# Patient Record
Sex: FEMALE | Race: WHITE | Employment: OTHER | URBAN - METROPOLITAN AREA
[De-identification: names, ages, dates, MRNs, and addresses within clinical notes are randomized per-mention and may not be internally consistent; named-entity substitution may affect disease eponyms.]

---

## 2017-01-30 ENCOUNTER — HOSPITAL ENCOUNTER (OUTPATIENT)
Dept: PULMONOLOGY | Age: 62
Discharge: HOME | End: 2017-01-30
Attending: INTERNAL MEDICINE | Admitting: INTERNAL MEDICINE

## 2017-01-30 ENCOUNTER — OFFICE VISIT (OUTPATIENT)
Dept: PULMONOLOGY | Age: 62
End: 2017-01-30

## 2017-01-30 VITALS
TEMPERATURE: 97.4 F | HEIGHT: 65 IN | SYSTOLIC BLOOD PRESSURE: 98 MMHG | DIASTOLIC BLOOD PRESSURE: 64 MMHG | OXYGEN SATURATION: 96 % | BODY MASS INDEX: 19.43 KG/M2 | HEART RATE: 107 BPM | WEIGHT: 116.6 LBS | RESPIRATION RATE: 24 BRPM

## 2017-01-30 DIAGNOSIS — Z23 NEED FOR INFLUENZA VACCINATION: ICD-10-CM

## 2017-01-30 DIAGNOSIS — K21.9 GASTROESOPHAGEAL REFLUX DISEASE, ESOPHAGITIS PRESENCE NOT SPECIFIED: ICD-10-CM

## 2017-01-30 DIAGNOSIS — F17.200 CURRENT SMOKER: ICD-10-CM

## 2017-01-30 DIAGNOSIS — J44.9 CHRONIC OBSTRUCTIVE PULMONARY DISEASE, UNSPECIFIED COPD TYPE (HCC): ICD-10-CM

## 2017-01-30 DIAGNOSIS — G47.34 NOCTURNAL HYPOXIA: ICD-10-CM

## 2017-01-30 DIAGNOSIS — R93.89 ABNORMAL FINDINGS ON DIAGNOSTIC IMAGING OF OTHER SPECIFIED BODY STRUCTURES: ICD-10-CM

## 2017-01-30 DIAGNOSIS — J47.9 BRONCHIECTASIS WITHOUT COMPLICATION (HCC): ICD-10-CM

## 2017-01-30 DIAGNOSIS — R93.89 ABNORMAL CT SCAN, CHEST: Primary | ICD-10-CM

## 2017-01-30 PROCEDURE — 99214 OFFICE O/P EST MOD 30 MIN: CPT | Performed by: INTERNAL MEDICINE

## 2017-01-30 PROCEDURE — G0008 ADMIN INFLUENZA VIRUS VAC: HCPCS | Performed by: INTERNAL MEDICINE

## 2017-01-30 PROCEDURE — 90688 IIV4 VACCINE SPLT 0.5 ML IM: CPT | Performed by: INTERNAL MEDICINE

## 2017-01-30 RX ORDER — ALBUTEROL SULFATE 2.5 MG/3ML
2.5 SOLUTION RESPIRATORY (INHALATION) ONCE
Status: COMPLETED | OUTPATIENT
Start: 2017-01-30 | End: 2017-01-30

## 2017-01-30 RX ADMIN — ALBUTEROL SULFATE 2.5 MG: 2.5 SOLUTION RESPIRATORY (INHALATION) at 10:10

## 2017-02-07 ENCOUNTER — OFFICE VISIT (OUTPATIENT)
Dept: FAMILY MEDICINE CLINIC | Age: 62
End: 2017-02-07

## 2017-02-07 VITALS
HEART RATE: 67 BPM | BODY MASS INDEX: 19.99 KG/M2 | OXYGEN SATURATION: 97 % | WEIGHT: 120 LBS | HEIGHT: 65 IN | SYSTOLIC BLOOD PRESSURE: 100 MMHG | DIASTOLIC BLOOD PRESSURE: 68 MMHG

## 2017-02-07 DIAGNOSIS — E78.00 PURE HYPERCHOLESTEROLEMIA: ICD-10-CM

## 2017-02-07 DIAGNOSIS — E87.1 HYPONATREMIA: ICD-10-CM

## 2017-02-07 DIAGNOSIS — K21.9 GASTROESOPHAGEAL REFLUX DISEASE WITHOUT ESOPHAGITIS: ICD-10-CM

## 2017-02-07 DIAGNOSIS — Z12.31 ENCOUNTER FOR SCREENING MAMMOGRAM FOR MALIGNANT NEOPLASM OF BREAST: ICD-10-CM

## 2017-02-07 DIAGNOSIS — J84.10 PULMONARY FIBROSIS (HCC): ICD-10-CM

## 2017-02-07 DIAGNOSIS — Z79.899 CURRENT USE OF PROTON PUMP INHIBITOR: ICD-10-CM

## 2017-02-07 DIAGNOSIS — J44.9 CHRONIC OBSTRUCTIVE PULMONARY DISEASE, UNSPECIFIED COPD TYPE (HCC): Primary | ICD-10-CM

## 2017-02-07 DIAGNOSIS — R68.89 COLD INTOLERANCE: ICD-10-CM

## 2017-02-07 DIAGNOSIS — F17.200 TOBACCO DEPENDENCE: ICD-10-CM

## 2017-02-07 DIAGNOSIS — J84.9 ILD (INTERSTITIAL LUNG DISEASE) (HCC): ICD-10-CM

## 2017-02-07 LAB
ANION GAP SERPL CALCULATED.3IONS-SCNC: 16 MMOL/L (ref 3–16)
BASOPHILS ABSOLUTE: 0.1 K/UL (ref 0–0.2)
BASOPHILS RELATIVE PERCENT: 0.9 %
BUN BLDV-MCNC: 14 MG/DL (ref 7–20)
CALCIUM SERPL-MCNC: 9.1 MG/DL (ref 8.3–10.6)
CHLORIDE BLD-SCNC: 94 MMOL/L (ref 99–110)
CHOLESTEROL, TOTAL: 179 MG/DL (ref 0–199)
CO2: 20 MMOL/L (ref 21–32)
CREAT SERPL-MCNC: 0.5 MG/DL (ref 0.6–1.2)
EOSINOPHILS ABSOLUTE: 0.1 K/UL (ref 0–0.6)
EOSINOPHILS RELATIVE PERCENT: 1 %
GFR AFRICAN AMERICAN: >60
GFR NON-AFRICAN AMERICAN: >60
GLUCOSE BLD-MCNC: 83 MG/DL (ref 70–99)
HCT VFR BLD CALC: 36.3 % (ref 36–48)
HDLC SERPL-MCNC: 81 MG/DL (ref 40–60)
HEMOGLOBIN: 12.2 G/DL (ref 12–16)
LDL CHOLESTEROL CALCULATED: 77 MG/DL
LYMPHOCYTES ABSOLUTE: 2.3 K/UL (ref 1–5.1)
LYMPHOCYTES RELATIVE PERCENT: 20 %
MAGNESIUM: 1.9 MG/DL (ref 1.8–2.4)
MCH RBC QN AUTO: 31 PG (ref 26–34)
MCHC RBC AUTO-ENTMCNC: 33.5 G/DL (ref 31–36)
MCV RBC AUTO: 92.5 FL (ref 80–100)
MONOCYTES ABSOLUTE: 0.7 K/UL (ref 0–1.3)
MONOCYTES RELATIVE PERCENT: 6.5 %
NEUTROPHILS ABSOLUTE: 8.2 K/UL (ref 1.7–7.7)
NEUTROPHILS RELATIVE PERCENT: 71.6 %
PDW BLD-RTO: 13.2 % (ref 12.4–15.4)
PLATELET # BLD: 221 K/UL (ref 135–450)
PMV BLD AUTO: 8.2 FL (ref 5–10.5)
POTASSIUM SERPL-SCNC: 4.5 MMOL/L (ref 3.5–5.1)
RBC # BLD: 3.92 M/UL (ref 4–5.2)
SODIUM BLD-SCNC: 130 MMOL/L (ref 136–145)
TRIGL SERPL-MCNC: 103 MG/DL (ref 0–150)
TSH REFLEX: 0.7 UIU/ML (ref 0.27–4.2)
VITAMIN B-12: 597 PG/ML (ref 211–911)
VLDLC SERPL CALC-MCNC: 21 MG/DL
WBC # BLD: 11.5 K/UL (ref 4–11)

## 2017-02-07 PROCEDURE — 36415 COLL VENOUS BLD VENIPUNCTURE: CPT | Performed by: FAMILY MEDICINE

## 2017-02-07 PROCEDURE — 99214 OFFICE O/P EST MOD 30 MIN: CPT | Performed by: FAMILY MEDICINE

## 2017-02-07 RX ORDER — PANTOPRAZOLE SODIUM 20 MG/1
TABLET, DELAYED RELEASE ORAL
Qty: 30 TABLET | Refills: 11 | Status: SHIPPED | OUTPATIENT
Start: 2017-02-07 | End: 2017-06-06

## 2017-02-07 RX ORDER — SERTRALINE HYDROCHLORIDE 100 MG/1
TABLET, FILM COATED ORAL
Qty: 60 TABLET | Refills: 11 | Status: SHIPPED | OUTPATIENT
Start: 2017-02-07 | End: 2018-02-12 | Stop reason: SDUPTHER

## 2017-02-07 RX ORDER — TRAZODONE HYDROCHLORIDE 150 MG/1
TABLET ORAL
Qty: 180 TABLET | Refills: 11 | Status: SHIPPED | OUTPATIENT
Start: 2017-02-07 | End: 2018-01-25 | Stop reason: SDUPTHER

## 2017-02-07 RX ORDER — IBUPROFEN 800 MG/1
TABLET ORAL
Qty: 90 TABLET | Refills: 11 | Status: SHIPPED | OUTPATIENT
Start: 2017-02-07 | End: 2018-04-25 | Stop reason: SDUPTHER

## 2017-02-07 RX ORDER — CYCLOBENZAPRINE HCL 5 MG
TABLET ORAL
Qty: 90 TABLET | Refills: 11 | Status: SHIPPED | OUTPATIENT
Start: 2017-02-07 | End: 2018-01-25 | Stop reason: SDUPTHER

## 2017-02-07 RX ORDER — MONTELUKAST SODIUM 10 MG/1
TABLET ORAL
Qty: 30 TABLET | Refills: 11 | Status: SHIPPED | OUTPATIENT
Start: 2017-02-07 | End: 2018-01-25 | Stop reason: SDUPTHER

## 2017-02-07 RX ORDER — BUSPIRONE HYDROCHLORIDE 30 MG/1
TABLET ORAL
Qty: 60 TABLET | Refills: 11 | Status: SHIPPED | OUTPATIENT
Start: 2017-02-07 | End: 2017-11-27 | Stop reason: SDUPTHER

## 2017-02-07 RX ORDER — ALENDRONATE SODIUM 70 MG/1
TABLET ORAL
Qty: 4 TABLET | Refills: 11 | Status: SHIPPED | OUTPATIENT
Start: 2017-02-07 | End: 2017-02-20

## 2017-02-07 RX ORDER — ARIPIPRAZOLE 5 MG/1
TABLET ORAL
Qty: 30 TABLET | Refills: 1 | Status: CANCELLED | OUTPATIENT
Start: 2017-02-07

## 2017-02-07 RX ORDER — SIMVASTATIN 20 MG
TABLET ORAL
Qty: 30 TABLET | Refills: 11 | Status: SHIPPED | OUTPATIENT
Start: 2017-02-07 | End: 2018-01-25 | Stop reason: SDUPTHER

## 2017-02-08 ASSESSMENT — ENCOUNTER SYMPTOMS
ABDOMINAL PAIN: 0
COUGH: 1
WHEEZING: 1
SHORTNESS OF BREATH: 1

## 2017-02-13 ENCOUNTER — TELEPHONE (OUTPATIENT)
Dept: FAMILY MEDICINE CLINIC | Age: 62
End: 2017-02-13

## 2017-02-20 RX ORDER — ALENDRONATE SODIUM 70 MG/1
TABLET ORAL
Qty: 4 TABLET | Refills: 5 | Status: SHIPPED | OUTPATIENT
Start: 2017-02-20 | End: 2017-09-03 | Stop reason: SDUPTHER

## 2017-03-20 RX ORDER — TIOTROPIUM BROMIDE 18 UG/1
CAPSULE ORAL; RESPIRATORY (INHALATION)
Qty: 30 CAPSULE | Refills: 5 | Status: SHIPPED | OUTPATIENT
Start: 2017-03-20 | End: 2017-10-31 | Stop reason: SDUPTHER

## 2017-04-03 DIAGNOSIS — J44.9 CHRONIC OBSTRUCTIVE PULMONARY DISEASE, UNSPECIFIED COPD TYPE (HCC): Primary | ICD-10-CM

## 2017-04-03 RX ORDER — LEVALBUTEROL TARTRATE 45 UG/1
2 AEROSOL, METERED ORAL EVERY 4 HOURS PRN
Qty: 1 INHALER | Refills: 5 | Status: SHIPPED | OUTPATIENT
Start: 2017-04-03 | End: 2017-09-05

## 2017-04-03 RX ORDER — LEVALBUTEROL TARTRATE 45 UG/1
AEROSOL, METERED ORAL
Qty: 1 INHALER | Refills: 6 | Status: SHIPPED | OUTPATIENT
Start: 2017-04-03 | End: 2017-09-05

## 2017-04-19 ENCOUNTER — TELEPHONE (OUTPATIENT)
Dept: PULMONOLOGY | Age: 62
End: 2017-04-19

## 2017-05-04 ENCOUNTER — TELEPHONE (OUTPATIENT)
Dept: PULMONOLOGY | Age: 62
End: 2017-05-04

## 2017-05-15 ENCOUNTER — TELEPHONE (OUTPATIENT)
Dept: FAMILY MEDICINE CLINIC | Age: 62
End: 2017-05-15

## 2017-05-23 RX ORDER — PROPRANOLOL HYDROCHLORIDE 40 MG/1
TABLET ORAL
Qty: 60 TABLET | Refills: 5 | Status: SHIPPED | OUTPATIENT
Start: 2017-05-23 | End: 2017-10-09 | Stop reason: SDUPTHER

## 2017-05-25 ENCOUNTER — HOSPITAL ENCOUNTER (OUTPATIENT)
Dept: PULMONOLOGY | Age: 62
Discharge: HOME | End: 2017-05-25
Attending: INTERNAL MEDICINE | Admitting: INTERNAL MEDICINE

## 2017-05-25 DIAGNOSIS — R93.89 ABNORMAL FINDINGS ON DIAGNOSTIC IMAGING OF OTHER SPECIFIED BODY STRUCTURES: ICD-10-CM

## 2017-05-25 RX ORDER — ALBUTEROL SULFATE 2.5 MG/3ML
2.5 SOLUTION RESPIRATORY (INHALATION) ONCE
Status: COMPLETED | OUTPATIENT
Start: 2017-05-25 | End: 2017-05-25

## 2017-05-25 RX ADMIN — ALBUTEROL SULFATE 2.5 MG: 2.5 SOLUTION RESPIRATORY (INHALATION) at 10:04

## 2017-06-13 ENCOUNTER — OFFICE VISIT (OUTPATIENT)
Dept: ORTHOPEDIC SURGERY | Age: 62
End: 2017-06-13

## 2017-06-13 VITALS — HEIGHT: 65 IN | BODY MASS INDEX: 19.98 KG/M2 | WEIGHT: 119.93 LBS

## 2017-06-13 DIAGNOSIS — S42.035A CLOSED NONDISPLACED FRACTURE OF ACROMIAL END OF LEFT CLAVICLE, INITIAL ENCOUNTER: ICD-10-CM

## 2017-06-13 DIAGNOSIS — M25.512 LEFT SHOULDER PAIN, UNSPECIFIED CHRONICITY: Primary | ICD-10-CM

## 2017-06-13 PROCEDURE — MISCLOD MISC IP SERVICE NONBILLABLE: Performed by: PHYSICIAN ASSISTANT

## 2017-06-13 PROCEDURE — 73030 X-RAY EXAM OF SHOULDER: CPT | Performed by: PHYSICIAN ASSISTANT

## 2017-06-13 RX ORDER — HYDROCODONE BITARTRATE AND ACETAMINOPHEN 5; 325 MG/1; MG/1
1 TABLET ORAL EVERY 6 HOURS PRN
Qty: 30 TABLET | Refills: 0 | Status: SHIPPED | OUTPATIENT
Start: 2017-06-13 | End: 2017-06-20

## 2017-08-28 ENCOUNTER — TELEPHONE (OUTPATIENT)
Dept: FAMILY MEDICINE CLINIC | Age: 62
End: 2017-08-28

## 2017-08-31 ENCOUNTER — OFFICE VISIT (OUTPATIENT)
Dept: PULMONOLOGY | Age: 62
End: 2017-08-31

## 2017-08-31 VITALS
TEMPERATURE: 97.6 F | HEART RATE: 76 BPM | RESPIRATION RATE: 16 BRPM | DIASTOLIC BLOOD PRESSURE: 70 MMHG | HEIGHT: 65 IN | SYSTOLIC BLOOD PRESSURE: 106 MMHG | WEIGHT: 129 LBS | OXYGEN SATURATION: 96 % | BODY MASS INDEX: 21.49 KG/M2

## 2017-08-31 DIAGNOSIS — F17.200 CURRENT SMOKER: ICD-10-CM

## 2017-08-31 DIAGNOSIS — J44.9 MODERATE COPD (CHRONIC OBSTRUCTIVE PULMONARY DISEASE) (HCC): ICD-10-CM

## 2017-08-31 DIAGNOSIS — J47.9 BRONCHIECTASIS WITHOUT COMPLICATION (HCC): ICD-10-CM

## 2017-08-31 DIAGNOSIS — K21.9 GASTROESOPHAGEAL REFLUX DISEASE, ESOPHAGITIS PRESENCE NOT SPECIFIED: ICD-10-CM

## 2017-08-31 DIAGNOSIS — R93.89 ABNORMAL CT SCAN, CHEST: Primary | ICD-10-CM

## 2017-08-31 PROCEDURE — 99214 OFFICE O/P EST MOD 30 MIN: CPT | Performed by: INTERNAL MEDICINE

## 2017-09-05 ENCOUNTER — TELEPHONE (OUTPATIENT)
Dept: PULMONOLOGY | Age: 62
End: 2017-09-05

## 2017-09-05 RX ORDER — ALBUTEROL SULFATE 90 UG/1
2 AEROSOL, METERED RESPIRATORY (INHALATION) EVERY 6 HOURS PRN
Qty: 1 INHALER | Refills: 5 | Status: SHIPPED | OUTPATIENT
Start: 2017-09-05 | End: 2018-08-22 | Stop reason: SDUPTHER

## 2017-09-05 RX ORDER — ALENDRONATE SODIUM 70 MG/1
TABLET ORAL
Qty: 4 TABLET | Refills: 5 | Status: SHIPPED | OUTPATIENT
Start: 2017-09-05 | End: 2018-01-25 | Stop reason: SDUPTHER

## 2017-10-09 RX ORDER — PROPRANOLOL HYDROCHLORIDE 40 MG/1
TABLET ORAL
Qty: 60 TABLET | Refills: 5 | Status: SHIPPED | OUTPATIENT
Start: 2017-10-09 | End: 2018-03-15 | Stop reason: SDUPTHER

## 2017-10-13 ENCOUNTER — HOSPITAL ENCOUNTER (OUTPATIENT)
Dept: PULMONOLOGY | Age: 62
Discharge: HOME | End: 2017-10-13
Attending: INTERNAL MEDICINE | Admitting: INTERNAL MEDICINE

## 2017-10-13 DIAGNOSIS — F17.200 NICOTINE DEPENDENCE, UNCOMPLICATED, UNSPECIFIED NICOTINE PRODUCT TYPE: ICD-10-CM

## 2017-10-13 DIAGNOSIS — R93.89 ABNORMAL FINDINGS ON DIAGNOSTIC IMAGING OF OTHER SPECIFIED BODY STRUCTURES: ICD-10-CM

## 2017-10-13 RX ORDER — ALBUTEROL SULFATE 2.5 MG/3ML
2.5 SOLUTION RESPIRATORY (INHALATION) ONCE
Status: DISCONTINUED | OUTPATIENT
Start: 2017-10-13 | End: 2017-10-13

## 2017-10-13 NOTE — PROCEDURES
[address]                [address] [address]                              PULMONARY FUNCTION    PATIENT NAME: Barb Dumont                        :             1955  MED REC NO:   [id number]                           ROOM:  ACCOUNT NO:   [de-identified]                           ADMISSION DATE:  10/13/2017  PROVIDER:     Quin Johnson MD    DATE OF PROCEDURE:  10/13/2017    INDICATIONS:  COPD. RESULTS:    1. Spirometry revealed evidence of severe obstructive defect. FEV1  is 1.27 L, which is 48% of predicted. FEV1/FVC ratio of 63%. 2.  Lung volume revealed normal total lung capacity of 4.78 L, which  is 89% of predicted. Air trapping residual volume 2.75 L, which is  141% of predicted. 3.  Diffusion capacity is severely decreased at 9.16, which is 38% of  predicted. 4.  Flow volume loops consistent with obstructive defect. 5.  Six-minute walk done per [facility] Protocol. The  patient was able to walk 1120 feet. Saturation on room air at rest  was 96% with a heart rate of 80. No significant desaturation on  exertion. Maximum heart rate is 105. CONCLUSION:  1. Severe obstructive defect with air trapping and severely decreased  diffusion capacity. 2.  Six-minute walk revealed no significant desaturation on exertion.         Deniz Ortiz MD    D: 10/13/2017 11:19:39       T: 10/13/2017 11:41:51     [email or web address]  Job#: [id number]     Doc#: [id number]

## 2017-10-16 RX ORDER — ALENDRONATE SODIUM 70 MG/1
TABLET ORAL
Qty: 4 TABLET | Refills: 5 | OUTPATIENT
Start: 2017-10-16

## 2017-10-19 ENCOUNTER — OFFICE VISIT (OUTPATIENT)
Dept: PULMONOLOGY | Age: 62
End: 2017-10-19

## 2017-10-19 VITALS
HEIGHT: 65 IN | RESPIRATION RATE: 20 BRPM | TEMPERATURE: 97.6 F | WEIGHT: 133.6 LBS | DIASTOLIC BLOOD PRESSURE: 74 MMHG | SYSTOLIC BLOOD PRESSURE: 116 MMHG | OXYGEN SATURATION: 95 % | HEART RATE: 60 BPM | BODY MASS INDEX: 22.26 KG/M2

## 2017-10-19 DIAGNOSIS — G47.34 NOCTURNAL HYPOXIA: ICD-10-CM

## 2017-10-19 DIAGNOSIS — F17.200 CURRENT SMOKER: ICD-10-CM

## 2017-10-19 DIAGNOSIS — R93.89 ABNORMAL CT OF THE CHEST: Primary | ICD-10-CM

## 2017-10-19 DIAGNOSIS — Z23 NEED FOR INFLUENZA VACCINATION: ICD-10-CM

## 2017-10-19 DIAGNOSIS — J44.9 MODERATE COPD (CHRONIC OBSTRUCTIVE PULMONARY DISEASE) (HCC): ICD-10-CM

## 2017-10-19 DIAGNOSIS — Z87.891 PERSONAL HISTORY OF TOBACCO USE, PRESENTING HAZARDS TO HEALTH: ICD-10-CM

## 2017-10-19 DIAGNOSIS — J47.9 BRONCHIECTASIS WITHOUT COMPLICATION (HCC): ICD-10-CM

## 2017-10-19 PROCEDURE — 3023F SPIROM DOC REV: CPT | Performed by: INTERNAL MEDICINE

## 2017-10-19 PROCEDURE — 4004F PT TOBACCO SCREEN RCVD TLK: CPT | Performed by: INTERNAL MEDICINE

## 2017-10-19 PROCEDURE — 3014F SCREEN MAMMO DOC REV: CPT | Performed by: INTERNAL MEDICINE

## 2017-10-19 PROCEDURE — 3017F COLORECTAL CA SCREEN DOC REV: CPT | Performed by: INTERNAL MEDICINE

## 2017-10-19 PROCEDURE — G8420 CALC BMI NORM PARAMETERS: HCPCS | Performed by: INTERNAL MEDICINE

## 2017-10-19 PROCEDURE — G8484 FLU IMMUNIZE NO ADMIN: HCPCS | Performed by: INTERNAL MEDICINE

## 2017-10-19 PROCEDURE — G0008 ADMIN INFLUENZA VIRUS VAC: HCPCS | Performed by: INTERNAL MEDICINE

## 2017-10-19 PROCEDURE — 99214 OFFICE O/P EST MOD 30 MIN: CPT | Performed by: INTERNAL MEDICINE

## 2017-10-19 PROCEDURE — 90688 IIV4 VACCINE SPLT 0.5 ML IM: CPT | Performed by: INTERNAL MEDICINE

## 2017-10-19 PROCEDURE — G8926 SPIRO NO PERF OR DOC: HCPCS | Performed by: INTERNAL MEDICINE

## 2017-10-19 PROCEDURE — G8427 DOCREV CUR MEDS BY ELIG CLIN: HCPCS | Performed by: INTERNAL MEDICINE

## 2017-10-19 RX ORDER — PANTOPRAZOLE SODIUM 20 MG/1
20 TABLET, DELAYED RELEASE ORAL DAILY
COMMUNITY
End: 2018-02-27

## 2017-10-19 NOTE — PATIENT INSTRUCTIONS
Tips to Help You Stop Smoking (taken from Up-To-Date)      Cigarette smoking is a preventable cause of death in the [place]. Quitting smoking now can decrease your risk of getting smoking-related illnesses like heart disease, stroke, cancer & COPD. S = Set a quit date. T = Tell family, friends, and the people around you that you plan to quit. A = Anticipate or plan ahead for the tough times you'll face while quitting. R = Remove cigarettes and other tobacco products from your home, car, and work. T = Talk to your doctor about getting help to quit. Your doctor may give you medicines to reduce your craving for cigarettes & the unpleasant symptoms that happen when you stop smoking (called withdrawal symptoms). What are the symptoms of withdrawal?  The symptoms include:   ?Trouble sleeping   ? Being irritable, anxious or restless   ? Getting frustrated or angry   ? Having trouble thinking clearly  Nicotine replacement therapy eases withdrawal and reduces your bodys craving for nicotine, the main drug found in cigarettes. Non-prescription forms of nicotine replacement include skin patches, lozenges, and gum. Two prescription medications are available that have been proven to help people stop smoking:  ? Bupropion is a prescription medicine that reduces your desire to smoke. This medicine is sold under the brand names Zyban and Wellbutrin & as a generic formulation. ? Varenicline (brand name: Chantix) is a prescription medicine that reduces withdrawal symptoms and cigarette cravings. If you take bupropion or varenicline and you have any new or worsening depressed mood or thoughts of harming yourself or someone else, stop taking the medicine and call your doctor. Buproprion should not be taken by anyone with a history of seizure or epilepsy. Will I gain weight if I quit?  Yes, you might gain a few pounds.  But quitting smoking will have a much more positive effect on your health than weighing a few pounds more. Plus, you can help prevent some weight gain by being more active and eating less. Taking the medicine bupropion might help control weight gain. What else can I do to improve my chances of quitting?  You can:  ?Start exercising. ?Stay away from smokers and places that you associate with smoking. If people close to you smoke, ask them to quit with you. ? Keep gum, hard candy, or something to put in your mouth handy. If you get a craving for a cigarette, try one of these instead. ?Dont give up, even if you start smoking again. It takes most people a few tries before they succeed. You can also get help from a free phone line (0-380-QUIT-NOW) or go online to www.smokefree.gov. Your pulmonary team is here to help you quit.   Call for assistance [phone]

## 2017-10-19 NOTE — PROGRESS NOTES
Vaccine Information Sheet, \"Influenza - Inactivated\"  given to Boni Leone, or parent/legal guardian of  Boni Leone and verbalized understanding. Patient responses:    Have you ever had a reaction to a flu vaccine? No  Are you able to eat eggs without adverse effects? Yes  Do you have any current illness? No  Have you ever had Guillian Strawn Syndrome? No    Flu vaccine given per order. Please see immunization tab.

## 2017-10-19 NOTE — PROGRESS NOTES
TOBACCO:   reports that she has been smoking Cigarettes. She has a 30.00 pack-year smoking history. She has never used smokeless tobacco.  ETOH:   reports that she does not drink alcohol.       Family History:       Problem Relation Age of Onset    Asthma Mother     Diabetes Mother     Emphysema Mother     Heart Failure Mother     Hypertension Mother     Heart Disease Mother     High Cholesterol Mother     Cancer Mother     Depression Mother     Diabetes Father     Emphysema Father     Heart Failure Father     Hypertension Father     Heart Disease Father     High Cholesterol Father     Substance Abuse Father     Emphysema Paternal Grandfather     Diabetes Sister     High Cholesterol Sister     Vision Loss Maternal Uncle        Current Medications:    Current Outpatient Prescriptions:     pantoprazole (PROTONIX) 20 MG tablet, Take 20 mg by mouth daily, Disp: , Rfl:     propranolol (INDERAL) 40 MG tablet, TAKE 1 TABLET BY MOUTH TWICE A DAY, Disp: 60 tablet, Rfl: 5    alendronate (FOSAMAX) 70 MG tablet, TAKE 1 TABLET BY MOUTH EACH WEEK, Disp: 4 tablet, Rfl: 5    albuterol sulfate HFA (VENTOLIN HFA) 108 (90 Base) MCG/ACT inhaler, Inhale 2 puffs into the lungs every 6 hours as needed for Wheezing, Disp: 1 Inhaler, Rfl: 5    ADVAIR DISKUS 250-50 MCG/DOSE AEPB, INHALE 1 PUFF TWICE DAILY, Disp: 1 Inhaler, Rfl: 5    SPIRIVA HANDIHALER 18 MCG inhalation capsule, INHALE 1 CAPSULE BY MOUTH INTO LUNGS EVERY DAY, Disp: 30 capsule, Rfl: 5    busPIRone (BUSPAR) 30 MG tablet, TAKE 1 TABLET BY MOUTH TWICE A DAY, Disp: 60 tablet, Rfl: 11    traZODone (DESYREL) 150 MG tablet, TAKE 2 TABLETS BY MOUTH AT BEDTIME, Disp: 180 tablet, Rfl: 11    montelukast (SINGULAIR) 10 MG tablet, TAKE 1 TABLET BY MOUTH EACH NIGHT, Disp: 30 tablet, Rfl: 11    simvastatin (ZOCOR) 20 MG tablet, TAKE 1 TABLET BY MOUTH EVERY EVENING, Disp: 30 tablet, Rfl: 11    ibuprofen (ADVIL;MOTRIN) 800 MG tablet, TAKE 1 TABLET BY MOUTH TLC 4.57(85%)   DLCO 08.31(35%) 6MW 1000F L O2 94%  PFTs 04/25/2016 FVC 1.63 (50%) FEV1 0.93(37%) TLC 4.96(97%)   DLCO 09.66(42%) 6MW 1140F L O2 91%  PFTs 12/09/2013                            FEV1 1.54(59%) TLC 5.34(104%) DLCO 11.30(49%) 6MW 1140F L O2 91%. PFTs 06/03/2013                            FEV1 1.69(65%) TLC 4.81(93%)   DLCO 09.44(41%) 6MW 1400F L O2 95%. PFTs 11/28/2012                            FEV1 1.67(64%) TLC 5.08(98%)   DLCO 11.94(51%) 6MW 1400F L O2 95%. PFTs 06/19/2012                            FEV1 1.59(60%) TLC 4.66(90%)   DLCO 10.21(44%) 6MW 1280F L O2 96%. PFTs 03/07/2012                            FEV1 1.72(70%) TLC 4.49(87%)   DLCO 10.72(54%)  PFTs 08/03/2011                            FEV1 1.71(72%) TLC 4.72(96%)   DLCO 12.3 (63%)  PFTs 01/10/2011                            FEV1 1.76           TLC 4.54             DLCO 10.50  PFTs 03/30/2010                            FEV1 1.96           TLC 4.86             DLCO 14.13  PFTs 03/03/2009                            FEV1 2.04           TLC 5.08             DLCO 14.02          CT chest 4/1/2016  Mediastinum: Mild dilation of the ascending thoracic aorta. Borderline enlarged precarinal lymph node measures 10 mm in short axis, unchanged. No new lymphadenopathy is identified. Coronary artery calcifications noted. Lungs/pleura: Peripheral irregular interstitial opacities are appreciated, scattered within the left lower lobes and within the anterior aspects of the upper lobes. Temporal heterogeneity is suggested with areas ground-glass opacity intermixed with areas of fibrosis with architectural distortion. Mild bronchiectasis within the lower lobes detected bilaterally, greater on the left. There is a rounded area of what is probably consolidation at left lung base immediately adjacent to the left hemidiaphragm. On the 5 mm imaging, this is best visualized on image number 82, measuring roughly 9 mm maximally.  All these findings are superimposed on a background of moderate emphysema. Upper Abdomen: Imaging the upper abdomen is unremarkable in appearance. Soft Tissues/Bones: No osteolytic or osteoblastic bone lesions are appreciated. Vertebroplasty within mid thoracic spine is identified. Mild anterior wedging of the thoracic vertebral bodies at other levels is re- demonstrated. LDCT 10/13/2017  images reviewed by me and showed:   1. LungRADS Category: LCS-2   2. LungRADS Category S: Emphysema   3. Incidental findings as above. 4. Annual LDCT screening study in 12 months. 4/5/2016 normal/negative RF 14, SCL70 SSA SSB aldolase CK GIORGI CCP Anti MALORIE-1    4/25/2016 Elevated IgA normal IgG and IgM      IgE 140 with unremarkable immunocap      Assessment:      · Abnormal CT 4/1/2016 with GGO- DDx RB-ILD, NSIP, HP, DIP, eosinophilic pneumonitis, aspiration and CTD-ILD. Favor smoking related ILD vs HP. Bronch 4/6/16 purulent secretions and grew strep pneumoniae. Negative AFB. Got rid of her Parrot   · Moderate obstructive airways diease secondary to COPD and asthma. · Bronchiectasis   · Nocturnal hypoxia on 2LPM   · >30 pack-years smoking                                                     Plan:   · Will continue to follow physiologically with PFTs 6MW in 4/2018  · Advised to avoid exposure/having birds   · Advair 250/50 BID, Spiriva daily, Singulair daily, and Albuterol PRN  · Advised to use O2 2LPM at night  · Pulmonary rehab referral    · Patient is up to date with Pneumococcal vaccine   · Influenza vaccine today   · Mucokinetic therapies (Hypertonic saline QID). · Acapella QID to mobilize respiratory secretions  · CT chest, low dose protocol, screening for lung cancer 10/2018. Risks, benefits and alternatives including doing nothing were discussed with patient. · Smoking cessation counseling. Patient was advised to visit [email or web address] gov and call 1800QUITNOW for assistance.  Will refer to [facility] smoking cessation program.

## 2017-10-23 ENCOUNTER — TELEPHONE (OUTPATIENT)
Dept: CASE MANAGEMENT | Age: 62
End: 2017-10-23

## 2017-10-23 NOTE — TELEPHONE ENCOUNTER
Annual lung screen on 10-13-17. LRAD2. Recommended screen in one year. Reviewed by ordering physician. Ordering office has contacted pt with results.

## 2017-11-27 RX ORDER — BUSPIRONE HYDROCHLORIDE 30 MG/1
TABLET ORAL
Qty: 180 TABLET | Refills: 3 | Status: SHIPPED | OUTPATIENT
Start: 2017-11-27 | End: 2018-12-01 | Stop reason: SDUPTHER

## 2018-01-08 RX ORDER — RANITIDINE 150 MG/1
TABLET ORAL
Qty: 30 TABLET | Refills: 5 | OUTPATIENT
Start: 2018-01-08

## 2018-01-10 ENCOUNTER — TELEPHONE (OUTPATIENT)
Dept: FAMILY MEDICINE CLINIC | Age: 63
End: 2018-01-10

## 2018-01-16 ENCOUNTER — TELEPHONE (OUTPATIENT)
Dept: PULMONOLOGY | Age: 63
End: 2018-01-16

## 2018-01-16 DIAGNOSIS — J84.10 PULMONARY FIBROSIS (HCC): Primary | ICD-10-CM

## 2018-01-16 DIAGNOSIS — J84.9 ILD (INTERSTITIAL LUNG DISEASE) (HCC): ICD-10-CM

## 2018-01-25 ENCOUNTER — OFFICE VISIT (OUTPATIENT)
Dept: FAMILY MEDICINE CLINIC | Age: 63
End: 2018-01-25

## 2018-01-25 VITALS
WEIGHT: 137 LBS | BODY MASS INDEX: 22.82 KG/M2 | HEART RATE: 92 BPM | DIASTOLIC BLOOD PRESSURE: 70 MMHG | SYSTOLIC BLOOD PRESSURE: 120 MMHG | HEIGHT: 65 IN | OXYGEN SATURATION: 95 %

## 2018-01-25 DIAGNOSIS — E87.1 HYPONATREMIA: ICD-10-CM

## 2018-01-25 DIAGNOSIS — Z23 NEED FOR TDAP VACCINATION: ICD-10-CM

## 2018-01-25 DIAGNOSIS — E78.00 PURE HYPERCHOLESTEROLEMIA: ICD-10-CM

## 2018-01-25 DIAGNOSIS — F33.41 RECURRENT MAJOR DEPRESSIVE DISORDER, IN PARTIAL REMISSION (HCC): ICD-10-CM

## 2018-01-25 DIAGNOSIS — J84.10 PULMONARY FIBROSIS (HCC): ICD-10-CM

## 2018-01-25 DIAGNOSIS — Z12.31 ENCOUNTER FOR SCREENING MAMMOGRAM FOR BREAST CANCER: ICD-10-CM

## 2018-01-25 DIAGNOSIS — J44.9 CHRONIC OBSTRUCTIVE PULMONARY DISEASE, UNSPECIFIED COPD TYPE (HCC): ICD-10-CM

## 2018-01-25 DIAGNOSIS — J84.9 ILD (INTERSTITIAL LUNG DISEASE) (HCC): ICD-10-CM

## 2018-01-25 DIAGNOSIS — F12.90 MARIJUANA USE: ICD-10-CM

## 2018-01-25 DIAGNOSIS — R82.90 ABNORMAL URINE ODOR: ICD-10-CM

## 2018-01-25 DIAGNOSIS — H61.23 BILATERAL IMPACTED CERUMEN: ICD-10-CM

## 2018-01-25 DIAGNOSIS — M79.7 FIBROMYALGIA: ICD-10-CM

## 2018-01-25 DIAGNOSIS — F17.210 CIGARETTE NICOTINE DEPENDENCE WITHOUT COMPLICATION: ICD-10-CM

## 2018-01-25 DIAGNOSIS — R53.82 CHRONIC FATIGUE: ICD-10-CM

## 2018-01-25 DIAGNOSIS — Z00.00 ROUTINE GENERAL MEDICAL EXAMINATION AT A HEALTH CARE FACILITY: Primary | ICD-10-CM

## 2018-01-25 PROBLEM — S42.035A CLOSED NONDISPLACED FRACTURE OF ACROMIAL END OF LEFT CLAVICLE: Status: RESOLVED | Noted: 2017-06-13 | Resolved: 2018-01-25

## 2018-01-25 LAB
A/G RATIO: 1.6 (ref 1.1–2.2)
ALBUMIN SERPL-MCNC: 4.6 G/DL (ref 3.4–5)
ALP BLD-CCNC: 62 U/L (ref 40–129)
ALT SERPL-CCNC: 13 U/L (ref 10–40)
ANION GAP SERPL CALCULATED.3IONS-SCNC: 14 MMOL/L (ref 3–16)
AST SERPL-CCNC: 18 U/L (ref 15–37)
BASOPHILS ABSOLUTE: 0 K/UL (ref 0–0.2)
BASOPHILS RELATIVE PERCENT: 0.5 %
BILIRUB SERPL-MCNC: 0.3 MG/DL (ref 0–1)
BUN BLDV-MCNC: 9 MG/DL (ref 7–20)
CALCIUM SERPL-MCNC: 9.5 MG/DL (ref 8.3–10.6)
CHLORIDE BLD-SCNC: 96 MMOL/L (ref 99–110)
CHOLESTEROL, TOTAL: 192 MG/DL (ref 0–199)
CO2: 23 MMOL/L (ref 21–32)
CREAT SERPL-MCNC: 0.7 MG/DL (ref 0.6–1.2)
EOSINOPHILS ABSOLUTE: 0.2 K/UL (ref 0–0.6)
EOSINOPHILS RELATIVE PERCENT: 2.6 %
GFR AFRICAN AMERICAN: >60
GFR NON-AFRICAN AMERICAN: >60
GLOBULIN: 2.8 G/DL
GLUCOSE BLD-MCNC: 95 MG/DL (ref 70–99)
HCT VFR BLD CALC: 43.9 % (ref 36–48)
HDLC SERPL-MCNC: 75 MG/DL (ref 40–60)
HEMOGLOBIN: 14.5 G/DL (ref 12–16)
LDL CHOLESTEROL CALCULATED: 85 MG/DL
LYMPHOCYTES ABSOLUTE: 2.2 K/UL (ref 1–5.1)
LYMPHOCYTES RELATIVE PERCENT: 23.4 %
MCH RBC QN AUTO: 30.8 PG (ref 26–34)
MCHC RBC AUTO-ENTMCNC: 33.1 G/DL (ref 31–36)
MCV RBC AUTO: 93.3 FL (ref 80–100)
MONOCYTES ABSOLUTE: 0.6 K/UL (ref 0–1.3)
MONOCYTES RELATIVE PERCENT: 6.1 %
NEUTROPHILS ABSOLUTE: 6.5 K/UL (ref 1.7–7.7)
NEUTROPHILS RELATIVE PERCENT: 67.4 %
PDW BLD-RTO: 13.2 % (ref 12.4–15.4)
PLATELET # BLD: 252 K/UL (ref 135–450)
PMV BLD AUTO: 8.5 FL (ref 5–10.5)
POTASSIUM SERPL-SCNC: 4.6 MMOL/L (ref 3.5–5.1)
RBC # BLD: 4.7 M/UL (ref 4–5.2)
SODIUM BLD-SCNC: 133 MMOL/L (ref 136–145)
TOTAL PROTEIN: 7.4 G/DL (ref 6.4–8.2)
TRIGL SERPL-MCNC: 161 MG/DL (ref 0–150)
TSH REFLEX: 1.43 UIU/ML (ref 0.27–4.2)
VITAMIN B-12: 653 PG/ML (ref 211–911)
VLDLC SERPL CALC-MCNC: 32 MG/DL
WBC # BLD: 9.6 K/UL (ref 4–11)

## 2018-01-25 PROCEDURE — G8484 FLU IMMUNIZE NO ADMIN: HCPCS | Performed by: FAMILY MEDICINE

## 2018-01-25 PROCEDURE — 4004F PT TOBACCO SCREEN RCVD TLK: CPT | Performed by: FAMILY MEDICINE

## 2018-01-25 PROCEDURE — G8420 CALC BMI NORM PARAMETERS: HCPCS | Performed by: FAMILY MEDICINE

## 2018-01-25 PROCEDURE — 3023F SPIROM DOC REV: CPT | Performed by: FAMILY MEDICINE

## 2018-01-25 PROCEDURE — 99214 OFFICE O/P EST MOD 30 MIN: CPT | Performed by: FAMILY MEDICINE

## 2018-01-25 PROCEDURE — G0438 PPPS, INITIAL VISIT: HCPCS | Performed by: FAMILY MEDICINE

## 2018-01-25 PROCEDURE — G8926 SPIRO NO PERF OR DOC: HCPCS | Performed by: FAMILY MEDICINE

## 2018-01-25 PROCEDURE — 90471 IMMUNIZATION ADMIN: CPT | Performed by: FAMILY MEDICINE

## 2018-01-25 PROCEDURE — 3014F SCREEN MAMMO DOC REV: CPT | Performed by: FAMILY MEDICINE

## 2018-01-25 PROCEDURE — 90715 TDAP VACCINE 7 YRS/> IM: CPT | Performed by: FAMILY MEDICINE

## 2018-01-25 PROCEDURE — 3017F COLORECTAL CA SCREEN DOC REV: CPT | Performed by: FAMILY MEDICINE

## 2018-01-25 PROCEDURE — G8427 DOCREV CUR MEDS BY ELIG CLIN: HCPCS | Performed by: FAMILY MEDICINE

## 2018-01-25 RX ORDER — MONTELUKAST SODIUM 10 MG/1
TABLET ORAL
Qty: 30 TABLET | Refills: 11 | Status: SHIPPED | OUTPATIENT
Start: 2018-01-25 | End: 2018-04-25 | Stop reason: SDUPTHER

## 2018-01-25 RX ORDER — ALENDRONATE SODIUM 70 MG/1
TABLET ORAL
Qty: 4 TABLET | Refills: 5 | Status: SHIPPED | OUTPATIENT
Start: 2018-01-25 | End: 2018-07-24 | Stop reason: SDUPTHER

## 2018-01-25 RX ORDER — SIMVASTATIN 20 MG
TABLET ORAL
Qty: 30 TABLET | Refills: 11 | Status: SHIPPED | OUTPATIENT
Start: 2018-01-25 | End: 2018-04-25 | Stop reason: SDUPTHER

## 2018-01-25 RX ORDER — TRAZODONE HYDROCHLORIDE 150 MG/1
TABLET ORAL
Qty: 180 TABLET | Refills: 11 | Status: SHIPPED | OUTPATIENT
Start: 2018-01-25 | End: 2018-12-19 | Stop reason: SDUPTHER

## 2018-01-25 RX ORDER — CYCLOBENZAPRINE HCL 5 MG
TABLET ORAL
Qty: 90 TABLET | Refills: 11 | Status: SHIPPED | OUTPATIENT
Start: 2018-01-25 | End: 2018-12-14 | Stop reason: ALTCHOICE

## 2018-01-25 RX ORDER — SERTRALINE HYDROCHLORIDE 100 MG/1
TABLET, FILM COATED ORAL
Qty: 60 TABLET | Refills: 11 | Status: CANCELLED | OUTPATIENT
Start: 2018-01-25

## 2018-01-25 RX ORDER — FLUOXETINE HYDROCHLORIDE 60 MG/1
1 TABLET, FILM COATED ORAL; ORAL NIGHTLY
Qty: 30 TABLET | Refills: 5 | Status: SHIPPED | OUTPATIENT
Start: 2018-01-25 | End: 2018-07-24 | Stop reason: SDUPTHER

## 2018-01-25 ASSESSMENT — LIFESTYLE VARIABLES: HOW OFTEN DO YOU HAVE A DRINK CONTAINING ALCOHOL: 0

## 2018-01-25 ASSESSMENT — ANXIETY QUESTIONNAIRES: GAD7 TOTAL SCORE: 3

## 2018-01-25 NOTE — PROGRESS NOTES
tablet TAKE ONE TABLET BY MOUTH EVERY 8 HOURS AS NEEDED FOR MUSCLE SPASMS Yes Pepe Caceres MD   albuterol (PROVENTIL) (2.5 MG/3ML) 0.083% nebulizer solution Take 3 mLs by nebulization every 6 hours as needed for Shortness of Breath Yes Irma Arvizu MD   sodium chloride 3 % nebulizer solution Take 2 mLs by nebulization 4 times daily Yes Irma Arvizu MD   Misc.  Devices (ACAPELLA) MISC 1 Device by Does not apply route 4 times daily DX ILD J84.9 Yes Irma Arvizu MD     Past Medical History:   Diagnosis Date    Allergic rhinitis 6/11/2010    Anxiety     Arthritis     Aspiration pneumonia ([address] 3/21/2012    Recurrent    Asthma     Bronchitis chronic     COPD (chronic obstructive pulmonary disease) ([address] 12/3/2009    Depression     Drug abuse, cocaine type     past history of crack cocaine use    Emphysema of lung (HCC)     Fibromyalgia     GERD (gastroesophageal reflux disease)     Hyperlipidemia     Lung disease     Pneumonia     Polysubstance dependence ([address] 1/2/2012    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    Pulmonary fibrosis ([facility] 10/16/2014    Pulmonary infiltrate     Pulmonary nodule 12/3/2009    Tobacco abuse      Past Surgical History:   Procedure Laterality Date    BRONCHOSCOPY      COLONOSCOPY  2012    ENDOSCOPY, COLON, DIAGNOSTIC      HYSTERECTOMY      OTHER SURGICAL HISTORY  2/12/15    T8 Kyphoplasty    SALIVARY GLAND SURGERY       Family History   Problem Relation Age of Onset    Asthma Mother     Diabetes Mother     Emphysema Mother     Heart Failure Mother     Hypertension Mother     Heart Disease Mother     High Cholesterol Mother     Cancer Mother     Depression Mother     Diabetes Father     Emphysema Father     Heart Failure Father     Hypertension Father     Heart Disease Father     High Cholesterol Father     Substance Abuse Father     Emphysema Paternal Grandfather     Diabetes Sister     High Cholesterol Sister     Vision Loss Maternal Uncle Cinda (Including outside providers/suppliers regularly involved in providing care):   Patient Care Team:  Garfield Pollard MD as PCP - General (Family Medicine)  Garfield Pollard MD as PCP - S Attributed Provider  Angelica Pabon MD as Consulting Physician (Pulmonology)  Araceli Ochoa RN as Registered Nurse    Wt Readings from Last 3 Encounters:   01/25/18 137 lb (62.1 kg)   10/19/17 133 lb 9.6 oz (60.6 kg)   08/31/17 129 lb (58.5 kg)     Vitals:    01/25/18 0818   BP: 120/70   Site: Right Arm   Position: Sitting   Cuff Size: Small Adult   Pulse: 92   SpO2: 95%   Weight: 137 lb (62.1 kg)   Height: 5' 5\" (1.651 m)       General Appearance: well-developed and well nourished and alert    Patient's complete Health Risk Assessment and screening values have been reviewed and are found in Flowsheets. The following problems were reviewed today and where indicated follow up appointments were made and/or referrals ordered.     Positive Risk Factor Screenings with Interventions:     Substance Abuse:  Social History     Tobacco History     Smoking Status  Current Every Day Smoker Smoking Frequency  1 pack/day for 30 years (30 pk yrs) Smoking Tobacco Type  Cigarettes    Smokeless Tobacco Use  Never Used          Alcohol History     Alcohol Use Status  No          Drug Use     Drug Use Status  No Comment  Crack  - none for 2 yrs          Sexual Activity     Sexually Active  Yes Partners  Male               Audit Questionnaire: Screen for Alcohol Misuse  How often do you have a drink containing alcohol?: Never  Substance Abuse Interventions:  · Recreational drug use:  educational materials provided    Health Habits/Nutrition:  Health Habits/Nutrition  Do you exercise for at least 20 minutes 2-3 times per week?: (!) No  Have you lost any weight without trying in the past 3 months?: No  Do you eat fewer than 2 meals per day?: No  Have you seen a dentist within the past year?: (!) No  Body mass index is Date Due    DTaP/Tdap/Td vaccine (1 - Tdap) 11/05/2013    Breast cancer screen  06/11/2016    Zostavax vaccine  10/22/2016    Smoker: low dose lung CT screening  10/13/2018    Colon cancer screen colonoscopy  08/19/2020    Lipid screen  02/07/2022    Flu vaccine  Completed    Pneumococcal med risk  Completed    Hepatitis C screen  Completed    HIV screen  Completed     Recommendations for Preventive Services Due: see orders.   Recommended screening schedule for the next 5-10 years is provided to the patient in written form: see Patient Instructions/AVS.

## 2018-01-25 NOTE — PATIENT INSTRUCTIONS
In[employer]ated disclaims any warranty or liability for your use of this information. Personalized Preventive Plan for Debbie Hartman - 1/25/2018  Medicare offers a range of preventive health benefits. Some of the tests and screenings are paid in full while other may be subject to a deductible, co-insurance, and/or copay. Some of these benefits include a comprehensive review of your medical history including lifestyle, illnesses that may run in your family, and various assessments and screenings as appropriate. After reviewing your medical record and screening and assessments performed today your provider may have ordered immunizations, labs, imaging, and/or referrals for you. A list of these orders (if applicable) as well as your Preventive Care list are included within your After Visit Summary for your review. Other Preventive Recommendations:    · A preventive eye exam performed by an eye specialist is recommended every 1-2 years to screen for glaucoma; cataracts, macular degeneration, and other eye disorders. · A preventive dental visit is recommended every 6 months. · Try to get at least 150 minutes of exercise per week or 10,000 steps per day on a pedometer . · Order or download the FREE \"Exercise & Physical Activity: Your Everyday Guide\" from [employer]. Call 8-502.977.7609 or search [employer] online. · You need 3697-7865 mg of calcium and 1678-1949 IU of vitamin D per day. It is possible to meet your calcium requirement with diet alone, but a vitamin D supplement is usually necessary to meet this goal.  · When exposed to the sun, use a sunscreen that protects against both UVA and UVB radiation with an SPF of 30 or greater. Reapply every 2 to 3 hours or after sweating, drying off with a towel, or swimming. · Always wear a seat belt when traveling in a car. Always wear a helmet when riding a bicycle or motorcycle.

## 2018-01-28 ASSESSMENT — COPD QUESTIONNAIRES: COPD: 1

## 2018-01-28 ASSESSMENT — ENCOUNTER SYMPTOMS
ABDOMINAL PAIN: 0
COUGH: 1
DIFFICULTY BREATHING: 0
WHEEZING: 1
NAUSEA: 0
SHORTNESS OF BREATH: 1
CHANGE IN BOWEL HABIT: 0

## 2018-01-29 ENCOUNTER — OFFICE VISIT (OUTPATIENT)
Dept: PULMONOLOGY | Age: 63
End: 2018-01-29

## 2018-01-29 VITALS
SYSTOLIC BLOOD PRESSURE: 110 MMHG | RESPIRATION RATE: 18 BRPM | OXYGEN SATURATION: 96 % | TEMPERATURE: 97.5 F | HEIGHT: 65 IN | HEART RATE: 81 BPM | BODY MASS INDEX: 22.49 KG/M2 | WEIGHT: 135 LBS | DIASTOLIC BLOOD PRESSURE: 60 MMHG

## 2018-01-29 DIAGNOSIS — J44.9 CHRONIC OBSTRUCTIVE PULMONARY DISEASE, UNSPECIFIED COPD TYPE (HCC): ICD-10-CM

## 2018-01-29 DIAGNOSIS — G47.34 NOCTURNAL HYPOXEMIA: Primary | ICD-10-CM

## 2018-01-29 DIAGNOSIS — Z72.0 TOBACCO ABUSE DISORDER: ICD-10-CM

## 2018-01-29 PROCEDURE — 3014F SCREEN MAMMO DOC REV: CPT | Performed by: NURSE PRACTITIONER

## 2018-01-29 PROCEDURE — G8484 FLU IMMUNIZE NO ADMIN: HCPCS | Performed by: NURSE PRACTITIONER

## 2018-01-29 PROCEDURE — 3017F COLORECTAL CA SCREEN DOC REV: CPT | Performed by: NURSE PRACTITIONER

## 2018-01-29 PROCEDURE — 99213 OFFICE O/P EST LOW 20 MIN: CPT | Performed by: NURSE PRACTITIONER

## 2018-01-29 PROCEDURE — G8427 DOCREV CUR MEDS BY ELIG CLIN: HCPCS | Performed by: NURSE PRACTITIONER

## 2018-01-29 PROCEDURE — G8420 CALC BMI NORM PARAMETERS: HCPCS | Performed by: NURSE PRACTITIONER

## 2018-01-29 PROCEDURE — 4004F PT TOBACCO SCREEN RCVD TLK: CPT | Performed by: NURSE PRACTITIONER

## 2018-01-29 PROCEDURE — 3023F SPIROM DOC REV: CPT | Performed by: NURSE PRACTITIONER

## 2018-01-29 PROCEDURE — G8926 SPIRO NO PERF OR DOC: HCPCS | Performed by: NURSE PRACTITIONER

## 2018-01-29 NOTE — PROGRESS NOTES
[facility]                                                                  CHIEF COMPLAINT: Dyspnea, hypoxia     HPI:   Patient of Dr. Hill Jerome who presents to the office for face-to-face visit for oxygen. Patient completed ONPO 1/8/2018 reviewed by me and showed low SPO2 85% and time < 88% 26.5 minutes. Patient previously would wear 's oxygen at night with benefit. Dyspnea is stable. Occasional cough with clear sputum. No hemoptysis. No fever/chills. Using albuterol 1-2x/day. Still smoking ~1ppd. Thinks about quitting all the time but not ready. Can walk about 2 blocks on the flat then experiences dyspnea. Vacuuming 1/2 living room then out of breath. 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 Social History:    TOBACCO:   reports that she has been smoking Cigarettes. She has a 30.00 pack-year smoking history.  She has never used smokeless tobacco.  ETOH:   reports that she does not drink

## 2018-02-13 RX ORDER — SERTRALINE HYDROCHLORIDE 100 MG/1
TABLET, FILM COATED ORAL
Qty: 60 TABLET | Refills: 11 | Status: SHIPPED | OUTPATIENT
Start: 2018-02-13 | End: 2018-02-27 | Stop reason: ALTCHOICE

## 2018-02-14 RX ORDER — ALENDRONATE SODIUM 70 MG/1
TABLET ORAL
Qty: 4 TABLET | Refills: 5 | OUTPATIENT
Start: 2018-02-14

## 2018-02-21 ENCOUNTER — TELEPHONE (OUTPATIENT)
Dept: FAMILY MEDICINE CLINIC | Age: 63
End: 2018-02-21

## 2018-02-22 ENCOUNTER — OFFICE VISIT (OUTPATIENT)
Dept: FAMILY MEDICINE CLINIC | Age: 63
End: 2018-02-22

## 2018-02-22 VITALS
BODY MASS INDEX: 23.22 KG/M2 | DIASTOLIC BLOOD PRESSURE: 52 MMHG | TEMPERATURE: 96.8 F | SYSTOLIC BLOOD PRESSURE: 96 MMHG | HEART RATE: 107 BPM | WEIGHT: 136 LBS | HEIGHT: 64 IN | OXYGEN SATURATION: 95 %

## 2018-02-22 DIAGNOSIS — R06.02 SHORTNESS OF BREATH: ICD-10-CM

## 2018-02-22 DIAGNOSIS — J44.1 COPD WITH ACUTE EXACERBATION (HCC): Primary | ICD-10-CM

## 2018-02-22 LAB
INFLUENZA A ANTIBODY: NORMAL
INFLUENZA B ANTIBODY: NORMAL

## 2018-02-22 PROCEDURE — 87804 INFLUENZA ASSAY W/OPTIC: CPT | Performed by: PHYSICIAN ASSISTANT

## 2018-02-22 PROCEDURE — 99214 OFFICE O/P EST MOD 30 MIN: CPT | Performed by: PHYSICIAN ASSISTANT

## 2018-02-22 PROCEDURE — 96372 THER/PROPH/DIAG INJ SC/IM: CPT | Performed by: PHYSICIAN ASSISTANT

## 2018-02-22 RX ORDER — METHYLPREDNISOLONE SODIUM SUCCINATE 125 MG/2ML
125 INJECTION, POWDER, LYOPHILIZED, FOR SOLUTION INTRAMUSCULAR; INTRAVENOUS ONCE
Status: COMPLETED | OUTPATIENT
Start: 2018-02-22 | End: 2018-02-22

## 2018-02-22 RX ORDER — CEFUROXIME AXETIL 500 MG/1
500 TABLET ORAL 2 TIMES DAILY
Qty: 20 TABLET | Refills: 0 | Status: SHIPPED | OUTPATIENT
Start: 2018-02-22 | End: 2018-03-04

## 2018-02-22 RX ORDER — PREDNISONE 10 MG/1
30 TABLET ORAL 2 TIMES DAILY
Qty: 30 TABLET | Refills: 0 | Status: SHIPPED | OUTPATIENT
Start: 2018-02-22 | End: 2018-02-27

## 2018-02-22 RX ADMIN — METHYLPREDNISOLONE SODIUM SUCCINATE 125 MG: 125 INJECTION, POWDER, LYOPHILIZED, FOR SOLUTION INTRAMUSCULAR; INTRAVENOUS at 13:36

## 2018-02-22 ASSESSMENT — ENCOUNTER SYMPTOMS
NAUSEA: 1
SHORTNESS OF BREATH: 1
RHINORRHEA: 1
ABDOMINAL PAIN: 0
CONSTIPATION: 0
SORE THROAT: 0
DIARRHEA: 0
VOMITING: 0
COUGH: 1

## 2018-02-22 NOTE — PROGRESS NOTES
2018  Diane Diamond (: 1955)  65 y.o. HPI     Being seen for shortness of breath. Has pMHx significant for COPD, has cough at baseline with occasional shortness of breath. 1 week of not feeling well. Increased cough from baseline with  green sputum. No hemoptysis. On oxygen at home 3L at night, has been using it some during the day for increased sob. No chest pain. Increased fatigue, feels like she's been \"hit by a bus. \" Initially with low grade fever, no chills. Has been using home inhalers and nebulizer with no improvement of soa and cough. Has also had some increased congestion. New headache x 3 days, frontal, has been taking ibuprofen without relief. No vision changes. No dizziness, or falls. Review of Systems   Constitutional: Positive for activity change. Negative for chills and fever. HENT: Positive for congestion and rhinorrhea. Negative for ear pain and sore throat. Eyes: Negative for visual disturbance. Respiratory: Positive for cough and shortness of breath. Cardiovascular: Negative for chest pain and palpitations. Gastrointestinal: Positive for nausea. Negative for abdominal pain, constipation, diarrhea and vomiting. Genitourinary: Negative for difficulty urinating and dysuria. Musculoskeletal: Negative for arthralgias and myalgias. Skin: Negative for rash. Neurological: Positive for headaches. Negative for dizziness, weakness and numbness. Psychiatric/Behavioral: Negative for sleep disturbance. Allergies, past medical history, family history, and social history reviewed and unchanged from previous encounter. Current Outpatient Prescriptions   Medication Sig Dispense Refill    predniSONE (DELTASONE) 10 MG tablet Take 3 tablets by mouth 2 times daily for 5 days Take with food.  30 tablet 0    cefUROXime (CEFTIN) 500 MG tablet Take 1 tablet by mouth 2 times daily for 10 days 20 tablet 0    sertraline (ZOLOFT) 100 MG tablet TAKE 2 TABLETS BY MOUTH

## 2018-02-27 ENCOUNTER — OFFICE VISIT (OUTPATIENT)
Dept: FAMILY MEDICINE CLINIC | Age: 63
End: 2018-02-27

## 2018-02-27 VITALS
HEIGHT: 63 IN | WEIGHT: 141 LBS | HEART RATE: 66 BPM | DIASTOLIC BLOOD PRESSURE: 60 MMHG | SYSTOLIC BLOOD PRESSURE: 110 MMHG | BODY MASS INDEX: 24.98 KG/M2 | OXYGEN SATURATION: 95 %

## 2018-02-27 DIAGNOSIS — F33.1 MDD (MAJOR DEPRESSIVE DISORDER), RECURRENT EPISODE, MODERATE (HCC): ICD-10-CM

## 2018-02-27 DIAGNOSIS — J44.1 COPD WITH ACUTE EXACERBATION (HCC): Primary | ICD-10-CM

## 2018-02-27 DIAGNOSIS — F41.8 DEPRESSION WITH ANXIETY: ICD-10-CM

## 2018-02-27 PROCEDURE — 4004F PT TOBACCO SCREEN RCVD TLK: CPT | Performed by: FAMILY MEDICINE

## 2018-02-27 PROCEDURE — G8926 SPIRO NO PERF OR DOC: HCPCS | Performed by: FAMILY MEDICINE

## 2018-02-27 PROCEDURE — G8420 CALC BMI NORM PARAMETERS: HCPCS | Performed by: FAMILY MEDICINE

## 2018-02-27 PROCEDURE — G8427 DOCREV CUR MEDS BY ELIG CLIN: HCPCS | Performed by: FAMILY MEDICINE

## 2018-02-27 PROCEDURE — 99214 OFFICE O/P EST MOD 30 MIN: CPT | Performed by: FAMILY MEDICINE

## 2018-02-27 PROCEDURE — 3023F SPIROM DOC REV: CPT | Performed by: FAMILY MEDICINE

## 2018-02-27 PROCEDURE — 3017F COLORECTAL CA SCREEN DOC REV: CPT | Performed by: FAMILY MEDICINE

## 2018-02-27 PROCEDURE — G8484 FLU IMMUNIZE NO ADMIN: HCPCS | Performed by: FAMILY MEDICINE

## 2018-02-27 PROCEDURE — 3014F SCREEN MAMMO DOC REV: CPT | Performed by: FAMILY MEDICINE

## 2018-02-27 RX ORDER — RANITIDINE 150 MG/1
TABLET ORAL
Qty: 60 TABLET | Refills: 11 | Status: SHIPPED | OUTPATIENT
Start: 2018-02-27 | End: 2018-12-19 | Stop reason: SDUPTHER

## 2018-02-27 RX ORDER — PREDNISONE 10 MG/1
TABLET ORAL
Qty: 42 TABLET | Refills: 0 | Status: SHIPPED | OUTPATIENT
Start: 2018-02-27 | End: 2018-03-09

## 2018-02-28 ASSESSMENT — ENCOUNTER SYMPTOMS
SPUTUM PRODUCTION: 1
DIFFICULTY BREATHING: 1
COUGH: 1
CHEST TIGHTNESS: 1
WHEEZING: 1

## 2018-02-28 ASSESSMENT — COPD QUESTIONNAIRES: COPD: 1

## 2018-03-05 RX ORDER — SIMVASTATIN 20 MG
TABLET ORAL
Qty: 30 TABLET | Refills: 11 | OUTPATIENT
Start: 2018-03-05

## 2018-03-13 RX ORDER — TRAZODONE HYDROCHLORIDE 150 MG/1
TABLET ORAL
Qty: 180 TABLET | Refills: 2 | OUTPATIENT
Start: 2018-03-13

## 2018-03-16 RX ORDER — PROPRANOLOL HYDROCHLORIDE 40 MG/1
TABLET ORAL
Qty: 60 TABLET | Refills: 5 | Status: SHIPPED | OUTPATIENT
Start: 2018-03-16 | End: 2018-04-09 | Stop reason: SDUPTHER

## 2018-03-21 ENCOUNTER — TELEPHONE (OUTPATIENT)
Dept: PHARMACY | Facility: CLINIC | Age: 63
End: 2018-03-21

## 2018-03-23 NOTE — TELEPHONE ENCOUNTER
CLINICAL PHARMACY NOTE: MEDICATION Odalis Hutchison is a 65 y.o. female identified as being eligible for Medication Therapy Management (MTM) services: comprehensive medication review (CMR) via [facility] platform.     Pt does not have VM, will try again later    Geraldine Hung, PharmD Candidate [phone] [facility]  Phone [phone] option 7

## 2018-03-27 NOTE — TELEPHONE ENCOUNTER
CLINICAL PHARMACY NOTE: MEDICATION Moi Reyes is a 65 y.o. female identified as being eligible for Medication Therapy Management (MTM) services: comprehensive medication review (CMR) via [email or web address]. S/O:    Medication Sig    propranolol (INDERAL) 40 MG tablet TAKE 1 TABLET BY MOUTH TWICE A DAY    ranitidine (ZANTAC) 150 MG tablet TAKE 1 TABLET BY MOUTH TWICE DAILY as needed for heartburn    traZODone (DESYREL) 150 MG tablet TAKE 2 TABLETS BY MOUTH AT BEDTIME    simvastatin (ZOCOR) 20 MG tablet TAKE 1 TABLET BY MOUTH EVERY EVENING    montelukast (SINGULAIR) 10 MG tablet TAKE 1 TABLET BY MOUTH EACH NIGHT    cyclobenzaprine (FLEXERIL) 5 MG tablet TAKE ONE TABLET BY MOUTH EVERY 8 HOURS AS NEEDED FOR MUSCLE SPASMS    alendronate (FOSAMAX) 70 MG tablet TAKE 1 TABLET BY MOUTH EACH WEEK    FLUoxetine HCl 60 MG TABS Take 1 tablet by mouth nightly  -Pt takes 3 20mg tablets once daily    fluticasone-salmeterol (ADVAIR DISKUS) 250-50 MCG/DOSE AEPB INHALE 1 PUFF TWICE DAILY    busPIRone (BUSPAR) 30 MG tablet TAKE 1 TABLET BY MOUTH TWICE A DAY    tiotropium (SPIRIVA HANDIHALER) 18 MCG inhalation capsule INHALE 1 CAPSULE BY MOUTH INTO LUNGS EVERY DAY    albuterol sulfate HFA (VENTOLIN HFA) 108 (90 Base) MCG/ACT inhaler Inhale 2 puffs into the lungs every 6 hours as needed for Wheezing  -Pt states she needs to take it a couple times every day    ibuprofen (ADVIL;MOTRIN) 800 MG tablet TAKE 1 TABLET BY MOUTH EVERY 8 HOURS AS NEEDED    albuterol (PROVENTIL) (2.5 MG/3ML) 0.083% nebulizer solution Take 3 mLs by nebulization every 6 hours as needed for Shortness of Breath  -Pt has to take occasionally, not at all in the last week    sodium chloride 3 % nebulizer solution Take 2 mLs by nebulization 4 times daily    Misc.  Devices (ACAPELLA) MISC 1 Device by Does not apply route 4 times daily DX ILD J84.9     Additional medications:  Oxygen 3L during the night while she sleeps    Allergies

## 2018-03-28 NOTE — TELEPHONE ENCOUNTER
Reviewed and agree with PharmD candidate.      Thank you,  Mallory Lara, PharmD, [address] Pharmacist  Direct: [phone]  Dept: [phone] (toll free [phone], option 7)

## 2018-03-28 NOTE — TELEPHONE ENCOUNTER
.  Last office visit 2/27/2018     Last written  12-13-17 #1 with 5 refills     Next office visit scheduled Visit date not scheduled     Requested Prescriptions     Pending Prescriptions Disp Refills    ADVAIR DISKUS 250-50 MCG/DOSE AEPB [Pharmacy Med Name: Abhay Leon 250-50 DISKUS]  1     Sig: INHALE 1 PUFF TWICE DAILY

## 2018-04-06 ENCOUNTER — OFFICE VISIT (OUTPATIENT)
Dept: FAMILY MEDICINE CLINIC | Age: 63
End: 2018-04-06

## 2018-04-06 ENCOUNTER — NURSE ONLY (OUTPATIENT)
Dept: URGENT CARE | Age: 63
End: 2018-04-06

## 2018-04-06 VITALS
HEIGHT: 63 IN | HEART RATE: 72 BPM | DIASTOLIC BLOOD PRESSURE: 70 MMHG | OXYGEN SATURATION: 97 % | BODY MASS INDEX: 25.52 KG/M2 | SYSTOLIC BLOOD PRESSURE: 134 MMHG | WEIGHT: 144 LBS

## 2018-04-06 DIAGNOSIS — R06.2 WHEEZING: ICD-10-CM

## 2018-04-06 DIAGNOSIS — J44.1 COPD EXACERBATION (HCC): ICD-10-CM

## 2018-04-06 DIAGNOSIS — J18.9 PNEUMONIA OF LEFT LOWER LOBE DUE TO INFECTIOUS ORGANISM: Primary | ICD-10-CM

## 2018-04-06 DIAGNOSIS — Z12.31 ENCOUNTER FOR SCREENING MAMMOGRAM FOR BREAST CANCER: ICD-10-CM

## 2018-04-06 PROCEDURE — 4004F PT TOBACCO SCREEN RCVD TLK: CPT | Performed by: FAMILY MEDICINE

## 2018-04-06 PROCEDURE — 99214 OFFICE O/P EST MOD 30 MIN: CPT | Performed by: FAMILY MEDICINE

## 2018-04-06 PROCEDURE — 3014F SCREEN MAMMO DOC REV: CPT | Performed by: FAMILY MEDICINE

## 2018-04-06 PROCEDURE — G8419 CALC BMI OUT NRM PARAM NOF/U: HCPCS | Performed by: FAMILY MEDICINE

## 2018-04-06 PROCEDURE — 3023F SPIROM DOC REV: CPT | Performed by: FAMILY MEDICINE

## 2018-04-06 PROCEDURE — 3017F COLORECTAL CA SCREEN DOC REV: CPT | Performed by: FAMILY MEDICINE

## 2018-04-06 PROCEDURE — G8926 SPIRO NO PERF OR DOC: HCPCS | Performed by: FAMILY MEDICINE

## 2018-04-06 PROCEDURE — G8427 DOCREV CUR MEDS BY ELIG CLIN: HCPCS | Performed by: FAMILY MEDICINE

## 2018-04-06 RX ORDER — LEVOFLOXACIN 750 MG/1
750 TABLET ORAL DAILY
Qty: 5 TABLET | Refills: 0 | Status: SHIPPED | OUTPATIENT
Start: 2018-04-06 | End: 2018-04-11

## 2018-04-06 RX ORDER — PREDNISONE 10 MG/1
TABLET ORAL
Qty: 42 TABLET | Refills: 0 | Status: SHIPPED | OUTPATIENT
Start: 2018-04-06 | End: 2018-04-16

## 2018-04-06 ASSESSMENT — ENCOUNTER SYMPTOMS
COUGH: 1
WHEEZING: 1
SORE THROAT: 0
DIFFICULTY BREATHING: 1
CHEST TIGHTNESS: 1
SHORTNESS OF BREATH: 1
SPUTUM PRODUCTION: 1

## 2018-04-09 RX ORDER — ATENOLOL 25 MG/1
25 TABLET ORAL DAILY
Qty: 90 TABLET | Refills: 1 | Status: CANCELLED | OUTPATIENT
Start: 2018-04-09 | End: 2019-04-09

## 2018-04-09 NOTE — TELEPHONE ENCOUNTER
Spoke to patient. Reviewed below. Patient verbalizes understanding and agreeable to trying atenolol.  - Agrees to complete propranolol 40mg BID on hand, then begin atenolol 25mg daily (as below consideration)  - Discussed monitoring tremors (may need to increase atenolol) and BP/HR (reports does not check at home; discussed s/s to monitor for)  - Requests rx to [facility] on file    Rx request pended to PCP in other encounter.

## 2018-04-10 ENCOUNTER — TELEPHONE (OUTPATIENT)
Dept: ORTHOPEDIC SURGERY | Age: 63
End: 2018-04-10

## 2018-04-10 RX ORDER — ATENOLOL 25 MG/1
25 TABLET ORAL DAILY
Qty: 90 TABLET | Refills: 1 | Status: SHIPPED | OUTPATIENT
Start: 2018-04-10 | End: 2018-11-06 | Stop reason: SDUPTHER

## 2018-04-13 ENCOUNTER — OFFICE VISIT (OUTPATIENT)
Dept: FAMILY MEDICINE CLINIC | Age: 63
End: 2018-04-13

## 2018-04-13 VITALS
DIASTOLIC BLOOD PRESSURE: 80 MMHG | OXYGEN SATURATION: 93 % | HEART RATE: 141 BPM | SYSTOLIC BLOOD PRESSURE: 130 MMHG | HEIGHT: 64 IN

## 2018-04-13 DIAGNOSIS — M54.6 ACUTE MIDLINE THORACIC BACK PAIN: ICD-10-CM

## 2018-04-13 DIAGNOSIS — E04.1 THYROID NODULE: ICD-10-CM

## 2018-04-13 DIAGNOSIS — Z78.0 POSTMENOPAUSAL STATE: ICD-10-CM

## 2018-04-13 DIAGNOSIS — S22.030A TRAUMATIC COMPRESSION FRACTURE OF T3 THORACIC VERTEBRA, CLOSED, INITIAL ENCOUNTER (HCC): Primary | ICD-10-CM

## 2018-04-13 PROCEDURE — G8427 DOCREV CUR MEDS BY ELIG CLIN: HCPCS | Performed by: FAMILY MEDICINE

## 2018-04-13 PROCEDURE — 4004F PT TOBACCO SCREEN RCVD TLK: CPT | Performed by: FAMILY MEDICINE

## 2018-04-13 PROCEDURE — 96372 THER/PROPH/DIAG INJ SC/IM: CPT | Performed by: FAMILY MEDICINE

## 2018-04-13 PROCEDURE — 3017F COLORECTAL CA SCREEN DOC REV: CPT | Performed by: FAMILY MEDICINE

## 2018-04-13 PROCEDURE — G8420 CALC BMI NORM PARAMETERS: HCPCS | Performed by: FAMILY MEDICINE

## 2018-04-13 PROCEDURE — 3014F SCREEN MAMMO DOC REV: CPT | Performed by: FAMILY MEDICINE

## 2018-04-13 PROCEDURE — 99214 OFFICE O/P EST MOD 30 MIN: CPT | Performed by: FAMILY MEDICINE

## 2018-04-13 RX ORDER — OXYCODONE HYDROCHLORIDE AND ACETAMINOPHEN 5; 325 MG/1; MG/1
1 TABLET ORAL EVERY 6 HOURS PRN
Qty: 28 TABLET | Refills: 0 | Status: SHIPPED | OUTPATIENT
Start: 2018-04-13 | End: 2018-05-08 | Stop reason: SDUPTHER

## 2018-04-14 ASSESSMENT — ENCOUNTER SYMPTOMS: BACK PAIN: 1

## 2018-04-16 ENCOUNTER — OFFICE VISIT (OUTPATIENT)
Dept: ORTHOPEDIC SURGERY | Age: 63
End: 2018-04-16

## 2018-04-16 VITALS
WEIGHT: 145.06 LBS | BODY MASS INDEX: 24.77 KG/M2 | DIASTOLIC BLOOD PRESSURE: 44 MMHG | HEIGHT: 64 IN | HEART RATE: 95 BPM | SYSTOLIC BLOOD PRESSURE: 135 MMHG

## 2018-04-16 DIAGNOSIS — S22.039A CLOSED FRACTURE OF THIRD THORACIC VERTEBRA, UNSPECIFIED FRACTURE MORPHOLOGY, INITIAL ENCOUNTER (HCC): Primary | ICD-10-CM

## 2018-04-16 PROCEDURE — 99214 OFFICE O/P EST MOD 30 MIN: CPT | Performed by: PHYSICIAN ASSISTANT

## 2018-04-16 PROCEDURE — 4004F PT TOBACCO SCREEN RCVD TLK: CPT | Performed by: PHYSICIAN ASSISTANT

## 2018-04-16 PROCEDURE — G8427 DOCREV CUR MEDS BY ELIG CLIN: HCPCS | Performed by: PHYSICIAN ASSISTANT

## 2018-04-16 PROCEDURE — 3014F SCREEN MAMMO DOC REV: CPT | Performed by: PHYSICIAN ASSISTANT

## 2018-04-16 PROCEDURE — G8420 CALC BMI NORM PARAMETERS: HCPCS | Performed by: PHYSICIAN ASSISTANT

## 2018-04-16 PROCEDURE — 3017F COLORECTAL CA SCREEN DOC REV: CPT | Performed by: PHYSICIAN ASSISTANT

## 2018-04-16 RX ORDER — MELOXICAM 15 MG/1
TABLET ORAL
Qty: 30 TABLET | Refills: 0 | Status: SHIPPED | OUTPATIENT
Start: 2018-04-16 | End: 2018-09-19

## 2018-04-16 RX ORDER — OXYCODONE HYDROCHLORIDE AND ACETAMINOPHEN 5; 325 MG/1; MG/1
TABLET ORAL
Qty: 28 TABLET | Refills: 0 | Status: SHIPPED | OUTPATIENT
Start: 2018-04-19 | End: 2018-04-27 | Stop reason: SDUPTHER

## 2018-04-17 ENCOUNTER — TELEPHONE (OUTPATIENT)
Dept: ORTHOPEDIC SURGERY | Age: 63
End: 2018-04-17

## 2018-04-18 ENCOUNTER — HOSPITAL ENCOUNTER (OUTPATIENT)
Dept: MRI IMAGING | Age: 63
Discharge: HOME | End: 2018-04-18
Attending: FAMILY MEDICINE | Admitting: FAMILY MEDICINE

## 2018-04-18 ENCOUNTER — TELEPHONE (OUTPATIENT)
Dept: ORTHOPEDIC SURGERY | Age: 63
End: 2018-04-18

## 2018-04-18 DIAGNOSIS — M54.6 ACUTE MIDLINE THORACIC BACK PAIN: ICD-10-CM

## 2018-04-18 DIAGNOSIS — S22.030A: ICD-10-CM

## 2018-04-18 DIAGNOSIS — S22.030A TRAUMATIC COMPRESSION FRACTURE OF T3 THORACIC VERTEBRA, CLOSED, INITIAL ENCOUNTER (HCC): ICD-10-CM

## 2018-04-19 ENCOUNTER — TELEPHONE (OUTPATIENT)
Dept: ORTHOPEDIC SURGERY | Age: 63
End: 2018-04-19

## 2018-04-19 ENCOUNTER — OFFICE VISIT (OUTPATIENT)
Dept: ORTHOPEDIC SURGERY | Age: 63
End: 2018-04-19

## 2018-04-19 VITALS
HEIGHT: 63 IN | WEIGHT: 134.92 LBS | BODY MASS INDEX: 23.91 KG/M2 | HEART RATE: 110 BPM | SYSTOLIC BLOOD PRESSURE: 87 MMHG | DIASTOLIC BLOOD PRESSURE: 53 MMHG

## 2018-04-19 DIAGNOSIS — M48.54XA NON-TRAUMATIC COMPRESSION FRACTURE OF THIRD THORACIC VERTEBRA, INITIAL ENCOUNTER: Primary | ICD-10-CM

## 2018-04-19 PROCEDURE — G8427 DOCREV CUR MEDS BY ELIG CLIN: HCPCS | Performed by: ORTHOPAEDIC SURGERY

## 2018-04-19 PROCEDURE — 99213 OFFICE O/P EST LOW 20 MIN: CPT | Performed by: ORTHOPAEDIC SURGERY

## 2018-04-19 PROCEDURE — 4004F PT TOBACCO SCREEN RCVD TLK: CPT | Performed by: ORTHOPAEDIC SURGERY

## 2018-04-19 PROCEDURE — 3014F SCREEN MAMMO DOC REV: CPT | Performed by: ORTHOPAEDIC SURGERY

## 2018-04-19 PROCEDURE — G8420 CALC BMI NORM PARAMETERS: HCPCS | Performed by: ORTHOPAEDIC SURGERY

## 2018-04-19 PROCEDURE — 3017F COLORECTAL CA SCREEN DOC REV: CPT | Performed by: ORTHOPAEDIC SURGERY

## 2018-04-20 ENCOUNTER — TELEPHONE (OUTPATIENT)
Dept: FAMILY MEDICINE CLINIC | Age: 63
End: 2018-04-20

## 2018-04-21 ENCOUNTER — TELEPHONE (OUTPATIENT)
Dept: FAMILY MEDICINE CLINIC | Age: 63
End: 2018-04-21

## 2018-04-23 ENCOUNTER — TELEPHONE (OUTPATIENT)
Dept: PULMONOLOGY | Age: 63
End: 2018-04-23

## 2018-04-27 DIAGNOSIS — S22.039A CLOSED FRACTURE OF THIRD THORACIC VERTEBRA, UNSPECIFIED FRACTURE MORPHOLOGY, INITIAL ENCOUNTER (HCC): ICD-10-CM

## 2018-04-27 RX ORDER — IBUPROFEN 800 MG/1
TABLET ORAL
Qty: 90 TABLET | Refills: 3 | Status: SHIPPED | OUTPATIENT
Start: 2018-04-27 | End: 2018-11-12 | Stop reason: SDUPTHER

## 2018-04-27 RX ORDER — OXYCODONE HYDROCHLORIDE AND ACETAMINOPHEN 5; 325 MG/1; MG/1
TABLET ORAL
Qty: 28 TABLET | Refills: 0 | Status: SHIPPED | OUTPATIENT
Start: 2018-04-27 | End: 2018-05-04

## 2018-04-27 RX ORDER — SIMVASTATIN 20 MG
TABLET ORAL
Qty: 90 TABLET | Refills: 3 | Status: SHIPPED | OUTPATIENT
Start: 2018-04-27 | End: 2018-12-19 | Stop reason: SDUPTHER

## 2018-04-27 RX ORDER — MONTELUKAST SODIUM 10 MG/1
TABLET ORAL
Qty: 90 TABLET | Refills: 3 | Status: SHIPPED | OUTPATIENT
Start: 2018-04-27 | End: 2018-12-12 | Stop reason: SDUPTHER

## 2018-04-30 RX ORDER — TIOTROPIUM BROMIDE 18 UG/1
CAPSULE ORAL; RESPIRATORY (INHALATION)
Qty: 30 CAPSULE | Refills: 5 | Status: SHIPPED | OUTPATIENT
Start: 2018-04-30 | End: 2018-08-22 | Stop reason: SDUPTHER

## 2018-05-03 ENCOUNTER — OFFICE VISIT (OUTPATIENT)
Dept: ORTHOPEDIC SURGERY | Age: 63
End: 2018-05-03

## 2018-05-03 VITALS
DIASTOLIC BLOOD PRESSURE: 78 MMHG | HEIGHT: 63 IN | HEART RATE: 71 BPM | BODY MASS INDEX: 23.91 KG/M2 | SYSTOLIC BLOOD PRESSURE: 146 MMHG | WEIGHT: 134.92 LBS

## 2018-05-03 DIAGNOSIS — M48.54XD NON-TRAUMATIC COMPRESSION FRACTURE OF T3 THORACIC VERTEBRA WITH ROUTINE HEALING, SUBSEQUENT ENCOUNTER: Primary | ICD-10-CM

## 2018-05-03 PROCEDURE — 99213 OFFICE O/P EST LOW 20 MIN: CPT | Performed by: ORTHOPAEDIC SURGERY

## 2018-05-03 PROCEDURE — 3017F COLORECTAL CA SCREEN DOC REV: CPT | Performed by: ORTHOPAEDIC SURGERY

## 2018-05-03 PROCEDURE — 4004F PT TOBACCO SCREEN RCVD TLK: CPT | Performed by: ORTHOPAEDIC SURGERY

## 2018-05-03 PROCEDURE — G8427 DOCREV CUR MEDS BY ELIG CLIN: HCPCS | Performed by: ORTHOPAEDIC SURGERY

## 2018-05-03 PROCEDURE — G8420 CALC BMI NORM PARAMETERS: HCPCS | Performed by: ORTHOPAEDIC SURGERY

## 2018-05-07 ENCOUNTER — TELEPHONE (OUTPATIENT)
Dept: FAMILY MEDICINE CLINIC | Age: 63
End: 2018-05-07

## 2018-05-07 ENCOUNTER — TELEPHONE (OUTPATIENT)
Dept: ORTHOPEDIC SURGERY | Age: 63
End: 2018-05-07

## 2018-05-07 DIAGNOSIS — S22.000S CLOSED COMPRESSION FRACTURE OF THORACIC VERTEBRA, SEQUELA: Primary | ICD-10-CM

## 2018-05-07 DIAGNOSIS — M54.6 ACUTE MIDLINE THORACIC BACK PAIN: ICD-10-CM

## 2018-05-08 ENCOUNTER — TELEPHONE (OUTPATIENT)
Dept: FAMILY MEDICINE CLINIC | Age: 63
End: 2018-05-08

## 2018-05-08 DIAGNOSIS — M54.6 ACUTE MIDLINE THORACIC BACK PAIN: ICD-10-CM

## 2018-05-08 DIAGNOSIS — S22.030A TRAUMATIC COMPRESSION FRACTURE OF T3 THORACIC VERTEBRA, CLOSED, INITIAL ENCOUNTER (HCC): ICD-10-CM

## 2018-05-08 RX ORDER — OXYCODONE HYDROCHLORIDE AND ACETAMINOPHEN 5; 325 MG/1; MG/1
1 TABLET ORAL EVERY 6 HOURS PRN
Qty: 28 TABLET | Refills: 0 | Status: SHIPPED | OUTPATIENT
Start: 2018-05-08 | End: 2018-05-15

## 2018-05-11 ENCOUNTER — HOSPITAL ENCOUNTER (OUTPATIENT)
Dept: GENERAL RADIOLOGY | Age: 63
Discharge: HOME | End: 2018-05-11
Attending: FAMILY MEDICINE | Admitting: FAMILY MEDICINE

## 2018-05-11 DIAGNOSIS — Z78.0 POSTMENOPAUSAL STATE: ICD-10-CM

## 2018-05-11 DIAGNOSIS — S22.030A: ICD-10-CM

## 2018-05-15 ENCOUNTER — TELEPHONE (OUTPATIENT)
Dept: FAMILY MEDICINE CLINIC | Age: 63
End: 2018-05-15

## 2018-05-16 ENCOUNTER — OFFICE VISIT (OUTPATIENT)
Dept: FAMILY MEDICINE CLINIC | Age: 63
End: 2018-05-16

## 2018-05-16 VITALS
DIASTOLIC BLOOD PRESSURE: 72 MMHG | TEMPERATURE: 97.3 F | BODY MASS INDEX: 23.92 KG/M2 | HEART RATE: 82 BPM | WEIGHT: 135 LBS | SYSTOLIC BLOOD PRESSURE: 100 MMHG | OXYGEN SATURATION: 95 %

## 2018-05-16 DIAGNOSIS — R06.2 WHEEZING: ICD-10-CM

## 2018-05-16 DIAGNOSIS — R09.89 RHONCHI: Primary | ICD-10-CM

## 2018-05-16 PROCEDURE — 99213 OFFICE O/P EST LOW 20 MIN: CPT | Performed by: PHYSICIAN ASSISTANT

## 2018-05-16 RX ORDER — PREDNISONE 10 MG/1
TABLET ORAL
Qty: 20 TABLET | Refills: 0 | Status: SHIPPED | OUTPATIENT
Start: 2018-05-16 | End: 2018-08-22 | Stop reason: ALTCHOICE

## 2018-05-16 RX ORDER — DOXYCYCLINE HYCLATE 100 MG/1
100 CAPSULE ORAL 2 TIMES DAILY
Qty: 14 CAPSULE | Refills: 0 | Status: SHIPPED | OUTPATIENT
Start: 2018-05-16 | End: 2018-05-23

## 2018-05-16 ASSESSMENT — ENCOUNTER SYMPTOMS
SHORTNESS OF BREATH: 1
EYE REDNESS: 0
EYE PAIN: 0
COUGH: 1
SORE THROAT: 0
WHEEZING: 1
EYE DISCHARGE: 0
SINUS PAIN: 0
SINUS PRESSURE: 0
RHINORRHEA: 0
EYE ITCHING: 0
CHEST TIGHTNESS: 0
DIARRHEA: 0
NAUSEA: 0

## 2018-05-17 ENCOUNTER — TELEPHONE (OUTPATIENT)
Dept: FAMILY MEDICINE CLINIC | Age: 63
End: 2018-05-17

## 2018-07-27 RX ORDER — FLUOXETINE HYDROCHLORIDE 60 MG/1
1 TABLET, FILM COATED ORAL; ORAL NIGHTLY
Qty: 30 TABLET | Refills: 5 | Status: SHIPPED | OUTPATIENT
Start: 2018-07-27 | End: 2018-12-19 | Stop reason: SDUPTHER

## 2018-07-27 RX ORDER — ALENDRONATE SODIUM 70 MG/1
TABLET ORAL
Qty: 4 TABLET | Refills: 5 | Status: SHIPPED | OUTPATIENT
Start: 2018-07-27 | End: 2018-12-10 | Stop reason: ALTCHOICE

## 2018-08-15 ENCOUNTER — HOSPITAL ENCOUNTER (OUTPATIENT)
Dept: PULMONOLOGY | Age: 63
Discharge: HOME | End: 2018-08-15
Payer: MEDICARE

## 2018-08-15 PROCEDURE — 6360000002 HC RX W HCPCS: Performed by: INTERNAL MEDICINE

## 2018-08-15 PROCEDURE — 94618 PULMONARY STRESS TESTING: CPT

## 2018-08-15 PROCEDURE — 94726 PLETHYSMOGRAPHY LUNG VOLUMES: CPT

## 2018-08-15 PROCEDURE — 94729 DIFFUSING CAPACITY: CPT

## 2018-08-15 PROCEDURE — 94640 AIRWAY INHALATION TREATMENT: CPT

## 2018-08-15 PROCEDURE — 94664 DEMO&/EVAL PT USE INHALER: CPT

## 2018-08-15 PROCEDURE — 94060 EVALUATION OF WHEEZING: CPT

## 2018-08-15 RX ORDER — ALBUTEROL SULFATE 2.5 MG/3ML
2.5 SOLUTION RESPIRATORY (INHALATION) ONCE
Status: COMPLETED | OUTPATIENT
Start: 2018-08-15 | End: 2018-08-15

## 2018-08-15 RX ADMIN — ALBUTEROL SULFATE 2.5 MG: 2.5 SOLUTION RESPIRATORY (INHALATION) at 10:44

## 2018-08-16 NOTE — PROCEDURES
[address]                  [address] [address]                                PULMONARY FUNCTION    PATIENT NAME: George Curtis                       :        1955  MED REC NO:   [id number]                          ROOM:  ACCOUNT NO:   [de-identified]                           ADMIT DATE: 08/15/2018  PROVIDER:     Rm Jamison MD    DATE OF PROCEDURE:  08/15/2018    PULMONARY FUNCTION TEST    INDICATION:  COPD. FINDINGS:  1. Spirometry revealed evidence of severe obstructive defect. FEV1 is  1.19 liters, which is 45% of predicted. No significant response to  bronchodilators. FEV1/FVC ratio of 64%. 2.  Lung volume revealed normal total lung capacity of 4.28 liters, which  is 80% of predicted. Air trapping residual volume 2.43 liters, which is  124% of predicted. 3.  Diffusion capacity is severely decreased at 7.59, which is 32% of  predicted. 4.  Flow volume loops consistent with obstructive defect. 5.  Six-minute walk done per [facility] protocol. The patient  was able to walk 840 feet. Saturation on room air at rest was 96% with a  heart rate of 63. There is no significant desaturation on exertion. CONCLUSION:  1. Severe obstructive defect with air trapping and severely decreased  diffusion capacity. 2.  Six-minute walk with no significant desaturation on exertion.         Jasmin Rey MD    D: 08/15/2018 14:04:41       T: 08/15/2018 14:05:37     [email or web address]  Job#: [id number]     Doc#: [id number]    CC:

## 2018-08-22 ENCOUNTER — OFFICE VISIT (OUTPATIENT)
Dept: PULMONOLOGY | Age: 63
End: 2018-08-22

## 2018-08-22 VITALS
RESPIRATION RATE: 16 BRPM | HEART RATE: 74 BPM | OXYGEN SATURATION: 96 % | DIASTOLIC BLOOD PRESSURE: 60 MMHG | TEMPERATURE: 97.6 F | SYSTOLIC BLOOD PRESSURE: 102 MMHG | BODY MASS INDEX: 22.98 KG/M2 | WEIGHT: 134.6 LBS | HEIGHT: 64 IN

## 2018-08-22 DIAGNOSIS — Z87.891 PERSONAL HISTORY OF SMOKING: ICD-10-CM

## 2018-08-22 DIAGNOSIS — J44.9 CHRONIC OBSTRUCTIVE PULMONARY DISEASE, UNSPECIFIED COPD TYPE (HCC): ICD-10-CM

## 2018-08-22 DIAGNOSIS — G47.34 NOCTURNAL HYPOXIA: ICD-10-CM

## 2018-08-22 DIAGNOSIS — J47.9 BRONCHIECTASIS WITHOUT COMPLICATION (HCC): ICD-10-CM

## 2018-08-22 DIAGNOSIS — R93.89 ABNORMAL CT OF THE CHEST: Primary | ICD-10-CM

## 2018-08-22 PROCEDURE — 3017F COLORECTAL CA SCREEN DOC REV: CPT | Performed by: INTERNAL MEDICINE

## 2018-08-22 PROCEDURE — G8427 DOCREV CUR MEDS BY ELIG CLIN: HCPCS | Performed by: INTERNAL MEDICINE

## 2018-08-22 PROCEDURE — 1036F TOBACCO NON-USER: CPT | Performed by: INTERNAL MEDICINE

## 2018-08-22 PROCEDURE — 99214 OFFICE O/P EST MOD 30 MIN: CPT | Performed by: INTERNAL MEDICINE

## 2018-08-22 PROCEDURE — G8926 SPIRO NO PERF OR DOC: HCPCS | Performed by: INTERNAL MEDICINE

## 2018-08-22 PROCEDURE — 3023F SPIROM DOC REV: CPT | Performed by: INTERNAL MEDICINE

## 2018-08-22 PROCEDURE — G8420 CALC BMI NORM PARAMETERS: HCPCS | Performed by: INTERNAL MEDICINE

## 2018-08-22 RX ORDER — VARENICLINE TARTRATE 0.5 MG/1
0.5 TABLET, FILM COATED ORAL 2 TIMES DAILY
COMMUNITY
End: 2018-09-05 | Stop reason: SDUPTHER

## 2018-08-22 RX ORDER — ALBUTEROL SULFATE 90 UG/1
2 AEROSOL, METERED RESPIRATORY (INHALATION) EVERY 6 HOURS PRN
Qty: 1 INHALER | Refills: 6 | Status: SHIPPED | OUTPATIENT
Start: 2018-08-22 | End: 2018-11-26 | Stop reason: SDUPTHER

## 2018-08-22 RX ORDER — GUAIFENESIN 600 MG/1
600 TABLET, EXTENDED RELEASE ORAL 2 TIMES DAILY PRN
Qty: 60 TABLET | Refills: 2 | Status: SHIPPED | OUTPATIENT
Start: 2018-08-22 | End: 2020-07-24 | Stop reason: SDUPTHER

## 2018-08-22 NOTE — PROGRESS NOTES
reports that she has quit smoking. Her smoking use included Cigarettes. She started smoking about 3 weeks ago. She has a 30.00 pack-year smoking history. She has never used smokeless tobacco.  ETOH:   reports that she does not drink alcohol.       Family History:   Family History   Problem Relation Age of Onset    Asthma Mother     Diabetes Mother     Emphysema Mother     Heart Failure Mother     Hypertension Mother     Heart Disease Mother     High Cholesterol Mother     Cancer Mother     Depression Mother     Diabetes Father     Emphysema Father     Heart Failure Father     Hypertension Father     Heart Disease Father     High Cholesterol Father     Substance Abuse Father     Emphysema Paternal Grandfather     Diabetes Sister     High Cholesterol Sister     Vision Loss Maternal Uncle        Current Medications:    Current Outpatient Prescriptions:     varenicline (CHANTIX) 0.5 MG tablet, Take 0.5 mg by mouth 2 times daily, Disp: , Rfl:     FLUoxetine HCl 60 MG TABS, Take 1 tablet by mouth nightly, Disp: 30 tablet, Rfl: 5    alendronate (FOSAMAX) 70 MG tablet, TAKE 1 TABLET BY MOUTH EACH WEEK, Disp: 4 tablet, Rfl: 5    fluticasone-salmeterol (ADVAIR DISKUS) 250-50 MCG/DOSE AEPB, INHALE 1 PUFF TWICE DAILY, Disp: 180 each, Rfl: 1    SPIRIVA HANDIHALER 18 MCG inhalation capsule, INHALE 1 CAPSULE BY MOUTH DAILY, Disp: 30 capsule, Rfl: 5    simvastatin (ZOCOR) 20 MG tablet, TAKE 1 TABLET BY MOUTH EVERY EVENING, Disp: 90 tablet, Rfl: 3    montelukast (SINGULAIR) 10 MG tablet, TAKE 1 TABLET BY MOUTH EACH NIGHT, Disp: 90 tablet, Rfl: 3    ibuprofen (ADVIL;MOTRIN) 800 MG tablet, TAKE 1 TABLET BY MOUTH EVERY 8 HOURS AS NEEDED, Disp: 90 tablet, Rfl: 3    meloxicam (MOBIC) 15 MG tablet, I po qd PRN, Disp: 30 tablet, Rfl: 0    atenolol (TENORMIN) 25 MG tablet, Take 1 tablet by mouth daily (replaces propranolol), Disp: 90 tablet, Rfl: 1    ranitidine (ZANTAC) 150 MG tablet, TAKE 1 TABLET BY MOUTH 6MW 840  F L O2 94%   PFTs 10/13/2017 FVC 2.00 (58%) FEV1 1.27(48%) TLC 4.78(89%)   DLCO 06.16(38%) 6MW 1120F L O2 94%  PFTs 05/25/2017 FVC 2.15 (63%) FEV1 1.25(47%) TLC 5.37(100%) DLCO 11.13(47%) 6MW 1120F L O2 95%  PFTs 01/30/2017 FVC 2.05 (60%) FEV1 1.18(44%) TLC 4.69(87%)   DLCO 09.35(39%) 6MW 1190F L O2 95%  PFTs 10/10/2016 FVC 1.96 (57%) FEV1 1.18(44%) TLC 4.57(85%)   DLCO 08.31(35%) 6MW 1000F L O2 94%  PFTs 04/25/2016 FVC 1.63 (50%) FEV1 0.93(37%) TLC 4.96(97%)   DLCO 09.66(42%) 6MW 1140F L O2 91%  PFTs 12/09/2013                            FEV1 1.54(59%) TLC 5.34(104%) DLCO 11.30(49%) 6MW 1140F L O2 91%. PFTs 06/03/2013                            FEV1 1.69(65%) TLC 4.81(93%)   DLCO 09.44(41%) 6MW 1400F L O2 95%. PFTs 11/28/2012                            FEV1 1.67(64%) TLC 5.08(98%)   DLCO 11.94(51%) 6MW 1400F L O2 95%. PFTs 06/19/2012                            FEV1 1.59(60%) TLC 4.66(90%)   DLCO 10.21(44%) 6MW 1280F L O2 96%. PFTs 03/07/2012                            FEV1 1.72(70%) TLC 4.49(87%)   DLCO 10.72(54%)  PFTs 08/03/2011                            FEV1 1.71(72%) TLC 4.72(96%)   DLCO 12.3 (63%)  PFTs 01/10/2011                            FEV1 1.76           TLC 4.54             DLCO 10.50  PFTs 03/30/2010                            FEV1 1.96           TLC 4.86             DLCO 14.13  PFTs 03/03/2009                            FEV1 2.04           TLC 5.08             DLCO 14.02          CT chest 4/1/2016  Mediastinum: Mild dilation of the ascending thoracic aorta. Borderline enlarged precarinal lymph node measures 10 mm in short axis, unchanged. No new lymphadenopathy is identified. Coronary artery calcifications noted. Lungs/pleura: Peripheral irregular interstitial opacities are appreciated, scattered within the left lower lobes and within the anterior aspects of the upper lobes.  Temporal heterogeneity is suggested with areas ground-glass opacity intermixed with areas of fibrosis with architectural distortion. Mild bronchiectasis within the lower lobes detected bilaterally, greater on the left. There is a rounded area of what is probably consolidation at left lung base immediately adjacent to the left hemidiaphragm. On the 5 mm imaging, this is best visualized on image number 82, measuring roughly 9 mm maximally. All these findings are superimposed on a background of moderate emphysema. Upper Abdomen: Imaging the upper abdomen is unremarkable in appearance. Soft Tissues/Bones: No osteolytic or osteoblastic bone lesions are appreciated. Vertebroplasty within mid thoracic spine is identified. Mild anterior wedging of the thoracic vertebral bodies at other levels is re- demonstrated. LDCT 10/13/2017   1. LungRADS Category: LCS-2   2. LungRADS Category S: Emphysema   3. Incidental findings as above. 4. Annual LDCT screening study in 12 months. 4/5/2016 normal/negative RF 14, SCL70 SSA SSB aldolase CK GIORGI CCP Anti MALORIE-1    4/25/2016 Elevated IgA normal IgG and IgM      IgE 140 with unremarkable immunocap      Assessment:   · Abnormal CT 4/1/2016 with GGO- DDx RB-ILD, NSIP, HP, DIP, eosinophilic pneumonitis, aspiration and CTD-ILD. Favor smoking related ILD vs HP. Bronch 4/6/16 purulent secretions and grew strep pneumoniae. Negative AFB. Got rid of her Parrot   · Moderate obstructive airways diease secondary to COPD and asthma.   · Bronchiectasis   · Nocturnal hypoxia- on 2LPM   · >30 pack-years smoking - quit 7/2018                                                    Plan:   · Will continue to follow physiologically with PFTs 6MW in 2/2019  · Advised to avoid exposure/having birds   · Advair 250/50 BID, Spiriva daily, Singulair daily  · Albuterol 2 puffs Q4-6 hrs PRN  · Advised to use O2 2LPM at night  · Pulmonary rehab referral    · Patient is up to date with Pneumococcal vaccine   · Advised to get influenza vaccine this year   · Acapella and Mucinex   · CT chest, low dose protocol, screening for lung cancer 10/2018. Risks, benefits and alternatives including doing nothing were discussed with patient.   · Advised to continue with smoking cessation

## 2018-09-05 ENCOUNTER — TELEPHONE (OUTPATIENT)
Dept: PULMONOLOGY | Age: 63
End: 2018-09-05

## 2018-09-05 RX ORDER — VARENICLINE TARTRATE 0.5 MG/1
0.5 TABLET, FILM COATED ORAL 2 TIMES DAILY
Qty: 60 TABLET | Refills: 1 | Status: SHIPPED | OUTPATIENT
Start: 2018-09-05 | End: 2018-12-19 | Stop reason: SDUPTHER

## 2018-09-05 NOTE — TELEPHONE ENCOUNTER
Patient calling requesting chantix be sent into the pharmacy. Patient states at the [address] she did not think she would need a refill however she does. Medication pending if appropriate. Patient was last seen 8/22/18,     LOV: 8/22/18    Assessment:   · Abnormal CT 4/1/2016 with GGO- DDx RB-ILD, NSIP, HP, DIP, eosinophilic pneumonitis, aspiration and CTD-ILD. Favor smoking related ILD vs HP. Bronch 4/6/16 purulent secretions and grew strep pneumoniae. Negative AFB. Got rid of her Parrot   · Moderate obstructive airways diease secondary to COPD and asthma. · Bronchiectasis   · Nocturnal hypoxia- on 2LPM   · >30 pack-years smoking - quit 7/2018                                                            Plan:   · Will continue to follow physiologically with PFTs 6MW in 2/2019  · Advised to avoid exposure/having birds   · Advair 250/50 BID, Spiriva daily, Singulair daily  · Albuterol 2 puffs Q4-6 hrs PRN  · Advised to use O2 2LPM at night  · Pulmonary rehab referral    · Patient is up to date with Pneumococcal vaccine   · Advised to get influenza vaccine this year   · Acapella and Mucinex   · CT chest, low dose protocol, screening for lung cancer 10/2018. Risks, benefits and alternatives including doing nothing were discussed with patient.   · Advised to continue with smoking cessation

## 2018-09-06 ENCOUNTER — HOSPITAL ENCOUNTER (OUTPATIENT)
Dept: MAMMOGRAPHY | Age: 63
Discharge: HOME | End: 2018-09-11
Payer: MEDICARE

## 2018-09-06 ENCOUNTER — PROCEDURE VISIT (OUTPATIENT)
Dept: FAMILY MEDICINE CLINIC | Age: 63
End: 2018-09-06

## 2018-09-06 VITALS
WEIGHT: 133 LBS | SYSTOLIC BLOOD PRESSURE: 124 MMHG | DIASTOLIC BLOOD PRESSURE: 70 MMHG | HEART RATE: 73 BPM | OXYGEN SATURATION: 96 % | HEIGHT: 64 IN | BODY MASS INDEX: 22.71 KG/M2

## 2018-09-06 DIAGNOSIS — Z23 INFLUENZA VACCINE NEEDED: ICD-10-CM

## 2018-09-06 DIAGNOSIS — M80.00XD OSTEOPOROSIS WITH CURRENT PATHOLOGICAL FRACTURE WITH ROUTINE HEALING, UNSPECIFIED OSTEOPOROSIS TYPE, SUBSEQUENT ENCOUNTER: ICD-10-CM

## 2018-09-06 DIAGNOSIS — M70.21 OLECRANON BURSITIS, RIGHT ELBOW: Primary | ICD-10-CM

## 2018-09-06 DIAGNOSIS — K63.5 POLYP OF COLON, UNSPECIFIED PART OF COLON, UNSPECIFIED TYPE: ICD-10-CM

## 2018-09-06 DIAGNOSIS — K62.5 RECTAL BLEEDING: ICD-10-CM

## 2018-09-06 DIAGNOSIS — Z12.31 VISIT FOR SCREENING MAMMOGRAM: ICD-10-CM

## 2018-09-06 PROCEDURE — 99214 OFFICE O/P EST MOD 30 MIN: CPT | Performed by: FAMILY MEDICINE

## 2018-09-06 PROCEDURE — 77063 BREAST TOMOSYNTHESIS BI: CPT

## 2018-09-06 PROCEDURE — 3017F COLORECTAL CA SCREEN DOC REV: CPT | Performed by: FAMILY MEDICINE

## 2018-09-06 PROCEDURE — G8420 CALC BMI NORM PARAMETERS: HCPCS | Performed by: FAMILY MEDICINE

## 2018-09-06 PROCEDURE — G0008 ADMIN INFLUENZA VIRUS VAC: HCPCS | Performed by: FAMILY MEDICINE

## 2018-09-06 PROCEDURE — G8427 DOCREV CUR MEDS BY ELIG CLIN: HCPCS | Performed by: FAMILY MEDICINE

## 2018-09-06 PROCEDURE — 1036F TOBACCO NON-USER: CPT | Performed by: FAMILY MEDICINE

## 2018-09-06 PROCEDURE — 90686 IIV4 VACC NO PRSV 0.5 ML IM: CPT | Performed by: FAMILY MEDICINE

## 2018-09-06 RX ORDER — METHYLPREDNISOLONE 4 MG/1
TABLET ORAL
Qty: 1 KIT | Refills: 0 | Status: SHIPPED | OUTPATIENT
Start: 2018-09-06 | End: 2018-09-19

## 2018-09-06 NOTE — PATIENT INSTRUCTIONS
to learn more about \"Elbow Bursitis: Exercises. \"     If you do not have an account, please click on the \"Sign Up Now\" link. Current as of: November 29, 2017  Content Version: 11.7  © 9447-3826 [employer]. Care instructions adapted under license by [facility] ([facility]). If you have questions about a medical condition or this instruction, always ask your healthcare professional. [address] any warranty or liability for your use of this information.

## 2018-09-10 ASSESSMENT — ENCOUNTER SYMPTOMS: HEMATOCHEZIA: 1

## 2018-09-10 NOTE — PROGRESS NOTES
2018     Waqar Wyatt (:  1955) is a 65 y.o. female, here for evaluation of the following medical concerns:    Chief complaint: Patient presents with:  Mass: mass right elbow, x1 month or a couple of weeks      Past medical, surgical, family and social history, as well as current medications and allergies, were reviewed and updated with the patient. Patient would like to go over recent DEXA scan results. Arm Pain    The incident occurred more than 1 week ago (x 3 weeks). The incident occurred at home. There was no injury mechanism. The pain is present in the right elbow. The quality of the pain is described as aching. The pain does not radiate. The pain is at a severity of 1/10. The pain is mild. Pertinent negatives include no muscle weakness, numbness or tingling. The symptoms are aggravated by palpation. She has tried nothing for the symptoms. Rectal Bleeding    The current episode started more than 1 week ago. The onset was gradual. The problem occurs occasionally. The patient is experiencing no pain. The stool is described as streaked with blood (on the outside of stool, also on the toilet paper). There was no prior successful therapy. Recent Medical Care: last colonoscopy in , had polyp. Review of Systems   Gastrointestinal: Positive for hematochezia. Neurological: Negative for tingling and numbness. Prior to Visit Medications    Medication Sig Taking?  Authorizing Provider   zoster recombinant adjuvanted vaccine (SHINGRIX) 50 MCG SUSR injection Inject 0.5 mLs into the muscle every 6 months for 2 doses Yes Hector England MD   methylPREDNISolone (MEDROL DOSEPACK) 4 MG tablet Take by mouth per pack instructions Yes Hector England MD   varenicline (CHANTIX) 0.5 MG tablet Take 1 tablet by mouth 2 times daily Yes Neil Booker MD   guaiFENesin (MUCINEX) 600 MG extended release tablet Take 1 tablet by mouth 2 times daily as needed for Congestion Yes Neil Booker MD albuterol sulfate HFA (VENTOLIN HFA) 108 (90 Base) MCG/ACT inhaler Inhale 2 puffs into the lungs every 6 hours as needed for Wheezing or Shortness of Breath Yes Kamron Constantino MD   tiotropium (SPIRIVA HANDIHALER) 18 MCG inhalation capsule INHALE 1 CAPSULE BY MOUTH DAILY Yes Kamron Constantino MD   FLUoxetine HCl 60 MG TABS Take 1 tablet by mouth nightly Yes Meg Reusing, MD   alendronate (FOSAMAX) 70 MG tablet TAKE 1 TABLET BY MOUTH EACH WEEK Yes Meg Reusing, MD   fluticasone-salmeterol (ADVAIR DISKUS) 250-50 MCG/DOSE AEPB INHALE 1 PUFF TWICE DAILY Yes Meg Reusing, MD   simvastatin (ZOCOR) 20 MG tablet TAKE 1 TABLET BY MOUTH EVERY EVENING Yes Meg Reusing, MD   montelukast (SINGULAIR) 10 MG tablet TAKE 1 TABLET BY MOUTH EACH NIGHT Yes Meg Reusing, MD   ibuprofen (ADVIL;MOTRIN) 800 MG tablet TAKE 1 TABLET BY MOUTH EVERY 8 HOURS AS NEEDED Yes Meg Reusing, MD   meloxicam (MOBIC) 15 MG tablet I po qd PRN Yes Usha Salomon PA-C   atenolol (TENORMIN) 25 MG tablet Take 1 tablet by mouth daily (replaces propranolol) Yes Meg Reusing, MD   ranitidine (ZANTAC) 150 MG tablet TAKE 1 TABLET BY MOUTH TWICE DAILY as needed for heartburn Yes Meg Reusing, MD   traZODone (DESYREL) 150 MG tablet TAKE 2 TABLETS BY MOUTH AT BEDTIME Yes Meg Reusing, MD   cyclobenzaprine (FLEXERIL) 5 MG tablet TAKE ONE TABLET BY MOUTH EVERY 8 HOURS AS NEEDED FOR MUSCLE SPASMS Yes Meg Reusing, MD   busPIRone (BUSPAR) 30 MG tablet TAKE 1 TABLET BY MOUTH TWICE A DAY Yes Meg Reusing, MD   albuterol (PROVENTIL) (2.5 MG/3ML) 0.083% nebulizer solution Take 3 mLs by nebulization every 6 hours as needed for Shortness of Breath Yes Kamron Constantino MD   sodium chloride 3 % nebulizer solution Take 2 mLs by nebulization 4 times daily Yes Kamron Constantino MD   Misc.  Devices (ACAPELLA) MISC 1 Device by Does not apply route 4 times daily DX ILD J84.9 Yes Kamron Constantino MD        Social History   Substance Use Topics    Smoking status: Former Smoker Packs/day: 1.00     Years: 30.00     Types: Cigarettes     Start date: 8/1/2018    Smokeless tobacco: Never Used    Alcohol use No        Vitals:    09/06/18 1505   BP: 124/70   Site: Left Arm   Position: Sitting   Cuff Size: Small Adult   Pulse: 73   SpO2: 96%   Weight: 133 lb (60.3 kg)   Height: 5' 4\" (1.626 m)     Estimated body mass index is 22.83 kg/m² as calculated from the following:    Height as of this encounter: 5' 4\" (1.626 m). Weight as of this encounter: 133 lb (60.3 kg). Physical Exam   Constitutional: She appears well-developed and well-nourished. No distress. Pulmonary/Chest: Effort normal.   Musculoskeletal:        Right elbow: She exhibits swelling (olecranon bursa). Tenderness found. Olecranon process tenderness noted. Neurological: She is alert. Skin: She is not diaphoretic. Nursing note and vitals reviewed.     Narrative   EXAMINATION:   BONE DENSITOMETRY       5/11/2018 11:33 am       TECHNIQUE:   A bone density DEXA scan was performed of the lumbar spine and bilateral hips   on a [employer] system.       COMPARISON:   04/10/2015       HISTORY:   ORDERING PHYSICIAN PROVIDED HISTORY: Postmenopausal state   TECHNOLOGIST PROVIDED HISTORY:   Technologist Provided Reason for Exam: Postmenopausal state   Acuity: Chronic   Type of Encounter: Ongoing       FINDINGS:   LUMBAR SPINE:       The bone mineral density in the lumbar spine including the L1, L2, and L4   levels is measured at 0.773 g/cm2, which corresponds to a T-score of -2.4 and   a Z-score of -0.9.  This is within the osteopenia range by WHO criteria.       Since prior exam, 4.1 % change in the lumbar spine, which is significant.       LEFT HIP:       The bone mineral density in the total hip is measured at 0.623 g/cm2   corresponding to a T-score of -2.6 and a Z-score of -1.6.  This is within the   osteoporosis range by WHO criteria.       The bone mineral density of the femoral neck is measured at 0.492 g/cm2   corresponding to

## 2018-09-17 ENCOUNTER — INITIAL CONSULT (OUTPATIENT)
Dept: GASTROENTEROLOGY | Age: 63
End: 2018-09-17

## 2018-09-17 VITALS
HEIGHT: 64 IN | DIASTOLIC BLOOD PRESSURE: 60 MMHG | WEIGHT: 136 LBS | SYSTOLIC BLOOD PRESSURE: 110 MMHG | BODY MASS INDEX: 23.22 KG/M2

## 2018-09-17 DIAGNOSIS — R13.19 ESOPHAGEAL DYSPHAGIA: ICD-10-CM

## 2018-09-17 DIAGNOSIS — Z86.010 HISTORY OF COLON POLYPS: Primary | ICD-10-CM

## 2018-09-17 DIAGNOSIS — K62.5 RECTAL BLEEDING: ICD-10-CM

## 2018-09-17 DIAGNOSIS — R63.4 WEIGHT LOSS: ICD-10-CM

## 2018-09-17 PROCEDURE — 3017F COLORECTAL CA SCREEN DOC REV: CPT | Performed by: INTERNAL MEDICINE

## 2018-09-17 PROCEDURE — 99204 OFFICE O/P NEW MOD 45 MIN: CPT | Performed by: INTERNAL MEDICINE

## 2018-09-17 PROCEDURE — G8427 DOCREV CUR MEDS BY ELIG CLIN: HCPCS | Performed by: INTERNAL MEDICINE

## 2018-09-17 PROCEDURE — 1036F TOBACCO NON-USER: CPT | Performed by: INTERNAL MEDICINE

## 2018-09-17 PROCEDURE — G8420 CALC BMI NORM PARAMETERS: HCPCS | Performed by: INTERNAL MEDICINE

## 2018-09-17 RX ORDER — SODIUM, POTASSIUM,MAG SULFATES 17.5-3.13G
1 SOLUTION, RECONSTITUTED, ORAL ORAL ONCE
Qty: 1 BOTTLE | Refills: 0 | Status: SHIPPED | OUTPATIENT
Start: 2018-09-17 | End: 2018-09-17

## 2018-09-17 NOTE — PATIENT INSTRUCTIONS
to detect lumps (tumors), polyps, inflammation, and areas of bleeding. Your caregiver may also take a small piece of tissue (biopsy) that will be examined under a microscope. LET YOUR CAREGIVER KNOW ABOUT:   Allergies to food or medicine. Medicines taken, including vitamins, herbs, eyedrops, over-the-counter medicines, and creams. Use of steroids (by mouth or creams). Previous problems with anesthetics or numbing medicines. History of bleeding problems or blood clots. Previous surgery. Other health problems, including diabetes and kidney problems. Possibility of pregnancy, if this applies. BEFORE THE PROCEDURE   A clear liquid diet may be required for 2 days before the exam.   Ask your caregiver about changing or stopping your regular medications. Liquid injections (enemas) or laxatives may be required. A large amount of electrolyte solution may be given to you to drink over a short period of time. This solution is used to clean out your colon. You should be present 60 minutes prior to your procedure or as directed by your caregiver. AFTER THE PROCEDURE   If you received a sedative or pain relieving medication, you will need to arrange for someone to drive you home. Occasionally, there is a little blood passed with the first bowel movement. Do not be concerned. FINDING OUT THE RESULTS OF YOUR TEST   Not all test results are available during your visit. If your test results are not back during the visit, make an appointment with your caregiver to find out the results. Do not assume everything is normal if you have not heard from your caregiver or the medical facility. It is important for you to follow up on all of your test results. HOME CARE INSTRUCTIONS   It is not unusual to pass moderate amounts of gas and experience mild abdominal cramping following the procedure.  This is due to air being used to inflate your colon during the exam. Walking or a warm pack on your belly (abdomen) may specific treatments (such as dilation, removal of polyps, treatment of bleeding), depending upon what is found during the examination. Air is introduced through the scope to open the esophagus, stomach, and intestine, allowing the scope to be passed through these structures and improving the endoscopist's ability to see all of the structures. You may experience a mild discomfort as air is pushed into the intestinal tract. This is not harmful and belching may relieve the sensation. The endoscope does not interfere with breathing. Taking slow, deep breaths during the procedure may help you to relax. ENDOSCOPY RECOVERY  After the endoscopy, you will be observed for one to two hours while the sedative medication wears off. The medicines cause most people to temporarily feel tired or have difficulty concentrating and you should not drive or return to work after the procedure. The most common discomfort after the examination is a feeling of bloating as a result of the air introduced during the examination. This usually resolves quickly. Some patients also have a mild sore throat. Most patients are able to eat shortly after the examination. ENDOSCOPY COMPLICATIONS  Upper endoscopy is a safe procedure and complications are uncommon. The following is a list of possible complications:  Aspiration (inhaling) of food or fluids into the lungs, the risk of which can be minimized by not eating or drinking for the recommended period of time before the examination. The endoscope can cause a tear or hole in the tissue being examined. This is a serious complication but fortunately occurs only rarely. Bleeding can occur from biopsies or the removal of polyps, although it is usually minimal and stops quickly on its own or can be easily controlled.    Reactions to the sedative medications are possible; the endoscopy team (doctors and nurses) will ask about previous medication allergies or reactions and about health problems such as heart, lung, kidney, or liver disease. Providing this information to the team ensures a safer examination. The medications may produce irritation in the vein at the site of the intravenous line. If redness, swelling, or discomfort occurs, your should call your endoscopist or primary care provider, or the number given by the nurse at discharge. The following signs and symptoms should be reported immediately:  Severe abdominal pain (more than gas cramps)   A firm, distended abdomen   Vomiting   Any temperature elevation   Difficulty swallowing or severe throat pain   A crunching feeling under the skin of the neck  AFTER UPPER ENDOSCOPY  Most patients tolerate endoscopy very well and feel fine afterwards. Some fatigue is common after the examination, and you should plan to take it easy and relax the rest of the day. The endoscopist can describe the result of their examination before you leave the endoscopy unit. If biopsies have been taken or polyps removed, you should call for results within one to two weeks. WHERE TO GET MORE INFORMATION  Your healthcare provider is the best source of information for questions and concerns related to your medical problem. ? The following organizations also provide reliable health information. [employer]  (www.nlm.nih.gov/medlineplus/healthtopics. html)  The American Society of Gastrointestinal Endoscopy:  (www.askasge. org)  [employer] of Diabetes and Digestive and Kidney Diseases  (http://digestive. niddk.nih.gov/ddiseases/pubs/upperendoscopy/index. htm)    COLONOSCOPY OVERVIEW - A colonoscopy is an exam of the lower part of the gastrointestinal tract, which is called the colon or large intestine (bowel). Colonoscopy is a safe procedure that provides information other tests may not be able to give. Patients who require colonoscopy often have questions and concerns about the procedure.     Colonoscopy is performed by inserting a device called a colonoscope can add a flavoring (included), which, unfortunately, only partially hides the unpleasant taste. Most doctors do not recommend that you add other flavorings to the solution. Refrigerating the solution can make it easier to drink. Drinking this solution may be the most unpleasant part of the exam. You will begin to have watery diarrhea within a short time after drinking the solution. If you become nauseated or vomit while drinking the solution, call your doctor or nurse for instructions. Medicines - You can take most prescription and nonprescription medicines right up to the day of the colonoscopy. Your doctor should tell you what medicines to stop. You should also tell the doctor if you are allergic to any medicines. Some medicines increase the risk of heavy bleeding if you have a biopsy during the colonoscopy. Ask your doctor how and when to stop these medicines, including warfarin/Coumadin® and clopidogrel/Plavix®. Transportation home - You will be given a sedative (a medicine to help you relax) during the colonoscopy, so you will need someone to take you home after your test. Although you will be awake by the time you go home, the sedative medicines cause changes in reflexes and judgment that can interfere with your ability to make decisions, similar to the effect of alcohol. WHAT TO EXPECT - Before the test, a doctor will review the test, including possible complications, and will ask you to sign a consent form. The nurse will start an IV line in your hand or arm. Your blood pressure and heart rate will be monitored during the test.    THE COLONOSCOPY PROCEDURE - You will be given fluid and medicines through an IV line. Many people sleep during the test, while others are very relaxed, comfortable, and generally not aware. The colonoscope is a flexible tube, approximately the size of the index finger.  The scope pumps air into the colon to inflate it and allow the doctor to see the entire lining. You might feel bloating or gas cramps as the air opens the colon. Try not to be embarrassed about passing this gas, and let your doctor know if you are uncomfortable. During the procedure, the doctor might take a biopsy (small pieces of tissue) or remove polyps. Polyps are growths of tissue that can range in size from the tip of a pen to several inches. Most polyps are benign (not cancerous). However, some polyps can become cancerous if allowed to grow for a long time. Having a polyp removed does not hurt. RECOVERY FROM COLONOSCOPY - After the colonoscopy, you will be observed in a recovery area until the effects of the sedative medication wear off. The most common complaint after colonoscopy is a feeling of bloating and gas cramps. You may also feel groggy from the sedation medications. You should not return to work or drive that day. Most people are able to eat normally after the test. Ask your doctor when it is safe to restart aspirin and other blood-thinning medications. COLONOSCOPY COMPLICATIONS - Colonoscopy is a safe procedure, and complications are rare but can occur:        Bleeding can occur from biopsies or the removal of polyps, but it is usually minimal and can be controlled. The colonoscope can cause a tear or hole in the colon. This is a serious problem, but it does not happen commonly. It is possible to have side effects from the sedative medicines. Although colonoscopy is the best test to examine the colon, it is possible for even the most skilled doctors to miss or overlook an abnormal area in the colon.     You should call your doctor immediately if you have any of the following:        Severe abdominal pain (not just gas cramps)      A firm, bloated abdomen      Vomiting      Fever      Rectal bleeding (greater than a few tablespoons)    AFTER COLONOSCOPY - Although many people worry about being uncomfortable during a colonoscopy, most people tolerate it very well and feel fine afterward. It is normal to feel tired afterward. Plan to take it easy and relax the rest of the day. Your doctor can describe the results of the colonoscopy as soon as it is over. If s/he took biopsies or polyps, you should call for results within one to two weeks. WHERE TO GET MORE INFORMATION - Your healthcare provider is the best source of information for questions and concerns related to your medical problem. This article will be updated as needed every four months on our Web site ([email or web address]). The following organizations also provide reliable health information. [employer]          (www.nlm.nih.gov/medlineplus/colonoscopy.html)        [address] for Gastrointestinal Endoscopy          (www.asge.org/PatientInfoIndex. aspx?rx=615)

## 2018-09-17 NOTE — LETTER
COLONOSCOPY PREP INSTRUCTIONS  SUPREP SPLIT DOSE  [facility] PHYSICIAN ENDOSCOPY    Your colonoscopy is scheduled on: _9/20/18_  Dr. Shafer/Jong_____  Arrival Time: __8:30 AM___   DO NOT EAT OR DRINK (INCLUDING WATER) AFTER: ___4:30 AM- after drinking the 2nd dose of prep__  's Name_[facility] [phone]  [facility] [phone]    Keep these papers together; REVIEW ALL OF THEM AT LEAST 7 DAYS BEFORE THE PROCEDURE. Please complete all paperwork; including a current list of your medications, to avoid delays in the admission process. The following instructions must be followed in order to ensure your procedure has optimal outcomes. - KEEP YOUR APPOINTMENT. If for any reason, you are unable to keep your appointment, please notify us within 72 hours before your procedure. - You MUST have a responsible adult to drive you, who MUST remain at our facility the ENTIRE time. If not the procedure will be cancelled. You may go by taxi ONLY if you have a responsible adult with you. You may experience light headedness, dizziness etc., therefore you should have a responsible adult remain with you until the morning after your procedure. - Bring your insurance card and 's license. Call your insurance carrier to verify your benefits, and confirm that our facility is in your network, prior to the procedure date to ensure coverage. The facility name is listed as [facility] or [facility] and the tax ID# is [id number].  - Due to the safety and privacy of our patients, only one visitor is allowed in the recovery area after the procedure. The center will not be responsible for lost valuables so please leave them at home. - Try to avoid seeds (strawberries, marlon, and rye) for one week prior to your procedure.   - If you have questions after beginning the prep, call between 8:30 am & 4:30pm you will speak to a nurse or medical assistant, after 4:30pm you will reach the physician on call. - Hydration (body fluid) is the most important aspect of an effective and safe prep. If you are not drinking enough fluid you may experience cramping, nausea and dizziness. - Common side effects of the prep are nausea, bloating and shaking chills. If any of these occur, take a break from the prep (30 minutes to 1 hour) and then restart. If unable to complete after that notify the Physician as your procedure may need to be cancelled. Nausea medication or an alternative prep is sometimes called in.  - Do not leave home after starting the prep. Frequent, liquid stools may begin as soon as 15 minutes after the first bottle, although it could take up to 4 hours or longer for your first bowel movement. - Even if your stools are clear and watery in appearance, TAKE ALL OF THE PREP.  - Using baby wipes and nonprescription ointments, such as Desitin, will help with the irritation caused by frequent loose stools. - Due to unforeseen schedule changes, you may be asked to move your appointment time to an earlier/later time slot. To insure that your prep gives you the best results for your Colonoscopy, Please follow all directions closely. 3-5 days prior to your procedure:  1. Begin a low-fiber diet (no corn, dried beans, tomatoes, nuts, seeds, lettuce). 2. No bran or bulking agents. 3. Stop taking your multivitamin or iron supplements.   4. If you currently take Aggrenox, Dipyridamole, Persantine, Fondaparinux sodium (Arixtra), Dalteparin sodium Zoie Tolliver), Warfarin (Coumadin), Clopidogrel (Plavix), Prasugrel (Effient), Enoxaparin, Lovenox, or Heparin, Cilostazol (Pletal), Rivoroxaban (Xarelto), Desirudin (Iprivask), Cyclopentyltrazolopyrimide (Ticagrelor or Brilinta), Cangrelor (Elia Ware), Dabigatran (Pradaxa), Apixaban (Eliquis), Edoxaban (Savaysa) you should

## 2018-09-17 NOTE — PROGRESS NOTES
04/04/16 111 lb 6.4 oz (50.5 kg)   01/14/16 112 lb (50.8 kg)   09/04/15 115 lb (52.2 kg)   06/30/15 110 lb (49.9 kg)   06/15/15 110 lb (49.9 kg)   04/02/15 116 lb (52.6 kg)   03/24/15 117 lb (53.1 kg)   02/12/15 120 lb (54.4 kg)   02/10/15 125 lb (56.7 kg)   02/09/15 125 lb (56.7 kg)   01/21/15 125 lb (56.7 kg)   12/30/14 120 lb 9.6 oz (54.7 kg)   10/27/14 127 lb (57.6 kg)   10/16/14 129 lb (58.5 kg)   09/26/14 120 lb 12.8 oz (54.8 kg)   09/05/14 124 lb 9.6 oz (56.5 kg)   01/02/14 135 lb (61.2 kg)   12/09/13 138 lb 3.2 oz (62.7 kg)   11/04/13 141 lb (64 kg)   06/13/13 142 lb (64.4 kg)   06/03/13 144 lb 6.4 oz (65.5 kg)   04/11/13 139 lb (63 kg)   01/15/13 143 lb (64.9 kg)   01/08/13 135 lb (61.2 kg)       No components found for: HGBA1C  BP Readings from Last 3 Encounters:   09/17/18 110/60   09/06/18 124/70   08/22/18 102/60     Health Maintenance   Topic Date Due    Shingles Vaccine (1 of 2 - 2 Dose Series) 10/22/2006    Colon cancer screen colonoscopy  08/19/2020    Breast cancer screen  09/06/2020    Lipid screen  01/25/2023    DTaP/Tdap/Td vaccine (2 - Td) 01/25/2028    Flu vaccine  Completed    Pneumococcal med risk  Completed    Hepatitis C screen  Completed    HIV screen  Completed       No components found for: [facility]     PAST MEDICAL HISTORY     Past Medical History:   Diagnosis Date    Allergic rhinitis 6/11/2010    Anxiety     Arthritis     [facility] 3/[facility]/2012    Recurrent    Asthma     Bronchitis chronic     COPD (chronic obstructive pulmonary disease) ([address] 12/3/2009    Depression     Drug abuse, cocaine type     past history of crack cocaine use    Emphysema [facility] lung ([facility]     Fibromyalgia     GERD (gastroesophageal reflux disease)     Hyperlipidemia     Lung disease     Pneumonia     Polysubstance dependence ([facility] 1/2/2012    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    Pulmonary fibrosis ([facility] 10/16/2014    Pulmonary infiltrate     Pulmonary nodule 12/3/2009    Tobacco abuse      FAMILY HISTORY     Family History   Problem Relation Age of Onset    Asthma Mother     Diabetes Mother     Emphysema Mother     Heart Failure Mother     Hypertension Mother     Heart Disease Mother     High Cholesterol Mother     Cancer Mother     Depression Mother     Diabetes Father     Emphysema Father     Heart Failure Father     Hypertension Father     Heart Disease Father     High Cholesterol Father     Substance Abuse Father     Emphysema Paternal Grandfather     Diabetes Sister     High Cholesterol Sister     Vision Loss Maternal Uncle      SOCIAL HISTORY     Social History     Social History    Marital status:      Spouse name: N/A    Number of children: N/A    Years of education: N/A     Occupational History    Not on file.      Social History Main Topics    Smoking status: Former Smoker     Packs/day: 1.00     Years: 30.00     Types: Cigarettes     Start date: 8/1/2018    Smokeless tobacco: Never Used    Alcohol use No    Drug use: Yes     Types: Marijuana    Sexual activity: Yes     Partners: Male     Other Topics Concern    Not on file     Social History Narrative    No narrative on file     SURGICAL HISTORY     Past Surgical History:   Procedure Laterality Date    BRONCHOSCOPY      COLONOSCOPY  2012    ENDOSCOPY, COLON, DIAGNOSTIC      HYSTERECTOMY      OTHER SURGICAL HISTORY  2/12/15    T8 Kyphoplasty    SALIVARY GLAND SURGERY       CURRENT MEDICATIONS   (This list may include medications prescribed during this encounter as epic can not insert only the list prior to this encounter.)  Current Outpatient Rx   Medication Sig Dispense Refill    bisacodyl (DULCOLAX) 5 MG EC tablet Take 1 tablet by mouth daily as needed for Constipation 4 tablet 0    Na Sulfate-K Sulfate-Mg Sulf 17.5-3.13-1.6 GM/180ML SOLN Take 1 kit by mouth once for 1 dose Insured 30% off (max $15) after first $40 of co-pay/cash [email or web address] 1 Bottle 0    zoster recombinant adjuvanted vaccine (SHINGRIX) 50 MCG SUSR injection Inject 0.5 mLs into the muscle every 6 months for 2 doses 1 each 1    methylPREDNISolone (MEDROL DOSEPACK) 4 MG tablet Take by mouth per pack instructions 1 kit 0    varenicline (CHANTIX) 0.5 MG tablet Take 1 tablet by mouth 2 times daily 60 tablet 1    guaiFENesin (MUCINEX) 600 MG extended release tablet Take 1 tablet by mouth 2 times daily as needed for Congestion 60 tablet 2    albuterol sulfate HFA (VENTOLIN HFA) 108 (90 Base) MCG/ACT inhaler Inhale 2 puffs into the lungs every 6 hours as needed for Wheezing or Shortness of Breath 1 Inhaler 6    tiotropium (SPIRIVA HANDIHALER) 18 MCG inhalation capsule INHALE 1 CAPSULE BY MOUTH DAILY 30 capsule 6    FLUoxetine HCl 60 MG TABS Take 1 tablet by mouth nightly 30 tablet 5    alendronate (FOSAMAX) 70 MG tablet TAKE 1 TABLET BY MOUTH EACH WEEK 4 tablet 5    fluticasone-salmeterol (ADVAIR DISKUS) 250-50 MCG/DOSE AEPB INHALE 1 PUFF TWICE DAILY 180 each 1    simvastatin (ZOCOR) 20 MG tablet TAKE 1 TABLET BY MOUTH EVERY EVENING 90 tablet 3    montelukast (SINGULAIR) 10 MG tablet TAKE 1 TABLET BY MOUTH EACH NIGHT 90 tablet 3    ibuprofen (ADVIL;MOTRIN) 800 MG tablet TAKE 1 TABLET BY MOUTH EVERY 8 HOURS AS NEEDED 90 tablet 3    meloxicam (MOBIC) 15 MG tablet I po qd PRN 30 tablet 0    atenolol (TENORMIN) 25 MG tablet Take 1 tablet by mouth daily (replaces propranolol) 90 tablet 1    ranitidine (ZANTAC) 150 MG tablet TAKE 1 TABLET BY MOUTH TWICE DAILY as needed for heartburn 60 tablet 11    traZODone (DESYREL) 150 MG tablet TAKE 2 TABLETS BY MOUTH AT BEDTIME 180 tablet 11    cyclobenzaprine (FLEXERIL) 5 MG tablet TAKE ONE TABLET BY MOUTH EVERY 8 HOURS AS NEEDED FOR MUSCLE SPASMS 90 tablet 11    busPIRone (BUSPAR) 30 MG tablet TAKE 1 TABLET BY MOUTH TWICE A  tablet 3    albuterol (PROVENTIL) (2.5 MG/3ML) 0.083% nebulizer kg)     Constitutional: Well developed, Well nourished, No acute distress, Non-toxic appearance. HENT: Normocephalic, Atraumatic, Bilateral external ears normal, Oropharynx moist, No oral exudates, Nose normal.   Eyes: Conjunctiva normal, No discharge. Neck: Normal range of motion, No tenderness, Supple, No stridor. Lymphatic: No cervical, subclavian, or axillary lymphadenopathy. Cardiovascular: Normal heart rate, Normal rhythm, No murmurs, No rubs, No gallops. Thorax & Lungs: Normal breath sounds, No respiratory distress, No wheezing, No chest tenderness. No gynecomastia. Abdomen: scars consistent with stated surgeries, no hernias, no HSM, soft NTND   Rectal:  Deferred. Skin: Warm, Dry, No erythema, No rash. No bruising. No spider hemangiomas. Back: No tenderness, No CVA tenderness. Lower Extremities: Intact distal pulses, No edema, No tenderness, No cyanosis, No clubbing. Neurologic: Alert & oriented x 3, Normal motor function, Normal sensory function, No focal deficits noted. No asterixis. RADIOLOGY/PROCEDURES         FINAL IMPRESSION     Orders Placed This Encounter   Procedures    COLONOSCOPY W/ OR W/O BIOPSY     Standing Status:   Future     Standing Expiration Date:   9/17/2019     Scheduling Instructions: With diprivan     Order Specific Question:   Screening or Diagnostic? Answer:   Diagnostic    EGD     Standing Status:   Future     Standing Expiration Date:   10/17/2018     Order Specific Question:   Screening or Diagnostic? Answer:   Isabela Jonas was seen today for establish care. Diagnoses and all orders for this visit:    History of colon polyps  -     COLONOSCOPY W/ OR W/O BIOPSY; Future  -     bisacodyl (DULCOLAX) 5 MG EC tablet;  Take 1 tablet by mouth daily as needed for Constipation  -     Na Sulfate-K Sulfate-Mg Sulf 17.5-3.13-1.6 GM/180ML SOLN; Take 1 kit by mouth once for 1 dose Insured 30% off (max $15) after first $40 of co-pay/cash price

## 2018-09-19 ENCOUNTER — TELEPHONE (OUTPATIENT)
Dept: GASTROENTEROLOGY | Age: 63
End: 2018-09-19

## 2018-09-20 ENCOUNTER — ANESTHESIA EVENT (OUTPATIENT)
Dept: ENDOSCOPY | Age: 63
End: 2018-09-20
Payer: MEDICARE

## 2018-09-20 ENCOUNTER — ANESTHESIA (OUTPATIENT)
Dept: ENDOSCOPY | Age: 63
End: 2018-09-20
Payer: MEDICARE

## 2018-09-20 ENCOUNTER — HOSPITAL ENCOUNTER (OUTPATIENT)
Age: 63
Setting detail: OUTPATIENT SURGERY
Discharge: HOME | End: 2018-09-20
Attending: INTERNAL MEDICINE | Admitting: INTERNAL MEDICINE
Payer: MEDICARE

## 2018-09-20 VITALS
HEIGHT: 64 IN | WEIGHT: 136 LBS | HEART RATE: 58 BPM | TEMPERATURE: 98.4 F | OXYGEN SATURATION: 100 % | RESPIRATION RATE: 15 BRPM | SYSTOLIC BLOOD PRESSURE: 115 MMHG | BODY MASS INDEX: 23.22 KG/M2 | DIASTOLIC BLOOD PRESSURE: 59 MMHG

## 2018-09-20 VITALS — OXYGEN SATURATION: 99 % | SYSTOLIC BLOOD PRESSURE: 79 MMHG | DIASTOLIC BLOOD PRESSURE: 49 MMHG

## 2018-09-20 PROCEDURE — 43450 DILATE ESOPHAGUS 1/MULT PASS: CPT | Performed by: INTERNAL MEDICINE

## 2018-09-20 PROCEDURE — 2500000003 HC RX 250 WO HCPCS: Performed by: NURSE ANESTHETIST, CERTIFIED REGISTERED

## 2018-09-20 PROCEDURE — 7100000010 HC PHASE II RECOVERY - FIRST 15 MIN: Performed by: INTERNAL MEDICINE

## 2018-09-20 PROCEDURE — 3609027000 HC COLONOSCOPY: Performed by: INTERNAL MEDICINE

## 2018-09-20 PROCEDURE — 3700000000 HC ANESTHESIA ATTENDED CARE: Performed by: INTERNAL MEDICINE

## 2018-09-20 PROCEDURE — 43235 EGD DIAGNOSTIC BRUSH WASH: CPT | Performed by: INTERNAL MEDICINE

## 2018-09-20 PROCEDURE — 6360000002 HC RX W HCPCS: Performed by: NURSE ANESTHETIST, CERTIFIED REGISTERED

## 2018-09-20 PROCEDURE — 2580000003 HC RX 258: Performed by: INTERNAL MEDICINE

## 2018-09-20 PROCEDURE — 45378 DIAGNOSTIC COLONOSCOPY: CPT | Performed by: INTERNAL MEDICINE

## 2018-09-20 PROCEDURE — 2709999900 HC NON-CHARGEABLE SUPPLY: Performed by: INTERNAL MEDICINE

## 2018-09-20 PROCEDURE — 3609017100 HC EGD: Performed by: INTERNAL MEDICINE

## 2018-09-20 PROCEDURE — 2580000003 HC RX 258: Performed by: NURSE ANESTHETIST, CERTIFIED REGISTERED

## 2018-09-20 PROCEDURE — 3609015300 HC ESOPHAGEAL DILATION MALONEY: Performed by: INTERNAL MEDICINE

## 2018-09-20 PROCEDURE — 3700000001 HC ADD 15 MINUTES (ANESTHESIA): Performed by: INTERNAL MEDICINE

## 2018-09-20 PROCEDURE — 7100000011 HC PHASE II RECOVERY - ADDTL 15 MIN: Performed by: INTERNAL MEDICINE

## 2018-09-20 RX ORDER — FENTANYL CITRATE 50 UG/ML
INJECTION, SOLUTION INTRAMUSCULAR; INTRAVENOUS PRN
Status: DISCONTINUED | OUTPATIENT
Start: 2018-09-20 | End: 2018-09-20 | Stop reason: SDUPTHER

## 2018-09-20 RX ORDER — PROPOFOL 10 MG/ML
INJECTION, EMULSION INTRAVENOUS PRN
Status: DISCONTINUED | OUTPATIENT
Start: 2018-09-20 | End: 2018-09-20 | Stop reason: SDUPTHER

## 2018-09-20 RX ORDER — SODIUM CHLORIDE 9 MG/ML
INJECTION, SOLUTION INTRAVENOUS CONTINUOUS PRN
Status: DISCONTINUED | OUTPATIENT
Start: 2018-09-20 | End: 2018-09-20 | Stop reason: SDUPTHER

## 2018-09-20 RX ORDER — SODIUM CHLORIDE, SODIUM LACTATE, POTASSIUM CHLORIDE, CALCIUM CHLORIDE 600; 310; 30; 20 MG/100ML; MG/100ML; MG/100ML; MG/100ML
INJECTION, SOLUTION INTRAVENOUS ONCE
Status: COMPLETED | OUTPATIENT
Start: 2018-09-20 | End: 2018-09-20

## 2018-09-20 RX ORDER — LIDOCAINE HYDROCHLORIDE 20 MG/ML
INJECTION, SOLUTION INFILTRATION; PERINEURAL PRN
Status: DISCONTINUED | OUTPATIENT
Start: 2018-09-20 | End: 2018-09-20 | Stop reason: SDUPTHER

## 2018-09-20 RX ADMIN — SODIUM CHLORIDE: 9 INJECTION, SOLUTION INTRAVENOUS at 09:00

## 2018-09-20 RX ADMIN — FENTANYL CITRATE 100 MCG: 50 INJECTION INTRAMUSCULAR; INTRAVENOUS at 09:04

## 2018-09-20 RX ADMIN — PROPOFOL 50 MG: 10 INJECTION, EMULSION INTRAVENOUS at 09:09

## 2018-09-20 RX ADMIN — PROPOFOL 20 MG: 10 INJECTION, EMULSION INTRAVENOUS at 09:15

## 2018-09-20 RX ADMIN — PROPOFOL 20 MG: 10 INJECTION, EMULSION INTRAVENOUS at 09:12

## 2018-09-20 RX ADMIN — PROPOFOL 100 MG: 10 INJECTION, EMULSION INTRAVENOUS at 09:06

## 2018-09-20 RX ADMIN — LIDOCAINE HYDROCHLORIDE 80 MG: 20 INJECTION, SOLUTION INFILTRATION; PERINEURAL at 09:04

## 2018-09-20 RX ADMIN — SODIUM CHLORIDE, POTASSIUM CHLORIDE, SODIUM LACTATE AND CALCIUM CHLORIDE: 600; 310; 30; 20 INJECTION, SOLUTION INTRAVENOUS at 08:26

## 2018-09-20 RX ADMIN — PHENYLEPHRINE HYDROCHLORIDE 100 MCG: 10 INJECTION INTRAVENOUS at 09:14

## 2018-09-20 RX ADMIN — PHENYLEPHRINE HYDROCHLORIDE 100 MCG: 10 INJECTION INTRAVENOUS at 09:24

## 2018-09-20 RX ADMIN — PROPOFOL 20 MG: 10 INJECTION, EMULSION INTRAVENOUS at 09:19

## 2018-09-20 ASSESSMENT — PAIN SCALES - GENERAL
PAINLEVEL_OUTOF10: 0

## 2018-09-20 ASSESSMENT — PAIN - FUNCTIONAL ASSESSMENT: PAIN_FUNCTIONAL_ASSESSMENT: 0-10

## 2018-09-20 NOTE — ANESTHESIA PRE PROCEDURE
09/17/18 136 lb (61.7 kg)   09/06/18 133 lb (60.3 kg)     Body mass index is 23.34 kg/m². CBC:   Lab Results   Component Value Date    WBC 15.9 04/18/2018    RBC 4.29 04/18/2018    HGB 13.0 04/18/2018    HCT 38.9 04/18/2018    MCV 90.5 04/18/2018    RDW 13.7 04/18/2018     04/18/2018       CMP:   Lab Results   Component Value Date     04/18/2018    K 4.3 04/18/2018    K 3.5 04/10/2018    CL 90 04/18/2018    CO2 23 04/18/2018    BUN 9 04/18/2018    CREATININE <0.5 04/18/2018    GFRAA >60 04/18/2018    GFRAA >60 01/08/2013    AGRATIO 1.1 04/18/2018    LABGLOM >60 04/18/2018    GLUCOSE 116 04/18/2018    PROT 6.9 04/18/2018    PROT 6.8 01/08/2013    CALCIUM 9.2 04/18/2018    BILITOT 0.5 04/18/2018    ALKPHOS 72 04/18/2018    AST 14 04/18/2018    ALT 9 04/18/2018       POC Tests: No results for input(s): POCGLU, POCNA, POCK, POCCL, POCBUN, POCHEMO, POCHCT in the last 72 hours.     Coags:   Lab Results   Component Value Date    PROTIME 11.2 02/12/2015    INR 1.04 02/12/2015       HCG (If Applicable): No results found for: PREGTESTUR, PREGSERUM, HCG, HCGQUANT     ABGs:   Lab Results   Component Value Date    PHART 7.424 03/28/2012    PO2ART 56.2 03/28/2012    DVM4XTR 37.1 03/28/2012    HMT5KQK 23.7 03/28/2012    BEART -0.4 03/28/2012    F6OSFZMW 90.1 03/28/2012        Type & Screen (If Applicable):  No results found for: TROY [facility]    Anesthesia Evaluation  Patient summary reviewed and Nursing notes reviewed no history of anesthetic complications:   Airway: Mallampati: II     Neck ROM: full   Dental:          Pulmonary:   (+) pneumonia:  COPD:  asthma:                            Cardiovascular:                      Neuro/Psych:   (+) neuromuscular disease:, psychiatric history:            GI/Hepatic/Renal:   (+) GERD:,           Endo/Other:                     Abdominal:           Vascular:                                        Anesthesia Plan      general     ASA 3       Induction: intravenous. Anesthetic plan and risks discussed with patient. Plan discussed with CRNA.                   Matthew Roberts MD   9/20/2018

## 2018-09-20 NOTE — H&P
[address] [facility],  [address] [place] [address] Ave  [place], [address] [place] [address]  Phone: [phone]       CHIEF COMPLAINT           Chief Complaint   Patient presents with   [facility] Establish Care       NP- rectal bleeding, previous GI Dr Laura Sun         HPI      Thank you Vipin Ag MD for asking me to see Glenn Snyder in consultation. She is a  [2] White [1] 65 y.o. Eward Medicus female seen independently who presents with the following GI complaints:  .  Glenn Snyder  Complains of 4 months of intermittent painless rectal bleeding. She has lost 10lbs this year but was not aware. She has chronic constipation and uses an otc stimulant laxative every 1-2 weeks. She describes intermittent solid food dysphagia with foreign body sensation. She has chronic gerd on a daily ppi with symptoms if she stops it. She believes she had colon polyps in the past.     HPI elements: location, severity, timing, modifying factors, quality, duration, context and associated signs/symptoms.     Last Encounter Reviewed:  Pertinent PMH, FH, SH is reviewed below. Last EGD: 2010  Last Colonoscopy: 2010     Review of available records reveals:       Wt Readings from Last 50 Encounters:   09/17/18 136 lb (61.7 kg)   09/06/18 133 lb (60.3 kg)   08/22/18 134 lb 9.6 oz (61.1 kg)   05/16/18 135 lb (61.2 kg)   05/03/18 134 lb 14.7 oz (61.2 kg)   04/19/18 134 lb 14.7 oz (61.2 kg)   04/18/18 135 lb (61.2 kg)   04/16/18 145 lb 1 oz (65.8 kg)   04/10/18 145 lb (65.8 kg)   04/06/18 144 lb (65.3 kg)   03/29/18 140 lb (63.5 kg)   02/27/18 141 lb (64 kg)   02/22/18 136 lb (61.7 kg)   01/29/18 135 lb (61.2 kg)   01/25/18 137 lb (62.1 kg)   10/19/17 133 lb 9.6 oz (60.6 kg)   08/31/17 129 lb (58.5 kg)   06/13/17 119 lb 14.9 oz (54.4 kg)   06/06/17 120 lb (54.4 kg)   02/07/17 120 lb (54.4 kg)   01/30/17 116 lb 9.6 oz (52.9 kg)   01/02/17 115 lb (52.2 kg)   10/12/16 110 lb (49.9 kg)   07/18/16 117 lb (53.1 kg)   04/25/16 108 lb (49 kg) 04/06/16 110 lb (49.9 kg)   04/04/16 111 lb 6.4 oz (50.5 kg)   01/14/16 112 lb (50.8 kg)   09/04/15 115 lb (52.2 kg)   06/30/15 110 lb (49.9 kg)   06/15/15 110 lb (49.9 kg)   04/02/15 116 lb (52.6 kg)   03/24/15 117 lb (53.1 kg)   02/12/15 120 lb (54.4 kg)   02/10/15 125 lb (56.7 kg)   02/09/15 125 lb (56.7 kg)   01/21/15 125 lb (56.7 kg)   12/30/14 120 lb 9.6 oz (54.7 kg)   10/27/14 127 lb (57.6 kg)   10/16/14 129 lb (58.5 kg)   09/26/14 120 lb 12.8 oz (54.8 kg)   09/05/14 124 lb 9.6 oz (56.5 kg)   01/02/14 135 lb (61.2 kg)   12/09/13 138 lb 3.2 oz (62.7 kg)   11/04/13 141 lb (64 kg)   06/13/13 142 lb (64.4 kg)   06/03/13 144 lb 6.4 oz (65.5 kg)   04/11/13 139 lb (63 kg)   01/15/13 143 lb (64.9 kg)   01/08/13 135 lb (61.2 kg)         No components found for: HGBA1C      BP Readings from Last 3 Encounters:   09/17/18 110/60   09/06/18 124/70   08/22/18 102/60           Health Maintenance   Topic Date Due    Shingles Vaccine (1 of 2 - 2 Dose Series) 10/22/2006    Colon cancer screen colonoscopy  08/19/2020    Breast cancer screen  09/06/2020    Lipid screen  01/25/2023    DTaP/Tdap/Td vaccine (2 - Td) 01/25/2028    Flu vaccine  Completed    Pneumococcal med risk  Completed    Hepatitis C screen  Completed    HIV screen  Completed         No components found for: Claude Redmond      PAST MEDICAL HISTORY      Past Medical History        Past Medical History:   Diagnosis Date    Allergic rhinitis 6/11/2010    Anxiety      Arthritis      Aspiration pneumonia ([facility] 3/21/2012     Recurrent    Asthma      Bronchitis chronic      COPD (chronic obstructive pulmonary disease) ([facility] 12/3/2009    Depression      Drug abuse, cocaine type       past history of crack cocaine use    Emphysema of lung (HCC)      Fibromyalgia      GERD (gastroesophageal reflux disease)      Hyperlipidemia      Lung disease      Pneumonia      [facility] dependence 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/2012    Pulmonary fibrosis ([facility] 10/16/2014    Pulmonary infiltrate      Pulmonary nodule 12/3/2009    Tobacco abuse           FAMILY HISTORY      Family History         Family History   Problem Relation Age of Onset    Asthma Mother      Diabetes Mother      Emphysema Mother      Heart Failure Mother      Hypertension Mother      Heart Disease Mother      High Cholesterol Mother      Cancer Mother      Depression Mother      Diabetes Father      Emphysema Father      Heart Failure Father      Hypertension Father      Heart Disease Father      High Cholesterol Father      Substance Abuse Father      Emphysema Paternal Grandfather      Diabetes Sister      High Cholesterol Sister      Vision Loss Maternal Uncle           SOCIAL HISTORY      Social History   Social History            Social History    Marital status:        Spouse name: N/A    Number of children: N/A    Years of education: N/A          Occupational History    Not on file.            Social History Main Topics    Smoking status: Former Smoker       Packs/day: 1.00       Years: 30.00       Types: Cigarettes       Start date: 8/1/2018    Smokeless tobacco: Never Used    Alcohol use No    Drug use:  Yes       Types: Marijuana    Sexual activity: Yes       Partners: Male           Other Topics Concern    Not on file          Social History Narrative    No narrative on file         SURGICAL HISTORY      Past Surgical History         Past Surgical History:   Procedure Laterality Date    BRONCHOSCOPY        COLONOSCOPY   2012    ENDOSCOPY, COLON, DIAGNOSTIC        HYSTERECTOMY        OTHER SURGICAL HISTORY   2/12/15     T8 Kyphoplasty    SALIVARY GLAND SURGERY             CURRENT MEDICATIONS   (This list may include medications prescribed during this encounter as epic can not insert only the list prior to this encounter.)  Current Outpatient Rx          Current Outpatient Rx   Medication Negative for suicidal ideas. PHYSICAL EXAM   VITAL SIGNS: /60 (Site: Right Upper Arm, Position: Sitting)   Ht 5' 4\" (1.626 m)   Wt 136 lb (61.7 kg)   BMI 23.34 kg/m²       Wt Readings from Last 3 Encounters:   09/17/18 136 lb (61.7 kg)   09/06/18 133 lb (60.3 kg)   08/22/18 134 lb 9.6 oz (61.1 kg)      Constitutional: Well developed, Well nourished, No acute distress, Non-toxic appearance. HENT: Normocephalic, Atraumatic, Bilateral external ears normal, Oropharynx moist, No oral exudates, Nose normal.   Eyes: Conjunctiva normal, No discharge. Neck: Normal range of motion, No tenderness, Supple, No stridor. Lymphatic: No cervical, subclavian, or axillary lymphadenopathy. Cardiovascular: Normal heart rate, Normal rhythm, No murmurs, No rubs, No gallops. Thorax & Lungs: Normal breath sounds, No respiratory distress, No wheezing, No chest tenderness. No gynecomastia. Abdomen: scars consistent with stated surgeries, no hernias, no HSM, soft NTND   Rectal:  Deferred. Skin: Warm, Dry, No erythema, No rash. No bruising. No spider hemangiomas. Back: No tenderness, No CVA tenderness. Lower Extremities: Intact distal pulses, No edema, No tenderness, No cyanosis, No clubbing. Neurologic: Alert & oriented x 3, Normal motor function, Normal sensory function, No focal deficits noted. No asterixis.   RADIOLOGY/PROCEDURES            FINAL IMPRESSION             Orders Placed This Encounter   Procedures    COLONOSCOPY W/ OR W/O BIOPSY       Standing Status:   Future       Standing Expiration Date:   9/17/2019       Scheduling Instructions:         With diprivan       Order Specific Question:   Screening or Diagnostic?       Answer:   Diagnostic    EGD       Standing Status:   Future       Standing Expiration Date:   10/17/2018       Order Specific Question:   Screening or Diagnostic?       Answer:   Diagnostic      Patt Lee was seen today for establish care.     Diagnoses and all orders for this

## 2018-09-21 ENCOUNTER — TELEPHONE (OUTPATIENT)
Dept: GASTROENTEROLOGY | Age: 63
End: 2018-09-21

## 2018-10-11 ENCOUNTER — TELEPHONE (OUTPATIENT)
Dept: CASE MANAGEMENT | Age: 63
End: 2018-10-11

## 2018-10-15 ENCOUNTER — TELEPHONE (OUTPATIENT)
Dept: CASE MANAGEMENT | Age: 63
End: 2018-10-15

## 2018-10-15 ENCOUNTER — OFFICE VISIT (OUTPATIENT)
Dept: FAMILY MEDICINE CLINIC | Age: 63
End: 2018-10-15
Payer: MEDICARE

## 2018-10-15 ENCOUNTER — HOSPITAL ENCOUNTER (OUTPATIENT)
Dept: CT IMAGING | Age: 63
Discharge: HOME | End: 2018-10-15
Payer: MEDICARE

## 2018-10-15 VITALS
SYSTOLIC BLOOD PRESSURE: 132 MMHG | HEART RATE: 62 BPM | WEIGHT: 143 LBS | DIASTOLIC BLOOD PRESSURE: 82 MMHG | BODY MASS INDEX: 24.55 KG/M2 | OXYGEN SATURATION: 99 %

## 2018-10-15 DIAGNOSIS — F17.200 SMOKER: ICD-10-CM

## 2018-10-15 DIAGNOSIS — M70.21 OLECRANON BURSITIS OF RIGHT ELBOW: Primary | ICD-10-CM

## 2018-10-15 PROCEDURE — 99213 OFFICE O/P EST LOW 20 MIN: CPT | Performed by: PHYSICIAN ASSISTANT

## 2018-10-15 PROCEDURE — G8420 CALC BMI NORM PARAMETERS: HCPCS | Performed by: PHYSICIAN ASSISTANT

## 2018-10-15 PROCEDURE — 3017F COLORECTAL CA SCREEN DOC REV: CPT | Performed by: PHYSICIAN ASSISTANT

## 2018-10-15 PROCEDURE — G8427 DOCREV CUR MEDS BY ELIG CLIN: HCPCS | Performed by: PHYSICIAN ASSISTANT

## 2018-10-15 PROCEDURE — G8482 FLU IMMUNIZE ORDER/ADMIN: HCPCS | Performed by: PHYSICIAN ASSISTANT

## 2018-10-15 PROCEDURE — 1036F TOBACCO NON-USER: CPT | Performed by: PHYSICIAN ASSISTANT

## 2018-10-15 PROCEDURE — G0297 LDCT FOR LUNG CA SCREEN: HCPCS

## 2018-10-15 ASSESSMENT — ENCOUNTER SYMPTOMS: COLOR CHANGE: 0

## 2018-10-15 NOTE — PROGRESS NOTES
erythema. Assessment    1. Olecranon bursitis of right elbow        Plan    Jessy Fisher was seen today for bursitis. Diagnoses and all orders for this visit:    Olecranon bursitis of right elbow  -     Anand Archibald MD (Hand, Wrist, Elbow)  -     No current infection noted.

## 2018-10-16 ENCOUNTER — TELEPHONE (OUTPATIENT)
Dept: ENDOCRINOLOGY | Age: 63
End: 2018-10-16

## 2018-10-16 NOTE — TELEPHONE ENCOUNTER
[address] [facility] [id number] [address] [facility] said Dr. Tomas Joe requested for dexa from 2007 and 2009 they are no able to get them.

## 2018-10-22 ENCOUNTER — TELEPHONE (OUTPATIENT)
Dept: ENDOCRINOLOGY | Age: 63
End: 2018-10-22

## 2018-10-23 ENCOUNTER — TELEPHONE (OUTPATIENT)
Dept: ORTHOPEDIC SURGERY | Age: 63
End: 2018-10-23

## 2018-10-31 ENCOUNTER — OFFICE VISIT (OUTPATIENT)
Dept: ORTHOPEDIC SURGERY | Age: 63
End: 2018-10-31
Payer: MEDICARE

## 2018-10-31 VITALS — BODY MASS INDEX: 24.43 KG/M2 | HEIGHT: 64 IN | WEIGHT: 143.08 LBS

## 2018-10-31 DIAGNOSIS — M25.521 ELBOW PAIN, RIGHT: Primary | ICD-10-CM

## 2018-10-31 DIAGNOSIS — M70.21 OLECRANON BURSITIS OF RIGHT ELBOW: ICD-10-CM

## 2018-10-31 PROCEDURE — 20605 DRAIN/INJ JOINT/BURSA W/O US: CPT | Performed by: ORTHOPAEDIC SURGERY

## 2018-10-31 PROCEDURE — G8420 CALC BMI NORM PARAMETERS: HCPCS | Performed by: ORTHOPAEDIC SURGERY

## 2018-10-31 PROCEDURE — G8427 DOCREV CUR MEDS BY ELIG CLIN: HCPCS | Performed by: ORTHOPAEDIC SURGERY

## 2018-10-31 PROCEDURE — 3017F COLORECTAL CA SCREEN DOC REV: CPT | Performed by: ORTHOPAEDIC SURGERY

## 2018-10-31 PROCEDURE — G8482 FLU IMMUNIZE ORDER/ADMIN: HCPCS | Performed by: ORTHOPAEDIC SURGERY

## 2018-10-31 PROCEDURE — 99213 OFFICE O/P EST LOW 20 MIN: CPT | Performed by: ORTHOPAEDIC SURGERY

## 2018-10-31 NOTE — PROGRESS NOTES
Chief Complaint  Elbow Pain (Right elbow olecranon bursitis)      History of Present Illness:  Frida Navas is a 59 y.o. female right-hand-dominant who presents for right elbow discomfort and swelling which began 3-4 months ago, insidiously, no history of trauma. She states that she had a similar bursitis years ago which successfully went away with drainage at a physician's office. She has tried activity modifications over the past 3 months with intermittent oral NSAIDs.     A specialty hand and upper extremity consultation was requested by my local colleague ANY Salazar, for evaluation treatment of right elbow pain, swelling and bursitis    Medical History  Past Medical History:   Diagnosis Date    Allergic rhinitis 6/11/2010    Anxiety     Arthritis     Aspiration pneumonia ([address] 3/21/2012    Recurrent    Asthma     Bronchitis chronic     COPD (chronic obstructive pulmonary disease) ([address] 12/3/2009    Depression     Drug abuse, cocaine type ([address]     past history of crack cocaine use    Emphysema of lung ([address]     Fibromyalgia     GERD (gastroesophageal reflux disease)     Hyperlipidemia     Lung disease     On home oxygen therapy     uses O2 NC 3L prn at night    Pneumonia     Polysubstance dependence ([address] 1/2/2012    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    Pulmonary fibrosis ([address] 10/16/2014    Pulmonary infiltrate     Pulmonary nodule 12/3/2009    Tobacco abuse      Past Surgical History:   Procedure Laterality Date    BLADDER REPAIR      BRONCHOSCOPY      COLONOSCOPY  2012    ENDOSCOPY, COLON, DIAGNOSTIC      ESOPHAGEAL DILATATION  9/20/2018    ESOPHAGEAL DILATION WATERS performed by Reena Baker MD at [facility],[address] HISTORY  2/12/15    T8 Kyphoplasty    MS COLONOSCOPY FLX DX W/COLLJ SPEC WHEN PFRMD N/A 9/20/2018    EGD AND COLONOSCOPY WITH ANESTHESIA performed by Reena Baker MD hours as needed for Wheezing or Shortness of Breath 1 Inhaler 6    tiotropium (SPIRIVA HANDIHALER) 18 MCG inhalation capsule INHALE 1 CAPSULE BY MOUTH DAILY 30 capsule 6    FLUoxetine HCl 60 MG TABS Take 1 tablet by mouth nightly 30 tablet 5    alendronate (FOSAMAX) 70 MG tablet TAKE 1 TABLET BY MOUTH EACH WEEK 4 tablet 5    fluticasone-salmeterol (ADVAIR DISKUS) 250-50 MCG/DOSE AEPB INHALE 1 PUFF TWICE DAILY 180 each 1    simvastatin (ZOCOR) 20 MG tablet TAKE 1 TABLET BY MOUTH EVERY EVENING 90 tablet 3    montelukast (SINGULAIR) 10 MG tablet TAKE 1 TABLET BY MOUTH EACH NIGHT 90 tablet 3    ibuprofen (ADVIL;MOTRIN) 800 MG tablet TAKE 1 TABLET BY MOUTH EVERY 8 HOURS AS NEEDED 90 tablet 3    atenolol (TENORMIN) 25 MG tablet Take 1 tablet by mouth daily (replaces propranolol) 90 tablet 1    ranitidine (ZANTAC) 150 MG tablet TAKE 1 TABLET BY MOUTH TWICE DAILY as needed for heartburn 60 tablet 11    traZODone (DESYREL) 150 MG tablet TAKE 2 TABLETS BY MOUTH AT BEDTIME 180 tablet 11    cyclobenzaprine (FLEXERIL) 5 MG tablet TAKE ONE TABLET BY MOUTH EVERY 8 HOURS AS NEEDED FOR MUSCLE SPASMS 90 tablet 11    busPIRone (BUSPAR) 30 MG tablet TAKE 1 TABLET BY MOUTH TWICE A  tablet 3    albuterol (PROVENTIL) (2.5 MG/3ML) 0.083% nebulizer solution Take 3 mLs by nebulization every 6 hours as needed for Shortness of Breath 120 each 3    sodium chloride 3 % nebulizer solution Take 2 mLs by nebulization 4 times daily 240 mL 6    Misc. Devices (ACAPELLA) MISC 1 Device by Does not apply route 4 times daily DX ILD J84.9 1 each 0     No current facility-administered medications for this visit. Allergies   Allergen Reactions    Meperidine Anaphylaxis     \"Demerol\"     Demerol Hives       Review of Systems  Relevant review of systems reviewed and available in the patient's chart    Vital Signs  There were no vitals filed for this visit.     General Exam:   Constitutional: Patient is adequately groomed with no

## 2018-11-06 RX ORDER — ATENOLOL 25 MG/1
25 TABLET ORAL DAILY
Qty: 90 TABLET | Refills: 1 | Status: SHIPPED | OUTPATIENT
Start: 2018-11-06 | End: 2018-12-19 | Stop reason: SDUPTHER

## 2018-11-09 ENCOUNTER — OFFICE VISIT (OUTPATIENT)
Dept: ENDOCRINOLOGY | Age: 63
End: 2018-11-09
Payer: MEDICARE

## 2018-11-09 VITALS
SYSTOLIC BLOOD PRESSURE: 104 MMHG | BODY MASS INDEX: 25.8 KG/M2 | DIASTOLIC BLOOD PRESSURE: 67 MMHG | HEIGHT: 62 IN | WEIGHT: 140.2 LBS

## 2018-11-09 DIAGNOSIS — M81.0 AGE-RELATED OSTEOPOROSIS WITHOUT CURRENT PATHOLOGICAL FRACTURE: ICD-10-CM

## 2018-11-09 DIAGNOSIS — M80.08XD VERTEBRAL FRACTURE, OSTEOPOROTIC, WITH ROUTINE HEALING, SUBSEQUENT ENCOUNTER: ICD-10-CM

## 2018-11-09 DIAGNOSIS — E55.9 VITAMIN D DEFICIENCY: ICD-10-CM

## 2018-11-09 DIAGNOSIS — Z51.81 MEDICATION MONITORING ENCOUNTER: ICD-10-CM

## 2018-11-09 PROCEDURE — 99205 OFFICE O/P NEW HI 60 MIN: CPT | Performed by: INTERNAL MEDICINE

## 2018-11-09 PROCEDURE — 3017F COLORECTAL CA SCREEN DOC REV: CPT | Performed by: INTERNAL MEDICINE

## 2018-11-09 PROCEDURE — 1036F TOBACCO NON-USER: CPT | Performed by: INTERNAL MEDICINE

## 2018-11-09 PROCEDURE — G8419 CALC BMI OUT NRM PARAM NOF/U: HCPCS | Performed by: INTERNAL MEDICINE

## 2018-11-09 PROCEDURE — G8482 FLU IMMUNIZE ORDER/ADMIN: HCPCS | Performed by: INTERNAL MEDICINE

## 2018-11-09 PROCEDURE — G8427 DOCREV CUR MEDS BY ELIG CLIN: HCPCS | Performed by: INTERNAL MEDICINE

## 2018-11-09 RX ORDER — PEN NEEDLE, DIABETIC 30 GX5/16"
NEEDLE, DISPOSABLE MISCELLANEOUS
Qty: 90 EACH | Refills: 4 | Status: SHIPPED | OUTPATIENT
Start: 2018-11-09 | End: 2019-12-25 | Stop reason: ALTCHOICE

## 2018-11-09 NOTE — LETTER
[address] [facility]  [address] [facility] [address] [facility], [address] [place] [address],[place] [address]  Phone [phone]  Fax [phone]         2018         Brandy Shannon MD                            Re:  Vickie Vahe,  1955    Dear Dr. Calvin Blend: Thank you for asking me to see Dustyjose Vahe in consultation. As you know, Ms. Angélica Arnold is a 59 y.o. woman who has had multiple fractures due to osteoporosis. She has been taking Fosamax for some time but with other medications, so Fosamax probably hasn't been absorbed. BMD decreased [id number]. We reviewed life-style issues (calcium, vitamin D and physical activity). I have ordered some diagnostic tests and will be back in touch when I have the results. I recommended treatment with an anabolic agent (Tymlos or Forteo). We will check on her insurance coverage. Enclosed is a copy of my consultation note. Please let me know if you have any questions. Sincerely,    Betty Arevalo. Mauri DELGADILLO     Encl.  Copy of consult note

## 2018-11-09 NOTE — PROGRESS NOTES
TODAYS DATE. FAMILY HISTORY. Relevant hx in problem list and/or HPI. Otherwise not contributory. PAST MEDICAL HISTORY. Noted in health history form. PAST SURGICAL HISTORY. Noted in health history form. SOCIAL HISTORY. Past smoker (quit 08/2018). No excessive intake of alcohol, caffeine or sodas. Lives with her . Kenia Garcia REVIEW OF SYSTEMS. Maximum adult height 65. 3 height loss. Usual weight 135#. No recent significant change in weight. PHYSICAL EXAMINATION. GENERAL. Well-nourished, well-developed, normally proportioned adult. MENTAL STATUS. Pleasant mood. Oriented to time, place, and person. ORAL. Edentulous. Gums appear to be in good condition. SKIN. Normal texture and turgor. LUNGS. Clear to auscultation. Breath sounds normal.  HEART. Heart sounds normal, no murmur or gallop. MUSCULOSKELETAL. The examination included inspection/palpation (any misalignment, asymmetry, crepitation, defects, tenderness, masses, effusions is noted), assessment of range of motion (any presence of pain, crepitation, contracture is noted), assessment of stability (any dislocation, subluxation, laxity is noted), assessment of muscle strength and tone (any atrophy or abnormal movements is noted). Pelvis appears normal.  Kyphosis. No spine tenderness to palpation or percussion. No finger space between ribs and pelvis. Gait steady without assistance. NEUROLOGICAL. Not to rise from chair without using arms. No apparent motor or sensory deficit. Coordination appears normal     BONE DENSITY. Most recent done [employer]. Studies were done in 2007 and 2009 but are not available. T-scores   Initial study: 04/10/2015 L1-L2-L4 -2.6 left fem. neck -2.9   Current study: 05/11/2018 L1-L2-L4 -2.4 left fem. neck -3.2     The table below shows bone mineral density (grams/cm2), the appropriate measure for comparing serial scans.  A significant increase or decrease is based on

## 2018-11-10 ENCOUNTER — HOSPITAL ENCOUNTER (OUTPATIENT)
Age: 63
Discharge: HOME | End: 2018-11-10
Payer: MEDICARE

## 2018-11-10 PROCEDURE — 84080 ASSAY ALKALINE PHOSPHATASES: CPT

## 2018-11-10 PROCEDURE — 36415 COLL VENOUS BLD VENIPUNCTURE: CPT

## 2018-11-10 PROCEDURE — 82306 VITAMIN D 25 HYDROXY: CPT

## 2018-11-10 PROCEDURE — 83519 RIA NONANTIBODY: CPT

## 2018-11-10 PROCEDURE — 83970 ASSAY OF PARATHORMONE: CPT

## 2018-11-11 LAB
PARATHYROID HORMONE INTACT: 32.1 PG/ML (ref 14–72)
VITAMIN D 25-HYDROXY: 23.6 NG/ML

## 2018-11-13 LAB — ALK PHOS BONE SPECIFIC: 8.4 UG/L

## 2018-11-13 RX ORDER — IBUPROFEN 800 MG/1
TABLET ORAL
Qty: 90 TABLET | Refills: 1 | Status: SHIPPED | OUTPATIENT
Start: 2018-11-13 | End: 2018-12-14 | Stop reason: ALTCHOICE

## 2018-11-16 LAB — PROCOLLAGEN I INTACT N-TERM PROPEP: 19 UG/L

## 2018-11-19 ENCOUNTER — TELEPHONE (OUTPATIENT)
Dept: ENDOCRINOLOGY | Age: 63
End: 2018-11-19

## 2018-11-26 NOTE — TELEPHONE ENCOUNTER
Last office visit 10/15/2018     Last written 08/22/2018, 1 inhaler with 6 refills. Next office visit scheduled None scheduled.      Requested Prescriptions     Pending Prescriptions Disp Refills    albuterol sulfate HFA (VENTOLIN HFA) 108 (90 Base) MCG/ACT inhaler 1 Inhaler 6     Sig: Inhale 2 puffs into the lungs every 6 hours as needed for Wheezing or Shortness of Breath

## 2018-11-28 RX ORDER — ALBUTEROL SULFATE 90 UG/1
2 AEROSOL, METERED RESPIRATORY (INHALATION) EVERY 6 HOURS PRN
Qty: 1 INHALER | Refills: 6 | Status: SHIPPED | OUTPATIENT
Start: 2018-11-28 | End: 2018-12-13 | Stop reason: SDUPTHER

## 2018-12-04 RX ORDER — BUSPIRONE HYDROCHLORIDE 30 MG/1
TABLET ORAL
Qty: 180 TABLET | Refills: 1 | Status: SHIPPED | OUTPATIENT
Start: 2018-12-04 | End: 2018-12-19 | Stop reason: SDUPTHER

## 2018-12-10 ENCOUNTER — OFFICE VISIT (OUTPATIENT)
Dept: FAMILY MEDICINE CLINIC | Age: 63
End: 2018-12-10
Payer: MEDICARE

## 2018-12-10 VITALS
SYSTOLIC BLOOD PRESSURE: 128 MMHG | DIASTOLIC BLOOD PRESSURE: 74 MMHG | HEART RATE: 98 BPM | OXYGEN SATURATION: 95 % | WEIGHT: 142 LBS | HEIGHT: 62 IN | BODY MASS INDEX: 26.13 KG/M2

## 2018-12-10 DIAGNOSIS — M54.6 CHRONIC MIDLINE THORACIC BACK PAIN: Primary | ICD-10-CM

## 2018-12-10 DIAGNOSIS — M80.00XD OSTEOPOROSIS WITH CURRENT PATHOLOGICAL FRACTURE WITH ROUTINE HEALING, UNSPECIFIED OSTEOPOROSIS TYPE, SUBSEQUENT ENCOUNTER: ICD-10-CM

## 2018-12-10 DIAGNOSIS — F12.20 CANNABIS DEPENDENCE WITH CURRENT USE (HCC): ICD-10-CM

## 2018-12-10 DIAGNOSIS — G89.29 CHRONIC MIDLINE THORACIC BACK PAIN: Primary | ICD-10-CM

## 2018-12-10 PROCEDURE — 99214 OFFICE O/P EST MOD 30 MIN: CPT | Performed by: FAMILY MEDICINE

## 2018-12-10 PROCEDURE — 3017F COLORECTAL CA SCREEN DOC REV: CPT | Performed by: FAMILY MEDICINE

## 2018-12-10 PROCEDURE — G8482 FLU IMMUNIZE ORDER/ADMIN: HCPCS | Performed by: FAMILY MEDICINE

## 2018-12-10 PROCEDURE — 1036F TOBACCO NON-USER: CPT | Performed by: FAMILY MEDICINE

## 2018-12-10 PROCEDURE — G8419 CALC BMI OUT NRM PARAM NOF/U: HCPCS | Performed by: FAMILY MEDICINE

## 2018-12-10 PROCEDURE — G8427 DOCREV CUR MEDS BY ELIG CLIN: HCPCS | Performed by: FAMILY MEDICINE

## 2018-12-12 ENCOUNTER — TELEPHONE (OUTPATIENT)
Dept: PAIN MANAGEMENT | Age: 63
End: 2018-12-12

## 2018-12-12 RX ORDER — MONTELUKAST SODIUM 10 MG/1
TABLET ORAL
Qty: 90 TABLET | Refills: 1 | Status: SHIPPED | OUTPATIENT
Start: 2018-12-12 | End: 2020-04-13 | Stop reason: SDUPTHER

## 2018-12-13 RX ORDER — ALBUTEROL SULFATE 90 UG/1
2 AEROSOL, METERED RESPIRATORY (INHALATION) EVERY 6 HOURS PRN
Qty: 3 INHALER | Refills: 1 | Status: SHIPPED | OUTPATIENT
Start: 2018-12-13 | End: 2019-08-29 | Stop reason: SDUPTHER

## 2018-12-13 ASSESSMENT — ENCOUNTER SYMPTOMS
BOWEL INCONTINENCE: 0
BACK PAIN: 1

## 2018-12-13 NOTE — PROGRESS NOTES
[address] [facility]  [address] [place], [address], [address] [facility]  Office: [phone]  Fax: [phone]       PATIENT: Katlin Jarquin    59 y.o.  female  [facility]: 1955   MRN:  [id number]        Date of current encounter: 12/14/2018  This encounter is evaluated as a:         [x] New Patient Visit      [] Consult/Second Opinion  [] [facility], [place]  [address]  [facility], [address] [facility] [address]  [phone]     Patient's PCP is Mira Coleman MD    Reason for referral:  'chronic back pain'    SUBJECTIVE:     Chief Complaint   Patient presents with    Pain     The patient complains of upper back pain.  Hip Pain     The patient complains of bilateral hip pain. HPI:  Katlin Jarquin is a 59y.o. year old, female complaining of chronic back pain. Presents with chronic back pain for the past 5 years, and attributes to multiple osteoporotic vertebral compression fractures over the years. History of osteoporotic T8 compression fracture treated with kyphoplasty in 02/2015 (Dr Brennon Nunez). Reports she has seen pain provider years ago and reports being on methadone and percocet for a while. She sustained another mild T3 compression fracture in 04/2018, seen Endo (Dr Ovidio Cochran) for osteoporosis and Ortho spine (Dr Mil Sargent) and was recommended bracing and conservative care. She is currently on ibuprofen and flexeril - referred to us for further options. Pain Characteristics     Location of Pain: upper and mid back areas. Quality of Pain: achy, throbbing, occasionally sharp. Frequency of Pain: constant, episodically worse. Radiation: none. Tingling/numbness: none. Weakness: none. Aggravated by: changes in weather, prolonged activities, bending, twsiting. Relieved by: nothing. Other: as above.       [x] RED FLAG symptoms (Symptoms may include, but not limited to, acute trauma, fever, drug use, malignancy, weakness, perianal numbness, bladder/bowel changes) - No    Co-morbid Issues     HTN, COPD (on home oxygen), GERD, asthma, osteoporosis, arthritis, anxiety. PRIOR TREATMENTS:     Therapies Tried      [x] Chiropractic Manipulation:  No / N/A    [x] Physical Therapy:  No / N/A    [x] Home Exercises:  No / N/A    [x] Behavioral Health Counseling: No / N/A    [x] Injections/Interventions: No / N/A    [x] Surgery: None Recently / N/A    Pain Medications Tried      [x] NSAIDS: No / N/A - ibuprofen. [x] Antidepressants/Anxiolytics: Yes / anxiety, depression  - Abilify, Zoloft, Buspar, Fluoxetine, trazodone    [x] Anticonvulsants: Yes / N/A - Neurontin, Lyrica.     [x] Muscle Relaxants: Yes / N/A - Flexeril    [x] Prescription Pain Medications: Yes / N/A - Norco, Percocet PRN    CO-EXISTING CONDITIONS:     · Past Medical History:  Past Medical History:   Diagnosis Date    Allergic rhinitis 6/11/2010    Anxiety     Arthritis     Aspiration pneumonia ([facility] [address] 3/21/2012    Recurrent    Asthma     Bronchitis chronic     COPD (chronic obstructive pulmonary disease) ([address] 12/3/2009    Depression     Drug abuse, cocaine type (HCC)     past history of crack cocaine use    Emphysema of lung (HCC)     Fibromyalgia     GERD (gastroesophageal reflux disease)     Hyperlipidemia     Lung disease     On home oxygen therapy     uses O2 NC 3L prn at night    Osteoporosis     Pneumonia     Polysubstance dependence ([facility] [address] [facility] 1/2/2012    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[facility] [address] [facility] 1/2/2012    Pulmonary fibrosis ([facility] [address] 10/16/2014    Pulmonary infiltrate     Pulmonary nodule 12/3/2009    Tobacco abuse        · Past Surgical History:  Past Surgical History:   Procedure Laterality Date    BLADDER REPAIR      BRONCHOSCOPY      COLONOSCOPY  2012    ENDOSCOPY, COLON, DIAGNOSTIC      ESOPHAGEAL DILATATION  9/20/2018    ESOPHAGEAL DILATION WATERS performed by Radha Darling MD at [address] central height loss. 2. T1 chronic mild superior endplate compression deformity. 3. T3 acute or subacute moderate compression fracture with 60% central height   loss and minimal retropulsion of the posterior inferior endplate causing mild   spinal canal stenosis. 4. T4 chronic mild superior endplate compression deformity. 5. T5 chronic mild anterior wedge compression deformity. 6. T6 chronic mild anterior wedge compression deformity. 7. T7 chronic mild anterior wedge compression deformity. 8. T8 chronic severe compression deformity previously treated with   kyphoplasty. Vertebral alignment is maintained.  Bone marrow signal is benign without   evidence of focal bone lesion.  Limited localizer images of the lumbar spine   show no compression deformity.       SPINAL CORD: No abnormal cord signal is seen. Barbette Yasmeen is no obvious epidural   hematoma.       SOFT TISSUES: No paraspinal mass identified.       DEGENERATIVE CHANGES: There are multilevel degenerative disc changes   throughout the spine.           Impression   1. Acute to subacute C7 and T3 compression fractures. 2. No spinal cord compression. 3. Multiple chronic midthoracic compression deformities. \"         CT (04/10/2018)  \"Impression   Mild compression fracture, inferior endplate T3 vertebral body new from prior   study 6 months earlier.  Correlate for local tenderness in the upper thoracic   spine.  If necessary, this could be better evaluated with thoracic spine MRI.       Prior compression fractures and kyphoplasty T8 unchanged. \"       [x] Results of the above studies were personally reviewed by me with the patient in detail along with the images, if available. The patient was instructed to contact the primary care provider regarding other unrelated findings not addressed during today's visit.     FUNCTIONAL EVALUATION:     [x] Brief Pain Inventory (BPI)   Pain Score = 4/10   Interference Score = 5 /10    [x] Pain, Enjoyment, General Activity (PEG-3) Score = 5 /10    [x] Pain Disability Index ([place])    Score = 61 /70      [x] Patient Health Questionnaire-9 (PHQ-9) Score = 14 /27      [x] General Anxiety Disorder-7 (BERNARDA-7) Score = 7 /21     [x] Pain Catastrophizing Scale (PCS) Score = 36 /52    ASSESSMENT (Medical Decision Making): Jamee Boxer is a 59y.o. year old female with the following diagnosis and treatments ordered today:    1. Compression fx, thoracic spine, sequela  Chronic  - [facility] OT - [facility]  - diclofenac (VOLTAREN) 50 MG EC tablet; Take 1 tablet by mouth 2 times daily (with food)  Dispense: 60 tablet; Refill: 1  - baclofen (LIORESAL) 10 MG tablet; Take 1 tablet by mouth 2 times daily  Dispense: 60 tablet; Refill: 1    2. Polyarthropathy, multiple sites  Chronic  - [facility] OT - [facility]  - diclofenac (VOLTAREN) 50 MG EC tablet; Take 1 tablet by mouth 2 times daily (with food)  Dispense: 60 tablet; Refill: 1  - baclofen (LIORESAL) 10 MG tablet; Take 1 tablet by mouth 2 times daily  Dispense: 60 tablet; Refill: 1    3. Chronic pain syndrome    4. Anxiety and depression    5. Other kyphosis of thoracic region    6. H/O: substance abuse    PLAN (Medical Decision Making):     1. Goals:     Multi-disciplinary comprehensive pain management with functional improvement and reduced opioid use. Discussed pain generators and management options including:      · Conservative care, including Physical therapy, home exercises, activity modification, weight loss, smoking cessation. · The indications for additional imaging/other studies/referrals. · The indications for diagnostic and therapeutic injections. · The indications for (possible future) surgical intervention. 2. Plan of Care Recommendations:    Chronic upper back pain due to osteoporotic compression fractures; no focal signs.   Reviewed films and results of thoracic MRI with patient - old compression fractures at T8 and T3, no canal intrusion. Counseled on osteoporotic fractures; recommend back brace. Recommend occupational therapy; will provide diclofenac and baclofen. Ongoing anxiety/depression issues - recommend counseling in PCP's office. Ongoing regular use of THC - she is also considering getting into the [facility] "[employer] program.  Rigo Simental her on opioids and high risk factors - poor candidate for chronic opioid. · Imaging/Testing/Referrals: NA    · Physical therapy: Occupational therapy    · Behavioral Health: Referral to [facility] (Dr Afia Faust)    · Interventions: TBD    · Adjuvant medications: diclofenac (stop ibuprofen) and baclofen (stop Flexeril)     · Prescription Pain Medication: NA    High risk for chronic opioids - recommend limited use for flare-ups. · MEDD current (per OARRS) = 0 to 30  · Opioid Risk Tool (ORT): 6; Risk level - HIGH   · Other Risk factors: Anxiety, osteoporosis, h/o polysubstance abuse (prescription opioid, benzo, cocaine, THC), h/o suicidal ideation (12/2011), recreational THC. · Urine Drug Screen: NA  · Medication Agreement: NA   · /OARRS review: Reviewed today. Attestation: The Prescription Monitoring Report for this patient was reviewed today. Meredith Payne MD)  Documentation: Possible medication side effects, risk of tolerance/dependence & alternative treatments discussed. , Potential drug abuse or diversion identified, see note documentation. Meredith Payne MD)     - Reviewed OARRS report in detail, including Narx Scores and Overdose Risk Score. - Counseled on dual effects, limitations, side effects and long-term complications of opioids including but not limited to opioid induced constipation, depression of breathing, sleep disorders, hormonal suppression, altered immune system, elevated cardiac risk, worsening pain or hyperalgesia, dependence and addiction. Discussed proper use and potential life threatening side effects of inappropriate use.      4. Education:    - Educational material provided on multidisciplinary chronic pain management and compression fractures. - Encouraged smoking cessation, regular home exercises and strengthening as long-term goals. - Educational material from [employer] on 'Non-opioid treatment of chronic pain' provided. 5. Follow-up: RTC X 2 months. - Patient engaged in shared decision making. The entire treatment plan was discussed with patient and agreed. - More than 45 minutes spent on face-to-face encounter with the patient by the provider.   - More than 50% of the time spent on counseling/coordination of care regarding chronic pain. Thank you for the referral - please do not hesitate to contact us if you have any questions or concerns.           Sasha Hubbard MD  Board Certified in Anesthesiology and Pain Medicine

## 2018-12-13 NOTE — PROGRESS NOTES
12/10/2018     Alma Fuentes (:  1955) is a 59 y.o. female, here for evaluation of the following medical concerns:    Chief complaint: Patient presents with:  Osteoporosis: pt saw a physician for arthritis and was dx with osteoporosis. just wants Dr. Yasmani Marr opinion  Back Pain     Past medical, surgical, family and social history, as well as current medications and allergies, were reviewed and updated with the patient. Back Pain   This is a chronic problem. The current episode started more than 1 month ago (x 7 months). The problem occurs constantly. The problem is unchanged. The pain is present in the thoracic spine. The quality of the pain is described as aching. Radiates to: radiates to sides  The pain is severe. The symptoms are aggravated by position, standing and sitting. Pertinent negatives include no bladder incontinence, bowel incontinence, numbness, tingling or weakness. Risk factors include history of osteoporosis (saw Dr. Leonora Olivares for OP, was started on Forteo). Treatments tried: had worn a back brace off and on after last fractures 7 momths ago, but did not wear it consistently. Got pain medication briefly, but called after hours and called multple providers for more pain meds, did not contact pain management as instructed. Review of Systems   Gastrointestinal: Negative for bowel incontinence. Genitourinary: Negative for bladder incontinence. Musculoskeletal: Positive for back pain. Neurological: Negative for tingling, weakness and numbness. Prior to Visit Medications    Medication Sig Taking? Authorizing Provider   Lidocaine-Menthol 4-5 % PTCH Patch may remain in place for up to 12 hours in any 24-hour period.  No more than 1 patch should be used in a 24-hour period Yes Ervin Salazar MD   busPIRone (BUSPAR) 30 MG tablet TAKE 1 TABLET BY MOUTH TWICE A DAY Yes Ervin Salazar MD   albuterol sulfate HFA (VENTOLIN HFA) 108 (90 Base) MCG/ACT inhaler Inhale 2 puffs into the

## 2018-12-14 ENCOUNTER — OFFICE VISIT (OUTPATIENT)
Dept: PAIN MANAGEMENT | Age: 63
End: 2018-12-14
Payer: MEDICARE

## 2018-12-14 VITALS
HEART RATE: 71 BPM | BODY MASS INDEX: 26.31 KG/M2 | DIASTOLIC BLOOD PRESSURE: 68 MMHG | HEIGHT: 62 IN | WEIGHT: 143 LBS | SYSTOLIC BLOOD PRESSURE: 112 MMHG

## 2018-12-14 DIAGNOSIS — F19.11 H/O: SUBSTANCE ABUSE (HCC): ICD-10-CM

## 2018-12-14 DIAGNOSIS — M40.294 OTHER KYPHOSIS OF THORACIC REGION: ICD-10-CM

## 2018-12-14 DIAGNOSIS — S22.000S COMPRESSION FX, THORACIC SPINE, SEQUELA: Primary | ICD-10-CM

## 2018-12-14 DIAGNOSIS — F32.A ANXIETY AND DEPRESSION: ICD-10-CM

## 2018-12-14 DIAGNOSIS — F41.9 ANXIETY AND DEPRESSION: ICD-10-CM

## 2018-12-14 DIAGNOSIS — M13.0 POLYARTHROPATHY, MULTIPLE SITES: ICD-10-CM

## 2018-12-14 DIAGNOSIS — G89.4 CHRONIC PAIN SYNDROME: ICD-10-CM

## 2018-12-14 PROBLEM — M40.209 KYPHOSIS: Status: ACTIVE | Noted: 2018-12-14

## 2018-12-14 PROCEDURE — G8419 CALC BMI OUT NRM PARAM NOF/U: HCPCS | Performed by: ANESTHESIOLOGY

## 2018-12-14 PROCEDURE — G8482 FLU IMMUNIZE ORDER/ADMIN: HCPCS | Performed by: ANESTHESIOLOGY

## 2018-12-14 PROCEDURE — 4004F PT TOBACCO SCREEN RCVD TLK: CPT | Performed by: ANESTHESIOLOGY

## 2018-12-14 PROCEDURE — 99204 OFFICE O/P NEW MOD 45 MIN: CPT | Performed by: ANESTHESIOLOGY

## 2018-12-14 PROCEDURE — G8427 DOCREV CUR MEDS BY ELIG CLIN: HCPCS | Performed by: ANESTHESIOLOGY

## 2018-12-14 PROCEDURE — 3017F COLORECTAL CA SCREEN DOC REV: CPT | Performed by: ANESTHESIOLOGY

## 2018-12-14 RX ORDER — BACLOFEN 10 MG/1
10 TABLET ORAL 2 TIMES DAILY
Qty: 60 TABLET | Refills: 1 | Status: SHIPPED | OUTPATIENT
Start: 2018-12-14 | End: 2019-03-26 | Stop reason: ALTCHOICE

## 2018-12-14 NOTE — PATIENT INSTRUCTIONS
You may find that combining more than one treatment helps the most.  These treatments can include:  · Heat or cold. This can help arthritis, sore muscles, and other aches. · Hydrotherapy. It uses flowing water to relax muscles. · Massage. Massage involves rubbing the soft tissues of the body. It eases tension and pain. · Transcutaneous electrical nerve stimulation (TENS). This treatment uses a gentle electric current applied to the skin for pain relief. · Acupuncture. This is a form of traditional [facility] medicine. It uses very thin needles inserted into certain points of the body. · Physical therapy. This treatment uses stretches and exercises to reduce pain and help you move better. If you get physical therapy, make sure to do any home exercises or stretching your therapist has prescribed. Stay as active as you can. Try to get some physical activity every day. What other things can help? You can manage chronic pain by using things other than medicines or physical treatments. For example, you can keep track of your pain in a pain diary. It can help you understand how the things you do affect your pain. Reducing stress and tension can reduce pain. And being more aware of your thought patterns can be helpful. In some cases, shifting how you think about pain can affect how you feel. Here are some options to think about:  · Breathing exercises and meditation. These techniques can help you focus your attention, relax, and get rid of tension. · Guided imagery. This is a series of thoughts and images that can focus your attention away from your pain. · Hypnosis. It's a state of focused concentration that makes you less aware of your surroundings. · Cognitive behavioral therapy. This type of counseling helps you change your thought patterns. · Yoga. Stretching and exercises can reduce stress and improve flexibility.   If what you're doing to control your pain isn't working, or if you're feeling depressed, talk

## 2018-12-19 ENCOUNTER — TELEPHONE (OUTPATIENT)
Dept: PULMONOLOGY | Age: 63
End: 2018-12-19

## 2018-12-19 DIAGNOSIS — J44.9 CHRONIC OBSTRUCTIVE PULMONARY DISEASE, UNSPECIFIED COPD TYPE (HCC): ICD-10-CM

## 2018-12-19 DIAGNOSIS — M54.6 CHRONIC MIDLINE THORACIC BACK PAIN: ICD-10-CM

## 2018-12-19 DIAGNOSIS — G89.29 CHRONIC MIDLINE THORACIC BACK PAIN: ICD-10-CM

## 2018-12-20 NOTE — TELEPHONE ENCOUNTER
I called and spoke with Hammad and stated that pt has appt on 2/28/19 with 6 MW. They state that appt is Ok to keep will just have to send O2 orders at that visit. I spoke with [facility].

## 2018-12-24 RX ORDER — FLUOXETINE HYDROCHLORIDE 60 MG/1
1 TABLET, FILM COATED ORAL; ORAL NIGHTLY
Qty: 90 TABLET | Refills: 1 | Status: SHIPPED | OUTPATIENT
Start: 2018-12-24 | End: 2019-04-01 | Stop reason: SDUPTHER

## 2018-12-24 RX ORDER — VARENICLINE TARTRATE 0.5 MG/1
0.5 TABLET, FILM COATED ORAL 2 TIMES DAILY
Qty: 60 TABLET | Refills: 1 | Status: SHIPPED | OUTPATIENT
Start: 2018-12-24 | End: 2019-03-26 | Stop reason: ALTCHOICE

## 2018-12-24 RX ORDER — CYCLOBENZAPRINE HCL 5 MG
TABLET ORAL
Qty: 90 TABLET | Refills: 11 | OUTPATIENT
Start: 2018-12-24

## 2018-12-24 RX ORDER — ATENOLOL 25 MG/1
25 TABLET ORAL DAILY
Qty: 90 TABLET | Refills: 1 | Status: SHIPPED | OUTPATIENT
Start: 2018-12-24 | End: 2019-09-13

## 2018-12-24 RX ORDER — ALBUTEROL SULFATE 2.5 MG/3ML
2.5 SOLUTION RESPIRATORY (INHALATION) EVERY 6 HOURS PRN
Qty: 120 EACH | Refills: 3 | Status: SHIPPED | OUTPATIENT
Start: 2018-12-24 | End: 2020-10-19

## 2018-12-24 RX ORDER — BUSPIRONE HYDROCHLORIDE 30 MG/1
TABLET ORAL
Qty: 180 TABLET | Refills: 1 | Status: SHIPPED | OUTPATIENT
Start: 2018-12-24 | End: 2019-03-27 | Stop reason: SDUPTHER

## 2018-12-24 RX ORDER — RANITIDINE 150 MG/1
TABLET ORAL
Qty: 180 TABLET | Refills: 1 | Status: SHIPPED | OUTPATIENT
Start: 2018-12-24 | End: 2019-03-27 | Stop reason: SDUPTHER

## 2018-12-24 RX ORDER — SIMVASTATIN 20 MG
TABLET ORAL
Qty: 90 TABLET | Refills: 1 | Status: SHIPPED | OUTPATIENT
Start: 2018-12-24 | End: 2019-03-27 | Stop reason: SDUPTHER

## 2018-12-24 RX ORDER — TRAZODONE HYDROCHLORIDE 150 MG/1
TABLET ORAL
Qty: 180 TABLET | Refills: 1 | Status: SHIPPED | OUTPATIENT
Start: 2018-12-24 | End: 2019-02-28 | Stop reason: SDUPTHER

## 2019-01-08 ENCOUNTER — TELEPHONE (OUTPATIENT)
Dept: PULMONOLOGY | Age: 64
End: 2019-01-08

## 2019-01-31 ENCOUNTER — OFFICE VISIT (OUTPATIENT)
Dept: FAMILY MEDICINE CLINIC | Age: 64
End: 2019-01-31
Payer: MEDICARE

## 2019-01-31 ENCOUNTER — TELEPHONE (OUTPATIENT)
Dept: FAMILY MEDICINE CLINIC | Age: 64
End: 2019-01-31

## 2019-01-31 ENCOUNTER — OFFICE VISIT (OUTPATIENT)
Dept: PSYCHOLOGY | Age: 64
End: 2019-01-31
Payer: MEDICARE

## 2019-01-31 VITALS
OXYGEN SATURATION: 96 % | HEIGHT: 62 IN | WEIGHT: 139 LBS | SYSTOLIC BLOOD PRESSURE: 124 MMHG | DIASTOLIC BLOOD PRESSURE: 68 MMHG | HEART RATE: 81 BPM | BODY MASS INDEX: 25.58 KG/M2

## 2019-01-31 DIAGNOSIS — J44.1 COPD EXACERBATION (HCC): Primary | ICD-10-CM

## 2019-01-31 DIAGNOSIS — F33.1 MDD (MAJOR DEPRESSIVE DISORDER), RECURRENT EPISODE, MODERATE (HCC): Primary | ICD-10-CM

## 2019-01-31 DIAGNOSIS — F41.9 ANXIETY: ICD-10-CM

## 2019-01-31 PROCEDURE — 99214 OFFICE O/P EST MOD 30 MIN: CPT | Performed by: FAMILY MEDICINE

## 2019-01-31 PROCEDURE — G8427 DOCREV CUR MEDS BY ELIG CLIN: HCPCS | Performed by: FAMILY MEDICINE

## 2019-01-31 PROCEDURE — 3017F COLORECTAL CA SCREEN DOC REV: CPT | Performed by: FAMILY MEDICINE

## 2019-01-31 PROCEDURE — 90791 PSYCH DIAGNOSTIC EVALUATION: CPT | Performed by: PSYCHOLOGIST

## 2019-01-31 PROCEDURE — G0444 DEPRESSION SCREEN ANNUAL: HCPCS | Performed by: FAMILY MEDICINE

## 2019-01-31 PROCEDURE — 94640 AIRWAY INHALATION TREATMENT: CPT | Performed by: FAMILY MEDICINE

## 2019-01-31 PROCEDURE — G8419 CALC BMI OUT NRM PARAM NOF/U: HCPCS | Performed by: FAMILY MEDICINE

## 2019-01-31 PROCEDURE — G8482 FLU IMMUNIZE ORDER/ADMIN: HCPCS | Performed by: FAMILY MEDICINE

## 2019-01-31 PROCEDURE — G8926 SPIRO NO PERF OR DOC: HCPCS | Performed by: FAMILY MEDICINE

## 2019-01-31 PROCEDURE — 4004F PT TOBACCO SCREEN RCVD TLK: CPT | Performed by: FAMILY MEDICINE

## 2019-01-31 PROCEDURE — 3023F SPIROM DOC REV: CPT | Performed by: FAMILY MEDICINE

## 2019-01-31 RX ORDER — IPRATROPIUM BROMIDE AND ALBUTEROL SULFATE 2.5; .5 MG/3ML; MG/3ML
1 SOLUTION RESPIRATORY (INHALATION) ONCE
Status: COMPLETED | OUTPATIENT
Start: 2019-01-31 | End: 2019-01-31

## 2019-01-31 RX ORDER — PREDNISONE 10 MG/1
TABLET ORAL
Qty: 42 TABLET | Refills: 0 | Status: SHIPPED | OUTPATIENT
Start: 2019-01-31 | End: 2019-02-10

## 2019-01-31 RX ADMIN — IPRATROPIUM BROMIDE AND ALBUTEROL SULFATE 1 AMPULE: 2.5; .5 SOLUTION RESPIRATORY (INHALATION) at 11:05

## 2019-01-31 ASSESSMENT — PATIENT HEALTH QUESTIONNAIRE - PHQ9
4. FEELING TIRED OR HAVING LITTLE ENERGY: 3
4. FEELING TIRED OR HAVING LITTLE ENERGY: 3
9. THOUGHTS THAT YOU WOULD BE BETTER OFF DEAD, OR OF HURTING YOURSELF: 0
6. FEELING BAD ABOUT YOURSELF - OR THAT YOU ARE A FAILURE OR HAVE LET YOURSELF OR YOUR FAMILY DOWN: 3
SUM OF ALL RESPONSES TO PHQ9 QUESTIONS 1 & 2: 3
9. THOUGHTS THAT YOU WOULD BE BETTER OFF DEAD, OR OF HURTING YOURSELF: 0
2. FEELING DOWN, DEPRESSED OR HOPELESS: 3
SUM OF ALL RESPONSES TO PHQ QUESTIONS 1-9: 15
3. TROUBLE FALLING OR STAYING ASLEEP: 3
8. MOVING OR SPEAKING SO SLOWLY THAT OTHER PEOPLE COULD HAVE NOTICED. OR THE OPPOSITE, BEING SO FIGETY OR RESTLESS THAT YOU HAVE BEEN MOVING AROUND A LOT MORE THAN USUAL: 0
7. TROUBLE CONCENTRATING ON THINGS, SUCH AS READING THE NEWSPAPER OR WATCHING TELEVISION: 3
5. POOR APPETITE OR OVEREATING: 0
10. IF YOU CHECKED OFF ANY PROBLEMS, HOW DIFFICULT HAVE THESE PROBLEMS MADE IT FOR YOU TO DO YOUR WORK, TAKE CARE OF THINGS AT HOME, OR GET ALONG WITH OTHER PEOPLE: 1
3. TROUBLE FALLING OR STAYING ASLEEP: 3
2. FEELING DOWN, DEPRESSED OR HOPELESS: 3
7. TROUBLE CONCENTRATING ON THINGS, SUCH AS READING THE NEWSPAPER OR WATCHING TELEVISION: 3
SUM OF ALL RESPONSES TO PHQ QUESTIONS 1-9: 18
5. POOR APPETITE OR OVEREATING: 0
8. MOVING OR SPEAKING SO SLOWLY THAT OTHER PEOPLE COULD HAVE NOTICED. OR THE OPPOSITE, BEING SO FIGETY OR RESTLESS THAT YOU HAVE BEEN MOVING AROUND A LOT MORE THAN USUAL: 0
1. LITTLE INTEREST OR PLEASURE IN DOING THINGS: 0
SUM OF ALL RESPONSES TO PHQ QUESTIONS 1-9: 18
6. FEELING BAD ABOUT YOURSELF - OR THAT YOU ARE A FAILURE OR HAVE LET YOURSELF OR YOUR FAMILY DOWN: 3
SUM OF ALL RESPONSES TO PHQ9 QUESTIONS 1 & 2: 6
1. LITTLE INTEREST OR PLEASURE IN DOING THINGS: 3
SUM OF ALL RESPONSES TO PHQ QUESTIONS 1-9: 15

## 2019-01-31 ASSESSMENT — ANXIETY QUESTIONNAIRES
6. BECOMING EASILY ANNOYED OR IRRITABLE: 2-OVER HALF THE DAYS
3. WORRYING TOO MUCH ABOUT DIFFERENT THINGS: 3-NEARLY EVERY DAY
GAD7 TOTAL SCORE: 14
7. FEELING AFRAID AS IF SOMETHING AWFUL MIGHT HAPPEN: 0-NOT AT ALL SURE
5. BEING SO RESTLESS THAT IT IS HARD TO SIT STILL: 0-NOT AT ALL SURE
2. NOT BEING ABLE TO STOP OR CONTROL WORRYING: 3-NEARLY EVERY DAY
1. FEELING NERVOUS, ANXIOUS, OR ON EDGE: 3-NEARLY EVERY DAY
4. TROUBLE RELAXING: 3-NEARLY EVERY DAY

## 2019-02-03 ASSESSMENT — ENCOUNTER SYMPTOMS
SPUTUM PRODUCTION: 0
WHEEZING: 1
SHORTNESS OF BREATH: 1

## 2019-02-28 ENCOUNTER — TELEPHONE (OUTPATIENT)
Dept: PULMONOLOGY | Age: 64
End: 2019-02-28

## 2019-02-28 ENCOUNTER — HOSPITAL ENCOUNTER (OUTPATIENT)
Dept: PULMONOLOGY | Age: 64
Discharge: HOME | End: 2019-02-28
Payer: MEDICARE

## 2019-02-28 ENCOUNTER — OFFICE VISIT (OUTPATIENT)
Dept: PULMONOLOGY | Age: 64
End: 2019-02-28
Payer: MEDICARE

## 2019-02-28 VITALS
BODY MASS INDEX: 25.95 KG/M2 | TEMPERATURE: 97.4 F | SYSTOLIC BLOOD PRESSURE: 106 MMHG | WEIGHT: 141 LBS | HEIGHT: 62 IN | RESPIRATION RATE: 16 BRPM | HEART RATE: 106 BPM | OXYGEN SATURATION: 94 % | DIASTOLIC BLOOD PRESSURE: 62 MMHG

## 2019-02-28 DIAGNOSIS — Z87.891 PERSONAL HISTORY OF TOBACCO USE: ICD-10-CM

## 2019-02-28 DIAGNOSIS — R06.02 SOB (SHORTNESS OF BREATH): ICD-10-CM

## 2019-02-28 DIAGNOSIS — J45.901 ASTHMA WITH ACUTE EXACERBATION, UNSPECIFIED ASTHMA SEVERITY, UNSPECIFIED WHETHER PERSISTENT: ICD-10-CM

## 2019-02-28 DIAGNOSIS — J44.9 MODERATE COPD (CHRONIC OBSTRUCTIVE PULMONARY DISEASE) (HCC): Primary | ICD-10-CM

## 2019-02-28 DIAGNOSIS — J47.9 BRONCHIECTASIS WITHOUT COMPLICATION (HCC): ICD-10-CM

## 2019-02-28 DIAGNOSIS — R93.89 ABNORMAL CT OF THE CHEST: ICD-10-CM

## 2019-02-28 DIAGNOSIS — G47.34 NOCTURNAL HYPOXIA: ICD-10-CM

## 2019-02-28 LAB
DLCO %PRED: 31 %
DLCO PRED: NORMAL ML/MIN/MMHG
DLCO/VA %PRED: NORMAL %
DLCO/VA PRED: NORMAL ML/MIN/MMHG
DLCO/VA: NORMAL ML/MIN/MMHG
DLCO: NORMAL ML/MIN/MMHG
EXPIRATORY TIME-POST: NORMAL SEC
EXPIRATORY TIME: NORMAL SEC
FEF 25-75% %CHNG: NORMAL
FEF 25-75% %PRED-POST: NORMAL %
FEF 25-75% %PRED-PRE: NORMAL L/SEC
FEF 25-75% PRED: NORMAL L/SEC
FEF 25-75%-POST: NORMAL L/SEC
FEF 25-75%-PRE: NORMAL L/SEC
FEV1 %PRED-POST: 54 %
FEV1 %PRED-PRE: 50 %
FEV1 PRED: NORMAL L
FEV1-POST: NORMAL L
FEV1-PRE: NORMAL L
FEV1/FVC %PRED-POST: NORMAL %
FEV1/FVC %PRED-PRE: NORMAL %
FEV1/FVC PRED: NORMAL %
FEV1/FVC-POST: 67 %
FEV1/FVC-PRE: 64 %
FVC %PRED-POST: NORMAL L
FVC %PRED-PRE: NORMAL %
FVC PRED: NORMAL L
FVC-POST: NORMAL L
FVC-PRE: NORMAL L
GAW %PRED: NORMAL %
GAW PRED: NORMAL L/S/CMH2O
GAW: NORMAL L/S/CMH2O
IC %PRED: NORMAL %
IC PRED: NORMAL L
IC: NORMAL L
MEP: NORMAL
MIP: NORMAL
MVV %PRED-PRE: NORMAL %
MVV PRED: NORMAL L/MIN
MVV-PRE: NORMAL L/MIN
PEF %PRED-POST: NORMAL %
PEF %PRED-PRE: NORMAL L/SEC
PEF PRED: NORMAL L/SEC
PEF%CHNG: NORMAL
PEF-POST: NORMAL L/SEC
PEF-PRE: NORMAL L/SEC
RAW %PRED: NORMAL %
RAW PRED: NORMAL CMH2O/L/S
RAW: NORMAL CMH2O/L/S
RV %PRED: NORMAL %
RV PRED: NORMAL L
RV: NORMAL L
SVC %PRED: NORMAL %
SVC PRED: NORMAL L
SVC: NORMAL L
TLC %PRED: 78 %
TLC PRED: NORMAL L
TLC: NORMAL L
VA %PRED: NORMAL %
VA PRED: NORMAL L
VA: NORMAL L
VTG %PRED: NORMAL %
VTG PRED: NORMAL L
VTG: NORMAL L

## 2019-02-28 PROCEDURE — 94640 AIRWAY INHALATION TREATMENT: CPT

## 2019-02-28 PROCEDURE — 3017F COLORECTAL CA SCREEN DOC REV: CPT | Performed by: INTERNAL MEDICINE

## 2019-02-28 PROCEDURE — 6360000002 HC RX W HCPCS: Performed by: INTERNAL MEDICINE

## 2019-02-28 PROCEDURE — 94060 EVALUATION OF WHEEZING: CPT

## 2019-02-28 PROCEDURE — 3023F SPIROM DOC REV: CPT | Performed by: INTERNAL MEDICINE

## 2019-02-28 PROCEDURE — G8419 CALC BMI OUT NRM PARAM NOF/U: HCPCS | Performed by: INTERNAL MEDICINE

## 2019-02-28 PROCEDURE — G8482 FLU IMMUNIZE ORDER/ADMIN: HCPCS | Performed by: INTERNAL MEDICINE

## 2019-02-28 PROCEDURE — G8427 DOCREV CUR MEDS BY ELIG CLIN: HCPCS | Performed by: INTERNAL MEDICINE

## 2019-02-28 PROCEDURE — 4004F PT TOBACCO SCREEN RCVD TLK: CPT | Performed by: INTERNAL MEDICINE

## 2019-02-28 PROCEDURE — 99214 OFFICE O/P EST MOD 30 MIN: CPT | Performed by: INTERNAL MEDICINE

## 2019-02-28 PROCEDURE — G8925 SPIR FEV1/FVC>=60% & NO COPD: HCPCS | Performed by: INTERNAL MEDICINE

## 2019-02-28 PROCEDURE — 94729 DIFFUSING CAPACITY: CPT

## 2019-02-28 PROCEDURE — G0296 VISIT TO DETERM LDCT ELIG: HCPCS | Performed by: INTERNAL MEDICINE

## 2019-02-28 PROCEDURE — 94618 PULMONARY STRESS TESTING: CPT

## 2019-02-28 PROCEDURE — 94726 PLETHYSMOGRAPHY LUNG VOLUMES: CPT

## 2019-02-28 RX ORDER — DOXYCYCLINE HYCLATE 100 MG
100 TABLET ORAL 2 TIMES DAILY
Qty: 10 TABLET | Refills: 0 | Status: SHIPPED | OUTPATIENT
Start: 2019-02-28 | End: 2019-06-04 | Stop reason: SDUPTHER

## 2019-02-28 RX ORDER — ALBUTEROL SULFATE 2.5 MG/3ML
2.5 SOLUTION RESPIRATORY (INHALATION) ONCE
Status: COMPLETED | OUTPATIENT
Start: 2019-02-28 | End: 2019-02-28

## 2019-02-28 RX ORDER — PREDNISONE 10 MG/1
TABLET ORAL
Qty: 30 TABLET | Refills: 0 | Status: SHIPPED | OUTPATIENT
Start: 2019-02-28 | End: 2019-06-04 | Stop reason: SDUPTHER

## 2019-02-28 RX ADMIN — ALBUTEROL SULFATE 2.5 MG: 2.5 SOLUTION RESPIRATORY (INHALATION) at 09:39

## 2019-02-28 ASSESSMENT — PULMONARY FUNCTION TESTS
FEV1_PERCENT_PREDICTED_POST: 54
FEV1_PERCENT_PREDICTED_PRE: 50
FEV1/FVC_POST: 67
FEV1/FVC_PRE: 64

## 2019-03-01 RX ORDER — TRAZODONE HYDROCHLORIDE 150 MG/1
TABLET ORAL
Qty: 180 TABLET | Refills: 2 | Status: SHIPPED | OUTPATIENT
Start: 2019-03-01 | End: 2019-10-25 | Stop reason: SDUPTHER

## 2019-03-19 ENCOUNTER — APPOINTMENT (OUTPATIENT)
Dept: CT IMAGING | Age: 64
DRG: 194 | End: 2019-03-19
Payer: MEDICARE

## 2019-03-19 ENCOUNTER — HOSPITAL ENCOUNTER (INPATIENT)
Age: 64
LOS: 2 days | Discharge: HOME | DRG: 194 | End: 2019-03-22
Attending: INTERNAL MEDICINE | Admitting: INTERNAL MEDICINE
Payer: MEDICARE

## 2019-03-19 ENCOUNTER — APPOINTMENT (OUTPATIENT)
Dept: GENERAL RADIOLOGY | Age: 64
DRG: 194 | End: 2019-03-19
Payer: MEDICARE

## 2019-03-19 DIAGNOSIS — J44.1 COPD EXACERBATION (HCC): ICD-10-CM

## 2019-03-19 DIAGNOSIS — J18.9 PNEUMONIA DUE TO ORGANISM: Primary | ICD-10-CM

## 2019-03-19 DIAGNOSIS — J84.10 PULMONARY FIBROSIS (HCC): ICD-10-CM

## 2019-03-19 LAB
A/G RATIO: 1.1 (ref 1.1–2.2)
ALBUMIN SERPL-MCNC: 3.5 G/DL (ref 3.4–5)
ALP BLD-CCNC: 63 U/L (ref 40–129)
ALT SERPL-CCNC: 9 U/L (ref 10–40)
ANION GAP SERPL CALCULATED.3IONS-SCNC: 10 MMOL/L (ref 3–16)
AST SERPL-CCNC: 10 U/L (ref 15–37)
BASOPHILS ABSOLUTE: 0 K/UL (ref 0–0.2)
BASOPHILS RELATIVE PERCENT: 0.3 %
BILIRUB SERPL-MCNC: 0.5 MG/DL (ref 0–1)
BUN BLDV-MCNC: 7 MG/DL (ref 7–20)
CALCIUM SERPL-MCNC: 8.5 MG/DL (ref 8.3–10.6)
CHLORIDE BLD-SCNC: 98 MMOL/L (ref 99–110)
CO2: 25 MMOL/L (ref 21–32)
CREAT SERPL-MCNC: <0.5 MG/DL (ref 0.6–1.2)
EOSINOPHILS ABSOLUTE: 0.1 K/UL (ref 0–0.6)
EOSINOPHILS RELATIVE PERCENT: 0.4 %
GFR AFRICAN AMERICAN: >60
GFR NON-AFRICAN AMERICAN: >60
GLOBULIN: 3.3 G/DL
GLUCOSE BLD-MCNC: 107 MG/DL (ref 70–99)
HCT VFR BLD CALC: 34.8 % (ref 36–48)
HEMOGLOBIN: 11.9 G/DL (ref 12–16)
LACTIC ACID: 1 MMOL/L (ref 0.4–2)
LYMPHOCYTES ABSOLUTE: 1.4 K/UL (ref 1–5.1)
LYMPHOCYTES RELATIVE PERCENT: 11 %
MCH RBC QN AUTO: 30.9 PG (ref 26–34)
MCHC RBC AUTO-ENTMCNC: 34.3 G/DL (ref 31–36)
MCV RBC AUTO: 90.3 FL (ref 80–100)
MONOCYTES ABSOLUTE: 1 K/UL (ref 0–1.3)
MONOCYTES RELATIVE PERCENT: 7.4 %
NEUTROPHILS ABSOLUTE: 10.6 K/UL (ref 1.7–7.7)
NEUTROPHILS RELATIVE PERCENT: 80.9 %
PDW BLD-RTO: 13.6 % (ref 12.4–15.4)
PLATELET # BLD: 224 K/UL (ref 135–450)
PMV BLD AUTO: 7.2 FL (ref 5–10.5)
POTASSIUM SERPL-SCNC: 3.7 MMOL/L (ref 3.5–5.1)
RAPID INFLUENZA  B AGN: NEGATIVE
RAPID INFLUENZA A AGN: NEGATIVE
RBC # BLD: 3.85 M/UL (ref 4–5.2)
SODIUM BLD-SCNC: 133 MMOL/L (ref 136–145)
TOTAL PROTEIN: 6.8 G/DL (ref 6.4–8.2)
TROPONIN: <0.01 NG/ML
WBC # BLD: 13.1 K/UL (ref 4–11)

## 2019-03-19 PROCEDURE — 2700000000 HC OXYGEN THERAPY PER DAY

## 2019-03-19 PROCEDURE — 6370000000 HC RX 637 (ALT 250 FOR IP): Performed by: NURSE PRACTITIONER

## 2019-03-19 PROCEDURE — 71046 X-RAY EXAM CHEST 2 VIEWS: CPT

## 2019-03-19 PROCEDURE — 84484 ASSAY OF TROPONIN QUANT: CPT

## 2019-03-19 PROCEDURE — 99285 EMERGENCY DEPT VISIT HI MDM: CPT

## 2019-03-19 PROCEDURE — 83880 ASSAY OF NATRIURETIC PEPTIDE: CPT

## 2019-03-19 PROCEDURE — 85025 COMPLETE CBC W/AUTO DIFF WBC: CPT

## 2019-03-19 PROCEDURE — 6360000002 HC RX W HCPCS: Performed by: NURSE PRACTITIONER

## 2019-03-19 PROCEDURE — 71250 CT THORAX DX C-: CPT

## 2019-03-19 PROCEDURE — 83605 ASSAY OF LACTIC ACID: CPT

## 2019-03-19 PROCEDURE — 93005 ELECTROCARDIOGRAM TRACING: CPT | Performed by: NURSE PRACTITIONER

## 2019-03-19 PROCEDURE — 94644 CONT INHLJ TX 1ST HOUR: CPT

## 2019-03-19 PROCEDURE — 80053 COMPREHEN METABOLIC PANEL: CPT

## 2019-03-19 PROCEDURE — 94761 N-INVAS EAR/PLS OXIMETRY MLT: CPT

## 2019-03-19 PROCEDURE — 96365 THER/PROPH/DIAG IV INF INIT: CPT

## 2019-03-19 PROCEDURE — 87804 INFLUENZA ASSAY W/OPTIC: CPT

## 2019-03-19 PROCEDURE — 96366 THER/PROPH/DIAG IV INF ADDON: CPT

## 2019-03-19 RX ORDER — LEVOFLOXACIN 5 MG/ML
750 INJECTION, SOLUTION INTRAVENOUS ONCE
Status: COMPLETED | OUTPATIENT
Start: 2019-03-19 | End: 2019-03-19

## 2019-03-19 RX ORDER — PREDNISONE 20 MG/1
60 TABLET ORAL ONCE
Status: COMPLETED | OUTPATIENT
Start: 2019-03-19 | End: 2019-03-19

## 2019-03-19 RX ORDER — ALBUTEROL SULFATE 2.5 MG/3ML
5 SOLUTION RESPIRATORY (INHALATION) ONCE
Status: COMPLETED | OUTPATIENT
Start: 2019-03-19 | End: 2019-03-19

## 2019-03-19 RX ORDER — IPRATROPIUM BROMIDE AND ALBUTEROL SULFATE 2.5; .5 MG/3ML; MG/3ML
1 SOLUTION RESPIRATORY (INHALATION) ONCE
Status: COMPLETED | OUTPATIENT
Start: 2019-03-19 | End: 2019-03-19

## 2019-03-19 RX ADMIN — ALBUTEROL SULFATE 5 MG: 2.5 SOLUTION RESPIRATORY (INHALATION) at 20:42

## 2019-03-19 RX ADMIN — LEVOFLOXACIN 750 MG: 5 INJECTION, SOLUTION INTRAVENOUS at 21:07

## 2019-03-19 RX ADMIN — IPRATROPIUM BROMIDE AND ALBUTEROL SULFATE 1 AMPULE: .5; 3 SOLUTION RESPIRATORY (INHALATION) at 20:42

## 2019-03-19 RX ADMIN — PREDNISONE 60 MG: 20 TABLET ORAL at 21:07

## 2019-03-20 LAB
ALBUMIN SERPL-MCNC: 3.3 G/DL (ref 3.4–5)
ANION GAP SERPL CALCULATED.3IONS-SCNC: 12 MMOL/L (ref 3–16)
BASOPHILS ABSOLUTE: 0 K/UL (ref 0–0.2)
BASOPHILS RELATIVE PERCENT: 0.1 %
BUN BLDV-MCNC: 7 MG/DL (ref 7–20)
CALCIUM SERPL-MCNC: 8.6 MG/DL (ref 8.3–10.6)
CHLORIDE BLD-SCNC: 97 MMOL/L (ref 99–110)
CO2: 23 MMOL/L (ref 21–32)
CREAT SERPL-MCNC: <0.5 MG/DL (ref 0.6–1.2)
EKG ATRIAL RATE: 93 BPM
EKG DIAGNOSIS: NORMAL
EKG P AXIS: 55 DEGREES
EKG P-R INTERVAL: 142 MS
EKG Q-T INTERVAL: 376 MS
EKG QRS DURATION: 82 MS
EKG QTC CALCULATION (BAZETT): 467 MS
EKG R AXIS: -2 DEGREES
EKG T AXIS: 58 DEGREES
EKG VENTRICULAR RATE: 93 BPM
EOSINOPHILS ABSOLUTE: 0 K/UL (ref 0–0.6)
EOSINOPHILS RELATIVE PERCENT: 0 %
GFR AFRICAN AMERICAN: >60
GFR NON-AFRICAN AMERICAN: >60
GLUCOSE BLD-MCNC: 134 MG/DL (ref 70–99)
GLUCOSE BLD-MCNC: 154 MG/DL (ref 70–99)
GLUCOSE BLD-MCNC: 160 MG/DL (ref 70–99)
GLUCOSE BLD-MCNC: 161 MG/DL (ref 70–99)
GLUCOSE BLD-MCNC: 177 MG/DL (ref 70–99)
HCT VFR BLD CALC: 34.1 % (ref 36–48)
HEMOGLOBIN: 11.6 G/DL (ref 12–16)
LYMPHOCYTES ABSOLUTE: 0.4 K/UL (ref 1–5.1)
LYMPHOCYTES RELATIVE PERCENT: 4.8 %
MCH RBC QN AUTO: 31.3 PG (ref 26–34)
MCHC RBC AUTO-ENTMCNC: 34.1 G/DL (ref 31–36)
MCV RBC AUTO: 91.7 FL (ref 80–100)
MONOCYTES ABSOLUTE: 0.1 K/UL (ref 0–1.3)
MONOCYTES RELATIVE PERCENT: 1.6 %
NEUTROPHILS ABSOLUTE: 7.2 K/UL (ref 1.7–7.7)
NEUTROPHILS RELATIVE PERCENT: 93.5 %
PDW BLD-RTO: 13.3 % (ref 12.4–15.4)
PERFORMED ON: ABNORMAL
PHOSPHORUS: 3 MG/DL (ref 2.5–4.9)
PLATELET # BLD: 207 K/UL (ref 135–450)
PMV BLD AUTO: 7.6 FL (ref 5–10.5)
POTASSIUM SERPL-SCNC: 4.2 MMOL/L (ref 3.5–5.1)
PRO-BNP: 129 PG/ML (ref 0–124)
RBC # BLD: 3.72 M/UL (ref 4–5.2)
SODIUM BLD-SCNC: 132 MMOL/L (ref 136–145)
WBC # BLD: 7.7 K/UL (ref 4–11)

## 2019-03-20 PROCEDURE — 6360000002 HC RX W HCPCS: Performed by: INTERNAL MEDICINE

## 2019-03-20 PROCEDURE — 2580000003 HC RX 258

## 2019-03-20 PROCEDURE — 94668 MNPJ CHEST WALL SBSQ: CPT

## 2019-03-20 PROCEDURE — 2500000003 HC RX 250 WO HCPCS: Performed by: INTERNAL MEDICINE

## 2019-03-20 PROCEDURE — 2700000000 HC OXYGEN THERAPY PER DAY

## 2019-03-20 PROCEDURE — G0378 HOSPITAL OBSERVATION PER HR: HCPCS

## 2019-03-20 PROCEDURE — 94761 N-INVAS EAR/PLS OXIMETRY MLT: CPT

## 2019-03-20 PROCEDURE — 1200000000 HC SEMI PRIVATE

## 2019-03-20 PROCEDURE — 6370000000 HC RX 637 (ALT 250 FOR IP): Performed by: INTERNAL MEDICINE

## 2019-03-20 PROCEDURE — 2580000003 HC RX 258: Performed by: INTERNAL MEDICINE

## 2019-03-20 PROCEDURE — 94640 AIRWAY INHALATION TREATMENT: CPT

## 2019-03-20 PROCEDURE — 93010 ELECTROCARDIOGRAM REPORT: CPT | Performed by: INTERNAL MEDICINE

## 2019-03-20 PROCEDURE — 36415 COLL VENOUS BLD VENIPUNCTURE: CPT

## 2019-03-20 PROCEDURE — 94150 VITAL CAPACITY TEST: CPT

## 2019-03-20 PROCEDURE — 94667 MNPJ CHEST WALL 1ST: CPT

## 2019-03-20 PROCEDURE — 85025 COMPLETE CBC W/AUTO DIFF WBC: CPT

## 2019-03-20 PROCEDURE — 80069 RENAL FUNCTION PANEL: CPT

## 2019-03-20 RX ORDER — ATENOLOL 25 MG/1
25 TABLET ORAL DAILY
Status: DISCONTINUED | OUTPATIENT
Start: 2019-03-20 | End: 2019-03-22 | Stop reason: HOSPADM

## 2019-03-20 RX ORDER — ALBUTEROL SULFATE 2.5 MG/3ML
2.5 SOLUTION RESPIRATORY (INHALATION) EVERY 6 HOURS PRN
Status: DISCONTINUED | OUTPATIENT
Start: 2019-03-20 | End: 2019-03-20

## 2019-03-20 RX ORDER — FLUOXETINE HYDROCHLORIDE 20 MG/1
60 CAPSULE ORAL NIGHTLY
Status: DISCONTINUED | OUTPATIENT
Start: 2019-03-20 | End: 2019-03-22 | Stop reason: HOSPADM

## 2019-03-20 RX ORDER — MONTELUKAST SODIUM 10 MG/1
10 TABLET ORAL NIGHTLY
Status: DISCONTINUED | OUTPATIENT
Start: 2019-03-20 | End: 2019-03-22 | Stop reason: HOSPADM

## 2019-03-20 RX ORDER — SIMVASTATIN 10 MG
20 TABLET ORAL NIGHTLY
Status: DISCONTINUED | OUTPATIENT
Start: 2019-03-20 | End: 2019-03-22 | Stop reason: HOSPADM

## 2019-03-20 RX ORDER — TRAZODONE HYDROCHLORIDE 50 MG/1
300 TABLET ORAL NIGHTLY
Status: DISCONTINUED | OUTPATIENT
Start: 2019-03-20 | End: 2019-03-22 | Stop reason: HOSPADM

## 2019-03-20 RX ORDER — NICOTINE 21 MG/24HR
1 PATCH, TRANSDERMAL 24 HOURS TRANSDERMAL DAILY
Status: DISCONTINUED | OUTPATIENT
Start: 2019-03-20 | End: 2019-03-22 | Stop reason: HOSPADM

## 2019-03-20 RX ORDER — VARENICLINE TARTRATE 1 MG/1
0.5 TABLET, FILM COATED ORAL 2 TIMES DAILY
Status: DISCONTINUED | OUTPATIENT
Start: 2019-03-20 | End: 2019-03-20 | Stop reason: ALTCHOICE

## 2019-03-20 RX ORDER — SODIUM CHLORIDE 0.9 % (FLUSH) 0.9 %
10 SYRINGE (ML) INJECTION EVERY 12 HOURS SCHEDULED
Status: DISCONTINUED | OUTPATIENT
Start: 2019-03-20 | End: 2019-03-22 | Stop reason: HOSPADM

## 2019-03-20 RX ORDER — SODIUM CHLORIDE 9 MG/ML
INJECTION, SOLUTION INTRAVENOUS
Status: COMPLETED
Start: 2019-03-20 | End: 2019-03-20

## 2019-03-20 RX ORDER — SODIUM CHLORIDE FOR INHALATION 3 %
2 VIAL, NEBULIZER (ML) INHALATION 4 TIMES DAILY
Status: DISCONTINUED | OUTPATIENT
Start: 2019-03-20 | End: 2019-03-22 | Stop reason: HOSPADM

## 2019-03-20 RX ORDER — NICOTINE POLACRILEX 4 MG
15 LOZENGE BUCCAL PRN
Status: DISCONTINUED | OUTPATIENT
Start: 2019-03-20 | End: 2019-03-22 | Stop reason: HOSPADM

## 2019-03-20 RX ORDER — DEXTROSE MONOHYDRATE 50 MG/ML
100 INJECTION, SOLUTION INTRAVENOUS PRN
Status: CANCELLED | OUTPATIENT
Start: 2019-03-20

## 2019-03-20 RX ORDER — ACETAMINOPHEN 325 MG/1
650 TABLET ORAL EVERY 4 HOURS PRN
Status: DISCONTINUED | OUTPATIENT
Start: 2019-03-20 | End: 2019-03-22 | Stop reason: HOSPADM

## 2019-03-20 RX ORDER — SODIUM CHLORIDE 0.9 % (FLUSH) 0.9 %
10 SYRINGE (ML) INJECTION PRN
Status: DISCONTINUED | OUTPATIENT
Start: 2019-03-20 | End: 2019-03-22 | Stop reason: HOSPADM

## 2019-03-20 RX ORDER — ALBUTEROL SULFATE 2.5 MG/3ML
2.5 SOLUTION RESPIRATORY (INHALATION)
Status: DISCONTINUED | OUTPATIENT
Start: 2019-03-20 | End: 2019-03-22 | Stop reason: HOSPADM

## 2019-03-20 RX ORDER — FAMOTIDINE 20 MG/1
20 TABLET, FILM COATED ORAL 2 TIMES DAILY
Status: DISCONTINUED | OUTPATIENT
Start: 2019-03-20 | End: 2019-03-22 | Stop reason: HOSPADM

## 2019-03-20 RX ORDER — BUSPIRONE HYDROCHLORIDE 5 MG/1
30 TABLET ORAL 2 TIMES DAILY
Status: DISCONTINUED | OUTPATIENT
Start: 2019-03-20 | End: 2019-03-22 | Stop reason: HOSPADM

## 2019-03-20 RX ORDER — DEXTROSE MONOHYDRATE 50 MG/ML
100 INJECTION, SOLUTION INTRAVENOUS PRN
Status: DISCONTINUED | OUTPATIENT
Start: 2019-03-20 | End: 2019-03-22 | Stop reason: HOSPADM

## 2019-03-20 RX ORDER — DEXTROSE MONOHYDRATE 25 G/50ML
12.5 INJECTION, SOLUTION INTRAVENOUS PRN
Status: DISCONTINUED | OUTPATIENT
Start: 2019-03-20 | End: 2019-03-22 | Stop reason: HOSPADM

## 2019-03-20 RX ORDER — BACLOFEN 10 MG/1
10 TABLET ORAL 2 TIMES DAILY
Status: DISCONTINUED | OUTPATIENT
Start: 2019-03-20 | End: 2019-03-22 | Stop reason: HOSPADM

## 2019-03-20 RX ORDER — NICOTINE POLACRILEX 4 MG
15 LOZENGE BUCCAL PRN
Status: CANCELLED | OUTPATIENT
Start: 2019-03-20

## 2019-03-20 RX ORDER — DEXTROSE MONOHYDRATE 25 G/50ML
12.5 INJECTION, SOLUTION INTRAVENOUS PRN
Status: CANCELLED | OUTPATIENT
Start: 2019-03-20

## 2019-03-20 RX ORDER — PREDNISONE 20 MG/1
40 TABLET ORAL DAILY
Status: DISCONTINUED | OUTPATIENT
Start: 2019-03-20 | End: 2019-03-22 | Stop reason: HOSPADM

## 2019-03-20 RX ADMIN — BUSPIRONE HYDROCHLORIDE 30 MG: 5 TABLET ORAL at 10:51

## 2019-03-20 RX ADMIN — ENOXAPARIN SODIUM 40 MG: 40 INJECTION SUBCUTANEOUS at 10:57

## 2019-03-20 RX ADMIN — SODIUM CHLORIDE SOLN NEBU 3% 2 ML: 3 NEBU SOLN at 15:28

## 2019-03-20 RX ADMIN — BACLOFEN 10 MG: 10 TABLET ORAL at 10:53

## 2019-03-20 RX ADMIN — Medication 2 PUFF: at 08:13

## 2019-03-20 RX ADMIN — SODIUM CHLORIDE SOLN NEBU 3% 2 ML: 3 NEBU SOLN at 19:44

## 2019-03-20 RX ADMIN — CEFTRIAXONE SODIUM 1 G: 1 INJECTION, POWDER, FOR SOLUTION INTRAMUSCULAR; INTRAVENOUS at 02:17

## 2019-03-20 RX ADMIN — BUSPIRONE HYDROCHLORIDE 30 MG: 5 TABLET ORAL at 20:41

## 2019-03-20 RX ADMIN — SIMVASTATIN 20 MG: 10 TABLET, FILM COATED ORAL at 20:41

## 2019-03-20 RX ADMIN — FLUOXETINE 60 MG: 20 CAPSULE ORAL at 20:40

## 2019-03-20 RX ADMIN — TRAZODONE HYDROCHLORIDE 300 MG: 50 TABLET ORAL at 02:56

## 2019-03-20 RX ADMIN — Medication 2 PUFF: at 19:51

## 2019-03-20 RX ADMIN — BACLOFEN 10 MG: 10 TABLET ORAL at 20:41

## 2019-03-20 RX ADMIN — ALBUTEROL SULFATE 2.5 MG: 2.5 SOLUTION RESPIRATORY (INHALATION) at 08:10

## 2019-03-20 RX ADMIN — SODIUM CHLORIDE SOLN NEBU 3% 2 ML: 3 NEBU SOLN at 08:10

## 2019-03-20 RX ADMIN — SODIUM CHLORIDE: 9 INJECTION, SOLUTION INTRAVENOUS at 02:38

## 2019-03-20 RX ADMIN — FAMOTIDINE 20 MG: 20 TABLET ORAL at 20:41

## 2019-03-20 RX ADMIN — ALBUTEROL SULFATE 2.5 MG: 2.5 SOLUTION RESPIRATORY (INHALATION) at 15:28

## 2019-03-20 RX ADMIN — TIOTROPIUM BROMIDE 18 MCG: 18 CAPSULE ORAL; RESPIRATORY (INHALATION) at 08:13

## 2019-03-20 RX ADMIN — MONTELUKAST 10 MG: 10 TABLET, FILM COATED ORAL at 20:41

## 2019-03-20 RX ADMIN — ATENOLOL 25 MG: 25 TABLET ORAL at 10:53

## 2019-03-20 RX ADMIN — DOXYCYCLINE 100 MG: 100 INJECTION, POWDER, LYOPHILIZED, FOR SOLUTION INTRAVENOUS at 03:03

## 2019-03-20 RX ADMIN — SODIUM CHLORIDE, PRESERVATIVE FREE 10 ML: 5 INJECTION INTRAVENOUS at 09:30

## 2019-03-20 RX ADMIN — ALBUTEROL SULFATE 2.5 MG: 2.5 SOLUTION RESPIRATORY (INHALATION) at 19:44

## 2019-03-20 RX ADMIN — ALBUTEROL SULFATE 2.5 MG: 2.5 SOLUTION RESPIRATORY (INHALATION) at 11:48

## 2019-03-20 RX ADMIN — FAMOTIDINE 20 MG: 20 TABLET ORAL at 10:52

## 2019-03-20 RX ADMIN — TRAZODONE HYDROCHLORIDE 300 MG: 50 TABLET ORAL at 20:40

## 2019-03-20 RX ADMIN — DOXYCYCLINE 100 MG: 100 INJECTION, POWDER, LYOPHILIZED, FOR SOLUTION INTRAVENOUS at 16:15

## 2019-03-20 RX ADMIN — PREDNISONE 40 MG: 20 TABLET ORAL at 10:52

## 2019-03-20 RX ADMIN — SODIUM CHLORIDE SOLN NEBU 3% 2 ML: 3 NEBU SOLN at 11:48

## 2019-03-20 RX ADMIN — SODIUM CHLORIDE, PRESERVATIVE FREE 10 ML: 5 INJECTION INTRAVENOUS at 20:44

## 2019-03-20 ASSESSMENT — PAIN SCALES - GENERAL
PAINLEVEL_OUTOF10: 0

## 2019-03-21 LAB
ANION GAP SERPL CALCULATED.3IONS-SCNC: 11 MMOL/L (ref 3–16)
BASOPHILS ABSOLUTE: 0 K/UL (ref 0–0.2)
BASOPHILS RELATIVE PERCENT: 0.1 %
BUN BLDV-MCNC: 10 MG/DL (ref 7–20)
CALCIUM SERPL-MCNC: 8.7 MG/DL (ref 8.3–10.6)
CHLORIDE BLD-SCNC: 99 MMOL/L (ref 99–110)
CO2: 24 MMOL/L (ref 21–32)
CREAT SERPL-MCNC: <0.5 MG/DL (ref 0.6–1.2)
EOSINOPHILS ABSOLUTE: 0 K/UL (ref 0–0.6)
EOSINOPHILS RELATIVE PERCENT: 0 %
GFR AFRICAN AMERICAN: >60
GFR NON-AFRICAN AMERICAN: >60
GLUCOSE BLD-MCNC: 103 MG/DL (ref 70–99)
GLUCOSE BLD-MCNC: 110 MG/DL (ref 70–99)
GLUCOSE BLD-MCNC: 112 MG/DL (ref 70–99)
GLUCOSE BLD-MCNC: 119 MG/DL (ref 70–99)
GLUCOSE BLD-MCNC: 136 MG/DL (ref 70–99)
HCT VFR BLD CALC: 34.2 % (ref 36–48)
HEMOGLOBIN: 11.8 G/DL (ref 12–16)
LYMPHOCYTES ABSOLUTE: 1.3 K/UL (ref 1–5.1)
LYMPHOCYTES RELATIVE PERCENT: 11.2 %
MCH RBC QN AUTO: 30.7 PG (ref 26–34)
MCHC RBC AUTO-ENTMCNC: 34.4 G/DL (ref 31–36)
MCV RBC AUTO: 89.4 FL (ref 80–100)
MONOCYTES ABSOLUTE: 0.8 K/UL (ref 0–1.3)
MONOCYTES RELATIVE PERCENT: 7 %
NEUTROPHILS ABSOLUTE: 9.7 K/UL (ref 1.7–7.7)
NEUTROPHILS RELATIVE PERCENT: 81.7 %
PDW BLD-RTO: 13.6 % (ref 12.4–15.4)
PERFORMED ON: ABNORMAL
PLATELET # BLD: 218 K/UL (ref 135–450)
PMV BLD AUTO: 7.8 FL (ref 5–10.5)
POTASSIUM SERPL-SCNC: 3.6 MMOL/L (ref 3.5–5.1)
RBC # BLD: 3.82 M/UL (ref 4–5.2)
SODIUM BLD-SCNC: 134 MMOL/L (ref 136–145)
WBC # BLD: 11.9 K/UL (ref 4–11)

## 2019-03-21 PROCEDURE — 1200000000 HC SEMI PRIVATE

## 2019-03-21 PROCEDURE — 6360000002 HC RX W HCPCS: Performed by: INTERNAL MEDICINE

## 2019-03-21 PROCEDURE — 80048 BASIC METABOLIC PNL TOTAL CA: CPT

## 2019-03-21 PROCEDURE — 6370000000 HC RX 637 (ALT 250 FOR IP): Performed by: INTERNAL MEDICINE

## 2019-03-21 PROCEDURE — 85025 COMPLETE CBC W/AUTO DIFF WBC: CPT

## 2019-03-21 PROCEDURE — 2500000003 HC RX 250 WO HCPCS: Performed by: INTERNAL MEDICINE

## 2019-03-21 PROCEDURE — 36415 COLL VENOUS BLD VENIPUNCTURE: CPT

## 2019-03-21 PROCEDURE — 2580000003 HC RX 258: Performed by: INTERNAL MEDICINE

## 2019-03-21 PROCEDURE — 94761 N-INVAS EAR/PLS OXIMETRY MLT: CPT

## 2019-03-21 PROCEDURE — 94640 AIRWAY INHALATION TREATMENT: CPT

## 2019-03-21 PROCEDURE — 94668 MNPJ CHEST WALL SBSQ: CPT

## 2019-03-21 PROCEDURE — 2700000000 HC OXYGEN THERAPY PER DAY

## 2019-03-21 PROCEDURE — 6370000000 HC RX 637 (ALT 250 FOR IP): Performed by: NURSE PRACTITIONER

## 2019-03-21 RX ORDER — LEVOFLOXACIN 500 MG/1
500 TABLET, FILM COATED ORAL DAILY
Status: DISCONTINUED | OUTPATIENT
Start: 2019-03-21 | End: 2019-03-22 | Stop reason: HOSPADM

## 2019-03-21 RX ORDER — GUAIFENESIN 600 MG/1
600 TABLET, EXTENDED RELEASE ORAL 2 TIMES DAILY
Status: DISCONTINUED | OUTPATIENT
Start: 2019-03-21 | End: 2019-03-22 | Stop reason: HOSPADM

## 2019-03-21 RX ORDER — DOXYCYCLINE HYCLATE 100 MG
100 TABLET ORAL EVERY 12 HOURS SCHEDULED
Status: DISCONTINUED | OUTPATIENT
Start: 2019-03-21 | End: 2019-03-21

## 2019-03-21 RX ADMIN — SODIUM CHLORIDE SOLN NEBU 3% 2 ML: 3 NEBU SOLN at 15:28

## 2019-03-21 RX ADMIN — ALBUTEROL SULFATE 2.5 MG: 2.5 SOLUTION RESPIRATORY (INHALATION) at 12:07

## 2019-03-21 RX ADMIN — FLUOXETINE 60 MG: 20 CAPSULE ORAL at 20:36

## 2019-03-21 RX ADMIN — SODIUM CHLORIDE SOLN NEBU 3% 2 ML: 3 NEBU SOLN at 19:55

## 2019-03-21 RX ADMIN — CEFTRIAXONE SODIUM 1 G: 1 INJECTION, POWDER, FOR SOLUTION INTRAMUSCULAR; INTRAVENOUS at 01:26

## 2019-03-21 RX ADMIN — SODIUM CHLORIDE SOLN NEBU 3% 2 ML: 3 NEBU SOLN at 12:07

## 2019-03-21 RX ADMIN — MAGNESIUM HYDROXIDE 30 ML: 400 SUSPENSION ORAL at 20:44

## 2019-03-21 RX ADMIN — FAMOTIDINE 20 MG: 20 TABLET ORAL at 20:36

## 2019-03-21 RX ADMIN — MONTELUKAST 10 MG: 10 TABLET, FILM COATED ORAL at 20:36

## 2019-03-21 RX ADMIN — BACLOFEN 10 MG: 10 TABLET ORAL at 09:00

## 2019-03-21 RX ADMIN — BUSPIRONE HYDROCHLORIDE 30 MG: 5 TABLET ORAL at 10:25

## 2019-03-21 RX ADMIN — GUAIFENESIN 600 MG: 600 TABLET, EXTENDED RELEASE ORAL at 20:36

## 2019-03-21 RX ADMIN — ALBUTEROL SULFATE 2.5 MG: 2.5 SOLUTION RESPIRATORY (INHALATION) at 19:55

## 2019-03-21 RX ADMIN — LEVOFLOXACIN 500 MG: 500 TABLET, FILM COATED ORAL at 10:29

## 2019-03-21 RX ADMIN — SIMVASTATIN 20 MG: 10 TABLET, FILM COATED ORAL at 20:36

## 2019-03-21 RX ADMIN — ALBUTEROL SULFATE 2.5 MG: 2.5 SOLUTION RESPIRATORY (INHALATION) at 15:28

## 2019-03-21 RX ADMIN — ALBUTEROL SULFATE 2.5 MG: 2.5 SOLUTION RESPIRATORY (INHALATION) at 08:58

## 2019-03-21 RX ADMIN — Medication 2 PUFF: at 09:00

## 2019-03-21 RX ADMIN — TIOTROPIUM BROMIDE 18 MCG: 18 CAPSULE ORAL; RESPIRATORY (INHALATION) at 09:01

## 2019-03-21 RX ADMIN — BACLOFEN 10 MG: 10 TABLET ORAL at 20:36

## 2019-03-21 RX ADMIN — Medication 2 PUFF: at 20:01

## 2019-03-21 RX ADMIN — DOXYCYCLINE 100 MG: 100 INJECTION, POWDER, LYOPHILIZED, FOR SOLUTION INTRAVENOUS at 02:01

## 2019-03-21 RX ADMIN — SODIUM CHLORIDE SOLN NEBU 3% 4 ML: 3 NEBU SOLN at 09:02

## 2019-03-21 RX ADMIN — PREDNISONE 40 MG: 20 TABLET ORAL at 09:00

## 2019-03-21 RX ADMIN — TRAZODONE HYDROCHLORIDE 300 MG: 50 TABLET ORAL at 20:36

## 2019-03-21 RX ADMIN — FAMOTIDINE 20 MG: 20 TABLET ORAL at 09:00

## 2019-03-21 RX ADMIN — BUSPIRONE HYDROCHLORIDE 30 MG: 5 TABLET ORAL at 20:35

## 2019-03-21 RX ADMIN — ATENOLOL 25 MG: 25 TABLET ORAL at 09:00

## 2019-03-21 RX ADMIN — GUAIFENESIN 600 MG: 600 TABLET, EXTENDED RELEASE ORAL at 10:29

## 2019-03-21 RX ADMIN — SODIUM CHLORIDE, PRESERVATIVE FREE 10 ML: 5 INJECTION INTRAVENOUS at 09:00

## 2019-03-21 RX ADMIN — ENOXAPARIN SODIUM 40 MG: 40 INJECTION SUBCUTANEOUS at 09:00

## 2019-03-22 ENCOUNTER — TELEPHONE (OUTPATIENT)
Dept: FAMILY MEDICINE CLINIC | Age: 64
End: 2019-03-22

## 2019-03-22 VITALS
HEIGHT: 62 IN | WEIGHT: 140.76 LBS | RESPIRATION RATE: 18 BRPM | OXYGEN SATURATION: 94 % | TEMPERATURE: 97.6 F | HEART RATE: 82 BPM | BODY MASS INDEX: 25.9 KG/M2 | DIASTOLIC BLOOD PRESSURE: 76 MMHG | SYSTOLIC BLOOD PRESSURE: 115 MMHG

## 2019-03-22 LAB
GLUCOSE BLD-MCNC: 105 MG/DL (ref 70–99)
PERFORMED ON: ABNORMAL

## 2019-03-22 PROCEDURE — 6360000002 HC RX W HCPCS: Performed by: INTERNAL MEDICINE

## 2019-03-22 PROCEDURE — 94668 MNPJ CHEST WALL SBSQ: CPT

## 2019-03-22 PROCEDURE — 6370000000 HC RX 637 (ALT 250 FOR IP): Performed by: INTERNAL MEDICINE

## 2019-03-22 PROCEDURE — 94640 AIRWAY INHALATION TREATMENT: CPT

## 2019-03-22 PROCEDURE — 6370000000 HC RX 637 (ALT 250 FOR IP): Performed by: NURSE PRACTITIONER

## 2019-03-22 PROCEDURE — 2580000003 HC RX 258: Performed by: INTERNAL MEDICINE

## 2019-03-22 RX ORDER — LEVOFLOXACIN 500 MG/1
500 TABLET, FILM COATED ORAL DAILY
Qty: 5 TABLET | Refills: 0 | Status: SHIPPED | OUTPATIENT
Start: 2019-03-23 | End: 2019-03-28

## 2019-03-22 RX ORDER — PREDNISONE 20 MG/1
40 TABLET ORAL DAILY
Qty: 2 TABLET | Refills: 0 | Status: SHIPPED | OUTPATIENT
Start: 2019-03-23 | End: 2019-03-24

## 2019-03-22 RX ADMIN — BUSPIRONE HYDROCHLORIDE 30 MG: 5 TABLET ORAL at 10:17

## 2019-03-22 RX ADMIN — Medication 2 PUFF: at 07:31

## 2019-03-22 RX ADMIN — ATENOLOL 25 MG: 25 TABLET ORAL at 08:00

## 2019-03-22 RX ADMIN — SODIUM CHLORIDE SOLN NEBU 3% 2 ML: 3 NEBU SOLN at 07:32

## 2019-03-22 RX ADMIN — GUAIFENESIN 600 MG: 600 TABLET, EXTENDED RELEASE ORAL at 07:59

## 2019-03-22 RX ADMIN — FAMOTIDINE 20 MG: 20 TABLET ORAL at 07:59

## 2019-03-22 RX ADMIN — TIOTROPIUM BROMIDE 18 MCG: 18 CAPSULE ORAL; RESPIRATORY (INHALATION) at 07:31

## 2019-03-22 RX ADMIN — ENOXAPARIN SODIUM 40 MG: 40 INJECTION SUBCUTANEOUS at 07:58

## 2019-03-22 RX ADMIN — PREDNISONE 40 MG: 20 TABLET ORAL at 07:59

## 2019-03-22 RX ADMIN — BACLOFEN 10 MG: 10 TABLET ORAL at 07:59

## 2019-03-22 RX ADMIN — LEVOFLOXACIN 500 MG: 500 TABLET, FILM COATED ORAL at 07:59

## 2019-03-22 RX ADMIN — ALBUTEROL SULFATE 2.5 MG: 2.5 SOLUTION RESPIRATORY (INHALATION) at 07:32

## 2019-03-26 ENCOUNTER — OFFICE VISIT (OUTPATIENT)
Dept: FAMILY MEDICINE CLINIC | Age: 64
End: 2019-03-26
Payer: MEDICARE

## 2019-03-26 VITALS
OXYGEN SATURATION: 94 % | SYSTOLIC BLOOD PRESSURE: 108 MMHG | DIASTOLIC BLOOD PRESSURE: 60 MMHG | HEART RATE: 86 BPM | BODY MASS INDEX: 26.52 KG/M2 | WEIGHT: 145 LBS

## 2019-03-26 DIAGNOSIS — Z09 HOSPITAL DISCHARGE FOLLOW-UP: Primary | ICD-10-CM

## 2019-03-26 DIAGNOSIS — J18.9 ATYPICAL PNEUMONIA: ICD-10-CM

## 2019-03-26 DIAGNOSIS — J84.9 ILD (INTERSTITIAL LUNG DISEASE) (HCC): ICD-10-CM

## 2019-03-26 DIAGNOSIS — R91.8 PULMONARY NODULES: ICD-10-CM

## 2019-03-26 DIAGNOSIS — J96.21 ACUTE ON CHRONIC RESPIRATORY FAILURE WITH HYPOXIA (HCC): ICD-10-CM

## 2019-03-26 DIAGNOSIS — J44.1 COPD EXACERBATION (HCC): ICD-10-CM

## 2019-03-26 DIAGNOSIS — J84.10 PULMONARY FIBROSIS (HCC): ICD-10-CM

## 2019-03-26 PROCEDURE — 1111F DSCHRG MED/CURRENT MED MERGE: CPT | Performed by: FAMILY MEDICINE

## 2019-03-26 PROCEDURE — 99496 TRANSJ CARE MGMT HIGH F2F 7D: CPT | Performed by: FAMILY MEDICINE

## 2019-03-26 NOTE — PATIENT INSTRUCTIONS
Patient Education        Pneumonia: Care Instructions  Your Care Instructions    Pneumonia is an infection of the lungs. Most cases are caused by infections from bacteria or viruses. Pneumonia may be mild or very severe. If it is caused by bacteria, you will be treated with antibiotics. It may take a few weeks to a few months to recover fully from pneumonia, depending on how sick you were and whether your overall health is good. Follow-up care is a key part of your treatment and safety. Be sure to make and go to all appointments, and call your doctor if you are having problems. It's also a good idea to know your test results and keep a list of the medicines you take. How can you care for yourself at home? · Take your antibiotics exactly as directed. Do not stop taking the medicine just because you are feeling better. You need to take the full course of antibiotics. · Take your medicines exactly as prescribed. Call your doctor if you think you are having a problem with your medicine. · Get plenty of rest and sleep. You may feel weak and tired for a while, but your energy level will improve with time. · To prevent dehydration, drink plenty of fluids, enough so that your urine is light yellow or clear like water. Choose water and other caffeine-free clear liquids until you feel better. If you have kidney, heart, or liver disease and have to limit fluids, talk with your doctor before you increase the amount of fluids you drink. · Take care of your cough so you can rest. A cough that brings up mucus from your lungs is common with pneumonia. It is one way your body gets rid of the infection. But if coughing keeps you from resting or causes severe fatigue and chest-wall pain, talk to your doctor. He or she may suggest that you take a medicine to reduce the cough. · Use a vaporizer or humidifier to add moisture to your bedroom. Follow the directions for cleaning the machine.   · Do not smoke or allow others to under license by [facility] ([facility]). If you have questions about a medical condition or this instruction, always ask your healthcare professional. [address] any warranty or liability for your use of this information.

## 2019-03-27 RX ORDER — RANITIDINE 150 MG/1
TABLET ORAL
Qty: 180 TABLET | Refills: 1 | Status: SHIPPED | OUTPATIENT
Start: 2019-03-27 | End: 2019-10-16 | Stop reason: SDUPTHER

## 2019-03-27 RX ORDER — BUSPIRONE HYDROCHLORIDE 30 MG/1
TABLET ORAL
Qty: 180 TABLET | Refills: 1 | Status: SHIPPED | OUTPATIENT
Start: 2019-03-27 | End: 2019-10-16 | Stop reason: SDUPTHER

## 2019-03-27 RX ORDER — SIMVASTATIN 20 MG
TABLET ORAL
Qty: 90 TABLET | Refills: 1 | Status: SHIPPED | OUTPATIENT
Start: 2019-03-27 | End: 2019-10-16 | Stop reason: SDUPTHER

## 2019-04-01 RX ORDER — FLUOXETINE HYDROCHLORIDE 60 MG/1
1 TABLET, FILM COATED ORAL; ORAL NIGHTLY
Qty: 90 TABLET | Refills: 1 | Status: SHIPPED | OUTPATIENT
Start: 2019-04-01 | End: 2019-04-16

## 2019-04-01 ASSESSMENT — ENCOUNTER SYMPTOMS
DIFFICULTY BREATHING: 0
WHEEZING: 0
SORE THROAT: 0
COUGH: 0
SPUTUM PRODUCTION: 0
CHEST TIGHTNESS: 0
SHORTNESS OF BREATH: 1

## 2019-04-01 NOTE — TELEPHONE ENCOUNTER
Last Seen: 3/26/2019    Last Written: 12-24-18    Next Appointment: Visit date not found    Requested Prescriptions     Pending Prescriptions Disp Refills    FLUoxetine HCl 60 MG TABS 90 tablet 1     Sig: Take 1 tablet by mouth nightly

## 2019-04-12 ENCOUNTER — TELEPHONE (OUTPATIENT)
Dept: FAMILY MEDICINE CLINIC | Age: 64
End: 2019-04-12

## 2019-04-12 NOTE — TELEPHONE ENCOUNTER
Pt received a letter form [employer] stating they would not cover the Prozac. Needs a PA done. Pt said [employer]'s number is [phone]. Also pt uses [employer] order and script was sent to [facility] asking if it can be switched over.

## 2019-04-16 RX ORDER — FLUOXETINE HYDROCHLORIDE 20 MG/1
60 CAPSULE ORAL DAILY
Qty: 90 CAPSULE | Refills: 5 | Status: SHIPPED | OUTPATIENT
Start: 2019-04-16 | End: 2019-05-30 | Stop reason: SDUPTHER

## 2019-04-16 NOTE — TELEPHONE ENCOUNTER
I received a notice from [facility] [employer] stating that There is a generic bioequivalent on formulary, which is available to the member: (fluoxetine capsule). In order to approve thei request for n exception to our formulary, please provide SPECIFIC CLINICAL RATIONALE as well as treatment history and specific response/adverse effects experienced with the generic bioequivalent:     Please advise. Thank you.

## 2019-05-09 NOTE — TELEPHONE ENCOUNTER
Pt would like a call back to discuss her current prescriptions and see what form of the medication she should be taking Pill or Capsule because her previous request was denied. Pt states she can bed called anytime at [phone].

## 2019-05-30 RX ORDER — FLUOXETINE HYDROCHLORIDE 20 MG/1
60 CAPSULE ORAL DAILY
Qty: 270 CAPSULE | Refills: 1 | Status: SHIPPED | OUTPATIENT
Start: 2019-05-30 | End: 2019-10-23 | Stop reason: SDUPTHER

## 2019-05-30 NOTE — TELEPHONE ENCOUNTER
Spoke to pt. She needs rx to go to [facility] for a 90 day supply.  pended

## 2019-06-03 ENCOUNTER — NURSE TRIAGE (OUTPATIENT)
Dept: OTHER | Facility: CLINIC | Age: 64
End: 2019-06-03

## 2019-06-03 NOTE — TELEPHONE ENCOUNTER
Reason for Disposition   Longstanding difficulty breathing (e.g., CHF, COPD, emphysema) and worse than normal    Protocols used: BREATHING DIFFICULTY-ADULT-OH    Caller with history of COPD and began with cough and sob 5 days ago. She is coughing up green mucous. Denies fever. Provided remedies to help with symptoms and when to seek immediate treatment. Advised caller to be seen in the office today. Caller states she is not able to come into the office and would like Dr. Fredrick Wilhelm to know what is going on with her and have someone from the office speak with her. Please advise. Thank you.

## 2019-06-03 NOTE — TELEPHONE ENCOUNTER
Please make sure patient is using albuterol every 6 hours for now, make sure she is using Spiriva and Advair everyday as well. Starting using Acapella device again if she hasn't been using it. Has appointment in the morning, which is good. Will need O2 level checked, needs respiratory examination.

## 2019-06-04 ENCOUNTER — OFFICE VISIT (OUTPATIENT)
Dept: FAMILY MEDICINE CLINIC | Age: 64
End: 2019-06-04
Payer: MEDICARE

## 2019-06-04 VITALS
OXYGEN SATURATION: 98 % | TEMPERATURE: 98.1 F | HEART RATE: 91 BPM | BODY MASS INDEX: 25.76 KG/M2 | HEIGHT: 62 IN | SYSTOLIC BLOOD PRESSURE: 108 MMHG | RESPIRATION RATE: 20 BRPM | DIASTOLIC BLOOD PRESSURE: 64 MMHG | WEIGHT: 140 LBS

## 2019-06-04 DIAGNOSIS — F17.200 TOBACCO DEPENDENCE: ICD-10-CM

## 2019-06-04 DIAGNOSIS — J44.1 ACUTE EXACERBATION OF COPD WITH ASTHMA (HCC): ICD-10-CM

## 2019-06-04 DIAGNOSIS — J45.901 ACUTE EXACERBATION OF COPD WITH ASTHMA (HCC): ICD-10-CM

## 2019-06-04 DIAGNOSIS — R06.02 SOB (SHORTNESS OF BREATH): ICD-10-CM

## 2019-06-04 PROCEDURE — G8926 SPIRO NO PERF OR DOC: HCPCS | Performed by: NURSE PRACTITIONER

## 2019-06-04 PROCEDURE — 3017F COLORECTAL CA SCREEN DOC REV: CPT | Performed by: NURSE PRACTITIONER

## 2019-06-04 PROCEDURE — 3023F SPIROM DOC REV: CPT | Performed by: NURSE PRACTITIONER

## 2019-06-04 PROCEDURE — 4004F PT TOBACCO SCREEN RCVD TLK: CPT | Performed by: NURSE PRACTITIONER

## 2019-06-04 PROCEDURE — 99213 OFFICE O/P EST LOW 20 MIN: CPT | Performed by: NURSE PRACTITIONER

## 2019-06-04 PROCEDURE — G8419 CALC BMI OUT NRM PARAM NOF/U: HCPCS | Performed by: NURSE PRACTITIONER

## 2019-06-04 PROCEDURE — G8427 DOCREV CUR MEDS BY ELIG CLIN: HCPCS | Performed by: NURSE PRACTITIONER

## 2019-06-04 PROCEDURE — 94640 AIRWAY INHALATION TREATMENT: CPT | Performed by: NURSE PRACTITIONER

## 2019-06-04 RX ORDER — VARENICLINE TARTRATE 25 MG
KIT ORAL
Qty: 1 BOX | Refills: 0 | Status: SHIPPED | OUTPATIENT
Start: 2019-06-04 | End: 2019-08-29 | Stop reason: SDUPTHER

## 2019-06-04 RX ORDER — DOXYCYCLINE HYCLATE 100 MG
100 TABLET ORAL 2 TIMES DAILY
Qty: 14 TABLET | Refills: 0 | Status: SHIPPED | OUTPATIENT
Start: 2019-06-04 | End: 2019-06-11

## 2019-06-04 RX ORDER — IPRATROPIUM BROMIDE AND ALBUTEROL SULFATE 2.5; .5 MG/3ML; MG/3ML
1 SOLUTION RESPIRATORY (INHALATION) ONCE
Status: COMPLETED | OUTPATIENT
Start: 2019-06-04 | End: 2019-06-04

## 2019-06-04 RX ORDER — PREDNISONE 10 MG/1
TABLET ORAL
Qty: 30 TABLET | Refills: 0 | Status: SHIPPED | OUTPATIENT
Start: 2019-06-04 | End: 2019-06-14

## 2019-06-04 RX ADMIN — IPRATROPIUM BROMIDE AND ALBUTEROL SULFATE 1 AMPULE: 2.5; .5 SOLUTION RESPIRATORY (INHALATION) at 09:55

## 2019-06-04 ASSESSMENT — ENCOUNTER SYMPTOMS
GASTROINTESTINAL NEGATIVE: 1
WHEEZING: 1
SHORTNESS OF BREATH: 1
COUGH: 1
CHEST TIGHTNESS: 1

## 2019-06-04 NOTE — PROGRESS NOTES
Moisés Rack 59 y.o. female    Chief Complaint   Patient presents with    Cough     x3 days, productive    Shortness of Breath     x3 days. Uses albuterol PRN- effective    Fatigue       HPI     SOB, coughing with green sputum, no energy, cannot sleep. Started 5 days ago. Started smoking again- asking for Chantix. +states temp 99 F, no chills or fever, no appetite. Pt with hx of COPD,asthma, ?ILD  +hx of pneumonia. Using Advair, spiriva, albuterol- uses 2 times yesterday. Noted last CT scan of chest 3/2019  Per radiology repeat in 3 months, pt has it scheduled in Oct    Pastmedical, surgical, family and social history were reviewed and updated with the patient. Current Outpatient Medications:     Pseudoephedrine-APAP-DM (DAYQUIL MULTI-SYMPTOM COLD/FLU PO), Take by mouth, Disp: , Rfl:     FLUoxetine (PROZAC) 20 MG capsule, Take 3 capsules by mouth daily, Disp: 270 capsule, Rfl: 1    ranitidine (ZANTAC) 150 MG tablet, TAKE 1 TABLET BY MOUTH TWICE DAILY AS NEEDED FOR HEARTBURN, Disp: 180 tablet, Rfl: 1    busPIRone (BUSPAR) 30 MG tablet, TAKE 1 TABLET TWICE DAILY, Disp: 180 tablet, Rfl: 1    simvastatin (ZOCOR) 20 MG tablet, TAKE 1 TABLET EVERY EVENING, Disp: 90 tablet, Rfl: 1    traZODone (DESYREL) 150 MG tablet, TAKE 2 TABLETS BY MOUTH AT BEDTIME, Disp: 180 tablet, Rfl: 2    albuterol (PROVENTIL) (2.5 MG/3ML) 0.083% nebulizer solution, Take 3 mLs by nebulization every 6 hours as needed for Shortness of Breath, Disp: 120 each, Rfl: 3    Lidocaine-Menthol 4-5 % PTCH, Patch may remain in place for up to 12 hours in any 24-hour period.  No more than 1 patch should be used in a 24-hour period, Disp: 90 patch, Rfl: 0    atenolol (TENORMIN) 25 MG tablet, Take 1 tablet by mouth daily (replaces propranolol), Disp: 90 tablet, Rfl: 1    albuterol sulfate HFA (VENTOLIN HFA) 108 (90 Base) MCG/ACT inhaler, Inhale 2 puffs into the lungs every 6 hours as needed for Wheezing or Shortness of Breath, Disp: 3 Inhaler, Rfl: 1    fluticasone-salmeterol (ADVAIR DISKUS) 250-50 MCG/DOSE AEPB, TAKE 1 PUFF BY MOUTH TWICE A DAY, Disp: 180 each, Rfl: 1    montelukast (SINGULAIR) 10 MG tablet, TAKE 1 TABLET BY MOUTH EACH NIGHT, Disp: 90 tablet, Rfl: 1    tiotropium (SPIRIVA HANDIHALER) 18 MCG inhalation capsule, INHALE 1 CAPSULE BY MOUTH DAILY, Disp: 90 capsule, Rfl: 1    Abaloparatide 3120 MCG/1.56ML SOPN, Inject 80 mcg into the skin daily, Disp: 3 pen, Rfl: 4    Insulin Pen Needle (PEN NEEDLES 3/16\") 31G X 5 MM MISC, Use one needle for each daily dose., Disp: 90 each, Rfl: 4    bisacodyl (DULCOLAX) 5 MG EC tablet, Take 1 tablet by mouth daily as needed for Constipation, Disp: 4 tablet, Rfl: 0    zoster recombinant adjuvanted vaccine (SHINGRIX) 50 MCG SUSR injection, Inject 0.5 mLs into the muscle every 6 months for 2 doses, Disp: 1 each, Rfl: 1    guaiFENesin (MUCINEX) 600 MG extended release tablet, Take 1 tablet by mouth 2 times daily as needed for Congestion, Disp: 60 tablet, Rfl: 2    Misc. Devices (ACAPELLA) MISC, 1 Device by Does not apply route 4 times daily DX ILD J84.9, Disp: 1 each, Rfl: 0    teriparatide, recombinant, (FORTEO) 600 MCG/2.4ML SOLN injection, Inject 0.08 mLs into the skin daily One dose injected daily. , Disp: 3 pen, Rfl: 4    Review of Systems   Constitutional: Positive for fatigue. Negative for chills and fever. HENT: Negative for congestion. Respiratory: Positive for cough, chest tightness, shortness of breath and wheezing. Cardiovascular: Negative. Gastrointestinal: Negative. Physical Exam   Constitutional: She is oriented to person, place, and time. She appears well-developed and well-nourished. HENT:   Mouth/Throat: Oropharynx is clear and moist. No oropharyngeal exudate. Eyes: Conjunctivae are normal.   Neck: Neck supple. No JVD present. Cardiovascular: Normal rate, regular rhythm and normal heart sounds.    Pulmonary/Chest: Effort normal. She has wheezes in the right middle field, the right lower field, the left middle field and the left lower field. She has no rhonchi. She has no rales. Lymphadenopathy:     She has no cervical adenopathy. Neurological: She is alert and oriented to person, place, and time. Nursing note and vitals reviewed. Vitals:    06/04/19 0932   BP: 108/64   Pulse: 91   Resp: 20   Temp: 98.1 °F (36.7 °C)   SpO2: 95%       Assessment    1. Acute exacerbation of COPD with asthma ([facility] [address]    2. SOB (shortness of breath)    3. Tobacco dependence        Plan    Yaneth Terry was seen today for cough, shortness of breath and fatigue. Diagnoses and all orders for this visit:    Acute exacerbation of COPD with asthma ([facility]  SOB (shortness of breath)  -     ipratropium-albuterol (DUONEB) nebulizer solution 1 ampule- breathing significantly improved after breathing treatment, O2 sats up to 98%  -     98014 - ME INHAL RX, AIRWAY OBST/DX SPUTUM INDUCT  -     predniSONE (DELTASONE) 10 MG tablet; Take 40 mg by mouth for 3 days 30 mg for 3 days 20 mg for 3 days 10 mg for 3 days. -     doxycycline hyclate (VIBRA-TABS) 100 MG tablet; Take 1 tablet by mouth 2 times daily for 7 days        -     Increase albuterol to every 6h. Smoking cessation   Return to office for worsening symptoms. To emergency room for severe symptoms    Tobacco dependence  -     varenicline (CHANTIX STARTING MONTH PAK) 0.5 MG X 11 & 1 MG X 42 tablet; Take by mouth.

## 2019-06-19 ENCOUNTER — TELEPHONE (OUTPATIENT)
Dept: FAMILY MEDICINE CLINIC | Age: 64
End: 2019-06-19

## 2019-06-19 ENCOUNTER — NURSE TRIAGE (OUTPATIENT)
Dept: OTHER | Facility: CLINIC | Age: 64
End: 2019-06-19

## 2019-06-19 NOTE — TELEPHONE ENCOUNTER
Reason for Disposition   [1] Known COPD or other severe lung disease (i.e., bronchiectasis, cystic fibrosis, lung surgery) AND [2] worsening symptoms (i.e., increased sputum purulence or amount, increased breathing difficulty    Protocols used: COUGH - ACUTE PRODUCTIVE-ADULT-AH    Received call from [address]. Patient had called to make an appointment previously, was given an appointment at an overflow location not the location of her PCP and patient is calling to cancel that appointment and be triaged for an appointment at her PCP perhaps tomorrow. Patient states that she has had a cough for the last 1.5 weeks that is productive of green colored sputum. She states she was put on an antibiotic 1.5 weeks ago but it has not helped. She states she is slightly SOB, and has had diarrhea today. She has a history of COPD. Call soft transferred to [address] to schedule appointment to be seen tomorrow.

## 2019-06-20 ENCOUNTER — OFFICE VISIT (OUTPATIENT)
Dept: FAMILY MEDICINE CLINIC | Age: 64
End: 2019-06-20
Payer: MEDICARE

## 2019-06-20 VITALS
HEIGHT: 62 IN | OXYGEN SATURATION: 98 % | DIASTOLIC BLOOD PRESSURE: 70 MMHG | TEMPERATURE: 98 F | HEART RATE: 91 BPM | BODY MASS INDEX: 26.39 KG/M2 | SYSTOLIC BLOOD PRESSURE: 122 MMHG | WEIGHT: 143.4 LBS

## 2019-06-20 DIAGNOSIS — R91.8 PULMONARY NODULES: ICD-10-CM

## 2019-06-20 DIAGNOSIS — J18.9 PNEUMONIA OF BOTH LOWER LOBES DUE TO INFECTIOUS ORGANISM: Primary | ICD-10-CM

## 2019-06-20 PROCEDURE — G8427 DOCREV CUR MEDS BY ELIG CLIN: HCPCS | Performed by: FAMILY MEDICINE

## 2019-06-20 PROCEDURE — 99214 OFFICE O/P EST MOD 30 MIN: CPT | Performed by: FAMILY MEDICINE

## 2019-06-20 PROCEDURE — G8419 CALC BMI OUT NRM PARAM NOF/U: HCPCS | Performed by: FAMILY MEDICINE

## 2019-06-20 PROCEDURE — 3017F COLORECTAL CA SCREEN DOC REV: CPT | Performed by: FAMILY MEDICINE

## 2019-06-20 PROCEDURE — 4004F PT TOBACCO SCREEN RCVD TLK: CPT | Performed by: FAMILY MEDICINE

## 2019-06-20 RX ORDER — LEVOFLOXACIN 750 MG/1
750 TABLET ORAL DAILY
Qty: 7 TABLET | Refills: 0 | Status: SHIPPED | OUTPATIENT
Start: 2019-06-20 | End: 2020-07-24 | Stop reason: SDUPTHER

## 2019-06-20 ASSESSMENT — ENCOUNTER SYMPTOMS
RHINORRHEA: 0
COUGH: 1
SHORTNESS OF BREATH: 0
WHEEZING: 1
SORE THROAT: 1

## 2019-06-20 NOTE — PROGRESS NOTES
2019     Xochitl Ruiz (:  1955) is a 59 y.o. female, here for evaluation of the following medical concerns:    Chief complaint: Patient presents with:  Cough: cough with green mucus, sore throat, no nasal drainage, sob, congestion in chest,     Past medical, surgical, family and social history, as well as current medications and allergies, were reviewed and updated with the patient. Cough   The current episode started 1 to 4 weeks ago (x 2 weeks). The problem has been unchanged. The problem occurs constantly. The cough is productive of purulent sputum (green). Associated symptoms include a sore throat and wheezing (better). Pertinent negatives include no chest pain, ear congestion, ear pain, fever, headaches, nasal congestion, postnasal drip, rhinorrhea or shortness of breath. Nothing aggravates the symptoms. Treatments tried: doxycycline, prednisone. The treatment provided mild relief. Her past medical history is significant for COPD. Review of Systems   Constitutional: Negative for fever. HENT: Positive for sore throat. Negative for ear pain, postnasal drip and rhinorrhea. Respiratory: Positive for cough and wheezing (better). Negative for shortness of breath. Cardiovascular: Negative for chest pain. Neurological: Negative for headaches. Prior to Visit Medications    Medication Sig Taking? Authorizing Provider   levofloxacin (LEVAQUIN) 750 MG tablet Take 1 tablet by mouth daily for 7 days Yes Cirilo Reyna MD   Pseudoephedrine-APAP-DM (DAYQUIL MULTI-SYMPTOM COLD/FLU PO) Take by mouth Yes Historical Provider, MD   varenicline (CHANTIX STARTING MONTH ) 0.5 MG X 11 & 1 MG X 42 tablet Take by mouth.  Yes SHAUN Joe CNP   FLUoxetine (PROZAC) 20 MG capsule Take 3 capsules by mouth daily Yes Cirilo Reyna MD   ranitidine (ZANTAC) 150 MG tablet TAKE 1 TABLET BY MOUTH TWICE DAILY AS NEEDED FOR HEARTBURN Yes Cirilo Reyna MD   busPIRone (BUSPAR) 30 MG tablet TAKE 1 TABLET TWICE DAILY Yes Faith Villalpando MD   simvastatin (ZOCOR) 20 MG tablet TAKE 1 Shane Marte MD   traZODone (DESYREL) 150 MG tablet TAKE 2 TABLETS BY MOUTH AT BEDTIME Yes Faith Villalpando MD   albuterol (PROVENTIL) (2.5 MG/3ML) 0.083% nebulizer solution Take 3 mLs by nebulization every 6 hours as needed for Shortness of Breath Yes Faith Villalpando MD   Lidocaine-Menthol 4-5 % [facility] Patch may remain in place for up to 12 hours in any 24-hour period. No more than 1 patch should be used in a 24-hour period Yes Faith Villalpando MD   atenolol (TENORMIN) 25 MG tablet Take 1 tablet by mouth daily (replaces propranolol) Yes Faith Villalpando MD   albuterol sulfate HFA (VENTOLIN HFA) 108 (90 Base) MCG/ACT inhaler Inhale 2 puffs into the lungs every 6 hours as needed for Wheezing or Shortness of Breath Yes Rufina Butler MD   fluticasone-salmeterol (ADVAIR DISKUS) 250-50 MCG/DOSE AEPB TAKE 1 PUFF BY MOUTH TWICE A DAY Yes Rufina Butler MD   montelukast (SINGULAIR) 10 MG tablet TAKE 1 TABLET BY MOUTH EACH NIGHT Yes Rufina Butler MD   tiotropium (SPIRIVA HANDIHALER) 18 MCG inhalation capsule INHALE 1 CAPSULE BY MOUTH DAILY Yes Rufina Butler MD   Abaloparatide 3120 MCG/1.56ML SOPN Inject 80 mcg into the skin daily Yes Donato Moreira MD   Insulin Pen Needle (PEN NEEDLES 3/16\") 31G X 5 MM MISC Use one needle for each daily dose. Yes Donato Moreira MD   bisacodyl (DULCOLAX) 5 MG EC tablet Take 1 tablet by mouth daily as needed for Constipation Yes Cherylene Hale, MD   zoster recombinant adjuvanted vaccine [facility]) 50 MCG SUSR injection Inject 0.5 mLs into the muscle every 6 months for 2 doses Yes Faith Villalpando MD   guaiFENesin (MUCINEX) 600 MG extended release tablet Take 1 tablet by mouth 2 times daily as needed for Congestion Yes Rufina Butler MD   Misc.  Devices (ACAPELLA) MISC 1 Device by Does not apply route 4 times daily DX ILD J84.9 Yes Rufina Butler MD   teriparatide, recombinant, (FORTEO) 600 MCG/2.4ML SOLN injection Inject 0.08 mLs into the skin daily One dose injected daily. Tiffanie Urrutia MD        Social History     Tobacco Use    Smoking status: Current Every Day Smoker     Packs/day: 0.25     Years: 40.00     Pack years: 10.00     Types: Cigarettes     Last attempt to quit: 2018     Years since quittin.9    Smokeless tobacco: Never Used    Tobacco comment: 20 - smokes 3-4 cigs daily   Substance Use Topics    Alcohol use: No     Alcohol/week: 0.0 oz        Vitals:    19 0854   BP: 122/70   Site: Right Upper Arm   Position: Sitting   Cuff Size: Small Adult   Pulse: 91   Temp: 98 °F (36.7 °C)   TempSrc: Oral   SpO2: 98%   Weight: 143 lb 6.4 oz (65 kg)   Height: 5' 2\" (1.575 m)     Estimated body mass index is 26.23 kg/m² as calculated from the following:    Height as of this encounter: 5' 2\" (1.575 m). Weight as of this encounter: 143 lb 6.4 oz (65 kg). Physical Exam   Constitutional: She appears well-developed and well-nourished. She is active. No distress. HENT:   Head: Normocephalic and atraumatic. Right Ear: Hearing, tympanic membrane, external ear and ear canal normal. No drainage or tenderness. Left Ear: Hearing, tympanic membrane, external ear and ear canal normal. No drainage or tenderness. Nose: Nose normal. No mucosal edema or rhinorrhea. Right sinus exhibits no maxillary sinus tenderness and no frontal sinus tenderness. Left sinus exhibits no maxillary sinus tenderness and no frontal sinus tenderness. Mouth/Throat: Uvula is midline and oropharynx is clear and moist. No oropharyngeal exudate or posterior oropharyngeal erythema. Eyes: Pupils are equal, round, and reactive to light. Conjunctivae and EOM are normal.   Neck: Neck supple. Cardiovascular: Normal rate, regular rhythm and normal heart sounds. Pulmonary/Chest: Effort normal. No respiratory distress. She has no decreased breath sounds. She has no wheezes. She has no rhonchi.  She has rales in the right lower field and the left lower field. She exhibits no tenderness. Abdominal: Soft. Bowel sounds are normal. She exhibits no distension. There is no tenderness. Lymphadenopathy:     She has no cervical adenopathy. Neurological: She is alert. Skin: She is not diaphoretic. Nursing note and vitals reviewed. ASSESSMENT/PLAN:  1. Pneumonia of both lower lobes due to infectious organism ([address]  Levaquin per orders. No need for further oral steroid as wheezing is minimal at this time. Call or return to clinic prn if these symptoms worsen or fail to improve as anticipated. 2. Pulmonary nodules  - CT CHEST WO CONTRAST; Future      Return if symptoms worsen or fail to improve. An electronic signature was used to authenticate this note.     --Bernadette Kaba MD on 6/20/2019 at 12:46 PM

## 2019-06-20 NOTE — PATIENT INSTRUCTIONS

## 2019-06-21 ENCOUNTER — TELEPHONE (OUTPATIENT)
Dept: FAMILY MEDICINE CLINIC | Age: 64
End: 2019-06-21

## 2019-06-27 ENCOUNTER — TELEPHONE (OUTPATIENT)
Dept: PULMONOLOGY | Age: 64
End: 2019-06-27

## 2019-06-27 DIAGNOSIS — R91.1 PULMONARY NODULE: Primary | ICD-10-CM

## 2019-06-27 NOTE — TELEPHONE ENCOUNTER
Message   Received: Yesterday   Message Contents   MD Annetta Haddad APRN - CNP   Cc: Tiffanie Mckenzie MA             Thank you Hakeem Moran for the update   We will repeat her CT this month    Previous Messages      ----- Message -----   From: SHAUN Edmonds CNP   Sent: 6/4/2019  10:18 AM   To: MD Josué Haddad Terrilyn Dynes was seen in our office again for COPD/asthma. Reviewed last CT scan of chest done in March- per radiology repeat in 3 months. Noted she has it scheduled in October per your office. Do you recommend to repeat this month based on last results?      Thanks,     [id number] On[address]

## 2019-07-22 ENCOUNTER — TELEPHONE (OUTPATIENT)
Dept: ENDOCRINOLOGY | Age: 64
End: 2019-07-22

## 2019-08-25 ENCOUNTER — HOSPITAL ENCOUNTER (OUTPATIENT)
Dept: CT IMAGING | Age: 64
Discharge: HOME | End: 2019-08-25
Payer: MEDICARE

## 2019-08-25 DIAGNOSIS — R91.1 PULMONARY NODULE: ICD-10-CM

## 2019-08-25 PROCEDURE — 71250 CT THORAX DX C-: CPT

## 2019-08-29 ENCOUNTER — HOSPITAL ENCOUNTER (OUTPATIENT)
Dept: PULMONOLOGY | Age: 64
Discharge: HOME | End: 2019-08-29
Payer: MEDICARE

## 2019-08-29 ENCOUNTER — OFFICE VISIT (OUTPATIENT)
Dept: PULMONOLOGY | Age: 64
End: 2019-08-29
Payer: MEDICARE

## 2019-08-29 VITALS
HEIGHT: 62 IN | BODY MASS INDEX: 25.76 KG/M2 | HEART RATE: 78 BPM | OXYGEN SATURATION: 95 % | DIASTOLIC BLOOD PRESSURE: 64 MMHG | SYSTOLIC BLOOD PRESSURE: 104 MMHG | WEIGHT: 140 LBS

## 2019-08-29 DIAGNOSIS — J84.9 ILD (INTERSTITIAL LUNG DISEASE) (HCC): ICD-10-CM

## 2019-08-29 DIAGNOSIS — J44.9 MODERATE COPD (CHRONIC OBSTRUCTIVE PULMONARY DISEASE) (HCC): Primary | ICD-10-CM

## 2019-08-29 DIAGNOSIS — F17.200 TOBACCO DEPENDENCE: ICD-10-CM

## 2019-08-29 DIAGNOSIS — J44.9 MODERATE COPD (CHRONIC OBSTRUCTIVE PULMONARY DISEASE) (HCC): ICD-10-CM

## 2019-08-29 DIAGNOSIS — R93.89 ABNORMAL CT OF THE CHEST: ICD-10-CM

## 2019-08-29 LAB
DLCO %PRED: 30 %
DLCO PRED: NORMAL ML/MIN/MMHG
DLCO/VA %PRED: NORMAL %
DLCO/VA PRED: NORMAL ML/MIN/MMHG
DLCO/VA: NORMAL ML/MIN/MMHG
DLCO: NORMAL ML/MIN/MMHG
EXPIRATORY TIME-POST: NORMAL SEC
EXPIRATORY TIME: NORMAL SEC
FEF 25-75% %CHNG: NORMAL
FEF 25-75% %PRED-POST: NORMAL %
FEF 25-75% %PRED-PRE: NORMAL L/SEC
FEF 25-75% PRED: NORMAL L/SEC
FEF 25-75%-POST: NORMAL L/SEC
FEF 25-75%-PRE: NORMAL L/SEC
FEV1 %PRED-POST: 38 %
FEV1 %PRED-PRE: 38 %
FEV1 PRED: NORMAL L
FEV1-POST: NORMAL L
FEV1-PRE: NORMAL L
FEV1/FVC %PRED-POST: NORMAL %
FEV1/FVC %PRED-PRE: NORMAL %
FEV1/FVC PRED: NORMAL %
FEV1/FVC-POST: 60 %
FEV1/FVC-PRE: 59 %
FVC %PRED-POST: NORMAL L
FVC %PRED-PRE: NORMAL %
FVC PRED: NORMAL L
FVC-POST: NORMAL L
FVC-PRE: NORMAL L
GAW %PRED: NORMAL %
GAW PRED: NORMAL L/S/CMH2O
GAW: NORMAL L/S/CMH2O
IC %PRED: NORMAL %
IC PRED: NORMAL L
IC: NORMAL L
MEP: NORMAL
MIP: NORMAL
MVV %PRED-PRE: NORMAL %
MVV PRED: NORMAL L/MIN
MVV-PRE: NORMAL L/MIN
PEF %PRED-POST: NORMAL %
PEF %PRED-PRE: NORMAL L/SEC
PEF PRED: NORMAL L/SEC
PEF%CHNG: NORMAL
PEF-POST: NORMAL L/SEC
PEF-PRE: NORMAL L/SEC
RAW %PRED: NORMAL %
RAW PRED: NORMAL CMH2O/L/S
RAW: NORMAL CMH2O/L/S
RV %PRED: NORMAL %
RV PRED: NORMAL L
RV: NORMAL L
SVC %PRED: NORMAL %
SVC PRED: NORMAL L
SVC: NORMAL L
TLC %PRED: 81 %
TLC PRED: NORMAL L
TLC: NORMAL L
VA %PRED: NORMAL %
VA PRED: NORMAL L
VA: NORMAL L
VTG %PRED: NORMAL %
VTG PRED: NORMAL L
VTG: NORMAL L

## 2019-08-29 PROCEDURE — G8427 DOCREV CUR MEDS BY ELIG CLIN: HCPCS | Performed by: INTERNAL MEDICINE

## 2019-08-29 PROCEDURE — G8925 SPIR FEV1/FVC>=60% & NO COPD: HCPCS | Performed by: INTERNAL MEDICINE

## 2019-08-29 PROCEDURE — 3023F SPIROM DOC REV: CPT | Performed by: INTERNAL MEDICINE

## 2019-08-29 PROCEDURE — 3017F COLORECTAL CA SCREEN DOC REV: CPT | Performed by: INTERNAL MEDICINE

## 2019-08-29 PROCEDURE — 6360000002 HC RX W HCPCS: Performed by: INTERNAL MEDICINE

## 2019-08-29 PROCEDURE — 94060 EVALUATION OF WHEEZING: CPT

## 2019-08-29 PROCEDURE — 94729 DIFFUSING CAPACITY: CPT

## 2019-08-29 PROCEDURE — 4004F PT TOBACCO SCREEN RCVD TLK: CPT | Performed by: INTERNAL MEDICINE

## 2019-08-29 PROCEDURE — 94640 AIRWAY INHALATION TREATMENT: CPT

## 2019-08-29 PROCEDURE — 99214 OFFICE O/P EST MOD 30 MIN: CPT | Performed by: INTERNAL MEDICINE

## 2019-08-29 PROCEDURE — 94618 PULMONARY STRESS TESTING: CPT

## 2019-08-29 PROCEDURE — 94726 PLETHYSMOGRAPHY LUNG VOLUMES: CPT

## 2019-08-29 PROCEDURE — G8419 CALC BMI OUT NRM PARAM NOF/U: HCPCS | Performed by: INTERNAL MEDICINE

## 2019-08-29 RX ORDER — ALBUTEROL SULFATE 2.5 MG/3ML
2.5 SOLUTION RESPIRATORY (INHALATION) ONCE
Status: COMPLETED | OUTPATIENT
Start: 2019-08-29 | End: 2019-08-29

## 2019-08-29 RX ORDER — VARENICLINE TARTRATE 25 MG
KIT ORAL
Qty: 1 BOX | Refills: 0 | Status: SHIPPED | OUTPATIENT
Start: 2019-08-29 | End: 2019-12-10

## 2019-08-29 RX ORDER — ALBUTEROL SULFATE 90 UG/1
2 AEROSOL, METERED RESPIRATORY (INHALATION) EVERY 6 HOURS PRN
Qty: 3 INHALER | Refills: 1 | Status: SHIPPED | OUTPATIENT
Start: 2019-08-29 | End: 2020-04-13 | Stop reason: SDUPTHER

## 2019-08-29 RX ADMIN — ALBUTEROL SULFATE 2.5 MG: 2.5 SOLUTION RESPIRATORY (INHALATION) at 10:17

## 2019-08-29 ASSESSMENT — PULMONARY FUNCTION TESTS
FEV1_PERCENT_PREDICTED_POST: 38
FEV1_PERCENT_PREDICTED_PRE: 38
FEV1/FVC_POST: 60
FEV1/FVC_PRE: 59

## 2019-09-06 RX ORDER — TRAZODONE HYDROCHLORIDE 150 MG/1
TABLET ORAL
Qty: 180 TABLET | Refills: 1 | OUTPATIENT
Start: 2019-09-06

## 2019-09-13 RX ORDER — ATENOLOL 25 MG/1
25 TABLET ORAL DAILY
Qty: 90 TABLET | Refills: 1 | Status: SHIPPED | OUTPATIENT
Start: 2019-09-13 | End: 2020-05-12 | Stop reason: SDUPTHER

## 2019-09-18 ENCOUNTER — TELEPHONE (OUTPATIENT)
Dept: ENDOCRINOLOGY | Age: 64
End: 2019-09-18

## 2019-09-25 ENCOUNTER — TELEPHONE (OUTPATIENT)
Dept: ENDOCRINOLOGY | Age: 64
End: 2019-09-25

## 2019-09-27 NOTE — TELEPHONE ENCOUNTER
[facility] [facility] [facility] called and asked for a refill of Forteo. They said that the patient will be out next week. I had trouble finding and adding this pharmacy to the demographics.

## 2019-09-30 NOTE — TELEPHONE ENCOUNTER
[facility] specialty pharmacy called and requesting a refill of the forteo.  They faxed a request

## 2019-09-30 NOTE — TELEPHONE ENCOUNTER
[facility] pharmacy [facility]

## 2019-10-18 RX ORDER — RANITIDINE 150 MG/1
TABLET ORAL
Qty: 180 TABLET | Refills: 1 | Status: SHIPPED | OUTPATIENT
Start: 2019-10-18 | End: 2020-03-24

## 2019-10-18 RX ORDER — BUSPIRONE HYDROCHLORIDE 30 MG/1
TABLET ORAL
Qty: 180 TABLET | Refills: 1 | Status: SHIPPED | OUTPATIENT
Start: 2019-10-18 | End: 2020-03-24

## 2019-10-18 RX ORDER — SIMVASTATIN 20 MG
TABLET ORAL
Qty: 90 TABLET | Refills: 1 | Status: SHIPPED | OUTPATIENT
Start: 2019-10-18 | End: 2020-03-24

## 2019-10-25 RX ORDER — FLUOXETINE HYDROCHLORIDE 20 MG/1
60 CAPSULE ORAL DAILY
Qty: 270 CAPSULE | Refills: 1 | Status: SHIPPED | OUTPATIENT
Start: 2019-10-25 | End: 2020-07-13

## 2019-10-26 RX ORDER — TRAZODONE HYDROCHLORIDE 150 MG/1
TABLET ORAL
Qty: 180 TABLET | Refills: 1 | Status: SHIPPED | OUTPATIENT
Start: 2019-10-26 | End: 2019-12-31 | Stop reason: SDUPTHER

## 2019-11-26 ENCOUNTER — TELEPHONE (OUTPATIENT)
Dept: PULMONOLOGY | Age: 64
End: 2019-11-26

## 2019-12-06 ENCOUNTER — TELEPHONE (OUTPATIENT)
Dept: FAMILY MEDICINE CLINIC | Age: 64
End: 2019-12-06

## 2019-12-06 ENCOUNTER — OFFICE VISIT (OUTPATIENT)
Dept: FAMILY MEDICINE CLINIC | Age: 64
End: 2019-12-06
Payer: MEDICARE

## 2019-12-06 VITALS
WEIGHT: 127 LBS | OXYGEN SATURATION: 90 % | BODY MASS INDEX: 23.23 KG/M2 | HEART RATE: 68 BPM | SYSTOLIC BLOOD PRESSURE: 90 MMHG | DIASTOLIC BLOOD PRESSURE: 68 MMHG

## 2019-12-06 DIAGNOSIS — J45.901 ACUTE EXACERBATION OF COPD WITH ASTHMA (HCC): Primary | ICD-10-CM

## 2019-12-06 DIAGNOSIS — J45.901 ACUTE EXACERBATION OF COPD WITH ASTHMA (HCC): ICD-10-CM

## 2019-12-06 DIAGNOSIS — J44.1 ACUTE EXACERBATION OF COPD WITH ASTHMA (HCC): ICD-10-CM

## 2019-12-06 DIAGNOSIS — J44.1 ACUTE EXACERBATION OF COPD WITH ASTHMA (HCC): Primary | ICD-10-CM

## 2019-12-06 PROCEDURE — 4004F PT TOBACCO SCREEN RCVD TLK: CPT | Performed by: PHYSICIAN ASSISTANT

## 2019-12-06 PROCEDURE — 99214 OFFICE O/P EST MOD 30 MIN: CPT | Performed by: PHYSICIAN ASSISTANT

## 2019-12-06 PROCEDURE — 94640 AIRWAY INHALATION TREATMENT: CPT | Performed by: PHYSICIAN ASSISTANT

## 2019-12-06 PROCEDURE — G8484 FLU IMMUNIZE NO ADMIN: HCPCS | Performed by: PHYSICIAN ASSISTANT

## 2019-12-06 PROCEDURE — 96372 THER/PROPH/DIAG INJ SC/IM: CPT | Performed by: PHYSICIAN ASSISTANT

## 2019-12-06 PROCEDURE — 3017F COLORECTAL CA SCREEN DOC REV: CPT | Performed by: PHYSICIAN ASSISTANT

## 2019-12-06 PROCEDURE — 3023F SPIROM DOC REV: CPT | Performed by: PHYSICIAN ASSISTANT

## 2019-12-06 PROCEDURE — G8926 SPIRO NO PERF OR DOC: HCPCS | Performed by: PHYSICIAN ASSISTANT

## 2019-12-06 PROCEDURE — G8420 CALC BMI NORM PARAMETERS: HCPCS | Performed by: PHYSICIAN ASSISTANT

## 2019-12-06 PROCEDURE — G8427 DOCREV CUR MEDS BY ELIG CLIN: HCPCS | Performed by: PHYSICIAN ASSISTANT

## 2019-12-06 RX ORDER — DOXYCYCLINE HYCLATE 100 MG
100 TABLET ORAL 2 TIMES DAILY
Qty: 20 TABLET | Refills: 0 | Status: SHIPPED | OUTPATIENT
Start: 2019-12-06 | End: 2019-12-16

## 2019-12-06 RX ORDER — PREDNISONE 10 MG/1
TABLET ORAL
Qty: 14 TABLET | Refills: 0 | Status: SHIPPED | OUTPATIENT
Start: 2019-12-06 | End: 2019-12-06 | Stop reason: SDUPTHER

## 2019-12-06 RX ORDER — DOXYCYCLINE HYCLATE 100 MG
100 TABLET ORAL 2 TIMES DAILY
Qty: 20 TABLET | Refills: 0 | Status: CANCELLED | OUTPATIENT
Start: 2019-12-06 | End: 2019-12-16

## 2019-12-06 RX ORDER — DOXYCYCLINE HYCLATE 100 MG
100 TABLET ORAL 2 TIMES DAILY
Qty: 20 TABLET | Refills: 0 | Status: SHIPPED | OUTPATIENT
Start: 2019-12-06 | End: 2019-12-06 | Stop reason: SDUPTHER

## 2019-12-06 RX ORDER — IPRATROPIUM BROMIDE AND ALBUTEROL SULFATE 2.5; .5 MG/3ML; MG/3ML
1 SOLUTION RESPIRATORY (INHALATION) ONCE
Status: COMPLETED | OUTPATIENT
Start: 2019-12-06 | End: 2019-12-06

## 2019-12-06 RX ORDER — METHYLPREDNISOLONE ACETATE 40 MG/ML
40 INJECTION, SUSPENSION INTRA-ARTICULAR; INTRALESIONAL; INTRAMUSCULAR; SOFT TISSUE ONCE
Status: COMPLETED | OUTPATIENT
Start: 2019-12-06 | End: 2019-12-06

## 2019-12-06 RX ORDER — PREDNISONE 10 MG/1
TABLET ORAL
Qty: 14 TABLET | Refills: 0 | Status: CANCELLED | OUTPATIENT
Start: 2019-12-06 | End: 2019-12-16

## 2019-12-06 RX ORDER — PREDNISONE 10 MG/1
TABLET ORAL
Qty: 14 TABLET | Refills: 0 | Status: SHIPPED | OUTPATIENT
Start: 2019-12-06 | End: 2019-12-09 | Stop reason: SDUPTHER

## 2019-12-06 RX ADMIN — IPRATROPIUM BROMIDE AND ALBUTEROL SULFATE 1 AMPULE: 2.5; .5 SOLUTION RESPIRATORY (INHALATION) at 14:52

## 2019-12-06 RX ADMIN — METHYLPREDNISOLONE ACETATE 40 MG: 40 INJECTION, SUSPENSION INTRA-ARTICULAR; INTRALESIONAL; INTRAMUSCULAR; SOFT TISSUE at 15:12

## 2019-12-06 ASSESSMENT — ENCOUNTER SYMPTOMS
TROUBLE SWALLOWING: 0
COUGH: 1
DIFFICULTY BREATHING: 1
WHEEZING: 1
FREQUENT THROAT CLEARING: 1
SPUTUM PRODUCTION: 1
HEMOPTYSIS: 0
SINUS PAIN: 0
SINUS PRESSURE: 0
SHORTNESS OF BREATH: 1
HOARSE VOICE: 0
CHEST TIGHTNESS: 1
RHINORRHEA: 0
VOICE CHANGE: 0
CHEST TIGHTNESS: 0
SORE THROAT: 0

## 2019-12-06 ASSESSMENT — COPD QUESTIONNAIRES: COPD: 1

## 2019-12-09 DIAGNOSIS — J45.901 ACUTE EXACERBATION OF COPD WITH ASTHMA (HCC): ICD-10-CM

## 2019-12-09 DIAGNOSIS — J44.1 ACUTE EXACERBATION OF COPD WITH ASTHMA (HCC): ICD-10-CM

## 2019-12-09 RX ORDER — PREDNISONE 10 MG/1
TABLET ORAL
Qty: 30 TABLET | Refills: 0 | Status: SHIPPED | OUTPATIENT
Start: 2019-12-09 | End: 2019-12-19

## 2019-12-10 DIAGNOSIS — F17.200 TOBACCO DEPENDENCE: ICD-10-CM

## 2019-12-10 RX ORDER — VARENICLINE TARTRATE 25 MG
KIT ORAL
Qty: 1 BOX | Refills: 0 | Status: SHIPPED | OUTPATIENT
Start: 2019-12-10 | End: 2019-12-25

## 2019-12-10 RX ORDER — VARENICLINE TARTRATE 1 MG/1
1 TABLET, FILM COATED ORAL 2 TIMES DAILY
Qty: 60 TABLET | Refills: 3 | Status: SHIPPED | OUTPATIENT
Start: 2019-12-10 | End: 2020-01-16 | Stop reason: ALTCHOICE

## 2019-12-10 RX ORDER — VARENICLINE TARTRATE 25 MG
KIT ORAL
Qty: 1 BOX | Refills: 0 | OUTPATIENT
Start: 2019-12-10

## 2019-12-11 ENCOUNTER — TELEPHONE (OUTPATIENT)
Dept: FAMILY MEDICINE CLINIC | Age: 64
End: 2019-12-11

## 2019-12-11 RX ORDER — ALBUTEROL SULFATE 90 UG/1
2 AEROSOL, METERED RESPIRATORY (INHALATION) EVERY 6 HOURS PRN
Qty: 1 INHALER | Refills: 3 | Status: CANCELLED | OUTPATIENT
Start: 2019-12-11

## 2019-12-16 ENCOUNTER — TELEPHONE (OUTPATIENT)
Dept: FAMILY MEDICINE CLINIC | Age: 64
End: 2019-12-16

## 2019-12-23 RX ORDER — PEN NEEDLE, DIABETIC 32GX 5/32"
NEEDLE, DISPOSABLE MISCELLANEOUS
Qty: 100 EACH | Refills: 0 | Status: SHIPPED | OUTPATIENT
Start: 2019-12-23 | End: 2019-12-25 | Stop reason: ALTCHOICE

## 2019-12-25 ENCOUNTER — HOSPITAL ENCOUNTER (OUTPATIENT)
Age: 64
Setting detail: OBSERVATION
Discharge: HOME | End: 2019-12-26
Attending: EMERGENCY MEDICINE | Admitting: INTERNAL MEDICINE
Payer: MEDICARE

## 2019-12-25 ENCOUNTER — APPOINTMENT (OUTPATIENT)
Dept: GENERAL RADIOLOGY | Age: 64
End: 2019-12-25
Payer: MEDICARE

## 2019-12-25 DIAGNOSIS — R06.03 RESPIRATORY DISTRESS: ICD-10-CM

## 2019-12-25 DIAGNOSIS — J44.1 ACUTE EXACERBATION OF CHRONIC OBSTRUCTIVE PULMONARY DISEASE (COPD) (HCC): Primary | ICD-10-CM

## 2019-12-25 LAB
A/G RATIO: 1.2 (ref 1.1–2.2)
ALBUMIN SERPL-MCNC: 3.7 G/DL (ref 3.4–5)
ALP BLD-CCNC: 82 U/L (ref 40–129)
ALT SERPL-CCNC: 11 U/L (ref 10–40)
ANION GAP SERPL CALCULATED.3IONS-SCNC: 13 MMOL/L (ref 3–16)
AST SERPL-CCNC: 15 U/L (ref 15–37)
BASOPHILS ABSOLUTE: 0.1 K/UL (ref 0–0.2)
BASOPHILS RELATIVE PERCENT: 0.8 %
BILIRUB SERPL-MCNC: 0.4 MG/DL (ref 0–1)
BUN BLDV-MCNC: 8 MG/DL (ref 7–20)
CALCIUM SERPL-MCNC: 8.7 MG/DL (ref 8.3–10.6)
CHLORIDE BLD-SCNC: 100 MMOL/L (ref 99–110)
CO2: 17 MMOL/L (ref 21–32)
CREAT SERPL-MCNC: <0.5 MG/DL (ref 0.6–1.2)
EOSINOPHILS ABSOLUTE: 0 K/UL (ref 0–0.6)
EOSINOPHILS RELATIVE PERCENT: 0.2 %
GFR AFRICAN AMERICAN: >60
GFR NON-AFRICAN AMERICAN: >60
GLOBULIN: 3.1 G/DL
GLUCOSE BLD-MCNC: 112 MG/DL (ref 70–99)
HCT VFR BLD CALC: 42.9 % (ref 36–48)
HEMOGLOBIN: 14.5 G/DL (ref 12–16)
LYMPHOCYTES ABSOLUTE: 2.7 K/UL (ref 1–5.1)
LYMPHOCYTES RELATIVE PERCENT: 19.9 %
MCH RBC QN AUTO: 30.7 PG (ref 26–34)
MCHC RBC AUTO-ENTMCNC: 33.8 G/DL (ref 31–36)
MCV RBC AUTO: 90.7 FL (ref 80–100)
MONOCYTES ABSOLUTE: 0.9 K/UL (ref 0–1.3)
MONOCYTES RELATIVE PERCENT: 6.6 %
NEUTROPHILS ABSOLUTE: 9.7 K/UL (ref 1.7–7.7)
NEUTROPHILS RELATIVE PERCENT: 72.5 %
PDW BLD-RTO: 13.7 % (ref 12.4–15.4)
PLATELET # BLD: 312 K/UL (ref 135–450)
PMV BLD AUTO: 7.2 FL (ref 5–10.5)
POTASSIUM SERPL-SCNC: 3.6 MMOL/L (ref 3.5–5.1)
PRO-BNP: 125 PG/ML (ref 0–124)
RBC # BLD: 4.73 M/UL (ref 4–5.2)
SODIUM BLD-SCNC: 130 MMOL/L (ref 136–145)
TOTAL PROTEIN: 6.8 G/DL (ref 6.4–8.2)
TROPONIN: <0.01 NG/ML
WBC # BLD: 13.4 K/UL (ref 4–11)

## 2019-12-25 PROCEDURE — 6360000002 HC RX W HCPCS: Performed by: EMERGENCY MEDICINE

## 2019-12-25 PROCEDURE — 6370000000 HC RX 637 (ALT 250 FOR IP): Performed by: EMERGENCY MEDICINE

## 2019-12-25 PROCEDURE — 94640 AIRWAY INHALATION TREATMENT: CPT

## 2019-12-25 PROCEDURE — 99291 CRITICAL CARE FIRST HOUR: CPT

## 2019-12-25 PROCEDURE — 83880 ASSAY OF NATRIURETIC PEPTIDE: CPT

## 2019-12-25 PROCEDURE — 6360000002 HC RX W HCPCS: Performed by: INTERNAL MEDICINE

## 2019-12-25 PROCEDURE — 71046 X-RAY EXAM CHEST 2 VIEWS: CPT

## 2019-12-25 PROCEDURE — 6370000000 HC RX 637 (ALT 250 FOR IP): Performed by: INTERNAL MEDICINE

## 2019-12-25 PROCEDURE — G0378 HOSPITAL OBSERVATION PER HR: HCPCS

## 2019-12-25 PROCEDURE — 96375 TX/PRO/DX INJ NEW DRUG ADDON: CPT

## 2019-12-25 PROCEDURE — 93005 ELECTROCARDIOGRAM TRACING: CPT | Performed by: EMERGENCY MEDICINE

## 2019-12-25 PROCEDURE — 2580000003 HC RX 258: Performed by: INTERNAL MEDICINE

## 2019-12-25 PROCEDURE — 85025 COMPLETE CBC W/AUTO DIFF WBC: CPT

## 2019-12-25 PROCEDURE — 2700000000 HC OXYGEN THERAPY PER DAY

## 2019-12-25 PROCEDURE — 96372 THER/PROPH/DIAG INJ SC/IM: CPT

## 2019-12-25 PROCEDURE — 94150 VITAL CAPACITY TEST: CPT

## 2019-12-25 PROCEDURE — 84484 ASSAY OF TROPONIN QUANT: CPT

## 2019-12-25 PROCEDURE — 96374 THER/PROPH/DIAG INJ IV PUSH: CPT

## 2019-12-25 PROCEDURE — 80053 COMPREHEN METABOLIC PANEL: CPT

## 2019-12-25 RX ORDER — IPRATROPIUM BROMIDE AND ALBUTEROL SULFATE 2.5; .5 MG/3ML; MG/3ML
3 SOLUTION RESPIRATORY (INHALATION) ONCE
Status: COMPLETED | OUTPATIENT
Start: 2019-12-25 | End: 2019-12-25

## 2019-12-25 RX ORDER — SODIUM CHLORIDE 0.9 % (FLUSH) 0.9 %
10 SYRINGE (ML) INJECTION EVERY 12 HOURS SCHEDULED
Status: DISCONTINUED | OUTPATIENT
Start: 2019-12-25 | End: 2019-12-26 | Stop reason: HOSPADM

## 2019-12-25 RX ORDER — PREDNISONE 20 MG/1
40 TABLET ORAL DAILY
Status: DISCONTINUED | OUTPATIENT
Start: 2019-12-27 | End: 2019-12-26 | Stop reason: HOSPADM

## 2019-12-25 RX ORDER — SODIUM CHLORIDE 0.9 % (FLUSH) 0.9 %
10 SYRINGE (ML) INJECTION PRN
Status: DISCONTINUED | OUTPATIENT
Start: 2019-12-25 | End: 2019-12-26 | Stop reason: HOSPADM

## 2019-12-25 RX ORDER — IPRATROPIUM BROMIDE AND ALBUTEROL SULFATE 2.5; .5 MG/3ML; MG/3ML
3 SOLUTION RESPIRATORY (INHALATION)
Status: DISCONTINUED | OUTPATIENT
Start: 2019-12-25 | End: 2019-12-25

## 2019-12-25 RX ORDER — DEXAMETHASONE SODIUM PHOSPHATE 4 MG/ML
8 INJECTION, SOLUTION INTRA-ARTICULAR; INTRALESIONAL; INTRAMUSCULAR; INTRAVENOUS; SOFT TISSUE ONCE
Status: COMPLETED | OUTPATIENT
Start: 2019-12-25 | End: 2019-12-25

## 2019-12-25 RX ORDER — FAMOTIDINE 20 MG/1
20 TABLET, FILM COATED ORAL 2 TIMES DAILY
Status: DISCONTINUED | OUTPATIENT
Start: 2019-12-25 | End: 2019-12-26 | Stop reason: HOSPADM

## 2019-12-25 RX ORDER — FLUOXETINE HYDROCHLORIDE 20 MG/1
60 CAPSULE ORAL DAILY
Status: DISCONTINUED | OUTPATIENT
Start: 2019-12-25 | End: 2019-12-26 | Stop reason: HOSPADM

## 2019-12-25 RX ORDER — BUSPIRONE HYDROCHLORIDE 5 MG/1
30 TABLET ORAL 2 TIMES DAILY
Status: DISCONTINUED | OUTPATIENT
Start: 2019-12-25 | End: 2019-12-26 | Stop reason: HOSPADM

## 2019-12-25 RX ORDER — METHYLPREDNISOLONE SODIUM SUCCINATE 40 MG/ML
40 INJECTION, POWDER, LYOPHILIZED, FOR SOLUTION INTRAMUSCULAR; INTRAVENOUS EVERY 12 HOURS
Status: DISCONTINUED | OUTPATIENT
Start: 2019-12-25 | End: 2019-12-25 | Stop reason: SDUPTHER

## 2019-12-25 RX ORDER — GUAIFENESIN 600 MG/1
600 TABLET, EXTENDED RELEASE ORAL 2 TIMES DAILY PRN
Status: DISCONTINUED | OUTPATIENT
Start: 2019-12-25 | End: 2019-12-26 | Stop reason: HOSPADM

## 2019-12-25 RX ORDER — BUSPIRONE HYDROCHLORIDE 10 MG/1
30 TABLET ORAL 2 TIMES DAILY
Status: DISCONTINUED | OUTPATIENT
Start: 2019-12-25 | End: 2019-12-25 | Stop reason: SDUPTHER

## 2019-12-25 RX ORDER — LORAZEPAM 1 MG/1
1 TABLET ORAL ONCE
Status: COMPLETED | OUTPATIENT
Start: 2019-12-25 | End: 2019-12-25

## 2019-12-25 RX ORDER — TRAZODONE HYDROCHLORIDE 50 MG/1
150 TABLET ORAL NIGHTLY
Status: DISCONTINUED | OUTPATIENT
Start: 2019-12-25 | End: 2019-12-26 | Stop reason: HOSPADM

## 2019-12-25 RX ORDER — MONTELUKAST SODIUM 10 MG/1
10 TABLET ORAL NIGHTLY
Status: DISCONTINUED | OUTPATIENT
Start: 2019-12-25 | End: 2019-12-26 | Stop reason: HOSPADM

## 2019-12-25 RX ORDER — VARENICLINE TARTRATE 1 MG/1
1 TABLET, FILM COATED ORAL 2 TIMES DAILY
Status: DISCONTINUED | OUTPATIENT
Start: 2019-12-25 | End: 2019-12-26 | Stop reason: HOSPADM

## 2019-12-25 RX ORDER — METHYLPREDNISOLONE SODIUM SUCCINATE 40 MG/ML
40 INJECTION, POWDER, LYOPHILIZED, FOR SOLUTION INTRAMUSCULAR; INTRAVENOUS EVERY 12 HOURS
Status: COMPLETED | OUTPATIENT
Start: 2019-12-25 | End: 2019-12-26

## 2019-12-25 RX ORDER — PREDNISONE 20 MG/1
40 TABLET ORAL DAILY
Status: DISCONTINUED | OUTPATIENT
Start: 2019-12-26 | End: 2019-12-25 | Stop reason: SDUPTHER

## 2019-12-25 RX ORDER — ATENOLOL 25 MG/1
25 TABLET ORAL DAILY
Status: DISCONTINUED | OUTPATIENT
Start: 2019-12-25 | End: 2019-12-26 | Stop reason: HOSPADM

## 2019-12-25 RX ORDER — ONDANSETRON 2 MG/ML
4 INJECTION INTRAMUSCULAR; INTRAVENOUS EVERY 6 HOURS PRN
Status: DISCONTINUED | OUTPATIENT
Start: 2019-12-25 | End: 2019-12-26 | Stop reason: HOSPADM

## 2019-12-25 RX ORDER — ALBUTEROL SULFATE 2.5 MG/3ML
2.5 SOLUTION RESPIRATORY (INHALATION)
Status: DISCONTINUED | OUTPATIENT
Start: 2019-12-25 | End: 2019-12-26

## 2019-12-25 RX ADMIN — MONTELUKAST SODIUM 10 MG: 10 TABLET ORAL at 20:28

## 2019-12-25 RX ADMIN — Medication 2 PUFF: at 21:49

## 2019-12-25 RX ADMIN — ENOXAPARIN SODIUM 40 MG: 40 INJECTION SUBCUTANEOUS at 20:28

## 2019-12-25 RX ADMIN — Medication 10 ML: at 20:28

## 2019-12-25 RX ADMIN — TRAZODONE HYDROCHLORIDE 150 MG: 50 TABLET ORAL at 20:28

## 2019-12-25 RX ADMIN — LORAZEPAM 1 MG: 1 TABLET ORAL at 16:35

## 2019-12-25 RX ADMIN — ALBUTEROL SULFATE 2.5 MG: 2.5 SOLUTION RESPIRATORY (INHALATION) at 21:49

## 2019-12-25 RX ADMIN — BUSPIRONE HYDROCHLORIDE 30 MG: 5 TABLET ORAL at 20:28

## 2019-12-25 RX ADMIN — FAMOTIDINE 20 MG: 20 TABLET, FILM COATED ORAL at 20:28

## 2019-12-25 RX ADMIN — DEXAMETHASONE SODIUM PHOSPHATE 8 MG: 4 INJECTION, SOLUTION INTRAMUSCULAR; INTRAVENOUS at 14:20

## 2019-12-25 RX ADMIN — IPRATROPIUM BROMIDE AND ALBUTEROL SULFATE 3 AMPULE: .5; 3 SOLUTION RESPIRATORY (INHALATION) at 15:35

## 2019-12-25 RX ADMIN — VARENICLINE TARTRATE 1 MG: 1 TABLET, FILM COATED ORAL at 20:28

## 2019-12-25 RX ADMIN — METHYLPREDNISOLONE SODIUM SUCCINATE 40 MG: 40 INJECTION, POWDER, FOR SOLUTION INTRAMUSCULAR; INTRAVENOUS at 20:53

## 2019-12-25 ASSESSMENT — ENCOUNTER SYMPTOMS
VOMITING: 0
NAUSEA: 0
WHEEZING: 1
DIARRHEA: 1
COUGH: 1
BACK PAIN: 0
COLOR CHANGE: 0
CHEST TIGHTNESS: 1
STRIDOR: 0
ABDOMINAL PAIN: 0
SHORTNESS OF BREATH: 1

## 2019-12-26 VITALS
RESPIRATION RATE: 19 BRPM | SYSTOLIC BLOOD PRESSURE: 112 MMHG | WEIGHT: 127.43 LBS | OXYGEN SATURATION: 94 % | HEART RATE: 88 BPM | TEMPERATURE: 97.9 F | HEIGHT: 65 IN | BODY MASS INDEX: 21.23 KG/M2 | DIASTOLIC BLOOD PRESSURE: 71 MMHG

## 2019-12-26 LAB
ANION GAP SERPL CALCULATED.3IONS-SCNC: 15 MMOL/L (ref 3–16)
BUN BLDV-MCNC: 8 MG/DL (ref 7–20)
CALCIUM SERPL-MCNC: 9 MG/DL (ref 8.3–10.6)
CHLORIDE BLD-SCNC: 101 MMOL/L (ref 99–110)
CO2: 16 MMOL/L (ref 21–32)
CREAT SERPL-MCNC: <0.5 MG/DL (ref 0.6–1.2)
EKG ATRIAL RATE: 81 BPM
EKG DIAGNOSIS: NORMAL
EKG P AXIS: 62 DEGREES
EKG P-R INTERVAL: 136 MS
EKG Q-T INTERVAL: 370 MS
EKG QRS DURATION: 78 MS
EKG QTC CALCULATION (BAZETT): 429 MS
EKG R AXIS: -5 DEGREES
EKG T AXIS: 66 DEGREES
EKG VENTRICULAR RATE: 81 BPM
GFR AFRICAN AMERICAN: >60
GFR NON-AFRICAN AMERICAN: >60
GLUCOSE BLD-MCNC: 125 MG/DL (ref 70–99)
HCT VFR BLD CALC: 43.2 % (ref 36–48)
HEMOGLOBIN: 14.6 G/DL (ref 12–16)
MCH RBC QN AUTO: 30.8 PG (ref 26–34)
MCHC RBC AUTO-ENTMCNC: 33.8 G/DL (ref 31–36)
MCV RBC AUTO: 91.1 FL (ref 80–100)
PDW BLD-RTO: 13.6 % (ref 12.4–15.4)
PLATELET # BLD: 292 K/UL (ref 135–450)
PMV BLD AUTO: 7.5 FL (ref 5–10.5)
POTASSIUM REFLEX MAGNESIUM: 4 MMOL/L (ref 3.5–5.1)
RAPID INFLUENZA  B AGN: NEGATIVE
RAPID INFLUENZA A AGN: NEGATIVE
RBC # BLD: 4.75 M/UL (ref 4–5.2)
REPORT: NORMAL
RESPIRATORY PANEL PCR: NORMAL
SODIUM BLD-SCNC: 132 MMOL/L (ref 136–145)
WBC # BLD: 16.5 K/UL (ref 4–11)

## 2019-12-26 PROCEDURE — 93010 ELECTROCARDIOGRAM REPORT: CPT | Performed by: INTERNAL MEDICINE

## 2019-12-26 PROCEDURE — 94640 AIRWAY INHALATION TREATMENT: CPT

## 2019-12-26 PROCEDURE — 90686 IIV4 VACC NO PRSV 0.5 ML IM: CPT | Performed by: INTERNAL MEDICINE

## 2019-12-26 PROCEDURE — 2700000000 HC OXYGEN THERAPY PER DAY

## 2019-12-26 PROCEDURE — 0100U HC RESPIRPTHGN MULT REV TRANS & AMP PRB TECH 21 TRGT: CPT

## 2019-12-26 PROCEDURE — 6370000000 HC RX 637 (ALT 250 FOR IP): Performed by: NURSE PRACTITIONER

## 2019-12-26 PROCEDURE — 36415 COLL VENOUS BLD VENIPUNCTURE: CPT

## 2019-12-26 PROCEDURE — 2580000003 HC RX 258: Performed by: INTERNAL MEDICINE

## 2019-12-26 PROCEDURE — 94761 N-INVAS EAR/PLS OXIMETRY MLT: CPT

## 2019-12-26 PROCEDURE — G0008 ADMIN INFLUENZA VIRUS VAC: HCPCS | Performed by: INTERNAL MEDICINE

## 2019-12-26 PROCEDURE — 96372 THER/PROPH/DIAG INJ SC/IM: CPT

## 2019-12-26 PROCEDURE — 6370000000 HC RX 637 (ALT 250 FOR IP): Performed by: INTERNAL MEDICINE

## 2019-12-26 PROCEDURE — 6360000002 HC RX W HCPCS: Performed by: INTERNAL MEDICINE

## 2019-12-26 PROCEDURE — G0378 HOSPITAL OBSERVATION PER HR: HCPCS

## 2019-12-26 PROCEDURE — 80048 BASIC METABOLIC PNL TOTAL CA: CPT

## 2019-12-26 PROCEDURE — 87804 INFLUENZA ASSAY W/OPTIC: CPT

## 2019-12-26 PROCEDURE — 96376 TX/PRO/DX INJ SAME DRUG ADON: CPT

## 2019-12-26 PROCEDURE — 85027 COMPLETE CBC AUTOMATED: CPT

## 2019-12-26 RX ORDER — ACETAMINOPHEN 325 MG/1
650 TABLET ORAL EVERY 4 HOURS PRN
Status: DISCONTINUED | OUTPATIENT
Start: 2019-12-26 | End: 2019-12-26 | Stop reason: HOSPADM

## 2019-12-26 RX ORDER — PREDNISONE 20 MG/1
40 TABLET ORAL DAILY
Qty: 10 TABLET | Refills: 0 | Status: SHIPPED | OUTPATIENT
Start: 2019-12-27 | End: 2020-01-01

## 2019-12-26 RX ORDER — IPRATROPIUM BROMIDE AND ALBUTEROL SULFATE 2.5; .5 MG/3ML; MG/3ML
1 SOLUTION RESPIRATORY (INHALATION)
Status: DISCONTINUED | OUTPATIENT
Start: 2019-12-26 | End: 2019-12-26 | Stop reason: HOSPADM

## 2019-12-26 RX ADMIN — VARENICLINE TARTRATE 1 MG: 1 TABLET, FILM COATED ORAL at 08:15

## 2019-12-26 RX ADMIN — FAMOTIDINE 20 MG: 20 TABLET, FILM COATED ORAL at 08:15

## 2019-12-26 RX ADMIN — IPRATROPIUM BROMIDE AND ALBUTEROL SULFATE 1 AMPULE: .5; 3 SOLUTION RESPIRATORY (INHALATION) at 17:15

## 2019-12-26 RX ADMIN — ATENOLOL 25 MG: 25 TABLET ORAL at 08:15

## 2019-12-26 RX ADMIN — BUSPIRONE HYDROCHLORIDE 30 MG: 5 TABLET ORAL at 08:14

## 2019-12-26 RX ADMIN — ENOXAPARIN SODIUM 40 MG: 40 INJECTION SUBCUTANEOUS at 08:15

## 2019-12-26 RX ADMIN — Medication 2 PUFF: at 07:56

## 2019-12-26 RX ADMIN — IPRATROPIUM BROMIDE AND ALBUTEROL SULFATE 1 AMPULE: .5; 3 SOLUTION RESPIRATORY (INHALATION) at 13:04

## 2019-12-26 RX ADMIN — INFLUENZA A VIRUS A/BRISBANE/02/2018 IVR-190 (H1N1) ANTIGEN (PROPIOLACTONE INACTIVATED), INFLUENZA A VIRUS A/KANSAS/14/2017 X-327 (H3N2) ANTIGEN (PROPIOLACTONE INACTIVATED), INFLUENZA B VIRUS B/MARYLAND/15/2016 ANTIGEN (PROPIOLACTONE INACTIVATED), INFLUENZA B VIRUS B/PHUKET/3073/2013 BVR-1B ANTIGEN (PROPIOLACTONE INACTIVATED) 0.5 ML: 15; 15; 15; 15 INJECTION, SUSPENSION INTRAMUSCULAR at 08:15

## 2019-12-26 RX ADMIN — ALBUTEROL SULFATE 2.5 MG: 2.5 SOLUTION RESPIRATORY (INHALATION) at 07:56

## 2019-12-26 RX ADMIN — METHYLPREDNISOLONE SODIUM SUCCINATE 40 MG: 40 INJECTION, POWDER, FOR SOLUTION INTRAMUSCULAR; INTRAVENOUS at 08:15

## 2019-12-26 RX ADMIN — FLUOXETINE 60 MG: 20 CAPSULE ORAL at 08:15

## 2019-12-26 RX ADMIN — Medication 10 ML: at 08:16

## 2020-01-01 RX ORDER — TRAZODONE HYDROCHLORIDE 150 MG/1
TABLET ORAL
Qty: 180 TABLET | Refills: 1 | Status: SHIPPED | OUTPATIENT
Start: 2020-01-01 | End: 2020-04-21

## 2020-01-06 ENCOUNTER — TELEPHONE (OUTPATIENT)
Dept: FAMILY MEDICINE CLINIC | Age: 65
End: 2020-01-06

## 2020-01-16 ENCOUNTER — HOSPITAL ENCOUNTER (OUTPATIENT)
Age: 65
Discharge: HOME | End: 2020-01-16
Payer: MEDICARE

## 2020-01-16 ENCOUNTER — HOSPITAL ENCOUNTER (OUTPATIENT)
Dept: GENERAL RADIOLOGY | Age: 65
Discharge: HOME | End: 2020-01-16
Payer: MEDICARE

## 2020-01-16 ENCOUNTER — OFFICE VISIT (OUTPATIENT)
Dept: FAMILY MEDICINE CLINIC | Age: 65
End: 2020-01-16
Payer: MEDICARE

## 2020-01-16 VITALS
TEMPERATURE: 97.8 F | WEIGHT: 129.6 LBS | OXYGEN SATURATION: 95 % | HEART RATE: 91 BPM | BODY MASS INDEX: 21.57 KG/M2 | DIASTOLIC BLOOD PRESSURE: 62 MMHG | SYSTOLIC BLOOD PRESSURE: 118 MMHG

## 2020-01-16 LAB
ANION GAP SERPL CALCULATED.3IONS-SCNC: 14 MMOL/L (ref 3–16)
BASOPHILS ABSOLUTE: 0.1 K/UL (ref 0–0.2)
BASOPHILS RELATIVE PERCENT: 0.8 %
BILIRUBIN, POC: NORMAL
BLOOD URINE, POC: NORMAL
BUN BLDV-MCNC: 7 MG/DL (ref 7–20)
CALCIUM SERPL-MCNC: 8.9 MG/DL (ref 8.3–10.6)
CHLORIDE BLD-SCNC: 96 MMOL/L (ref 99–110)
CLARITY, POC: CLEAR
CO2: 24 MMOL/L (ref 21–32)
COLOR, POC: YELLOW
CREAT SERPL-MCNC: 0.6 MG/DL (ref 0.6–1.2)
EOSINOPHILS ABSOLUTE: 0.1 K/UL (ref 0–0.6)
EOSINOPHILS RELATIVE PERCENT: 1.1 %
GFR AFRICAN AMERICAN: >60
GFR NON-AFRICAN AMERICAN: >60
GLUCOSE BLD-MCNC: 84 MG/DL (ref 70–99)
GLUCOSE URINE, POC: NORMAL
HCT VFR BLD CALC: 38.1 % (ref 36–48)
HEMOGLOBIN: 13.1 G/DL (ref 12–16)
KETONES, POC: NORMAL
LEUKOCYTE EST, POC: NORMAL
LYMPHOCYTES ABSOLUTE: 2.5 K/UL (ref 1–5.1)
LYMPHOCYTES RELATIVE PERCENT: 28.5 %
MCH RBC QN AUTO: 31.4 PG (ref 26–34)
MCHC RBC AUTO-ENTMCNC: 34.4 G/DL (ref 31–36)
MCV RBC AUTO: 91.4 FL (ref 80–100)
MONOCYTES ABSOLUTE: 0.6 K/UL (ref 0–1.3)
MONOCYTES RELATIVE PERCENT: 6.9 %
NEUTROPHILS ABSOLUTE: 5.5 K/UL (ref 1.7–7.7)
NEUTROPHILS RELATIVE PERCENT: 62.7 %
NITRITE, POC: NORMAL
OSMOLALITY URINE: 257 MOSM/KG (ref 390–1070)
OSMOLALITY: 276 MOSM/KG (ref 280–301)
PDW BLD-RTO: 14 % (ref 12.4–15.4)
PH, POC: 6.5
PLATELET # BLD: 288 K/UL (ref 135–450)
PMV BLD AUTO: 7.7 FL (ref 5–10.5)
POTASSIUM SERPL-SCNC: 4.8 MMOL/L (ref 3.5–5.1)
PROTEIN, POC: NORMAL
RBC # BLD: 4.17 M/UL (ref 4–5.2)
SODIUM BLD-SCNC: 134 MMOL/L (ref 136–145)
SODIUM URINE: 49 MMOL/L
SPECIFIC GRAVITY, POC: 1.01
TSH REFLEX: 1.35 UIU/ML (ref 0.27–4.2)
UROBILINOGEN, POC: 0.2
VITAMIN B-12: 732 PG/ML (ref 211–911)
WBC # BLD: 8.8 K/UL (ref 4–11)

## 2020-01-16 PROCEDURE — 99214 OFFICE O/P EST MOD 30 MIN: CPT | Performed by: FAMILY MEDICINE

## 2020-01-16 PROCEDURE — 81002 URINALYSIS NONAUTO W/O SCOPE: CPT | Performed by: FAMILY MEDICINE

## 2020-01-16 PROCEDURE — G8482 FLU IMMUNIZE ORDER/ADMIN: HCPCS | Performed by: FAMILY MEDICINE

## 2020-01-16 PROCEDURE — 4004F PT TOBACCO SCREEN RCVD TLK: CPT | Performed by: FAMILY MEDICINE

## 2020-01-16 PROCEDURE — G8420 CALC BMI NORM PARAMETERS: HCPCS | Performed by: FAMILY MEDICINE

## 2020-01-16 PROCEDURE — 71046 X-RAY EXAM CHEST 2 VIEWS: CPT

## 2020-01-16 PROCEDURE — G8926 SPIRO NO PERF OR DOC: HCPCS | Performed by: FAMILY MEDICINE

## 2020-01-16 PROCEDURE — 3017F COLORECTAL CA SCREEN DOC REV: CPT | Performed by: FAMILY MEDICINE

## 2020-01-16 PROCEDURE — G8427 DOCREV CUR MEDS BY ELIG CLIN: HCPCS | Performed by: FAMILY MEDICINE

## 2020-01-16 PROCEDURE — 3023F SPIROM DOC REV: CPT | Performed by: FAMILY MEDICINE

## 2020-01-16 NOTE — PROGRESS NOTES
2020     Linda Cheung (:  1955) is a 62 y.o. female, here for evaluation of the following medical concerns:    Chief complaint: Patient presents with:  Memory Loss: episodes of confusion, happened while grocery shopping, was very short of breath at the time  Shortness of Breath: for a long time, does see pulmonology     Past medical, surgical, family and social history, as well as current medications and allergies, were reviewed and updated with the patient. Patient is brought in today by her sister. Her sister provides part of the history today as patient cannot recollect her most recent episode of confusion, but her niece was there and described it to her mother (the sister that is here today). Shortness of Breath   This is a new (having episodes of extreme shortness of breath where she gets very confused and can't remember anything. Happened at [address] about 10 days ago. ) problem. The current episode started more than 1 month ago ( feels this has been going on for a couple of months off and on). The problem occurs intermittently. The problem has been gradually worsening. Associated symptoms include wheezing. Pertinent negatives include no chest pain, fever, headaches, sputum production or syncope. The symptoms are aggravated by any activity (most recent episode at [address] after walking around to get food). Risk factors include smoking. She has tried rest (after she stopped to rest, confusion cleared up in about 10 minutes) for the symptoms. The treatment provided significant relief. Her past medical history is significant for chronic lung disease (pulmonary fibrosis) and COPD. Neurologic Problem   The patient's primary symptoms include memory loss (with confusion, episodic, only when very short of breath after exertion, then returns to baseline). The patient's pertinent negatives include no near-syncope. This is a new problem. The current episode started 1 to 4 weeks ago.  The

## 2020-01-16 NOTE — PROGRESS NOTES
Ankit Weeks did a 6 minute walk test today. At the start of the test her oxygen was 95 and pulse was 90. Two minutes into the test pt oxygen is 88 and pulse 108. At the four minute brielle pt oxygen was 86 and pulse 107-111. She could go no farther.  After resting for 2 minutes patients oxygen was 93 and pulse 95

## 2020-01-17 LAB — TOTAL SYPHILLIS IGG/IGM: NORMAL

## 2020-01-18 LAB — URINE CULTURE, ROUTINE: NORMAL

## 2020-01-19 ASSESSMENT — ENCOUNTER SYMPTOMS
SPUTUM PRODUCTION: 0
SHORTNESS OF BREATH: 1
BOWEL INCONTINENCE: 0
WHEEZING: 1

## 2020-01-21 ENCOUNTER — TELEPHONE (OUTPATIENT)
Dept: FAMILY MEDICINE CLINIC | Age: 65
End: 2020-01-21

## 2020-02-28 ENCOUNTER — NURSE TRIAGE (OUTPATIENT)
Dept: OTHER | Facility: CLINIC | Age: 65
End: 2020-02-28

## 2020-02-28 NOTE — TELEPHONE ENCOUNTER
Reason for Disposition   Chest pain  (Exception: MILD central chest pain, present only when coughing)    Protocols used: COUGH - ACUTE PRODUCTIVE-ADULT-AH    [facility] to schedule appointment, with red flag complaint, transferred to RN access for triage. Patient reports she started with a productive cough with green mucous 2 days ago, reports hx of COPD and oxygen use at home. Patient reports she has felt more short of breath than normal and has been experiencing constant chest pain. Denies fever. Writer instructed patient to be evaluated in the ED now. Patient agreeable to plan of care. Please do not respond to the triage nurse through this encounter. Any subsequent communication should be directly with the patient.

## 2020-02-29 ENCOUNTER — HOSPITAL ENCOUNTER (EMERGENCY)
Age: 65
Discharge: HOME | End: 2020-02-29
Attending: EMERGENCY MEDICINE
Payer: MEDICARE

## 2020-02-29 ENCOUNTER — APPOINTMENT (OUTPATIENT)
Dept: GENERAL RADIOLOGY | Age: 65
End: 2020-02-29
Payer: MEDICARE

## 2020-02-29 VITALS
SYSTOLIC BLOOD PRESSURE: 111 MMHG | WEIGHT: 130 LBS | HEIGHT: 65 IN | HEART RATE: 87 BPM | TEMPERATURE: 97.8 F | RESPIRATION RATE: 20 BRPM | DIASTOLIC BLOOD PRESSURE: 74 MMHG | OXYGEN SATURATION: 95 % | BODY MASS INDEX: 21.66 KG/M2

## 2020-02-29 LAB
A/G RATIO: 1.1 (ref 1.1–2.2)
ALBUMIN SERPL-MCNC: 3.5 G/DL (ref 3.4–5)
ALP BLD-CCNC: 78 U/L (ref 40–129)
ALT SERPL-CCNC: 9 U/L (ref 10–40)
ANION GAP SERPL CALCULATED.3IONS-SCNC: 11 MMOL/L (ref 3–16)
AST SERPL-CCNC: 16 U/L (ref 15–37)
BASOPHILS ABSOLUTE: 0 K/UL (ref 0–0.2)
BASOPHILS RELATIVE PERCENT: 0.4 %
BILIRUB SERPL-MCNC: <0.2 MG/DL (ref 0–1)
BUN BLDV-MCNC: 7 MG/DL (ref 7–20)
CALCIUM SERPL-MCNC: 9 MG/DL (ref 8.3–10.6)
CHLORIDE BLD-SCNC: 92 MMOL/L (ref 99–110)
CO2: 26 MMOL/L (ref 21–32)
CREAT SERPL-MCNC: 0.6 MG/DL (ref 0.6–1.2)
EOSINOPHILS ABSOLUTE: 0.1 K/UL (ref 0–0.6)
EOSINOPHILS RELATIVE PERCENT: 1.1 %
GFR AFRICAN AMERICAN: >60
GFR NON-AFRICAN AMERICAN: >60
GLOBULIN: 3.3 G/DL
GLUCOSE BLD-MCNC: 77 MG/DL (ref 70–99)
HCT VFR BLD CALC: 37.5 % (ref 36–48)
HEMOGLOBIN: 12.8 G/DL (ref 12–16)
LACTIC ACID: 1.1 MMOL/L (ref 0.4–2)
LYMPHOCYTES ABSOLUTE: 2.3 K/UL (ref 1–5.1)
LYMPHOCYTES RELATIVE PERCENT: 20.5 %
MCH RBC QN AUTO: 30.8 PG (ref 26–34)
MCHC RBC AUTO-ENTMCNC: 34 G/DL (ref 31–36)
MCV RBC AUTO: 90.4 FL (ref 80–100)
MONOCYTES ABSOLUTE: 0.8 K/UL (ref 0–1.3)
MONOCYTES RELATIVE PERCENT: 6.9 %
NEUTROPHILS ABSOLUTE: 8 K/UL (ref 1.7–7.7)
NEUTROPHILS RELATIVE PERCENT: 71.1 %
PDW BLD-RTO: 12.9 % (ref 12.4–15.4)
PLATELET # BLD: 404 K/UL (ref 135–450)
PMV BLD AUTO: 7.3 FL (ref 5–10.5)
POTASSIUM SERPL-SCNC: 3.6 MMOL/L (ref 3.5–5.1)
PRO-BNP: 302 PG/ML (ref 0–124)
RAPID INFLUENZA  B AGN: NEGATIVE
RAPID INFLUENZA A AGN: NEGATIVE
RBC # BLD: 4.14 M/UL (ref 4–5.2)
SODIUM BLD-SCNC: 129 MMOL/L (ref 136–145)
TOTAL PROTEIN: 6.8 G/DL (ref 6.4–8.2)
TROPONIN: <0.01 NG/ML
WBC # BLD: 11.2 K/UL (ref 4–11)

## 2020-02-29 PROCEDURE — 83880 ASSAY OF NATRIURETIC PEPTIDE: CPT

## 2020-02-29 PROCEDURE — 93005 ELECTROCARDIOGRAM TRACING: CPT | Performed by: EMERGENCY MEDICINE

## 2020-02-29 PROCEDURE — 94644 CONT INHLJ TX 1ST HOUR: CPT

## 2020-02-29 PROCEDURE — 85025 COMPLETE CBC W/AUTO DIFF WBC: CPT

## 2020-02-29 PROCEDURE — 87040 BLOOD CULTURE FOR BACTERIA: CPT

## 2020-02-29 PROCEDURE — 96367 TX/PROPH/DG ADDL SEQ IV INF: CPT

## 2020-02-29 PROCEDURE — 71046 X-RAY EXAM CHEST 2 VIEWS: CPT

## 2020-02-29 PROCEDURE — 80053 COMPREHEN METABOLIC PANEL: CPT

## 2020-02-29 PROCEDURE — 87804 INFLUENZA ASSAY W/OPTIC: CPT

## 2020-02-29 PROCEDURE — 96365 THER/PROPH/DIAG IV INF INIT: CPT

## 2020-02-29 PROCEDURE — 83605 ASSAY OF LACTIC ACID: CPT

## 2020-02-29 PROCEDURE — 6370000000 HC RX 637 (ALT 250 FOR IP): Performed by: NURSE PRACTITIONER

## 2020-02-29 PROCEDURE — 99285 EMERGENCY DEPT VISIT HI MDM: CPT

## 2020-02-29 PROCEDURE — 6360000002 HC RX W HCPCS: Performed by: NURSE PRACTITIONER

## 2020-02-29 PROCEDURE — 2580000003 HC RX 258: Performed by: NURSE PRACTITIONER

## 2020-02-29 PROCEDURE — 84484 ASSAY OF TROPONIN QUANT: CPT

## 2020-02-29 RX ORDER — IPRATROPIUM BROMIDE AND ALBUTEROL SULFATE 2.5; .5 MG/3ML; MG/3ML
1 SOLUTION RESPIRATORY (INHALATION) ONCE
Status: COMPLETED | OUTPATIENT
Start: 2020-02-29 | End: 2020-02-29

## 2020-02-29 RX ORDER — 0.9 % SODIUM CHLORIDE 0.9 %
1000 INTRAVENOUS SOLUTION INTRAVENOUS ONCE
Status: COMPLETED | OUTPATIENT
Start: 2020-02-29 | End: 2020-02-29

## 2020-02-29 RX ORDER — AMOXICILLIN AND CLAVULANATE POTASSIUM 875; 125 MG/1; MG/1
1 TABLET, FILM COATED ORAL 2 TIMES DAILY
Qty: 20 TABLET | Refills: 0 | Status: ON HOLD | OUTPATIENT
Start: 2020-02-29 | End: 2020-03-08 | Stop reason: HOSPADM

## 2020-02-29 RX ORDER — DOXYCYCLINE HYCLATE 100 MG/1
100 CAPSULE ORAL 2 TIMES DAILY
Qty: 20 CAPSULE | Refills: 0 | Status: ON HOLD | OUTPATIENT
Start: 2020-02-29 | End: 2020-03-08 | Stop reason: HOSPADM

## 2020-02-29 RX ADMIN — SODIUM CHLORIDE 1000 ML: 9 INJECTION, SOLUTION INTRAVENOUS at 16:39

## 2020-02-29 RX ADMIN — AZITHROMYCIN MONOHYDRATE 500 MG: 500 INJECTION, POWDER, LYOPHILIZED, FOR SOLUTION INTRAVENOUS at 17:50

## 2020-02-29 RX ADMIN — IPRATROPIUM BROMIDE AND ALBUTEROL SULFATE 1 AMPULE: .5; 3 SOLUTION RESPIRATORY (INHALATION) at 16:05

## 2020-02-29 RX ADMIN — CEFEPIME HYDROCHLORIDE 2 G: 2 INJECTION, POWDER, FOR SOLUTION INTRAVENOUS at 16:39

## 2020-02-29 RX ADMIN — IPRATROPIUM BROMIDE AND ALBUTEROL SULFATE 1 AMPULE: .5; 3 SOLUTION RESPIRATORY (INHALATION) at 16:06

## 2020-02-29 ASSESSMENT — PAIN DESCRIPTION - PAIN TYPE: TYPE: ACUTE PAIN

## 2020-02-29 ASSESSMENT — PAIN DESCRIPTION - LOCATION: LOCATION: CHEST

## 2020-02-29 ASSESSMENT — PAIN SCALES - GENERAL: PAINLEVEL_OUTOF10: 6

## 2020-02-29 ASSESSMENT — PAIN DESCRIPTION - DESCRIPTORS: DESCRIPTORS: TIGHTNESS

## 2020-02-29 NOTE — ED PROVIDER NOTES
I independently performed a history and physical on Mimi Varghese. All diagnostic, treatment, and disposition decisions were made by myself in conjunction with the advanced practice provider. For further details of [address] [facility] emergency department encounter, please see Pasquale Pacheco NP's documentation. Patient reports several days of general illness and URI symptoms. She is a smoker. She is here now because of some low blood pressure and chills. On exam heart regular rate and rhythm and lungs with few scattered rales primarily in the left base. Abdomen benign. Imaging consistent with lower lobe pneumonia. Patient is able to ambulate however without any significant decrease of oxygen. She states her baseline blood pressure is around 729-925 systolic. In my opinion the patient can be safely discharged. EKG  The Ekg interpreted by me shows  normal sinus rhythm with a rate of 84  Axis is   Normal  QTc is  normal  Intervals and Durations are unremarkable. ST Segments: no acute change  No significant change from prior EKG dated 12/25/19      Xr Chest Standard (2 Vw)    Result Date: 2/29/2020  EXAMINATION: TWO XRAY VIEWS OF THE CHEST 2/29/2020 3:34 pm COMPARISON: 01/16/2020 HISTORY: ORDERING SYSTEM PROVIDED HISTORY: chest pain TECHNOLOGIST PROVIDED HISTORY: Reason for exam:->chest pain Reason for Exam: Chest pains/pressure; SOB; Productive cough Acuity: Acute Type of Exam: Initial FINDINGS: Cardiomediastinal silhouette is stable. New airspace opacity left lower lobe. No pulmonary vascular congestion or edema. No pleural effusion.      New left lower lobe airspace opacity could represent subsegmental atelectasis or pneumonia           Mp Cerna MD  03/01/20 0601

## 2020-02-29 NOTE — ED PROVIDER NOTES
[facility] Emergency Department    CHIEF COMPLAINT  Chest Pain and Hypotension      HISTORY OF PRESENT ILLNESS  Xavier Pandya is a 62 y.o. female who presents to the ED complaining of feeling ill the past 3 or 4 days. Patient reports that because she was feeling ill she checked her blood pressure noticed it was low. Patient reports that she has been feeling tired with chest congestion and productive cough. Patient reports I was worried that it was pneumonia. Patient denies any dizziness but reports feeling some lightheadedness. No known fever or chills. Patient reports shortness of breath and chest tightness. Patient reports using her inhaler but not using her breathing treatments at home. Patient reports wearing oxygen at nights. Patient reports that she smokes half pack per day and daily marijuana use. No abdominal pain or discomfort. No nausea, vomiting, or diarrhea. No numbness or tingling in extremities. No unilateral weakness. No urinary complaints. No other complaints, modifying factors or associated symptoms. Nursing notes reviewed.    Past Medical History:   Diagnosis Date    Allergic rhinitis 6/11/2010    Anxiety     Arthritis     Aspiration pneumonia ([address] 3/21/2012    Recurrent    Asthma     Bronchitis chronic     COPD (chronic obstructive pulmonary disease) ([address] 12/3/2009    Depression     Drug abuse, cocaine type (HCC)     past history of crack cocaine use    [facility]     Fibromyalgia     GERD (gastroesophageal reflux disease)     Hyperlipidemia     Lung disease     On home oxygen therapy     uses O2 NC 3L prn at night    Osteoporosis     Pneumonia     Polysubstance dependence ([address] 1/2/2012    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    Pulmonary fibrosis ([address] 10/16/2014    Pulmonary infiltrate     Pulmonary nodule 12/3/2009    Tobacco abuse      Past Surgical History:   Procedure Laterality Date    BLADDER REPAIR      BRONCHOSCOPY      COLONOSCOPY      ENDOSCOPY, COLON, DIAGNOSTIC      ESOPHAGEAL DILATATION  2018    ESOPHAGEAL DILATION [place] performed by Dread Cho MD at [address] [facility] OTHER SURGICAL HISTORY  2/12/15    T8 Kyphoplasty    MT COLONOSCOPY FLX DX W/COLLJ SPEC WHEN PFRMD N/A 2018    EGD AND COLONOSCOPY WITH ANESTHESIA performed by Dread Cho MD at [address] [facility] ESOPHAGOGASTRODUODENOSCOPY TRANSORAL DIAGNOSTIC N/A 2018    EGD AND COLONOSCOPY WITH ANESTHESIA performed by Dread Cho MD at [address] [facility]       Family History   Problem Relation Age of Onset    Asthma Mother     Diabetes Mother     Emphysema Mother     Heart Failure Mother     Hypertension Mother     Heart Disease Mother     High Cholesterol Mother     Cancer Mother     Depression Mother     Diabetes Father     Emphysema Father     Heart Failure Father     Hypertension Father     Heart Disease Father     High Cholesterol Father     Substance Abuse Father     Emphysema Paternal Grandfather     Diabetes Sister     High Cholesterol Sister     Vision Loss Maternal Uncle      Social History     Socioeconomic History    Marital status:      Spouse name: Not on file    Number of children: Not on file    Years of education: Not on file    Highest education level: Not on file   Occupational History    Occupation: Disabled     Comment: 2008   Social Needs    Financial resource strain: Not on file    Food insecurity:     Worry: Not on file     Inability: Not on file   datapine needs:     Medical: Not on file     Non-medical: Not on file   Tobacco Use    Smoking status: Current Every Day Smoker     Packs/day: 0.25     Years: 40.00     Pack years: 10.00     Types: Cigarettes     Last attempt to quit: 2018     Years since quittin.6    Smokeless tobacco: Never Used    Tobacco comment: 2/28/19 - smokes 3-4 cigs daily   Substance and Sexual Activity    Alcohol use: No     Alcohol/week: 0.0 standard drinks    Drug use: Yes     Types: Marijuana     Comment: Daily    Sexual activity: Yes     Partners: Male   Lifestyle    Physical activity:     Days per week: Not on file     Minutes per session: Not on file    Stress: Not on file   Relationships    Social connections:     Talks on phone: Not on file     Gets together: Not on file     Attends Hindu service: Not on file     Active member of club or organization: Not on file     Attends meetings of clubs or organizations: Not on file     Relationship status: Not on file    Intimate partner violence:     Fear of current or ex partner: Not on file     Emotionally abused: Not on file     Physically abused: Not on file     Forced sexual activity: Not on file   Other Topics Concern    Not on file   Social History Narrative    Not on file     Current Facility-Administered Medications   Medication Dose Route Frequency Provider Last Rate Last Dose    ipratropium-albuterol (DUONEB) nebulizer solution 1 ampule  1 ampule Inhalation Once SHAUN Guallpa CNP        ipratropium-albuterol (DUONEB) nebulizer solution 1 ampule  1 ampule Inhalation Once SHAUN Guallpa CNP         Current Outpatient Medications   Medication Sig Dispense Refill    traZODone (DESYREL) 150 MG tablet TAKE 2 TABLETS BY MOUTH AT BEDTIME 180 tablet 1    fluticasone-salmeterol (WIXELA INHUB) 250-50 MCG/DOSE AEPB INHALE 1 PUFF TWICE DAILY 180 each 1    FLUoxetine (PROZAC) 20 MG capsule TAKE 3 CAPSULES BY MOUTH DAILY 270 capsule 1    busPIRone (BUSPAR) 30 MG tablet TAKE 1 TABLET TWICE DAILY 180 tablet 1    simvastatin (ZOCOR) 20 MG tablet TAKE 1 TABLET EVERY EVENING 90 tablet 1    ranitidine (ZANTAC) 150 MG tablet TAKE 1 TABLET BY MOUTH TWICE DAILY AS NEEDED FOR HEARTBURN 180 tablet 1    teriparatide, recombinant, (FORTEO) 600 dizziness or lightheadedness. All ED findings, results, and plan of care were discussed in detail with ER attending. Patient comfortable and would like to be discharged at this time. ER attending a well and agrees with plan of care. A discussion was had with Mrs. Wong regarding ED findings, results and follow up. All questions were answered. Patient will follow up with PCP in 1 to 2 days for reevaluation for further evaluation/treatment. Patient will return to ED for new/worsening symptoms. Comfortable upon discharge. Strict return precautions were discussed in detail. Patient agreeable with plan of care, treatment, and discharge at this time. Antibiotic regimen was discussed with ER attending prior to disposition. Recommendations for Augmentin and doxycycline. Patient was sent home with a prescription for Augmentin and doxycycline.     MDM  Results for orders placed or performed during the hospital encounter of 02/29/20   Rapid influenza A/B antigens   Result Value Ref Range    Rapid Influenza A Ag Negative Negative    Rapid Influenza B Ag Negative Negative   CBC auto differential   Result Value Ref Range    WBC 11.2 (H) 4.0 - 11.0 K/uL    RBC 4.14 4.00 - 5.20 M/uL    Hemoglobin 12.8 12.0 - 16.0 g/dL    Hematocrit 37.5 36.0 - 48.0 %    MCV 90.4 80.0 - 100.0 fL    MCH 30.8 26.0 - 34.0 pg    MCHC 34.0 31.0 - 36.0 g/dL    RDW 12.9 12.4 - 15.4 %    Platelets 522 937 - 398 K/uL    MPV 7.3 5.0 - 10.5 fL    Neutrophils % 71.1 %    Lymphocytes % 20.5 %    Monocytes % 6.9 %    Eosinophils % 1.1 %    Basophils % 0.4 %    Neutrophils Absolute 8.0 (H) 1.7 - 7.7 K/uL    Lymphocytes Absolute 2.3 1.0 - 5.1 K/uL    Monocytes Absolute 0.8 0.0 - 1.3 K/uL    Eosinophils Absolute 0.1 0.0 - 0.6 K/uL    Basophils Absolute 0.0 0.0 - 0.2 K/uL   Comprehensive metabolic panel   Result Value Ref Range    Sodium 129 (L) 136 - 145 mmol/L    Potassium 3.6 3.5 - 5.1 mmol/L    Chloride 92 (L) 99 - 110 mmol/L    CO2 26 21 - 32 mmol/L (1.651 m), weight 130 lb (59 kg), SpO2 95 %, not currently breastfeeding. DISPOSITION  Patient was discharged to home in good condition.           SHAUN Montanez - CNP  02/29/20 8099

## 2020-02-29 NOTE — ED NOTES
Ambulated patient approx. 150 ft on room air. BP before ambulation was 114/77. Pt did not report any dizziness or lightheadedness. Pt had 93% O2 Saturation after ambulation.       Metteressa Zhu  02/29/20 0356

## 2020-03-01 LAB
EKG ATRIAL RATE: 84 BPM
EKG DIAGNOSIS: NORMAL
EKG P AXIS: 39 DEGREES
EKG P-R INTERVAL: 144 MS
EKG Q-T INTERVAL: 380 MS
EKG QRS DURATION: 82 MS
EKG QTC CALCULATION (BAZETT): 449 MS
EKG R AXIS: -18 DEGREES
EKG T AXIS: 52 DEGREES
EKG VENTRICULAR RATE: 84 BPM

## 2020-03-01 PROCEDURE — 93010 ELECTROCARDIOGRAM REPORT: CPT | Performed by: INTERNAL MEDICINE

## 2020-03-01 NOTE — ED NOTES
Patient ambulated to the restroom without any difficulty.       Chelita Gaona, MALDONADO  02/29/20 5120

## 2020-03-04 ENCOUNTER — APPOINTMENT (OUTPATIENT)
Dept: GENERAL RADIOLOGY | Age: 65
DRG: 193 | End: 2020-03-04
Payer: MEDICARE

## 2020-03-04 ENCOUNTER — TELEPHONE (OUTPATIENT)
Dept: PULMONOLOGY | Age: 65
End: 2020-03-04

## 2020-03-04 ENCOUNTER — HOSPITAL ENCOUNTER (INPATIENT)
Age: 65
LOS: 4 days | Discharge: HOME | DRG: 193 | End: 2020-03-08
Attending: EMERGENCY MEDICINE | Admitting: INTERNAL MEDICINE
Payer: MEDICARE

## 2020-03-04 PROBLEM — J96.21 ACUTE ON CHRONIC RESPIRATORY FAILURE WITH HYPOXIA (HCC): Status: ACTIVE | Noted: 2020-03-04

## 2020-03-04 LAB
A/G RATIO: 1 (ref 1.1–2.2)
ALBUMIN SERPL-MCNC: 3.5 G/DL (ref 3.4–5)
ALP BLD-CCNC: 80 U/L (ref 40–129)
ALT SERPL-CCNC: 11 U/L (ref 10–40)
ANION GAP SERPL CALCULATED.3IONS-SCNC: 15 MMOL/L (ref 3–16)
AST SERPL-CCNC: 25 U/L (ref 15–37)
BILIRUB SERPL-MCNC: <0.2 MG/DL (ref 0–1)
BILIRUBIN URINE: ABNORMAL
BLOOD CULTURE, ROUTINE: NORMAL
BLOOD, URINE: NEGATIVE
BUN BLDV-MCNC: 9 MG/DL (ref 7–20)
CALCIUM SERPL-MCNC: 8.6 MG/DL (ref 8.3–10.6)
CHLORIDE BLD-SCNC: 89 MMOL/L (ref 99–110)
CLARITY: CLEAR
CO2: 23 MMOL/L (ref 21–32)
COLOR: YELLOW
CREAT SERPL-MCNC: <0.5 MG/DL (ref 0.6–1.2)
CULTURE, BLOOD 2: NORMAL
EKG ATRIAL RATE: 88 BPM
EKG DIAGNOSIS: NORMAL
EKG P AXIS: 70 DEGREES
EKG P-R INTERVAL: 146 MS
EKG Q-T INTERVAL: 394 MS
EKG QRS DURATION: 82 MS
EKG QTC CALCULATION (BAZETT): 476 MS
EKG R AXIS: 27 DEGREES
EKG T AXIS: 67 DEGREES
EKG VENTRICULAR RATE: 88 BPM
GFR AFRICAN AMERICAN: >60
GFR NON-AFRICAN AMERICAN: >60
GLOBULIN: 3.5 G/DL
GLUCOSE BLD-MCNC: 105 MG/DL (ref 70–99)
GLUCOSE URINE: NEGATIVE MG/DL
HCT VFR BLD CALC: 38.9 % (ref 36–48)
HEMOGLOBIN: 13.5 G/DL (ref 12–16)
KETONES, URINE: NEGATIVE MG/DL
LEUKOCYTE ESTERASE, URINE: NEGATIVE
LIPASE: 55 U/L (ref 13–60)
MCH RBC QN AUTO: 31 PG (ref 26–34)
MCHC RBC AUTO-ENTMCNC: 34.6 G/DL (ref 31–36)
MCV RBC AUTO: 89.5 FL (ref 80–100)
MICROSCOPIC EXAMINATION: ABNORMAL
NITRITE, URINE: NEGATIVE
PDW BLD-RTO: 13.3 % (ref 12.4–15.4)
PH UA: 6.5 (ref 5–8)
PLATELET # BLD: 364 K/UL (ref 135–450)
PMV BLD AUTO: 7.3 FL (ref 5–10.5)
POTASSIUM SERPL-SCNC: 3.5 MMOL/L (ref 3.5–5.1)
PROCALCITONIN: 0.11 NG/ML (ref 0–0.15)
PROTEIN UA: NEGATIVE MG/DL
RBC # BLD: 4.35 M/UL (ref 4–5.2)
SODIUM BLD-SCNC: 127 MMOL/L (ref 136–145)
SPECIFIC GRAVITY UA: 1.02 (ref 1–1.03)
TOTAL PROTEIN: 7 G/DL (ref 6.4–8.2)
TROPONIN: <0.01 NG/ML
URINE REFLEX TO CULTURE: ABNORMAL
URINE TYPE: ABNORMAL
UROBILINOGEN, URINE: 0.2 E.U./DL
WBC # BLD: 5 K/UL (ref 4–11)

## 2020-03-04 PROCEDURE — 2580000003 HC RX 258: Performed by: EMERGENCY MEDICINE

## 2020-03-04 PROCEDURE — 6370000000 HC RX 637 (ALT 250 FOR IP): Performed by: INTERNAL MEDICINE

## 2020-03-04 PROCEDURE — 84484 ASSAY OF TROPONIN QUANT: CPT

## 2020-03-04 PROCEDURE — 99285 EMERGENCY DEPT VISIT HI MDM: CPT

## 2020-03-04 PROCEDURE — 96375 TX/PRO/DX INJ NEW DRUG ADDON: CPT

## 2020-03-04 PROCEDURE — 2700000000 HC OXYGEN THERAPY PER DAY

## 2020-03-04 PROCEDURE — 96365 THER/PROPH/DIAG IV INF INIT: CPT

## 2020-03-04 PROCEDURE — 6360000002 HC RX W HCPCS: Performed by: EMERGENCY MEDICINE

## 2020-03-04 PROCEDURE — 84145 PROCALCITONIN (PCT): CPT

## 2020-03-04 PROCEDURE — 6360000002 HC RX W HCPCS: Performed by: INTERNAL MEDICINE

## 2020-03-04 PROCEDURE — 81003 URINALYSIS AUTO W/O SCOPE: CPT

## 2020-03-04 PROCEDURE — 83690 ASSAY OF LIPASE: CPT

## 2020-03-04 PROCEDURE — 1200000000 HC SEMI PRIVATE

## 2020-03-04 PROCEDURE — 94761 N-INVAS EAR/PLS OXIMETRY MLT: CPT

## 2020-03-04 PROCEDURE — 80053 COMPREHEN METABOLIC PANEL: CPT

## 2020-03-04 PROCEDURE — 94150 VITAL CAPACITY TEST: CPT

## 2020-03-04 PROCEDURE — 85027 COMPLETE CBC AUTOMATED: CPT

## 2020-03-04 PROCEDURE — 93005 ELECTROCARDIOGRAM TRACING: CPT | Performed by: EMERGENCY MEDICINE

## 2020-03-04 PROCEDURE — 71045 X-RAY EXAM CHEST 1 VIEW: CPT

## 2020-03-04 PROCEDURE — 6360000002 HC RX W HCPCS

## 2020-03-04 RX ORDER — ALBUTEROL SULFATE 2.5 MG/3ML
2.5 SOLUTION RESPIRATORY (INHALATION)
Status: DISCONTINUED | OUTPATIENT
Start: 2020-03-04 | End: 2020-03-04

## 2020-03-04 RX ORDER — ONDANSETRON 2 MG/ML
4 INJECTION INTRAMUSCULAR; INTRAVENOUS ONCE
Status: COMPLETED | OUTPATIENT
Start: 2020-03-04 | End: 2020-03-04

## 2020-03-04 RX ORDER — 0.9 % SODIUM CHLORIDE 0.9 %
1000 INTRAVENOUS SOLUTION INTRAVENOUS ONCE
Status: COMPLETED | OUTPATIENT
Start: 2020-03-04 | End: 2020-03-04

## 2020-03-04 RX ORDER — IPRATROPIUM BROMIDE AND ALBUTEROL SULFATE 2.5; .5 MG/3ML; MG/3ML
1 SOLUTION RESPIRATORY (INHALATION) EVERY 4 HOURS PRN
Status: DISCONTINUED | OUTPATIENT
Start: 2020-03-04 | End: 2020-03-08 | Stop reason: HOSPADM

## 2020-03-04 RX ORDER — TRAZODONE HYDROCHLORIDE 50 MG/1
150 TABLET ORAL NIGHTLY PRN
Status: DISCONTINUED | OUTPATIENT
Start: 2020-03-04 | End: 2020-03-08 | Stop reason: HOSPADM

## 2020-03-04 RX ORDER — SODIUM CHLORIDE 0.9 % (FLUSH) 0.9 %
10 SYRINGE (ML) INJECTION PRN
Status: DISCONTINUED | OUTPATIENT
Start: 2020-03-04 | End: 2020-03-08 | Stop reason: HOSPADM

## 2020-03-04 RX ORDER — ACETAMINOPHEN 650 MG/1
650 SUPPOSITORY RECTAL EVERY 6 HOURS PRN
Status: DISCONTINUED | OUTPATIENT
Start: 2020-03-04 | End: 2020-03-08 | Stop reason: HOSPADM

## 2020-03-04 RX ORDER — MONTELUKAST SODIUM 10 MG/1
10 TABLET ORAL NIGHTLY
Status: DISCONTINUED | OUTPATIENT
Start: 2020-03-04 | End: 2020-03-08 | Stop reason: HOSPADM

## 2020-03-04 RX ORDER — BUSPIRONE HYDROCHLORIDE 10 MG/1
30 TABLET ORAL 2 TIMES DAILY
Status: DISCONTINUED | OUTPATIENT
Start: 2020-03-04 | End: 2020-03-04 | Stop reason: CLARIF

## 2020-03-04 RX ORDER — BUDESONIDE AND FORMOTEROL FUMARATE DIHYDRATE 160; 4.5 UG/1; UG/1
2 AEROSOL RESPIRATORY (INHALATION) 2 TIMES DAILY
Status: DISCONTINUED | OUTPATIENT
Start: 2020-03-04 | End: 2020-03-08 | Stop reason: HOSPADM

## 2020-03-04 RX ORDER — ATENOLOL 25 MG/1
25 TABLET ORAL DAILY
Status: DISCONTINUED | OUTPATIENT
Start: 2020-03-05 | End: 2020-03-08 | Stop reason: HOSPADM

## 2020-03-04 RX ORDER — FAMOTIDINE 20 MG/1
20 TABLET, FILM COATED ORAL 2 TIMES DAILY
Status: DISCONTINUED | OUTPATIENT
Start: 2020-03-04 | End: 2020-03-08 | Stop reason: HOSPADM

## 2020-03-04 RX ORDER — PROMETHAZINE HYDROCHLORIDE 25 MG/1
12.5 TABLET ORAL EVERY 6 HOURS PRN
Status: DISCONTINUED | OUTPATIENT
Start: 2020-03-04 | End: 2020-03-08 | Stop reason: HOSPADM

## 2020-03-04 RX ORDER — MAGNESIUM SULFATE 1 G/100ML
1 INJECTION INTRAVENOUS PRN
Status: DISCONTINUED | OUTPATIENT
Start: 2020-03-04 | End: 2020-03-08 | Stop reason: HOSPADM

## 2020-03-04 RX ORDER — FLUOXETINE HYDROCHLORIDE 20 MG/1
60 CAPSULE ORAL DAILY
Status: DISCONTINUED | OUTPATIENT
Start: 2020-03-05 | End: 2020-03-08 | Stop reason: HOSPADM

## 2020-03-04 RX ORDER — ALBUTEROL SULFATE 2.5 MG/3ML
2.5 SOLUTION RESPIRATORY (INHALATION)
Status: DISCONTINUED | OUTPATIENT
Start: 2020-03-05 | End: 2020-03-08 | Stop reason: HOSPADM

## 2020-03-04 RX ORDER — METHYLPREDNISOLONE SODIUM SUCCINATE 40 MG/ML
40 INJECTION, POWDER, LYOPHILIZED, FOR SOLUTION INTRAMUSCULAR; INTRAVENOUS EVERY 12 HOURS
Status: DISCONTINUED | OUTPATIENT
Start: 2020-03-04 | End: 2020-03-07

## 2020-03-04 RX ORDER — BENZONATATE 100 MG/1
200 CAPSULE ORAL 3 TIMES DAILY PRN
Status: DISCONTINUED | OUTPATIENT
Start: 2020-03-04 | End: 2020-03-08 | Stop reason: HOSPADM

## 2020-03-04 RX ORDER — MAGNESIUM HYDROXIDE/ALUMINUM HYDROXICE/SIMETHICONE 120; 1200; 1200 MG/30ML; MG/30ML; MG/30ML
30 SUSPENSION ORAL EVERY 6 HOURS PRN
Status: DISCONTINUED | OUTPATIENT
Start: 2020-03-04 | End: 2020-03-08 | Stop reason: HOSPADM

## 2020-03-04 RX ORDER — ONDANSETRON 2 MG/ML
4 INJECTION INTRAMUSCULAR; INTRAVENOUS EVERY 6 HOURS PRN
Status: DISCONTINUED | OUTPATIENT
Start: 2020-03-04 | End: 2020-03-08 | Stop reason: HOSPADM

## 2020-03-04 RX ORDER — ACETAMINOPHEN 325 MG/1
650 TABLET ORAL EVERY 6 HOURS PRN
Status: DISCONTINUED | OUTPATIENT
Start: 2020-03-04 | End: 2020-03-08 | Stop reason: HOSPADM

## 2020-03-04 RX ORDER — BUSPIRONE HYDROCHLORIDE 5 MG/1
30 TABLET ORAL 2 TIMES DAILY
Status: DISCONTINUED | OUTPATIENT
Start: 2020-03-04 | End: 2020-03-08 | Stop reason: HOSPADM

## 2020-03-04 RX ORDER — POTASSIUM CHLORIDE 20 MEQ/1
40 TABLET, EXTENDED RELEASE ORAL PRN
Status: DISCONTINUED | OUTPATIENT
Start: 2020-03-04 | End: 2020-03-08 | Stop reason: HOSPADM

## 2020-03-04 RX ORDER — ATORVASTATIN CALCIUM 10 MG/1
10 TABLET, FILM COATED ORAL DAILY
Status: DISCONTINUED | OUTPATIENT
Start: 2020-03-05 | End: 2020-03-08 | Stop reason: HOSPADM

## 2020-03-04 RX ORDER — ONDANSETRON 2 MG/ML
INJECTION INTRAMUSCULAR; INTRAVENOUS
Status: COMPLETED
Start: 2020-03-04 | End: 2020-03-04

## 2020-03-04 RX ORDER — POTASSIUM CHLORIDE 7.45 MG/ML
10 INJECTION INTRAVENOUS PRN
Status: DISCONTINUED | OUTPATIENT
Start: 2020-03-04 | End: 2020-03-08 | Stop reason: HOSPADM

## 2020-03-04 RX ADMIN — AZITHROMYCIN MONOHYDRATE 500 MG: 500 INJECTION, POWDER, LYOPHILIZED, FOR SOLUTION INTRAVENOUS at 20:09

## 2020-03-04 RX ADMIN — METHYLPREDNISOLONE SODIUM SUCCINATE 40 MG: 40 INJECTION, POWDER, FOR SOLUTION INTRAMUSCULAR; INTRAVENOUS at 20:30

## 2020-03-04 RX ADMIN — FAMOTIDINE 20 MG: 20 TABLET, FILM COATED ORAL at 22:32

## 2020-03-04 RX ADMIN — CEFTRIAXONE SODIUM 1 G: 1 INJECTION, POWDER, FOR SOLUTION INTRAMUSCULAR; INTRAVENOUS at 19:23

## 2020-03-04 RX ADMIN — ONDANSETRON 4 MG: 2 INJECTION INTRAMUSCULAR; INTRAVENOUS at 20:30

## 2020-03-04 RX ADMIN — MONTELUKAST SODIUM 10 MG: 10 TABLET ORAL at 22:32

## 2020-03-04 RX ADMIN — ONDANSETRON 4 MG: 2 INJECTION INTRAMUSCULAR; INTRAVENOUS at 16:23

## 2020-03-04 RX ADMIN — SODIUM CHLORIDE 1000 ML: 9 INJECTION, SOLUTION INTRAVENOUS at 16:22

## 2020-03-04 RX ADMIN — BUSPIRONE HYDROCHLORIDE 30 MG: 5 TABLET ORAL at 22:32

## 2020-03-04 RX ADMIN — SODIUM CHLORIDE 1000 ML: 9 INJECTION, SOLUTION INTRAVENOUS at 18:55

## 2020-03-04 ASSESSMENT — ENCOUNTER SYMPTOMS
NAUSEA: 1
SORE THROAT: 0
VOMITING: 1
ABDOMINAL PAIN: 0
DIARRHEA: 1
SHORTNESS OF BREATH: 0
BACK PAIN: 0
COUGH: 1

## 2020-03-04 ASSESSMENT — PAIN SCALES - GENERAL: PAINLEVEL_OUTOF10: 0

## 2020-03-04 NOTE — TELEPHONE ENCOUNTER
Patient did not show for 6 month PFT follow-up appointment  with  on 3/4/20    Same Day Cancellation: No    Patient rescheduled:  No    New appointment: n/a    Patient was also no show on: 11/26/19    LOV   Assessment:8/29/19   · Moderate obstructive airways diease secondary to COPD and asthma   · Abnormal CT chest 3/19/2019. Improved on repeat a CT 8/25/2019  · Abnormal CT 4/1/2016 with GGO- DDx RB-ILD, NSIP, HP, DIP, eosinophilic pneumonitis, aspiration and CTD-ILD. Favor smoking related ILD vs HP. Bronch 4/6/16 purulent secretions and grew strep pneumoniae. Negative AFB. Got rid of her Valentino Mash   · Bronchiectasis   · Nocturnal hypoxia- on 2LPM   · >30 pack-years smoking                                                  Plan:   · PFTs 6MW in 2/2019  · Advised to avoid exposure/having birds   · Continue Advair 250/50 BID, Spiriva daily, Singulair daily  · Albuterol 2 puffs Q4-6 hrs PRN  · Advised to use O2 2LPM at night- no need during the day   · Pulmonary rehab referral    · Patient is up to date with Pneumococcal vaccine   · Advised to get influenza vaccine this year   · Acapella and Mucinex   · CT chest, low dose protocol, screening for lung cancer 8/2020. Risks, benefits and alternatives including doing nothing were discussed with patient. · Smoking cessation counseling. Patient was advised to visit [email or web address] gov and call 1800QUITNOW for assistance. Will refer to [facility] smoking cessation program.   · [place] [employer].  Risks, benefits and side effects were discussed with patient

## 2020-03-04 NOTE — TELEPHONE ENCOUNTER
Spoke with patient state she has stomach bug has been vomiting all night. Will call back later to selin appts.

## 2020-03-04 NOTE — ED PROVIDER NOTES
other systems reviewed and are negative. Except as noted above the remainder of the review of systems was reviewed and negative.        PAST MEDICAL HISTORY     Past Medical History:   Diagnosis Date    Allergic rhinitis 6/11/2010    Anxiety     Arthritis     Aspiration pneumonia ([address] 3/21/2012    Recurrent    Asthma     Bronchitis chronic     COPD (chronic obstructive pulmonary disease) ([address] 12/3/2009    Depression     Drug abuse, cocaine type ([facility]     past history of crack cocaine use    Emphysema of lung (HCC)     Fibromyalgia     GERD (gastroesophageal reflux disease)     Hyperlipidemia     Lung disease     On home oxygen therapy     uses O2 NC 3L prn at night    Osteoporosis     Pneumonia     Polysubstance dependence ([address] 1/2/2012    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[facility] 1/2/2012    Pulmonary fibrosis ([facility] 10/16/2014    Pulmonary infiltrate     Pulmonary nodule 12/3/2009    Tobacco abuse          SURGICAL HISTORY       Past Surgical History:   Procedure Laterality Date    BLADDER REPAIR      BRONCHOSCOPY      COLONOSCOPY  2012    ENDOSCOPY, COLON, DIAGNOSTIC      ESOPHAGEAL DILATATION  9/20/2018    ESOPHAGEAL DILATION [place] performed by Heidi Mahajan MD at [address] [facility] HISTORY  2/12/15    T8 Kyphoplasty    [place] COLONOSCOPY FLX DX W/COLLJ SPEC WHEN PFRMD N/A 9/20/2018    EGD AND COLONOSCOPY WITH ANESTHESIA performed by Heidi Mahajan MD at [address] [facility] ESOPHAGOGASTRODUODENOSCOPY TRANSORAL DIAGNOSTIC N/A 9/20/2018    EGD AND COLONOSCOPY WITH ANESTHESIA performed by Heidi Mahajan MD at [address] [facility]       Previous Medications    ABALOPARATIDE 3120 MCG/1.56ML SOPN    Inject 80 mcg into the skin daily    ALBUTEROL (PROVENTIL) (2.5 MG/3ML) 0.083% NEBULIZER SOLUTION    Take 3 mLs by nebulization every 6 hours as needed for Shortness of Breath    ALBUTEROL SULFATE HFA (VENTOLIN HFA) 108 (90 BASE) MCG/ACT INHALER    Inhale 2 puffs into the lungs every 6 hours as needed for Wheezing or Shortness of Breath    AMOXICILLIN-CLAVULANATE (AUGMENTIN) 875-125 MG PER TABLET    Take 1 tablet by mouth 2 times daily for 10 days    ATENOLOL (TENORMIN) 25 MG TABLET    TAKE 1 TABLET BY MOUTH DAILY (REPLACES PROPRANOLOL)    BISACODYL (DULCOLAX) 5 MG EC TABLET    Take 1 tablet by mouth daily as needed for Constipation    BUSPIRONE (BUSPAR) 30 MG TABLET    TAKE 1 TABLET TWICE DAILY    DOXYCYCLINE HYCLATE (VIBRAMYCIN) 100 MG CAPSULE    Take 1 capsule by mouth 2 times daily for 10 days    FLUOXETINE (PROZAC) 20 MG CAPSULE    TAKE 3 CAPSULES BY MOUTH DAILY    FLUTICASONE-SALMETEROL (WIXELA INHUB) 250-50 MCG/DOSE AEPB    INHALE 1 PUFF TWICE DAILY    GUAIFENESIN (MUCINEX) 600 MG EXTENDED RELEASE TABLET    Take 1 tablet by mouth 2 times daily as needed for Congestion    MONTELUKAST (SINGULAIR) 10 MG TABLET    TAKE 1 TABLET BY MOUTH EACH NIGHT    RANITIDINE (ZANTAC) 150 MG TABLET    TAKE 1 TABLET BY MOUTH TWICE DAILY AS NEEDED FOR HEARTBURN    SIMVASTATIN (ZOCOR) 20 MG TABLET    TAKE 1 TABLET EVERY EVENING    TERIPARATIDE, RECOMBINANT, (FORTEO) 600 MCG/2.4ML SOLN INJECTION    Inject 0.08 mLs into the skin daily One dose injected daily.     TIOTROPIUM (SPIRIVA HANDIHALER) 18 MCG INHALATION CAPSULE    INHALE THE CONTENTS OF 1 CAPSULE EVERY DAY    TRAZODONE (DESYREL) 150 MG TABLET    TAKE 2 TABLETS BY MOUTH AT BEDTIME       ALLERGIES     Meperidine and Demerol    FAMILY HISTORY       Family History   Problem Relation Age of Onset    Asthma Mother     Diabetes Mother     Emphysema Mother     Heart Failure Mother     Hypertension Mother     Heart Disease Mother     High Cholesterol Mother     Cancer Mother     Depression Mother     Diabetes Father     Emphysema Father     Heart Failure Father     Hypertension Father     Heart Disease Father 03/04/20 1515 03/04/20 1521 03/04/20 1521 03/04/20 1515 03/04/20 1515 03/04/20 1515 -- --   125/85 98 °F (36.7 °C) Oral 102 16 93 %         Physical Exam  Vitals signs and nursing note reviewed. Constitutional:       General: She is not in acute distress. Appearance: Normal appearance. HENT:      Head: Normocephalic and atraumatic. Nose: Nose normal. No congestion. Mouth/Throat:      Mouth: Mucous membranes are moist.   Eyes:      Conjunctiva/sclera: Conjunctivae normal.   Neck:      Musculoskeletal: Normal range of motion and neck supple. Cardiovascular:      Rate and Rhythm: Normal rate and regular rhythm. Pulses: Normal pulses. Heart sounds: Normal heart sounds. No murmur. Pulmonary:      Effort: Pulmonary effort is normal. No respiratory distress. Breath sounds: Normal breath sounds. Abdominal:      General: There is no distension. Palpations: Abdomen is soft. Tenderness: There is no abdominal tenderness. Musculoskeletal: Normal range of motion. General: No swelling or deformity. Skin:     General: Skin is warm and dry. Neurological:      General: No focal deficit present. Mental Status: She is alert and oriented to person, place, and time. DIAGNOSTIC RESULTS     EKG: All EKG's are interpreted by the Emergency Department Physician who either signs or Co-signs this chart in the absence of a cardiologist.    Normal sinus rhythm, rate 88, normal intervals, no STEMI, similar to previous EKG dated February 29, 2020    RADIOLOGY:     Interpretation per the Radiologist below, if available at the time of this note:    XR CHEST PORTABLE   Final Result   Interstitial lung disease with suspected superimposed airspace disease or   pneumonia in the lower lobes, greater on the right.                LABS:  Labs Reviewed   COMPREHENSIVE METABOLIC PANEL - Abnormal; Notable for the following components:       Result Value    Sodium 127 (*)     Chloride 89 (*)     Glucose 105 (*)     CREATININE <0.5 (*)     Albumin/Globulin Ratio 1.0 (*)     All other components within normal limits    Narrative:     Performed at:  [address],  [place], [address] [facility]   Phone [phone]   URINE RT REFLEX TO CULTURE - Abnormal; Notable for the following components:    Bilirubin Urine SMALL (*)     All other components within normal limits    Narrative:     Performed at:  [address],  [place], [address] [facility]   Phone [phone]   CBC    Narrative:     Performed at:  [address],  [place], [address] [facility]   Phone [phone]   LIPASE    Narrative:     Performed at:  [address],  [place], [address] [facility]   Phone [phone]   TROPONIN    Narrative:     Performed at:  [address],  [place], [address] [facility]   Phone [phone]   PROCALCITONIN    Narrative:     Performed at:  [address],  [place], [address] [facility]   Phone [phone]       All other labs were within normal range or not returned as of this dictation. EMERGENCY DEPARTMENT COURSE and DIFFERENTIAL DIAGNOSIS/MDM:   Vitals:    Vitals:    03/04/20 1614 03/04/20 1821 03/04/20 1955 03/04/20 2115   BP: 116/80 120/75 122/68 120/75   Pulse: 87 84 78 81   Resp: 17 18 16 20   Temp:       TempSrc:       SpO2: 92% 96% 94% 95%       Patient evaluated and previous record reviewed. Patient presents with generalized malaise, cough, nausea, vomiting, and diarrhea. Vital signs notable for afebrile, oxygen saturation 94% on 2 L nasal cannula. Physical exam as documented above. Lab work obtained and notable for white blood cell count within normal limits, bony tree Estelita.   Chest x-ray shows bilateral lower lobe pneumonia which is worse from patient's prior chest x-ray despite 5 days of outpatient antibiotic treatment with Augmentin and doxycycline. Patient given IV fluids and covered with Rocephin and azithromycin. Reevaluated patient she states that she still feels miserable and is having persistent nausea and vomiting does not feel comfortable with discharge home. Will admit patient for failure of outpatient treatment of pneumonia, intractable nausea vomiting, and hyponatremia. CONSULTS:  IP CONSULT TO HOSPITALIST    PROCEDURES:  Unless otherwise noted below, none     Procedures      FINAL IMPRESSION      1. Pneumonia due to organism    2. Non-intractable vomiting with nausea, unspecified vomiting type    3. Hyponatremia          DISPOSITION/PLAN   DISPOSITION Admitted 03/04/2020 08:11:42 PM      PATIENT REFERRED TO:  Holly Ludwig MD  [address] [address]  [phone]            DISCHARGE MEDICATIONS:  New Prescriptions    No medications on file     Controlled Substances Monitoring:     RX Monitoring 12/14/2018   Attestation The Prescription Monitoring Report for this patient was reviewed today. Periodic Controlled Substance Monitoring Possible medication side effects, risk of tolerance/dependence & alternative treatments discussed. ;Potential drug abuse or diversion identified, see note documentation.        (Please note that portions of this note were completed with a voice recognition program.  Efforts were made to edit the dictations but occasionally words are mis-transcribed.)    Uriel Horan MD (electronically signed)  Attending Emergency Physician            Ritesh Box MD  03/04/20 5988

## 2020-03-05 LAB
AMPHETAMINE SCREEN, URINE: ABNORMAL
ANION GAP SERPL CALCULATED.3IONS-SCNC: 12 MMOL/L (ref 3–16)
BARBITURATE SCREEN URINE: ABNORMAL
BASOPHILS ABSOLUTE: 0 K/UL (ref 0–0.2)
BASOPHILS RELATIVE PERCENT: 0.2 %
BENZODIAZEPINE SCREEN, URINE: ABNORMAL
BUN BLDV-MCNC: 8 MG/DL (ref 7–20)
CALCIUM SERPL-MCNC: 8.1 MG/DL (ref 8.3–10.6)
CANNABINOID SCREEN URINE: POSITIVE
CHLORIDE BLD-SCNC: 93 MMOL/L (ref 99–110)
CO2: 22 MMOL/L (ref 21–32)
COCAINE METABOLITE SCREEN URINE: ABNORMAL
CREAT SERPL-MCNC: <0.5 MG/DL (ref 0.6–1.2)
EOSINOPHILS ABSOLUTE: 0 K/UL (ref 0–0.6)
EOSINOPHILS RELATIVE PERCENT: 0 %
GFR AFRICAN AMERICAN: >60
GFR NON-AFRICAN AMERICAN: >60
GLUCOSE BLD-MCNC: 116 MG/DL (ref 70–99)
HCT VFR BLD CALC: 37.7 % (ref 36–48)
HEMOGLOBIN: 12.9 G/DL (ref 12–16)
LYMPHOCYTES ABSOLUTE: 0.6 K/UL (ref 1–5.1)
LYMPHOCYTES RELATIVE PERCENT: 13 %
Lab: ABNORMAL
MAGNESIUM: 1.5 MG/DL (ref 1.8–2.4)
MCH RBC QN AUTO: 30.7 PG (ref 26–34)
MCHC RBC AUTO-ENTMCNC: 34.3 G/DL (ref 31–36)
MCV RBC AUTO: 89.4 FL (ref 80–100)
METHADONE SCREEN, URINE: ABNORMAL
MONOCYTES ABSOLUTE: 0.3 K/UL (ref 0–1.3)
MONOCYTES RELATIVE PERCENT: 6.6 %
NEUTROPHILS ABSOLUTE: 4 K/UL (ref 1.7–7.7)
NEUTROPHILS RELATIVE PERCENT: 80.2 %
OPIATE SCREEN URINE: ABNORMAL
OXYCODONE URINE: ABNORMAL
PDW BLD-RTO: 13.2 % (ref 12.4–15.4)
PH UA: 7
PHENCYCLIDINE SCREEN URINE: ABNORMAL
PLATELET # BLD: 330 K/UL (ref 135–450)
PMV BLD AUTO: 7 FL (ref 5–10.5)
POTASSIUM SERPL-SCNC: 3.9 MMOL/L (ref 3.5–5.1)
PROPOXYPHENE SCREEN: ABNORMAL
RBC # BLD: 4.22 M/UL (ref 4–5.2)
SODIUM BLD-SCNC: 127 MMOL/L (ref 136–145)
WBC # BLD: 4.9 K/UL (ref 4–11)

## 2020-03-05 PROCEDURE — 80307 DRUG TEST PRSMV CHEM ANLYZR: CPT

## 2020-03-05 PROCEDURE — 0100U HC RESPIRPTHGN MULT REV TRANS & AMP PRB TECH 21 TRGT: CPT

## 2020-03-05 PROCEDURE — 6360000002 HC RX W HCPCS: Performed by: INTERNAL MEDICINE

## 2020-03-05 PROCEDURE — 6360000002 HC RX W HCPCS: Performed by: NURSE PRACTITIONER

## 2020-03-05 PROCEDURE — 83735 ASSAY OF MAGNESIUM: CPT

## 2020-03-05 PROCEDURE — 6370000000 HC RX 637 (ALT 250 FOR IP): Performed by: INTERNAL MEDICINE

## 2020-03-05 PROCEDURE — 85025 COMPLETE CBC W/AUTO DIFF WBC: CPT

## 2020-03-05 PROCEDURE — 2580000003 HC RX 258: Performed by: INTERNAL MEDICINE

## 2020-03-05 PROCEDURE — 99223 1ST HOSP IP/OBS HIGH 75: CPT | Performed by: INTERNAL MEDICINE

## 2020-03-05 PROCEDURE — 94761 N-INVAS EAR/PLS OXIMETRY MLT: CPT

## 2020-03-05 PROCEDURE — 1200000000 HC SEMI PRIVATE

## 2020-03-05 PROCEDURE — 80048 BASIC METABOLIC PNL TOTAL CA: CPT

## 2020-03-05 PROCEDURE — 94640 AIRWAY INHALATION TREATMENT: CPT

## 2020-03-05 PROCEDURE — 36415 COLL VENOUS BLD VENIPUNCTURE: CPT

## 2020-03-05 PROCEDURE — 2700000000 HC OXYGEN THERAPY PER DAY

## 2020-03-05 RX ORDER — MAGNESIUM SULFATE IN WATER 40 MG/ML
2 INJECTION, SOLUTION INTRAVENOUS ONCE
Status: COMPLETED | OUTPATIENT
Start: 2020-03-05 | End: 2020-03-05

## 2020-03-05 RX ADMIN — ATENOLOL 25 MG: 25 TABLET ORAL at 10:53

## 2020-03-05 RX ADMIN — FAMOTIDINE 20 MG: 20 TABLET, FILM COATED ORAL at 10:53

## 2020-03-05 RX ADMIN — Medication 10 ML: at 20:16

## 2020-03-05 RX ADMIN — ATORVASTATIN CALCIUM 10 MG: 10 TABLET, FILM COATED ORAL at 10:53

## 2020-03-05 RX ADMIN — ALBUTEROL SULFATE 2.5 MG: 2.5 SOLUTION RESPIRATORY (INHALATION) at 21:15

## 2020-03-05 RX ADMIN — Medication 2 PUFF: at 21:15

## 2020-03-05 RX ADMIN — METHYLPREDNISOLONE SODIUM SUCCINATE 40 MG: 40 INJECTION, POWDER, FOR SOLUTION INTRAMUSCULAR; INTRAVENOUS at 10:52

## 2020-03-05 RX ADMIN — TRAZODONE HYDROCHLORIDE 150 MG: 50 TABLET ORAL at 20:15

## 2020-03-05 RX ADMIN — ALBUTEROL SULFATE 2.5 MG: 2.5 SOLUTION RESPIRATORY (INHALATION) at 15:32

## 2020-03-05 RX ADMIN — FLUOXETINE 60 MG: 20 CAPSULE ORAL at 10:53

## 2020-03-05 RX ADMIN — ENOXAPARIN SODIUM 40 MG: 40 INJECTION SUBCUTANEOUS at 10:52

## 2020-03-05 RX ADMIN — BUSPIRONE HYDROCHLORIDE 30 MG: 5 TABLET ORAL at 10:53

## 2020-03-05 RX ADMIN — MONTELUKAST SODIUM 10 MG: 10 TABLET ORAL at 20:15

## 2020-03-05 RX ADMIN — ALBUTEROL SULFATE 2.5 MG: 2.5 SOLUTION RESPIRATORY (INHALATION) at 11:43

## 2020-03-05 RX ADMIN — ALBUTEROL SULFATE 2.5 MG: 2.5 SOLUTION RESPIRATORY (INHALATION) at 07:51

## 2020-03-05 RX ADMIN — FAMOTIDINE 20 MG: 20 TABLET, FILM COATED ORAL at 20:16

## 2020-03-05 RX ADMIN — METHYLPREDNISOLONE SODIUM SUCCINATE 40 MG: 40 INJECTION, POWDER, FOR SOLUTION INTRAMUSCULAR; INTRAVENOUS at 20:41

## 2020-03-05 RX ADMIN — TIOTROPIUM BROMIDE INHALATION SPRAY 2 PUFF: 3.12 SPRAY, METERED RESPIRATORY (INHALATION) at 07:51

## 2020-03-05 RX ADMIN — MAGNESIUM SULFATE HEPTAHYDRATE 2 G: 40 INJECTION, SOLUTION INTRAVENOUS at 13:42

## 2020-03-05 RX ADMIN — Medication 2 PUFF: at 07:52

## 2020-03-05 RX ADMIN — Medication 1 MG: at 20:15

## 2020-03-05 RX ADMIN — BUSPIRONE HYDROCHLORIDE 30 MG: 5 TABLET ORAL at 20:15

## 2020-03-05 ASSESSMENT — ENCOUNTER SYMPTOMS
SORE THROAT: 0
EYE DISCHARGE: 0
CHOKING: 0
EYE PAIN: 0
SHORTNESS OF BREATH: 1
DIARRHEA: 0
CHEST TIGHTNESS: 0
CONSTIPATION: 0
EYE ITCHING: 0
STRIDOR: 0
COUGH: 1
ABDOMINAL PAIN: 0
VOICE CHANGE: 0

## 2020-03-05 NOTE — ED NOTES
Performed straight catheterization on pt using sterile technique. Was able to obtain clean sample which was sent off to lab. RN made aware.       Ledy Goodman  03/04/20 2003

## 2020-03-05 NOTE — ED NOTES
[facility] @ [facility]   RE: Worsening pneumonia despite outpatient treatment, nausea and vomiting, hyponatremia  DR. Marla Harris @ [facility]  03/04/20 2010

## 2020-03-05 NOTE — H&P
One dose injected daily. 9/27/19 7/16/20  Aleisha Pineda MD   atenolol (TENORMIN) 25 MG tablet TAKE 1 TABLET BY MOUTH DAILY (REPLACES PROPRANOLOL) 9/13/19 9/12/20  Khurram Downing MD   tiotropium (SPIRIVA HANDIHALER) 18 MCG inhalation capsule INHALE THE CONTENTS OF 1 CAPSULE EVERY DAY 8/29/19   Barbara Iqbal MD   albuterol sulfate HFA (VENTOLIN HFA) 108 (90 Base) MCG/ACT inhaler Inhale 2 puffs into the lungs every 6 hours as needed for Wheezing or Shortness of Breath 8/29/19   Barbara Iqbal MD   albuterol (PROVENTIL) (2.5 MG/3ML) 0.083% nebulizer solution Take 3 mLs by nebulization every 6 hours as needed for Shortness of Breath 12/24/18   Khurram Downing MD   montelukast (SINGULAIR) 10 MG tablet TAKE 1 TABLET BY MOUTH EACH NIGHT 12/12/18   Barbara Iqbal MD   Abaloparatide 3120 MCG/1.56ML SOPN Inject 80 mcg into the skin daily 11/9/18   Aleisha Pineda MD   bisacodyl (DULCOLAX) 5 MG EC tablet Take 1 tablet by mouth daily as needed for Constipation 9/17/18   Savanna Angel MD   guaiFENesin [facility]) 600 MG extended release tablet Take 1 tablet by mouth 2 times daily as needed for Congestion 8/22/18   Barbara Iqbal MD       Allergies:   Meperidine and Demerol    Social:   reports that she has been smoking cigarettes. She has a 40.00 pack-year smoking history. She has never used smokeless tobacco.   reports no history of alcohol use.   Social History     Substance and Sexual Activity   Drug Use Yes    Types: Marijuana    Comment: Daily       Family History   Problem Relation Age of Onset   Martha Mcallister Asthma Mother     Diabetes Mother     Emphysema Mother     Heart Failure Mother     Hypertension Mother     Heart Disease Mother     High Cholesterol Mother    Martha Mcallister Cancer Mother     Depression Mother     Diabetes Father     Emphysema Father     Heart Failure Father     Hypertension Father     Heart Disease Father     High Cholesterol Father     Substance Abuse Father     Emphysema Paternal Grandfather     Diabetes Sister     High Cholesterol Sister     Vision Loss Maternal Uncle        PHYSICAL EXAM:  I performed this physical examination. Vitals:  Patient Vitals for the past 24 hrs:   BP Temp Temp src Pulse Resp SpO2   03/04/20 1955 122/68 -- -- 78 16 94 %   03/04/20 1821 120/75 -- -- 84 18 96 %   03/04/20 1614 116/80 -- -- 87 17 92 %   03/04/20 1521 -- 98 °F (36.7 °C) Oral -- -- --   03/04/20 1515 125/85 -- -- 102 16 93 %       Intake/Output Summary (Last 24 hours) at 3/4/2020 2013  Last data filed at 3/4/2020 1821  Gross per 24 hour   Intake 1000 ml   Output --   Net 1000 ml     O2 2L/min via NC    GEN:  Appearance:  Age appropriate female. Appears acutely ill w/ clear increase work of breathing. Appears tired. Level of Consciousness:  alert . Orientation:  Full    HEENT: Sclera anicteric.  no conjunctival chemosis. moist mucus membranes. no specific or diagnostic oral lesions. NECK:  no signs of meningismus. Jugular veins not distended. Carotid pulses  2+.  no cervical lymphadenopathy. no thyromegaly. CV:  regular rhythm. normal S1 & S2.    no murmur. no rub.  no gallop. CHEST: Hyperinflated. Exaggerated thoracic kyphosis. PULM:  Chest excursion is symmetric. Breath sounds are generally diminished and bronchial in quality. Crackles are scattered throughout all fields. Wheezes are scattered throughout all fields. Rubs are absent. AB:  Abdominal shape is normal.  Bowel sounds are hypoactive but present. Generally soft to palpation. no tenderness is present. no involuntary guarding. no rebound guarding. Normal respirophasic abdominal wall motion. EXTR:  Skin is warm. Capillary refill brisk. no specific or pathognomic rash. no clubbing. non pitting edema. no active wound or ulcer.     LABS:  Lab Results   Component Value Date    WBC 5.0 03/04/2020    HGB 13.5 03/04/2020    HCT 38.9 03/04/2020    MCV 89.5 03/04/2020     03/04/2020     Lab Results Component Value Date    CREATININE <0.5 (L) 03/04/2020    BUN 9 03/04/2020     (L) 03/04/2020    K 3.5 03/04/2020    CL 89 (L) 03/04/2020    CO2 23 03/04/2020     Lab Results   Component Value Date    ALT 11 03/04/2020    AST 25 03/04/2020    ALKPHOS 80 03/04/2020    BILITOT <0.2 03/04/2020     Lab Results   Component Value Date    CKTOTAL 39 04/04/2016    TROPONINI <0.01 03/04/2020     No results for input(s): PHART, TRA3EEF, PO2ART in the last 72 hours. IMAGING:  Xr Chest Standard (2 Vw)    Result Date: 2/29/2020  EXAMINATION: TWO XRAY VIEWS OF THE CHEST 2/29/2020 3:34 pm COMPARISON: 01/16/2020 HISTORY: ORDERING SYSTEM PROVIDED HISTORY: chest pain TECHNOLOGIST PROVIDED HISTORY: Reason for exam:->chest pain Reason for Exam: Chest pains/pressure; SOB; Productive cough Acuity: Acute Type of Exam: Initial FINDINGS: Cardiomediastinal silhouette is stable. New airspace opacity left lower lobe. No pulmonary vascular congestion or edema. No pleural effusion. New left lower lobe airspace opacity could represent subsegmental atelectasis or pneumonia     Xr Chest Portable    Result Date: 3/4/2020  EXAMINATION: ONE XRAY VIEW OF THE CHEST 3/4/2020 4:12 pm COMPARISON: Prior study(s) most recent 02/29/2020. HISTORY: ORDERING SYSTEM PROVIDED HISTORY: recently diagnosed pneumonia, patient feels worse TECHNOLOGIST PROVIDED HISTORY: Reason for exam:->recently diagnosed pneumonia, patient feels worse Reason for Exam: dx with pneumonia a few days ago; pt feels worse Acuity: Acute Type of Exam: Initial FINDINGS: The heart is normal size. Peribronchial thickening is noted. There is extensive interstitial densities throughout the lungs bilaterally. This likely represents moderate interstitial lung disease. There is question superimposed airspace disease in the lower lungs, greater right lower lobe. There is scoliosis convex to the right. Patient also somewhat rotated to the left.      Interstitial lung disease with suspected superimposed airspace disease or pneumonia in the lower lobes, greater on the right. I directly reviewed all recent imaging studies as well as pertinent prior studies. Radiology reports may or may not be available at the time of my review. My comments and findings are as follows: No additional comment. EKG:  New and pertinent prior tracings were directly reviewed. My interpretation is as follows:  Normal sinus rhythm. Active Hospital Problems    Diagnosis Date Noted    Stage 3 severe COPD by GOLD classification ([facility] [address] [J44.9] 12/03/2009     Priority: Medium    Acute on chronic respiratory failure with hypoxia ([facility] [address] [J96.21] 03/04/2020    [facility] [J44.1] 12/25/2019    ILD (interstitial lung disease) ([facility] [J84.9] 04/06/2016       ASSESSMENT & PLAN  Acute on chronic respiratory failure, COPD / ILD exacerbation, Tobacco abuse  -  Has been receiving abx and may not require further abx treatment. Check procalcitonin. If > 0.25 will obtain additional cultures and add empiric abx. -  Start solumedrol 40mg IV q12h. Continue advair and spiriva. JACKY/SAAC's made available prn. Antitussives prn. GERD  -  Continue H2RA. DVT prophylaxis: SCDs,   Code Status:  Full  Disposition:  Inpatient. Anticipated discharge to home in 3-5 days.     Kavita Garcia MD

## 2020-03-05 NOTE — CARE COORDINATION
CASE MANAGEMENT INITIAL ASSESSMENT      Reviewed chart and met with patient today, re: Triggered by age and diagnosis   Explained Case Management role/services. To patient     Family present: None  Primary contact information: Che Dorantes [phone]    Admit date/status: inpatient 3/4/20  Diagnosis: Acute on chronic respiratory failure/ COPD exacerbation  Is this a Readmission?: no    Insurance: [employer] and [employer]  Precert required for SNF - Y    3 night stay required -  N    Living arrangements, Adls, care needs, prior to admission: Lives with spouse at home and uses a cane as needed    Transportation: TBD    Durable Medical Equipment at home: Walker__Cane__RTS__ BSC__Shower Chair__    02_X Nocturnal, she thinks it is [employer] biut states she has had it for so long she cannot remember company. She wears 2 liters NC    _ HHN__ CPAP__  BiPap__  Hospital Bed__ W/C___ Other__________    Services in the home and/or outpatient, prior to admission: None    PT/OT recs: Not seen this admission    Hospital Exemption Notification (HEN): N/A    Barriers to discharge: None identified    Plan/comments: To return home without services. She states she and her spouse manage well at home and she does not anticipate need for home care.     ECOC on chart for MD signature

## 2020-03-05 NOTE — PROGRESS NOTES
3.5 3.9   CL 89* 93*   CO2 23 22   BUN 9 8   CREATININE <0.5* <0.5*   CALCIUM 8.6 8.1*     Recent Labs     03/04/20  1609   AST 25   ALT 11   BILITOT <0.2   ALKPHOS 80     No results for input(s): INR in the last 72 hours. Recent Labs     03/04/20  1609   TROPONINI <0.01       Urinalysis:      Lab Results   Component Value Date    NITRU Negative 03/04/2020    WBCUA 0-3 04/13/2012    RBCUA 3-5 04/13/2012    BLOODU Negative 03/04/2020    SPECGRAV 1.025 03/04/2020    GLUCOSEU Negative 03/04/2020    GLUCOSEU NEGATIVE 04/13/2012       Radiology:  XR CHEST PORTABLE   Final Result   Interstitial lung disease with suspected superimposed airspace disease or   pneumonia in the lower lobes, greater on the right. Assessment/Plan:    Active Hospital Problems    Diagnosis Date Noted    Stage 3 severe COPD by GOLD classification ([facility] [address] [J44.9] 12/03/2009     Priority: Medium    Acute on chronic respiratory failure with hypoxia ([address] [J96.21] 03/04/2020    COPD with acute exacerbation ([address] [J44.1] 12/25/2019    Heartburn [R12]     ILD (interstitial lung disease) ([address] [J84.9] 04/06/2016    Tobacco dependence [F17.200] 09/27/2014     Acute on Chronic Hypoxic Resp Failure 2/2 COPD/ILD Exacerbation. Treated for pna outpt. Procal neg on admission. Imaging:CXR:Interstitial lung disease with suspected superimposed airspace disease or  pneumonia in the lower lobes, greater on the right. Cultures: Rapid Flu Neg, Pending Molecular. Abx: Azithro/Rocephin given in the ED. Holding, pending molecular panel  Labs: CBC, BMP  Meds: Nebs, Solumedrol  Consults: Pulm  *Add droplet until Flu is R/O    Tobacco Abuse  -nicotine patch, counseled on risks    DVT Prophylaxis: Lovenox  Diet: DIET CARDIAC;  Code Status: Full Code    PT/OT Eval Status: Not needed given independent ambulatory status.        Dispo - 1-2 days    Fatuma Leone, APRN - CNP

## 2020-03-05 NOTE — CONSULTS
2/12/15    T8 Kyphoplasty    AR COLONOSCOPY FLX DX W/COLLJ SPEC WHEN PFRMD N/A 9/20/2018    EGD AND COLONOSCOPY WITH ANESTHESIA performed by Jo Ann Bolton MD at [address] [facility] ESOPHAGOGASTRODUODENOSCOPY TRANSORAL DIAGNOSTIC N/A 9/20/2018    EGD AND COLONOSCOPY WITH ANESTHESIA performed by Jo Ann Bolton MD at [address] [facility]         family history includes Asthma in her mother; Cancer in her mother; Depression in her mother; Diabetes in her father, mother, and sister; Emphysema in her father, mother, and paternal grandfather; Heart Disease in her father and mother; Heart Failure in her father and mother; High Cholesterol in her father, mother, and sister; Hypertension in her father and mother; Substance Abuse in her father; Vision Loss in her maternal uncle.     Social History     Tobacco Use    Smoking status: Current Every Day Smoker     Packs/day: 1.00     Years: 40.00     Pack years: 40.00     Types: Cigarettes    Smokeless tobacco: Never Used    Tobacco comment: 2/28/19 - smokes 3-4 cigs daily   Substance Use Topics    Alcohol use: No     Alcohol/week: 0.0 standard drinks        Scheduled Meds:   magnesium sulfate  2 g Intravenous Once    methylPREDNISolone  40 mg Intravenous Q12H    atenolol  25 mg Oral Daily    FLUoxetine  60 mg Oral Daily    montelukast  10 mg Oral Nightly    famotidine  20 mg Oral BID    tiotropium  2 puff Inhalation Daily    budesonide-formoterol  2 puff Inhalation BID    atorvastatin  10 mg Oral Daily    enoxaparin  40 mg Subcutaneous Daily    busPIRone  30 mg Oral BID    albuterol  2.5 mg Nebulization Q4H WA       Continuous Infusions:      PRN Meds:   traZODone, sodium chloride flush, potassium chloride **OR** potassium alternative oral replacement **OR** potassium chloride, magnesium sulfate, acetaminophen **OR** acetaminophen, promethazine **OR** ondansetron, melatonin ER, aluminum & magnesium hydroxide-simethicone, benzonatate, ipratropium-albuterol   Inpatient consult to Pulmonology  Consult performed by: Sarah Rockwell MD  Consult ordered by: SHAUN Huizar - CNP        ALLERGIES:  Patient is allergic to meperidine and demerol. REVIEW OF SYSTEMS:  Review of Systems   Constitutional: Negative for chills, fever and unexpected weight change. HENT: Negative for mouth sores, sore throat and voice change. Eyes: Negative for pain, discharge and itching. Respiratory: Positive for cough and shortness of breath. Negative for choking, chest tightness and stridor. Cardiovascular: Negative for chest pain, palpitations and leg swelling. Gastrointestinal: Negative for abdominal pain, constipation and diarrhea. Endocrine: Negative for cold intolerance, heat intolerance and polydipsia. Genitourinary: Negative for dysuria, frequency and hematuria. Musculoskeletal: Negative for gait problem, joint swelling and neck stiffness. Neurological: Negative for dizziness, numbness and headaches. Psychiatric/Behavioral: Negative for agitation, confusion and hallucinations. Objective:   PHYSICAL EXAM:  BP (!) 149/66   Pulse 82   Temp 98 °F (36.7 °C) (Oral)   Resp 16   Ht 5' 5\" (1.651 m)   Wt 124 lb 1.6 oz (56.3 kg)   SpO2 92%   BMI 20.65 kg/m²    Physical Exam  Constitutional:       General: She is not in acute distress. Appearance: She is well-developed. She is not diaphoretic. HENT:      Head: Normocephalic and atraumatic. Mouth/Throat:      Pharynx: No oropharyngeal exudate. Eyes:      Pupils: Pupils are equal, round, and reactive to light. Neck:      Musculoskeletal: Neck supple. Vascular: No JVD. Cardiovascular:      Heart sounds: Normal heart sounds. No murmur. No friction rub. No gallop. Pulmonary:      Effort: Pulmonary effort is normal.      Breath sounds: No wheezing or rales. Abdominal:      General: Bowel sounds are normal. There is no distension. Palpations: Abdomen is soft. Tenderness: There is no abdominal tenderness. Musculoskeletal: Normal range of motion. Lymphadenopathy:      Cervical: No cervical adenopathy. Skin:     General: Skin is warm and dry. Findings: No rash. Neurological:      Mental Status: She is alert. Cranial Nerves: No cranial nerve deficit. Comments: CN 2-12 grossly intact            Data Reviewed:   LABS:  CBC:   Recent Labs     03/04/20  1609 03/05/20  0613   WBC 5.0 4.9   HGB 13.5 12.9   HCT 38.9 37.7   MCV 89.5 89.4    330     BMP:   Recent Labs     03/04/20  1609 03/05/20  0613   * 127*   K 3.5 3.9   CL 89* 93*   CO2 23 22   BUN 9 8   CREATININE <0.5* <0.5*     LIVER PROFILE:   Recent Labs     03/04/20  1609   AST 25   ALT 11   LIPASE 55.0   BILITOT <0.2   ALKPHOS 80     PT/INR: No results for input(s): PROTIME, INR in the last 72 hours. APTT:No results for input(s): APTT in the last 72 hours. UA:  Recent Labs     03/04/20  1937   COLORU Yellow   PHUR 6.5   CLARITYU Clear   SPECGRAV 1.025   LEUKOCYTESUR Negative   UROBILINOGEN 0.2   BILIRUBINUR SMALL*   BLOODU Negative   GLUCOSEU Negative     No results for input(s): PHART, QTO5VRA, PO2ART in the last 72 hours. CXR personally reviewed, superimposed right sided infiltrate on top of interstitial changes consistent with ILD           Assessment:     1. Acute on chronic resp failure, hypoxic  2. ILD with acute exac  3. COPD exac   4.  Tobacco dependence    Plan:      -Steroids  -Empiric atb  -Bronch in AM  -Bronchodilators, pulm toileting  -Wean off oxygen  -Smoking cessation counseling    Ez Sands MD

## 2020-03-05 NOTE — ED NOTES
Beside report to C3 RN. Patient transported via wheelchair from dept in stable condition.      Linda Rhodes RN  03/04/20 7825

## 2020-03-06 LAB
ANION GAP SERPL CALCULATED.3IONS-SCNC: 13 MMOL/L (ref 3–16)
BASOPHILS ABSOLUTE: 0 K/UL (ref 0–0.2)
BASOPHILS RELATIVE PERCENT: 0.1 %
BUN BLDV-MCNC: 8 MG/DL (ref 7–20)
CALCIUM SERPL-MCNC: 8.5 MG/DL (ref 8.3–10.6)
CHLORIDE BLD-SCNC: 96 MMOL/L (ref 99–110)
CO2: 22 MMOL/L (ref 21–32)
CREAT SERPL-MCNC: <0.5 MG/DL (ref 0.6–1.2)
EOSINOPHILS ABSOLUTE: 0 K/UL (ref 0–0.6)
EOSINOPHILS RELATIVE PERCENT: 0 %
GFR AFRICAN AMERICAN: >60
GFR NON-AFRICAN AMERICAN: >60
GLUCOSE BLD-MCNC: 130 MG/DL (ref 70–99)
HCT VFR BLD CALC: 38.7 % (ref 36–48)
HEMOGLOBIN: 13.3 G/DL (ref 12–16)
LYMPHOCYTES ABSOLUTE: 0.5 K/UL (ref 1–5.1)
LYMPHOCYTES RELATIVE PERCENT: 11.7 %
MAGNESIUM: 1.9 MG/DL (ref 1.8–2.4)
MCH RBC QN AUTO: 30.7 PG (ref 26–34)
MCHC RBC AUTO-ENTMCNC: 34.2 G/DL (ref 31–36)
MCV RBC AUTO: 89.8 FL (ref 80–100)
MONOCYTES ABSOLUTE: 0.4 K/UL (ref 0–1.3)
MONOCYTES RELATIVE PERCENT: 9.2 %
NEUTROPHILS ABSOLUTE: 3.6 K/UL (ref 1.7–7.7)
NEUTROPHILS RELATIVE PERCENT: 79 %
PDW BLD-RTO: 13.4 % (ref 12.4–15.4)
PLATELET # BLD: 380 K/UL (ref 135–450)
PMV BLD AUTO: 7 FL (ref 5–10.5)
POTASSIUM SERPL-SCNC: 3.8 MMOL/L (ref 3.5–5.1)
RBC # BLD: 4.31 M/UL (ref 4–5.2)
SODIUM BLD-SCNC: 131 MMOL/L (ref 136–145)
WBC # BLD: 4.6 K/UL (ref 4–11)

## 2020-03-06 PROCEDURE — 87015 SPECIMEN INFECT AGNT CONCNTJ: CPT

## 2020-03-06 PROCEDURE — 88305 TISSUE EXAM BY PATHOLOGIST: CPT

## 2020-03-06 PROCEDURE — 87070 CULTURE OTHR SPECIMN AEROBIC: CPT

## 2020-03-06 PROCEDURE — 1200000000 HC SEMI PRIVATE

## 2020-03-06 PROCEDURE — 88312 SPECIAL STAINS GROUP 1: CPT

## 2020-03-06 PROCEDURE — 7100000010 HC PHASE II RECOVERY - FIRST 15 MIN: Performed by: INTERNAL MEDICINE

## 2020-03-06 PROCEDURE — 6360000002 HC RX W HCPCS: Performed by: INTERNAL MEDICINE

## 2020-03-06 PROCEDURE — 2500000003 HC RX 250 WO HCPCS: Performed by: INTERNAL MEDICINE

## 2020-03-06 PROCEDURE — 3609010800 HC BRONCHOSCOPY ALVEOLAR LAVAGE: Performed by: INTERNAL MEDICINE

## 2020-03-06 PROCEDURE — 99152 MOD SED SAME PHYS/QHP 5/>YRS: CPT | Performed by: INTERNAL MEDICINE

## 2020-03-06 PROCEDURE — 7100000011 HC PHASE II RECOVERY - ADDTL 15 MIN: Performed by: INTERNAL MEDICINE

## 2020-03-06 PROCEDURE — 2580000003 HC RX 258: Performed by: INTERNAL MEDICINE

## 2020-03-06 PROCEDURE — 0B938ZZ DRAINAGE OF RIGHT MAIN BRONCHUS, VIA NATURAL OR ARTIFICIAL OPENING ENDOSCOPIC: ICD-10-PCS | Performed by: INTERNAL MEDICINE

## 2020-03-06 PROCEDURE — 0B9D8ZX DRAINAGE OF RIGHT MIDDLE LUNG LOBE, VIA NATURAL OR ARTIFICIAL OPENING ENDOSCOPIC, DIAGNOSTIC: ICD-10-PCS | Performed by: INTERNAL MEDICINE

## 2020-03-06 PROCEDURE — 6370000000 HC RX 637 (ALT 250 FOR IP): Performed by: INTERNAL MEDICINE

## 2020-03-06 PROCEDURE — 0B978ZZ DRAINAGE OF LEFT MAIN BRONCHUS, VIA NATURAL OR ARTIFICIAL OPENING ENDOSCOPIC: ICD-10-PCS | Performed by: INTERNAL MEDICINE

## 2020-03-06 PROCEDURE — 3609010900 HC BRONCHOSCOPY THERAPUTIC ASPIRATION INITIAL: Performed by: INTERNAL MEDICINE

## 2020-03-06 PROCEDURE — 87206 SMEAR FLUORESCENT/ACID STAI: CPT

## 2020-03-06 PROCEDURE — 87106 FUNGI IDENTIFICATION YEAST: CPT

## 2020-03-06 PROCEDURE — 87631 RESP VIRUS 3-5 TARGETS: CPT

## 2020-03-06 PROCEDURE — 94761 N-INVAS EAR/PLS OXIMETRY MLT: CPT

## 2020-03-06 PROCEDURE — 87116 MYCOBACTERIA CULTURE: CPT

## 2020-03-06 PROCEDURE — 31624 DX BRONCHOSCOPE/LAVAGE: CPT | Performed by: INTERNAL MEDICINE

## 2020-03-06 PROCEDURE — 85025 COMPLETE CBC W/AUTO DIFF WBC: CPT

## 2020-03-06 PROCEDURE — 94640 AIRWAY INHALATION TREATMENT: CPT

## 2020-03-06 PROCEDURE — 2700000000 HC OXYGEN THERAPY PER DAY

## 2020-03-06 PROCEDURE — 88112 CYTOPATH CELL ENHANCE TECH: CPT

## 2020-03-06 PROCEDURE — 99233 SBSQ HOSP IP/OBS HIGH 50: CPT | Performed by: INTERNAL MEDICINE

## 2020-03-06 PROCEDURE — 80048 BASIC METABOLIC PNL TOTAL CA: CPT

## 2020-03-06 PROCEDURE — 87205 SMEAR GRAM STAIN: CPT

## 2020-03-06 PROCEDURE — 0B918ZZ DRAINAGE OF TRACHEA, VIA NATURAL OR ARTIFICIAL OPENING ENDOSCOPIC: ICD-10-PCS | Performed by: INTERNAL MEDICINE

## 2020-03-06 PROCEDURE — 31645 BRNCHSC W/THER ASPIR 1ST: CPT | Performed by: INTERNAL MEDICINE

## 2020-03-06 PROCEDURE — 83735 ASSAY OF MAGNESIUM: CPT

## 2020-03-06 PROCEDURE — 2709999900 HC NON-CHARGEABLE SUPPLY: Performed by: INTERNAL MEDICINE

## 2020-03-06 PROCEDURE — 87102 FUNGUS ISOLATION CULTURE: CPT

## 2020-03-06 PROCEDURE — 36415 COLL VENOUS BLD VENIPUNCTURE: CPT

## 2020-03-06 RX ORDER — MIDAZOLAM HYDROCHLORIDE 5 MG/ML
INJECTION INTRAMUSCULAR; INTRAVENOUS PRN
Status: DISCONTINUED | OUTPATIENT
Start: 2020-03-06 | End: 2020-03-06 | Stop reason: ALTCHOICE

## 2020-03-06 RX ORDER — MAGNESIUM HYDROXIDE 1200 MG/15ML
LIQUID ORAL CONTINUOUS PRN
Status: COMPLETED | OUTPATIENT
Start: 2020-03-06 | End: 2020-03-06

## 2020-03-06 RX ORDER — FENTANYL CITRATE 50 UG/ML
INJECTION, SOLUTION INTRAMUSCULAR; INTRAVENOUS PRN
Status: DISCONTINUED | OUTPATIENT
Start: 2020-03-06 | End: 2020-03-06 | Stop reason: ALTCHOICE

## 2020-03-06 RX ORDER — LIDOCAINE HYDROCHLORIDE 20 MG/ML
INJECTION, SOLUTION INFILTRATION; PERINEURAL PRN
Status: DISCONTINUED | OUTPATIENT
Start: 2020-03-06 | End: 2020-03-06 | Stop reason: ALTCHOICE

## 2020-03-06 RX ADMIN — METHYLPREDNISOLONE SODIUM SUCCINATE 40 MG: 40 INJECTION, POWDER, FOR SOLUTION INTRAMUSCULAR; INTRAVENOUS at 10:38

## 2020-03-06 RX ADMIN — METHYLPREDNISOLONE SODIUM SUCCINATE 40 MG: 40 INJECTION, POWDER, FOR SOLUTION INTRAMUSCULAR; INTRAVENOUS at 20:51

## 2020-03-06 RX ADMIN — ALBUTEROL SULFATE 2.5 MG: 2.5 SOLUTION RESPIRATORY (INHALATION) at 15:15

## 2020-03-06 RX ADMIN — TRAZODONE HYDROCHLORIDE 150 MG: 50 TABLET ORAL at 20:51

## 2020-03-06 RX ADMIN — FAMOTIDINE 20 MG: 20 TABLET, FILM COATED ORAL at 20:51

## 2020-03-06 RX ADMIN — Medication 1 MG: at 20:51

## 2020-03-06 RX ADMIN — ALBUTEROL SULFATE 2.5 MG: 2.5 SOLUTION RESPIRATORY (INHALATION) at 09:27

## 2020-03-06 RX ADMIN — BUSPIRONE HYDROCHLORIDE 30 MG: 5 TABLET ORAL at 20:51

## 2020-03-06 RX ADMIN — Medication 10 ML: at 10:38

## 2020-03-06 RX ADMIN — Medication 2 PUFF: at 19:18

## 2020-03-06 RX ADMIN — Medication 2 PUFF: at 09:29

## 2020-03-06 RX ADMIN — ALBUTEROL SULFATE 2.5 MG: 2.5 SOLUTION RESPIRATORY (INHALATION) at 19:17

## 2020-03-06 RX ADMIN — MONTELUKAST SODIUM 10 MG: 10 TABLET ORAL at 20:51

## 2020-03-06 ASSESSMENT — PAIN SCALES - GENERAL
PAINLEVEL_OUTOF10: 0
PAINLEVEL_OUTOF10: 0

## 2020-03-06 NOTE — PRE SEDATION
Sedation Pre-Procedure Note    Patient Name: Xavier Pandya   YOB: 1955  Room/Bed: 0330/0330-01  Medical Record Number: [id number]  Date: 3/6/2020   Time: 9:40 AM       Indication:  Resp Failure    Consent: I have discussed with the patient and/or the patient representative the indication, alternatives, and the possible risks and/or complications of the planned procedure and the anesthesia methods. The patient and/or patient representative appear to understand and agree to proceed. Vital Signs:   Vitals:    03/06/20 0931   BP:    Pulse:    Resp:    Temp:    SpO2: 95%       Past Medical History:   has a past medical history of Allergic rhinitis, Anxiety, Arthritis, Aspiration pneumonia ([address] Asthma, Bronchitis chronic, COPD (chronic obstructive pulmonary disease) ([address] Depression, Drug abuse, cocaine type ([address] Emphysema of lung ([address] Fibromyalgia, GERD (gastroesophageal reflux disease), Hyperlipidemia, Lung disease, On home oxygen therapy, Osteoporosis, Pneumonia, Polysubstance dependence ([address] 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[address] Pulmonary fibrosis ([address] Pulmonary infiltrate, Pulmonary nodule, and Tobacco abuse. Past Surgical History:   has a past surgical history that includes bronchoscopy; Hysterectomy; Salivary gland surgery; Endoscopy, colon, diagnostic; other surgical history (2/12/15); Colonoscopy (2012); bladder repair; pr colonoscopy flx dx w/collj spec when pfrmd (N/A, 9/20/2018); pr esophagogastroduodenoscopy transoral diagnostic (N/A, 9/20/2018); and Esophagus dilation (9/20/2018).     Medications:   Scheduled Meds:    methylPREDNISolone  40 mg Intravenous Q12H    atenolol  25 mg Oral Daily    FLUoxetine  60 mg Oral Daily    montelukast  10 mg Oral Nightly    famotidine  20 mg Oral BID    tiotropium  2 puff Inhalation Daily    budesonide-formoterol  2 puff Inhalation BID    atorvastatin  10 mg Oral Daily    enoxaparin  40 mg Subcutaneous Daily    every 6 hours as needed for Wheezing or Shortness of Breath 8/29/19   Duyen Le MD   albuterol (PROVENTIL) (2.5 MG/3ML) 0.083% nebulizer solution Take 3 mLs by nebulization every 6 hours as needed for Shortness of Breath 12/24/18   Cat Chacon MD   montelukast (SINGULAIR) 10 MG tablet TAKE 1 TABLET BY MOUTH EACH NIGHT 12/12/18   Duyen Le MD   Abaloparatide 3120 MCG/1.56ML SOPN Inject 80 mcg into the skin daily 11/9/18   Tracey Sinclair MD   bisacodyl (DULCOLAX) 5 MG EC tablet Take 1 tablet by mouth daily as needed for Constipation 9/17/18   Aminata Garcia MD   guaiFENesin [facility]) 600 MG extended release tablet Take 1 tablet by mouth 2 times daily as needed for Congestion 8/22/18   Duyen Le MD     Coumadin Use Last 7 Days:  no  Antiplatelet drug therapy use last 7 days: no  Other anticoagulant use last 7 days: no  Additional Medication Information:        Pre-Sedation Documentation and Exam:   I have reviewed the patient's history and review of systems.     Mallampati Airway Assessment:  Mallampati Class II - (soft palate, fauces & uvula are visible)    Prior History of Anesthesia Complications:   none    ASA Classification:  Class 3 - A patient with severe systemic disease that limits activity but is not incapacitating    Sedation/ Anesthesia Plan:   intravenous sedation    Medications Planned:   midazolam (Versed) intravenously and fentanyl intravenously    Patient is an appropriate candidate for plan of sedation: yes    Electronically signed by Jhoana Humphrey MD on 3/6/2020 at 9:40 AM

## 2020-03-06 NOTE — PLAN OF CARE
Pt remained free from falls during shift. Pt has skid resistant footwear on feet. Pt is alert and oriented. Pt was observed walking with a steady gain and calls out appropriately. Pt has call light and bedside table within reach. Pt is checked on every hour by staff.

## 2020-03-06 NOTE — PROGRESS NOTES
Hospitalist Progress Note      PCP: Tamara Lacey MD    Date of Admission: 3/4/2020    Chief Complaint:   Chief Complaint   Patient presents with    Emesis     Patient reports diarrhea and vomitng \"all night\" after being diagnosed pneumonia 2 couple of days ago and being started on ABX. Denies pain. Respirations are labored. O2 sat 92 % on RA     Hospital Course: Vianca Jung is a 62 y.o. female. She has a h/o severe COPD and ILD, likely smoking-related pulmonary fibrosis. At baseline she only uses O2 2-3L/min at night.     She presents for above-baseline dyspnea worsening over about 4 to 5 days. She is now dyspneic at rest and reports feeling very fatigued. She has not been able to sleep much. She was seen in the ER on . Brennen Mendoza She was discharged with rx's for augmentin and doxycycline, no prednisone. Now dyspneic at rest.  Minimal cough at this point which has been nonproductive. Denies hemoptysis and pleuritic chest pain. Appetite is also diminished. Does describe some fine rigors, chills, and sometimes nausea.     Patient also relates she has developed runny diarrhea since starting outpatient antibiotics.     Patient denies ever requiring intubation and vent support for exacerbations of her lung disease. She has required BiPAP support in the past though    Subjective: Worsening cough, pending bronch today  DC plan:  Home    No new medical concerns  Bedside rounding with nursing completed.     Medications:  Reviewed    Infusion Medications   Scheduled Medications    methylPREDNISolone  40 mg Intravenous Q12H    atenolol  25 mg Oral Daily    FLUoxetine  60 mg Oral Daily    montelukast  10 mg Oral Nightly    famotidine  20 mg Oral BID    tiotropium  2 puff Inhalation Daily    budesonide-formoterol  2 puff Inhalation BID    atorvastatin  10 mg Oral Daily    enoxaparin  40 mg Subcutaneous Daily    busPIRone  30 mg Oral BID    albuterol  2.5 mg Nebulization Q4H [place]     PRN Meds: traZODone, sodium chloride flush, potassium chloride **OR** potassium alternative oral replacement **OR** potassium chloride, magnesium sulfate, acetaminophen **OR** acetaminophen, promethazine **OR** ondansetron, melatonin ER, aluminum & magnesium hydroxide-simethicone, benzonatate, ipratropium-albuterol      Intake/Output Summary (Last 24 hours) at 3/6/2020 0946  Last data filed at 3/6/2020 0708  Gross per 24 hour   Intake 790 ml   Output 1920 ml   Net -1130 ml       Physical Exam Performed:    /75   Pulse 79   Temp 97.2 °F (36.2 °C) (Oral)   Resp 18 Comment: Simultaneous filing. User may not have seen previous data. Ht 5' 5\" (1.651 m)   Wt 121 lb 1.6 oz (54.9 kg)   SpO2 95%   BMI 20.15 kg/m²     General appearance: elderly female, tremor in bed,No apparent distress, appears stated age and cooperative. HEENT: Pupils equal, round, and reactive to light. Conjunctivae/corneas clear. Neck: Supple, with full range of motion. No jugular venous distention. Trachea midline. Respiratory:increased respiratory effort. DM to auscultation, bilaterally with rhonchi/rales  Cardiovascular: Regular rate and rhythm with normal S1/S2 without murmurs, rubs or gallops. Abdomen: Soft, non-tender, non-distended with normal bowel sounds. Musculoskeletal: No clubbing, cyanosis or edema bilaterally. Full range of motion without deformity. Skin: Skin color, texture, turgor normal.  No rashes or lesions. Neurologic:  Neurovascularly intact without any focal sensory/motor deficits.  Cranial nerves: II-XII intact, grossly non-focal.  Psychiatric: Alert and oriented, thought content appropriate, normal insight  Capillary Refill: Brisk,< 3 seconds   Peripheral Pulses: +2 palpable, equal bilaterally       Labs:   Recent Labs     03/04/20  1609 03/05/20  0613 03/06/20  0613   WBC 5.0 4.9 4.6   HGB 13.5 12.9 13.3   HCT 38.9 37.7 38.7    330 380     Recent Labs     03/04/20  1609 03/05/20  0613 03/06/20  0613   * 127* 131*   K 3.5 3.9 3.8   CL 89* 93* 96*   CO2 23 22 22   BUN 9 8 8   CREATININE <0.5* <0.5* <0.5*   CALCIUM 8.6 8.1* 8.5     Recent Labs     03/04/20  1609   AST 25   ALT 11   BILITOT <0.2   ALKPHOS 80     No results for input(s): INR in the last 72 hours. Recent Labs     03/04/20  1609   TROPONINI <0.01       Urinalysis:      Lab Results   Component Value Date    NITRU Negative 03/04/2020    WBCUA 0-3 04/13/2012    RBCUA 3-5 04/13/2012    BLOODU Negative 03/04/2020    SPECGRAV 1.025 03/04/2020    GLUCOSEU Negative 03/04/2020    GLUCOSEU NEGATIVE 04/13/2012       Radiology:  XR CHEST PORTABLE   Final Result   Interstitial lung disease with suspected superimposed airspace disease or   pneumonia in the lower lobes, greater on the right. Assessment/Plan:    Active Hospital Problems    Diagnosis Date Noted    Stage 3 severe COPD by GOLD classification ([facility] [address] [J44.9] 12/03/2009     Priority: Medium    Acute on chronic respiratory failure with hypoxia ([address] [J96.21] 03/04/2020    COPD with acute exacerbation ([facility] [address] [J44.1] 12/25/2019    Heartburn [R12]     ILD (interstitial lung disease) ([facility] [address] [J84.9] 04/06/2016    Tobacco dependence [F17.200] 09/27/2014     Acute on Chronic Hypoxic Resp Failure 2/2 COPD/ILD Exacerbation. Treated for pna outpt. Procal neg on admission. Imaging:CXR:Interstitial lung disease with suspected superimposed airspace disease or  pneumonia in the lower lobes, greater on the right. Cultures: Rapid Flu Neg, Pending Molecular. Abx: Azithro/Rocephin given in the ED. Holding, pending molecular panel  Labs: CBC, BMP  Meds: Nebs, Solumedrol  Consults: Pulm-pending bronch  *continue droplet until Flu is R/O    Tobacco Abuse  -nicotine patch, counseled on risks    DVT Prophylaxis: Lovenox  Diet: Diet NPO Time Specified  Code Status: Full Code    PT/OT Eval Status: Not needed given independent ambulatory status.        Dispo - 1-2 days    Yakov Hof, APRN - CNP

## 2020-03-07 LAB
ANION GAP SERPL CALCULATED.3IONS-SCNC: 12 MMOL/L (ref 3–16)
BASOPHILS ABSOLUTE: 0 K/UL (ref 0–0.2)
BASOPHILS RELATIVE PERCENT: 0.2 %
BUN BLDV-MCNC: 12 MG/DL (ref 7–20)
CALCIUM SERPL-MCNC: 8.9 MG/DL (ref 8.3–10.6)
CHLORIDE BLD-SCNC: 100 MMOL/L (ref 99–110)
CO2: 20 MMOL/L (ref 21–32)
CREAT SERPL-MCNC: <0.5 MG/DL (ref 0.6–1.2)
EOSINOPHILS ABSOLUTE: 0 K/UL (ref 0–0.6)
EOSINOPHILS RELATIVE PERCENT: 0 %
GFR AFRICAN AMERICAN: >60
GFR NON-AFRICAN AMERICAN: >60
GLUCOSE BLD-MCNC: 117 MG/DL (ref 70–99)
HCT VFR BLD CALC: 39 % (ref 36–48)
HEMOGLOBIN: 13.1 G/DL (ref 12–16)
LYMPHOCYTES ABSOLUTE: 0.6 K/UL (ref 1–5.1)
LYMPHOCYTES RELATIVE PERCENT: 9.1 %
MAGNESIUM: 1.8 MG/DL (ref 1.8–2.4)
MCH RBC QN AUTO: 30.2 PG (ref 26–34)
MCHC RBC AUTO-ENTMCNC: 33.7 G/DL (ref 31–36)
MCV RBC AUTO: 89.6 FL (ref 80–100)
MONOCYTES ABSOLUTE: 0.6 K/UL (ref 0–1.3)
MONOCYTES RELATIVE PERCENT: 7.8 %
NEUTROPHILS ABSOLUTE: 5.9 K/UL (ref 1.7–7.7)
NEUTROPHILS RELATIVE PERCENT: 82.9 %
ORGANISM: ABNORMAL
PDW BLD-RTO: 13.6 % (ref 12.4–15.4)
PLATELET # BLD: 412 K/UL (ref 135–450)
PMV BLD AUTO: 7 FL (ref 5–10.5)
POTASSIUM SERPL-SCNC: 4.2 MMOL/L (ref 3.5–5.1)
RBC # BLD: 4.36 M/UL (ref 4–5.2)
REPORT: NORMAL
RESPIRATORY PANEL PCR: ABNORMAL
SODIUM BLD-SCNC: 132 MMOL/L (ref 136–145)
WBC # BLD: 7.1 K/UL (ref 4–11)

## 2020-03-07 PROCEDURE — 99233 SBSQ HOSP IP/OBS HIGH 50: CPT | Performed by: INTERNAL MEDICINE

## 2020-03-07 PROCEDURE — 2700000000 HC OXYGEN THERAPY PER DAY

## 2020-03-07 PROCEDURE — 6370000000 HC RX 637 (ALT 250 FOR IP): Performed by: NURSE PRACTITIONER

## 2020-03-07 PROCEDURE — 6360000002 HC RX W HCPCS: Performed by: INTERNAL MEDICINE

## 2020-03-07 PROCEDURE — 6370000000 HC RX 637 (ALT 250 FOR IP): Performed by: INTERNAL MEDICINE

## 2020-03-07 PROCEDURE — 1200000000 HC SEMI PRIVATE

## 2020-03-07 PROCEDURE — 2580000003 HC RX 258: Performed by: INTERNAL MEDICINE

## 2020-03-07 PROCEDURE — 80048 BASIC METABOLIC PNL TOTAL CA: CPT

## 2020-03-07 PROCEDURE — 94761 N-INVAS EAR/PLS OXIMETRY MLT: CPT

## 2020-03-07 PROCEDURE — 85025 COMPLETE CBC W/AUTO DIFF WBC: CPT

## 2020-03-07 PROCEDURE — 83735 ASSAY OF MAGNESIUM: CPT

## 2020-03-07 PROCEDURE — 94640 AIRWAY INHALATION TREATMENT: CPT

## 2020-03-07 PROCEDURE — 36415 COLL VENOUS BLD VENIPUNCTURE: CPT

## 2020-03-07 RX ORDER — PREDNISONE 20 MG/1
40 TABLET ORAL DAILY
Status: DISCONTINUED | OUTPATIENT
Start: 2020-03-08 | End: 2020-03-08 | Stop reason: HOSPADM

## 2020-03-07 RX ORDER — HYDROXYZINE HYDROCHLORIDE 10 MG/1
10 TABLET, FILM COATED ORAL 3 TIMES DAILY PRN
Status: DISCONTINUED | OUTPATIENT
Start: 2020-03-07 | End: 2020-03-08 | Stop reason: HOSPADM

## 2020-03-07 RX ADMIN — TRAZODONE HYDROCHLORIDE 150 MG: 50 TABLET ORAL at 20:04

## 2020-03-07 RX ADMIN — Medication 2 PUFF: at 08:05

## 2020-03-07 RX ADMIN — ATORVASTATIN CALCIUM 10 MG: 10 TABLET, FILM COATED ORAL at 08:44

## 2020-03-07 RX ADMIN — BUSPIRONE HYDROCHLORIDE 30 MG: 5 TABLET ORAL at 08:44

## 2020-03-07 RX ADMIN — FLUOXETINE 60 MG: 20 CAPSULE ORAL at 08:44

## 2020-03-07 RX ADMIN — FAMOTIDINE 20 MG: 20 TABLET, FILM COATED ORAL at 08:44

## 2020-03-07 RX ADMIN — ALBUTEROL SULFATE 2.5 MG: 2.5 SOLUTION RESPIRATORY (INHALATION) at 19:49

## 2020-03-07 RX ADMIN — Medication 2 PUFF: at 19:49

## 2020-03-07 RX ADMIN — Medication 10 ML: at 08:45

## 2020-03-07 RX ADMIN — TIOTROPIUM BROMIDE INHALATION SPRAY 2 PUFF: 3.12 SPRAY, METERED RESPIRATORY (INHALATION) at 08:05

## 2020-03-07 RX ADMIN — Medication 1 MG: at 20:04

## 2020-03-07 RX ADMIN — ALBUTEROL SULFATE 2.5 MG: 2.5 SOLUTION RESPIRATORY (INHALATION) at 16:12

## 2020-03-07 RX ADMIN — Medication 10 ML: at 20:04

## 2020-03-07 RX ADMIN — ENOXAPARIN SODIUM 40 MG: 40 INJECTION SUBCUTANEOUS at 08:45

## 2020-03-07 RX ADMIN — FAMOTIDINE 20 MG: 20 TABLET, FILM COATED ORAL at 20:04

## 2020-03-07 RX ADMIN — ALBUTEROL SULFATE 2.5 MG: 2.5 SOLUTION RESPIRATORY (INHALATION) at 08:05

## 2020-03-07 RX ADMIN — METHYLPREDNISOLONE SODIUM SUCCINATE 40 MG: 40 INJECTION, POWDER, FOR SOLUTION INTRAMUSCULAR; INTRAVENOUS at 08:44

## 2020-03-07 RX ADMIN — BUSPIRONE HYDROCHLORIDE 30 MG: 5 TABLET ORAL at 20:04

## 2020-03-07 RX ADMIN — ALBUTEROL SULFATE 2.5 MG: 2.5 SOLUTION RESPIRATORY (INHALATION) at 11:46

## 2020-03-07 RX ADMIN — MONTELUKAST SODIUM 10 MG: 10 TABLET ORAL at 20:04

## 2020-03-07 RX ADMIN — ATENOLOL 25 MG: 25 TABLET ORAL at 08:44

## 2020-03-07 RX ADMIN — HYDROXYZINE HYDROCHLORIDE 10 MG: 10 TABLET, FILM COATED ORAL at 16:45

## 2020-03-07 ASSESSMENT — PAIN SCALES - GENERAL
PAINLEVEL_OUTOF10: 0

## 2020-03-07 NOTE — PROGRESS NOTES
Pulmonary & Critical Care Inpatient Progress Note   Darlyne Duverney, MD     REASON FOR TODAY'S VISIT:  Acute resp failure    SUBJECTIVE:   On 2 LPM of oxygen  Overall feels improved     Scheduled Meds:   [START ON 3/8/2020] predniSONE  40 mg Oral Daily    atenolol  25 mg Oral Daily    FLUoxetine  60 mg Oral Daily    montelukast  10 mg Oral Nightly    famotidine  20 mg Oral BID    tiotropium  2 puff Inhalation Daily    budesonide-formoterol  2 puff Inhalation BID    atorvastatin  10 mg Oral Daily    enoxaparin  40 mg Subcutaneous Daily    busPIRone  30 mg Oral BID    albuterol  2.5 mg Nebulization Q4H [place]       Continuous Infusions:      PRN Meds:  traZODone, sodium chloride flush, potassium chloride **OR** potassium alternative oral replacement **OR** potassium chloride, magnesium sulfate, acetaminophen **OR** acetaminophen, promethazine **OR** ondansetron, melatonin ER, aluminum & magnesium hydroxide-simethicone, benzonatate, ipratropium-albuterol    ALLERGIES:  Patient is allergic to meperidine and demerol. Objective:   PHYSICAL EXAM:  /75   Pulse 77   Temp 98 °F (36.7 °C) (Oral)   Resp 16   Ht 5' 5\" (1.651 m)   Wt 121 lb 1.6 oz (54.9 kg)   SpO2 95%   BMI 20.15 kg/m²    Physical Exam  Constitutional:       General: She is not in acute distress. Appearance: She is well-developed. She is not diaphoretic. HENT:      Head: Normocephalic and atraumatic. Mouth/Throat:      Pharynx: No oropharyngeal exudate. Eyes:      Pupils: Pupils are equal, round, and reactive to light. Neck:      Musculoskeletal: Neck supple. Vascular: No JVD. Cardiovascular:      Heart sounds: Normal heart sounds. No murmur. No friction rub. No gallop. Pulmonary:      Effort: Pulmonary effort is normal.      Breath sounds: No wheezing or rales. Abdominal:      General: Bowel sounds are normal. There is no distension. Palpations: Abdomen is soft. Tenderness: There is no abdominal tenderness.

## 2020-03-07 NOTE — PROGRESS NOTES
Secure message sent to CNP \"Pt stated she feels anxious and would like to have something to help her \"relax. \" Thanks! \"

## 2020-03-07 NOTE — PLAN OF CARE
Problem: Falls - Risk of:  Goal: Will remain free from falls  Description  Will remain free from falls  3/6/2020 2058 by Donal Altamirano RN  Outcome: Ongoing     Bed locked and in lowest position, call light within reach, room free from clutter, non-skid socks on. Pt stedy on feet and calls out appropriately. Refusing bed alarm at this time. Will continue to monitor.

## 2020-03-07 NOTE — PROGRESS NOTES
Shift assessment completed and charted. Pt alert and oriented, VSS, 2LO2 NC. Pt with expiratory wheezing throughout. Pt denies SOB at rest, but does state she has some SOB with exertion. Pt denies pain or needs. Will continue to monitor.

## 2020-03-07 NOTE — PROGRESS NOTES
Nebulization Q4H [place]     PRN Meds: traZODone, sodium chloride flush, potassium chloride **OR** potassium alternative oral replacement **OR** potassium chloride, magnesium sulfate, acetaminophen **OR** acetaminophen, promethazine **OR** ondansetron, melatonin ER, aluminum & magnesium hydroxide-simethicone, benzonatate, ipratropium-albuterol      Intake/Output Summary (Last 24 hours) at 3/7/2020 0940  Last data filed at 3/7/2020 0505  Gross per 24 hour   Intake 990 ml   Output 500 ml   Net 490 ml       Physical Exam Performed:    /71   Pulse 84   Temp 97.5 °F (36.4 °C) (Oral)   Resp 20   Ht 5' 5\" (1.651 m)   Wt 121 lb 1.6 oz (54.9 kg)   SpO2 96%   BMI 20.15 kg/m²     General appearance: elderly female, tremor in bed,No apparent distress, appears stated age and cooperative. HEENT: Pupils equal, round, and reactive to light. Conjunctivae/corneas clear. Neck: Supple, with full range of motion. No jugular venous distention. Trachea midline. Respiratory:increased respiratory effort. DM to auscultation, bilaterally with rhonchi/rales  Cardiovascular: Regular rate and rhythm with normal S1/S2 without murmurs, rubs or gallops. Abdomen: Soft, non-tender, non-distended with normal bowel sounds. Musculoskeletal: No clubbing, cyanosis or edema bilaterally. Full range of motion without deformity. Skin: Skin color, texture, turgor normal.  No rashes or lesions. Neurologic:  Neurovascularly intact without any focal sensory/motor deficits.  Cranial nerves: II-XII intact, grossly non-focal.  Psychiatric: Alert and oriented, thought content appropriate, normal insight  Capillary Refill: Brisk,< 3 seconds   Peripheral Pulses: +2 palpable, equal bilaterally       Labs:   Recent Labs     03/05/20 0613 03/06/20 0613 03/07/20  0616   WBC 4.9 4.6 7.1   HGB 12.9 13.3 13.1   HCT 37.7 38.7 39.0    380 412     Recent Labs     03/05/20 0613 03/06/20 0613 03/07/20  0616   * 131* 132*   K 3.9 3.8 4.2   CL 93* 96*

## 2020-03-08 VITALS
TEMPERATURE: 97.8 F | HEART RATE: 76 BPM | WEIGHT: 122.6 LBS | SYSTOLIC BLOOD PRESSURE: 119 MMHG | BODY MASS INDEX: 20.43 KG/M2 | DIASTOLIC BLOOD PRESSURE: 70 MMHG | OXYGEN SATURATION: 94 % | HEIGHT: 65 IN | RESPIRATION RATE: 16 BRPM

## 2020-03-08 LAB
ANION GAP SERPL CALCULATED.3IONS-SCNC: 8 MMOL/L (ref 3–16)
BASOPHILS ABSOLUTE: 0 K/UL (ref 0–0.2)
BASOPHILS RELATIVE PERCENT: 0.3 %
BUN BLDV-MCNC: 14 MG/DL (ref 7–20)
CALCIUM SERPL-MCNC: 8.5 MG/DL (ref 8.3–10.6)
CHLORIDE BLD-SCNC: 99 MMOL/L (ref 99–110)
CO2: 22 MMOL/L (ref 21–32)
CREAT SERPL-MCNC: <0.5 MG/DL (ref 0.6–1.2)
CULTURE, RESPIRATORY: NORMAL
EOSINOPHILS ABSOLUTE: 0 K/UL (ref 0–0.6)
EOSINOPHILS RELATIVE PERCENT: 0 %
GFR AFRICAN AMERICAN: >60
GFR NON-AFRICAN AMERICAN: >60
GLUCOSE BLD-MCNC: 90 MG/DL (ref 70–99)
GRAM STAIN RESULT: NORMAL
HCT VFR BLD CALC: 35.7 % (ref 36–48)
HEMOGLOBIN: 12.3 G/DL (ref 12–16)
LYMPHOCYTES ABSOLUTE: 1.9 K/UL (ref 1–5.1)
LYMPHOCYTES RELATIVE PERCENT: 24.7 %
MAGNESIUM: 1.7 MG/DL (ref 1.8–2.4)
MCH RBC QN AUTO: 30.8 PG (ref 26–34)
MCHC RBC AUTO-ENTMCNC: 34.5 G/DL (ref 31–36)
MCV RBC AUTO: 89.1 FL (ref 80–100)
MONOCYTES ABSOLUTE: 1 K/UL (ref 0–1.3)
MONOCYTES RELATIVE PERCENT: 12.7 %
NEUTROPHILS ABSOLUTE: 4.9 K/UL (ref 1.7–7.7)
NEUTROPHILS RELATIVE PERCENT: 62.3 %
PDW BLD-RTO: 13.1 % (ref 12.4–15.4)
PLATELET # BLD: 354 K/UL (ref 135–450)
PMV BLD AUTO: 6.9 FL (ref 5–10.5)
POTASSIUM SERPL-SCNC: 3.6 MMOL/L (ref 3.5–5.1)
RBC # BLD: 4 M/UL (ref 4–5.2)
SODIUM BLD-SCNC: 129 MMOL/L (ref 136–145)
WBC # BLD: 7.9 K/UL (ref 4–11)

## 2020-03-08 PROCEDURE — 36415 COLL VENOUS BLD VENIPUNCTURE: CPT

## 2020-03-08 PROCEDURE — 83735 ASSAY OF MAGNESIUM: CPT

## 2020-03-08 PROCEDURE — 6360000002 HC RX W HCPCS: Performed by: INTERNAL MEDICINE

## 2020-03-08 PROCEDURE — 6370000000 HC RX 637 (ALT 250 FOR IP): Performed by: NURSE PRACTITIONER

## 2020-03-08 PROCEDURE — 6370000000 HC RX 637 (ALT 250 FOR IP): Performed by: INTERNAL MEDICINE

## 2020-03-08 PROCEDURE — 80048 BASIC METABOLIC PNL TOTAL CA: CPT

## 2020-03-08 PROCEDURE — 85025 COMPLETE CBC W/AUTO DIFF WBC: CPT

## 2020-03-08 PROCEDURE — 2700000000 HC OXYGEN THERAPY PER DAY

## 2020-03-08 PROCEDURE — 94640 AIRWAY INHALATION TREATMENT: CPT

## 2020-03-08 PROCEDURE — 94761 N-INVAS EAR/PLS OXIMETRY MLT: CPT

## 2020-03-08 PROCEDURE — 2580000003 HC RX 258: Performed by: INTERNAL MEDICINE

## 2020-03-08 RX ORDER — PREDNISONE 20 MG/1
40 TABLET ORAL DAILY
Qty: 8 TABLET | Refills: 0 | Status: SHIPPED | OUTPATIENT
Start: 2020-03-09 | End: 2020-03-13

## 2020-03-08 RX ORDER — OSELTAMIVIR PHOSPHATE 75 MG/1
75 CAPSULE ORAL 2 TIMES DAILY
Status: DISCONTINUED | OUTPATIENT
Start: 2020-03-08 | End: 2020-03-08

## 2020-03-08 RX ORDER — BENZONATATE 200 MG/1
200 CAPSULE ORAL 3 TIMES DAILY PRN
Qty: 12 CAPSULE | Refills: 0 | Status: SHIPPED | OUTPATIENT
Start: 2020-03-08 | End: 2020-03-15

## 2020-03-08 RX ADMIN — FLUOXETINE 60 MG: 20 CAPSULE ORAL at 07:43

## 2020-03-08 RX ADMIN — ENOXAPARIN SODIUM 40 MG: 40 INJECTION SUBCUTANEOUS at 07:43

## 2020-03-08 RX ADMIN — Medication 2 PUFF: at 08:32

## 2020-03-08 RX ADMIN — Medication 10 ML: at 07:44

## 2020-03-08 RX ADMIN — MAGNESIUM GLUCONATE 500 MG ORAL TABLET 400 MG: 500 TABLET ORAL at 10:12

## 2020-03-08 RX ADMIN — TIOTROPIUM BROMIDE INHALATION SPRAY 2 PUFF: 3.12 SPRAY, METERED RESPIRATORY (INHALATION) at 08:32

## 2020-03-08 RX ADMIN — ATORVASTATIN CALCIUM 10 MG: 10 TABLET, FILM COATED ORAL at 07:43

## 2020-03-08 RX ADMIN — FAMOTIDINE 20 MG: 20 TABLET, FILM COATED ORAL at 07:43

## 2020-03-08 RX ADMIN — ATENOLOL 25 MG: 25 TABLET ORAL at 07:43

## 2020-03-08 RX ADMIN — PREDNISONE 40 MG: 20 TABLET ORAL at 07:43

## 2020-03-08 RX ADMIN — BUSPIRONE HYDROCHLORIDE 30 MG: 5 TABLET ORAL at 07:42

## 2020-03-08 RX ADMIN — ALBUTEROL SULFATE 2.5 MG: 2.5 SOLUTION RESPIRATORY (INHALATION) at 12:04

## 2020-03-08 RX ADMIN — ALBUTEROL SULFATE 2.5 MG: 2.5 SOLUTION RESPIRATORY (INHALATION) at 08:31

## 2020-03-08 ASSESSMENT — PAIN SCALES - GENERAL: PAINLEVEL_OUTOF10: 0

## 2020-03-08 NOTE — PROGRESS NOTES
Shift assessment completed and charted. Pt alert and oriented, VSS, 2LO2 NC. Pt ambulates independently. Pt states she feels much better. Denies SOB at this time. Lungs with wheezing throughout. Pt denies pain or needs. Will continue to monitor.

## 2020-03-08 NOTE — DISCHARGE SUMMARY
Hospital Medicine Discharge Summary    Patient ID: Fabrice Gardner      Patient's PCP: Mp Rosales MD    Admit Date: 3/4/2020     Discharge Date:   ***    Admitting Physician: Tamara Aguirre MD     Discharge Physician: SHAUN Garcia - CNP     Discharge Diagnoses: Active Hospital Problems    Diagnosis    Stage 3 severe COPD by GOLD classification ([facility] [address] [J44.9]     Priority: Medium    Acute on chronic respiratory failure with hypoxia (HCC) [J96.21]    COPD with acute exacerbation ([facility] [address] [J44.1]    Heartburn [R12]    ILD (interstitial lung disease) ([facility] [address] [J84.9]    Tobacco dependence [F17.200]       The patient was seen and examined on day of discharge and this discharge summary is in conjunction with any daily progress note from day of discharge. Hospital Course: ***          Physical Exam Performed:     /70   Pulse 76   Temp 97.8 °F (36.6 °C) (Oral)   Resp 16   Ht 5' 5\" (1.651 m)   Wt 122 lb 9.6 oz (55.6 kg)   SpO2 93%   BMI 20.40 kg/m²       General appearance:  No apparent distress, appears stated age and cooperative. HEENT:  Normal cephalic, atraumatic without obvious deformity. Pupils equal, round, and reactive to light. Extra ocular muscles intact. Conjunctivae/corneas clear. Neck: Supple, with full range of motion. No jugular venous distention. Trachea midline. Respiratory:  Normal respiratory effort. Clear to auscultation, bilaterally without Rales/Wheezes/Rhonchi. Cardiovascular:  Regular rate and rhythm with normal S1/S2 without murmurs, rubs or gallops. Abdomen: Soft, non-tender, non-distended with normal bowel sounds. Musculoskeletal:  No clubbing, cyanosis or edema bilaterally. Full range of motion without deformity. Skin: Skin color, texture, turgor normal.  No rashes or lesions. Neurologic:  Neurovascularly intact without any focal sensory/motor deficits.  Cranial nerves: II-XII intact, grossly non-focal.  Psychiatric:  Alert and oriented, thought content appropriate, normal insight  Capillary Refill: Brisk,< 3 seconds   Peripheral Pulses: +2 palpable, equal bilaterally       Labs: For convenience and continuity at follow-up the following most recent labs are provided:      CBC:    Lab Results   Component Value Date    WBC 7.9 03/08/2020    HGB 12.3 03/08/2020    HCT 35.7 03/08/2020     03/08/2020       Renal:    Lab Results   Component Value Date     03/08/2020    K 3.6 03/08/2020    K 4.0 12/26/2019    CL 99 03/08/2020    CO2 22 03/08/2020    BUN 14 03/08/2020    CREATININE <0.5 03/08/2020    CALCIUM 8.5 03/08/2020    PHOS 3.0 03/20/2019         Significant Diagnostic Studies    Radiology:   XR CHEST PORTABLE   Final Result   Interstitial lung disease with suspected superimposed airspace disease or   pneumonia in the lower lobes, greater on the right.                 Consults:     IP CONSULT TO HOSPITALIST  IP CONSULT TO PULMONOLOGY    Disposition:  ***     Condition at Discharge: [address] [place] [address] Patient Condition:[id number]}    Discharge Instructions/Follow-up:  ***    Code Status:  Full Code ***    Activity: activity as tolerated    Diet: {diet:16293}      Discharge Medications:     Current Discharge Medication List           Details   predniSONE (DELTASONE) 20 MG tablet Take 2 tablets by mouth daily for 4 days  Qty: 8 tablet, Refills: 0      benzonatate (TESSALON) 200 MG capsule Take 1 capsule by mouth 3 times daily as needed for Cough  Qty: 12 capsule, Refills: 0              Details   traZODone (DESYREL) 150 MG tablet TAKE 2 TABLETS BY MOUTH AT BEDTIME  Qty: 180 tablet, Refills: 1      fluticasone-salmeterol (WIXELA INHUB) 250-50 MCG/DOSE AEPB INHALE 1 PUFF TWICE DAILY  Qty: 180 each, Refills: 1      FLUoxetine (PROZAC) 20 MG capsule TAKE 3 CAPSULES BY MOUTH DAILY  Qty: 270 capsule, Refills: 1      busPIRone (BUSPAR) 30 MG tablet TAKE 1 TABLET TWICE DAILY  Qty: 180 tablet, Refills: 1      simvastatin (ZOCOR) 20 MG tablet TAKE 1 TABLET EVERY contact me at [phone].

## 2020-03-08 NOTE — PROGRESS NOTES
Pt with eight beats of Vtach. Pt asymptomatic, VSS, 2LO2 NC baseline. Denies CP or SOB. Notified CNP. Labs from this morning WDL. No new orders at this time. Will monitor.

## 2020-03-08 NOTE — PROGRESS NOTES
Pt a/o. Stated SOB at rest and on exertion. No pain, nausea, or emesis. Up in bed eating breakfast. Call light within reach; will continue to monitor.

## 2020-03-09 ENCOUNTER — CARE COORDINATION (OUTPATIENT)
Dept: CASE MANAGEMENT | Age: 65
End: 2020-03-09

## 2020-03-09 NOTE — CARE COORDINATION
Ajith [facility] Transitions Initial Follow Up Call    Call within 2 business days of discharge: Yes    Patient: Linda Cheung Patient : 1955   MRN: [id number]  Reason for Admission: Acute on Chronic Resp Failure w/Hypoxia  Discharge Date: 3/8/20 RARS: Readmission Risk Score: 18      Last Discharge [facility]       Complaint Diagnosis Description Type Department Provider    3/4/20 Emesis Pneumonia due to organism . .. ED to Hosp-Admission (Discharged) ([facility]) Clark Baumann MD; Uriel Pablo. .. Spoke with: patient [address]: [facility] provided:  Obtained and reviewed discharge summary and/or continuity of care documents  Education of patient/family/caregiver/guardian to support self-management-use of 02 and nebulizer tx      Patient reports that she's \"not doing too well, I'm short of air\". Patient noted to be SOB when speaking with CTN. Stated she had not being doing any exertional activities-\"just laying on the couch\". States she isn't any worse than when left the hospital.  Denies any cough, congestion, CP or fever. CTN asked her if she was using her 18 and she stated \"no I haven't been using it but I think I might need to\". CTN advised her to put on 02 and to make sure she breathes through her nose and not her mouth when 02 is on to make sure she is getting the benefit of the 02. CTN asked her if she had done any of her prn nebulizer tx's that are ordered for SOB and she said she had not-CTN advised her to try doing a HHN tx to see if that helps, she said she would. Stated she had her new meds from the hosp-Tessalon and Prednisone and has been taking them as ordered and stopped the Amoxicillin and Doxycyline. She declined to review the rest of her meds. CTN offered to try to get a same day appt with PCP and she declined, offered to see about getting her an appt for tomorrow and she also declined   Agreeable to f/u calls.   Educated on

## 2020-03-10 ENCOUNTER — CARE COORDINATION (OUTPATIENT)
Dept: CASE MANAGEMENT | Age: 65
End: 2020-03-10

## 2020-03-10 LAB
INFLUENZA A BY PCR: DETECTED
INFLUENZA B BY PCR: NOT DETECTED
RSV BY PCR: NOT DETECTED
RSV SOURCE: ABNORMAL

## 2020-03-13 ENCOUNTER — CARE COORDINATION (OUTPATIENT)
Dept: CASE MANAGEMENT | Age: 65
End: 2020-03-13

## 2020-03-13 NOTE — CARE COORDINATION
Ajith 45 Transitions Follow Up Call    3/13/2020    Patient: Delia Gilford  Patient : 1955   MRN: [id number]  Reason for Admission: Acute on Chronic Resp Failure w/Hypoxia  Discharge Date: 3/8/20 RARS: Readmission Risk Score: 18    Spoke with: patient Vahe Griffin    Patient reports that she is \"short of air\", sounded SOB when speaking to CTN. Stated she had not been wearing 02-when CTN asked why she stopped wearing it, she said she didn't know-was unable to give a reason, reminded her that when CTN last spoke to her after advising her to wear it she was breathing better and feeling better, she stated \"yeah, I remember, I'll start wearing it\". Again advised her to wear it cont until she fully recovers and after that she needs to wear it-she verbalized understanding. Patient stated she had not been doing any HHN tx's as well and CTN reminded her that we had the same conversation about doing tx's and that she said they helped her breath better also. CTN encouraged her to cont doing tx's until she fully recovers and anytime she feels SOB as directed. She verbalized understanding and stated she would start doing tx's. She denies any other sx-no fever, cough, CP. Reminded her of PCP appt on 3/17 at 10:30am.  Denies any acute needs at present time. Agreeable to f/u calls. Educated on the use of urgent care or physicians 24 hr access line if assistance is needed after hours & that they can always contact their home care provider to request a nurse visit even if it isn't their regularly scheduled day for their nurse to visit. Care Transitions Subsequent and Final Call    Subsequent and Final Calls  Have your medications changed?:  No  Do you have any questions related to your medications?:  No  Do you currently have any active services?:  No  Care Transitions Interventions  Other Interventions:             Follow Up  Future Appointments   Date Time Provider Matt Crabtree   3/17/2020 10:30 AM González Carrera

## 2020-03-16 ENCOUNTER — CARE COORDINATION (OUTPATIENT)
Dept: CASE MANAGEMENT | Age: 65
End: 2020-03-16

## 2020-03-16 NOTE — DISCHARGE SUMMARY
Hospital Medicine Discharge Summary    Patient ID: Rajeev Benito      Patient's PCP: Garrett Hernandez MD    Admit Date: 3/4/2020     Discharge Date: 3/8/2020      Admitting Physician: Carver Ahumada, MD     Discharge Physician: SHAUN Peck - CNP     Discharge Diagnoses: Active Hospital Problems    Diagnosis    Stage 3 severe COPD by GOLD classification ([facility] [address] [J44.9]     Priority: Medium    Acute on chronic respiratory failure with hypoxia (HCC) [J96.21]    COPD with acute exacerbation ([address] [J44.1]    Heartburn [R12]    ILD (interstitial lung disease) ([facility] [address] [J84.9]    Tobacco dependence [F17.200]       The patient was seen and examined on day of discharge and this discharge summary is in conjunction with any daily progress note from day of discharge. Hospital Course:   Faustino Deng a 62 y. o. female.   She has a h/o severe COPD and ILD, likely smoking-related pulmonary fibrosis.  At baseline she only uses O2 2-3L/min at night.     She presents for above-baseline dyspnea worsening over about 4 to 5 days.  She is now dyspneic at rest and reports feeling very fatigued.  She has not been able to sleep much. Demetriusia Adele was seen in the ER on . Joanna Mckeon was discharged with rx's for augmentin and doxycycline, no prednisone.  Now dyspneic at rest.  Minimal cough at this point which has been nonproductive.  Denies hemoptysis and pleuritic chest pain.  Appetite is also diminished.  Does describe some fine rigors, chills, and sometimes nausea.     Patient also relates she has developed runny diarrhea since starting outpatient antibiotics.     Patient denies ever requiring intubation and vent support for exacerbations of her lung disease.  She has required BiPAP support in the past though    Acute on Chronic Hypoxic Resp Failure 2/2 acute influenza A and viral pneumonia  Treated for pna outpt.    Imaging:CXR:Interstitial lung disease with suspected superimposed airspace disease or  pneumonia in the lower One dose injected daily. , Disp-1 pen, R-11Print      atenolol (TENORMIN) 25 MG tablet TAKE 1 TABLET BY MOUTH DAILY (REPLACES PROPRANOLOL), Disp-90 tablet, R-1Normal      tiotropium (SPIRIVA HANDIHALER) 18 MCG inhalation capsule INHALE THE CONTENTS OF 1 CAPSULE EVERY DAY, Disp-90 capsule, R-1Normal      albuterol sulfate HFA (VENTOLIN HFA) 108 (90 Base) MCG/ACT inhaler Inhale 2 puffs into the lungs every 6 hours as needed for Wheezing or Shortness of Breath, Disp-3 Inhaler, R-1Normal      albuterol (PROVENTIL) (2.5 MG/3ML) 0.083% nebulizer solution Take 3 mLs by nebulization every 6 hours as needed for Shortness of Breath, Disp-120 each, R-3DX: COPD J44.9Normal      montelukast (SINGULAIR) 10 MG tablet TAKE 1 TABLET BY MOUTH EACH NIGHT, Disp-90 tablet, R-1Normal      Abaloparatide 3120 MCG/1.56ML SOPN Inject 80 mcg into the skin daily, Disp-3 pen, R-4Print      bisacodyl (DULCOLAX) 5 MG EC tablet Take 1 tablet by mouth daily as needed for Constipation, Disp-4 tablet, R-0Normal      guaiFENesin (MUCINEX) 600 MG extended release tablet Take 1 tablet by mouth 2 times daily as needed for Congestion, Disp-60 tablet, R-2Normal             Time Spent on discharge is more than 45 minutes in the examination, evaluation, counseling and review of medications and discharge plan. Signed:    SHAUN Urena - CNP   3/16/2020      Thank you Megan Merlos MD for the opportunity to be involved in this patient's care. If you have any questions or concerns please feel free to contact me at [phone].

## 2020-03-18 ENCOUNTER — CARE COORDINATION (OUTPATIENT)
Dept: CASE MANAGEMENT | Age: 65
End: 2020-03-18

## 2020-03-18 NOTE — CARE COORDINATION
Ajith 45 Transitions Follow Up Call    3/18/2020    Patient: Devante Samuel  Patient : 1955   MRN: [id number]  Reason for Admission: Acute on chronic resp failure   Discharge Date: 3/8/20 RARS: Readmission Risk Score: 18         Spoke with:  [address] with patient who reported she is doing pretty good. Patient stated her breathing is better but still a little short of air. Patient reported again she is not wearing oxygen. CTN reiterated with patient the importance of wearing her oxygen and doing her nebulizer treatments. Patient said Bcmegan Wali know you are right and then placed her oxygen on herself. CTN discussed the importance with patient and also precautions to take at this time regarding the COVID 19 infection. Patient verbalized understanding and stated she would wear her home oxygen and start doing her nebulizer treatments. Denies any acute needs at present time. Agreeable to f/u calls. Educated on the use of urgent care or physicians 24 hr access line if assistance is needed after hours. Care Transitions Subsequent and Final Call    Subsequent and Final Calls  Do you have any ongoing symptoms?:  No  Have your medications changed?:  No  Do you have any questions related to your medications?:  No  Do you currently have any active services?:  No  Do you have any needs or concerns that I can assist you with?:  No  Identified Barriers:  None  Care Transitions Interventions  Other Interventions:             Follow Up  Future Appointments   Date Time Provider Matt Crabtree   2020  2:00 PM MD Vielka Bishop   2020 10:30 AM [facility]   2020 11:30 AM MD Vielka Bishop [facility]     Jesusita DENISEN, RN, [facility]  Care Transition Nurse   [phone]

## 2020-03-24 ENCOUNTER — CARE COORDINATION (OUTPATIENT)
Dept: CASE MANAGEMENT | Age: 65
End: 2020-03-24

## 2020-03-24 RX ORDER — SIMVASTATIN 20 MG
TABLET ORAL
Qty: 90 TABLET | Refills: 1 | Status: SHIPPED | OUTPATIENT
Start: 2020-03-24 | End: 2020-08-07

## 2020-03-24 RX ORDER — BUSPIRONE HYDROCHLORIDE 30 MG/1
TABLET ORAL
Qty: 180 TABLET | Refills: 1 | Status: SHIPPED | OUTPATIENT
Start: 2020-03-24 | End: 2020-08-07

## 2020-03-24 RX ORDER — RANITIDINE 150 MG/1
TABLET ORAL
Qty: 180 TABLET | Refills: 1 | Status: SHIPPED | OUTPATIENT
Start: 2020-03-24 | End: 2020-04-13 | Stop reason: ALTCHOICE

## 2020-03-24 NOTE — CARE COORDINATION
Ajith 45 Transitions Follow Up Call    3/24/2020    Patient: Carlo Patton  Patient : 1955   MRN: [id number]  Reason for Admission: Acute on chronic resp failure   Discharge Date: 3/8/20 RARS: Readmission Risk Score: 18         Spoke with:  [address] with patient who reported she is doing ok. Patient stated she has a sore throat today. Patient denied any fever/chills, dizziness, or cough. Patient reported she continues to have SOB with exertion and did state she has been using her O2. Patient stated her O2 sat has been 94-95%. CTN encouraged patient to try warm salt water gargles to help sore throat. CTN also encouraged patient to monitor symptoms and if breathing worsens or displays fever/chills, cough, or dizziness to contact PCP right away. Patient stated she has remain in her home and not going out. Denies any further needs at present time. Agreeable to f/u calls. Educated on the use of urgent care or physicians 24 hr access line if assistance is needed after hours. Care Transitions Subsequent and Final Call    Subsequent and Final Calls  Do you have any ongoing symptoms?:  Yes  Patient-reported symptoms:  Shortness of Breath  Have your medications changed?:  No  Do you have any questions related to your medications?:  No  Do you currently have any active services?:  No  Do you have any needs or concerns that I can assist you with?:  No  Identified Barriers:  None  Care Transitions Interventions  Other Interventions:             Follow Up  Future Appointments   Date Time Provider Matt Crabtree   2020  2:00 PM Dona Walsh MD [facility]   2020 10:30 AM [facility]   2020 11:30 AM Dona Walsh MD [facility]       Clemente Paul RN

## 2020-03-24 NOTE — TELEPHONE ENCOUNTER
.  Last office visit 1/16/2020     Last written 10/18/20 90 WITH 1      Next office visit scheduled no future ov     Requested Prescriptions     Pending Prescriptions Disp Refills    simvastatin (ZOCOR) 20 MG tablet [Pharmacy Med Name: SIMVASTATIN 20 MG Tablet] 90 tablet 1     Sig: TAKE 1 TABLET EVERY EVENING    raNITIdine (ZANTAC) 150 MG tablet [Pharmacy Med Name: RANITIDINE HYDROCHLORIDE 150 MG Tablet] 180 tablet 1     Sig: TAKE 1 TABLET BY MOUTH TWICE DAILY AS NEEDED FOR HEARTBURN    busPIRone (BUSPAR) 30 MG tablet [Pharmacy Med Name: BUSPIRONE HYDROCHLORIDE 30 MG Tablet] 180 tablet 1     Sig: TAKE 1 TABLET TWICE DAILY

## 2020-03-27 ENCOUNTER — CARE COORDINATION (OUTPATIENT)
Dept: CASE MANAGEMENT | Age: 65
End: 2020-03-27

## 2020-03-27 NOTE — CARE COORDINATION
Ajith 45 Transitions Follow Up Call    3/27/2020    Patient: Anastasia Smith  Patient : 1955   MRN: [id number]  Reason for Admission: Acute on chronic resp failure    Discharge Date: 3/8/20 RARS: Readmission Risk Score: 18         Attempted to reach patient via phone for care transition. Phone rings several times with no answer and no VM available.        Follow Up  Future Appointments   Date Time Provider Matt Crabtree   2020  2:00 PM Jamar Pascal MD [facility]   2020 10:30 AM [facility]   2020 11:30 AM Jamar Pascal MD [facility]       Maria Antonia Grimes RN

## 2020-03-30 ENCOUNTER — CARE COORDINATION (OUTPATIENT)
Dept: CASE MANAGEMENT | Age: 65
End: 2020-03-30

## 2020-03-30 NOTE — CARE COORDINATION
Ajith 45 Transitions Follow Up Call    3/30/2020    Patient: Eusebio Martines  Patient : 1955   MRN: [id number]  Reason for Admission: Acute Resp Chronic Resp Failure   Discharge Date: 3/8/20 RARS: Readmission Risk Score: 18         Spoke with:  [address] with patient who reported she is doing about the same. Patient stated she is still \"short of air\" but not too bad. Patient stated she continues to smoke. CTN asked patient if she was using oxygen while smoking and patient stated no and patient discussed the importance of not smoking while using oxygen. CTN also discussed way and resources to help with smoking cessation. Patient stated she has chantix and plans to restart it to help. Denies any acute needs at present time. Agreeable to f/u calls. Educated on the use of urgent care or physicians 24 hr access line if assistance is needed after hours. Care Transitions Subsequent and Final Call    Subsequent and Final Calls  Do you have any ongoing symptoms?:  Yes  Patient-reported symptoms:  Shortness of Breath  Have your medications changed?:  No  Do you have any questions related to your medications?:  No  Do you currently have any active services?:  No  Do you have any needs or concerns that I can assist you with?:  No  Identified Barriers:  None  Care Transitions Interventions  Other Interventions:             Follow Up  Future Appointments   Date Time Provider Matt Crabtree   2020  2:00 PM Kymberly Weber MD [facility]   2020 10:30 AM [facility] CT MAIN [facility]   2020 11:30 AM Kymberly Weber MD [facility]       Gisell Valdes RN

## 2020-04-03 ENCOUNTER — TELEPHONE (OUTPATIENT)
Dept: OTHER | Facility: CLINIC | Age: 65
End: 2020-04-03

## 2020-04-03 ENCOUNTER — APPOINTMENT (OUTPATIENT)
Dept: GENERAL RADIOLOGY | Age: 65
DRG: 177 | End: 2020-04-03
Payer: MEDICARE

## 2020-04-03 ENCOUNTER — HOSPITAL ENCOUNTER (INPATIENT)
Age: 65
LOS: 2 days | Discharge: HOME | DRG: 177 | End: 2020-04-05
Attending: EMERGENCY MEDICINE | Admitting: HOSPITALIST
Payer: MEDICARE

## 2020-04-03 ENCOUNTER — NURSE TRIAGE (OUTPATIENT)
Dept: OTHER | Facility: CLINIC | Age: 65
End: 2020-04-03

## 2020-04-03 PROBLEM — J96.01 ACUTE RESPIRATORY FAILURE WITH HYPOXIA (HCC): Status: ACTIVE | Noted: 2020-04-03

## 2020-04-03 LAB
A/G RATIO: 0.9 (ref 1.1–2.2)
ALBUMIN SERPL-MCNC: 3.3 G/DL (ref 3.4–5)
ALP BLD-CCNC: 78 U/L (ref 40–129)
ALT SERPL-CCNC: 7 U/L (ref 10–40)
ANION GAP SERPL CALCULATED.3IONS-SCNC: 14 MMOL/L (ref 3–16)
AST SERPL-CCNC: 12 U/L (ref 15–37)
BASOPHILS ABSOLUTE: 0.1 K/UL (ref 0–0.2)
BASOPHILS RELATIVE PERCENT: 0.5 %
BILIRUB SERPL-MCNC: 0.4 MG/DL (ref 0–1)
BILIRUBIN URINE: NEGATIVE
BLOOD, URINE: NEGATIVE
BUN BLDV-MCNC: 8 MG/DL (ref 7–20)
CALCIUM SERPL-MCNC: 9 MG/DL (ref 8.3–10.6)
CHLORIDE BLD-SCNC: 97 MMOL/L (ref 99–110)
CLARITY: CLEAR
CO2: 20 MMOL/L (ref 21–32)
COLOR: YELLOW
CREAT SERPL-MCNC: <0.5 MG/DL (ref 0.6–1.2)
EOSINOPHILS ABSOLUTE: 0 K/UL (ref 0–0.6)
EOSINOPHILS RELATIVE PERCENT: 0.3 %
GFR AFRICAN AMERICAN: >60
GFR NON-AFRICAN AMERICAN: >60
GLOBULIN: 3.6 G/DL
GLUCOSE BLD-MCNC: 117 MG/DL (ref 70–99)
GLUCOSE URINE: NEGATIVE MG/DL
HCT VFR BLD CALC: 40 % (ref 36–48)
HEMOGLOBIN: 13.6 G/DL (ref 12–16)
KETONES, URINE: NEGATIVE MG/DL
LACTIC ACID, SEPSIS: 0.6 MMOL/L (ref 0.4–1.9)
LACTIC ACID, SEPSIS: 0.7 MMOL/L (ref 0.4–1.9)
LACTIC ACID, SEPSIS: 0.7 MMOL/L (ref 0.4–1.9)
LEUKOCYTE ESTERASE, URINE: NEGATIVE
LYMPHOCYTES ABSOLUTE: 1.3 K/UL (ref 1–5.1)
LYMPHOCYTES RELATIVE PERCENT: 8.2 %
MCH RBC QN AUTO: 30.6 PG (ref 26–34)
MCHC RBC AUTO-ENTMCNC: 34 G/DL (ref 31–36)
MCV RBC AUTO: 90.1 FL (ref 80–100)
MICROSCOPIC EXAMINATION: NORMAL
MONOCYTES ABSOLUTE: 1.1 K/UL (ref 0–1.3)
MONOCYTES RELATIVE PERCENT: 6.5 %
NEUTROPHILS ABSOLUTE: 13.9 K/UL (ref 1.7–7.7)
NEUTROPHILS RELATIVE PERCENT: 84.5 %
NITRITE, URINE: NEGATIVE
PDW BLD-RTO: 13.8 % (ref 12.4–15.4)
PH UA: 6.5 (ref 5–8)
PLATELET # BLD: 321 K/UL (ref 135–450)
PMV BLD AUTO: 7.8 FL (ref 5–10.5)
POTASSIUM SERPL-SCNC: 3.8 MMOL/L (ref 3.5–5.1)
PRO-BNP: 78 PG/ML (ref 0–124)
PROCALCITONIN: 0.06 NG/ML (ref 0–0.15)
PROCALCITONIN: 0.08 NG/ML (ref 0–0.15)
PROTEIN UA: NEGATIVE MG/DL
RAPID INFLUENZA  B AGN: NEGATIVE
RAPID INFLUENZA A AGN: NEGATIVE
RBC # BLD: 4.44 M/UL (ref 4–5.2)
SODIUM BLD-SCNC: 131 MMOL/L (ref 136–145)
SPECIFIC GRAVITY UA: 1.02 (ref 1–1.03)
TOTAL PROTEIN: 6.9 G/DL (ref 6.4–8.2)
TROPONIN: <0.01 NG/ML
URINE TYPE: NORMAL
UROBILINOGEN, URINE: 1 E.U./DL
WBC # BLD: 16.5 K/UL (ref 4–11)

## 2020-04-03 PROCEDURE — 85025 COMPLETE CBC W/AUTO DIFF WBC: CPT

## 2020-04-03 PROCEDURE — 93005 ELECTROCARDIOGRAM TRACING: CPT | Performed by: EMERGENCY MEDICINE

## 2020-04-03 PROCEDURE — 6370000000 HC RX 637 (ALT 250 FOR IP): Performed by: HOSPITALIST

## 2020-04-03 PROCEDURE — 1200000000 HC SEMI PRIVATE

## 2020-04-03 PROCEDURE — 2580000003 HC RX 258: Performed by: EMERGENCY MEDICINE

## 2020-04-03 PROCEDURE — 2700000000 HC OXYGEN THERAPY PER DAY

## 2020-04-03 PROCEDURE — 96375 TX/PRO/DX INJ NEW DRUG ADDON: CPT

## 2020-04-03 PROCEDURE — 96366 THER/PROPH/DIAG IV INF ADDON: CPT

## 2020-04-03 PROCEDURE — 6370000000 HC RX 637 (ALT 250 FOR IP): Performed by: EMERGENCY MEDICINE

## 2020-04-03 PROCEDURE — 36415 COLL VENOUS BLD VENIPUNCTURE: CPT

## 2020-04-03 PROCEDURE — 96367 TX/PROPH/DG ADDL SEQ IV INF: CPT

## 2020-04-03 PROCEDURE — 87040 BLOOD CULTURE FOR BACTERIA: CPT

## 2020-04-03 PROCEDURE — 81003 URINALYSIS AUTO W/O SCOPE: CPT

## 2020-04-03 PROCEDURE — 6360000002 HC RX W HCPCS: Performed by: EMERGENCY MEDICINE

## 2020-04-03 PROCEDURE — 96365 THER/PROPH/DIAG IV INF INIT: CPT

## 2020-04-03 PROCEDURE — 80053 COMPREHEN METABOLIC PANEL: CPT

## 2020-04-03 PROCEDURE — 6360000002 HC RX W HCPCS: Performed by: HOSPITALIST

## 2020-04-03 PROCEDURE — 83605 ASSAY OF LACTIC ACID: CPT

## 2020-04-03 PROCEDURE — 84145 PROCALCITONIN (PCT): CPT

## 2020-04-03 PROCEDURE — 99285 EMERGENCY DEPT VISIT HI MDM: CPT

## 2020-04-03 PROCEDURE — 94761 N-INVAS EAR/PLS OXIMETRY MLT: CPT

## 2020-04-03 PROCEDURE — 2580000003 HC RX 258: Performed by: HOSPITALIST

## 2020-04-03 PROCEDURE — 87804 INFLUENZA ASSAY W/OPTIC: CPT

## 2020-04-03 PROCEDURE — 94150 VITAL CAPACITY TEST: CPT

## 2020-04-03 PROCEDURE — 94640 AIRWAY INHALATION TREATMENT: CPT

## 2020-04-03 PROCEDURE — 71045 X-RAY EXAM CHEST 1 VIEW: CPT

## 2020-04-03 PROCEDURE — 84484 ASSAY OF TROPONIN QUANT: CPT

## 2020-04-03 PROCEDURE — 83880 ASSAY OF NATRIURETIC PEPTIDE: CPT

## 2020-04-03 PROCEDURE — 99223 1ST HOSP IP/OBS HIGH 75: CPT | Performed by: INTERNAL MEDICINE

## 2020-04-03 RX ORDER — ACETAMINOPHEN 325 MG/1
650 TABLET ORAL EVERY 6 HOURS PRN
Status: DISCONTINUED | OUTPATIENT
Start: 2020-04-03 | End: 2020-04-05 | Stop reason: HOSPADM

## 2020-04-03 RX ORDER — BUDESONIDE AND FORMOTEROL FUMARATE DIHYDRATE 80; 4.5 UG/1; UG/1
2 AEROSOL RESPIRATORY (INHALATION) 2 TIMES DAILY
Status: DISCONTINUED | OUTPATIENT
Start: 2020-04-03 | End: 2020-04-05 | Stop reason: HOSPADM

## 2020-04-03 RX ORDER — FLUOXETINE HYDROCHLORIDE 20 MG/1
60 CAPSULE ORAL DAILY
Status: DISCONTINUED | OUTPATIENT
Start: 2020-04-03 | End: 2020-04-05 | Stop reason: HOSPADM

## 2020-04-03 RX ORDER — ALBUTEROL SULFATE 2.5 MG/3ML
2.5 SOLUTION RESPIRATORY (INHALATION) EVERY 6 HOURS PRN
Status: DISCONTINUED | OUTPATIENT
Start: 2020-04-03 | End: 2020-04-03

## 2020-04-03 RX ORDER — SODIUM CHLORIDE 0.9 % (FLUSH) 0.9 %
10 SYRINGE (ML) INJECTION EVERY 12 HOURS SCHEDULED
Status: DISCONTINUED | OUTPATIENT
Start: 2020-04-03 | End: 2020-04-05 | Stop reason: HOSPADM

## 2020-04-03 RX ORDER — ONDANSETRON 2 MG/ML
4 INJECTION INTRAMUSCULAR; INTRAVENOUS EVERY 6 HOURS PRN
Status: DISCONTINUED | OUTPATIENT
Start: 2020-04-03 | End: 2020-04-05 | Stop reason: HOSPADM

## 2020-04-03 RX ORDER — SODIUM CHLORIDE 9 MG/ML
INJECTION, SOLUTION INTRAVENOUS CONTINUOUS
Status: DISCONTINUED | OUTPATIENT
Start: 2020-04-03 | End: 2020-04-05

## 2020-04-03 RX ORDER — METHYLPREDNISOLONE SODIUM SUCCINATE 40 MG/ML
40 INJECTION, POWDER, LYOPHILIZED, FOR SOLUTION INTRAMUSCULAR; INTRAVENOUS EVERY 12 HOURS
Status: DISCONTINUED | OUTPATIENT
Start: 2020-04-03 | End: 2020-04-04

## 2020-04-03 RX ORDER — ALBUTEROL SULFATE 90 UG/1
2 AEROSOL, METERED RESPIRATORY (INHALATION) EVERY 6 HOURS PRN
Status: DISCONTINUED | OUTPATIENT
Start: 2020-04-03 | End: 2020-04-03

## 2020-04-03 RX ORDER — ALBUTEROL SULFATE 2.5 MG/3ML
2.5 SOLUTION RESPIRATORY (INHALATION)
Status: DISCONTINUED | OUTPATIENT
Start: 2020-04-03 | End: 2020-04-05

## 2020-04-03 RX ORDER — FAMOTIDINE 20 MG/1
20 TABLET, FILM COATED ORAL 2 TIMES DAILY
Status: DISCONTINUED | OUTPATIENT
Start: 2020-04-03 | End: 2020-04-05 | Stop reason: HOSPADM

## 2020-04-03 RX ORDER — TRAZODONE HYDROCHLORIDE 50 MG/1
300 TABLET ORAL NIGHTLY
Status: DISCONTINUED | OUTPATIENT
Start: 2020-04-03 | End: 2020-04-05 | Stop reason: HOSPADM

## 2020-04-03 RX ORDER — POLYETHYLENE GLYCOL 3350 17 G/17G
17 POWDER, FOR SOLUTION ORAL DAILY
Status: DISCONTINUED | OUTPATIENT
Start: 2020-04-03 | End: 2020-04-03 | Stop reason: SDUPTHER

## 2020-04-03 RX ORDER — POTASSIUM CHLORIDE 7.45 MG/ML
10 INJECTION INTRAVENOUS PRN
Status: DISCONTINUED | OUTPATIENT
Start: 2020-04-03 | End: 2020-04-05 | Stop reason: HOSPADM

## 2020-04-03 RX ORDER — POTASSIUM CHLORIDE 20 MEQ/1
40 TABLET, EXTENDED RELEASE ORAL PRN
Status: DISCONTINUED | OUTPATIENT
Start: 2020-04-03 | End: 2020-04-05 | Stop reason: HOSPADM

## 2020-04-03 RX ORDER — IPRATROPIUM BROMIDE AND ALBUTEROL SULFATE 2.5; .5 MG/3ML; MG/3ML
1 SOLUTION RESPIRATORY (INHALATION)
Status: DISCONTINUED | OUTPATIENT
Start: 2020-04-03 | End: 2020-04-03

## 2020-04-03 RX ORDER — SODIUM CHLORIDE 0.9 % (FLUSH) 0.9 %
10 SYRINGE (ML) INJECTION PRN
Status: DISCONTINUED | OUTPATIENT
Start: 2020-04-03 | End: 2020-04-05 | Stop reason: HOSPADM

## 2020-04-03 RX ORDER — MONTELUKAST SODIUM 10 MG/1
10 TABLET ORAL NIGHTLY
Status: DISCONTINUED | OUTPATIENT
Start: 2020-04-03 | End: 2020-04-05 | Stop reason: HOSPADM

## 2020-04-03 RX ORDER — POLYETHYLENE GLYCOL 3350 17 G/17G
17 POWDER, FOR SOLUTION ORAL DAILY PRN
Status: DISCONTINUED | OUTPATIENT
Start: 2020-04-03 | End: 2020-04-05 | Stop reason: HOSPADM

## 2020-04-03 RX ORDER — METHYLPREDNISOLONE SODIUM SUCCINATE 125 MG/2ML
125 INJECTION, POWDER, LYOPHILIZED, FOR SOLUTION INTRAMUSCULAR; INTRAVENOUS ONCE
Status: COMPLETED | OUTPATIENT
Start: 2020-04-03 | End: 2020-04-03

## 2020-04-03 RX ORDER — NICOTINE 21 MG/24HR
1 PATCH, TRANSDERMAL 24 HOURS TRANSDERMAL DAILY
Status: DISCONTINUED | OUTPATIENT
Start: 2020-04-03 | End: 2020-04-05 | Stop reason: HOSPADM

## 2020-04-03 RX ORDER — TRAZODONE HYDROCHLORIDE 50 MG/1
100 TABLET ORAL NIGHTLY
Status: DISCONTINUED | OUTPATIENT
Start: 2020-04-03 | End: 2020-04-03

## 2020-04-03 RX ORDER — GUAIFENESIN 600 MG/1
600 TABLET, EXTENDED RELEASE ORAL 2 TIMES DAILY PRN
Status: DISCONTINUED | OUTPATIENT
Start: 2020-04-03 | End: 2020-04-05 | Stop reason: HOSPADM

## 2020-04-03 RX ORDER — ACETAMINOPHEN 650 MG/1
650 SUPPOSITORY RECTAL EVERY 6 HOURS PRN
Status: DISCONTINUED | OUTPATIENT
Start: 2020-04-03 | End: 2020-04-05 | Stop reason: HOSPADM

## 2020-04-03 RX ORDER — ATORVASTATIN CALCIUM 10 MG/1
10 TABLET, FILM COATED ORAL DAILY
Status: DISCONTINUED | OUTPATIENT
Start: 2020-04-03 | End: 2020-04-05 | Stop reason: HOSPADM

## 2020-04-03 RX ORDER — ATENOLOL 25 MG/1
25 TABLET ORAL DAILY
Status: DISCONTINUED | OUTPATIENT
Start: 2020-04-03 | End: 2020-04-05 | Stop reason: HOSPADM

## 2020-04-03 RX ORDER — PROMETHAZINE HYDROCHLORIDE 25 MG/1
12.5 TABLET ORAL EVERY 6 HOURS PRN
Status: DISCONTINUED | OUTPATIENT
Start: 2020-04-03 | End: 2020-04-05 | Stop reason: HOSPADM

## 2020-04-03 RX ORDER — BUSPIRONE HYDROCHLORIDE 5 MG/1
30 TABLET ORAL 2 TIMES DAILY
Status: DISCONTINUED | OUTPATIENT
Start: 2020-04-03 | End: 2020-04-05 | Stop reason: HOSPADM

## 2020-04-03 RX ADMIN — IPRATROPIUM BROMIDE AND ALBUTEROL SULFATE 1 AMPULE: .5; 3 SOLUTION RESPIRATORY (INHALATION) at 12:29

## 2020-04-03 RX ADMIN — ENOXAPARIN SODIUM 40 MG: 40 INJECTION SUBCUTANEOUS at 21:14

## 2020-04-03 RX ADMIN — BUSPIRONE HYDROCHLORIDE 30 MG: 5 TABLET ORAL at 21:12

## 2020-04-03 RX ADMIN — FAMOTIDINE 20 MG: 20 TABLET, FILM COATED ORAL at 21:14

## 2020-04-03 RX ADMIN — ALBUTEROL SULFATE 2.5 MG: 2.5 SOLUTION RESPIRATORY (INHALATION) at 21:55

## 2020-04-03 RX ADMIN — METHYLPREDNISOLONE SODIUM SUCCINATE 125 MG: 125 INJECTION, POWDER, FOR SOLUTION INTRAMUSCULAR; INTRAVENOUS at 13:10

## 2020-04-03 RX ADMIN — PIPERACILLIN SODIUM AND TAZOBACTAM SODIUM 4.5 G: 4; .5 INJECTION, POWDER, LYOPHILIZED, FOR SOLUTION INTRAVENOUS at 13:10

## 2020-04-03 RX ADMIN — ATORVASTATIN CALCIUM 10 MG: 10 TABLET, FILM COATED ORAL at 21:13

## 2020-04-03 RX ADMIN — FLUOXETINE 60 MG: 20 CAPSULE ORAL at 21:13

## 2020-04-03 RX ADMIN — METHYLPREDNISOLONE SODIUM SUCCINATE 40 MG: 40 INJECTION, POWDER, FOR SOLUTION INTRAMUSCULAR; INTRAVENOUS at 21:15

## 2020-04-03 RX ADMIN — MONTELUKAST SODIUM 10 MG: 10 TABLET ORAL at 21:14

## 2020-04-03 RX ADMIN — GUAIFENESIN 600 MG: 600 TABLET, EXTENDED RELEASE ORAL at 21:12

## 2020-04-03 RX ADMIN — VANCOMYCIN HYDROCHLORIDE 1.5 G: 10 INJECTION, POWDER, LYOPHILIZED, FOR SOLUTION INTRAVENOUS at 13:47

## 2020-04-03 RX ADMIN — PIPERACILLIN AND TAZOBACTAM 3.38 G: 3; .375 INJECTION, POWDER, LYOPHILIZED, FOR SOLUTION INTRAVENOUS at 21:14

## 2020-04-03 RX ADMIN — SODIUM CHLORIDE: 9 INJECTION, SOLUTION INTRAVENOUS at 16:16

## 2020-04-03 RX ADMIN — ATENOLOL 25 MG: 25 TABLET ORAL at 21:19

## 2020-04-03 RX ADMIN — Medication 2 PUFF: at 21:53

## 2020-04-03 ASSESSMENT — ENCOUNTER SYMPTOMS
BACK PAIN: 0
VOMITING: 0
SHORTNESS OF BREATH: 1
CONSTIPATION: 0
NAUSEA: 0
DIARRHEA: 0
ABDOMINAL PAIN: 0
SORE THROAT: 0
COUGH: 1

## 2020-04-03 ASSESSMENT — PAIN DESCRIPTION - FREQUENCY: FREQUENCY: CONTINUOUS

## 2020-04-03 ASSESSMENT — PAIN DESCRIPTION - LOCATION: LOCATION: HEAD

## 2020-04-03 ASSESSMENT — PAIN SCALES - GENERAL
PAINLEVEL_OUTOF10: 0
PAINLEVEL_OUTOF10: 0

## 2020-04-03 NOTE — ED NOTES
Bed: 19  Expected date:   Expected time:   Means of arrival:   Comments:  Respiratory squad      William Duffy, [facility]  04/03/20 1119

## 2020-04-03 NOTE — H&P
Hospital Medicine History & Physical      PCP: Sarika Warren MD    Date of Admission: 4/3/2020    Date of Service: Pt seen/examined on 4/3/2020 and Admitted to Inpatient with expected LOS greater than two midnights due to medical therapy. Chief Complaint: Shortness of breath for few days      History Of Present Illness:      62 y.o. female who presented to [facility] with shortness of breath for a few days, coughing up yellow-green sputum questionable fever chills denies any chest pain no nausea vomiting no diarrhea she continues to smoke a pack and 1/2/day for 20+ years denies any history of alcohol or drug abuse. In the emergency room patient was noted to be hypoxic, leukocytosis, chest x-ray with pneumonia, at home she uses 2- 3 L oxygen at night, patient recently admitted first week of March with influenza A pneumonia, past medical history of pulmonary fibrosis, COPD, depression, hyperlipidemia.     Past Medical History:          Diagnosis Date    Allergic rhinitis 6/11/2010    Anxiety     Arthritis     Aspiration pneumonia ([address] 3/21/2012    Recurrent    Asthma     Bronchitis chronic     COPD (chronic obstructive pulmonary disease) ([address] 12/3/2009    Depression     Drug abuse, cocaine type (HCC)     past history of crack cocaine use    Emphysema of lung (HCC)     Fibromyalgia     GERD (gastroesophageal reflux disease)     Hyperlipidemia     Influenza A 03/05/2020    Lung disease     On home oxygen therapy     uses O2 NC 3L prn at night    Osteoporosis     Pneumonia     Polysubstance dependence ([address] 1/2/2012    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 All other systems reviewed and negative. PHYSICAL EXAM PERFORMED:    /67   Pulse 102   Temp 99 °F (37.2 °C) (Oral)   Resp 18   Ht 5' 5\" (1.651 m)   Wt 129 lb (58.5 kg)   SpO2 94%   BMI 21.47 kg/m²     General appearance:  No apparent distress, appears stated age and cooperative. Ill-appearing  HEENT:  Normal cephalic, atraumatic without obvious deformity. Pupils equal, round, and reactive to light. Extra ocular muscles intact. Conjunctivae/corneas clear. Neck: Supple, with full range of motion. No jugular venous distention. Trachea midline. Respiratory: Decreased breath sound, positive wheezing. Cardiovascular:  Regular rate and rhythm with normal S1/S2 without murmurs, rubs or gallops. Abdomen: Soft, non-tender, non-distended with normal bowel sounds. Musculoskeletal:  No clubbing, cyanosis or edema bilaterally. Full range of motion without deformity. Skin: Skin color, texture, turgor normal.  No rashes or lesions. Neurologic:  Neurovascularly intact without any focal sensory/motor deficits. Cranial nerves: II-XII intact, grossly non-focal.  Psychiatric:  Alert and oriented, thought content appropriate, normal insight  Capillary Refill: Brisk,< 3 seconds   Peripheral Pulses: +2 palpable, equal bilaterally       Labs:     Recent Labs     04/03/20  1140   WBC 16.5*   HGB 13.6   HCT 40.0        Recent Labs     04/03/20  1239   *   K 3.8   CL 97*   CO2 20*   BUN 8   CREATININE <0.5*   CALCIUM 9.0     Recent Labs     04/03/20  1239   AST 12*   ALT 7*   BILITOT 0.4   ALKPHOS 78     No results for input(s): INR in the last 72 hours.   Recent Labs     04/03/20  1239   TROPONINI <0.01       Urinalysis:      Lab Results   Component Value Date    NITRU Negative 03/04/2020    WBCUA 0-3 04/13/2012    RBCUA 3-5 04/13/2012    BLOODU Negative 03/04/2020    SPECGRAV 1.025 03/04/2020    GLUCOSEU Negative 03/04/2020    GLUCOSEU NEGATIVE 04/13/2012       Radiology:     CXR: I have reviewed the CXR with the following interpretation:   EKG:  I have reviewed the EKG with the following interpretation:     XR CHEST PORTABLE   Final Result   Ill-defined airspace disease in the left lower lobe. Generalized thickening and hyperinflation, compatible with underlying COPD. ASSESSMENT:    Active Hospital Problems    Diagnosis Date Noted    Acute respiratory failure with hypoxia ([facility] [J96.01] 04/03/2020   Healthcare associated pneumonia  COPD exacerbation  Hyperlipidemia  Depression  Tobacco abuse        PLAN:    1. This is a 71-year-old female with a past medical history of COPD, pulmonary fibrosis recent admission to the hospital with influenza A pneumonia admitted with acute respiratory failure with hypoxia, healthcare associated pneumonia continue with Zosyn and vancomycin started in the emergency room pharmacy to dose follow blood cultures, sputum culture. Check CBC and a BMP in a.m. Droplet and contact isolation, check LDH check procalcitonin level, check CRP and ferritin. Consult pulmonary. Continue with supplemental oxygen wean as needed. 2.  COPD with acute exacerbation started on IV Solu-Medrol continue with inhalers, antibiotic. 3.  Hyperlipidemia continue with home medication. 4.  Depression continue with home medication. 5.  Tobacco abuse recommended patient to stop smoking. DVT Prophylaxis: Lovenox subcu  Diet: No diet orders on file  Code Status: Full Code    PT/OT Eval Status:     Kyra Da Silva MD    Thank you Luisa Nash MD for the opportunity to be involved in this patient's care. If you have any questions or concerns please feel free to contact me at [phone].

## 2020-04-04 ENCOUNTER — APPOINTMENT (OUTPATIENT)
Dept: GENERAL RADIOLOGY | Age: 65
DRG: 177 | End: 2020-04-04
Payer: MEDICARE

## 2020-04-04 PROBLEM — J44.9 CHRONIC OBSTRUCTIVE PULMONARY DISEASE (HCC): Status: ACTIVE | Noted: 2019-12-25

## 2020-04-04 LAB
ALBUMIN SERPL-MCNC: 3.5 G/DL (ref 3.4–5)
ALP BLD-CCNC: 74 U/L (ref 40–129)
ALT SERPL-CCNC: 7 U/L (ref 10–40)
ANION GAP SERPL CALCULATED.3IONS-SCNC: 15 MMOL/L (ref 3–16)
AST SERPL-CCNC: 10 U/L (ref 15–37)
BILIRUB SERPL-MCNC: <0.2 MG/DL (ref 0–1)
BILIRUBIN DIRECT: <0.2 MG/DL (ref 0–0.3)
BILIRUBIN, INDIRECT: ABNORMAL MG/DL (ref 0–1)
BUN BLDV-MCNC: 10 MG/DL (ref 7–20)
CALCIUM SERPL-MCNC: 9 MG/DL (ref 8.3–10.6)
CHLORIDE BLD-SCNC: 96 MMOL/L (ref 99–110)
CO2: 22 MMOL/L (ref 21–32)
CREAT SERPL-MCNC: <0.5 MG/DL (ref 0.6–1.2)
EKG ATRIAL RATE: 99 BPM
EKG DIAGNOSIS: NORMAL
EKG P AXIS: 51 DEGREES
EKG P-R INTERVAL: 144 MS
EKG Q-T INTERVAL: 358 MS
EKG QRS DURATION: 76 MS
EKG QTC CALCULATION (BAZETT): 459 MS
EKG R AXIS: 19 DEGREES
EKG T AXIS: 68 DEGREES
EKG VENTRICULAR RATE: 99 BPM
GFR AFRICAN AMERICAN: >60
GFR NON-AFRICAN AMERICAN: >60
GLUCOSE BLD-MCNC: 153 MG/DL (ref 70–99)
HCT VFR BLD CALC: 36.9 % (ref 36–48)
HEMOGLOBIN: 12.5 G/DL (ref 12–16)
MCH RBC QN AUTO: 30.7 PG (ref 26–34)
MCHC RBC AUTO-ENTMCNC: 33.8 G/DL (ref 31–36)
MCV RBC AUTO: 90.8 FL (ref 80–100)
PDW BLD-RTO: 13.8 % (ref 12.4–15.4)
PLATELET # BLD: 274 K/UL (ref 135–450)
PMV BLD AUTO: 7.5 FL (ref 5–10.5)
POTASSIUM REFLEX MAGNESIUM: 3.7 MMOL/L (ref 3.5–5.1)
RBC # BLD: 4.07 M/UL (ref 4–5.2)
SODIUM BLD-SCNC: 133 MMOL/L (ref 136–145)
TOTAL PROTEIN: 6.9 G/DL (ref 6.4–8.2)
WBC # BLD: 13.9 K/UL (ref 4–11)

## 2020-04-04 PROCEDURE — 6370000000 HC RX 637 (ALT 250 FOR IP): Performed by: HOSPITALIST

## 2020-04-04 PROCEDURE — 87641 MR-STAPH DNA AMP PROBE: CPT

## 2020-04-04 PROCEDURE — 80048 BASIC METABOLIC PNL TOTAL CA: CPT

## 2020-04-04 PROCEDURE — 2700000000 HC OXYGEN THERAPY PER DAY

## 2020-04-04 PROCEDURE — 99233 SBSQ HOSP IP/OBS HIGH 50: CPT | Performed by: INTERNAL MEDICINE

## 2020-04-04 PROCEDURE — 85027 COMPLETE CBC AUTOMATED: CPT

## 2020-04-04 PROCEDURE — 36415 COLL VENOUS BLD VENIPUNCTURE: CPT

## 2020-04-04 PROCEDURE — 71045 X-RAY EXAM CHEST 1 VIEW: CPT

## 2020-04-04 PROCEDURE — 6360000002 HC RX W HCPCS: Performed by: HOSPITALIST

## 2020-04-04 PROCEDURE — 1200000000 HC SEMI PRIVATE

## 2020-04-04 PROCEDURE — 94640 AIRWAY INHALATION TREATMENT: CPT

## 2020-04-04 PROCEDURE — 93010 ELECTROCARDIOGRAM REPORT: CPT | Performed by: INTERNAL MEDICINE

## 2020-04-04 PROCEDURE — 80076 HEPATIC FUNCTION PANEL: CPT

## 2020-04-04 PROCEDURE — 2580000003 HC RX 258: Performed by: HOSPITALIST

## 2020-04-04 PROCEDURE — 87449 NOS EACH ORGANISM AG IA: CPT

## 2020-04-04 PROCEDURE — 0100U HC RESPIRPTHGN MULT REV TRANS & AMP PRB TECH 21 TRGT: CPT

## 2020-04-04 PROCEDURE — 6370000000 HC RX 637 (ALT 250 FOR IP): Performed by: NURSE PRACTITIONER

## 2020-04-04 PROCEDURE — 94761 N-INVAS EAR/PLS OXIMETRY MLT: CPT

## 2020-04-04 RX ORDER — PREDNISONE 20 MG/1
40 TABLET ORAL DAILY
Status: DISCONTINUED | OUTPATIENT
Start: 2020-04-05 | End: 2020-04-05 | Stop reason: HOSPADM

## 2020-04-04 RX ADMIN — MONTELUKAST SODIUM 10 MG: 10 TABLET ORAL at 22:19

## 2020-04-04 RX ADMIN — ALBUTEROL SULFATE 2.5 MG: 2.5 SOLUTION RESPIRATORY (INHALATION) at 19:23

## 2020-04-04 RX ADMIN — Medication 10 ML: at 08:12

## 2020-04-04 RX ADMIN — TRAZODONE HYDROCHLORIDE 300 MG: 50 TABLET ORAL at 00:10

## 2020-04-04 RX ADMIN — FAMOTIDINE 20 MG: 20 TABLET, FILM COATED ORAL at 22:20

## 2020-04-04 RX ADMIN — ENOXAPARIN SODIUM 40 MG: 40 INJECTION SUBCUTANEOUS at 08:01

## 2020-04-04 RX ADMIN — Medication 2 PUFF: at 19:23

## 2020-04-04 RX ADMIN — VANCOMYCIN HYDROCHLORIDE 1000 MG: 10 INJECTION, POWDER, LYOPHILIZED, FOR SOLUTION INTRAVENOUS at 13:04

## 2020-04-04 RX ADMIN — PIPERACILLIN AND TAZOBACTAM 3.38 G: 3; .375 INJECTION, POWDER, LYOPHILIZED, FOR SOLUTION INTRAVENOUS at 05:12

## 2020-04-04 RX ADMIN — BUSPIRONE HYDROCHLORIDE 30 MG: 5 TABLET ORAL at 22:19

## 2020-04-04 RX ADMIN — VANCOMYCIN HYDROCHLORIDE 1000 MG: 10 INJECTION, POWDER, LYOPHILIZED, FOR SOLUTION INTRAVENOUS at 03:00

## 2020-04-04 RX ADMIN — FAMOTIDINE 20 MG: 20 TABLET, FILM COATED ORAL at 08:01

## 2020-04-04 RX ADMIN — TRAZODONE HYDROCHLORIDE 300 MG: 50 TABLET ORAL at 22:19

## 2020-04-04 RX ADMIN — ACETAMINOPHEN 650 MG: 325 TABLET ORAL at 22:19

## 2020-04-04 RX ADMIN — METHYLPREDNISOLONE SODIUM SUCCINATE 40 MG: 40 INJECTION, POWDER, FOR SOLUTION INTRAMUSCULAR; INTRAVENOUS at 08:01

## 2020-04-04 RX ADMIN — PIPERACILLIN AND TAZOBACTAM 3.38 G: 3; .375 INJECTION, POWDER, LYOPHILIZED, FOR SOLUTION INTRAVENOUS at 22:20

## 2020-04-04 RX ADMIN — FLUOXETINE 60 MG: 20 CAPSULE ORAL at 08:01

## 2020-04-04 RX ADMIN — BUSPIRONE HYDROCHLORIDE 30 MG: 5 TABLET ORAL at 08:01

## 2020-04-04 RX ADMIN — ALBUTEROL SULFATE 2.5 MG: 2.5 SOLUTION RESPIRATORY (INHALATION) at 16:35

## 2020-04-04 RX ADMIN — ATORVASTATIN CALCIUM 10 MG: 10 TABLET, FILM COATED ORAL at 08:01

## 2020-04-04 RX ADMIN — PIPERACILLIN AND TAZOBACTAM 3.38 G: 3; .375 INJECTION, POWDER, LYOPHILIZED, FOR SOLUTION INTRAVENOUS at 14:04

## 2020-04-04 RX ADMIN — ALBUTEROL SULFATE 2.5 MG: 2.5 SOLUTION RESPIRATORY (INHALATION) at 11:56

## 2020-04-04 RX ADMIN — SODIUM CHLORIDE: 9 INJECTION, SOLUTION INTRAVENOUS at 22:20

## 2020-04-04 ASSESSMENT — PAIN SCALES - GENERAL
PAINLEVEL_OUTOF10: 5
PAINLEVEL_OUTOF10: 0

## 2020-04-04 NOTE — PROGRESS NOTES
RESPIRATORY THERAPY ASSESSMENT    Name:  Vel [address] Record Number:  [id number]  Age: 62 y.o. Gender: female  : 1955  Today's Date:  4/3/2020  Room:  0209/0209-01    Assessment     Is the patient being admitted for a COPD or Asthma exacerbation? No   (If yes the patient will be seen every 4 hours for the first 24 hours and then reassessed)    Patient Admission Diagnosis      Allergies  Allergies   Allergen Reactions    Meperidine Anaphylaxis     \"Demerol\"     Demerol Hives       Minimum Predicted Vital Capacity:     855          Actual Vital Capacity:      750              Pulmonary History:COPD and Asthma  Home Oxygen Therapy:  2 liters/min via nasal cannula/NOC  Home Respiratory Therapy:Albuterol 1/2 XDAILY, SPIRIVA, WIXELA  Current Respiratory Therapy:  ALBUTEROL, SYMBICORT, SPIRIVA  Treatment Type: MDI(RINSE MOUTH POST MDI)  Medications: Budesonide/Formoterol    Respiratory Severity Index(RSI)   Patients with orders for inhalation medications, oxygen, or any therapeutic treatment modality will be placed on Respiratory Protocol. They will be assessed with the first treatment and at least every 72 hours thereafter. The following severity scale will be used to determine frequency of treatment intervention.     Smoking History: Pulmonary Disease or Smoking History, Greater than 15 pack year = 2    Social History  Social History     Tobacco Use    Smoking status: Current Every Day Smoker     Packs/day: 1.00     Years: 40.00     Pack years: 40.00     Types: Cigarettes    Smokeless tobacco: Never Used    Tobacco comment: 20 - smokes 3-4 cigs daily   Substance Use Topics    Alcohol use: No     Alcohol/week: 0.0 standard drinks    Drug use: Yes     Types: Marijuana     Comment: Daily       Recent Surgical History: None = 0  Past Surgical History  Past Surgical History:   Procedure Laterality Date    BLADDER REPAIR      BRONCHOSCOPY      BRONCHOSCOPY N/A 3/6/2020    BRONCHOSCOPY THERAPUTIC ASPIRATION INITIAL performed by Bernardino Guerra MD at [address]  3/6/2020    BRONCHOSCOPY ALVEOLAR LAVAGE performed by Bernardino Guerra MD at [address]  2012    ENDOSCOPY, COLON, DIAGNOSTIC      ESOPHAGEAL DILATATION  9/20/2018    ESOPHAGEAL DILATION Lamona Locket performed by Norma Murillo MD at [address] [facility] OTHER SURGICAL HISTORY  2/12/15    T8 Kyphoplasty    KS COLONOSCOPY FLX DX W/COLLJ SPEC WHEN PFRMD N/A 9/20/2018    EGD AND COLONOSCOPY WITH ANESTHESIA performed by Norma Murillo MD at [address] [facility] ESOPHAGOGASTRODUODENOSCOPY TRANSORAL DIAGNOSTIC N/A 9/20/2018    EGD AND COLONOSCOPY WITH ANESTHESIA performed by Norma Murillo MD at [address]         Level of Consciousness: Alert, Oriented, and Cooperative = 0    Level of Activity: Walking unassisted = 0    Respiratory Pattern: Dyspnea with exertion;Irregular pattern;or RR less than 6 = 2    Breath Sounds: Absent bilaterally and/or with wheezes = 3    Sputum  Sputum Color: (reports gree),  ,    Cough: Strong, productive = 1    Vital Signs   BP (!) 106/49   Pulse 98   Temp 97 °F (36.1 °C) (Oral)   Resp 20   Ht 5' 5\" (1.651 m)   Wt 121 lb 8 oz (55.1 kg)   SpO2 96%   BMI 20.22 kg/m²   SPO2 (COPD values may differ): 88-89% on room air or greater than 92% on FiO2 28- 35% = 2    Peak Flow (asthma only): not applicable = 0    RSI: 9-43 = TID (three times daily) and Q4hr PRN for dyspnea        Plan       Goals: medication delivery    Patient/caregiver was educated on the proper method of use for Respiratory Care Devices:  Yes      Level of patient/caregiver understanding able to:   ? Verbalize understanding   ? Demonstrate understanding       ? Teach back        ? Needs reinforcement       ? No available caregiver               ?   Other:     Response to education:  Excellent     Is patient being placed on

## 2020-04-04 NOTE — PROGRESS NOTES
midline. Respiratory: Decreased breath sound, positive mild wheezing. Cardiovascular:  Regular rate and rhythm with normal S1/S2 without murmurs, rubs or gallops. Abdomen: Soft, non-tender, non-distended with normal bowel sounds. Musculoskeletal:  No clubbing, cyanosis or edema bilaterally. Full range of motion without deformity. Skin: Skin color, texture, turgor normal.  No rashes or lesions. Neurologic:  Neurovascularly intact without any focal sensory/motor deficits. Cranial nerves: II-XII intact, grossly non-focal.  Psychiatric:  Alert and oriented, thought content appropriate, normal insight  Capillary Refill: Brisk,< 3 seconds   Peripheral Pulses: +2 palpable, equal bilaterally          Labs:   Recent Labs     04/03/20  1140 04/04/20  0608   WBC 16.5* 13.9*   HGB 13.6 12.5   HCT 40.0 36.9    274     Recent Labs     04/03/20  1239 04/04/20  0608   * 133*   K 3.8 3.7   CL 97* 96*   CO2 20* 22   BUN 8 10   CREATININE <0.5* <0.5*   CALCIUM 9.0 9.0     Recent Labs     04/03/20  1239 04/04/20  0608   AST 12* 10*   ALT 7* 7*   BILIDIR  --  <0.2   BILITOT 0.4 <0.2   ALKPHOS 78 74     No results for input(s): INR in the last 72 hours. Recent Labs     04/03/20  1239   TROPONINI <0.01       Urinalysis:      Lab Results   Component Value Date    NITRU Negative 04/03/2020    WBCUA 0-3 04/13/2012    RBCUA 3-5 04/13/2012    BLOODU Negative 04/03/2020    SPECGRAV 1.025 04/03/2020    GLUCOSEU Negative 04/03/2020    GLUCOSEU NEGATIVE 04/13/2012       Radiology:  XR CHEST PORTABLE   Final Result   Ill-defined airspace disease in the left lower lobe. Generalized thickening and hyperinflation, compatible with underlying COPD.                  Assessment/Plan:    Active Hospital Problems    Diagnosis    Acute respiratory failure with hypoxia ([facility] [J96.01]        This is a 56-year-old female with a past medical history of COPD, pulmonary fibrosis recent admission to the hospital with influenza A pneumonia admitted with sob. Acute respiratory failure with hypoxia, healthcare associated pneumonia continue with Zosyn and vancomycin started in the emergency room pharmacy to dose follow blood cultures, sputum culture. Check CBC and a BMP in a.m. Droplet and contact isolation, check LDH check procalcitonin level, check CRP and ferritin. Consulted pulmonary. Continue with supplemental oxygen wean as needed. 2.  COPD with acute exacerbation started on IV Solu-Medrol continue with inhalers, antibiotic. 3.  Hyperlipidemia continue with home medication. 4.  Depression continue with home medication. 5.  Tobacco abuse recommended patient to stop smoking.     DVT Prophylaxis: lovenox  Diet: DIET GENERAL;  Code Status: Full Code    PT/OT Eval Status: not ordered    Dispo - pending improvement, pulm Eder Kirby MD

## 2020-04-05 VITALS
RESPIRATION RATE: 18 BRPM | BODY MASS INDEX: 20.84 KG/M2 | SYSTOLIC BLOOD PRESSURE: 126 MMHG | TEMPERATURE: 97.8 F | HEIGHT: 65 IN | OXYGEN SATURATION: 94 % | HEART RATE: 82 BPM | WEIGHT: 125.1 LBS | DIASTOLIC BLOOD PRESSURE: 67 MMHG

## 2020-04-05 LAB
L. PNEUMOPHILA SEROGP 1 UR AG: NORMAL
MRSA SCREEN RT-PCR: NORMAL
REPORT: NORMAL
RESPIRATORY PANEL PCR: NORMAL
STREP PNEUMONIAE ANTIGEN, URINE: NORMAL
VANCOMYCIN TROUGH: 16.1 UG/ML (ref 10–20)

## 2020-04-05 PROCEDURE — 87205 SMEAR GRAM STAIN: CPT

## 2020-04-05 PROCEDURE — 6370000000 HC RX 637 (ALT 250 FOR IP): Performed by: INTERNAL MEDICINE

## 2020-04-05 PROCEDURE — 6370000000 HC RX 637 (ALT 250 FOR IP): Performed by: HOSPITALIST

## 2020-04-05 PROCEDURE — 99232 SBSQ HOSP IP/OBS MODERATE 35: CPT | Performed by: INTERNAL MEDICINE

## 2020-04-05 PROCEDURE — 80202 ASSAY OF VANCOMYCIN: CPT

## 2020-04-05 PROCEDURE — 2580000003 HC RX 258: Performed by: HOSPITALIST

## 2020-04-05 PROCEDURE — 94640 AIRWAY INHALATION TREATMENT: CPT

## 2020-04-05 PROCEDURE — 36415 COLL VENOUS BLD VENIPUNCTURE: CPT

## 2020-04-05 PROCEDURE — 6360000002 HC RX W HCPCS: Performed by: HOSPITALIST

## 2020-04-05 RX ORDER — ALBUTEROL SULFATE 90 UG/1
2 AEROSOL, METERED RESPIRATORY (INHALATION) 4 TIMES DAILY
Status: DISCONTINUED | OUTPATIENT
Start: 2020-04-05 | End: 2020-04-05 | Stop reason: HOSPADM

## 2020-04-05 RX ORDER — PREDNISONE 20 MG/1
TABLET ORAL
Qty: 20 TABLET | Refills: 0 | Status: SHIPPED | OUTPATIENT
Start: 2020-04-05 | End: 2020-04-21 | Stop reason: ALTCHOICE

## 2020-04-05 RX ORDER — ALBUTEROL SULFATE 90 UG/1
2 AEROSOL, METERED RESPIRATORY (INHALATION) EVERY 6 HOURS PRN
Status: DISCONTINUED | OUTPATIENT
Start: 2020-04-05 | End: 2020-04-05

## 2020-04-05 RX ORDER — DOXYCYCLINE HYCLATE 100 MG
100 TABLET ORAL EVERY 12 HOURS SCHEDULED
Status: DISCONTINUED | OUTPATIENT
Start: 2020-04-05 | End: 2020-04-05 | Stop reason: HOSPADM

## 2020-04-05 RX ORDER — DOXYCYCLINE HYCLATE 100 MG
100 TABLET ORAL EVERY 12 HOURS SCHEDULED
Qty: 8 TABLET | Refills: 0 | Status: SHIPPED | OUTPATIENT
Start: 2020-04-05 | End: 2020-04-09

## 2020-04-05 RX ADMIN — TIOTROPIUM BROMIDE INHALATION SPRAY 2 PUFF: 3.12 SPRAY, METERED RESPIRATORY (INHALATION) at 09:35

## 2020-04-05 RX ADMIN — Medication 2 PUFF: at 12:26

## 2020-04-05 RX ADMIN — Medication 2 PUFF: at 09:35

## 2020-04-05 RX ADMIN — ATORVASTATIN CALCIUM 10 MG: 10 TABLET, FILM COATED ORAL at 07:48

## 2020-04-05 RX ADMIN — Medication 10 ML: at 07:50

## 2020-04-05 RX ADMIN — FAMOTIDINE 20 MG: 20 TABLET, FILM COATED ORAL at 07:49

## 2020-04-05 RX ADMIN — PREDNISONE 40 MG: 20 TABLET ORAL at 07:48

## 2020-04-05 RX ADMIN — BUSPIRONE HYDROCHLORIDE 30 MG: 5 TABLET ORAL at 07:49

## 2020-04-05 RX ADMIN — FLUOXETINE 60 MG: 20 CAPSULE ORAL at 07:49

## 2020-04-05 RX ADMIN — PIPERACILLIN AND TAZOBACTAM 3.38 G: 3; .375 INJECTION, POWDER, LYOPHILIZED, FOR SOLUTION INTRAVENOUS at 03:56

## 2020-04-05 RX ADMIN — ENOXAPARIN SODIUM 40 MG: 40 INJECTION SUBCUTANEOUS at 07:49

## 2020-04-05 RX ADMIN — VANCOMYCIN HYDROCHLORIDE 1000 MG: 10 INJECTION, POWDER, LYOPHILIZED, FOR SOLUTION INTRAVENOUS at 02:29

## 2020-04-05 RX ADMIN — ATENOLOL 25 MG: 25 TABLET ORAL at 07:48

## 2020-04-05 ASSESSMENT — PAIN SCALES - GENERAL: PAINLEVEL_OUTOF10: 0

## 2020-04-05 NOTE — DISCHARGE SUMMARY
Hospital Medicine Discharge Summary    Patient ID: Radha Rivera      Patient's PCP: Rinku Wahl MD    Admit Date: 4/3/2020     Discharge Date: 4/5/2020      Admitting Physician: Kaley Adam MD     Discharge Physician: Adelaide Piper MD     Discharge Diagnoses: Active Hospital Problems    Diagnosis    Healthcare associated bacterial pneumonia [J15.9]    Bandemia [D72.825]    Acute respiratory failure with hypoxia (HCC) [J96.01]    Chronic obstructive pulmonary disease ([address] [J44.9]    Smoker [F17.200]       The patient was seen and examined on day of discharge and this discharge summary is in conjunction with any daily progress note from day of discharge. Hospital Course:   History Of Present Illness:       62 y.o. female who presented to [facility] with shortness of breath for a few days, coughing up yellow-green sputum questionable fever chills denies any chest pain no nausea vomiting no diarrhea she continues to smoke a pack and 1/2/day for 20+ years denies any history of alcohol or drug abuse. In the emergency room patient was noted to be hypoxic, leukocytosis, chest x-ray with pneumonia, at home she uses 2- 3 L oxygen at night, patient recently admitted first week of March with influenza A pneumonia, past medical history of pulmonary fibrosis, COPD, depression, hyperlipidemia. Acute respiratory failure with hypoxia, healthcare associated pneumonia(possible gram neg) continued with Zosyn and vancomycin started in the emergency room pharmacy to dose follow blood cultures, sputum culture.  Monitored CBC and a BMP  Droplet and contact isolation ordered  -msra screen was neg, stopped iv Vanc  - procalcitonin level wnl  -strep pna agn/legionella agn neg  Consulted pulmonary.  Switched to doxycycline for short course, continued steroid taper on dc  Continued with supplemental oxygen, weaned off during stay     2.  COPD with acute exacerbation started on IV Solu-Medrol continued HANDIHALER) 18 MCG inhalation capsule INHALE THE CONTENTS OF 1 CAPSULE EVERY DAY, Disp-90 capsule, R-1Normal      albuterol sulfate HFA (VENTOLIN HFA) 108 (90 Base) MCG/ACT inhaler Inhale 2 puffs into the lungs every 6 hours as needed for Wheezing or Shortness of Breath, Disp-3 Inhaler, R-1Normal      albuterol (PROVENTIL) (2.5 MG/3ML) 0.083% nebulizer solution Take 3 mLs by nebulization every 6 hours as needed for Shortness of Breath, Disp-120 each, R-3DX: COPD J44.9Normal      montelukast (SINGULAIR) 10 MG tablet TAKE 1 TABLET BY MOUTH EACH NIGHT, Disp-90 tablet, R-1Normal      Abaloparatide 3120 MCG/1.56ML SOPN Inject 80 mcg into the skin daily, Disp-3 pen, R-4Print      bisacodyl (DULCOLAX) 5 MG EC tablet Take 1 tablet by mouth daily as needed for Constipation, Disp-4 tablet, R-0Normal      guaiFENesin (MUCINEX) 600 MG extended release tablet Take 1 tablet by mouth 2 times daily as needed for Congestion, Disp-60 tablet, R-2Normal             Time Spent on discharge is more than 30 minutes in the examination, evaluation, counseling and review of medications and discharge plan. Signed:    Jess Miranda MD   4/5/2020      Thank you Lucien Pineda MD for the opportunity to be involved in this patient's care. If you have any questions or concerns please feel free to contact me at [phone].

## 2020-04-05 NOTE — PROGRESS NOTES
INPATIENT PULMONARY CRITICAL CARE PROGRESS NOTE        SUBJECTIVE: The patient is feeling better, she is still short of breath with ambulation but improved. Still having cough with greenish phlegm. She has remained afebrile and hemodynamically stable. Weaned off oxygen with SaO2 94%. Fair appetite, no GI or  complaints. Physical Exam:  Blood pressure 107/65, pulse 82, temperature 97.2 °F (36.2 °C), temperature source Oral, resp. rate 16, height 5' 5\" (1.651 m), weight 125 lb 1.6 oz (56.7 kg), SpO2 95 %, not currently breastfeeding.'   Constitutional: Small built, somewhat underweight. Does not appear very ill. In no acute distress. HENT:  Oropharynx is clear and moist.  Missing teeth, class II airway  Eyes:  Conjunctivae are normal. No scleral icterus. Neck: No JVD. Cardiovascular: Normal rate, regular rhythm, normal heart sounds. Pulmonary/Chest: No wheezes. Few Left basal crackles in the infrascapular region. Abdominal:  Deferred. Musculoskeletal: No cyanosis. No clubbing. No obvious joint deformity. Lymphadenopathy: Deferred. Skin: Skin is warm and dry. No rash or nodules on the exposed extremities. Psychiatric: Normal mood and affect. Behavior is normal.  No anxiety. Neurologic: Alert, awake and oriented. PERRL. Speech fluent.   No obvious neurologic deficit, detailed exam not performed      Results:  CBC:   Recent Labs     04/03/20  1140 04/04/20  0608   WBC 16.5* 13.9*   HGB 13.6 12.5   HCT 40.0 36.9   MCV 90.1 90.8    274     BMP:   Recent Labs     04/03/20  1239 04/04/20  0608   * 133*   K 3.8 3.7   CL 97* 96*   CO2 20* 22   BUN 8 10   CREATININE <0.5* <0.5*     LIVER PROFILE:   Recent Labs     04/03/20  1239 04/04/20  0608   AST 12* 10*   ALT 7* 7*   BILIDIR  --  <0.2   BILITOT 0.4 <0.2   ALKPHOS 78 74     UA:  Recent Labs     04/03/20  1605   COLORU Yellow   PHUR 6.5   CLARITYU Clear   SPECGRAV 1.025   LEUKOCYTESUR Negative   UROBILINOGEN 1.0   BILIRUBINUR

## 2020-04-05 NOTE — PROGRESS NOTES
Patient given discharge instructions and voiced understanding. Prescriptions were called in to the patient's pharmacy. Patient discharged in stable condition with all belongings. To car via wheelchair. Home with .  KATHERIN ESTRADA

## 2020-04-05 NOTE — PLAN OF CARE
Problem: Falls - Risk of:  Goal: Will remain free from falls  Description: Will remain free from falls  Outcome: Ongoing  Note: Pt will remain free from falls throughout hospital stay. Fall precautions in place,  bed in lowest position with wheels locked and side rails 2/4 up. Room door open and hourly rounding completed. Will continue to monitor throughout shift.

## 2020-04-06 ENCOUNTER — CARE COORDINATION (OUTPATIENT)
Dept: CASE MANAGEMENT | Age: 65
End: 2020-04-06

## 2020-04-06 PROCEDURE — 1111F DSCHRG MED/CURRENT MED MERGE: CPT | Performed by: FAMILY MEDICINE

## 2020-04-06 NOTE — CARE COORDINATION
contact PCP to discuss f/u apt. Denies any acute needs at present time. Agreeable to f/u calls. Educated on the use of urgent care or physicians 24 hr access line if assistance is needed after hours.      Eliana DENISEN, RN, [facility]  Care Transition Nurse   [phone]

## 2020-04-07 LAB
BLOOD CULTURE, ROUTINE: NORMAL
BLOOD CULTURE, ROUTINE: NORMAL
CULTURE, BLOOD 2: NORMAL

## 2020-04-08 ENCOUNTER — TELEPHONE (OUTPATIENT)
Dept: FAMILY MEDICINE CLINIC | Age: 65
End: 2020-04-08

## 2020-04-09 ENCOUNTER — CARE COORDINATION (OUTPATIENT)
Dept: CASE MANAGEMENT | Age: 65
End: 2020-04-09

## 2020-04-13 ENCOUNTER — VIRTUAL VISIT (OUTPATIENT)
Dept: FAMILY MEDICINE CLINIC | Age: 65
End: 2020-04-13
Payer: MEDICARE

## 2020-04-13 VITALS — OXYGEN SATURATION: 96 %

## 2020-04-13 LAB
FUNGUS (MYCOLOGY) CULTURE: ABNORMAL
FUNGUS STAIN: ABNORMAL
ORGANISM: ABNORMAL

## 2020-04-13 PROCEDURE — 1111F DSCHRG MED/CURRENT MED MERGE: CPT | Performed by: FAMILY MEDICINE

## 2020-04-13 PROCEDURE — G2012 BRIEF CHECK IN BY MD/QHP: HCPCS | Performed by: FAMILY MEDICINE

## 2020-04-13 RX ORDER — TIOTROPIUM BROMIDE 18 UG/1
CAPSULE ORAL; RESPIRATORY (INHALATION)
Qty: 90 CAPSULE | Refills: 1 | Status: SHIPPED | OUTPATIENT
Start: 2020-04-13 | End: 2020-09-09 | Stop reason: SDUPTHER

## 2020-04-13 RX ORDER — MONTELUKAST SODIUM 10 MG/1
TABLET ORAL
Qty: 90 TABLET | Refills: 1 | Status: SHIPPED | OUTPATIENT
Start: 2020-04-13 | End: 2020-05-12 | Stop reason: SDUPTHER

## 2020-04-13 RX ORDER — ALBUTEROL SULFATE 90 UG/1
2 AEROSOL, METERED RESPIRATORY (INHALATION) EVERY 6 HOURS PRN
Qty: 3 INHALER | Refills: 1 | Status: SHIPPED | OUTPATIENT
Start: 2020-04-13 | End: 2020-09-09 | Stop reason: SDUPTHER

## 2020-04-13 NOTE — PROGRESS NOTES
Van Garcia is a 62 y.o. female evaluated via telephone on 4/13/2020. Consent:  She and/or health care decision maker is aware that that she may receive a bill for this telephone service, depending on her insurance coverage, and has provided verbal consent to proceed: Yes      Documentation:  I communicated with the patient and/or health care decision maker about hospital follow up from recent pneumonia, acute on chronic respiratory failure, and COPD exacerbation. Details of this discussion including any medical advice provided: Patient is overall feeling much better. She has completed her course of oral doxycycine, and just took her last oral prednisone today. No fevers, no increased SOB. She is back to baseline. She is working hard at quitting smoking. Restarted Chantix yesterday. Advised on normal course of chantix, including possible side effects. Advised to get f/u CXR in 4 weeks. Of note, she has not been social distancing from her daughter's family. Advised she should stay at home and no longer go to their home during the COVID-19 outbreak. She is in agreement. I affirm this is a Patient Initiated Episode with an Established Patient who has not had a related appointment within my department in the past 7 days or scheduled within the next 24 hours.     Total Time: minutes: 5-10 minutes    Note: not billable if this call serves to triage the patient into an appointment for the relevant concern      Dina Queen

## 2020-04-15 ENCOUNTER — TELEPHONE (OUTPATIENT)
Dept: PULMONOLOGY | Age: 65
End: 2020-04-15

## 2020-04-16 ENCOUNTER — CARE COORDINATION (OUTPATIENT)
Dept: CASE MANAGEMENT | Age: 65
End: 2020-04-16

## 2020-04-16 NOTE — CARE COORDINATION
COVID-19 Screening Follow-up Note    Patient contacted regarding COVID-19 risk and screening. Care Transition Nurse/ Ambulatory Care Manager contacted the patient by telephone to perform follow-up assessment. Verified name and  with patient as identifiers. Patient has following risk factors of: COPD and pneumonia. Symptoms reviewed with patient who verbalized the following symptoms: none      Due to no new or worsening symptoms encounter was not routed to provider for escalation. Education provided regarding infection prevention, and signs and symptoms of COVID-19 and when to seek medical attention with patient who verbalized understanding. Discussed exposure protocols and quarantine from [address] you at higher risk for severe illness  and given an opportunity for questions and concerns. The patient agrees to contact the COVID-19 hotline [phone] or PCP office for questions related to their healthcare. CTN/ACM provided contact information for future reference. From CDC: Are you at higher risk for severe illness?  Wash your hands often.  Avoid close contact (6 feet, which is about two arm lengths) with people who are sick.  Put distance between yourself and other people if COVID-19 is spreading in your community.  Clean and disinfect frequently touched surfaces.  Avoid all cruise travel and non-essential air travel.  Call your healthcare professional if you have concerns about COVID-19 and your underlying condition or if you are sick. For more information on steps you can take to protect yourself, see CDC's How to [address] [place] [address] for follow-up call in 5-7 days based on severity of symptoms and risk factors    Spoke with patient who reported she is doing really well. Patient denied any SOB and reported her breathing is doing good. Patient stated she is smoking cigarettes again but did also start her chantix again to help with quitting.  CTN did discuss

## 2020-04-21 ENCOUNTER — VIRTUAL VISIT (OUTPATIENT)
Dept: PULMONOLOGY | Age: 65
End: 2020-04-21
Payer: MEDICARE

## 2020-04-21 VITALS — WEIGHT: 122 LBS | BODY MASS INDEX: 20.33 KG/M2 | HEIGHT: 65 IN

## 2020-04-21 LAB
AFB CULTURE (MYCOBACTERIA): NORMAL
AFB SMEAR: NORMAL

## 2020-04-21 PROCEDURE — 99214 OFFICE O/P EST MOD 30 MIN: CPT | Performed by: INTERNAL MEDICINE

## 2020-04-21 RX ORDER — TRAZODONE HYDROCHLORIDE 150 MG/1
TABLET ORAL
Qty: 180 TABLET | Refills: 1 | Status: SHIPPED | OUTPATIENT
Start: 2020-04-21 | End: 2020-09-11

## 2020-04-21 NOTE — PROGRESS NOTES
90 tablet, Rfl: 1    albuterol sulfate HFA (VENTOLIN HFA) 108 (90 Base) MCG/ACT inhaler, Inhale 2 puffs into the lungs every 6 hours as needed for Wheezing or Shortness of Breath, Disp: 3 Inhaler, Rfl: 1    tiotropium (SPIRIVA HANDIHALER) 18 MCG inhalation capsule, INHALE THE CONTENTS OF 1 CAPSULE EVERY DAY, Disp: 90 capsule, Rfl: 1    simvastatin (ZOCOR) 20 MG tablet, TAKE 1 TABLET EVERY EVENING, Disp: 90 tablet, Rfl: 1    busPIRone (BUSPAR) 30 MG tablet, TAKE 1 TABLET TWICE DAILY, Disp: 180 tablet, Rfl: 1    FLUoxetine (PROZAC) 20 MG capsule, TAKE 3 CAPSULES BY MOUTH DAILY, Disp: 270 capsule, Rfl: 1    teriparatide, recombinant, (FORTEO) 600 MCG/2.4ML SOLN injection, Inject 0.08 mLs into the skin daily One dose injected daily. , Disp: 1 pen, Rfl: 11    atenolol (TENORMIN) 25 MG tablet, TAKE 1 TABLET BY MOUTH DAILY (REPLACES PROPRANOLOL), Disp: 90 tablet, Rfl: 1    albuterol (PROVENTIL) (2.5 MG/3ML) 0.083% nebulizer solution, Take 3 mLs by nebulization every 6 hours as needed for Shortness of Breath, Disp: 120 each, Rfl: 3    guaiFENesin (MUCINEX) 600 MG extended release tablet, Take 1 tablet by mouth 2 times daily as needed for Congestion, Disp: 60 tablet, Rfl: 2            Objective:     Height 5' 5\" (1.651 m), weight 122 lb (55.3 kg), not currently breastfeeding.' on RA  O2 Sat:  HR:  BP:  RR:  Temperature:  Constitutional:  No acute distress. Appears well developed and nourished. Eyes: No sclera icterus. EOM intact. No visible discharge. HENT: Head is normocephalic and atraumatic. Mucus membranes are moist and the tongue appears normal. Normal appearing nose. External Ears normal.   Neck: No visualized mass. [place] Parisian is midline   Resp: No accessory muscle use. Respiratory effort normal. No visualized signs of difficulty breathing or respiratory distress. Cardiovascular: No LE edema. Musculoskeletal: Normal gait with no signs of ataxia. Normal range of motion of the neck.   Skin: No significant exanthematous lesions or discoloration noted on facial skin    Neuro: Awake. Alert. Able to follow commands. No facial asymmetry. No gaze palsy. Psych: No agitation. Normal affect. No hallucinations. Oriented to person/time/place. No anxiety. Normal judgement and insight. DATA reviewed by me:   PFTs 08/29/2019 FVC 1.68 (50%) FEV1 0.98(38%) TLC 4.35 (81%)  DLCO 07.19(30%) 6MW 860  F L O2 90%   PFTs 02/28/2019 FVC 2.03 (60%) FEV1 1.30(50%) TLC 4.15 (78%)  DLCO 07.23(31%) 6MW 560  F L O2 90%   PFTs 08/15/2018 FVC 1.86 (55%) FEV1 1.19(45%) TLC 4.28(80%)   DLCO 07.59(32%) 6MW 840  F L O2 94%   PFTs 10/13/2017 FVC 2.00 (58%) FEV1 1.27(48%) TLC 4.78(89%)   DLCO 06.16(38%) 6MW 1120F L O2 94%  PFTs 05/25/2017 FVC 2.15 (63%) FEV1 1.25(47%) TLC 5.37(100%) DLCO 11.13(47%) 6MW 1120F L O2 95%  PFTs 01/30/2017 FVC 2.05 (60%) FEV1 1.18(44%) TLC 4.69(87%)   DLCO 09.35(39%) 6MW 1190F L O2 95%  PFTs 10/10/2016 FVC 1.96 (57%) FEV1 1.18(44%) TLC 4.57(85%)   DLCO 08.31(35%) 6MW 1000F L O2 94%  PFTs 04/25/2016 FVC 1.63 (50%) FEV1 0.93(37%) TLC 4.96(97%)   DLCO 09.66(42%) 6MW 1140F L O2 91%  PFTs 12/09/2013                            FEV1 1.54(59%) TLC 5.34(104%) DLCO 11.30(49%) 6MW 1140F L O2 91%. PFTs 06/03/2013                            FEV1 1.69(65%) TLC 4.81(93%)   DLCO 09.44(41%) 6MW 1400F L O2 95%. PFTs 11/28/2012                            FEV1 1.67(64%) TLC 5.08(98%)   DLCO 11.94(51%) 6MW 1400F L O2 95%. PFTs 06/19/2012                            FEV1 1.59(60%) TLC 4.66(90%)   DLCO 10.21(44%) 6MW 1280F L O2 96%.    PFTs 03/07/2012                            FEV1 1.72(70%) TLC 4.49(87%)   DLCO 10.72(54%)  PFTs 08/03/2011                            FEV1 1.71(72%) TLC 4.72(96%)   DLCO 12.3 (63%)  PFTs 01/10/2011                            FEV1 1.76           TLC 4.54             DLCO 10.50  PFTs 03/30/2010                            FEV1 1.96           TLC 4.86             DLCO 14.13  PFTs 03/03/2009 FEV1 2.04           TLC 5.08             DLCO 14.02      CT chest 8/25/2019  Decreased bibasilar airspace disease and nodular opacification  Bronchial wall thickening  Stable reticular opacities upper lobe  Stable mediastinal lymph nodes    Chest x-ray 4/4/2020 imaging reviewed by me and showed  Bibasilar airspace disease slightly improved on the left          4/5/2016 normal/negative RF 14, SCL70 SSA SSB aldolase CK GIORGI CCP Anti MALORIE-1    4/25/2016 Elevated IgA normal IgG and IgM      IgE 140 with unremarkable immunocap      Assessment:   · Moderate obstructive airways diease secondary to COPD and asthma   · Abnormal CT chest 3/19/2019. Improved on repeat a CT 8/25/2019  · Abnormal CT 4/1/2016 with GGO- DDx RB-ILD, NSIP, HP, DIP, eosinophilic pneumonitis, aspiration and CTD-ILD. Favor smoking related ILD vs HP. Bronch 4/6/16 purulent secretions and grew strep pneumoniae. Negative AFB. Got rid of her Izaiah Stevens   · Bronchiectasis   · Nocturnal hypoxia- on 2LPM   · >30 pack-years smoking                                          Plan:   · Continue Advair 250/50 BID, Spiriva daily, Singulair daily, and Albuterol 2 puffs Q4-6 hrs PRN  · Advised to use O2 2LPM at night- no need during the day   · Patient is up to date with Pneumococcal vaccine and influenza vaccine this year   · Acapella and Mucinex   · CT chest, low dose protocol, screening for lung cancer and PFTs 8/2020  · Smoking cessation counseling  · Follow up after testing           Jackie Keith is a 62 y.o. female being evaluated by a Virtual Visit (video visit) encounter to address concerns as mentioned above. A caregiver was present when appropriate. Due to this being a TeleHealth encounter (During LZE-61 public health emergency), evaluation of the following organ systems was limited: Vitals/Constitutional/EENT/Resp/CV/GI//MS/Neuro/Skin/Heme-Lymph-Imm.   Pursuant to the emergency declaration under the [address] [facility] Act, 1135 waiver authority and the Coronavirus Preparedness and Response Supplemental Appropriations Act, this Virtual Visit was conducted with patient's (and/or legal guardian's) consent, to reduce the patient's risk of exposure to COVID-19 and provide necessary medical care. The patient (and/or legal guardian) has also been advised to contact this office for worsening conditions or problems, and seek emergency medical treatment and/or call 911 if deemed necessary. Services were provided through a video synchronous discussion virtually to substitute for in-person clinic visit. Patient was located in her home, provider was located in his office. --Denny Alfonso MD on 4/21/2020 at 1:38 PM    An electronic signature was used to authenticate this note.

## 2020-04-23 ENCOUNTER — CARE COORDINATION (OUTPATIENT)
Dept: CASE MANAGEMENT | Age: 65
End: 2020-04-23

## 2020-04-24 ENCOUNTER — TELEPHONE (OUTPATIENT)
Dept: FAMILY MEDICINE CLINIC | Age: 65
End: 2020-04-24

## 2020-04-24 RX ORDER — BUPROPION HYDROCHLORIDE 150 MG/1
150 TABLET, EXTENDED RELEASE ORAL 2 TIMES DAILY
Qty: 60 TABLET | Refills: 2 | Status: SHIPPED | OUTPATIENT
Start: 2020-04-24 | End: 2020-12-07

## 2020-04-24 NOTE — TELEPHONE ENCOUNTER
Pt. Would like order for home health aide or a skilled nurse to come to her house and help her out on some days. Please advise and call pt.  Back

## 2020-04-28 ENCOUNTER — CARE COORDINATION (OUTPATIENT)
Dept: CASE MANAGEMENT | Age: 65
End: 2020-04-28

## 2020-04-28 NOTE — CARE COORDINATION
Patient contacted regarding COVID-19 risk and screening. Care Transition Nurse/ Ambulatory Care Manager contacted the patient by telephone to perform follow-up assessment. Verified name and  with patient as identifiers. Patient has following risk factors of: COPD. Symptoms reviewed with patient who verbalized the following symptoms: no new symptoms and no worsening symptoms. Due to no new or worsening symptoms encounter was not routed to provider for escalation. Education provided regarding infection prevention, and signs and symptoms of COVID-19 and when to seek medical attention with patient who verbalized understanding. Discussed exposure protocols and quarantine from [address] you at higher risk for severe illness  and given an opportunity for questions and concerns. The patient agrees to contact the COVID-19 hotline [phone] or PCP office for questions related to their healthcare. CTN/ACM provided contact information for future reference. From CDC: Are you at higher risk for severe illness?  Wash your hands often.  Avoid close contact (6 feet, which is about two arm lengths) with people who are sick.  Put distance between yourself and other people if COVID-19 is spreading in your community.  Clean and disinfect frequently touched surfaces.  Avoid all cruise travel and non-essential air travel.  Call your healthcare professional if you have concerns about COVID-19 and your underlying condition or if you are sick. For more information on steps you can take to protect yourself, see CDC's How to [place] for follow-up call in 5-7 days based on severity of symptoms and risk factors. Spoke with patient who reported she is doing alright. Patient stated her breathing remains the same and is no worse. Patient has started the Wellbutrin and stated feels like smoking cessation is a little better. Denies any acute needs at present time. Agreeable to f/u calls. Educated on the use of urgent care or physicians 24 hr access line if assistance is needed after hours.     David Barfield BSN, RN, [facility]  Care Transition Nurse   [phone]

## 2020-05-05 ENCOUNTER — CARE COORDINATION (OUTPATIENT)
Dept: CASE MANAGEMENT | Age: 65
End: 2020-05-05

## 2020-05-11 ENCOUNTER — TELEPHONE (OUTPATIENT)
Dept: FAMILY MEDICINE CLINIC | Age: 65
End: 2020-05-11

## 2020-05-11 ASSESSMENT — PATIENT HEALTH QUESTIONNAIRE - PHQ9
SUM OF ALL RESPONSES TO PHQ QUESTIONS 1-9: 0
SUM OF ALL RESPONSES TO PHQ QUESTIONS 1-9: 0

## 2020-05-11 ASSESSMENT — LIFESTYLE VARIABLES: HOW OFTEN DO YOU HAVE A DRINK CONTAINING ALCOHOL: 0

## 2020-05-11 NOTE — TELEPHONE ENCOUNTER
LMOM for a return call to see if pt was able to set up the "[employer]t barbara on her smartphone.  Will reach out to her again

## 2020-05-12 ENCOUNTER — TELEMEDICINE (OUTPATIENT)
Dept: FAMILY MEDICINE CLINIC | Age: 65
End: 2020-05-12
Payer: MEDICARE

## 2020-05-12 PROCEDURE — 3017F COLORECTAL CA SCREEN DOC REV: CPT | Performed by: FAMILY MEDICINE

## 2020-05-12 PROCEDURE — G0439 PPPS, SUBSEQ VISIT: HCPCS | Performed by: FAMILY MEDICINE

## 2020-05-12 RX ORDER — MONTELUKAST SODIUM 10 MG/1
TABLET ORAL
Qty: 90 TABLET | Refills: 1 | Status: SHIPPED | OUTPATIENT
Start: 2020-05-12 | End: 2020-07-24 | Stop reason: SDUPTHER

## 2020-05-12 RX ORDER — ATENOLOL 25 MG/1
25 TABLET ORAL DAILY
Qty: 90 TABLET | Refills: 1 | Status: SHIPPED | OUTPATIENT
Start: 2020-05-12 | End: 2020-12-07

## 2020-05-12 NOTE — PROGRESS NOTES
Medicare Annual Wellness Visit  Name: Kai Ann Date: 2020   MRN: [id number] Sex: Female   Age: 62 y.o. Ethnicity: Non-/Non    : 1955 Race: Jose Alonzo is here for Medicare AWV    Screenings for behavioral, psychosocial and functional/safety risks, and cognitive dysfunction are all negative except as indicated below. These results, as well as other patient data from the [address] form, are documented in Flowsheets linked to this Encounter. Mood Disorder:  Patient presents for follow-up of depression and anxiety disorder. Current complaints include: none. She denies anhedonia, depressed mood, tearfulness, excessive worry and restlessness. Symptoms/signs of lori: none. External stressors: will be moving and needs letter to keep her dog. Current treatment includes: Prozac- 20 mg.  Medication side effects: none. Allergies   Allergen Reactions    Meperidine Anaphylaxis     \"Demerol\"     Demerol Hives       Prior to Visit Medications    Medication Sig Taking?  Authorizing Provider   montelukast (SINGULAIR) 10 MG tablet TAKE 1 TABLET BY MOUTH EACH NIGHT Yes Stephanie Powers MD   atenolol (TENORMIN) 25 MG tablet Take 1 tablet by mouth daily (replaces propranolol) Yes Stephanie Powers MD   buPROPion (WELLBUTRIN SR) 150 MG extended release tablet Take 1 tablet by mouth 2 times daily Yes Stephanie Powers MD   traZODone (DESYREL) 150 MG tablet TAKE 2 TABLETS BY MOUTH AT BEDTIME Yes Stephanie Powers MD   fluticasone-salmeterol (Viktoria Humbles) 250-50 MCG/DOSE AEPB INHALE 1 PUFF TWICE DAILY Yes Mellissa Wright MD   albuterol sulfate HFA (VENTOLIN HFA) 108 (90 Base) MCG/ACT inhaler Inhale 2 puffs into the lungs every 6 hours as needed for Wheezing or Shortness of Breath Yes Stephanie Powers MD   tiotropium (SPIRIVA HANDIHALER) 18 MCG inhalation capsule INHALE THE CONTENTS OF 1 CAPSULE EVERY DAY Yes Stephanie Powers MD   simvastatin (ZOCOR) 20 MG tablet TAKE 1 TABLET are found in [address] [facility]. The following problems were reviewed today and where indicated follow up appointments were made and/or referrals ordered. Positive Risk Factor Screenings with Interventions:     Substance Abuse:  Social History     Tobacco History     Smoking Status  Current Every Day Smoker Smoking Frequency  1 pack/day for 40 years (40 pk yrs) Smoking Tobacco Type  Cigarettes    Smokeless Tobacco Use  Never Used          Alcohol History     Alcohol Use Status  No          Drug Use     Drug Use Status  Yes Types  Marijuana Comment  Daily          Sexual Activity     Sexually Active  Yes Partners  Male               Audit Questionnaire: Screen for Alcohol Misuse  How often do you have a drink containing alcohol?: Never  Substance Abuse Interventions:  · Tobacco abuse:  patient is not ready to work toward tobacco cessation at this time, provider spent 5 minutes counseling patient  · Recreational drug use:  patient is not ready to change his/her recreational drug use behavior at this time. We discussed about changing to edible and legal form. She declines at this time. General Health:  General  In general, how would you say your health is?: Good  In the past 7 days, have you experienced any of the following? New or Increased Pain, New or Increased Fatigue, Loneliness, Social Isolation, Stress or Anger?: (!) New or Increased Pain  Do you get the social and emotional support that you need?: Yes  Do you have a Living Will?: (!) No  General Health Risk Interventions:  · No Living Will: additional information provided mailing it to her    Health Habits/Nutrition:  Health Habits/Nutrition  Do you exercise for at least 20 minutes 2-3 times per week?: (!) No  Have you lost any weight without trying in the past 3 months?: No  Do you eat fewer than 2 meals per day?: No  Have you seen a dentist within the past year?: (!) No  There is no height or weight on file to calculate BMI.   Health Habits/Nutrition  DTaP/Tdap/Td vaccine (2 - Td) 01/25/2028    Flu vaccine  Completed    Pneumococcal 0-64 years Vaccine  Completed    Hepatitis C screen  Completed    HIV screen  Completed    Hepatitis A vaccine  Aged Out    Hepatitis B vaccine  Aged Out    Hib vaccine  Aged Out    Meningococcal (ACWY) vaccine  Aged Out     Recommendations for Polyplus-transfection Due: see orders and patient instructions/AVS.  . Recommended screening schedule for the next 5-10 years is provided to the patient in written form: see Patient Instructions/AVS.    Nohemi Antunez was seen today for medicare aw. Diagnoses and all orders for this visit:     Routine general medical examination at a health care facility     Plan per above. Hyponatremia  -     Comprehensive Metabolic Panel; Future    Hypomagnesemia  -     Magnesium; Future    Pure hypercholesterolemia  -     Comprehensive Metabolic Panel; Future  -     Lipid Panel; Future    Anxiety and depression  This problem is well controlled. Pt should continue current medication at current dose (prozac). Letter for emotional support animal written so she may keep her dog when she moves. Recurrent major depressive disorder, in partial remission ([address]  This problem is well controlled. Pt should continue current medication at current dose (prozac). Tobacco dependence  -     CBC Auto Differential; Future  Advise on cessation as above. Marijuana smoker, continuous  -     CBC Auto Differential; Future  Advise on cessation as above. Stage 3 severe COPD by GOLD classification (HCC)  -     CBC Auto Differential; Future    Other orders  -     montelukast (SINGULAIR) 10 MG tablet; TAKE 1 TABLET BY MOUTH EACH NIGHT  -     atenolol (TENORMIN) 25 MG tablet; Take 1 tablet by mouth daily (replaces propranolol)              Ray Pride is a 62 y.o. female being evaluated by a Virtual Visit (video and audio) encounter to address concerns as mentioned above.   A caregiver was present when appropriate. Due to this being a TeleHealth encounter (During PBLSG-72 public health emergency), evaluation of the following organ systems was limited: Vitals/Constitutional/EENT/Resp/CV/GI//MS/Neuro/Skin/Heme-Lymph-Imm. Pursuant to the emergency declaration under the [address] [place] [address] and the [employer] Act, this Virtual Visit was conducted with patient's (and/or legal guardian's) consent, to reduce the patient's risk of exposure to COVID-19 and provide necessary medical care. The patient (and/or legal guardian) has also been advised to contact this office for worsening conditions or problems, and seek emergency medical treatment and/or call 911 if deemed necessary. Patient identification was verified at the start of the visit: Yes    Services were provided through a video synchronous discussion virtually to substitute for in-person clinic visit. Patient and provider were located at their individual homes. --Jun Valenzuela MD on 5/12/2020 at 2:04 PM    An electronic signature was used to authenticate this note.

## 2020-05-21 ENCOUNTER — VIRTUAL VISIT (OUTPATIENT)
Dept: PULMONOLOGY | Age: 65
End: 2020-05-21
Payer: MEDICARE

## 2020-05-21 VITALS — WEIGHT: 130 LBS | BODY MASS INDEX: 21.66 KG/M2 | HEIGHT: 65 IN

## 2020-05-21 PROCEDURE — 99214 OFFICE O/P EST MOD 30 MIN: CPT | Performed by: INTERNAL MEDICINE

## 2020-05-21 RX ORDER — DOXYCYCLINE HYCLATE 100 MG
100 TABLET ORAL 2 TIMES DAILY
Qty: 10 TABLET | Refills: 0 | Status: SHIPPED | OUTPATIENT
Start: 2020-05-21 | End: 2020-05-26

## 2020-05-21 RX ORDER — PREDNISONE 10 MG/1
TABLET ORAL
Qty: 30 TABLET | Refills: 0 | Status: SHIPPED | OUTPATIENT
Start: 2020-05-21 | End: 2020-06-15 | Stop reason: SDUPTHER

## 2020-05-21 NOTE — PROGRESS NOTES
Merle Lozano MD at [address] [facility]  3/6/2020    BRONCHOSCOPY ALVEOLAR LAVAGE performed by Rickey Tejeda MD at [place] [address]  2012    ENDOSCOPY, COLON, DIAGNOSTIC      ESOPHAGEAL DILATATION  9/20/2018    ESOPHAGEAL DILATION [place] performed by Walker Patricio MD at [address] [facility],[address] HISTORY  2/12/15    T8 Kyphoplasty    ID COLONOSCOPY FLX DX W/COLLJ SPEC WHEN PFRMD N/A 9/20/2018    EGD AND COLONOSCOPY WITH ANESTHESIA performed by Walker Patricio MD at [address] [facility] ESOPHAGOGASTRODUODENOSCOPY TRANSORAL DIAGNOSTIC N/A 9/20/2018    EGD AND COLONOSCOPY WITH ANESTHESIA performed by Walker Patricio MD at [address] [facility]         Allergies:  is allergic to meperidine and demerol. Social History:    TOBACCO:   reports that she has been smoking cigarettes. She has a 40.00 pack-year smoking history. She has never used smokeless tobacco.  ETOH:   reports no history of alcohol use.       Family History:       Problem Relation Age of Onset    Asthma Mother     Diabetes Mother     Emphysema Mother     Heart Failure Mother     Hypertension Mother     Heart Disease Mother     High Cholesterol Mother     Cancer Mother     Depression Mother     Diabetes Father     Emphysema Father     Heart Failure Father     Hypertension Father     Heart Disease Father     High Cholesterol Father     Substance Abuse Father     Emphysema Paternal Grandfather     Diabetes Sister     High Cholesterol Sister     Vision Loss Maternal Uncle        Current Medications:    Current Outpatient Medications:     montelukast (SINGULAIR) 10 MG tablet, TAKE 1 TABLET BY MOUTH EACH NIGHT, Disp: 90 tablet, Rfl: 1    atenolol (TENORMIN) 25 MG tablet, Take 1 tablet by mouth daily (replaces propranolol), Disp: 90 tablet, Rfl: 1    buPROPion (WELLBUTRIN SR) 150 MG extended release tablet, Take 1 tablet by

## 2020-06-01 ENCOUNTER — HOSPITAL ENCOUNTER (INPATIENT)
Age: 65
LOS: 3 days | Discharge: HOME | DRG: 871 | End: 2020-06-04
Attending: EMERGENCY MEDICINE | Admitting: INTERNAL MEDICINE
Payer: MEDICARE

## 2020-06-01 ENCOUNTER — APPOINTMENT (OUTPATIENT)
Dept: GENERAL RADIOLOGY | Age: 65
DRG: 871 | End: 2020-06-01
Payer: MEDICARE

## 2020-06-01 PROBLEM — A41.9 SEPSIS (HCC): Status: ACTIVE | Noted: 2020-06-01

## 2020-06-01 LAB
A/G RATIO: 1.4 (ref 1.1–2.2)
ALBUMIN SERPL-MCNC: 3.9 G/DL (ref 3.4–5)
ALP BLD-CCNC: 64 U/L (ref 40–129)
ALT SERPL-CCNC: 9 U/L (ref 10–40)
ANION GAP SERPL CALCULATED.3IONS-SCNC: 9 MMOL/L (ref 3–16)
AST SERPL-CCNC: 13 U/L (ref 15–37)
BASOPHILS ABSOLUTE: 0.1 K/UL (ref 0–0.2)
BASOPHILS RELATIVE PERCENT: 0.9 %
BILIRUB SERPL-MCNC: 0.3 MG/DL (ref 0–1)
BILIRUBIN URINE: NEGATIVE
BLOOD, URINE: NEGATIVE
BUN BLDV-MCNC: 6 MG/DL (ref 7–20)
CALCIUM SERPL-MCNC: 8.8 MG/DL (ref 8.3–10.6)
CHLORIDE BLD-SCNC: 97 MMOL/L (ref 99–110)
CLARITY: CLEAR
CO2: 25 MMOL/L (ref 21–32)
COLOR: YELLOW
CREAT SERPL-MCNC: 0.6 MG/DL (ref 0.6–1.2)
EOSINOPHILS ABSOLUTE: 0.1 K/UL (ref 0–0.6)
EOSINOPHILS RELATIVE PERCENT: 0.9 %
GFR AFRICAN AMERICAN: >60
GFR NON-AFRICAN AMERICAN: >60
GLOBULIN: 2.7 G/DL
GLUCOSE BLD-MCNC: 114 MG/DL (ref 70–99)
GLUCOSE URINE: NEGATIVE MG/DL
HCT VFR BLD CALC: 41 % (ref 36–48)
HEMOGLOBIN: 13.7 G/DL (ref 12–16)
KETONES, URINE: NEGATIVE MG/DL
LACTIC ACID: 0.5 MMOL/L (ref 0.4–2)
LEUKOCYTE ESTERASE, URINE: NEGATIVE
LYMPHOCYTES ABSOLUTE: 3.6 K/UL (ref 1–5.1)
LYMPHOCYTES RELATIVE PERCENT: 25.4 %
MCH RBC QN AUTO: 30.1 PG (ref 26–34)
MCHC RBC AUTO-ENTMCNC: 33.5 G/DL (ref 31–36)
MCV RBC AUTO: 89.8 FL (ref 80–100)
MICROSCOPIC EXAMINATION: NORMAL
MONOCYTES ABSOLUTE: 0.9 K/UL (ref 0–1.3)
MONOCYTES RELATIVE PERCENT: 6.6 %
NEUTROPHILS ABSOLUTE: 9.4 K/UL (ref 1.7–7.7)
NEUTROPHILS RELATIVE PERCENT: 66.2 %
NITRITE, URINE: NEGATIVE
PDW BLD-RTO: 14.8 % (ref 12.4–15.4)
PH UA: 7 (ref 5–8)
PLATELET # BLD: 385 K/UL (ref 135–450)
PMV BLD AUTO: 6.9 FL (ref 5–10.5)
POTASSIUM SERPL-SCNC: 3.7 MMOL/L (ref 3.5–5.1)
PRO-BNP: 130 PG/ML (ref 0–124)
PROCALCITONIN: 0.07 NG/ML (ref 0–0.15)
PROTEIN UA: NEGATIVE MG/DL
RBC # BLD: 4.57 M/UL (ref 4–5.2)
SODIUM BLD-SCNC: 131 MMOL/L (ref 136–145)
SPECIFIC GRAVITY UA: 1.01 (ref 1–1.03)
TOTAL PROTEIN: 6.6 G/DL (ref 6.4–8.2)
TROPONIN: <0.01 NG/ML
URINE TYPE: NORMAL
UROBILINOGEN, URINE: 0.2 E.U./DL
WBC # BLD: 14.3 K/UL (ref 4–11)

## 2020-06-01 PROCEDURE — 83880 ASSAY OF NATRIURETIC PEPTIDE: CPT

## 2020-06-01 PROCEDURE — 2700000000 HC OXYGEN THERAPY PER DAY

## 2020-06-01 PROCEDURE — 84145 PROCALCITONIN (PCT): CPT

## 2020-06-01 PROCEDURE — 2580000003 HC RX 258: Performed by: EMERGENCY MEDICINE

## 2020-06-01 PROCEDURE — 6370000000 HC RX 637 (ALT 250 FOR IP): Performed by: INTERNAL MEDICINE

## 2020-06-01 PROCEDURE — U0003 INFECTIOUS AGENT DETECTION BY NUCLEIC ACID (DNA OR RNA); SEVERE ACUTE RESPIRATORY SYNDROME CORONAVIRUS 2 (SARS-COV-2) (CORONAVIRUS DISEASE [COVID-19]), AMPLIFIED PROBE TECHNIQUE, MAKING USE OF HIGH THROUGHPUT TECHNOLOGIES AS DESCRIBED BY CMS-2020-01-R: HCPCS

## 2020-06-01 PROCEDURE — 2580000003 HC RX 258: Performed by: INTERNAL MEDICINE

## 2020-06-01 PROCEDURE — 94761 N-INVAS EAR/PLS OXIMETRY MLT: CPT

## 2020-06-01 PROCEDURE — 81003 URINALYSIS AUTO W/O SCOPE: CPT

## 2020-06-01 PROCEDURE — 82728 ASSAY OF FERRITIN: CPT

## 2020-06-01 PROCEDURE — 83605 ASSAY OF LACTIC ACID: CPT

## 2020-06-01 PROCEDURE — 6360000002 HC RX W HCPCS: Performed by: EMERGENCY MEDICINE

## 2020-06-01 PROCEDURE — 80053 COMPREHEN METABOLIC PANEL: CPT

## 2020-06-01 PROCEDURE — 96368 THER/DIAG CONCURRENT INF: CPT

## 2020-06-01 PROCEDURE — 87040 BLOOD CULTURE FOR BACTERIA: CPT

## 2020-06-01 PROCEDURE — U0002 COVID-19 LAB TEST NON-CDC: HCPCS

## 2020-06-01 PROCEDURE — 96365 THER/PROPH/DIAG IV INF INIT: CPT

## 2020-06-01 PROCEDURE — 86140 C-REACTIVE PROTEIN: CPT

## 2020-06-01 PROCEDURE — 85025 COMPLETE CBC W/AUTO DIFF WBC: CPT

## 2020-06-01 PROCEDURE — 6360000002 HC RX W HCPCS: Performed by: INTERNAL MEDICINE

## 2020-06-01 PROCEDURE — 6370000000 HC RX 637 (ALT 250 FOR IP): Performed by: EMERGENCY MEDICINE

## 2020-06-01 PROCEDURE — 1200000000 HC SEMI PRIVATE

## 2020-06-01 PROCEDURE — 84484 ASSAY OF TROPONIN QUANT: CPT

## 2020-06-01 PROCEDURE — 99285 EMERGENCY DEPT VISIT HI MDM: CPT

## 2020-06-01 PROCEDURE — 94640 AIRWAY INHALATION TREATMENT: CPT

## 2020-06-01 PROCEDURE — 83615 LACTATE (LD) (LDH) ENZYME: CPT

## 2020-06-01 PROCEDURE — 71045 X-RAY EXAM CHEST 1 VIEW: CPT

## 2020-06-01 RX ORDER — ACETAMINOPHEN 650 MG/1
650 SUPPOSITORY RECTAL EVERY 6 HOURS PRN
Status: DISCONTINUED | OUTPATIENT
Start: 2020-06-01 | End: 2020-06-04 | Stop reason: HOSPADM

## 2020-06-01 RX ORDER — ATENOLOL 25 MG/1
25 TABLET ORAL DAILY
Status: DISCONTINUED | OUTPATIENT
Start: 2020-06-02 | End: 2020-06-04 | Stop reason: HOSPADM

## 2020-06-01 RX ORDER — BUPROPION HYDROCHLORIDE 150 MG/1
150 TABLET, EXTENDED RELEASE ORAL 2 TIMES DAILY
Status: DISCONTINUED | OUTPATIENT
Start: 2020-06-01 | End: 2020-06-04 | Stop reason: HOSPADM

## 2020-06-01 RX ORDER — ACETAMINOPHEN 325 MG/1
650 TABLET ORAL EVERY 6 HOURS PRN
Status: DISCONTINUED | OUTPATIENT
Start: 2020-06-01 | End: 2020-06-04 | Stop reason: HOSPADM

## 2020-06-01 RX ORDER — SODIUM CHLORIDE 0.9 % (FLUSH) 0.9 %
10 SYRINGE (ML) INJECTION PRN
Status: DISCONTINUED | OUTPATIENT
Start: 2020-06-01 | End: 2020-06-04 | Stop reason: HOSPADM

## 2020-06-01 RX ORDER — POLYETHYLENE GLYCOL 3350 17 G/17G
17 POWDER, FOR SOLUTION ORAL DAILY PRN
Status: DISCONTINUED | OUTPATIENT
Start: 2020-06-01 | End: 2020-06-04 | Stop reason: HOSPADM

## 2020-06-01 RX ORDER — TRAZODONE HYDROCHLORIDE 50 MG/1
300 TABLET ORAL NIGHTLY
Status: DISCONTINUED | OUTPATIENT
Start: 2020-06-01 | End: 2020-06-04 | Stop reason: HOSPADM

## 2020-06-01 RX ORDER — PROMETHAZINE HYDROCHLORIDE 25 MG/1
12.5 TABLET ORAL EVERY 6 HOURS PRN
Status: DISCONTINUED | OUTPATIENT
Start: 2020-06-01 | End: 2020-06-04 | Stop reason: HOSPADM

## 2020-06-01 RX ORDER — FLUOXETINE HYDROCHLORIDE 20 MG/1
60 CAPSULE ORAL DAILY
Status: DISCONTINUED | OUTPATIENT
Start: 2020-06-02 | End: 2020-06-04 | Stop reason: HOSPADM

## 2020-06-01 RX ORDER — GUAIFENESIN 600 MG/1
600 TABLET, EXTENDED RELEASE ORAL 2 TIMES DAILY PRN
Status: DISCONTINUED | OUTPATIENT
Start: 2020-06-01 | End: 2020-06-04 | Stop reason: HOSPADM

## 2020-06-01 RX ORDER — IPRATROPIUM BROMIDE AND ALBUTEROL SULFATE 2.5; .5 MG/3ML; MG/3ML
3 SOLUTION RESPIRATORY (INHALATION) ONCE
Status: COMPLETED | OUTPATIENT
Start: 2020-06-01 | End: 2020-06-01

## 2020-06-01 RX ORDER — SODIUM CHLORIDE 9 MG/ML
INJECTION, SOLUTION INTRAVENOUS
Status: DISPENSED
Start: 2020-06-01 | End: 2020-06-02

## 2020-06-01 RX ORDER — ONDANSETRON 2 MG/ML
4 INJECTION INTRAMUSCULAR; INTRAVENOUS EVERY 6 HOURS PRN
Status: DISCONTINUED | OUTPATIENT
Start: 2020-06-01 | End: 2020-06-04 | Stop reason: HOSPADM

## 2020-06-01 RX ORDER — ALBUTEROL SULFATE 2.5 MG/3ML
2.5 SOLUTION RESPIRATORY (INHALATION) EVERY 6 HOURS PRN
Status: DISCONTINUED | OUTPATIENT
Start: 2020-06-01 | End: 2020-06-02

## 2020-06-01 RX ORDER — BUSPIRONE HYDROCHLORIDE 5 MG/1
30 TABLET ORAL 2 TIMES DAILY
Status: DISCONTINUED | OUTPATIENT
Start: 2020-06-01 | End: 2020-06-04 | Stop reason: HOSPADM

## 2020-06-01 RX ORDER — ATORVASTATIN CALCIUM 10 MG/1
10 TABLET, FILM COATED ORAL DAILY
Status: DISCONTINUED | OUTPATIENT
Start: 2020-06-02 | End: 2020-06-04 | Stop reason: HOSPADM

## 2020-06-01 RX ORDER — SODIUM CHLORIDE 0.9 % (FLUSH) 0.9 %
10 SYRINGE (ML) INJECTION EVERY 12 HOURS SCHEDULED
Status: DISCONTINUED | OUTPATIENT
Start: 2020-06-01 | End: 2020-06-04 | Stop reason: HOSPADM

## 2020-06-01 RX ORDER — BUDESONIDE AND FORMOTEROL FUMARATE DIHYDRATE 160; 4.5 UG/1; UG/1
2 AEROSOL RESPIRATORY (INHALATION) 2 TIMES DAILY
Status: DISCONTINUED | OUTPATIENT
Start: 2020-06-01 | End: 2020-06-04 | Stop reason: HOSPADM

## 2020-06-01 RX ADMIN — TRAZODONE HYDROCHLORIDE 300 MG: 50 TABLET ORAL at 22:16

## 2020-06-01 RX ADMIN — BUSPIRONE HYDROCHLORIDE 30 MG: 5 TABLET ORAL at 22:16

## 2020-06-01 RX ADMIN — BUPROPION HYDROCHLORIDE 150 MG: 150 TABLET, EXTENDED RELEASE ORAL at 22:16

## 2020-06-01 RX ADMIN — CEFEPIME HYDROCHLORIDE 1 G: 1 INJECTION, POWDER, FOR SOLUTION INTRAMUSCULAR; INTRAVENOUS at 16:06

## 2020-06-01 RX ADMIN — VANCOMYCIN HYDROCHLORIDE 1000 MG: 10 INJECTION, POWDER, LYOPHILIZED, FOR SOLUTION INTRAVENOUS at 16:07

## 2020-06-01 RX ADMIN — IPRATROPIUM BROMIDE AND ALBUTEROL SULFATE 3 AMPULE: .5; 3 SOLUTION RESPIRATORY (INHALATION) at 15:31

## 2020-06-01 RX ADMIN — ENOXAPARIN SODIUM 30 MG: 30 INJECTION SUBCUTANEOUS at 22:16

## 2020-06-01 RX ADMIN — AZITHROMYCIN MONOHYDRATE 500 MG: 500 INJECTION, POWDER, LYOPHILIZED, FOR SOLUTION INTRAVENOUS at 22:26

## 2020-06-01 RX ADMIN — Medication 10 ML: at 22:17

## 2020-06-01 ASSESSMENT — PAIN DESCRIPTION - PAIN TYPE: TYPE: ACUTE PAIN

## 2020-06-01 ASSESSMENT — PAIN SCALES - GENERAL: PAINLEVEL_OUTOF10: 5

## 2020-06-01 ASSESSMENT — PAIN DESCRIPTION - LOCATION: LOCATION: BACK

## 2020-06-01 NOTE — H&P
infiltrate     Pulmonary nodule 12/3/2009    Tobacco abuse        Past Surgical History:          Procedure Laterality Date    BLADDER REPAIR      BRONCHOSCOPY      BRONCHOSCOPY N/A 3/6/2020    BRONCHOSCOPY THERAPUTIC ASPIRATION INITIAL performed by Kahlil Joshi MD at [address] [facility] [address]  3/6/2020    BRONCHOSCOPY ALVEOLAR LAVAGE performed by Kahlil Joshi MD at [address]  2012    ENDOSCOPY, COLON, DIAGNOSTIC      ESOPHAGEAL DILATATION  9/20/2018    ESOPHAGEAL DILATION Delfin Riser performed by Felix Rodriguez MD at [address] [facility],[address] HISTORY  2/12/15    T8 Kyphoplasty    IA COLONOSCOPY FLX DX W/COLLJ SPEC WHEN PFRMD N/A 9/20/2018    EGD AND COLONOSCOPY WITH ANESTHESIA performed by Felix Rodriguez MD at [address] [facility] ESOPHAGOGASTRODUODENOSCOPY TRANSORAL DIAGNOSTIC N/A 9/20/2018    EGD AND COLONOSCOPY WITH ANESTHESIA performed by Felix Rodriguez MD at [address] [facility]         Medications Prior to Admission:      Prior to Admission medications    Medication Sig Start Date End Date Taking?  Authorizing Provider   montelukast (SINGULAIR) 10 MG tablet TAKE 1 TABLET BY MOUTH EACH NIGHT 5/12/20   Dustin Del Angel MD   atenolol (TENORMIN) 25 MG tablet Take 1 tablet by mouth daily (replaces propranolol) 5/12/20 5/12/21  Dustin Del Angel MD   buPROPion [facility]) 150 MG extended release tablet Take 1 tablet by mouth 2 times daily 4/24/20   Dustin Del Angel MD   traZODone (DESYREL) 150 MG tablet TAKE 2 TABLETS BY MOUTH AT BEDTIME 4/21/20   Dustin Del Angel MD   fluticasone-salmeterol ([place]) 250-50 MCG/DOSE AEPB INHALE 1 PUFF TWICE DAILY 4/20/20   Kalen Otto MD   albuterol sulfate HFA (VENTOLIN HFA) 108 (90 Base) MCG/ACT inhaler Inhale 2 puffs into the lungs every 6 hours as needed for Wheezing or Shortness of Breath 4/13/20   Dustin Del Angel MD   tiotropium ([facility]) negative. Physical Exam Performed:    /74   Pulse 89   Temp 98.3 °F (36.8 °C) (Oral)   Resp 17   Ht 5' 5\" (1.651 m)   Wt 130 lb (59 kg)   SpO2 96%   BMI 21.63 kg/m²     General appearance: No apparent distress, appears stated age and cooperative. HEENT:  Normal cephalic, atraumatic without obvious deformity. Pupils equal, round, and reactive to light. Extra ocular muscles intact. Conjunctivae/corneas clear. Neck: Supple, no jugular venous distention. Trachea midline with full range of motion. Respiratory:  Normal respiratory effort. Diminished but clear to auscultation, bilaterally without Rales/Wheezes/Rhonchi. Cardiovascular: Regular rate and rhythm with normal S1/S2 without murmurs, rubs or gallops. Abdomen: Soft, non-tender, non-distended with normal bowel sounds. Musculoskelatal: No clubbing, cyanosis or edema bilaterally. Full range of motion without deformity. Neurologic:  Neurovascularly intact without any focal sensory/motor deficits. Cranial nerves: II-XII intact, grossly non-focal.  Psychiatric: Alert and oriented, thought content appropriate, normal insight  Skin: Skin color, texture, turgor normal.  No rashes or lesions. Capillary Refill: Brisk,< 3 seconds   Peripheral Pulses: +2 palpable, equal bilaterally       Labs:     Recent Labs     06/01/20  1355   WBC 14.3*   HGB 13.7   HCT 41.0        Recent Labs     06/01/20  1355   *   K 3.7   CL 97*   CO2 25   BUN 6*   CREATININE 0.6   CALCIUM 8.8     Recent Labs     06/01/20  1355   AST 13*   ALT 9*   BILITOT 0.3   ALKPHOS 64     No results for input(s): INR in the last 72 hours. Recent Labs     06/01/20  1355   TROPONINI <0.01       Radiology:     CXR: I have reviewed the CXR with the following interpretation: Patchy multifocal airspace disease in the peripheral lungs. EKG:  I have reviewed the EKG with the following interpretation: Normal sinus rhythm, no acute ischemic changes.     Xr Chest Portable    Result

## 2020-06-01 NOTE — ED NOTES
Patient placed on 2L O2 per NC for tachypnea and SOB with O2 sat 93%. States she uses O2 @ night at home.       Blue Guan RN  06/01/20 2880

## 2020-06-01 NOTE — ED NOTES

## 2020-06-01 NOTE — PROGRESS NOTES
RESPIRATORY THERAPY ASSESSMENT    Name:  Vel [address] Record Number:  [id number]  Age: 62 y.o. Gender: female  : 1955  Today's Date:  2020  Room:      Assessment     Is the patient being admitted for a COPD or Asthma exacerbation? Yes   (If yes the patient will be seen every 4 hours for the first 24 hours and then reassessed)    Patient Admission Diagnosis      Allergies  Allergies   Allergen Reactions    Meperidine Anaphylaxis     \"Demerol\"     Demerol Hives       Minimum Predicted Vital Capacity:     N/a           Actual Vital Capacity:      n/a              Pulmonary History:COPD  Home Oxygen Therapy:  2   liters/min via nasal cannula  Home Respiratory Therapy:Albuterol   Current Respiratory Therapy:  duoneb    Treatment Type: HHN  Medications: Albuterol/Ipratropium    Respiratory Severity Index(RSI)   Patients with orders for inhalation medications, oxygen, or any therapeutic treatment modality will be placed on Respiratory Protocol. They will be assessed with the first treatment and at least every 72 hours thereafter. The following severity scale will be used to determine frequency of treatment intervention.     Smoking History: Mild Exacerbation = 3    Social History  Social History     Tobacco Use    Smoking status: Current Every Day Smoker     Packs/day: 1.00     Years: 40.00     Pack years: 40.00     Types: Cigarettes    Smokeless tobacco: Never Used   Substance Use Topics    Alcohol use: No     Alcohol/week: 0.0 standard drinks    Drug use: Yes     Types: Marijuana     Comment: Daily       Recent Surgical History: None = 0  Past Surgical History  Past Surgical History:   Procedure Laterality Date    BLADDER REPAIR      BRONCHOSCOPY      BRONCHOSCOPY N/A 3/6/2020    BRONCHOSCOPY THERAPUTIC ASPIRATION INITIAL performed by Mesfin Walker MD at [address]  3/6/2020    BRONCHOSCOPY ALVEOLAR LAVAGE performed by Mesfin Walker MD at [address]  COLONOSCOPY  2012    ENDOSCOPY, COLON, DIAGNOSTIC      ESOPHAGEAL DILATATION  9/20/2018    ESOPHAGEAL DILATION [place] performed by Michael Gonzalez MD at [address] [facility],[address] HISTORY  2/12/15    T8 Kyphoplasty    [place] COLONOSCOPY FLX DX W/COLLJ SPEC WHEN PFRMD N/A 9/20/2018    EGD AND COLONOSCOPY WITH ANESTHESIA performed by Michael Gonzalez MD at [address] [facility] ESOPHAGOGASTRODUODENOSCOPY TRANSORAL DIAGNOSTIC N/A 9/20/2018    EGD AND COLONOSCOPY WITH ANESTHESIA performed by Michael Gonzalez MD at [address]         Level of Consciousness: Alert, Oriented, and Cooperative = 0    Level of Activity: Walking unassisted = 0    Respiratory Pattern: Increased; RR 21-30 = 1    Breath Sounds: Diminshed bilaterally and/or crackles = 2    Sputum   ,  ,    Cough: Strong, spontaneous, non-productive = 0    Vital Signs   /72   Pulse 99   Temp 98.3 °F (36.8 °C) (Oral)   Resp 16   Ht 5' 5\" (1.651 m)   Wt 130 lb (59 kg)   SpO2 98%   BMI 21.63 kg/m²   SPO2 (COPD values may differ): 88-89% on room air or greater than 92% on FiO2 28- 35% = 2    Peak Flow (asthma only): not applicable = 0    RSI: 0-4 = See once and convert to home regimen or discontinue        Plan       Goals: medication delivery, mobilize retained secretions, volume expansion and improve oxygenation    Patient/caregiver was educated on the proper method of use for Respiratory Care Devices:  Yes      Level of patient/caregiver understanding able to:   ? Verbalize understanding   ? Demonstrate understanding       ? Teach back        ? Needs reinforcement       ? No available caregiver               ? Other:     Response to education:  Good     Is patient being placed on Home Treatment Regimen? No     Does the patient have everything they need prior to discharge?   Yes     Comments: contine tx    Plan of Care:    Electronically signed by Margi Delgadillo be discontinued if patient has a RSI less than 9 and has received no scheduled or as needed treatment for 72  Hrs. Patients Ordered on a Mucolytic Agent:    1. Must always be administered with a bronchodilator. 2. Discontinue if patient experiences worsened bronchospasm, or secretions have lessened to the point that the patient is able to clear them with a cough. Anti-inflammatory and Combination Medications:    1. If the patient lacks prior history of lung disease, is not using inhaled anti-inflammatory medication at home, and lacks wheezing by examination or by history for at least 24 hours, contact physician for possible discontinuation.

## 2020-06-01 NOTE — ED PROVIDER NOTES
[facility] C2 CARD TELEMETRY  EMERGENCY DEPARTMENT ENCOUNTER        Pt Name: Yuval Kennedy  MRN: [id number]  [place] 1955  Date of evaluation: 6/1/2020  Provider: Piter Cancino MD  PCP: Reji Edwards MD      CHIEF COMPLAINT       Chief Complaint   Patient presents with    Cough     hx of copd. sx worse for last 3-4 days    Shortness of Breath       HISTORY OFPRESENT ILLNESS   (Location/Symptom, Timing/Onset, Context/Setting, Quality, Duration, Modifying Factors,Severity)  Note limiting factors. Yuval Kennedy is a 62 y.o. female presenting today due to concern for increasing cough with shortness of breath worsening over the last 4 days. She does have a history of COPD. She denies any fever. She does have a history of smoking. She was last admitted to the hospital in early April 2020. She does not believe she had any exposure to coronavirus. No abdominal pain or vomiting. No falls or trauma. No syncope. She has a mild headache. Due to concern for worsening shortness of breath and cough and having difficulty with maintaining her oxygen at a reasonable level at home, she came to the emergency department for further evaluation. REVIEW OF SYSTEMS    (2-9 systems for level 4, 10 or more for level 5)     Review of Systems   Constitutional: Positive for fatigue. Negative for chills, diaphoresis and fever. HENT: Positive for congestion. Respiratory: Positive for cough, chest tightness and shortness of breath. Cardiovascular: Positive for chest pain. Negative for palpitations. Gastrointestinal: Negative for abdominal pain, nausea and vomiting. Genitourinary: Negative for dysuria and flank pain. Musculoskeletal: Negative for back pain. Skin: Negative for color change. Neurological: Positive for weakness (generalized, no new unilateral symptoms ), light-headedness and headaches (mild). Negative for syncope. Psychiatric/Behavioral: Negative for confusion.          Positives and Pertinent negatives as per HPI.       PASTMEDICAL HISTORY     Past Medical History:   Diagnosis Date    Allergic rhinitis 6/11/2010    Anxiety     Arthritis     Aspiration pneumonia ([address] 3/21/2012    Recurrent    Asthma     Bronchitis chronic     COPD (chronic obstructive pulmonary disease) ([address] 12/3/2009    Depression     Drug abuse, cocaine type ([address]     past history of crack cocaine use    Emphysema of lung (HCC)     Fibromyalgia     GERD (gastroesophageal reflux disease)     Hyperlipidemia     Influenza A 03/05/2020    Lung disease     On home oxygen therapy     uses O2 NC 3L prn at night    Osteoporosis     Pneumonia     Polysubstance dependence ([address] 1/2/2012    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    Pulmonary fibrosis ([address] 10/16/2014    Pulmonary infiltrate     Pulmonary nodule 12/3/2009    Tobacco abuse          SURGICAL HISTORY       Past Surgical History:   Procedure Laterality Date    BLADDER REPAIR      BRONCHOSCOPY      BRONCHOSCOPY N/A 3/6/2020    BRONCHOSCOPY THERAPUTIC ASPIRATION INITIAL performed by Mickie Stubbs MD at [address] [facility]  3/6/2020    BRONCHOSCOPY ALVEOLAR LAVAGE performed by Mickie Stubbs MD at [address] [place] [address]    ENDOSCOPY, COLON, DIAGNOSTIC      ESOPHAGEAL DILATATION  9/20/2018    ESOPHAGEAL DILATION Morna Fend performed by Bernard Baugh MD at [address] [facility],[address] HISTORY  2/12/15    T8 Kyphoplasty    [place] COLONOSCOPY FLX DX W/COLLJ SPEC WHEN PFRMD N/A 9/20/2018    EGD AND COLONOSCOPY WITH ANESTHESIA performed by Bernard Baugh MD at [address] [facility] ESOPHAGOGASTRODUODENOSCOPY TRANSORAL DIAGNOSTIC N/A 9/20/2018    EGD AND COLONOSCOPY WITH ANESTHESIA performed by Bernard Baugh MD at [address] [facility]       Current Discharge Medication List Scale  Eye Opening: Spontaneous  Best Verbal Response: Oriented  Best Motor Response: Obeys commands  Sarah Coma Scale Score: 15           PHYSICAL EXAM    (up to 7 for level 4, 8 or more for level 5)     ED Triage Vitals [06/01/20 1350]   BP Temp Temp Source Pulse Resp SpO2 Height Weight   128/78 98.3 °F (36.8 °C) Oral 103 28 93 % 5' 5\" (1.651 m) 130 lb (59 kg)       Physical Exam  Vitals signs reviewed. Constitutional:       General: She is awake. She is in acute distress (mild). Appearance: She is well-developed and normal weight. She is ill-appearing. She is not toxic-appearing or diaphoretic. Interventions: She is not intubated. HENT:      Head: Normocephalic and atraumatic. Right Ear: External ear normal.      Left Ear: External ear normal.      Nose: Nose normal.      Mouth/Throat:      Mouth: Mucous membranes are dry. Eyes:      General:         Right eye: No discharge. Left eye: No discharge. Pupils: Pupils are equal, round, and reactive to light. Neck:      Musculoskeletal: Full passive range of motion without pain, normal range of motion and neck supple. Normal range of motion. No edema, erythema, neck rigidity, crepitus, injury, pain with movement, torticollis, spinous process tenderness or muscular tenderness. Trachea: Trachea normal. No tracheal deviation. Cardiovascular:      Rate and Rhythm: Regular rhythm. Tachycardia present. Pulses: Normal pulses. Radial pulses are 2+ on the right side and 2+ on the left side. Heart sounds: Normal heart sounds. Pulmonary:      Effort: Tachypnea and respiratory distress (mild) present. No accessory muscle usage, prolonged expiration or retractions. She is not intubated. Breath sounds: Normal air entry. No stridor or transmitted upper airway sounds. Examination of the right-upper field reveals decreased breath sounds and wheezing.  Examination of the left-upper field reveals decreased breath sounds and wheezing. Examination of the right-middle field reveals decreased breath sounds and wheezing. Examination of the left-middle field reveals decreased breath sounds and wheezing. Examination of the right-lower field reveals decreased breath sounds and wheezing. Examination of the left-lower field reveals decreased breath sounds and wheezing. Decreased breath sounds, wheezing and rhonchi present. No rales. Chest:      Chest wall: No tenderness. Abdominal:      General: Bowel sounds are normal. There is no distension. Palpations: Abdomen is soft. Abdomen is not rigid. Tenderness: There is no abdominal tenderness. There is no guarding or rebound. Negative signs include Colmenares's sign and McBurney's sign. Musculoskeletal: Normal range of motion. General: No swelling, tenderness, deformity or signs of injury. Right lower leg: No edema. Left lower leg: No edema. Skin:     General: Skin is warm and dry. Coloration: Skin is not jaundiced or pale. Findings: No bruising, erythema, lesion or rash. Neurological:      General: No focal deficit present. Mental Status: She is alert and oriented to person, place, and time. Mental status is at baseline. GCS: GCS eye subscore is 4. GCS verbal subscore is 5. GCS motor subscore is 6. Sensory: No sensory deficit. Motor: No weakness, tremor, atrophy, abnormal muscle tone or seizure activity. Psychiatric:         Attention and Perception: Attention normal.         Mood and Affect: Affect normal. Mood is depressed. Speech: Speech is not slurred. Behavior: Behavior is cooperative.              DIAGNOSTIC RESULTS   :    Labs Reviewed   CBC WITH AUTO DIFFERENTIAL - Abnormal; Notable for the following components:       Result Value    WBC 14.3 (*)     Neutrophils Absolute 9.4 (*)     All other components within normal limits    Narrative:     Performed at:  [facility] voicerecognition program.  Efforts were made to edit the dictations but occasionally words are mis-transcribed.)    Abiola Desouza MD (electronically signed)            Abiola Desouza MD  06/02/20 3826

## 2020-06-01 NOTE — ED NOTES
Ps hosp @ 1643  Per: Dr. Leopoldo Nutting  Re: multifocal pneumonia, copd, worsening VELÁSQUEZ  Dr. Fortino Hopkins returned call @ [address]  06/01/20 9224

## 2020-06-02 LAB
A/G RATIO: 1.3 (ref 1.1–2.2)
ALBUMIN SERPL-MCNC: 3.6 G/DL (ref 3.4–5)
ALP BLD-CCNC: 59 U/L (ref 40–129)
ALT SERPL-CCNC: 8 U/L (ref 10–40)
ANION GAP SERPL CALCULATED.3IONS-SCNC: 6 MMOL/L (ref 3–16)
AST SERPL-CCNC: 12 U/L (ref 15–37)
BASOPHILS ABSOLUTE: 0.1 K/UL (ref 0–0.2)
BASOPHILS RELATIVE PERCENT: 0.8 %
BILIRUB SERPL-MCNC: <0.2 MG/DL (ref 0–1)
BUN BLDV-MCNC: 8 MG/DL (ref 7–20)
C-REACTIVE PROTEIN: 28.6 MG/L (ref 0–5.1)
CALCIUM SERPL-MCNC: 8.9 MG/DL (ref 8.3–10.6)
CHLORIDE BLD-SCNC: 98 MMOL/L (ref 99–110)
CO2: 31 MMOL/L (ref 21–32)
CREAT SERPL-MCNC: 0.6 MG/DL (ref 0.6–1.2)
D DIMER: <200 NG/ML DDU (ref 0–229)
EOSINOPHILS ABSOLUTE: 0.1 K/UL (ref 0–0.6)
EOSINOPHILS RELATIVE PERCENT: 1.1 %
FERRITIN: 75.7 NG/ML (ref 15–150)
GFR AFRICAN AMERICAN: >60
GFR NON-AFRICAN AMERICAN: >60
GLOBULIN: 2.8 G/DL
GLUCOSE BLD-MCNC: 99 MG/DL (ref 70–99)
HCT VFR BLD CALC: 41.4 % (ref 36–48)
HEMOGLOBIN: 13.8 G/DL (ref 12–16)
LACTATE DEHYDROGENASE: 145 U/L (ref 100–190)
LYMPHOCYTES ABSOLUTE: 2.3 K/UL (ref 1–5.1)
LYMPHOCYTES RELATIVE PERCENT: 18.9 %
MCH RBC QN AUTO: 30.4 PG (ref 26–34)
MCHC RBC AUTO-ENTMCNC: 33.3 G/DL (ref 31–36)
MCV RBC AUTO: 91.4 FL (ref 80–100)
MONOCYTES ABSOLUTE: 0.8 K/UL (ref 0–1.3)
MONOCYTES RELATIVE PERCENT: 6.9 %
NEUTROPHILS ABSOLUTE: 8.7 K/UL (ref 1.7–7.7)
NEUTROPHILS RELATIVE PERCENT: 72.3 %
PDW BLD-RTO: 14.8 % (ref 12.4–15.4)
PLATELET # BLD: 318 K/UL (ref 135–450)
PMV BLD AUTO: 6.9 FL (ref 5–10.5)
POTASSIUM REFLEX MAGNESIUM: 4.5 MMOL/L (ref 3.5–5.1)
RBC # BLD: 4.54 M/UL (ref 4–5.2)
SARS-COV-2, PCR: NOT DETECTED
SODIUM BLD-SCNC: 135 MMOL/L (ref 136–145)
TOTAL PROTEIN: 6.4 G/DL (ref 6.4–8.2)
WBC # BLD: 12.1 K/UL (ref 4–11)

## 2020-06-02 PROCEDURE — 6370000000 HC RX 637 (ALT 250 FOR IP): Performed by: INTERNAL MEDICINE

## 2020-06-02 PROCEDURE — 2700000000 HC OXYGEN THERAPY PER DAY

## 2020-06-02 PROCEDURE — 6360000002 HC RX W HCPCS: Performed by: INTERNAL MEDICINE

## 2020-06-02 PROCEDURE — 85379 FIBRIN DEGRADATION QUANT: CPT

## 2020-06-02 PROCEDURE — 2580000003 HC RX 258

## 2020-06-02 PROCEDURE — 94640 AIRWAY INHALATION TREATMENT: CPT

## 2020-06-02 PROCEDURE — 94761 N-INVAS EAR/PLS OXIMETRY MLT: CPT

## 2020-06-02 PROCEDURE — 2580000003 HC RX 258: Performed by: INTERNAL MEDICINE

## 2020-06-02 PROCEDURE — 36415 COLL VENOUS BLD VENIPUNCTURE: CPT

## 2020-06-02 PROCEDURE — 1200000000 HC SEMI PRIVATE

## 2020-06-02 PROCEDURE — 80053 COMPREHEN METABOLIC PANEL: CPT

## 2020-06-02 PROCEDURE — 85025 COMPLETE CBC W/AUTO DIFF WBC: CPT

## 2020-06-02 RX ORDER — SODIUM CHLORIDE 9 MG/ML
INJECTION, SOLUTION INTRAVENOUS
Status: DISPENSED
Start: 2020-06-02 | End: 2020-06-02

## 2020-06-02 RX ORDER — ALBUTEROL SULFATE 2.5 MG/3ML
2.5 SOLUTION RESPIRATORY (INHALATION) EVERY 4 HOURS PRN
Status: DISCONTINUED | OUTPATIENT
Start: 2020-06-02 | End: 2020-06-04 | Stop reason: HOSPADM

## 2020-06-02 RX ORDER — SODIUM CHLORIDE 9 MG/ML
INJECTION, SOLUTION INTRAVENOUS
Status: COMPLETED
Start: 2020-06-02 | End: 2020-06-02

## 2020-06-02 RX ORDER — SODIUM CHLORIDE 9 MG/ML
INJECTION, SOLUTION INTRAVENOUS
Status: DISPENSED
Start: 2020-06-02 | End: 2020-06-03

## 2020-06-02 RX ADMIN — ATENOLOL 25 MG: 25 TABLET ORAL at 08:03

## 2020-06-02 RX ADMIN — BUSPIRONE HYDROCHLORIDE 30 MG: 5 TABLET ORAL at 08:02

## 2020-06-02 RX ADMIN — Medication 10 ML: at 08:03

## 2020-06-02 RX ADMIN — Medication 2 PUFF: at 09:07

## 2020-06-02 RX ADMIN — BUPROPION HYDROCHLORIDE 150 MG: 150 TABLET, EXTENDED RELEASE ORAL at 08:03

## 2020-06-02 RX ADMIN — ENOXAPARIN SODIUM 30 MG: 30 INJECTION SUBCUTANEOUS at 08:02

## 2020-06-02 RX ADMIN — ATORVASTATIN CALCIUM 10 MG: 10 TABLET, FILM COATED ORAL at 08:03

## 2020-06-02 RX ADMIN — ENOXAPARIN SODIUM 30 MG: 30 INJECTION SUBCUTANEOUS at 20:20

## 2020-06-02 RX ADMIN — BUSPIRONE HYDROCHLORIDE 30 MG: 5 TABLET ORAL at 20:20

## 2020-06-02 RX ADMIN — FLUOXETINE 60 MG: 20 CAPSULE ORAL at 08:03

## 2020-06-02 RX ADMIN — AZITHROMYCIN MONOHYDRATE 500 MG: 500 INJECTION, POWDER, LYOPHILIZED, FOR SOLUTION INTRAVENOUS at 21:22

## 2020-06-02 RX ADMIN — Medication 10 ML: at 20:23

## 2020-06-02 RX ADMIN — SODIUM CHLORIDE 250 ML: 9 INJECTION, SOLUTION INTRAVENOUS at 20:23

## 2020-06-02 RX ADMIN — CEFTRIAXONE SODIUM 1 G: 1 INJECTION, POWDER, FOR SOLUTION INTRAMUSCULAR; INTRAVENOUS at 08:04

## 2020-06-02 RX ADMIN — TIOTROPIUM BROMIDE INHALATION SPRAY 2 PUFF: 3.12 SPRAY, METERED RESPIRATORY (INHALATION) at 09:07

## 2020-06-02 RX ADMIN — TRAZODONE HYDROCHLORIDE 300 MG: 50 TABLET ORAL at 20:20

## 2020-06-02 RX ADMIN — BUPROPION HYDROCHLORIDE 150 MG: 150 TABLET, EXTENDED RELEASE ORAL at 20:21

## 2020-06-02 RX ADMIN — Medication 2 PUFF: at 20:02

## 2020-06-02 ASSESSMENT — ENCOUNTER SYMPTOMS
VOMITING: 0
NAUSEA: 0
SHORTNESS OF BREATH: 1
COLOR CHANGE: 0
CHEST TIGHTNESS: 1
COUGH: 1
ABDOMINAL PAIN: 0
BACK PAIN: 0

## 2020-06-02 ASSESSMENT — PAIN SCALES - GENERAL: PAINLEVEL_OUTOF10: 0

## 2020-06-02 NOTE — PROGRESS NOTES
 BRONCHOSCOPY  3/6/2020    BRONCHOSCOPY ALVEOLAR LAVAGE performed by Yumiko Mckinney MD at [address] [facility]  2012    ENDOSCOPY, COLON, DIAGNOSTIC      ESOPHAGEAL DILATATION  9/20/2018    ESOPHAGEAL DILATION Ivory Wilhelm performed by Valerie Gregg MD at [address] [facility],[address] HISTORY  2/12/15    T8 Kyphoplasty    [place] COLONOSCOPY FLX DX W/COLLJ SPEC WHEN PFRMD N/A 9/20/2018    EGD AND COLONOSCOPY WITH ANESTHESIA performed by Valerie Gregg MD at [address] [facility] ESOPHAGOGASTRODUODENOSCOPY TRANSORAL DIAGNOSTIC N/A 9/20/2018    EGD AND COLONOSCOPY WITH ANESTHESIA performed by Valerie Gregg MD at [address] [facility]         Level of Consciousness: Alert, Oriented, and Cooperative = 0    Level of Activity: Walking with assistance = 1    Respiratory Pattern: Regular Pattern; RR 8-20 = 0    Breath Sounds: Diminshed bilaterally and/or crackles = 2    Sputum   ,  ,    Cough: Strong, spontaneous, non-productive = 0    Vital Signs   /75   Pulse 95   Temp 98.3 °F (36.8 °C) (Oral)   Resp 16   Ht 5' 5\" (1.651 m)   Wt 128 lb 3.2 oz (58.2 kg)   SpO2 97%   BMI 21.33 kg/m²   SPO2 (COPD values may differ): 88-89% on room air or greater than 92% on FiO2 28- 35% = 2    Peak Flow (asthma only): not applicable = 0    RSI: 7-8 = BID and Q4HPRN (every four hours as needed) for dyspnea        Plan       Goals: medication delivery, mobilize retained secretions, volume expansion and improve oxygenation    Patient/caregiver was educated on the proper method of use for Respiratory Care Devices:  Yes      Level of patient/caregiver understanding able to:   ? Verbalize understanding   ? Demonstrate understanding       ? Teach back        ? Needs reinforcement       ? No available caregiver               ? Other:     Response to education:  Good     Is patient being placed on Home Treatment Regimen?   Yes     Does the be contacted. 4. If the bronchospasm improves, the frequency of the bronchodilator can be decreased, based on the patient's RSI, but not less than home treatment regimen frequency. 5. Bronchodilator(s) will be discontinued if patient has a RSI less than 9 and has received no scheduled or as needed treatment for 72  Hrs. Patients Ordered on a Mucolytic Agent:    1. Must always be administered with a bronchodilator. 2. Discontinue if patient experiences worsened bronchospasm, or secretions have lessened to the point that the patient is able to clear them with a cough. Anti-inflammatory and Combination Medications:    1. If the patient lacks prior history of lung disease, is not using inhaled anti-inflammatory medication at home, and lacks wheezing by examination or by history for at least 24 hours, contact physician for possible discontinuation.

## 2020-06-02 NOTE — PROGRESS NOTES
Hospitalist Progress Note    Date of Admission: 6/1/2020    Chief Complaint: SOB    Hospital Course: 62 y.o. female who presented to [facility] with SOB. PMHx significant for COPD, chronic hypoxic respiratory failure, interstitial lung disease, arthritis, anxiety. She was recently hospitalized about 2 months prior for pneumonia. She reports over the last few days she has had increasing shortness of breath. Her sputum has become more productive although the color remains white to clear. She denies any fevers, chills, chest pain. She has been mostly self isolating at home and has had minimal visitors. She does not have any known COVID sick contacts. Work-up in the emergency department revealed multifocal pulmonary infiltrates. She had a mild leukocytosis tachycardia consistent with sepsis. She was started on impaired broad-spectrum antibiotics. Given her presentation and imaging findings she was placed in droplet plus isolation for COVID testing. Subjective: Stable of O2 at rest while awake. Ongoing SOB and VELÁSQUEZ. Minimal cough. Labs:   Recent Labs     06/01/20  1355 06/02/20  0938   WBC 14.3* 12.1*   HGB 13.7 13.8   HCT 41.0 41.4    318     Recent Labs     06/01/20  1355 06/02/20  0938   * 135*   K 3.7 4.5   CL 97* 98*   CO2 25 31   BUN 6* 8   CREATININE 0.6 0.6   CALCIUM 8.8 8.9     Recent Labs     06/01/20  1355 06/02/20  0938   AST 13* 12*   ALT 9* 8*   BILITOT 0.3 <0.2   ALKPHOS 64 59     No results for input(s): INR in the last 72 hours. Physical Exam Performed:    /72   Pulse 83   Temp 98.3 °F (36.8 °C) (Oral)   Resp 16   Ht 5' 5\" (1.651 m)   Wt 128 lb 3.2 oz (58.2 kg)   SpO2 93%   BMI 21.33 kg/m²     General appearance: No apparent distress, appears stated age and cooperative. HEENT:  Normal cephalic, atraumatic without obvious deformity. Pupils equal, round, and reactive to light. Extra ocular muscles intact. Conjunctivae/corneas clear.   Neck: Supple,

## 2020-06-02 NOTE — PROGRESS NOTES
Patient admitted to room 228 from ED. Patient oriented to room, call light, bed rails, phone, lights and bathroom. Patient instructed about the schedule of the day including: vital sign frequency, lab draws, possible tests, frequency of MD and staff rounds, including RN/MD rounding together at bedside, daily weights, and I &O's. Patient instructed about prescribed diet, how to use 8MENU, and television. No bed alarm in place, patient aware of placement and reason. Telemetry box in place, patient aware of placement and reason. Bed locked, in lowest position, side rails up 2/4, call light within reach. Will continue to monitor.

## 2020-06-03 LAB
A/G RATIO: 1.1 (ref 1.1–2.2)
ALBUMIN SERPL-MCNC: 3.3 G/DL (ref 3.4–5)
ALP BLD-CCNC: 55 U/L (ref 40–129)
ALT SERPL-CCNC: 8 U/L (ref 10–40)
ANION GAP SERPL CALCULATED.3IONS-SCNC: 7 MMOL/L (ref 3–16)
AST SERPL-CCNC: 12 U/L (ref 15–37)
BASOPHILS ABSOLUTE: 0.1 K/UL (ref 0–0.2)
BASOPHILS RELATIVE PERCENT: 0.7 %
BILIRUB SERPL-MCNC: 0.4 MG/DL (ref 0–1)
BUN BLDV-MCNC: 8 MG/DL (ref 7–20)
CALCIUM SERPL-MCNC: 8.9 MG/DL (ref 8.3–10.6)
CHLORIDE BLD-SCNC: 98 MMOL/L (ref 99–110)
CO2: 29 MMOL/L (ref 21–32)
CREAT SERPL-MCNC: 0.6 MG/DL (ref 0.6–1.2)
EOSINOPHILS ABSOLUTE: 0.1 K/UL (ref 0–0.6)
EOSINOPHILS RELATIVE PERCENT: 1.5 %
GFR AFRICAN AMERICAN: >60
GFR NON-AFRICAN AMERICAN: >60
GLOBULIN: 2.9 G/DL
GLUCOSE BLD-MCNC: 99 MG/DL (ref 70–99)
HCT VFR BLD CALC: 39.4 % (ref 36–48)
HEMOGLOBIN: 13.2 G/DL (ref 12–16)
LYMPHOCYTES ABSOLUTE: 2.9 K/UL (ref 1–5.1)
LYMPHOCYTES RELATIVE PERCENT: 29.8 %
MCH RBC QN AUTO: 30.5 PG (ref 26–34)
MCHC RBC AUTO-ENTMCNC: 33.6 G/DL (ref 31–36)
MCV RBC AUTO: 90.6 FL (ref 80–100)
MONOCYTES ABSOLUTE: 0.7 K/UL (ref 0–1.3)
MONOCYTES RELATIVE PERCENT: 6.7 %
NEUTROPHILS ABSOLUTE: 6 K/UL (ref 1.7–7.7)
NEUTROPHILS RELATIVE PERCENT: 61.3 %
PDW BLD-RTO: 14.7 % (ref 12.4–15.4)
PLATELET # BLD: 309 K/UL (ref 135–450)
PMV BLD AUTO: 7.1 FL (ref 5–10.5)
POTASSIUM REFLEX MAGNESIUM: 4.5 MMOL/L (ref 3.5–5.1)
RBC # BLD: 4.35 M/UL (ref 4–5.2)
SODIUM BLD-SCNC: 134 MMOL/L (ref 136–145)
TOTAL PROTEIN: 6.2 G/DL (ref 6.4–8.2)
WBC # BLD: 9.8 K/UL (ref 4–11)

## 2020-06-03 PROCEDURE — 6360000002 HC RX W HCPCS: Performed by: INTERNAL MEDICINE

## 2020-06-03 PROCEDURE — 85025 COMPLETE CBC W/AUTO DIFF WBC: CPT

## 2020-06-03 PROCEDURE — 6370000000 HC RX 637 (ALT 250 FOR IP): Performed by: INTERNAL MEDICINE

## 2020-06-03 PROCEDURE — 94640 AIRWAY INHALATION TREATMENT: CPT

## 2020-06-03 PROCEDURE — 1200000000 HC SEMI PRIVATE

## 2020-06-03 PROCEDURE — 6370000000 HC RX 637 (ALT 250 FOR IP): Performed by: NURSE PRACTITIONER

## 2020-06-03 PROCEDURE — 80053 COMPREHEN METABOLIC PANEL: CPT

## 2020-06-03 PROCEDURE — 2580000003 HC RX 258: Performed by: INTERNAL MEDICINE

## 2020-06-03 PROCEDURE — 36415 COLL VENOUS BLD VENIPUNCTURE: CPT

## 2020-06-03 RX ORDER — HYDROCODONE BITARTRATE AND ACETAMINOPHEN 5; 325 MG/1; MG/1
1 TABLET ORAL EVERY 6 HOURS PRN
Status: DISCONTINUED | OUTPATIENT
Start: 2020-06-03 | End: 2020-06-04 | Stop reason: HOSPADM

## 2020-06-03 RX ORDER — SODIUM CHLORIDE 9 MG/ML
INJECTION, SOLUTION INTRAVENOUS
Status: DISPENSED
Start: 2020-06-03 | End: 2020-06-04

## 2020-06-03 RX ADMIN — ATORVASTATIN CALCIUM 10 MG: 10 TABLET, FILM COATED ORAL at 08:08

## 2020-06-03 RX ADMIN — AZITHROMYCIN MONOHYDRATE 500 MG: 500 INJECTION, POWDER, LYOPHILIZED, FOR SOLUTION INTRAVENOUS at 19:56

## 2020-06-03 RX ADMIN — ENOXAPARIN SODIUM 30 MG: 30 INJECTION SUBCUTANEOUS at 19:53

## 2020-06-03 RX ADMIN — TRAZODONE HYDROCHLORIDE 300 MG: 50 TABLET ORAL at 19:53

## 2020-06-03 RX ADMIN — CEFTRIAXONE SODIUM 1 G: 1 INJECTION, POWDER, FOR SOLUTION INTRAMUSCULAR; INTRAVENOUS at 08:09

## 2020-06-03 RX ADMIN — HYDROCODONE BITARTRATE AND ACETAMINOPHEN 1 TABLET: 5; 325 TABLET ORAL at 19:54

## 2020-06-03 RX ADMIN — BUSPIRONE HYDROCHLORIDE 30 MG: 5 TABLET ORAL at 08:08

## 2020-06-03 RX ADMIN — Medication 2 PUFF: at 20:10

## 2020-06-03 RX ADMIN — BUSPIRONE HYDROCHLORIDE 30 MG: 5 TABLET ORAL at 19:53

## 2020-06-03 RX ADMIN — TIOTROPIUM BROMIDE INHALATION SPRAY 2 PUFF: 3.12 SPRAY, METERED RESPIRATORY (INHALATION) at 08:43

## 2020-06-03 RX ADMIN — ENOXAPARIN SODIUM 30 MG: 30 INJECTION SUBCUTANEOUS at 08:09

## 2020-06-03 RX ADMIN — BUPROPION HYDROCHLORIDE 150 MG: 150 TABLET, EXTENDED RELEASE ORAL at 19:54

## 2020-06-03 RX ADMIN — FLUOXETINE 60 MG: 20 CAPSULE ORAL at 08:08

## 2020-06-03 RX ADMIN — ATENOLOL 25 MG: 25 TABLET ORAL at 08:08

## 2020-06-03 RX ADMIN — BUPROPION HYDROCHLORIDE 150 MG: 150 TABLET, EXTENDED RELEASE ORAL at 08:08

## 2020-06-03 RX ADMIN — Medication 2 PUFF: at 08:43

## 2020-06-03 ASSESSMENT — PAIN DESCRIPTION - ORIENTATION: ORIENTATION: LEFT

## 2020-06-03 ASSESSMENT — PAIN DESCRIPTION - LOCATION: LOCATION: BACK

## 2020-06-03 ASSESSMENT — PAIN SCALES - GENERAL
PAINLEVEL_OUTOF10: 4
PAINLEVEL_OUTOF10: 5

## 2020-06-03 ASSESSMENT — PAIN DESCRIPTION - DESCRIPTORS: DESCRIPTORS: ACHING

## 2020-06-04 ENCOUNTER — TELEPHONE (OUTPATIENT)
Dept: FAMILY MEDICINE CLINIC | Age: 65
End: 2020-06-04

## 2020-06-04 VITALS
WEIGHT: 128.2 LBS | SYSTOLIC BLOOD PRESSURE: 98 MMHG | BODY MASS INDEX: 21.36 KG/M2 | DIASTOLIC BLOOD PRESSURE: 62 MMHG | HEIGHT: 65 IN | OXYGEN SATURATION: 93 % | HEART RATE: 76 BPM | RESPIRATION RATE: 18 BRPM | TEMPERATURE: 97.7 F

## 2020-06-04 LAB
A/G RATIO: 1.2 (ref 1.1–2.2)
ALBUMIN SERPL-MCNC: 3.3 G/DL (ref 3.4–5)
ALP BLD-CCNC: 53 U/L (ref 40–129)
ALT SERPL-CCNC: 8 U/L (ref 10–40)
ANION GAP SERPL CALCULATED.3IONS-SCNC: 6 MMOL/L (ref 3–16)
AST SERPL-CCNC: 12 U/L (ref 15–37)
BASOPHILS ABSOLUTE: 0 K/UL (ref 0–0.2)
BASOPHILS RELATIVE PERCENT: 0.5 %
BILIRUB SERPL-MCNC: <0.2 MG/DL (ref 0–1)
BUN BLDV-MCNC: 10 MG/DL (ref 7–20)
CALCIUM SERPL-MCNC: 8.7 MG/DL (ref 8.3–10.6)
CHLORIDE BLD-SCNC: 96 MMOL/L (ref 99–110)
CO2: 29 MMOL/L (ref 21–32)
CREAT SERPL-MCNC: 0.6 MG/DL (ref 0.6–1.2)
EOSINOPHILS ABSOLUTE: 0.1 K/UL (ref 0–0.6)
EOSINOPHILS RELATIVE PERCENT: 1.2 %
GFR AFRICAN AMERICAN: >60
GFR NON-AFRICAN AMERICAN: >60
GLOBULIN: 2.7 G/DL
GLUCOSE BLD-MCNC: 89 MG/DL (ref 70–99)
HCT VFR BLD CALC: 36.7 % (ref 36–48)
HEMOGLOBIN: 12.6 G/DL (ref 12–16)
LYMPHOCYTES ABSOLUTE: 2.3 K/UL (ref 1–5.1)
LYMPHOCYTES RELATIVE PERCENT: 29.2 %
MCH RBC QN AUTO: 31.1 PG (ref 26–34)
MCHC RBC AUTO-ENTMCNC: 34.4 G/DL (ref 31–36)
MCV RBC AUTO: 90.5 FL (ref 80–100)
MONOCYTES ABSOLUTE: 0.6 K/UL (ref 0–1.3)
MONOCYTES RELATIVE PERCENT: 8.2 %
NEUTROPHILS ABSOLUTE: 4.7 K/UL (ref 1.7–7.7)
NEUTROPHILS RELATIVE PERCENT: 60.9 %
PDW BLD-RTO: 14.3 % (ref 12.4–15.4)
PLATELET # BLD: 258 K/UL (ref 135–450)
PMV BLD AUTO: 6.7 FL (ref 5–10.5)
POTASSIUM REFLEX MAGNESIUM: 4.2 MMOL/L (ref 3.5–5.1)
RBC # BLD: 4.06 M/UL (ref 4–5.2)
SODIUM BLD-SCNC: 131 MMOL/L (ref 136–145)
TOTAL PROTEIN: 6 G/DL (ref 6.4–8.2)
WBC # BLD: 7.8 K/UL (ref 4–11)

## 2020-06-04 PROCEDURE — 6370000000 HC RX 637 (ALT 250 FOR IP): Performed by: INTERNAL MEDICINE

## 2020-06-04 PROCEDURE — 85025 COMPLETE CBC W/AUTO DIFF WBC: CPT

## 2020-06-04 PROCEDURE — 6360000002 HC RX W HCPCS: Performed by: INTERNAL MEDICINE

## 2020-06-04 PROCEDURE — 94640 AIRWAY INHALATION TREATMENT: CPT

## 2020-06-04 PROCEDURE — 80053 COMPREHEN METABOLIC PANEL: CPT

## 2020-06-04 PROCEDURE — 36415 COLL VENOUS BLD VENIPUNCTURE: CPT

## 2020-06-04 PROCEDURE — 2580000003 HC RX 258: Performed by: INTERNAL MEDICINE

## 2020-06-04 RX ADMIN — ATORVASTATIN CALCIUM 10 MG: 10 TABLET, FILM COATED ORAL at 08:45

## 2020-06-04 RX ADMIN — FLUOXETINE 60 MG: 20 CAPSULE ORAL at 08:44

## 2020-06-04 RX ADMIN — CEFTRIAXONE SODIUM 1 G: 1 INJECTION, POWDER, FOR SOLUTION INTRAMUSCULAR; INTRAVENOUS at 08:45

## 2020-06-04 RX ADMIN — Medication 2 PUFF: at 07:51

## 2020-06-04 RX ADMIN — BUPROPION HYDROCHLORIDE 150 MG: 150 TABLET, EXTENDED RELEASE ORAL at 08:44

## 2020-06-04 RX ADMIN — ENOXAPARIN SODIUM 30 MG: 30 INJECTION SUBCUTANEOUS at 08:45

## 2020-06-04 RX ADMIN — ATENOLOL 25 MG: 25 TABLET ORAL at 08:44

## 2020-06-04 RX ADMIN — TIOTROPIUM BROMIDE INHALATION SPRAY 2 PUFF: 3.12 SPRAY, METERED RESPIRATORY (INHALATION) at 07:51

## 2020-06-04 RX ADMIN — BUSPIRONE HYDROCHLORIDE 30 MG: 5 TABLET ORAL at 08:44

## 2020-06-04 NOTE — DISCHARGE SUMMARY
Hospital Medicine Discharge Summary    Patient: Andrew Clark     Age: 62 y.o. Gender: female  : 1955   MRN: [id number]  Code Status: Full     Admit Date: 2020   Discharge Date: 2020    Disposition:  Home     Condition at Discharge: Stable    Primary Care Provider: Vida Hickey MD    Admitting Physician: Jenae Antunez MD  Discharge Physician: Jenae Antunez MD       Discharge Diagnoses: Active Hospital Problems    Diagnosis    [facility]eps[facility] ([facility] [A41.9]    Chronic obstructive pulmonary disease ([facility] [J44.9]    Pneumonia [J18.9]    ILD (interstitial lung disease) ([facility] [J84.9]       Hospital Course:   62 [facility]. [facility] PMHx significant for COPD, chronic hypoxic respiratory failure, interstitial lung disease, arthritis, anxiety. She was recently hospitalized about 2 months prior for pneumonia. She reports over the last few days she has had increasing shortness of breath. Her sputum has become more productive although the color remains white to clear. She denies any fevers, chills, chest pain. She has been mostly self isolating at home and has had minimal visitors. She does not have any known COVID sick contacts. Work-up in the emergency department revealed multifocal pulmonary infiltrates. She had a mild leukocytosis tachycardia consistent with sepsis. She was started on empiric broad-spectrum antibiotics. Given her presentation and imaging findings she was placed in droplet plus isolation for COVID testing. Assessment/Plan:    Sepsis secondary to pneumonia in setting of COPD and interstitial lung disease: Leukocytosis and tachycardia, present on arrival.  Afebrile. Procalcitonin is normal.  She has multifocal infiltrates concerning for pneumonia. Complete empiric antibiotics. COVID testing was negative. Acute on Chronic respiratory failure with hypoxia: At baseline she uses 2 L of O2 at night only.   She currently has increased oxygen requirement with continuous use. Continue oxygen support and wean as tolerated. Chronic obstructive pulmonary disease: Stable without definite exacerbation. Continue bronchodilators. No need for steroids at this time. Anxiety: Continue home medication regimen. Exam:   BP 98/62   Pulse 76   Temp 97.7 °F (36.5 °C) (Oral)   Resp 18   Ht 5' 5\" (1.651 m)   Wt 128 lb 3.2 oz (58.2 kg)   SpO2 93%   BMI 21.33 kg/m²   General appearance: No apparent distress, appears stated age and cooperative. HEENT:  Normal cephalic, atraumatic without obvious deformity. Pupils equal, round, and reactive to light. Extra ocular muscles intact. Conjunctivae/corneas clear. Neck: Supple, no jugular venous distention. Trachea midline with full range of motion. Respiratory:  Normal respiratory effort. Diminished but clear to auscultation, bilaterally without Rales/Wheezes/Rhonchi. Cardiovascular: Regular rate and rhythm with normal S1/S2 without murmurs, rubs or gallops. Abdomen: Soft, non-tender, non-distended with normal bowel sounds. Musculoskelatal: No clubbing, cyanosis or edema bilaterally. Full range of motion without deformity. Neurologic:  Neurovascularly intact without any focal sensory/motor deficits. Cranial nerves: II-XII intact, grossly non-focal.  Psychiatric: Alert and oriented, thought content appropriate, normal insight  Skin: Skin color, texture, turgor normal.  No rashes or lesions. Capillary Refill: Brisk,< 3 seconds   Peripheral Pulses: +2 palpable, equal bilaterally       Patient Discharge Instructions: Follow up:  1. Primary Care Provider Dr. Fred Dick MD in the next 1-2 weeks.         Discharge Medications:   Discharge Medication List as of 6/4/2020  5:25 PM        Discharge Medication List as of 6/4/2020  5:25 PM        Discharge Medication List as of 6/4/2020  5:25 PM      CONTINUE these medications which have NOT CHANGED    Details   montelukast (SINGULAIR) 10

## 2020-06-04 NOTE — CARE COORDINATION
Chart reviewed day 3. Spoke with patient. Reports continues to plan on returning home at discharge. Has O2 for nocturnal use. Denied any DCP needs. Would like note from Dr endorsing need for stair lift. Informed PCP's recommendation may hold more weight with insurance company.  Ludy Zimmerman RN

## 2020-06-05 ENCOUNTER — TELEPHONE (OUTPATIENT)
Dept: FAMILY MEDICINE CLINIC | Age: 65
End: 2020-06-05

## 2020-06-05 ENCOUNTER — CARE COORDINATION (OUTPATIENT)
Dept: CASE MANAGEMENT | Age: 65
End: 2020-06-05

## 2020-06-05 LAB
BLOOD CULTURE, ROUTINE: NORMAL
CULTURE, BLOOD 2: NORMAL

## 2020-06-05 PROCEDURE — 1111F DSCHRG MED/CURRENT MED MERGE: CPT | Performed by: FAMILY MEDICINE

## 2020-06-05 NOTE — TELEPHONE ENCOUNTER
Ajith 45 Transitions Initial Follow Up Call    Outreach made within 2 business days of discharge: Yes    Patient: Lupe Stearns Patient : 1955   MRN: [id number]  Reason for Admission: There are no discharge diagnoses documented for the most recent discharge. Discharge Date: 20       Spoke with: [facility]    Discharge department/facility: [facility] Interactive Patient Contact:  Was patient able to fill all prescriptions: Yes  Was patient instructed to bring all medications to the follow-up visit: Yes  Is patient taking all medications as directed in the discharge summary?  Yes  Does patient understand their discharge instructions: Yes  Does patient have questions or concerns that need addressed prior to 7-14 day follow up office visit: no    Scheduled appointment with PCP within 7-14 days    Follow Up  Future Appointments   Date Time Provider Matt Crabtree   2020 10:00 AM Janessa Yolie, APRN - CNP [facility]   2020  9:00 AM [facility] PULMONARY FUNCTION TESTING 2 [facility] PFT [place] HOD   2020 10:00 AM [facility] ED [facility] CT SC [place] [facility]   2020 11:00 AM Valente Parks MD [facility] PULM [place]le MA

## 2020-06-08 ENCOUNTER — CARE COORDINATION (OUTPATIENT)
Dept: CASE MANAGEMENT | Age: 65
End: 2020-06-08

## 2020-06-08 NOTE — CARE COORDINATION
Ajith 45 Transitions Follow Up Call    2020    Patient: Ray Pride  Patient : 1955   MRN: [id number]  Reason for Admission: Multifocal Pna   Discharge Date: 20 RARS: Readmission Risk Score: 19         Spoke with:  Ashley Mtz with patient who was SOB but able to carry on throughout conversation with CTN. Patient stated she just went from downstairs to upstairs and that is why she is SOB. Patient has home oxygen but states she hasn't been wearing it. CTN encouraged patient to wear her oxygen especially if exerting herself. Patient stated she checked her O2 level once she was upstairs and was 94%. Patient reported she reached out to PCP and is scheduled for VV on . Patient denied any other issues such as no CP and no fever/chills. Denies any further needs at present time. Agreeable to f/u calls. Educated on the use of urgent care or physicians 24 hr access line if assistance is needed after hours. Care Transitions Subsequent and Final Call    Subsequent and Final Calls  Do you have any ongoing symptoms?:  Yes  Patient-reported symptoms:  Shortness of Breath  Do you currently have any active services?:  No  Do you have any needs or concerns that I can assist you with?:  No  Identified Barriers:  None  Care Transitions Interventions  Other Interventions:             Follow Up  Future Appointments   Date Time Provider Matt Crabtree   2020 10:00 AM SHAUN Resendez - CNP [facility]   2020  9:00 AM [facility] [place] [facility]   2020 10:00 AM [facility] [place] [facility]   2020 11:00 AM Snow Maurer MD [facility] PULM [place] Yun RN

## 2020-06-15 ENCOUNTER — CARE COORDINATION (OUTPATIENT)
Dept: CASE MANAGEMENT | Age: 65
End: 2020-06-15

## 2020-06-15 ENCOUNTER — VIRTUAL VISIT (OUTPATIENT)
Dept: FAMILY MEDICINE CLINIC | Age: 65
End: 2020-06-15
Payer: MEDICARE

## 2020-06-15 PROCEDURE — 99495 TRANSJ CARE MGMT MOD F2F 14D: CPT | Performed by: FAMILY MEDICINE

## 2020-06-15 PROCEDURE — 1111F DSCHRG MED/CURRENT MED MERGE: CPT | Performed by: FAMILY MEDICINE

## 2020-06-15 RX ORDER — VARENICLINE TARTRATE
KIT
Qty: 1 BOX | Refills: 0 | Status: SHIPPED | OUTPATIENT
Start: 2020-06-15 | End: 2020-10-19

## 2020-06-15 RX ORDER — PREDNISONE 10 MG/1
TABLET ORAL
Qty: 30 TABLET | Refills: 0 | Status: ON HOLD | OUTPATIENT
Start: 2020-06-15 | End: 2020-06-28

## 2020-06-15 RX ORDER — LEVOFLOXACIN 750 MG/1
750 TABLET ORAL DAILY
Qty: 5 TABLET | Refills: 0 | Status: SHIPPED | OUTPATIENT
Start: 2020-06-15 | End: 2020-06-20

## 2020-06-15 RX ORDER — VARENICLINE TARTRATE 1 MG/1
1 TABLET, FILM COATED ORAL 2 TIMES DAILY
Qty: 60 TABLET | Refills: 1 | Status: SHIPPED | OUTPATIENT
Start: 2020-07-15 | End: 2020-07-08

## 2020-06-15 NOTE — PROGRESS NOTES
possible (telephone call)    Assessment/Plan:  1. Sepsis with acute hypoxic respiratory failure without septic shock, due to unspecified organism [facility])  Improved on discharged, but patient feels PNA is returning. Will get her on antibiotics and steroids. Follow up with me in a few days if not improvement. - MS DISCHARGE MEDS RECONCILED W/ CURRENT OUTPATIENT MED LIST    2. Multifocal pneumonia  Patient reports this is likely recurring. Will get her on antibiotics and steroids immediately. - predniSONE (DELTASONE) 10 MG tablet; Take 40 mg by mouth for 3 days 30 mg for 3 days 20 mg for 3 days 10 mg for 3 days. Dispense: 30 tablet; Refill: 0  - levoFLOXacin (LEVAQUIN) 750 MG tablet; Take 1 tablet by mouth daily for 5 days  Dispense: 5 tablet; Refill: 0    3. Cigarette nicotine dependence without complication  Discussed various options to assist with smoking cessation including cold turkey, 1-800-QUIT-NOW, nicotine replacement, zyban, and chantix and the risks and benefits of each. I advised patient to quit, and offered support. Educational material distributed. Discussed current use pattern. Asked pt to inform me when sets quit date. She already takes Wellbutrin. We are going to add in Chantix. Rx for Chantix 1 month starter pack and 2 continuing month packs were written. Pt to start medication 1 week prior quit date and continue for a total of 90 days. Adverse effects of Chantix discussed with patient, advised pt to call immediately if suicidal thoughts or intrusive dreams develop. Pt advised on 1-800-quit-now and GetQuit resources for smoking cessation support. - varenicline (CHANTIX CONTINUING MONTH PAK) 1 MG tablet; Take 1 tablet by mouth 2 times daily  Dispense: 60 tablet; Refill: 1  - varenicline (CHANTIX STARTING MONTH PAK) 0.5 MG X 11 & 1 MG X 42 tablet; Take by mouth. Dispense: 1 box; Refill: 0    4.  ILD (interstitial lung disease) ([facility]  Encouraged close follow up with her pulmonologist.

## 2020-06-15 NOTE — CARE COORDINATION
Ajith 45 Transitions Follow Up Call    6/15/2020    Patient: Argelia Benavides  Patient : 1955   MRN: [id number]  Reason for Admission: Multifocal PNA   Discharge Date: 20 RARS: Readmission Risk Score: 19         Spoke with:  Ashley Mtz with patient who reported she is doing ok. Patient sounds slightly SOB throughout conversation and CTN asked if patient is wearing her oxygen. Patient stated she is not wearing her oxygen and reported she will put it on. Patient stated her oxygen saturation was 93 % right now without oxygen. CTN encouraged patient to wear oxygen when feeling sob or when exerting herself. Patient reminded of VV with Dr. Leonidas Villanueva today at 330 pm. Patient reported she is having some pain in her back area and will also discuss that with Dr. Leonidas Villanueva. Patient denied any further needs at this time and agreed to follow up calls. Care Transitions Subsequent and Final Call    Subsequent and Final Calls  Do you have any ongoing symptoms?:  Yes  Patient-reported symptoms:  Shortness of Breath  Have your medications changed?:  No  Do you have any questions related to your medications?:  No  Do you currently have any active services?:  No  Are you currently active with any services?:  Home Health  Do you have any needs or concerns that I can assist you with?:  No  Identified Barriers:  None  Care Transitions Interventions  Other Interventions:             Follow Up  Future Appointments   Date Time Provider Matt Crabtree   6/15/2020  3:30 PM MD JAIRO Hollis   2020  9:00 AM [facility] [place] [facility]   2020 10:00 AM [facility] CT ED [facility] CT SC [place] Rad   2020 11:00 AM MD LINDA Conte PULJD Garrido, RN

## 2020-06-17 ENCOUNTER — CARE COORDINATION (OUTPATIENT)
Dept: CASE MANAGEMENT | Age: 65
End: 2020-06-17

## 2020-06-17 NOTE — CARE COORDINATION
Ajith 45 Transitions Follow Up Call    2020    Patient: Radha Rivera  Patient : 1955   MRN: [id number]  Reason for Admission: PNA   Discharge Date: 20 RARS: Readmission Risk Score: 19         Attempted to reach patient via phone for care transition. VM left stating purpose of call along with my contact information requesting a return call.             Follow Up  Future Appointments   Date Time Provider Matt Crabtree   2020  9:00 AM [facility] [place] [facility]   2020 10:00 AM [facility] [place] [facility]   2020 11:00 AM Tadeo Marie MD [facility] PULM [place] Gill[place]and RN

## 2020-06-18 ENCOUNTER — CARE COORDINATION (OUTPATIENT)
Dept: CASE MANAGEMENT | Age: 65
End: 2020-06-18

## 2020-06-18 ASSESSMENT — ENCOUNTER SYMPTOMS
WHEEZING: 1
COUGH: 1
CHEST TIGHTNESS: 1
SHORTNESS OF BREATH: 1

## 2020-06-18 NOTE — CARE COORDINATION
Ajith 45 Transitions Follow Up Call    2020    Patient: Ирина Good  Patient : 1955   MRN: [id number]  Reason for Admission: PNA   Discharge Date: 20 RARS: Readmission Risk Score: 19         Spoke with:  Ashley Mtz with patient who reported she is doing alright. Patient stated she is wearing her oxygen at night and sometimes during the day. Patient stated her breathing was stable at this time. Patient stated she had apt with PCP and was started on another antibiotic and also prescribed Chantix to try again. Patient also interested in [facility] services possibly and CTN encouraged patient to reach out to PCP to discuss. Patient denied any issues or concerns and was agreeable to follow up calls. CTN advised patient of use of urgent care or physicians 24 hr access line if assistance is needed after hours. Care Transitions Subsequent and Final Call    Subsequent and Final Calls  Do you have any ongoing symptoms?:  No  Have your medications changed?:  No  Do you have any questions related to your medications?:  No  Do you currently have any active services?:  No  Are you currently active with any services?:  Home Health  Do you have any needs or concerns that I can assist you with?:  No  Identified Barriers:  None  Care Transitions Interventions  Other Interventions:             Follow Up  Future Appointments   Date Time Provider Matt Crabtree   2020  9:00 AM [facility] PUL[facility]ARY FUNCTION [facility] [place] [facility]   2020 10:00 AM [facility] [place] [facility]   2020 11:00 AM MD LINDA Daniels PULM KYMB[place]LY Sullivan RN

## 2020-06-19 ENCOUNTER — TELEPHONE (OUTPATIENT)
Dept: FAMILY MEDICINE CLINIC | Age: 65
End: 2020-06-19

## 2020-06-19 ENCOUNTER — CARE COORDINATION (OUTPATIENT)
Dept: CARE COORDINATION | Age: 65
End: 2020-06-19

## 2020-06-19 NOTE — TELEPHONE ENCOUNTER
Pt says she spoke to her insurance company and they will pay for a chair lift.    Can you write the RX

## 2020-06-19 NOTE — CARE COORDINATION
PCP asked ACM to outreach to patient in regards to her request for [place] [id number] services. Called Mary Hernández and she reports that what she is really needing is a stair lift for her stairs. She reports that she has 10 stairs that she has to go up to get to her bedroom and bathroom. Discussed with Mary Hernández that medicare does not cover stair lift's, but her [employer] plan may. Encouraged her to outreach to her insurance company to see if they will cover a stair lift. Asked about [place] [address] services such as nursing and PT. Mary Hernández declines PT, stating it will not help her. When asked about nursing services she states she will discuss with her  and let office know. ACM contact information provided and encouraged patient to call with questions/concerns.

## 2020-06-24 ENCOUNTER — APPOINTMENT (OUTPATIENT)
Dept: GENERAL RADIOLOGY | Age: 65
DRG: 196 | End: 2020-06-24
Payer: MEDICARE

## 2020-06-24 ENCOUNTER — CARE COORDINATION (OUTPATIENT)
Dept: CASE MANAGEMENT | Age: 65
End: 2020-06-24

## 2020-06-24 ENCOUNTER — HOSPITAL ENCOUNTER (INPATIENT)
Age: 65
LOS: 4 days | Discharge: HOME | DRG: 196 | End: 2020-06-28
Attending: INTERNAL MEDICINE | Admitting: INTERNAL MEDICINE
Payer: MEDICARE

## 2020-06-24 PROBLEM — J44.1 COPD EXACERBATION (HCC): Status: ACTIVE | Noted: 2020-06-24

## 2020-06-24 LAB
A/G RATIO: 1.3 (ref 1.1–2.2)
ALBUMIN SERPL-MCNC: 3.4 G/DL (ref 3.4–5)
ALP BLD-CCNC: 53 U/L (ref 40–129)
ALT SERPL-CCNC: 7 U/L (ref 10–40)
ANION GAP SERPL CALCULATED.3IONS-SCNC: 10 MMOL/L (ref 3–16)
ANISOCYTOSIS: ABNORMAL
AST SERPL-CCNC: 12 U/L (ref 15–37)
ATYPICAL LYMPHOCYTE RELATIVE PERCENT: 2 % (ref 0–6)
BANDED NEUTROPHILS RELATIVE PERCENT: 1 % (ref 0–7)
BASE EXCESS VENOUS: 0.1 MMOL/L (ref -3–3)
BASOPHILS ABSOLUTE: 0 K/UL (ref 0–0.2)
BASOPHILS RELATIVE PERCENT: 0 %
BILIRUB SERPL-MCNC: <0.2 MG/DL (ref 0–1)
BUN BLDV-MCNC: 6 MG/DL (ref 7–20)
CALCIUM SERPL-MCNC: 8.5 MG/DL (ref 8.3–10.6)
CARBOXYHEMOGLOBIN: 5.4 % (ref 0–1.5)
CHLORIDE BLD-SCNC: 94 MMOL/L (ref 99–110)
CO2: 23 MMOL/L (ref 21–32)
CREAT SERPL-MCNC: 0.6 MG/DL (ref 0.6–1.2)
D DIMER: <200 NG/ML DDU (ref 0–229)
EOSINOPHILS ABSOLUTE: 0.3 K/UL (ref 0–0.6)
EOSINOPHILS RELATIVE PERCENT: 2 %
FIBRINOGEN: 401 MG/DL (ref 200–397)
GFR AFRICAN AMERICAN: >60
GFR NON-AFRICAN AMERICAN: >60
GLOBULIN: 2.7 G/DL
GLUCOSE BLD-MCNC: 113 MG/DL (ref 70–99)
HCO3 VENOUS: 27.6 MMOL/L (ref 23–29)
HCT VFR BLD CALC: 39.6 % (ref 36–48)
HEMOGLOBIN: 13.2 G/DL (ref 12–16)
LACTATE DEHYDROGENASE: 148 U/L (ref 100–190)
LYMPHOCYTES ABSOLUTE: 3.3 K/UL (ref 1–5.1)
LYMPHOCYTES RELATIVE PERCENT: 24 %
MCH RBC QN AUTO: 30.1 PG (ref 26–34)
MCHC RBC AUTO-ENTMCNC: 33.4 G/DL (ref 31–36)
MCV RBC AUTO: 90.1 FL (ref 80–100)
METHEMOGLOBIN VENOUS: 0.4 %
MONOCYTES ABSOLUTE: 0.8 K/UL (ref 0–1.3)
MONOCYTES RELATIVE PERCENT: 6 %
NEUTROPHILS ABSOLUTE: 8.3 K/UL (ref 1.7–7.7)
NEUTROPHILS RELATIVE PERCENT: 65 %
O2 CONTENT, VEN: 16 VOL %
O2 SAT, VEN: 87 %
O2 THERAPY: ABNORMAL
PCO2, VEN: 57 MMHG (ref 40–50)
PDW BLD-RTO: 14 % (ref 12.4–15.4)
PH VENOUS: 7.3 (ref 7.35–7.45)
PLATELET # BLD: 323 K/UL (ref 135–450)
PLATELET SLIDE REVIEW: ADEQUATE
PMV BLD AUTO: 7.1 FL (ref 5–10.5)
PO2, VEN: 54.3 MMHG (ref 25–40)
POIKILOCYTES: ABNORMAL
POTASSIUM SERPL-SCNC: 4 MMOL/L (ref 3.5–5.1)
RBC # BLD: 4.4 M/UL (ref 4–5.2)
SLIDE REVIEW: ABNORMAL
SODIUM BLD-SCNC: 127 MMOL/L (ref 136–145)
TCO2 CALC VENOUS: 29 MMOL/L
TOTAL PROTEIN: 6.1 G/DL (ref 6.4–8.2)
TROPONIN: <0.01 NG/ML
WBC # BLD: 12.5 K/UL (ref 4–11)

## 2020-06-24 PROCEDURE — 99285 EMERGENCY DEPT VISIT HI MDM: CPT

## 2020-06-24 PROCEDURE — 6370000000 HC RX 637 (ALT 250 FOR IP): Performed by: INTERNAL MEDICINE

## 2020-06-24 PROCEDURE — 85025 COMPLETE CBC W/AUTO DIFF WBC: CPT

## 2020-06-24 PROCEDURE — 71045 X-RAY EXAM CHEST 1 VIEW: CPT

## 2020-06-24 PROCEDURE — 6370000000 HC RX 637 (ALT 250 FOR IP): Performed by: NURSE PRACTITIONER

## 2020-06-24 PROCEDURE — 82803 BLOOD GASES ANY COMBINATION: CPT

## 2020-06-24 PROCEDURE — 1200000000 HC SEMI PRIVATE

## 2020-06-24 PROCEDURE — 94644 CONT INHLJ TX 1ST HOUR: CPT

## 2020-06-24 PROCEDURE — 83615 LACTATE (LD) (LDH) ENZYME: CPT

## 2020-06-24 PROCEDURE — 82728 ASSAY OF FERRITIN: CPT

## 2020-06-24 PROCEDURE — 94760 N-INVAS EAR/PLS OXIMETRY 1: CPT

## 2020-06-24 PROCEDURE — 93005 ELECTROCARDIOGRAM TRACING: CPT | Performed by: INTERNAL MEDICINE

## 2020-06-24 PROCEDURE — 85379 FIBRIN DEGRADATION QUANT: CPT

## 2020-06-24 PROCEDURE — U0003 INFECTIOUS AGENT DETECTION BY NUCLEIC ACID (DNA OR RNA); SEVERE ACUTE RESPIRATORY SYNDROME CORONAVIRUS 2 (SARS-COV-2) (CORONAVIRUS DISEASE [COVID-19]), AMPLIFIED PROBE TECHNIQUE, MAKING USE OF HIGH THROUGHPUT TECHNOLOGIES AS DESCRIBED BY CMS-2020-01-R: HCPCS

## 2020-06-24 PROCEDURE — 80053 COMPREHEN METABOLIC PANEL: CPT

## 2020-06-24 PROCEDURE — 2700000000 HC OXYGEN THERAPY PER DAY

## 2020-06-24 PROCEDURE — 85384 FIBRINOGEN ACTIVITY: CPT

## 2020-06-24 PROCEDURE — 84484 ASSAY OF TROPONIN QUANT: CPT

## 2020-06-24 PROCEDURE — 36415 COLL VENOUS BLD VENIPUNCTURE: CPT

## 2020-06-24 RX ORDER — IPRATROPIUM BROMIDE AND ALBUTEROL SULFATE 2.5; .5 MG/3ML; MG/3ML
1 SOLUTION RESPIRATORY (INHALATION) ONCE
Status: DISCONTINUED | OUTPATIENT
Start: 2020-06-24 | End: 2020-06-25

## 2020-06-24 RX ORDER — ACETAMINOPHEN 650 MG/1
650 SUPPOSITORY RECTAL EVERY 6 HOURS PRN
Status: DISCONTINUED | OUTPATIENT
Start: 2020-06-24 | End: 2020-06-28 | Stop reason: HOSPADM

## 2020-06-24 RX ORDER — SODIUM CHLORIDE 0.9 % (FLUSH) 0.9 %
10 SYRINGE (ML) INJECTION PRN
Status: DISCONTINUED | OUTPATIENT
Start: 2020-06-24 | End: 2020-06-28 | Stop reason: HOSPADM

## 2020-06-24 RX ORDER — MAGNESIUM SULFATE 1 G/100ML
1 INJECTION INTRAVENOUS PRN
Status: DISCONTINUED | OUTPATIENT
Start: 2020-06-24 | End: 2020-06-28 | Stop reason: HOSPADM

## 2020-06-24 RX ORDER — ONDANSETRON 2 MG/ML
4 INJECTION INTRAMUSCULAR; INTRAVENOUS EVERY 6 HOURS PRN
Status: DISCONTINUED | OUTPATIENT
Start: 2020-06-24 | End: 2020-06-28 | Stop reason: HOSPADM

## 2020-06-24 RX ORDER — BENZONATATE 100 MG/1
200 CAPSULE ORAL 3 TIMES DAILY PRN
Status: DISCONTINUED | OUTPATIENT
Start: 2020-06-24 | End: 2020-06-28 | Stop reason: HOSPADM

## 2020-06-24 RX ORDER — ALBUTEROL SULFATE 2.5 MG/3ML
2.5 SOLUTION RESPIRATORY (INHALATION)
Status: DISCONTINUED | OUTPATIENT
Start: 2020-06-24 | End: 2020-06-25

## 2020-06-24 RX ORDER — ACETAMINOPHEN 325 MG/1
650 TABLET ORAL EVERY 6 HOURS PRN
Status: DISCONTINUED | OUTPATIENT
Start: 2020-06-24 | End: 2020-06-28 | Stop reason: HOSPADM

## 2020-06-24 RX ORDER — NICOTINE 21 MG/24HR
1 PATCH, TRANSDERMAL 24 HOURS TRANSDERMAL DAILY
Status: DISCONTINUED | OUTPATIENT
Start: 2020-06-25 | End: 2020-06-28 | Stop reason: HOSPADM

## 2020-06-24 RX ORDER — MONTELUKAST SODIUM 10 MG/1
10 TABLET ORAL NIGHTLY
Status: DISCONTINUED | OUTPATIENT
Start: 2020-06-24 | End: 2020-06-28 | Stop reason: HOSPADM

## 2020-06-24 RX ORDER — POTASSIUM CHLORIDE 7.45 MG/ML
10 INJECTION INTRAVENOUS PRN
Status: DISCONTINUED | OUTPATIENT
Start: 2020-06-24 | End: 2020-06-28 | Stop reason: HOSPADM

## 2020-06-24 RX ORDER — POTASSIUM CHLORIDE 20 MEQ/1
40 TABLET, EXTENDED RELEASE ORAL PRN
Status: DISCONTINUED | OUTPATIENT
Start: 2020-06-24 | End: 2020-06-28 | Stop reason: HOSPADM

## 2020-06-24 RX ORDER — PROMETHAZINE HYDROCHLORIDE 25 MG/1
12.5 TABLET ORAL EVERY 6 HOURS PRN
Status: DISCONTINUED | OUTPATIENT
Start: 2020-06-24 | End: 2020-06-28 | Stop reason: HOSPADM

## 2020-06-24 RX ORDER — METHYLPREDNISOLONE SODIUM SUCCINATE 40 MG/ML
40 INJECTION, POWDER, LYOPHILIZED, FOR SOLUTION INTRAMUSCULAR; INTRAVENOUS EVERY 12 HOURS
Status: DISCONTINUED | OUTPATIENT
Start: 2020-06-25 | End: 2020-06-28

## 2020-06-24 RX ORDER — IPRATROPIUM BROMIDE AND ALBUTEROL SULFATE 2.5; .5 MG/3ML; MG/3ML
3 SOLUTION RESPIRATORY (INHALATION) ONCE
Status: COMPLETED | OUTPATIENT
Start: 2020-06-24 | End: 2020-06-24

## 2020-06-24 RX ORDER — IPRATROPIUM BROMIDE AND ALBUTEROL SULFATE 2.5; .5 MG/3ML; MG/3ML
1 SOLUTION RESPIRATORY (INHALATION) 4 TIMES DAILY
Status: DISCONTINUED | OUTPATIENT
Start: 2020-06-25 | End: 2020-06-25

## 2020-06-24 RX ORDER — TRAZODONE HYDROCHLORIDE 50 MG/1
300 TABLET ORAL NIGHTLY
Status: DISCONTINUED | OUTPATIENT
Start: 2020-06-25 | End: 2020-06-28 | Stop reason: HOSPADM

## 2020-06-24 RX ORDER — BUPROPION HYDROCHLORIDE 150 MG/1
150 TABLET, EXTENDED RELEASE ORAL 2 TIMES DAILY
Status: DISCONTINUED | OUTPATIENT
Start: 2020-06-25 | End: 2020-06-28 | Stop reason: HOSPADM

## 2020-06-24 RX ORDER — BUSPIRONE HYDROCHLORIDE 5 MG/1
30 TABLET ORAL 2 TIMES DAILY
Status: DISCONTINUED | OUTPATIENT
Start: 2020-06-25 | End: 2020-06-28 | Stop reason: HOSPADM

## 2020-06-24 RX ORDER — FLUOXETINE HYDROCHLORIDE 20 MG/1
60 CAPSULE ORAL DAILY
Status: DISCONTINUED | OUTPATIENT
Start: 2020-06-25 | End: 2020-06-28 | Stop reason: HOSPADM

## 2020-06-24 RX ADMIN — IPRATROPIUM BROMIDE AND ALBUTEROL SULFATE 3 AMPULE: .5; 3 SOLUTION RESPIRATORY (INHALATION) at 20:04

## 2020-06-24 RX ADMIN — MONTELUKAST SODIUM 10 MG: 10 TABLET, FILM COATED ORAL at 23:04

## 2020-06-24 ASSESSMENT — PAIN SCALES - GENERAL
PAINLEVEL_OUTOF10: 6
PAINLEVEL_OUTOF10: 0

## 2020-06-24 ASSESSMENT — PAIN DESCRIPTION - PAIN TYPE: TYPE: ACUTE PAIN

## 2020-06-24 ASSESSMENT — PAIN DESCRIPTION - FREQUENCY: FREQUENCY: CONTINUOUS

## 2020-06-24 ASSESSMENT — PAIN DESCRIPTION - LOCATION: LOCATION: BACK;HEAD

## 2020-06-24 ASSESSMENT — PAIN DESCRIPTION - DESCRIPTORS: DESCRIPTORS: ACHING

## 2020-06-24 NOTE — ED PROVIDER NOTES
[facility] Emergency Department    CHIEF COMPLAINT  Shortness of Breath (A&Ox4 pt arrives to ED, through triage, with c/o worseing SOB over the last 2 days with no relief from home breathing tx's. R labored in triage and tachypneic in the upper 20's - 30's. 92-94% on room air. Afebrile, normotensive, and normocardic. )      HISTORY OF PRESENT ILLNESS  Van Garcia is a 62 y.o. female who presents to the ED complaining of this of breath. Patient has an extensive history of COPD, emphysema, pneumonia, and lung disease. Patient wears 2 to 3 L of oxygen via nasal cannula at home as needed. Patient was just discharged from the hospital excellently 2 weeks ago and finished her oral antibiotics today and is currently on prednisone. Patient reports shortness of breath did improve after her discharge, but returned 2 days ago. Patient still smokes cigarettes denies illicit drug abuse. Patient denies fever, chest pain, sore throat, abdominal pain, nausea, vomiting, diarrhea. Patient does report chronic cough. No other complaints, modifying factors or associated symptoms. Nursing notes reviewed.    Past Medical History:   Diagnosis Date    Allergic rhinitis 6/11/2010    Anxiety     Arthritis     Aspiration pneumonia ([address] 3/21/2012    Recurrent    Asthma     Bronchitis chronic     COPD (chronic obstructive pulmonary disease) ([address] 12/3/2009    Depression     Drug abuse, cocaine type (HCC)     past history of crack cocaine use    Emphysema of lung (HCC)     Fibromyalgia     GERD (gastroesophageal reflux disease)     Hyperlipidemia     Influenza A 03/05/2020    Lung disease     On home oxygen therapy     uses O2 NC 3L prn at night    Osteoporosis     Pneumonia     Polysubstance dependence ([address] 1/2/2012    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    Pulmonary fibrosis ([address] 10/16/2014    Pulmonary infiltrate     Pulmonary nodule 12/3/2009  Tobacco abuse      Past Surgical History:   Procedure Laterality Date    BLADDER REPAIR      BRONCHOSCOPY      BRONCHOSCOPY N/A 3/6/2020    BRONCHOSCOPY THERAPUTIC ASPIRATION INITIAL performed by Robert Medrano MD at [address] [facility]  3/6/2020    BRONCHOSCOPY ALVEOLAR LAVAGE performed by Robert Medrano MD at [facility]  2012    ENDOSCOPY, COLON, DIAGNOSTIC      ESOPHAGEAL DILATATION  9/20/2018    ESOPHAGEAL DILATION [place] performed by Rose Mary Razo MD at [address] [facility] OTHER SURGICAL HISTORY  2/12/15    T8 Kyphoplasty    W[place] COLONOSCOPY FLX DX W/COLLJ SPEC WHEN PFRMD N/A 9/20/2018    EGD AND COLONOSCOPY WITH ANESTHESIA performed by Rose Mary Razo MD at [address] [facility] ESOPHAGOGASTRODUODENOSCOPY TRANSORAL DIAGNOSTIC N/A 9/20/2018    EGD AND COLONOSCOPY WITH ANESTHESIA performed by Rose Mary Razo MD at [address] [facility]       Family History   Problem Relation Age of Onset    Asthma Mother     Diabetes Mother     Emphysema Mother     Heart Failure Mother     Hypertension Mother     Heart Disease Mother     High Cholesterol Mother     Cancer Mother     Depression Mother     Diabetes Father     Emphysema Father     Heart Failure Father     Hypertension Father     Heart Disease Father     High Cholesterol Father     Substance Abuse Father     Emphysema Paternal Grandfather     Diabetes Sister     High Cholesterol Sister     Vision Loss Maternal Uncle      Social History     Socioeconomic History    Marital status:      Spouse name: Not on file    Number of children: 3    Years of education: Not on file    Highest education level: Not on file   Occupational History    Occupation: Disabled     Comment: 2008   Social Needs    Financial resource strain: Not very hard    Food insecurity     Worry: Sometimes true     Inability: Sometimes true   Cindi Lainez

## 2020-06-24 NOTE — CARE COORDINATION
Ajith 45 Transitions Follow Up Call    2020    Patient: Miroslava Macias  Patient : 1955   MRN: [id number]  Reason for Admission: PNA  Discharge Date: 20 RARS: Readmission Risk Score: 19         Spoke with:  Ashley Mtz with patient who reported she is doing ok. Patient sounded sob while talking and stated she just came downstairs. Patient reported she has been wearing her oxygen and after coming downstairs her O2 level was 80%, after resting for a minute patient stated it came up to 91%. Patient stated she continues to have cough but denied any worse. Patient stated she is in the process of getting a stair lift in her apartment. Patient denied any further issues or concerns. CTN encouraged patient to reach out to MD right away if breathing worsens. CTN advised patient of use of urgent care or physicians 24 hr access line if assistance is needed after hours. Care Transitions Subsequent and Final Call    Subsequent and Final Calls  Do you have any ongoing symptoms?:  Yes  Patient-reported symptoms:  Shortness of Breath  Have your medications changed?:  No  Do you have any questions related to your medications?:  No  Do you currently have any active services?:  Yes  Are you currently active with any services?:  Home Health  Do you have any needs or concerns that I can assist you with?:  No  Identified Barriers:  None  Care Transitions Interventions  Other Interventions:             Follow Up  Future Appointments   Date Time Provider Matt Crabtree   2020  9:00 AM [facility] [place] [facility]   2020 10:00 AM [facility] [place] [facility]   2020 11:00 AM MD LINDA Maldonado PULJD Rosario RN

## 2020-06-24 NOTE — ED TRIAGE NOTES
A&Ox4 pt arrives to ED, through triage, with c/o worseing SOB over the last 2 days with no relief from home breathing tx's. R labored in triage and tachypneic in the upper 20's - 30's. 92-94% on room air. Afebrile, normotensive, and normocardic. Patient identified as a positive fall risk on the ED triage fall screening. Patient placed in fall precautions which includes:  yellow fall risk bracelet on wrist, yellow socks on feet, \"Be Safe\" sign placed on patient's door, and bed alarm placed under patient/alarm turned on. Patient instructed on importance of not getting out of bed or ambulating without assistance for safety.

## 2020-06-25 PROBLEM — R09.89 CHEST CONGESTION: Status: ACTIVE | Noted: 2020-06-25

## 2020-06-25 PROBLEM — T17.500A MUCUS PLUGGING OF BRONCHI: Status: ACTIVE | Noted: 2020-06-25

## 2020-06-25 PROBLEM — J96.22 ACUTE ON CHRONIC RESPIRATORY FAILURE WITH HYPOXIA AND HYPERCAPNIA (HCC): Status: ACTIVE | Noted: 2020-03-04

## 2020-06-25 PROBLEM — R91.8 PULMONARY INFILTRATES: Status: ACTIVE | Noted: 2020-06-25

## 2020-06-25 PROBLEM — R06.89 INEFFECTIVE AIRWAY CLEARANCE: Status: ACTIVE | Noted: 2020-06-25

## 2020-06-25 LAB
A/G RATIO: 1.1 (ref 1.1–2.2)
ALBUMIN SERPL-MCNC: 3.5 G/DL (ref 3.4–5)
ALP BLD-CCNC: 59 U/L (ref 40–129)
ALT SERPL-CCNC: 9 U/L (ref 10–40)
ANION GAP SERPL CALCULATED.3IONS-SCNC: 8 MMOL/L (ref 3–16)
APTT: 32 SEC (ref 24.2–36.2)
AST SERPL-CCNC: 12 U/L (ref 15–37)
BASOPHILS ABSOLUTE: 0.1 K/UL (ref 0–0.2)
BASOPHILS RELATIVE PERCENT: 0.7 %
BILIRUB SERPL-MCNC: <0.2 MG/DL (ref 0–1)
BUN BLDV-MCNC: 6 MG/DL (ref 7–20)
CALCIUM SERPL-MCNC: 9 MG/DL (ref 8.3–10.6)
CHLORIDE BLD-SCNC: 96 MMOL/L (ref 99–110)
CO2: 25 MMOL/L (ref 21–32)
CREAT SERPL-MCNC: 0.6 MG/DL (ref 0.6–1.2)
EKG ATRIAL RATE: 82 BPM
EKG DIAGNOSIS: NORMAL
EKG P AXIS: 70 DEGREES
EKG P-R INTERVAL: 144 MS
EKG Q-T INTERVAL: 356 MS
EKG QRS DURATION: 78 MS
EKG QTC CALCULATION (BAZETT): 415 MS
EKG R AXIS: 0 DEGREES
EKG T AXIS: 69 DEGREES
EKG VENTRICULAR RATE: 82 BPM
EOSINOPHILS ABSOLUTE: 0 K/UL (ref 0–0.6)
EOSINOPHILS RELATIVE PERCENT: 0.2 %
FERRITIN: 55.5 NG/ML (ref 15–150)
GFR AFRICAN AMERICAN: >60
GFR NON-AFRICAN AMERICAN: >60
GLOBULIN: 3.3 G/DL
GLUCOSE BLD-MCNC: 138 MG/DL (ref 70–99)
HCT VFR BLD CALC: 42.3 % (ref 36–48)
HEMOGLOBIN: 14.5 G/DL (ref 12–16)
INR BLD: 0.97 (ref 0.86–1.14)
LYMPHOCYTES ABSOLUTE: 0.9 K/UL (ref 1–5.1)
LYMPHOCYTES RELATIVE PERCENT: 8.1 %
MCH RBC QN AUTO: 30.8 PG (ref 26–34)
MCHC RBC AUTO-ENTMCNC: 34.2 G/DL (ref 31–36)
MCV RBC AUTO: 90.2 FL (ref 80–100)
MONOCYTES ABSOLUTE: 0.2 K/UL (ref 0–1.3)
MONOCYTES RELATIVE PERCENT: 1.6 %
NEUTROPHILS ABSOLUTE: 9.9 K/UL (ref 1.7–7.7)
NEUTROPHILS RELATIVE PERCENT: 89.4 %
PDW BLD-RTO: 14.2 % (ref 12.4–15.4)
PLATELET # BLD: 305 K/UL (ref 135–450)
PMV BLD AUTO: 6.9 FL (ref 5–10.5)
POTASSIUM REFLEX MAGNESIUM: 4.4 MMOL/L (ref 3.5–5.1)
PROTHROMBIN TIME: 11.3 SEC (ref 10–13.2)
RBC # BLD: 4.69 M/UL (ref 4–5.2)
SARS-COV-2, PCR: NOT DETECTED
SODIUM BLD-SCNC: 129 MMOL/L (ref 136–145)
TOTAL PROTEIN: 6.8 G/DL (ref 6.4–8.2)
WBC # BLD: 11.1 K/UL (ref 4–11)

## 2020-06-25 PROCEDURE — 6370000000 HC RX 637 (ALT 250 FOR IP): Performed by: INTERNAL MEDICINE

## 2020-06-25 PROCEDURE — 2580000003 HC RX 258

## 2020-06-25 PROCEDURE — 6360000002 HC RX W HCPCS: Performed by: INTERNAL MEDICINE

## 2020-06-25 PROCEDURE — 94640 AIRWAY INHALATION TREATMENT: CPT

## 2020-06-25 PROCEDURE — 80053 COMPREHEN METABOLIC PANEL: CPT

## 2020-06-25 PROCEDURE — 1200000000 HC SEMI PRIVATE

## 2020-06-25 PROCEDURE — 2580000003 HC RX 258: Performed by: INTERNAL MEDICINE

## 2020-06-25 PROCEDURE — 85730 THROMBOPLASTIN TIME PARTIAL: CPT

## 2020-06-25 PROCEDURE — 2700000000 HC OXYGEN THERAPY PER DAY

## 2020-06-25 PROCEDURE — 99223 1ST HOSP IP/OBS HIGH 75: CPT | Performed by: INTERNAL MEDICINE

## 2020-06-25 PROCEDURE — 94761 N-INVAS EAR/PLS OXIMETRY MLT: CPT

## 2020-06-25 PROCEDURE — 93010 ELECTROCARDIOGRAM REPORT: CPT | Performed by: INTERNAL MEDICINE

## 2020-06-25 PROCEDURE — 85610 PROTHROMBIN TIME: CPT

## 2020-06-25 PROCEDURE — 85025 COMPLETE CBC W/AUTO DIFF WBC: CPT

## 2020-06-25 RX ORDER — SODIUM CHLORIDE 9 MG/ML
INJECTION, SOLUTION INTRAVENOUS
Status: COMPLETED
Start: 2020-06-25 | End: 2020-06-25

## 2020-06-25 RX ORDER — ALBUTEROL SULFATE 2.5 MG/3ML
2.5 SOLUTION RESPIRATORY (INHALATION) EVERY 4 HOURS
Status: DISCONTINUED | OUTPATIENT
Start: 2020-06-26 | End: 2020-06-27

## 2020-06-25 RX ORDER — ATORVASTATIN CALCIUM 10 MG/1
10 TABLET, FILM COATED ORAL NIGHTLY
Status: DISCONTINUED | OUTPATIENT
Start: 2020-06-25 | End: 2020-06-28 | Stop reason: HOSPADM

## 2020-06-25 RX ORDER — BUDESONIDE AND FORMOTEROL FUMARATE DIHYDRATE 160; 4.5 UG/1; UG/1
2 AEROSOL RESPIRATORY (INHALATION) 2 TIMES DAILY
Status: DISCONTINUED | OUTPATIENT
Start: 2020-06-25 | End: 2020-06-28 | Stop reason: HOSPADM

## 2020-06-25 RX ORDER — ALBUTEROL SULFATE 90 UG/1
2 AEROSOL, METERED RESPIRATORY (INHALATION)
Status: DISCONTINUED | OUTPATIENT
Start: 2020-06-25 | End: 2020-06-25

## 2020-06-25 RX ORDER — ATENOLOL 25 MG/1
25 TABLET ORAL DAILY
Status: DISCONTINUED | OUTPATIENT
Start: 2020-06-25 | End: 2020-06-28 | Stop reason: HOSPADM

## 2020-06-25 RX ORDER — IPRATROPIUM BROMIDE AND ALBUTEROL SULFATE 2.5; .5 MG/3ML; MG/3ML
1 SOLUTION RESPIRATORY (INHALATION) EVERY 4 HOURS PRN
Status: DISCONTINUED | OUTPATIENT
Start: 2020-06-25 | End: 2020-06-28 | Stop reason: HOSPADM

## 2020-06-25 RX ORDER — LEVOFLOXACIN 5 MG/ML
500 INJECTION, SOLUTION INTRAVENOUS EVERY 24 HOURS
Status: DISCONTINUED | OUTPATIENT
Start: 2020-06-25 | End: 2020-06-28

## 2020-06-25 RX ADMIN — TIOTROPIUM BROMIDE INHALATION SPRAY 2 PUFF: 3.12 SPRAY, METERED RESPIRATORY (INHALATION) at 20:55

## 2020-06-25 RX ADMIN — MONTELUKAST SODIUM 10 MG: 10 TABLET, FILM COATED ORAL at 20:44

## 2020-06-25 RX ADMIN — METHYLPREDNISOLONE SODIUM SUCCINATE 40 MG: 40 INJECTION, POWDER, FOR SOLUTION INTRAMUSCULAR; INTRAVENOUS at 14:13

## 2020-06-25 RX ADMIN — SODIUM CHLORIDE 500 ML: 9 INJECTION, SOLUTION INTRAVENOUS at 05:49

## 2020-06-25 RX ADMIN — BUSPIRONE HYDROCHLORIDE 30 MG: 5 TABLET ORAL at 20:43

## 2020-06-25 RX ADMIN — ALBUTEROL SULFATE 2.5 MG: 2.5 SOLUTION RESPIRATORY (INHALATION) at 23:33

## 2020-06-25 RX ADMIN — Medication 10 ML: at 09:52

## 2020-06-25 RX ADMIN — TRAZODONE HYDROCHLORIDE 300 MG: 50 TABLET ORAL at 00:03

## 2020-06-25 RX ADMIN — LEVOFLOXACIN 500 MG: 5 INJECTION, SOLUTION INTRAVENOUS at 05:49

## 2020-06-25 RX ADMIN — BUSPIRONE HYDROCHLORIDE 30 MG: 5 TABLET ORAL at 09:51

## 2020-06-25 RX ADMIN — ENOXAPARIN SODIUM 40 MG: 40 INJECTION SUBCUTANEOUS at 09:51

## 2020-06-25 RX ADMIN — ATORVASTATIN CALCIUM 10 MG: 10 TABLET, FILM COATED ORAL at 20:44

## 2020-06-25 RX ADMIN — Medication 2 PUFF: at 16:40

## 2020-06-25 RX ADMIN — Medication 2 PUFF: at 11:54

## 2020-06-25 RX ADMIN — FLUOXETINE 60 MG: 20 CAPSULE ORAL at 09:51

## 2020-06-25 RX ADMIN — METHYLPREDNISOLONE SODIUM SUCCINATE 40 MG: 40 INJECTION, POWDER, FOR SOLUTION INTRAMUSCULAR; INTRAVENOUS at 00:03

## 2020-06-25 RX ADMIN — BUPROPION HYDROCHLORIDE 150 MG: 150 TABLET, EXTENDED RELEASE ORAL at 20:43

## 2020-06-25 RX ADMIN — ALBUTEROL SULFATE 2.5 MG: 2.5 SOLUTION RESPIRATORY (INHALATION) at 21:00

## 2020-06-25 RX ADMIN — TRAZODONE HYDROCHLORIDE 300 MG: 50 TABLET ORAL at 20:43

## 2020-06-25 RX ADMIN — BUPROPION HYDROCHLORIDE 150 MG: 150 TABLET, EXTENDED RELEASE ORAL at 09:51

## 2020-06-25 RX ADMIN — Medication 2 PUFF: at 20:53

## 2020-06-25 RX ADMIN — Medication 2 PUFF: at 08:22

## 2020-06-25 ASSESSMENT — PAIN SCALES - GENERAL: PAINLEVEL_OUTOF10: 0

## 2020-06-25 NOTE — PROGRESS NOTES
End of shift report given to Song Flores RN at bedside. Bed in lowest position with wheels locked. Call light within reach. No further needs at this time.

## 2020-06-25 NOTE — PROGRESS NOTES
Report received from [facility], in ED. Pt transported by wheelchair on 2L/NC to room 237. Oriented patient to room and use of call button. Instructed patient to press call button and wait for assistance prior to getting out of bed. Patient verbalized understanding. Calling out appropriately at this time.      Vitals:    06/24/20 2218   BP: 107/78   Pulse: 95   Resp: 24   Temp: 97.5 °F (36.4 °C)   SpO2: 98%

## 2020-06-25 NOTE — ED NOTES
[facility] @  2115   Re: admission  Dr. Vivas Bloodgovik @ 2129     [facility]  06/24/20 2129

## 2020-06-25 NOTE — CONSULTS
INPATIENT PULMONARY CRITICAL CARE CONSULT NOTE      Chief Complaint/Referring Provider:  Patient is being seen at the request of Ulyess Brittle  for a consultation for severe COPD plus pulmonary fibrosis     Presenting HPI: Patient came to the hospital because of increasing shortness of breath along with cough and wheezing     As per admitting provider-Nai MILES Marvin is a 62 y. o. female.   She has a h/o severe COPD and ILD, likely smoking-related pulmonary fibrosis.  At baseline she uses O2 2-3L/min at night. She presents for above-baseline dyspnea worsening over about 2-3 days.  She is now dyspneic at rest.  Denies hemoptysis and pleuritic chest pain.  No rigors or sweats. No nausea or diarrhea.     She continues to smoke. She was just hospitalized 6/1-4/2020 for the same symptoms. She was sent home with a 10-day prednisone regimen and five days of levofloxacin. She finished the antibiotic. She is nearing the end of her prednisone regimen which should have ended 6/15/20 (e.g. she has not taken it as prescribed).     Patient denies ever requiring intubation and vent support for exacerbations of her lung disease.  She has required BiPAP support in the past though.     Patient when seen this morning states that she has not been feeling well for last 1 week or so has been having increased cough with chest congestion, patient has been having increasing shortness of breath with wheezing, patient did not have any chest pain per se but had chest tightness, patient has some nasal congestion, patient denies any otalgia or ear discharge, patient does not have any sore throat, no difficulty in swallowing per se on a regular basis, patient states that she did not have any epistaxis or hemoptysis, patient did not have any significant fever or chills, patient does not have any abdominal pain nausea vomiting, patient did not have any hematochezia or melena, patient did not have any increase leg swelling, no dysuria or hematuria, Mother     Heart Disease Mother     High Cholesterol Mother     Cancer Mother     Depression Mother     Diabetes Father     Emphysema Father     Heart Failure Father     Hypertension Father     Heart Disease Father     High Cholesterol Father     Substance Abuse Father     Emphysema Paternal Grandfather     Diabetes Sister     High Cholesterol Sister     Vision Loss Maternal Uncle         Social History     Tobacco Use    Smoking status: Current Every Day Smoker     Packs/day: 1.00     Years: 40.00     Pack years: 40.00     Types: Cigarettes     Start date: 1/1/1972    Smokeless tobacco: Never Used   Substance Use Topics    Alcohol use: No     Alcohol/week: 0.0 standard drinks        Allergies   Allergen Reactions    Meperidine Anaphylaxis     \"Demerol\"     Demerol Hives               Physical Exam:  Blood pressure 103/74, pulse 78, temperature 97.8 °F (36.6 °C), temperature source Oral, resp. rate 15, height 5' 5\" (1.651 m), weight 130 lb (59 kg), SpO2 96 %, not currently breastfeeding.'   Constitutional: Mild respiratory distress. When seen  HENT:  Oropharynx is clear and moist. No thyromegaly. Eyes:  Conjunctivae are normal. Pupils equal, round, and reactive to light. No scleral icterus. Neck: . No tracheal deviation present. No obvious thyroid mass. Cardiovascular: Normal rate, regular rhythm, normal heart sounds. No right ventricular heave. No lower extremity edema. Pulmonary/Chest: Bilateral wheezes. Bibasilar scattered rales. Increased chest congestion chest wall is not dull to percussion. No accessory muscle usage or stridor. Decreased breath sound density  Abdominal: Soft. Bowel sounds present. No distension or hernia. No tenderness. Musculoskeletal: No cyanosis. No clubbing. No obvious joint deformity. Lymphadenopathy: No cervical or supraclavicular adenopathy. Skin: Skin is warm and dry. No rash or nodules on the exposed extremities.   Psychiatric: Normal mood and airspace opacities concerning for an atypical pneumonia. Results for Anthony Amezcua (MRN [id number]) as of 6/25/2020 19:05   Ref. Range 6/2/2020 09:38 6/3/2020 05:59 6/4/2020 05:47 6/24/2020 19:40 6/25/2020 04:00   Sodium Latest Ref Range: 136 - 145 mmol/L 135 (L) 134 (L) 131 (L) 127 (L) 129 (L)   Potassium Latest Ref Range: 3.5 - 5.1 mmol/L 4.5 4.5 4.2 4.0 4.4   Chloride Latest Ref Range: 99 - 110 mmol/L 98 (L) 98 (L) 96 (L) 94 (L) 96 (L)   CO2 Latest Ref Range: 21 - 32 mmol/L 31 29 29 23 25   BUN Latest Ref Range: 7 - 20 mg/dL 8 8 10 6 (L) 6 (L)   Creatinine Latest Ref Range: 0.6 - 1.2 mg/dL 0.6 0.6 0.6 0.6 0.6   Anion Gap Latest Ref Range: 3 - 16  6 7 6 10 8   GFR Non- Latest Ref Range: >60  >60 >60 >60 >60 >60   GFR  Latest Ref Range: >60  >60 >60 >60 >60 >60   Glucose Latest Ref Range: 70 - 99 mg/dL 99 99 89 113 (H) 138 (H)   Calcium Latest Ref Range: 8.3 - 10.6 mg/dL 8.9 8.9 8.7 8.5 9.0   Total Protein Latest Ref Range: 6.4 - 8.2 g/dL 6.4 6.2 (L) 6.0 (L) 6.1 (L) 6.8     Results for Anthony Amezcua (MRN [id number]) as of 6/25/2020 19:05   Ref.  Range 6/4/2020 05:47 6/24/2020 19:40 6/25/2020 04:00   WBC Latest Ref Range: 4.0 - 11.0 K/uL 7.8 12.5 (H) 11.1 (H)   RBC Latest Ref Range: 4.00 - 5.20 M/uL 4.06 4.40 4.69   Hemoglobin Quant Latest Ref Range: 12.0 - 16.0 g/dL 12.6 13.2 14.5   Hematocrit Latest Ref Range: 36.0 - 48.0 % 36.7 39.6 42.3   MCV Latest Ref Range: 80.0 - 100.0 fL 90.5 90.1 90.2   MCH Latest Ref Range: 26.0 - 34.0 pg 31.1 30.1 30.8   MCHC Latest Ref Range: 31.0 - 36.0 g/dL 34.4 33.4 34.2   MPV Latest Ref Range: 5.0 - 10.5 fL 6.7 7.1 6.9   RDW Latest Ref Range: 12.4 - 15.4 % 14.3 14.0 14.2   Platelet Count Latest Ref Range: 135 - 450 K/uL 258 323 305   Neutrophils % Latest Units: % 60.9 65.0 89.4   Lymphocyte % Latest Units: % 29.2 24.0 8.1   Monocytes % Latest Units: % 8.2 6.0 1.6     Urine drug screen in past -Results for Anthony Amezcua (MRN [id number]) as of 4. 20 uIU/mL 0.70 1.43 1.35       Assessment:  Principal Problem:    Acute on chronic respiratory failure with hypoxia and hypercapnia ([facility])  Active Problems:    Stage 3 severe COPD by GOLD classification ([facility])    Hyponatremia    Tobacco dependence    ILD (interstitial lung disease) ([facility])    COPD exacerbation ([facility])    Pulmonary infiltrates    Chest congestion    Mucus plugging of bronchi    Ineffective airway clearance  Resolved Problems:    * No resolved hospital problems.  *          Plan:   · Oxygen supplementation to keep saturation being 90 to 94% only  · If patient has worsening hypoventilation and somnolence patient may require to be put on BiPAP  · Bronchodilators  · Patient was started on Symbicort and Spiriva along with DuoNeb on PRN basis  · IV Solu-Medrol to continue  · Incentive spirometry  · Pulmonary toilet  · Patient continues to have increased chest congestion along with mucus plugging and ineffective airway clearance and if the COVID test which has been sent is negative will consider bronchoscopy in the morning for diagnostic and therapeutic purposes  · Patient is on Levaquin which can be continued-titration as per clinical status and cultures  · Evaluation and management of hyponatremia as per internal medicine team  · Smoking cessation advised and pros and cons of continued smoking discussed  · Nicotine replacement therapy offered and given  · PUD and DVT prophylaxis as per IM     Case discussed with patient and nursing    Further management depending on patient's clinical status and follow-up on above recommendations        Electronically signed by:  Mihir Julien MD    6/25/2020    7:12 PM.

## 2020-06-25 NOTE — ED PROVIDER NOTES
airspace disease. Lab reviewed. Pertinent for WBC 12.5. H/H normal.  Negative troponin. Na 127, slightly lower than prior lab but no clinical significance at this time. COVID19 testing ordered. Due to worsening respiratory status, patient will be admitted for monitoring. For further details of [facility] emergency department encounter, please see Verónica Reich NP's documentation. This chart was generated in part by using Dragon Dictation system and may contain errors related to that system including errors in grammar, punctuation, and spelling, as well as words and phrases that may be inappropriate. If there are any questions or concerns please feel free to contact the dictating provider for clarification.           Cole Zavala MD  06/24/20 0553

## 2020-06-26 LAB
A/G RATIO: 1.2 (ref 1.1–2.2)
ALBUMIN SERPL-MCNC: 3.4 G/DL (ref 3.4–5)
ALP BLD-CCNC: 50 U/L (ref 40–129)
ALT SERPL-CCNC: 7 U/L (ref 10–40)
ANION GAP SERPL CALCULATED.3IONS-SCNC: 8 MMOL/L (ref 3–16)
APTT: 27.3 SEC (ref 24.2–36.2)
AST SERPL-CCNC: 10 U/L (ref 15–37)
BASOPHILS ABSOLUTE: 0 K/UL (ref 0–0.2)
BASOPHILS RELATIVE PERCENT: 0.2 %
BILIRUB SERPL-MCNC: <0.2 MG/DL (ref 0–1)
BUN BLDV-MCNC: 12 MG/DL (ref 7–20)
CALCIUM SERPL-MCNC: 8.6 MG/DL (ref 8.3–10.6)
CHLORIDE BLD-SCNC: 95 MMOL/L (ref 99–110)
CO2: 27 MMOL/L (ref 21–32)
CREAT SERPL-MCNC: <0.5 MG/DL (ref 0.6–1.2)
EOSINOPHILS ABSOLUTE: 0 K/UL (ref 0–0.6)
EOSINOPHILS RELATIVE PERCENT: 0 %
GFR AFRICAN AMERICAN: >60
GFR NON-AFRICAN AMERICAN: >60
GLOBULIN: 2.8 G/DL
GLUCOSE BLD-MCNC: 136 MG/DL (ref 70–99)
HCT VFR BLD CALC: 39.3 % (ref 36–48)
HEMOGLOBIN: 13.1 G/DL (ref 12–16)
INR BLD: 0.99 (ref 0.86–1.14)
LYMPHOCYTES ABSOLUTE: 0.8 K/UL (ref 1–5.1)
LYMPHOCYTES RELATIVE PERCENT: 5.2 %
MCH RBC QN AUTO: 30.5 PG (ref 26–34)
MCHC RBC AUTO-ENTMCNC: 33.5 G/DL (ref 31–36)
MCV RBC AUTO: 91 FL (ref 80–100)
MONOCYTES ABSOLUTE: 0.6 K/UL (ref 0–1.3)
MONOCYTES RELATIVE PERCENT: 3.8 %
NEUTROPHILS ABSOLUTE: 13.4 K/UL (ref 1.7–7.7)
NEUTROPHILS RELATIVE PERCENT: 90.8 %
PDW BLD-RTO: 13.8 % (ref 12.4–15.4)
PLATELET # BLD: 284 K/UL (ref 135–450)
PMV BLD AUTO: 6.9 FL (ref 5–10.5)
POTASSIUM REFLEX MAGNESIUM: 4.4 MMOL/L (ref 3.5–5.1)
PROTHROMBIN TIME: 11.5 SEC (ref 10–13.2)
RBC # BLD: 4.31 M/UL (ref 4–5.2)
SODIUM BLD-SCNC: 130 MMOL/L (ref 136–145)
TOTAL PROTEIN: 6.2 G/DL (ref 6.4–8.2)
WBC # BLD: 14.8 K/UL (ref 4–11)

## 2020-06-26 PROCEDURE — 2580000003 HC RX 258: Performed by: INTERNAL MEDICINE

## 2020-06-26 PROCEDURE — 87116 MYCOBACTERIA CULTURE: CPT

## 2020-06-26 PROCEDURE — 80053 COMPREHEN METABOLIC PANEL: CPT

## 2020-06-26 PROCEDURE — 99152 MOD SED SAME PHYS/QHP 5/>YRS: CPT | Performed by: INTERNAL MEDICINE

## 2020-06-26 PROCEDURE — 6360000002 HC RX W HCPCS: Performed by: INTERNAL MEDICINE

## 2020-06-26 PROCEDURE — 87070 CULTURE OTHR SPECIMN AEROBIC: CPT

## 2020-06-26 PROCEDURE — 6370000000 HC RX 637 (ALT 250 FOR IP): Performed by: INTERNAL MEDICINE

## 2020-06-26 PROCEDURE — 0B9F8ZX DRAINAGE OF RIGHT LOWER LUNG LOBE, VIA NATURAL OR ARTIFICIAL OPENING ENDOSCOPIC, DIAGNOSTIC: ICD-10-PCS | Performed by: INTERNAL MEDICINE

## 2020-06-26 PROCEDURE — 97110 THERAPEUTIC EXERCISES: CPT

## 2020-06-26 PROCEDURE — 3609010900 HC BRONCHOSCOPY THERAPUTIC ASPIRATION INITIAL: Performed by: INTERNAL MEDICINE

## 2020-06-26 PROCEDURE — 31624 DX BRONCHOSCOPE/LAVAGE: CPT | Performed by: INTERNAL MEDICINE

## 2020-06-26 PROCEDURE — 97116 GAIT TRAINING THERAPY: CPT

## 2020-06-26 PROCEDURE — 85610 PROTHROMBIN TIME: CPT

## 2020-06-26 PROCEDURE — 87102 FUNGUS ISOLATION CULTURE: CPT

## 2020-06-26 PROCEDURE — 87106 FUNGI IDENTIFICATION YEAST: CPT

## 2020-06-26 PROCEDURE — 2500000003 HC RX 250 WO HCPCS: Performed by: INTERNAL MEDICINE

## 2020-06-26 PROCEDURE — 31645 BRNCHSC W/THER ASPIR 1ST: CPT | Performed by: INTERNAL MEDICINE

## 2020-06-26 PROCEDURE — 85730 THROMBOPLASTIN TIME PARTIAL: CPT

## 2020-06-26 PROCEDURE — 85025 COMPLETE CBC W/AUTO DIFF WBC: CPT

## 2020-06-26 PROCEDURE — 88112 CYTOPATH CELL ENHANCE TECH: CPT

## 2020-06-26 PROCEDURE — 3609010800 HC BRONCHOSCOPY ALVEOLAR LAVAGE: Performed by: INTERNAL MEDICINE

## 2020-06-26 PROCEDURE — 1200000000 HC SEMI PRIVATE

## 2020-06-26 PROCEDURE — 2700000000 HC OXYGEN THERAPY PER DAY

## 2020-06-26 PROCEDURE — 99233 SBSQ HOSP IP/OBS HIGH 50: CPT | Performed by: INTERNAL MEDICINE

## 2020-06-26 PROCEDURE — 88305 TISSUE EXAM BY PATHOLOGIST: CPT

## 2020-06-26 PROCEDURE — 97165 OT EVAL LOW COMPLEX 30 MIN: CPT

## 2020-06-26 PROCEDURE — 94640 AIRWAY INHALATION TREATMENT: CPT

## 2020-06-26 PROCEDURE — 87205 SMEAR GRAM STAIN: CPT

## 2020-06-26 PROCEDURE — 87206 SMEAR FLUORESCENT/ACID STAI: CPT

## 2020-06-26 PROCEDURE — 0BC38ZZ EXTIRPATION OF MATTER FROM RIGHT MAIN BRONCHUS, VIA NATURAL OR ARTIFICIAL OPENING ENDOSCOPIC: ICD-10-PCS | Performed by: INTERNAL MEDICINE

## 2020-06-26 PROCEDURE — 97161 PT EVAL LOW COMPLEX 20 MIN: CPT

## 2020-06-26 PROCEDURE — 87632 RESP VIRUS 6-11 TARGETS: CPT

## 2020-06-26 PROCEDURE — 94761 N-INVAS EAR/PLS OXIMETRY MLT: CPT

## 2020-06-26 PROCEDURE — 2709999900 HC NON-CHARGEABLE SUPPLY: Performed by: INTERNAL MEDICINE

## 2020-06-26 PROCEDURE — 87015 SPECIMEN INFECT AGNT CONCNTJ: CPT

## 2020-06-26 RX ORDER — LIDOCAINE HYDROCHLORIDE 20 MG/ML
INJECTION, SOLUTION INFILTRATION; PERINEURAL PRN
Status: DISCONTINUED | OUTPATIENT
Start: 2020-06-26 | End: 2020-06-26 | Stop reason: ALTCHOICE

## 2020-06-26 RX ORDER — MIDAZOLAM HYDROCHLORIDE 5 MG/ML
INJECTION INTRAMUSCULAR; INTRAVENOUS PRN
Status: DISCONTINUED | OUTPATIENT
Start: 2020-06-26 | End: 2020-06-26 | Stop reason: ALTCHOICE

## 2020-06-26 RX ORDER — MAGNESIUM HYDROXIDE 1200 MG/15ML
LIQUID ORAL CONTINUOUS PRN
Status: COMPLETED | OUTPATIENT
Start: 2020-06-26 | End: 2020-06-26

## 2020-06-26 RX ORDER — 0.9 % SODIUM CHLORIDE 0.9 %
INTRAVENOUS SOLUTION INTRAVENOUS CONTINUOUS PRN
Status: COMPLETED | OUTPATIENT
Start: 2020-06-26 | End: 2020-06-26

## 2020-06-26 RX ORDER — ACETYLCYSTEINE 200 MG/ML
SOLUTION ORAL; RESPIRATORY (INHALATION) PRN
Status: DISCONTINUED | OUTPATIENT
Start: 2020-06-26 | End: 2020-06-26 | Stop reason: ALTCHOICE

## 2020-06-26 RX ADMIN — METHYLPREDNISOLONE SODIUM SUCCINATE 40 MG: 40 INJECTION, POWDER, FOR SOLUTION INTRAMUSCULAR; INTRAVENOUS at 12:33

## 2020-06-26 RX ADMIN — FLUOXETINE 60 MG: 20 CAPSULE ORAL at 12:30

## 2020-06-26 RX ADMIN — BUPROPION HYDROCHLORIDE 150 MG: 150 TABLET, EXTENDED RELEASE ORAL at 12:29

## 2020-06-26 RX ADMIN — Medication 2 PUFF: at 08:02

## 2020-06-26 RX ADMIN — ALBUTEROL SULFATE 2.5 MG: 2.5 SOLUTION RESPIRATORY (INHALATION) at 23:49

## 2020-06-26 RX ADMIN — MONTELUKAST SODIUM 10 MG: 10 TABLET, FILM COATED ORAL at 20:22

## 2020-06-26 RX ADMIN — BUPROPION HYDROCHLORIDE 150 MG: 150 TABLET, EXTENDED RELEASE ORAL at 20:22

## 2020-06-26 RX ADMIN — Medication 10 ML: at 09:18

## 2020-06-26 RX ADMIN — BUSPIRONE HYDROCHLORIDE 30 MG: 5 TABLET ORAL at 12:29

## 2020-06-26 RX ADMIN — LEVOFLOXACIN 500 MG: 5 INJECTION, SOLUTION INTRAVENOUS at 05:44

## 2020-06-26 RX ADMIN — ALBUTEROL SULFATE 2.5 MG: 2.5 SOLUTION RESPIRATORY (INHALATION) at 21:01

## 2020-06-26 RX ADMIN — TRAZODONE HYDROCHLORIDE 300 MG: 50 TABLET ORAL at 22:02

## 2020-06-26 RX ADMIN — ATORVASTATIN CALCIUM 10 MG: 10 TABLET, FILM COATED ORAL at 20:22

## 2020-06-26 RX ADMIN — ALBUTEROL SULFATE 2.5 MG: 2.5 SOLUTION RESPIRATORY (INHALATION) at 03:49

## 2020-06-26 RX ADMIN — ALBUTEROL SULFATE 2.5 MG: 2.5 SOLUTION RESPIRATORY (INHALATION) at 08:02

## 2020-06-26 RX ADMIN — Medication 10 ML: at 20:23

## 2020-06-26 RX ADMIN — TIOTROPIUM BROMIDE INHALATION SPRAY 2 PUFF: 3.12 SPRAY, METERED RESPIRATORY (INHALATION) at 08:02

## 2020-06-26 RX ADMIN — METHYLPREDNISOLONE SODIUM SUCCINATE 40 MG: 40 INJECTION, POWDER, FOR SOLUTION INTRAMUSCULAR; INTRAVENOUS at 00:10

## 2020-06-26 RX ADMIN — BUSPIRONE HYDROCHLORIDE 30 MG: 5 TABLET ORAL at 20:25

## 2020-06-26 RX ADMIN — Medication 2 PUFF: at 21:03

## 2020-06-26 RX ADMIN — ALBUTEROL SULFATE 2.5 MG: 2.5 SOLUTION RESPIRATORY (INHALATION) at 15:40

## 2020-06-26 ASSESSMENT — PAIN SCALES - GENERAL
PAINLEVEL_OUTOF10: 0
PAINLEVEL_OUTOF10: 5

## 2020-06-26 ASSESSMENT — PAIN DESCRIPTION - PAIN TYPE: TYPE: ACUTE PAIN;CHRONIC PAIN

## 2020-06-26 ASSESSMENT — PAIN DESCRIPTION - LOCATION: LOCATION: BACK;HEAD

## 2020-06-26 NOTE — PROGRESS NOTES
4 Eyes Skin Assessment     The patient is being assess for  Transfer to New Unit    I agree that 2 RN's have performed a thorough Head to Toe Skin Assessment on the patient. ALL assessment sites listed below have been assessed. Areas assessed by both nurses: Kelsea/Arabella   [x]   Head, Face, and Ears   [x]   Shoulders, Back, and Chest  [x]   Arms, Elbows, and Hands   [x]   Coccyx, Sacrum, and Ischum  [x]   Legs, Feet, and Heels        Does the Patient have Skin Breakdown?   No         Philip Prevention initiated:  NA   Wound Care Orders initiated:  NA      [facility] nurse consulted for Pressure Injury (Stage 3,4, Unstageable, DTI, NWPT, and Complex wounds):  NA      Nurse 1 eSignature: Electronically signed by Kaylynn Frank RN on 6/26/20 at 11:48 AM EDT    **SHARE this note so that the co-signing nurse is able to place an eSignature**    Nurse 2 eSignature: Electronically signed by Rosanna Aaron RN on 6/26/20 at 11:49 AM EDT

## 2020-06-26 NOTE — PROGRESS NOTES
Occupational Therapy   Occupational Therapy Initial Assessment/Treatment   Date: 2020   Patient Name: Deni Solis  MRN: [id number]     : 1955    Date of Service: 2020    Discharge Recommendations:  24 hour supervision or assist       Assessment   Performance deficits / Impairments: Decreased functional mobility ; Decreased endurance;Decreased strength;Decreased ADL status  Assessment: Pt 63 yo female functioning with deficits in the areas listed above following SOB at home. Pt reports IND PLOF for adls but required assist with household tasks from spouse. Pt reports her bedroom/bathroom are on 2nd floor. Pt currently limited due to decreased endurance requiring rest breaks with BUE exercise. Skilled Ot services needed while in acute care. Prognosis: Good  Decision Making: Medium Complexity  OT Education: OT Role;Plan of Care;Transfer Training;Precautions; Home Exercise Program;Energy Conservation  REQUIRES OT FOLLOW UP: Yes  Activity Tolerance  Activity Tolerance: Patient Tolerated treatment well  Activity Tolerance: pt SOB with BUE, O2 92% on 2L  Safety Devices  Safety Devices in place: Yes  Type of devices: Call light within reach; Chair alarm in place; Left in chair;Gait belt;Nurse notified           Patient Diagnosis(es): The encounter diagnosis was COPD exacerbation ([address]      has a past medical history of Allergic rhinitis, Anxiety, Arthritis, Aspiration pneumonia ([address] Asthma, Bronchitis chronic, COPD (chronic obstructive pulmonary disease) ([address] Depression, Drug abuse, cocaine type ([address] Emphysema of lung ([address] Fibromyalgia, GERD (gastroesophageal reflux disease), Hyperlipidemia, Influenza A, Lung disease, On home oxygen therapy, Osteoporosis, Pneumonia, Polysubstance dependence ([address] 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[address] Pulmonary fibrosis ([address] Pulmonary infiltrate, Pulmonary nodule, and Tobacco abuse.   has a past surgical history that includes bronchoscopy; Balance: Stand by assistance(no AD)  Functional Mobility  Functional - Mobility Device: No device  Activity: Other  Assist Level: Stand by assistance  ADL  Additional Comments: pt declined needs for adls   Tone RUE  RUE Tone: Normotonic  Tone LUE  LUE Tone: Normotonic  Coordination  Movements Are Fluid And Coordinated: Yes     Bed mobility  Supine to Sit: Modified independent(HOB elevated)  Sit to Supine: Unable to assess(up in chair at end of session)  Transfers  Sit to stand: Supervision  Stand to sit: Supervision     Cognition  Overall Cognitive Status: WFL           Type of ROM/Therapeutic Exercise  Type of ROM/Therapeutic Exercise: AROM  Comment: BUE seated   Exercises  Shoulder Flexion: x10  Elbow Flexion: x10  Elbow Extension: x10  Supination: x10  Pronation: x10  Wrist Flexion: x10  Wrist Extension: x10  Grasp/Release: x10     LUE AROM (degrees)  LUE AROM : WFL  RUE AROM (degrees)  RUE AROM : WFL  LUE Strength  Gross LUE Strength: WFL  L Hand General: 4/5  RUE Strength  Gross RUE Strength: WFL  R Hand General: 4/5                   Plan   Plan  Times per week: 3-5x/wk  Current Treatment Recommendations: Endurance Training, Balance Training, Functional Mobility Training, Safety Education & Training, Self-Care / ADL    AM-PAC Score        AM-[facility] Inpatient Daily Activity Raw Score: 20 (06/26/20 1545)  AM-PAC Inpatient ADL T-Scale Score : 42.03 (06/26/20 1545)  ADL Inpatient CMS 0-100% Score: 38.32 (06/26/20 1545)  ADL Inpatient CMS G-Code Modifier : Venora Flavors (06/26/20 1545)    Goals  Short term goals  Time Frame for Short term goals: 1 week (7/3/20)  Short term goal 1: Pt will complete toilet transfer with S.   Short term goal 2: Pt will complete bathroom mobility with S on RA with O2 above 90%.    Short term goal 3: Pt will complete 15 reps of BUE exercises for increased strength and endurance. (7/1/20)  Patient Goals   Patient goals : \"to get better\"       Therapy Time   Individual Concurrent Group Co-treatment

## 2020-06-26 NOTE — PROGRESS NOTES
Report received from [facility]. Pt brought up to floor by wheelchair. Vitals stable. Bed in lowest position and wheels locked. Call light within reach. Pt stated that she is missing a white tshirt. No further needs at this time.

## 2020-06-26 NOTE — PROGRESS NOTES
goal 4: 6/29/20: pt will complete 12-15 reps BLE exercises to increase strength and increase I with functional mobility  Patient Goals   Patient goals : \"Be able to walk more and not get so short of breath\"       Therapy Time   Individual Concurrent Group Co-treatment   Time In 1618         Time Out 1640         Minutes 22         Timed Code Treatment Minutes: 12 Minutes(10 minutes for eval)     If pt d/c prior to next tx session, this note to serve as d/c summary.     Anjali Speak, PT, DPT

## 2020-06-26 NOTE — TELEPHONE ENCOUNTER
Multiple messages regarding the same thing. Please see other telephone encounter where this was addressed.

## 2020-06-26 NOTE — PROGRESS NOTES
Patient to Rm 531 from unit. Report given to [facility]. Patient belongings gathered and accounted for. Patient stated that she was missing a tshirt. Four eyed skin assessment completed before transfer.

## 2020-06-26 NOTE — PROGRESS NOTES
ondansetron, benzonatate      Intake/Output Summary (Last 24 hours) at 6/26/2020 1023  Last data filed at 6/26/2020 0945  Gross per 24 hour   Intake 550 ml   Output 0 ml   Net 550 ml       Physical Exam Performed:    /71   Pulse 119   Temp 97.9 °F (36.6 °C) (Oral)   Resp 24   Ht 5' 5\" (1.651 m)   Wt 129 lb 14.4 oz (58.9 kg)   SpO2 96%   BMI 21.62 kg/m²     General appearance: No apparent distress, appears stated age and cooperative. HEENT: Pupils equal, round, and reactive to light. Conjunctivae/corneas clear. Neck: Supple, with full range of motion. No jugular venous distention. Trachea midline. Respiratory:  Normal respiratory effort. Rales bilaterally with mild expiratory wheezes without Rhonchi. Cardiovascular: Regular rate and rhythm with normal S1/S2 without murmurs, rubs or gallops. Abdomen: Soft, non-tender, non-distended with normal bowel sounds. Musculoskeletal: No clubbing, cyanosis or edema bilaterally. Full range of motion without deformity. Skin: Skin color, texture, turgor normal.  No rashes or lesions. Neurologic:  Neurovascularly intact without any focal sensory/motor deficits.  Cranial nerves: II-XII intact, grossly non-focal.  Psychiatric: Alert and oriented, thought content appropriate, normal insight  Capillary Refill: Brisk,< 3 seconds   Peripheral Pulses: +2 palpable, equal bilaterally       Labs:   Recent Labs     06/24/20 1940 06/25/20 0400 06/26/20  0528   WBC 12.5* 11.1* 14.8*   HGB 13.2 14.5 13.1   HCT 39.6 42.3 39.3    305 284     Recent Labs     06/24/20 1940 06/25/20 0400 06/26/20  0528   * 129* 130*   K 4.0 4.4 4.4   CL 94* 96* 95*   CO2 23 25 27   BUN 6* 6* 12   CREATININE 0.6 0.6 <0.5*   CALCIUM 8.5 9.0 8.6     Recent Labs     06/24/20 1940 06/25/20 0400 06/26/20  0528   AST 12* 12* 10*   ALT 7* 9* 7*   BILITOT <0.2 <0.2 <0.2   ALKPHOS 53 59 50     Recent Labs     06/25/20  0400 06/26/20  0528   INR 0.97 0.99     Recent Labs     06/24/20  1940

## 2020-06-27 PROCEDURE — 6370000000 HC RX 637 (ALT 250 FOR IP): Performed by: INTERNAL MEDICINE

## 2020-06-27 PROCEDURE — 6360000002 HC RX W HCPCS: Performed by: INTERNAL MEDICINE

## 2020-06-27 PROCEDURE — 2700000000 HC OXYGEN THERAPY PER DAY

## 2020-06-27 PROCEDURE — 6370000000 HC RX 637 (ALT 250 FOR IP): Performed by: PHYSICIAN ASSISTANT

## 2020-06-27 PROCEDURE — 94640 AIRWAY INHALATION TREATMENT: CPT

## 2020-06-27 PROCEDURE — 1200000000 HC SEMI PRIVATE

## 2020-06-27 PROCEDURE — 2580000003 HC RX 258: Performed by: INTERNAL MEDICINE

## 2020-06-27 PROCEDURE — 2580000003 HC RX 258

## 2020-06-27 PROCEDURE — 99232 SBSQ HOSP IP/OBS MODERATE 35: CPT | Performed by: INTERNAL MEDICINE

## 2020-06-27 PROCEDURE — 94761 N-INVAS EAR/PLS OXIMETRY MLT: CPT

## 2020-06-27 RX ORDER — POLYETHYLENE GLYCOL 3350 17 G/17G
17 POWDER, FOR SOLUTION ORAL DAILY
Status: DISCONTINUED | OUTPATIENT
Start: 2020-06-27 | End: 2020-06-28 | Stop reason: HOSPADM

## 2020-06-27 RX ORDER — BISACODYL 10 MG
10 SUPPOSITORY, RECTAL RECTAL DAILY PRN
Status: DISCONTINUED | OUTPATIENT
Start: 2020-06-27 | End: 2020-06-28 | Stop reason: HOSPADM

## 2020-06-27 RX ORDER — ALBUTEROL SULFATE 2.5 MG/3ML
2.5 SOLUTION RESPIRATORY (INHALATION) EVERY 4 HOURS PRN
Status: DISCONTINUED | OUTPATIENT
Start: 2020-06-27 | End: 2020-06-28 | Stop reason: HOSPADM

## 2020-06-27 RX ORDER — SENNA PLUS 8.6 MG/1
1 TABLET ORAL NIGHTLY PRN
Status: DISCONTINUED | OUTPATIENT
Start: 2020-06-27 | End: 2020-06-28 | Stop reason: HOSPADM

## 2020-06-27 RX ORDER — DOCUSATE SODIUM 100 MG/1
100 CAPSULE, LIQUID FILLED ORAL DAILY
Status: DISCONTINUED | OUTPATIENT
Start: 2020-06-27 | End: 2020-06-28 | Stop reason: HOSPADM

## 2020-06-27 RX ORDER — SODIUM CHLORIDE 9 MG/ML
INJECTION, SOLUTION INTRAVENOUS
Status: COMPLETED
Start: 2020-06-27 | End: 2020-06-27

## 2020-06-27 RX ADMIN — TIOTROPIUM BROMIDE INHALATION SPRAY 2 PUFF: 3.12 SPRAY, METERED RESPIRATORY (INHALATION) at 08:47

## 2020-06-27 RX ADMIN — METHYLPREDNISOLONE SODIUM SUCCINATE 40 MG: 40 INJECTION, POWDER, FOR SOLUTION INTRAMUSCULAR; INTRAVENOUS at 23:48

## 2020-06-27 RX ADMIN — SODIUM CHLORIDE 500 ML: 9 INJECTION, SOLUTION INTRAVENOUS at 05:05

## 2020-06-27 RX ADMIN — TRAZODONE HYDROCHLORIDE 300 MG: 50 TABLET ORAL at 21:28

## 2020-06-27 RX ADMIN — METHYLPREDNISOLONE SODIUM SUCCINATE 40 MG: 40 INJECTION, POWDER, FOR SOLUTION INTRAMUSCULAR; INTRAVENOUS at 01:44

## 2020-06-27 RX ADMIN — MONTELUKAST SODIUM 10 MG: 10 TABLET, FILM COATED ORAL at 19:55

## 2020-06-27 RX ADMIN — ACETAMINOPHEN 650 MG: 325 TABLET ORAL at 15:26

## 2020-06-27 RX ADMIN — FLUOXETINE 60 MG: 20 CAPSULE ORAL at 08:47

## 2020-06-27 RX ADMIN — METHYLPREDNISOLONE SODIUM SUCCINATE 40 MG: 40 INJECTION, POWDER, FOR SOLUTION INTRAMUSCULAR; INTRAVENOUS at 11:54

## 2020-06-27 RX ADMIN — DOCUSATE SODIUM 100 MG: 100 CAPSULE, LIQUID FILLED ORAL at 21:28

## 2020-06-27 RX ADMIN — POLYETHYLENE GLYCOL 3350 17 G: 17 POWDER, FOR SOLUTION ORAL at 10:56

## 2020-06-27 RX ADMIN — Medication 2 PUFF: at 08:47

## 2020-06-27 RX ADMIN — Medication 2 PUFF: at 20:22

## 2020-06-27 RX ADMIN — BUPROPION HYDROCHLORIDE 150 MG: 150 TABLET, EXTENDED RELEASE ORAL at 19:55

## 2020-06-27 RX ADMIN — ALBUTEROL SULFATE 2.5 MG: 2.5 SOLUTION RESPIRATORY (INHALATION) at 04:06

## 2020-06-27 RX ADMIN — ALBUTEROL SULFATE 2.5 MG: 2.5 SOLUTION RESPIRATORY (INHALATION) at 08:47

## 2020-06-27 RX ADMIN — ENOXAPARIN SODIUM 40 MG: 40 INJECTION SUBCUTANEOUS at 08:43

## 2020-06-27 RX ADMIN — ALBUTEROL SULFATE 2.5 MG: 2.5 SOLUTION RESPIRATORY (INHALATION) at 20:17

## 2020-06-27 RX ADMIN — BUSPIRONE HYDROCHLORIDE 30 MG: 5 TABLET ORAL at 08:44

## 2020-06-27 RX ADMIN — BUSPIRONE HYDROCHLORIDE 30 MG: 5 TABLET ORAL at 19:55

## 2020-06-27 RX ADMIN — Medication 10 ML: at 19:55

## 2020-06-27 RX ADMIN — ALBUTEROL SULFATE 2.5 MG: 2.5 SOLUTION RESPIRATORY (INHALATION) at 12:13

## 2020-06-27 RX ADMIN — LEVOFLOXACIN 500 MG: 5 INJECTION, SOLUTION INTRAVENOUS at 05:09

## 2020-06-27 RX ADMIN — ALBUTEROL SULFATE 2.5 MG: 2.5 SOLUTION RESPIRATORY (INHALATION) at 16:31

## 2020-06-27 RX ADMIN — BUPROPION HYDROCHLORIDE 150 MG: 150 TABLET, EXTENDED RELEASE ORAL at 08:45

## 2020-06-27 RX ADMIN — ATORVASTATIN CALCIUM 10 MG: 10 TABLET, FILM COATED ORAL at 19:55

## 2020-06-27 ASSESSMENT — PAIN SCALES - GENERAL
PAINLEVEL_OUTOF10: 0
PAINLEVEL_OUTOF10: 0
PAINLEVEL_OUTOF10: 3

## 2020-06-27 NOTE — PROCEDURES
Bronchoscopy note    Patient with increased shortness of breath, chest congestion, mucous plugging along with ineffective airway clearance and atelectasis-like picture and given the patient's clinical status, it was decided to do bronchoscopy for diagnostic and therapeutic purposes and for that reason and for that reason after informed consent, the endoscopy team was requested to come to the bedside, patient was given oropharyngeal analgesia with benzocaine, patient was given lidocaine for tracheobronchial analgesia, patient was given Versed for IV sedation for the procedure, patient has allergy to Demerol and fentanyl was not given because of possible cross-reactivity, the bronchoscope was used through the mouth using a bite block, patient had normal vocal cords, patient's entire tracheobronchial tree was full of thick mucous plugs which were quite viscous tenacious and mucoid in nature, the bronchoscope was wedged into the right lower lobe bronchus and BAL was then pulmonary area which was sent for various culture and cytology, rest of the tracheobronchial tree was therapeutically aspirated using Mucomyst and saline, patient tolerated the procedure well and did not have any apparent complications  Estimated blood loss was 0  Further  management depending on patient's clinical status and the bronchoscopy results    Shad Leonard MD

## 2020-06-27 NOTE — PRE SEDATION
teriparatide, recombinant, (FORTEO) 600 MCG/2.4ML SOLN injection Inject 0.08 mLs into the skin daily One dose injected daily. 9/27/19 7/16/20 Yes Maura Mccollum MD   albuterol (PROVENTIL) (2.5 MG/3ML) 0.083% nebulizer solution Take 3 mLs by nebulization every 6 hours as needed for Shortness of Breath 12/24/18  Yes Rich Deshpande MD   guaiFENesin (MUCINEX) 600 MG extended release tablet Take 1 tablet by mouth 2 times daily as needed for Congestion 8/22/18  Yes Paloma Dalton MD   varenicline (CHANTIX STARTING MONTH PAK) 0.5 MG X 11 & 1 MG X 42 tablet Take by mouth. 6/15/20   Rich Deshpande MD   buPROPion [facility] SR) 150 MG extended release tablet Take 1 tablet by mouth 2 times daily 4/24/20   Rich Deshpande MD           Pre-Sedation Documentation and Exam:   Vital signs have been reviewed (see flow sheet for vitals).     Mallampati Airway Assessment:  Mallampati Class II - (soft palate, fauces & uvula are visible)    Prior History of Anesthesia Complications:   none    ASA Classification:  Class 3 - A patient with severe systemic disease that limits activity but is not incapacitating    Sedation/ Anesthesia Plan:   intravenous sedation    Medications Planned:   midazolam (Versed) intravenously    Patient is an appropriate candidate for plan of sedation: yes    Electronically signed by Aneudy Min MD on 6/27/2020 at 12:07 AM

## 2020-06-28 VITALS
BODY MASS INDEX: 21.46 KG/M2 | WEIGHT: 128.8 LBS | OXYGEN SATURATION: 92 % | DIASTOLIC BLOOD PRESSURE: 77 MMHG | SYSTOLIC BLOOD PRESSURE: 118 MMHG | HEIGHT: 65 IN | TEMPERATURE: 98.1 F | HEART RATE: 90 BPM | RESPIRATION RATE: 18 BRPM

## 2020-06-28 LAB
A/G RATIO: 1.3 (ref 1.1–2.2)
ADENOVIRUS PCR: NOT DETECTED
ALBUMIN SERPL-MCNC: 3.6 G/DL (ref 3.4–5)
ALP BLD-CCNC: 51 U/L (ref 40–129)
ALT SERPL-CCNC: 9 U/L (ref 10–40)
ANION GAP SERPL CALCULATED.3IONS-SCNC: 8 MMOL/L (ref 3–16)
APTT: 26.1 SEC (ref 24.2–36.2)
AST SERPL-CCNC: 11 U/L (ref 15–37)
BASOPHILS ABSOLUTE: 0 K/UL (ref 0–0.2)
BASOPHILS RELATIVE PERCENT: 0.1 %
BILIRUB SERPL-MCNC: <0.2 MG/DL (ref 0–1)
BUN BLDV-MCNC: 17 MG/DL (ref 7–20)
CALCIUM SERPL-MCNC: 8.9 MG/DL (ref 8.3–10.6)
CHLORIDE BLD-SCNC: 98 MMOL/L (ref 99–110)
CO2: 23 MMOL/L (ref 21–32)
CREAT SERPL-MCNC: 0.6 MG/DL (ref 0.6–1.2)
CULTURE, RESPIRATORY: NORMAL
EOSINOPHILS ABSOLUTE: 0 K/UL (ref 0–0.6)
EOSINOPHILS RELATIVE PERCENT: 0 %
GFR AFRICAN AMERICAN: >60
GFR NON-AFRICAN AMERICAN: >60
GLOBULIN: 2.8 G/DL
GLUCOSE BLD-MCNC: 123 MG/DL (ref 70–99)
GRAM STAIN RESULT: NORMAL
HCT VFR BLD CALC: 39.5 % (ref 36–48)
HEMOGLOBIN: 13.2 G/DL (ref 12–16)
HUMAN METAPNEUMOVIRUS PCR: NOT DETECTED
INFLUENZA A: NOT DETECTED
INFLUENZA B: NOT DETECTED
INR BLD: 0.97 (ref 0.86–1.14)
LYMPHOCYTES ABSOLUTE: 0.6 K/UL (ref 1–5.1)
LYMPHOCYTES RELATIVE PERCENT: 3.3 %
MCH RBC QN AUTO: 30.1 PG (ref 26–34)
MCHC RBC AUTO-ENTMCNC: 33.5 G/DL (ref 31–36)
MCV RBC AUTO: 89.8 FL (ref 80–100)
MONOCYTES ABSOLUTE: 0.4 K/UL (ref 0–1.3)
MONOCYTES RELATIVE PERCENT: 2.2 %
NEUTROPHILS ABSOLUTE: 18.1 K/UL (ref 1.7–7.7)
NEUTROPHILS RELATIVE PERCENT: 94.4 %
PARAINFLUENZA 1 PCR: NOT DETECTED
PARAINFLUENZA 2 PCR: NOT DETECTED
PARAINFLUENZA 3 PCR: NOT DETECTED
PARAINFLUENZA 4 PCR: NOT DETECTED
PDW BLD-RTO: 14.3 % (ref 12.4–15.4)
PLATELET # BLD: 266 K/UL (ref 135–450)
PMV BLD AUTO: 7.1 FL (ref 5–10.5)
POTASSIUM REFLEX MAGNESIUM: 4.6 MMOL/L (ref 3.5–5.1)
PROTHROMBIN TIME: 11.2 SEC (ref 10–13.2)
RBC # BLD: 4.39 M/UL (ref 4–5.2)
RHINO/ENTEROVIRUS PCR: NOT DETECTED
RSV BY PCR: NOT DETECTED
RSV SOURCE: NORMAL
SODIUM BLD-SCNC: 129 MMOL/L (ref 136–145)
TOTAL PROTEIN: 6.4 G/DL (ref 6.4–8.2)
WBC # BLD: 19.2 K/UL (ref 4–11)

## 2020-06-28 PROCEDURE — 6370000000 HC RX 637 (ALT 250 FOR IP): Performed by: INTERNAL MEDICINE

## 2020-06-28 PROCEDURE — 6360000002 HC RX W HCPCS: Performed by: INTERNAL MEDICINE

## 2020-06-28 PROCEDURE — 94761 N-INVAS EAR/PLS OXIMETRY MLT: CPT

## 2020-06-28 PROCEDURE — 6370000000 HC RX 637 (ALT 250 FOR IP): Performed by: PHYSICIAN ASSISTANT

## 2020-06-28 PROCEDURE — 99232 SBSQ HOSP IP/OBS MODERATE 35: CPT | Performed by: INTERNAL MEDICINE

## 2020-06-28 PROCEDURE — 85610 PROTHROMBIN TIME: CPT

## 2020-06-28 PROCEDURE — 36415 COLL VENOUS BLD VENIPUNCTURE: CPT

## 2020-06-28 PROCEDURE — 2580000003 HC RX 258

## 2020-06-28 PROCEDURE — 85025 COMPLETE CBC W/AUTO DIFF WBC: CPT

## 2020-06-28 PROCEDURE — 85730 THROMBOPLASTIN TIME PARTIAL: CPT

## 2020-06-28 PROCEDURE — 80053 COMPREHEN METABOLIC PANEL: CPT

## 2020-06-28 PROCEDURE — 94640 AIRWAY INHALATION TREATMENT: CPT

## 2020-06-28 PROCEDURE — 2700000000 HC OXYGEN THERAPY PER DAY

## 2020-06-28 RX ORDER — PREDNISONE 10 MG/1
TABLET ORAL
Qty: 18 TABLET | Refills: 0 | Status: SHIPPED | OUTPATIENT
Start: 2020-06-28 | End: 2020-07-08

## 2020-06-28 RX ORDER — SODIUM CHLORIDE 9 MG/ML
INJECTION, SOLUTION INTRAVENOUS
Status: COMPLETED
Start: 2020-06-28 | End: 2020-06-28

## 2020-06-28 RX ORDER — BISACODYL 10 MG
10 SUPPOSITORY, RECTAL RECTAL ONCE
Status: COMPLETED | OUTPATIENT
Start: 2020-06-28 | End: 2020-06-28

## 2020-06-28 RX ADMIN — SODIUM CHLORIDE: 900 INJECTION, SOLUTION INTRAVENOUS at 04:56

## 2020-06-28 RX ADMIN — BUSPIRONE HYDROCHLORIDE 30 MG: 5 TABLET ORAL at 09:40

## 2020-06-28 RX ADMIN — BISACODYL 10 MG: 10 SUPPOSITORY RECTAL at 11:59

## 2020-06-28 RX ADMIN — POLYETHYLENE GLYCOL 3350 17 G: 17 POWDER, FOR SOLUTION ORAL at 09:39

## 2020-06-28 RX ADMIN — LEVOFLOXACIN 500 MG: 5 INJECTION, SOLUTION INTRAVENOUS at 04:49

## 2020-06-28 RX ADMIN — BUPROPION HYDROCHLORIDE 150 MG: 150 TABLET, EXTENDED RELEASE ORAL at 09:42

## 2020-06-28 RX ADMIN — ALBUTEROL SULFATE 2.5 MG: 2.5 SOLUTION RESPIRATORY (INHALATION) at 04:27

## 2020-06-28 RX ADMIN — ENOXAPARIN SODIUM 40 MG: 40 INJECTION SUBCUTANEOUS at 09:40

## 2020-06-28 RX ADMIN — Medication 2 PUFF: at 07:34

## 2020-06-28 RX ADMIN — FLUOXETINE 60 MG: 20 CAPSULE ORAL at 09:42

## 2020-06-28 RX ADMIN — DOCUSATE SODIUM 100 MG: 100 CAPSULE, LIQUID FILLED ORAL at 09:43

## 2020-06-28 RX ADMIN — METHYLPREDNISOLONE SODIUM SUCCINATE 40 MG: 40 INJECTION, POWDER, FOR SOLUTION INTRAMUSCULAR; INTRAVENOUS at 12:00

## 2020-06-28 RX ADMIN — TIOTROPIUM BROMIDE INHALATION SPRAY 2 PUFF: 3.12 SPRAY, METERED RESPIRATORY (INHALATION) at 07:34

## 2020-06-28 ASSESSMENT — PAIN SCALES - GENERAL: PAINLEVEL_OUTOF10: 0

## 2020-06-28 NOTE — PROGRESS NOTES
INPATIENT PULMONARY CRITICAL CARE PROGRESS NOTE      Reason for visit  severe COPD plus pulmonary fibrosis      SUBJECTIVE: Patient when seen was feeling much better ;no increasing cough/expectoration/sob/wheezing;patient's wheezing has decreased to a large extent , patient was not having any chest pain, patient was afebrile and hemodynamically  maintained, patient was on 2 L of nasal cannula oxygen with saturation 93% when seen, patient was having sinus rhythm on the monitor,  patient's blood sugars are acceptable, patient was alert and communicative,no diarrhea; no other pertinent review of system of concern        Physical Exam:  Blood pressure 118/77, pulse 90, temperature 98.1 °F (36.7 °C), temperature source Oral, resp. rate 18, height 5' 5\" (1.651 m), weight 128 lb 12.8 oz (58.4 kg), SpO2 92 %, not currently breastfeeding.'     Constitutional: No respiratory distress. When seen  HENT:  Oropharynx is clear and moist. No thyromegaly. Eyes:  Conjunctivae are normal. Pupils equal, round, and reactive to light. No scleral icterus. Neck: . No tracheal deviation present. No obvious thyroid mass. Cardiovascular: Normal rate, regular rhythm, normal heart sounds. No right ventricular heave. No lower extremity edema. Pulmonary/Chest: minimal  Bilateral wheezes. minimal Rt basilar  rales. no significant   chest congestion chest wall is not dull to percussion. No accessory muscle usage or stridor. Decreased breath sound density  Abdominal: Soft. Bowel sounds present. No distension or hernia. No tenderness. Musculoskeletal: No cyanosis. No clubbing. No obvious joint deformity. Lymphadenopathy: No cervical or supraclavicular adenopathy. Skin: Skin is warm and dry. No rash or nodules on the exposed extremities. Psychiatric: Normal mood and affect. Behavior is normal.  No anxiety. Neurologic: Alert, awake and oriented. PERRL.   Speech fluent         Results:  CBC:   Recent Labs     06/26/20  0528 06/28/20  0610   WBC 14.8* 19.2*   HGB 13.1 13.2   HCT 39.3 39.5   MCV 91.0 89.8    266     BMP:   Recent Labs     06/26/20  0528 06/28/20  0611   * 129*   K 4.4 4.6   CL 95* 98*   CO2 27 23   BUN 12 17   CREATININE <0.5* 0.6     LIVER PROFILE:   Recent Labs     06/26/20  0528 06/28/20  0611   AST 10* 11*   ALT 7* 9*   BILITOT <0.2 <0.2   ALKPHOS 50 51     PT/INR:   Recent Labs     06/26/20  0528 06/28/20  0610   PROTIME 11.5 11.2   INR 0.99 0.97     APTT:   Recent Labs     06/26/20  0528 06/28/20  0610   APTT 27.3 26.1       Imaging:  I have reviewed radiology images personally. XR CHEST PORTABLE   Final Result   Persistent left basilar airspace disease. Results for Magaly Tello (MRN [id number]) as of 6/28/2020 12:49   Ref. Range 6/4/2020 05:47 6/24/2020 19:40 6/25/2020 04:00 6/26/2020 05:28 6/28/2020 06:11   Sodium Latest Ref Range: 136 - 145 mmol/L 131 (L) 127 (L) 129 (L) 130 (L) 129 (L)   Potassium Latest Ref Range: 3.5 - 5.1 mmol/L 4.2 4.0 4.4 4.4 4.6   Chloride Latest Ref Range: 99 - 110 mmol/L 96 (L) 94 (L) 96 (L) 95 (L) 98 (L)   CO2 Latest Ref Range: 21 - 32 mmol/L 29 23 25 27 23   BUN Latest Ref Range: 7 - 20 mg/dL 10 6 (L) 6 (L) 12 17   Creatinine Latest Ref Range: 0.6 - 1.2 mg/dL 0.6 0.6 0.6 <0.5 (L) 0.6   Anion Gap Latest Ref Range: 3 - 16  6 10 8 8 8   GFR Non- Latest Ref Range: >60  >60 >60 >60 >60 >60   GFR  Latest Ref Range: >60  >60 >60 >60 >60 >60   Glucose Latest Ref Range: 70 - 99 mg/dL 89 113 (H) 138 (H) 136 (H) 123 (H)   Calcium Latest Ref Range: 8.3 - 10.6 mg/dL 8.7 8.5 9.0 8.6 8.9   Total Protein Latest Ref Range: 6.4 - 8.2 g/dL 6.0 (L) 6.1 (L) 6.8 6.2 (L) 6.4     Results for Magaly Tello (MRN [id number]) as of 6/28/2020 12:49   Ref.  Range 6/4/2020 05:47 6/24/2020 19:40 6/25/2020 04:00 6/26/2020 05:28 6/28/2020 06:10   WBC Latest Ref Range: 4.0 - 11.0 K/uL 7.8 12.5 (H) 11.1 (H) 14.8 (H) 19.2 (H)   RBC Latest Ref Range: 4.00 - 5.20

## 2020-06-28 NOTE — PROGRESS NOTES
INPATIENT PULMONARY CRITICAL CARE PROGRESS NOTE      Reason for visit  severe COPD plus pulmonary fibrosis      SUBJECTIVE: Patient underwent bronchoscopy and large amounts of mucus plugs were lavaged out; patient when seen this morning states that she is feeling better, patient has less coughing and chest congestion as compared to yesterday, patient has less shortness of breath, patient does not have that much of wheezing, patient was not having any chest pain, patient was afebrile and hemodynamically  maintained, patient was on 2 L of nasal cannula oxygen with saturation 93% when seen, patient was having sinus rhythm on the monitor, patient urine output has not been recorded in the epic for review, patient's blood sugars are slightly on the higher side but acceptable, patient was alert and communicative, no other pertinent review of system of concern        Physical Exam:  Blood pressure 115/75, pulse 80, temperature 98.5 °F (36.9 °C), temperature source Oral, resp. rate 20, height 5' 5\" (1.651 m), weight 129 lb 8 oz (58.7 kg), SpO2 95 %, not currently breastfeeding.'     Constitutional: No respiratory distress. When seen  HENT:  Oropharynx is clear and moist. No thyromegaly. Eyes:  Conjunctivae are normal. Pupils equal, round, and reactive to light. No scleral icterus. Neck: . No tracheal deviation present. No obvious thyroid mass. Cardiovascular: Normal rate, regular rhythm, normal heart sounds. No right ventricular heave. No lower extremity edema. Pulmonary/Chest: decreased Bilateral wheezes. Right basilar  scattered rales. no significnat  chest congestion chest wall is not dull to percussion. No accessory muscle usage or stridor. Decreased breath sound density  Abdominal: Soft. Bowel sounds present. No distension or hernia. No tenderness. Musculoskeletal: No cyanosis. No clubbing. No obvious joint deformity. Lymphadenopathy: No cervical or supraclavicular adenopathy.    Skin: Skin is warm and Problems:    * No resolved hospital problems.  *          Plan:   · Oxygen supplementation to keep saturation being 90 to 94% only  · Bronchodilators  · Patient was started on Symbicort and Spiriva along with DuoNeb on PRN basis  · IV Solu-Medrol to continue-will change to Po prednsione in AM   · Incentive spirometry  · Pulmonary toilet  · COVID-19 testing is negative   · S/p bronchoscopy -results are pending  · Patient is on Levaquin which can be continued-titration as per clinical status and cultures  · Evaluation and management of hyponatremia as per internal medicine team  · Smoking cessation advised and pros and cons of continued smoking discussed  · Nicotine replacement therapy offered and given  · PUD and DVT prophylaxis as per IM      Case discussed with patient and nursing     Further management depending on patient's clinical status and bronchoscopy results         Electronically signed by:  Jose A Cardona MD    6/27/2020    9:26 PM.

## 2020-06-28 NOTE — DISCHARGE SUMMARY
pneumonia. Improved. Pt to continue steroid taper with bronchodilators.  F/u with her pulmonologist in 2-3 weeks.      Acute on chronic hypoxic respiratory failure  - 2/2 above. COVID 19 test was negative. - on 2L NC at baseline. Remained at  home O2 levels at d/c     Hyponatremia  - chronic, mild. Monitored and remained within baseline levels.        Depression/anxiety  - mood remained stable  - continue home regimen  - SSRI may be contributing to above hyponatremia     HLD  - continue home statin            Physical Exam Performed:     /77   Pulse 90   Temp 98.1 °F (36.7 °C) (Oral)   Resp 18   Ht 5' 5\" (1.651 m)   Wt 128 lb 12.8 oz (58.4 kg)   SpO2 92%   BMI 21.43 kg/m²     General appearance: No apparent distress, appears stated age and cooperative. HEENT: Pupils equal, round, Conjunctivae/corneas clear. Neck: Supple, with full range of motion. No jugular venous distention. Trachea midline. Respiratory:  Normal respiratory effort. Improved  air entry bilat with no overt wheezing   Cardiovascular: Regular rate and rhythm with normal S1/S2 without murmurs, rubs or gallops. Abdomen: Soft, non-tender, non-distended with normal bowel sounds. Musculoskeletal: No clubbing, cyanosis or edema bilaterally. Skin: Warm and dry  Neurologic: Alert, speech clear with no overt facial droop  Psychiatric: Alert and oriented, thought content appropriate, normal insight      Labs:  For convenience and continuity at follow-up the following most recent labs are provided:      CBC:    Lab Results   Component Value Date    WBC 19.2 06/28/2020    HGB 13.2 06/28/2020    HCT 39.5 06/28/2020     06/28/2020       Renal:    Lab Results   Component Value Date     06/28/2020    K 4.6 06/28/2020    CL 98 06/28/2020    CO2 23 06/28/2020    BUN 17 06/28/2020    CREATININE 0.6 06/28/2020    CALCIUM 8.9 06/28/2020    PHOS 3.0 03/20/2019         Significant Diagnostic Studies    Radiology:   XR CHEST PORTABLE   Final Result   Persistent left basilar airspace disease.                 Consults:     IP CONSULT TO HOSPITALIST  IP CONSULT TO PULMONOLOGY  IP CONSULT TO CASE MANAGEMENT    Disposition:  Home     Condition at Discharge: Stable    Discharge Instructions/Follow-up:     - Follow up with PCP in one week   - Follow up with your pulmonologist, Dr Lefty Riley in 2-3 weeks     Code Status:  Prior     Activity: activity as tolerated    Diet: regular diet      Discharge Medications:     Discharge Medication List as of 6/28/2020  2:14 PM           Details   predniSONE (DELTASONE) 10 MG tablet Take 30 mg for 3 days 20 mg for 3 days 10 mg for 3 days then stop., Disp-18 tablet, R-0Normal              Details   varenicline (CHANTIX CONTINUING MONTH TAL) 1 MG tablet Take 1 tablet by mouth 2 times daily, Disp-60 tablet, R-1Normal      montelukast (SINGULAIR) 10 MG tablet TAKE 1 TABLET BY MOUTH EACH NIGHT, Disp-90 tablet, R-1Normal      atenolol (TENORMIN) 25 MG tablet Take 1 tablet by mouth daily (replaces propranolol), Disp-90 tablet, R-1Normal      traZODone (DESYREL) 150 MG tablet TAKE 2 TABLETS BY MOUTH AT BEDTIME, Disp-180 tablet, R-1Normal      fluticasone-salmeterol (WIXELA INHUB) 250-50 MCG/DOSE AEPB INHALE 1 PUFF TWICE DAILY, Disp-3 Inhaler, R-1Normal      albuterol sulfate HFA (VENTOLIN HFA) 108 (90 Base) MCG/ACT inhaler Inhale 2 puffs into the lungs every 6 hours as needed for Wheezing or Shortness of Breath, Disp-3 Inhaler, R-1Normal      tiotropium (SPIRIVA HANDIHALER) 18 MCG inhalation capsule INHALE THE CONTENTS OF 1 CAPSULE EVERY DAY, Disp-90 capsule, R-1Normal      simvastatin (ZOCOR) 20 MG tablet TAKE 1 TABLET EVERY EVENING, Disp-90 tablet, R-1Normal      busPIRone (BUSPAR) 30 MG tablet TAKE 1 TABLET TWICE DAILY, Disp-180 tablet, R-1Normal      FLUoxetine (PROZAC) 20 MG capsule TAKE 3 CAPSULES BY MOUTH DAILY, Disp-270 capsule, R-1Normal      teriparatide, recombinant, (FORTEO) 600 MCG/2.4ML SOLN injection Inject 0.08 mLs into

## 2020-06-28 NOTE — DISCHARGE INSTR - COC
MD Zohreh at [address] [facility]         Immunization History:   Immunization History   Administered Date(s) Administered    Influenza 10/04/2010, 10/12/2011, 11/28/2012    Influenza Virus Vaccine 10/16/2014, 01/14/2016    Influenza, Intradermal, Preservative free 11/04/2013    Influenza, Duncan Phalen, IM, (6 mo and older Fluzone, Flulaval, Fluarix and 3 yrs and older Afluria) 10/12/2011, 01/30/2017, 10/19/2017    Influenza, Duncan Phalen, IM, PF (6 mo and older Fluzone, Flulaval, Fluarix, and 3 yrs and older Afluria) 09/06/2018, 12/26/2019    Pneumococcal Conjugate 7-valent (Prevnar7) 01/01/2009    Pneumococcal Polysaccharide ([id number]) 01/14/2016    Td, unspecified formulation 11/04/2013    Tdap (Boostrix, Adacel) 01/25/2018       Active Problems:  Patient Active Problem List   Diagnosis Code    Stage 3 severe COPD by GOLD classification ([facility] J44.9    Smoker F17.200    Fibromyalgia M79.7    Hypokalemia E87.6    Hyperlipidemia E78.5    Hyponatremia E87.1    Depression F32.9    Tobacco dependence F17.200    [facility]monary fibrosis [facility] J84.10    ILD (interstitial lung disease) ([facility] J84.9    Recurrent major depressive disorder, in partial remission ([facility] F33.41    Thyroid nodule E04.1    History of prescription drug abuse [id number]    Esophageal dysphagia R13.10    Heartburn R12    Rectal bleeding K62.5    History of colon polyps Z86.010    Compression fx, thoracic spine, sequela S22.000S    Kyphosis M40.209    Anxiety and depression F41.9, F32.9    Chronic pain syndrome G89.4    Polyarthropathy, multiple sites M13.0    Pneumonia J18.9    Chronic obstructive pulmonary disease (HCC) J44.9    Acute on chronic respiratory failure with hypoxia and hypercapnia (HCC) J96.21, J96.22    Acute respiratory failure with hypoxia (HCC) J96.01    Healthcare associated bacterial pneumonia J15.9    Bandemia D72.825    Sepsis ([facility]) A41.9    COPD exacerbation ([facility]) J44.1    Admission H&P: {[facility] DME Changes in NQQEN:[id number]}    PHYSICIAN SIGNATURE:  {Esignature:[id number]}

## 2020-06-29 ENCOUNTER — TELEPHONE (OUTPATIENT)
Dept: FAMILY MEDICINE CLINIC | Age: 65
End: 2020-06-29

## 2020-06-29 ENCOUNTER — CARE COORDINATION (OUTPATIENT)
Dept: CASE MANAGEMENT | Age: 65
End: 2020-06-29

## 2020-06-29 NOTE — CARE COORDINATION
opportunity to ask questions and does not have any further questions or concerns at this time. Were discharge instructions available to patient? Yes. Reviewed appropriate site of care based on symptoms and resources available to patient including: PCP and Home health. The patient agrees to contact the PCP office for questions related to their healthcare. Medication reconciliation was performed with patient, who verbalizes understanding of administration of home medications. Advised obtaining a 90-day supply of all daily and as-needed medications. Covid Risk Education    Patient has following risk factors of: COPD, acute respiratory failure and smoker. Education provided regarding infection prevention, and signs and symptoms of COVID-19 and when to seek medical attention with patient who verbalized understanding. Discussed exposure protocols and quarantine From [facility]: Are you at higher risk for severe illness?   and given an opportunity for questions and concerns. The patient agrees to contact the COVID-19 hotline [phone] or PCP office for questions related to COVID-19. Discussed follow-up appointments. If no appointment was previously scheduled, appointment scheduling offered: Yes  Non-[facility] follow up appointment(s):     Plan for follow-up call in 7-10 days based on severity of symptoms and risk factors. Plan for next call: self management-COPD zones    Completed TCM call with PCP office this morning. Declines full med review therefore 1111F not completed at this time. States she has all meds and if she needs refills she will contact her PCP. Declines HC but states that her neighbor has a [address] who wants to see her and she is considering it. Explained that she will need MD order for [facility]. services to start and if she decides to do this to have that nurse contact the PCP office for orders or find out the agency name and ask at her TCM visit. She voices understanding.  States her breathing is

## 2020-07-07 ENCOUNTER — CARE COORDINATION (OUTPATIENT)
Dept: CASE MANAGEMENT | Age: 65
End: 2020-07-07

## 2020-07-07 NOTE — CARE COORDINATION
[facility]    Patient declined home care services  Wants portable o2  Educated how to set up DME    [address], BSN CTN  [facility] [phone]

## 2020-07-10 ENCOUNTER — CARE COORDINATION (OUTPATIENT)
Dept: CASE MANAGEMENT | Age: 65
End: 2020-07-10

## 2020-07-10 NOTE — CARE COORDINATION
Ajith 45 Transitions Follow Up Call    7/10/2020    Patient: Mendel Pacer  Patient : 1955   MRN: [id number]  Reason for Admission: COPD   Discharge Date: 20 RARS: Readmission Risk Score: 24         Spoke with:  Ashley Mtz with patient who reported she is doing good today and breathing is stable. Patient stated she still has sob with exertion but is wearing her oxygen. Patient is currently using Chantix and feels it is working well for smoking cessation. Patient denied the need for [place] [id number] services at this time. Denies any acute needs at present time. Agreeable to f/u calls. Educated on the use of urgent care or physicians 24 hr access line if assistance is needed after hours. Care Transitions Subsequent and Final Call    Subsequent and Final Calls  Do you have any ongoing symptoms?:  No  Have your medications changed?:  No  Do you have any questions related to your medications?:  No  Do you currently have any active services?:  No  Are you currently active with any services?:  Home Health  Do you have any needs or concerns that I can assist you with?:  No  Identified Barriers:  None  Care Transitions Interventions  Other Interventions:             Follow Up  Future Appointments   Date Time Provider Matt Crabtree   2020  9:00 AM [facility] PULMONARY FUNCTION TESTING [facility] [place] [facility]   2020 10:00 AM [facility] Yadi Rad   2020 11:00 AM MD LINDA Haynes PULJD Villatoro RN

## 2020-07-13 RX ORDER — FLUOXETINE HYDROCHLORIDE 20 MG/1
60 CAPSULE ORAL DAILY
Qty: 270 CAPSULE | Refills: 1 | Status: SHIPPED | OUTPATIENT
Start: 2020-07-13 | End: 2020-12-03

## 2020-07-17 ENCOUNTER — CARE COORDINATION (OUTPATIENT)
Dept: CASE MANAGEMENT | Age: 65
End: 2020-07-17

## 2020-07-17 NOTE — CARE COORDINATION
Ajith 45 Transitions Follow Up Call    2020    Patient: Steve Adjutant  Patient : 1955   MRN: [id number]  Reason for Admission: COPD   Discharge Date: 20 RARS: Readmission Risk Score: 24         Spoke with: Rebeca Thomson with patient who reported she is doing well. Patient stated her breathing has been stable and she uses her oxygen as needed. Patient reported she continues to use Chantix and is doing really well with not smoking. Patient stated her doctor is going to be proud of her. CTN also provided patient with positive feedback and encouragement. Denies any acute needs at present time. Agreeable to f/u calls. Educated on the use of urgent care or physicians 24 hr access line if assistance is needed after hours. Care Transitions Subsequent and Final Call    Subsequent and Final Calls  Do you have any ongoing symptoms?:  No  Have your medications changed?:  No  Do you have any questions related to your medications?:  No  Do you currently have any active services?:  No  Are you currently active with any services?:  Home Health  Do you have any needs or concerns that I can assist you with?:  No  Identified Barriers:  None  Care Transitions Interventions  Other Interventions:             Follow Up  Future Appointments   Date Time Provider Matt Crabtree   2020  9:00 AM [facility]   2020 10:00 AM [facility] Alex Rad   2020 11:00 AM MD LINDA Self PULJD Martinez RN

## 2020-07-24 ENCOUNTER — CARE COORDINATION (OUTPATIENT)
Dept: CASE MANAGEMENT | Age: 65
End: 2020-07-24

## 2020-07-24 ENCOUNTER — VIRTUAL VISIT (OUTPATIENT)
Dept: FAMILY MEDICINE CLINIC | Age: 65
End: 2020-07-24
Payer: MEDICARE

## 2020-07-24 ENCOUNTER — NURSE TRIAGE (OUTPATIENT)
Dept: OTHER | Facility: CLINIC | Age: 65
End: 2020-07-24

## 2020-07-24 ENCOUNTER — TELEPHONE (OUTPATIENT)
Dept: FAMILY MEDICINE CLINIC | Age: 65
End: 2020-07-24

## 2020-07-24 PROCEDURE — 1111F DSCHRG MED/CURRENT MED MERGE: CPT | Performed by: FAMILY MEDICINE

## 2020-07-24 PROCEDURE — G8427 DOCREV CUR MEDS BY ELIG CLIN: HCPCS | Performed by: FAMILY MEDICINE

## 2020-07-24 PROCEDURE — 99213 OFFICE O/P EST LOW 20 MIN: CPT | Performed by: FAMILY MEDICINE

## 2020-07-24 PROCEDURE — 3017F COLORECTAL CA SCREEN DOC REV: CPT | Performed by: FAMILY MEDICINE

## 2020-07-24 RX ORDER — GUAIFENESIN 600 MG/1
600 TABLET, EXTENDED RELEASE ORAL 2 TIMES DAILY PRN
Qty: 60 TABLET | Refills: 2 | Status: SHIPPED | OUTPATIENT
Start: 2020-07-24 | End: 2021-07-21 | Stop reason: SDUPTHER

## 2020-07-24 RX ORDER — METHYLPREDNISOLONE 4 MG/1
TABLET ORAL
Qty: 21 TABLET | Refills: 0 | Status: SHIPPED | OUTPATIENT
Start: 2020-07-24 | End: 2020-09-09

## 2020-07-24 RX ORDER — LEVOFLOXACIN 750 MG/1
750 TABLET ORAL DAILY
Qty: 7 TABLET | Refills: 0 | Status: SHIPPED | OUTPATIENT
Start: 2020-07-24 | End: 2021-04-27 | Stop reason: SDUPTHER

## 2020-07-24 RX ORDER — MONTELUKAST SODIUM 10 MG/1
TABLET ORAL
Qty: 90 TABLET | Refills: 1 | Status: SHIPPED | OUTPATIENT
Start: 2020-07-24 | End: 2020-09-09 | Stop reason: SDUPTHER

## 2020-07-24 ASSESSMENT — ENCOUNTER SYMPTOMS
COUGH: 1
WHEEZING: 1

## 2020-07-24 NOTE — TELEPHONE ENCOUNTER
----- Message from "[employer]" sent at 7/24/2020 12:06 PM EDT -----  Subject: Refill Request    QUESTIONS  Name of Medication? montelukast (SINGULAIR) 10 MG tablet  Patient-reported dosage and instructions? 10 mg  How many days do you have left? 0  Preferred Pharmacy? [address] [place] [address] [phone] - F [phone]  Pharmacy phone number (if available)? [phone]  Additional Information for Provider?   ---------------------------------------------------------------------------  --------------  CALL BACK INFO  What is the best way for the office to contact you? OK to leave message on   voicemail  Preferred Call Back Phone Number?  [phone]

## 2020-07-24 NOTE — TELEPHONE ENCOUNTER
Received call from [facility], [address], [place]. Patient called in stating she is coughing up green stuff, onset 7/22/20, states she has an infection again and she needs antibiotic, see triage assessment below. Patient states she needs refills on her Singulair. Care Advice provided; patient acknowledged understanding of care advice and is in agreement with plan, with modifications. Call soft transferred to [facility], [address] to schedule   appointment. Please do not reply to the triage nurse through this encounter. Any subsequent communication should be directly with the patient. Reason for Disposition   Known COPD or other severe lung disease (i.e., bronchiectasis, cystic fibrosis, lung surgery) and worsening symptoms (i.e., increased sputum purulence or amount, increased breathing difficulty)    Answer Assessment - Initial Assessment Questions  1. ONSET: \"When did the cough begin? \"       7/22/20  2. SEVERITY: \"How bad is the cough today? \"       Mild  3. RESPIRATORY DISTRESS: \"Describe your breathing. \"       Normal, baseline SOB  4. FEVER: \"Do you have a fever? \" If so, ask: \"What is your temperature, how was it measured, and when did it start? \"     Denies fever  5. HEMOPTYSIS: \"Are you coughing up any blood? \" If so ask: \"How much? \" (flecks, streaks, tablespoons, etc.)     Denies hemoptysis  6. TREATMENT: \"What have you done so far to treat the cough? \" (e.g., meds, fluids, humidifier)      No treatment  7. CARDIAC HISTORY: \"Do you have any history of heart disease? \" (e.g., heart attack, congestive heart failure)       Denies cardiac history  8. LUNG HISTORY: \"Do you have any history of lung disease? \"  (e.g., pulmonary embolus, asthma, emphysema)     COPD  9. PE RISK FACTORS: \"Do you have a history of blood clots? \" (or: recent major surgery, recent prolonged travel, bedridden)     Denies PE risk factors  10.  OTHER SYMPTOMS: \"Do you have any other symptoms? (e.g., runny nose,

## 2020-07-24 NOTE — TELEPHONE ENCOUNTER
.  Last office visit 6/15/2020     Last written 5/12/20 #90 1 refill    Next office visit scheduled none    Requested Prescriptions     Pending Prescriptions Disp Refills    montelukast (SINGULAIR) 10 MG tablet 90 tablet 1     Sig: TAKE 1 TABLET BY MOUTH EACH NIGHT

## 2020-07-24 NOTE — CARE COORDINATION
Ajith 45 Transitions Follow Up Call    2020    Patient: Ana Angulo  Patient : 1955   MRN: [id number]  Reason for Admission: COPD   Discharge Date: 20 RARS: Readmission Risk Score: 24         Spoke with:  [address] with patient who reported she is doing ok but has been having increase productive cough with greenish sputum for the past couple of days. Patient denied any increased in SOB and O2 level is 93%. Patient stated she still is not smoking. Patient denied any fevers but reported she doesn't have a thermometer to check. CTN encouraged patient to reach out to PCP or pulmonologist to discuss sputum. Patient stated she would reach out to PCP today. CTN advised patient of use of urgent care or physicians 24 hr access line if assistance is needed after hours. Care Transitions Subsequent and Final Call    Subsequent and Final Calls  Do you have any ongoing symptoms?:  Yes  Patient-reported symptoms:  Cough, Other  Interventions for patient-reported symptoms:  Notified PCP/Physician  Have your medications changed?:  No  Do you have any questions related to your medications?:  No  Do you currently have any active services?:  No  Are you currently active with any services?:  Home Health  Do you have any needs or concerns that I can assist you with?:  No  Identified Barriers:  None  Care Transitions Interventions  Other Interventions:             Follow Up  Future Appointments   Date Time Provider Matt Crabtree   2020  9:00 AM [facility] PULMONARY FUNCTION TEST[facility] [place] [facility]   2020 10:00 AM [facility] [place] Rad   2020 11:00 AM Delores French MD [facility] PULM [place] Ennis RN

## 2020-07-24 NOTE — PROGRESS NOTES
Toney Vanegas is a 62 y.o. female    Chief Complaint   Patient presents with    Cough     x2 days, cough green stuff, bronchitis coming back, no fever       HPI:    This is a new patient to me. This is a video visit. Consent has been obtained. The patient is at home. Cough   This is a new problem. The current episode started in the past 7 days. The problem has been gradually worsening. The cough is productive of sputum. Associated symptoms include wheezing. Pertinent negatives include no chills or fever. The symptoms are aggravated by lying down. Risk factors for lung disease include smoking/tobacco exposure. She has tried nothing for the symptoms. Her past medical history is significant for COPD and emphysema. There is no history of asthma. ROS:    Review of Systems   Constitutional: Negative for chills and fever. Respiratory: Positive for cough and wheezing. There were no vitals taken for this visit. Physical Exam:    Physical Exam  Constitutional:       General: She is not in acute distress. Appearance: She is normal weight. She is not toxic-appearing. HENT:      Head: Normocephalic. Neurological:      Mental Status: She is alert. Psychiatric:         Mood and Affect: Mood normal.         Behavior: Behavior normal.         Current Outpatient Medications   Medication Sig Dispense Refill    levoFLOXacin (LEVAQUIN) 750 MG tablet Take 1 tablet by mouth daily for 7 days 7 tablet 0    guaiFENesin (MUCINEX) 600 MG extended release tablet Take 1 tablet by mouth 2 times daily as needed for Congestion 60 tablet 2    methylPREDNISolone (MEDROL DOSEPACK) 4 MG tablet Take by mouth. Take with food. 21 tablet 0    FLUoxetine (PROZAC) 20 MG capsule TAKE 3 CAPSULES BY MOUTH DAILY 270 capsule 1    CHANTIX 1 MG tablet TAKE 1 TABLET BY MOUTH TWICE A DAY 60 tablet 0    varenicline (CHANTIX STARTING MONTH TAL) 0.5 MG X 11 & 1 MG X 42 tablet Take by mouth.  1 box 0    atenolol (TENORMIN) 25 MG tablet Take 1 tablet by mouth daily (replaces propranolol) 90 tablet 1    buPROPion (WELLBUTRIN SR) 150 MG extended release tablet Take 1 tablet by mouth 2 times daily 60 tablet 2    traZODone (DESYREL) 150 MG tablet TAKE 2 TABLETS BY MOUTH AT BEDTIME 180 tablet 1    fluticasone-salmeterol (WIXELA INHUB) 250-50 MCG/DOSE AEPB INHALE 1 PUFF TWICE DAILY 3 Inhaler 1    albuterol sulfate HFA (VENTOLIN HFA) 108 (90 Base) MCG/ACT inhaler Inhale 2 puffs into the lungs every 6 hours as needed for Wheezing or Shortness of Breath 3 Inhaler 1    tiotropium (SPIRIVA HANDIHALER) 18 MCG inhalation capsule INHALE THE CONTENTS OF 1 CAPSULE EVERY DAY 90 capsule 1    simvastatin (ZOCOR) 20 MG tablet TAKE 1 TABLET EVERY EVENING 90 tablet 1    busPIRone (BUSPAR) 30 MG tablet TAKE 1 TABLET TWICE DAILY 180 tablet 1    albuterol (PROVENTIL) (2.5 MG/3ML) 0.083% nebulizer solution Take 3 mLs by nebulization every 6 hours as needed for Shortness of Breath 120 each 3    montelukast (SINGULAIR) 10 MG tablet TAKE 1 TABLET BY MOUTH EACH NIGHT 90 tablet 1    teriparatide, recombinant, (FORTEO) 600 MCG/2.4ML SOLN injection Inject 0.08 mLs into the skin daily One dose injected daily. 1 pen 11     No current facility-administered medications for this visit. Assessment:    1. COPD exacerbation ([facility]        Plan:    1. COPD exacerbation ([facility]  Discussed SE's. She does well with Levaquin. Desired steroid taper. Went over concerning signs and symptoms and when to go to the ED. An understanding of the plan was verbalized. - levoFLOXacin (LEVAQUIN) 750 MG tablet; Take 1 tablet by mouth daily for 7 days  Dispense: 7 tablet; Refill: 0  - guaiFENesin (MUCINEX) 600 MG extended release tablet; Take 1 tablet by mouth 2 times daily as needed for Congestion  Dispense: 60 tablet; Refill: 2  - methylPREDNISolone (MEDROL DOSEPACK) 4 MG tablet; Take by mouth. Take with food. Dispense: 21 tablet;  Refill: 0      Patient to return to clinic if symptoms worsen or fail to improve.

## 2020-07-24 NOTE — TELEPHONE ENCOUNTER
----- Message from Kalie Reyna sent at 7/24/2020 12:05 PM EDT -----  Subject: Appointment Request    QUESTIONS  Reason for appointment request? Other - pt was transfered from [facility] and   wants her seen today. No appts available  ---------------------------------------------------------------------------  --------------  CALL BACK INFO  What is the best way for the office to contact you? OK to leave message on   voicemail  Preferred Call Back Phone Number? [phone]  ---------------------------------------------------------------------------  --------------  SCRIPT ANSWERS  Patient needs to be seen today? Yes  Nurse Name? Huan Greer  (Did RN indicate the need for Red Scheduling?)?  Yes

## 2020-07-27 ENCOUNTER — CARE COORDINATION (OUTPATIENT)
Dept: CASE MANAGEMENT | Age: 65
End: 2020-07-27

## 2020-07-27 LAB
FUNGUS (MYCOLOGY) CULTURE: ABNORMAL
FUNGUS STAIN: ABNORMAL
ORGANISM: ABNORMAL

## 2020-07-27 NOTE — TELEPHONE ENCOUNTER
I called and spoke with the pt and offered VV today. She says she already saw tabitha PCP and was started on Antibiotic and prednisone and Mucinex. She says she is feeling better and no need to speak with Dr Ailyn Pandya.

## 2020-07-27 NOTE — CARE COORDINATION
[facility] Transitions Follow Up Call    2020    Patient: Sona Moreno  Patient : 1955   MRN: [id number]  Reason for Admission: COPD   Discharge Date: 20 RARS: Readmission Risk Score: 24         Spoke with:  Ashley Mtz with patient who reported she is doing better and reached out to her doctor regarding cough with green sputum and was prescribed steroid and  Antibiotic. Patient reported since starting the medications her symptoms have improved and she is feeling better. Patient denied any further needs or concerns at this time. CTN informed patient of final call. CTN advised patient of use of urgent care or physicians 24 hr access line if assistance is needed after hours. Care Transitions Subsequent and Final Call    Subsequent and Final Calls  Do you have any ongoing symptoms?:  No  Have your medications changed?:  Yes  Do you have any questions related to your medications?:  No  Do you currently have any active services?:  No  Are you currently active with any services?:  Home Health  Do you have any needs or concerns that I can assist you with?:  No  Identified Barriers:  None  Care Transitions Interventions  Other Interventions:             Follow Up  Future Appointments   Date Time Provider Matt Crabtree   2020  9:00 AM [facility] PULMONARY FUNCTION TESTING 2 [facility] [place] HOD   2020 10:00 AM [facility] ED [facility] [place] Rad   2020 11:00 AM MD LINDA Fernandes PULM [place] RN

## 2020-08-07 RX ORDER — SIMVASTATIN 20 MG
TABLET ORAL
Qty: 90 TABLET | Refills: 1 | Status: SHIPPED | OUTPATIENT
Start: 2020-08-07

## 2020-08-07 RX ORDER — BUSPIRONE HYDROCHLORIDE 30 MG/1
TABLET ORAL
Qty: 180 TABLET | Refills: 1 | Status: SHIPPED | OUTPATIENT
Start: 2020-08-07

## 2020-08-07 NOTE — TELEPHONE ENCOUNTER
Refill Request     Last Seen: 7/24/2020    Last Written: 3/24/20 simvastatin  4/24/20 Buspar    Next Appointment:   Future Appointments   Date Time Provider Matt Leyda   9/9/2020  9:00 AM [facility]ULMONARY FUNCTION TESTING 2 [facility] [place] HOD   9/9/2020 10:00 AM [facility] ED [facility] CT [place] Rad   9/9/2020 11:00 AM Naman Garcia MD [place] PULM [facility]             Requested Prescriptions     Pending Prescriptions Disp Refills    simvastatin (ZOCOR) 20 MG tablet [Pharmacy Med Name: SIMVASTATIN 20 MG Tablet] 90 tablet 1     Sig: TAKE 1 TABLET EVERY EVENING    busPIRone (BUSPAR) 30 MG tablet [Pharmacy Med Name: BUSPIRONE HYDROCHLORIDE 30 MG Tablet] 180 tablet 1     Sig: TAKE 1 TABLET TWICE DAILY

## 2020-08-11 LAB
AFB CULTURE (MYCOBACTERIA): NORMAL
AFB SMEAR: NORMAL

## 2020-08-27 ENCOUNTER — TELEPHONE (OUTPATIENT)
Dept: PULMONOLOGY | Age: 65
End: 2020-08-27

## 2020-08-27 NOTE — TELEPHONE ENCOUNTER
Within this Telehealth Consent, the terms you and yours refer to the person using the Telehealth Service (Service), or in the case of a use of the Service by or on behalf of a minor, you and yours refer to and include (i) the parent or legal guardian who provides consent to the use of the Service by such minor or uses the Service on behalf of such minor, and (ii) the minor for whom consent is being provided or on whose behalf the Service is being utilized. When using Service, you will be consulting with your health care providers via the use of Telehealth.   Telehealth involves the delivery of healthcare services using electronic communications, information technology or other means between a healthcare provider and a patient who are not in the same physical location. Telehealth may be used for diagnosis, treatment, follow-up and/or patient education, and may include, but is not limited to, one or more of the following:    Electronic transmission of medical records, photo images, personal health information or other data between a patient and a healthcare provider    Interactions between a patient and healthcare provider via audio, video and/or data communications    Use of output data from medical devices, sound and video files    Anticipated Benefits   The use of Telehealth by your Provider(s) through the Service may have the following possible benefits:    Making it easier and more efficient for you to access medical care and treatment for the conditions treated by such Provider(s) utilizing the Service    Allowing you to obtain medical care and treatment by Provider(s) at times that are convenient for you    Enabling you to interact with Provider(s) without the necessity of an in-office appointment     Possible Risks   While the use of Telehealth can provide potential benefits for you, there are also potential risks associated with the use of Telehealth.  These risks include, but may not be limited to the following:    Your Provider(s) may not able to provide medical treatment for your particular condition and you may be required to seek alternative healthcare or emergency care services.  The electronic systems or other security protocols or safeguards used in the Service could fail, causing a breach of privacy of your medical or other information.  Given regulatory requirements in certain jurisdictions, your Provider(s) diagnosis and/or treatment options, especially pertaining to certain prescriptions, may be limited. Acceptance   1. You understand that Services will be provided via Telehealth. This process involves the use of HIPAA compliant and secure, real-time audio-visual interfacing with a qualified and appropriately trained provider located at [facility]. 2. You understand that, under no circumstances, will this session be recorded. 3. You understand that the Provider(s) at [facility] and other clinical participants will be party to the information obtained during the Telehealth session in accordance with best medical practices. 4. You understand that the information obtained during the Telehealth session will be used to help determine the most appropriate treatment options. 5. You understand that You have the right to revoke this consent at any point in time. 6. You understand that Telehealth is voluntary, and that continued treatment is not dependent upon consent. 7. You understand that, in the event of non-consent to Telehealth services and/or technical difficulties, you will obtain services as typically provided in the absence of Telehealth technology. 8. You understand that this consent will be kept in Your medical record. 9. No potential benefits from the use of Telehealth or specific results can be guaranteed. Your condition may not be cured or improved and, in some cases, may get worse.    10. There are limitations in the provision of medical care and treatment via Telehealth and the Service and you may not be able to receive diagnosis and/or treatment through the Service for every condition for which you seek diagnosis and/or treatment. 11. There are potential risks to the use of Telehealth, including but not limited to the risks described in this Telehealth Consent. 12. Your Provider(s) have discussed the use of Telehealth and the Service with you, including the benefits and risks of such and you have provided oral consent to your Provider(s) for the use of Telehealth and the Service. 15. You understand that it is your duty to provide your Provider(s) truthful, accurate and complete information, including all relevant information regarding care that you may have received or may be receiving from other healthcare providers outside of the Service. 14. You understand that each of your Provider(s) may determine in his or sole discretion that your condition is not suitable for diagnosis and/or treatment using the Service, and that you may need to seek medical care and treatment a specialist or other healthcare provider, outside of the Service. 15. You understand that you are fully responsible for payment for all services provided by Provider(s) or through use of the Service and that you may not be able to use third-party insurance. 16. You represent that (a) you have read this Telehealth Consent carefully, (b) you understand the risks and benefits of the Service and the use of Telehealth in the medical care and treatment provided to you by Provider(s) using the Service, and (c) you have the legal capacity and authority to provide this consent for yourself and/or the minor for which you are consenting under applicable federal and state laws, including laws relating to the age of [de-identified] and/or parental/guardian consent.    17. You give your informed consent to the use of Telehealth by Provider(s) using the Service under the terms described in the Terms of Service and this Telehealth Consent. The patient was read the following statement and has consented to the visit as of 8/27/20. The patient has been scheduled for their first telehealth visit on 9/9/20 with Dr. Leo Dillon.

## 2020-09-04 ENCOUNTER — OFFICE VISIT (OUTPATIENT)
Dept: PRIMARY CARE CLINIC | Age: 65
End: 2020-09-04
Payer: MEDICARE

## 2020-09-04 PROCEDURE — 99211 OFF/OP EST MAY X REQ PHY/QHP: CPT | Performed by: NURSE PRACTITIONER

## 2020-09-04 PROCEDURE — G8428 CUR MEDS NOT DOCUMENT: HCPCS | Performed by: NURSE PRACTITIONER

## 2020-09-04 PROCEDURE — G8420 CALC BMI NORM PARAMETERS: HCPCS | Performed by: NURSE PRACTITIONER

## 2020-09-04 NOTE — PATIENT INSTRUCTIONS
Plan:   bathroom, if available. Animals: You should restrict contact with pets and other animals while you are sick with COVID-19, just like you would around other people. Although there have not been reports of pets or other animals becoming sick with COVID-19, it is still recommended that people sick with COVID-19 limit contact with animals until more information is known about the virus. When possible, have another member of your household care for your animals while you are sick. If you are sick with COVID-19, avoid contact with your pet, including petting, snuggling, being kissed or licked, and sharing food. If you must care for your pet or be around animals while you are sick, wash your hands before and after you interact with pets and wear a facemask. Call ahead before visiting your doctor  If you have a medical appointment, call the healthcare provider and tell them that you have or may have COVID-19. This will help the healthcare providers office take steps to keep other people from getting infected or exposed. Wear a facemask  You should wear a facemask when you are around other people (e.g., sharing a room or vehicle) or pets and before you enter a healthcare providers office. If you are not able to wear a facemask (for example, because it causes trouble breathing), then people who live with you should not stay in the same room with you, or they should wear a facemask if they enter your room. Cover your coughs and sneezes  Cover your mouth and nose with a tissue when you cough or sneeze. Throw used tissues in a lined trash can. Immediately wash your hands with soap and water for at least 20 seconds or, if soap and water are not available, clean your hands with an alcohol-based hand  that contains at least 60% alcohol.   Clean your hands often  Wash your hands often with soap and water for at least 20 seconds, especially after blowing your nose, coughing, or sneezing; going to the bathroom; and before eating or preparing food. If soap and water are not readily available, use an alcohol-based hand  with at least 60% alcohol, covering all surfaces of your hands and rubbing them together until they feel dry. Soap and water are the best option if hands are visibly dirty. Avoid touching your eyes, nose, and mouth with unwashed hands. Avoid sharing personal household items  You should not share dishes, drinking glasses, cups, eating utensils, towels, or bedding with other people or pets in your home. After using these items, they should be washed thoroughly with soap and water. Clean all high-touch surfaces everyday  High touch surfaces include counters, tabletops, doorknobs, bathroom fixtures, toilets, phones, keyboards, tablets, and bedside tables. Also, clean any surfaces that may have blood, stool, or body fluids on them. Use a household cleaning spray or wipe, according to the label instructions. Labels contain instructions for safe and effective use of the cleaning product including precautions you should take when applying the product, such as wearing gloves and making sure you have good ventilation during use of the product. Monitor your symptoms  Seek prompt medical attention if your illness is worsening (e.g., difficulty breathing). Before seeking care, call your healthcare provider and tell them that you have, or are being evaluated for, COVID-19. Put on a facemask before you enter the facility. These steps will help the healthcare providers office to keep other people in the office or waiting room from getting infected or exposed. Ask your healthcare provider to call the local or state health department. Persons who are placed under active monitoring or facilitated self-monitoring should follow instructions provided by their local health department or occupational health professionals, as appropriate. When working with your local health department check their available hours.   If you

## 2020-09-05 LAB — SARS-COV-2, NAA: NOT DETECTED

## 2020-09-09 ENCOUNTER — VIRTUAL VISIT (OUTPATIENT)
Dept: PULMONOLOGY | Age: 65
End: 2020-09-09
Payer: MEDICARE

## 2020-09-09 PROCEDURE — 99443 PR PHYS/QHP TELEPHONE EVALUATION 21-30 MIN: CPT | Performed by: INTERNAL MEDICINE

## 2020-09-09 RX ORDER — ALBUTEROL SULFATE 90 UG/1
2 AEROSOL, METERED RESPIRATORY (INHALATION) EVERY 6 HOURS PRN
Qty: 3 INHALER | Refills: 1 | Status: SHIPPED | OUTPATIENT
Start: 2020-09-09 | End: 2021-03-04 | Stop reason: SDUPTHER

## 2020-09-09 RX ORDER — TIOTROPIUM BROMIDE 18 UG/1
CAPSULE ORAL; RESPIRATORY (INHALATION)
Qty: 90 CAPSULE | Refills: 1 | Status: SHIPPED | OUTPATIENT
Start: 2020-09-09 | End: 2021-07-21 | Stop reason: SDUPTHER

## 2020-09-09 RX ORDER — MONTELUKAST SODIUM 10 MG/1
TABLET ORAL
Qty: 90 TABLET | Refills: 1 | Status: SHIPPED | OUTPATIENT
Start: 2020-09-09 | End: 2020-12-07 | Stop reason: SDUPTHER

## 2020-09-09 RX ORDER — ALBUTEROL SULFATE 2.5 MG/3ML
2.5 SOLUTION RESPIRATORY (INHALATION) EVERY 6 HOURS PRN
Qty: 120 VIAL | Refills: 6 | Status: SHIPPED | OUTPATIENT
Start: 2020-09-09 | End: 2021-07-21 | Stop reason: SDUPTHER

## 2020-09-09 NOTE — PROGRESS NOTES
[facility]    Outpatient Follow Up Note  TELEHEALTH EVALUATION: Service performed was Audio (During [id number] public health emergency) and not a face-to-face visit       CHIEF COMPLAINT: follow up COPD       HPI:   Doing okay   Cough with yellow/white sputum  [place]   No hemoptysis   No fever   Uses Albuterol 2 times a day with help   Smoking 1 ppd   Uses O2 on and off at night   Sat today 93%     From prior visit:   No humidifier. No air . No purifier. No steam sauna. No steam shower. + indoor hot tub- no reported. Had mold in her old house and moved to new one 1.5 months ago. No asbestos exposure. No down pillows or comforters. Has 1 parrot. No fatigue. No joint stiffness, pain or swelling wrists or knees. No difficulty swallowing. + dryness mouth. + fingers color change in cold weather- fingers turns white in cold weather. No recurrent fever. + weight loss. + GERD. No rash. No mouth ulcers.            Past Medical History:   Diagnosis Date    Allergic rhinitis 6/11/2010    Anxiety     Arthritis     Aspiration pneumonia ([address] 3/21/2012    Recurrent    Asthma     Bronchitis chronic     COPD (chronic obstructive pulmonary disease) ([address] 12/3/2009    Depression     Drug abuse, cocaine type (HCC)     past history of crack cocaine use    Emphysema of lung (HCC)     Fibromyalgia     GERD (gastroesophageal reflux disease)     Hyperlipidemia     Influenza A 03/05/2020    Lung disease     On home oxygen therapy     uses O2 NC 3L prn at night    Osteoporosis     Pneumonia     Polysubstance dependence ([address] 1/2/2012    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    Pulmonary fibrosis ([address] 10/16/2014    Pulmonary infiltrate     Pulmonary nodule 12/3/2009    Tobacco abuse        Past Surgical History:        Procedure Laterality Date    BLADDER REPAIR      BRONCHOSCOPY      BRONCHOSCOPY N/A 3/6/2020    BRONCHOSCOPY THERAPUTIC ASPIRATION INITIAL performed by Amanda Zee MD at [facility] [facility]  3/6/2020    BRONCHOSCOPY ALVEOLAR LAVAGE performed by Amanda Zee MD at [address] [place] [address] N/A 6/26/2020    BRONCHOSCOPY THERAPUTIC ASPIRATION INITIAL performed by Red Bowers MD at [address] [place] [address]  6/26/2020    BRONCHOSCOPY ALVEOLAR LAVAGE performed by Red Bowers MD at [facility]  2012    ENDOSCOPY, COLON, DIAGNOSTIC      ESOPHAGEAL DILATATION  9/20/2018    ESOPHAGEAL DILATION Everlina Breath performed by Danielle Schwab MD at [address] [facility] HISTORY  2/12/15    T8 Kyphoplasty    UT COLONOSCOPY FLX DX W/COLLJ SPEC WHEN PFRMD N/A 9/20/2018    EGD AND COLONOSCOPY WITH ANESTHESIA performed by Danielle Schwab MD at [address] [facility] ESOPHAGOGASTRODUODENOSCOPY TRANSORAL DIAGNOSTIC N/A 9/20/2018    EGD AND COLONOSCOPY WITH ANESTHESIA performed by Danielle Schwab MD at [address] [facility]         Allergies:  is allergic to meperidine and demerol. Social History:    TOBACCO:   reports that she has been smoking cigarettes. She started smoking about 48 years ago. She has a 40.00 pack-year smoking history. She has never used smokeless tobacco.  ETOH:   reports no history of alcohol use.       Family History:       Problem Relation Age of Onset    Asthma Mother     Diabetes Mother     Emphysema Mother     Heart Failure Mother     Hypertension Mother     Heart Disease Mother     High Cholesterol Mother     Cancer Mother     Depression Mother     Diabetes Father     Emphysema Father     Heart Failure Father     Hypertension Father     Heart Disease Father     High Cholesterol Father     Substance Abuse Father     Emphysema Paternal Grandfather     Diabetes Sister     High Cholesterol Sister     Vision Loss Maternal Uncle        Current Medications:    Current Outpatient Medications:     fluticasone-salmeterol (WIXELA INHUB) 250-50 MCG/DOSE AEPB, INHALE 1 PUFF TWICE DAILY, Disp: 180 each, Rfl: 1    simvastatin (ZOCOR) 20 MG tablet, TAKE 1 TABLET EVERY EVENING, Disp: 90 tablet, Rfl: 1    busPIRone (BUSPAR) 30 MG tablet, TAKE 1 TABLET TWICE DAILY, Disp: 180 tablet, Rfl: 1    montelukast (SINGULAIR) 10 MG tablet, TAKE 1 TABLET BY MOUTH EACH NIGHT, Disp: 90 tablet, Rfl: 1    guaiFENesin (MUCINEX) 600 MG extended release tablet, Take 1 tablet by mouth 2 times daily as needed for Congestion, Disp: 60 tablet, Rfl: 2    methylPREDNISolone (MEDROL DOSEPACK) 4 MG tablet, Take by mouth. Take with food. , Disp: 21 tablet, Rfl: 0    FLUoxetine (PROZAC) 20 MG capsule, TAKE 3 CAPSULES BY MOUTH DAILY, Disp: 270 capsule, Rfl: 1    CHANTIX 1 MG tablet, TAKE 1 TABLET BY MOUTH TWICE A DAY, Disp: 60 tablet, Rfl: 0    varenicline (CHANTIX STARTING MONTH TAL) 0.5 MG X 11 & 1 MG X 42 tablet, Take by mouth., Disp: 1 box, Rfl: 0    atenolol (TENORMIN) 25 MG tablet, Take 1 tablet by mouth daily (replaces propranolol), Disp: 90 tablet, Rfl: 1    buPROPion (WELLBUTRIN SR) 150 MG extended release tablet, Take 1 tablet by mouth 2 times daily, Disp: 60 tablet, Rfl: 2    traZODone (DESYREL) 150 MG tablet, TAKE 2 TABLETS BY MOUTH AT BEDTIME, Disp: 180 tablet, Rfl: 1    albuterol sulfate HFA (VENTOLIN HFA) 108 (90 Base) MCG/ACT inhaler, Inhale 2 puffs into the lungs every 6 hours as needed for Wheezing or Shortness of Breath, Disp: 3 Inhaler, Rfl: 1    tiotropium (SPIRIVA HANDIHALER) 18 MCG inhalation capsule, INHALE THE CONTENTS OF 1 CAPSULE EVERY DAY, Disp: 90 capsule, Rfl: 1    teriparatide, recombinant, (FORTEO) 600 MCG/2.4ML SOLN injection, Inject 0.08 mLs into the skin daily One dose injected daily. , Disp: 1 pen, Rfl: 11    albuterol (PROVENTIL) (2.5 MG/3ML) 0.083% nebulizer solution, Take 3 mLs by nebulization every 6 hours as needed for Shortness of Breath, Disp: 120 each, Rfl: opacities upper lobe  Stable mediastinal lymph nodes    Chest x-ray 6/24/2020 imaging reviewed by me and showed   Persistent left basilar airspace disease  Resolution of right basilar airspace disease        4/5/2016 normal/negative RF 14, SCL70 SSA SSB aldolase CK GIORGI CCP Anti MALOREI-1    4/25/2016 Elevated IgA normal IgG and IgM      IgE 140 with unremarkable immunocap      Assessment:   · Moderate obstructive airways diease secondary to COPD and asthma - not well controlled   · Abnormal CT chest 3/19/2019. Improved on repeat a CT 8/25/2019  · Abnormal CT 4/1/2016 with GGO- DDx RB-ILD, NSIP, HP, DIP, eosinophilic pneumonitis, aspiration and CTD-ILD. Favor smoking related ILD vs HP. Bronch 4/6/16 purulent secretions and grew strep pneumoniae. Negative AFB. Got rid of her Melissa Mai   · Bronchiectasis with acute exacerbation  · Nocturnal hypoxia- on 2LPM   · >30 pack-years smoking                                          Plan:   · Increase Advair 500/50 BID  · Continue Spiriva daily, Singulair daily, and Albuterol 2 puffs Q4-6 hrs PRN  · Medication refills- singulair   · Continue O2 2LPM at night  · Patient is up to date with Pneumococcal vaccine   · Advised to get influenza vaccine this year   · Acapella and Mucinex   · Reschedule CT chest, low dose protocol, screening for lung cancer  · Smoking cessation counseling        Ana Angulo is a 62 y.o. female evaluated via telephone on 9/9/2020.       Consent:  She and/or health care decision maker is aware that that she may receive a bill for this telephone service, depending on her insurance coverage, and has provided verbal consent to proceed: Yes       Documentation:  I communicated with the patient and/or health care decision maker about: See above   Details of this discussion including any medical advice provided: See above       I Affirm this is a Patient Initiated Episode with an Established Patient who has not had a related appointment within my department in the past 7 days or scheduled within the next 24 hours.     Total Time: 21-30 minutes     Note: not billable if this call serves to triage the patient into an appointment for the relevant concern      Dre Ying

## 2020-09-11 RX ORDER — TRAZODONE HYDROCHLORIDE 150 MG/1
TABLET ORAL
Qty: 180 TABLET | Refills: 1 | Status: SHIPPED | OUTPATIENT
Start: 2020-09-11 | End: 2021-09-13 | Stop reason: SDUPTHER

## 2020-09-11 NOTE — TELEPHONE ENCOUNTER
Refill Request     Last Seen: 7/24/2020    Last Written: 4/21/2020    Next Appointment:   Future Appointments   Date Time Provider Matt Leyda   3/4/2021  2:15 PM MD LINDA Zacarias           Requested Prescriptions     Pending Prescriptions Disp Refills    traZODone (DESYREL) 150 MG tablet [Pharmacy Med Name: TRAZODONE HYDROCHLORIDE 150 MG Tablet] 180 tablet 1     Sig: TAKE 2 TABLETS AT BEDTIME

## 2020-10-09 ENCOUNTER — APPOINTMENT (OUTPATIENT)
Dept: GENERAL RADIOLOGY | Age: 65
DRG: 191 | End: 2020-10-09
Payer: MEDICARE

## 2020-10-09 ENCOUNTER — HOSPITAL ENCOUNTER (INPATIENT)
Age: 65
LOS: 3 days | Discharge: HOME | DRG: 191 | End: 2020-10-12
Attending: EMERGENCY MEDICINE | Admitting: INTERNAL MEDICINE
Payer: MEDICARE

## 2020-10-09 LAB
A/G RATIO: 1.3 (ref 1.1–2.2)
ALBUMIN SERPL-MCNC: 4.1 G/DL (ref 3.4–5)
ALP BLD-CCNC: 59 U/L (ref 40–129)
ALT SERPL-CCNC: 10 U/L (ref 10–40)
ANION GAP SERPL CALCULATED.3IONS-SCNC: 11 MMOL/L (ref 3–16)
AST SERPL-CCNC: 17 U/L (ref 15–37)
BASOPHILS ABSOLUTE: 0.1 K/UL (ref 0–0.2)
BASOPHILS RELATIVE PERCENT: 0.9 %
BILIRUB SERPL-MCNC: 0.4 MG/DL (ref 0–1)
BUN BLDV-MCNC: 10 MG/DL (ref 7–20)
CALCIUM SERPL-MCNC: 9.2 MG/DL (ref 8.3–10.6)
CHLORIDE BLD-SCNC: 94 MMOL/L (ref 99–110)
CO2: 26 MMOL/L (ref 21–32)
CREAT SERPL-MCNC: <0.5 MG/DL (ref 0.6–1.2)
D DIMER: <200 NG/ML DDU (ref 0–229)
EOSINOPHILS ABSOLUTE: 0.1 K/UL (ref 0–0.6)
EOSINOPHILS RELATIVE PERCENT: 0.8 %
GFR AFRICAN AMERICAN: >60
GFR NON-AFRICAN AMERICAN: >60
GLOBULIN: 3.1 G/DL
GLUCOSE BLD-MCNC: 113 MG/DL (ref 70–99)
HCT VFR BLD CALC: 42.3 % (ref 36–48)
HEMOGLOBIN: 14.3 G/DL (ref 12–16)
LYMPHOCYTES ABSOLUTE: 2.6 K/UL (ref 1–5.1)
LYMPHOCYTES RELATIVE PERCENT: 19.8 %
MCH RBC QN AUTO: 30.3 PG (ref 26–34)
MCHC RBC AUTO-ENTMCNC: 33.9 G/DL (ref 31–36)
MCV RBC AUTO: 89.5 FL (ref 80–100)
MONOCYTES ABSOLUTE: 1 K/UL (ref 0–1.3)
MONOCYTES RELATIVE PERCENT: 7.5 %
NEUTROPHILS ABSOLUTE: 9.2 K/UL (ref 1.7–7.7)
NEUTROPHILS RELATIVE PERCENT: 71 %
PDW BLD-RTO: 13.4 % (ref 12.4–15.4)
PLATELET # BLD: 320 K/UL (ref 135–450)
PMV BLD AUTO: 7.1 FL (ref 5–10.5)
POTASSIUM SERPL-SCNC: 4.2 MMOL/L (ref 3.5–5.1)
PRO-BNP: 509 PG/ML (ref 0–124)
RBC # BLD: 4.73 M/UL (ref 4–5.2)
SODIUM BLD-SCNC: 131 MMOL/L (ref 136–145)
TOTAL PROTEIN: 7.2 G/DL (ref 6.4–8.2)
TROPONIN: <0.01 NG/ML
WBC # BLD: 13 K/UL (ref 4–11)

## 2020-10-09 PROCEDURE — 6360000002 HC RX W HCPCS: Performed by: NURSE PRACTITIONER

## 2020-10-09 PROCEDURE — 94761 N-INVAS EAR/PLS OXIMETRY MLT: CPT

## 2020-10-09 PROCEDURE — 96366 THER/PROPH/DIAG IV INF ADDON: CPT

## 2020-10-09 PROCEDURE — 94644 CONT INHLJ TX 1ST HOUR: CPT

## 2020-10-09 PROCEDURE — 93005 ELECTROCARDIOGRAM TRACING: CPT | Performed by: EMERGENCY MEDICINE

## 2020-10-09 PROCEDURE — 85025 COMPLETE CBC W/AUTO DIFF WBC: CPT

## 2020-10-09 PROCEDURE — 84484 ASSAY OF TROPONIN QUANT: CPT

## 2020-10-09 PROCEDURE — 85379 FIBRIN DEGRADATION QUANT: CPT

## 2020-10-09 PROCEDURE — 96365 THER/PROPH/DIAG IV INF INIT: CPT

## 2020-10-09 PROCEDURE — U0003 INFECTIOUS AGENT DETECTION BY NUCLEIC ACID (DNA OR RNA); SEVERE ACUTE RESPIRATORY SYNDROME CORONAVIRUS 2 (SARS-COV-2) (CORONAVIRUS DISEASE [COVID-19]), AMPLIFIED PROBE TECHNIQUE, MAKING USE OF HIGH THROUGHPUT TECHNOLOGIES AS DESCRIBED BY CMS-2020-01-R: HCPCS

## 2020-10-09 PROCEDURE — 96375 TX/PRO/DX INJ NEW DRUG ADDON: CPT

## 2020-10-09 PROCEDURE — 83880 ASSAY OF NATRIURETIC PEPTIDE: CPT

## 2020-10-09 PROCEDURE — 99285 EMERGENCY DEPT VISIT HI MDM: CPT

## 2020-10-09 PROCEDURE — 80053 COMPREHEN METABOLIC PANEL: CPT

## 2020-10-09 PROCEDURE — 6370000000 HC RX 637 (ALT 250 FOR IP): Performed by: NURSE PRACTITIONER

## 2020-10-09 PROCEDURE — 2700000000 HC OXYGEN THERAPY PER DAY

## 2020-10-09 PROCEDURE — 71045 X-RAY EXAM CHEST 1 VIEW: CPT

## 2020-10-09 PROCEDURE — 1200000000 HC SEMI PRIVATE

## 2020-10-09 RX ORDER — MAGNESIUM SULFATE IN WATER 40 MG/ML
2 INJECTION, SOLUTION INTRAVENOUS ONCE
Status: COMPLETED | OUTPATIENT
Start: 2020-10-09 | End: 2020-10-09

## 2020-10-09 RX ORDER — DOXYCYCLINE HYCLATE 100 MG
100 TABLET ORAL ONCE
Status: COMPLETED | OUTPATIENT
Start: 2020-10-09 | End: 2020-10-09

## 2020-10-09 RX ORDER — METHYLPREDNISOLONE SODIUM SUCCINATE 125 MG/2ML
125 INJECTION, POWDER, LYOPHILIZED, FOR SOLUTION INTRAMUSCULAR; INTRAVENOUS ONCE
Status: COMPLETED | OUTPATIENT
Start: 2020-10-09 | End: 2020-10-09

## 2020-10-09 RX ADMIN — MAGNESIUM SULFATE HEPTAHYDRATE 2 G: 40 INJECTION, SOLUTION INTRAVENOUS at 21:36

## 2020-10-09 RX ADMIN — ALBUTEROL SULFATE 15 MG/HR: 2.5 SOLUTION RESPIRATORY (INHALATION) at 20:49

## 2020-10-09 RX ADMIN — DOXYCYCLINE HYCLATE 100 MG: 100 TABLET, COATED ORAL at 23:13

## 2020-10-09 RX ADMIN — METHYLPREDNISOLONE SODIUM SUCCINATE 125 MG: 125 INJECTION, POWDER, FOR SOLUTION INTRAMUSCULAR; INTRAVENOUS at 20:44

## 2020-10-10 LAB
EKG ATRIAL RATE: 91 BPM
EKG DIAGNOSIS: NORMAL
EKG P AXIS: 87 DEGREES
EKG P-R INTERVAL: 138 MS
EKG Q-T INTERVAL: 366 MS
EKG QRS DURATION: 78 MS
EKG QTC CALCULATION (BAZETT): 450 MS
EKG R AXIS: -10 DEGREES
EKG T AXIS: 60 DEGREES
EKG VENTRICULAR RATE: 91 BPM
SARS-COV-2, PCR: NOT DETECTED

## 2020-10-10 PROCEDURE — 6360000002 HC RX W HCPCS: Performed by: INTERNAL MEDICINE

## 2020-10-10 PROCEDURE — 6370000000 HC RX 637 (ALT 250 FOR IP): Performed by: INTERNAL MEDICINE

## 2020-10-10 PROCEDURE — 2700000000 HC OXYGEN THERAPY PER DAY

## 2020-10-10 PROCEDURE — 87631 RESP VIRUS 3-5 TARGETS: CPT

## 2020-10-10 PROCEDURE — 1200000000 HC SEMI PRIVATE

## 2020-10-10 PROCEDURE — 94640 AIRWAY INHALATION TREATMENT: CPT

## 2020-10-10 PROCEDURE — 93010 ELECTROCARDIOGRAM REPORT: CPT | Performed by: INTERNAL MEDICINE

## 2020-10-10 PROCEDURE — 2580000003 HC RX 258: Performed by: INTERNAL MEDICINE

## 2020-10-10 PROCEDURE — 94150 VITAL CAPACITY TEST: CPT

## 2020-10-10 PROCEDURE — 94761 N-INVAS EAR/PLS OXIMETRY MLT: CPT

## 2020-10-10 RX ORDER — BUSPIRONE HYDROCHLORIDE 5 MG/1
30 TABLET ORAL 2 TIMES DAILY
Status: DISCONTINUED | OUTPATIENT
Start: 2020-10-10 | End: 2020-10-12 | Stop reason: HOSPADM

## 2020-10-10 RX ORDER — BUPROPION HYDROCHLORIDE 150 MG/1
150 TABLET, EXTENDED RELEASE ORAL 2 TIMES DAILY
Status: DISCONTINUED | OUTPATIENT
Start: 2020-10-10 | End: 2020-10-12 | Stop reason: HOSPADM

## 2020-10-10 RX ORDER — MONTELUKAST SODIUM 10 MG/1
10 TABLET ORAL NIGHTLY
Status: DISCONTINUED | OUTPATIENT
Start: 2020-10-10 | End: 2020-10-12 | Stop reason: HOSPADM

## 2020-10-10 RX ORDER — ATENOLOL 25 MG/1
25 TABLET ORAL DAILY
Status: DISCONTINUED | OUTPATIENT
Start: 2020-10-10 | End: 2020-10-12 | Stop reason: HOSPADM

## 2020-10-10 RX ORDER — IPRATROPIUM BROMIDE AND ALBUTEROL SULFATE 2.5; .5 MG/3ML; MG/3ML
1 SOLUTION RESPIRATORY (INHALATION)
Status: DISCONTINUED | OUTPATIENT
Start: 2020-10-10 | End: 2020-10-10

## 2020-10-10 RX ORDER — SODIUM CHLORIDE 0.9 % (FLUSH) 0.9 %
10 SYRINGE (ML) INJECTION PRN
Status: DISCONTINUED | OUTPATIENT
Start: 2020-10-10 | End: 2020-10-12 | Stop reason: HOSPADM

## 2020-10-10 RX ORDER — ACETAMINOPHEN 650 MG/1
650 SUPPOSITORY RECTAL EVERY 6 HOURS PRN
Status: DISCONTINUED | OUTPATIENT
Start: 2020-10-10 | End: 2020-10-12 | Stop reason: HOSPADM

## 2020-10-10 RX ORDER — METHYLPREDNISOLONE SODIUM SUCCINATE 40 MG/ML
40 INJECTION, POWDER, LYOPHILIZED, FOR SOLUTION INTRAMUSCULAR; INTRAVENOUS EVERY 6 HOURS
Status: COMPLETED | OUTPATIENT
Start: 2020-10-10 | End: 2020-10-12

## 2020-10-10 RX ORDER — IPRATROPIUM BROMIDE AND ALBUTEROL SULFATE 2.5; .5 MG/3ML; MG/3ML
1 SOLUTION RESPIRATORY (INHALATION) EVERY 4 HOURS
Status: DISCONTINUED | OUTPATIENT
Start: 2020-10-10 | End: 2020-10-12 | Stop reason: HOSPADM

## 2020-10-10 RX ORDER — PREDNISONE 20 MG/1
40 TABLET ORAL DAILY
Status: DISCONTINUED | OUTPATIENT
Start: 2020-10-12 | End: 2020-10-12 | Stop reason: HOSPADM

## 2020-10-10 RX ORDER — POLYETHYLENE GLYCOL 3350 17 G/17G
17 POWDER, FOR SOLUTION ORAL DAILY PRN
Status: DISCONTINUED | OUTPATIENT
Start: 2020-10-10 | End: 2020-10-12 | Stop reason: HOSPADM

## 2020-10-10 RX ORDER — ATORVASTATIN CALCIUM 10 MG/1
20 TABLET, FILM COATED ORAL NIGHTLY
Status: DISCONTINUED | OUTPATIENT
Start: 2020-10-10 | End: 2020-10-12 | Stop reason: HOSPADM

## 2020-10-10 RX ORDER — SODIUM CHLORIDE 0.9 % (FLUSH) 0.9 %
10 SYRINGE (ML) INJECTION EVERY 12 HOURS SCHEDULED
Status: DISCONTINUED | OUTPATIENT
Start: 2020-10-10 | End: 2020-10-12 | Stop reason: HOSPADM

## 2020-10-10 RX ORDER — TRAZODONE HYDROCHLORIDE 50 MG/1
150 TABLET ORAL NIGHTLY
Status: DISCONTINUED | OUTPATIENT
Start: 2020-10-10 | End: 2020-10-11

## 2020-10-10 RX ORDER — FLUOXETINE HYDROCHLORIDE 20 MG/1
60 CAPSULE ORAL DAILY
Status: DISCONTINUED | OUTPATIENT
Start: 2020-10-10 | End: 2020-10-12 | Stop reason: HOSPADM

## 2020-10-10 RX ORDER — DOXYCYCLINE HYCLATE 100 MG
100 TABLET ORAL EVERY 12 HOURS
Status: DISCONTINUED | OUTPATIENT
Start: 2020-10-10 | End: 2020-10-12 | Stop reason: HOSPADM

## 2020-10-10 RX ORDER — ONDANSETRON 2 MG/ML
4 INJECTION INTRAMUSCULAR; INTRAVENOUS EVERY 6 HOURS PRN
Status: DISCONTINUED | OUTPATIENT
Start: 2020-10-10 | End: 2020-10-12 | Stop reason: HOSPADM

## 2020-10-10 RX ORDER — ACETAMINOPHEN 325 MG/1
650 TABLET ORAL EVERY 6 HOURS PRN
Status: DISCONTINUED | OUTPATIENT
Start: 2020-10-10 | End: 2020-10-12 | Stop reason: HOSPADM

## 2020-10-10 RX ORDER — PROMETHAZINE HYDROCHLORIDE 25 MG/1
12.5 TABLET ORAL EVERY 6 HOURS PRN
Status: DISCONTINUED | OUTPATIENT
Start: 2020-10-10 | End: 2020-10-12 | Stop reason: HOSPADM

## 2020-10-10 RX ADMIN — BUSPIRONE HYDROCHLORIDE 30 MG: 5 TABLET ORAL at 09:53

## 2020-10-10 RX ADMIN — IPRATROPIUM BROMIDE AND ALBUTEROL SULFATE 1 AMPULE: .5; 3 SOLUTION RESPIRATORY (INHALATION) at 08:28

## 2020-10-10 RX ADMIN — ATORVASTATIN CALCIUM 20 MG: 10 TABLET, FILM COATED ORAL at 20:10

## 2020-10-10 RX ADMIN — BUSPIRONE HYDROCHLORIDE 30 MG: 5 TABLET ORAL at 20:11

## 2020-10-10 RX ADMIN — DOXYCYCLINE HYCLATE 100 MG: 100 TABLET, COATED ORAL at 20:10

## 2020-10-10 RX ADMIN — METHYLPREDNISOLONE SODIUM SUCCINATE 40 MG: 40 INJECTION, POWDER, FOR SOLUTION INTRAMUSCULAR; INTRAVENOUS at 20:11

## 2020-10-10 RX ADMIN — TRAZODONE HYDROCHLORIDE 150 MG: 50 TABLET ORAL at 20:10

## 2020-10-10 RX ADMIN — TRAZODONE HYDROCHLORIDE 150 MG: 50 TABLET ORAL at 01:47

## 2020-10-10 RX ADMIN — IPRATROPIUM BROMIDE AND ALBUTEROL SULFATE 1 AMPULE: .5; 3 SOLUTION RESPIRATORY (INHALATION) at 03:57

## 2020-10-10 RX ADMIN — ATORVASTATIN CALCIUM 20 MG: 10 TABLET, FILM COATED ORAL at 01:47

## 2020-10-10 RX ADMIN — BUPROPION HYDROCHLORIDE 150 MG: 150 TABLET, EXTENDED RELEASE ORAL at 09:54

## 2020-10-10 RX ADMIN — MONTELUKAST SODIUM 10 MG: 10 TABLET, FILM COATED ORAL at 01:47

## 2020-10-10 RX ADMIN — BUPROPION HYDROCHLORIDE 150 MG: 150 TABLET, EXTENDED RELEASE ORAL at 01:47

## 2020-10-10 RX ADMIN — IPRATROPIUM BROMIDE AND ALBUTEROL SULFATE 1 AMPULE: .5; 3 SOLUTION RESPIRATORY (INHALATION) at 19:52

## 2020-10-10 RX ADMIN — ENOXAPARIN SODIUM 40 MG: 40 INJECTION SUBCUTANEOUS at 09:55

## 2020-10-10 RX ADMIN — BUPROPION HYDROCHLORIDE 150 MG: 150 TABLET, EXTENDED RELEASE ORAL at 20:10

## 2020-10-10 RX ADMIN — Medication 10 ML: at 10:01

## 2020-10-10 RX ADMIN — ATENOLOL 25 MG: 25 TABLET ORAL at 09:55

## 2020-10-10 RX ADMIN — METHYLPREDNISOLONE SODIUM SUCCINATE 40 MG: 40 INJECTION, POWDER, FOR SOLUTION INTRAMUSCULAR; INTRAVENOUS at 17:03

## 2020-10-10 RX ADMIN — METHYLPREDNISOLONE SODIUM SUCCINATE 40 MG: 40 INJECTION, POWDER, FOR SOLUTION INTRAMUSCULAR; INTRAVENOUS at 09:58

## 2020-10-10 RX ADMIN — IPRATROPIUM BROMIDE AND ALBUTEROL SULFATE 1 AMPULE: .5; 3 SOLUTION RESPIRATORY (INHALATION) at 11:54

## 2020-10-10 RX ADMIN — ACETAMINOPHEN 650 MG: 325 TABLET ORAL at 14:06

## 2020-10-10 RX ADMIN — MONTELUKAST SODIUM 10 MG: 10 TABLET, FILM COATED ORAL at 20:10

## 2020-10-10 RX ADMIN — BUSPIRONE HYDROCHLORIDE 30 MG: 5 TABLET ORAL at 01:46

## 2020-10-10 RX ADMIN — IPRATROPIUM BROMIDE AND ALBUTEROL SULFATE 1 AMPULE: .5; 3 SOLUTION RESPIRATORY (INHALATION) at 15:37

## 2020-10-10 RX ADMIN — Medication 10 ML: at 20:11

## 2020-10-10 RX ADMIN — DOXYCYCLINE HYCLATE 100 MG: 100 TABLET, COATED ORAL at 09:55

## 2020-10-10 RX ADMIN — IPRATROPIUM BROMIDE AND ALBUTEROL SULFATE 1 AMPULE: .5; 3 SOLUTION RESPIRATORY (INHALATION) at 23:42

## 2020-10-10 ASSESSMENT — PAIN SCALES - GENERAL
PAINLEVEL_OUTOF10: 0
PAINLEVEL_OUTOF10: 5

## 2020-10-10 NOTE — PROGRESS NOTES
RESPIRATORY THERAPY ASSESSMENT    Name:  Vel [address] Record Number:  [id number]  Age: 62 y.o. Gender: female  : 1955  Today's Date:  10/10/2020  Room:  0333/0333-01    Assessment     Is the patient being admitted for a COPD or Asthma exacerbation? Yes   (If yes the patient will be seen every 4 hours for the first 24 hours and then reassessed)    Patient Admission Diagnosis      Allergies  Allergies   Allergen Reactions    Meperidine Anaphylaxis     \"Demerol\"     Demerol Hives       Minimum Predicted Vital Capacity:     855          Actual Vital Capacity:      n/a              Pulmonary History:COPD, asthma, ILD, pulmonary fibrosis, current smoker  Home Oxygen Therapy:  2 liters/min via nasal cannula  Home Respiratory Therapy:Albuterol, Salmeterol/Fluticasone Propionate and Tiotropium Bromide (per chart)  Current Respiratory Therapy:  duoneb  Treatment Type: HHN  Medications: Albuterol(15mg)    Respiratory Severity Index(RSI)   Patients with orders for inhalation medications, oxygen, or any therapeutic treatment modality will be placed on Respiratory Protocol. They will be assessed with the first treatment and at least every 72 hours thereafter. The following severity scale will be used to determine frequency of treatment intervention.     Smoking History: Mild Exacerbation = 3    Social History  Social History     Tobacco Use    Smoking status: Current Every Day Smoker     Packs/day: 1.00     Years: 40.00     Pack years: 40.00     Types: Cigarettes     Start date: 1972    Smokeless tobacco: Never Used   Substance Use Topics    Alcohol use: No     Alcohol/week: 0.0 standard drinks    Drug use: Yes     Types: Marijuana     Comment: Daily       Recent Surgical History: None = 0  Past Surgical History  Past Surgical History:   Procedure Laterality Date    BLADDER REPAIR      BRONCHOSCOPY      BRONCHOSCOPY N/A 3/6/2020    BRONCHOSCOPY THERAPUTIC ASPIRATION INITIAL performed by Jenna Duval ? Teach back        ? Needs reinforcement       ? No available caregiver               ? Other:     Response to education:  Very Good     Is patient being placed on Home Treatment Regimen? No     Does the patient have everything they need prior to discharge? NA     Comments: pt assessed and chart reviewed    Plan of Care: duoneb q4    Electronically signed by Mika Fofana RCP on 10/10/2020 at 2:49 AM    Respiratory Protocol Guidelines     1. Assessment and treatment by Respiratory Therapy will be initiated for medication and therapeutic interventions upon initiation of aerosolized medication. 2. Physician will be contacted for respiratory rate (RR) greater than 35 breaths per minute. Therapy will be held for heart rate (HR) greater than 140 beats per minute, pending direction from physician. 3. Bronchodilators will be administered via Metered Dose Inhaler (MDI) with spacer when the following criteria are met:  a. Alert and cooperative     b. HR < 140 bpm  c. RR < 30 bpm                d. Can demonstrate a 2-3 second inspiratory hold  4. Bronchodilators will be administered via Hand Held Nebulizer [facility]) to patients when ANY of the following criteria are met  a. Incognizant or uncooperative          b. Patients treated with HHN at Home        c. Unable to demonstrate proper use of MDI with spacer     d. RR > 30 bpm   5. Bronchodilators will be delivered via Metered Dose Inhaler (MDI), HHN, Aerogen to intubated patients on mechanical ventilation. 6. Inhalation medication orders will be delivered and/or substituted as outlined below. Aerosolized Medications Ordering and Administration Guidelines:    1. All Medications will be ordered by a physician, and their frequency and/or modality will be adjusted as defined by the patients Respiratory Severity Index (RSI) score.   2. If the patient does not have documented COPD, consider discontinuing anticholinergics when RSI is less than 9.  3. If the bronchospasm worsens (increased RSI), then the bronchodilator frequency can be increased to a maximum of every 4 hours. If greater than every 4 hours is required, the physician will be contacted. 4. If the bronchospasm improves, the frequency of the bronchodilator can be decreased, based on the patient's RSI, but not less than home treatment regimen frequency. 5. Bronchodilator(s) will be discontinued if patient has a RSI less than 9 and has received no scheduled or as needed treatment for 72  Hrs. Patients Ordered on a Mucolytic Agent:    1. Must always be administered with a bronchodilator. 2. Discontinue if patient experiences worsened bronchospasm, or secretions have lessened to the point that the patient is able to clear them with a cough. Anti-inflammatory and Combination Medications:    1. If the patient lacks prior history of lung disease, is not using inhaled anti-inflammatory medication at home, and lacks wheezing by examination or by history for at least 24 hours, contact physician for possible discontinuation.

## 2020-10-10 NOTE — PROGRESS NOTES
Shift assessment completed. Patient alert and oriented, vital signs stable, denies any needs at this time. Has expiratory and inspiratory wheezing, still requiring 2L NC. Complains of headache, PRN Tylenol given with relief. Call light within reach, will continue to monitor.

## 2020-10-10 NOTE — ED PROVIDER NOTES
Emergency Department Provider Note     Location: [facility] C3 TELE/MED SURG/ONC  10/9/2020    I independently performed a history and physical on Ivan Delgado. All diagnostic, treatment, and disposition decisions were made by myself in conjunction with the mid-level provider. Briefly, this is a 62 y.o. female here for shortness of breath. Patient has a history of COPD and typically wears 2 L of oxygen at nighttime but has had worsening shortness of breath on exertion has been wearing her oxygen all of the time. Patient denies any fevers or chills. Denies any chest pain. .      ED Triage Vitals   BP Temp Temp Source Pulse Resp SpO2 Height Weight   10/09/20 1932 10/09/20 1928 10/09/20 1928 10/09/20 1928 10/09/20 1928 10/09/20 1928 10/09/20 1928 10/09/20 1928   97/63 98 °F (36.7 °C) Oral 109 22 95 % 5' 5\" (1.651 m) 130 lb (59 kg)        Patient resting comfortably in no acute distress. Heart is regular rate and rhythm. Lungs are coarse bilaterally with inspiratory and expiratory wheezes. Abdomen is soft, nondistended, and nontender. No peripheral edema noted. Patient seen and examined. Vital signs notable for tachycardia, afebrile. Physical exam as documented above. Lab work-up notable for leukocytosis, negative d-dimer, negative troponin. Chest x-ray without acute cardiopulmonary abnormality. Patient given continuous nebulizer treatment with some improvement in symptoms but still with increased oxygen requirement and increased work of breathing. Patient given antibiotics for sputum production. Will admit patient for further work-up and management of COPD exacerbation. EKG  The Ekg interpreted by me in the absence of a cardiologist shows.   Normal sinus rhythm, rate 91, normal intervals, no STEMI      Xr Chest Portable    Result Date: 10/9/2020  EXAMINATION: ONE XRAY VIEW OF THE CHEST 10/9/2020 8:21 pm COMPARISON: 06/24/2020 HISTORY: ORDERING SYSTEM PROVIDED HISTORY: shortness of breath TECHNOLOGIST PROVIDED HISTORY: Reason for exam:->shortness of breath Reason for Exam: sob Acuity: Acute Type of Exam: Initial FINDINGS: There is left lower lobe faint ill-defined airspace disease. No evidence of pneumothorax or pleural effusion. No evidence of acute process of the cardiac or mediastinal structures     Left lower lobe airspace disease very similar to the comparison. Therefore this may be due to chronic atelectasis however recurrent pneumonia is a consideration. No evidence of acute process elsewhere in the chest..          Results for orders placed or performed during the hospital encounter of 10/09/20   CBC Auto Differential   Result Value Ref Range    WBC 13.0 (H) 4.0 - 11.0 K/uL    RBC 4.73 4.00 - 5.20 M/uL    Hemoglobin 14.3 12.0 - 16.0 g/dL    Hematocrit 42.3 36.0 - 48.0 %    MCV 89.5 80.0 - 100.0 fL    MCH 30.3 26.0 - 34.0 pg    MCHC 33.9 31.0 - 36.0 g/dL    RDW 13.4 12.4 - 15.4 %    Platelets 771 813 - 979 K/uL    MPV 7.1 5.0 - 10.5 fL    Neutrophils % 71.0 %    Lymphocytes % 19.8 %    Monocytes % 7.5 %    Eosinophils % 0.8 %    Basophils % 0.9 %    Neutrophils Absolute 9.2 (H) 1.7 - 7.7 K/uL    Lymphocytes Absolute 2.6 1.0 - 5.1 K/uL    Monocytes Absolute 1.0 0.0 - 1.3 K/uL    Eosinophils Absolute 0.1 0.0 - 0.6 K/uL    Basophils Absolute 0.1 0.0 - 0.2 K/uL   Comprehensive Metabolic Panel   Result Value Ref Range    Sodium 131 (L) 136 - 145 mmol/L    Potassium 4.2 3.5 - 5.1 mmol/L    Chloride 94 (L) 99 - 110 mmol/L    CO2 26 21 - 32 mmol/L    Anion Gap 11 3 - 16    Glucose 113 (H) 70 - 99 mg/dL    BUN 10 7 - 20 mg/dL    CREATININE <0.5 (L) 0.6 - 1.2 mg/dL    GFR Non-African American >60 >60    GFR African American >60 >60    Calcium 9.2 8.3 - 10.6 mg/dL    Total Protein 7.2 6.4 - 8.2 g/dL    Alb 4.1 3.4 - 5.0 g/dL    Albumin/Globulin Ratio 1.3 1.1 - 2.2    Total Bilirubin 0.4 0.0 - 1.0 mg/dL    Alkaline Phosphatase 59 40 - 129 U/L    ALT 10 10 - 40 U/L    AST 17 15 - 37 U/L    Globulin 3.1 g/dL Troponin   Result Value Ref Range    Troponin <0.01 <0.01 ng/mL   D-Dimer, Quantitative   Result Value Ref Range    D-Dimer, Quant <200 0 - 229 ng/mL DDU   Brain Natriuretic Peptide   Result Value Ref Range    Pro- (H) 0 - 124 pg/mL       For further details of [address] emergency department encounter, please see Pooja Mcfarland's documentation. This chart was generated in part by using Dragon Dictation system and may contain errors related to that system including errors in grammar, punctuation, and spelling, as well as words and phrases that may be inappropriate. If there are any questions or concerns please feel free to contact the dictating provider for clarification.            Uriel Gamble MD  10/10/20 1411

## 2020-10-10 NOTE — PROGRESS NOTES
4 Eyes Skin Assessment     The patient is being assess for  Admission    I agree that 2 RN's have performed a thorough Head to Toe Skin Assessment on the patient. ALL assessment sites listed below have been assessed. Areas assessed by both nurses:   [x]   Head, Face, and Ears   [x]   Shoulders, Back, and Chest  [x]   Arms, Elbows, and Hands   [x]   Coccyx, Sacrum, and Ischum  [x]   Legs, Feet, and Heels        Does the Patient have Skin Breakdown?   No         Philip Prevention initiated:  No   Wound Care Orders initiated:  No      [facility] nurse consulted for Pressure Injury (Stage 3,4, Unstageable, DTI, NWPT, and Complex wounds):  No      Nurse 1 eSignature: Electronically signed by Maurisio Pacheco RN on 10/10/20 at 3:38 AM EDT    **SHARE this note so that the co-signing nurse is able to place an eSignature**    Nurse 2 eSignature: Electronically signed by Noelle Steen RN on 10/10/20 at 6:52 AM EDT

## 2020-10-10 NOTE — PROGRESS NOTES
Admission assessment completed and charted. VSS. Pt on 2L NC, tried to wean, with ambulation pt becomes very SOB and o2 needs increase. Four eye completed. A&OX4. Bed locked and in lowest position. Call light within reach. Pt denies any other needs at this time. Will continue to monitor.

## 2020-10-10 NOTE — ED NOTES
Yoel Hardin updated on patients status at this time and room assignment.   stated he will call the unit in the AM to talk about how she is doing and he will speak to the patient in the am.       Ankita La RN  10/10/20 9404

## 2020-10-10 NOTE — H&P
Hospital Medicine History & Physical      PCP: Rosemary Burciaga MD    Date of Admission: 10/9/2020    Date of Service: Pt seen/examined on 10/10/2020 and Admitted to Inpatient with expected LOS greater than two midnights due to medical therapy. Chief Complaint: Shortness of breath      History Of Present Illness:      62 y.o. female who presented to [facility] with above complaints  Patient with history of COPD, tobacco abuse presented to the ER last night with complaints of shortness of breath x2 days, associated with productive cough. Continues to smoke a pack a day. Symptoms partially relieved with nebulizer treatments at home. Denies any subjective fevers chills or rigors. Patient reports she has been using oxygen all day yesterday. She normally uses 2 LPM only at night. Denies chest pain.   Follows up with OP pulmonologist Dr. Karthik Dwyer    Past Medical History:          Diagnosis Date    Allergic rhinitis 6/11/2010    Anxiety     Arthritis     Aspiration pneumonia ([address] 3/21/2012    Recurrent    Asthma     Bronchitis chronic     COPD (chronic obstructive pulmonary disease) ([address] 12/3/2009    Depression     Drug abuse, cocaine type ([address]     past history of crack cocaine use    Emphysema of lung ([address]     Fibromyalgia     GERD (gastroesophageal reflux disease)     Hyperlipidemia     Influenza A 03/05/2020    Lung disease     On home oxygen therapy     uses O2 NC 3L prn at night    Osteoporosis     Pneumonia     Polysubstance dependence ([address] 1/2/2012    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    Pulmonary fibrosis ([address] 10/16/2014    Pulmonary infiltrate     Pulmonary nodule 12/3/2009    Tobacco abuse        Past Surgical History:          Procedure Laterality Date    BLADDER REPAIR      BRONCHOSCOPY      BRONCHOSCOPY N/A 3/6/2020    BRONCHOSCOPY THERAPUTIC ASPIRATION INITIAL performed by Casimiro Ott MD at [address] [place] [address] 3/6/2020    BRONCHOSCOPY ALVEOLAR LAVAGE performed by Bogdan Waters MD at [address] [facility] N/A 6/26/2020    BRONCHOSCOPY THERAPUTIC ASPIRATION INITIAL performed by Johnson Valiente MD at [address] [facility]  6/26/2020    BRONCHOSCOPY ALVEOLAR LAVAGE performed by Johnson Valiente MD at [address] [address]    ENDOSCOPY, COLON, DIAGNOSTIC      ESOPHAGEAL DILATATION  9/20/2018    ESOPHAGEAL DILATION [place] performed by Esther Peterson MD at [address] [facility] HISTORY  2/12/15    T8 Kyphoplasty    NM COLONOSCOPY FLX DX W/COLLJ SPEC WHEN PFRMD N/A 9/20/2018    EGD AND COLONOSCOPY WITH ANESTHESIA performed by Esther Peterson MD at [address] [facility] ESOPHAGOGASTRODUODENOSCOPY TRANSORAL DIAGNOSTIC N/A 9/20/2018    EGD AND COLONOSCOPY WITH ANESTHESIA performed by Esther Peterson MD at [address] [facility]         Medications Prior to Admission:      Prior to Admission medications    Medication Sig Start Date End Date Taking?  Authorizing Provider   traZODone (DESYREL) 150 MG tablet TAKE 2 TABLETS AT BEDTIME 9/11/20  Yes Isabel Smart MD   fluticasone-salmeterol [facility] INH) 500-50 MCG/DOSE diskus inhaler Inhale 1 puff into the lungs every 12 hours 9/9/20  Yes Luis E De Paz MD   montelukast (SINGULAIR) 10 MG tablet TAKE 1 TABLET BY MOUTH EACH NIGHT 9/9/20  Yes Luis E De Paz MD   tiotropium (Shawn Ling) 18 MCG inhalation capsule INHALE THE CONTENTS OF 1 CAPSULE EVERY DAY 9/9/20  Yes Luis E De Paz MD   albuterol sulfate HFA (VENTOLIN HFA) 108 (90 Base) MCG/ACT inhaler Inhale 2 puffs into the lungs every 6 hours as needed for Wheezing or Shortness of Breath 9/9/20  Yes Luis E De Paz MD   albuterol (PROVENTIL) (2.5 MG/3ML) 0.083% nebulizer solution Take 3 mLs by nebulization every 6 hours as needed for Wheezing or Shortness of Breath DX COPD J44.9 9/9/20  Yes Luis E De Paz MD simvastatin (ZOCOR) 20 MG tablet TAKE 1 TABLET EVERY EVENING 8/7/20  Yes Irving Butler MD   busPIRone (BUSPAR) 30 MG tablet TAKE 1 TABLET TWICE DAILY 8/7/20  Yes Irving Butler MD   guaiFENesin (MUCINEX) 600 MG extended release tablet Take 1 tablet by mouth 2 times daily as needed for Congestion 7/24/20  Yes Sherry Simeon,    FLUoxetine (PROZAC) 20 MG capsule TAKE 3 CAPSULES BY MOUTH DAILY 7/13/20  Yes Irving Butler MD   atenolol (TENORMIN) 25 MG tablet Take 1 tablet by mouth daily (replaces propranolol) 5/12/20 5/12/21 Yes Irving Butler MD   buPROPion [facility]) 150 MG extended release tablet Take 1 tablet by mouth 2 times daily 4/24/20  Yes Irving Butler MD   albuterol (PROVENTIL) (2.5 MG/3ML) 0.083% nebulizer solution Take 3 mLs by nebulization every 6 hours as needed for Shortness of Breath 12/24/18  Yes Irving Butler MD   CHANTIX 1 MG tablet TAKE 1 TABLET BY MOUTH TWICE A DAY 7/8/20   Erlin Méndez,    varenicline (CHANTIX STARTING MONTH PAK) 0.5 MG X 11 & 1 MG X 42 tablet Take by mouth. Patient not taking: Reported on 9/9/2020 6/15/20   Irving Butler MD   teriparatide, recombinant, (FORTEO) 600 MCG/2.4ML SOLN injection Inject 0.08 mLs into the skin daily One dose injected daily. 9/27/19 7/16/20  Zamzam Hammonds MD       Allergies:  Meperidine and Demerol    Social History:      The patient currently lives at home  TOBACCO:   reports that she has been smoking cigarettes. She started smoking about 48 years ago. She has a 40.00 pack-year smoking history. She has never used smokeless tobacco.  ETOH:   reports no history of alcohol use. E-Cigarettes Vaping or Juuling     Questions Responses    Vaping Use Never User    Start Date     Does device contain nicotine?  Always    Quit Date     Vaping Type Unknown            Family History:    Positive as follows:        Problem Relation Age of Onset    Asthma Mother     Diabetes Mother     Emphysema Mother     Heart Failure Mother     Hypertension Mother     Heart Disease Mother     High Cholesterol Mother     Cancer Mother     Depression Mother     Diabetes Father     Emphysema Father     Heart Failure Father     Hypertension Father     Heart Disease Father     High Cholesterol Father     Substance Abuse Father     Emphysema Paternal Grandfather     Diabetes Sister     High Cholesterol Sister     Vision Loss Maternal Uncle        REVIEW OF SYSTEMS:   Pertinent positives as noted in the HPI. All other systems reviewed and negative. PHYSICAL EXAM PERFORMED:    /77   Pulse 96   Temp 97.6 °F (36.4 °C) (Oral)   Resp 20   Ht 5' 5\" (1.651 m)   Wt 126 lb 2 oz (57.2 kg)   SpO2 94%   BMI 20.99 kg/m²     General appearance:  No apparent distress, appears stated age and cooperative. HEENT:  Normal cephalic, atraumatic without obvious deformity. Pupils equal, round, and reactive to light. Extra ocular muscles intact. Conjunctivae/corneas clear. Neck: Supple, with full range of motion. No jugular venous distention. Trachea midline. Respiratory: Tachypneic, increased respiratory effort. Bilateral wheeze to auscultation   cardiovascular:  Regular rate and rhythm with normal S1/S2 without murmurs, rubs or gallops. Abdomen: Soft, non-tender, non-distended with normal bowel sounds. Musculoskeletal:  No clubbing, cyanosis or edema bilaterally. Full range of motion without deformity. Skin: Skin color, texture, turgor normal.  No rashes or lesions. Neurologic:  Neurovascularly intact without any focal sensory/motor deficits.  Cranial nerves: II-XII intact, grossly non-focal.  Psychiatric:  Alert and oriented, thought content appropriate, normal insight  Capillary Refill: Brisk,< 3 seconds   Peripheral Pulses: +2 palpable, equal bilaterally       Labs:     Recent Labs     10/09/20  2044   WBC 13.0*   HGB 14.3   HCT 42.3        Recent Labs     10/09/20  2044   *   K 4.2   CL 94*   CO2 26   BUN 10   CREATININE <0.5*   CALCIUM 9.2 Recent Labs     10/09/20  2044   AST 17   ALT 10   BILITOT 0.4   ALKPHOS 59     No results for input(s): INR in the last 72 hours. Recent Labs     10/09/20  2044   TROPONINI <0.01       Urinalysis:      Lab Results   Component Value Date    NITRU Negative 06/01/2020    WBCUA 0-3 04/13/2012    RBCUA 3-5 04/13/2012    BLOODU Negative 06/01/2020    SPECGRAV 1.010 06/01/2020    GLUCOSEU Negative 06/01/2020    GLUCOSEU NEGATIVE 04/13/2012       Radiology:     CXR: I have reviewed the CXR with the following interpretation: No new acute cardiopulmonary process, left lower lobe opacity is chronic      XR CHEST PORTABLE   Final Result   Left lower lobe airspace disease very similar to the comparison. Therefore   this may be due to chronic atelectasis however recurrent pneumonia is a   consideration. No evidence of acute process elsewhere in the chest..             ASSESSMENT:PLAN:    COPD exacerbation   - inhaled bronchodilator nebs Q4H scheduled with duonebs  - systemic steroids with iv solumedrol  - po antibiotics doxy 100 mg bid  - supplemental O2 to keep sats > 88%, currently on 2 LPM  - low suspicion for acute PE    Tobacco dependence  -Counseled cessation, patient defers nicotine replacement therapy for now    Anxiety and depression  -Mood stable, resume home meds    Hyperlipidemia  -Resume statin    Hypertension  -Controlled, resume atenolol      DVT Prophylaxis: Lovenox  Diet: DIET GENERAL;  Code Status: Full Code    PT/OT Eval Status: Ambulatory    Dispo -IP stay       Shbanam Gamble MD    Thank you Ed Paul MD for the opportunity to be involved in this patient's care. If you have any questions or concerns please feel free to contact me at [phone].

## 2020-10-10 NOTE — ED PROVIDER NOTES
[address] [place] [address]  ED  [address] [address]  Dept: [phone]  Dept Fax: [phone]  Loc: [facility]        This patient was seen and evaluated per myself in conjunction with ED attending dr. Antoni Li    Chief Complaint   Patient presents with    Shortness of Breath     HX: COPD wears 2L O2 HS; pt states today increased SOB with excertion; denies fevers       HPI    Herlinda Isabel is a 62 y.o. female with a history of COPD who presents with shortness of breath. Onset was over the past couple of days. She states that it worsened today. She only used her nebulizer solution once this morning with possibly some mild alleviation of her symptoms, patient was not 100% sure. She states \"I do not use it as much as I should. \"  She is endorsing a cough but this is normal for her, it is nonproductive. Denies any fevers or chills. Denies any history of DVT or PE. Denies any bilateral lower extremity swelling. Denies any chest pain. Shortness of breath worsens with exertion. She is normally on 2 L nasal cannula nightly but is having to use it over the past couple of days intermittently throughout the day due to her shortness of breath. The duration has been constant since the onset. Patient came to the ED for further evaluation and treatment. REVIEW OF SYSTEMS    Cardiac: No palpitations or syncope  Respiratory: +SOB, + cough  Neurologic: No syncope or headache  GI: No vomiting or diarrhea  General: no Fever  All other systems reviewed and are negative.      PAST MEDICAL & SURGICAL HISTORY    Past Medical History:   Diagnosis Date    Allergic rhinitis 6/11/2010    Anxiety     Arthritis     Aspiration pneumonia ([facility] 3/21/2012    Recurrent    Asthma     Bronchitis chronic     COPD (chronic obstructive pulmonary disease) ([facility] 12/3/2009    Depression     Drug abuse, cocaine type ([facility]     past history into the lungs every 12 hours 180 each 1    montelukast (SINGULAIR) 10 MG tablet TAKE 1 TABLET BY MOUTH EACH NIGHT 90 tablet 1    tiotropium (SPIRIVA HANDIHALER) 18 MCG inhalation capsule INHALE THE CONTENTS OF 1 CAPSULE EVERY DAY 90 capsule 1    albuterol sulfate HFA (VENTOLIN HFA) 108 (90 Base) MCG/ACT inhaler Inhale 2 puffs into the lungs every 6 hours as needed for Wheezing or Shortness of Breath 3 Inhaler 1    albuterol (PROVENTIL) (2.5 MG/3ML) 0.083% nebulizer solution Take 3 mLs by nebulization every 6 hours as needed for Wheezing or Shortness of Breath DX COPD J44.9 120 vial 6    simvastatin (ZOCOR) 20 MG tablet TAKE 1 TABLET EVERY EVENING 90 tablet 1    busPIRone (BUSPAR) 30 MG tablet TAKE 1 TABLET TWICE DAILY 180 tablet 1    guaiFENesin (MUCINEX) 600 MG extended release tablet Take 1 tablet by mouth 2 times daily as needed for Congestion 60 tablet 2    FLUoxetine (PROZAC) 20 MG capsule TAKE 3 CAPSULES BY MOUTH DAILY 270 capsule 1    CHANTIX 1 MG tablet TAKE 1 TABLET BY MOUTH TWICE A DAY (Patient not taking: Reported on 9/9/2020) 60 tablet 0    varenicline (CHANTIX STARTING MONTH PAK) 0.5 MG X 11 & 1 MG X 42 tablet Take by mouth. (Patient not taking: Reported on 9/9/2020) 1 box 0    atenolol (TENORMIN) 25 MG tablet Take 1 tablet by mouth daily (replaces propranolol) 90 tablet 1    buPROPion (WELLBUTRIN SR) 150 MG extended release tablet Take 1 tablet by mouth 2 times daily 60 tablet 2    teriparatide, recombinant, (FORTEO) 600 MCG/2.4ML SOLN injection Inject 0.08 mLs into the skin daily One dose injected daily.  1 pen 11    albuterol (PROVENTIL) (2.5 MG/3ML) 0.083% nebulizer solution Take 3 mLs by nebulization every 6 hours as needed for Shortness of Breath 120 each 3       ALLERGIES    Allergies   Allergen Reactions    Meperidine Anaphylaxis     \"Demerol\"     Demerol Hives       SOCIAL & FAMILY HISTORY    Social History     Socioeconomic History    Marital status:      Spouse name: Not on file    Number of children: 3    Years of education: Not on file    Highest education level: Not on file   Occupational History    Occupation: Disabled     Comment: 2008   Social Needs    Financial resource strain: Not very hard    Food insecurity     Worry: Sometimes true     Inability: Sometimes true   [employer] needs     Medical: No     Non-medical: No   Tobacco Use    Smoking status: Current Every Day Smoker     Packs/day: 1.00     Years: 40.00     Pack years: 40.00     Types: Cigarettes     Start date: 1/1/1972    Smokeless tobacco: Never Used   Substance and Sexual Activity    Alcohol use: No     Alcohol/week: 0.0 standard drinks    Drug use: Yes     Types: Marijuana     Comment: Daily    Sexual activity: Yes     Partners: Male   Lifestyle    Physical activity     Days per week: Not on file     Minutes per session: Not on file    Stress: Not on file   Relationships    Social connections     Talks on phone: Not on file     Gets together: Not on file     Attends Mandaeism service: Not on file     Active member of club or organization: Not on file     Attends meetings of clubs or organizations: Not on file     Relationship status: Not on file    Intimate partner violence     Fear of current or ex partner: Not on file     Emotionally abused: Not on file     Physically abused: Not on file     Forced sexual activity: Not on file   Other Topics Concern    Not on file   Social History Narrative    Not on file     Family History   Problem Relation Age of Onset    Asthma Mother     Diabetes Mother     Emphysema Mother     Heart Failure Mother     Hypertension Mother     Heart Disease Mother     High Cholesterol Mother    Kirt Chahalck Mother     Depression Mother     Diabetes Father     Emphysema Father     Heart Failure Father     Hypertension Father     Heart Disease Father     High Cholesterol Father     Substance Abuse Father     Emphysema Paternal Grandfather     Diabetes Sister     High Cholesterol Sister     Vision Loss Maternal Uncle        PHYSICAL EXAM    VITAL SIGNS: /81   Pulse 93   Temp 98 °F (36.7 °C) (Oral)   Resp 22   Ht 5' 5\" (1.651 m)   Wt 130 lb (59 kg)   SpO2 97%   BMI 21.63 kg/m²    Constitutional:  Well developed, well nourished, does appear to have some accessory muscle use, is not cyanotic, tripoding or in any acute respiratory distress however has increased work of breathing   HENT:  Atraumatic, moist mucus membranes  Neck: supple, no JVD   Respiratory:  Lungs diminished breath sounds bilaterally lower lung field with a prolonged expiratory phase, positive expiratory wheezes throughout all lung fields, no bilateral rhonchi, no crackles,+ retractions and accessory muscle use  Cardiovascular:  tachycardic rate, no murmurs  Vascular: Radial pulses 2+ equal bilaterally  GI:  Soft, nontender, normal bowel sounds  Musculoskeletal:  no lower extremity edema, no lower extremity asymmetry, no calf tenderness, no thigh tenderness, no acute deformities  Integument:  Skin is warm and dry, no petechiae   Neurologic:  Alert & oriented, no slurred speech  Psych: Pleasant affect, the patient does not appear to feel anxious    EKG    Please see the ED Physician note for EKG interpretation.     LABS  Results for orders placed or performed during the hospital encounter of 10/09/20   CBC Auto Differential   Result Value Ref Range    WBC 13.0 (H) 4.0 - 11.0 K/uL    RBC 4.73 4.00 - 5.20 M/uL    Hemoglobin 14.3 12.0 - 16.0 g/dL    Hematocrit 42.3 36.0 - 48.0 %    MCV 89.5 80.0 - 100.0 fL    MCH 30.3 26.0 - 34.0 pg    MCHC 33.9 31.0 - 36.0 g/dL    RDW 13.4 12.4 - 15.4 %    Platelets 973 520 - 383 K/uL    MPV 7.1 5.0 - 10.5 fL    Neutrophils % 71.0 %    Lymphocytes % 19.8 %    Monocytes % 7.5 %    Eosinophils % 0.8 %    Basophils % 0.9 %    Neutrophils Absolute 9.2 (H) 1.7 - 7.7 K/uL    Lymphocytes Absolute 2.6 1.0 - 5.1 K/uL    Monocytes Absolute 1.0 0.0 - 1.3 K/uL Eosinophils Absolute 0.1 0.0 - 0.6 K/uL    Basophils Absolute 0.1 0.0 - 0.2 K/uL   Comprehensive Metabolic Panel   Result Value Ref Range    Sodium 131 (L) 136 - 145 mmol/L    Potassium 4.2 3.5 - 5.1 mmol/L    Chloride 94 (L) 99 - 110 mmol/L    CO2 26 21 - 32 mmol/L    Anion Gap 11 3 - 16    Glucose 113 (H) 70 - 99 mg/dL    BUN 10 7 - 20 mg/dL    CREATININE <0.5 (L) 0.6 - 1.2 mg/dL    GFR Non-African American >60 >60    GFR African American >60 >60    Calcium 9.2 8.3 - 10.6 mg/dL    Total Protein 7.2 6.4 - 8.2 g/dL    Alb 4.1 3.4 - 5.0 g/dL    Albumin/Globulin Ratio 1.3 1.1 - 2.2    Total Bilirubin 0.4 0.0 - 1.0 mg/dL    Alkaline Phosphatase 59 40 - 129 U/L    ALT 10 10 - 40 U/L    AST 17 15 - 37 U/L    Globulin 3.1 g/dL   Troponin   Result Value Ref Range    Troponin <0.01 <0.01 ng/mL   D-Dimer, Quantitative   Result Value Ref Range    D-Dimer, Quant <200 0 - 229 ng/mL DDU         RADIOLOGY   XR CHEST PORTABLE   Final Result   Left lower lobe airspace disease very similar to the comparison. Therefore   this may be due to chronic atelectasis however recurrent pneumonia is a   consideration. No evidence of acute process elsewhere in the chest..             ED COURSE & MEDICAL DECISION MAKING    Pertinent Labs & Imaging studies reviewed and interpreted. (See chart for details)    Patient was given beta agonist nebulizers, oxygen, IV steroids, and antibiotics. See chart for details of medications given during the ED stay.     Vitals:    10/09/20 1932 10/09/20 2039 10/09/20 2049 10/09/20 2316   BP: 97/63 102/70  127/81   Pulse:  91  93   Resp:  26 18 22   Temp:  97.7 °F (36.5 °C)  98 °F (36.7 °C)   TempSrc:  Oral  Oral   SpO2:  97% 96% 97%   Weight:       Height:           Differential Diagnosis: Acute COPD exacerbation, Hypoxemic Respiratory Distress, Pneumonia, Sepsis, Congestive Heart Failure, Myocardial Infarction, Pulmonary Embolus, ARDS, Pneumothorax, other    CRITICAL CARE NOTE:  There was a high probability of clinically significant life-threatening deterioration of the patient's condition requiring my urgent intervention. Total critical care time was at least 20 minutes. This includes vital sign monitoring, pulse oximetry monitoring, telemetry monitoring, clinical response to the IV medications, reviewing the nursing notes, consultation time, dictation/documentation time, and interpretation of the labwork. This excludes any separately billable procedures performed. The critical care time above also includes time spent obtaining history from elecronic chart, as the patient was unable to provide the history AND the history obtained was directly relevant to the care of the patient. Patient is afebrile and nontoxic in appearance. Labs reveal slight leukocytosis of 13, no anemia. Metabolic panel unremarkable. CXR findings as above. EKG interpreted by physician. Troponin negative. D-dimer is negative. Reevaluation: Patient is full having an increased work of breathing even at rest.  This is despite continuous nebulizers and magnesium. I therefore do believe that she would benefit from further evaluation and treatment on an inpatient basis. Therefore consulted the hospitalist, spoke to Dr. Shaunna Samuel who agreed to accept the patient for admission and write orders for admission. FINAL IMPRESSION    1. COPD exacerbation ([address]    2. Increased oxygen demand    3.  Dyspnea and respiratory abnormalities        PLAN  Admission to the hospital        (Please note that this note was completed with a voice recognition program.  Every attempt was made to edit the dictations, but inevitably there remain words that are mis-transcribed.)          SHAUN Marie - CNP  10/09/20 4234

## 2020-10-10 NOTE — ED NOTES
[facility] [id number]  Per Wenceslao Pond  RE  admission for COPD exacerbation, increased WOB at rest, increased O2 req   @[id number]       Subha Santos  10/09/20 7557

## 2020-10-10 NOTE — ED NOTES

## 2020-10-10 NOTE — PROGRESS NOTES
Hospitalist Progress Note    Patient:  Tabby Reese  Unit/Bed:0333/0333-01   YOB: 1955       MRN: [id number] Acct: [de-identified]  PCP: Mark Rios MD    Date of Admission: 10/9/2020  --------------------------    Chief Complaint:     [address] Course:     Tabby Reese is a 62 y.o. female hospitalized on 10/9/2020   With sob. Assessment:     COPD exacerbation   -Continue respiratory care protocol, bronchodilator and steroid therapy. Continue doxycycline. Wean off oxygen as tolerated  -Old left lower lobe airspace disease since 6/2020  Tobacco dependence  -Continue encouraged smoking cessation  Left lower lobe airspace disease very similar to the comparison.  -Awaiting repeat SARS-CoV-2  -Check for influenza and respiratory viral panel       Anxiety and depression  -Continue selected home medication     Hyperlipidemia  -Resume statin     Hypertension  -Controlled, resume atenolol       Code Status: Full Code         DVT prophylaxis:  Lovenox     Disposition:   Home in 2-3 days    I discussed my thought processes at length with patient/family and patient understood. Question and concerns  Addressed     Discussed with RN      ----------------      Subjective:     Patient seen and examined  Overnight events noted  RN and ancillary staff note reviewed    Shortness of breath slightly better, continue therapies, sputum colored sputum production. No fever      Diet: DIET GENERAL;    OBJECTIVE     Exam:  /77   Pulse 96   Temp 97.6 °F (36.4 °C) (Oral)   Resp 20   Ht 5' 5\" (1.651 m)   Wt 126 lb 2 oz (57.2 kg)   SpO2 93%   BMI 20.99 kg/m²           Gen: Not in distress. Alert. Head: Normocephalic. Atraumatic. Eyes: Conjunctivae/corneas clear. ENT: Oral mucosa moist  Neck: No JVD. No obvious thyromegaly. CVS: Nml S1S2, no murmur RRR  Pulmomary: Reduced air entry, wheezing noted. Gastrointestinal: Soft, non tender, non distend, . Musculoskeletal: No edema. Warm  Neuro:  No focal deficit. Moves extremity spontaneously. Psychiatry: Appropriate affect. Not agitated. Medications:  Reviewed    Infusion Medications   Scheduled Medications    atenolol  25 mg Oral Daily    buPROPion  150 mg Oral BID    busPIRone  30 mg Oral BID    FLUoxetine  60 mg Oral Daily    montelukast  10 mg Oral Nightly    atorvastatin  20 mg Oral Nightly    traZODone  150 mg Oral Nightly    sodium chloride flush  10 mL Intravenous 2 times per day    enoxaparin  40 mg Subcutaneous Daily    methylPREDNISolone  40 mg Intravenous Q6H    Followed by   Raiza Jones ON 10/12/2020] predniSONE  40 mg Oral Daily    doxycycline hyclate  100 mg Oral Q12H    ipratropium-albuterol  1 ampule Inhalation Q4H     PRN Meds: sodium chloride flush, acetaminophen **OR** acetaminophen, polyethylene glycol, promethazine **OR** ondansetron      Intake/Output Summary (Last 24 hours) at 10/10/2020 0851  Last data filed at 10/10/2020 0357  Gross per 24 hour   Intake 60 ml   Output 250 ml   Net -190 ml             Labs:   Recent Labs     10/09/20  2044   WBC 13.0*   HGB 14.3   HCT 42.3        Recent Labs     10/09/20  2044   *   K 4.2   CL 94*   CO2 26   BUN 10   CREATININE <0.5*   CALCIUM 9.2     Recent Labs     10/09/20  2044   AST 17   ALT 10   BILITOT 0.4   ALKPHOS 59     No results for input(s): INR in the last 72 hours. Recent Labs     10/09/20  2044   TROPONINI <0.01       Urinalysis:      Lab Results   Component Value Date    NITRU Negative 06/01/2020    WBCUA 0-3 04/13/2012    RBCUA 3-5 04/13/2012    BLOODU Negative 06/01/2020    SPECGRAV 1.010 06/01/2020    GLUCOSEU Negative 06/01/2020    GLUCOSEU NEGATIVE 04/13/2012       Radiology:  XR CHEST PORTABLE   Final Result   Left lower lobe airspace disease very similar to the comparison. Therefore   this may be due to chronic atelectasis however recurrent pneumonia is a   consideration. No evidence of acute process elsewhere in the chest.. Electronically signed by Aniyah Navarrete MD on 10/10/2020 at 8:51 AM

## 2020-10-11 LAB
ALBUMIN SERPL-MCNC: 3.6 G/DL (ref 3.4–5)
ANION GAP SERPL CALCULATED.3IONS-SCNC: 12 MMOL/L (ref 3–16)
BUN BLDV-MCNC: 19 MG/DL (ref 7–20)
CALCIUM SERPL-MCNC: 8.8 MG/DL (ref 8.3–10.6)
CHLORIDE BLD-SCNC: 95 MMOL/L (ref 99–110)
CO2: 23 MMOL/L (ref 21–32)
CREAT SERPL-MCNC: 0.6 MG/DL (ref 0.6–1.2)
GFR AFRICAN AMERICAN: >60
GFR NON-AFRICAN AMERICAN: >60
GLUCOSE BLD-MCNC: 136 MG/DL (ref 70–99)
HCT VFR BLD CALC: 41 % (ref 36–48)
HEMOGLOBIN: 13.7 G/DL (ref 12–16)
MCH RBC QN AUTO: 29.9 PG (ref 26–34)
MCHC RBC AUTO-ENTMCNC: 33.5 G/DL (ref 31–36)
MCV RBC AUTO: 89.3 FL (ref 80–100)
PDW BLD-RTO: 13.6 % (ref 12.4–15.4)
PHOSPHORUS: 2.4 MG/DL (ref 2.5–4.9)
PLATELET # BLD: 284 K/UL (ref 135–450)
PMV BLD AUTO: 7.6 FL (ref 5–10.5)
POTASSIUM SERPL-SCNC: 4.3 MMOL/L (ref 3.5–5.1)
RBC # BLD: 4.59 M/UL (ref 4–5.2)
SODIUM BLD-SCNC: 130 MMOL/L (ref 136–145)
WBC # BLD: 19.5 K/UL (ref 4–11)

## 2020-10-11 PROCEDURE — 85027 COMPLETE CBC AUTOMATED: CPT

## 2020-10-11 PROCEDURE — 6370000000 HC RX 637 (ALT 250 FOR IP): Performed by: HOSPITALIST

## 2020-10-11 PROCEDURE — 80069 RENAL FUNCTION PANEL: CPT

## 2020-10-11 PROCEDURE — 2580000003 HC RX 258: Performed by: INTERNAL MEDICINE

## 2020-10-11 PROCEDURE — 6370000000 HC RX 637 (ALT 250 FOR IP): Performed by: INTERNAL MEDICINE

## 2020-10-11 PROCEDURE — 2700000000 HC OXYGEN THERAPY PER DAY

## 2020-10-11 PROCEDURE — 94640 AIRWAY INHALATION TREATMENT: CPT

## 2020-10-11 PROCEDURE — 1200000000 HC SEMI PRIVATE

## 2020-10-11 PROCEDURE — 6360000002 HC RX W HCPCS: Performed by: INTERNAL MEDICINE

## 2020-10-11 PROCEDURE — 94761 N-INVAS EAR/PLS OXIMETRY MLT: CPT

## 2020-10-11 RX ORDER — NICOTINE 21 MG/24HR
1 PATCH, TRANSDERMAL 24 HOURS TRANSDERMAL DAILY
Status: DISCONTINUED | OUTPATIENT
Start: 2020-10-11 | End: 2020-10-12 | Stop reason: HOSPADM

## 2020-10-11 RX ORDER — TRAZODONE HYDROCHLORIDE 50 MG/1
300 TABLET ORAL NIGHTLY
Status: DISCONTINUED | OUTPATIENT
Start: 2020-10-11 | End: 2020-10-12 | Stop reason: HOSPADM

## 2020-10-11 RX ADMIN — Medication 10 ML: at 07:54

## 2020-10-11 RX ADMIN — IPRATROPIUM BROMIDE AND ALBUTEROL SULFATE 1 AMPULE: .5; 3 SOLUTION RESPIRATORY (INHALATION) at 19:47

## 2020-10-11 RX ADMIN — IPRATROPIUM BROMIDE AND ALBUTEROL SULFATE 1 AMPULE: .5; 3 SOLUTION RESPIRATORY (INHALATION) at 11:49

## 2020-10-11 RX ADMIN — DOXYCYCLINE HYCLATE 100 MG: 100 TABLET, COATED ORAL at 21:21

## 2020-10-11 RX ADMIN — BUPROPION HYDROCHLORIDE 150 MG: 150 TABLET, EXTENDED RELEASE ORAL at 21:21

## 2020-10-11 RX ADMIN — ACETAMINOPHEN 650 MG: 325 TABLET ORAL at 05:37

## 2020-10-11 RX ADMIN — ATORVASTATIN CALCIUM 20 MG: 10 TABLET, FILM COATED ORAL at 21:21

## 2020-10-11 RX ADMIN — TRAZODONE HYDROCHLORIDE 300 MG: 50 TABLET ORAL at 21:21

## 2020-10-11 RX ADMIN — BUSPIRONE HYDROCHLORIDE 30 MG: 5 TABLET ORAL at 21:21

## 2020-10-11 RX ADMIN — METHYLPREDNISOLONE SODIUM SUCCINATE 40 MG: 40 INJECTION, POWDER, FOR SOLUTION INTRAMUSCULAR; INTRAVENOUS at 07:53

## 2020-10-11 RX ADMIN — BUSPIRONE HYDROCHLORIDE 30 MG: 5 TABLET ORAL at 07:52

## 2020-10-11 RX ADMIN — Medication 10 ML: at 21:21

## 2020-10-11 RX ADMIN — METHYLPREDNISOLONE SODIUM SUCCINATE 40 MG: 40 INJECTION, POWDER, FOR SOLUTION INTRAMUSCULAR; INTRAVENOUS at 14:19

## 2020-10-11 RX ADMIN — IPRATROPIUM BROMIDE AND ALBUTEROL SULFATE 1 AMPULE: .5; 3 SOLUTION RESPIRATORY (INHALATION) at 09:06

## 2020-10-11 RX ADMIN — METHYLPREDNISOLONE SODIUM SUCCINATE 40 MG: 40 INJECTION, POWDER, FOR SOLUTION INTRAMUSCULAR; INTRAVENOUS at 21:20

## 2020-10-11 RX ADMIN — FLUOXETINE 60 MG: 20 CAPSULE ORAL at 07:52

## 2020-10-11 RX ADMIN — IPRATROPIUM BROMIDE AND ALBUTEROL SULFATE 1 AMPULE: .5; 3 SOLUTION RESPIRATORY (INHALATION) at 15:46

## 2020-10-11 RX ADMIN — IPRATROPIUM BROMIDE AND ALBUTEROL SULFATE 1 AMPULE: .5; 3 SOLUTION RESPIRATORY (INHALATION) at 04:13

## 2020-10-11 RX ADMIN — ENOXAPARIN SODIUM 40 MG: 40 INJECTION SUBCUTANEOUS at 07:53

## 2020-10-11 RX ADMIN — ATENOLOL 25 MG: 25 TABLET ORAL at 07:53

## 2020-10-11 RX ADMIN — DOXYCYCLINE HYCLATE 100 MG: 100 TABLET, COATED ORAL at 07:52

## 2020-10-11 RX ADMIN — METHYLPREDNISOLONE SODIUM SUCCINATE 40 MG: 40 INJECTION, POWDER, FOR SOLUTION INTRAMUSCULAR; INTRAVENOUS at 03:07

## 2020-10-11 RX ADMIN — MONTELUKAST SODIUM 10 MG: 10 TABLET, FILM COATED ORAL at 21:22

## 2020-10-11 ASSESSMENT — PAIN SCALES - GENERAL
PAINLEVEL_OUTOF10: 5
PAINLEVEL_OUTOF10: 0

## 2020-10-11 NOTE — PROGRESS NOTES
Hospitalist Progress Note    Patient:  Georgette Whittington  Unit/Bed:0333/0333-01   YOB: 1955       MRN: [id number] Acct: [de-identified]  PCP: Mustapha Herbert MD    Date of Admission: 10/9/2020  --------------------------    Chief Complaint:     [place] [facility] Course:     Georgette Whittington is a 62 y.o. female hospitalized on 10/9/2020   With sob. Assessment:     COPD exacerbation   -Improving slowly  Continue respiratory care protocol, bronchodilator and steroid therapy. Continue doxycycline. Wean off oxygen as tolerated  -Old left lower lobe airspace disease since 6/2020  -SARS-CoV-2 negative. Awaiting respiratory viral panel  -Leukocytosis, likely secondary to steroid-induced      Tobacco dependence  -Nicotine patch provided       Anxiety and depression  -Continue selected home medication     Hyperlipidemia  -Resume statin     Hypertension  - ,  atenolol     Hyponatremia   overall stable, continue monitoring    Code Status: Full Code         DVT prophylaxis:  Lovenox     Disposition: Home likely tomorrow    I discussed my thought processes at length with patient/family and patient understood. Question and concerns  Addressed     Discussed with RN      ----------------      Subjective:     Patient seen and examined  Overnight events noted  RN and ancillary staff note reviewed    Requesting nicotine patch  Overall breathing improved, less wheezing. Requesting home dose of trazodone 300 mg nightly. Diet: DIET GENERAL;    OBJECTIVE     Exam:  /61   Pulse 93   Temp 97.9 °F (36.6 °C)   Resp 18   Ht 5' 5\" (1.651 m)   Wt 126 lb 2 oz (57.2 kg)   SpO2 93%   BMI 20.99 kg/m²         Gen: Not in distress. Alert. Head: Normocephalic. Atraumatic. Eyes: Conjunctivae/corneas clear. ENT: Oral mucosa moist  Neck: No JVD. No obvious thyromegaly. CVS: Nml S1S2, no murmur  , RRR  Pulmomary: Improving and air entry, expiratory wheezing.   Gastrointestinal: Soft, non tender, non distend, .  Musculoskeletal: No edema. Warm  Neuro: No focal deficit. Moves extremity spontaneously. Psychiatry: Appropriate affect. Not agitated. Medications:  Reviewed    Infusion Medications   Scheduled Medications    traZODone  300 mg Oral Nightly    nicotine  1 patch Transdermal Daily    atenolol  25 mg Oral Daily    buPROPion  150 mg Oral BID    busPIRone  30 mg Oral BID    FLUoxetine  60 mg Oral Daily    montelukast  10 mg Oral Nightly    atorvastatin  20 mg Oral Nightly    sodium chloride flush  10 mL Intravenous 2 times per day    enoxaparin  40 mg Subcutaneous Daily    methylPREDNISolone  40 mg Intravenous Q6H    Followed by   Jennifer Wang ON 10/12/2020] predniSONE  40 mg Oral Daily    doxycycline hyclate  100 mg Oral Q12H    ipratropium-albuterol  1 ampule Inhalation Q4H     PRN Meds: sodium chloride flush, acetaminophen **OR** acetaminophen, polyethylene glycol, promethazine **OR** ondansetron      Intake/Output Summary (Last 24 hours) at 10/11/2020 1247  Last data filed at 10/11/2020 0608  Gross per 24 hour   Intake 840 ml   Output 300 ml   Net 540 ml             Labs:   Recent Labs     10/09/20  2044 10/11/20  0537   WBC 13.0* 19.5*   HGB 14.3 13.7   HCT 42.3 41.0    284     Recent Labs     10/09/20  2044 10/11/20  0537   * 130*   K 4.2 4.3   CL 94* 95*   CO2 26 23   BUN 10 19   CREATININE <0.5* 0.6   CALCIUM 9.2 8.8   PHOS  --  2.4*     Recent Labs     10/09/20  2044   AST 17   ALT 10   BILITOT 0.4   ALKPHOS 59     No results for input(s): INR in the last 72 hours. Recent Labs     10/09/20  2044   TROPONINI <0.01       Urinalysis:      Lab Results   Component Value Date    NITRU Negative 06/01/2020    WBCUA 0-3 04/13/2012    RBCUA 3-5 04/13/2012    BLOODU Negative 06/01/2020    SPECGRAV 1.010 06/01/2020    GLUCOSEU Negative 06/01/2020    GLUCOSEU NEGATIVE 04/13/2012       Radiology:  XR CHEST PORTABLE   Final Result   Left lower lobe airspace disease very similar to the comparison. Therefore   this may be due to chronic atelectasis however recurrent pneumonia is a   consideration.   No evidence of acute process elsewhere in the chest..                     Electronically signed by Jeraldine Closs, MD on 10/11/2020 at 12:47 PM

## 2020-10-11 NOTE — PROGRESS NOTES
Pt up to [facility]sc feeling sob with activity pt with barky cough. resp therapy here to give pt a prn tx.pt on glenis oxy. with sat 95% on 2lnc.will glenis to monitor.

## 2020-10-11 NOTE — PROGRESS NOTES
Shift assessment completed. Patient alert and oriented, vital signs stable. Patient reports headache, PRN Tylenol given. State she think her headache is from nicotine withdrawal, is requesting nicotine patch. Will inform MD during rounds. Denies any other needs at this time. Call light within reach, will continue to monitor.

## 2020-10-12 VITALS
SYSTOLIC BLOOD PRESSURE: 136 MMHG | TEMPERATURE: 97.8 F | HEART RATE: 84 BPM | BODY MASS INDEX: 21.01 KG/M2 | WEIGHT: 126.13 LBS | HEIGHT: 65 IN | RESPIRATION RATE: 20 BRPM | OXYGEN SATURATION: 94 % | DIASTOLIC BLOOD PRESSURE: 85 MMHG

## 2020-10-12 LAB
INFLUENZA A BY PCR: NOT DETECTED
INFLUENZA B BY PCR: NOT DETECTED
RSV BY PCR: NOT DETECTED
RSV SOURCE: NORMAL

## 2020-10-12 PROCEDURE — 2700000000 HC OXYGEN THERAPY PER DAY

## 2020-10-12 PROCEDURE — 6370000000 HC RX 637 (ALT 250 FOR IP): Performed by: INTERNAL MEDICINE

## 2020-10-12 PROCEDURE — 6370000000 HC RX 637 (ALT 250 FOR IP): Performed by: HOSPITALIST

## 2020-10-12 PROCEDURE — 2580000003 HC RX 258: Performed by: INTERNAL MEDICINE

## 2020-10-12 PROCEDURE — 6360000002 HC RX W HCPCS: Performed by: INTERNAL MEDICINE

## 2020-10-12 PROCEDURE — 94640 AIRWAY INHALATION TREATMENT: CPT

## 2020-10-12 PROCEDURE — 94761 N-INVAS EAR/PLS OXIMETRY MLT: CPT

## 2020-10-12 RX ORDER — PREDNISONE 20 MG/1
20 TABLET ORAL DAILY
Qty: 7 TABLET | Refills: 0 | Status: SHIPPED | OUTPATIENT
Start: 2020-10-13 | End: 2020-10-20

## 2020-10-12 RX ORDER — DOXYCYCLINE HYCLATE 100 MG
100 TABLET ORAL 2 TIMES DAILY
Qty: 10 TABLET | Refills: 0 | Status: SHIPPED | OUTPATIENT
Start: 2020-10-12 | End: 2020-10-17

## 2020-10-12 RX ADMIN — FLUOXETINE 60 MG: 20 CAPSULE ORAL at 10:18

## 2020-10-12 RX ADMIN — BUSPIRONE HYDROCHLORIDE 30 MG: 5 TABLET ORAL at 10:12

## 2020-10-12 RX ADMIN — BUPROPION HYDROCHLORIDE 150 MG: 150 TABLET, EXTENDED RELEASE ORAL at 10:12

## 2020-10-12 RX ADMIN — IPRATROPIUM BROMIDE AND ALBUTEROL SULFATE 1 AMPULE: .5; 3 SOLUTION RESPIRATORY (INHALATION) at 02:38

## 2020-10-12 RX ADMIN — ATENOLOL 25 MG: 25 TABLET ORAL at 10:18

## 2020-10-12 RX ADMIN — METHYLPREDNISOLONE SODIUM SUCCINATE 40 MG: 40 INJECTION, POWDER, FOR SOLUTION INTRAMUSCULAR; INTRAVENOUS at 02:19

## 2020-10-12 RX ADMIN — PREDNISONE 40 MG: 20 TABLET ORAL at 10:18

## 2020-10-12 RX ADMIN — ENOXAPARIN SODIUM 40 MG: 40 INJECTION SUBCUTANEOUS at 10:18

## 2020-10-12 RX ADMIN — IPRATROPIUM BROMIDE AND ALBUTEROL SULFATE 1 AMPULE: .5; 3 SOLUTION RESPIRATORY (INHALATION) at 08:10

## 2020-10-12 RX ADMIN — DOXYCYCLINE HYCLATE 100 MG: 100 TABLET, COATED ORAL at 10:12

## 2020-10-12 RX ADMIN — Medication 10 ML: at 10:19

## 2020-10-12 RX ADMIN — IPRATROPIUM BROMIDE AND ALBUTEROL SULFATE 1 AMPULE: .5; 3 SOLUTION RESPIRATORY (INHALATION) at 11:43

## 2020-10-12 RX ADMIN — IPRATROPIUM BROMIDE AND ALBUTEROL SULFATE 1 AMPULE: .5; 3 SOLUTION RESPIRATORY (INHALATION) at 15:43

## 2020-10-12 ASSESSMENT — PAIN SCALES - GENERAL
PAINLEVEL_OUTOF10: 1
PAINLEVEL_OUTOF10: 1

## 2020-10-12 ASSESSMENT — PAIN DESCRIPTION - PAIN TYPE: TYPE: ACUTE PAIN

## 2020-10-12 ASSESSMENT — PAIN DESCRIPTION - DESCRIPTORS: DESCRIPTORS: ACHING

## 2020-10-12 ASSESSMENT — PAIN DESCRIPTION - LOCATION: LOCATION: HEAD

## 2020-10-12 NOTE — DISCHARGE SUMMARY
Hospital Medicine Discharge Summary    Patient: Israel Robert     Age: 60 y.o. Gender: female  : 1955   MRN: [id number]  Code Status: Full     Admit Date: 10/9/2020   Discharge Date: 10/12/2020    Disposition:  Home     Condition at Discharge: Stable    Primary Care Provider: Audrey Betancur MD    Admitting Physician: Vincenzo Driver MD  Discharge Physician: Montez Torre MD       Discharge Diagnoses: Active Hospital Problems    Diagnosis    Stage 3 severe COPD by GOLD classification ([facility] [J44.9]     Priority: Medium    COPD exacerbation ([address] [J44.1]    Anxiety and depression [F41.9, F32.9]    Tobacco dependence [F17.200]    Depression [F32.9]    Hyperlipidemia [E78.5]       Hospital Course: 59 yo F presented with SOB    Assessment/Plan:    COPD exacerbation with Chronic Hypoxemic Respiratory Failure  -Improving slowly  Continue respiratory care protocol, bronchodilator and steroid therapy. Continue doxycycline. Wean oxygen as tolerated  -Old left lower lobe airspace disease since 2020  -SARS-CoV-2 negative.  -Leukocytosis, likely secondary to steroid-induced    Tobacco dependence  -Nicotine patch provided     Anxiety and depression  -Continue selected home medication     Hyperlipidemia  -Resume statin     Hypertension  - atenolol     Hyponatremia   overall stable, continue monitoring          Exam:   /85   Pulse 84   Temp 97.8 °F (36.6 °C) (Oral)   Resp 20   Ht 5' 5\" (1.651 m)   Wt 126 lb 2 oz (57.2 kg)   SpO2 94%   BMI 20.99 kg/m²   General appearance: No apparent distress, appears stated age and cooperative. HEENT: Pupils equal, round, and reactive to light. Conjunctivae/corneas clear. Neck: Supple, no jugular venous distention. Trachea midline with full range of motion. Respiratory:  Normal respiratory effort. Clear to auscultation, bilaterally without Rales/Wheezes/Rhonchi.   Cardiovascular: Regular rate and rhythm with normal S1/S2 without murmurs, rubs or gallops. Abdomen: Soft, non-tender, non-distended with normal bowel sounds. Musculoskelatal: No clubbing, cyanosis or edema bilaterally. Full range of motion without deformity. Neurologic:  Neurovascularly intact without any focal sensory/motor deficits. Cranial nerves: II-XII intact, grossly non-focal.  Psychiatric: Alert and oriented, thought content appropriate, normal insight  Skin: Skin color, texture, turgor normal.  No rashes or lesions. Capillary Refill: Brisk,< 3 seconds   Peripheral Pulses: +2 palpable, equal bilaterally     Patient Discharge Instructions:    Follow-up with PCP    Discharge Medications:   Discharge Medication List as of 10/12/2020  3:36 PM      START taking these medications    Details   doxycycline hyclate (VIBRA-TABS) 100 MG tablet Take 1 tablet by mouth 2 times daily for 5 days, Disp-10 tablet,R-0Normal      predniSONE (DELTASONE) 20 MG tablet Take 1 tablet by mouth daily for 7 days, Disp-7 tablet,R-0Normal           Discharge Medication List as of 10/12/2020  3:36 PM        Discharge Medication List as of 10/12/2020  3:36 PM      CONTINUE these medications which have NOT CHANGED    Details   traZODone (DESYREL) 150 MG tablet TAKE 2 TABLETS AT BEDTIME, Disp-180 tablet,R-1Normal      fluticasone-salmeterol (WIXELA INHUB) 500-50 MCG/DOSE diskus inhaler Inhale 1 puff into the lungs every 12 hours, Disp-180 each,R-1Normal      montelukast (SINGULAIR) 10 MG tablet TAKE 1 TABLET BY MOUTH EACH NIGHT, Disp-90 tablet,R-1Normal      tiotropium (SPIRIVA HANDIHALER) 18 MCG inhalation capsule INHALE THE CONTENTS OF 1 CAPSULE EVERY DAY, Disp-90 capsule,R-1Normal      albuterol sulfate HFA (VENTOLIN HFA) 108 (90 Base) MCG/ACT inhaler Inhale 2 puffs into the lungs every 6 hours as needed for Wheezing or Shortness of Breath, Disp-3 Inhaler,R-1Normal      !! albuterol (PROVENTIL) (2.5 MG/3ML) 0.083% nebulizer solution Take 3 mLs by nebulization every 6 hours as needed for Wheezing or Shortness of Breath DX COPD J44.9, Disp-120 vial,R-6Normal      simvastatin (ZOCOR) 20 MG tablet TAKE 1 TABLET EVERY EVENING, Disp-90 tablet,R-1Normal      busPIRone (BUSPAR) 30 MG tablet TAKE 1 TABLET TWICE DAILY, Disp-180 tablet,R-1Normal      guaiFENesin (MUCINEX) 600 MG extended release tablet Take 1 tablet by mouth 2 times daily as needed for Congestion, Disp-60 tablet,R-2Normal      FLUoxetine (PROZAC) 20 MG capsule TAKE 3 CAPSULES BY MOUTH DAILY, Disp-270 capsule,R-1Normal      CHANTIX 1 MG tablet TAKE 1 TABLET BY MOUTH TWICE A DAY, Disp-60 tablet,R-0Normal      varenicline (CHANTIX STARTING MONTH PAK) 0.5 MG X 11 & 1 MG X 42 tablet Take by mouth., Disp-1 box, R-0Normal      atenolol (TENORMIN) 25 MG tablet Take 1 tablet by mouth daily (replaces propranolol), Disp-90 tablet, R-1Normal      buPROPion (WELLBUTRIN SR) 150 MG extended release tablet Take 1 tablet by mouth 2 times daily, Disp-60 tablet, R-2Normal      teriparatide, recombinant, (FORTEO) 600 MCG/2.4ML SOLN injection Inject 0.08 mLs into the skin daily One dose injected daily. , Disp-1 pen, R-11Print      !! albuterol (PROVENTIL) (2.5 MG/3ML) 0.083% nebulizer solution Take 3 mLs by nebulization every 6 hours as needed for Shortness of Breath, Disp-120 each, R-3DX: COPD J44.9Normal       !! - Potential duplicate medications found. Please discuss with provider. Discharge Medication List as of 10/12/2020  3:36 PM            Significant Test Results    Xr Chest Portable    Result Date: 10/9/2020  EXAMINATION: ONE XRAY VIEW OF THE CHEST 10/9/2020 8:21 pm COMPARISON: 06/24/2020 HISTORY: ORDERING SYSTEM PROVIDED HISTORY: shortness of breath TECHNOLOGIST PROVIDED HISTORY: Reason for exam:->shortness of breath Reason for Exam: sob Acuity: Acute Type of Exam: Initial FINDINGS: There is left lower lobe faint ill-defined airspace disease. No evidence of pneumothorax or pleural effusion.   No evidence of acute process of the cardiac or mediastinal structures     Left

## 2020-10-12 NOTE — CARE COORDINATION
10/12/20 Spoke to [facility] pt is all set up with continuous home o2. Pt declined HHC. No other DCP needs noted.

## 2020-10-12 NOTE — CARE COORDINATION
CASE MANAGEMENT INITIAL ASSESSMENT      Reviewed chart and completed assessment via telephone with:patient  Explained Case Management role/services. Primary contact information:[place] [address]:    Who do you trust or have selected to make healthcare decisions for you? Name:   Christopher Mesa [phone]  Phone  Number:   Can this person be reached and be able to respond quickly, such as within a few minutes or hours? Yes  Who would be your back-up decision maker? Name Alma Sears  Phone Number:[phone]    Admit date/status:10/9/20  Diagnosis:COPD   Is this a Readmission?:  No      Insurance:humana   Precert required for SNF: Yes       3 night stay required: No    Living arrangements, Adls, care needs, prior to admission:lives in 2 story apartment with spouse. Transportation:private     Durable Medical Equipment at home:  Walker_x_Cane_x_RTS__ BSC__Shower Chair__  02_x 2 LNC HS provided by [facility]_ HHN_x_ CPAP__  BiPap__  Hospital Bed__ W/C___ Other__________    Services in the home and/or outpatient, prior to [address] (if applicable)   · Name:  · Address:  · Dialysis Schedule:  · Phone:  · Fax:    PT/OT recs:none    Hospital Exemption Notification (HEN):needed for SNF    Barriers to discharge:none    Plan/comments:spoke with patient. Stated plans on returning home at discharge. Discussed possible HHC needs. Patient declined. Stated she and her  can handle. Has O2 in the home provided by [facility]. Please notify should other needs arise. Spoke with [facility] liaison. Stated if patient needs other than HS will need order and face to face. Walk test is completed. Following.  Kalen Patricio RN       ECOC on chart for MD signature

## 2020-10-12 NOTE — PROGRESS NOTES
Shift assessment completed and charted. Walking and test for home o2 completed and charted. VSS. Bed locked and in lowest position. Call light within reach. Pt denies any other needs at this time. Will continue to monitor.

## 2020-10-12 NOTE — PLAN OF CARE
Problem: Falls - Risk of:  Goal: Will remain free from falls  Description: Will remain free from falls  Outcome: Ongoing  Goal: Absence of physical injury  Description: Absence of physical injury  Outcome: Ongoing   Pt free from falls and injury. Calls out for assistance as needed. [place]

## 2020-10-12 NOTE — PROGRESS NOTES
Oxygen documentation:     1. O2 saturation at REST on ROOM AIR = 92%     If saturation is 89% or above please proceed with steps 2 and 3. 2. O2 saturation with AMBULATION of 100 feet on ROOM AIR = 87%  3. O2 saturation with AMBULATION on 2 liter/min = 88%  4.  O2 saturation with AMBULATION on 3 liter/min = 91%

## 2020-10-12 NOTE — PROGRESS NOTES
Pt alert and orientated. Call light within reach. No complaints at this time and will continue to monitor. [place]

## 2020-10-12 NOTE — PROGRESS NOTES
Discharge instructions given. No questions or concerns. Pt has prescriptions already filled in out pt pharmacy. [place]

## 2020-10-13 ENCOUNTER — CARE COORDINATION (OUTPATIENT)
Dept: CASE MANAGEMENT | Age: 65
End: 2020-10-13

## 2020-10-13 PROCEDURE — 1111F DSCHRG MED/CURRENT MED MERGE: CPT | Performed by: FAMILY MEDICINE

## 2020-10-13 NOTE — CARE COORDINATION
understanding of administration of home medications. Advised obtaining a 90-day supply of all daily and as-needed medications. Covid Risk Education    Patient has following risk factors of: COPD and acute respiratory failure. Education provided regarding infection prevention, and signs and symptoms of COVID-19 and when to seek medical attention with patient who verbalized understanding. Discussed exposure protocols and quarantine From CDC: Are you at higher risk for severe illness?   and given an opportunity for questions and concerns. The patient agrees to contact the COVID-19 hotline [phone] or PCP office for questions related to COVID-19. For more information on steps you can take to protect yourself, see CDC's How to Protect Yourself       Discussed follow-up appointments. If no appointment was previously scheduled, appointment scheduling offered: Yes. Is follow up appointment scheduled within 7 days of discharge? No Patient stated she would call PCP office for apt   [id number] follow up appointment(s):     Plan for follow-up call in 5-7 days based on severity of symptoms and risk factors. Spoke with patient who reported she is doing alright. Patient stated her breathing is stable and denied any increase SOB. Patient reported she is wearing oxygen and doing well. Reviewed all medications with patient and she reported she is taking all as prescribed. Patient stated she would call PCP office to setup follow up apt. Denies any acute needs at present time. Agreeable to f/u calls. Educated on the use of urgent care or physicians 24 hr access line if assistance is needed after hours. CTN provided contact information for future needs.           Care Transitions 24 Hour Call    Do you have any ongoing symptoms?:  No  Do you have a copy of your discharge instructions?:  Yes  Do you have all of your prescriptions and are they filled?:  Yes  Have you been contacted by a [address]?:  No  Have

## 2020-10-14 ENCOUNTER — TELEPHONE (OUTPATIENT)
Dept: PULMONOLOGY | Age: 65
End: 2020-10-14

## 2020-10-14 NOTE — TELEPHONE ENCOUNTER
I called pt it looks like per chart that orders were placed to [facility] by IP MD Dr Nicol Garcia.  I advised pt to call [facility] and check her o2 delivery and call us back if there was a problem

## 2020-10-16 NOTE — PROGRESS NOTES
Physician Progress Note      Kathy Singh  CSN #:                  [id number]  :                       1955  ADMIT DATE:       10/9/2020 7:09 PM  [address] DATE:        10/12/2020 4:24 PM  RESPONDING  PROVIDER #:        Juan Lujan MD          QUERY TEXT:    Pt admitted with COPD exacerbation. Pt unable to be weaned from supplemental   02 prior to discharge, previous baseline requirement was hs only. If possible,   please document in the progress notes and discharge summary if you are   evaluating and/or treating any of the following: The medical record reflects the following:  Risk Factors: 62 y.o. female w/COPD  Clinical Indicators: increased supplemental 02 requirement and failure to wean   to baseline requirements, new DME orders filed on discharge and per last   flowsheet entry filed pt was on 3 ltr NC at discharge. Treatment: supplemental 02, vs and pulse oximetry monitoring, ongoing   supportive care    Thank you,  Hermes Ferrera RN [facility]  [phone]  Options provided:  -- Chronic respiratory failure with hypoxia  -- Other - I will add my own diagnosis  -- Disagree - Not applicable / Not valid  -- Disagree - Clinically unable to determine / Unknown  -- Refer to Clinical Documentation Reviewer    PROVIDER RESPONSE TEXT:    This patient has chronic respiratory failure with hypoxia. Query created by:  Jearlean Hodgkin on 10/16/2020 2:25 PM      Electronically signed by:  Juan Lujan MD 10/16/2020 2:50 PM

## 2020-10-19 ENCOUNTER — VIRTUAL VISIT (OUTPATIENT)
Dept: PULMONOLOGY | Age: 65
End: 2020-10-19
Payer: MEDICARE

## 2020-10-19 PROCEDURE — 99443 PR PHYS/QHP TELEPHONE EVALUATION 21-30 MIN: CPT | Performed by: INTERNAL MEDICINE

## 2020-10-19 NOTE — PATIENT INSTRUCTIONS
Remember to bring a list of pulmonary medications and any CPAP or BiPAP machines to your next appointment with the office. Please keep all of your future appointments scheduled by [address], [facility] office. Out of respect for other patients and providers, you may be asked to reschedule your appointment if you arrive later than your scheduled appointment time. Appointments cancelled less than 24hrs in advance will be considered a no show. Patients with three missed appointments within 1 year or four missed appointments within 2 years can be dismissed from the practice. You may receive a survey regarding the care you received during your visit. Your input is valuable to us. We encourage you to complete and return your survey. We hope you will choose us in the future for your healthcare needs. Pt instructed of all future appointment dates & times, including radiology, labs, procedures & referrals. If procedures were scheduled preparation instructions provided. Instructions on future appointments with [facility] were given.

## 2020-10-19 NOTE — PROGRESS NOTES
P Pul[facility]    Outpatient Follow Up Note  TELEHEALTH EVALUATION: Service performed was Audio (During EWW-29 public health emergency) and not a face-to-face visit       CHIEF COMPLAINT: follow-up hospitalization      HPI:   Patient was discharged 10/12/2020 after admission for COPD exacerbation. Completed course of prednisone taper and doxycycline. Doing much better  No cough or sputum  Completing Abx and steroids   No smoking   Uses 2LPM at night   O2 sat today 96%     From prior visit:   No humidifier. No air . No purifier. No steam sauna. No steam shower. + indoor hot tub- no reported. Had mold in her old house and moved to new one 1.5 months ago. No asbestos exposure. No down pillows or comforters. Has 1 parrot. No fatigue. No joint stiffness, pain or swelling wrists or knees. No difficulty swallowing. + dryness mouth. + fingers color change in cold weather- fingers turns white in cold weather. No recurrent fever. + weight loss. + GERD. No rash. No mouth ulcers.            Past Medical History:   Diagnosis Date    Allergic rhinitis 6/11/2010    Anxiety     Arthritis     Aspiration pneumonia ([address] 3/21/2012    Recurrent    Asthma     Bronchitis chronic     COPD (chronic obstructive pulmonary disease) ([address] 12/3/2009    Depression     Drug abuse, cocaine type (HCC)     past history of crack cocaine use    Emphysema of lung (HCC)     Fibromyalgia     GERD (gastroesophageal reflux disease)     Hyperlipidemia     Influenza A 03/05/2020    Lung disease     On home oxygen therapy     uses O2 NC 3L prn at night    Osteoporosis     Pneumonia     Polysubstance dependence ([address] 1/2/2012    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    Pulmonary fibrosis ([address] 10/16/2014    Pulmonary infiltrate     Pulmonary nodule 12/3/2009    Tobacco abuse        Past Surgical History:        Procedure Laterality Date    BLADDER REPAIR      BRONCHOSCOPY      BRONCHOSCOPY N/A 3/6/2020    BRONCHOSCOPY THERAPUTIC ASPIRATION INITIAL performed by Tal De Jesus MD at [address] [place] [address]  3/6/2020    BRONCHOSCOPY ALVEOLAR LAVAGE performed by Tal De Jesus MD at [address] [place] [address] N/A 6/26/2020    BRONCHOSCOPY THERAPUTIC ASPIRATION INITIAL performed by Gilda Boswell MD at [address] [place] [address]  6/26/2020    BRONCHOSCOPY ALVEOLAR LAVAGE performed by Gilda Boswell MD at [facility]  2012    ENDOSCOPY, COLON, DIAGNOSTIC      ESOPHAGEAL DILATATION  9/20/2018    ESOPHAGEAL DILATION [place] performed by Jennette Dandy, MD at [address] [facility] HISTORY  2/12/15    T8 Kyphoplasty    MT COLONOSCOPY FLX DX W/COLLJ SPEC WHEN PFRMD N/A 9/20/2018    EGD AND COLONOSCOPY WITH ANESTHESIA performed by Jennette Dandy, MD at [address] ESOPHAGOGASTRODUODENOSCOPY TRANSORAL DIAGNOSTIC N/A 9/20/2018    EGD AND COLONOSCOPY WITH ANESTHESIA performed by Jennette Dandy, MD at [address]         Allergies:  is allergic to meperidine and demerol. Social History:    TOBACCO:   reports that she quit smoking 7 days ago. Her smoking use included cigarettes. She started smoking about 48 years ago. She has a 40.00 pack-year smoking history. She has never used smokeless tobacco.  ETOH:   reports no history of alcohol use.       Family History:       Problem Relation Age of Onset    Asthma Mother     Diabetes Mother     Emphysema Mother     Heart Failure Mother     Hypertension Mother     Heart Disease Mother     High Cholesterol Mother    [de-identified] Cancer Mother     Depression Mother     Diabetes Father     Emphysema Father     Heart Failure Father     Hypertension Father     Heart Disease Father     High Cholesterol Father     Substance Abuse Father     Emphysema Paternal Grandfather     Diabetes Sister     High Cholesterol Sister     Vision Loss Maternal Uncle        Current Medications:    Current Outpatient Medications:     predniSONE (DELTASONE) 20 MG tablet, Take 1 tablet by mouth daily for 7 days, Disp: 7 tablet, Rfl: 0    traZODone (DESYREL) 150 MG tablet, TAKE 2 TABLETS AT BEDTIME, Disp: 180 tablet, Rfl: 1    fluticasone-salmeterol (WIXELA INHUB) 500-50 MCG/DOSE diskus inhaler, Inhale 1 puff into the lungs every 12 hours, Disp: 180 each, Rfl: 1    montelukast (SINGULAIR) 10 MG tablet, TAKE 1 TABLET BY MOUTH EACH NIGHT, Disp: 90 tablet, Rfl: 1    tiotropium (SPIRIVA HANDIHALER) 18 MCG inhalation capsule, INHALE THE CONTENTS OF 1 CAPSULE EVERY DAY, Disp: 90 capsule, Rfl: 1    albuterol sulfate HFA (VENTOLIN HFA) 108 (90 Base) MCG/ACT inhaler, Inhale 2 puffs into the lungs every 6 hours as needed for Wheezing or Shortness of Breath, Disp: 3 Inhaler, Rfl: 1    albuterol (PROVENTIL) (2.5 MG/3ML) 0.083% nebulizer solution, Take 3 mLs by nebulization every 6 hours as needed for Wheezing or Shortness of Breath DX COPD J44.9, Disp: 120 vial, Rfl: 6    simvastatin (ZOCOR) 20 MG tablet, TAKE 1 TABLET EVERY EVENING, Disp: 90 tablet, Rfl: 1    busPIRone (BUSPAR) 30 MG tablet, TAKE 1 TABLET TWICE DAILY, Disp: 180 tablet, Rfl: 1    guaiFENesin (MUCINEX) 600 MG extended release tablet, Take 1 tablet by mouth 2 times daily as needed for Congestion, Disp: 60 tablet, Rfl: 2    FLUoxetine (PROZAC) 20 MG capsule, TAKE 3 CAPSULES BY MOUTH DAILY, Disp: 270 capsule, Rfl: 1    atenolol (TENORMIN) 25 MG tablet, Take 1 tablet by mouth daily (replaces propranolol), Disp: 90 tablet, Rfl: 1    buPROPion (WELLBUTRIN SR) 150 MG extended release tablet, Take 1 tablet by mouth 2 times daily, Disp: 60 tablet, Rfl: 2    teriparatide, recombinant, (FORTEO) 600 MCG/2.4ML SOLN injection, Inject 0.08 mLs into the skin daily One dose injected daily. , Disp: 1 pen, Rfl: 11            Objective:   Telephone visit not able to obtain physical exam             DATA reviewed by me:   PFTs 08/29/2019 FVC 1.68 (50%) FEV1 0.98(38%) TLC 4.35 (81%)  DLCO 07.19(30%) 6MW 860  F L O2 90%   PFTs 02/28/2019 FVC 2.03 (60%) FEV1 1.30(50%) TLC 4.15 (78%)  DLCO 07.23(31%) 6MW 560  F L O2 90%   PFTs 08/15/2018 FVC 1.86 (55%) FEV1 1.19(45%) TLC 4.28(80%)   DLCO 07.59(32%) 6MW 840  F L O2 94%   PFTs 10/13/2017 FVC 2.00 (58%) FEV1 1.27(48%) TLC 4.78(89%)   DLCO 06.16(38%) 6MW 1120F L O2 94%  PFTs 05/25/2017 FVC 2.15 (63%) FEV1 1.25(47%) TLC 5.37(100%) DLCO 11.13(47%) 6MW 1120F L O2 95%  PFTs 01/30/2017 FVC 2.05 (60%) FEV1 1.18(44%) TLC 4.69(87%)   DLCO 09.35(39%) 6MW 1190F L O2 95%  PFTs 10/10/2016 FVC 1.96 (57%) FEV1 1.18(44%) TLC 4.57(85%)   DLCO 08.31(35%) 6MW 1000F L O2 94%  PFTs 04/25/2016 FVC 1.63 (50%) FEV1 0.93(37%) TLC 4.96(97%)   DLCO 09.66(42%) 6MW 1140F L O2 91%  PFTs 12/09/2013                            FEV1 1.54(59%) TLC 5.34(104%) DLCO 11.30(49%) 6MW 1140F L O2 91%. PFTs 06/03/2013                            FEV1 1.69(65%) TLC 4.81(93%)   DLCO 09.44(41%) 6MW 1400F L O2 95%. PFTs 11/28/2012                            FEV1 1.67(64%) TLC 5.08(98%)   DLCO 11.94(51%) 6MW 1400F L O2 95%. PFTs 06/19/2012                            FEV1 1.59(60%) TLC 4.66(90%)   DLCO 10.21(44%) 6MW 1280F L O2 96%.    PFTs 03/07/2012                            FEV1 1.72(70%) TLC 4.49(87%)   DLCO 10.72(54%)  PFTs 08/03/2011                            FEV1 1.71(72%) TLC 4.72(96%)   DLCO 12.3 (63%)  PFTs 01/10/2011                            FEV1 1.76           TLC 4.54             DLCO 10.50  PFTs 03/30/2010                            FEV1 1.96           TLC 4.86             DLCO 14.13  PFTs 03/03/2009                            FEV1 2.04           TLC 5.08             DLCO 14.02      CT chest 8/25/2019  Decreased bibasilar airspace disease and nodular opacification  Bronchial wall thickening  Stable reticular opacities upper lobe  Stable mediastinal lymph nodes    Chest x-ray 10/9/2020 imaging reviewed by me and showed  Left lower lobe airspace disease similar to prior      4/5/2016 normal/negative RF 14, SCL70 SSA SSB aldolase CK GIORGI CCP Anti MALORIE-1    4/25/2016 Elevated IgA normal IgG and IgM      IgE 140 with unremarkable immunocap      Assessment:   · Abnormal chest x-ray with LLL airspace disease  · Moderate obstructive airways diease secondary to COPD and asthma  · Abnormal CT 4/1/2016 with GGO- DDx RB-ILD, NSIP, HP, DIP, eosinophilic pneumonitis, aspiration and CTD-ILD. Favor smoking related ILD vs HP. Bronch 4/6/16 purulent secretions and grew strep pneumoniae. Negative AFB. Got rid of her Di Butter   · Bronchiectasis with acute exacerbation  · Nocturnal hypoxia- on 2LPM   · >30 pack-years smoking                                          Plan:   · CT chest to further evaluate abnormal chest x-ray and PFTs/6MW in 4 weeks with follow up   · Continue Advair 500/50 BID, Spiriva, and albuterol as needed  · Continue Singulair daily  · Continue O2 2LPM at night  · Patient is up to date with Pneumococcal vaccine   · Advised to get influenza vaccine this year   · Acapella and Mucinex   · Advised to continue with smoking cessation. Fortino Rader is a 62 y.o. female evaluated via telephone on 10/19/2020. Consent:  She and/or health care decision maker is aware that that she may receive a bill for this telephone service, depending on her insurance coverage, and has provided verbal consent to proceed: Yes       Documentation:  I communicated with the patient and/or health care decision maker about: See above   Details of this discussion including any medical advice provided: See above       I Affirm this is a Patient Initiated Episode with an Established Patient who has not had a related appointment within my department in the past 7 days or scheduled within the next 24 hours.     Total Time: 21-30 minutes     Note: not billable if this call serves to triage the

## 2020-10-20 ENCOUNTER — CARE COORDINATION (OUTPATIENT)
Dept: CASE MANAGEMENT | Age: 65
End: 2020-10-20

## 2020-10-26 RX ORDER — TIOTROPIUM BROMIDE 18 UG/1
CAPSULE ORAL; RESPIRATORY (INHALATION)
Qty: 90 CAPSULE | Refills: 1 | OUTPATIENT
Start: 2020-10-26

## 2020-10-27 ENCOUNTER — CARE COORDINATION (OUTPATIENT)
Dept: CASE MANAGEMENT | Age: 65
End: 2020-10-27

## 2020-10-27 NOTE — CARE COORDINATION
Ajith 45 Transitions Follow Up Call    10/27/2020    Patient: Israel Robert  Patient : 1955   MRN: [id number]  Reason for Admission: COPD   Discharge Date: 10/12/20 RARS: Readmission Risk Score: 22         Spoke with:  Ashley Mtz with patient who reported she is doing well. Patient reports her breathing is stable and she is no longer in need of her oxygen. Patient had apt with Dr. Giovanny Khan on 10/19 and will have CT and PFT on  and then apt with Dr. Giovanny Khan on . Patient denied any questions or concerns at this time and was agreeable to follow up calls. CTN advised patient of use of urgent care or physicians 24 hr access line if assistance is needed after hours. Care Transitions Subsequent and Final Call    Subsequent and Final Calls  Do you have any ongoing symptoms?:  No  Have your medications changed?:  No  Do you have any questions related to your medications?:  No  Do you currently have any active services?:  No  Are you currently active with any services?:  Home Health  Do you have any needs or concerns that I can assist you with?:  No  Identified Barriers:  None  Care Transitions Interventions  Other Interventions:             Follow Up  Future Appointments   Date Time Provider Matt Crabtree   2020 11:00 AM [facility] CT MAIN [facility] CT [place] Rad   2020 12:00 PM [facility] PULMONARY FUNCTION TESTING [facility] PFT [facility]   2020  3:30 PM MD LINDA Carney   3/4/2021  2:15 PM MD LINDA Carney RN

## 2020-11-03 ENCOUNTER — CARE COORDINATION (OUTPATIENT)
Dept: CASE MANAGEMENT | Age: 65
End: 2020-11-03

## 2020-11-03 PROBLEM — J44.9 CHRONIC OBSTRUCTIVE PULMONARY DISEASE (HCC): Status: RESOLVED | Noted: 2019-12-25 | Resolved: 2020-11-03

## 2020-11-03 NOTE — CARE COORDINATION
Ajith 45 Transitions Follow Up Call    11/3/2020    Patient: Toshia Jaimes  Patient : 1955   MRN: [id number]  Reason for Admission: COPD   Discharge Date: 10/12/20 RARS: Readmission Risk Score: 22         Spoke with:  Ashley Mtz with patient who reported she is doing alright. Patient reported her breathing is ok and she hasn't been wearing the oxygen. Patient also reported she has not been checking her pulse ox. Patient attempted to check her pulse ox while on the phone with CTN and reported her batteries were dead in the pulse ox and she would need to get new ones. Patient encouraged to check her pulse ox and use oxygen as needed. Patient also reported she has been smoking and admits it is hard to quit. Patient reported she has tried different smoking cessation medications in past but has failed with inconsistent of taking the medication. Patient encouraged to try and wean down smoking and reevaluate smoking cessation with doctor. Patient encouraged to reach out to doctor with worsening breathing. Care Transitions Subsequent and Final Call    Subsequent and Final Calls  Do you have any ongoing symptoms?:  No  Have your medications changed?:  No  Do you have any questions related to your medications?:  No  Do you currently have any active services?:  No  Are you currently active with any services?:  Home Health  Do you have any needs or concerns that I can assist you with?:  No  Identified Barriers:  None  Care Transitions Interventions  Other Interventions:             Follow Up  Future Appointments   Date Time Provider Matt Crabtree   2020 11:00 AM [facility] CT MAIN [facility] CT SC [place] Rad   2020 12:00 PM [facility] PULMONARY FUNCTION TESTING [facility] PFT [facility]   2020  3:30 PM MD LINDA De La Torre PULJD TRAYLOR   3/4/2021  2:15 PM MD LINDA De La Torre PULJD Blunt RN

## 2020-11-10 ENCOUNTER — CARE COORDINATION (OUTPATIENT)
Dept: CASE MANAGEMENT | Age: 65
End: 2020-11-10

## 2020-11-10 ENCOUNTER — OFFICE VISIT (OUTPATIENT)
Dept: PRIMARY CARE CLINIC | Age: 65
End: 2020-11-10
Payer: MEDICARE

## 2020-11-10 PROCEDURE — 99211 OFF/OP EST MAY X REQ PHY/QHP: CPT | Performed by: NURSE PRACTITIONER

## 2020-11-10 NOTE — PATIENT INSTRUCTIONS
Advance Care Planning  People with COVID-19 may have no symptoms, mild symptoms, such as fever, cough, and shortness of breath or they may have more severe illness, developing severe and fatal pneumonia. As a result, Advance Care Planning with attention to naming a health care decision maker (someone you trust to make healthcare decisions for you if you could not speak for yourself) and sharing other health care preferences is important BEFORE a possible health crisis. Please contact your Primary Care Provider to discuss Advance Care Planning. Preventing the Spread of Coronavirus Disease 2019 in Homes and Residential Communities  For the most recent information go to [email or web address]    Prevention steps for People with confirmed or suspected COVID-19 (including persons under investigation) who do not need to be hospitalized  and   People with confirmed COVID-19 who were hospitalized and determined to be medically stable to go home    Your healthcare provider and public health staff will evaluate whether you can be cared for at home. If it is determined that you do not need to be hospitalized and can be isolated at home, you will be monitored by staff from your local or state health department. You should follow the prevention steps below until a healthcare provider or local or state health department says you can return to your normal activities. Stay home except to get medical care  People who are mildly ill with COVID-19 are able to isolate at home during their illness. You should restrict activities outside your home, except for getting medical care. Do not go to work, school, or public areas. Avoid using public transportation, ride-sharing, or taxis. Separate yourself from other people and animals in your home  People: As much as possible, you should stay in a specific room and away from other people in your home.  Also, you should use a separate bathroom, if available. Animals: You should restrict contact with pets and other animals while you are sick with COVID-19, just like you would around other people. Although there have not been reports of pets or other animals becoming sick with COVID-19, it is still recommended that people sick with COVID-19 limit contact with animals until more information is known about the virus. When possible, have another member of your household care for your animals while you are sick. If you are sick with COVID-19, avoid contact with your pet, including petting, snuggling, being kissed or licked, and sharing food. If you must care for your pet or be around animals while you are sick, wash your hands before and after you interact with pets and wear a facemask. Call ahead before visiting your doctor  If you have a medical appointment, call the healthcare provider and tell them that you have or may have COVID-19. This will help the healthcare providers office take steps to keep other people from getting infected or exposed. Wear a facemask  You should wear a facemask when you are around other people (e.g., sharing a room or vehicle) or pets and before you enter a healthcare providers office. If you are not able to wear a facemask (for example, because it causes trouble breathing), then people who live with you should not stay in the same room with you, or they should wear a facemask if they enter your room. Cover your coughs and sneezes  Cover your mouth and nose with a tissue when you cough or sneeze. Throw used tissues in a lined trash can. Immediately wash your hands with soap and water for at least 20 seconds or, if soap and water are not available, clean your hands with an alcohol-based hand  that contains at least 60% alcohol.   Clean your hands often  Wash your hands often with soap and water for at least 20 seconds, especially after blowing your nose, coughing, or sneezing; going to the bathroom; and have a medical emergency and need to call 911, notify the dispatch personnel that you have, or are being evaluated for COVID-19. If possible, put on a facemask before emergency medical services arrive. Discontinuing home isolation  Patients with confirmed COVID-19 should remain under home isolation precautions until the risk of secondary transmission to others is thought to be low. The decision to discontinue home isolation precautions should be made on a case-by-case basis, in consultation with healthcare providers and state and local health departments.

## 2020-11-10 NOTE — PROGRESS NOTES
Gavin Friend received a viral test for COVID-19. They were educated on isolation and quarantine as appropriate. For any symptoms, they were directed to seek care from their PCP, given contact information to establish with a doctor, directed to an urgent care or the emergency room.

## 2020-11-11 LAB — SARS-COV-2: NOT DETECTED

## 2020-11-16 ENCOUNTER — HOSPITAL ENCOUNTER (OUTPATIENT)
Dept: CT IMAGING | Age: 65
Discharge: HOME | End: 2020-11-16

## 2020-11-27 ENCOUNTER — HOSPITAL ENCOUNTER (OUTPATIENT)
Age: 65
Discharge: HOME | End: 2020-11-27
Payer: MEDICARE

## 2020-11-27 LAB — SARS-COV-2: NOT DETECTED

## 2020-11-27 PROCEDURE — U0003 INFECTIOUS AGENT DETECTION BY NUCLEIC ACID (DNA OR RNA); SEVERE ACUTE RESPIRATORY SYNDROME CORONAVIRUS 2 (SARS-COV-2) (CORONAVIRUS DISEASE [COVID-19]), AMPLIFIED PROBE TECHNIQUE, MAKING USE OF HIGH THROUGHPUT TECHNOLOGIES AS DESCRIBED BY CMS-2020-01-R: HCPCS

## 2020-12-02 ENCOUNTER — HOSPITAL ENCOUNTER (OUTPATIENT)
Dept: CT IMAGING | Age: 65
Discharge: HOME | End: 2020-12-02
Payer: MEDICARE

## 2020-12-02 ENCOUNTER — HOSPITAL ENCOUNTER (OUTPATIENT)
Dept: PULMONOLOGY | Age: 65
Discharge: HOME | End: 2020-12-02
Payer: MEDICARE

## 2020-12-02 LAB
DLCO %PRED: 30 %
DLCO PRED: NORMAL
DLCO/VA %PRED: NORMAL
DLCO/VA PRED: NORMAL
DLCO/VA: NORMAL
DLCO: NORMAL
EXPIRATORY TIME-POST: NORMAL
EXPIRATORY TIME: NORMAL
FEF 25-75% %CHNG: NORMAL
FEF 25-75% %PRED-POST: NORMAL
FEF 25-75% %PRED-PRE: NORMAL
FEF 25-75% PRED: NORMAL
FEF 25-75%-POST: NORMAL
FEF 25-75%-PRE: NORMAL
FEV1 %PRED-POST: 49 %
FEV1 %PRED-PRE: 45 %
FEV1 PRED: NORMAL
FEV1-POST: NORMAL
FEV1-PRE: NORMAL
FEV1/FVC %PRED-POST: NORMAL
FEV1/FVC %PRED-PRE: NORMAL
FEV1/FVC PRED: NORMAL
FEV1/FVC-POST: 57 %
FEV1/FVC-PRE: 54 %
FVC %PRED-POST: NORMAL
FVC %PRED-PRE: NORMAL
FVC PRED: NORMAL
FVC-POST: NORMAL
FVC-PRE: NORMAL
GAW %PRED: NORMAL
GAW PRED: NORMAL
GAW: NORMAL
IC %PRED: NORMAL
IC PRED: NORMAL
IC: NORMAL
MEP: NORMAL
MIP: NORMAL
MVV %PRED-PRE: NORMAL
MVV PRED: NORMAL
MVV-PRE: NORMAL
PEF %PRED-POST: NORMAL
PEF %PRED-PRE: NORMAL
PEF PRED: NORMAL
PEF%CHNG: NORMAL
PEF-POST: NORMAL
PEF-PRE: NORMAL
RAW %PRED: NORMAL
RAW PRED: NORMAL
RAW: NORMAL
RV %PRED: NORMAL
RV PRED: NORMAL
RV: NORMAL
SVC %PRED: NORMAL
SVC PRED: NORMAL
SVC: NORMAL
TLC %PRED: 78 %
TLC PRED: NORMAL
TLC: NORMAL
VA %PRED: NORMAL
VA PRED: NORMAL
VA: NORMAL
VTG %PRED: NORMAL
VTG PRED: NORMAL
VTG: NORMAL

## 2020-12-02 PROCEDURE — 94060 EVALUATION OF WHEEZING: CPT

## 2020-12-02 PROCEDURE — 6370000000 HC RX 637 (ALT 250 FOR IP): Performed by: INTERNAL MEDICINE

## 2020-12-02 PROCEDURE — 94726 PLETHYSMOGRAPHY LUNG VOLUMES: CPT

## 2020-12-02 PROCEDURE — 71250 CT THORAX DX C-: CPT

## 2020-12-02 PROCEDURE — 94618 PULMONARY STRESS TESTING: CPT

## 2020-12-02 PROCEDURE — 94640 AIRWAY INHALATION TREATMENT: CPT

## 2020-12-02 PROCEDURE — 94729 DIFFUSING CAPACITY: CPT

## 2020-12-02 RX ORDER — ALBUTEROL SULFATE 90 UG/1
2 AEROSOL, METERED RESPIRATORY (INHALATION) ONCE
Status: COMPLETED | OUTPATIENT
Start: 2020-12-02 | End: 2020-12-02

## 2020-12-02 RX ADMIN — Medication 2 PUFF: at 14:22

## 2020-12-02 ASSESSMENT — PULMONARY FUNCTION TESTS
FEV1/FVC_PRE: 54
FEV1/FVC_POST: 57
FEV1_PERCENT_PREDICTED_PRE: 45
FEV1_PERCENT_PREDICTED_POST: 49

## 2020-12-03 ENCOUNTER — TELEPHONE (OUTPATIENT)
Dept: PULMONOLOGY | Age: 65
End: 2020-12-03

## 2020-12-03 RX ORDER — FLUOXETINE HYDROCHLORIDE 20 MG/1
60 CAPSULE ORAL DAILY
Qty: 270 CAPSULE | Refills: 1 | Status: SHIPPED | OUTPATIENT
Start: 2020-12-03

## 2020-12-03 NOTE — PROCEDURES
[address]                 [address] [address]                               PULMONARY FUNCTION    PATIENT NAME: Jolane Gosselin                       :        1955  MED REC NO:   [id number]                          ROOM:  ACCOUNT NO:   [de-identified]                           ADMIT DATE: 2020  PROVIDER:     Susie Hirsch MD    DATE OF PROCEDURE:  2020    REQUESTING PROVIDER:  Rl Neff MD    INDICATION:  COPD. FINDINGS:  1. Spirometry: The FVC is 64% of predicted. The FEV1 is 1.15 liters,  which is 45% of predicted. The FEV1/FVC ratio is 0.54. There was no  statistically significant response to bronchodilator. 2.  Plethysmography:  The total lung capacity is 4.16 liters, which is  78% of predicted. 3.  The diffusion capacity for carbon monoxide is 30% of predicted. 4.  The flow volume loop shows an obstructive pattern. IMPRESSION:  This patient has severe COPD and a decreased DLCO. There  is a coexisting restrictive defect that is only mild to moderate in  nature and clinical correlation is recommended. SIX-MINUTE WALK TEST    A six-minute walk test was conducted per [facility] protocol. The  patient walked 880 feet on room air. The resting SpO2 was 94% and at  the completion of the test, the SpO2 was 93%. The heart rate was 122 at  the beginning of the test and the highest heart rate reached was 139. IMPRESSION:  This patient has mild hypoxemia while walking, but  supplemental oxygen is not required. This patient has resting  tachycardia and clinical correlation for this is recommended.         Janneth Conley MD    D: 2020 13:57:45       T: 2020 15:38:48     [email or web address]  Job#: [id number]     Doc#: [id number]    CC:

## 2020-12-03 NOTE — TELEPHONE ENCOUNTER
Refill Request     Last Seen: 7/24/2020    Last Written: #270   1rf  7/13/2020    Next Appointment:   Future Appointments   Date Time Provider Matt Crabtree   12/7/2020  2:15 PM MD LINDA Astudillo   3/4/2021  2:15 PM [facility]       Appointment scheduled      Requested Prescriptions     Pending Prescriptions Disp Refills    FLUoxetine (PROZAC) 20 MG capsule [Pharmacy Med Name: FLUOXETINE HCL 20 MG Capsule] 270 capsule 1     Sig: TAKE 3 CAPSULES BY MOUTH DAILY

## 2020-12-03 NOTE — TELEPHONE ENCOUNTER
EGK order per result note. Result Notes       Nathan Mullins   12/3/2020  3:59 PM       Patient informed on result with a verbal understanding. Patient states she will complete ekg before fua scheduled on 12/7/20.  States she will keep track of her HR and update the office      Autumn Dubon MD   12/3/2020  3:41 PM       Tachycardia on 6MW   Get 12 lead ECG   Have patient monitor her pulse and report tachycardia HR>90

## 2020-12-04 ENCOUNTER — HOSPITAL ENCOUNTER (OUTPATIENT)
Age: 65
Discharge: HOME | End: 2020-12-04
Payer: MEDICARE

## 2020-12-04 LAB
EKG ATRIAL RATE: 79 BPM
EKG DIAGNOSIS: NORMAL
EKG P AXIS: 67 DEGREES
EKG P-R INTERVAL: 144 MS
EKG Q-T INTERVAL: 364 MS
EKG QRS DURATION: 76 MS
EKG QTC CALCULATION (BAZETT): 417 MS
EKG R AXIS: 48 DEGREES
EKG T AXIS: 74 DEGREES
EKG VENTRICULAR RATE: 79 BPM

## 2020-12-04 PROCEDURE — 93010 ELECTROCARDIOGRAM REPORT: CPT | Performed by: INTERNAL MEDICINE

## 2020-12-04 PROCEDURE — 93005 ELECTROCARDIOGRAM TRACING: CPT | Performed by: INTERNAL MEDICINE

## 2020-12-07 ENCOUNTER — VIRTUAL VISIT (OUTPATIENT)
Dept: PULMONOLOGY | Age: 65
End: 2020-12-07
Payer: MEDICARE

## 2020-12-07 PROCEDURE — 99443 PR PHYS/QHP TELEPHONE EVALUATION 21-30 MIN: CPT | Performed by: INTERNAL MEDICINE

## 2020-12-07 RX ORDER — MONTELUKAST SODIUM 10 MG/1
TABLET ORAL
Qty: 90 TABLET | Refills: 1 | Status: SHIPPED | OUTPATIENT
Start: 2020-12-07 | End: 2021-07-21 | Stop reason: SDUPTHER

## 2020-12-07 NOTE — PROGRESS NOTES
[facility]    Outpatient Follow Up Note  TELEHEALTH EVALUATION: Service performed was Audio (During PESAA-36 public health emergency) and not a face-to-face visit       CHIEF COMPLAINT: CT/PFT      HPI:   CT chest and PFTs reviewed by me and noted below. Results were dicussed with patient and multiple good questions were answered. Doing good  Feels about the same   Little clear sputum   No hemoptysis   Uses 2LPM at night   O2 sat today 95-96%   HR 94 - highest 102 she said at times  Unfortunately continues to smoke    From prior visit:   No humidifier. No air . No purifier. No steam sauna. No steam shower. + indoor hot tub- no reported. Had mold in her old house and moved to new one 1.5 months ago. No asbestos exposure. No down pillows or comforters. Has 1 parrot. No fatigue. No joint stiffness, pain or swelling wrists or knees. No difficulty swallowing. + dryness mouth. + fingers color change in cold weather- fingers turns white in cold weather. No recurrent fever. + weight loss. + GERD. No rash. No mouth ulcers.            Past Medical History:   Diagnosis Date    Allergic rhinitis 6/11/2010    Anxiety     Arthritis     Aspiration pneumonia ([address] 3/21/2012    Recurrent    Asthma     Bronchitis chronic     COPD (chronic obstructive pulmonary disease) ([address] 12/3/2009    Depression     Drug abuse, cocaine type (HCC)     past history of crack cocaine use    Emphysema of lung (HCC)     Fibromyalgia     GERD (gastroesophageal reflux disease)     Hyperlipidemia     Influenza A 03/05/2020    Lung disease     On home oxygen therapy     uses O2 NC 3L prn at night    Osteoporosis     Pneumonia     Polysubstance dependence ([address] 1/2/2012    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    Pulmonary fibrosis ([address] 10/16/2014    Pulmonary infiltrate     Pulmonary nodule 12/3/2009    Tobacco abuse        Past Surgical History:        Procedure Laterality Date    BLADDER REPAIR      BRONCHOSCOPY      BRONCHOSCOPY N/A 3/6/2020    BRONCHOSCOPY THERAPUTIC ASPIRATION INITIAL performed by Joellen Coello MD at [address]  3/6/2020    BRONCHOSCOPY ALVEOLAR LAVAGE performed by Joellen Coello MD at [address] N/A 6/26/2020    BRONCHOSCOPY THERAPUTIC ASPIRATION INITIAL performed by Kinjal Melendez MD at [address]  6/26/2020    BRONCHOSCOPY ALVEOLAR LAVAGE performed by Kinjal Melendez MD at [address] [facility]  2012    ENDOSCOPY, COLON, DIAGNOSTIC      ESOPHAGEAL DILATATION  9/20/2018    ESOPHAGEAL DILATION Mariah Cristino performed by Francesca Garcia MD at [facility] HISTORY  2/12/15    T8 Kyphoplasty    AZ COLONOSCOPY FLX DX W/COLLJ SPEC WHEN PFRMD N/A 9/20/2018    EGD AND COLONOSCOPY WITH ANESTHESIA performed by Francesca Garcia MD at [address] [facility] ESOPHAGOGASTRODUODENOSCOPY TRANSORAL DIAGNOSTIC N/A 9/20/2018    EGD AND COLONOSCOPY WITH ANESTHESIA performed by Francesca Garcia MD at [address] [facility]         Allergies:  is allergic to meperidine and demerol. Social History:    TOBACCO:   reports that she has been smoking cigarettes. She started smoking about 48 years ago. She has a 40.00 pack-year smoking history. She has never used smokeless tobacco.  ETOH:   reports no history of alcohol use.       Family History:       Problem Relation Age of Onset    Asthma Mother     Diabetes Mother     Emphysema Mother     Heart Failure Mother     Hypertension Mother     Heart Disease Mother     High Cholesterol Mother     Cancer Mother     Depression Mother     Diabetes Father     Emphysema Father     Heart Failure Father     Hypertension Father     Heart Disease Father     High Cholesterol Father     Substance Abuse Father     Emphysema Paternal Onnie Blaze Diabetes Sister     High Cholesterol Sister     Vision Loss Maternal Uncle        Current Medications:    Current Outpatient Medications:     FLUoxetine (PROZAC) 20 MG capsule, TAKE 3 CAPSULES BY MOUTH DAILY, Disp: 270 capsule, Rfl: 1    traZODone (DESYREL) 150 MG tablet, TAKE 2 TABLETS AT BEDTIME, Disp: 180 tablet, Rfl: 1    fluticasone-salmeterol (WIXELA INHUB) 500-50 MCG/DOSE diskus inhaler, Inhale 1 puff into the lungs every 12 hours, Disp: 180 each, Rfl: 1    montelukast (SINGULAIR) 10 MG tablet, TAKE 1 TABLET BY MOUTH EACH NIGHT, Disp: 90 tablet, Rfl: 1    tiotropium (SPIRIVA HANDIHALER) 18 MCG inhalation capsule, INHALE THE CONTENTS OF 1 CAPSULE EVERY DAY, Disp: 90 capsule, Rfl: 1    albuterol sulfate HFA (VENTOLIN HFA) 108 (90 Base) MCG/ACT inhaler, Inhale 2 puffs into the lungs every 6 hours as needed for Wheezing or Shortness of Breath, Disp: 3 Inhaler, Rfl: 1    albuterol (PROVENTIL) (2.5 MG/3ML) 0.083% nebulizer solution, Take 3 mLs by nebulization every 6 hours as needed for Wheezing or Shortness of Breath DX COPD J44.9, Disp: 120 vial, Rfl: 6    simvastatin (ZOCOR) 20 MG tablet, TAKE 1 TABLET EVERY EVENING, Disp: 90 tablet, Rfl: 1    busPIRone (BUSPAR) 30 MG tablet, TAKE 1 TABLET TWICE DAILY, Disp: 180 tablet, Rfl: 1    guaiFENesin (MUCINEX) 600 MG extended release tablet, Take 1 tablet by mouth 2 times daily as needed for Congestion, Disp: 60 tablet, Rfl: 2    teriparatide, recombinant, (FORTEO) 600 MCG/2.4ML SOLN injection, Inject 0.08 mLs into the skin daily One dose injected daily. , Disp: 1 pen, Rfl: 11            Objective:   Telephone visit not able to obtain physical exam             DATA reviewed by me:   PFTs 12/02/2020 FVC 2.12 (64%) FEV1 1.15(45%) TLC 4.16 (78%)  DLCO 06.97(30%) 6MW 880  F L O2 93%   PFTs 08/29/2019 FVC 1.68 (50%) FEV1 0.98(38%) TLC 4.35 (81%)  DLCO 07.19(30%) 6MW 860  F L O2 90%   PFTs 02/28/2019 FVC 2.03 (60%) FEV1 1.30(50%) TLC 4.15 (78%)  DLCO 07.23(31%) 6MW 560  F L O2 90%   PFTs 08/15/2018 FVC 1.86 (55%) FEV1 1.19(45%) TLC 4.28(80%)   DLCO 07.59(32%) 6MW 840  F L O2 94%   PFTs 10/13/2017 FVC 2.00 (58%) FEV1 1.27(48%) TLC 4.78(89%)   DLCO 06.16(38%) 6MW 1120F L O2 94%  PFTs 05/25/2017 FVC 2.15 (63%) FEV1 1.25(47%) TLC 5.37(100%) DLCO 11.13(47%) 6MW 1120F L O2 95%  PFTs 01/30/2017 FVC 2.05 (60%) FEV1 1.18(44%) TLC 4.69(87%)   DLCO 09.35(39%) 6MW 1190F L O2 95%  PFTs 10/10/2016 FVC 1.96 (57%) FEV1 1.18(44%) TLC 4.57(85%)   DLCO 08.31(35%) 6MW 1000F L O2 94%  PFTs 04/25/2016 FVC 1.63 (50%) FEV1 0.93(37%) TLC 4.96(97%)   DLCO 09.66(42%) 6MW 1140F L O2 91%  PFTs 12/09/2013                            FEV1 1.54(59%) TLC 5.34(104%) DLCO 11.30(49%) 6MW 1140F L O2 91%. PFTs 06/03/2013                            FEV1 1.69(65%) TLC 4.81(93%)   DLCO 09.44(41%) 6MW 1400F L O2 95%. PFTs 11/28/2012                            FEV1 1.67(64%) TLC 5.08(98%)   DLCO 11.94(51%) 6MW 1400F L O2 95%. PFTs 06/19/2012                            FEV1 1.59(60%) TLC 4.66(90%)   DLCO 10.21(44%) 6MW 1280F L O2 96%.    PFTs 03/07/2012                            FEV1 1.72(70%) TLC 4.49(87%)   DLCO 10.72(54%)  PFTs 08/03/2011                            FEV1 1.71(72%) TLC 4.72(96%)   DLCO 12.3 (63%)  PFTs 01/10/2011                            FEV1 1.76           TLC 4.54             DLCO 10.50  PFTs 03/30/2010                            FEV1 1.96           TLC 4.86             DLCO 14.13  PFTs 03/03/2009                            FEV1 2.04           TLC 5.08             DLCO 14.02      CT chest 8/25/2019  Decreased bibasilar airspace disease and nodular opacification  Bronchial wall thickening  Stable reticular opacities upper lobe  Stable mediastinal lymph nodes    CT chest 12/2/2020 imaging reviewed by me and showed  No acute intrapulmonary findings  Stable multifocal irregular opacities associated with GGO's likely reflecting element of chronic interstitial lung disease  Stable mild mediastinal adenopathy      4/5/2016 normal/negative RF 14, SCL70 SSA SSB aldolase CK GIORGI CCP Anti MALORIE-1    4/25/2016 Elevated IgA normal IgG and IgM      IgE 140 with unremarkable immunocap      Assessment:   · Moderate obstructive airways diease secondary to COPD and asthma  · Abnormal CT 4/1/2016 with GGO- DDx RB-ILD, NSIP, HP, DIP, eosinophilic pneumonitis, aspiration and CTD-ILD. Favor smoking related ILD vs HP. Bronch 4/6/16 purulent secretions and grew strep pneumoniae. Negative AFB. Got rid of her Etheleen Led. Evidently changed on repeat CT 12/2/2020. · Tachycardia on 6-minute walk-unremarkable EKG  · Bronchiectasis with acute exacerbation  · Nocturnal hypoxia- on 2LPM   · >30 pack-years smoking                                          Plan:   · Continue Advair 500/50 BID  · Continue Spiriva  · Continue Albuterol 2 puffs Q4-6 hrs PRN  · Continue Singulair daily- refills   · Continue O2 2LPM at night  · CT chest, low dose protocol, screening for lung cancer December 2021. Risks, benefits and alternatives including doing nothing were discussed with patient. · Patient is up to date with Pneumococcal vaccine   · Advised to get influenza vaccine   · Acapella and Mucinex   · Advised to quit smoking. She verbalized understanding that her underlying ILD is related to smoking and smoking cessation is the main course of management. Failure to quit smoking could lead to worsening scarring and disability. · Follow up in 3 months             Ronaldo Arias is a 60 y.o. female evaluated via telephone on 12/7/2020.       Consent:  She and/or health care decision maker is aware that that she may receive a bill for this telephone service, depending on her insurance coverage, and has provided verbal consent to proceed: Yes       Documentation:  I communicated with the patient and/or health care decision maker about: See above   Details of this discussion including any medical advice provided: See above       I Affirm this is a Patient Initiated Episode with an Established Patient who has not had a related appointment within my department in the past 7 days or scheduled within the next 24 hours.     Total Time: 21-30 minutes     Note: not billable if this call serves to triage the patient into an appointment for the relevant concern      Precious Demarco

## 2020-12-11 RX ORDER — PEN NEEDLE, DIABETIC 32GX 5/32"
NEEDLE, DISPOSABLE MISCELLANEOUS
Qty: 100 EACH | Refills: 5 | Status: SHIPPED | OUTPATIENT
Start: 2020-12-11

## 2020-12-11 NOTE — TELEPHONE ENCOUNTER
.  Last office visit 7/24/2020     Last written  12-23-19 100 with 0     Next office visit scheduled Visit date not found    Requested Prescriptions     Pending Prescriptions Disp Refills    BD PEN NEEDLE SVETLANA U/F 32G X 4 MM MISC [Pharmacy Med Name: B-D PEN NDL SVETLANA [id number] [facility]] 100 each 5     Sig: USE ONE DAILY AS DIRECTED

## 2020-12-17 ENCOUNTER — TELEPHONE (OUTPATIENT)
Dept: PULMONOLOGY | Age: 65
End: 2020-12-17

## 2020-12-17 RX ORDER — DOXYCYCLINE HYCLATE 100 MG/1
100 CAPSULE ORAL 2 TIMES DAILY
Qty: 10 CAPSULE | Refills: 0 | Status: SHIPPED | OUTPATIENT
Start: 2020-12-17 | End: 2020-12-22

## 2020-12-17 RX ORDER — VARENICLINE TARTRATE 1 MG/1
1 TABLET, FILM COATED ORAL 2 TIMES DAILY
Qty: 60 TABLET | Refills: 3 | Status: SHIPPED | OUTPATIENT
Start: 2020-12-17 | End: 2021-04-27 | Stop reason: ALTCHOICE

## 2020-12-17 RX ORDER — VARENICLINE TARTRATE
KIT
Qty: 1 BOX | Refills: 0 | Status: SHIPPED | OUTPATIENT
Start: 2020-12-17 | End: 2021-04-27 | Stop reason: ALTCHOICE

## 2020-12-17 RX ORDER — PREDNISONE 10 MG/1
TABLET ORAL
Qty: 30 TABLET | Refills: 0 | Status: SHIPPED | OUTPATIENT
Start: 2020-12-17 | End: 2020-12-29

## 2020-12-17 NOTE — TELEPHONE ENCOUNTER
Patient is requesting chantix be sent into her pharmacy both the starter and continuing along with a prednisone taper    Do you have the following symptoms? Shortness of Breath  yes  Wheezing  yes  Cough  yes  Cough Characteristics:  Productive    yes  Sputum Color    clear  Hemoptysis   no  Consistency of sputum   Thick     Fever:    No    Temp:n/a  Chills/Sweats:  No  What other symptoms are you having?:  Body aches    How long have you had these symptoms? Few days     [facility]          Review medications and allergies: Allergies? Allergies   Allergen Reactions    Meperidine Anaphylaxis     \"Demerol\"     Demerol Hives             Currently on Antibiotics? (Drug/Dose/Frequency and how long on?) No        Systemic Steroids? (Drug/Dose/Frequency and how long on?) No      Last sick call taken on na. Meds prescribed were na    LOV: 12/7/20  Assessment:   · Moderate obstructive airways diease secondary to COPD and asthma  · Abnormal CT 4/1/2016 with GGO- DDx RB-ILD, NSIP, HP, DIP, eosinophilic pneumonitis, aspiration and CTD-ILD. Favor smoking related ILD vs HP. Bronch 4/6/16 purulent secretions and grew strep pneumoniae. Negative AFB. Got rid of her Rosario Fuchs. Evidently changed on repeat CT 12/2/2020. · Tachycardia on 6-minute walk-unremarkable EKG  · Bronchiectasis with acute exacerbation  · Nocturnal hypoxia- on 2LPM   · >30 pack-years smoking                                                  Plan:   · Continue Advair 500/50 BID  · Continue Spiriva  · Continue Albuterol 2 puffs Q4-6 hrs PRN  · Continue Singulair daily- refills   · Continue O2 2LPM at night  · CT chest, low dose protocol, screening for lung cancer December 2021. Risks, benefits and alternatives including doing nothing were discussed with patient. · Patient is up to date with Pneumococcal vaccine   · Advised to get influenza vaccine   · Acapella and Mucinex   · Advised to quit smoking.   She verbalized understanding that her underlying ILD is related to smoking and smoking cessation is the main course of management. Failure to quit smoking could lead to worsening scarring and disability.   · Follow up in 3 months

## 2021-02-05 ENCOUNTER — APPOINTMENT (OUTPATIENT)
Dept: CT IMAGING | Age: 66
End: 2021-02-05
Payer: MEDICARE

## 2021-02-05 ENCOUNTER — APPOINTMENT (OUTPATIENT)
Dept: GENERAL RADIOLOGY | Age: 66
End: 2021-02-05
Payer: MEDICARE

## 2021-02-05 ENCOUNTER — HOSPITAL ENCOUNTER (EMERGENCY)
Age: 66
Discharge: HOME | End: 2021-02-05
Payer: MEDICARE

## 2021-02-05 VITALS
OXYGEN SATURATION: 95 % | DIASTOLIC BLOOD PRESSURE: 63 MMHG | HEART RATE: 87 BPM | BODY MASS INDEX: 22.49 KG/M2 | SYSTOLIC BLOOD PRESSURE: 118 MMHG | TEMPERATURE: 98.1 F | HEIGHT: 65 IN | WEIGHT: 135 LBS | RESPIRATION RATE: 16 BRPM

## 2021-02-05 DIAGNOSIS — M54.50 ACUTE LEFT-SIDED LOW BACK PAIN WITHOUT SCIATICA: Primary | ICD-10-CM

## 2021-02-05 DIAGNOSIS — M54.9 MUSCULOSKELETAL BACK PAIN: ICD-10-CM

## 2021-02-05 LAB
A/G RATIO: 1.4 (ref 1.1–2.2)
ALBUMIN SERPL-MCNC: 4.2 G/DL (ref 3.4–5)
ALP BLD-CCNC: 63 U/L (ref 40–129)
ALT SERPL-CCNC: 11 U/L (ref 10–40)
ANION GAP SERPL CALCULATED.3IONS-SCNC: 9 MMOL/L (ref 3–16)
AST SERPL-CCNC: 18 U/L (ref 15–37)
BASOPHILS ABSOLUTE: 0 K/UL (ref 0–0.2)
BASOPHILS RELATIVE PERCENT: 0.2 %
BILIRUB SERPL-MCNC: 0.3 MG/DL (ref 0–1)
BILIRUBIN URINE: NEGATIVE
BLOOD, URINE: NEGATIVE
BUN BLDV-MCNC: 8 MG/DL (ref 7–20)
CALCIUM SERPL-MCNC: 9.5 MG/DL (ref 8.3–10.6)
CHLORIDE BLD-SCNC: 99 MMOL/L (ref 99–110)
CLARITY: CLEAR
CO2: 20 MMOL/L (ref 21–32)
COLOR: YELLOW
CREAT SERPL-MCNC: 0.6 MG/DL (ref 0.6–1.2)
EOSINOPHILS ABSOLUTE: 0.1 K/UL (ref 0–0.6)
EOSINOPHILS RELATIVE PERCENT: 1.1 %
GFR AFRICAN AMERICAN: >60
GFR NON-AFRICAN AMERICAN: >60
GLOBULIN: 2.9 G/DL
GLUCOSE BLD-MCNC: 102 MG/DL (ref 70–99)
GLUCOSE URINE: NEGATIVE MG/DL
HCT VFR BLD CALC: 40.7 % (ref 36–48)
HEMOGLOBIN: 14.1 G/DL (ref 12–16)
KETONES, URINE: NEGATIVE MG/DL
LEUKOCYTE ESTERASE, URINE: NEGATIVE
LYMPHOCYTES ABSOLUTE: 2.3 K/UL (ref 1–5.1)
LYMPHOCYTES RELATIVE PERCENT: 22.9 %
MCH RBC QN AUTO: 31.1 PG (ref 26–34)
MCHC RBC AUTO-ENTMCNC: 34.7 G/DL (ref 31–36)
MCV RBC AUTO: 89.6 FL (ref 80–100)
MICROSCOPIC EXAMINATION: NORMAL
MONOCYTES ABSOLUTE: 0.6 K/UL (ref 0–1.3)
MONOCYTES RELATIVE PERCENT: 6 %
NEUTROPHILS ABSOLUTE: 6.9 K/UL (ref 1.7–7.7)
NEUTROPHILS RELATIVE PERCENT: 69.8 %
NITRITE, URINE: NEGATIVE
PDW BLD-RTO: 13.4 % (ref 12.4–15.4)
PH UA: 7.5 (ref 5–8)
PLATELET # BLD: 286 K/UL (ref 135–450)
PMV BLD AUTO: 7.3 FL (ref 5–10.5)
POTASSIUM REFLEX MAGNESIUM: 4.3 MMOL/L (ref 3.5–5.1)
PROTEIN UA: NEGATIVE MG/DL
RBC # BLD: 4.54 M/UL (ref 4–5.2)
SODIUM BLD-SCNC: 128 MMOL/L (ref 136–145)
SPECIFIC GRAVITY UA: 1.01 (ref 1–1.03)
TOTAL PROTEIN: 7.1 G/DL (ref 6.4–8.2)
URINE REFLEX TO CULTURE: NORMAL
URINE TYPE: NORMAL
UROBILINOGEN, URINE: 0.2 E.U./DL
WBC # BLD: 9.9 K/UL (ref 4–11)

## 2021-02-05 PROCEDURE — 6360000004 HC RX CONTRAST MEDICATION: Performed by: NURSE PRACTITIONER

## 2021-02-05 PROCEDURE — 96372 THER/PROPH/DIAG INJ SC/IM: CPT

## 2021-02-05 PROCEDURE — 80053 COMPREHEN METABOLIC PANEL: CPT

## 2021-02-05 PROCEDURE — 81003 URINALYSIS AUTO W/O SCOPE: CPT

## 2021-02-05 PROCEDURE — 99284 EMERGENCY DEPT VISIT MOD MDM: CPT

## 2021-02-05 PROCEDURE — 71046 X-RAY EXAM CHEST 2 VIEWS: CPT

## 2021-02-05 PROCEDURE — 96374 THER/PROPH/DIAG INJ IV PUSH: CPT

## 2021-02-05 PROCEDURE — 6360000002 HC RX W HCPCS: Performed by: NURSE PRACTITIONER

## 2021-02-05 PROCEDURE — 2580000003 HC RX 258: Performed by: NURSE PRACTITIONER

## 2021-02-05 PROCEDURE — 74177 CT ABD & PELVIS W/CONTRAST: CPT

## 2021-02-05 PROCEDURE — 85025 COMPLETE CBC W/AUTO DIFF WBC: CPT

## 2021-02-05 PROCEDURE — 96375 TX/PRO/DX INJ NEW DRUG ADDON: CPT

## 2021-02-05 RX ORDER — ONDANSETRON 2 MG/ML
4 INJECTION INTRAMUSCULAR; INTRAVENOUS ONCE
Status: COMPLETED | OUTPATIENT
Start: 2021-02-05 | End: 2021-02-05

## 2021-02-05 RX ORDER — METHOCARBAMOL 500 MG/1
500 TABLET, FILM COATED ORAL 4 TIMES DAILY
Qty: 40 TABLET | Refills: 0 | Status: SHIPPED | OUTPATIENT
Start: 2021-02-05 | End: 2021-02-15

## 2021-02-05 RX ORDER — KETOROLAC TROMETHAMINE 30 MG/ML
30 INJECTION, SOLUTION INTRAMUSCULAR; INTRAVENOUS ONCE
Status: COMPLETED | OUTPATIENT
Start: 2021-02-05 | End: 2021-02-05

## 2021-02-05 RX ORDER — MORPHINE SULFATE 4 MG/ML
4 INJECTION, SOLUTION INTRAMUSCULAR; INTRAVENOUS ONCE
Status: COMPLETED | OUTPATIENT
Start: 2021-02-05 | End: 2021-02-05

## 2021-02-05 RX ORDER — 0.9 % SODIUM CHLORIDE 0.9 %
1000 INTRAVENOUS SOLUTION INTRAVENOUS ONCE
Status: COMPLETED | OUTPATIENT
Start: 2021-02-05 | End: 2021-02-05

## 2021-02-05 RX ORDER — NAPROXEN 500 MG/1
500 TABLET ORAL 2 TIMES DAILY WITH MEALS
Qty: 30 TABLET | Refills: 0 | Status: ON HOLD | OUTPATIENT
Start: 2021-02-05 | End: 2021-09-11 | Stop reason: HOSPADM

## 2021-02-05 RX ORDER — PREDNISONE 10 MG/1
60 TABLET ORAL DAILY
Qty: 30 TABLET | Refills: 0 | Status: SHIPPED | OUTPATIENT
Start: 2021-02-05 | End: 2021-02-10

## 2021-02-05 RX ORDER — ORPHENADRINE CITRATE 30 MG/ML
60 INJECTION INTRAMUSCULAR; INTRAVENOUS ONCE
Status: COMPLETED | OUTPATIENT
Start: 2021-02-05 | End: 2021-02-05

## 2021-02-05 RX ADMIN — SODIUM CHLORIDE 1000 ML: 9 INJECTION, SOLUTION INTRAVENOUS at 11:18

## 2021-02-05 RX ADMIN — ONDANSETRON 4 MG: 2 INJECTION INTRAMUSCULAR; INTRAVENOUS at 11:21

## 2021-02-05 RX ADMIN — IOPAMIDOL 75 ML: 755 INJECTION, SOLUTION INTRAVENOUS at 12:33

## 2021-02-05 RX ADMIN — KETOROLAC TROMETHAMINE 30 MG: 30 INJECTION, SOLUTION INTRAMUSCULAR at 11:21

## 2021-02-05 RX ADMIN — ORPHENADRINE CITRATE 60 MG: 30 INJECTION INTRAMUSCULAR; INTRAVENOUS at 14:37

## 2021-02-05 RX ADMIN — MORPHINE SULFATE 4 MG: 4 INJECTION, SOLUTION INTRAMUSCULAR; INTRAVENOUS at 13:50

## 2021-02-05 ASSESSMENT — ENCOUNTER SYMPTOMS
DIARRHEA: 0
VOMITING: 0
BACK PAIN: 1
SHORTNESS OF BREATH: 0
COUGH: 0
NAUSEA: 0
COLOR CHANGE: 0
ABDOMINAL PAIN: 0
WHEEZING: 0

## 2021-02-05 ASSESSMENT — PAIN SCALES - GENERAL
PAINLEVEL_OUTOF10: 6
PAINLEVEL_OUTOF10: 6

## 2021-02-05 NOTE — ED PROVIDER NOTES
**ADVANCED PRACTICE PROVIDER, I HAVE EVALUATED THIS [facility]  ED  EMERGENCY DEPARTMENT ENCOUNTER      Pt Name: Aydee Martinez  ETJ:[id number]  Ozzie 1955  Date of evaluation: 2/5/2021  Provider: SHAUN Weber CNP      Chief Complaint:    Chief Complaint   Patient presents with    Back Pain     left sided started last night       Nursing Notes, Past Medical Hx, Past Surgical Hx, Social Hx, Allergies, and Family Hx were all reviewed and agreed with or any disagreements were addressed in the HPI.    HPI:  (Location, Duration, Timing, Severity, Quality, Assoc Sx, Context, Modifying factors)  This is a  60 y.o. female who presents today with left flank pain that started in the middle of the night last night, she states her pain is only getting worse, she denies any urinary symptoms and no position is making the pain better. She denies any N/V/D. States that her pain is worse with any movement, she denies any falls traumas or injuries. No numbness feeling or paresthesias. She denies any chest pain pleuritic chest pain or shortness of breath. No cough, congestion, fever or chills. She rates her pain a 6 out of 10, states is constant achy. She denies any additional complaints. No additional aggravating relieving factors. Patient presents awake, alert and in no acute distress or toxic appearance however, does appear uncomfortable with the pain.     PastMedical/Surgical History:      Diagnosis Date    Allergic rhinitis 6/11/2010    Anxiety     Arthritis     Aspiration pneumonia ([facility] [address] 3/21/2012    Recurrent    Asthma     Bronchitis chronic     COPD (chronic obstructive pulmonary disease) ([facility] [address] 12/3/2009    Depression     Drug abuse, cocaine type (HCC)     past history of crack cocaine use    Emphysema of lung (HCC)     Fibromyalgia     GERD (gastroesophageal reflux disease)     Hyperlipidemia     Influenza A 03/05/2020    Lung disease     On home oxygen therapy     uses O2 NC 3L prn at night    Osteoporosis     Pneumonia     Polysubstance dependence ([facility] [address] 1/2/2012    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[facility] [address] 1/2/2012    Pulmonary fibrosis ([facility] [address] 10/16/2014    Pulmonary infiltrate     Pulmonary nodule 12/3/2009    Tobacco abuse          Procedure Laterality Date    BLADDER REPAIR      BRONCHOSCOPY      BRONCHOSCOPY N/A 3/6/2020    BRONCHOSCOPY THERAPUTIC ASPIRATION INITIAL performed by Timi Ji MD at [address] [place] [address]  3/6/2020    BRONCHOSCOPY ALVEOLAR LAVAGE performed by Timi Ji MD at [address] [place] [address] N/A 6/26/2020    BRONCHOSCOPY THERAPUTIC ASPIRATION INITIAL performed by Gladys Jones MD at [address] [place] [address]  6/26/2020    BRONCHOSCOPY ALVEOLAR LAVAGE performed by Gladys Jones MD at [address] [address]    ENDOSCOPY, COLON, DIAGNOSTIC      ESOPHAGEAL DILATATION  9/20/2018    ESOPHAGEAL DILATION Mariza Meter performed by Daren Rosas MD at [address] [facility] HISTORY  2/12/15    T8 Kyphoplasty    NE COLONOSCOPY FLX DX W/COLLJ SPEC WHEN PFRMD N/A 9/20/2018    EGD AND COLONOSCOPY WITH ANESTHESIA performed by Daren Rosas MD at [address] [facility] ESOPHAGOGASTRODUODENOSCOPY TRANSORAL DIAGNOSTIC N/A 9/20/2018    EGD AND COLONOSCOPY WITH ANESTHESIA performed by Daren Rosas MD at [address] [facility]         Medications:  Previous Medications    ALBUTEROL (PROVENTIL) (2.5 MG/3ML) 0.083% NEBULIZER SOLUTION    Take 3 mLs by nebulization every 6 hours as needed for Wheezing or Shortness of Breath DX COPD J44.9    ALBUTEROL SULFATE HFA (VENTOLIN HFA) 108 (90 BASE) MCG/ACT INHALER    Inhale 2 puffs into the lungs every 6 hours as needed for Wheezing or Shortness of Breath    BD PEN NEEDLE SVETLANA U/F 32G X 4 MM MISC    USE ONE DAILY AS DIRECTED    BUSPIRONE (BUSPAR) 30 MG TABLET    TAKE 1 TABLET TWICE DAILY    FLUOXETINE (PROZAC) 20 MG CAPSULE    TAKE 3 CAPSULES BY MOUTH DAILY    FLUTICASONE-SALMETEROL (WIXELA INHUB) 500-50 MCG/DOSE DISKUS INHALER    Inhale 1 puff into the lungs every 12 hours    GUAIFENESIN (MUCINEX) 600 MG EXTENDED RELEASE TABLET    Take 1 tablet by mouth 2 times daily as needed for Congestion    MONTELUKAST (SINGULAIR) 10 MG TABLET    TAKE 1 TABLET BY MOUTH EACH NIGHT    SIMVASTATIN (ZOCOR) 20 MG TABLET    TAKE 1 TABLET EVERY EVENING    TERIPARATIDE, RECOMBINANT, (FORTEO) 600 MCG/2.4ML SOLN INJECTION    Inject 0.08 mLs into the skin daily One dose injected daily. TIOTROPIUM (SPIRIVA HANDIHALER) 18 MCG INHALATION CAPSULE    INHALE THE CONTENTS OF 1 CAPSULE EVERY DAY    TRAZODONE (DESYREL) 150 MG TABLET    TAKE 2 TABLETS AT BEDTIME    VARENICLINE (CHANTIX CONTINUING MONTH PAK) 1 MG TABLET    Take 1 tablet by mouth 2 times daily    VARENICLINE (CHANTIX STARTING MONTH PAK) 0.5 MG X 11 & 1 MG X 42 TABLET    Take by mouth. Review of Systems:  Review of Systems   Constitutional: Negative for chills and fever. HENT: Negative for congestion. Respiratory: Negative for cough, shortness of breath and wheezing. Cardiovascular: Negative for chest pain. Gastrointestinal: Negative for abdominal pain, diarrhea, nausea and vomiting. Genitourinary: Positive for flank pain. Negative for difficulty urinating, dysuria, frequency and hematuria. Musculoskeletal: Positive for back pain and myalgias. Patient presents with left flank pain that started in the middle of the night last night, she states her pain is only getting worse, she denies any urinary symptoms and no position is making the pain better. Skin: Negative for color change. Neurological: Negative for weakness, numbness and headaches. Positives and Pertinent negatives as per HPI. Except as noted above in the ROS, problem specific ROS was completed and is negative.     Physical Exam:  Physical Exam  Vitals signs and nursing note reviewed. Constitutional:       Appearance: She is well-developed. She is not diaphoretic. HENT:      Head: Normocephalic. Right Ear: External ear normal.      Left Ear: External ear normal.   Eyes:      General: No scleral icterus. Right eye: No discharge. Left eye: No discharge. Neck:      Musculoskeletal: Normal range of motion and neck supple. Cardiovascular:      Rate and Rhythm: Normal rate and regular rhythm. Pulmonary:      Effort: Pulmonary effort is normal. No respiratory distress. Breath sounds: Normal breath sounds. Abdominal:      Palpations: Abdomen is soft. Tenderness: There is abdominal tenderness. There is left CVA tenderness. Comments: Patient complains of left flank pain, she has reproducible tenderness in the left CVA region, no visible bruising or acute abnormality. Reproducible tenderness at left lower quadrant of the abdomen with guarding on exam but no rebound tenderness at McBurney's point, no acute ascites or rigidity. Musculoskeletal: Normal range of motion. Skin:     General: Skin is warm. Capillary Refill: Capillary refill takes less than 2 seconds. Coloration: Skin is not pale. Neurological:      General: No focal deficit present. Mental Status: She is alert and oriented to person, place, and time. GCS: GCS eye subscore is 4. GCS verbal subscore is 5. GCS motor subscore is 6. Cranial Nerves: Cranial nerves are intact. Sensory: Sensation is intact. Motor: Motor function is intact. Comments: Patient has no spinal tenderness, she has unremarkable physiological exam and neuro exam.  Moving her upper and lower extremities without difficulty however any movement does worsen her left-sided back pain.    Psychiatric:         Behavior: Behavior normal.         MEDICAL DECISION MAKING    Vitals:    Vitals:    02/05/21 1345 02/05/21 1415 02/05/21 1418 02/05/21 1445   BP: 122/78 107/67 103/69 118/63   Pulse: 86 80 84 87   Resp: 16 18 18 16   Temp: 98.1 °F (36.7 °C) 98.1 °F (36.7 °C) 98.1 °F (36.7 °C) 98.1 °F (36.7 °C)   TempSrc: Oral  Oral Oral   SpO2: 96% 92% 92% 95%   Weight:       Height:           LABS:  Labs Reviewed   COMPREHENSIVE METABOLIC PANEL W/ REFLEX TO MG FOR LOW K - Abnormal; Notable for the following components:       Result Value    Sodium 128 (*)     CO2 20 (*)     Glucose 102 (*)     All other components within normal limits    Narrative:     Performed at:  [place] [address] [place]   Phone [phone]   CBC WITH AUTO DIFFERENTIAL    Narrative:     Performed at:  [address], [facility] [place]   Phone [phone]   URINE RT REFLEX TO CULTURE    Narrative:     Performed at:  [address], [facility] [place]   Phone  of labs reviewed and werenegative at this time or not returned at the time of this note. RADIOLOGY:   Non-plain film images such as CT, Ultrasound and MRI are read by the radiologist. Orly LAMA, SHAUN - CNP have directly visualized the radiologic plain film image(s) with the below findings:        Interpretation per the Radiologist below, if available at the time of this note:    CT ABDOMEN PELVIS W IV CONTRAST   Final Result   1. No acute abnormality. XR CHEST (2 VW)   Final Result   No acute process.       Decreased opacity left lower lobe suggesting scarring or atelectasis                MEDICAL DECISION MAKING / ED COURSE:      PROCEDURES:   Procedures    None    Patient was given:  Medications   ketorolac (TORADOL) injection 30 mg (30 mg Intravenous Given 2/5/21 1121)   ondansetron (ZOFRAN) injection 4 mg (4 mg Intravenous Given 2/5/21 1121)   0.9 % sodium chloride bolus (0 mLs Intravenous Stopped 2/5/21 1339) iopamidol (ISOVUE-370) 76 % injection 75 mL (75 mLs Intravenous Given 2/5/21 1233)   morphine (PF) injection 4 mg (4 mg Intravenous Given 2/5/21 1350)   orphenadrine (NORFLEX) injection 60 mg (60 mg Intramuscular Given 2/5/21 3197)       Patient presents with left flank pain that started in the middle of the night last night, she states her pain is only getting worse, she denies any urinary symptoms and no position is making the pain better. After evaluation and examination the patient she has no central cervical thoracic or lumbar spine tenderness or step-off, I do have concern for possible cystitis, or kidney stone. Patient was ordered IV access, IV fluids, blood work, analysis, medications and a CT scan the abdomen. Urinalysis shows no acute infection. CBC shows no sepsis or anemia. Metabolic panel shows hyponatremia with a sodium of 128, kidney function and electrolytes are normal.  Liver functions are normal.  CT the abdomen shows no acute abnormality. Chest x-ray shows no acute cardiopulmonary findings. Upon reevaluation patient is still having pain, I do believe her pain is from musculoskeletal nature, patient was ordered Norflex. Upon reevaluation to get vital signs remained stable. She has no neurologic deficits. Pt denies any history of new numbness, weakness, incontinence of bowel or bladder, constipation, saddle anesthesia or paresthesias. I estimate there is LOW risk for ABDOMINAL AORTIC ANEURYSM, CAUDA EQUINA SYNDROME, EPIDURAL MASS LESION, OR CORD COMPRESSION, thus I consider the discharge disposition reasonable. Therefore, shared medical decision was made between the patient and myself and we agreed that she could be discharged home with outpatient follow-up. Patient was discharged home with referral to PCP, educated follow-up in the morning for an appointment. Discharged home with Robaxin Naprosyn and prednisone with education take medicine as prescribed.   Return the ER for worsening or concerning symptoms. The patient tolerated their visit well. I evaluated the patient. The physician was available for consultation as needed. The patient and / or the family were informed of the results of any tests, a time was given to answer questions, a plan was proposed and they agreed with plan. Patient verbalized understanding of discharge instructions and was discharged from the department in stable condition. CLINICAL IMPRESSION:  1. Acute left-sided low back pain without sciatica    2.  Musculoskeletal back pain        DISPOSITION        PATIENT REFERRED TO:  MD Linda Reederso [place] [place], [place] [address]  [phone]    Schedule an appointment as soon as possible for a visit in 3 days  Follow-up with your PCP on Monday for reevaluation    [facility]  ED  [address] [facility] [address] [facility]  Go to   If symptoms worsen      DISCHARGE MEDICATIONS:  New Prescriptions    METHOCARBAMOL (ROBAXIN) 500 MG TABLET    Take 1 tablet by mouth 4 times daily for 10 days    NAPROXEN (NAPROSYN) 500 MG TABLET    Take 1 tablet by mouth 2 times daily (with meals)    PREDNISONE (DELTASONE) 10 MG TABLET    Take 6 tablets by mouth daily for 5 doses       DISCONTINUED MEDICATIONS:  Discontinued Medications    No medications on file              (Please note the MDM and HPI sections of this note were completed with a voice recognition program.  Efforts were made to edit the dictations but occasionally words are mis-transcribed.)    Electronically signed, SHAUN Bueno CNP,           SHAUN Bueno CNP  02/05/21 5471

## 2021-03-04 ENCOUNTER — VIRTUAL VISIT (OUTPATIENT)
Dept: PULMONOLOGY | Age: 66
End: 2021-03-04
Payer: MEDICARE

## 2021-03-04 DIAGNOSIS — R93.89 ABNORMAL CT OF THE CHEST: ICD-10-CM

## 2021-03-04 DIAGNOSIS — J44.9 MODERATE COPD (CHRONIC OBSTRUCTIVE PULMONARY DISEASE) (HCC): Primary | ICD-10-CM

## 2021-03-04 DIAGNOSIS — J47.9 BRONCHIECTASIS WITHOUT COMPLICATION (HCC): ICD-10-CM

## 2021-03-04 DIAGNOSIS — G47.34 NOCTURNAL HYPOXEMIA: ICD-10-CM

## 2021-03-04 PROCEDURE — 99443 PR PHYS/QHP TELEPHONE EVALUATION 21-30 MIN: CPT | Performed by: INTERNAL MEDICINE

## 2021-03-04 RX ORDER — DOXYCYCLINE HYCLATE 100 MG
100 TABLET ORAL 2 TIMES DAILY
Qty: 10 TABLET | Refills: 0 | Status: SHIPPED | OUTPATIENT
Start: 2021-03-04 | End: 2021-03-09

## 2021-03-04 RX ORDER — ALBUTEROL SULFATE 90 UG/1
2 AEROSOL, METERED RESPIRATORY (INHALATION) EVERY 6 HOURS PRN
Qty: 3 INHALER | Refills: 1 | Status: SHIPPED | OUTPATIENT
Start: 2021-03-04 | End: 2022-08-25 | Stop reason: SDUPTHER

## 2021-03-04 RX ORDER — PREDNISONE 10 MG/1
TABLET ORAL
Qty: 30 TABLET | Refills: 0 | Status: ON HOLD | OUTPATIENT
Start: 2021-03-04 | End: 2021-03-18 | Stop reason: SDUPTHER

## 2021-03-04 NOTE — PROGRESS NOTES
[facility]    Outpatient Follow Up Note  TELEHEALTH EVALUATION: Service performed was Audio (During ETQXV-[id number] public health emergency) and not a face-to-face visit       CHIEF COMPLAINT: COPD      HPI:   Doing pretty good  Uses her Neb once every 2 days  No hemoptysis   Uses 2LPM at night on and off   O2 sat today 93%   Unfortunately continues to smoke 1ppd- started Chantix yesterday       From prior visit:   No humidifier. No air . No purifier. No steam sauna. No steam shower. + indoor hot tub- no reported. Had mold in her old house and moved to new one 1.5 months ago. No asbestos exposure. No down pillows or comforters. Has 1 parrot. No fatigue. No joint stiffness, pain or swelling wrists or knees. No difficulty swallowing. + dryness mouth. + fingers color change in cold weather- fingers turns white in cold weather. No recurrent fever. + weight loss. + GERD. No rash. No mouth ulcers.            Past Medical History:   Diagnosis Date    Allergic rhinitis 6/11/2010    Anxiety     Arthritis     Aspiration pneumonia ([address] 3/21/2012    Recurrent    Asthma     Bronchitis chronic     COPD (chronic obstructive pulmonary disease) ([address] 12/3/2009    Depression     Drug abuse, cocaine type (HCC)     past history of crack cocaine use    Emphysema of lung (HCC)     Fibromyalgia     GERD (gastroesophageal reflux disease)     Hyperlipidemia     Influenza A 03/05/2020    Lung disease     On home oxygen therapy     uses O2 NC 3L prn at night    Osteoporosis     Pneumonia     Polysubstance dependence ([address] 1/2/2012    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    Pulmonary fibrosis ([address] 10/16/2014    Pulmonary infiltrate     Pulmonary nodule 12/3/2009    Tobacco abuse        Past Surgical History:        Procedure Laterality Date    BLADDER REPAIR      BRONCHOSCOPY      BRONCHOSCOPY N/A 3/6/2020    BRONCHOSCOPY THERAPUTIC ASPIRATION INITIAL performed by Delaney London MD at [address] [facility]  3/6/2020    BRONCHOSCOPY ALVEOLAR LAVAGE performed by Delaney London MD at [address] [place] [address] N/A 6/26/2020    BRONCHOSCOPY THERAPUTIC ASPIRATION INITIAL performed by Kayden Valenzuela MD at [address] [place] [address]  6/26/2020    BRONCHOSCOPY ALVEOLAR LAVAGE performed by Kayden Valenzuela MD at [address] [facility]  2012    ENDOSCOPY, COLON, DIAGNOSTIC      ESOPHAGEAL DILATATION  9/20/2018    ESOPHAGEAL DILATION Bettyjo Hoit performed by Maia Rivas MD at [address] [facility] HISTORY  2/12/15    T8 Kyphoplasty    MN COLONOSCOPY FLX DX W/COLLJ SPEC WHEN PFRMD N/A 9/20/2018    EGD AND COLONOSCOPY WITH ANESTHESIA performed by Maia Rivas MD at [address] [facility] ESOPHAGOGASTRODUODENOSCOPY TRANSORAL DIAGNOSTIC N/A 9/20/2018    EGD AND COLONOSCOPY WITH ANESTHESIA performed by Maia Rivas MD at [address] [facility]         Allergies:  is allergic to meperidine and demerol. Social History:    TOBACCO:   reports that she has been smoking cigarettes. She started smoking about 49 years ago. She has a 40.00 pack-year smoking history. She has never used smokeless tobacco.  ETOH:   reports no history of alcohol use.       Family History:       Problem Relation Age of Onset    Asthma Mother     Diabetes Mother     Emphysema Mother     Heart Failure Mother     Hypertension Mother     Heart Disease Mother     High Cholesterol Mother     Cancer Mother     Depression Mother     Diabetes Father     Emphysema Father     Heart Failure Father     Hypertension Father     Heart Disease Father     High Cholesterol Father     Substance Abuse Father     Emphysema Paternal Grandfather     Diabetes Sister     High Cholesterol Sister     Vision Loss Maternal Uncle        Current Medications:    Current Outpatient Medications:     varenicline (CHANTIX STARTING MONTH TAL) 0.5 MG X 11 & 1 MG X 42 tablet, Take by mouth., Disp: 1 box, Rfl: 0    varenicline (CHANTIX CONTINUING MONTH TAL) 1 MG tablet, Take 1 tablet by mouth 2 times daily, Disp: 60 tablet, Rfl: 3    BD PEN NEEDLE SVETLANA U/F 32G X 4 MM MISC, USE ONE DAILY AS DIRECTED, Disp: 100 each, Rfl: 5    montelukast (SINGULAIR) 10 MG tablet, TAKE 1 TABLET BY MOUTH EACH NIGHT, Disp: 90 tablet, Rfl: 1    FLUoxetine (PROZAC) 20 MG capsule, TAKE 3 CAPSULES BY MOUTH DAILY, Disp: 270 capsule, Rfl: 1    traZODone (DESYREL) 150 MG tablet, TAKE 2 TABLETS AT BEDTIME, Disp: 180 tablet, Rfl: 1    fluticasone-salmeterol (WIXELA INHUB) 500-50 MCG/DOSE diskus inhaler, Inhale 1 puff into the lungs every 12 hours, Disp: 180 each, Rfl: 1    tiotropium (SPIRIVA HANDIHALER) 18 MCG inhalation capsule, INHALE THE CONTENTS OF 1 CAPSULE EVERY DAY, Disp: 90 capsule, Rfl: 1    albuterol sulfate HFA (VENTOLIN HFA) 108 (90 Base) MCG/ACT inhaler, Inhale 2 puffs into the lungs every 6 hours as needed for Wheezing or Shortness of Breath, Disp: 3 Inhaler, Rfl: 1    albuterol (PROVENTIL) (2.5 MG/3ML) 0.083% nebulizer solution, Take 3 mLs by nebulization every 6 hours as needed for Wheezing or Shortness of Breath DX COPD J44.9, Disp: 120 vial, Rfl: 6    simvastatin (ZOCOR) 20 MG tablet, TAKE 1 TABLET EVERY EVENING, Disp: 90 tablet, Rfl: 1    busPIRone (BUSPAR) 30 MG tablet, TAKE 1 TABLET TWICE DAILY, Disp: 180 tablet, Rfl: 1    guaiFENesin (MUCINEX) 600 MG extended release tablet, Take 1 tablet by mouth 2 times daily as needed for Congestion, Disp: 60 tablet, Rfl: 2    naproxen (NAPROSYN) 500 MG tablet, Take 1 tablet by mouth 2 times daily (with meals), Disp: 30 tablet, Rfl: 0    teriparatide, recombinant, (FORTEO) 600 MCG/2.4ML SOLN injection, Inject 0.08 mLs into the skin daily One dose injected daily. , Disp: 1 pen, Rfl: 11            Objective:   Telephone visit not able to obtain physical exam DATA reviewed by me:   PFTs 12/02/2020 FVC 2.12 (64%) FEV1 1.15(45%) TLC 4.16 (78%)  DLCO 06.97(30%) 6MW 880  F L O2 93%   PFTs 08/29/2019 FVC 1.68 (50%) FEV1 0.98(38%) TLC 4.35 (81%)  DLCO 07.19(30%) 6MW 860  F L O2 90%   PFTs 02/28/2019 FVC 2.03 (60%) FEV1 1.30(50%) TLC 4.15 (78%)  DLCO 07.23(31%) 6MW 560  F L O2 90%   PFTs 08/15/2018 FVC 1.86 (55%) FEV1 1.19(45%) TLC 4.28(80%)   DLCO 07.59(32%) 6MW 840  F L O2 94%   PFTs 10/13/2017 FVC 2.00 (58%) FEV1 1.27(48%) TLC 4.78(89%)   DLCO 06.16(38%) 6MW 1120F L O2 94%  PFTs 05/25/2017 FVC 2.15 (63%) FEV1 1.25(47%) TLC 5.37(100%) DLCO 11.13(47%) 6MW 1120F L O2 95%  PFTs 01/30/2017 FVC 2.05 (60%) FEV1 1.18(44%) TLC 4.69(87%)   DLCO 09.35(39%) 6MW 1190F L O2 95%  PFTs 10/10/2016 FVC 1.96 (57%) FEV1 1.18(44%) TLC 4.57(85%)   DLCO 08.31(35%) 6MW 1000F L O2 94%  PFTs 04/25/2016 FVC 1.63 (50%) FEV1 0.93(37%) TLC 4.96(97%)   DLCO 09.66(42%) 6MW 1140F L O2 91%  PFTs 12/09/2013                            FEV1 1.54(59%) TLC 5.34(104%) DLCO 11.30(49%) 6MW 1140F L O2 91%. PFTs 06/03/2013                            FEV1 1.69(65%) TLC 4.81(93%)   DLCO 09.44(41%) 6MW 1400F L O2 95%. PFTs 11/28/2012                            FEV1 1.67(64%) TLC 5.08(98%)   DLCO 11.94(51%) 6MW 1400F L O2 95%. PFTs 06/19/2012                            FEV1 1.59(60%) TLC 4.66(90%)   DLCO 10.21(44%) 6MW 1280F L O2 96%.    PFTs 03/07/2012                            FEV1 1.72(70%) TLC 4.49(87%)   DLCO 10.72(54%)  PFTs 08/03/2011                            FEV1 1.71(72%) TLC 4.72(96%)   DLCO 12.3 (63%)  PFTs 01/10/2011                            FEV1 1.76           TLC 4.54             DLCO 10.50  PFTs 03/30/2010                            FEV1 1.96           TLC 4.86             DLCO 14.13  PFTs 03/03/2009                            FEV1 2.04           TLC 5.08             DLCO 14.02      CT chest 8/25/2019  Decreased bibasilar airspace disease and nodular opacification  Bronchial wall thickening  Stable reticular opacities upper lobe  Stable mediastinal lymph nodes    CT chest 12/2/2020 imaging reviewed by me and showed  No acute intrapulmonary findings  Stable multifocal irregular opacities associated with GGO's likely reflecting element of chronic interstitial lung disease  Stable mild mediastinal adenopathy      4/5/2016 normal/negative RF 14, SCL70 SSA SSB aldolase CK GIORGI CCP Anti MALORIE-1    4/25/2016 Elevated IgA normal IgG and IgM      IgE 140 with unremarkable immunocap      Assessment:   · Moderate obstructive airways diease secondary to COPD and asthma  · Abnormal CT 4/1/2016 with GGO- DDx RB-ILD, NSIP, HP, DIP, eosinophilic pneumonitis, aspiration and CTD-ILD. Favor smoking related ILD vs HP. Bronch 4/6/16 purulent secretions and grew strep pneumoniae. Negative AFB. Got rid of her Swanson Ruts. Evidently changed on repeat CT 12/2/2020. · Bronchiectasis   · Nocturnal hypoxia- on 2LPM   · >30 pack-years smoking                                          Plan:   · Continue Advair 500/50 BID, Spiriva, and Albuterol 2 puffs Q4-6 hrs PRN  · Continue Singulair daily- refills   · Prednisone taper and Doxycycline 100 mg BID x 5 days for COPD AE self management if needed. · Continue O2 2LPM at night  · CT chest, low dose protocol, screening for lung cancer December 2021. · Patient is up to date with Pneumococcal vaccine   · Advised to get influenza vaccine yearly   · Patient is interested in Covid vaccine and will schedule   · Acapella and Mucinex   · Advised to quit smoking. She verbalized understanding that her underlying ILD is related to smoking and smoking cessation is the main course of management. Failure to quit smoking could lead to worsening scarring and disability. · Follow up in 3-6 months             Nathalie Chacon is a 60 y.o. female evaluated via telephone on 3/4/2021.       Consent:  She and/or health care decision maker is aware that that she may receive a bill for this telephone service, depending on her insurance coverage, and has provided verbal consent to proceed: Yes       Documentation:  I communicated with the patient and/or health care decision maker about: See above   Details of this discussion including any medical advice provided: See above       I Affirm this is a Patient Initiated Episode with an Established Patient who has not had a related appointment within my department in the past 7 days or scheduled within the next 24 hours.     Total Time: 21-30 minutes     Note: not billable if this call serves to triage the patient into an appointment for the relevant concern      [facility]

## 2021-03-05 ENCOUNTER — TELEPHONE (OUTPATIENT)
Dept: PULMONOLOGY | Age: 66
End: 2021-03-05

## 2021-03-05 DIAGNOSIS — F17.200 SMOKER: ICD-10-CM

## 2021-03-05 DIAGNOSIS — F17.200 TOBACCO DEPENDENCE: Primary | ICD-10-CM

## 2021-03-05 NOTE — TELEPHONE ENCOUNTER
Reminder: ( For Bryon Packer)  Pt has appt on 7/8/21 with Dr. Mac Barrier schedule and order CT for Dec 2021. LVV: 3/4/21     Assessment:   · Moderate obstructive airways diease secondary to COPD and asthma  · Abnormal CT 4/1/2016 with GGO- DDx RB-ILD, NSIP, HP, DIP, eosinophilic pneumonitis, aspiration and CTD-ILD. Favor smoking related ILD vs HP. Bronch 4/6/16 purulent secretions and grew strep pneumoniae. Negative AFB. Got rid of her Julio Ibarra. Evidently changed on repeat CT 12/2/2020. · Bronchiectasis   · Nocturnal hypoxia- on 2LPM   · >30 pack-years smoking                                                  Plan:   · Continue Advair 500/50 BID, Spiriva, and Albuterol 2 puffs Q4-6 hrs PRN  · Continue Singulair daily- refills   · Prednisone taper and Doxycycline 100 mg BID x 5 days for COPD AE self management if needed. · Continue O2 2LPM at night  · CT chest, low dose protocol, screening for lung cancer December 2021. · Patient is up to date with Pneumococcal vaccine   · Advised to get influenza vaccine yearly   · Patient is interested in Covid vaccine and will schedule   · Acapella and Mucinex   · Advised to quit smoking. She verbalized understanding that her underlying ILD is related to smoking and smoking cessation is the main course of management. Failure to quit smoking could lead to worsening scarring and disability.   · Follow up in 3-6 months

## 2021-03-10 ENCOUNTER — IMMUNIZATION (OUTPATIENT)
Dept: PRIMARY CARE CLINIC | Age: 66
End: 2021-03-10
Payer: MEDICARE

## 2021-03-10 PROCEDURE — 0001A COVID-19, PFIZER VACCINE 30MCG/0.3ML DOSE: CPT | Performed by: FAMILY MEDICINE

## 2021-03-10 PROCEDURE — 91300 COVID-19, PFIZER VACCINE 30MCG/0.3ML DOSE: CPT | Performed by: FAMILY MEDICINE

## 2021-03-14 ENCOUNTER — APPOINTMENT (OUTPATIENT)
Dept: CT IMAGING | Age: 66
DRG: 190 | End: 2021-03-14
Payer: MEDICARE

## 2021-03-14 ENCOUNTER — APPOINTMENT (OUTPATIENT)
Dept: GENERAL RADIOLOGY | Age: 66
DRG: 190 | End: 2021-03-14
Payer: MEDICARE

## 2021-03-14 ENCOUNTER — HOSPITAL ENCOUNTER (INPATIENT)
Age: 66
LOS: 4 days | Discharge: HOME | DRG: 190 | End: 2021-03-18
Attending: EMERGENCY MEDICINE | Admitting: HOSPITALIST
Payer: MEDICARE

## 2021-03-14 DIAGNOSIS — J44.1 COPD EXACERBATION (HCC): Primary | ICD-10-CM

## 2021-03-14 DIAGNOSIS — J96.01 ACUTE RESPIRATORY FAILURE WITH HYPOXIA (HCC): ICD-10-CM

## 2021-03-14 PROBLEM — J96.21 ACUTE ON CHRONIC RESPIRATORY FAILURE WITH HYPOXEMIA (HCC): Status: ACTIVE | Noted: 2021-03-14

## 2021-03-14 LAB
A/G RATIO: 1.5 (ref 1.1–2.2)
ALBUMIN SERPL-MCNC: 4.1 G/DL (ref 3.4–5)
ALP BLD-CCNC: 56 U/L (ref 40–129)
ALT SERPL-CCNC: 12 U/L (ref 10–40)
ANION GAP SERPL CALCULATED.3IONS-SCNC: 10 MMOL/L (ref 3–16)
AST SERPL-CCNC: 13 U/L (ref 15–37)
BASE EXCESS VENOUS: 1.5 MMOL/L (ref -3–3)
BILIRUB SERPL-MCNC: 0.3 MG/DL (ref 0–1)
BUN BLDV-MCNC: 14 MG/DL (ref 7–20)
CALCIUM SERPL-MCNC: 9.2 MG/DL (ref 8.3–10.6)
CARBOXYHEMOGLOBIN: 3.4 % (ref 0–1.5)
CHLORIDE BLD-SCNC: 92 MMOL/L (ref 99–110)
CO2: 26 MMOL/L (ref 21–32)
CREAT SERPL-MCNC: 0.7 MG/DL (ref 0.6–1.2)
GFR AFRICAN AMERICAN: >60
GFR NON-AFRICAN AMERICAN: >60
GLOBULIN: 2.7 G/DL
GLUCOSE BLD-MCNC: 95 MG/DL (ref 70–99)
HCO3 VENOUS: 26.8 MMOL/L (ref 23–29)
LACTIC ACID: 1.7 MMOL/L (ref 0.4–2)
METHEMOGLOBIN VENOUS: 0.2 %
O2 CONTENT, VEN: 21 VOL %
O2 SAT, VEN: 98 %
O2 THERAPY: ABNORMAL
PCO2, VEN: 44.4 MMHG (ref 40–50)
PH VENOUS: 7.4 (ref 7.35–7.45)
PO2, VEN: 143.5 MMHG (ref 25–40)
POTASSIUM REFLEX MAGNESIUM: 3.7 MMOL/L (ref 3.5–5.1)
SODIUM BLD-SCNC: 128 MMOL/L (ref 136–145)
TCO2 CALC VENOUS: 28 MMOL/L
TOTAL PROTEIN: 6.8 G/DL (ref 6.4–8.2)
TROPONIN: <0.01 NG/ML

## 2021-03-14 PROCEDURE — 83880 ASSAY OF NATRIURETIC PEPTIDE: CPT

## 2021-03-14 PROCEDURE — 1200000000 HC SEMI PRIVATE

## 2021-03-14 PROCEDURE — 6360000004 HC RX CONTRAST MEDICATION: Performed by: NURSE PRACTITIONER

## 2021-03-14 PROCEDURE — 80053 COMPREHEN METABOLIC PANEL: CPT

## 2021-03-14 PROCEDURE — 82803 BLOOD GASES ANY COMBINATION: CPT

## 2021-03-14 PROCEDURE — 6370000000 HC RX 637 (ALT 250 FOR IP): Performed by: HOSPITALIST

## 2021-03-14 PROCEDURE — 71260 CT THORAX DX C+: CPT

## 2021-03-14 PROCEDURE — 99284 EMERGENCY DEPT VISIT MOD MDM: CPT

## 2021-03-14 PROCEDURE — 2700000000 HC OXYGEN THERAPY PER DAY

## 2021-03-14 PROCEDURE — 36415 COLL VENOUS BLD VENIPUNCTURE: CPT

## 2021-03-14 PROCEDURE — 94150 VITAL CAPACITY TEST: CPT

## 2021-03-14 PROCEDURE — 84484 ASSAY OF TROPONIN QUANT: CPT

## 2021-03-14 PROCEDURE — 84145 PROCALCITONIN (PCT): CPT

## 2021-03-14 PROCEDURE — 6360000002 HC RX W HCPCS: Performed by: NURSE PRACTITIONER

## 2021-03-14 PROCEDURE — 83605 ASSAY OF LACTIC ACID: CPT

## 2021-03-14 PROCEDURE — 94664 DEMO&/EVAL PT USE INHALER: CPT

## 2021-03-14 PROCEDURE — 71045 X-RAY EXAM CHEST 1 VIEW: CPT

## 2021-03-14 PROCEDURE — 94761 N-INVAS EAR/PLS OXIMETRY MLT: CPT

## 2021-03-14 PROCEDURE — 87040 BLOOD CULTURE FOR BACTERIA: CPT

## 2021-03-14 PROCEDURE — 6370000000 HC RX 637 (ALT 250 FOR IP): Performed by: NURSE PRACTITIONER

## 2021-03-14 PROCEDURE — 85025 COMPLETE CBC W/AUTO DIFF WBC: CPT

## 2021-03-14 PROCEDURE — 96374 THER/PROPH/DIAG INJ IV PUSH: CPT

## 2021-03-14 PROCEDURE — 94640 AIRWAY INHALATION TREATMENT: CPT

## 2021-03-14 PROCEDURE — 93005 ELECTROCARDIOGRAM TRACING: CPT | Performed by: EMERGENCY MEDICINE

## 2021-03-14 RX ORDER — NICOTINE 21 MG/24HR
1 PATCH, TRANSDERMAL 24 HOURS TRANSDERMAL DAILY
Status: DISCONTINUED | OUTPATIENT
Start: 2021-03-14 | End: 2021-03-18 | Stop reason: HOSPADM

## 2021-03-14 RX ORDER — SODIUM CHLORIDE 9 MG/ML
INJECTION, SOLUTION INTRAVENOUS CONTINUOUS
Status: ACTIVE | OUTPATIENT
Start: 2021-03-14 | End: 2021-03-15

## 2021-03-14 RX ORDER — NICOTINE POLACRILEX 4 MG
15 LOZENGE BUCCAL PRN
Status: DISCONTINUED | OUTPATIENT
Start: 2021-03-14 | End: 2021-03-18 | Stop reason: HOSPADM

## 2021-03-14 RX ORDER — SODIUM CHLORIDE 0.9 % (FLUSH) 0.9 %
10 SYRINGE (ML) INJECTION EVERY 12 HOURS SCHEDULED
Status: DISCONTINUED | OUTPATIENT
Start: 2021-03-14 | End: 2021-03-18 | Stop reason: HOSPADM

## 2021-03-14 RX ORDER — DEXAMETHASONE SODIUM PHOSPHATE 10 MG/ML
10 INJECTION INTRAMUSCULAR; INTRAVENOUS ONCE
Status: COMPLETED | OUTPATIENT
Start: 2021-03-14 | End: 2021-03-14

## 2021-03-14 RX ORDER — SENNA PLUS 8.6 MG/1
1 TABLET ORAL DAILY PRN
Status: DISCONTINUED | OUTPATIENT
Start: 2021-03-14 | End: 2021-03-18 | Stop reason: HOSPADM

## 2021-03-14 RX ORDER — IPRATROPIUM BROMIDE AND ALBUTEROL SULFATE 2.5; .5 MG/3ML; MG/3ML
1 SOLUTION RESPIRATORY (INHALATION)
Status: DISCONTINUED | OUTPATIENT
Start: 2021-03-15 | End: 2021-03-15

## 2021-03-14 RX ORDER — FLUOXETINE HYDROCHLORIDE 20 MG/1
60 CAPSULE ORAL DAILY
Status: DISCONTINUED | OUTPATIENT
Start: 2021-03-15 | End: 2021-03-18 | Stop reason: HOSPADM

## 2021-03-14 RX ORDER — ATORVASTATIN CALCIUM 10 MG/1
10 TABLET, FILM COATED ORAL DAILY
Status: DISCONTINUED | OUTPATIENT
Start: 2021-03-15 | End: 2021-03-18 | Stop reason: HOSPADM

## 2021-03-14 RX ORDER — ONDANSETRON 2 MG/ML
4 INJECTION INTRAMUSCULAR; INTRAVENOUS EVERY 6 HOURS PRN
Status: DISCONTINUED | OUTPATIENT
Start: 2021-03-14 | End: 2021-03-18 | Stop reason: HOSPADM

## 2021-03-14 RX ORDER — POTASSIUM CHLORIDE 20 MEQ/1
40 TABLET, EXTENDED RELEASE ORAL PRN
Status: DISCONTINUED | OUTPATIENT
Start: 2021-03-14 | End: 2021-03-18 | Stop reason: HOSPADM

## 2021-03-14 RX ORDER — BUSPIRONE HYDROCHLORIDE 5 MG/1
30 TABLET ORAL 2 TIMES DAILY
Status: DISCONTINUED | OUTPATIENT
Start: 2021-03-14 | End: 2021-03-18 | Stop reason: HOSPADM

## 2021-03-14 RX ORDER — DEXTROSE MONOHYDRATE 25 G/50ML
12.5 INJECTION, SOLUTION INTRAVENOUS PRN
Status: DISCONTINUED | OUTPATIENT
Start: 2021-03-14 | End: 2021-03-18 | Stop reason: HOSPADM

## 2021-03-14 RX ORDER — PANTOPRAZOLE SODIUM 40 MG/1
40 TABLET, DELAYED RELEASE ORAL
Status: DISCONTINUED | OUTPATIENT
Start: 2021-03-14 | End: 2021-03-15

## 2021-03-14 RX ORDER — DEXTROSE MONOHYDRATE 50 MG/ML
100 INJECTION, SOLUTION INTRAVENOUS PRN
Status: DISCONTINUED | OUTPATIENT
Start: 2021-03-14 | End: 2021-03-18 | Stop reason: HOSPADM

## 2021-03-14 RX ORDER — POTASSIUM CHLORIDE 7.45 MG/ML
10 INJECTION INTRAVENOUS PRN
Status: DISCONTINUED | OUTPATIENT
Start: 2021-03-14 | End: 2021-03-18 | Stop reason: HOSPADM

## 2021-03-14 RX ORDER — PROMETHAZINE HYDROCHLORIDE 25 MG/1
12.5 TABLET ORAL EVERY 6 HOURS PRN
Status: DISCONTINUED | OUTPATIENT
Start: 2021-03-14 | End: 2021-03-18 | Stop reason: HOSPADM

## 2021-03-14 RX ORDER — ACETAMINOPHEN 650 MG/1
650 SUPPOSITORY RECTAL EVERY 6 HOURS PRN
Status: DISCONTINUED | OUTPATIENT
Start: 2021-03-14 | End: 2021-03-18 | Stop reason: HOSPADM

## 2021-03-14 RX ORDER — METHYLPREDNISOLONE SODIUM SUCCINATE 125 MG/2ML
60 INJECTION, POWDER, LYOPHILIZED, FOR SOLUTION INTRAMUSCULAR; INTRAVENOUS EVERY 12 HOURS
Status: DISCONTINUED | OUTPATIENT
Start: 2021-03-14 | End: 2021-03-17

## 2021-03-14 RX ORDER — MAGNESIUM SULFATE IN WATER 40 MG/ML
2000 INJECTION, SOLUTION INTRAVENOUS PRN
Status: DISCONTINUED | OUTPATIENT
Start: 2021-03-14 | End: 2021-03-18 | Stop reason: HOSPADM

## 2021-03-14 RX ORDER — ACETAMINOPHEN 325 MG/1
650 TABLET ORAL EVERY 6 HOURS PRN
Status: DISCONTINUED | OUTPATIENT
Start: 2021-03-14 | End: 2021-03-18 | Stop reason: HOSPADM

## 2021-03-14 RX ORDER — IPRATROPIUM BROMIDE AND ALBUTEROL SULFATE 2.5; .5 MG/3ML; MG/3ML
3 SOLUTION RESPIRATORY (INHALATION) ONCE
Status: COMPLETED | OUTPATIENT
Start: 2021-03-14 | End: 2021-03-14

## 2021-03-14 RX ORDER — BUDESONIDE AND FORMOTEROL FUMARATE DIHYDRATE 160; 4.5 UG/1; UG/1
2 AEROSOL RESPIRATORY (INHALATION) 2 TIMES DAILY
Status: DISCONTINUED | OUTPATIENT
Start: 2021-03-14 | End: 2021-03-18 | Stop reason: HOSPADM

## 2021-03-14 RX ORDER — VARENICLINE TARTRATE 1 MG/1
1 TABLET, FILM COATED ORAL 2 TIMES DAILY
Status: DISCONTINUED | OUTPATIENT
Start: 2021-03-14 | End: 2021-03-18 | Stop reason: HOSPADM

## 2021-03-14 RX ORDER — SODIUM CHLORIDE 0.9 % (FLUSH) 0.9 %
10 SYRINGE (ML) INJECTION PRN
Status: DISCONTINUED | OUTPATIENT
Start: 2021-03-14 | End: 2021-03-18 | Stop reason: HOSPADM

## 2021-03-14 RX ORDER — MONTELUKAST SODIUM 10 MG/1
10 TABLET ORAL NIGHTLY
Status: DISCONTINUED | OUTPATIENT
Start: 2021-03-14 | End: 2021-03-18 | Stop reason: HOSPADM

## 2021-03-14 RX ORDER — TRAZODONE HYDROCHLORIDE 50 MG/1
150 TABLET ORAL NIGHTLY
Status: DISCONTINUED | OUTPATIENT
Start: 2021-03-14 | End: 2021-03-18 | Stop reason: HOSPADM

## 2021-03-14 RX ORDER — GUAIFENESIN 600 MG/1
600 TABLET, EXTENDED RELEASE ORAL 2 TIMES DAILY PRN
Status: DISCONTINUED | OUTPATIENT
Start: 2021-03-14 | End: 2021-03-18 | Stop reason: HOSPADM

## 2021-03-14 RX ADMIN — IPRATROPIUM BROMIDE AND ALBUTEROL SULFATE 3 AMPULE: .5; 3 SOLUTION RESPIRATORY (INHALATION) at 18:00

## 2021-03-14 RX ADMIN — IPRATROPIUM BROMIDE AND ALBUTEROL SULFATE 1 AMPULE: .5; 3 SOLUTION RESPIRATORY (INHALATION) at 23:52

## 2021-03-14 RX ADMIN — DEXAMETHASONE SODIUM PHOSPHATE 10 MG: 10 INJECTION INTRAMUSCULAR; INTRAVENOUS at 17:38

## 2021-03-14 RX ADMIN — IOPAMIDOL 75 ML: 755 INJECTION, SOLUTION INTRAVENOUS at 21:30

## 2021-03-14 ASSESSMENT — PAIN SCALES - GENERAL: PAINLEVEL_OUTOF10: 0

## 2021-03-14 NOTE — ED PROVIDER NOTES
[facility] Emergency Department    CHIEF COMPLAINT  Shortness of Breath (pt became SOB this am \"before I smoked my last cig\". Pt on O2 at night 3L. 89% on RA at time of arrival )      HISTORY OF PRESENT ILLNESS  Rosemarie Monroe is a 60 y.o. female with a pertinent history of COPD, pulmonary fibrosis, who presents to the ED complaining of worsening shortness of breath. Patient reports she has been experiencing worsening shortness of breath over the last 2 to 3 weeks patient does wear oxygen as needed mainly at nights between 2 to 3 L via nasal cannula. Patient reports she has been requiring more oxygen 24 hours a day. Patient was given a steroid pack and antibiotics by her pulmonologist for COPD exacerbation. Patient states that despite these medications she has continued to experience worsening shortness of breath. Patient reports minimal to no change in cough. Patient does still smoke cigarettes. Patient denies COVID-19 exposure. Patient denies fever, chills, body aches, hemoptysis, chest pain, leg swelling or calf pain, palpitations. No other complaints, modifying factors or associated symptoms. Nursing notes reviewed.    Past Medical History:   Diagnosis Date    Allergic rhinitis 6/11/2010    Anxiety     Arthritis     Aspiration pneumonia ([address] 3/21/2012    Recurrent    Asthma     Bronchitis chronic     COPD (chronic obstructive pulmonary disease) ([address] 12/3/2009    Depression     Drug abuse, cocaine type (HCC)     past history of crack cocaine use    Emphysema of lung (HCC)     Fibromyalgia     GERD (gastroesophageal reflux disease)     Hyperlipidemia     Influenza A 03/05/2020    Lung disease     On home oxygen therapy     uses O2 NC 3L prn at night    Osteoporosis     Pneumonia     Polysubstance dependence ([address] 1/2/2012    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    Pulmonary fibrosis ([address] 10/16/2014    Pulmonary infiltrate     Pulmonary nodule 12/3/2009    Tobacco abuse      Past Surgical History:   Procedure Laterality Date    BLADDER REPAIR      BRONCHOSCOPY      BRONCHOSCOPY N/A 3/6/2020    BRONCHOSCOPY THERAPUTIC ASPIRATION INITIAL performed by Titi Snow MD at [address] [place] [address]  3/6/2020    BRONCHOSCOPY ALVEOLAR LAVAGE performed by Titi Snow MD at [address] [place] [address] N/A 6/26/2020    BRONCHOSCOPY THERAPUTIC ASPIRATION INITIAL performed by Casey Das MD at [address] [place] [address]  6/26/2020    BRONCHOSCOPY ALVEOLAR LAVAGE performed by Casey Das MD at [address] [address]    ENDOSCOPY, COLON, DIAGNOSTIC      ESOPHAGEAL DILATATION  9/20/2018    ESOPHAGEAL DILATION WATERS performed by Gabriela Vergara MD at [address] [facility] OTHER SURGICAL HISTORY  2/12/15    T8 Kyphoplasty    AL COLONOSCOPY FLX DX W/COLLJ SPEC WHEN PFRMD N/A 9/20/2018    EGD AND COLONOSCOPY WITH ANESTHESIA performed by Gabriela Vergara MD at [address] [facility] ESOPHAGOGASTRODUODENOSCOPY TRANSORAL DIAGNOSTIC N/A 9/20/2018    EGD AND COLONOSCOPY WITH ANESTHESIA performed by Gabriela Vergara MD at [address] [facility]       Family History   Problem Relation Age of Onset    Asthma Mother     Diabetes Mother     Emphysema Mother     Heart Failure Mother     Hypertension Mother     Heart Disease Mother     High Cholesterol Mother     Cancer Mother     Depression Mother     Diabetes Father     Emphysema Father     Heart Failure Father     Hypertension Father     Heart Disease Father     High Cholesterol Father     Substance Abuse Father     Emphysema Paternal Grandfather     Diabetes Sister     High Cholesterol Sister     Vision Loss Maternal Uncle      Social History     Socioeconomic History    Marital status:      Spouse name: Not on file    Number of children: 3  Years of education: Not on file    Highest education level: Not on file   Occupational History    Occupation: Disabled     Comment:    Social Needs    Financial resource strain: Not very hard    Food insecurity     Worry: Sometimes true     Inability: Sometimes true   [employer] needs     Medical: No     Non-medical: No   Tobacco Use    Smoking status: Current Every Day Smoker     Packs/day: 1.00     Years: 40.00     Pack years: 40.00     Types: Cigarettes     Start date: 1972     Last attempt to quit: 10/12/2020     Years since quittin.4    Smokeless tobacco: Never Used    Tobacco comment: 0.5 PPD restarted   Substance and Sexual Activity    Alcohol use: No     Alcohol/week: 0.0 standard drinks    Drug use: Yes     Types: Marijuana     Comment: Daily    Sexual activity: Yes     Partners: Male   Lifestyle    Physical activity     Days per week: Not on file     Minutes per session: Not on file    Stress: Not on file   Relationships    Social connections     Talks on phone: Not on file     Gets together: Not on file     Attends Jainism service: Not on file     Active member of club or organization: Not on file     Attends meetings of clubs or organizations: Not on file     Relationship status: Not on file    Intimate partner violence     Fear of current or ex partner: Not on file     Emotionally abused: Not on file     Physically abused: Not on file     Forced sexual activity: Not on file   Other Topics Concern    Not on file   Social History Narrative    Not on file     No current facility-administered medications for this encounter. Current Outpatient Medications   Medication Sig Dispense Refill    predniSONE (DELTASONE) 10 MG tablet 40MG daily x 3 days, 30MG daily x3 days, 20MG daily x3 days, 10MG daily x3 days.  30 tablet 0    albuterol sulfate HFA (VENTOLIN HFA) 108 (90 Base) MCG/ACT inhaler Inhale 2 puffs into the lungs every 6 hours as needed for Wheezing or Shortness of Breath 3 Inhaler 1    naproxen (NAPROSYN) 500 MG tablet Take 1 tablet by mouth 2 times daily (with meals) 30 tablet 0    varenicline (CHANTIX STARTING MONTH PAK) 0.5 MG X 11 & 1 MG X 42 tablet Take by mouth. 1 box 0    varenicline (CHANTIX CONTINUING MONTH PAK) 1 MG tablet Take 1 tablet by mouth 2 times daily 60 tablet 3    BD PEN NEEDLE SVETLANA U/F 32G X 4 MM MISC USE ONE DAILY AS DIRECTED 100 each 5    montelukast (SINGULAIR) 10 MG tablet TAKE 1 TABLET BY MOUTH EACH NIGHT 90 tablet 1    FLUoxetine (PROZAC) 20 MG capsule TAKE 3 CAPSULES BY MOUTH DAILY 270 capsule 1    traZODone (DESYREL) 150 MG tablet TAKE 2 TABLETS AT BEDTIME 180 tablet 1    fluticasone-salmeterol (WIXELA INHUB) 500-50 MCG/DOSE diskus inhaler Inhale 1 puff into the lungs every 12 hours 180 each 1    tiotropium (SPIRIVA HANDIHALER) 18 MCG inhalation capsule INHALE THE CONTENTS OF 1 CAPSULE EVERY DAY 90 capsule 1    albuterol (PROVENTIL) (2.5 MG/3ML) 0.083% nebulizer solution Take 3 mLs by nebulization every 6 hours as needed for Wheezing or Shortness of Breath DX COPD J44.9 120 vial 6    simvastatin (ZOCOR) 20 MG tablet TAKE 1 TABLET EVERY EVENING 90 tablet 1    busPIRone (BUSPAR) 30 MG tablet TAKE 1 TABLET TWICE DAILY 180 tablet 1    guaiFENesin (MUCINEX) 600 MG extended release tablet Take 1 tablet by mouth 2 times daily as needed for Congestion 60 tablet 2    teriparatide, recombinant, (FORTEO) 600 MCG/2.4ML SOLN injection Inject 0.08 mLs into the skin daily One dose injected daily. 1 pen 11     Allergies   Allergen Reactions    Meperidine Anaphylaxis     \"Demerol\"     Demerol Hives       REVIEW OF SYSTEMS  10 systems reviewed, pertinent positives per HPI otherwise noted to be negative    PHYSICAL EXAM  /76   Pulse 111   Temp 98.4 °F (36.9 °C) (Axillary)   Resp 20   SpO2 95%   GENERAL APPEARANCE: Awake and alert. Patient is cooperative. Afebrile. Hemodynamically stable. HEAD: Normocephalic. Atraumatic. EYES: PERRL. EOM's grossly intact. ENT: Mucous membranes are dry. NECK: Supple. Normal ROM. HEART: Tachycardic. Distal pulses are equal and intact. Cap refill less than 2 seconds. LUNGS: Respirations are labored. Patient unable to complete a sentence without multiple breaks. Lung sounds expiratory wheezing throughout all lung fields. Patient requiring 3 L of oxygen via nasal cannula. ABDOMEN: Soft. Non-distended. Non-tender. No guarding or rebound. EXTREMITIES: No peripheral edema. Moves all extremities equally. All extremities neurovascularly intact. SKIN: Warm and dry. No acute rashes. NEUROLOGICAL: Alert and oriented. No gross facial drooping. Strength 5/5, sensation intact. PSYCHIATRIC: Normal mood and affect. SCREENINGS       RADIOLOGY  Xr Chest Portable    Result Date: 3/14/2021  EXAMINATION: ONE XRAY VIEW OF THE CHEST 3/14/2021 5:15 pm COMPARISON: 02/05/2021. HISTORY: ORDERING SYSTEM PROVIDED HISTORY: sob TECHNOLOGIST PROVIDED HISTORY: Reason for exam:->sob Reason for Exam: Shortness of Breath (pt became SOB this am \"before I smoked my last cig\". Pt on O2 at night 3L. 89% on RA at time of arrival ) FINDINGS: The cardiomediastinal silhouette is stable. There is increased dependent opacification at the left base, possibly developing infiltrate versus asymmetric edema. The right lung is clear. Emphysematous changes are noted with flattening of the hemidiaphragms. Increased dependent left basilar opacification, possibly infiltrate or asymmetric edema. CRITICAL CARE TIME  Total Critical Care time was 35 minutes, excluding separately reportable procedures. There was a high probability of clinically significant/life threatening deterioration in the patient's condition which required my urgent intervention. CONSULTS  IP CONSULT TO HOSPITALIST    ED COURSE/MDM  Patient seen and evaluated. Old records reviewed. Diagnostic testing reviewed and results discussed.      I Hematocrit 41.0 36.0 - 48.0 %    MCV 89.3 80.0 - 100.0 fL    MCH 30.5 26.0 - 34.0 pg    MCHC 34.1 31.0 - 36.0 g/dL    RDW 13.1 12.4 - 15.4 %    Platelets 778 735 - 855 K/uL    MPV 6.8 5.0 - 10.5 fL    PLATELET SLIDE REVIEW Adequate     SLIDE REVIEW see below     Neutrophils % 66.0 %    Lymphocytes % 24.0 %    Monocytes % 3.0 %    Eosinophils % 1.0 %    Basophils % 0.0 %    Neutrophils Absolute 12.9 (H) 1.7 - 7.7 K/uL    Lymphocytes Absolute 5.6 (H) 1.0 - 5.1 K/uL    Monocytes Absolute 0.6 0.0 - 1.3 K/uL    Eosinophils Absolute 0.2 0.0 - 0.6 K/uL    Basophils Absolute 0.0 0.0 - 0.2 K/uL    Bands Relative 1 0 - 7 %    Atypical Lymphocytes Relative 5 0 - 6 %   Comprehensive Metabolic Panel w/ Reflex to MG   Result Value Ref Range    Sodium 128 (L) 136 - 145 mmol/L    Potassium reflex Magnesium 3.7 3.5 - 5.1 mmol/L    Chloride 92 (L) 99 - 110 mmol/L    CO2 26 21 - 32 mmol/L    Anion Gap 10 3 - 16    Glucose 95 70 - 99 mg/dL    BUN 14 7 - 20 mg/dL    CREATININE 0.7 0.6 - 1.2 mg/dL    GFR Non-African American >60 >60    GFR African American >60 >60    Calcium 9.2 8.3 - 10.6 mg/dL    Total Protein 6.8 6.4 - 8.2 g/dL    Albumin 4.1 3.4 - 5.0 g/dL    Albumin/Globulin Ratio 1.5 1.1 - 2.2    Total Bilirubin 0.3 0.0 - 1.0 mg/dL    Alkaline Phosphatase 56 40 - 129 U/L    ALT 12 10 - 40 U/L    AST 13 (L) 15 - 37 U/L    Globulin 2.7 g/dL   Troponin   Result Value Ref Range    Troponin <0.01 <0.01 ng/mL   Blood gas, venous   Result Value Ref Range    pH, Ivan 7.398 7.350 - 7.450    pCO2, Ivan 44.4 40.0 - 50.0 mmHg    pO2, Ivan 143.5 (H) 25 - 40 mmHg    HCO3, Venous 26.8 23.0 - 29.0 mmol/L    Base Excess, Ivan 1.5 -3.0 - 3.0 mmol/L    O2 Sat, Ivan 98 Not Established %    Carboxyhemoglobin 3.4 (H) 0.0 - 1.5 %    MetHgb, Ivan 0.2 <1.5 %    TC02 (Calc), Ivan 28 Not Established mmol/L    O2 Content, Ivan 21 Not Established VOL %    O2 Therapy Unknown    EKG 12 Lead   Result Value Ref Range    Ventricular Rate 110 BPM    Atrial Rate 110 BPM P-R Interval 142 ms    QRS Duration 78 ms    Q-T Interval 316 ms    QTc Calculation (Bazett) 427 ms    P Axis 71 degrees    R Axis 16 degrees    T Axis 65 degrees    Diagnosis       Sinus tachycardiaCannot rule out Anterior infarct , age undeterminedAbnormal ECGWhen compared with ECG of 04-DEC-2020 11:12,No significant change was found       I spoke with Dr. Holly Mitchell. We thoroughly discussed the history, physical exam, laboratory and imaging studies, as well as, emergency department course. Based upon that discussion, we've decided to admit Sandy Daughters for further observation and evaluation of Jaeleleli Wong's dyspnea. As I have deemed necessary from their history, physical, and studies, I have considered and evaluated Sandy Daughters for the following diagnoses:  ACUTE CORONARY SYNDROME, CHRONIC OBSTRUCTIVE PULMONARY DISEASE, CONGESTIVE HEART FAILURE, PERICARDIAL TAMPONADE, PNEUMONIA, PNEUMOTHORAX, PULMONARY EMBOLISM, SEPSIS, and THORACIC DISSECTION. FINAL IMPRESSION  1. COPD exacerbation ([address]    2. Acute respiratory failure with hypoxia (HCC)        Vitals:  Blood pressure 102/76, pulse 111, temperature 98.4 °F (36.9 °C), temperature source Axillary, resp. rate 20, SpO2 95 %, not currently breastfeeding. DISPOSITION  Patient was admitted to the hospital in stable condition. Comment: Please note this report has been produced using speech recognition software and may contain errors related to that system including errors in grammar, punctuation, and spelling, as well as words and phrases that may be inappropriate. If there are any questions or concerns please feel free to contact the dictating provider for clarification.             Moris Snyder, SHAUN - CNP  03/14/21 [id number]

## 2021-03-15 LAB
A/G RATIO: 1.4 (ref 1.1–2.2)
ALBUMIN SERPL-MCNC: 3.7 G/DL (ref 3.4–5)
ALP BLD-CCNC: 50 U/L (ref 40–129)
ALT SERPL-CCNC: 12 U/L (ref 10–40)
ANION GAP SERPL CALCULATED.3IONS-SCNC: 10 MMOL/L (ref 3–16)
AST SERPL-CCNC: 13 U/L (ref 15–37)
ATYPICAL LYMPHOCYTE RELATIVE PERCENT: 5 % (ref 0–6)
BANDED NEUTROPHILS RELATIVE PERCENT: 1 % (ref 0–7)
BASOPHILS ABSOLUTE: 0 K/UL (ref 0–0.2)
BASOPHILS ABSOLUTE: 0 K/UL (ref 0–0.2)
BASOPHILS RELATIVE PERCENT: 0 %
BASOPHILS RELATIVE PERCENT: 0.3 %
BILIRUB SERPL-MCNC: <0.2 MG/DL (ref 0–1)
BUN BLDV-MCNC: 14 MG/DL (ref 7–20)
CALCIUM SERPL-MCNC: 8.6 MG/DL (ref 8.3–10.6)
CHLORIDE BLD-SCNC: 97 MMOL/L (ref 99–110)
CO2: 23 MMOL/L (ref 21–32)
CREAT SERPL-MCNC: 0.6 MG/DL (ref 0.6–1.2)
EKG ATRIAL RATE: 110 BPM
EKG DIAGNOSIS: NORMAL
EKG P AXIS: 71 DEGREES
EKG P-R INTERVAL: 142 MS
EKG Q-T INTERVAL: 316 MS
EKG QRS DURATION: 78 MS
EKG QTC CALCULATION (BAZETT): 427 MS
EKG R AXIS: 16 DEGREES
EKG T AXIS: 65 DEGREES
EKG VENTRICULAR RATE: 110 BPM
EOSINOPHILS ABSOLUTE: 0 K/UL (ref 0–0.6)
EOSINOPHILS ABSOLUTE: 0.2 K/UL (ref 0–0.6)
EOSINOPHILS RELATIVE PERCENT: 0 %
EOSINOPHILS RELATIVE PERCENT: 1 %
GFR AFRICAN AMERICAN: >60
GFR NON-AFRICAN AMERICAN: >60
GLOBULIN: 2.7 G/DL
GLUCOSE BLD-MCNC: 104 MG/DL (ref 70–99)
GLUCOSE BLD-MCNC: 133 MG/DL (ref 70–99)
GLUCOSE BLD-MCNC: 137 MG/DL (ref 70–99)
GLUCOSE BLD-MCNC: 142 MG/DL (ref 70–99)
GLUCOSE BLD-MCNC: 151 MG/DL (ref 70–99)
GLUCOSE BLD-MCNC: 213 MG/DL (ref 70–99)
HCT VFR BLD CALC: 39 % (ref 36–48)
HCT VFR BLD CALC: 41 % (ref 36–48)
HEMATOLOGY PATH CONSULT: NORMAL
HEMATOLOGY PATH CONSULT: YES
HEMOGLOBIN: 13.4 G/DL (ref 12–16)
HEMOGLOBIN: 14 G/DL (ref 12–16)
LV EF: 55 %
LVEF MODALITY: NORMAL
LYMPHOCYTES ABSOLUTE: 0.7 K/UL (ref 1–5.1)
LYMPHOCYTES ABSOLUTE: 5.6 K/UL (ref 1–5.1)
LYMPHOCYTES RELATIVE PERCENT: 24 %
LYMPHOCYTES RELATIVE PERCENT: 5.4 %
MCH RBC QN AUTO: 30.5 PG (ref 26–34)
MCH RBC QN AUTO: 30.8 PG (ref 26–34)
MCHC RBC AUTO-ENTMCNC: 34.1 G/DL (ref 31–36)
MCHC RBC AUTO-ENTMCNC: 34.3 G/DL (ref 31–36)
MCV RBC AUTO: 89.3 FL (ref 80–100)
MCV RBC AUTO: 89.8 FL (ref 80–100)
MONOCYTES ABSOLUTE: 0.2 K/UL (ref 0–1.3)
MONOCYTES ABSOLUTE: 0.6 K/UL (ref 0–1.3)
MONOCYTES RELATIVE PERCENT: 1.3 %
MONOCYTES RELATIVE PERCENT: 3 %
NEUTROPHILS ABSOLUTE: 12.7 K/UL (ref 1.7–7.7)
NEUTROPHILS ABSOLUTE: 12.9 K/UL (ref 1.7–7.7)
NEUTROPHILS RELATIVE PERCENT: 66 %
NEUTROPHILS RELATIVE PERCENT: 93 %
OSMOLALITY URINE: 352 MOSM/KG (ref 390–1070)
OSMOLALITY: 273 MOSM/KG (ref 280–301)
PDW BLD-RTO: 12.8 % (ref 12.4–15.4)
PDW BLD-RTO: 13.1 % (ref 12.4–15.4)
PERFORMED ON: ABNORMAL
PLATELET # BLD: 283 K/UL (ref 135–450)
PLATELET # BLD: 385 K/UL (ref 135–450)
PLATELET SLIDE REVIEW: ADEQUATE
PMV BLD AUTO: 6.8 FL (ref 5–10.5)
PMV BLD AUTO: 7 FL (ref 5–10.5)
POTASSIUM REFLEX MAGNESIUM: 4.3 MMOL/L (ref 3.5–5.1)
PRO-BNP: 53 PG/ML (ref 0–124)
PROCALCITONIN: 0.03 NG/ML (ref 0–0.15)
RBC # BLD: 4.34 M/UL (ref 4–5.2)
RBC # BLD: 4.59 M/UL (ref 4–5.2)
SLIDE REVIEW: ABNORMAL
SODIUM BLD-SCNC: 130 MMOL/L (ref 136–145)
SODIUM URINE: <20 MMOL/L
TOTAL PROTEIN: 6.4 G/DL (ref 6.4–8.2)
VITAMIN D 25-HYDROXY: 19.3 NG/ML
WBC # BLD: 13.6 K/UL (ref 4–11)
WBC # BLD: 19.3 K/UL (ref 4–11)

## 2021-03-15 PROCEDURE — 94640 AIRWAY INHALATION TREATMENT: CPT

## 2021-03-15 PROCEDURE — 83935 ASSAY OF URINE OSMOLALITY: CPT

## 2021-03-15 PROCEDURE — 6370000000 HC RX 637 (ALT 250 FOR IP): Performed by: HOSPITALIST

## 2021-03-15 PROCEDURE — 94761 N-INVAS EAR/PLS OXIMETRY MLT: CPT

## 2021-03-15 PROCEDURE — 85025 COMPLETE CBC W/AUTO DIFF WBC: CPT

## 2021-03-15 PROCEDURE — 93010 ELECTROCARDIOGRAM REPORT: CPT | Performed by: INTERNAL MEDICINE

## 2021-03-15 PROCEDURE — 82306 VITAMIN D 25 HYDROXY: CPT

## 2021-03-15 PROCEDURE — 83930 ASSAY OF BLOOD OSMOLALITY: CPT

## 2021-03-15 PROCEDURE — 2700000000 HC OXYGEN THERAPY PER DAY

## 2021-03-15 PROCEDURE — 93306 TTE W/DOPPLER COMPLETE: CPT

## 2021-03-15 PROCEDURE — 6360000002 HC RX W HCPCS: Performed by: HOSPITALIST

## 2021-03-15 PROCEDURE — 1200000000 HC SEMI PRIVATE

## 2021-03-15 PROCEDURE — 84300 ASSAY OF URINE SODIUM: CPT

## 2021-03-15 PROCEDURE — 2580000003 HC RX 258: Performed by: HOSPITALIST

## 2021-03-15 PROCEDURE — 80053 COMPREHEN METABOLIC PANEL: CPT

## 2021-03-15 PROCEDURE — 36415 COLL VENOUS BLD VENIPUNCTURE: CPT

## 2021-03-15 RX ORDER — IPRATROPIUM BROMIDE AND ALBUTEROL SULFATE 2.5; .5 MG/3ML; MG/3ML
1 SOLUTION RESPIRATORY (INHALATION) EVERY 4 HOURS PRN
Status: DISCONTINUED | OUTPATIENT
Start: 2021-03-15 | End: 2021-03-18 | Stop reason: HOSPADM

## 2021-03-15 RX ORDER — FAMOTIDINE 20 MG/1
20 TABLET, FILM COATED ORAL 2 TIMES DAILY
Status: DISCONTINUED | OUTPATIENT
Start: 2021-03-15 | End: 2021-03-18 | Stop reason: HOSPADM

## 2021-03-15 RX ORDER — IPRATROPIUM BROMIDE AND ALBUTEROL SULFATE 2.5; .5 MG/3ML; MG/3ML
1 SOLUTION RESPIRATORY (INHALATION) 2 TIMES DAILY
Status: DISCONTINUED | OUTPATIENT
Start: 2021-03-15 | End: 2021-03-18 | Stop reason: HOSPADM

## 2021-03-15 RX ADMIN — MONTELUKAST SODIUM 10 MG: 10 TABLET ORAL at 20:18

## 2021-03-15 RX ADMIN — PANTOPRAZOLE SODIUM 40 MG: 40 TABLET, DELAYED RELEASE ORAL at 06:35

## 2021-03-15 RX ADMIN — Medication 2 PUFF: at 08:15

## 2021-03-15 RX ADMIN — ATORVASTATIN CALCIUM 10 MG: 10 TABLET, FILM COATED ORAL at 08:49

## 2021-03-15 RX ADMIN — MONTELUKAST SODIUM 10 MG: 10 TABLET ORAL at 00:30

## 2021-03-15 RX ADMIN — IPRATROPIUM BROMIDE AND ALBUTEROL SULFATE 1 AMPULE: .5; 3 SOLUTION RESPIRATORY (INHALATION) at 19:06

## 2021-03-15 RX ADMIN — ACETAMINOPHEN 650 MG: 325 TABLET ORAL at 20:18

## 2021-03-15 RX ADMIN — METHYLPREDNISOLONE SODIUM SUCCINATE 60 MG: 125 INJECTION, POWDER, FOR SOLUTION INTRAMUSCULAR; INTRAVENOUS at 00:42

## 2021-03-15 RX ADMIN — ENOXAPARIN SODIUM 40 MG: 40 INJECTION SUBCUTANEOUS at 08:49

## 2021-03-15 RX ADMIN — SODIUM CHLORIDE, PRESERVATIVE FREE 10 ML: 5 INJECTION INTRAVENOUS at 00:30

## 2021-03-15 RX ADMIN — BUSPIRONE HYDROCHLORIDE 30 MG: 5 TABLET ORAL at 20:18

## 2021-03-15 RX ADMIN — SODIUM CHLORIDE 75 ML/HR: 9 INJECTION, SOLUTION INTRAVENOUS at 00:25

## 2021-03-15 RX ADMIN — INSULIN LISPRO 2 UNITS: 100 INJECTION, SOLUTION INTRAVENOUS; SUBCUTANEOUS at 00:43

## 2021-03-15 RX ADMIN — TRAZODONE HYDROCHLORIDE 150 MG: 50 TABLET ORAL at 20:17

## 2021-03-15 RX ADMIN — IPRATROPIUM BROMIDE AND ALBUTEROL SULFATE 1 AMPULE: .5; 3 SOLUTION RESPIRATORY (INHALATION) at 08:14

## 2021-03-15 RX ADMIN — BUSPIRONE HYDROCHLORIDE 30 MG: 5 TABLET ORAL at 08:49

## 2021-03-15 RX ADMIN — TRAZODONE HYDROCHLORIDE 150 MG: 50 TABLET ORAL at 00:30

## 2021-03-15 RX ADMIN — FAMOTIDINE 20 MG: 20 TABLET, FILM COATED ORAL at 20:26

## 2021-03-15 RX ADMIN — METHYLPREDNISOLONE SODIUM SUCCINATE 60 MG: 125 INJECTION, POWDER, FOR SOLUTION INTRAMUSCULAR; INTRAVENOUS at 08:49

## 2021-03-15 RX ADMIN — SODIUM CHLORIDE, PRESERVATIVE FREE 10 ML: 5 INJECTION INTRAVENOUS at 20:18

## 2021-03-15 RX ADMIN — METHYLPREDNISOLONE SODIUM SUCCINATE 60 MG: 125 INJECTION, POWDER, FOR SOLUTION INTRAMUSCULAR; INTRAVENOUS at 20:18

## 2021-03-15 RX ADMIN — Medication 2 PUFF: at 19:11

## 2021-03-15 RX ADMIN — FLUOXETINE 60 MG: 20 CAPSULE ORAL at 08:48

## 2021-03-15 ASSESSMENT — ENCOUNTER SYMPTOMS: TACHYPNEA: 1

## 2021-03-15 ASSESSMENT — PAIN DESCRIPTION - LOCATION: LOCATION: HEAD

## 2021-03-15 ASSESSMENT — PAIN SCALES - GENERAL: PAINLEVEL_OUTOF10: 0

## 2021-03-15 NOTE — ED PROVIDER NOTES
I independently performed a history and physical on Lluvia Quijano. All diagnostic, treatment, and disposition decisions were made by myself in conjunction with the advanced practice provider.     -Lluvia Quijano is a 60 y.o. female with history of hyperlipidemia, pulmonary fibrosis, COPD presents to ED for shortness of breath. Patient reports shortness of breath for several days, and associated with wheezing, cough. Denies chest pain, fever, nausea, vomiting. States that she normally uses oxygen only at night however is required to use it during the day. -PE: well appearing, nontoxic, not in acute distress. RRR, tachypneic, retractions, wheezing abdomen soft, ND, NTTP, + BS x 4, no rigidity, rebound, guarding  -lab workup significant for: Leukocytosis, hyponatremia 128  -Cxr: Increased dependent left basilar opacification possibly infiltrate or asymmetric edema  -Ua: Negative for acute infection  -CT chest shows no evidence of pulmonary embolism or acute pulmonary abnormality. Mild scattered fibrotic pulmonary changes.  -Patient was given Decadron and DuoNeb in the ED. On reassessment patient reports she is feeling slightly improved though still short of breath.  -The Ekg interpreted by me shows  sinus tachycardia, ajdi=[id number]   Axis is   Normal  QTc is  427  Intervals and Durations are unremarkable. ST Segments: no acute change  No significant change from prior EKG dated 12/4/20  -Plan for admission for further workup and treatment for COPD exacerbation discussed with patient who is in agreement with plan and have no further questions/concerns    For further details of [address] [facility] emergency department encounter, please see NP Yessenia Martinez's documentation.         Lynnette Olmos MD  03/15/21 [id number]

## 2021-03-15 NOTE — PROGRESS NOTES
4 Eyes Skin Assessment     The patient is being assess for   Admission    I agree that 2 RN's have performed a thorough Head to Toe Skin Assessment on the patient. ALL assessment sites listed below have been assessed. Areas assessed by both nurses:   [x]   Head, Face, and Ears   [x]   Shoulders, Back, and Chest, Abdomen  [x]   Arms, Elbows, and Hands   [x]   Coccyx, Sacrum, and Ischium  [x]   Legs, Feet, and Heels      No redness or open areas on skin. **SHARE this note so that the co-signing nurse is able to place an eSignature**    Co-signer eSignature: {Esignature:[id number]}    Does the Patient have Skin Breakdown?   No          Philip Prevention initiated:  Yes   Wound Care Orders initiated:  NA      [facility] nurse consulted for Pressure Injury (Stage 3,4, Unstageable, DTI, NWPT, Complex wounds)and New or Established Ostomies:  NA      Primary Nurse eSignature: Electronically signed by Savanna Stearns RN on 3/15/21 at 2:42 AM EDT

## 2021-03-15 NOTE — PROGRESS NOTES
Patient admitted to room 540 per stretcher . O2 per nasal cannula at 4 liters, O2 sat 95 % Patient SOB with any exertion. No complaints of pain or nausea. Oriented to room, call light use and fall prevention protocol. Bed in low, locked position, call light in reach.

## 2021-03-15 NOTE — H&P
HOSPITALISTS HISTORY AND PHYSICAL    3/15/2021 6:42 AM    Patient Information:  Marvel Florentino is a 60 y.o. female [id number]  PCP:  Lala Calixto MD (Tel: [phone] )    Chief complaint:    Chief Complaint   Patient presents with    Shortness of Breath     pt became SOB this am \"before I smoked my last cig\". Pt on O2 at night 3L. 89% on RA at time of arrival         History of Present Illness:  Sandy Sullivan is a 60 y.o. female who presented to the ED with sudden onset of acute worsening dyspnea PTA. Of note, the patient does have a diagnosis of stage III severe COPD by GOLD, ILD, pulmonary emphysema, and chronic respiratory failure with hypoxia. She reports that she has recently been treated with outpatient oral steroid therapy as well as to different antibiotics. Patient is however concerned regarding the sudden onset of profound shortness of breath moments prior to arrival.  Vital signs obtained in ED revealed that patient was profoundly tachypneic and moderately tachycardic into the 120s. In lieu of vital sign abnormality and sudden onset of symptoms, a decision was made to obtain stat chest CT with PE protocol to rule out thromboembolic phenomenon. Fortunately, it revealed no PE, infiltrate, effusion, or pneumothorax. Patient received parenteral steroids, back on back nebulizer therapy, and supplemental O2 in ED. VBG was negative for hypercapnia therefore BiPAP not indicated at the time of admission. History obtained from patient and review of Epic chart      REVIEW OF SYSTEMS:   Constitutional: Negative for fever,chills,weight loss, and generalized weakness  ENT: Negative for rhinorrhea, epistaxis, hoarseness, and sore throat.   Respiratory: Positive for chronic shortness of breath with acute decompensation; wheezing, and cough present  Cardiovascular: Positive for atypical chest pain; no palpitations or peripheral edema; no orthopnea or PND  Gastrointestinal: Negative for N/V/D; no hematemesis, hematochezia, or melena; no anorexia  Genitourinary: Negative for dysuria, frequency, hesitancy, and urgency; no incontinence  Hematologic/Lymphatic: Negative for bleeding tendency, excessive bruising, and enlarged LN  Musculoskeletal: Positive for myalgias and arthalgias; able to ambulate without difficulty  Neurologic: Negative for LOC, seizure activity, paresthesias, dysarthria, vertigo, and gait disturbance  Skin: Negative for itching,rash  Psychiatric: Positive for depression,anxiety  Endocrine: Negative for polyuria/polydipsia/polyphagia; no heat/cold intolerance    Past Medical History:   has a past medical history of Allergic rhinitis, Anxiety, Arthritis, Aspiration pneumonia ([address] Asthma, Bronchitis chronic, COPD (chronic obstructive pulmonary disease) ([address] Depression, Drug abuse, cocaine type ([address] Emphysema of lung ([address] Fibromyalgia, GERD (gastroesophageal reflux disease), Hyperlipidemia, Influenza A, Lung disease, On home oxygen therapy, Osteoporosis, Pneumonia, Polysubstance dependence ([address] 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[address] Pulmonary fibrosis ([address] Pulmonary infiltrate, Pulmonary nodule, and Tobacco abuse. Past Surgical History:   has a past surgical history that includes bronchoscopy; Hysterectomy; Salivary gland surgery; Endoscopy, colon, diagnostic; other surgical history (2/12/15); Colonoscopy (2012); bladder repair; pr colonoscopy flx dx w/collj spec when pfrmd (N/A, 9/20/2018); pr esophagogastroduodenoscopy transoral diagnostic (N/A, 9/20/2018); Esophagus dilation (9/20/2018); bronchoscopy (N/A, 3/6/2020); bronchoscopy (3/6/2020); bronchoscopy (N/A, 6/26/2020); and bronchoscopy (6/26/2020). Medications:  No current facility-administered medications on file prior to encounter.       Current Outpatient Medications on File Prior to Encounter Medication Sig Dispense Refill    predniSONE (DELTASONE) 10 MG tablet 40MG daily x 3 days, 30MG daily x3 days, 20MG daily x3 days, 10MG daily x3 days. 30 tablet 0    albuterol sulfate HFA (VENTOLIN HFA) 108 (90 Base) MCG/ACT inhaler Inhale 2 puffs into the lungs every 6 hours as needed for Wheezing or Shortness of Breath 3 Inhaler 1    naproxen (NAPROSYN) 500 MG tablet Take 1 tablet by mouth 2 times daily (with meals) 30 tablet 0    varenicline (CHANTIX STARTING MONTH PAK) 0.5 MG X 11 & 1 MG X 42 tablet Take by mouth. 1 box 0    varenicline (CHANTIX CONTINUING MONTH PAK) 1 MG tablet Take 1 tablet by mouth 2 times daily 60 tablet 3    BD PEN NEEDLE SVELTANA U/F 32G X 4 MM MISC USE ONE DAILY AS DIRECTED 100 each 5    montelukast (SINGULAIR) 10 MG tablet TAKE 1 TABLET BY MOUTH EACH NIGHT 90 tablet 1    FLUoxetine (PROZAC) 20 MG capsule TAKE 3 CAPSULES BY MOUTH DAILY 270 capsule 1    traZODone (DESYREL) 150 MG tablet TAKE 2 TABLETS AT BEDTIME 180 tablet 1    fluticasone-salmeterol (WIXELA INHUB) 500-50 MCG/DOSE diskus inhaler Inhale 1 puff into the lungs every 12 hours 180 each 1    tiotropium (SPIRIVA HANDIHALER) 18 MCG inhalation capsule INHALE THE CONTENTS OF 1 CAPSULE EVERY DAY 90 capsule 1    albuterol (PROVENTIL) (2.5 MG/3ML) 0.083% nebulizer solution Take 3 mLs by nebulization every 6 hours as needed for Wheezing or Shortness of Breath DX COPD J44.9 120 vial 6    simvastatin (ZOCOR) 20 MG tablet TAKE 1 TABLET EVERY EVENING 90 tablet 1    busPIRone (BUSPAR) 30 MG tablet TAKE 1 TABLET TWICE DAILY 180 tablet 1    guaiFENesin (MUCINEX) 600 MG extended release tablet Take 1 tablet by mouth 2 times daily as needed for Congestion 60 tablet 2    teriparatide, recombinant, (FORTEO) 600 MCG/2.4ML SOLN injection Inject 0.08 mLs into the skin daily One dose injected daily. 1 pen 11       Allergies:   Allergies   Allergen Reactions    Meperidine Anaphylaxis     \"Demerol\"     Demerol Hives        Social History:   reports that she has been smoking cigarettes. She started smoking about 49 years ago. She has a 40.00 pack-year smoking history. She has never used smokeless tobacco. She reports current drug use. Drug: Marijuana. She reports that she does not drink alcohol. Family History:  family history includes Asthma in her mother; Cancer in her mother; Depression in her mother; Diabetes in her father, mother, and sister; Emphysema in her father, mother, and paternal grandfather; Heart Disease in her father and mother; Heart Failure in her father and mother; High Cholesterol in her father, mother, and sister; Hypertension in her father and mother; Substance Abuse in her father; Vision Loss in her maternal uncle. Physical Exam:  /76   Pulse 91   Temp 97.3 °F (36.3 °C) (Oral)   Resp 24   Ht 5' 3\" (1.6 m)   Wt 139 lb 1.6 oz (63.1 kg)   SpO2 94%   BMI 24.64 kg/m²     General appearance:  Thin frail female who appears older than her stated age with mild respiratory distress  Eyes: Sclera clear without conjunctival injection; PERRLA; EOMI  ENT: Mucous membranes moist without thrush; normal dentition  Neck: Supple without meningismus; no goiter; no carotid bruit bilaterally  Cardiovascular: Regular rhythm without ectopy; normal S1-S2 with no murmurs; no peripheral edema; no JVD  Respiratory: Positive tachypnea at 20-30 bpm; pursed lip breathing; diminished breath sounds throughout with end expiratory wheeze; prolonged I/E ratio  Gastrointestinal: Abdomen soft, non-tender, not distended; bowel sounds normal x4 quadrants; no masses/organomegaly appreciated  Musculoskeletal: FROM spine and extremities x4; no gross deformity  Neurology: A&O x3; cranial nerves 2-12 grossly intact; motor 5/5; no seizure activity  Psychiatry: Well-groomed with good eye contact; appropriate affect; no visual/auditory hallucination  Skin: Warm, dry, normal turgor, no rash  PV: 2/4 radial and dorsalis pedis bilaterally; brisk capillary refill    Labs:  CBC:   Lab Results   Component Value Date    WBC 19.3 03/14/2021    RBC 4.59 03/14/2021    HGB 14.0 03/14/2021    HCT 41.0 03/14/2021    MCV 89.3 03/14/2021    MCH 30.5 03/14/2021    MCHC 34.1 03/14/2021    RDW 13.1 03/14/2021     03/14/2021    MPV 6.8 03/14/2021     BMP:    Lab Results   Component Value Date     03/14/2021    K 3.7 03/14/2021    CL 92 03/14/2021    CO2 26 03/14/2021    BUN 14 03/14/2021    CREATININE 0.7 03/14/2021    CALCIUM 9.2 03/14/2021    GFRAA >60 03/14/2021    GFRAA >60 01/08/2013    LABGLOM >60 03/14/2021    GLUCOSE 95 03/14/2021     CT CHEST PULMONARY EMBOLISM W CONTRAST   Final Result   No evidence of pulmonary embolism or acute pulmonary abnormality. Mild scattered fibrotic pulmonary changes. Exaggerated thoracic kyphotic curvature with multiple chronic vertebral body   compression fractures. XR CHEST PORTABLE   Final Result   Increased dependent left basilar opacification, possibly infiltrate or   asymmetric edema. EKG: Ventricular Rate 110 P BPM QTc Calculation (Bazett) 427 P ms   Atrial Rate 110 P BPM P Axis 71 P degrees   P-R Interval 142 P ms R Axis 16 P degrees   QRS Duration 78 P ms T Axis 65 P degrees   Q-T Interval 316 P ms Diagnosis Sinus tachycardiaCannot rule out Anterior infarct , age undeterminedAbnormal        I visualized CXR images and EKG strips personally and agree with documented interpretation    Discussed case  with ED provider    Problem List  Active Problems:    Acute respiratory failure with hypoxia (HCC)    Stage 3 severe COPD by GOLD classification ([facility])    Hyponatremia    Tobacco dependence    ILD (interstitial lung disease) ([facility])    Anxiety and depression    Chronic pain syndrome    Acute on chronic respiratory failure with hypoxemia ([facility])  Resolved Problems:    * No resolved hospital problems.  *        Assessment/Plan:     Acute COPD exacerbation with decompensated hypoxic respiratory failure  -Continuous pulse oximetry monitoring initiated with PRN supplemental O2   -Continue home maintenance MDIs/nebulizer treatment including Advair, Mucinex and Singulair  -Encourage aggressive pulmonary toilet including incentive spirometry every 4H while awake  -DuoNebs scheduled Q4H while awake  -IV Solu-Medrol scheduled Q12H; POC glucose checks with PRN insulin coverage  -Procalcitonin level ordered by admitting team; no antibiotics initiated at this time  -Patient counseled on necessity of smoking cessation; nicotine replacement patch provided    Hyponatremia  -Strict I's and O's   -Normal saline bolus administered in ED with gentle maintenance IVF to infuse overnight  -Serial renal panels to ensure adequate electrolyte correction (while avoiding overly aggressive correction with subsequent CPM)  -Serum/urine osmolality and spot urine sodium to determine FENa   -Medications with potential to worsen hyponatremia held including: diuretics, NSAIDS, PPI; SSRI and other neuroleptic medications NOT held due to moderate chronic depression anxiety    Osteoporosis with compression fractures  -Likely secondary to chronic recurrent need for systemic corticosteroids  -CT scan revealed multiple chronic vertebral body compression fractures  -Continue Forteo daily injection  -Calcium and vitamin D levels pending  -Fall risk parameters in place      DVT prophylaxis-Lovenox 40 mg subcu daily  Code status-full code  Diet-cardiac with carb restriction due to high-dose IV systemic steroids  IV access-PIV established in ED      Admit as inpatient. I anticipate hospitalization spanning more than two midnights for investigation and treatment of the above medically necessary diagnoses. Please note that some part of this chart was generated using Dragon dictation software. Although every effort was made to ensure the accuracy of this automated transcription, some errors in transcription may have occurred inadvertently.  If you

## 2021-03-15 NOTE — CARE COORDINATION
CASE MANAGEMENT INITIAL ASSESSMENT      Reviewed chart and completed assessment with:patient  Explained Case Management role/services. Primary contact information:see below  Health Care Decision Maker :   Primary Decision Maker: Angelita Wong - Child - [phone]    Secondary Decision Maker: [address] - [phone]    Supplemental (Other) Decision Maker: Norbert Wong - Child - [phone]  Can this person be reached and be able to respond quickly, such as within a few minutes or hours? Yes  Admit date/status:03/14/2021  Diagnosis:Acute on chronic respiratory failure with hypoxemia   Is this a Readmission?:  No      Insurance:[employer]   Precert required for SNF: Yes       3 night stay required: No    Living arrangements, Adls, care needs, prior to admission:Pt IPTA, uses [address] at home:  Walkerx__Cane_x_RTS__ BSC_x_Shower Chair__  02_2 L [employer]_ HHN__ CPAP__  BiPap__  Hospital Bed__ W/C___ Other__________    Services in the home and/or outpatient, prior to [facility] Notification (HEN):not initiated    Barriers to discharge:none    Plan/comments:Pt plans to d/c when medically stable to home, aware spouse will need to bring tank. Referral made to [place] [facility] at pt request for possible Meals on Wheels. CM will continue to follow for needs.   Kelly Piedra RN       [facility] on chart for MD signature

## 2021-03-15 NOTE — PROGRESS NOTES
apparent distress, appears stated age and cooperative. Oxygen by nasal cannula 3 L/min  HEENT: Pupils equal, round, and reactive to light. Conjunctivae/corneas clear. Neck: Supple, with full range of motion. No jugular venous distention. Trachea midline. Respiratory:  Normal respiratory effort. Improved air exchange. Diminished breath sounds at bases with coarse rhonchi at bases bilaterally   cardiovascular: Regular rate and rhythm with normal S1/S2 without murmurs, rubs or gallops. Abdomen: Soft, non-tender, non-distended with normal bowel sounds. Musculoskeletal: No clubbing, cyanosis or edema bilaterally. Full range of motion without deformity. Skin: Skin color, texture, turgor normal.  No rashes or lesions. Neurologic:  Neurovascularly intact without any focal sensory/motor deficits. Cranial nerves: II-XII intact, grossly non-focal.  Psychiatric: Alert and oriented, thought content appropriate, normal insight  Capillary Refill: Brisk,< 3 seconds   Peripheral Pulses: +2 palpable, equal bilaterally       Labs:   Recent Labs     03/14/21  1712 03/15/21  0606   WBC 19.3* 13.6*   HGB 14.0 13.4   HCT 41.0 39.0    283     Recent Labs     03/14/21  1712 03/15/21  0606   * 130*   K 3.7 4.3   CL 92* 97*   CO2 26 23   BUN 14 14   CREATININE 0.7 0.6   CALCIUM 9.2 8.6     Recent Labs     03/14/21  1712 03/15/21  0606   AST 13* 13*   ALT 12 12   BILITOT 0.3 <0.2   ALKPHOS 56 50     No results for input(s): INR in the last 72 hours. Recent Labs     03/14/21 1712   TROPONINI <0.01     Radiology:  CT CHEST PULMONARY EMBOLISM W CONTRAST   Final Result   No evidence of pulmonary embolism or acute pulmonary abnormality. Mild scattered fibrotic pulmonary changes. Exaggerated thoracic kyphotic curvature with multiple chronic vertebral body   compression fractures. XR CHEST PORTABLE   Final Result   Increased dependent left basilar opacification, possibly infiltrate or   asymmetric edema. Active Hospital Problems    Diagnosis Date Noted    Stage 3 severe COPD by GOLD classification ([facility] [J44.9] 12/03/2009     Priority: Medium    Acute on chronic respiratory failure with hypoxemia (HCC) [J96.21] 03/14/2021    Acute respiratory failure with hypoxia ([facility] [J96.01] 04/03/2020    Anxiety and depression [F41.9, F32.9] 12/14/2018    Chronic pain syndrome [G89.4] 12/14/2018    ILD (interstitial lung disease) ([facility] [J84.9] 04/06/2016    Tobacco dependence [F17.200] 09/27/2014    Hyponatremia [E87.1] 09/10/2012     Assessment/Plan:  Acute COPD exacerbation with decompensated hypoxic respiratory failure  -Continuous pulse oximetry monitoring initiated with PRN supplemental O2   -Continue home maintenance MDIs/nebulizer treatment including Advair, Mucinex and Singulair  -Encourage aggressive pulmonary toilet including incentive spirometry every 4H while awake  -DuoNebs scheduled Q4H while awake  -IV Solu-Medrol scheduled Q12H; POC glucose checks with PRN insulin coverage  -Procalcitonin level  - normal ; No Abx needed at this time   -Patient counseled on necessity of smoking cessation; nicotine replacement patch provided     Mild Acute Hyponatremia - Likely due to Hypovolemia - Improving   -Strict I's and O's   -Normal saline bolus administered in ED with gentle maintenance IVF  infused overnight  -Serial renal panels to ensure adequate electrolyte correction (while avoiding overly aggressive correction with subsequent CPM)  -Serum/urine osmolality and spot urine sodium to determine FENa   -Medications with potential to worsen hyponatremia held including: diuretics, NSAIDS, PPI; SSRI and other neuroleptic medications NOT held due to moderate chronic depression anxiety     Osteoporosis with compression fractures  -Likely secondary to chronic recurrent need for systemic corticosteroids  -CT scan revealed multiple chronic vertebral body compression fractures  -Continue Forteo daily injection  -Calcium and vitamin D levels pending  -Fall risk parameters in place    DVT Prophylaxis: Lovenox   Diet: DIET GENERAL; Carb Control: 5 carb choices (75 gms)/meal; No Added Salt (3-4 GM); Daily Fluid Restriction: 2000 ml  Code Status: Full Code    PT/OT Eval Status: Ambulatory     Dispo - Likely 1-2 days pending Clinical Improvement        The note was completed using Dragon -speech recognition software & EMR  . Every effort was made to ensure accuracy; however, inadvertent computerized transcription errors may be present.     Debbie Brown MD

## 2021-03-15 NOTE — PLAN OF CARE
Problem:  Activity Intolerance:  Goal: Ability to tolerate increased activity will improve  Description: Ability to tolerate increased activity will improve  Outcome: Ongoing     Problem: Breathing Pattern - Ineffective:  Goal: Ability to achieve and maintain a regular respiratory rate will improve  Description: Ability to achieve and maintain a regular respiratory rate will improve  Outcome: Ongoing

## 2021-03-15 NOTE — PROGRESS NOTES
RESPIRATORY THERAPY ASSESSMENT    Name:  Vel [address] Record Number:  [id number]  Age: 60 y.o. Gender: female  : 1955  Today's Date:  3/15/2021  Room:  [facility]0540-01    Assessment     Is the patient being admitted for a COPD or Asthma exacerbation? Yes   (If yes the patient will be seen every 4 hours for the first 24 hours and then reassessed)    Patient Admission Diagnosis      Allergies  Allergies   Allergen Reactions    Meperidine Anaphylaxis     \"Demerol\"     Demerol Hives       Minimum Predicted Vital Capacity:     NA          Actual Vital Capacity:      NA              Pulmonary History:COPD  Home Oxygen Therapy:  3 liters/min via nasal cannula  Home Respiratory Therapy:Tiotropium Bromide QD, Advair BID, Albuterol PRN  Current Respiratory Therapy:  Advair BID, Duoneb Q4WA  Treatment Type: HHN, IS  Medications: Albuterol/Ipratropium    Respiratory Severity Index(RSI)   Patients with orders for inhalation medications, oxygen, or any therapeutic treatment modality will be placed on Respiratory Protocol. They will be assessed with the first treatment and at least every 72 hours thereafter. The following severity scale will be used to determine frequency of treatment intervention.     Smoking History: Mild Exacerbation = 3    Social History  Social History     Tobacco Use    Smoking status: Current Every Day Smoker     Packs/day: 1.00     Years: 40.00     Pack years: 40.00     Types: Cigarettes     Start date: 1972     Last attempt to quit: 10/12/2020     Years since quittin.4    Smokeless tobacco: Never Used    Tobacco comment: 0.5 PPD restarted   Substance Use Topics    Alcohol use: No     Alcohol/week: 0.0 standard drinks    Drug use: Yes     Types: Marijuana     Comment: Daily       Recent Surgical History: None = 0  Past Surgical History  Past Surgical History:   Procedure Laterality Date    BLADDER REPAIR      BRONCHOSCOPY      BRONCHOSCOPY N/A 3/6/2020    BRONCHOSCOPY THERAPUTIC ASPIRATION INITIAL performed by Braulio Oniell MD at [address] [place] [address]  3/6/2020    BRONCHOSCOPY ALVEOLAR LAVAGE performed by Braulio Oneill MD at [address] [place] [address] N/A 6/26/2020    BRONCHOSCOPY THERAPUTIC ASPIRATION INITIAL performed by Zach Berry MD at [address] [place] [address]  6/26/2020    BRONCHOSCOPY ALVEOLAR LAVAGE performed by Zach Berry MD at [facility]  2012    ENDOSCOPY, COLON, DIAGNOSTIC      ESOPHAGEAL DILATATION  9/20/2018    ESOPHAGEAL DILATION [place] performed by Nando Perdomo MD at [address] [facility] HISTORY  2/12/15    T8 Kyphoplasty    CT COLONOSCOPY FLX DX W/COLLJ SPEC WHEN PFRMD N/A 9/20/2018    EGD AND COLONOSCOPY WITH ANESTHESIA performed by Nando Perdomo MD at [address] [facility] ESOPHAGOGASTRODUODENOSCOPY TRANSORAL DIAGNOSTIC N/A 9/20/2018    EGD AND COLONOSCOPY WITH ANESTHESIA performed by Nando Perdomo MD at [address]         Level of Consciousness: Alert, Oriented, and Cooperative = 0    Level of Activity: Walking unassisted = 0    Respiratory Pattern: Increased; RR 21-30 = 1    Breath Sounds: Diminished unilaterally = 1    Sputum   ,  , Sputum How Obtained: Cough on request  Cough: Strong, spontaneous, non-productive = 0    Vital Signs   /68   Pulse 80   Temp 97.4 °F (36.3 °C) (Oral)   Resp 24   SpO2 94%   SPO2 (COPD values may differ): 90-91% on room air or greater than 92% on FiO2 24- 28% = 1    Peak Flow (asthma only): not applicable = 0    RSI: 7-8 = BID and Q4HPRN (every four hours as needed) for dyspnea        Plan       Goals: medication delivery, mobilize retained secretions, volume expansion and improve oxygenation    Patient/caregiver was educated on the proper method of use for Respiratory Care Devices:  Yes      Level of patient/caregiver understanding able to:   ? (RSI) score. 2. If the patient does not have documented COPD, consider discontinuing anticholinergics when RSI is less than 9.  3. If the bronchospasm worsens (increased RSI), then the bronchodilator frequency can be increased to a maximum of every 4 hours. If greater than every 4 hours is required, the physician will be contacted. 4. If the bronchospasm improves, the frequency of the bronchodilator can be decreased, based on the patient's RSI, but not less than home treatment regimen frequency. 5. Bronchodilator(s) will be discontinued if patient has a RSI less than 9 and has received no scheduled or as needed treatment for 72  Hrs. Patients Ordered on a Mucolytic Agent:    1. Must always be administered with a bronchodilator. 2. Discontinue if patient experiences worsened bronchospasm, or secretions have lessened to the point that the patient is able to clear them with a cough. Anti-inflammatory and Combination Medications:    1. If the patient lacks prior history of lung disease, is not using inhaled anti-inflammatory medication at home, and lacks wheezing by examination or by history for at least 24 hours, contact physician for possible discontinuation.

## 2021-03-16 LAB
ANION GAP SERPL CALCULATED.3IONS-SCNC: 11 MMOL/L (ref 3–16)
BASOPHILS ABSOLUTE: 0.1 K/UL (ref 0–0.2)
BASOPHILS RELATIVE PERCENT: 0.4 %
BUN BLDV-MCNC: 12 MG/DL (ref 7–20)
CALCIUM SERPL-MCNC: 8.7 MG/DL (ref 8.3–10.6)
CHLORIDE BLD-SCNC: 96 MMOL/L (ref 99–110)
CO2: 21 MMOL/L (ref 21–32)
CREAT SERPL-MCNC: 0.6 MG/DL (ref 0.6–1.2)
EOSINOPHILS ABSOLUTE: 0 K/UL (ref 0–0.6)
EOSINOPHILS RELATIVE PERCENT: 0 %
GFR AFRICAN AMERICAN: >60
GFR NON-AFRICAN AMERICAN: >60
GLUCOSE BLD-MCNC: 115 MG/DL (ref 70–99)
GLUCOSE BLD-MCNC: 156 MG/DL (ref 70–99)
GLUCOSE BLD-MCNC: 99 MG/DL (ref 70–99)
HCT VFR BLD CALC: 40.4 % (ref 36–48)
HEMOGLOBIN: 13.4 G/DL (ref 12–16)
LYMPHOCYTES ABSOLUTE: 1.6 K/UL (ref 1–5.1)
LYMPHOCYTES RELATIVE PERCENT: 7.1 %
MCH RBC QN AUTO: 30.3 PG (ref 26–34)
MCHC RBC AUTO-ENTMCNC: 33.2 G/DL (ref 31–36)
MCV RBC AUTO: 91 FL (ref 80–100)
MONOCYTES ABSOLUTE: 0.6 K/UL (ref 0–1.3)
MONOCYTES RELATIVE PERCENT: 2.5 %
NEUTROPHILS ABSOLUTE: 20.8 K/UL (ref 1.7–7.7)
NEUTROPHILS RELATIVE PERCENT: 90 %
PDW BLD-RTO: 13 % (ref 12.4–15.4)
PERFORMED ON: ABNORMAL
PERFORMED ON: NORMAL
PLATELET # BLD: 285 K/UL (ref 135–450)
PMV BLD AUTO: 7.1 FL (ref 5–10.5)
POTASSIUM REFLEX MAGNESIUM: 3.8 MMOL/L (ref 3.5–5.1)
RBC # BLD: 4.44 M/UL (ref 4–5.2)
SODIUM BLD-SCNC: 128 MMOL/L (ref 136–145)
WBC # BLD: 23 K/UL (ref 4–11)

## 2021-03-16 PROCEDURE — 6370000000 HC RX 637 (ALT 250 FOR IP): Performed by: INTERNAL MEDICINE

## 2021-03-16 PROCEDURE — 94761 N-INVAS EAR/PLS OXIMETRY MLT: CPT

## 2021-03-16 PROCEDURE — 36415 COLL VENOUS BLD VENIPUNCTURE: CPT

## 2021-03-16 PROCEDURE — 94640 AIRWAY INHALATION TREATMENT: CPT

## 2021-03-16 PROCEDURE — 6370000000 HC RX 637 (ALT 250 FOR IP): Performed by: HOSPITALIST

## 2021-03-16 PROCEDURE — 6360000002 HC RX W HCPCS: Performed by: HOSPITALIST

## 2021-03-16 PROCEDURE — 2580000003 HC RX 258: Performed by: HOSPITALIST

## 2021-03-16 PROCEDURE — 1200000000 HC SEMI PRIVATE

## 2021-03-16 PROCEDURE — 85025 COMPLETE CBC W/AUTO DIFF WBC: CPT

## 2021-03-16 PROCEDURE — 2700000000 HC OXYGEN THERAPY PER DAY

## 2021-03-16 PROCEDURE — 80048 BASIC METABOLIC PNL TOTAL CA: CPT

## 2021-03-16 RX ORDER — ERGOCALCIFEROL 1.25 MG/1
50000 CAPSULE ORAL WEEKLY
Status: DISCONTINUED | OUTPATIENT
Start: 2021-03-16 | End: 2021-03-18 | Stop reason: HOSPADM

## 2021-03-16 RX ADMIN — Medication 2 PUFF: at 08:37

## 2021-03-16 RX ADMIN — Medication 2 PUFF: at 20:09

## 2021-03-16 RX ADMIN — TRAZODONE HYDROCHLORIDE 150 MG: 50 TABLET ORAL at 20:50

## 2021-03-16 RX ADMIN — IPRATROPIUM BROMIDE AND ALBUTEROL SULFATE 1 AMPULE: .5; 3 SOLUTION RESPIRATORY (INHALATION) at 08:37

## 2021-03-16 RX ADMIN — SODIUM CHLORIDE, PRESERVATIVE FREE 10 ML: 5 INJECTION INTRAVENOUS at 09:36

## 2021-03-16 RX ADMIN — ENOXAPARIN SODIUM 40 MG: 40 INJECTION SUBCUTANEOUS at 09:35

## 2021-03-16 RX ADMIN — BUSPIRONE HYDROCHLORIDE 30 MG: 5 TABLET ORAL at 09:35

## 2021-03-16 RX ADMIN — FAMOTIDINE 20 MG: 20 TABLET, FILM COATED ORAL at 09:34

## 2021-03-16 RX ADMIN — METHYLPREDNISOLONE SODIUM SUCCINATE 60 MG: 125 INJECTION, POWDER, FOR SOLUTION INTRAMUSCULAR; INTRAVENOUS at 09:35

## 2021-03-16 RX ADMIN — ERGOCALCIFEROL 50000 UNITS: 1.25 CAPSULE ORAL at 09:53

## 2021-03-16 RX ADMIN — MONTELUKAST SODIUM 10 MG: 10 TABLET ORAL at 20:50

## 2021-03-16 RX ADMIN — ATORVASTATIN CALCIUM 10 MG: 10 TABLET, FILM COATED ORAL at 09:34

## 2021-03-16 RX ADMIN — METHYLPREDNISOLONE SODIUM SUCCINATE 60 MG: 125 INJECTION, POWDER, FOR SOLUTION INTRAMUSCULAR; INTRAVENOUS at 20:51

## 2021-03-16 RX ADMIN — BUSPIRONE HYDROCHLORIDE 30 MG: 5 TABLET ORAL at 20:50

## 2021-03-16 RX ADMIN — FAMOTIDINE 20 MG: 20 TABLET, FILM COATED ORAL at 20:50

## 2021-03-16 RX ADMIN — FLUOXETINE 60 MG: 20 CAPSULE ORAL at 09:35

## 2021-03-16 RX ADMIN — SODIUM CHLORIDE, PRESERVATIVE FREE 10 ML: 5 INJECTION INTRAVENOUS at 20:51

## 2021-03-16 RX ADMIN — IPRATROPIUM BROMIDE AND ALBUTEROL SULFATE 1 AMPULE: .5; 3 SOLUTION RESPIRATORY (INHALATION) at 20:09

## 2021-03-16 ASSESSMENT — PAIN SCALES - GENERAL
PAINLEVEL_OUTOF10: 0

## 2021-03-16 NOTE — PLAN OF CARE
Problem: OXYGENATION/RESPIRATORY FUNCTION  Goal: Patient will achieve/maintain normal respiratory rate/effort  Description: Respiratory rate and effort will be within normal limits for the patient  Outcome: Ongoing     Problem: Breathing Pattern - Ineffective:  Goal: Ability to achieve and maintain a regular respiratory rate will improve  Description: Ability to achieve and maintain a regular respiratory rate will improve  Outcome: Ongoing     Problem: OXYGENATION/RESPIRATORY FUNCTION  Goal: Patient will maintain patent airway  3/16/2021 1827 by Deb Cervantes  Outcome: Ongoing  3/16/2021 0612 by Chris Johnson RN  Outcome: Ongoing

## 2021-03-16 NOTE — PLAN OF CARE
Problem: OXYGENATION/RESPIRATORY FUNCTION  Goal: Patient will maintain patent airway  Outcome: Ongoing    Patient currently on 3L of O2 via nasal cannula. Patient is encouraged to use the I.S. on bedside table at least 10 times per hour every hour she is awake.

## 2021-03-16 NOTE — PROGRESS NOTES
Hospitalist Progress Note      PCP: Latoya London MD    Date of Admission: 3/14/2021    Chief Complaint: Shortness of breath    Hospital Course: Reviewed H&P    Subjective: Patient reports that she feels slightly improved, but not back to \"normal\". Reports shortness of breath on exertion. Currently on 3 L/min oxygen by nasal cannula. Reports she wears oxygen during nighttime only at home. Follows with Dr. Irene Segovia . Lung exam with bilateral coarse rhonchi. Continue current care. Discussed likelihood that pt will require 24hr O2 supplementation, pt understands and is agreeable to this management.       Medications:  Reviewed    Infusion Medications    dextrose       Scheduled Medications    vitamin D  50,000 Units Oral Weekly    ipratropium-albuterol  1 ampule Inhalation BID    famotidine  20 mg Oral BID    methylPREDNISolone  60 mg Intravenous Q12H    busPIRone  30 mg Oral BID    FLUoxetine  60 mg Oral Daily    montelukast  10 mg Oral Nightly    atorvastatin  10 mg Oral Daily    Forteo  20 mcg Subcutaneous Daily    traZODone  150 mg Oral Nightly    varenicline  1 mg Oral BID    budesonide-formoterol  2 puff Inhalation BID    sodium chloride flush  10 mL Intravenous 2 times per day    enoxaparin  40 mg Subcutaneous Daily    nicotine  1 patch Transdermal Daily     PRN Meds: ipratropium-albuterol, guaiFENesin, glucose, dextrose, glucagon (rDNA), dextrose, sodium chloride flush, potassium chloride **OR** potassium alternative oral replacement **OR** potassium chloride, magnesium sulfate, promethazine **OR** ondansetron, senna, acetaminophen **OR** acetaminophen, perflutren lipid microspheres      Intake/Output Summary (Last 24 hours) at 3/16/2021 1717  Last data filed at 3/16/2021 1145  Gross per 24 hour   Intake 240 ml   Output 600 ml   Net -360 ml       Physical Exam Performed:  /74   Pulse 84   Temp 97.8 °F (36.6 °C) (Oral)   Resp 19   Ht 5' 3\" (1.6 m)   Wt 139 lb 1.6 oz (63.1 kg)   SpO2 93%   BMI 24.64 kg/m²     General appearance: Pt in mild distress after returning from restroom(w/o O2), 3L O2 replaced and pt improved appears stated age and cooperative. HEENT: Pupils equal, round, and reactive to light. Conjunctivae/corneas clear. Neck: Supple, with full range of motion. No jugular venous distention. Trachea midline. Respiratory:  Increased respiratory effort, mild distress with exertion. Improved air exchange. Improving airflow to bases. Cardiovascular: Regular rate and rhythm with normal S1/S2 without murmurs, rubs or gallops. Abdomen: Soft, non-tender, non-distended with normal bowel sounds. Musculoskeletal: No clubbing, cyanosis or edema bilaterally. Full range of motion without deformity. Skin: Skin color, texture, turgor normal.  No rashes or lesions. Neurologic:  Neurovascularly intact without any focal sensory/motor deficits. Cranial nerves: II-XII intact, grossly non-focal.  Psychiatric: Alert and oriented, thought content appropriate, normal insight  Capillary Refill: Brisk,< 3 seconds   Peripheral Pulses: +2 palpable, equal bilaterally       Labs:   Recent Labs     03/14/21  1712 03/15/21  0606 03/16/21  0943   WBC 19.3* 13.6* 23.0*   HGB 14.0 13.4 13.4   HCT 41.0 39.0 40.4    283 285     Recent Labs     03/14/21  1712 03/15/21  0606 03/16/21  0943   * 130* 128*   K 3.7 4.3 3.8   CL 92* 97* 96*   CO2 26 23 21   BUN 14 14 12   CREATININE 0.7 0.6 0.6   CALCIUM 9.2 8.6 8.7     Recent Labs     03/14/21  1712 03/15/21  0606   AST 13* 13*   ALT 12 12   BILITOT 0.3 <0.2   ALKPHOS 56 50     No results for input(s): INR in the last 72 hours. Recent Labs     03/14/21 1712   TROPONINI <0.01     Radiology:  CT CHEST PULMONARY EMBOLISM W CONTRAST   Final Result   No evidence of pulmonary embolism or acute pulmonary abnormality. Mild scattered fibrotic pulmonary changes.       Exaggerated thoracic kyphotic curvature with multiple chronic vertebral body compression fractures. XR CHEST PORTABLE   Final Result   Increased dependent left basilar opacification, possibly infiltrate or   asymmetric edema.            Active Hospital Problems    Diagnosis Date Noted    Stage 3 severe COPD by GOLD classification ([facility] [J44.9] 12/03/2009     Priority: Medium    Acute on chronic respiratory failure with hypoxemia (HCC) [J96.21] 03/14/2021    Acute respiratory failure with hypoxia ([facility] [J96.01] 04/03/2020    Anxiety and depression [F41.9, F32.9] 12/14/2018    Chronic pain syndrome [G89.4] 12/14/2018    ILD (interstitial lung disease) ([facility] [J84.9] 04/06/2016    Tobacco dependence [F17.200] 09/27/2014    Hyponatremia [E87.1] 09/10/2012     Assessment/Plan:  Acute COPD exacerbation with decompensated hypoxic respiratory failure: improving  -Pt aware that 24hr home O2 will likely be needed, currently on 2L   -pt reports having O2 and a concentrator at home  -Continuous pulse oximetry monitoring initiated with PRN supplemental O2   -Continue home maintenance MDIs/nebulizer treatment including Advair, Mucinex and Singulair  -Encourage aggressive pulmonary toilet including incentive spirometry every 4H while awake  -DuoNebs scheduled Q4H while awake  -IV Solu-Medrol scheduled Q12H; POC glucose checks with PRN insulin coverage  -Procalcitonin level  - normal ; No Abx needed at this time   -Patient counseled on necessity of smoking cessation; nicotine replacement patch provided    Leukocytosis: worsening -likely due to steroids   -no known source of infection  -recheck CBC tomorrow AM    Mild Acute Hyponatremia - Likely due to Hypovolemia - unchanged  -Strict I's and O's   -Normal saline bolus administered in ED with gentle maintenance IVF  infused overnight  -Serial renal panels to ensure adequate electrolyte correction (while avoiding overly aggressive correction with subsequent CPM)  -Serum/urine osmolality and spot urine sodium to determine FENa   -Medications with potential to worsen hyponatremia held including: diuretics, NSAIDS, PPI; SSRI and other neuroleptic medications NOT held due to moderate chronic depression anxiety     Osteoporosis with compression fractures  -Likely secondary to chronic recurrent need for systemic corticosteroids  -CT scan revealed multiple chronic vertebral body compression fractures  -Continue Forteo daily injection  -Calcium and vitamin D levels pending  -Fall risk parameters in place    DVT Prophylaxis: Lovenox   Diet: DIET GENERAL;  Code Status: Full Code    PT/OT Eval Status: Ambulatory     Dispo - Likely tomorrow pending continued clinical improvement    Patient case seen and discussed under supervision of preceptor, Dr. Guille Drake. Note made by PA-S3 student in the status of a scribe, with review by preceptor.      Elidia AGARWAL-S3     Addendum to PA student progress note:  Pt seen, examined, and evaluated with PA student, who acted as my scribe for the above documentation. I have reviewed the current history, physical findings, labs, assessment, and plan and agree with the note as documented.     Mindi Joe MD  Hospitalist Physician         The note was completed using EMR. Every effort was made to ensure accuracy; However, inadvertent computerized transcription errors may be present.

## 2021-03-17 LAB
ANION GAP SERPL CALCULATED.3IONS-SCNC: 8 MMOL/L (ref 3–16)
ATYPICAL LYMPHOCYTE RELATIVE PERCENT: 1 % (ref 0–6)
BASOPHILS ABSOLUTE: 0 K/UL (ref 0–0.2)
BASOPHILS RELATIVE PERCENT: 0 %
BUN BLDV-MCNC: 16 MG/DL (ref 7–20)
CALCIUM SERPL-MCNC: 8.8 MG/DL (ref 8.3–10.6)
CHLORIDE BLD-SCNC: 95 MMOL/L (ref 99–110)
CO2: 22 MMOL/L (ref 21–32)
CREAT SERPL-MCNC: <0.5 MG/DL (ref 0.6–1.2)
EOSINOPHILS ABSOLUTE: 0 K/UL (ref 0–0.6)
EOSINOPHILS RELATIVE PERCENT: 0 %
GFR AFRICAN AMERICAN: >60
GFR NON-AFRICAN AMERICAN: >60
GLUCOSE BLD-MCNC: 108 MG/DL (ref 70–99)
HCT VFR BLD CALC: 38 % (ref 36–48)
HEMOGLOBIN: 12.9 G/DL (ref 12–16)
LYMPHOCYTES ABSOLUTE: 0.5 K/UL (ref 1–5.1)
LYMPHOCYTES RELATIVE PERCENT: 2 %
MCH RBC QN AUTO: 30.6 PG (ref 26–34)
MCHC RBC AUTO-ENTMCNC: 33.9 G/DL (ref 31–36)
MCV RBC AUTO: 90.2 FL (ref 80–100)
MONOCYTES ABSOLUTE: 0.4 K/UL (ref 0–1.3)
MONOCYTES RELATIVE PERCENT: 2 %
NEUTROPHILS ABSOLUTE: 17.4 K/UL (ref 1.7–7.7)
NEUTROPHILS RELATIVE PERCENT: 95 %
PDW BLD-RTO: 13.3 % (ref 12.4–15.4)
PLATELET # BLD: 298 K/UL (ref 135–450)
PLATELET SLIDE REVIEW: ADEQUATE
PMV BLD AUTO: 7.3 FL (ref 5–10.5)
POTASSIUM REFLEX MAGNESIUM: 4.1 MMOL/L (ref 3.5–5.1)
RBC # BLD: 4.22 M/UL (ref 4–5.2)
RBC # BLD: NORMAL 10*6/UL
SLIDE REVIEW: ABNORMAL
SODIUM BLD-SCNC: 125 MMOL/L (ref 136–145)
WBC # BLD: 18.3 K/UL (ref 4–11)

## 2021-03-17 PROCEDURE — 6370000000 HC RX 637 (ALT 250 FOR IP): Performed by: HOSPITALIST

## 2021-03-17 PROCEDURE — 94761 N-INVAS EAR/PLS OXIMETRY MLT: CPT

## 2021-03-17 PROCEDURE — 2580000003 HC RX 258: Performed by: HOSPITALIST

## 2021-03-17 PROCEDURE — 94640 AIRWAY INHALATION TREATMENT: CPT

## 2021-03-17 PROCEDURE — 6360000002 HC RX W HCPCS: Performed by: HOSPITALIST

## 2021-03-17 PROCEDURE — 80048 BASIC METABOLIC PNL TOTAL CA: CPT

## 2021-03-17 PROCEDURE — 36415 COLL VENOUS BLD VENIPUNCTURE: CPT

## 2021-03-17 PROCEDURE — 1200000000 HC SEMI PRIVATE

## 2021-03-17 PROCEDURE — 85025 COMPLETE CBC W/AUTO DIFF WBC: CPT

## 2021-03-17 PROCEDURE — 2700000000 HC OXYGEN THERAPY PER DAY

## 2021-03-17 RX ORDER — PREDNISONE 20 MG/1
40 TABLET ORAL DAILY
Status: DISCONTINUED | OUTPATIENT
Start: 2021-03-18 | End: 2021-03-18 | Stop reason: HOSPADM

## 2021-03-17 RX ADMIN — TRAZODONE HYDROCHLORIDE 150 MG: 50 TABLET ORAL at 21:26

## 2021-03-17 RX ADMIN — FLUOXETINE 60 MG: 20 CAPSULE ORAL at 08:40

## 2021-03-17 RX ADMIN — BUSPIRONE HYDROCHLORIDE 30 MG: 5 TABLET ORAL at 08:41

## 2021-03-17 RX ADMIN — BUSPIRONE HYDROCHLORIDE 30 MG: 5 TABLET ORAL at 21:26

## 2021-03-17 RX ADMIN — SODIUM CHLORIDE, PRESERVATIVE FREE 10 ML: 5 INJECTION INTRAVENOUS at 21:26

## 2021-03-17 RX ADMIN — MONTELUKAST SODIUM 10 MG: 10 TABLET ORAL at 21:26

## 2021-03-17 RX ADMIN — METHYLPREDNISOLONE SODIUM SUCCINATE 60 MG: 125 INJECTION, POWDER, FOR SOLUTION INTRAMUSCULAR; INTRAVENOUS at 08:47

## 2021-03-17 RX ADMIN — FAMOTIDINE 20 MG: 20 TABLET, FILM COATED ORAL at 21:26

## 2021-03-17 RX ADMIN — FAMOTIDINE 20 MG: 20 TABLET, FILM COATED ORAL at 08:41

## 2021-03-17 RX ADMIN — ATORVASTATIN CALCIUM 10 MG: 10 TABLET, FILM COATED ORAL at 08:41

## 2021-03-17 RX ADMIN — Medication 2 PUFF: at 21:16

## 2021-03-17 RX ADMIN — IPRATROPIUM BROMIDE AND ALBUTEROL SULFATE 1 AMPULE: .5; 3 SOLUTION RESPIRATORY (INHALATION) at 21:16

## 2021-03-17 RX ADMIN — Medication 2 PUFF: at 08:40

## 2021-03-17 RX ADMIN — ENOXAPARIN SODIUM 40 MG: 40 INJECTION SUBCUTANEOUS at 08:42

## 2021-03-17 RX ADMIN — IPRATROPIUM BROMIDE AND ALBUTEROL SULFATE 1 AMPULE: .5; 3 SOLUTION RESPIRATORY (INHALATION) at 08:40

## 2021-03-17 RX ADMIN — SODIUM CHLORIDE, PRESERVATIVE FREE 10 ML: 5 INJECTION INTRAVENOUS at 08:46

## 2021-03-17 ASSESSMENT — PAIN SCALES - GENERAL: PAINLEVEL_OUTOF10: 0

## 2021-03-17 NOTE — PLAN OF CARE
Problem: Falls - Risk of:  Goal: Will remain free from falls  Description: Will remain free from falls  3/17/2021 1141 by Jaswinder Gresham RN  Outcome: Ongoing    Goal: Absence of physical injury  Description: Absence of physical injury  3/17/2021 1141 by Jaswinder Gresham RN  Outcome: Ongoing       Problem: OXYGENATION/RESPIRATORY FUNCTION  Goal: Patient will maintain patent airway  Outcome: Ongoing  Goal: Patient will achieve/maintain normal respiratory rate/effort  Description: Respiratory rate and effort will be within normal limits for the patient  Outcome: Ongoing     Problem: Discharge Planning:  Goal: Discharged to appropriate level of care  Description: Discharged to appropriate level of care  Outcome: Ongoing     Problem:  Activity Intolerance:  Goal: Ability to tolerate increased activity will improve  Description: Ability to tolerate increased activity will improve  Outcome: Ongoing     Problem: Gas Exchange - Impaired:  Goal: Levels of oxygenation will improve  Description: Levels of oxygenation will improve  Outcome: Ongoing

## 2021-03-17 NOTE — PROGRESS NOTES
Hospitalist Progress Note      PCP: Denisse Kerns MD    Date of Admission: 3/14/2021    Chief Complaint: Shortness of breath    Hospital Course: Reviewed H&P    Subjective: Patient reports that she feels like she continues to improve, but slowly. She feels she could take care of ADL's and IADL's, especially with the help of her . Reports shortness of breath on exertion. Currently on 2 L/min oxygen by nasal cannula. Reiterated plan for patient to go home pending reassuring labwork. Patient is agreeable, and feeling more comfortable with caring for herself. Denies any signs of infection. Pt reports minimal use of IS, encouraged to increase use, pt demonstrated proper use.       Medications:  Reviewed    Infusion Medications    dextrose       Scheduled Medications    [START ON 3/18/2021] predniSONE  40 mg Oral Daily    vitamin D  50,000 Units Oral Weekly    ipratropium-albuterol  1 ampule Inhalation BID    famotidine  20 mg Oral BID    busPIRone  30 mg Oral BID    FLUoxetine  60 mg Oral Daily    montelukast  10 mg Oral Nightly    atorvastatin  10 mg Oral Daily    Forteo  20 mcg Subcutaneous Daily    traZODone  150 mg Oral Nightly    varenicline  1 mg Oral BID    budesonide-formoterol  2 puff Inhalation BID    sodium chloride flush  10 mL Intravenous 2 times per day    enoxaparin  40 mg Subcutaneous Daily    nicotine  1 patch Transdermal Daily     PRN Meds: ipratropium-albuterol, guaiFENesin, glucose, dextrose, glucagon (rDNA), dextrose, sodium chloride flush, potassium chloride **OR** potassium alternative oral replacement **OR** potassium chloride, magnesium sulfate, promethazine **OR** ondansetron, senna, acetaminophen **OR** acetaminophen, perflutren lipid microspheres    No intake or output data in the 24 hours ending 03/17/21 7428    Physical Exam Performed:  /72   Pulse 109   Temp 97.6 °F (36.4 °C) (Oral)   Resp 16   Ht 5' 3\" (1.6 m)   Wt 140 lb 1.6 oz (63.5 kg) SpO2 94%   BMI 24.82 kg/m²     General appearance: Pt in mild distress after returning from restroom(w/o O2), 3L O2 replaced and pt improved appears stated age and cooperative. HEENT: Pupils equal, round, and reactive to light. Conjunctivae/corneas clear. Neck: Supple, with full range of motion. No jugular venous distention. Trachea midline. Respiratory:  Increased respiratory effort, mild distress with exertion. Improved air exchange. Improving airflow to bases. Cardiovascular: Regular rate and rhythm with normal S1/S2 without murmurs, rubs or gallops. Abdomen: Soft, non-tender, non-distended with normal bowel sounds. Musculoskeletal: No clubbing, cyanosis or edema bilaterally. Full range of motion without deformity. Skin: Skin color, texture, turgor normal.  No rashes or lesions. Neurologic:  Neurovascularly intact without any focal sensory/motor deficits. Cranial nerves: II-XII intact, grossly non-focal.  Psychiatric: Alert and oriented, thought content appropriate, normal insight  Capillary Refill: Brisk,< 3 seconds   Peripheral Pulses: +2 palpable, equal bilaterally       Labs:   Recent Labs     03/15/21  0606 03/16/21  0943 03/17/21  1219   WBC 13.6* 23.0* 18.3*   HGB 13.4 13.4 12.9   HCT 39.0 40.4 38.0    285 298     Recent Labs     03/15/21  0606 03/16/21  0943 03/17/21  1219   * 128* 125*   K 4.3 3.8 4.1   CL 97* 96* 95*   CO2 23 21 22   BUN 14 12 16   CREATININE 0.6 0.6 <0.5*   CALCIUM 8.6 8.7 8.8     Recent Labs     03/14/21  1712 03/15/21  0606   AST 13* 13*   ALT 12 12   BILITOT 0.3 <0.2   ALKPHOS 56 50     No results for input(s): INR in the last 72 hours. Recent Labs     03/14/21 1712   TROPONINI <0.01     Radiology:  CT CHEST PULMONARY EMBOLISM W CONTRAST   Final Result   No evidence of pulmonary embolism or acute pulmonary abnormality. Mild scattered fibrotic pulmonary changes.       Exaggerated thoracic kyphotic curvature with multiple chronic vertebral body compression fractures. XR CHEST PORTABLE   Final Result   Increased dependent left basilar opacification, possibly infiltrate or   asymmetric edema.            Active Hospital Problems    Diagnosis Date Noted    Stage 3 severe COPD by GOLD classification ([facility] [address] [J44.9] 12/03/2009     Priority: Medium    Acute on chronic respiratory failure with hypoxemia (HCC) [J96.21] 03/14/2021    Acute respiratory failure with hypoxia ([facility] [address] [J96.01] 04/03/2020    Anxiety and depression [F41.9, F32.9] 12/14/2018    Chronic pain syndrome [G89.4] 12/14/2018    ILD (interstitial lung disease) ([facility] [address] [J84.9] 04/06/2016    Tobacco dependence [F17.200] 09/27/2014    Hyponatremia [E87.1] 09/10/2012     Assessment/Plan:  Acute COPD exacerbation with decompensated hypoxic respiratory failure: improving  -Pt aware that 24hr home O2 will likely be needed, currently on 2L   -pt reports having O2 and a concentrator at home  Continuous pulse oximetry monitoring initiated with PRN supplemental O2   Continue home maintenance MDIs/nebulizer treatment including Advair, Mucinex and Singulair  Encourage aggressive pulmonary toilet including incentive spirometry every 4H while awake  DuoNebs scheduled Q4H while awake  -steroids changed to PO  - POC glucose checks with PRN insulin coverage  Procalcitonin level  - normal ; No Abx needed at this time   Patient counseled on necessity of smoking cessation; nicotine replacement patch provided    Leukocytosis: improving -likely due to steroids   -no known source of infection  -recheck CBC tomorrow AM    Mild Acute Hyponatremia - Likely due to Hypovolemia - worsening  125, down from 128 on 7/15  -Strict I's and O's   Normal saline bolus administered in ED with gentle maintenance IVF  infused overnight  Serial renal panels to ensure adequate electrolyte correction (while avoiding overly aggressive correction with subsequent CPM)  Serum/urine osmolality and spot urine sodium to determine FENa   Medications with potential to worsen hyponatremia held including: diuretics, NSAIDS, PPI; SSRI and other neuroleptic medications NOT held due to moderate chronic depression anxiety     Osteoporosis with compression fractures  Likely secondary to chronic recurrent need for systemic corticosteroids  CT scan revealed multiple chronic vertebral body compression fractures  Continue Forteo daily injection  Calcium and vitamin D levels pending  Fall risk parameters in place    DVT Prophylaxis: Lovenox   Diet: DIET GENERAL;  Code Status: Full Code    PT/OT Eval Status: Ambulatory     Dispo - Likely tomorrow pending continued improvement in Na, and continued/maintained respiratory status improvement    Patient case seen and discussed under supervision of preceptor, Dr. Nanette Celestin. Note made by PA-S3 student in the status of a scribe, with review by preceptor.      Melissa AGARWAL-S3     Addendum to PA student progress note:  Pt seen, examined, and evaluated with PA student, who acted as my scribe for the above documentation. I have reviewed the current history, physical findings, labs, assessment, and plan and agree with the note as documented.     Jasmyne Osorio MD  Hospitalist Physician         The note was completed using EMR. Every effort was made to ensure accuracy; However, inadvertent computerized transcription errors may be present.

## 2021-03-18 VITALS
BODY MASS INDEX: 24.96 KG/M2 | HEART RATE: 76 BPM | TEMPERATURE: 98 F | DIASTOLIC BLOOD PRESSURE: 61 MMHG | OXYGEN SATURATION: 96 % | RESPIRATION RATE: 18 BRPM | WEIGHT: 140.9 LBS | HEIGHT: 63 IN | SYSTOLIC BLOOD PRESSURE: 120 MMHG

## 2021-03-18 LAB
ANION GAP SERPL CALCULATED.3IONS-SCNC: 5 MMOL/L (ref 3–16)
ATYPICAL LYMPHOCYTE RELATIVE PERCENT: 1 % (ref 0–6)
BANDED NEUTROPHILS RELATIVE PERCENT: 2 % (ref 0–7)
BASOPHILS ABSOLUTE: 0 K/UL (ref 0–0.2)
BASOPHILS RELATIVE PERCENT: 0 %
BUN BLDV-MCNC: 18 MG/DL (ref 7–20)
CALCIUM SERPL-MCNC: 8.8 MG/DL (ref 8.3–10.6)
CHLORIDE BLD-SCNC: 99 MMOL/L (ref 99–110)
CO2: 28 MMOL/L (ref 21–32)
CREAT SERPL-MCNC: 0.7 MG/DL (ref 0.6–1.2)
EOSINOPHILS ABSOLUTE: 0 K/UL (ref 0–0.6)
EOSINOPHILS RELATIVE PERCENT: 0 %
GFR AFRICAN AMERICAN: >60
GFR NON-AFRICAN AMERICAN: >60
GLUCOSE BLD-MCNC: 90 MG/DL (ref 70–99)
HCT VFR BLD CALC: 37.9 % (ref 36–48)
HEMOGLOBIN: 12.7 G/DL (ref 12–16)
LYMPHOCYTES ABSOLUTE: 3 K/UL (ref 1–5.1)
LYMPHOCYTES RELATIVE PERCENT: 19 %
MCH RBC QN AUTO: 30.7 PG (ref 26–34)
MCHC RBC AUTO-ENTMCNC: 33.6 G/DL (ref 31–36)
MCV RBC AUTO: 91.4 FL (ref 80–100)
MONOCYTES ABSOLUTE: 1.1 K/UL (ref 0–1.3)
MONOCYTES RELATIVE PERCENT: 7 %
MYELOCYTE PERCENT: 1 %
NEUTROPHILS ABSOLUTE: 11 K/UL (ref 1.7–7.7)
NEUTROPHILS RELATIVE PERCENT: 70 %
PDW BLD-RTO: 13.1 % (ref 12.4–15.4)
PLATELET # BLD: 263 K/UL (ref 135–450)
PMV BLD AUTO: 6.8 FL (ref 5–10.5)
POTASSIUM SERPL-SCNC: 4.6 MMOL/L (ref 3.5–5.1)
RBC # BLD: 4.15 M/UL (ref 4–5.2)
RBC # BLD: NORMAL 10*6/UL
SODIUM BLD-SCNC: 132 MMOL/L (ref 136–145)
WBC # BLD: 15 K/UL (ref 4–11)

## 2021-03-18 PROCEDURE — 6370000000 HC RX 637 (ALT 250 FOR IP): Performed by: HOSPITALIST

## 2021-03-18 PROCEDURE — 6370000000 HC RX 637 (ALT 250 FOR IP): Performed by: INTERNAL MEDICINE

## 2021-03-18 PROCEDURE — 80048 BASIC METABOLIC PNL TOTAL CA: CPT

## 2021-03-18 PROCEDURE — 6360000002 HC RX W HCPCS: Performed by: HOSPITALIST

## 2021-03-18 PROCEDURE — 2580000003 HC RX 258: Performed by: HOSPITALIST

## 2021-03-18 PROCEDURE — 94640 AIRWAY INHALATION TREATMENT: CPT

## 2021-03-18 PROCEDURE — 36415 COLL VENOUS BLD VENIPUNCTURE: CPT

## 2021-03-18 PROCEDURE — 85025 COMPLETE CBC W/AUTO DIFF WBC: CPT

## 2021-03-18 RX ORDER — PREDNISONE 10 MG/1
TABLET ORAL
Qty: 24 TABLET | Refills: 0 | Status: SHIPPED | OUTPATIENT
Start: 2021-03-18 | End: 2021-03-27

## 2021-03-18 RX ORDER — ERGOCALCIFEROL 1.25 MG/1
50000 CAPSULE ORAL WEEKLY
Qty: 5 CAPSULE | Refills: 0 | Status: SHIPPED | OUTPATIENT
Start: 2021-03-23 | End: 2021-08-17 | Stop reason: ALTCHOICE

## 2021-03-18 RX ADMIN — ATORVASTATIN CALCIUM 10 MG: 10 TABLET, FILM COATED ORAL at 08:32

## 2021-03-18 RX ADMIN — FAMOTIDINE 20 MG: 20 TABLET, FILM COATED ORAL at 08:28

## 2021-03-18 RX ADMIN — Medication 2 PUFF: at 09:10

## 2021-03-18 RX ADMIN — ENOXAPARIN SODIUM 40 MG: 40 INJECTION SUBCUTANEOUS at 08:28

## 2021-03-18 RX ADMIN — BUSPIRONE HYDROCHLORIDE 30 MG: 5 TABLET ORAL at 08:28

## 2021-03-18 RX ADMIN — IPRATROPIUM BROMIDE AND ALBUTEROL SULFATE 1 AMPULE: .5; 3 SOLUTION RESPIRATORY (INHALATION) at 09:10

## 2021-03-18 RX ADMIN — SODIUM CHLORIDE, PRESERVATIVE FREE 10 ML: 5 INJECTION INTRAVENOUS at 08:33

## 2021-03-18 RX ADMIN — PREDNISONE 40 MG: 20 TABLET ORAL at 08:28

## 2021-03-18 RX ADMIN — FLUOXETINE 60 MG: 20 CAPSULE ORAL at 08:28

## 2021-03-18 NOTE — DISCHARGE SUMMARY
Hospital Medicine Discharge Summary    Patient ID: Priya Lindsay      Patient's PCP: Adrian Hopkins MD    Admit Date: 3/14/2021     Discharge Date:   03/18/21    Admitting Physician: Pablito Luo MD     Discharge Physician: Beth Hannon     Discharge Diagnoses: Active Hospital Problems    Diagnosis    Stage 3 severe COPD by GOLD classification ([facility] [J44.9]     Priority: Medium    Acute on chronic respiratory failure with hypoxemia (HCC) [J96.21]    Acute respiratory failure with hypoxia (HCC) [J96.01]    Anxiety and depression [F41.9, F32.9]    Chronic pain syndrome [G89.4]    [facility] [J84.9]    Tobacco dependence [F17.200]    Hyponatremia [E87.1]     The patient was seen and examined on day of discharge and this discharge summary is in conjunction with any daily progress note from day of discharge. HPI from admission H&P : Priya Lindsay is a 60 y.o. female with a pertinent history of COPD, pulmonary fibrosis, who presents to the ED complaining of worsening shortness of breath. Patient reports she has been experiencing worsening shortness of breath over the last 2 to 3 weeks patient does wear oxygen as needed mainly at nights between 2 to 3 L via nasal cannula. Patient reports she has been requiring more oxygen 24 hours a day. Patient was given a steroid pack and antibiotics by her pulmonologist for COPD exacerbation. Patient states that despite these medications she has continued to experience worsening shortness of breath. Patient reports minimal to no change in cough. Patient does still smoke cigarettes. Patient denies COVID-19 exposure. Patient denies fever, chills, body aches, hemoptysis, chest pain, leg swelling or calf pain, palpitations. ECG, CXR, and CTPA done in ER: unremarkable w/ exception of mild tachycardia and mild scattered fibrotic lung changes  Duoneb and IV steroids given in ED with some improvement    Hospital Course:  By problem List Acute COPD exacerbation with decompensated hypoxic respiratory failure: improved  -Monitored with Continuous pulse oximetry with PRN supplemental O2   -Continue home maintenance MDIs/nebulizer treatment including Advair, Mucinex and Singulair  -Encourage aggressive pulmonary toilet including incentive spirometry every 4H while awake  -DuoNebs scheduled Q4H while awake  -Received IV Solu-Medrol scheduled Q12H; POC glucose checks with PRN insulin coverage -- Transitioned to PO prednisone taper at DC   -Procalcitonin level  - normal ; No Abx needed at this time   -Patient counseled on necessity of smoking cessation; nicotine replacement patch provided  _Chantix prescription given  - Patient had Ambulatory O2 eval and did not qualify for continuous O2 . Resumed home Nocturnal O2 use      Leukocytosis: improving -likely due to steroids  -no known source of infection  -serial CBCs to trend     Mild Acute Hyponatremia -improving   -Strict I's and O's  ; Encouraged PO fluid intake   - Received IVF    -Serial renal panels to ensure adequate electrolyte correction (while avoiding overly aggressive correction with subsequent CPM)     Osteoporosis with compression fractures  -Likely secondary to chronic recurrent need for systemic corticosteroids  -CT scan revealed multiple chronic vertebral body compression fractures  -Continue Forteo daily injection  -Calcium and vitamin D levels pending  -Fall risk parameters in place    Physical Exam Performed:   /61   Pulse 76   Temp 98 °F (36.7 °C) (Oral)   Resp 18   Ht 5' 3\" (1.6 m)   Wt 140 lb 14.4 oz (63.9 kg)   SpO2 96%   BMI 24.96 kg/m²     General appearance:  No apparent distress, appears stated age and cooperative. HEENT:  Normal cephalic, atraumatic without obvious deformity. Pupils equal, round, and reactive to light. Extra ocular muscles intact. Conjunctivae/corneas clear. Neck: Supple, with full range of motion. No jugular venous distention.  Trachea midline. Respiratory:  Normal respiratory effort. Bilateral expiratory wheezes and rhonchi R>L  Cardiovascular:  Regular rate and rhythm with normal S1/S2 without murmurs, rubs or gallops. Abdomen: Soft, non-tender, non-distended with normal bowel sounds. Musculoskeletal:  No clubbing, cyanosis or edema bilaterally. Full range of motion without deformity. Skin: Skin color, texture, turgor normal.  No rashes or lesions. Neurologic:  Neurovascularly intact without any focal sensory/motor deficits. Cranial nerves: II-XII intact, grossly non-focal.  Psychiatric:  Alert and oriented, thought content appropriate, normal insight  Capillary Refill: Brisk,< 3 seconds   Peripheral Pulses: +2 palpable, equal bilaterally     Labs: For convenience and continuity at follow-up the following most recent labs are provided:    CBC:    Lab Results   Component Value Date    WBC 15.0 03/18/2021    HGB 12.7 03/18/2021    HCT 37.9 03/18/2021     03/18/2021       Renal:    Lab Results   Component Value Date     03/18/2021    K 4.6 03/18/2021    K 4.1 03/17/2021    CL 99 03/18/2021    CO2 28 03/18/2021    BUN 18 03/18/2021    CREATININE 0.7 03/18/2021    CALCIUM 8.8 03/18/2021    PHOS 2.4 10/11/2020     Significant Diagnostic Studies    Radiology:   CT CHEST PULMONARY EMBOLISM W CONTRAST   Final Result   No evidence of pulmonary embolism or acute pulmonary abnormality. Mild scattered fibrotic pulmonary changes. Exaggerated thoracic kyphotic curvature with multiple chronic vertebral body   compression fractures. XR CHEST PORTABLE   Final Result   Increased dependent left basilar opacification, possibly infiltrate or   asymmetric edema. Consults:   IP CONSULT TO HOSPITALIST    Disposition: home    Condition at Discharge: Stable    Discharge Instructions/Follow-up:  F/u with Dr. Ree Contreras within 1 wk  Return if SOB worsens.   Finish steroid taper  Smoking cessation     Code Status:  Full Code TAKE 1 TABLET TWICE DAILY  Qty: 180 tablet, Refills: 1      guaiFENesin (MUCINEX) 600 MG extended release tablet Take 1 tablet by mouth 2 times daily as needed for Congestion  Qty: 60 tablet, Refills: 2    Associated Diagnoses: COPD exacerbation (HCC)      teriparatide, recombinant, (FORTEO) 600 MCG/2.4ML SOLN injection Inject 0.08 mLs into the skin daily One dose injected daily. Qty: 1 pen, Refills: 11             Time Spent on discharge is more than 30 minutes in the examination, evaluation, counseling and review of medications and discharge plan. Patient case seen and discussed under supervision of preceptor, Dr. Karrie Arellano. Note made by PA-S3 student in the status of a scribe, with review by preceptor.  Paloma Gave De León PA-S3     Addendum to PA student discharge summary note:  Pt seen, examined, and evaluated with PA lissett, who acted as my scribe for the above documentation. I have reviewed the current history, physical findings, labs, assessment, and plan and agree with the note as documented.     Cherie Cazares MD  Hospitalist Physician         The note was completed using EMR. Every effort was made to ensure accuracy; However, inadvertent computerized transcription errors may be present.     Thank you Debbie France MD for the opportunity to be involved in this patient's care. If you have any questions or concerns please feel free to contact me at [phone].

## 2021-03-18 NOTE — CARE COORDINATION
CASE MANAGEMENT DISCHARGE SUMMARY      Discharge to:   Home      Transportation:    Family/car:  ; will bring O2 tank     Confirmed discharge plan with:     Patient: yes      RN, name: Donovan Solomon    Note: Discharging nurse to complete ASHLEY, reconcile AVS, and place final copy with patient's discharge packet. RN to ensure that written prescriptions for  Level II medications are sent with patient to the facility as per protocol.

## 2021-03-19 ENCOUNTER — CARE COORDINATION (OUTPATIENT)
Dept: CASE MANAGEMENT | Age: 66
End: 2021-03-19

## 2021-03-19 LAB
BLOOD CULTURE, ROUTINE: NORMAL
CULTURE, BLOOD 2: NORMAL

## 2021-03-19 NOTE — CARE COORDINATION
[facility] Transitions Initial Follow Up Call    Call within 2 business days of discharge: Yes    Patient: Becky Cowan Patient : 1955   MRN: [id number]  Reason for Admission: COPD  Discharge Date: 3/18/21 RARS: Readmission Risk Score: 18      Last Discharge [facility]       Complaint Diagnosis Description Type Department Provider    3/14/21 Shortness of Breath COPD exacerbation ([address] . .. ED to Hosp-Admission (Discharged) (ADMITTED) [place] [address] Pilar De La Cruz MD; Nohemi Kessler. .. Spoke with: [address]: [facility]    Challenges to be reviewed by the provider   Additional needs identified to be addressed with provider No  none             Method of communication with provider : phone    Advance Care Planning:   Does patient have an Advance Directive:  not on file. Was this a readmission? No  Patients top risk factors for readmission: medical condition    Care Transition Nurse (CTN) contacted the patient by telephone to perform post hospital discharge assessment. Verified name and  with patient as identifiers. Provided introduction to self, and explanation of the CTN role. CTN reviewed discharge instructions, medical action plan and red flags with patient who verbalized understanding. Patient given an opportunity to ask questions and does not have any further questions or concerns at this time. Were discharge instructions available to patient? Yes. Reviewed appropriate site of care based on symptoms and resources available to patient including: PCP. The patient agrees to contact the PCP office for questions related to their healthcare. Medication reconciliation was performed with patient, who verbalizes understanding of administration of home medications. Advised obtaining a 90-day supply of all daily and as-needed medications. Covid Risk Education    Patient has following risk factors of: COPD.    Education provided regarding infection prevention, and signs and symptoms of COVID-19 and when to seek medical attention with patient who verbalized understanding. Discussed exposure protocols and quarantine From CDC: Are you at higher risk for severe illness?   and given an opportunity for questions and concerns. The patient agrees to contact the COVID-19 hotline [phone] or PCP office for questions related to COVID-19. For more information on steps you can take to protect yourself, see CDC's How to Protect Yourself       Discussed follow-up appointments. If no appointment was previously scheduled, appointment scheduling offered: Yes. Is follow up appointment scheduled within 7 days of discharge? Yes    Plan for follow-up call in 7-10 days based on severity of symptoms and risk factors. Patient answered call and verified . Patient pleasant and agreeable to transition call. Patient discussed recent hospitalization and new medications discussed. Patient declined full medication reconciliation. Patient states that she is aware of purpose and frequency of all medication. Patient has follow up appts scheduled and 2nd covid vaccine scheduled. Denies any acute needs at present time. Agreeable to f/u calls. Educated on the use of urgent care or physicians 24 hr access line if assistance is needed after hours. CTN provided contact information for future needs.         Care Transitions 24 Hour Call    Do you have any ongoing symptoms?: No  Do you have a copy of your discharge instructions?: Yes  Do you have all of your prescriptions and are they filled?: Yes  Have you been contacted by a [address]?: No  Have you scheduled your follow up appointment?: Yes  How are you going to get to your appointment?: Car - family or friend to transport  Were you discharged with any Home Care or Post Acute Services: No  Post Acute Services: Home Health  Do you feel like you have everything you need to keep you well at home?: Yes  Care Transitions Interventions         Follow Up  Future Appointments   Date Time Provider Matt Crabtree   3/31/2021  8:45 AM MHCX CLER COVID IMM, PFIZER 21 DAY SECOND DOSE MHCX CLR IMM MMA   7/8/2021  9:30 AM Gilberto Talbot MD CLERM PULM KYMBERLY Araujo RN

## 2021-03-22 ENCOUNTER — TELEPHONE (OUTPATIENT)
Dept: FAMILY MEDICINE CLINIC | Age: 66
End: 2021-03-22

## 2021-03-22 NOTE — TELEPHONE ENCOUNTER
Kiran LUEVANO LPN High Risk Monitoring Initial Call    Introductions given. Does patient use MyChart? No: Does not use  Has patient used Virtual Visits? Yes  Does patient have smartphone? Yes   Comfort level with smartphone - 8    Specialists seen  - Pulmonologist - Dr. Yuniel Crawford    Medication Review Completed by LPN - Yes  Understands Medications - Yes  Does someone help you with meds?  - No: Does not need help    Transportation barriers - No:  takes to appointments     Do you currently monitor? If so, who do you report these numbers to? BP - Yes - Reports findings to Dr. Ella Arvizu   Weight - No - Does not monitor  Pulse Ox - Yes - Reports findings to Dr. Ella Arvizu   BG - No - Does not monitor    What takes you to the Emergency Room/Hospital?  Smoking cigarettes     What do you think will keep you out of the Emergency Room? Not smoking     What would like to know more about how to care for yourself?    Nothing     Appt scheduled LPN Visit for Monitoring Education - No: She believes she does not need monitoring  Willing to join Monitoring Program - No: declines

## 2021-03-26 ENCOUNTER — CARE COORDINATION (OUTPATIENT)
Dept: CASE MANAGEMENT | Age: 66
End: 2021-03-26

## 2021-03-29 ENCOUNTER — CARE COORDINATION (OUTPATIENT)
Dept: CASE MANAGEMENT | Age: 66
End: 2021-03-29

## 2021-03-29 NOTE — CARE COORDINATION
Aj[facility] Transitions Follow Up Call    3/29/2021    Patient: Chey Harding  Patient : 1955   MRN: [id number]  Reason for Admission: ARF  Discharge Date: 3/18/21 RARS: Readmission Risk Score: 18         Spoke with: Chey Harding    Patient answered call and verified . Patient pleasant and agreeable to transition call. Patient doing well, but started vaping since last contact. Patient not smoking cigarettes and proud of that accomplishment. Patient taking all medication as directed and denies any changes. Denies any acute needs at present time. Agreeable to f/u calls. Educated on the use of urgent care or physicians 24 hr access line if assistance is needed after hours. Care Transitions Subsequent and Final Call    Subsequent and Final Calls  Do you have any ongoing symptoms?: No  Have your medications changed?: No  Do you have any questions related to your medications?: No  Do you currently have any active services?: No  Are you currently active with any services?: Home Health  Do you have any needs or concerns that I can assist you with?: No  Identified Barriers: None  Care Transitions Interventions  Other Interventions:            Follow Up  Future Appointments   Date Time Provider Matt Crabtree   3/31/2021  8:45 AM [facility] CLER COVID IMM, PFIZER 21 DAY SECOND DOSE MHCX CLR IMM MMA   2021  9:30 AM Talisha Alejandro MD CLERM PULJD Saenz RN

## 2021-03-31 ENCOUNTER — IMMUNIZATION (OUTPATIENT)
Dept: PRIMARY CARE CLINIC | Age: 66
End: 2021-03-31
Payer: MEDICARE

## 2021-03-31 PROCEDURE — 91300 COVID-19, PFIZER VACCINE 30MCG/0.3ML DOSE: CPT | Performed by: FAMILY MEDICINE

## 2021-03-31 PROCEDURE — 0002A COVID-19, PFIZER VACCINE 30MCG/0.3ML DOSE: CPT | Performed by: FAMILY MEDICINE

## 2021-04-05 ENCOUNTER — CARE COORDINATION (OUTPATIENT)
Dept: CASE MANAGEMENT | Age: 66
End: 2021-04-05

## 2021-04-23 ENCOUNTER — APPOINTMENT (OUTPATIENT)
Dept: GENERAL RADIOLOGY | Age: 66
End: 2021-04-23
Payer: MEDICARE

## 2021-04-23 ENCOUNTER — HOSPITAL ENCOUNTER (EMERGENCY)
Age: 66
Discharge: HOME | End: 2021-04-23
Payer: MEDICARE

## 2021-04-23 VITALS
SYSTOLIC BLOOD PRESSURE: 128 MMHG | TEMPERATURE: 98 F | OXYGEN SATURATION: 95 % | HEART RATE: 85 BPM | RESPIRATION RATE: 18 BRPM | DIASTOLIC BLOOD PRESSURE: 88 MMHG

## 2021-04-23 DIAGNOSIS — M25.552 LEFT HIP PAIN: Primary | ICD-10-CM

## 2021-04-23 DIAGNOSIS — M54.32 SCIATICA OF LEFT SIDE: ICD-10-CM

## 2021-04-23 PROCEDURE — 6360000002 HC RX W HCPCS: Performed by: NURSE PRACTITIONER

## 2021-04-23 PROCEDURE — 73502 X-RAY EXAM HIP UNI 2-3 VIEWS: CPT

## 2021-04-23 PROCEDURE — 99284 EMERGENCY DEPT VISIT MOD MDM: CPT

## 2021-04-23 PROCEDURE — 96372 THER/PROPH/DIAG INJ SC/IM: CPT

## 2021-04-23 PROCEDURE — 6370000000 HC RX 637 (ALT 250 FOR IP): Performed by: NURSE PRACTITIONER

## 2021-04-23 RX ORDER — HYDROCODONE BITARTRATE AND ACETAMINOPHEN 7.5; 325 MG/1; MG/1
1 TABLET ORAL ONCE
Status: COMPLETED | OUTPATIENT
Start: 2021-04-23 | End: 2021-04-23

## 2021-04-23 RX ORDER — CYCLOBENZAPRINE HCL 10 MG
10 TABLET ORAL 3 TIMES DAILY PRN
Qty: 21 TABLET | Refills: 0 | Status: SHIPPED | OUTPATIENT
Start: 2021-04-23 | End: 2021-05-03

## 2021-04-23 RX ORDER — LIDOCAINE 4 G/G
1 PATCH TOPICAL DAILY
Status: DISCONTINUED | OUTPATIENT
Start: 2021-04-23 | End: 2021-04-23 | Stop reason: HOSPADM

## 2021-04-23 RX ORDER — PREDNISONE 20 MG/1
60 TABLET ORAL ONCE
Status: COMPLETED | OUTPATIENT
Start: 2021-04-23 | End: 2021-04-23

## 2021-04-23 RX ORDER — ORPHENADRINE CITRATE 30 MG/ML
60 INJECTION INTRAMUSCULAR; INTRAVENOUS ONCE
Status: COMPLETED | OUTPATIENT
Start: 2021-04-23 | End: 2021-04-23

## 2021-04-23 RX ORDER — IBUPROFEN 600 MG/1
600 TABLET ORAL 3 TIMES DAILY PRN
Qty: 30 TABLET | Refills: 0 | Status: ON HOLD
Start: 2021-04-23 | End: 2021-08-29 | Stop reason: HOSPADM

## 2021-04-23 RX ORDER — ONDANSETRON 4 MG/1
4 TABLET, ORALLY DISINTEGRATING ORAL ONCE
Status: COMPLETED | OUTPATIENT
Start: 2021-04-23 | End: 2021-04-23

## 2021-04-23 RX ORDER — PREDNISONE 10 MG/1
60 TABLET ORAL DAILY
Qty: 30 TABLET | Refills: 0 | Status: SHIPPED
Start: 2021-04-23 | End: 2021-04-27 | Stop reason: SDUPTHER

## 2021-04-23 RX ORDER — KETOROLAC TROMETHAMINE 30 MG/ML
30 INJECTION, SOLUTION INTRAMUSCULAR; INTRAVENOUS ONCE
Status: COMPLETED | OUTPATIENT
Start: 2021-04-23 | End: 2021-04-23

## 2021-04-23 RX ORDER — LIDOCAINE 50 MG/G
1 PATCH TOPICAL DAILY
Qty: 10 PATCH | Refills: 0 | Status: SHIPPED | OUTPATIENT
Start: 2021-04-23 | End: 2021-05-03

## 2021-04-23 RX ADMIN — ORPHENADRINE CITRATE 60 MG: 30 INJECTION INTRAMUSCULAR; INTRAVENOUS at 18:56

## 2021-04-23 RX ADMIN — HYDROCODONE BITARTRATE AND ACETAMINOPHEN 1 TABLET: 7.5; 325 TABLET ORAL at 18:56

## 2021-04-23 RX ADMIN — KETOROLAC TROMETHAMINE 30 MG: 30 INJECTION, SOLUTION INTRAMUSCULAR; INTRAVENOUS at 18:56

## 2021-04-23 RX ADMIN — PREDNISONE 60 MG: 20 TABLET ORAL at 18:56

## 2021-04-23 RX ADMIN — ONDANSETRON 4 MG: 4 TABLET, ORALLY DISINTEGRATING ORAL at 18:56

## 2021-04-23 NOTE — ED PROVIDER NOTES
[address] [facility] Emergency Department    CHIEF COMPLAINT  Hip Pain (left hip pain started yesterday,)      HISTORY OF PRESENT ILLNESS  Trevor Tate is a 60 y.o. female who presents to the ED complaining of left hip pain. Patient reports that pain started yesterday no known injury or trauma. Patient describes it as a constant aching pain. Patient tried Tylenol with little to no relief. Patient reports she was unable to sleep due to the pain. Patient denies numbness, tingling, saddle anesthesia, bowel or bladder incontinence, urinary tension, fever, chills, body aches, extremity weakness. Patient denies identifiable aggravating or alleviating factors. No other complaints, modifying factors or associated symptoms. Nursing notes reviewed.    Past Medical History:   Diagnosis Date    Allergic rhinitis 6/11/2010    Anxiety     Arthritis     Aspiration pneumonia ([address] 3/21/2012    Recurrent    Asthma     Bronchitis chronic     COPD (chronic obstructive pulmonary disease) ([address] 12/3/2009    Depression     Drug abuse, cocaine type ([facility])     past history of crack cocaine use    Emphysema of lung ([facility])     Fibromyalgia     GERD (gastroesophageal reflux disease)     Hyperlipidemia     Influenza A 03/05/2020    Lung disease     On home oxygen therapy     uses O2 NC 3L prn at night    Osteoporosis     Pneumonia     Polysubstance dependence ([address] 1/2/2012    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    Pulmonary fibrosis ([address] 10/16/2014    Pulmonary infiltrate     Pulmonary nodule 12/3/2009    Tobacco abuse      Past Surgical History:   Procedure Laterality Date    BLADDER REPAIR      BRONCHOSCOPY      BRONCHOSCOPY N/A 3/6/2020    BRONCHOSCOPY THERAPUTIC ASPIRATION INITIAL performed by Pippa Velasquez MD at [address] [place] [address]  3/6/2020    BRONCHOSCOPY ALVEOLAR LAVAGE performed by Pippa Velasquez MD at [facility] Packs/day: 1.00     Years: 40.00     Pack years: 40.00     Types: Cigarettes     Start date: 1972     Last attempt to quit: 10/12/2020     Years since quittin.5    Smokeless tobacco: Never Used    Tobacco comment: 0.5 PPD restarted   Substance and Sexual Activity    Alcohol use: No     Alcohol/week: 0.0 standard drinks    Drug use: Yes     Types: Marijuana     Comment: Daily    Sexual activity: Yes     Partners: Male   Lifestyle    Physical activity     Days per week: Not on file     Minutes per session: Not on file    Stress: Not on file   Relationships    Social connections     Talks on phone: Not on file     Gets together: Not on file     Attends Alevism service: Not on file     Active member of club or organization: Not on file     Attends meetings of clubs or organizations: Not on file     Relationship status: Not on file    Intimate partner violence     Fear of current or ex partner: Not on file     Emotionally abused: Not on file     Physically abused: Not on file     Forced sexual activity: Not on file   Other Topics Concern    Not on file   Social History Narrative    Not on file     Current Facility-Administered Medications   Medication Dose Route Frequency Provider Last Rate Last Admin    lidocaine 4 % external patch 1 patch  1 patch Transdermal Daily SHAUN Acosta CNP   1 patch at 21 191     Current Outpatient Medications   Medication Sig Dispense Refill    predniSONE (DELTASONE) 10 MG tablet Take 6 tablets by mouth daily for 5 doses 30 tablet 0    cyclobenzaprine (FLEXERIL) 10 MG tablet Take 1 tablet by mouth 3 times daily as needed for Muscle spasms 21 tablet 0    ibuprofen (ADVIL;MOTRIN) 600 MG tablet Take 1 tablet by mouth 3 times daily as needed for Pain 30 tablet 0    lidocaine (LIDODERM) 5 % Place 1 patch onto the skin daily for 10 days 12 hours on, 12 hours off.  10 patch 0    vitamin D (ERGOCALCIFEROL) 1.25 MG (75870 UT) CAPS capsule Take 1 capsule by mouth once a week for 5 days Every Tuesday 5 capsule 0    albuterol sulfate HFA (VENTOLIN HFA) 108 (90 Base) MCG/ACT inhaler Inhale 2 puffs into the lungs every 6 hours as needed for Wheezing or Shortness of Breath 3 Inhaler 1    naproxen (NAPROSYN) 500 MG tablet Take 1 tablet by mouth 2 times daily (with meals) (Patient not taking: Reported on 3/22/2021) 30 tablet 0    varenicline (CHANTIX STARTING MONTH TAL) 0.5 MG X 11 & 1 MG X 42 tablet Take by mouth. 1 box 0    varenicline (CHANTIX CONTINUING MONTH PAK) 1 MG tablet Take 1 tablet by mouth 2 times daily 60 tablet 3    BD PEN NEEDLE SVETLANA U/F 32G X 4 MM MISC USE ONE DAILY AS DIRECTED (Patient not taking: Reported on 3/22/2021) 100 each 5    montelukast (SINGULAIR) 10 MG tablet TAKE 1 TABLET BY MOUTH EACH NIGHT 90 tablet 1    FLUoxetine (PROZAC) 20 MG capsule TAKE 3 CAPSULES BY MOUTH DAILY 270 capsule 1    traZODone (DESYREL) 150 MG tablet TAKE 2 TABLETS AT BEDTIME 180 tablet 1    fluticasone-salmeterol (WIXELA INHUB) 500-50 MCG/DOSE diskus inhaler Inhale 1 puff into the lungs every 12 hours 180 each 1    tiotropium (SPIRIVA HANDIHALER) 18 MCG inhalation capsule INHALE THE CONTENTS OF 1 CAPSULE EVERY DAY 90 capsule 1    albuterol (PROVENTIL) (2.5 MG/3ML) 0.083% nebulizer solution Take 3 mLs by nebulization every 6 hours as needed for Wheezing or Shortness of Breath DX COPD J44.9 120 vial 6    simvastatin (ZOCOR) 20 MG tablet TAKE 1 TABLET EVERY EVENING 90 tablet 1    busPIRone (BUSPAR) 30 MG tablet TAKE 1 TABLET TWICE DAILY 180 tablet 1    guaiFENesin (MUCINEX) 600 MG extended release tablet Take 1 tablet by mouth 2 times daily as needed for Congestion 60 tablet 2    teriparatide, recombinant, (FORTEO) 600 MCG/2.4ML SOLN injection Inject 0.08 mLs into the skin daily One dose injected daily.  1 pen 11     Allergies   Allergen Reactions    Meperidine Anaphylaxis     \"Demerol\"     Demerol Hives       REVIEW OF SYSTEMS  10 systems reviewed, pertinent positives per HPI otherwise noted to be negative    PHYSICAL EXAM  /88   Pulse 85   Temp 98 °F (36.7 °C) (Oral)   Resp 18   SpO2 95%   GENERAL APPEARANCE: Awake and alert. Cooperative. No acute distress. Vital signs are stable. Well appearing and non toxic. HEAD: Normocephalic. Atraumatic. EYES: PERRL. EOM's grossly intact. ENT: Mucous membranes are moist.   NECK: Supple. Normal ROM. HEART: RRR. Distal pulses are equal and intact. Cap refill less than 2 seconds. LUNGS: Respirations unlabored. CTAB. Good air exchange. Speaking comfortably in full sentences. ABDOMEN: Soft. Non-distended. Non-tender. No guarding or rebound. EXTREMITIES: No peripheral edema. Moves all extremities equally. All extremities neurovascularly intact. Left hip; no obvious deformity, no leg shortening, no internal/external rotation, no erythema, edema, ecchymosis, edema. No bony tenderness. Patient able to perform active and passive range of motion. Normal strength. Sensation intact. Cap refill less than 2 seconds. Distal pulse intact. Neurovascularly intact. SKIN: Warm and dry. No acute rashes. NEUROLOGICAL: Alert and oriented. No gross facial drooping. Strength 5/5, sensation intact. PSYCHIATRIC: Normal mood and affect. BACK: On exam of cervical, thoracic, lumbar spine, there is no swelling, bruising, or color change noted. There is no midline bony tenderness, without crepitus, deformity, or step off. Patient exhibits tenderness of paraspinal musculature to left of midline of the lumbar region. There is positive left-sided point tenderness over the SI Joint. Patellar reflexes +2 bilaterally. Distal pulses intact. Cap refill < 2 seconds. Sensation intact. Neurovascularly intact. HSNE spine intact.        RADIOLOGY  Xr Hip 2-3 Vw W Pelvis Left    Result Date: 4/23/2021  EXAMINATION: ONE XRAY VIEW OF THE PELVIS AND TWO XRAY VIEWS LEFT HIP 4/23/2021 5:32 pm COMPARISON: None HISTORY: ORDERING SYSTEM PROVIDED HISTORY: pain TECHNOLOGIST PROVIDED HISTORY: Reason for exam:->pain Reason for Exam: left hip pain, no known injury Acuity: Acute Type of Exam: Initial FINDINGS: There is no evidence of acute fracture. There is normal alignment. No acute joint abnormality. No focal osseous lesion. No focal soft tissue abnormality. No acute osseous abnormality. ED COURSE/MDM  Patient seen and evaluated. Old records reviewed. Diagnostic testing reviewed and results discussed. I have independently evaluated this patient based upon my scope of practice. Supervising physician was in the department for consultation as needed. Odin Flowers presented to the ED today with above noted complaints. Patient received Norflex, Toradol, Norco, prednisone, lidocaine patch for pain, with good relief. Patient ambulatory in the emergency department. Patient provided with as needed medications for home. While in ED patient received   Medications   lidocaine 4 % external patch 1 patch (1 patch Transdermal Patch Applied 4/23/21 1917)   orphenadrine (NORFLEX) injection 60 mg (60 mg Intramuscular Given 4/23/21 1856)   ketorolac (TORADOL) injection 30 mg (30 mg Intramuscular Given 4/23/21 1856)   HYDROcodone-acetaminophen (Jesus Free) 7.5-325 MG per tablet 1 tablet (1 tablet Oral Given 4/23/21 1856)   ondansetron (ZOFRAN-ODT) disintegrating tablet 4 mg (4 mg Oral Given 4/23/21 1856)   predniSONE (DELTASONE) tablet 60 mg (60 mg Oral Given 4/23/21 1856)             At this point I do not feel the patient requires further work up and it is reasonable to discharge the patient. A discussion was had with the patient and/or their surrogate regarding diagnosis, diagnostic testing results, treatment/ plan of care, and follow up. There was shared decision-making between myself as well as the patient and/or their surrogate and we are all in agreement with discharge home.   There was an opportunity for questions and all questions were answered to the best of my ability and to the satisfaction of the patient and/or patient family. Patient will follow up with ortho and PT for further evaluation/treatment. The patient was given strict return precautions as we discussed symptoms that would necessitate return to the ED. Patient will return to ED for new/worsening symptoms. The patient verbalized their understanding and agreement with the above plan. Please refer to AVS for further details regarding discharge instructions. No results found for this visit on 04/23/21. I estimate there is LOW risk for COMPARTMENT SYNDROME, DEEP VENOUS THROMBOSIS, SEPTIC ARTHRITIS, TENDON OR NEUROVASCULAR INJURY, thus I consider the discharge disposition reasonable. Jesus Nguyen and I have discussed the diagnosis and risks, and we agree with discharging home to follow-up with their primary doctor or the referral orthopedist. We also discussed returning to the Emergency Department immediately if new or worsening symptoms occur. We have discussed the symptoms which are most concerning (e.g., changing or worsening pain, numbness, weakness) that necessitate immediate return. Final Impression    1. Left hip pain    2. Sciatica of left side        Blood pressure 128/88, pulse 85, temperature 98 °F (36.7 °C), temperature source Oral, resp. rate 18, SpO2 95 %, not currently breastfeeding.mdm    Patient was sent home with a prescription for below medication/s. I did [place] patient on appropriate use of these medication. New Prescriptions    CYCLOBENZAPRINE (FLEXERIL) 10 MG TABLET    Take 1 tablet by mouth 3 times daily as needed for Muscle spasms    IBUPROFEN (ADVIL;MOTRIN) 600 MG TABLET    Take 1 tablet by mouth 3 times daily as needed for Pain    LIDOCAINE (LIDODERM) 5 %    Place 1 patch onto the skin daily for 10 days 12 hours on, 12 hours off.     PREDNISONE (DELTASONE) 10 MG TABLET    Take 6 tablets by mouth daily for 5 doses           FOLLOW UP  Gab GAVIRIA Melanie Herndon, [address] [place] [address]  [address] [facility]  ED  [address] [facility] [address] [phone]          DISPOSITION  Patient was discharged to home in good condition. Comment: Please note this report has been produced using speech recognition software and may contain errors related to that system including errors in grammar, punctuation, and spelling, as well as words and phrases that may be inappropriate. If there are any questions or concerns please feel free to contact the dictating provider for clarification.             SHAUN Crum - CNP  04/23/21 1938

## 2021-04-27 ENCOUNTER — VIRTUAL VISIT (OUTPATIENT)
Dept: FAMILY MEDICINE CLINIC | Age: 66
End: 2021-04-27
Payer: MEDICARE

## 2021-04-27 DIAGNOSIS — F12.90 MARIJUANA SMOKER, CONTINUOUS: ICD-10-CM

## 2021-04-27 DIAGNOSIS — J44.1 COPD EXACERBATION (HCC): Primary | ICD-10-CM

## 2021-04-27 PROCEDURE — 99442 PR PHYS/QHP TELEPHONE EVALUATION 11-20 MIN: CPT | Performed by: FAMILY MEDICINE

## 2021-04-27 RX ORDER — LEVOFLOXACIN 750 MG/1
750 TABLET ORAL DAILY
Qty: 7 TABLET | Refills: 0 | Status: SHIPPED | OUTPATIENT
Start: 2021-04-27 | End: 2021-05-04

## 2021-04-27 RX ORDER — PREDNISONE 10 MG/1
TABLET ORAL
Qty: 42 TABLET | Refills: 0 | Status: SHIPPED | OUTPATIENT
Start: 2021-04-27 | End: 2021-05-07

## 2021-04-27 NOTE — PROGRESS NOTES
Madeleine Moreno is a 60 y.o. female evaluated via telephone on 4/27/2021. Consent:  She and/or health care decision maker is aware that that she may receive a bill for this telephone service, depending on her insurance coverage, and has provided verbal consent to proceed: Yes      Documentation:  I communicated with the patient and/or health care decision maker about   Chief Complaint   Patient presents with    Cough     cough, sob, sore throat, headache, x1 day copd flare up     . Details of this discussion including any medical advice provided:   +shortness of breath, wheezing. Coughing up thick green sputum that started yesterday. No hemoptysis. No fever. +headache and +body aches. Has been fully vaccinated for COVID. No sick contacts. Recently in the hospital a month ago for similar symptoms. She would like to get this taken care of ASAP to avoid a hospitalization. Was just at the ED for hip pain within the last week. She was diagnosed with sciatica, and given medications which have helped (flexeril, ibuprofen, lidocaine patches, prednisone). She was given a 60 mg prednisone burst for 5 days, and she is on day 4 of those. Not smoking cigarettes. Still smoking marijuana. Joon Bal was seen today for cough. Diagnoses and all orders for this visit:    COPD exacerbation ([facility] [address]  -     levoFLOXacin (LEVAQUIN) 750 MG tablet; Take 1 tablet by mouth daily for 7 days  -     predniSONE (DELTASONE) 10 MG tablet; Take 6tab by mouth x2 days, then 5tab x2 days, then 4tabs x2 days, then 3tab x2 days, then 2tab x2 days, then 1tab x2 days. Use of albuterol around the clock q4-6 hours for the next 2 days, then may decrease to as needed. Call if not improving within 3 days. Marijuana smoker, continuous  Discussed importance of quitting smoking anything (cigarettes, marijuana, etc) for the health and safety of her lungs.  She has successfully been able to quit smoking, which is wonderful, and has been encouraged to quit smoking marijuana, which she is not wanting to do at this time. I affirm this is a Patient Initiated Episode with a Patient who has not had a related appointment within my department in the past 7 days or scheduled within the next 24 hours. Patient identification was verified at the start of the visit: Yes    Total Time: minutes: 11-20 minutes    The visit was conducted pursuant to the emergency declaration under the [address] [place] [address] and the [employer] Act. Patient identification was verified, and a caregiver was present when appropriate. The patient was located in a state where the provider was credentialed to provide care.     Note: not billable if this call serves to triage the patient into an appointment for the relevant concern      Roxy Ramirez

## 2021-05-24 ENCOUNTER — NURSE TRIAGE (OUTPATIENT)
Dept: OTHER | Facility: CLINIC | Age: 66
End: 2021-05-24

## 2021-05-24 ENCOUNTER — HOSPITAL ENCOUNTER (INPATIENT)
Age: 66
LOS: 3 days | Discharge: HOME | DRG: 190 | End: 2021-05-27
Attending: EMERGENCY MEDICINE | Admitting: INTERNAL MEDICINE
Payer: MEDICARE

## 2021-05-24 ENCOUNTER — APPOINTMENT (OUTPATIENT)
Dept: GENERAL RADIOLOGY | Age: 66
DRG: 190 | End: 2021-05-24
Payer: MEDICARE

## 2021-05-24 DIAGNOSIS — J44.1 COPD EXACERBATION (HCC): Primary | ICD-10-CM

## 2021-05-24 PROBLEM — J44.9 COPD (CHRONIC OBSTRUCTIVE PULMONARY DISEASE) (HCC): Status: ACTIVE | Noted: 2021-05-24

## 2021-05-24 LAB
A/G RATIO: 1.3 (ref 1.1–2.2)
ALBUMIN SERPL-MCNC: 4.1 G/DL (ref 3.4–5)
ALP BLD-CCNC: 77 U/L (ref 40–129)
ALT SERPL-CCNC: 16 U/L (ref 10–40)
ANION GAP SERPL CALCULATED.3IONS-SCNC: 12 MMOL/L (ref 3–16)
AST SERPL-CCNC: 22 U/L (ref 15–37)
BASE EXCESS VENOUS: -2.3 MMOL/L (ref -3–3)
BASOPHILS ABSOLUTE: 0.1 K/UL (ref 0–0.2)
BASOPHILS RELATIVE PERCENT: 1.1 %
BILIRUB SERPL-MCNC: 0.4 MG/DL (ref 0–1)
BUN BLDV-MCNC: 6 MG/DL (ref 7–20)
CALCIUM SERPL-MCNC: 9 MG/DL (ref 8.3–10.6)
CARBOXYHEMOGLOBIN: 3.7 % (ref 0–1.5)
CHLORIDE BLD-SCNC: 96 MMOL/L (ref 99–110)
CO2: 23 MMOL/L (ref 21–32)
CREAT SERPL-MCNC: 0.7 MG/DL (ref 0.6–1.2)
EKG ATRIAL RATE: 96 BPM
EKG DIAGNOSIS: NORMAL
EKG P AXIS: 82 DEGREES
EKG P-R INTERVAL: 150 MS
EKG Q-T INTERVAL: 338 MS
EKG QRS DURATION: 74 MS
EKG QTC CALCULATION (BAZETT): 427 MS
EKG R AXIS: 64 DEGREES
EKG T AXIS: 77 DEGREES
EKG VENTRICULAR RATE: 96 BPM
EOSINOPHILS ABSOLUTE: 0.3 K/UL (ref 0–0.6)
EOSINOPHILS RELATIVE PERCENT: 2.7 %
GFR AFRICAN AMERICAN: >60
GFR NON-AFRICAN AMERICAN: >60
GLOBULIN: 3.2 G/DL
GLUCOSE BLD-MCNC: 98 MG/DL (ref 70–99)
HCO3 VENOUS: 22.1 MMOL/L (ref 23–29)
HCT VFR BLD CALC: 38.1 % (ref 36–48)
HEMOGLOBIN: 13.2 G/DL (ref 12–16)
LYMPHOCYTES ABSOLUTE: 2.1 K/UL (ref 1–5.1)
LYMPHOCYTES RELATIVE PERCENT: 21.1 %
MCH RBC QN AUTO: 30.6 PG (ref 26–34)
MCHC RBC AUTO-ENTMCNC: 34.6 G/DL (ref 31–36)
MCV RBC AUTO: 88.4 FL (ref 80–100)
METHEMOGLOBIN VENOUS: 0.4 %
MONOCYTES ABSOLUTE: 0.8 K/UL (ref 0–1.3)
MONOCYTES RELATIVE PERCENT: 7.8 %
NEUTROPHILS ABSOLUTE: 6.6 K/UL (ref 1.7–7.7)
NEUTROPHILS RELATIVE PERCENT: 67.3 %
O2 CONTENT, VEN: 15 VOL %
O2 SAT, VEN: 77 %
O2 THERAPY: ABNORMAL
PCO2, VEN: 36.9 MMHG (ref 40–50)
PDW BLD-RTO: 13 % (ref 12.4–15.4)
PH VENOUS: 7.39 (ref 7.35–7.45)
PLATELET # BLD: 482 K/UL (ref 135–450)
PMV BLD AUTO: 6.9 FL (ref 5–10.5)
PO2, VEN: 41 MMHG (ref 25–40)
POTASSIUM SERPL-SCNC: 4.1 MMOL/L (ref 3.5–5.1)
RBC # BLD: 4.31 M/UL (ref 4–5.2)
SODIUM BLD-SCNC: 131 MMOL/L (ref 136–145)
TCO2 CALC VENOUS: 23 MMOL/L
TOTAL PROTEIN: 7.3 G/DL (ref 6.4–8.2)
TROPONIN: <0.01 NG/ML
WBC # BLD: 9.8 K/UL (ref 4–11)

## 2021-05-24 PROCEDURE — 93010 ELECTROCARDIOGRAM REPORT: CPT | Performed by: INTERNAL MEDICINE

## 2021-05-24 PROCEDURE — 94644 CONT INHLJ TX 1ST HOUR: CPT

## 2021-05-24 PROCEDURE — 71045 X-RAY EXAM CHEST 1 VIEW: CPT

## 2021-05-24 PROCEDURE — 85025 COMPLETE CBC W/AUTO DIFF WBC: CPT

## 2021-05-24 PROCEDURE — 94761 N-INVAS EAR/PLS OXIMETRY MLT: CPT

## 2021-05-24 PROCEDURE — 2700000000 HC OXYGEN THERAPY PER DAY

## 2021-05-24 PROCEDURE — 93005 ELECTROCARDIOGRAM TRACING: CPT | Performed by: PHYSICIAN ASSISTANT

## 2021-05-24 PROCEDURE — 6370000000 HC RX 637 (ALT 250 FOR IP): Performed by: INTERNAL MEDICINE

## 2021-05-24 PROCEDURE — 2580000003 HC RX 258: Performed by: INTERNAL MEDICINE

## 2021-05-24 PROCEDURE — 80053 COMPREHEN METABOLIC PANEL: CPT

## 2021-05-24 PROCEDURE — 99284 EMERGENCY DEPT VISIT MOD MDM: CPT

## 2021-05-24 PROCEDURE — 82803 BLOOD GASES ANY COMBINATION: CPT

## 2021-05-24 PROCEDURE — 6370000000 HC RX 637 (ALT 250 FOR IP): Performed by: PHYSICIAN ASSISTANT

## 2021-05-24 PROCEDURE — 1200000000 HC SEMI PRIVATE

## 2021-05-24 PROCEDURE — 94640 AIRWAY INHALATION TREATMENT: CPT

## 2021-05-24 PROCEDURE — 6360000002 HC RX W HCPCS: Performed by: PHYSICIAN ASSISTANT

## 2021-05-24 PROCEDURE — 84484 ASSAY OF TROPONIN QUANT: CPT

## 2021-05-24 RX ORDER — PROMETHAZINE HYDROCHLORIDE 25 MG/1
12.5 TABLET ORAL EVERY 6 HOURS PRN
Status: DISCONTINUED | OUTPATIENT
Start: 2021-05-24 | End: 2021-05-27 | Stop reason: HOSPADM

## 2021-05-24 RX ORDER — MONTELUKAST SODIUM 10 MG/1
10 TABLET ORAL NIGHTLY
Status: DISCONTINUED | OUTPATIENT
Start: 2021-05-24 | End: 2021-05-27 | Stop reason: HOSPADM

## 2021-05-24 RX ORDER — ACETAMINOPHEN 325 MG/1
650 TABLET ORAL EVERY 6 HOURS PRN
Status: DISCONTINUED | OUTPATIENT
Start: 2021-05-24 | End: 2021-05-27 | Stop reason: HOSPADM

## 2021-05-24 RX ORDER — IPRATROPIUM BROMIDE AND ALBUTEROL SULFATE 2.5; .5 MG/3ML; MG/3ML
3 SOLUTION RESPIRATORY (INHALATION) ONCE
Status: COMPLETED | OUTPATIENT
Start: 2021-05-24 | End: 2021-05-24

## 2021-05-24 RX ORDER — GUAIFENESIN 600 MG/1
600 TABLET, EXTENDED RELEASE ORAL 2 TIMES DAILY PRN
Status: DISCONTINUED | OUTPATIENT
Start: 2021-05-24 | End: 2021-05-27 | Stop reason: HOSPADM

## 2021-05-24 RX ORDER — SODIUM CHLORIDE 0.9 % (FLUSH) 0.9 %
5-40 SYRINGE (ML) INJECTION PRN
Status: DISCONTINUED | OUTPATIENT
Start: 2021-05-24 | End: 2021-05-27 | Stop reason: HOSPADM

## 2021-05-24 RX ORDER — ONDANSETRON 2 MG/ML
4 INJECTION INTRAMUSCULAR; INTRAVENOUS EVERY 6 HOURS PRN
Status: DISCONTINUED | OUTPATIENT
Start: 2021-05-24 | End: 2021-05-27 | Stop reason: HOSPADM

## 2021-05-24 RX ORDER — IPRATROPIUM BROMIDE AND ALBUTEROL SULFATE 2.5; .5 MG/3ML; MG/3ML
1 SOLUTION RESPIRATORY (INHALATION)
Status: DISCONTINUED | OUTPATIENT
Start: 2021-05-25 | End: 2021-05-25

## 2021-05-24 RX ORDER — SODIUM CHLORIDE 0.9 % (FLUSH) 0.9 %
5-40 SYRINGE (ML) INJECTION EVERY 12 HOURS SCHEDULED
Status: DISCONTINUED | OUTPATIENT
Start: 2021-05-24 | End: 2021-05-27 | Stop reason: HOSPADM

## 2021-05-24 RX ORDER — IPRATROPIUM BROMIDE AND ALBUTEROL SULFATE 2.5; .5 MG/3ML; MG/3ML
3 SOLUTION RESPIRATORY (INHALATION)
Status: DISCONTINUED | OUTPATIENT
Start: 2021-05-24 | End: 2021-05-24

## 2021-05-24 RX ORDER — ACETAMINOPHEN 650 MG/1
650 SUPPOSITORY RECTAL EVERY 6 HOURS PRN
Status: DISCONTINUED | OUTPATIENT
Start: 2021-05-24 | End: 2021-05-27 | Stop reason: HOSPADM

## 2021-05-24 RX ORDER — SODIUM CHLORIDE 9 MG/ML
25 INJECTION, SOLUTION INTRAVENOUS PRN
Status: DISCONTINUED | OUTPATIENT
Start: 2021-05-24 | End: 2021-05-27 | Stop reason: HOSPADM

## 2021-05-24 RX ORDER — TRAZODONE HYDROCHLORIDE 50 MG/1
100 TABLET ORAL NIGHTLY
Status: DISCONTINUED | OUTPATIENT
Start: 2021-05-24 | End: 2021-05-25

## 2021-05-24 RX ORDER — METHYLPREDNISOLONE SODIUM SUCCINATE 125 MG/2ML
125 INJECTION, POWDER, LYOPHILIZED, FOR SOLUTION INTRAMUSCULAR; INTRAVENOUS ONCE
Status: COMPLETED | OUTPATIENT
Start: 2021-05-24 | End: 2021-05-24

## 2021-05-24 RX ORDER — METHYLPREDNISOLONE SODIUM SUCCINATE 40 MG/ML
40 INJECTION, POWDER, LYOPHILIZED, FOR SOLUTION INTRAMUSCULAR; INTRAVENOUS 2 TIMES DAILY
Status: DISCONTINUED | OUTPATIENT
Start: 2021-05-25 | End: 2021-05-27

## 2021-05-24 RX ORDER — FLUOXETINE HYDROCHLORIDE 20 MG/1
60 CAPSULE ORAL DAILY
Status: DISCONTINUED | OUTPATIENT
Start: 2021-05-25 | End: 2021-05-27 | Stop reason: HOSPADM

## 2021-05-24 RX ORDER — BUSPIRONE HYDROCHLORIDE 5 MG/1
30 TABLET ORAL 2 TIMES DAILY
Status: DISCONTINUED | OUTPATIENT
Start: 2021-05-24 | End: 2021-05-27 | Stop reason: HOSPADM

## 2021-05-24 RX ORDER — ATORVASTATIN CALCIUM 10 MG/1
20 TABLET, FILM COATED ORAL DAILY
Status: DISCONTINUED | OUTPATIENT
Start: 2021-05-25 | End: 2021-05-27 | Stop reason: HOSPADM

## 2021-05-24 RX ORDER — POLYETHYLENE GLYCOL 3350 17 G/17G
17 POWDER, FOR SOLUTION ORAL DAILY PRN
Status: DISCONTINUED | OUTPATIENT
Start: 2021-05-24 | End: 2021-05-27 | Stop reason: HOSPADM

## 2021-05-24 RX ADMIN — BUSPIRONE HYDROCHLORIDE 30 MG: 5 TABLET ORAL at 21:38

## 2021-05-24 RX ADMIN — IPRATROPIUM BROMIDE AND ALBUTEROL SULFATE 3 AMPULE: .5; 3 SOLUTION RESPIRATORY (INHALATION) at 20:07

## 2021-05-24 RX ADMIN — TRAZODONE HYDROCHLORIDE 100 MG: 50 TABLET ORAL at 21:38

## 2021-05-24 RX ADMIN — MONTELUKAST SODIUM 10 MG: 10 TABLET ORAL at 21:38

## 2021-05-24 RX ADMIN — Medication 10 ML: at 21:38

## 2021-05-24 RX ADMIN — METHYLPREDNISOLONE SODIUM SUCCINATE 125 MG: 125 INJECTION, POWDER, FOR SOLUTION INTRAMUSCULAR; INTRAVENOUS at 15:33

## 2021-05-24 RX ADMIN — IPRATROPIUM BROMIDE AND ALBUTEROL SULFATE 3 AMPULE: .5; 3 SOLUTION RESPIRATORY (INHALATION) at 15:58

## 2021-05-24 NOTE — ED PROVIDER NOTES
I independently examined and evaluated Beverley Recio. All diagnostic, treatment, and disposition decisions were made by myself in conjunction with the SARA, [employer]. For all further details of the patient's emergency department visit, please see their documentation. Seven 60-year-old female with history of COPD presents with wheezing and shortness of breath. She states she started smoking again 2 weeks ago after she had stopped for quite some time. Her symptoms started 2 days ago. On exam she has diffuse expiratory wheezing and is tachypneic with increased work of breathing. She has received 3 DuoNeb's here along with Solu-Medrol. Chest x-ray shows no infiltrate. She will be admitted for further treatment of her COPD exacerbation.       EKG Interpretation    Interpreted by emergency department physician    Rhythm: normal sinus   Rate: normal  Axis: normal  Ectopy: none  Conduction: normal  ST Segments: no acute change  T Waves: no acute change  Q Waves: nonspecific    Clinical Impression: non-specific EKG    MD Erma Hernandez MD  05/24/21 [id number]

## 2021-05-24 NOTE — ED NOTES
Pt's daughter updated on pt's condition with pt's permission. Meal tray ordered for pt per diet order.      Traci Santa RN  05/24/21 2501

## 2021-05-24 NOTE — ED NOTES
Pt ambulated from triage to Rm 23, 120 feet, on RA, with standby assistance from tech. Maintained -120, SpO2 started 95% down to 89% upon arrival to room. Pt became increasingly short of breath when ambulating, needing to stop once and hold techs arm when ambulating. RR increased from 20 to 36 while ambulating. ANY [place] aware ambulation results.      Laura Camara  05/24/21 7597

## 2021-05-24 NOTE — TELEPHONE ENCOUNTER
Reason for Disposition   MODERATE difficulty breathing (e.g., speaks in phrases, SOB even at rest, pulse 100-120) of new onset or worse than normal    Answer Assessment - Initial Assessment Questions  1. RESPIRATORY STATUS: \"Describe your breathing? \" (e.g., wheezing, shortness of breath, unable to speak, severe coughing)       Sob all the time, even at rest. Hard to get out full sentences    2. ONSET: \"When did this breathing problem begin? \"     Started a few days ago    3. PATTERN \"Does the difficult breathing come and go, or has it been constant since it started? \"      Constant    4. SEVERITY: \"How bad is your breathing? \" (e.g., mild, moderate, severe)     - MILD: No SOB at rest, mild SOB with walking, speaks normally in sentences, can lay down, no retractions, pulse < 100.     - MODERATE: SOB at rest, SOB with minimal exertion and prefers to sit, cannot lie down flat, speaks in phrases, mild retractions, audible wheezing, pulse 100-120.     - SEVERE: Very SOB at rest, speaks in single words, struggling to breathe, sitting hunched forward, retractions, pulse > 120      Hard to speak,     5. RECURRENT SYMPTOM: \"Have you had difficulty breathing before? \" If so, ask: \"When was the last time? \" and \"What happened that time?\"        6. CARDIAC HISTORY: \"Do you have any history of heart disease? \" (e.g., heart attack, angina, bypass surgery, angioplasty)       7. LUNG HISTORY: \"Do you have any history of lung disease? \"  (e.g., pulmonary embolus, asthma, emphysema)  Started back smoking  COPD    8. CAUSE: \"What do you think is causing the breathing problem?\"     9. OTHER SYMPTOMS: \"Do you have any other symptoms? (e.g., dizziness, runny nose, cough, chest pain, fever)    A little cough,   No chest pain, no fever    10. PREGNANCY: \"Is there any chance you are pregnant? \" \"When was your last menstrual period? \"          11. TRAVEL: \"Have you traveled out of the country in the last month? \" (e.g., travel history, exposures)    Protocols used: BREATHING DIFFICULTY-ADULT-OH  Received call from [facility] at pre-service center [facility]) [place] with Red Flag Complaint. Brief description of triage: Pt with sob for the past few days. Started back smoking again. Is sob even at rest, having trouble getting full sentences out. Also with COPD. A little cough. No chest pain. Triage indicates for patient to go to ED. Pt states once her  returns will go to ED. Care advice provided, patient verbalizes understanding; denies any other questions or concerns; instructed to call back for any new or worsening symptoms. Attention Provider: Thank you for allowing me to participate in the care of your patient. The patient was connected to triage in response to information provided to the ECC. Please do not respond through this encounter as the response is not directed to a shared pool.

## 2021-05-24 NOTE — ED PROVIDER NOTES
[facility] Emergency Department    CHIEF COMPLAINT  Shortness of Breath (for last 2 days. pt satrted up smoking again2 weeks ago)      [address] [facility]  I have seen and evaluated this patient with my supervising physician, Dr. Arthur Jay. HISTORY OF PRESENT ILLNESS  Irene Cotton is a 60 y.o. female who presents to the ED complaining of several day history of shortness of breath. Patient brought in by  today for evaluation. Patient has a history of COPD. States that she did attempt to quit smoking although recently started back up. Cough nonproductive and without hemoptysis. States that she does not feel that she is moving air adequately. She denies fevers or chills. No chest pain. No headache, lightheadedness, dizziness confusion. No nausea vomiting. No diarrhea or constipation. No leg pain or swelling. No recent travel, trauma or surgery. No other complaints, modifying factors or associated symptoms. Nursing notes reviewed.    Past Medical History:   Diagnosis Date    Allergic rhinitis 6/11/2010    Anxiety     Arthritis     Aspiration pneumonia ([address] 3/21/2012    Recurrent    Asthma     Bronchitis chronic     COPD (chronic obstructive pulmonary disease) ([address] 12/3/2009    Depression     Drug abuse, cocaine type (HCC)     past history of crack cocaine use    Emphysema of lung (HCC)     Fibromyalgia     GERD (gastroesophageal reflux disease)     Hyperlipidemia     Influenza A 03/05/2020    Lung disease     On home oxygen therapy     uses O2 NC 3L prn at night    Osteoporosis     Pneumonia     Polysubstance dependence ([address] 1/2/2012    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    Pulmonary fibrosis ([address] 10/16/2014    Pulmonary infiltrate     Pulmonary nodule 12/3/2009    Tobacco abuse      Past Surgical History:   Procedure Laterality Date    BLADDER REPAIR      BRONCHOSCOPY      BRONCHOSCOPY N/A 3/6/2020 Every Day Smoker     Packs/day: 1.00     Years: 40.00     Pack years: 40.00     Types: Cigarettes     Start date: 1972     Last attempt to quit: 10/12/2020     Years since quittin.6    Smokeless tobacco: Never Used    Tobacco comment: 0.5 PPD restarted   Vaping Use    Vaping Use: Never used   Substance and Sexual Activity    Alcohol use: No     Alcohol/week: 0.0 standard drinks    Drug use: Yes     Types: Marijuana     Comment: Daily    Sexual activity: Yes     Partners: Male   Other Topics Concern    Not on file   Social History Narrative    Not on file     Social Determinants of Health     Financial Resource Strain: Low Risk     Difficulty of Paying Living Expenses: Not very hard   Food Insecurity: Food Insecurity Present    Worried About Running Out of Food in the Last Year: Sometimes true    Erica of Food in the Last Year: Sometimes true   Transportation Needs: No Transportation Needs    Lack of Transportation (Medical): No    Lack of Transportation (Non-Medical):  No   Physical Activity:     Days of Exercise per Week:     Minutes of Exercise per Session:    Stress:     Feeling of Stress :    Social Connections:     Frequency of Communication with Friends and Family:     Frequency of Social Gatherings with Friends and Family:     Attends Taoism Services:     Active Member of Clubs or Organizations:     Attends Club or Organization Meetings:     Marital Status:    Intimate Partner Violence:     Fear of Current or Ex-Partner:     Emotionally Abused:     Physically Abused:     Sexually Abused:      Current Facility-Administered Medications   Medication Dose Route Frequency Provider Last Rate Last Admin    ipratropium-albuterol (DUONEB) nebulizer solution 3 ampule  3 ampule Inhalation Q4H [place], [place]        methylPREDNISolone sodium (SOLU-MEDROL) injection 125 mg  125 mg Intravenous Once [place], [place]         Current Outpatient Medications   Medication Sig Dispense Refill    ibuprofen (ADVIL;MOTRIN) 600 MG tablet Take 1 tablet by mouth 3 times daily as needed for Pain 30 tablet 0    vitamin D (ERGOCALCIFEROL) 1.25 MG (84579 UT) CAPS capsule Take 1 capsule by mouth once a week for 5 days Every Tuesday 5 capsule 0    albuterol sulfate HFA (VENTOLIN HFA) 108 (90 Base) MCG/ACT inhaler Inhale 2 puffs into the lungs every 6 hours as needed for Wheezing or Shortness of Breath 3 Inhaler 1    naproxen (NAPROSYN) 500 MG tablet Take 1 tablet by mouth 2 times daily (with meals) (Patient not taking: Reported on 3/22/2021) 30 tablet 0    BD PEN NEEDLE SVETLANA U/F 32G X 4 MM MISC USE ONE DAILY AS DIRECTED (Patient not taking: Reported on 3/22/2021) 100 each 5    montelukast (SINGULAIR) 10 MG tablet TAKE 1 TABLET BY MOUTH EACH NIGHT 90 tablet 1    FLUoxetine (PROZAC) 20 MG capsule TAKE 3 CAPSULES BY MOUTH DAILY 270 capsule 1    traZODone (DESYREL) 150 MG tablet TAKE 2 TABLETS AT BEDTIME 180 tablet 1    fluticasone-salmeterol (WIXELA INHUB) 500-50 MCG/DOSE diskus inhaler Inhale 1 puff into the lungs every 12 hours 180 each 1    tiotropium (SPIRIVA HANDIHALER) 18 MCG inhalation capsule INHALE THE CONTENTS OF 1 CAPSULE EVERY DAY 90 capsule 1    albuterol (PROVENTIL) (2.5 MG/3ML) 0.083% nebulizer solution Take 3 mLs by nebulization every 6 hours as needed for Wheezing or Shortness of Breath DX COPD J44.9 120 vial 6    simvastatin (ZOCOR) 20 MG tablet TAKE 1 TABLET EVERY EVENING 90 tablet 1    busPIRone (BUSPAR) 30 MG tablet TAKE 1 TABLET TWICE DAILY 180 tablet 1    guaiFENesin (MUCINEX) 600 MG extended release tablet Take 1 tablet by mouth 2 times daily as needed for Congestion 60 tablet 2    teriparatide, recombinant, (FORTEO) 600 MCG/2.4ML SOLN injection Inject 0.08 mLs into the skin daily One dose injected daily.  1 pen 11     Allergies   Allergen Reactions    Meperidine Anaphylaxis     \"Demerol\"     Demerol Hives       REVIEW OF SYSTEMS  10 systems reviewed, pertinent positives per HPI otherwise noted to be negative    PHYSICAL EXAM  /82   Pulse 107   Temp 98.5 °F (36.9 °C) (Oral)   Resp (!) 36   Ht 5' 4\" (1.626 m)   Wt 140 lb (63.5 kg)   SpO2 94%   BMI 24.03 kg/m²   GENERAL APPEARANCE: Awake and alert. Cooperative. No acute distress. HEAD: Normocephalic. Atraumatic. EYES: PERRL. EOM's grossly intact. ENT: Mucous membranes are moist.   NECK: Supple. No JVD. No tracheal tenderness or deviation. No crepitus. HEART: Tachycardic. No murmurs. No chest wall tenderness. LUNGS: Respirations labored. Patient with conversational dyspnea. Tachypneic. Patient with inspiratory and expiratory wheezing. No obvious rhonchi or rales. Retractions present. ABDOMEN: Soft. Non-distended. Non-tender. No guarding or rebound. No midline pulsatile mass. EXTREMITIES: No peripheral edema. No unilateral calf pain, redness or swelling. Moves all extremities equally. All extremities neurovascularly intact. SKIN: Warm and dry. No acute rashes. NEUROLOGICAL: Alert and oriented. CN's 2-12 intact. No gross facial drooping. Strength 5/5, sensation intact. PSYCHIATRIC: Normal mood and affect. RADIOLOGY  XR CHEST PORTABLE    Result Date: 5/24/2021  EXAMINATION: ONE XRAY VIEW OF THE CHEST 5/24/2021 2:51 pm COMPARISON: Prior study(s) most recent chest CT 03/14/2021. HISTORY: ORDERING SYSTEM PROVIDED HISTORY: sob TECHNOLOGIST PROVIDED HISTORY: Reason for exam:->sob Reason for Exam: sob Acuity: Acute Type of Exam: Initial FINDINGS: The heart is normal size. Interstitial changes at the lung bases are present, asymmetric involvement left lower lobe. Remainder the lungs are somewhat hyperinflated with emphysema, upper lobe predominant. No pneumothorax. No pleural effusion. Reticular densities asymmetric left lung base likely representing fibrosis or chronic postinflammatory change. Suspect moderate COPD.      ED COURSE  Patient received Solu-Medrol and several duo nebs for shortness of breath, with good relief. Triage vitals showing tachypnea at 36 respirations per minute with tachycardia at 107. Oxygen saturations at 94%. Portable chest x-ray showing asymmetric reticular densities favored to be fibrosis versus chronic changes. CBC without leukocytosis or anemia. CMP unremarkable. Troponin less than 0.01. VBG without acidosis. PCO2 36.9. EKG was a normal sinus rhythm without evidence for acute ischemic change. Patient still very dyspneic on reevaluation. Additional albuterol treatments ordered. We are at this time recommending admission for COPD exacerbation. Have discussed case with hospital medicine and orders placed. A discussion was had with Ms. Wong regarding shortness of breath, ED findings and recommendations for admission. Risk management discussed and shared decision making had with patient and/or surrogate. All questions were answered. Patient in agreement. CRITICAL CARE TIME  30 Minutes of critical care time spent not including separately billable procedures.     MDM  Results for orders placed or performed during the hospital encounter of 05/24/21   CBC auto differential   Result Value Ref Range    WBC 9.8 4.0 - 11.0 K/uL    RBC 4.31 4.00 - 5.20 M/uL    Hemoglobin 13.2 12.0 - 16.0 g/dL    Hematocrit 38.1 36.0 - 48.0 %    MCV 88.4 80.0 - 100.0 fL    MCH 30.6 26.0 - 34.0 pg    MCHC 34.6 31.0 - 36.0 g/dL    RDW 13.0 12.4 - 15.4 %    Platelets 490 (H) 932 - 450 K/uL    MPV 6.9 5.0 - 10.5 fL    Neutrophils % 67.3 %    Lymphocytes % 21.1 %    Monocytes % 7.8 %    Eosinophils % 2.7 %    Basophils % 1.1 %    Neutrophils Absolute 6.6 1.7 - 7.7 K/uL    Lymphocytes Absolute 2.1 1.0 - 5.1 K/uL    Monocytes Absolute 0.8 0.0 - 1.3 K/uL    Eosinophils Absolute 0.3 0.0 - 0.6 K/uL    Basophils Absolute 0.1 0.0 - 0.2 K/uL   Comprehensive metabolic panel   Result Value Ref Range    Sodium 131 (L) 136 - 145 mmol/L    Potassium 4.1 3.5 - 5.1 mmol/L    Chloride 96 (L) 99 - 110 mmol/L    CO2 23 21 - 32 mmol/L    Anion Gap 12 3 - 16    Glucose 98 70 - 99 mg/dL    BUN 6 (L) 7 - 20 mg/dL    CREATININE 0.7 0.6 - 1.2 mg/dL    GFR Non-African American >60 >60    GFR African American >60 >60    Calcium 9.0 8.3 - 10.6 mg/dL    Total Protein 7.3 6.4 - 8.2 g/dL    Albumin 4.1 3.4 - 5.0 g/dL    Albumin/Globulin Ratio 1.3 1.1 - 2.2    Total Bilirubin 0.4 0.0 - 1.0 mg/dL    Alkaline Phosphatase 77 40 - 129 U/L    ALT 16 10 - 40 U/L    AST 22 15 - 37 U/L    Globulin 3.2 g/dL   Troponin   Result Value Ref Range    Troponin <0.01 <0.01 ng/mL   Blood gas, venous   Result Value Ref Range    pH, Ivan 7.395 7.350 - 7.450    pCO2, Ivan 36.9 (L) 40.0 - 50.0 mmHg    pO2, Ivan 41.0 (H) 25 - 40 mmHg    HCO3, Venous 22.1 (L) 23.0 - 29.0 mmol/L    Base Excess, Ivan -2.3 -3.0 - 3.0 mmol/L    O2 Sat, Ivan 77 Not Established %    Carboxyhemoglobin 3.7 (H) 0.0 - 1.5 %    MetHgb, Ivan 0.4 <1.5 %    TC02 (Calc), Ivan 23 Not Established mmol/L    O2 Content, Ivan 15 Not Established VOL %    O2 Therapy Unknown    CBC   Result Value Ref Range    WBC 10.6 4.0 - 11.0 K/uL    RBC 4.08 4.00 - 5.20 M/uL    Hemoglobin 12.7 12.0 - 16.0 g/dL    Hematocrit 36.3 36.0 - 48.0 %    MCV 88.8 80.0 - 100.0 fL    MCH 31.2 26.0 - 34.0 pg    MCHC 35.1 31.0 - 36.0 g/dL    RDW 13.0 12.4 - 15.4 %    Platelets 865 270 - 993 K/uL    MPV 6.9 5.0 - 10.5 fL   Renal Function Panel   Result Value Ref Range    Sodium 131 (L) 136 - 145 mmol/L    Potassium 4.4 3.5 - 5.1 mmol/L    Chloride 97 (L) 99 - 110 mmol/L    CO2 21 21 - 32 mmol/L    Anion Gap 13 3 - 16    Glucose 130 (H) 70 - 99 mg/dL    BUN 9 7 - 20 mg/dL    CREATININE 0.6 0.6 - 1.2 mg/dL    GFR Non-African American >60 >60    GFR African American >60 >60    Calcium 8.6 8.3 - 10.6 mg/dL    Phosphorus 2.3 (L) 2.5 - 4.9 mg/dL    Albumin 3.9 3.4 - 5.0 g/dL   Magnesium   Result Value Ref Range    Magnesium 1.90 1.80 - 2.40 mg/dL   EKG 12 Lead   Result Value Ref Range    Ventricular Rate 96 BPM    Atrial Rate 96 BPM    P-R Interval 150 ms    QRS Duration 74 ms    Q-T Interval 338 ms    QTc Calculation (Bazett) 427 ms    P Axis 82 degrees    R Axis 64 degrees    T Axis 77 degrees    Diagnosis       Normal sinus rhythmNormal ECGConfirmed by Manasa Mckinney MD, [facility] ([facility]) on 5/24/2021 3:44:45 PM     I spoke with Crispin Snyder and Harper Peñaloza. We thoroughly discussed the history, physical exam, laboratory and imaging studies, as well as, emergency department course. Based upon that discussion, we've decided to admit Beverley Recio to the hospital for further observation, evaluation and treatment    Final Impression  1. COPD exacerbation (HCC)      Blood pressure 120/77, pulse 91, temperature 98.4 °F (36.9 °C), temperature source Oral, resp. rate 22, height 5' 4\" (1.626 m), weight 140 lb (63.5 kg), SpO2 96 %, not currently breastfeeding. DISPOSITION  Patient was admitted to the hospital in stable condition.          Pushp[place]ooth Hernandez [place]  05/25/21 1013

## 2021-05-25 LAB
ALBUMIN SERPL-MCNC: 3.9 G/DL (ref 3.4–5)
ANION GAP SERPL CALCULATED.3IONS-SCNC: 13 MMOL/L (ref 3–16)
BUN BLDV-MCNC: 9 MG/DL (ref 7–20)
CALCIUM SERPL-MCNC: 8.6 MG/DL (ref 8.3–10.6)
CHLORIDE BLD-SCNC: 97 MMOL/L (ref 99–110)
CO2: 21 MMOL/L (ref 21–32)
CREAT SERPL-MCNC: 0.6 MG/DL (ref 0.6–1.2)
GFR AFRICAN AMERICAN: >60
GFR NON-AFRICAN AMERICAN: >60
GLUCOSE BLD-MCNC: 130 MG/DL (ref 70–99)
HCT VFR BLD CALC: 36.3 % (ref 36–48)
HEMOGLOBIN: 12.7 G/DL (ref 12–16)
MAGNESIUM: 1.9 MG/DL (ref 1.8–2.4)
MCH RBC QN AUTO: 31.2 PG (ref 26–34)
MCHC RBC AUTO-ENTMCNC: 35.1 G/DL (ref 31–36)
MCV RBC AUTO: 88.8 FL (ref 80–100)
PDW BLD-RTO: 13 % (ref 12.4–15.4)
PHOSPHORUS: 2.3 MG/DL (ref 2.5–4.9)
PLATELET # BLD: 387 K/UL (ref 135–450)
PMV BLD AUTO: 6.9 FL (ref 5–10.5)
POTASSIUM SERPL-SCNC: 4.4 MMOL/L (ref 3.5–5.1)
RBC # BLD: 4.08 M/UL (ref 4–5.2)
SODIUM BLD-SCNC: 131 MMOL/L (ref 136–145)
WBC # BLD: 10.6 K/UL (ref 4–11)

## 2021-05-25 PROCEDURE — 6360000002 HC RX W HCPCS: Performed by: INTERNAL MEDICINE

## 2021-05-25 PROCEDURE — 80069 RENAL FUNCTION PANEL: CPT

## 2021-05-25 PROCEDURE — 36415 COLL VENOUS BLD VENIPUNCTURE: CPT

## 2021-05-25 PROCEDURE — 2580000003 HC RX 258: Performed by: INTERNAL MEDICINE

## 2021-05-25 PROCEDURE — 94640 AIRWAY INHALATION TREATMENT: CPT

## 2021-05-25 PROCEDURE — 94761 N-INVAS EAR/PLS OXIMETRY MLT: CPT

## 2021-05-25 PROCEDURE — 2700000000 HC OXYGEN THERAPY PER DAY

## 2021-05-25 PROCEDURE — 6370000000 HC RX 637 (ALT 250 FOR IP): Performed by: INTERNAL MEDICINE

## 2021-05-25 PROCEDURE — 83735 ASSAY OF MAGNESIUM: CPT

## 2021-05-25 PROCEDURE — 85027 COMPLETE CBC AUTOMATED: CPT

## 2021-05-25 PROCEDURE — 1200000000 HC SEMI PRIVATE

## 2021-05-25 RX ORDER — IBUPROFEN 400 MG/1
400 TABLET ORAL EVERY 6 HOURS PRN
Status: DISCONTINUED | OUTPATIENT
Start: 2021-05-25 | End: 2021-05-27 | Stop reason: HOSPADM

## 2021-05-25 RX ORDER — NICOTINE 21 MG/24HR
1 PATCH, TRANSDERMAL 24 HOURS TRANSDERMAL DAILY
Status: DISCONTINUED | OUTPATIENT
Start: 2021-05-25 | End: 2021-05-27 | Stop reason: HOSPADM

## 2021-05-25 RX ORDER — ALBUTEROL SULFATE 2.5 MG/3ML
2.5 SOLUTION RESPIRATORY (INHALATION)
Status: DISCONTINUED | OUTPATIENT
Start: 2021-05-25 | End: 2021-05-27 | Stop reason: HOSPADM

## 2021-05-25 RX ORDER — TRAZODONE HYDROCHLORIDE 50 MG/1
300 TABLET ORAL NIGHTLY
Status: DISCONTINUED | OUTPATIENT
Start: 2021-05-26 | End: 2021-05-27 | Stop reason: HOSPADM

## 2021-05-25 RX ADMIN — METHYLPREDNISOLONE SODIUM SUCCINATE 40 MG: 40 INJECTION, POWDER, LYOPHILIZED, FOR SOLUTION INTRAMUSCULAR; INTRAVENOUS at 09:44

## 2021-05-25 RX ADMIN — TIOTROPIUM BROMIDE INHALATION SPRAY 2 PUFF: 3.12 SPRAY, METERED RESPIRATORY (INHALATION) at 08:15

## 2021-05-25 RX ADMIN — ATORVASTATIN CALCIUM 20 MG: 10 TABLET, FILM COATED ORAL at 09:43

## 2021-05-25 RX ADMIN — TRAZODONE HYDROCHLORIDE 100 MG: 50 TABLET ORAL at 20:53

## 2021-05-25 RX ADMIN — ACETAMINOPHEN 650 MG: 325 TABLET ORAL at 09:43

## 2021-05-25 RX ADMIN — Medication 10 ML: at 09:44

## 2021-05-25 RX ADMIN — BUSPIRONE HYDROCHLORIDE 30 MG: 5 TABLET ORAL at 09:43

## 2021-05-25 RX ADMIN — ALBUTEROL SULFATE 2.5 MG: 2.5 SOLUTION RESPIRATORY (INHALATION) at 16:23

## 2021-05-25 RX ADMIN — ACETAMINOPHEN 650 MG: 325 TABLET ORAL at 16:23

## 2021-05-25 RX ADMIN — IBUPROFEN 400 MG: 400 TABLET, FILM COATED ORAL at 12:15

## 2021-05-25 RX ADMIN — ALBUTEROL SULFATE 2.5 MG: 2.5 SOLUTION RESPIRATORY (INHALATION) at 19:58

## 2021-05-25 RX ADMIN — METHYLPREDNISOLONE SODIUM SUCCINATE 40 MG: 40 INJECTION, POWDER, LYOPHILIZED, FOR SOLUTION INTRAMUSCULAR; INTRAVENOUS at 20:53

## 2021-05-25 RX ADMIN — BUSPIRONE HYDROCHLORIDE 30 MG: 5 TABLET ORAL at 20:53

## 2021-05-25 RX ADMIN — ALBUTEROL SULFATE 2.5 MG: 2.5 SOLUTION RESPIRATORY (INHALATION) at 08:15

## 2021-05-25 RX ADMIN — ACETAMINOPHEN 650 MG: 325 TABLET ORAL at 04:39

## 2021-05-25 RX ADMIN — MONTELUKAST SODIUM 10 MG: 10 TABLET ORAL at 20:53

## 2021-05-25 RX ADMIN — ENOXAPARIN SODIUM 40 MG: 40 INJECTION SUBCUTANEOUS at 09:44

## 2021-05-25 RX ADMIN — FLUOXETINE 60 MG: 20 CAPSULE ORAL at 09:43

## 2021-05-25 RX ADMIN — METHYLPREDNISOLONE SODIUM SUCCINATE 40 MG: 40 INJECTION, POWDER, LYOPHILIZED, FOR SOLUTION INTRAMUSCULAR; INTRAVENOUS at 03:34

## 2021-05-25 RX ADMIN — ALBUTEROL SULFATE 2.5 MG: 2.5 SOLUTION RESPIRATORY (INHALATION) at 11:18

## 2021-05-25 ASSESSMENT — PAIN SCALES - GENERAL
PAINLEVEL_OUTOF10: 3
PAINLEVEL_OUTOF10: 0

## 2021-05-25 NOTE — H&P
Hospital Medicine History & Physical      PCP: Keny Tucker MD    Date of Admission: 5/24/2021    Date of Service: Pt seen/examined on 5/24/21 and Admitted to Inpatient with expected LOS greater than two midnights due to medical therapy. Chief Complaint:  sob      History Of Present Illness:     60 y.o. female with hx of copd/chronic resp failure(on 2-3L qhs prn), gerd, anxiety/depression, prior substance abuse who presented to [facility] with sob for the past 2 days. She recently started smoking again about 2 weeks ago. No f/chills/cp. She notes ongoing dry nonproductive cough. No n/v.       Past Medical History:          Diagnosis Date    Allergic rhinitis 6/11/2010    Anxiety     Arthritis     Aspiration pneumonia ([address] 3/21/2012    Recurrent    Asthma     Bronchitis chronic     COPD (chronic obstructive pulmonary disease) ([address] 12/3/2009    Depression     Drug abuse, cocaine type (HCC)     past history of crack cocaine use    Emphysema of lung (HCC)     Fibromyalgia     GERD (gastroesophageal reflux disease)     Hyperlipidemia     Influenza A 03/05/2020    Lung disease     On home oxygen therapy     uses O2 NC 3L prn at night    Osteoporosis     Pneumonia     Polysubstance dependence ([address] 1/2/2012    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    Pulmonary fibrosis ([address] 10/16/2014    Pulmonary infiltrate     Pulmonary nodule 12/3/2009    Tobacco abuse        Past Surgical History:          Procedure Laterality Date    BLADDER REPAIR      BRONCHOSCOPY      BRONCHOSCOPY N/A 3/6/2020    BRONCHOSCOPY THERAPUTIC ASPIRATION INITIAL performed by Pippa Velasquez MD at [address] [place] [address]  3/6/2020    BRONCHOSCOPY ALVEOLAR LAVAGE performed by Pippa Velasquez MD at [address] [place] [address] N/A 6/26/2020    BRONCHOSCOPY THERAPUTIC ASPIRATION INITIAL performed by Erasmo Bowser MD at [address] [place] [address]  6/26/2020 BRONCHOSCOPY ALVEOLAR LAVAGE performed by Abhishek Rand MD at [address] [facility]  2012    ENDOSCOPY, COLON, DIAGNOSTIC      ESOPHAGEAL DILATATION  9/20/2018    ESOPHAGEAL DILATION [place] performed by Trinidad Nava MD at [address] [facility] HISTORY  2/12/15    T8 Kyphoplasty    HI COLONOSCOPY FLX DX W/COLLJ SPEC WHEN PFRMD N/A 9/20/2018    EGD AND COLONOSCOPY WITH ANESTHESIA performed by Trinidad Nava MD at [address] [facility] ESOPHAGOGASTRODUODENOSCOPY TRANSORAL DIAGNOSTIC N/A 9/20/2018    EGD AND COLONOSCOPY WITH ANESTHESIA performed by Trinidad Nava MD at [address] [facility]         Medications Prior to Admission:      Prior to Admission medications    Medication Sig Start Date End Date Taking?  Authorizing Provider   ibuprofen (ADVIL;MOTRIN) 600 MG tablet Take 1 tablet by mouth 3 times daily as needed for Pain 4/23/21  Yes SHAUN Cai CNP   albuterol sulfate HFA (VENTOLIN HFA) 108 (90 Base) MCG/ACT inhaler Inhale 2 puffs into the lungs every 6 hours as needed for Wheezing or Shortness of Breath 3/4/21  Yes Aftab Richard MD   naproxen (NAPROSYN) 500 MG tablet Take 1 tablet by mouth 2 times daily (with meals) 2/5/21  Yes SHAUN Barbour CNP   BD PEN NEEDLE SVETLANA U/F 32G X 4 MM MISC USE ONE DAILY AS DIRECTED 12/11/20  Yes Last Nolan MD   montelukast (SINGULAIR) 10 MG tablet TAKE 1 TABLET BY MOUTH EACH NIGHT 12/7/20  Yes Aftab Richard MD   FLUoxetine (PROZAC) 20 MG capsule TAKE 3 CAPSULES BY MOUTH DAILY 12/3/20  Yes Last Nolan MD   traZODone (DESYREL) 150 MG tablet TAKE 2 TABLETS AT BEDTIME 9/11/20  Yes Last Nolan MD   fluticasone-salmeterol [facility]) 500-50 MCG/DOSE diskus inhaler Inhale 1 puff into the lungs every 12 hours 9/9/20  Yes Aftab Richard MD   tiotropium (Lucia Damaris) 18 MCG inhalation capsule INHALE THE CONTENTS OF 1 CAPSULE EVERY DAY 9/9/20  Yes Lang Goodson MD   albuterol (PROVENTIL) (2.5 MG/3ML) 0.083% nebulizer solution Take 3 mLs by nebulization every 6 hours as needed for Wheezing or Shortness of Breath DX COPD J44.9 9/9/20  Yes Lang Goodson MD   simvastatin (ZOCOR) 20 MG tablet TAKE 1 TABLET EVERY EVENING 8/7/20  Yes Leonila Asencio MD   busPIRone (BUSPAR) 30 MG tablet TAKE 1 TABLET TWICE DAILY 8/7/20  Yes Leonila Asencio MD   guaiFENesin (MUCINEX) 600 MG extended release tablet Take 1 tablet by mouth 2 times daily as needed for Congestion 7/24/20  Yes Tisha Hassan DO   vitamin D (ERGOCALCIFEROL) 1.25 MG (69099 UT) CAPS capsule Take 1 capsule by mouth once a week for 5 days Every Tuesday 3/23/21 3/28/21  Ruddy Mei MD   teriparatide, recombinant, (FORTEO) 600 MCG/2.4ML SOLN injection Inject 0.08 mLs into the skin daily One dose injected daily. 9/27/19 12/7/20  Katt Maciel MD       Allergies:  Meperidine and Demerol    Social History:      The patient currently lives at home    TOBACCO:   reports that she has been smoking cigarettes. She started smoking about 49 years ago. She has a 20.00 pack-year smoking history. She has never used smokeless tobacco.  ETOH:   reports no history of alcohol use. E-Cigarettes/Vaping Use     Questions Responses    E-Cigarette/Vaping Use Never User    Start Date     Passive Exposure     Quit Date     Counseling Given     Comments Unknown            Family History:       Reviewed in detail and negative for DM, CAD, Cancer, CVA.  Positive as follows:        Problem Relation Age of Onset    Asthma Mother     Diabetes Mother     Emphysema Mother     Heart Failure Mother     Hypertension Mother     Heart Disease Mother     High Cholesterol Mother     Cancer Mother     Depression Mother     Diabetes Father     Emphysema Father     Heart Failure Father     Hypertension Father     Heart Disease Father     High Cholesterol Father     Substance Abuse Father     Emphysema Paternal Torri Dallas Diabetes Sister     High Cholesterol Sister     Vision Loss Maternal Uncle        REVIEW OF SYSTEMS COMPLETED:   Pertinent positives as noted in the HPI. All other systems reviewed and negative. PHYSICAL EXAM PERFORMED:    /77   Pulse 91   Temp 98.4 °F (36.9 °C) (Oral)   Resp 20   Ht 5' 4\" (1.626 m)   Wt 140 lb (63.5 kg)   SpO2 97%   BMI 24.03 kg/m²     General appearance:  No apparent distress, appears stated age and cooperative. HEENT:  Normal cephalic, atraumatic without obvious deformity. Pupils equal, round, and reactive to light. Extra ocular muscles intact. Conjunctivae/corneas clear. Neck: Supple, with full range of motion. No jugular venous distention. Trachea midline. Respiratory:  Normal respiratory effort. Clear to auscultation, bilaterally without Rales/Wheezes/Rhonchi. Mild exp wheezing  Cardiovascular:  Regular rate and rhythm with normal S1/S2 without murmurs, rubs or gallops. Abdomen: Soft, non-tender, non-distended with normal bowel sounds. Musculoskeletal:  No clubbing, cyanosis or edema bilaterally. Full range of motion without deformity. Skin: Skin color, texture, turgor normal.  No rashes or lesions. Neurologic:  Neurovascularly intact without any focal sensory/motor deficits. Cranial nerves: II-XII intact, grossly non-focal.  Psychiatric:  Alert and oriented, thought content appropriate, normal insight  Capillary Refill: Brisk,3 seconds, normal  Peripheral Pulses: +2 palpable, equal bilaterally       Labs:     Recent Labs     05/24/21  1531 05/25/21  0639   WBC 9.8 10.6   HGB 13.2 12.7   HCT 38.1 36.3   * 387     Recent Labs     05/24/21  1531 05/25/21  0639   * 131*   K 4.1 4.4   CL 96* 97*   CO2 23 21   BUN 6* 9   CREATININE 0.7 0.6   CALCIUM 9.0 8.6   PHOS  --  2.3*     Recent Labs     05/24/21  1531   AST 22   ALT 16   BILITOT 0.4   ALKPHOS 77     No results for input(s): INR in the last 72 hours.   Recent Labs     05/24/21  1531   TROPONINI <0.01 Urinalysis:      Lab Results   Component Value Date    NITRU Negative 02/05/2021    WBCUA 0-3 04/13/2012    RBCUA 3-5 04/13/2012    BLOODU Negative 02/05/2021    SPECGRAV 1.010 02/05/2021    GLUCOSEU Negative 02/05/2021    GLUCOSEU NEGATIVE 04/13/2012       Radiology:       EKG:  I have reviewed the EKG with the following interpretation: nsr, 96, nl axis, no gross ischemic changes noted    XR CHEST PORTABLE   Final Result   Reticular densities asymmetric left lung base likely representing fibrosis or   chronic postinflammatory change. Suspect moderate COPD. ASSESSMENT:    Active Hospital Problems    Diagnosis Date Noted    COPD (chronic obstructive pulmonary disease) ([facility] [J44.9] 05/24/2021         PLAN:    Acute Resp failure- 86% on ra with ambulation in ER, likely from copd exacerbation, no obvious infection  -supportive care with duonebs, iv steroids    Copd-2L qhs prn  -continued singulair, nebs, spiriva    HLD- on statin    Tobacco use- counselled  -started on patch    Anxiety/depression-On buspar, prozac, trazodone    DVT Prophylaxis: lovenox  Diet: DIET GENERAL;  Code Status: Full Code    PT/OT Eval Status: not ordered    Dispo - pending improvement, 1-2 days       Jane Salvador MD    Thank you Jimmy Rios MD for the opportunity to be involved in this patient's care. If you have any questions or concerns please feel free to contact me at [phone].

## 2021-05-25 NOTE — PROGRESS NOTES
RESPIRATORY THERAPY ASSESSMENT    Name:  Vel [address] Record Number:  [id number]  Age: 60 y.o. Gender: female  : 1955  Today's Date:  2021  Room:  [address] [place] [address]-    Assessment     Is the patient being admitted for a COPD or Asthma exacerbation? Yes   (If yes the patient will be seen every 4 hours for the first 24 hours and then reassessed)    Patient Admission Diagnosis      Allergies  Allergies   Allergen Reactions    Meperidine Anaphylaxis     \"Demerol\"     Demerol Hives       Minimum Predicted Vital Capacity:     821          Actual Vital Capacity:      IS placed at bedside, pt wants to rest              Pulmonary History:COPD and Pulmonary Fibrosis  Home Oxygen Therapy:  room air  Home Respiratory Therapy:Albuterol, Tiotropium Bromide and Wixela   Current Respiratory Therapy:  Duoneb, spiriva  Treatment Type: IS, Treatment missed (IS placed ta bedside)  Medications: Albuterol/Ipratropium    Respiratory Severity Index(RSI)   Patients with orders for inhalation medications, oxygen, or any therapeutic treatment modality will be placed on Respiratory Protocol. They will be assessed with the first treatment and at least every 72 hours thereafter. The following severity scale will be used to determine frequency of treatment intervention.     Smoking History: Pulmonary Disease or Smoking History, Greater than 15 pack year = 2    Social History  Social History     Tobacco Use    Smoking status: Current Every Day Smoker     Packs/day: 0.50     Years: 40.00     Pack years: 20.00     Types: Cigarettes     Start date: 1972     Last attempt to quit: 10/12/2020     Years since quittin.6    Smokeless tobacco: Never Used    Tobacco comment: 0.5 PPD restarted   Vaping Use    Vaping Use: Never used   Substance Use Topics    Alcohol use: No     Alcohol/week: 0.0 standard drinks    Drug use: Yes     Types: Marijuana     Comment: Daily       Recent Surgical History: None = 0  Past Surgical History  Past Surgical History:   Procedure Laterality Date    BLADDER REPAIR      BRONCHOSCOPY      BRONCHOSCOPY N/A 3/6/2020    BRONCHOSCOPY THERAPUTIC ASPIRATION INITIAL performed by Debbie Cadet MD at [address] [place] [address]  3/6/2020    BRONCHOSCOPY ALVEOLAR LAVAGE performed by Debbie Cadet MD at [address] [place] [address] N/A 6/26/2020    BRONCHOSCOPY THERAPUTIC ASPIRATION INITIAL performed by Nehemiah Chappell MD at [address] [place] [address]  6/26/2020    BRONCHOSCOPY ALVEOLAR LAVAGE performed by Nehemiah Chappell MD at [place] [address]  2012    ENDOSCOPY, COLON, DIAGNOSTIC      ESOPHAGEAL DILATATION  9/20/2018    ESOPHAGEAL DILATION [place] performed by Edson Rodriguez MD at [address] [facility] HISTORY  2/12/15    T8 Kyphoplasty    TX COLONOSCOPY FLX DX W/COLLJ SPEC WHEN PFRMD N/A 9/20/2018    EGD AND COLONOSCOPY WITH ANESTHESIA performed by Edson Rodriguez MD at [address] ESOPHAGOGASTRODUODENOSCOPY TRANSORAL DIAGNOSTIC N/A 9/20/2018    EGD AND COLONOSCOPY WITH ANESTHESIA performed by Edson Rodriguez MD at [address] [facility]         Level of Consciousness: Alert, Oriented, and Cooperative = 0    Level of Activity: Walking with assistance = 1    Respiratory Pattern: Dyspnea with exertion;Irregular pattern;or RR less than 6 = 2    Breath Sounds: Absent bilaterally and/or with wheezes = 3    Sputum   ,  ,    Cough: Strong, spontaneous, non-productive = 0    Vital Signs   /72   Pulse 109   Temp 97.6 °F (36.4 °C) (Oral)   Resp 20   Ht 5' 4\" (1.626 m)   Wt 140 lb (63.5 kg)   SpO2 94%   BMI 24.03 kg/m²   SPO2 (COPD values may differ): 88-89% on room air or greater than 92% on FiO2 28- 35% = 2    Peak Flow (asthma only): not applicable = 0    RSI: 5-40 = TID (three times daily) and Q4hr PRN for dyspnea        Plan       Goals: medication delivery    Patient/caregiver was educated on the proper method of use for Respiratory Care Devices:  Yes      Level of patient/caregiver understanding able to:   ? Verbalize understanding   ? Demonstrate understanding       ? Teach back        ? Needs reinforcement       ? No available caregiver               ? Other:     Response to education:  Excellent     Is patient being placed on Home Treatment Regimen? No     Does the patient have everything they need prior to discharge? NA     Comments: pt seen and chart reviewed    Plan of Care: albuterol q4w/a, spiriva    Electronically signed by Gris Brown RCP on 5/24/2021 at 11:57 PM    Respiratory Protocol Guidelines     1. Assessment and treatment by Respiratory Therapy will be initiated for medication and therapeutic interventions upon initiation of aerosolized medication. 2. Physician will be contacted for respiratory rate (RR) greater than 35 breaths per minute. Therapy will be held for heart rate (HR) greater than 140 beats per minute, pending direction from physician. 3. Bronchodilators will be administered via Metered Dose Inhaler (MDI) with spacer when the following criteria are met:  a. Alert and cooperative     b. HR < 140 bpm  c. RR < 30 bpm                d. Can demonstrate a 2-3 second inspiratory hold  4. Bronchodilators will be administered via Hand Held Nebulizer [facility]) to patients when ANY of the following criteria are met  a. Incognizant or uncooperative          b. Patients treated with HHN at Home        c. Unable to demonstrate proper use of MDI with spacer     d. RR > 30 bpm   5. Bronchodilators will be delivered via Metered Dose Inhaler (MDI), HHN, Aerogen to intubated patients on mechanical ventilation. 6. Inhalation medication orders will be delivered and/or substituted as outlined below. Aerosolized Medications Ordering and Administration Guidelines:    1.  All Medications will be ordered by a physician, and their frequency

## 2021-05-25 NOTE — H&P
Hospital Medicine History & Physical      PCP: Keny Tucker MD    Date of Admission: 5/24/2021    Date of Service: Pt seen/examined on 5/24/21 and Admitted to Inpatient with expected LOS greater than two midnights due to medical therapy. Chief Complaint:  sob      History Of Present Illness:     60 y.o. female with hx of copd/chronic resp failure(on 2-3L qhs prn), gerd, anxiety/depression, prior substance abuse who presented to [facility] with sob for the past 2 days. She recently started smoking again about 2 weeks ago. No f/chills/cp noted. She notes ongoing dry nonproductive cough. No n/v.  Despite taking all her chronic pulm meds, she noted inc'd sob so her  brought her in. She sees Dr. Gordo Jackson for pulm.     Past Medical History:          Diagnosis Date    Allergic rhinitis 6/11/2010    Anxiety     Arthritis     Aspiration pneumonia ([address] 3/21/2012    Recurrent    Asthma     Bronchitis chronic     COPD (chronic obstructive pulmonary disease) ([address] 12/3/2009    Depression     Drug abuse, cocaine type (HCC)     past history of crack cocaine use    Emphysema of lung (HCC)     Fibromyalgia     GERD (gastroesophageal reflux disease)     Hyperlipidemia     Influenza A 03/05/2020    Lung disease     On home oxygen therapy     uses O2 NC 3L prn at night    Osteoporosis     Pneumonia     Polysubstance dependence ([address] 1/2/2012    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    Pulmonary fibrosis ([address] 10/16/2014    Pulmonary infiltrate     Pulmonary nodule 12/3/2009    Tobacco abuse        Past Surgical History:          Procedure Laterality Date    BLADDER REPAIR      BRONCHOSCOPY      BRONCHOSCOPY N/A 3/6/2020    BRONCHOSCOPY THERAPUTIC ASPIRATION INITIAL performed by Pippa Velasquez MD at [address] [place] [address]  3/6/2020    BRONCHOSCOPY ALVEOLAR LAVAGE performed by Pippa Velasquez MD at [address] [place] [address] N/A 6/26/2020 Maudry Phoenix, MD   tiotropium (SPIRIVA HANDIHALER) 18 MCG inhalation capsule INHALE THE CONTENTS OF 1 CAPSULE EVERY DAY 9/9/20  Yes Bari Toure MD   albuterol (PROVENTIL) (2.5 MG/3ML) 0.083% nebulizer solution Take 3 mLs by nebulization every 6 hours as needed for Wheezing or Shortness of Breath DX COPD J44.9 9/9/20  Yes Bari Toure MD   simvastatin (ZOCOR) 20 MG tablet TAKE 1 TABLET EVERY EVENING 8/7/20  Yes Rock Paula MD   busPIRone (BUSPAR) 30 MG tablet TAKE 1 TABLET TWICE DAILY 8/7/20  Yes Rock Paula MD   guaiFENesin (MUCINEX) 600 MG extended release tablet Take 1 tablet by mouth 2 times daily as needed for Congestion 7/24/20  Yes Tisha Hassan DO   vitamin D (ERGOCALCIFEROL) 1.25 MG (69481 UT) CAPS capsule Take 1 capsule by mouth once a week for 5 days Every Tuesday 3/23/21 3/28/21  Gely Bergeron MD   teriparatide, recombinant, (FORTEO) 600 MCG/2.4ML SOLN injection Inject 0.08 mLs into the skin daily One dose injected daily. 9/27/19 12/7/20  Janet Rosa MD       Allergies:  Meperidine and Demerol    Social History:      The patient currently lives at home    TOBACCO:   reports that she has been smoking cigarettes. She started smoking about 49 years ago. She has a 20.00 pack-year smoking history. She has never used smokeless tobacco.  ETOH:   reports no history of alcohol use. E-Cigarettes/Vaping Use     Questions Responses    E-Cigarette/Vaping Use Never User    Start Date     Passive Exposure     Quit Date     Counseling Given     Comments Unknown            Family History:       Reviewed in detail and negative for DM, CAD, Cancer, CVA.  Positive as follows:        Problem Relation Age of Onset    Asthma Mother     Diabetes Mother     Emphysema Mother     Heart Failure Mother     Hypertension Mother     Heart Disease Mother     High Cholesterol Mother     Cancer Mother     Depression Mother     Diabetes Father     Emphysema Father     Heart Failure Father     Hypertension Father  Heart Disease Father     High Cholesterol Father     Substance Abuse Father     Emphysema Paternal Grandfather     Diabetes Sister     High Cholesterol Sister     Vision Loss Maternal Uncle        REVIEW OF SYSTEMS COMPLETED:   Pertinent positives as noted in the HPI. All other systems reviewed and negative. PHYSICAL EXAM PERFORMED:    /77   Pulse 85   Temp 97.6 °F (36.4 °C) (Oral)   Resp 16   Ht 5' 4\" (1.626 m)   Wt 140 lb (63.5 kg)   SpO2 94%   BMI 24.03 kg/m²     General appearance:  No apparent distress, appears stated age and cooperative. HEENT:  Normal cephalic, atraumatic without obvious deformity. Pupils equal, round, and reactive to light. Extra ocular muscles intact. Conjunctivae/corneas clear. Neck: Supple, with full range of motion. No jugular venous distention. Trachea midline. Respiratory:  Normal respiratory effort. Clear to auscultation, bilaterally without Rales/Rhonchi. Mild exp wheezing  Cardiovascular:  Regular rate and rhythm with normal S1/S2 without murmurs, rubs or gallops. Abdomen: Soft, non-tender, non-distended with normal bowel sounds. Musculoskeletal:  No clubbing, cyanosis or edema bilaterally. Full range of motion without deformity. Skin: Skin color, texture, turgor normal.  No rashes or lesions. Neurologic:  Neurovascularly intact without any focal sensory/motor deficits.  Cranial nerves: II-XII intact, grossly non-focal.  Psychiatric:  Alert and oriented, thought content appropriate, normal insight  Capillary Refill: Brisk,3 seconds, normal  Peripheral Pulses: +2 palpable, equal bilaterally       Labs:     Recent Labs     05/24/21  1531 05/25/21  0639   WBC 9.8 10.6   HGB 13.2 12.7   HCT 38.1 36.3   * 387     Recent Labs     05/24/21  1531 05/25/21  0639   * 131*   K 4.1 4.4   CL 96* 97*   CO2 23 21   BUN 6* 9   CREATININE 0.7 0.6   CALCIUM 9.0 8.6   PHOS  --  2.3*     Recent Labs     05/24/21  1531   AST 22   ALT 16   BILITOT 0.4 ALKPHOS 77     No results for input(s): INR in the last 72 hours. Recent Labs     05/24/21  1531   TROPONINI <0.01       Urinalysis:      Lab Results   Component Value Date    NITRU Negative 02/05/2021    WBCUA 0-3 04/13/2012    RBCUA 3-5 04/13/2012    BLOODU Negative 02/05/2021    SPECGRAV 1.010 02/05/2021    GLUCOSEU Negative 02/05/2021    GLUCOSEU NEGATIVE 04/13/2012       Radiology:       EKG:  I have reviewed the EKG with the following interpretation: nsr, 96, nl axis, no gross ischemic changes noted    XR CHEST PORTABLE   Final Result   Reticular densities asymmetric left lung base likely representing fibrosis or   chronic postinflammatory change. Suspect moderate COPD. ASSESSMENT:    Active Hospital Problems    Diagnosis Date Noted    COPD (chronic obstructive pulmonary disease) ([facility] [J44.9] 05/24/2021         PLAN:    Acute Resp failure- 86% on ra with ambulation in ER, likely from copd exacerbation, no obvious infection, likely from smoking   -supportive care with duonebs, iv steroids    Copd-2L qhs prn  -continued singulair, nebs, spiriva    HLD- on statin    Tobacco use- counselled  -started on patch    Anxiety/depression-On buspar, prozac, trazodone    DVT Prophylaxis: lovenox  Diet: DIET GENERAL;  Code Status: Full Code    PT/OT Eval Status: not ordered    Dispo - pending improvement, 1-2 days       Carlton Lin MD    Thank you Lobo Galarza MD for the opportunity to be involved in this patient's care. If you have any questions or concerns please feel free to contact me at [phone].

## 2021-05-25 NOTE — PROGRESS NOTES
Shift assessments completed and charted. Pt a/o x4, VSS, on supplemental O2 via NC @ 2LMP. Expiratory wheezings heard bilateral. Denies any needs at this time, will continue to monitor.

## 2021-05-25 NOTE — CARE COORDINATION
CASE MANAGEMENT INITIAL ASSESSMENT    Reviewed chart and completed assessment with: Patient   Explained Case Management role/services. Primary contact information:Norbert Spouse     Health Care Decision Maker :   Primary Decision Maker: Jeffy Parra - Spouse - [phone]    Secondary Decision Maker: Angelita Wong - Child - [phone]    Supplemental (Other) Decision Maker: Rosy Mccollum - Child - [phone]          Can this person be reached and be able to respond quickly, such as within a few minutes or hours? Yes    Admit date/status:05/24/2021 Inpatient   Diagnosis:COPD   Is this a Readmission?:  No      Insurance:[employer] Medicare    Precert required for SNF: Yes       3 night stay required: No    Living arrangements, Adls, care needs, prior to admission: Patient reports living at home with spouse 16 steps to bed/bath. Patient reports mainly stays on 1st floor utilizing bedside commode. Transportation: Spouse drives, reports not driving over last 6 months     [facility] at home:  Walker_x_Cane_x_RTS__ BSCx__Shower Chair__  02_x [facility]_ HHNx__ CPAP__  BiPap__  Hospital Bed__ W/C___ Other__________    Services in the home and/or outpatient, prior to admission: Denies     PT/OT recs:not orders    Hospital Exemption Notification (HEN):only for SNF     Barriers to discharge: Denies     Plan/comments: Patient plans to dc home with spouse when medically stable for discharge. Patient reports using home 02 at baseline thru [facility] she thinks. Writer placed call placed to provider confirm, will review and call back writer. Patient also reports having own hand held nebulizer. Patient with 2nd floor bed/bath but reports primarily using [facility] and stay on 1st floor. SW will ct to follow for DCP needs. Alyssa Proctor [place]  [id number]: Writer received return call from Nay Santiago with [facility] Patient is active with service. KATIE Ramey      ECO on chart for MD signature

## 2021-05-25 NOTE — PLAN OF CARE
Problem: Falls - Risk of:  Goal: Will remain free from falls  Description: Will remain free from falls  Outcome: Met This Shift  Goal: Absence of physical injury  Description: Absence of physical injury  Outcome: Met This Shift  Note: Bed locked in lowest position, non-skid socks on. Call light and bedside table within reach.

## 2021-05-25 NOTE — PROGRESS NOTES
Pt arrived to floor at approx 2115 via wheelchair in stable condition but was having significant labored breathing- Pts SpO2 was >90%, 2L NC was applied for comfort and pt improved. Pt was oriented to her room and her belongings were accounted for and put into her locker. Pts admission and 4eye were completed. Pt is a/o x4. VSS. Assessment as charted. 4 Eyes Skin Assessment     The patient is being assess for  Admission    I agree that 2 RN's have performed a thorough Head to Toe Skin Assessment on the patient. ALL assessment sites listed below have been assessed. Areas assessed by both nurses: Kalani Owens  [x]   Head, Face, and Ears   [x]   Shoulders, Back, and Chest  [x]   Arms, Elbows, and Hands   [x]   Coccyx, Sacrum, and Ischum  [x]   Legs, Feet, and Heels   - scattered bruising        Does the Patient have Skin Breakdown?   No         Philip Prevention initiated:  No   Wound Care Orders initiated:  No      WOC nurse consulted for Pressure Injury (Stage 3,4, Unstageable, DTI, NWPT, and Complex wounds):  No      Nurse 1 eSignature: Electronically signed by Duyen Oglesby RN on 5/24/21 at 11:43 PM EDT    **SHARE this note so that the co-signing nurse is able to place an eSignature**    Nurse 2 eSignature: Electronically signed by Rick Heredia RN on 5/25/21 at 10:11 PM EDT

## 2021-05-26 PROCEDURE — 6370000000 HC RX 637 (ALT 250 FOR IP): Performed by: NURSE PRACTITIONER

## 2021-05-26 PROCEDURE — 6360000002 HC RX W HCPCS: Performed by: INTERNAL MEDICINE

## 2021-05-26 PROCEDURE — 94761 N-INVAS EAR/PLS OXIMETRY MLT: CPT

## 2021-05-26 PROCEDURE — 6370000000 HC RX 637 (ALT 250 FOR IP): Performed by: INTERNAL MEDICINE

## 2021-05-26 PROCEDURE — 2700000000 HC OXYGEN THERAPY PER DAY

## 2021-05-26 PROCEDURE — 2580000003 HC RX 258: Performed by: INTERNAL MEDICINE

## 2021-05-26 PROCEDURE — 94640 AIRWAY INHALATION TREATMENT: CPT

## 2021-05-26 PROCEDURE — 1200000000 HC SEMI PRIVATE

## 2021-05-26 RX ORDER — TRAZODONE HYDROCHLORIDE 50 MG/1
200 TABLET ORAL ONCE
Status: COMPLETED | OUTPATIENT
Start: 2021-05-26 | End: 2021-05-26

## 2021-05-26 RX ADMIN — FLUOXETINE 60 MG: 20 CAPSULE ORAL at 08:25

## 2021-05-26 RX ADMIN — ALBUTEROL SULFATE 2.5 MG: 2.5 SOLUTION RESPIRATORY (INHALATION) at 08:21

## 2021-05-26 RX ADMIN — IBUPROFEN 400 MG: 400 TABLET, FILM COATED ORAL at 18:08

## 2021-05-26 RX ADMIN — ALBUTEROL SULFATE 2.5 MG: 2.5 SOLUTION RESPIRATORY (INHALATION) at 12:15

## 2021-05-26 RX ADMIN — GUAIFENESIN 600 MG: 600 TABLET, EXTENDED RELEASE ORAL at 08:25

## 2021-05-26 RX ADMIN — TIOTROPIUM BROMIDE INHALATION SPRAY 2 PUFF: 3.12 SPRAY, METERED RESPIRATORY (INHALATION) at 08:21

## 2021-05-26 RX ADMIN — BUSPIRONE HYDROCHLORIDE 30 MG: 5 TABLET ORAL at 19:53

## 2021-05-26 RX ADMIN — ALBUTEROL SULFATE 2.5 MG: 2.5 SOLUTION RESPIRATORY (INHALATION) at 20:03

## 2021-05-26 RX ADMIN — TRAZODONE HYDROCHLORIDE 300 MG: 50 TABLET ORAL at 19:53

## 2021-05-26 RX ADMIN — METHYLPREDNISOLONE SODIUM SUCCINATE 40 MG: 40 INJECTION, POWDER, LYOPHILIZED, FOR SOLUTION INTRAMUSCULAR; INTRAVENOUS at 08:24

## 2021-05-26 RX ADMIN — Medication 10 ML: at 08:27

## 2021-05-26 RX ADMIN — ALBUTEROL SULFATE 2.5 MG: 2.5 SOLUTION RESPIRATORY (INHALATION) at 15:29

## 2021-05-26 RX ADMIN — MONTELUKAST SODIUM 10 MG: 10 TABLET ORAL at 19:53

## 2021-05-26 RX ADMIN — BUSPIRONE HYDROCHLORIDE 30 MG: 5 TABLET ORAL at 08:25

## 2021-05-26 RX ADMIN — METHYLPREDNISOLONE SODIUM SUCCINATE 40 MG: 40 INJECTION, POWDER, LYOPHILIZED, FOR SOLUTION INTRAMUSCULAR; INTRAVENOUS at 19:53

## 2021-05-26 RX ADMIN — Medication 10 ML: at 19:54

## 2021-05-26 RX ADMIN — ATORVASTATIN CALCIUM 20 MG: 10 TABLET, FILM COATED ORAL at 08:25

## 2021-05-26 RX ADMIN — TRAZODONE HYDROCHLORIDE 200 MG: 50 TABLET ORAL at 00:29

## 2021-05-26 RX ADMIN — ENOXAPARIN SODIUM 40 MG: 40 INJECTION SUBCUTANEOUS at 08:24

## 2021-05-26 ASSESSMENT — PAIN SCALES - GENERAL: PAINLEVEL_OUTOF10: 5

## 2021-05-26 NOTE — CARE COORDINATION
Chart review day 2: Patient on C3 re COPD followed by IM. Patient from home with spouse reports IPTA using 02 at HS. Patient with 02 thru [facility]. Patient currently 98% on 2L. SW will follow for DCP needs. KATIE Vang

## 2021-05-26 NOTE — PROGRESS NOTES
Assessment completed and documented. VSS. A/ox4. Denies pain. Ambulates independently. Lungs with wheezing. Patient uses 2L at night,currently on patient now sating at 97% Bed locked and in lowest position. Bedside table and call light within reach. Denies further needs at this time.

## 2021-05-26 NOTE — PROGRESS NOTES
Walked with pt about 100 feet from pt room to the doors of the unit and pt got very SOB and a little unsteady on her feet. Oxygen saturation was 91% on RA and HR was about 120. Pt states she got weak and very SOB. Pt states she does not feel as though she is ready to go home.

## 2021-05-26 NOTE — PLAN OF CARE
Pt remained free of falls. Call light within reach. Non skid footwear in place. Bed locked and in lowest position. Ambulates independently.

## 2021-05-26 NOTE — PROGRESS NOTES
Hospitalist Progress Note      PCP: Belgica Abraham MD    Date of Admission: 5/24/2021    Chief Complaint: [facility] Course: reviewed     Subjective:  Still with sob with exertion       Medications:  Reviewed    Infusion Medications    sodium chloride       Scheduled Medications    albuterol  2.5 mg Nebulization Q4H WA    nicotine  1 patch Transdermal Daily    traZODone  300 mg Oral Nightly    sodium chloride flush  5-40 mL Intravenous 2 times per day    enoxaparin  40 mg Subcutaneous Daily    methylPREDNISolone  40 mg Intravenous BID    busPIRone  30 mg Oral BID    FLUoxetine  60 mg Oral Daily    montelukast  10 mg Oral Nightly    atorvastatin  20 mg Oral Daily    tiotropium  2 puff Inhalation Daily     PRN Meds: ibuprofen, sodium chloride flush, sodium chloride, promethazine **OR** ondansetron, polyethylene glycol, acetaminophen **OR** acetaminophen, guaiFENesin      Intake/Output Summary (Last 24 hours) at 5/26/2021 0935  Last data filed at 5/26/2021 0317  Gross per 24 hour   Intake 1030 ml   Output --   Net 1030 ml       Physical Exam Performed:    /70   Pulse 94   Temp 97.9 °F (36.6 °C) (Oral)   Resp 18   Ht 5' 4\" (1.626 m)   Wt 140 lb (63.5 kg)   SpO2 98%   BMI 24.03 kg/m²     General appearance:  No apparent distress, appears stated age and cooperative. HEENT:  Normal cephalic, atraumatic without obvious deformity. Pupils equal, round, and reactive to light. Extra ocular muscles intact. Conjunctivae/corneas clear. Neck: Supple, with full range of motion. No jugular venous distention. Trachea midline. Respiratory:  Normal respiratory effort. Clear to auscultation, bilaterally without Rales/Wheezes/Rhonchi. Mild exp wheezing  Cardiovascular:  Regular rate and rhythm with normal S1/S2 without murmurs, rubs or gallops. Abdomen: Soft, non-tender, non-distended with normal bowel sounds. Musculoskeletal:  No clubbing, cyanosis or edema bilaterally.   Full range of motion without deformity. Skin: Skin color, texture, turgor normal.  No rashes or lesions. Neurologic:  Neurovascularly intact without any focal sensory/motor deficits. Cranial nerves: II-XII intact, grossly non-focal.  Psychiatric:  Alert and oriented, thought content appropriate, normal insight  Capillary Refill: Brisk,3 seconds, normal  Peripheral Pulses: +2 palpable, equal bilaterally       Labs:   Recent Labs     05/24/21  1531 05/25/21  0639   WBC 9.8 10.6   HGB 13.2 12.7   HCT 38.1 36.3   * 387     Recent Labs     05/24/21  1531 05/25/21  0639   * 131*   K 4.1 4.4   CL 96* 97*   CO2 23 21   BUN 6* 9   CREATININE 0.7 0.6   CALCIUM 9.0 8.6   PHOS  --  2.3*     Recent Labs     05/24/21  1531   AST 22   ALT 16   BILITOT 0.4   ALKPHOS 77     No results for input(s): INR in the last 72 hours. Recent Labs     05/24/21  1531   TROPONINI <0.01       Urinalysis:      Lab Results   Component Value Date    NITRU Negative 02/05/2021    WBCUA 0-3 04/13/2012    RBCUA 3-5 04/13/2012    BLOODU Negative 02/05/2021    SPECGRAV 1.010 02/05/2021    GLUCOSEU Negative 02/05/2021    GLUCOSEU NEGATIVE 04/13/2012       Radiology:  XR CHEST PORTABLE   Final Result   Reticular densities asymmetric left lung base likely representing fibrosis or   chronic postinflammatory change. Suspect moderate COPD.                  Assessment/Plan:    Active Hospital Problems    Diagnosis     [facility] [J44.9]          Acute Resp failure- 86% on ra with ambulation in ER, likely from copd exacerbation, no obvious infection  -continued supportive care with duonebs, iv steroids     Copd-2L qhs prn  -continued singulair, nebs, spiriva     HLD- on statin     Tobacco use- counselled  -started on patch     Anxiety/depression-On buspar, prozac, trazodone     DVT Prophylaxis: lovenox  Diet: DIET GENERAL;  Code Status: Full Code    PT/OT Eval Status: not ordered     Dispo - pending improvement, possibly

## 2021-05-27 VITALS
WEIGHT: 140 LBS | OXYGEN SATURATION: 96 % | SYSTOLIC BLOOD PRESSURE: 116 MMHG | DIASTOLIC BLOOD PRESSURE: 72 MMHG | HEART RATE: 97 BPM | RESPIRATION RATE: 18 BRPM | HEIGHT: 64 IN | BODY MASS INDEX: 23.9 KG/M2 | TEMPERATURE: 98.7 F

## 2021-05-27 PROCEDURE — 2700000000 HC OXYGEN THERAPY PER DAY

## 2021-05-27 PROCEDURE — 6370000000 HC RX 637 (ALT 250 FOR IP): Performed by: INTERNAL MEDICINE

## 2021-05-27 PROCEDURE — 94640 AIRWAY INHALATION TREATMENT: CPT

## 2021-05-27 PROCEDURE — 2580000003 HC RX 258: Performed by: INTERNAL MEDICINE

## 2021-05-27 PROCEDURE — 94761 N-INVAS EAR/PLS OXIMETRY MLT: CPT

## 2021-05-27 PROCEDURE — 6360000002 HC RX W HCPCS: Performed by: INTERNAL MEDICINE

## 2021-05-27 RX ORDER — PREDNISONE 10 MG/1
TABLET ORAL
Qty: 14 TABLET | Refills: 0 | Status: ON HOLD
Start: 2021-05-27 | End: 2021-06-11 | Stop reason: HOSPADM

## 2021-05-27 RX ORDER — PREDNISONE 20 MG/1
40 TABLET ORAL DAILY
Status: DISCONTINUED | OUTPATIENT
Start: 2021-05-28 | End: 2021-05-27 | Stop reason: HOSPADM

## 2021-05-27 RX ADMIN — ATORVASTATIN CALCIUM 20 MG: 10 TABLET, FILM COATED ORAL at 09:21

## 2021-05-27 RX ADMIN — ALBUTEROL SULFATE 2.5 MG: 2.5 SOLUTION RESPIRATORY (INHALATION) at 12:39

## 2021-05-27 RX ADMIN — BUSPIRONE HYDROCHLORIDE 30 MG: 5 TABLET ORAL at 09:22

## 2021-05-27 RX ADMIN — TIOTROPIUM BROMIDE INHALATION SPRAY 2 PUFF: 3.12 SPRAY, METERED RESPIRATORY (INHALATION) at 09:23

## 2021-05-27 RX ADMIN — FLUOXETINE 60 MG: 20 CAPSULE ORAL at 09:22

## 2021-05-27 RX ADMIN — METHYLPREDNISOLONE SODIUM SUCCINATE 40 MG: 40 INJECTION, POWDER, LYOPHILIZED, FOR SOLUTION INTRAMUSCULAR; INTRAVENOUS at 09:22

## 2021-05-27 RX ADMIN — ALBUTEROL SULFATE 2.5 MG: 2.5 SOLUTION RESPIRATORY (INHALATION) at 09:23

## 2021-05-27 RX ADMIN — Medication 10 ML: at 09:22

## 2021-05-27 NOTE — PROGRESS NOTES
Oxygen documentation:      O2 saturation at REST on ROOM AIR = 90%      If saturation is 89% or above please proceed with steps 2 and 3. ........ Gerardo Blackwell     O2 saturation with AMBULATION of 200 feet on ROOM AIR = 88%  O2 saturation with AMBULATION on current liter flow = ______%      DCP notified:  Yes

## 2021-05-27 NOTE — DISCHARGE SUMMARY
Hospital Medicine Discharge Summary    Patient ID: Argelia Cooley      Patient's PCP: Linda Roa MD    Admit Date: 5/24/2021     Discharge Date: 5/27/2021      Admitting Physician: Nura Carrion MD     Discharge Physician: Monica Hamm MD     Discharge Diagnoses: Active Hospital Problems    Diagnosis     COPD (chronic obstructive pulmonary disease) ([facility] [J44.9]        The patient was seen and examined on day of discharge and this discharge summary is in conjunction with any daily progress note from day of discharge. Hospital Course:   History Of Present Illness:      60 y.o. female with hx of copd/chronic resp failure(on 2-3L qhs prn), gerd, anxiety/depression, prior substance abuse who presented to [facility] with sob for the past 2 days. She recently started smoking again about 2 weeks ago. No f/chills/cp. She notes ongoing dry nonproductive cough. No n/v.            Acute Resp failure- 86% on ra with ambulation in ER, likely from copd exacerbation due to pt starting to smoke again, no obvious infection  -continued supportive care with duonebs, iv steroids, transitioned to po taper of prednisone on dc     Copd-2L qhs prn  -continued singulair, nebs, spiriva     HLD- on statin     Tobacco use- counselled  -started on patch     Anxiety/depression-On buspar, prozac, trazodone    Physical Exam Performed:     /72   Pulse 97   Temp 98.7 °F (37.1 °C) (Oral)   Resp 18   Ht 5' 4\" (1.626 m)   Wt 140 lb (63.5 kg)   SpO2 96%   BMI 24.03 kg/m²     General appearance:  No apparent distress, appears stated age and cooperative. obese  HEENT:  Normal cephalic, atraumatic without obvious deformity. Pupils equal, round, and reactive to light.  Extra ocular muscles intact. Conjunctivae/corneas clear. Neck: Supple, with full range of motion. No jugular venous distention. Trachea midline. Respiratory:  Normal respiratory effort.  Clear to auscultation, bilaterally without ibuprofen (ADVIL;MOTRIN) 600 MG tablet Take 1 tablet by mouth 3 times daily as needed for Pain, Disp-30 tablet, R-0Normal      albuterol sulfate HFA (VENTOLIN HFA) 108 (90 Base) MCG/ACT inhaler Inhale 2 puffs into the lungs every 6 hours as needed for Wheezing or Shortness of Breath, Disp-3 Inhaler, R-1Normal      naproxen (NAPROSYN) 500 MG tablet Take 1 tablet by mouth 2 times daily (with meals), Disp-30 tablet, R-0Print      BD PEN NEEDLE SVETLANA U/F 32G X 4 MM MISC Disp-100 each, R-5, Normal      montelukast (SINGULAIR) 10 MG tablet TAKE 1 TABLET BY MOUTH EACH NIGHT, Disp-90 tablet,R-1Normal      FLUoxetine (PROZAC) 20 MG capsule TAKE 3 CAPSULES BY MOUTH DAILY, Disp-270 capsule,R-1Normal      traZODone (DESYREL) 150 MG tablet TAKE 2 TABLETS AT BEDTIME, Disp-180 tablet,R-1Normal      fluticasone-salmeterol (WIXELA INHUB) 500-50 MCG/DOSE diskus inhaler Inhale 1 puff into the lungs every 12 hours, Disp-180 each,R-1Normal      tiotropium (SPIRIVA HANDIHALER) 18 MCG inhalation capsule INHALE THE CONTENTS OF 1 CAPSULE EVERY DAY, Disp-90 capsule,R-1Normal      albuterol (PROVENTIL) (2.5 MG/3ML) 0.083% nebulizer solution Take 3 mLs by nebulization every 6 hours as needed for Wheezing or Shortness of Breath DX COPD J44.9, Disp-120 vial,R-6Normal      simvastatin (ZOCOR) 20 MG tablet TAKE 1 TABLET EVERY EVENING, Disp-90 tablet,R-1Normal      busPIRone (BUSPAR) 30 MG tablet TAKE 1 TABLET TWICE DAILY, Disp-180 tablet,R-1Normal      guaiFENesin (MUCINEX) 600 MG extended release tablet Take 1 tablet by mouth 2 times daily as needed for Congestion, Disp-60 tablet,R-2Normal      vitamin D (ERGOCALCIFEROL) 1.25 MG (33099 UT) CAPS capsule Take 1 capsule by mouth once a week for 5 days Every Tuesday, Disp-5 capsule, R-0Normal      teriparatide, recombinant, (FORTEO) 600 MCG/2.4ML SOLN injection Inject 0.08 mLs into the skin daily One dose injected daily. , Disp-1 pen,R-11Print             Time Spent on discharge is more than 30

## 2021-05-27 NOTE — PROGRESS NOTES
Shift assessment completed. Pt A&O x4, VSS. Pt on 2 liters via nasal cannula at 97%. Non skid socks on. Pt independent with ambulation. Denies any needs at this time. Bed locked and in lowest position. Call light and bedside table within reach. Will continue to monitor.

## 2021-05-27 NOTE — PROGRESS NOTES
Pt's verbal and written discharge instructions given, all questions answered. IV removed. Follow up appointments made and discussed. New medications reviewed and sent to [facility] for pt to pickup. Pt left in stable condition via wheelchair to private car with family. O2 tank given to pt to take home.

## 2021-05-27 NOTE — PROGRESS NOTES
Pt assessment completed and charted. VSS. Pt a/o x4. Pt 94% on 2L NC. Pt denies wanting to walk the hallway at this time. Medications given per MAR. Bed in lowest position and wheels locked. Call light within reach. Bedside table within reach. Non-skid footwear in place. Pt denies any other needs at this time. Pt calls out appropriately. Will continue to monitor.

## 2021-05-28 ENCOUNTER — CARE COORDINATION (OUTPATIENT)
Dept: CASE MANAGEMENT | Age: 66
End: 2021-05-28

## 2021-05-28 DIAGNOSIS — J96.21 ACUTE ON CHRONIC RESPIRATORY FAILURE WITH HYPOXIA AND HYPERCAPNIA (HCC): Primary | ICD-10-CM

## 2021-05-28 DIAGNOSIS — J96.22 ACUTE ON CHRONIC RESPIRATORY FAILURE WITH HYPOXIA AND HYPERCAPNIA (HCC): Primary | ICD-10-CM

## 2021-05-28 PROCEDURE — 1111F DSCHRG MED/CURRENT MED MERGE: CPT | Performed by: FAMILY MEDICINE

## 2021-05-28 NOTE — TELEPHONE ENCOUNTER
Scheduled    [facility] Transitions Initial Follow Up Call    Outreach made within 2 business days of discharge: Yes    Patient: Caity Posada Patient : 1955   MRN: [id number]  Reason for Admission: There are no discharge diagnoses documented for the most recent discharge. Discharge Date: 21       Spoke with: Lorrin Fothergill    Discharge department/facility: [facility] Interactive Patient Contact:  Was patient able to fill all prescriptions: Yes  Was patient instructed to bring all medications to the follow-up visit: Yes  Is patient taking all medications as directed in the discharge summary?  Yes  Does patient understand their discharge instructions: Yes  Does patient have questions or concerns that need addressed prior to 7-14 day follow up office visit: no    Scheduled appointment with PCP within 7-14 days    Follow Up  Future Appointments   Date Time Provider Matt Crabtree   6/10/2021  9:00 AM MD JAIRO P[facility]rez - [facility]   2021  2:30 PM MD JAIRO Pérez - [facility]   2021  9:30 AM MD LINDA Arredondo [facility]       Cinthya Aleman MA

## 2021-05-28 NOTE — CARE COORDINATION
[facility] Transitions Initial Follow Up Call    Call within 2 business days of discharge: Yes    Patient: Irene Cotton Patient : 1955   MRN: [id number]  Reason for Admission: COPD   Discharge Date: 21 RARS: Readmission Risk Score: 18      Last Discharge [facility]       Complaint Diagnosis Description Type Department Provider    21 Shortness of Breath COPD exacerbation ([address] ED to Hosp-Admission (Discharged) (ADMITTED) [place] [address] Marilia Calero MD; Tressa Cho. .. Spoke with: [address]: [facility]       Transitions of Care Initial Call    Was this an external facility discharge? No Discharge Facility: n/a    Challenges to be reviewed by the provider   Additional needs identified to be addressed with provider Yes  Patient needs TCM apt             Method of communication with provider : none      Advance Care Planning:   Does patient have an Advance Directive:  not on file. Was this a readmission? No  Patient stated reason for admission: sob   Patients top risk factors for readmission: medical condition-copd    Care Transition Nurse (CTN) contacted the patient by telephone to perform post hospital discharge assessment. Verified name and  with patient as identifiers. Provided introduction to self, and explanation of the CTN role. CTN reviewed discharge instructions, medical action plan and red flags with patient who verbalized understanding. Patient given an opportunity to ask questions and does not have any further questions or concerns at this time. Were discharge instructions available to patient? Yes. Reviewed appropriate site of care based on symptoms and resources available to patient including: When to call 911. The patient agrees to contact the PCP office for questions related to their healthcare. Medication reconciliation was performed with patient, who verbalizes understanding of administration of home medications.  Advised obtaining a 90-day supply of all daily and as-needed medications. Reviewed and educated patient on any new and changed medications related to discharge diagnosis       CTN provided contact information. Plan for follow-up call in 5-7 days based on severity of symptoms and risk factors. Spoke with patient who reported she is doing alright and just waking up. Patient reported she slept with her oxygen last night and did well. Patient reported she will  her prednisone prescription from pharmacy today and start taking. Patient reported she has all other medications and taking as prescribed. CTN encouraged patient to reach out to her doctor to setup follow up apt. Patient reported she still hasn't smoked and is doing well so far. CTN advised patient of use of urgent care or physicians 24 hr access line if assistance is needed after hours.               Care Transitions 24 Hour Call    Do you have any ongoing symptoms?: No  Do you have a copy of your discharge instructions?: Yes  Do you have all of your prescriptions and are they filled?: Yes  Have you been contacted by a [address]?: No  Have you scheduled your follow up appointment?: No  Were you discharged with any Home Care or Post Acute Services: No  Post Acute Services: [address] [place] [address] you feel like you have everything you need to keep you well at home?: Yes  Care Transitions Interventions         Follow Up  Future Appointments   Date Time Provider Matt Crabtree   6/28/2021  2:30 Torrey Matute MD [facility] - D[facility]   7/8/2021  9:30 AM Yariel Waller MD [facility]       Pritesh Thomson RN

## 2021-06-04 ENCOUNTER — CARE COORDINATION (OUTPATIENT)
Dept: CASE MANAGEMENT | Age: 66
End: 2021-06-04

## 2021-06-04 NOTE — CARE COORDINATION
[facility] Transitions Follow Up Call    2021    Patient: Caity Posada  Patient : 1955   MRN: [id number]  Reason for Admission: COPD   Discharge Date: 21 RARS: Readmission Risk Score: 18         Spoke with:  Ashley Mtz with patient who reported she wasn't feeling too good. CTN asked what was going on and patient responded she started smoking again. Patient reported she started back on her chantix and CTN also discussed possibility of nicotine gum. CTN also encouraged patient to not smoke and wear oxygen which patient reported she understood. CTN encouraged patient to reach out to her doctor if her symptoms worsened. CTN advised patient of use of urgent care or physicians 24 hr access line if assistance is needed after hours. Care Transitions Subsequent and Final Call    Subsequent and Final Calls  Care Transitions Interventions  Other Interventions:            Follow Up  Future Appointments   Date Time Provider Matt Crabtree   6/10/2021  9:00 AM MD [facility]   2021  2:30 PM MD [facility]   2021  9:30 AM MD LINDA Arredondo [facility]   12/3/2021 11:00 AM [facility] [address] [place] Milagros Goldman RN

## 2021-06-07 ENCOUNTER — APPOINTMENT (OUTPATIENT)
Dept: GENERAL RADIOLOGY | Age: 66
DRG: 189 | End: 2021-06-07
Payer: MEDICARE

## 2021-06-07 ENCOUNTER — TELEPHONE (OUTPATIENT)
Dept: OTHER | Facility: CLINIC | Age: 66
End: 2021-06-07

## 2021-06-07 ENCOUNTER — HOSPITAL ENCOUNTER (INPATIENT)
Age: 66
LOS: 4 days | Discharge: HOME | DRG: 189 | End: 2021-06-11
Attending: EMERGENCY MEDICINE | Admitting: INTERNAL MEDICINE
Payer: MEDICARE

## 2021-06-07 DIAGNOSIS — J44.1 COPD WITH ACUTE EXACERBATION (HCC): ICD-10-CM

## 2021-06-07 DIAGNOSIS — Z99.81 SUPPLEMENTAL OXYGEN DEPENDENT: ICD-10-CM

## 2021-06-07 DIAGNOSIS — J96.21 ACUTE ON CHRONIC RESPIRATORY FAILURE WITH HYPOXIA (HCC): Primary | ICD-10-CM

## 2021-06-07 DIAGNOSIS — Z87.09 HISTORY OF COPD: ICD-10-CM

## 2021-06-07 DIAGNOSIS — F17.200 SMOKER: ICD-10-CM

## 2021-06-07 PROBLEM — J96.20 ACUTE ON CHRONIC RESPIRATORY FAILURE (HCC): Status: ACTIVE | Noted: 2021-06-07

## 2021-06-07 LAB
A/G RATIO: 1.1 (ref 1.1–2.2)
ALBUMIN SERPL-MCNC: 3.5 G/DL (ref 3.4–5)
ALP BLD-CCNC: 63 U/L (ref 40–129)
ALT SERPL-CCNC: 13 U/L (ref 10–40)
ANION GAP SERPL CALCULATED.3IONS-SCNC: 10 MMOL/L (ref 3–16)
AST SERPL-CCNC: 14 U/L (ref 15–37)
BASE EXCESS VENOUS: -2.7 MMOL/L (ref -3–3)
BASOPHILS ABSOLUTE: 0.1 K/UL (ref 0–0.2)
BASOPHILS RELATIVE PERCENT: 1 %
BILIRUB SERPL-MCNC: <0.2 MG/DL (ref 0–1)
BILIRUBIN URINE: NEGATIVE
BLOOD, URINE: NEGATIVE
BUN BLDV-MCNC: 12 MG/DL (ref 7–20)
CALCIUM SERPL-MCNC: 9.2 MG/DL (ref 8.3–10.6)
CARBOXYHEMOGLOBIN: 3.8 % (ref 0–1.5)
CHLORIDE BLD-SCNC: 95 MMOL/L (ref 99–110)
CLARITY: ABNORMAL
CO2: 20 MMOL/L (ref 21–32)
COLOR: YELLOW
CREAT SERPL-MCNC: 0.7 MG/DL (ref 0.6–1.2)
EKG ATRIAL RATE: 101 BPM
EKG DIAGNOSIS: NORMAL
EKG P AXIS: 83 DEGREES
EKG P-R INTERVAL: 146 MS
EKG Q-T INTERVAL: 326 MS
EKG QRS DURATION: 68 MS
EKG QTC CALCULATION (BAZETT): 422 MS
EKG R AXIS: 21 DEGREES
EKG T AXIS: 61 DEGREES
EKG VENTRICULAR RATE: 101 BPM
EOSINOPHILS ABSOLUTE: 0.3 K/UL (ref 0–0.6)
EOSINOPHILS RELATIVE PERCENT: 2.1 %
GFR AFRICAN AMERICAN: >60
GFR NON-AFRICAN AMERICAN: >60
GLOBULIN: 3.3 G/DL
GLUCOSE BLD-MCNC: 91 MG/DL (ref 70–99)
GLUCOSE URINE: NEGATIVE MG/DL
HCO3 VENOUS: 20.1 MMOL/L (ref 23–29)
HCT VFR BLD CALC: 36.8 % (ref 36–48)
HEMOGLOBIN: 12.7 G/DL (ref 12–16)
KETONES, URINE: NEGATIVE MG/DL
LEUKOCYTE ESTERASE, URINE: NEGATIVE
LYMPHOCYTES ABSOLUTE: 2.6 K/UL (ref 1–5.1)
LYMPHOCYTES RELATIVE PERCENT: 20.5 %
MCH RBC QN AUTO: 30.7 PG (ref 26–34)
MCHC RBC AUTO-ENTMCNC: 34.6 G/DL (ref 31–36)
MCV RBC AUTO: 88.8 FL (ref 80–100)
METHEMOGLOBIN VENOUS: 0.5 %
MICROSCOPIC EXAMINATION: ABNORMAL
MONOCYTES ABSOLUTE: 1 K/UL (ref 0–1.3)
MONOCYTES RELATIVE PERCENT: 7.8 %
NEUTROPHILS ABSOLUTE: 8.6 K/UL (ref 1.7–7.7)
NEUTROPHILS RELATIVE PERCENT: 68.6 %
NITRITE, URINE: NEGATIVE
O2 CONTENT, VEN: 19 VOL %
O2 SAT, VEN: 99 %
O2 THERAPY: ABNORMAL
PCO2, VEN: 29.6 MMHG (ref 40–50)
PDW BLD-RTO: 13 % (ref 12.4–15.4)
PH UA: 7 (ref 5–8)
PH VENOUS: 7.45 (ref 7.35–7.45)
PLATELET # BLD: 278 K/UL (ref 135–450)
PMV BLD AUTO: 6.6 FL (ref 5–10.5)
PO2, VEN: 179.3 MMHG (ref 25–40)
POTASSIUM REFLEX MAGNESIUM: 4.3 MMOL/L (ref 3.5–5.1)
PRO-BNP: 76 PG/ML (ref 0–124)
PROCALCITONIN: 0.02 NG/ML (ref 0–0.15)
PROTEIN UA: NEGATIVE MG/DL
RBC # BLD: 4.14 M/UL (ref 4–5.2)
SODIUM BLD-SCNC: 125 MMOL/L (ref 136–145)
SPECIFIC GRAVITY UA: 1.01 (ref 1–1.03)
SPECIMEN STATUS: NORMAL
TCO2 CALC VENOUS: 21 MMOL/L
TOTAL PROTEIN: 6.8 G/DL (ref 6.4–8.2)
TROPONIN: <0.01 NG/ML
URINE REFLEX TO CULTURE: ABNORMAL
URINE TYPE: ABNORMAL
UROBILINOGEN, URINE: 0.2 E.U./DL
WBC # BLD: 12.6 K/UL (ref 4–11)

## 2021-06-07 PROCEDURE — 83880 ASSAY OF NATRIURETIC PEPTIDE: CPT

## 2021-06-07 PROCEDURE — 6360000002 HC RX W HCPCS: Performed by: NURSE PRACTITIONER

## 2021-06-07 PROCEDURE — 99285 EMERGENCY DEPT VISIT HI MDM: CPT

## 2021-06-07 PROCEDURE — 1200000000 HC SEMI PRIVATE

## 2021-06-07 PROCEDURE — 94640 AIRWAY INHALATION TREATMENT: CPT

## 2021-06-07 PROCEDURE — 2580000003 HC RX 258: Performed by: INTERNAL MEDICINE

## 2021-06-07 PROCEDURE — 85025 COMPLETE CBC W/AUTO DIFF WBC: CPT

## 2021-06-07 PROCEDURE — 6370000000 HC RX 637 (ALT 250 FOR IP): Performed by: NURSE PRACTITIONER

## 2021-06-07 PROCEDURE — 71045 X-RAY EXAM CHEST 1 VIEW: CPT

## 2021-06-07 PROCEDURE — 2700000000 HC OXYGEN THERAPY PER DAY

## 2021-06-07 PROCEDURE — 94660 CPAP INITIATION&MGMT: CPT

## 2021-06-07 PROCEDURE — 81003 URINALYSIS AUTO W/O SCOPE: CPT

## 2021-06-07 PROCEDURE — 84484 ASSAY OF TROPONIN QUANT: CPT

## 2021-06-07 PROCEDURE — 94761 N-INVAS EAR/PLS OXIMETRY MLT: CPT

## 2021-06-07 PROCEDURE — 84145 PROCALCITONIN (PCT): CPT

## 2021-06-07 PROCEDURE — 80053 COMPREHEN METABOLIC PANEL: CPT

## 2021-06-07 PROCEDURE — 96374 THER/PROPH/DIAG INJ IV PUSH: CPT

## 2021-06-07 PROCEDURE — 93010 ELECTROCARDIOGRAM REPORT: CPT | Performed by: INTERNAL MEDICINE

## 2021-06-07 PROCEDURE — 6370000000 HC RX 637 (ALT 250 FOR IP): Performed by: INTERNAL MEDICINE

## 2021-06-07 PROCEDURE — 82803 BLOOD GASES ANY COMBINATION: CPT

## 2021-06-07 PROCEDURE — 93005 ELECTROCARDIOGRAM TRACING: CPT | Performed by: NURSE PRACTITIONER

## 2021-06-07 RX ORDER — ATORVASTATIN CALCIUM 10 MG/1
10 TABLET, FILM COATED ORAL DAILY
Status: DISCONTINUED | OUTPATIENT
Start: 2021-06-07 | End: 2021-06-11 | Stop reason: HOSPADM

## 2021-06-07 RX ORDER — BUSPIRONE HYDROCHLORIDE 5 MG/1
30 TABLET ORAL 2 TIMES DAILY
Status: DISCONTINUED | OUTPATIENT
Start: 2021-06-07 | End: 2021-06-11 | Stop reason: HOSPADM

## 2021-06-07 RX ORDER — METHYLPREDNISOLONE SODIUM SUCCINATE 125 MG/2ML
80 INJECTION, POWDER, LYOPHILIZED, FOR SOLUTION INTRAMUSCULAR; INTRAVENOUS ONCE
Status: COMPLETED | OUTPATIENT
Start: 2021-06-07 | End: 2021-06-07

## 2021-06-07 RX ORDER — IPRATROPIUM BROMIDE AND ALBUTEROL SULFATE 2.5; .5 MG/3ML; MG/3ML
1 SOLUTION RESPIRATORY (INHALATION) 4 TIMES DAILY
Status: DISCONTINUED | OUTPATIENT
Start: 2021-06-07 | End: 2021-06-07

## 2021-06-07 RX ORDER — SODIUM CHLORIDE 0.9 % (FLUSH) 0.9 %
10 SYRINGE (ML) INJECTION PRN
Status: DISCONTINUED | OUTPATIENT
Start: 2021-06-07 | End: 2021-06-11 | Stop reason: HOSPADM

## 2021-06-07 RX ORDER — ACETAMINOPHEN 325 MG/1
650 TABLET ORAL EVERY 6 HOURS PRN
Status: DISCONTINUED | OUTPATIENT
Start: 2021-06-07 | End: 2021-06-11 | Stop reason: HOSPADM

## 2021-06-07 RX ORDER — SODIUM CHLORIDE 9 MG/ML
25 INJECTION, SOLUTION INTRAVENOUS PRN
Status: DISCONTINUED | OUTPATIENT
Start: 2021-06-07 | End: 2021-06-11 | Stop reason: HOSPADM

## 2021-06-07 RX ORDER — TRAZODONE HYDROCHLORIDE 50 MG/1
300 TABLET ORAL NIGHTLY
Status: DISCONTINUED | OUTPATIENT
Start: 2021-06-07 | End: 2021-06-11 | Stop reason: HOSPADM

## 2021-06-07 RX ORDER — ALBUTEROL SULFATE 2.5 MG/3ML
2.5 SOLUTION RESPIRATORY (INHALATION) EVERY 4 HOURS PRN
Status: DISCONTINUED | OUTPATIENT
Start: 2021-06-07 | End: 2021-06-11 | Stop reason: HOSPADM

## 2021-06-07 RX ORDER — BENZONATATE 100 MG/1
200 CAPSULE ORAL 3 TIMES DAILY PRN
Status: DISCONTINUED | OUTPATIENT
Start: 2021-06-07 | End: 2021-06-11 | Stop reason: HOSPADM

## 2021-06-07 RX ORDER — IPRATROPIUM BROMIDE AND ALBUTEROL SULFATE 2.5; .5 MG/3ML; MG/3ML
1 SOLUTION RESPIRATORY (INHALATION) EVERY 4 HOURS
Status: DISCONTINUED | OUTPATIENT
Start: 2021-06-07 | End: 2021-06-11 | Stop reason: HOSPADM

## 2021-06-07 RX ORDER — MAGNESIUM SULFATE 1 G/100ML
1000 INJECTION INTRAVENOUS PRN
Status: DISCONTINUED | OUTPATIENT
Start: 2021-06-07 | End: 2021-06-08

## 2021-06-07 RX ORDER — METHYLPREDNISOLONE SODIUM SUCCINATE 40 MG/ML
40 INJECTION, POWDER, LYOPHILIZED, FOR SOLUTION INTRAMUSCULAR; INTRAVENOUS EVERY 12 HOURS
Status: DISCONTINUED | OUTPATIENT
Start: 2021-06-08 | End: 2021-06-11 | Stop reason: HOSPADM

## 2021-06-07 RX ORDER — POTASSIUM CHLORIDE 20 MEQ/1
40 TABLET, EXTENDED RELEASE ORAL PRN
Status: DISCONTINUED | OUTPATIENT
Start: 2021-06-07 | End: 2021-06-08

## 2021-06-07 RX ORDER — ONDANSETRON 2 MG/ML
4 INJECTION INTRAMUSCULAR; INTRAVENOUS EVERY 6 HOURS PRN
Status: DISCONTINUED | OUTPATIENT
Start: 2021-06-07 | End: 2021-06-11 | Stop reason: HOSPADM

## 2021-06-07 RX ORDER — MONTELUKAST SODIUM 10 MG/1
10 TABLET ORAL NIGHTLY
Status: DISCONTINUED | OUTPATIENT
Start: 2021-06-07 | End: 2021-06-11 | Stop reason: HOSPADM

## 2021-06-07 RX ORDER — FLUOXETINE HYDROCHLORIDE 20 MG/1
60 CAPSULE ORAL DAILY
Status: DISCONTINUED | OUTPATIENT
Start: 2021-06-07 | End: 2021-06-11 | Stop reason: HOSPADM

## 2021-06-07 RX ORDER — ONDANSETRON 4 MG/1
4 TABLET, ORALLY DISINTEGRATING ORAL EVERY 8 HOURS PRN
Status: DISCONTINUED | OUTPATIENT
Start: 2021-06-07 | End: 2021-06-11 | Stop reason: HOSPADM

## 2021-06-07 RX ORDER — ACETAMINOPHEN 650 MG/1
650 SUPPOSITORY RECTAL EVERY 6 HOURS PRN
Status: DISCONTINUED | OUTPATIENT
Start: 2021-06-07 | End: 2021-06-11 | Stop reason: HOSPADM

## 2021-06-07 RX ORDER — IPRATROPIUM BROMIDE AND ALBUTEROL SULFATE 2.5; .5 MG/3ML; MG/3ML
1 SOLUTION RESPIRATORY (INHALATION) ONCE
Status: COMPLETED | OUTPATIENT
Start: 2021-06-07 | End: 2021-06-07

## 2021-06-07 RX ORDER — POTASSIUM CHLORIDE 7.45 MG/ML
10 INJECTION INTRAVENOUS PRN
Status: DISCONTINUED | OUTPATIENT
Start: 2021-06-07 | End: 2021-06-08

## 2021-06-07 RX ADMIN — IPRATROPIUM BROMIDE AND ALBUTEROL SULFATE 1 AMPULE: .5; 3 SOLUTION RESPIRATORY (INHALATION) at 19:21

## 2021-06-07 RX ADMIN — ATORVASTATIN CALCIUM 10 MG: 10 TABLET, FILM COATED ORAL at 21:12

## 2021-06-07 RX ADMIN — IPRATROPIUM BROMIDE AND ALBUTEROL SULFATE 1 AMPULE: .5; 2.5 SOLUTION RESPIRATORY (INHALATION) at 10:20

## 2021-06-07 RX ADMIN — TRAZODONE HYDROCHLORIDE 300 MG: 50 TABLET ORAL at 21:10

## 2021-06-07 RX ADMIN — Medication 10 ML: at 21:14

## 2021-06-07 RX ADMIN — BUSPIRONE HYDROCHLORIDE 30 MG: 5 TABLET ORAL at 21:13

## 2021-06-07 RX ADMIN — IPRATROPIUM BROMIDE AND ALBUTEROL SULFATE 1 AMPULE: .5; 3 SOLUTION RESPIRATORY (INHALATION) at 23:35

## 2021-06-07 RX ADMIN — FLUOXETINE 60 MG: 20 CAPSULE ORAL at 21:12

## 2021-06-07 RX ADMIN — MONTELUKAST SODIUM 10 MG: 10 TABLET ORAL at 21:14

## 2021-06-07 RX ADMIN — METHYLPREDNISOLONE SODIUM SUCCINATE 80 MG: 125 INJECTION, POWDER, FOR SOLUTION INTRAMUSCULAR; INTRAVENOUS at 10:21

## 2021-06-07 ASSESSMENT — PAIN SCALES - GENERAL: PAINLEVEL_OUTOF10: 0

## 2021-06-07 ASSESSMENT — ENCOUNTER SYMPTOMS
SHORTNESS OF BREATH: 1
NAUSEA: 0
DIARRHEA: 0
COLOR CHANGE: 0
COUGH: 0
ABDOMINAL PAIN: 0
WHEEZING: 1
CHEST TIGHTNESS: 1
BACK PAIN: 0
VOMITING: 0

## 2021-06-07 NOTE — PROGRESS NOTES
06/07/21 1020   Treatment   Treatment Type N   $Treatment Type $Inhaled Therapy/Meds   Medications Albuterol/Ipratropium   Pre-Tx Pulse 104   Pre-Tx Resps 25   Breath Sounds Pre-Tx ERIC Diminished   Breath Sounds Pre-Tx LLL Diminished   Breath Sounds Pre-Tx RUL Diminished   Breath Sounds Pre-Tx RML Diminished   Breath Sounds Pre-Tx RLL Diminished   Breath Sounds Post-Tx ERIC Diminished   Breath Sounds Post-Tx LLL Diminished   Breath Sounds Post-Tx RUL Diminished   Breath Sounds Post-Tx RML Diminished   Breath Sounds Post-Tx RLL Diminished   Post-Tx Pulse 104   Post-Tx Resps 25   Delivery Source Oxygen  (via BiPAP)   Position Sitting   Tx Tolerance Well   Is patient on O2?  Y   Oxygen Therapy/Pulse Ox   O2 Therapy Oxygen   $Oxygen $Daily Charge   O2 Device PAP (positive airway pressure)   FiO2  30 %   Resp 23   SpO2 99 %   Skin Assessment Clean, dry, & intact   Skin Protection for O2 Device Yes   Location Nose   Intervention(s) Skin Barrier   $Pulse Oximeter $Spot check (multiple/continuous)

## 2021-06-07 NOTE — PROGRESS NOTES
06/07/21 1020   NIV Type   $NIV $Daily Charge   Skin Assessment Clean, dry, & intact   Skin Protection for O2 Device Yes   Location Nose   Intervention(s) Skin Barrier   NIV Started/Stopped On   Equipment Type V60   Mode Bilevel   Mask Type Full face mask   Mask Size Medium   Settings/Measurements   IPAP 12 cmH20   CPAP/EPAP 6 cmH2O   Resp 23   FiO2  30 %   Vt Exhaled 730 mL   Minute Volume 15.4 Liters   Mask Leak (lpm) 0 lpm   Comfort Level Good   Using Accessory Muscles Yes   SpO2 99   Alarm Settings   Alarms On Y   Press Low Alarm 6 cmH2O   High Pressure Alarm 25 cmH2O   Resp Rate Low Alarm 6   High Respiratory Rate 40 br/min

## 2021-06-07 NOTE — ED PROVIDER NOTES
Emergency Department Provider Note     Location: [facility]  ED  6/7/2021    I independently performed a history and physical on Villa Gomes. All diagnostic, treatment, and disposition decisions were made by myself in conjunction with the mid-level provider. Briefly, this is a 60 y.o. female here for occulta breathing for the last 2 days. Patient has a history of severe COPD for which she wears 2 L nasal cannula at nighttime as well as pulmonary fibrosis. Patient was recently admitted a few weeks ago but completed her course of steroids. Reports that she has been doing albuterol inhalers and nebulizer treatments at home without any relief and came in for evaluation today due to worsening shortness of breath. Reports cough productive of clear sputum. Denies any fevers, chills, chest pain or abdominal pain. .      ED Triage Vitals [06/07/21 1007]   BP Temp Temp Source Pulse Resp SpO2 Height Weight   122/79 97.6 °F (36.4 °C) Oral 103 (!) 40 91 % -- 140 lb (63.5 kg)        Patient resting comfortably in no acute distress. Heart is regular rate and rhythm. Lungs with crackles and expiratory wheezes throughout. Abdomen is soft, nondistended, and nontender. No peripheral edema noted. Patient seen and examined. Vital signs notable for tachypnea and tachycardia. Patient with significantly increased work of breathing on arrival.  Placed on BiPAP and given steroids and breathing treatments. On later evaluation patient is resting comfortably and states that she feels significantly better. Crackles and wheezes still present on exam but improved. Patient able to be titrated to nasal cannula and tolerates this well. Respiratory rate and tachycardia both improved. No evidence of pneumonia. Symptoms more consistent with COPD exacerbation. We will plan to admit for further work-up and management. EKG  The Ekg interpreted by me in the absence of a cardiologist shows.   Sinus tachycardia, rate 101, normal intervals, no STEMI, nonspecific ST changes, previous EKG dated May 24, 2021 shows normal sinus rhythm      XR CHEST PORTABLE    Result Date: 6/7/2021  EXAMINATION: ONE XRAY VIEW OF THE CHEST 6/7/2021 10:07 am COMPARISON: May 24, 2021 HISTORY: ORDERING SYSTEM PROVIDED HISTORY: SOB TECHNOLOGIST PROVIDED HISTORY: Reason for exam:->SOB Reason for Exam: resp distress Acuity: Acute Type of Exam: Initial Initial evaluation for acute respiratory distress. FINDINGS: The cardiomediastinal silhouette is normal in size. Mildly increased interstitial opacities are present bilaterally with lower lobe predominance. This appears stable from previous exam. No focal lobar consolidation or air bronchograms. No pleural effusion or pneumothorax. No significant interval change. Stable bilateral lower lobe predominant increased interstitial opacities. No superimposed acute process. No significant interval change compared to May 24, 2021.          Results for orders placed or performed during the hospital encounter of 06/07/21   CBC Auto Differential   Result Value Ref Range    WBC 12.6 (H) 4.0 - 11.0 K/uL    RBC 4.14 4.00 - 5.20 M/uL    Hemoglobin 12.7 12.0 - 16.0 g/dL    Hematocrit 36.8 36.0 - 48.0 %    MCV 88.8 80.0 - 100.0 fL    MCH 30.7 26.0 - 34.0 pg    MCHC 34.6 31.0 - 36.0 g/dL    RDW 13.0 12.4 - 15.4 %    Platelets 357 770 - 149 K/uL    MPV 6.6 5.0 - 10.5 fL    Neutrophils % 68.6 %    Lymphocytes % 20.5 %    Monocytes % 7.8 %    Eosinophils % 2.1 %    Basophils % 1.0 %    Neutrophils Absolute 8.6 (H) 1.7 - 7.7 K/uL    Lymphocytes Absolute 2.6 1.0 - 5.1 K/uL    Monocytes Absolute 1.0 0.0 - 1.3 K/uL    Eosinophils Absolute 0.3 0.0 - 0.6 K/uL    Basophils Absolute 0.1 0.0 - 0.2 K/uL   Comprehensive Metabolic Panel w/ Reflex to MG   Result Value Ref Range    Sodium 125 (L) 136 - 145 mmol/L    Potassium reflex Magnesium 4.3 3.5 - 5.1 mmol/L    Chloride 95 (L) 99 - 110 mmol/L    CO2 20 (L) 21 - 32 mmol/L    Anion Gap 10 3 - 16    Glucose 91 70 - 99 mg/dL    BUN 12 7 - 20 mg/dL    CREATININE 0.7 0.6 - 1.2 mg/dL    GFR Non-African American >60 >60    GFR African American >60 >60    Calcium 9.2 8.3 - 10.6 mg/dL    Total Protein 6.8 6.4 - 8.2 g/dL    Albumin 3.5 3.4 - 5.0 g/dL    Albumin/Globulin Ratio 1.1 1.1 - 2.2    Total Bilirubin <0.2 0.0 - 1.0 mg/dL    Alkaline Phosphatase 63 40 - 129 U/L    ALT 13 10 - 40 U/L    AST 14 (L) 15 - 37 U/L    Globulin 3.3 g/dL   Blood Gas, Venous   Result Value Ref Range    pH, Ivan 7.449 7.350 - 7.450    pCO2, Ivan 29.6 (L) 40.0 - 50.0 mmHg    pO2, Ivan 179.3 (H) 25 - 40 mmHg    HCO3, Venous 20.1 (L) 23.0 - 29.0 mmol/L    Base Excess, Ivan -2.7 -3.0 - 3.0 mmol/L    O2 Sat, Ivan 99 Not Established %    Carboxyhemoglobin 3.8 (H) 0.0 - 1.5 %    MetHgb, Ivan 0.5 <1.5 %    TC02 (Calc), Ivan 21 Not Established mmol/L    O2 Content, Ivan 19 Not Established VOL %    O2 Therapy Unknown    Troponin   Result Value Ref Range    Troponin <0.01 <0.01 ng/mL   Brain Natriuretic Peptide   Result Value Ref Range    Pro-BNP 76 0 - 124 pg/mL   Sample possible blood bank testing   Result Value Ref Range    Specimen Status HANNY    EKG 12 Lead   Result Value Ref Range    Ventricular Rate 101 BPM    Atrial Rate 101 BPM    P-R Interval 146 ms    QRS Duration 68 ms    Q-T Interval 326 ms    QTc Calculation (Bazett) 422 ms    P Axis 83 degrees    R Axis 21 degrees    T Axis 61 degrees    Diagnosis       Sinus tachycardia with occasional Premature ventricular complexes and Fusion complexesMarked ST abnormality, possible lateral subendocardial injuryAbnormal ECGWhen compared with ECG of 24-MAY-2021 15:26,Fusion complexes are now PresentPremature ventricular complexes are now Present       For further details of [facility] emergency department encounter, please see Olive Ramirez's documentation. Total critical care time is 0 minutes, which excludes separately billable procedures and updating family.  Time spent is specifically for management of the presenting complaint and symptoms initially, direct bedside care, reevaluation, review of records, and consultation. There was a high probability of clinically significant life-threatening deterioration in the patient's condition, which required my urgent intervention. This chart was generated in part by using Dragon Dictation system and may contain errors related to that system including errors in grammar, punctuation, and spelling, as well as words and phrases that may be inappropriate. If there are any questions or concerns please feel free to contact the dictating provider for clarification.            Uriel Coles MD  06/07/21 [id number]

## 2021-06-07 NOTE — TELEPHONE ENCOUNTER
Writer contacted ED provider Antonio Isabel  to inform of 30 day readmission risk. Writer's attempt to contact ED provider was unsuccessful. No Decision on disposition at this time.

## 2021-06-07 NOTE — PROGRESS NOTES
4 Eyes Skin Assessment     The patient is being assess for  Admission    I agree that 2 RN's have performed a thorough Head to Toe Skin Assessment on the patient. ALL assessment sites listed below have been assessed. Areas assessed by both nurses: Juan Bostonr, RN   [x]   Head, Face, and Ears   [x]   Shoulders, Back, and Chest  [x]   Arms, Elbows, and Hands   [x]   Coccyx, Sacrum, and Ischum  [x]   Legs, Feet, and Heels        Does the Patient have Skin Breakdown?   No         Philip Prevention initiated:  No   Wound Care Orders initiated:  No      [facility] nurse consulted for Pressure Injury (Stage 3,4, Unstageable, DTI, NWPT, and Complex wounds):  No      Nurse 1 eSignature: Electronically signed by Jimmy Reyes RN on 6/7/21 at 6:50 PM EDT    **SHARE this note so that the co-signing nurse is able to place an eSignature**    Nurse 2 eSignature: {Esignature:[id number]}

## 2021-06-07 NOTE — RT PROTOCOL NOTE
RT Inhaler-Nebulizer Bronchodilator Protocol Note    There is a bronchodilator order in the chart from a provider indicating to follow the RT Bronchodilator Protocol and there is an Initiate RT Bronchodilator Protocol order as well (see protocol at bottom of note). The findings from the last RT Protocol Assessment were as follows:  Smoking: >15 Pack years  Surgical Status: No surgery  Xray: Chronic changes  Respiratory Pattern: Dyspnea at rest  Mental Status: Alert and Oriented  Breath Sounds: Diminished and/or crackles  Cough: Strong, spontaneous, non-productive  Activity Level: Walking unassisted  Oxygen Requirement: 29% or 3LNC - 5LNC/40%  Indication for Bronchodilator Therapy: Wheezing associated with pulm disorder  Bronchodilator Assessment Score: 7    Aerosolized bronchodilator medication orders have been revised according to the RT Bronchodilator Protocol. RT Inhaler-Nebulizer Bronchodilator Protocol:    Respiratory Therapist to perform RT Therapy Protocol Assessment then follow the protocol. No Indications - adjust the frequency to every 6 hours PRN wheezing or bronchospasm, if no treatments needed after 48 hours then discontinue using Per Protocol order mode. If indication present, adjust the RT bronchodilator orders based on the Bronchodilator Assessment Score as follows:    0-6 - enter or revise RT bronchodilator order to Albuterol Inhaler order with frequency of every 2 hours PRN for wheezing or increased work of breathing using Per Protocol order mode. If Albuterol Inhaler not tolerated or not effective, then discontinue the Albuterol Inhaler order and enter Albuterol Nebulizer order with same frequency and PRN reasons. Repeat RT Therapy Protocol Assessment as needed.     7-10 - discontinue any other Inpatient aerosolized bronchodilator medication orders and enter or revise two Albuterol Inhaler orders, one with BID frequency and one with frequency of every 2 hours PRN wheezing or

## 2021-06-07 NOTE — H&P
Failure Mother     Hypertension Mother     Heart Disease Mother     High Cholesterol Mother     Cancer Mother     Depression Mother     Diabetes Father     Emphysema Father     Heart Failure Father     Hypertension Father     Heart Disease Father     High Cholesterol Father     Substance Abuse Father     Emphysema Paternal Grandfather     Diabetes Sister     High Cholesterol Sister     Vision Loss Maternal Uncle        PHYSICAL EXAM:  I performed this physical examination. Vitals:  Patient Vitals for the past 24 hrs:   BP Temp Temp src Pulse Resp SpO2 Weight   06/07/21 1349 117/73 -- -- 88 23 97 % --   06/07/21 1330 98/82 -- -- 96 30 98 % --   06/07/21 1326 98/82 -- -- 87 17 97 % --   06/07/21 1235 114/74 -- -- 83 18 96 % --   06/07/21 1225 114/74 -- -- 81 23 94 % --   06/07/21 1144 124/77 -- -- 78 15 94 % --   06/07/21 1058 111/62 -- -- 81 13 97 % --   06/07/21 1020 -- -- -- -- 23 99 % --   06/07/21 1007 122/79 97.6 °F (36.4 °C) Oral 103 (!) 40 91 % 140 lb (63.5 kg)     No intake or output data in the 24 hours ending 06/07/21 1523    Vent Settings:  4L/min O2    GEN:  Appearance:  Age appropirate female in NAD. She is tachypneic but w/o conversational dyspnea. Level of Consciousness:  alert . Orientation:  full    HEENT: Sclera anicteric.  no conjunctival chemosis. moist mucus membranes. no specific or diagnostic oral lesions. NECK:  no signs of meningismus. Jugular veins not distended. Carotid pulses  2+.  no cervical lymphadenopathy. no thyromegaly. CV:  regular rhythm. normal S1 & S2.    no murmur. no rub.  no gallop. PULM:  Chest excursion is symmetric. Breath sounds are globally diminished. Adventitious sounds:  bibasilar crackles. Expiratory wheezes in all fields. AB:  Abdominal shape is normal.  Bowel sounds are active. Generally soft to palpation. no tenderness is present. no involuntary guarding. no rebound guarding. EXTR:  Skin is warm. Capillary refill brisk. no specific or pathognomic rash. no clubbing. no pitting edema. no active wound or ulcer. LABS:  Lab Results   Component Value Date    WBC 12.6 (H) 06/07/2021    HGB 12.7 06/07/2021    HCT 36.8 06/07/2021    MCV 88.8 06/07/2021     06/07/2021     Lab Results   Component Value Date    CREATININE 0.7 06/07/2021    BUN 12 06/07/2021     (L) 06/07/2021    K 4.3 06/07/2021    CL 95 (L) 06/07/2021    CO2 20 (L) 06/07/2021     Lab Results   Component Value Date    ALT 13 06/07/2021    AST 14 (L) 06/07/2021    ALKPHOS 63 06/07/2021    BILITOT <0.2 06/07/2021     Lab Results   Component Value Date    CKTOTAL 39 04/04/2016    TROPONINI <0.01 06/07/2021     No results for input(s): PHART, VUJ2JKC, PO2ART in the last 72 hours. IMAGING:  XR CHEST PORTABLE    Result Date: 6/7/2021  EXAMINATION: ONE XRAY VIEW OF THE CHEST 6/7/2021 10:07 am COMPARISON: May 24, 2021 HISTORY: ORDERING SYSTEM PROVIDED HISTORY: SOB TECHNOLOGIST PROVIDED HISTORY: Reason for exam:->SOB Reason for Exam: resp distress Acuity: Acute Type of Exam: Initial Initial evaluation for acute respiratory distress. FINDINGS: The cardiomediastinal silhouette is normal in size. Mildly increased interstitial opacities are present bilaterally with lower lobe predominance. This appears stable from previous exam. No focal lobar consolidation or air bronchograms. No pleural effusion or pneumothorax. No significant interval change. Stable bilateral lower lobe predominant increased interstitial opacities. No superimposed acute process. No significant interval change compared to May 24, 2021. XR CHEST PORTABLE    Result Date: 5/24/2021  EXAMINATION: ONE XRAY VIEW OF THE CHEST 5/24/2021 2:51 pm COMPARISON: Prior study(s) most recent chest CT 03/14/2021.  HISTORY: ORDERING SYSTEM PROVIDED HISTORY: sob TECHNOLOGIST PROVIDED HISTORY: Reason for exam:->sob Reason for Exam: sob Acuity: Acute Type of Exam: Initial FINDINGS: The heart is normal size. Interstitial changes at the lung bases are present, asymmetric involvement left lower lobe. Remainder the lungs are somewhat hyperinflated with emphysema, upper lobe predominant. No pneumothorax. No pleural effusion. Reticular densities asymmetric left lung base likely representing fibrosis or chronic postinflammatory change. Suspect moderate COPD. I directly reviewed all recent imaging studies as well as pertinent prior studies. Radiology reports may or may not be available at the time of my review. EKG:  New and pertinent prior tracings were directly reviewed. My interpretation is as follows:  Sinus tachycardia. Active Hospital Problems    Diagnosis Date Noted    Stage 3 severe COPD by GOLD classification ([facility] [J44.9] 12/03/2009     Priority: Medium    Acute on chronic respiratory failure ([facility] [J96.20] 06/07/2021    COPD exacerbation ([facility] [J44.1] 06/24/2020    Pulmonary fibrosis ([facility] [J84.10] 10/16/2014    Tobacco dependence [F17.200] 09/27/2014       ASSESSMENT & PLAN  ILD & COPD exacerbation, Tobacco abuse  -  Continue general respiratory support. Baseline O2 requirement is 2-3 L/min just at night. She currently receiving 4 L/min. BiPAP will be made available going forward on a prn basis. -  No clinical syndrome to suggest an acute bacterial pneumonia. Procalcitonin only 0.02. Will not use empiric antibiotics. -  Start solumedrol 40mg IV q12h, scheduled duonebs QID, prn albuterol nebs, prn antitussives. Continue montelukast.  -  Smoking cessation again was advised. NRT provided. Hyponatremia  - Mild and chronic. Monitor. Consider free water restriction. Depression/Anxiety  -  Continue fluoxetine, bupropion, and trazodone per home regimen. DVT prophylaxis: SCDs, lovenox  Code Status:  Full  Disposition:  Inpatient. Anticipate eventual d/c to home, possibly 3-5 days.     Zhang Mcdonnell MD MD

## 2021-06-07 NOTE — ED PROVIDER NOTES
**ADVANCED PRACTICE PROVIDER, I HAVE EVALUATED THIS [facility]  ED  EMERGENCY DEPARTMENT ENCOUNTER      Pt Name: Eddy Kirk  DAYNA:[id number]  [place] 1955  Date of evaluation: 6/7/2021  Provider: Merlin Ensign, APRN - CNP      Chief Complaint:    Chief Complaint   Patient presents with    Respiratory Distress     X 2 DAYS,          Nursing Notes, Past Medical Hx, Past Surgical Hx, Social Hx, Allergies, and Family Hx were all reviewed and agreed with or any disagreements were addressed in the HPI.    HPI: (Location, Duration, Timing, Severity, Quality, Assoc Sx, Context, Modifying factors)  This is a  60 y.o. female who presents ER in acute respiratory distress, patient states she been short of breath for the past 3 days. She is complaining of chest tightness, reports an occasional nonproductive cough, she is a smoker, typically only smokes a couple cigarettes a day, has not been able to smoke because she still short of breath. She does complain of chest tightness, denies any fever or chills, she is already had her Covid vaccines. She denies any headache neck pain or neck stiffness. She has a history of COPD, asthma and pneumonia. No complaints of pain on exam, just having a hard time breathing. She denies any concern for Covid. She complains of chest discomfort related to the tightness because of her shortness of breath. She presents in acute respiratory distress and toxic appearance.     PastMedical/Surgical History:      Diagnosis Date    Allergic rhinitis 6/11/2010    Anxiety     Arthritis     Aspiration pneumonia ([facility] [address] 3/21/2012    Recurrent    Asthma     Bronchitis chronic     COPD (chronic obstructive pulmonary disease) ([facility] 12/3/2009    Depression     Drug abuse, cocaine type (HCC)     past history of crack cocaine use    Emphysema of lung (HCC)     Fibromyalgia     GERD (gastroesophageal reflux disease)     Hyperlipidemia     Influenza A 03/05/2020    Lung disease     On home oxygen therapy     uses O2 NC 3L prn at night    Osteoporosis     Pneumonia     Polysubstance dependence ([facility] [address] 1/2/2012    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[facility] [address] 1/2/2012    Pulmonary fibrosis ([facility] [address] 10/16/2014    Pulmonary infiltrate     Pulmonary nodule 12/3/2009    Tobacco abuse          Procedure Laterality Date    BLADDER REPAIR      BRONCHOSCOPY      BRONCHOSCOPY N/A 3/6/2020    BRONCHOSCOPY THERAPUTIC ASPIRATION INITIAL performed by Elroy Cerrato MD at [address] [place] [address]  3/6/2020    BRONCHOSCOPY ALVEOLAR LAVAGE performed by Elroy Cerrato MD at [address] [place] [address] N/A 6/26/2020    BRONCHOSCOPY THERAPUTIC ASPIRATION INITIAL performed by Hira Cisse MD at [address] [place] [address]  6/26/2020    BRONCHOSCOPY ALVEOLAR LAVAGE performed by Hira Cisse MD at [place] [address]  2012    ENDOSCOPY, COLON, DIAGNOSTIC      ESOPHAGEAL DILATATION  9/20/2018    ESOPHAGEAL DILATION [facility] performed by Reid Cornelius MD at [address] [facility] HISTORY  2/12/15    T8 Kyphoplasty    MS COLONOSCOPY FLX DX W/COLLJ SPEC WHEN PFRMD N/A 9/20/2018    EGD AND COLONOSCOPY WITH ANESTHESIA performed by Reid Cornelius MD at [address] [facility] ESOPHAGOGASTRODUODENOSCOPY TRANSORAL DIAGNOSTIC N/A 9/20/2018    EGD AND COLONOSCOPY WITH ANESTHESIA performed by Reid Cornelius MD at [address] [facility]         Medications:  Previous Medications    ALBUTEROL (PROVENTIL) (2.5 MG/3ML) 0.083% NEBULIZER SOLUTION    Take 3 mLs by nebulization every 6 hours as needed for Wheezing or Shortness of Breath DX COPD J44.9    ALBUTEROL SULFATE HFA (VENTOLIN HFA) 108 (90 BASE) MCG/ACT INHALER    Inhale 2 puffs into the lungs every 6 hours as needed for Wheezing or Shortness of Breath    BD PEN NEEDLE SVETLANA U/F 32G X 4 MM MISC USE ONE DAILY AS DIRECTED    BUSPIRONE (BUSPAR) 30 MG TABLET    TAKE 1 TABLET TWICE DAILY    FLUOXETINE (PROZAC) 20 MG CAPSULE    TAKE 3 CAPSULES BY MOUTH DAILY    FLUTICASONE-SALMETEROL (WIXELA INHUB) 500-50 MCG/DOSE DISKUS INHALER    Inhale 1 puff into the lungs every 12 hours    GUAIFENESIN (MUCINEX) 600 MG EXTENDED RELEASE TABLET    Take 1 tablet by mouth 2 times daily as needed for Congestion    IBUPROFEN (ADVIL;MOTRIN) 600 MG TABLET    Take 1 tablet by mouth 3 times daily as needed for Pain    MONTELUKAST (SINGULAIR) 10 MG TABLET    TAKE 1 TABLET BY MOUTH EACH NIGHT    NAPROXEN (NAPROSYN) 500 MG TABLET    Take 1 tablet by mouth 2 times daily (with meals)    PREDNISONE (DELTASONE) 10 MG TABLET    (Always with food)Starting 5/28, take 40mg daily x 2 days, then 20mg daily x 2 days, then 10mg daily x 2 days to finish taper    SIMVASTATIN (ZOCOR) 20 MG TABLET    TAKE 1 TABLET EVERY EVENING    TERIPARATIDE, RECOMBINANT, (FORTEO) 600 MCG/2.4ML SOLN INJECTION    Inject 0.08 mLs into the skin daily One dose injected daily. TIOTROPIUM (SPIRIVA HANDIHALER) 18 MCG INHALATION CAPSULE    INHALE THE CONTENTS OF 1 CAPSULE EVERY DAY    TRAZODONE (DESYREL) 150 MG TABLET    TAKE 2 TABLETS AT BEDTIME    VITAMIN D (ERGOCALCIFEROL) 1.25 MG (99677 UT) CAPS CAPSULE    Take 1 capsule by mouth once a week for 5 days Every Tuesday         Review of Systems:  (2-9 systems needed)  Review of Systems   Constitutional: Negative for chills and fever. Denies any fever or chills associated with her symptoms, denies any concern for Covid, has already had 2 vaccines   HENT: Negative for congestion. Respiratory: Positive for chest tightness, shortness of breath and wheezing. Negative for cough. Patient presents and acute respiratory distress, patient states she been short of breath for the past 3 days.   She is complaining of chest tightness, reports an occasional nonproductive cough, she is a smoker, typically only smokes a couple cigarettes a day, has not been able to smoke because she still short of breath. Cardiovascular: Negative for chest pain. Gastrointestinal: Negative for abdominal pain, diarrhea, nausea and vomiting. Genitourinary: Negative for difficulty urinating, dysuria and frequency. Musculoskeletal: Negative for back pain. Skin: Negative for color change. Neurological: Negative for weakness, numbness and headaches. Physical Exam:  Physical Exam  Vitals and nursing note reviewed. Constitutional:       Appearance: She is well-developed. She is not diaphoretic. HENT:      Head: Normocephalic. Right Ear: External ear normal.      Left Ear: External ear normal.   Eyes:      General: No scleral icterus. Right eye: No discharge. Left eye: No discharge. Cardiovascular:      Comments: Normal S1 and 2, peripheral pulses are 2+, no peripheral edema  Pulmonary:      Effort: Respiratory distress present. Breath sounds: Wheezing present. Comments: Upon ED arrival she is tachypneic, dyspneic breathing approximately 36 breaths/min, saturations are 93% on room air, she has wheezing bilaterally and in acute distress  Abdominal:      Palpations: Abdomen is soft. Musculoskeletal:         General: Normal range of motion. Cervical back: Normal range of motion and neck supple. Skin:     General: Skin is warm. Capillary Refill: Capillary refill takes less than 2 seconds. Coloration: Skin is not pale. Neurological:      General: No focal deficit present. Mental Status: She is alert and oriented to person, place, and time. GCS: GCS eye subscore is 4. GCS verbal subscore is 5. GCS motor subscore is 6.       Comments: Patient is awake, alert and in no acute respiratory distress however, she is neurologically intact with no acute focal deficit   Psychiatric:         Behavior: Behavior normal.         MEDICAL DECISION MAKING    Vitals:    Vitals:    06/07/21 1326 such as CT, Ultrasound and MRI are read by the radiologist. SHAUN Walton CNP have directly visualized the radiologic plain film image(s) with the below findings:      Interpretation per the Radiologist below, if available at the time of this note:    XR CHEST PORTABLE   Preliminary Result   Stable bilateral lower lobe predominant increased interstitial opacities. No   superimposed acute process. No significant interval change compared to May   24, 2021. MEDICAL DECISION MAKING / ED COURSE:    Because of high probability of sudden clinical deterioration of the patient's condition and risk of further deterioration, critical care time required my full attention to the patient's condition; which included chart data review, documentation, medication ordering, reviewing the patient's old records, reevaluation patient's cardiac, pulmonary and neurological status. Reevaluation of vital signs. Consultations with ED attending and admitting physician. Ordering, interpreting reviewing diagnostic testing. Therefore a critical care time was 35 minutes of direct attention to the patient's condition did not include time spent on procedures. PROCEDURES:   Procedures    None    Patient was given:  Medications   methylPREDNISolone sodium (SOLU-MEDROL) injection 80 mg (80 mg Intravenous Given 6/7/21 1021)   ipratropium-albuterol (DUONEB) nebulizer solution 1 ampule (1 ampule Inhalation Given 6/7/21 1020)       Patient presents and acute respiratory distress, patient states she been short of breath for the past 3 days. She is complaining of chest tightness, reports an occasional nonproductive cough, she is a smoker, typically only smokes a couple cigarettes a day, has not been able to smoke because she still short of breath.      After evaluation and examination patient she is in acute respiratory distress, patient was placed on nonrebreather, ordered BiPAP, she was also ordered IV access, blood work, chest x-ray and EKG. I do believe this is all related to her COPD and smoking. EKG shows sinus tachycardia rate of 101 bpm, no acute ST elevation, please see my attending EKG interpretation note. Chest x-ray shows stable bilateral lower lobe interstitial opacity potentially superimposed processes, patient is afebrile and I do believe this is more related to her COPD. CBC shows a leukocytosis with a white count 2275, metabolic panel shows notes concerning for underlying dehydration since her sodium is 125, chloride of 95 and CO2 of 20, however, on her blood gas there is no significant acidosis or alkalosis, her PO2 is 179, bicarb is 20.1, PCO2 is 29.6 and carboxyhemoglobin is 3.8 all consistent with her history of COPD, we then paged respiratory to wean the patient off the BiPAP after she was given Solu-Medrol and DuoNeb nebulizer, this has improved tremendously however she still requiring supplemental oxygen. However due to the patient's COPD exacerbation, acute respiratory distress and impending respiratory failure compromise, I spoke with the attending physician about admitting the patient to hospital.  Hospitalist paged via perfect serve for admission. The patient tolerated their visit well. I evaluated the patient. The physician was available for consultation as needed. The patient and / or the family were informed of the results of any tests, a time was given to answer questions, a plan was proposed and they agreed with plan. Dr. Isabel Desir agreed to set the patient for admission, patient will be admitted to the hospital for further evaluation management care. CLINICAL IMPRESSION:  1. Acute on chronic respiratory failure with hypoxia (HCC)    2. Supplemental oxygen dependent    3. History of COPD    4. Smoker    5. COPD with acute exacerbation ([facility] [address]        DISPOSITION Admitted 06/07/2021 03:36:31 PM      PATIENT REFERRED TO:  No follow-up provider specified.     DISCHARGE MEDICATIONS:  New Prescriptions    No medications on file       DISCONTINUED MEDICATIONS:  Discontinued Medications    No medications on file              (Please note the MDM and HPI sections of this note were completed with a voice recognition program.  Efforts were made to edit the dictations but occasionally words are mis-transcribed.)    Electronically signed, SHAUN Gallardo CNP,          SHAUN Gallardo CNP  06/07/21 9405

## 2021-06-07 NOTE — PROGRESS NOTES
Per verbal okay from Dr. Litzy Denney, patient taken off BiPAP and placed on a 3 LPM nasal cannula with an SpO2 of 97%. Patient states her WOB is better and she does not appear to be in distress at this time. RN notified.      Electronically signed by ROSAURA MontesP, RRT, RRT-ACCS on 6/7/2021 at 11:24 AM

## 2021-06-08 ENCOUNTER — CARE COORDINATION (OUTPATIENT)
Dept: CASE MANAGEMENT | Age: 66
End: 2021-06-08

## 2021-06-08 LAB
ANION GAP SERPL CALCULATED.3IONS-SCNC: 7 MMOL/L (ref 3–16)
BASOPHILS ABSOLUTE: 0 K/UL (ref 0–0.2)
BASOPHILS RELATIVE PERCENT: 0.2 %
BUN BLDV-MCNC: 12 MG/DL (ref 7–20)
CALCIUM SERPL-MCNC: 8.5 MG/DL (ref 8.3–10.6)
CHLORIDE BLD-SCNC: 102 MMOL/L (ref 99–110)
CO2: 23 MMOL/L (ref 21–32)
CREAT SERPL-MCNC: 0.6 MG/DL (ref 0.6–1.2)
EOSINOPHILS ABSOLUTE: 0 K/UL (ref 0–0.6)
EOSINOPHILS RELATIVE PERCENT: 0.2 %
GFR AFRICAN AMERICAN: >60
GFR NON-AFRICAN AMERICAN: >60
GLUCOSE BLD-MCNC: 121 MG/DL (ref 70–99)
HCT VFR BLD CALC: 33.3 % (ref 36–48)
HEMOGLOBIN: 11.6 G/DL (ref 12–16)
LYMPHOCYTES ABSOLUTE: 1.3 K/UL (ref 1–5.1)
LYMPHOCYTES RELATIVE PERCENT: 12.1 %
MAGNESIUM: 2.1 MG/DL (ref 1.8–2.4)
MCH RBC QN AUTO: 30.6 PG (ref 26–34)
MCHC RBC AUTO-ENTMCNC: 34.9 G/DL (ref 31–36)
MCV RBC AUTO: 87.8 FL (ref 80–100)
MONOCYTES ABSOLUTE: 0.8 K/UL (ref 0–1.3)
MONOCYTES RELATIVE PERCENT: 7 %
NEUTROPHILS ABSOLUTE: 8.9 K/UL (ref 1.7–7.7)
NEUTROPHILS RELATIVE PERCENT: 80.5 %
PDW BLD-RTO: 13 % (ref 12.4–15.4)
PLATELET # BLD: 251 K/UL (ref 135–450)
PMV BLD AUTO: 7 FL (ref 5–10.5)
POTASSIUM SERPL-SCNC: 4.1 MMOL/L (ref 3.5–5.1)
RBC # BLD: 3.79 M/UL (ref 4–5.2)
SODIUM BLD-SCNC: 132 MMOL/L (ref 136–145)
WBC # BLD: 11 K/UL (ref 4–11)

## 2021-06-08 PROCEDURE — 94761 N-INVAS EAR/PLS OXIMETRY MLT: CPT

## 2021-06-08 PROCEDURE — 6360000002 HC RX W HCPCS: Performed by: INTERNAL MEDICINE

## 2021-06-08 PROCEDURE — 2580000003 HC RX 258: Performed by: INTERNAL MEDICINE

## 2021-06-08 PROCEDURE — 80048 BASIC METABOLIC PNL TOTAL CA: CPT

## 2021-06-08 PROCEDURE — 6370000000 HC RX 637 (ALT 250 FOR IP): Performed by: INTERNAL MEDICINE

## 2021-06-08 PROCEDURE — 94640 AIRWAY INHALATION TREATMENT: CPT

## 2021-06-08 PROCEDURE — 83735 ASSAY OF MAGNESIUM: CPT

## 2021-06-08 PROCEDURE — 36415 COLL VENOUS BLD VENIPUNCTURE: CPT

## 2021-06-08 PROCEDURE — 1200000000 HC SEMI PRIVATE

## 2021-06-08 PROCEDURE — 2700000000 HC OXYGEN THERAPY PER DAY

## 2021-06-08 PROCEDURE — 85025 COMPLETE CBC W/AUTO DIFF WBC: CPT

## 2021-06-08 RX ADMIN — Medication 10 ML: at 08:28

## 2021-06-08 RX ADMIN — IPRATROPIUM BROMIDE AND ALBUTEROL SULFATE 1 AMPULE: .5; 3 SOLUTION RESPIRATORY (INHALATION) at 15:22

## 2021-06-08 RX ADMIN — IPRATROPIUM BROMIDE AND ALBUTEROL SULFATE 1 AMPULE: .5; 3 SOLUTION RESPIRATORY (INHALATION) at 20:15

## 2021-06-08 RX ADMIN — BUSPIRONE HYDROCHLORIDE 30 MG: 5 TABLET ORAL at 19:51

## 2021-06-08 RX ADMIN — IPRATROPIUM BROMIDE AND ALBUTEROL SULFATE 1 AMPULE: .5; 3 SOLUTION RESPIRATORY (INHALATION) at 07:49

## 2021-06-08 RX ADMIN — ENOXAPARIN SODIUM 40 MG: 40 INJECTION SUBCUTANEOUS at 08:27

## 2021-06-08 RX ADMIN — TRAZODONE HYDROCHLORIDE 300 MG: 50 TABLET ORAL at 20:02

## 2021-06-08 RX ADMIN — ACETAMINOPHEN 650 MG: 325 TABLET ORAL at 20:00

## 2021-06-08 RX ADMIN — IPRATROPIUM BROMIDE AND ALBUTEROL SULFATE 1 AMPULE: .5; 3 SOLUTION RESPIRATORY (INHALATION) at 11:42

## 2021-06-08 RX ADMIN — IPRATROPIUM BROMIDE AND ALBUTEROL SULFATE 1 AMPULE: .5; 3 SOLUTION RESPIRATORY (INHALATION) at 23:38

## 2021-06-08 RX ADMIN — Medication 10 ML: at 19:58

## 2021-06-08 RX ADMIN — METHYLPREDNISOLONE SODIUM SUCCINATE 40 MG: 40 INJECTION, POWDER, FOR SOLUTION INTRAMUSCULAR; INTRAVENOUS at 08:27

## 2021-06-08 RX ADMIN — FLUOXETINE 60 MG: 20 CAPSULE ORAL at 08:27

## 2021-06-08 RX ADMIN — MONTELUKAST SODIUM 10 MG: 10 TABLET ORAL at 19:51

## 2021-06-08 RX ADMIN — ATORVASTATIN CALCIUM 10 MG: 10 TABLET, FILM COATED ORAL at 08:27

## 2021-06-08 RX ADMIN — IPRATROPIUM BROMIDE AND ALBUTEROL SULFATE 1 AMPULE: .5; 3 SOLUTION RESPIRATORY (INHALATION) at 03:40

## 2021-06-08 RX ADMIN — METHYLPREDNISOLONE SODIUM SUCCINATE 40 MG: 40 INJECTION, POWDER, FOR SOLUTION INTRAMUSCULAR; INTRAVENOUS at 20:01

## 2021-06-08 RX ADMIN — BUSPIRONE HYDROCHLORIDE 30 MG: 5 TABLET ORAL at 08:27

## 2021-06-08 ASSESSMENT — PAIN SCALES - GENERAL: PAINLEVEL_OUTOF10: 5

## 2021-06-08 NOTE — PROGRESS NOTES
Shift assessment completed and charted. VSS. Pt weaned to RA/1L NC, RA stat 95% while in bed and pt puts 1L NC when getting to [facility] due to being so SOB with exertion. A&OX4. Rhonchi and wheezes noted dre. Bed locked and in lowest position. Call light within reach. Pt denies any other needs at this time. Will continue to monitor.

## 2021-06-08 NOTE — PROGRESS NOTES
Hospitalist Progress Note      PCP: Fredy Smith MD    Date of Admission: 6/7/2021    Chief Complaint: SOB    Subjective: no new c/o. Medications:  Reviewed    Infusion Medications    sodium chloride       Scheduled Medications    busPIRone  30 mg Oral BID    FLUoxetine  60 mg Oral Daily    montelukast  10 mg Oral Nightly    atorvastatin  10 mg Oral Daily    traZODone  300 mg Oral Nightly    enoxaparin  40 mg Subcutaneous Daily    nicotine  1 patch Transdermal Daily    methylPREDNISolone  40 mg Intravenous Q12H    ipratropium-albuterol  1 ampule Inhalation Q4H     PRN Meds: sodium chloride flush, sodium chloride, ondansetron **OR** ondansetron, acetaminophen **OR** acetaminophen, albuterol, benzonatate      Intake/Output Summary (Last 24 hours) at 6/8/2021 0934  Last data filed at 6/8/2021 0838  Gross per 24 hour   Intake 850 ml   Output --   Net 850 ml       Physical Exam Performed:    /86   Pulse 100   Temp 97.5 °F (36.4 °C) (Oral)   Resp 22   Ht 5' 4\" (1.626 m)   Wt 140 lb (63.5 kg)   SpO2 96%   BMI 24.03 kg/m²     General appearance: No apparent distress, appears stated age and cooperative. HEENT: Pupils equal, round, and reactive to light. Conjunctivae/corneas clear. Neck: Supple, with full range of motion. No jugular venous distention. Trachea midline. Respiratory:  Normal respiratory effort. Clear to auscultation, bilaterally without Rales/Wheezes/Rhonchi. Cardiovascular: Regular rate and rhythm with normal S1/S2 without murmurs, rubs or gallops. Abdomen: Soft, non-tender, non-distended with normal bowel sounds. Musculoskeletal: No clubbing, cyanosis or edema bilaterally. Full range of motion without deformity. Skin: Skin color, texture, turgor normal.  No rashes or lesions. Neurologic:  Neurovascularly intact without any focal sensory/motor deficits.  Cranial nerves: II-XII intact, grossly non-focal.  Psychiatric: Alert and oriented, thought content appropriate, normal insight  Capillary Refill: Brisk,< 3 seconds   Peripheral Pulses: +2 palpable, equal bilaterally       Labs:   Recent Labs     06/07/21  1013 06/08/21  0452   WBC 12.6* 11.0   HGB 12.7 11.6*   HCT 36.8 33.3*    251     Recent Labs     06/07/21  1013 06/08/21  0452   * 132*   K 4.3 4.1   CL 95* 102   CO2 20* 23   BUN 12 12   CREATININE 0.7 0.6   CALCIUM 9.2 8.5     Recent Labs     06/07/21  1013   AST 14*   ALT 13   BILITOT <0.2   ALKPHOS 63     No results for input(s): INR in the last 72 hours. Recent Labs     06/07/21  1013   TROPONINI <0.01       Urinalysis:      Lab Results   Component Value Date    NITRU Negative 06/07/2021    WBCUA 0-3 04/13/2012    RBCUA 3-5 04/13/2012    BLOODU Negative 06/07/2021    SPECGRAV 1.015 06/07/2021    GLUCOSEU Negative 06/07/2021    GLUCOSEU NEGATIVE 04/13/2012       Consults:    IP CONSULT TO HOSPITALIST      Assessment/Plan:    Active Hospital Problems    Diagnosis     Stage 3 severe COPD by GOLD classification ([facility] [address] [J44.9]      Priority: Medium    Acute on chronic respiratory failure ([facility] [address] [J96.20]     COPD exacerbation ([facility] [address] [J44.1]     Pulmonary fibrosis ([facility] [address] [J84.10]     Tobacco dependence [F17.200]        COPD - w/ ILD and  chronic respiratory failure on baseline home O2 at 2L nocturnal.  Normally controlled on home medication regimen - here w/ acute exacerbation - on IV Steroids/HHN. Acute on Chronic Respiratory Failure - w/ increased hypoxia, POArrival.  Presence of clinical respiratory distress w/ tachypnea/dyspnea/SOB and wheezing w/ use of accessory muscles to breath. Supplemental O2 and wean as tolerated. HypoNatremia - etiology clinically unable to determine but likely hypovolemic. Follow serial labs off IVF. Reviewed and documented as above. Tobacco Abuse - active and ongoing. Cessation counseled. Nicotine replacement PRN.     Depression/Anxiety - controlled on home Fluoxetine, Bupropion, and Trazodone - continued.

## 2021-06-08 NOTE — PROGRESS NOTES
Pt is alert and oriented. VS. RA. Pt rates pain 5/10. Pt given PRN pain medication per MAR. Shift assessment completed and documented. Call light within reach. Bed side table within reach. Wheels locked. Bed in lowest position. Pt instructed to call out for assistance. Pt expressesed understanding & calls out appropritately. All care per orders. Will continue to monitor.  Electronically signed by Elaina Isaacs RN on 6/8/2021 at 7:56 PM

## 2021-06-08 NOTE — PLAN OF CARE
Problem: SAFETY  Goal: Free from accidental physical injury  Outcome: Ongoing  Note: Pt will remain free from falls. Pt is a low fall risk. Call light within reach. Bed side table within reach. Wheels locked. Bed in lowest position. Pt instructed to call out for assistance. Pt expressesed understanding & calls out appropritately. All care per orders. Will continue to monitor.

## 2021-06-08 NOTE — PLAN OF CARE
Problem: Falls - Risk of:  Goal: Will remain free from falls  Description: Will remain free from falls  Outcome: Ongoing  Note: Pt will remain free from falls. Pt is a high fall risk. Call light within reach. Bed side table within reach. Wheels locked. Bed in lowest position. Pt instructed to call out for assistance. Pt expressesed understanding & calls out appropritately. All care per orders. Will continue to monitor.

## 2021-06-09 LAB
ANION GAP SERPL CALCULATED.3IONS-SCNC: 11 MMOL/L (ref 3–16)
BASOPHILS ABSOLUTE: 0 K/UL (ref 0–0.2)
BASOPHILS RELATIVE PERCENT: 0.1 %
BUN BLDV-MCNC: 13 MG/DL (ref 7–20)
CALCIUM SERPL-MCNC: 9 MG/DL (ref 8.3–10.6)
CHLORIDE BLD-SCNC: 99 MMOL/L (ref 99–110)
CO2: 21 MMOL/L (ref 21–32)
CREAT SERPL-MCNC: 0.6 MG/DL (ref 0.6–1.2)
EKG ATRIAL RATE: 109 BPM
EKG DIAGNOSIS: NORMAL
EKG P AXIS: 67 DEGREES
EKG P-R INTERVAL: 138 MS
EKG Q-T INTERVAL: 326 MS
EKG QRS DURATION: 76 MS
EKG QTC CALCULATION (BAZETT): 439 MS
EKG R AXIS: 34 DEGREES
EKG T AXIS: 66 DEGREES
EKG VENTRICULAR RATE: 109 BPM
EOSINOPHILS ABSOLUTE: 0 K/UL (ref 0–0.6)
EOSINOPHILS RELATIVE PERCENT: 0 %
GFR AFRICAN AMERICAN: >60
GFR NON-AFRICAN AMERICAN: >60
GLUCOSE BLD-MCNC: 130 MG/DL (ref 70–99)
HCT VFR BLD CALC: 34.8 % (ref 36–48)
HEMOGLOBIN: 12 G/DL (ref 12–16)
LYMPHOCYTES ABSOLUTE: 0.7 K/UL (ref 1–5.1)
LYMPHOCYTES RELATIVE PERCENT: 4.9 %
MAGNESIUM: 2 MG/DL (ref 1.8–2.4)
MCH RBC QN AUTO: 30.3 PG (ref 26–34)
MCHC RBC AUTO-ENTMCNC: 34.5 G/DL (ref 31–36)
MCV RBC AUTO: 87.9 FL (ref 80–100)
MONOCYTES ABSOLUTE: 0.4 K/UL (ref 0–1.3)
MONOCYTES RELATIVE PERCENT: 3.2 %
NEUTROPHILS ABSOLUTE: 12.6 K/UL (ref 1.7–7.7)
NEUTROPHILS RELATIVE PERCENT: 91.8 %
PDW BLD-RTO: 12.8 % (ref 12.4–15.4)
PLATELET # BLD: 287 K/UL (ref 135–450)
PMV BLD AUTO: 7.1 FL (ref 5–10.5)
POTASSIUM SERPL-SCNC: 4.5 MMOL/L (ref 3.5–5.1)
RBC # BLD: 3.96 M/UL (ref 4–5.2)
SODIUM BLD-SCNC: 131 MMOL/L (ref 136–145)
WBC # BLD: 13.7 K/UL (ref 4–11)

## 2021-06-09 PROCEDURE — 2580000003 HC RX 258: Performed by: INTERNAL MEDICINE

## 2021-06-09 PROCEDURE — 1200000000 HC SEMI PRIVATE

## 2021-06-09 PROCEDURE — 6370000000 HC RX 637 (ALT 250 FOR IP): Performed by: INTERNAL MEDICINE

## 2021-06-09 PROCEDURE — 94669 MECHANICAL CHEST WALL OSCILL: CPT

## 2021-06-09 PROCEDURE — 80048 BASIC METABOLIC PNL TOTAL CA: CPT

## 2021-06-09 PROCEDURE — 94640 AIRWAY INHALATION TREATMENT: CPT

## 2021-06-09 PROCEDURE — 6360000002 HC RX W HCPCS: Performed by: INTERNAL MEDICINE

## 2021-06-09 PROCEDURE — 85025 COMPLETE CBC W/AUTO DIFF WBC: CPT

## 2021-06-09 PROCEDURE — 83735 ASSAY OF MAGNESIUM: CPT

## 2021-06-09 RX ADMIN — ENOXAPARIN SODIUM 40 MG: 40 INJECTION SUBCUTANEOUS at 09:08

## 2021-06-09 RX ADMIN — BUSPIRONE HYDROCHLORIDE 30 MG: 5 TABLET ORAL at 09:07

## 2021-06-09 RX ADMIN — MONTELUKAST SODIUM 10 MG: 10 TABLET ORAL at 20:50

## 2021-06-09 RX ADMIN — Medication 10 ML: at 20:49

## 2021-06-09 RX ADMIN — IPRATROPIUM BROMIDE AND ALBUTEROL SULFATE 1 AMPULE: .5; 3 SOLUTION RESPIRATORY (INHALATION) at 16:17

## 2021-06-09 RX ADMIN — IPRATROPIUM BROMIDE AND ALBUTEROL SULFATE 1 AMPULE: .5; 3 SOLUTION RESPIRATORY (INHALATION) at 08:31

## 2021-06-09 RX ADMIN — TRAZODONE HYDROCHLORIDE 300 MG: 50 TABLET ORAL at 20:49

## 2021-06-09 RX ADMIN — FLUOXETINE 60 MG: 20 CAPSULE ORAL at 09:08

## 2021-06-09 RX ADMIN — ATORVASTATIN CALCIUM 10 MG: 10 TABLET, FILM COATED ORAL at 09:07

## 2021-06-09 RX ADMIN — METHYLPREDNISOLONE SODIUM SUCCINATE 40 MG: 40 INJECTION, POWDER, FOR SOLUTION INTRAMUSCULAR; INTRAVENOUS at 20:49

## 2021-06-09 RX ADMIN — IPRATROPIUM BROMIDE AND ALBUTEROL SULFATE 1 AMPULE: .5; 3 SOLUTION RESPIRATORY (INHALATION) at 12:11

## 2021-06-09 RX ADMIN — IPRATROPIUM BROMIDE AND ALBUTEROL SULFATE 1 AMPULE: .5; 3 SOLUTION RESPIRATORY (INHALATION) at 03:52

## 2021-06-09 RX ADMIN — METHYLPREDNISOLONE SODIUM SUCCINATE 40 MG: 40 INJECTION, POWDER, FOR SOLUTION INTRAMUSCULAR; INTRAVENOUS at 09:08

## 2021-06-09 RX ADMIN — BUSPIRONE HYDROCHLORIDE 30 MG: 5 TABLET ORAL at 20:50

## 2021-06-09 RX ADMIN — IPRATROPIUM BROMIDE AND ALBUTEROL SULFATE 1 AMPULE: .5; 3 SOLUTION RESPIRATORY (INHALATION) at 19:12

## 2021-06-09 NOTE — PROGRESS NOTES
Pt alert and oriented, O2 sat 95% on RA, , MD notified, EKG ordered, pt denies chest pain. Shift assessment completed and documented. Pt denies any needs at this time. Bed locked and in lowest position. Call light within reach.

## 2021-06-09 NOTE — PROGRESS NOTES
Hospitalist Progress Note      PCP: Kerwin Rojas MD    Date of Admission: 6/7/2021    Chief Complaint: SOB    Subjective: no new c/o. Medications:  Reviewed    Infusion Medications    sodium chloride       Scheduled Medications    busPIRone  30 mg Oral BID    FLUoxetine  60 mg Oral Daily    montelukast  10 mg Oral Nightly    atorvastatin  10 mg Oral Daily    traZODone  300 mg Oral Nightly    enoxaparin  40 mg Subcutaneous Daily    nicotine  1 patch Transdermal Daily    methylPREDNISolone  40 mg Intravenous Q12H    ipratropium-albuterol  1 ampule Inhalation Q4H     PRN Meds: sodium chloride flush, sodium chloride, ondansetron **OR** ondansetron, acetaminophen **OR** acetaminophen, albuterol, benzonatate      Intake/Output Summary (Last 24 hours) at 6/9/2021 0847  Last data filed at 6/8/2021 1958  Gross per 24 hour   Intake 730 ml   Output --   Net 730 ml       Physical Exam Performed:    /79   Pulse 106   Temp 98.5 °F (36.9 °C) (Oral)   Resp 16   Ht 5' 4\" (1.626 m)   Wt 137 lb 6.4 oz (62.3 kg)   SpO2 95%   BMI 23.58 kg/m²     General appearance: No apparent distress, appears stated age and cooperative. HEENT: Pupils equal, round, and reactive to light. Conjunctivae/corneas clear. Neck: Supple, with full range of motion. No jugular venous distention. Trachea midline. Respiratory:  Normal respiratory effort. Clear to auscultation, bilaterally without Rales/Wheezes/Rhonchi. Cardiovascular: Regular rate and rhythm with normal S1/S2 without murmurs, rubs or gallops. Abdomen: Soft, non-tender, non-distended with normal bowel sounds. Musculoskeletal: No clubbing, cyanosis or edema bilaterally. Full range of motion without deformity. Skin: Skin color, texture, turgor normal.  No rashes or lesions. Neurologic:  Neurovascularly intact without any focal sensory/motor deficits.  Cranial nerves: II-XII intact, grossly non-focal.  Psychiatric: Alert and oriented, thought content appropriate, normal insight  Capillary Refill: Brisk,< 3 seconds   Peripheral Pulses: +2 palpable, equal bilaterally       Labs:   Recent Labs     06/07/21  1013 06/08/21  0452 06/09/21  0550   WBC 12.6* 11.0 13.7*   HGB 12.7 11.6* 12.0   HCT 36.8 33.3* 34.8*    251 287     Recent Labs     06/07/21  1013 06/08/21  0452 06/09/21  0550   * 132* 131*   K 4.3 4.1 4.5   CL 95* 102 99   CO2 20* 23 21   BUN 12 12 13   CREATININE 0.7 0.6 0.6   CALCIUM 9.2 8.5 9.0     Recent Labs     06/07/21  1013   AST 14*   ALT 13   BILITOT <0.2   ALKPHOS 63     No results for input(s): INR in the last 72 hours. Recent Labs     06/07/21  1013   TROPONINI <0.01       Urinalysis:      Lab Results   Component Value Date    NITRU Negative 06/07/2021    WBCUA 0-3 04/13/2012    RBCUA 3-5 04/13/2012    BLOODU Negative 06/07/2021    SPECGRAV 1.015 06/07/2021    GLUCOSEU Negative 06/07/2021    GLUCOSEU NEGATIVE 04/13/2012       Consults:    IP CONSULT TO HOSPITALIST      Assessment/Plan:    Active Hospital Problems    Diagnosis     Stage 3 severe COPD by GOLD classification ([facility] [J44.9]      Priority: Medium    Acute on chronic respiratory failure ([facility] [J96.20]     COPD exacerbation ([facility] [J44.1]     Pulmonary fibrosis ([facility] [J84.10]     Tobacco dependence [F17.200]        COPD - w/ ILD and chronic respiratory failure on baseline home O2 at 2L nocturnal.  Normally controlled on home medication regimen - here w/ acute exacerbation - on IV Steroids/HHN. Followed outpt by Dr. Tammie Desouza. Has required Brochoscopy for mucous plugging in past    Acute on Chronic Respiratory Failure - w/ increased hypoxia, POArrival.  Presence of clinical respiratory distress w/ tachypnea/dyspnea/SOB and wheezing w/ use of accessory muscles to breath. Supplemental O2 and wean as tolerated. Pulmonology consulted and appreciated in advance - NPO pending evaluation. HypoNatremia - etiology clinically unable to determine but likely hypovolemic.   Follow

## 2021-06-09 NOTE — CARE COORDINATION
Chart reviewed. Care per IM. Spoke with [facility]. Currently on RA. Has O2 for use at , provided by [employer]. From home with spouse.  Marv Soria RN

## 2021-06-10 PROBLEM — F12.20 CANNABIS DEPENDENCE WITH CURRENT USE (HCC): Status: ACTIVE | Noted: 2021-06-10

## 2021-06-10 LAB
ANION GAP SERPL CALCULATED.3IONS-SCNC: 8 MMOL/L (ref 3–16)
BASOPHILS ABSOLUTE: 0 K/UL (ref 0–0.2)
BASOPHILS RELATIVE PERCENT: 0.1 %
BUN BLDV-MCNC: 14 MG/DL (ref 7–20)
CALCIUM SERPL-MCNC: 9.3 MG/DL (ref 8.3–10.6)
CHLORIDE BLD-SCNC: 97 MMOL/L (ref 99–110)
CO2: 24 MMOL/L (ref 21–32)
CREAT SERPL-MCNC: 0.6 MG/DL (ref 0.6–1.2)
EOSINOPHILS ABSOLUTE: 0 K/UL (ref 0–0.6)
EOSINOPHILS RELATIVE PERCENT: 0 %
GFR AFRICAN AMERICAN: >60
GFR NON-AFRICAN AMERICAN: >60
GLUCOSE BLD-MCNC: 112 MG/DL (ref 70–99)
HCT VFR BLD CALC: 35.8 % (ref 36–48)
HEMOGLOBIN: 12.1 G/DL (ref 12–16)
LYMPHOCYTES ABSOLUTE: 0.9 K/UL (ref 1–5.1)
LYMPHOCYTES RELATIVE PERCENT: 6.1 %
MAGNESIUM: 2 MG/DL (ref 1.8–2.4)
MCH RBC QN AUTO: 30 PG (ref 26–34)
MCHC RBC AUTO-ENTMCNC: 33.7 G/DL (ref 31–36)
MCV RBC AUTO: 89 FL (ref 80–100)
MONOCYTES ABSOLUTE: 0.6 K/UL (ref 0–1.3)
MONOCYTES RELATIVE PERCENT: 4.3 %
NEUTROPHILS ABSOLUTE: 13.1 K/UL (ref 1.7–7.7)
NEUTROPHILS RELATIVE PERCENT: 89.5 %
PDW BLD-RTO: 13.1 % (ref 12.4–15.4)
PLATELET # BLD: 298 K/UL (ref 135–450)
PMV BLD AUTO: 7 FL (ref 5–10.5)
POTASSIUM SERPL-SCNC: 4.8 MMOL/L (ref 3.5–5.1)
RBC # BLD: 4.02 M/UL (ref 4–5.2)
SODIUM BLD-SCNC: 129 MMOL/L (ref 136–145)
WBC # BLD: 14.7 K/UL (ref 4–11)

## 2021-06-10 PROCEDURE — 80048 BASIC METABOLIC PNL TOTAL CA: CPT

## 2021-06-10 PROCEDURE — 6370000000 HC RX 637 (ALT 250 FOR IP): Performed by: INTERNAL MEDICINE

## 2021-06-10 PROCEDURE — 2580000003 HC RX 258: Performed by: INTERNAL MEDICINE

## 2021-06-10 PROCEDURE — 94761 N-INVAS EAR/PLS OXIMETRY MLT: CPT

## 2021-06-10 PROCEDURE — 2700000000 HC OXYGEN THERAPY PER DAY

## 2021-06-10 PROCEDURE — 94669 MECHANICAL CHEST WALL OSCILL: CPT

## 2021-06-10 PROCEDURE — 83735 ASSAY OF MAGNESIUM: CPT

## 2021-06-10 PROCEDURE — 6360000002 HC RX W HCPCS: Performed by: INTERNAL MEDICINE

## 2021-06-10 PROCEDURE — 94640 AIRWAY INHALATION TREATMENT: CPT

## 2021-06-10 PROCEDURE — 99223 1ST HOSP IP/OBS HIGH 75: CPT | Performed by: INTERNAL MEDICINE

## 2021-06-10 PROCEDURE — 36415 COLL VENOUS BLD VENIPUNCTURE: CPT

## 2021-06-10 PROCEDURE — 85025 COMPLETE CBC W/AUTO DIFF WBC: CPT

## 2021-06-10 PROCEDURE — 1200000000 HC SEMI PRIVATE

## 2021-06-10 RX ADMIN — METHYLPREDNISOLONE SODIUM SUCCINATE 40 MG: 40 INJECTION, POWDER, FOR SOLUTION INTRAMUSCULAR; INTRAVENOUS at 09:37

## 2021-06-10 RX ADMIN — IPRATROPIUM BROMIDE AND ALBUTEROL SULFATE 1 AMPULE: .5; 3 SOLUTION RESPIRATORY (INHALATION) at 15:46

## 2021-06-10 RX ADMIN — IPRATROPIUM BROMIDE AND ALBUTEROL SULFATE 1 AMPULE: .5; 3 SOLUTION RESPIRATORY (INHALATION) at 20:19

## 2021-06-10 RX ADMIN — TRAZODONE HYDROCHLORIDE 300 MG: 50 TABLET ORAL at 20:02

## 2021-06-10 RX ADMIN — Medication 10 ML: at 20:02

## 2021-06-10 RX ADMIN — IPRATROPIUM BROMIDE AND ALBUTEROL SULFATE 1 AMPULE: .5; 3 SOLUTION RESPIRATORY (INHALATION) at 00:01

## 2021-06-10 RX ADMIN — MONTELUKAST SODIUM 10 MG: 10 TABLET ORAL at 20:02

## 2021-06-10 RX ADMIN — IPRATROPIUM BROMIDE AND ALBUTEROL SULFATE 1 AMPULE: .5; 3 SOLUTION RESPIRATORY (INHALATION) at 23:50

## 2021-06-10 RX ADMIN — METHYLPREDNISOLONE SODIUM SUCCINATE 40 MG: 40 INJECTION, POWDER, FOR SOLUTION INTRAMUSCULAR; INTRAVENOUS at 20:03

## 2021-06-10 RX ADMIN — ACETAMINOPHEN 650 MG: 325 TABLET ORAL at 16:00

## 2021-06-10 RX ADMIN — BUSPIRONE HYDROCHLORIDE 30 MG: 5 TABLET ORAL at 20:02

## 2021-06-10 RX ADMIN — BENZONATATE 200 MG: 100 CAPSULE ORAL at 15:57

## 2021-06-10 RX ADMIN — IPRATROPIUM BROMIDE AND ALBUTEROL SULFATE 1 AMPULE: .5; 3 SOLUTION RESPIRATORY (INHALATION) at 11:36

## 2021-06-10 RX ADMIN — IPRATROPIUM BROMIDE AND ALBUTEROL SULFATE 1 AMPULE: .5; 3 SOLUTION RESPIRATORY (INHALATION) at 03:44

## 2021-06-10 RX ADMIN — IPRATROPIUM BROMIDE AND ALBUTEROL SULFATE 1 AMPULE: .5; 3 SOLUTION RESPIRATORY (INHALATION) at 08:03

## 2021-06-10 ASSESSMENT — PAIN SCALES - GENERAL
PAINLEVEL_OUTOF10: 5
PAINLEVEL_OUTOF10: 0
PAINLEVEL_OUTOF10: 0

## 2021-06-10 ASSESSMENT — ENCOUNTER SYMPTOMS: TACHYPNEA: 1

## 2021-06-10 NOTE — CONSULTS
count 81977, metabolic panel concerning for underlying dehydration since her sodium is125, chloride of 95 and CO2 of 20. Blood gas: pH:7.45, PO2 is 179, bicarb is 20.1, PCO2 is 29.6 and carboxyhemoglobin is 3.8 all consistent with her history of COPD. She was given Solu-Medrol and DuoNeb nebulizer, but still required supplemental oxygen and thus admitted to the hospital ib 06/07/2021.     Hospital Course:  Acute Resp failure- 86% on RA with ambulation 2/2 COPD exacerbation, no obvious infection  -supportive care with duonebs, IVsteroids  COPD-2L qhs prn  -continued singulair, nebs, Spiriva  HypoNatremia - etiology clinically unable to determine but likely hypovolemic. HLD- on statin  Tobacco use- counseled; started on patch     Anxiety/depression-On buspar, prozac, trazodone  IV:   sodium chloride         ROS:   Constitutional: Denies fever, or chills. Appetite is good  Eyes: No pain, no blurring, no redness. ENT: No dizziness, ear ringing, no change in hearing  Cardiovascular: No palpitation, no chest pain, no lightheadedness. Respiratory: Improvement in SOB  GI: No nausea, vomiting, or diarrhea. No indigestion. No reflux symptoms. : No dysuria, frequency, cloudy urine, or bloody urine. Musculoskeletal: No muscle soreness. No joints swelling. Skin: Denies bruising, laceration, lumps. Neurologic: Denies dizziness, numbness, tingling, weakness. Endocrine: No heat or cold intolerance. Psychiatric: Denies depression, anxiety.     Patient Active Problem List    Diagnosis Date Noted    Stage 3 severe COPD by GOLD classification ([facility] 12/03/2009    Hypokalemia 03/19/2012    Fibromyalgia 06/11/2010    Cannabis dependence with current use ([facility] 06/10/2021    Acute on chronic respiratory failure ([facility] 06/07/2021    COPD [facility]ch[facility] disease[facility] 05/24/2021    Acute on chronic respiratory failure with hypoxemia ([facility] 03/14/2021    Pulmonary infiltrates 06/25/2020    Chest congestion 06/25/2020    Mucus plugging of bronchi 06/25/2020    Ineffective airway clearance 06/25/2020    COPD exacerbation ([address] 06/24/2020    Sepsis ([address] 06/01/2020    Healthcare associated bacterial pneumonia     Bandemia     Acute respiratory failure with hypoxia ([address] 04/03/2020    Acute on chronic respiratory failure with hypoxia and hypercapnia (HCC) 03/04/2020    Pneumonia 03/19/2019    Compression fx, thoracic spine, sequela 12/14/2018    Kyphosis 12/14/2018    Anxiety and depression 12/14/2018    Chronic pain syndrome 12/14/2018    Polyarthropathy, multiple sites 12/14/2018    Esophageal dysphagia     Heartburn     Rectal bleeding     History of colon polyps     History of prescription drug abuse 04/21/2018    Thyroid nodule 04/13/2018    Recurrent major depressive disorder, in partial remission ([address] 01/25/2018    ILD (interstitial lung disease) ([address] 04/06/2016    Pulmonary fibrosis ([address] 10/16/2014    Tobacco dependence 09/27/2014    Depression 04/11/2013    Hyperlipidemia 09/10/2012    Hyponatremia 09/10/2012    Smoker 12/03/2009       Past Medical History:   Diagnosis Date    Allergic rhinitis 6/11/2010    Anxiety     Arthritis     Aspiration pneumonia ([address] 3/21/2012    Recurrent    Asthma     Bronchitis chronic     COPD (chronic obstructive pulmonary disease) ([address] 12/3/2009    Depression     Drug abuse, cocaine type ([address]     past history of crack cocaine use    Emphysema of lung ([address]     Fibromyalgia     GERD (gastroesophageal reflux disease)     Hyperlipidemia     Influenza A 03/05/2020    Lung disease     On home oxygen therapy     uses O2 NC 3L prn at night    Osteoporosis     Pneumonia     Polysubstance dependence ([address] 1/2/2012    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    Pulmonary fibrosis ([address] 10/16/2014    Pulmonary infiltrate     Pulmonary nodule 12/3/2009    Tobacco abuse         Past Surgical History:   Procedure Laterality Date    BLADDER REPAIR      BRONCHOSCOPY      BRONCHOSCOPY N/A 3/6/2020    BRONCHOSCOPY THERAPUTIC ASPIRATION INITIAL performed by Michell Menendez MD at [address] [place] [address]  3/6/2020    BRONCHOSCOPY ALVEOLAR LAVAGE performed by Michell Menendez MD at [address] [place] [address] N/A 6/26/2020    BRONCHOSCOPY THERAPUTIC ASPIRATION INITIAL performed by Jayson Kaba MD at [address] [place] [address]  6/26/2020    BRONCHOSCOPY ALVEOLAR LAVAGE performed by Jayson Kaba MD at [address] [address]    ENDOSCOPY, COLON, DIAGNOSTIC      ESOPHAGEAL DILATATION  9/20/2018    ESOPHAGEAL DILATION Edwin Flatness performed by Regan Arevalo MD at [address]  2/12/15    T8 Kyphoplasty    CT COLONOSCOPY FLX DX W/COLLJ SPEC WHEN PFRMD N/A 9/20/2018    EGD AND COLONOSCOPY WITH ANESTHESIA performed by Regan Arevalo MD at [address] [place] [address] ESOPHAGOGASTRODUODENOSCOPY TRANSORAL DIAGNOSTIC N/A 9/20/2018    EGD AND COLONOSCOPY WITH ANESTHESIA performed by Regan Arevalo MD at [address] [place] [address]          Family History   Problem Relation Age of Onset    Asthma Mother     Diabetes Mother     Emphysema Mother     Heart Failure Mother     Hypertension Mother     Heart Disease Mother     High Cholesterol Mother     Cancer Mother     Depression Mother     Diabetes Father     Emphysema Father     Heart Failure Father     Hypertension Father     Heart Disease Father     High Cholesterol Father     Substance Abuse Father     Emphysema Paternal Grandfather     Diabetes Sister     High Cholesterol Sister     Vision Loss Maternal Uncle         Social History     Tobacco Use    Smoking status: Current Every Day Smoker     Packs/day: 0.50     Years: 40.00     Pack years: 20.00     Types: Cigarettes     Start date: 1/1/1972     Last attempt to quit: 10/12/2020 Years since quittin.6    Smokeless tobacco: Never Used    Tobacco comment: 0.5 PPD restarted   Substance Use Topics    Alcohol use: No     Alcohol/week: 0.0 standard drinks        Allergies   Allergen Reactions    Meperidine Anaphylaxis     \"Demerol\"     Demerol Hives         Vital Signs:  Blood pressure 121/78, pulse 100, temperature 98.3 °F (36.8 °C), temperature source Oral, resp. rate 22, height 5' 4\" (1.626 m), weight 137 lb 9.6 oz (62.4 kg), SpO2 94 %, not currently breastfeeding.' on RA  Body mass index is 23.62 kg/m². CVP:      Intake/Output Summary (Last 24 hours) at 6/10/2021 1056  Last data filed at 6/10/2021 7639  Gross per 24 hour   Intake 370 ml   Output --   Net 370 ml         PHYSICAL EXAM:  General:  Sitting in bed with O2, conversing easily, asking questions  Eyes:  No scleral icterus or periorbital edema. Head: Atraumatic, normocephalic. ENMT: OG/NG intact. No bleeding of lips or gums. Mucosa pink and moist.   Neck: No  lymphadenopathy, no JVD. No tracheal deviation. Lymphadenopathy: No cervical, supraclavicular adenopathy. CV: S1, S2, regular rate and rhythm   Respiratory: Good air movement at the bases, still wheezing throughout all lung fields  Chest: Equal rise and fall of chest.   GI: Abdomen soft. No organomegaly. Bowel sounds present. Skin: No rashes, lesions Color, texture, turgor normal.  Musculoskeletal: Supple,  No joint swelling, redness or edema  Neuro: Detailed exam not performed, moves all extremities.        Results:  CBC:   Recent Labs     21  0452 21  0550 06/10/21  0606   WBC 11.0 13.7* 14.7*   HGB 11.6* 12.0 12.1   HCT 33.3* 34.8* 35.8*   MCV 87.8 87.9 89.0    287 298     BMP:   Recent Labs     21  0452 21  0550 06/10/21  0606   * 131* 129*   K 4.1 4.5 4.8    99 97*   CO2 23 21 24   BUN 12 13 14   CREATININE 0.6 0.6 0.6     LIVER PROFILE: No results for input(s): AST, ALT, LIPASE, BILIDIR, BILITOT, ALKPHOS in the last 72 hours. Invalid input(s): AMYLASE,  ALB  PT/INR: No results for input(s): PROTIME, INR in the last 72 hours. APTT: No results for input(s): APTT in the last 72 hours. UA:  Recent Labs     06/07/21  1455   COLORU Yellow   PHUR 7.0   CLARITYU SL CLOUDY*   SPECGRAV 1.015   LEUKOCYTESUR Negative   UROBILINOGEN 0.2   BILIRUBINUR Negative   BLOODU Negative   GLUCOSEU Negative       Imaging:  I have reviewed radiology images personally. XR CHEST PORTABLE   Final Result   Stable bilateral lower lobe predominant increased interstitial opacities. No   superimposed acute process. No significant interval change compared to May   24, 2021. XR CHEST PORTABLE    Result Date: 6/8/2021  EXAMINATION: ONE XRAY VIEW OF THE CHEST 6/7/2021 10:07 am COMPARISON: May 24, 2021 HISTORY: ORDERING SYSTEM PROVIDED HISTORY: SOB TECHNOLOGIST PROVIDED HISTORY: Reason for exam:->SOB Reason for Exam: resp distress Acuity: Acute Type of Exam: Initial Initial evaluation for acute respiratory distress. FINDINGS: The cardiomediastinal silhouette is normal in size. Mildly increased interstitial opacities are present bilaterally with lower lobe predominance. This appears stable from previous exam. No focal lobar consolidation or air bronchograms. No pleural effusion or pneumothorax. No significant interval change. Stable bilateral lower lobe predominant increased interstitial opacities. No superimposed acute process. No significant interval change compared to May 24, 2021. XR CHEST PORTABLE    Result Date: 5/24/2021  EXAMINATION: ONE XRAY VIEW OF THE CHEST 5/24/2021 2:51 pm COMPARISON: Prior study(s) most recent chest CT 03/14/2021. HISTORY: ORDERING SYSTEM PROVIDED HISTORY: sob TECHNOLOGIST PROVIDED HISTORY: Reason for exam:->sob Reason for Exam: sob Acuity: Acute Type of Exam: Initial FINDINGS: The heart is normal size. Interstitial changes at the lung bases are present, asymmetric involvement left lower lobe. Remainder the lungs are somewhat hyperinflated with emphysema, upper lobe predominant. No pneumothorax. No pleural effusion. Reticular densities asymmetric left lung base likely representing fibrosis or chronic postinflammatory change. Suspect moderate COPD. EXAMINATION:   CTA OF THE CHEST 3/14/2021 9:19 pm       TECHNIQUE:   CTA of the chest was performed after the administration of intravenous   contrast.  Multiplanar reformatted images are provided for review.  MIP   images are provided for review.  Dose modulation, iterative reconstruction,   and/or weight based adjustment of the mA/kV was utilized to reduce the   radiation dose to as low as reasonably achievable.       COMPARISON:   None.       HISTORY:   ORDERING SYSTEM PROVIDED HISTORY: Hypoxia with tachycardia R/O PE; also   assymetrical L basilar opacification in pt with emphysema and chronic tobacco   abuse   TECHNOLOGIST PROVIDED HISTORY:   Reason for exam:->Hypoxia with tachycardia R/O PE; also assymetrical L   basilar opacification in pt with emphysema and chronic tobacco abuse   Decision Support Exception->Emergency Medical Condition (MA)   Reason for Exam: sob x 2 days-hypoxia-tachycardia   Acuity: Acute   Type of Exam: Initial   Relevant Medical/Surgical History: no surg       FINDINGS:   Pulmonary Arteries: Pulmonary arteries are adequately opacified for   evaluation.  No evidence of intraluminal filling defect to suggest pulmonary   embolism.  Main pulmonary artery is normal in caliber.       Mediastinum: No evidence of mediastinal lymphadenopathy.  The heart and   pericardium demonstrate no acute abnormality.  There is no acute abnormality   of the thoracic aorta.       Lungs/pleura: Mild scattered fibrotic changes are noted in the bilateral   lungs.  The lungs are without acute process.  No focal consolidation or   pulmonary edema.  No evidence of pleural effusion or pneumothorax.       Upper Abdomen: Limited images of the upper abdomen are unremarkable.       Soft Tissues/Bones: No acute bone or soft tissue abnormality.  Chronic   compression fractures of the thoracic spine including a severe central   compression of the T3 with minimal retropulsion, severe compression of T8   with minimal retropulsion and evidence of previous vertebroplasty, and mild   anterior compression of the T5 vertebral body.  Exaggerated thoracic kyphotic   curvature.  Generalized osteopenia. CT from 03/14/2021 reviewed  - mixed pulmonary fibrosis and emphysema  - large R ventricle, suspicious for pHTN  - Left lung > R bronchiectasis  - Some bronchomalacia        ASSESSMENT AND PLAN:    Principal Problem:    COPD exacerbation (HCC)  Active Problems:    Stage 3 severe COPD by GOLD classification ([address]    Tobacco dependence    Pulmonary fibrosis (HCC)    Acute on chronic respiratory failure ([address]  Resolved Problems:    * No resolved hospital problems. *      Mixed Obstructive and Restrictive Lung Disease  > Severe COPD, followed by Dr. Dyer Alt OP  > concomitant pulmonary fibrosis  > Mucus plugging seen on imaging, possible bronch in am   > Can switch IV solumedrol to oral prednisone 50 mg daily   > Pt wants to stop smoking completely upon leaving the hospital     Chronic Hyponatremia  > Stable, Continue oral fluids    Leukocytosis  - More likely 2/2 to inflammation rather than infection  - Procalcitonin: 0.02       Echocardiogram: 03/14/2021   Left ventricular systolic function is normal with ejection fraction   estimated at 55%. No regional wall motion abnormalities. There is mild concentric left ventricular hypertrophy. Grade I diastolic dysfunction with normal left ventricular filling pressure. Moderate aortic regurgitation.     PFT:  12/02/2020  FEV1/ FVC  54<  TLC             78<  Dsb             6.97<    I have examined this patient and available medical records, including all the radiographic and all relevant lab data/studies personally on this date and have made the above observations, conclusions and recommendations. Have discussed with nursing staff, RT, patient, family:     Thank you for the consultation. We will continue to follow with you.     Electronically signed by:  Jolene Godwin DO

## 2021-06-10 NOTE — PLAN OF CARE
Problem: Falls - Risk of:  Goal: Will remain free from falls  Description: Will remain free from falls  6/10/2021 0037 by Elil Cobb RN  Outcome: Ongoing  Note: Pt will remain free from falls. Pt is a medium fall risk. Call light within reach. Bed side table within reach. Wheels locked. Bed in lowest position. Pt instructed to call out for assistance. Pt expressesed understanding & calls out appropritately. All care per orders. Will continue to monitor.

## 2021-06-10 NOTE — PROGRESS NOTES
06/10/21 0800   RT Protocol   Smoking Status 2   Surgical status 0   Xray 1   Respiratory pattern 0   Mental Status 0   Breath sounds 1   Cough 0   Activity level 1   Oxygen Requirement 0   Indications for Bronchodilator Therapy Wheezing associated with pulm disorder   Bronchodilator Assessment Score 4   Bronchial Hygiene Assessment Score 5   Volume Expansion Assessment Score 3

## 2021-06-10 NOTE — PROGRESS NOTES
Pt is alert and oriented. VSS. RA. Pt denies pain and discomfort at this time. Shift assessment completed and documented. Call light within reach. Bed side table within reach. Wheels locked. Bed in lowest position. Pt instructed to call out for assistance. Pt expressesed understanding & calls out appropritately. All care per orders. Will continue to monitor.  Electronically signed by Phong Gill RN on 6/10/2021 at 12:35 AM

## 2021-06-10 NOTE — PROGRESS NOTES
appropriate, normal insight  Capillary Refill: Brisk,< 3 seconds   Peripheral Pulses: +2 palpable, equal bilaterally       Labs:   Recent Labs     06/08/21  0452 06/09/21  0550 06/10/21  0606   WBC 11.0 13.7* 14.7*   HGB 11.6* 12.0 12.1   HCT 33.3* 34.8* 35.8*    287 298     Recent Labs     06/08/21  0452 06/09/21  0550 06/10/21  0606   * 131* 129*   K 4.1 4.5 4.8    99 97*   CO2 23 21 24   BUN 12 13 14   CREATININE 0.6 0.6 0.6   CALCIUM 8.5 9.0 9.3     No results for input(s): AST, ALT, BILIDIR, BILITOT, ALKPHOS in the last 72 hours. No results for input(s): INR in the last 72 hours. No results for input(s): [place] in the last 72 hours. Urinalysis:      Lab Results   Component Value Date    NITRU Negative 06/07/2021    WBCUA 0-3 04/13/2012    RBCUA 3-5 04/13/2012    BLOODU Negative 06/07/2021    SPECGRAV 1.015 06/07/2021    GLUCOSEU Negative 06/07/2021    GLUCOSEU NEGATIVE 04/13/2012       Consults:    IP CONSULT TO HOSPITALIST  IP CONSULT TO PULMONOLOGY      Assessment/Plan:    Active Hospital Problems    Diagnosis     Stage 3 severe COPD by GOLD classification ([facility] [J44.9]      Priority: Medium    Acute on chronic respiratory failure ([facility] [J96.20]     COPD exacerbation ([facility] [J44.1]     Pulmonary fibrosis ([facility] [J84.10]     Tobacco dependence [F17.200]        COPD - w/ ILD and chronic respiratory failure on baseline home O2 at 2L nocturnal.  Normally controlled on home medication regimen - here w/ acute exacerbation - on IV Steroids/HHN. Followed outpt by Dr. Joesph Scott. Has required Brochoscopy for mucous plugging in past    Acute on Chronic Respiratory Failure - w/ increased hypoxia, POArrival.  Presence of clinical respiratory distress w/ tachypnea/dyspnea/SOB and wheezing w/ use of accessory muscles to breath. Supplemental O2 and wean as tolerated. Pulmonology consulted and appreciated in advance - NPO pending evaluation.      HypoNatremia - etiology clinically unable to determine but likely hypovolemic. Follow serial labs off IVF. Reviewed and documented as above. Tobacco Abuse - active and ongoing. Cessation counseled. Nicotine replacement PRN.     Depression/Anxiety - controlled on home Fluoxetine, Bupropion, and Trazodone - continued.         DVT Prophylaxis: LMWH/IPC     Recent Labs     06/08/21  0452 06/09/21  0550 06/10/21  0606    287 298     Diet: Diet NPO  Code Status: Full Code      PT/OT Eval Status: not yet ordered. Dispo - Likely to home Thurs/Friday 10/11 Farhana pending clinical course and subspecialty recs.  Clarissa Álvarez MD

## 2021-06-10 NOTE — RT PROTOCOL NOTE
RT Inhaler-Nebulizer Bronchodilator Protocol Note    There is a bronchodilator order in the chart from a provider indicating to follow the RT Bronchodilator Protocol and there is an Initiate RT Bronchodilator Protocol order as well (see protocol at bottom of note). The findings from the last RT Protocol Assessment were as follows:  Smoking: >15 Pack years  Surgical Status: No surgery  Xray: Chronic changes  Respiratory Pattern: RR 12-20  Mental Status: Alert and Oriented  Breath Sounds: Diminished and/or crackles  Cough: Strong, spontaneous, non-productive  Activity Level: Walking with assistance  Oxygen Requirement: Room Air - 2LNC/28% or home setting  Indication for Bronchodilator Therapy: Wheezing associated with pulm disorder  Bronchodilator Assessment Score: 4    Aerosolized bronchodilator medication orders have been revised according to the RT Bronchodilator Protocol. RT Inhaler-Nebulizer Bronchodilator Protocol:    Respiratory Therapist to perform RT Therapy Protocol Assessment then follow the protocol. No Indications - adjust the frequency to every 6 hours PRN wheezing or bronchospasm, if no treatments needed after 48 hours then discontinue using Per Protocol order mode. If indication present, adjust the RT bronchodilator orders based on the Bronchodilator Assessment Score as follows:    0-6 - enter or revise RT bronchodilator order to Albuterol Inhaler order with frequency of every 2 hours PRN for wheezing or increased work of breathing using Per Protocol order mode. If Albuterol Inhaler not tolerated or not effective, then discontinue the Albuterol Inhaler order and enter Albuterol Nebulizer order with same frequency and PRN reasons. Repeat RT Therapy Protocol Assessment as needed.     7-10 - discontinue any other Inpatient aerosolized bronchodilator medication orders and enter or revise two Albuterol Inhaler orders, one with BID frequency and one with frequency of every 2 hours PRN wheezing or increased work of breathing using Per Protocol order mode. Repeat RT Therapy Protocol Assessment with second treatment then BID and as needed. If Albuterol Inhaler not tolerated or not effective, then discontinue the Albuterol Inhaler orders and enter two Albuterol Nebulizer orders with same frequencies and PRN reasons. 11-13 - discontinue any other Inpatient aerosolized bronchodilator medication orders and enter DuoNeb Nebulizer orders QID frequency and an Albuterol Nebulizer order every 2 hours PRN wheezing or increased work of breathing using Per Protocol order mode. Repeat RT Therapy Protocol Assessment with second treatment then QID and as needed. Greater than 13 - discontinue any other Inpatient bronchodilator aerosolized medication orders and enter DuoNeb Nebulizer order every 4 hours frequency and Albuterol Nebulizer every 2 hours PRN wheezing or increased work of breathing using Per Protocol order mode. Repeat RT Therapy Protocol Assessment with second treatment then every 4 hours and as needed. RT to enter RT Home Evaluation for COPD & MDI Assessment order using Per Protocol order mode.     Electronically signed by Peace Garcia RCP on 6/10/2021 at 8:30 AM

## 2021-06-11 VITALS
HEART RATE: 90 BPM | RESPIRATION RATE: 18 BRPM | OXYGEN SATURATION: 94 % | SYSTOLIC BLOOD PRESSURE: 127 MMHG | WEIGHT: 152.9 LBS | DIASTOLIC BLOOD PRESSURE: 72 MMHG | HEIGHT: 64 IN | TEMPERATURE: 97.8 F | BODY MASS INDEX: 26.1 KG/M2

## 2021-06-11 LAB
ANION GAP SERPL CALCULATED.3IONS-SCNC: 8 MMOL/L (ref 3–16)
BANDED NEUTROPHILS RELATIVE PERCENT: 5 % (ref 0–7)
BASOPHILS ABSOLUTE: 0 K/UL (ref 0–0.2)
BASOPHILS RELATIVE PERCENT: 0 %
BUN BLDV-MCNC: 17 MG/DL (ref 7–20)
CALCIUM SERPL-MCNC: 8.8 MG/DL (ref 8.3–10.6)
CHLORIDE BLD-SCNC: 99 MMOL/L (ref 99–110)
CO2: 24 MMOL/L (ref 21–32)
CREAT SERPL-MCNC: 0.7 MG/DL (ref 0.6–1.2)
EOSINOPHILS ABSOLUTE: 0 K/UL (ref 0–0.6)
EOSINOPHILS RELATIVE PERCENT: 0 %
GFR AFRICAN AMERICAN: >60
GFR NON-AFRICAN AMERICAN: >60
GLUCOSE BLD-MCNC: 113 MG/DL (ref 70–99)
HCT VFR BLD CALC: 36 % (ref 36–48)
HEMOGLOBIN: 12 G/DL (ref 12–16)
LYMPHOCYTES ABSOLUTE: 1 K/UL (ref 1–5.1)
LYMPHOCYTES RELATIVE PERCENT: 6 %
MAGNESIUM: 1.9 MG/DL (ref 1.8–2.4)
MCH RBC QN AUTO: 30.1 PG (ref 26–34)
MCHC RBC AUTO-ENTMCNC: 33.4 G/DL (ref 31–36)
MCV RBC AUTO: 90 FL (ref 80–100)
MONOCYTES ABSOLUTE: 0.6 K/UL (ref 0–1.3)
MONOCYTES RELATIVE PERCENT: 4 %
NEUTROPHILS ABSOLUTE: 14.3 K/UL (ref 1.7–7.7)
NEUTROPHILS RELATIVE PERCENT: 85 %
PDW BLD-RTO: 13.1 % (ref 12.4–15.4)
PLATELET # BLD: 305 K/UL (ref 135–450)
PMV BLD AUTO: 7 FL (ref 5–10.5)
POLYCHROMASIA: ABNORMAL
POTASSIUM SERPL-SCNC: 4.5 MMOL/L (ref 3.5–5.1)
RBC # BLD: 4.01 M/UL (ref 4–5.2)
SODIUM BLD-SCNC: 131 MMOL/L (ref 136–145)
WBC # BLD: 15.9 K/UL (ref 4–11)

## 2021-06-11 PROCEDURE — 6360000002 HC RX W HCPCS: Performed by: INTERNAL MEDICINE

## 2021-06-11 PROCEDURE — 99232 SBSQ HOSP IP/OBS MODERATE 35: CPT | Performed by: INTERNAL MEDICINE

## 2021-06-11 PROCEDURE — 6370000000 HC RX 637 (ALT 250 FOR IP): Performed by: INTERNAL MEDICINE

## 2021-06-11 PROCEDURE — 85025 COMPLETE CBC W/AUTO DIFF WBC: CPT

## 2021-06-11 PROCEDURE — 36415 COLL VENOUS BLD VENIPUNCTURE: CPT

## 2021-06-11 PROCEDURE — 94640 AIRWAY INHALATION TREATMENT: CPT

## 2021-06-11 PROCEDURE — 83735 ASSAY OF MAGNESIUM: CPT

## 2021-06-11 PROCEDURE — 2700000000 HC OXYGEN THERAPY PER DAY

## 2021-06-11 PROCEDURE — 94761 N-INVAS EAR/PLS OXIMETRY MLT: CPT

## 2021-06-11 PROCEDURE — 94669 MECHANICAL CHEST WALL OSCILL: CPT

## 2021-06-11 PROCEDURE — 80048 BASIC METABOLIC PNL TOTAL CA: CPT

## 2021-06-11 RX ORDER — PREDNISONE 20 MG/1
40 TABLET ORAL DAILY
Qty: 10 TABLET | Refills: 0 | Status: SHIPPED | OUTPATIENT
Start: 2021-06-11 | End: 2021-06-16

## 2021-06-11 RX ADMIN — FLUOXETINE 60 MG: 20 CAPSULE ORAL at 09:00

## 2021-06-11 RX ADMIN — ATORVASTATIN CALCIUM 10 MG: 10 TABLET, FILM COATED ORAL at 09:00

## 2021-06-11 RX ADMIN — METHYLPREDNISOLONE SODIUM SUCCINATE 40 MG: 40 INJECTION, POWDER, FOR SOLUTION INTRAMUSCULAR; INTRAVENOUS at 09:00

## 2021-06-11 RX ADMIN — ENOXAPARIN SODIUM 40 MG: 40 INJECTION SUBCUTANEOUS at 09:01

## 2021-06-11 RX ADMIN — BUSPIRONE HYDROCHLORIDE 30 MG: 5 TABLET ORAL at 09:00

## 2021-06-11 RX ADMIN — IPRATROPIUM BROMIDE AND ALBUTEROL SULFATE 1 AMPULE: .5; 3 SOLUTION RESPIRATORY (INHALATION) at 08:17

## 2021-06-11 NOTE — PROGRESS NOTES
Hospitalist Progress Note      PCP: Last Nolan MD    Date of Admission: 6/7/2021    Chief Complaint: SOB    Subjective: no new c/o. Medications:  Reviewed    Infusion Medications    sodium chloride       Scheduled Medications    busPIRone  30 mg Oral BID    FLUoxetine  60 mg Oral Daily    montelukast  10 mg Oral Nightly    atorvastatin  10 mg Oral Daily    traZODone  300 mg Oral Nightly    enoxaparin  40 mg Subcutaneous Daily    nicotine  1 patch Transdermal Daily    methylPREDNISolone  40 mg Intravenous Q12H    ipratropium-albuterol  1 ampule Inhalation Q4H     PRN Meds: sodium chloride flush, sodium chloride, ondansetron **OR** ondansetron, acetaminophen **OR** acetaminophen, albuterol, benzonatate      Intake/Output Summary (Last 24 hours) at 6/11/2021 0923  Last data filed at 6/10/2021 2103  Gross per 24 hour   Intake 950 ml   Output --   Net 950 ml       Physical Exam Performed:    /79   Pulse 93   Temp 97.9 °F (36.6 °C) (Oral)   Resp 18   Ht 5' 4\" (1.626 m)   Wt 152 lb 14.4 oz (69.4 kg)   SpO2 94%   BMI 26.25 kg/m²     General appearance: No apparent distress, appears stated age and cooperative. HEENT: Pupils equal, round, and reactive to light. Conjunctivae/corneas clear. Neck: Supple, with full range of motion. No jugular venous distention. Trachea midline. Respiratory:  Normal respiratory effort. Clear to auscultation, bilaterally without Rales/Wheezes/Rhonchi. Cardiovascular: Regular rate and rhythm with normal S1/S2 without murmurs, rubs or gallops. Abdomen: Soft, non-tender, non-distended with normal bowel sounds. Musculoskeletal: No clubbing, cyanosis or edema bilaterally. Full range of motion without deformity. Skin: Skin color, texture, turgor normal.  No rashes or lesions. Neurologic:  Neurovascularly intact without any focal sensory/motor deficits.  Cranial nerves: II-XII intact, grossly non-focal.  Psychiatric: Alert and oriented, thought content appropriate, normal insight  Capillary Refill: Brisk,< 3 seconds   Peripheral Pulses: +2 palpable, equal bilaterally       Labs:   Recent Labs     06/09/21  0550 06/10/21  0606 06/11/21  0639   WBC 13.7* 14.7* 15.9*   HGB 12.0 12.1 12.0   HCT 34.8* 35.8* 36.0    298 305     Recent Labs     06/09/21  0550 06/10/21  0606 06/11/21  0639   * 129* 131*   K 4.5 4.8 4.5   CL 99 97* 99   CO2 21 24 24   BUN 13 14 17   CREATININE 0.6 0.6 0.7   CALCIUM 9.0 9.3 8.8     No results for input(s): AST, ALT, BILIDIR, BILITOT, ALKPHOS in the last 72 hours. No results for input(s): INR in the last 72 hours. No results for input(s): Kirkpatrick Lancaster in the last 72 hours. Urinalysis:      Lab Results   Component Value Date    NITRU Negative 06/07/2021    WBCUA 0-3 04/13/2012    RBCUA 3-5 04/13/2012    BLOODU Negative 06/07/2021    SPECGRAV 1.015 06/07/2021    GLUCOSEU Negative 06/07/2021    GLUCOSEU NEGATIVE 04/13/2012       Consults:    IP CONSULT TO HOSPITALIST  IP CONSULT TO PULMONOLOGY      Assessment/Plan:    Active Hospital Problems    Diagnosis     Stage 3 severe COPD by GOLD classification ([facility] [J44.9]      Priority: Medium    Acute on chronic respiratory failure ([facility] [J96.20]     COPD exacerbation ([facility] [J44.1]     Pulmonary fibrosis ([facility] [J84.10]     Tobacco dependence [F17.200]        COPD - w/ ILD and chronic respiratory failure on baseline home O2 at 2L nocturnal.  Normally controlled on home medication regimen - here w/ acute exacerbation - on IV Steroids/HHN. Followed outpt by Dr. Noelle Flynn. Has required Brochoscopy for mucous plugging in past    Acute on Chronic Respiratory Failure - w/ increased hypoxia, POArrival.  Presence of clinical respiratory distress w/ tachypnea/dyspnea/SOB and wheezing w/ use of accessory muscles to breath. Supplemental O2 and wean as tolerated. Pulmonology consulted and appreciated in advance - NPO pending evaluation for possible Bronchoscopy.      HypoNatremia - etiology clinically unable to determine but likely hypovolemic. Follow serial labs off IVF - stable. Reviewed and documented as above. Tobacco Abuse - active and ongoing. Cessation counseled. Nicotine replacement PRN.     Depression/Anxiety - controlled on home Fluoxetine, Bupropion, and Trazodone - continued.         DVT Prophylaxis: LMWH/IPC     Recent Labs     06/09/21  0550 06/10/21  0606 06/11/21  0639    298 305     Diet: ADULT DIET; Regular  Code Status: Full Code      PT/OT Eval Status: not yet ordered. Dispo - Likely to home Sat/Sunday 12/13 June pending clinical course and subspecialty recs.      Donna Alvarez MD

## 2021-06-11 NOTE — PROGRESS NOTES
Pt d/c per order. Pt given written and verbal d/c instructions. PIV removed. Pt left in stable condition with her .

## 2021-06-11 NOTE — PROGRESS NOTES
INPATIENT PULMONARY CRITICAL CARE PROGRESS NOTE      Reason for visit: COPD exacerbation, possible mucous plugging, nonspecific mildly, smoker    SUBJECTIVE: The patient is marginally better, both by her assessment and by respiratory therapy. She has been coughing up small plugs, especially after nebulizer treatment. Is afebrile and hemodynamically stable     Physical Exam:  Blood pressure 109/71, pulse 81, temperature 97.6 °F (36.4 °C), temperature source Oral, resp. rate 18, height 5' 4\" (1.626 m), weight 152 lb 14.4 oz (69.4 kg), SpO2 95 %, not currently breastfeeding.'   Constitutional: Pleasant, averagely built, overweight. No acute distress. HENT:  Oropharynx is clear and moist.  Class III airway  Eyes:  Conjunctivae are normal. Pupils equal, round, and reactive to light. No scleral icterus. Wearing glasses  Neck: No JVD. No tracheal deviation present. No obvious thyroid mass. Cardiovascular: Normal rate, regular rhythm, normal heart sounds. No lower extremity edema. Pulmonary/Chest: Diminished air entry, very rare wheezes. No rales. No accessory muscle usage or stridor. Abdominal: Deferred. Musculoskeletal: No cyanosis. No clubbing. No obvious joint deformity. Lymphadenopathy: Deferred. Skin: Skin is warm and dry. No rash or nodules on the exposed extremities. Psychiatric: Normal mood and affect. Behavior is normal.  No anxiety. Neurologic: Alert, awake and oriented. PERRL. Speech fluent        Results:  CBC:   Recent Labs     06/09/21  0550 06/10/21  0606 06/11/21  0639   WBC 13.7* 14.7* 15.9*   HGB 12.0 12.1 12.0   HCT 34.8* 35.8* 36.0   MCV 87.9 89.0 90.0    298 305     BMP:   Recent Labs     06/09/21  0550 06/10/21  0606 06/11/21  0639   * 129* 131*   K 4.5 4.8 4.5   CL 99 97* 99   CO2 21 24 24   BUN 13 14 17   CREATININE 0.6 0.6 0.7     Imaging:  I have reviewed radiology images personally.     XR CHEST PORTABLE   Final Result   Stable bilateral lower lobe predominant increased interstitial opacities. No   superimposed acute process. No significant interval change compared to May   24, 2021. XR CHEST PORTABLE    Result Date: 6/8/2021  EXAMINATION: ONE XRAY VIEW OF THE CHEST 6/7/2021 10:07 am COMPARISON: May 24, 2021 HISTORY: ORDERING SYSTEM PROVIDED HISTORY: SOB TECHNOLOGIST PROVIDED HISTORY: Reason for exam:->SOB Reason for Exam: resp distress Acuity: Acute Type of Exam: Initial Initial evaluation for acute respiratory distress. FINDINGS: The cardiomediastinal silhouette is normal in size. Mildly increased interstitial opacities are present bilaterally with lower lobe predominance. This appears stable from previous exam. No focal lobar consolidation or air bronchograms. No pleural effusion or pneumothorax. No significant interval change. Stable bilateral lower lobe predominant increased interstitial opacities. No superimposed acute process. No significant interval change compared to May 24, 2021. XR CHEST PORTABLE    Result Date: 5/24/2021  EXAMINATION: ONE XRAY VIEW OF THE CHEST 5/24/2021 2:51 pm COMPARISON: Prior study(s) most recent chest CT 03/14/2021. HISTORY: ORDERING SYSTEM PROVIDED HISTORY: sob TECHNOLOGIST PROVIDED HISTORY: Reason for exam:->sob Reason for Exam: sob Acuity: Acute Type of Exam: Initial FINDINGS: The heart is normal size. Interstitial changes at the lung bases are present, asymmetric involvement left lower lobe. Remainder the lungs are somewhat hyperinflated with emphysema, upper lobe predominant. No pneumothorax. No pleural effusion. Reticular densities asymmetric left lung base likely representing fibrosis or chronic postinflammatory change. Suspect moderate COPD. Assessment and plan:  COPD exacerbation, severe COPD. Improvement is slow but continues. On DuoNeb and Solu-Medrol  Mucus plugging. She is clinically improved, is coughing up phlegm particularly after nebulized bronchodilator.   She does have a nebulizer at home, as an Acapella. I do not believe she needs therapeutic bronchoscopy. The patient was informed, is agreeable  ILD. Nonspecific appearance. Being followed by Dr. Trino rodriguez. Talk to her about smoking altogether. HLD, allergic rhinitis, depression. Per IM  DVT prophylaxis. Lovenox    I believe the patient can be discharged home, nursing informed. She can call our office or Dr. Amauri Hammonds office if she feels she needs outpatient therapeutic bronchoscopy for persistent mucous plugging. She is agreeable.       Electronically signed by:  Laz Grover MD    6/11/2021    8:21 AM.

## 2021-06-11 NOTE — PROGRESS NOTES
Pt alert and oriented, VSS. Shift assessment completed and documented. Pt denies any needs at this time. Bed locked and in lowest position. Call light within reach.

## 2021-06-14 ENCOUNTER — TELEPHONE (OUTPATIENT)
Dept: FAMILY MEDICINE CLINIC | Age: 66
End: 2021-06-14

## 2021-06-14 ENCOUNTER — CARE COORDINATION (OUTPATIENT)
Dept: CASE MANAGEMENT | Age: 66
End: 2021-06-14

## 2021-06-14 DIAGNOSIS — J96.21 ACUTE ON CHRONIC RESPIRATORY FAILURE WITH HYPOXIA (HCC): Primary | ICD-10-CM

## 2021-06-14 PROCEDURE — 1111F DSCHRG MED/CURRENT MED MERGE: CPT | Performed by: FAMILY MEDICINE

## 2021-06-14 NOTE — CARE COORDINATION
[facility] Transitions Initial Follow Up Call    Call within 2 business days of discharge: Yes    Patient: Amanda Isaacs Patient : 1955   MRN: [id number]  Reason for Admission: COPD  Discharge Date: 21 RARS: Readmission Risk Score: 23      Last Discharge [address]       Complaint Diagnosis Description Type Department Provider    21 Respiratory Distress Acute on chronic respiratory failure with hypoxia ([facility] . .. ED to Hosp-Admission (Discharged) (ADMITTED) Cheryl Carr MD; Uriel Keating. .. Spoke with: Amanda Isaacs      [facility]     Transitions of Care Initial Call    Was this an external facility discharge? No Discharge Facility: n/a    Challenges to be reviewed by the provider   Additional needs identified to be addressed with provider: No  none             Method of communication with provider : none      Advance Care Planning:   Does patient have an Advance Directive:  not on file. Was this a readmission? Yes  Patient stated reason for admission: sob   Patients top risk factors for readmission: medical condition-COPD     Care Transition Nurse (CTN) contacted the patient by telephone to perform post hospital discharge assessment. Verified name and  with patient as identifiers. Provided introduction to self, and explanation of the CTN role. CTN reviewed discharge instructions, medical action plan and red flags with patient who verbalized understanding. Patient given an opportunity to ask questions and does not have any further questions or concerns at this time. Were discharge instructions available to patient? Yes. Reviewed appropriate site of care based on symptoms and resources available to patient including: PCP and When to call 911. The patient agrees to contact the PCP office for questions related to their healthcare. Medication reconciliation was performed with patient, who verbalizes understanding of administration of home medications.  Advised obtaining a 90-day supply of all daily and as-needed medications. Reviewed and educated patient on any new and changed medications related to discharge diagnosis. CTN provided contact information. Plan for follow-up call in 5-7 days based on severity of symptoms and risk factors. Spoke with patient who reported she is feeling better and breathing is stable. CTN reviewed medications with patient who reported she is taking as prescribed. Patient reported PCP office reached out today as well and patient has apt setup on 6/28. Patient also encouraged to reach out to her pulmonologist as well for follow up apt. CTN discuss smoking cessation with patient as well and discussed options. Patient reported she has chantix and also will discuss nicotine patch with PCP.             Care Transitions 24 Hour Call    Do you have any ongoing symptoms?: No  Do you have a copy of your discharge instructions?: Yes  Do you have all of your prescriptions and are they filled?: Yes  Have you been contacted by a [facility] Pharmacist?: No  Have you scheduled your follow up appointment?: Yes  How are you going to get to your appointment?: Car - family or friend to transport  Were you discharged with any Home Care or Post Acute Services: No  Post Acute Services: [address] you feel like you have everything you need to keep you well at home?: Yes  Care Transitions Interventions         Follow Up  Future Appointments   Date Time Provider Matt Crabtree   6/28/2021  2:30 PM MD JAIRO [place] - DYMARYANN   7/8/2021  9:30 AM MD LINDA Waters   12/3/2021 11:00 AM [facility] Abhijit Hong RN

## 2021-06-14 NOTE — DISCHARGE SUMMARY
Hospital Medicine Discharge Summary    Patient ID: Norma Hoyt      Patient's PCP: Amy Carrasquillo MD    Admit Date: 6/7/2021     Discharge Date: 6/11/2021      Admitting Physician: Anna Espitia MD     Discharge Physician: Milind Cantu MD     Discharge Diagnoses: Active Hospital Problems    Diagnosis     Stage 3 severe COPD by GOLD classification ([facility] [J44.9]      Priority: Medium    Acute on chronic respiratory failure ([facility] [J96.20]     COPD exacerbation ([facility] [J44.1]     Pulmonary fibrosis ([facility] [J84.10]     Tobacco dependence [F17.200]        The patient was seen and examined on day of discharge and this discharge summary is in conjunction with any daily progress note from day of discharge. Hospital Course:          COPD - w/ ILD and chronic respiratory failure on baseline home O2 at 2L nocturnal.  Normally controlled on home medication regimen - here w/ acute exacerbation - on IV Steroids/HHN. Followed outpt by Dr. Jose Luis Vee. Has required Brochoscopy for mucous plugging in past     Acute on Chronic Respiratory Failure - w/ increased hypoxia, POArrival.  Presence of clinical respiratory distress w/ tachypnea/dyspnea/SOB and wheezing w/ use of accessory muscles to breath. Supplemental O2 and wean as tolerated. Pulmonology consulted and appreciated but no need for Bronchoscopy     HypoNatremia - etiology clinically unable to determine but likely hypovolemic. Followed serial labs off IVF - stable.      Tobacco Abuse - active and ongoing. Cessation counseled. Nicotine replacement PRN.     Depression/Anxiety - controlled on home Fluoxetine, Bupropion, and Trazodone - continued.        Labs:  For convenience and continuity at follow-up the following most recent labs are provided:      CBC:    Lab Results   Component Value Date    WBC 15.9 06/11/2021    HGB 12.0 06/11/2021    HCT 36.0 06/11/2021     06/11/2021       Renal:    Lab Results   Component Value Date     06/11/2021 K 4.5 06/11/2021    K 4.3 06/07/2021    CL 99 06/11/2021    CO2 24 06/11/2021    BUN 17 06/11/2021    CREATININE 0.7 06/11/2021    CALCIUM 8.8 06/11/2021    PHOS 2.3 05/25/2021         Significant Diagnostic Studies    Radiology:   XR CHEST PORTABLE   Final Result   Stable bilateral lower lobe predominant increased interstitial opacities. No   superimposed acute process. No significant interval change compared to May   24, 2021. Consults:     IP CONSULT TO HOSPITALIST  IP CONSULT TO PULMONOLOGY    Disposition: home     Condition at Discharge: Stable    Discharge Instructions/Follow-up:  w/ PCP 1-2 weeks and subspecialists as arranged.      Code Status:  Full Code    Activity: activity as tolerated    Diet: regular diet      Discharge Medications:     Discharge Medication List as of 6/11/2021  2:25 PM           Details   predniSONE (DELTASONE) 20 MG tablet Take 2 tablets by mouth daily for 5 days, Disp-10 tablet, R-0Print              Details   ibuprofen (ADVIL;MOTRIN) 600 MG tablet Take 1 tablet by mouth 3 times daily as needed for Pain, Disp-30 tablet, R-0Normal      vitamin D (ERGOCALCIFEROL) 1.25 MG (71440 UT) CAPS capsule Take 1 capsule by mouth once a week for 5 days Every Tuesday, Disp-5 capsule, R-0Normal      albuterol sulfate HFA (VENTOLIN HFA) 108 (90 Base) MCG/ACT inhaler Inhale 2 puffs into the lungs every 6 hours as needed for Wheezing or Shortness of Breath, Disp-3 Inhaler, R-1Normal      naproxen (NAPROSYN) 500 MG tablet Take 1 tablet by mouth 2 times daily (with meals), Disp-30 tablet, R-0Print      BD PEN NEEDLE SVETLANA U/F 32G X 4 MM MISC Disp-100 each, R-5, Normal      montelukast (SINGULAIR) 10 MG tablet TAKE 1 TABLET BY MOUTH EACH NIGHT, Disp-90 tablet,R-1Normal      FLUoxetine (PROZAC) 20 MG capsule TAKE 3 CAPSULES BY MOUTH DAILY, Disp-270 capsule,R-1Normal      traZODone (DESYREL) 150 MG tablet TAKE 2 TABLETS AT BEDTIME, Disp-180 tablet,R-1Normal      fluticasone-salmeterol (WIXELA INHUB) 500-50 MCG/DOSE diskus inhaler Inhale 1 puff into the lungs every 12 hours, Disp-180 each,R-1Normal      tiotropium (SPIRIVA HANDIHALER) 18 MCG inhalation capsule INHALE THE CONTENTS OF 1 CAPSULE EVERY DAY, Disp-90 capsule,R-1Normal      albuterol (PROVENTIL) (2.5 MG/3ML) 0.083% nebulizer solution Take 3 mLs by nebulization every 6 hours as needed for Wheezing or Shortness of Breath DX COPD J44.9, Disp-120 vial,R-6Normal      simvastatin (ZOCOR) 20 MG tablet TAKE 1 TABLET EVERY EVENING, Disp-90 tablet,R-1Normal      busPIRone (BUSPAR) 30 MG tablet TAKE 1 TABLET TWICE DAILY, Disp-180 tablet,R-1Normal      guaiFENesin (MUCINEX) 600 MG extended release tablet Take 1 tablet by mouth 2 times daily as needed for Congestion, Disp-60 tablet,R-2Normal      teriparatide, recombinant, (FORTEO) 600 MCG/2.4ML SOLN injection Inject 0.08 mLs into the skin daily One dose injected daily. , Disp-1 pen,R-11Print             Time Spent on discharge is more than 30 minutes in the examination, evaluation, counseling and review of medications and discharge plan. Signed:    Nura Carrion MD   6/14/2021      Thank you Linda Roa MD for the opportunity to be involved in this patient's care. If you have any questions or concerns please feel free to contact me at [phone].

## 2021-06-14 NOTE — TELEPHONE ENCOUNTER
[facility] Transitions Initial Follow Up Call    Outreach made within 2 business days of discharge: Yes    Patient: Odin Flowers Patient : 1955   MRN: [id number]  Reason for Admission: There are no discharge diagnoses documented for the most recent discharge. Discharge Date: 21       Spoke with: Tamica Saucedo appointment at this time. Discharge department/facility: [facility] Interactive Patient Contact:  Was patient able to fill all prescriptions: Yes  Was patient instructed to bring all medications to the follow-up visit: Yes  Is patient taking all medications as directed in the discharge summary?  Yes  Does patient understand their discharge instructions: Yes  Does patient have questions or concerns that need addressed prior to 7-14 day follow up office visit: no    Scheduled appointment with PCP within 7-14 days    Follow Up  Future Appointments   Date Time Provider Matt Crabtree   2021  2:30 MD JAIRO [place] - DYD   2021  9:30 AM MD LINDA Lopez   12/3/2021 11:00 AM [facility] [address] [place] Yadi Angel, ADOLPHN

## 2021-06-20 ENCOUNTER — HOSPITAL ENCOUNTER (INPATIENT)
Age: 66
LOS: 4 days | Discharge: HOME | DRG: 190 | End: 2021-06-24
Attending: EMERGENCY MEDICINE | Admitting: INTERNAL MEDICINE
Payer: MEDICARE

## 2021-06-20 ENCOUNTER — APPOINTMENT (OUTPATIENT)
Dept: GENERAL RADIOLOGY | Age: 66
DRG: 190 | End: 2021-06-20
Payer: MEDICARE

## 2021-06-20 DIAGNOSIS — E87.1 HYPONATREMIA: ICD-10-CM

## 2021-06-20 DIAGNOSIS — J44.1 COPD EXACERBATION (HCC): Primary | ICD-10-CM

## 2021-06-20 LAB
A/G RATIO: 1.1 (ref 1.1–2.2)
ALBUMIN SERPL-MCNC: 3.8 G/DL (ref 3.4–5)
ALP BLD-CCNC: 57 U/L (ref 40–129)
ALT SERPL-CCNC: 17 U/L (ref 10–40)
ANION GAP SERPL CALCULATED.3IONS-SCNC: 10 MMOL/L (ref 3–16)
AST SERPL-CCNC: 19 U/L (ref 15–37)
ATYPICAL LYMPHOCYTE RELATIVE PERCENT: 3 % (ref 0–6)
BANDED NEUTROPHILS RELATIVE PERCENT: 9 % (ref 0–7)
BASE EXCESS VENOUS: 1.4 MMOL/L (ref -3–3)
BASOPHILS ABSOLUTE: 0 K/UL (ref 0–0.2)
BASOPHILS RELATIVE PERCENT: 0 %
BILIRUB SERPL-MCNC: 0.4 MG/DL (ref 0–1)
BUN BLDV-MCNC: 9 MG/DL (ref 7–20)
CALCIUM SERPL-MCNC: 8.9 MG/DL (ref 8.3–10.6)
CARBOXYHEMOGLOBIN: 2.5 % (ref 0–1.5)
CHLORIDE BLD-SCNC: 91 MMOL/L (ref 99–110)
CO2: 24 MMOL/L (ref 21–32)
CREAT SERPL-MCNC: <0.5 MG/DL (ref 0.6–1.2)
EKG ATRIAL RATE: 97 BPM
EKG DIAGNOSIS: NORMAL
EKG P AXIS: 71 DEGREES
EKG P-R INTERVAL: 140 MS
EKG Q-T INTERVAL: 332 MS
EKG QRS DURATION: 74 MS
EKG QTC CALCULATION (BAZETT): 421 MS
EKG R AXIS: 3 DEGREES
EKG T AXIS: 68 DEGREES
EKG VENTRICULAR RATE: 97 BPM
EOSINOPHILS ABSOLUTE: 0.3 K/UL (ref 0–0.6)
EOSINOPHILS RELATIVE PERCENT: 2 %
GFR AFRICAN AMERICAN: >60
GFR NON-AFRICAN AMERICAN: >60
GLOBULIN: 3.5 G/DL
GLUCOSE BLD-MCNC: 98 MG/DL (ref 70–99)
HCO3 VENOUS: 25.7 MMOL/L (ref 23–29)
HCT VFR BLD CALC: 39.6 % (ref 36–48)
HEMOGLOBIN: 13.6 G/DL (ref 12–16)
LYMPHOCYTES ABSOLUTE: 2.3 K/UL (ref 1–5.1)
LYMPHOCYTES RELATIVE PERCENT: 12 %
MCH RBC QN AUTO: 30.7 PG (ref 26–34)
MCHC RBC AUTO-ENTMCNC: 34.3 G/DL (ref 31–36)
MCV RBC AUTO: 89.5 FL (ref 80–100)
METHEMOGLOBIN VENOUS: 0.4 %
MONOCYTES ABSOLUTE: 0.9 K/UL (ref 0–1.3)
MONOCYTES RELATIVE PERCENT: 6 %
NEUTROPHILS ABSOLUTE: 11.9 K/UL (ref 1.7–7.7)
NEUTROPHILS RELATIVE PERCENT: 68 %
O2 CONTENT, VEN: 18 VOL %
O2 SAT, VEN: 89 %
O2 THERAPY: ABNORMAL
PCO2, VEN: 39.3 MMHG (ref 40–50)
PDW BLD-RTO: 13.3 % (ref 12.4–15.4)
PH VENOUS: 7.43 (ref 7.35–7.45)
PLATELET # BLD: 368 K/UL (ref 135–450)
PMV BLD AUTO: 6.9 FL (ref 5–10.5)
PO2, VEN: 52.9 MMHG (ref 25–40)
POIKILOCYTES: ABNORMAL
POLYCHROMASIA: ABNORMAL
POTASSIUM REFLEX MAGNESIUM: 4.6 MMOL/L (ref 3.5–5.1)
RBC # BLD: 4.43 M/UL (ref 4–5.2)
SODIUM BLD-SCNC: 125 MMOL/L (ref 136–145)
STOMATOCYTES: ABNORMAL
TCO2 CALC VENOUS: 27 MMOL/L
TOTAL PROTEIN: 7.3 G/DL (ref 6.4–8.2)
WBC # BLD: 15.4 K/UL (ref 4–11)

## 2021-06-20 PROCEDURE — 94761 N-INVAS EAR/PLS OXIMETRY MLT: CPT

## 2021-06-20 PROCEDURE — 6360000002 HC RX W HCPCS: Performed by: EMERGENCY MEDICINE

## 2021-06-20 PROCEDURE — 6370000000 HC RX 637 (ALT 250 FOR IP): Performed by: INTERNAL MEDICINE

## 2021-06-20 PROCEDURE — 2700000000 HC OXYGEN THERAPY PER DAY

## 2021-06-20 PROCEDURE — 2580000003 HC RX 258: Performed by: INTERNAL MEDICINE

## 2021-06-20 PROCEDURE — 82803 BLOOD GASES ANY COMBINATION: CPT

## 2021-06-20 PROCEDURE — 94640 AIRWAY INHALATION TREATMENT: CPT

## 2021-06-20 PROCEDURE — 85025 COMPLETE CBC W/AUTO DIFF WBC: CPT

## 2021-06-20 PROCEDURE — 6360000002 HC RX W HCPCS: Performed by: INTERNAL MEDICINE

## 2021-06-20 PROCEDURE — 2580000003 HC RX 258: Performed by: EMERGENCY MEDICINE

## 2021-06-20 PROCEDURE — 83935 ASSAY OF URINE OSMOLALITY: CPT

## 2021-06-20 PROCEDURE — 96374 THER/PROPH/DIAG INJ IV PUSH: CPT

## 2021-06-20 PROCEDURE — 71045 X-RAY EXAM CHEST 1 VIEW: CPT

## 2021-06-20 PROCEDURE — 93005 ELECTROCARDIOGRAM TRACING: CPT | Performed by: EMERGENCY MEDICINE

## 2021-06-20 PROCEDURE — 99283 EMERGENCY DEPT VISIT LOW MDM: CPT

## 2021-06-20 PROCEDURE — 93010 ELECTROCARDIOGRAM REPORT: CPT | Performed by: INTERNAL MEDICINE

## 2021-06-20 PROCEDURE — 1200000000 HC SEMI PRIVATE

## 2021-06-20 PROCEDURE — 80053 COMPREHEN METABOLIC PANEL: CPT

## 2021-06-20 PROCEDURE — 6370000000 HC RX 637 (ALT 250 FOR IP): Performed by: EMERGENCY MEDICINE

## 2021-06-20 RX ORDER — METHYLPREDNISOLONE SODIUM SUCCINATE 125 MG/2ML
125 INJECTION, POWDER, LYOPHILIZED, FOR SOLUTION INTRAMUSCULAR; INTRAVENOUS ONCE
Status: COMPLETED | OUTPATIENT
Start: 2021-06-20 | End: 2021-06-20

## 2021-06-20 RX ORDER — ONDANSETRON 2 MG/ML
4 INJECTION INTRAMUSCULAR; INTRAVENOUS EVERY 6 HOURS PRN
Status: DISCONTINUED | OUTPATIENT
Start: 2021-06-20 | End: 2021-06-24 | Stop reason: HOSPADM

## 2021-06-20 RX ORDER — PREDNISONE 20 MG/1
40 TABLET ORAL DAILY
Status: DISCONTINUED | OUTPATIENT
Start: 2021-06-22 | End: 2021-06-24 | Stop reason: HOSPADM

## 2021-06-20 RX ORDER — POLYETHYLENE GLYCOL 3350 17 G/17G
17 POWDER, FOR SOLUTION ORAL DAILY PRN
Status: DISCONTINUED | OUTPATIENT
Start: 2021-06-20 | End: 2021-06-24 | Stop reason: HOSPADM

## 2021-06-20 RX ORDER — MONTELUKAST SODIUM 10 MG/1
10 TABLET ORAL NIGHTLY
Status: DISCONTINUED | OUTPATIENT
Start: 2021-06-20 | End: 2021-06-24 | Stop reason: HOSPADM

## 2021-06-20 RX ORDER — IPRATROPIUM BROMIDE AND ALBUTEROL SULFATE 2.5; .5 MG/3ML; MG/3ML
3 SOLUTION RESPIRATORY (INHALATION) ONCE
Status: COMPLETED | OUTPATIENT
Start: 2021-06-20 | End: 2021-06-20

## 2021-06-20 RX ORDER — SODIUM CHLORIDE 0.9 % (FLUSH) 0.9 %
5-40 SYRINGE (ML) INJECTION PRN
Status: DISCONTINUED | OUTPATIENT
Start: 2021-06-20 | End: 2021-06-24 | Stop reason: HOSPADM

## 2021-06-20 RX ORDER — GUAIFENESIN 600 MG/1
600 TABLET, EXTENDED RELEASE ORAL 2 TIMES DAILY PRN
Status: DISCONTINUED | OUTPATIENT
Start: 2021-06-20 | End: 2021-06-24 | Stop reason: HOSPADM

## 2021-06-20 RX ORDER — ACETAMINOPHEN 325 MG/1
650 TABLET ORAL EVERY 6 HOURS PRN
Status: DISCONTINUED | OUTPATIENT
Start: 2021-06-20 | End: 2021-06-24 | Stop reason: HOSPADM

## 2021-06-20 RX ORDER — 0.9 % SODIUM CHLORIDE 0.9 %
500 INTRAVENOUS SOLUTION INTRAVENOUS ONCE
Status: COMPLETED | OUTPATIENT
Start: 2021-06-20 | End: 2021-06-20

## 2021-06-20 RX ORDER — IPRATROPIUM BROMIDE AND ALBUTEROL SULFATE 2.5; .5 MG/3ML; MG/3ML
1 SOLUTION RESPIRATORY (INHALATION) EVERY 4 HOURS
Status: DISCONTINUED | OUTPATIENT
Start: 2021-06-20 | End: 2021-06-24 | Stop reason: HOSPADM

## 2021-06-20 RX ORDER — FLUOXETINE HYDROCHLORIDE 20 MG/1
60 CAPSULE ORAL DAILY
Status: DISCONTINUED | OUTPATIENT
Start: 2021-06-20 | End: 2021-06-24 | Stop reason: HOSPADM

## 2021-06-20 RX ORDER — SODIUM CHLORIDE 9 MG/ML
25 INJECTION, SOLUTION INTRAVENOUS PRN
Status: DISCONTINUED | OUTPATIENT
Start: 2021-06-20 | End: 2021-06-24 | Stop reason: HOSPADM

## 2021-06-20 RX ORDER — ACETAMINOPHEN 650 MG/1
650 SUPPOSITORY RECTAL EVERY 6 HOURS PRN
Status: DISCONTINUED | OUTPATIENT
Start: 2021-06-20 | End: 2021-06-24 | Stop reason: HOSPADM

## 2021-06-20 RX ORDER — SODIUM CHLORIDE 0.9 % (FLUSH) 0.9 %
5-40 SYRINGE (ML) INJECTION EVERY 12 HOURS SCHEDULED
Status: DISCONTINUED | OUTPATIENT
Start: 2021-06-20 | End: 2021-06-24 | Stop reason: HOSPADM

## 2021-06-20 RX ORDER — ONDANSETRON 4 MG/1
4 TABLET, ORALLY DISINTEGRATING ORAL EVERY 8 HOURS PRN
Status: DISCONTINUED | OUTPATIENT
Start: 2021-06-20 | End: 2021-06-24 | Stop reason: HOSPADM

## 2021-06-20 RX ORDER — BUSPIRONE HYDROCHLORIDE 5 MG/1
30 TABLET ORAL 2 TIMES DAILY
Status: DISCONTINUED | OUTPATIENT
Start: 2021-06-20 | End: 2021-06-24 | Stop reason: HOSPADM

## 2021-06-20 RX ORDER — METHYLPREDNISOLONE SODIUM SUCCINATE 40 MG/ML
40 INJECTION, POWDER, LYOPHILIZED, FOR SOLUTION INTRAMUSCULAR; INTRAVENOUS EVERY 6 HOURS
Status: COMPLETED | OUTPATIENT
Start: 2021-06-20 | End: 2021-06-22

## 2021-06-20 RX ORDER — TRAZODONE HYDROCHLORIDE 50 MG/1
150 TABLET ORAL NIGHTLY
Status: DISCONTINUED | OUTPATIENT
Start: 2021-06-20 | End: 2021-06-24 | Stop reason: HOSPADM

## 2021-06-20 RX ORDER — IPRATROPIUM BROMIDE AND ALBUTEROL SULFATE 2.5; .5 MG/3ML; MG/3ML
1 SOLUTION RESPIRATORY (INHALATION)
Status: DISCONTINUED | OUTPATIENT
Start: 2021-06-20 | End: 2021-06-20

## 2021-06-20 RX ADMIN — METHYLPREDNISOLONE SODIUM SUCCINATE 40 MG: 40 INJECTION, POWDER, FOR SOLUTION INTRAMUSCULAR; INTRAVENOUS at 14:42

## 2021-06-20 RX ADMIN — SODIUM CHLORIDE 500 ML: 9 INJECTION, SOLUTION INTRAVENOUS at 11:26

## 2021-06-20 RX ADMIN — Medication 10 ML: at 21:41

## 2021-06-20 RX ADMIN — IPRATROPIUM BROMIDE AND ALBUTEROL SULFATE 1 AMPULE: .5; 3 SOLUTION RESPIRATORY (INHALATION) at 20:48

## 2021-06-20 RX ADMIN — Medication 10 ML: at 19:49

## 2021-06-20 RX ADMIN — IPRATROPIUM BROMIDE AND ALBUTEROL SULFATE 1 AMPULE: .5; 3 SOLUTION RESPIRATORY (INHALATION) at 23:54

## 2021-06-20 RX ADMIN — MONTELUKAST SODIUM 10 MG: 10 TABLET ORAL at 21:25

## 2021-06-20 RX ADMIN — METHYLPREDNISOLONE SODIUM SUCCINATE 125 MG: 125 INJECTION, POWDER, FOR SOLUTION INTRAMUSCULAR; INTRAVENOUS at 10:32

## 2021-06-20 RX ADMIN — TRAZODONE HYDROCHLORIDE 150 MG: 50 TABLET ORAL at 21:25

## 2021-06-20 RX ADMIN — IPRATROPIUM BROMIDE AND ALBUTEROL SULFATE 1 AMPULE: .5; 3 SOLUTION RESPIRATORY (INHALATION) at 15:39

## 2021-06-20 RX ADMIN — BUSPIRONE HYDROCHLORIDE 30 MG: 5 TABLET ORAL at 21:24

## 2021-06-20 RX ADMIN — BUSPIRONE HYDROCHLORIDE 30 MG: 5 TABLET ORAL at 14:40

## 2021-06-20 RX ADMIN — METHYLPREDNISOLONE SODIUM SUCCINATE 40 MG: 40 INJECTION, POWDER, FOR SOLUTION INTRAMUSCULAR; INTRAVENOUS at 19:49

## 2021-06-20 RX ADMIN — FLUOXETINE 60 MG: 20 CAPSULE ORAL at 14:41

## 2021-06-20 RX ADMIN — ALBUTEROL SULFATE 7.5 MG/HR: 2.5 SOLUTION RESPIRATORY (INHALATION) at 11:59

## 2021-06-20 RX ADMIN — IPRATROPIUM BROMIDE AND ALBUTEROL SULFATE 3 AMPULE: .5; 3 SOLUTION RESPIRATORY (INHALATION) at 10:23

## 2021-06-20 ASSESSMENT — PAIN SCALES - GENERAL
PAINLEVEL_OUTOF10: 0

## 2021-06-20 NOTE — PROGRESS NOTES
4 Eyes Skin Assessment     The patient is being assess for   Admission    I agree that 2 RN's have performed a thorough Head to Toe Skin Assessment on the patient. ALL assessment sites listed below have been assessed. Areas assessed for pressure by both nurses:   [x]   Head, Face, and Ears   [x]   Shoulders, Back, and Chest, Abdomen  [x]   Arms, Elbows, and Hands   [x]   Coccyx, Sacrum, and Ischium  [x]   Legs, Feet, and Heels        Skin Assessed Under all Medical Devices by both nurses:  O2 device tubing              All Mepilex Borders were peeled back and area peeked at by both nurses:  No: not applicable   Please list where Mepilex Borders are located:  none             **SHARE this note so that the co-signing nurse is able to place an eSignature**    Co-signer eSignature: Electronically signed by Glory Jorge RN on 6/20/21 at 1:59 PM EDT    Does the Patient have Skin Breakdown related to pressure?   No            Philip Prevention initiated:  Yes   Wound Care Orders initiated:  NA      [facility] nurse consulted for Pressure Injury (Stage 3,4, Unstageable, DTI, NWPT, Complex wounds)and New or Established Ostomies:  No      Primary Nurse eSignature: Electronically signed by Heather Cifuentes RN on 6/20/21 at 1:58 PM EDT

## 2021-06-20 NOTE — ED PROVIDER NOTES
[facility] Emergency Department      CHIEF COMPLAINT  Shortness of Breath (for the last 2 days. Recent admission for same 1 1/2 weeks ago)      HISTORY OF PRESENT ILLNESS  Wilver Young is a 60 y.o. female with a history of COPD and interstitial lung disease presents with wheezing and shortness of breath. She is normally on 2 L of supplemental oxygen at night only. She has been using her inhalers at home but they are not helping. She has had a mild cough but no fevers. No chest pain. She was recently admitted for similar episode and treated for COPD exacerbation. No other complaints, modifying factors or associated symptoms. I have reviewed the following from the nursing documentation.     Past Medical History:   Diagnosis Date    Allergic rhinitis 6/11/2010    Anxiety     Arthritis     Aspiration pneumonia ([address] 3/21/2012    Recurrent    Asthma     Bronchitis chronic     COPD (chronic obstructive pulmonary disease) ([address] 12/3/2009    Depression     Drug abuse, cocaine type (HCC)     past history of crack cocaine use    Emphysema of lung (HCC)     Fibromyalgia     GERD (gastroesophageal reflux disease)     Hyperlipidemia     Influenza A 03/05/2020    Lung disease     On home oxygen therapy     uses O2 NC 3L prn at night    Osteoporosis     Pneumonia     Polysubstance dependence ([address] 1/2/2012    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    Pulmonary fibrosis ([address] 10/16/2014    Pulmonary infiltrate     Pulmonary nodule 12/3/2009    Tobacco abuse      Past Surgical History:   Procedure Laterality Date    BLADDER REPAIR      BRONCHOSCOPY      BRONCHOSCOPY N/A 3/6/2020    BRONCHOSCOPY THERAPUTIC ASPIRATION INITIAL performed by Matti Welch MD at [facility]  3/6/2020    BRONCHOSCOPY ALVEOLAR LAVAGE performed by Matti Welch MD at [facility] 6/26/2020    BRONCHOSCOPY THERAPUTIC ASPIRATION  montelukast (SINGULAIR) 10 MG tablet TAKE 1 TABLET BY MOUTH EACH NIGHT 90 tablet 1    FLUoxetine (PROZAC) 20 MG capsule TAKE 3 CAPSULES BY MOUTH DAILY 270 capsule 1    traZODone (DESYREL) 150 MG tablet TAKE 2 TABLETS AT BEDTIME 180 tablet 1    fluticasone-salmeterol (WIXELA INHUB) 500-50 MCG/DOSE diskus inhaler Inhale 1 puff into the lungs every 12 hours 180 each 1    tiotropium (SPIRIVA HANDIHALER) 18 MCG inhalation capsule INHALE THE CONTENTS OF 1 CAPSULE EVERY DAY 90 capsule 1    simvastatin (ZOCOR) 20 MG tablet TAKE 1 TABLET EVERY EVENING 90 tablet 1    busPIRone (BUSPAR) 30 MG tablet TAKE 1 TABLET TWICE DAILY 180 tablet 1    guaiFENesin (MUCINEX) 600 MG extended release tablet Take 1 tablet by mouth 2 times daily as needed for Congestion 60 tablet 2    vitamin D (ERGOCALCIFEROL) 1.25 MG (23972 UT) CAPS capsule Take 1 capsule by mouth once a week for 5 days Every Tuesday 5 capsule 0    albuterol (PROVENTIL) (2.5 MG/3ML) 0.083% nebulizer solution Take 3 mLs by nebulization every 6 hours as needed for Wheezing or Shortness of Breath DX COPD J44.9 120 vial 6    teriparatide, recombinant, (FORTEO) 600 MCG/2.4ML SOLN injection Inject 0.08 mLs into the skin daily One dose injected daily. 1 pen 11     Allergies   Allergen Reactions    Meperidine Anaphylaxis     \"Demerol\"     Demerol Hives       REVIEW OF SYSTEMS  10 systems reviewed, pertinent positives per HPI otherwise noted to be negative. PHYSICAL EXAM  /73   Pulse 101   Temp 99 °F (37.2 °C) (Oral)   Resp 19   Ht 5' 5\" (1.651 m)   Wt 150 lb (68 kg)   SpO2 94%   BMI 24.96 kg/m²   GENERAL APPEARANCE: Awake and alert. Cooperative. Moderate respiratory distress. HEAD: Normocephalic. Atraumatic. EYES: PERRL. EOM's grossly intact. ENT: Mucous membranes are moist.   NECK: Supple, trachea midline. HEART: Tachycardic, normal rhythm. LUNGS: Labored respirations, tachypneic. She has diffuse expiratory wheezing bilaterally.    ABDOMEN: Ventricular Rate 97 BPM    Atrial Rate 97 BPM    P-R Interval 140 ms    QRS Duration 74 ms    Q-T Interval 332 ms    QTc Calculation (Bazett) 421 ms    P Axis 71 degrees    R Axis 3 degrees    T Axis 68 degrees    Diagnosis       Normal sinus rhythmPossible Inferior infarct , age undeterminedAbnormal ECGWhen compared with ECG of 09-JUN-2021 09:31,No significant change was found       EKG  The Ekg interpreted by myself  normal sinus rhythm with a rate of 97  Axis is   Normal  QTc is  normal  Intervals and Durations are unremarkable. No specific ST-T wave changes appreciated. No evidence of acute ischemia. No significant change from prior EKG dated 6/9/21    Cardiac Monitoring: The cardiac monitor revealed sinus tachycardia as interpreted by me. The cardiac monitor was ordered secondary to the patient's complaint of shortness of breath and to monitor the patient for dysrhythmia. RADIOLOGY  X-RAYS:  I have reviewed radiologic plain film image(s). ALL OTHER NON-PLAIN FILM IMAGES SUCH AS CT, ULTRASOUND AND MRI HAVE BEEN READ BY THE RADIOLOGIST. XR CHEST PORTABLE   Final Result   Prominent interstitial markings again noted, stable suggesting interstitial   lung disease      No acute cardiopulmonary process                    Rechecks: Physical assessment performed. After the first round of DuoNeb's she is feeling some improvement but still is tachypneic with expiratory wheezing noted. Critical Care:I personally spent a total of 45 minutes of critical care time in obtaining history, performing a physical exam, bedside monitoring of interventions, collecting and interpreting tests and discussion with consultants but excluding time spent performing procedures, treating other patients and teaching time. ED COURSE/MDM  Patient seen and evaluated.  Here the patient is afebrile, tachycardic but other vitals normal.  Room air sats are 94%. Old records reviewed. Labs and imaging reviewed and results discussed with patient. Lab work here is unchanged from prior although she does have some mildly worsened hyponatremia. Chest x-ray shows no acute findings. After 3 DuoNeb's she is feeling mild improvement but still has significant wheezing. I have ordered a continuous albuterol neb. She is status post Solu-Medrol as well. I will admit the patient to the hospitalist.  Patient was reassessed as noted above . Plan of care discussed with patient. Patient in agreement with plan. CLINICAL IMPRESSION  1. COPD exacerbation ([address]    2. Hyponatremia        Blood pressure 122/73, pulse 101, temperature 99 °F (37.2 °C), temperature source Oral, resp. rate 19, height 5' 5\" (1.651 m), weight 150 lb (68 kg), SpO2 94 %, not currently breastfeeding. DISPOSITION  Angel Perez was admitted in stable condition.     (Please note this note was completed with a voice recognition program.  Efforts were made to edit the dictations but occasionally words are mis-transcribed.)       Brigid De Leon MD  06/20/21 5335

## 2021-06-20 NOTE — PROGRESS NOTES
Patient admitted to room 546 from ED. Patient oriented to room, call light, bed rails, phone, lights and bathroom. Patient instructed about the schedule of the day including: vital sign frequency, lab draws, possible tests, frequency of MD and staff rounds, including RN/MD rounding together at bedside, and I &O's. Patient instructed about prescribed diet, how to use menu and television. Telemetry box in place, patient aware of placement and reason. Bed locked, in lowest position, side rails up 2/4, call light within reach. Will continue to monitor.

## 2021-06-20 NOTE — PROGRESS NOTES
PS sent to Dr. Queen  due to pts chart flagging for early sepsis sai. Asking if he wants to order CBC, lactic and blood cultures.  Waiting for response

## 2021-06-20 NOTE — ED NOTES
Re: admit for COPD exacerbation  200 - Dr Peggy Cabot put in admission orders at this time     Melecio Wiggins  06/20/21 0049

## 2021-06-20 NOTE — H&P
Hospital Medicine History & Physical      PCP: Serafin Ramirez MD    Date of Admission: 6/20/2021    Date of Service: Pt seen/examined on 06/20/21  and Admitted to Inpatient with expected LOS greater than two midnights due to medical therapy. Chief Complaint:   Chief Complaint   Patient presents with    Shortness of Breath     for the last 2 days. Recent admission for same 1 1/2 weeks ago     History Of Present Illness:   60 y.o. female with PMHx severe COPD with chronic hypoxic respiratory failure on 2 L/min oxygen by nasal cannula, ILD, current smoke who presented to [facility] ED with c/p \" I cannot breathe\" . Of note, patient had 2 admissions in the last 1 month for the same (5/24 - 5/27/21) and (6/7- 6/11/21) for COPD exacerbation and treated with IV steroids, hydration and pulmonology consultation. Patient reports that she completed steroid course a week ago. She started noticing more shortness of breath with cough but no sputum production for the last 2 days. Unfortunately she continues to smoke and reports that her  also smokes. She denies alcohol use, illicit drug use. Endorses compliance to her home inhalers. She reports that she \"forgot\" to follow-up with her PCP since last discharge . She has not seen Dr. Ralf Blue since March 2021(as VV) . Patient denies fever, chills, nausea, vomiting, diarrhea, chest pain, palpitations, lightheadedness, dizziness, orthopnea, PND, pedal edema. ED course : Patient noted to be tachypneic and tachycardic upon arrival to ED. Maintaining oxygen saturation in mid 90s on 2 L/min (home requirement) . Basic labs suggestive of hyponatremia sodium 125 and mild leukocytosis 15.4. Chest x-ray suggestive of prominent interstitial markings suggesting interstitial lung disease. No acute cardiopulmonary process. Patient received multiple breathing treatments in ED with partial relief of symptoms.   She was subsequently placed on continuous nebulizer treatments and hospitalist consulted for admission for further evaluation and management. She received IV steroids x1 in ED.     Past Medical History:      Diagnosis Date    Allergic rhinitis 6/11/2010    Anxiety     Arthritis     Aspiration pneumonia ([address] 3/21/2012    Recurrent    Asthma     Bronchitis chronic     COPD (chronic obstructive pulmonary disease) ([address] 12/3/2009    Depression     Drug abuse, cocaine type ([address]     past history of crack cocaine use    Emphysema of lung (HCC)     Fibromyalgia     GERD (gastroesophageal reflux disease)     Hyperlipidemia     Influenza A 03/05/2020    Lung disease     On home oxygen therapy     uses O2 NC 3L prn at night    Osteoporosis     Pneumonia     Polysubstance dependence ([address] 1/2/2012    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    Pulmonary fibrosis ([address] 10/16/2014    Pulmonary infiltrate     Pulmonary nodule 12/3/2009    Tobacco abuse        Past Surgical History:        Procedure Laterality Date    BLADDER REPAIR      BRONCHOSCOPY      BRONCHOSCOPY N/A 3/6/2020    BRONCHOSCOPY THERAPUTIC ASPIRATION INITIAL performed by Fabiola Seay MD at [address] [place] [address]  3/6/2020    BRONCHOSCOPY ALVEOLAR LAVAGE performed by Fabiola Seay MD at [address] [place] [address] N/A 6/26/2020    BRONCHOSCOPY THERAPUTIC ASPIRATION INITIAL performed by Natalie Delgadillo MD at [address] [place] [address]  6/26/2020    BRONCHOSCOPY ALVEOLAR LAVAGE performed by Natalie Delgadillo MD at [address] [facility]  2012    ENDOSCOPY, COLON, DIAGNOSTIC      ESOPHAGEAL DILATATION  9/20/2018    ESOPHAGEAL DILATION Joaquin Young performed by Felix Montero MD at [facility] HISTORY  2/12/15    T8 Kyphoplasty    ND COLONOSCOPY FLX DX W/COLLJ SPEC WHEN PFRMD N/A 9/20/2018    EGD AND COLONOSCOPY WITH ANESTHESIA performed by Felix Montero MD at [facility] Congestion 7/24/20  Yes Tisha Hassan,    vitamin D (ERGOCALCIFEROL) 1.25 MG (59783 UT) CAPS capsule Take 1 capsule by mouth once a week for 5 days Every Tuesday 3/23/21 3/28/21  Bradley Chatterjee MD   teriparatide, recombinant, (FORTEO) 600 MCG/2.4ML SOLN injection Inject 0.08 mLs into the skin daily One dose injected daily. 9/27/19 12/7/20  Belem Cohen MD       Allergies:  Meperidine and Demerol    Social History:    The patient currently lives at home with her  and is independent of IADLs    TOBACCO:   reports that she has been smoking cigarettes. She started smoking about 49 years ago. She has a 20.00 pack-year smoking history. She has never used smokeless tobacco.  ETOH:   reports no history of alcohol use. Family History:     Reviewed in detail and negative for DM, CAD, Cancer, CVA. Positive as follows:        Problem Relation Age of Onset    Asthma Mother     Diabetes Mother     Emphysema Mother     Heart Failure Mother     Hypertension Mother     Heart Disease Mother     High Cholesterol Mother     Cancer Mother     Depression Mother     Diabetes Father     Emphysema Father     Heart Failure Father     Hypertension Father     Heart Disease Father     High Cholesterol Father     Substance Abuse Father     Emphysema Paternal Grandfather     Diabetes Sister     High Cholesterol Sister     Vision Loss Maternal Uncle        REVIEW OF SYSTEMS:   Pertinent positives as noted in the HPI. All other systems reviewed and negative. PHYSICAL EXAM PERFORMED:  /73   Pulse 113   Temp 98 °F (36.7 °C) (Oral)   Resp 20   Ht 5' 5\" (1.651 m)   Wt 150 lb (68 kg)   SpO2 97%   BMI 24.96 kg/m²     General appearance:  No apparent distress, appears stated age and cooperative. Oxygen by nasal cannula 2 L/min  HEENT:  Normal cephalic, atraumatic without obvious deformity. Pupils equal, round, and reactive to light. Extra ocular muscles intact.  Conjunctivae/corneas No need for antibiotics at this time.  -Patient recently seen by pulmonology during last admission (6/7/21 - 6/11/21) and recommended outpatient follow-up with her primary pulmonologist ()    -Patient on 2 L/min oxygen by nasal cannula at baseline. Continue  -Unfortunately patient continues to smoke which might be contributing to her recurrent admissions with exacerbations. Counseled at length about the importance of quitting smoking. Offered nicotine patch. Patient declined. 2. Acute on Chronic Respiratory Failure - w/ increased hypoxia, POArrival.  Presence of clinical respiratory distress w/ tachypnea/dyspnea/SOB and wheezing w/ use of accessory muscles to breath.  Supplemental O2 and wean as tolerated.        3. Acute HypoNatremia - etiology clinically unable to determine but likely hypovolemic. Sodium 125 on admission. Check urine and serum osmolality.  Follow  serial labs off IVF - stable.      4. Tobacco Abuse - active and ongoing. Sona Santos counseled.  Nicotine replacement PRN offered but patient declined .     5. Depression/Anxiety - controlled on home Fluoxetine, Bupropion, and Trazodone - continued. DVT Prophylaxis: Lovenox  Diet: ADULT DIET; Regular  Code Status: Full Code    PT/OT Eval Status: Not yet ordered     Dispo -anticipate more than 2 midnight stay in the hospital   Bradley Chatterjee MD    The note was completed using Dragon -speech recognition software & EMR  . Every effort was made to ensure accuracy; however, inadvertent computerized transcription errors may be present. Thank you Umesh Sifuentes MD for the opportunity to be involved in this patient's care. If you have any questions or concerns please feel free to contact me at [phone].

## 2021-06-21 ENCOUNTER — CARE COORDINATION (OUTPATIENT)
Dept: CASE MANAGEMENT | Age: 66
End: 2021-06-21

## 2021-06-21 LAB
ANION GAP SERPL CALCULATED.3IONS-SCNC: 11 MMOL/L (ref 3–16)
BUN BLDV-MCNC: 11 MG/DL (ref 7–20)
CALCIUM SERPL-MCNC: 8.4 MG/DL (ref 8.3–10.6)
CHLORIDE BLD-SCNC: 99 MMOL/L (ref 99–110)
CO2: 22 MMOL/L (ref 21–32)
CREAT SERPL-MCNC: <0.5 MG/DL (ref 0.6–1.2)
GFR AFRICAN AMERICAN: >60
GFR NON-AFRICAN AMERICAN: >60
GLUCOSE BLD-MCNC: 152 MG/DL (ref 70–99)
HCT VFR BLD CALC: 33.8 % (ref 36–48)
HEMOGLOBIN: 11.7 G/DL (ref 12–16)
MCH RBC QN AUTO: 30.6 PG (ref 26–34)
MCHC RBC AUTO-ENTMCNC: 34.7 G/DL (ref 31–36)
MCV RBC AUTO: 88.1 FL (ref 80–100)
OSMOLALITY URINE: 300 MOSM/KG (ref 390–1070)
OSMOLALITY: 283 MOSM/KG (ref 280–301)
PDW BLD-RTO: 13.1 % (ref 12.4–15.4)
PLATELET # BLD: 311 K/UL (ref 135–450)
PMV BLD AUTO: 6.7 FL (ref 5–10.5)
POTASSIUM REFLEX MAGNESIUM: 4.1 MMOL/L (ref 3.5–5.1)
RBC # BLD: 3.83 M/UL (ref 4–5.2)
SODIUM BLD-SCNC: 132 MMOL/L (ref 136–145)
WBC # BLD: 18.3 K/UL (ref 4–11)

## 2021-06-21 PROCEDURE — 85027 COMPLETE CBC AUTOMATED: CPT

## 2021-06-21 PROCEDURE — 6370000000 HC RX 637 (ALT 250 FOR IP)

## 2021-06-21 PROCEDURE — 6360000002 HC RX W HCPCS: Performed by: INTERNAL MEDICINE

## 2021-06-21 PROCEDURE — 80048 BASIC METABOLIC PNL TOTAL CA: CPT

## 2021-06-21 PROCEDURE — 1200000000 HC SEMI PRIVATE

## 2021-06-21 PROCEDURE — 6370000000 HC RX 637 (ALT 250 FOR IP): Performed by: PEDIATRICS

## 2021-06-21 PROCEDURE — 94761 N-INVAS EAR/PLS OXIMETRY MLT: CPT

## 2021-06-21 PROCEDURE — 6370000000 HC RX 637 (ALT 250 FOR IP): Performed by: INTERNAL MEDICINE

## 2021-06-21 PROCEDURE — 94640 AIRWAY INHALATION TREATMENT: CPT

## 2021-06-21 PROCEDURE — 2700000000 HC OXYGEN THERAPY PER DAY

## 2021-06-21 PROCEDURE — 36415 COLL VENOUS BLD VENIPUNCTURE: CPT

## 2021-06-21 PROCEDURE — 83930 ASSAY OF BLOOD OSMOLALITY: CPT

## 2021-06-21 PROCEDURE — 2580000003 HC RX 258: Performed by: INTERNAL MEDICINE

## 2021-06-21 RX ORDER — CALCIUM CARBONATE 200(500)MG
500 TABLET,CHEWABLE ORAL 3 TIMES DAILY PRN
Status: DISCONTINUED | OUTPATIENT
Start: 2021-06-21 | End: 2021-06-24 | Stop reason: HOSPADM

## 2021-06-21 RX ORDER — NICOTINE 21 MG/24HR
1 PATCH, TRANSDERMAL 24 HOURS TRANSDERMAL DAILY
Status: DISCONTINUED | OUTPATIENT
Start: 2021-06-21 | End: 2021-06-24 | Stop reason: HOSPADM

## 2021-06-21 RX ORDER — NICOTINE 21 MG/24HR
PATCH, TRANSDERMAL 24 HOURS TRANSDERMAL
Status: COMPLETED
Start: 2021-06-21 | End: 2021-06-22

## 2021-06-21 RX ORDER — IBUPROFEN 400 MG/1
400 TABLET ORAL ONCE
Status: COMPLETED | OUTPATIENT
Start: 2021-06-21 | End: 2021-06-21

## 2021-06-21 RX ADMIN — MONTELUKAST SODIUM 10 MG: 10 TABLET ORAL at 20:48

## 2021-06-21 RX ADMIN — Medication 10 ML: at 07:54

## 2021-06-21 RX ADMIN — TRAZODONE HYDROCHLORIDE 150 MG: 50 TABLET ORAL at 20:47

## 2021-06-21 RX ADMIN — Medication 10 ML: at 00:59

## 2021-06-21 RX ADMIN — ANTACID TABLETS 500 MG: 500 TABLET, CHEWABLE ORAL at 18:44

## 2021-06-21 RX ADMIN — FLUOXETINE 60 MG: 20 CAPSULE ORAL at 07:49

## 2021-06-21 RX ADMIN — BUSPIRONE HYDROCHLORIDE 30 MG: 5 TABLET ORAL at 20:48

## 2021-06-21 RX ADMIN — IPRATROPIUM BROMIDE AND ALBUTEROL SULFATE 1 AMPULE: .5; 3 SOLUTION RESPIRATORY (INHALATION) at 19:49

## 2021-06-21 RX ADMIN — METHYLPREDNISOLONE SODIUM SUCCINATE 40 MG: 40 INJECTION, POWDER, FOR SOLUTION INTRAMUSCULAR; INTRAVENOUS at 07:21

## 2021-06-21 RX ADMIN — BUSPIRONE HYDROCHLORIDE 30 MG: 5 TABLET ORAL at 07:49

## 2021-06-21 RX ADMIN — IPRATROPIUM BROMIDE AND ALBUTEROL SULFATE 1 AMPULE: .5; 3 SOLUTION RESPIRATORY (INHALATION) at 03:56

## 2021-06-21 RX ADMIN — METHYLPREDNISOLONE SODIUM SUCCINATE 40 MG: 40 INJECTION, POWDER, FOR SOLUTION INTRAMUSCULAR; INTRAVENOUS at 00:59

## 2021-06-21 RX ADMIN — ACETAMINOPHEN 650 MG: 325 TABLET ORAL at 20:47

## 2021-06-21 RX ADMIN — ENOXAPARIN SODIUM 40 MG: 40 INJECTION SUBCUTANEOUS at 07:49

## 2021-06-21 RX ADMIN — Medication 10 ML: at 20:50

## 2021-06-21 RX ADMIN — METHYLPREDNISOLONE SODIUM SUCCINATE 40 MG: 40 INJECTION, POWDER, FOR SOLUTION INTRAMUSCULAR; INTRAVENOUS at 12:18

## 2021-06-21 RX ADMIN — IPRATROPIUM BROMIDE AND ALBUTEROL SULFATE 1 AMPULE: .5; 3 SOLUTION RESPIRATORY (INHALATION) at 15:52

## 2021-06-21 RX ADMIN — IPRATROPIUM BROMIDE AND ALBUTEROL SULFATE 1 AMPULE: .5; 3 SOLUTION RESPIRATORY (INHALATION) at 11:43

## 2021-06-21 RX ADMIN — IBUPROFEN 400 MG: 400 TABLET, FILM COATED ORAL at 22:56

## 2021-06-21 RX ADMIN — ACETAMINOPHEN 650 MG: 325 TABLET ORAL at 11:34

## 2021-06-21 RX ADMIN — IPRATROPIUM BROMIDE AND ALBUTEROL SULFATE 1 AMPULE: .5; 3 SOLUTION RESPIRATORY (INHALATION) at 08:33

## 2021-06-21 RX ADMIN — METHYLPREDNISOLONE SODIUM SUCCINATE 40 MG: 40 INJECTION, POWDER, FOR SOLUTION INTRAMUSCULAR; INTRAVENOUS at 18:44

## 2021-06-21 RX ADMIN — Medication 10 ML: at 07:22

## 2021-06-21 ASSESSMENT — PAIN SCALES - GENERAL
PAINLEVEL_OUTOF10: 5
PAINLEVEL_OUTOF10: 0
PAINLEVEL_OUTOF10: 0
PAINLEVEL_OUTOF10: 6
PAINLEVEL_OUTOF10: 5
PAINLEVEL_OUTOF10: 0
PAINLEVEL_OUTOF10: 0

## 2021-06-21 NOTE — PLAN OF CARE
Problem:  Activity Intolerance:  Goal: Ability to tolerate increased activity will improve  Description: Ability to tolerate increased activity will improve  Outcome: Ongoing     Problem: Airway Clearance - Ineffective:  Goal: Ability to maintain a clear airway will improve  Description: Ability to maintain a clear airway will improve  Outcome: Ongoing     Problem: Breathing Pattern - Ineffective:  Goal: Ability to achieve and maintain a regular respiratory rate will improve  Description: Ability to achieve and maintain a regular respiratory rate will improve  Outcome: Ongoing     Problem: Gas Exchange - Impaired:  Goal: Levels of oxygenation will improve  Description: Levels of oxygenation will improve  Outcome: Ongoing

## 2021-06-21 NOTE — PROGRESS NOTES
Hospitalist Progress Note      PCP: Quita Reed MD    Date of Admission: 6/20/2021    Chief Complaint: Shortness of Breath    Hospital Course:   60 y.o. female with PMHx severe COPD with chronic hypoxic respiratory failure on 2 L/min oxygen by nasal cannula, ILD, current smoke who presented to [facility] ED with c/p \" I cannot breathe\" . Of note, patient had 2 admissions in the last 1 month for the same (5/24 - 5/27/21) and (6/7- 6/11/21) for COPD exacerbation and treated with IV steroids, hydration and pulmonology consultation. Patient reports that she completed steroid course a week ago. She started noticing more shortness of breath with cough but no sputum production for the last 2 days. Unfortunately she continues to smoke and reports that her  also smokes. She denies alcohol use, illicit drug use. Endorses compliance to her home inhalers. She reports that she \"forgot\" to follow-up with her PCP since last discharge . She has not seen Dr. Ellen Durham since March 2021(as VV) . Patient denies fever, chills, nausea, vomiting, diarrhea, chest pain, palpitations, lightheadedness, dizziness, orthopnea, PND, pedal edema.     ED course : Patient noted to be tachypneic and tachycardic upon arrival to ED. Maintaining oxygen saturation in mid 90s on 2 L/min (home requirement) . Basic labs suggestive of hyponatremia sodium 125 and mild leukocytosis 15.4. Chest x-ray suggestive of prominent interstitial markings suggesting interstitial lung disease. No acute cardiopulmonary process. Patient received multiple breathing treatments in ED with partial relief of symptoms. She was subsequently placed on continuous nebulizer treatments and hospitalist consulted for admission for further evaluation and management. She received IV steroids x1 in ED. Subjective:   Still has cough and shortness of breath. On 2.5 L oxygen she does not take oxygen daytime at home she takes only in the night.   No fever chest pain change in mental status or focal neurological symptoms. No problem in swallowing      Medications:  Reviewed    Infusion Medications    sodium chloride       Scheduled Medications    sodium chloride flush  5-40 mL Intravenous 2 times per day    enoxaparin  40 mg Subcutaneous Daily    methylPREDNISolone  40 mg Intravenous Q6H    Followed by   Gilma Solis ON 6/22/2021] predniSONE  40 mg Oral Daily    busPIRone  30 mg Oral BID    FLUoxetine  60 mg Oral Daily    montelukast  10 mg Oral Nightly    traZODone  150 mg Oral Nightly    ipratropium-albuterol  1 ampule Inhalation Q4H     PRN Meds: sodium chloride flush, sodium chloride, ondansetron **OR** ondansetron, polyethylene glycol, acetaminophen **OR** acetaminophen, guaiFENesin      Intake/Output Summary (Last 24 hours) at 6/21/2021 0736  Last data filed at 6/20/2021 1945  Gross per 24 hour   Intake 220 ml   Output 300 ml   Net -80 ml       Physical Exam Performed:    /64   Pulse 108   Temp 97.6 °F (36.4 °C) (Oral)   Resp 20   Ht 5' 5\" (1.651 m)   Wt 150 lb (68 kg)   SpO2 94%   BMI 24.96 kg/m²     General appearance: No apparent distress, appears stated age and cooperative. HEENT: Pupils equal, round, and reactive to light. Conjunctivae/corneas clear. Neck: Supple, with full range of motion. No jugular venous distention. Trachea midline. Respiratory: Tachypneic with exertion. , bilaterally without Rales/with occasional wheezes/Rhonchi. Cardiovascular: Regular rate and rhythm with normal S1/S2 without murmurs, rubs or gallops. Abdomen: Soft, non-tender, non-distended with normal bowel sounds. Musculoskeletal: No clubbing, cyanosis or edema bilaterally. Full range of motion without deformity. Skin: Skin color, texture, turgor normal.  No rashes or lesions. Neurologic:  Neurovascularly intact without any focal sensory/motor deficits.  Cranial nerves: II-XII intact, grossly non-focal.  Psychiatric: Alert and oriented, thought content appropriate, normal insight  Capillary Refill: Brisk,3 seconds, normal   Peripheral Pulses: +2 palpable, equal bilaterally       Labs:   Recent Labs     06/20/21  1022 06/21/21  0631   WBC 15.4* 18.3*   HGB 13.6 11.7*   HCT 39.6 33.8*    311     Recent Labs     06/20/21  1022 06/21/21  0631   * 132*   K 4.6 4.1   CL 91* 99   CO2 24 22   BUN 9 11   CREATININE <0.5* <0.5*   CALCIUM 8.9 8.4     Recent Labs     06/20/21  1022   AST 19   ALT 17   BILITOT 0.4   ALKPHOS 57     No results for input(s): INR in the last 72 hours. No results for input(s): Erich Killings in the last 72 hours. Urinalysis:      Lab Results   Component Value Date    NITRU Negative 06/07/2021    WBCUA 0-3 04/13/2012    RBCUA 3-5 04/13/2012    BLOODU Negative 06/07/2021    SPECGRAV 1.015 06/07/2021    GLUCOSEU Negative 06/07/2021    GLUCOSEU NEGATIVE 04/13/2012       Radiology:  XR CHEST PORTABLE   Final Result   Prominent interstitial markings again noted, stable suggesting interstitial   lung disease      No acute cardiopulmonary process                 Assessment/Plan:    Active Hospital Problems    Diagnosis     COPD exacerbation ([facility] [J44.1]      1. Acute COPD exacerbation in a patient with chronic hypoxic respiratory failure with ILD  POA  -Continue IV steroids/HHN scheduled and as needed. No need for antibiotics at this time.  -Patient recently seen by pulmonology during last admission (6/7/21 - 6/11/21) and recommended outpatient follow-up with her primary pulmonologist ()    -Patient on 2 L/min oxygen by nasal cannula   -Unfortunately patient continues to smoke which might be contributing to her recurrent admissions with exacerbations. Counseled at length about the importance of quitting smoking. Offered nicotine patch. Patient declined.     2.  Acute on Chronic Respiratory Failure - w/ increased hypoxia, POArrival.  Presence of clinical respiratory distress w/ tachypnea/dyspnea/SOB and wheezing w/ use of accessory muscles to breath.  Supplemental O2 and wean as tolerated.        3. Acute HypoNatremia - etiology clinically unable to determine but likely hypovolemic. Sodium 125 on admission. Check urine and serum osmolality.  Follow  serial labs off IVF -improved     4. Tobacco Abuse - active and ongoing. Chris De La Fuente counseled.  Nicotine replacement PRN offered but patient declined .     5. Depression/Anxiety - controlled on home Fluoxetine, Bupropion, and Trazodone - continued. 6.  Leukocytosis-possibly secondary to steroid, she does not seem to be septic    DVT Prophylaxis: Lovenox  Diet: ADULT DIET;  Regular  Code Status: Full Code    PT/OT Eval Status:     Dispo -pending improvement 2 to 3 days    William Neal MD

## 2021-06-21 NOTE — CARE COORDINATION
Patient with frequent admission related to COPD. CTN reached out to inpatient [facility] to discuss the possibility of palliative/hospice services for patient. CM to discuss [facility] S4 program for patient. CTN also left message with CHANCE Dietrich to discuss patient as well.        Maxime DENISEN, RN, [facility]  Care Transition Nurse  [phone] mobile

## 2021-06-21 NOTE — CARE COORDINATION
CASE MANAGEMENT INITIAL ASSESSMENT      Reviewed chart and completed assessment with:  Pt  Explained Case Management role/services. Primary contact information: [address] Decision Maker :   Primary Decision Maker: Guillermo Donald - Spouse - [phone]    Secondary Decision Maker: Angelita Wong - Child - [phone]    Secondary Decision Maker: kaylynn Benton - Child - [phone]    Supplemental (Other) Decision Maker: Waylon Francois - Child - [phone]          Can this person be reached and be able to respond quickly, such as within a few minutes or hours? Yes      Admit date/status: IP, 6/20/21  Diagnosis: COPD excaberation   Is this a Readmission?:  Yes    Discharged on 6/11/21 home with No needs, pt did not f/u with PCP or Pulmonologist.     Insurance: [employer]. Precert required for SNF: Yes       3 night stay required: No    Living arrangements, Adls, care needs, prior to admission: Lives in a two story condo, master bedroom and bath on the second floor. Lives with spouse, drives and independent in ADL's    Transportation: family    Durable Medical Equipment at home:  Walker__Cane__RTS__ BSC__Shower Chair__  02_X [employer] 2Lcont, pt can bring in portable tank at NH._ HHN__ CPAP__  BiPap__  Hospital Bed__ W/C___ Other__________    Services in the home and/or outpatient, prior to admission: None      PT/OT recs: None seen at this time. Hospital Exemption Notification (HEN): Needed for SNF, not initiated. Barriers to discharge: None    Plan/comments: CM spoke to [employer] CTC/RN and updated that she is following and pt has, \"So Bernabe Ball has an apt already scheduled with Dr. Dylan Rouse on 6/28 Mon\"  Met with pt at bedside and discussed F/U PCP apt. Discussed with pt having skilled [facility] d/t hospital readmissions. Pt is in agreement with [facility] with no agency preference, OK for referral to [facility]. LVM with Robbie Neves today with [facility] about S4 program as well.  Per MD dispo pending in 2-3 days.  CM following-Jael Hernandes RN      ECOC on chart for MD signature

## 2021-06-22 PROBLEM — F12.90 MARIJUANA USE: Status: ACTIVE | Noted: 2021-06-22

## 2021-06-22 PROBLEM — M40.14 OTHER SECONDARY KYPHOSIS, THORACIC REGION: Status: ACTIVE | Noted: 2021-06-22

## 2021-06-22 PROBLEM — I51.89 DIASTOLIC DYSFUNCTION: Status: ACTIVE | Noted: 2021-06-22

## 2021-06-22 PROCEDURE — 6370000000 HC RX 637 (ALT 250 FOR IP): Performed by: PEDIATRICS

## 2021-06-22 PROCEDURE — 94761 N-INVAS EAR/PLS OXIMETRY MLT: CPT

## 2021-06-22 PROCEDURE — 99223 1ST HOSP IP/OBS HIGH 75: CPT | Performed by: INTERNAL MEDICINE

## 2021-06-22 PROCEDURE — 6370000000 HC RX 637 (ALT 250 FOR IP): Performed by: INTERNAL MEDICINE

## 2021-06-22 PROCEDURE — 2580000003 HC RX 258: Performed by: INTERNAL MEDICINE

## 2021-06-22 PROCEDURE — 2700000000 HC OXYGEN THERAPY PER DAY

## 2021-06-22 PROCEDURE — 94669 MECHANICAL CHEST WALL OSCILL: CPT

## 2021-06-22 PROCEDURE — 6360000002 HC RX W HCPCS: Performed by: INTERNAL MEDICINE

## 2021-06-22 PROCEDURE — 2500000003 HC RX 250 WO HCPCS: Performed by: INTERNAL MEDICINE

## 2021-06-22 PROCEDURE — 94640 AIRWAY INHALATION TREATMENT: CPT

## 2021-06-22 PROCEDURE — 1200000000 HC SEMI PRIVATE

## 2021-06-22 RX ORDER — ACETYLCYSTEINE 200 MG/ML
600 SOLUTION ORAL; RESPIRATORY (INHALATION) 2 TIMES DAILY
Status: DISCONTINUED | OUTPATIENT
Start: 2021-06-22 | End: 2021-06-24

## 2021-06-22 RX ORDER — PANTOPRAZOLE SODIUM 40 MG/1
40 TABLET, DELAYED RELEASE ORAL
Status: DISCONTINUED | OUTPATIENT
Start: 2021-06-23 | End: 2021-06-24 | Stop reason: HOSPADM

## 2021-06-22 RX ORDER — IBUPROFEN 400 MG/1
400 TABLET ORAL ONCE
Status: COMPLETED | OUTPATIENT
Start: 2021-06-22 | End: 2021-06-22

## 2021-06-22 RX ORDER — BUDESONIDE 0.5 MG/2ML
0.5 INHALANT ORAL 2 TIMES DAILY
Status: DISCONTINUED | OUTPATIENT
Start: 2021-06-22 | End: 2021-06-24

## 2021-06-22 RX ADMIN — Medication 10 ML: at 08:23

## 2021-06-22 RX ADMIN — PREDNISONE 40 MG: 20 TABLET ORAL at 15:28

## 2021-06-22 RX ADMIN — IPRATROPIUM BROMIDE AND ALBUTEROL SULFATE 1 AMPULE: .5; 3 SOLUTION RESPIRATORY (INHALATION) at 16:00

## 2021-06-22 RX ADMIN — Medication 10 ML: at 21:04

## 2021-06-22 RX ADMIN — ENOXAPARIN SODIUM 40 MG: 40 INJECTION SUBCUTANEOUS at 08:22

## 2021-06-22 RX ADMIN — IPRATROPIUM BROMIDE AND ALBUTEROL SULFATE 1 AMPULE: .5; 3 SOLUTION RESPIRATORY (INHALATION) at 03:30

## 2021-06-22 RX ADMIN — BUSPIRONE HYDROCHLORIDE 30 MG: 5 TABLET ORAL at 20:54

## 2021-06-22 RX ADMIN — METHYLPREDNISOLONE SODIUM SUCCINATE 40 MG: 40 INJECTION, POWDER, FOR SOLUTION INTRAMUSCULAR; INTRAVENOUS at 06:46

## 2021-06-22 RX ADMIN — FLUOXETINE 60 MG: 20 CAPSULE ORAL at 08:22

## 2021-06-22 RX ADMIN — MONTELUKAST SODIUM 10 MG: 10 TABLET ORAL at 20:55

## 2021-06-22 RX ADMIN — BUSPIRONE HYDROCHLORIDE 30 MG: 5 TABLET ORAL at 08:22

## 2021-06-22 RX ADMIN — IBUPROFEN 400 MG: 400 TABLET, FILM COATED ORAL at 20:55

## 2021-06-22 RX ADMIN — TRAZODONE HYDROCHLORIDE 150 MG: 50 TABLET ORAL at 20:54

## 2021-06-22 RX ADMIN — DOXYCYCLINE 100 MG: 100 INJECTION, POWDER, LYOPHILIZED, FOR SOLUTION INTRAVENOUS at 21:00

## 2021-06-22 RX ADMIN — METHYLPREDNISOLONE SODIUM SUCCINATE 40 MG: 40 INJECTION, POWDER, FOR SOLUTION INTRAMUSCULAR; INTRAVENOUS at 00:52

## 2021-06-22 RX ADMIN — IPRATROPIUM BROMIDE AND ALBUTEROL SULFATE 1 AMPULE: .5; 3 SOLUTION RESPIRATORY (INHALATION) at 07:52

## 2021-06-22 RX ADMIN — IPRATROPIUM BROMIDE AND ALBUTEROL SULFATE 1 AMPULE: .5; 3 SOLUTION RESPIRATORY (INHALATION) at 23:46

## 2021-06-22 RX ADMIN — IPRATROPIUM BROMIDE AND ALBUTEROL SULFATE 1 AMPULE: .5; 3 SOLUTION RESPIRATORY (INHALATION) at 11:40

## 2021-06-22 RX ADMIN — SODIUM CHLORIDE 25 ML: 9 INJECTION, SOLUTION INTRAVENOUS at 20:59

## 2021-06-22 RX ADMIN — HYPROMELLOSE 2906 (4000 MPA.S) AND HYPROMELLOSE 2906 (50 MPA.S) 1 DROP: 25; 25 SOLUTION OPHTHALMIC at 21:27

## 2021-06-22 ASSESSMENT — PAIN SCALES - GENERAL
PAINLEVEL_OUTOF10: 0
PAINLEVEL_OUTOF10: 5
PAINLEVEL_OUTOF10: 0
PAINLEVEL_OUTOF10: 0

## 2021-06-22 ASSESSMENT — PAIN DESCRIPTION - LOCATION: LOCATION: HEAD

## 2021-06-22 ASSESSMENT — PAIN DESCRIPTION - PAIN TYPE: TYPE: ACUTE PAIN

## 2021-06-22 NOTE — CARE COORDINATION
[facility]    Referral received from CM to follow for home care services. I will follow for needs, and speak with patient to verify demos.         COPD  S4      DC Tues/Wed      Bakari Burton  Work mobile: [phone]  [facility] office: [phone]

## 2021-06-22 NOTE — CONSULTS
INPATIENT PULMONARY CRITICAL CARE CONSULT NOTE      Chief Complaint/Referring Provider:  Patient is being seen at the request of Dr. Kasey Ortega  for a consultation for refractory COPD with 3 admissions in last 30 days      Presenting HPI: Patient came to the hospital with increasing shortness of breath     Patient came to the hospital because of increasing shortness of breath    As per the admitting provider-65 y.o. female with PMHx severe COPD with chronic hypoxic respiratory failure on 2 L/min oxygen by nasal cannula, ILD, current smoke who presented to [facility] ED with c/p \" I cannot breathe\" . Of note, patient had 2 admissions in the last 1 month for the same (5/24 - 5/27/21) and (6/7- 6/11/21) for COPD exacerbation and treated with IV steroids, hydration and pulmonology consultation. Patient reports that she completed steroid course a week ago. She started noticing more shortness of breath with cough but no sputum production for the last 2 days. Unfortunately she continues to smoke and reports that her  also smokes. She denies alcohol use, illicit drug use. Endorses compliance to her home inhalers. She reports that she \"forgot\" to follow-up with her PCP since last discharge . She has not seen Dr. Dav Butler since March 2021(as VV) . Patient denies fever, chills, nausea, vomiting, diarrhea, chest pain, palpitations, lightheadedness, dizziness, orthopnea, PND, pedal edema.     ED course : Patient noted to be tachypneic and tachycardic upon arrival to ED. Maintaining oxygen saturation in mid 90s on 2 L/min (home requirement) . Basic labs suggestive of hyponatremia sodium 125 and mild leukocytosis 15.4. Chest x-ray suggestive of prominent interstitial markings suggesting interstitial lung disease. No acute cardiopulmonary process. Patient received multiple breathing treatments in ED with partial relief of symptoms.   She was subsequently placed on continuous nebulizer treatments and hospitalist obstructive pulmonary disease) ([address] 05/24/2021    Acute on chronic respiratory failure with hypoxemia ([address] 03/14/2021    Pulmonary infiltrates 06/25/2020    Chest congestion 06/25/2020    Mucus plugging of bronchi 06/25/2020    Ineffective airway clearance 06/25/2020    COPD exacerbation ([address] 06/24/2020    Sepsis ([address] 06/01/2020    Healthcare associated bacterial pneumonia     Bandemia     Acute respiratory failure with hypoxia ([address] 04/03/2020    Acute on chronic respiratory failure with hypoxia and hypercapnia (HCC) 03/04/2020    Pneumonia 03/19/2019    Compression fx, thoracic spine, sequela 12/14/2018    Kyphosis 12/14/2018    Anxiety and depression 12/14/2018    Chronic pain syndrome 12/14/2018    Polyarthropathy, multiple sites 12/14/2018    Esophageal dysphagia     Heartburn     Rectal bleeding     History of colon polyps     History of prescription drug abuse 04/21/2018    Thyroid nodule 04/13/2018    Recurrent major depressive disorder, in partial remission ([address] 01/25/2018    ILD (interstitial lung disease) ([address] 04/06/2016    Pulmonary fibrosis ([address] 10/16/2014    Tobacco dependence 09/27/2014    Depression 04/11/2013    Hyperlipidemia 09/10/2012    Hyponatremia 09/10/2012    Smoker 12/03/2009       Past Medical History:   Diagnosis Date    Allergic rhinitis 6/11/2010    Anxiety     Arthritis     Aspiration pneumonia ([address] 3/21/2012    Recurrent    Asthma     Bronchitis chronic     COPD (chronic obstructive pulmonary disease) ([address] 12/3/2009    Depression     Drug abuse, cocaine type ([address]     past history of crack cocaine use    Emphysema of lung ([address]     Fibromyalgia     GERD (gastroesophageal reflux disease)     Hyperlipidemia     Influenza A 03/05/2020    Lung disease     On home oxygen therapy     uses O2 NC 3L prn at night    Osteoporosis     Pneumonia     Polysubstance dependence ([address] 1/2/2012    Psychoactive substance-induced organic Smoking status: Current Every Day Smoker     Packs/day: 0.50     Years: 40.00     Pack years: 20.00     Types: Cigarettes     Start date: 1972     Last attempt to quit: 10/12/2020     Years since quittin.6    Smokeless tobacco: Never Used    Tobacco comment: 0.5 PPD restarted   Substance Use Topics    Alcohol use: No     Alcohol/week: 0.0 standard drinks        Allergies   Allergen Reactions    Meperidine Anaphylaxis     \"Demerol\"     Demerol Hives               Physical Exam:  Blood pressure (!) 105/58, pulse 97, temperature 98 °F (36.7 °C), temperature source Oral, resp. rate 16, height 5' 5\" (1.651 m), weight 150 lb (68 kg), SpO2 94 %, not currently breastfeeding.'     Constitutional: Mild respiratory distress. When seen  HENT:  Oropharynx is clear and moist. No thyromegaly. Eyes:  Conjunctivae are normal. Pupils equal, round, and reactive to light. No scleral icterus. Neck: . No tracheal deviation present. No obvious thyroid mass. Cardiovascular: Normal rate, regular rhythm, normal heart sounds. No right ventricular heave. No lower extremity edema. Pulmonary/Chest: Bilateral wheezes. Bibasilar scattered rales. Increased chest congestion chest wall is not dull to percussion. No accessory muscle usage or stridor. Decreased breath sound density  Abdominal: Soft. Bowel sounds present. No distension or hernia. No tenderness. Musculoskeletal: No cyanosis. No clubbing. No obvious joint deformity. Lymphadenopathy: No cervical or supraclavicular adenopathy. Skin: Skin is warm and dry. No rash or nodules on the exposed extremities. Psychiatric: Normal mood and affect. Behavior is normal.  No anxiety. Neurologic: Alert, awake and oriented. PERRL.   Speech fluent         Results:  CBC:   Recent Labs     21  1022 21  0631   WBC 15.4* 18.3*   HGB 13.6 11.7*   HCT 39.6 33.8*   MCV 89.5 88.1    311     BMP:   Recent Labs     21  1022 21  0631   * 132*   K 4.6 4.1   CL 91* 99   CO2 24 22   BUN 9 11   CREATININE <0.5* <0.5*     LIVER PROFILE:   Recent Labs     06/20/21  1022   AST 19   ALT 17   BILITOT 0.4   ALKPHOS 57       Imaging:  I have reviewed radiology images personally. XR CHEST PORTABLE   Final Result   Prominent interstitial markings again noted, stable suggesting interstitial   lung disease      No acute cardiopulmonary process           XR CHEST PORTABLE    Result Date: 6/20/2021  EXAMINATION: ONE XRAY VIEW OF THE CHEST 6/20/2021 10:20 am COMPARISON: 06/07/2021 HISTORY: ORDERING SYSTEM PROVIDED HISTORY: sob TECHNOLOGIST PROVIDED HISTORY: Reason for exam:->sob FINDINGS: Cardiomediastinal silhouette is stable. Prominent interstitial markings are noted. No focal pulmonary consolidation. No pneumothorax. No pleural effusion. Prominent interstitial markings again noted, stable suggesting interstitial lung disease No acute cardiopulmonary process     XR CHEST PORTABLE    Result Date: 6/8/2021  EXAMINATION: ONE XRAY VIEW OF THE CHEST 6/7/2021 10:07 am COMPARISON: May 24, 2021 HISTORY: ORDERING SYSTEM PROVIDED HISTORY: SOB TECHNOLOGIST PROVIDED HISTORY: Reason for exam:->SOB Reason for Exam: resp distress Acuity: Acute Type of Exam: Initial Initial evaluation for acute respiratory distress. FINDINGS: The cardiomediastinal silhouette is normal in size. Mildly increased interstitial opacities are present bilaterally with lower lobe predominance. This appears stable from previous exam. No focal lobar consolidation or air bronchograms. No pleural effusion or pneumothorax. No significant interval change. Stable bilateral lower lobe predominant increased interstitial opacities. No superimposed acute process. No significant interval change compared to May 24, 2021. XR CHEST PORTABLE    Result Date: 5/24/2021  EXAMINATION: ONE XRAY VIEW OF THE CHEST 5/24/2021 2:51 pm COMPARISON: Prior study(s) most recent chest CT 03/14/2021.  HISTORY: ORDERING SYSTEM PROVIDED HISTORY: sob TECHNOLOGIST PROVIDED HISTORY: Reason for exam:->sob Reason for Exam: sob Acuity: Acute Type of Exam: Initial FINDINGS: The heart is normal size. Interstitial changes at the lung bases are present, asymmetric involvement left lower lobe. Remainder the lungs are somewhat hyperinflated with emphysema, upper lobe predominant. No pneumothorax. No pleural effusion. Reticular densities asymmetric left lung base likely representing fibrosis or chronic postinflammatory change. Suspect moderate COPD. Echocardiogram:Summary   Left ventricular systolic function is normal with ejection fraction   estimated at 55%. No regional wall motion abnormalities. There is mild concentric left ventricular hypertrophy. Grade I diastolic dysfunction with normal left ventricular filling pressure. Moderate aortic regurgitation. PFT:Results for Patrice Arevalo (MRN [id number]) as of 6/22/2021 12:26   Ref. Range 2/28/2019 10:53 8/29/2019 23:59 12/2/2020 14:06   DLCO %Pred Latest Units: % 31 30 30   FEV1 %Pred-Post Latest Units: % 54 38 49   FEV1 %Pred-Pre Latest Units: % 50 38 45   FEV1/FVC-Post Latest Units: % 67 60 57   FEV1/FVC-Pre Latest Units: % 64 59 54   TLC %Pred Latest Units: % 78 81 78       CTA OF THE CHEST 3/14/2021 9:19 pm       TECHNIQUE:   CTA of the chest was performed after the administration of intravenous   contrast.  Multiplanar reformatted images are provided for review.  MIP   images are provided for review.  Dose modulation, iterative reconstruction,   and/or weight based adjustment of the mA/kV was utilized to reduce the   radiation dose to as low as reasonably achievable.       COMPARISON:   None.       HISTORY:   ORDERING SYSTEM PROVIDED HISTORY: Hypoxia with tachycardia R/O PE; also   assymetrical L basilar opacification in pt with emphysema and chronic tobacco   abuse   TECHNOLOGIST PROVIDED HISTORY:   Reason for exam:->Hypoxia with tachycardia R/O PE; also assymetrical L   basilar opacification in pt with emphysema and chronic tobacco abuse   Decision Support Exception->Emergency Medical Condition (MA)   Reason for Exam: sob x 2 days-hypoxia-tachycardia   Acuity: Acute   Type of Exam: Initial   Relevant Medical/Surgical History: no surg       FINDINGS:   Pulmonary Arteries: Pulmonary arteries are adequately opacified for   evaluation.  No evidence of intraluminal filling defect to suggest pulmonary   embolism.  Main pulmonary artery is normal in caliber.       Mediastinum: No evidence of mediastinal lymphadenopathy.  The heart and   pericardium demonstrate no acute abnormality.  There is no acute abnormality   of the thoracic aorta.       Lungs/pleura: Mild scattered fibrotic changes are noted in the bilateral   lungs.  The lungs are without acute process.  No focal consolidation or   pulmonary edema.  No evidence of pleural effusion or pneumothorax.       Upper Abdomen: Limited images of the upper abdomen are unremarkable.       Soft Tissues/Bones: No acute bone or soft tissue abnormality.  Chronic   compression fractures of the thoracic spine including a severe central   compression of the T3 with minimal retropulsion, severe compression of T8   with minimal retropulsion and evidence of previous vertebroplasty, and mild   anterior compression of the T5 vertebral body.  Exaggerated thoracic kyphotic   curvature.  Generalized osteopenia.           Impression   No evidence of pulmonary embolism or acute pulmonary abnormality.       Mild scattered fibrotic pulmonary changes.       Exaggerated thoracic kyphotic curvature with multiple chronic vertebral body   compression fractures     Results for Concepcion Parra (MRN [id number]) as of 6/22/2021 12:26   Ref.  Range 6/9/2021 05:50 6/10/2021 06:06 6/11/2021 06:39 6/20/2021 10:22 6/21/2021 06:31   WBC Latest Ref Range: 4.0 - 11.0 K/uL 13.7 (H) 14.7 (H) 15.9 (H) 15.4 (H) 18.3 (H)   RBC Latest Ref Range: 4.00 - 5.20 M/uL 3.96 (L) 4. 02 4.01 4.43 3.83 (L)   Hemoglobin Quant Latest Ref Range: 12.0 - 16.0 g/dL 12.0 12.1 12.0 13.6 11.7 (L)   Hematocrit Latest Ref Range: 36.0 - 48.0 % 34.8 (L) 35.8 (L) 36.0 39.6 33.8 (L)   MCV Latest Ref Range: 80.0 - 100.0 fL 87.9 89.0 90.0 89.5 88.1   MCH Latest Ref Range: 26.0 - 34.0 pg 30.3 30.0 30.1 30.7 30.6   MCHC Latest Ref Range: 31.0 - 36.0 g/dL 34.5 33.7 33.4 34.3 34.7   MPV Latest Ref Range: 5.0 - 10.5 fL 7.1 7.0 7.0 6.9 6.7   RDW Latest Ref Range: 12.4 - 15.4 % 12.8 13.1 13.1 13.3 13.1   Platelet Count Latest Ref Range: 135 - 450 K/uL 287 298 305 368 311   Neutrophils % Latest Units: % 91.8 89.5 85.0 68.0    Lymphocyte % Latest Units: % 4.9 6.1 6.0 12.0      Results for Georgina Ibarra (MRN [id number]) as of 6/22/2021 19:42   Ref. Range 3/14/2021 17:12 5/24/2021 15:31 6/7/2021 10:20 6/20/2021 10:22   pH, Ivan Latest Ref Range: 7.350 - 7.450  7.398 7.395 7.449 7.433   pCO2, Ivan Latest Ref Range: 40.0 - 50.0 mmHg 44.4 36.9 (L) 29.6 (L) 39.3 (L)   pO2, Ivan Latest Ref Range: 25 - 40 mmHg 143.5 (H) 41.0 (H) 179.3 (H) 52.9 (H)   HCO3, Venous Latest Ref Range: 23.0 - 29.0 mmol/L 26.8 22.1 (L) 20.1 (L) 25.7   TC02 (Calc), Ivan Latest Ref Range: Not Established mmol/L 28 23 21 27   Base Excess, Ivan Latest Ref Range: -3.0 - 3.0 mmol/L 1.5 -2.3 -2.7 1.4   O2 Content, Ivan Latest Ref Range: Not Established VOL % 21 15 19 18   MetHgb, Ivan Latest Ref Range: <1.5 % 0.2 0.4 0.5 0.4   O2 Sat, Ivan Latest Ref Range: Not Established % 98 77 99 89     PFT in 2018-INDICATION:  COPD.     FINDINGS:  1. Spirometry revealed evidence of severe obstructive defect. FEV1 is  1.19 liters, which is 45% of predicted. No significant response to  bronchodilators. FEV1/FVC ratio of 64%. 2.  Lung volume revealed normal total lung capacity of 4.28 liters, which  is 80% of predicted. Air trapping residual volume 2.43 liters, which is  124% of predicted. 3.  Diffusion capacity is severely decreased at 7.59, which is 32% of  predicted.   4. Flow volume loops consistent with obstructive defect. 5.  Six-minute walk done per [facility] protocol. The patient  was able to walk 840 feet. Saturation on room air at rest was 96% with a  heart rate of 63. There is no significant desaturation on exertion.     CONCLUSION:  1. Severe obstructive defect with air trapping and severely decreased  diffusion capacity. 2.  Six-minute walk with no significant desaturation on exertion.     Results for [place] Aundrea (MRN [id number]) as of 6/22/2021 19:42   Ref. Range 9/5/2014 11:50 4/2/2015 08:46 1/14/2016 10:54 2/7/2017 09:29 1/25/2018 09:25 1/16/2020 11:59   TSH Latest Ref Range: 0.27 - 4.20 uIU/mL 0.40 0.78 0.84 0.70 1.43 1.35     Assessment:  Active Problems:    Stage 3 severe COPD by GOLD classification ([facility])    Hyponatremia    Pulmonary fibrosis (HCC)    Bandemia    COPD exacerbation ([facility])    Chest congestion    Ineffective airway clearance    Other secondary kyphosis, thoracic region    Marijuana use    Diastolic dysfunction  Resolved Problems:    * No resolved hospital problems.  *          Plan:   Oxygen supplementation to keep saturation being 90 to 94% only  Please titrate the oxygen as per the above parameters  Bronchodilators  DuoNeb to continue  Pulmicort and Mucomyst nebulization added to the regimen  IV Solu-Medrol to continue  Incentive spirometry  Pulmonary toilet  Patient continues to have increased chest congestion along with mucus plugging and ineffective airway clearance and patient has recurrent hospitalization with similar symptoms and patient will benefit from diagnostic and therapeutic purposes-patient was told about the procedure and the pros and cons and patient was agreeable-will arrange that for tomorrow morning  Patient was started on IV doxycycline and titration as per clinical status and cultures  Patient's hyponatremia may be secondary to SSRI  Patient's TSH in the past was normal  Evaluation and management of

## 2021-06-22 NOTE — PROGRESS NOTES
Hospitalist Progress Note      PCP: Ed Benz MD    Date of Admission: 6/20/2021    Chief Complaint: Shortness of Breath    Hospital Course:   60 y.o. female with PMHx severe COPD with chronic hypoxic respiratory failure on 2 L/min oxygen by nasal cannula, ILD, current smoke who presented to [facility] ED with c/p \" I cannot breathe\" . Of note, patient had 2 admissions in the last 1 month for the same (5/24 - 5/27/21) and (6/7- 6/11/21) for COPD exacerbation and treated with IV steroids, hydration and pulmonology consultation. Patient reports that she completed steroid course a week ago. She started noticing more shortness of breath with cough but no sputum production for the last 2 days. Unfortunately she continues to smoke and reports that her  also smokes. She denies alcohol use, illicit drug use. Endorses compliance to her home inhalers. She reports that she \"forgot\" to follow-up with her PCP since last discharge . She has not seen Dr. Josue Griffin since March 2021(as VV) . Patient denies fever, chills, nausea, vomiting, diarrhea, chest pain, palpitations, lightheadedness, dizziness, orthopnea, PND, pedal edema.     ED course : Patient noted to be tachypneic and tachycardic upon arrival to ED. Maintaining oxygen saturation in mid 90s on 2 L/min (home requirement) . Basic labs suggestive of hyponatremia sodium 125 and mild leukocytosis 15.4. Chest x-ray suggestive of prominent interstitial markings suggesting interstitial lung disease. No acute cardiopulmonary process. Patient received multiple breathing treatments in ED with partial relief of symptoms. She was subsequently placed on continuous nebulizer treatments and hospitalist consulted for admission for further evaluation and management. She received IV steroids x1 in ED. Subjective:   Still has cough and shortness of breath. On 2.5 L oxygen she does not take oxygen daytime at home she takes only in the night.   No fever chest pain change in mental status or focal neurological symptoms. No problem in swallowing      Medications:  Reviewed    Infusion Medications    sodium chloride       Scheduled Medications    nicotine  1 patch Transdermal Daily    sodium chloride flush  5-40 mL Intravenous 2 times per day    enoxaparin  40 mg Subcutaneous Daily    predniSONE  40 mg Oral Daily    busPIRone  30 mg Oral BID    FLUoxetine  60 mg Oral Daily    montelukast  10 mg Oral Nightly    traZODone  150 mg Oral Nightly    ipratropium-albuterol  1 ampule Inhalation Q4H     PRN Meds: calcium carbonate, sodium chloride flush, sodium chloride, ondansetron **OR** ondansetron, polyethylene glycol, acetaminophen **OR** acetaminophen, guaiFENesin      Intake/Output Summary (Last 24 hours) at 6/22/2021 1213  Last data filed at 6/22/2021 0847  Gross per 24 hour   Intake 480 ml   Output 700 ml   Net -220 ml       Physical Exam Performed:    /72   Pulse 100   Temp 97.6 °F (36.4 °C) (Oral)   Resp 14   Ht 5' 5\" (1.651 m)   Wt 150 lb (68 kg)   SpO2 95%   BMI 24.96 kg/m²     General appearance: No apparent distress, appears stated age and cooperative. HEENT: Pupils equal, round, and reactive to light. Conjunctivae/corneas clear. Neck: Supple, with full range of motion. No jugular venous distention. Trachea midline. Respiratory: Tachypneic with exertion. , bilaterally without Rales/with   wheezes/Rhonchi. Cardiovascular: Regular rate and rhythm with normal S1/S2 without murmurs, rubs or gallops. Abdomen: Soft, non-tender, non-distended with normal bowel sounds. Musculoskeletal: No clubbing, cyanosis or edema bilaterally. Full range of motion without deformity. Skin: Skin color, texture, turgor normal.  No rashes or lesions. Neurologic:  Neurovascularly intact without any focal sensory/motor deficits.  Cranial nerves: II-XII intact, grossly non-focal.  Psychiatric: Alert and oriented, thought content appropriate, normal to breath.  Supplemental O2 and wean as tolerated.        3. Acute HypoNatremia - etiology clinically unable to determine but likely hypovolemic. Sodium 125 on admission. Check urine and serum osmolality.  Follow  serial labs off IVF -improved     4. Tobacco Abuse - active and ongoing. Kathie Mcintosh counseled.  Nicotine replacement PRN offered but patient declined .     5. Depression/Anxiety - controlled on home Fluoxetine, Bupropion, and Trazodone - continued. 6.  Leukocytosis-possibly secondary to steroid, she does not seem to be septic    DVT Prophylaxis: Lovenox  Diet: ADULT DIET;  Regular  Code Status: Full Code    PT/OT Eval Status:     Dispo -pending improvement 2 to 3 days    Luis Enrique Bingham MD

## 2021-06-23 LAB
ANION GAP SERPL CALCULATED.3IONS-SCNC: 10 MMOL/L (ref 3–16)
APPEARANCE BAL (LAVAGE): ABNORMAL
BASOPHILS ABSOLUTE: 0 K/UL (ref 0–0.2)
BASOPHILS RELATIVE PERCENT: 0 %
BUN BLDV-MCNC: 21 MG/DL (ref 7–20)
CALCIUM SERPL-MCNC: 8.3 MG/DL (ref 8.3–10.6)
CHLORIDE BLD-SCNC: 98 MMOL/L (ref 99–110)
CLOT EVALUATION BAL: ABNORMAL
CO2: 22 MMOL/L (ref 21–32)
COLOR LAVAGE: COLORLESS
CREAT SERPL-MCNC: 0.6 MG/DL (ref 0.6–1.2)
EOSINOPHILS ABSOLUTE: 0 K/UL (ref 0–0.6)
EOSINOPHILS RELATIVE PERCENT: 0 %
GFR AFRICAN AMERICAN: >60
GFR NON-AFRICAN AMERICAN: >60
GLUCOSE BLD-MCNC: 93 MG/DL (ref 70–99)
HCT VFR BLD CALC: 32.8 % (ref 36–48)
HEMOGLOBIN: 11.1 G/DL (ref 12–16)
LYMPHOCYTES ABSOLUTE: 0.2 K/UL (ref 1–5.1)
LYMPHOCYTES RELATIVE PERCENT: 1 %
MACROPHAGES, BAL: 10 % (ref 90–95)
MCH RBC QN AUTO: 30.2 PG (ref 26–34)
MCHC RBC AUTO-ENTMCNC: 34 G/DL (ref 31–36)
MCV RBC AUTO: 88.8 FL (ref 80–100)
METAMYELOCYTES RELATIVE PERCENT: 1 %
MONOCYTES ABSOLUTE: 1.5 K/UL (ref 0–1.3)
MONOCYTES RELATIVE PERCENT: 8 %
NEUTROPHILS ABSOLUTE: 17.6 K/UL (ref 1.7–7.7)
NEUTROPHILS RELATIVE PERCENT: 90 %
NUMBER OF CELLS COUNTED BAL (LAVAGE): 50
PDW BLD-RTO: 13.1 % (ref 12.4–15.4)
PLATELET # BLD: 321 K/UL (ref 135–450)
PLATELET SLIDE REVIEW: ADEQUATE
PMV BLD AUTO: 6.7 FL (ref 5–10.5)
POTASSIUM REFLEX MAGNESIUM: 4.4 MMOL/L (ref 3.5–5.1)
RBC # BLD: 3.69 M/UL (ref 4–5.2)
RBC, BAL: 35 /CUMM
SEGMENTED NEUTROPHILS, BAL: 90 % (ref 5–10)
SLIDE REVIEW: ABNORMAL
SODIUM BLD-SCNC: 130 MMOL/L (ref 136–145)
WBC # BLD: 19.3 K/UL (ref 4–11)
WBC/EPI CELLS BAL: 90 /CUMM

## 2021-06-23 PROCEDURE — 94669 MECHANICAL CHEST WALL OSCILL: CPT

## 2021-06-23 PROCEDURE — 6370000000 HC RX 637 (ALT 250 FOR IP): Performed by: INTERNAL MEDICINE

## 2021-06-23 PROCEDURE — 6360000002 HC RX W HCPCS: Performed by: INTERNAL MEDICINE

## 2021-06-23 PROCEDURE — 2580000003 HC RX 258: Performed by: INTERNAL MEDICINE

## 2021-06-23 PROCEDURE — 87077 CULTURE AEROBIC IDENTIFY: CPT

## 2021-06-23 PROCEDURE — 87632 RESP VIRUS 6-11 TARGETS: CPT

## 2021-06-23 PROCEDURE — 3609027000 HC BRONCHOSCOPY: Performed by: INTERNAL MEDICINE

## 2021-06-23 PROCEDURE — 99152 MOD SED SAME PHYS/QHP 5/>YRS: CPT | Performed by: INTERNAL MEDICINE

## 2021-06-23 PROCEDURE — 80048 BASIC METABOLIC PNL TOTAL CA: CPT

## 2021-06-23 PROCEDURE — 0B9F8ZX DRAINAGE OF RIGHT LOWER LUNG LOBE, VIA NATURAL OR ARTIFICIAL OPENING ENDOSCOPIC, DIAGNOSTIC: ICD-10-PCS | Performed by: INTERNAL MEDICINE

## 2021-06-23 PROCEDURE — 1200000000 HC SEMI PRIVATE

## 2021-06-23 PROCEDURE — 2500000003 HC RX 250 WO HCPCS: Performed by: INTERNAL MEDICINE

## 2021-06-23 PROCEDURE — 87206 SMEAR FLUORESCENT/ACID STAI: CPT

## 2021-06-23 PROCEDURE — 7100000010 HC PHASE II RECOVERY - FIRST 15 MIN: Performed by: INTERNAL MEDICINE

## 2021-06-23 PROCEDURE — 88305 TISSUE EXAM BY PATHOLOGIST: CPT

## 2021-06-23 PROCEDURE — 3609010900 HC BRONCHOSCOPY THERAPUTIC ASPIRATION INITIAL: Performed by: INTERNAL MEDICINE

## 2021-06-23 PROCEDURE — 85025 COMPLETE CBC W/AUTO DIFF WBC: CPT

## 2021-06-23 PROCEDURE — 87102 FUNGUS ISOLATION CULTURE: CPT

## 2021-06-23 PROCEDURE — 87116 MYCOBACTERIA CULTURE: CPT

## 2021-06-23 PROCEDURE — 99232 SBSQ HOSP IP/OBS MODERATE 35: CPT | Performed by: INTERNAL MEDICINE

## 2021-06-23 PROCEDURE — 87070 CULTURE OTHR SPECIMN AEROBIC: CPT

## 2021-06-23 PROCEDURE — 87015 SPECIMEN INFECT AGNT CONCNTJ: CPT

## 2021-06-23 PROCEDURE — 31645 BRNCHSC W/THER ASPIR 1ST: CPT | Performed by: INTERNAL MEDICINE

## 2021-06-23 PROCEDURE — 36415 COLL VENOUS BLD VENIPUNCTURE: CPT

## 2021-06-23 PROCEDURE — 7100000011 HC PHASE II RECOVERY - ADDTL 15 MIN: Performed by: INTERNAL MEDICINE

## 2021-06-23 PROCEDURE — 94640 AIRWAY INHALATION TREATMENT: CPT

## 2021-06-23 PROCEDURE — 94761 N-INVAS EAR/PLS OXIMETRY MLT: CPT

## 2021-06-23 PROCEDURE — 3609010800 HC BRONCHOSCOPY ALVEOLAR LAVAGE: Performed by: INTERNAL MEDICINE

## 2021-06-23 PROCEDURE — 88112 CYTOPATH CELL ENHANCE TECH: CPT

## 2021-06-23 PROCEDURE — 31624 DX BRONCHOSCOPE/LAVAGE: CPT | Performed by: INTERNAL MEDICINE

## 2021-06-23 PROCEDURE — 2709999900 HC NON-CHARGEABLE SUPPLY: Performed by: INTERNAL MEDICINE

## 2021-06-23 PROCEDURE — 87205 SMEAR GRAM STAIN: CPT

## 2021-06-23 PROCEDURE — 87186 SC STD MICRODIL/AGAR DIL: CPT

## 2021-06-23 PROCEDURE — 89051 BODY FLUID CELL COUNT: CPT

## 2021-06-23 RX ORDER — LIDOCAINE HYDROCHLORIDE 20 MG/ML
INJECTION, SOLUTION INFILTRATION; PERINEURAL PRN
Status: DISCONTINUED | OUTPATIENT
Start: 2021-06-23 | End: 2021-06-23 | Stop reason: ALTCHOICE

## 2021-06-23 RX ORDER — LANOLIN ALCOHOL/MO/W.PET/CERES
3 CREAM (GRAM) TOPICAL NIGHTLY PRN
Status: DISCONTINUED | OUTPATIENT
Start: 2021-06-23 | End: 2021-06-24 | Stop reason: HOSPADM

## 2021-06-23 RX ORDER — MAGNESIUM HYDROXIDE 1200 MG/15ML
LIQUID ORAL CONTINUOUS PRN
Status: COMPLETED | OUTPATIENT
Start: 2021-06-23 | End: 2021-06-23

## 2021-06-23 RX ORDER — ACETYLCYSTEINE 200 MG/ML
SOLUTION ORAL; RESPIRATORY (INHALATION) PRN
Status: DISCONTINUED | OUTPATIENT
Start: 2021-06-23 | End: 2021-06-23 | Stop reason: ALTCHOICE

## 2021-06-23 RX ORDER — MIDAZOLAM HYDROCHLORIDE 5 MG/ML
INJECTION INTRAMUSCULAR; INTRAVENOUS PRN
Status: DISCONTINUED | OUTPATIENT
Start: 2021-06-23 | End: 2021-06-23 | Stop reason: ALTCHOICE

## 2021-06-23 RX ADMIN — BUDESONIDE 500 MCG: 0.5 SUSPENSION RESPIRATORY (INHALATION) at 08:23

## 2021-06-23 RX ADMIN — ENOXAPARIN SODIUM 40 MG: 40 INJECTION SUBCUTANEOUS at 13:07

## 2021-06-23 RX ADMIN — DOXYCYCLINE 100 MG: 100 INJECTION, POWDER, LYOPHILIZED, FOR SOLUTION INTRAVENOUS at 08:09

## 2021-06-23 RX ADMIN — HYPROMELLOSE 2906 (4000 MPA.S) AND HYPROMELLOSE 2906 (50 MPA.S) 1 DROP: 25; 25 SOLUTION OPHTHALMIC at 18:51

## 2021-06-23 RX ADMIN — BUSPIRONE HYDROCHLORIDE 30 MG: 5 TABLET ORAL at 20:30

## 2021-06-23 RX ADMIN — Medication 10 ML: at 20:30

## 2021-06-23 RX ADMIN — TRAZODONE HYDROCHLORIDE 150 MG: 50 TABLET ORAL at 20:30

## 2021-06-23 RX ADMIN — ACETYLCYSTEINE 600 MG: 200 SOLUTION ORAL; RESPIRATORY (INHALATION) at 08:23

## 2021-06-23 RX ADMIN — IPRATROPIUM BROMIDE AND ALBUTEROL SULFATE 1 AMPULE: .5; 3 SOLUTION RESPIRATORY (INHALATION) at 08:23

## 2021-06-23 RX ADMIN — IPRATROPIUM BROMIDE AND ALBUTEROL SULFATE 1 AMPULE: .5; 3 SOLUTION RESPIRATORY (INHALATION) at 16:32

## 2021-06-23 RX ADMIN — ACETYLCYSTEINE 600 MG: 200 SOLUTION ORAL; RESPIRATORY (INHALATION) at 19:40

## 2021-06-23 RX ADMIN — IPRATROPIUM BROMIDE AND ALBUTEROL SULFATE 1 AMPULE: .5; 3 SOLUTION RESPIRATORY (INHALATION) at 23:52

## 2021-06-23 RX ADMIN — BUDESONIDE 500 MCG: 0.5 SUSPENSION RESPIRATORY (INHALATION) at 19:45

## 2021-06-23 RX ADMIN — SODIUM CHLORIDE 25 ML: 9 INJECTION, SOLUTION INTRAVENOUS at 08:08

## 2021-06-23 RX ADMIN — MONTELUKAST SODIUM 10 MG: 10 TABLET ORAL at 20:29

## 2021-06-23 RX ADMIN — PREDNISONE 40 MG: 20 TABLET ORAL at 13:07

## 2021-06-23 RX ADMIN — BUSPIRONE HYDROCHLORIDE 30 MG: 5 TABLET ORAL at 13:07

## 2021-06-23 RX ADMIN — DOXYCYCLINE 100 MG: 100 INJECTION, POWDER, LYOPHILIZED, FOR SOLUTION INTRAVENOUS at 20:37

## 2021-06-23 RX ADMIN — IPRATROPIUM BROMIDE AND ALBUTEROL SULFATE 1 AMPULE: .5; 3 SOLUTION RESPIRATORY (INHALATION) at 03:08

## 2021-06-23 RX ADMIN — FLUOXETINE 60 MG: 20 CAPSULE ORAL at 13:07

## 2021-06-23 RX ADMIN — IPRATROPIUM BROMIDE AND ALBUTEROL SULFATE 1 AMPULE: .5; 3 SOLUTION RESPIRATORY (INHALATION) at 19:40

## 2021-06-23 ASSESSMENT — PAIN SCALES - GENERAL
PAINLEVEL_OUTOF10: 0
PAINLEVEL_OUTOF10: 0

## 2021-06-23 NOTE — PROGRESS NOTES
Hospitalist Progress Note      PCP: Jimmy Rios MD    Date of Admission: 6/20/2021    Chief Complaint: Shortness of Breath    Hospital Course:   60 y.o. female with PMHx severe COPD with chronic hypoxic respiratory failure on 2 L/min oxygen by nasal cannula, ILD, current smoke who presented to [facility] ED with c/p \" I cannot breathe\" . Of note, patient had 2 admissions in the last 1 month for the same (5/24 - 5/27/21) and (6/7- 6/11/21) for COPD exacerbation and treated with IV steroids, hydration and pulmonology consultation. Patient reports that she completed steroid course a week ago. She started noticing more shortness of breath with cough but no sputum production for the last 2 days. Unfortunately she continues to smoke and reports that her  also smokes. She denies alcohol use, illicit drug use. Endorses compliance to her home inhalers. She reports that she \"forgot\" to follow-up with her PCP since last discharge . She has not seen Dr. Parveen Meredith since March 2021(as VV) . Patient denies fever, chills, nausea, vomiting, diarrhea, chest pain, palpitations, lightheadedness, dizziness, orthopnea, PND, pedal edema.     ED course : Patient noted to be tachypneic and tachycardic upon arrival to ED. Maintaining oxygen saturation in mid 90s on 2 L/min (home requirement) . Basic labs suggestive of hyponatremia sodium 125 and mild leukocytosis 15.4. Chest x-ray suggestive of prominent interstitial markings suggesting interstitial lung disease. No acute cardiopulmonary process. Patient received multiple breathing treatments in ED with partial relief of symptoms. She was subsequently placed on continuous nebulizer treatments and hospitalist consulted for admission for further evaluation and management. She received IV steroids x1 in ED. Subjective:   Still has cough and shortness of breath. On 2.5 L oxygen she does not take oxygen daytime at home she takes only in the night.   No fever chest pain change in mental status or focal neurological symptoms. No problem in swallowing  Awaiting for bronchoscope      Medications:  Reviewed    Infusion Medications    sodium chloride 25 mL (06/23/21 0808)     Scheduled Medications    acetylcysteine  600 mg Inhalation BID    pantoprazole  40 mg Oral QAM AC    budesonide  0.5 mg Nebulization BID    doxycycline (VIBRAMYCIN) IV  100 mg Intravenous Q12H    nicotine  1 patch Transdermal Daily    sodium chloride flush  5-40 mL Intravenous 2 times per day    enoxaparin  40 mg Subcutaneous Daily    predniSONE  40 mg Oral Daily    busPIRone  30 mg Oral BID    FLUoxetine  60 mg Oral Daily    montelukast  10 mg Oral Nightly    traZODone  150 mg Oral Nightly    ipratropium-albuterol  1 ampule Inhalation Q4H     PRN Meds: hydroxypropyl methylcellulose, calcium carbonate, sodium chloride flush, sodium chloride, ondansetron **OR** ondansetron, polyethylene glycol, acetaminophen **OR** acetaminophen, guaiFENesin      Intake/Output Summary (Last 24 hours) at 6/23/2021 0823  Last data filed at 6/22/2021 2219  Gross per 24 hour   Intake 1058.7 ml   Output 700 ml   Net 358.7 ml       Physical Exam Performed:    /71   Pulse 78   Temp 97.8 °F (36.6 °C) (Oral)   Resp 18   Ht 5' 5\" (1.651 m)   Wt 150 lb (68 kg)   SpO2 97%   BMI 24.96 kg/m²     General appearance: No apparent distress, appears stated age and cooperative. HEENT: Pupils equal, round, and reactive to light. Conjunctivae/corneas clear. Neck: Supple, with full range of motion. No jugular venous distention. Trachea midline. Respiratory: Tachypneic with exertion. , bilaterally without Rales/with   wheezes/Rhonchi. Cardiovascular: Regular rate and rhythm with normal S1/S2 without murmurs, rubs or gallops. Abdomen: Soft, non-tender, non-distended with normal bowel sounds. Musculoskeletal: No clubbing, cyanosis or edema bilaterally. Full range of motion without deformity.   Skin: Skin color, texture, turgor normal.  No rashes or lesions. Neurologic:  Neurovascularly intact without any focal sensory/motor deficits. Cranial nerves: II-XII intact, grossly non-focal.  Psychiatric: Alert and oriented, thought content appropriate, normal insight  Capillary Refill: Brisk,3 seconds, normal   Peripheral Pulses: +2 palpable, equal bilaterally       Labs:   Recent Labs     06/20/21  1022 06/21/21  0631 06/23/21  0642   WBC 15.4* 18.3* 19.3*   HGB 13.6 11.7* 11.1*   HCT 39.6 33.8* 32.8*    311 321     Recent Labs     06/20/21  1022 06/21/21  0631 06/23/21  0642   * 132* 130*   K 4.6 4.1 4.4   CL 91* 99 98*   CO2 24 22 22   BUN 9 11 21*   CREATININE <0.5* <0.5* 0.6   CALCIUM 8.9 8.4 8.3     Recent Labs     06/20/21  1022   AST 19   ALT 17   BILITOT 0.4   ALKPHOS 57     No results for input(s): INR in the last 72 hours. No results for input(s): Flavia [place] in the last 72 hours. Urinalysis:      Lab Results   Component Value Date    NITRU Negative 06/07/2021    WBCUA 0-3 04/13/2012    RBCUA 3-5 04/13/2012    BLOODU Negative 06/07/2021    SPECGRAV 1.015 06/07/2021    GLUCOSEU Negative 06/07/2021    GLUCOSEU NEGATIVE 04/13/2012       Radiology:  XR CHEST PORTABLE   Final Result   Prominent interstitial markings again noted, stable suggesting interstitial   lung disease      No acute cardiopulmonary process                 Assessment/Plan:    Active Hospital Problems    Diagnosis     Stage 3 severe COPD by GOLD classification ([address] [J44.9]      Priority: Medium    Other secondary kyphosis, 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   Acute COPD exacerbation in a patient with chronic hypoxic respiratory failure with ILD  POA  -Continue IV steroids/HHN scheduled and as needed. No need for antibiotics at this time.  -Patient recently seen by pulmonology during last admission (6/7/21 - 6/11/21) and recommended outpatient follow-up with her primary pulmonologist ()    -Patient on 2 L/min oxygen by nasal cannula   -Unfortunately patient continues to smoke which might be contributing to her recurrent admissions with exacerbations. Counseled at length about the importance of quitting smoking. Pulmonology evaluation appreciated, planning for bronchoscope today  Started on doxycycline     2. Acute on Chronic Respiratory Failure - w/ increased hypoxia, POArrival.  Presence of clinical respiratory distress w/ tachypnea/dyspnea/SOB and wheezing w/ use of accessory muscles to breath.  Supplemental O2 and wean as tolerated.        3. Acute HypoNatremia - etiology clinically unable to determine but likely hypovolemic. Sodium 125 on admission. Check urine and serum osmolality.  Follow  serial labs off IVF -improved     4. Tobacco Abuse - active and ongoing. Sabas Jay counseled.  Nicotine replacement PRN offered but patient declined .     5. Depression/Anxiety - controlled on home Fluoxetine, Bupropion, and Trazodone - continued.      6.  Leukocytosis-possibly secondary to steroid, she does not seem to be septic    DVT Prophylaxis: Lovenox  Diet: Diet NPO  Code Status: Full Code    PT/OT Eval Status:     Dispo -pending improvement 2 to 3 days    Bridgette Sparks MD

## 2021-06-23 NOTE — PROGRESS NOTES
Saline hung for procedure. Known history of HIV on Biktarvy as an outpatient. Undetectable viral load.  -Continue Biktarvy  -Valtrex suppressive therapy for HSV resumed

## 2021-06-23 NOTE — PLAN OF CARE
Problem: Falls - Risk of:  Goal: Will remain free from falls  Description: Will remain free from falls  Outcome: Ongoing  Goal: Absence of physical injury  Description: Absence of physical injury  Outcome: Ongoing     Problem: Discharge Planning:  Goal: Discharged to appropriate level of care  Description: Discharged to appropriate level of care  Outcome: Ongoing     Problem: Airway Clearance - Ineffective:  Goal: Ability to maintain a clear airway will improve  Description: Ability to maintain a clear airway will improve  Outcome: Ongoing     Problem: Gas Exchange - Impaired:  Goal: Levels of oxygenation will improve  Description: Levels of oxygenation will improve  Outcome: Ongoing

## 2021-06-24 ENCOUNTER — TELEPHONE (OUTPATIENT)
Dept: FAMILY MEDICINE CLINIC | Age: 66
End: 2021-06-24

## 2021-06-24 VITALS
HEART RATE: 97 BPM | HEIGHT: 65 IN | OXYGEN SATURATION: 99 % | TEMPERATURE: 98.1 F | BODY MASS INDEX: 24.99 KG/M2 | DIASTOLIC BLOOD PRESSURE: 71 MMHG | SYSTOLIC BLOOD PRESSURE: 115 MMHG | WEIGHT: 150 LBS | RESPIRATION RATE: 20 BRPM

## 2021-06-24 LAB
ANION GAP SERPL CALCULATED.3IONS-SCNC: 8 MMOL/L (ref 3–16)
BUN BLDV-MCNC: 17 MG/DL (ref 7–20)
CALCIUM SERPL-MCNC: 8.9 MG/DL (ref 8.3–10.6)
CHLORIDE BLD-SCNC: 98 MMOL/L (ref 99–110)
CO2: 23 MMOL/L (ref 21–32)
CREAT SERPL-MCNC: 0.8 MG/DL (ref 0.6–1.2)
GFR AFRICAN AMERICAN: >60
GFR NON-AFRICAN AMERICAN: >60
GLUCOSE BLD-MCNC: 88 MG/DL (ref 70–99)
POTASSIUM SERPL-SCNC: 4.1 MMOL/L (ref 3.5–5.1)
SODIUM BLD-SCNC: 129 MMOL/L (ref 136–145)

## 2021-06-24 PROCEDURE — 94640 AIRWAY INHALATION TREATMENT: CPT

## 2021-06-24 PROCEDURE — 2580000003 HC RX 258: Performed by: INTERNAL MEDICINE

## 2021-06-24 PROCEDURE — 6370000000 HC RX 637 (ALT 250 FOR IP): Performed by: INTERNAL MEDICINE

## 2021-06-24 PROCEDURE — 97165 OT EVAL LOW COMPLEX 30 MIN: CPT

## 2021-06-24 PROCEDURE — 6360000002 HC RX W HCPCS: Performed by: INTERNAL MEDICINE

## 2021-06-24 PROCEDURE — 97161 PT EVAL LOW COMPLEX 20 MIN: CPT

## 2021-06-24 PROCEDURE — 97530 THERAPEUTIC ACTIVITIES: CPT

## 2021-06-24 PROCEDURE — 97535 SELF CARE MNGMENT TRAINING: CPT

## 2021-06-24 PROCEDURE — 80048 BASIC METABOLIC PNL TOTAL CA: CPT

## 2021-06-24 PROCEDURE — 94669 MECHANICAL CHEST WALL OSCILL: CPT

## 2021-06-24 PROCEDURE — 99232 SBSQ HOSP IP/OBS MODERATE 35: CPT | Performed by: INTERNAL MEDICINE

## 2021-06-24 PROCEDURE — 97116 GAIT TRAINING THERAPY: CPT

## 2021-06-24 PROCEDURE — 2500000003 HC RX 250 WO HCPCS: Performed by: INTERNAL MEDICINE

## 2021-06-24 PROCEDURE — 36415 COLL VENOUS BLD VENIPUNCTURE: CPT

## 2021-06-24 RX ORDER — DOXYCYCLINE HYCLATE 100 MG
100 TABLET ORAL EVERY 12 HOURS SCHEDULED
Status: DISCONTINUED | OUTPATIENT
Start: 2021-06-24 | End: 2021-06-24 | Stop reason: HOSPADM

## 2021-06-24 RX ORDER — DOXYCYCLINE HYCLATE 100 MG
100 TABLET ORAL EVERY 12 HOURS SCHEDULED
Qty: 10 TABLET | Refills: 0 | Status: SHIPPED | OUTPATIENT
Start: 2021-06-24 | End: 2021-06-28 | Stop reason: ALTCHOICE

## 2021-06-24 RX ORDER — PREDNISONE 10 MG/1
TABLET ORAL
Qty: 18 TABLET | Refills: 0 | Status: ON HOLD
Start: 2021-06-24 | End: 2021-07-14 | Stop reason: HOSPADM

## 2021-06-24 RX ORDER — PANTOPRAZOLE SODIUM 40 MG/1
40 TABLET, DELAYED RELEASE ORAL
Qty: 30 TABLET | Refills: 1 | Status: ON HOLD | OUTPATIENT
Start: 2021-06-25 | End: 2021-10-03 | Stop reason: HOSPADM

## 2021-06-24 RX ADMIN — BUDESONIDE 500 MCG: 0.5 SUSPENSION RESPIRATORY (INHALATION) at 08:41

## 2021-06-24 RX ADMIN — ACETYLCYSTEINE 600 MG: 200 SOLUTION ORAL; RESPIRATORY (INHALATION) at 08:41

## 2021-06-24 RX ADMIN — FLUOXETINE 60 MG: 20 CAPSULE ORAL at 08:32

## 2021-06-24 RX ADMIN — IPRATROPIUM BROMIDE AND ALBUTEROL SULFATE 1 AMPULE: .5; 3 SOLUTION RESPIRATORY (INHALATION) at 03:35

## 2021-06-24 RX ADMIN — BUSPIRONE HYDROCHLORIDE 30 MG: 5 TABLET ORAL at 08:32

## 2021-06-24 RX ADMIN — DOXYCYCLINE 100 MG: 100 INJECTION, POWDER, LYOPHILIZED, FOR SOLUTION INTRAVENOUS at 08:36

## 2021-06-24 RX ADMIN — ENOXAPARIN SODIUM 40 MG: 40 INJECTION SUBCUTANEOUS at 08:33

## 2021-06-24 RX ADMIN — IPRATROPIUM BROMIDE AND ALBUTEROL SULFATE 1 AMPULE: .5; 3 SOLUTION RESPIRATORY (INHALATION) at 12:02

## 2021-06-24 RX ADMIN — GUAIFENESIN 600 MG: 600 TABLET, EXTENDED RELEASE ORAL at 08:32

## 2021-06-24 RX ADMIN — PREDNISONE 40 MG: 20 TABLET ORAL at 08:32

## 2021-06-24 RX ADMIN — PANTOPRAZOLE SODIUM 40 MG: 40 TABLET, DELAYED RELEASE ORAL at 05:28

## 2021-06-24 RX ADMIN — IPRATROPIUM BROMIDE AND ALBUTEROL SULFATE 1 AMPULE: .5; 3 SOLUTION RESPIRATORY (INHALATION) at 08:40

## 2021-06-24 ASSESSMENT — PAIN SCALES - GENERAL: PAINLEVEL_OUTOF10: 0

## 2021-06-24 NOTE — DISCHARGE INSTR - COC
Continuity of Care Form    Patient Name: Fanny Chun   :  1955  MRN:  [id number]    Admit date:  2021  Discharge date:  ***    Code Status Order: Full Code   Advance Directives:   Advance Care Flowsheet Documentation       Date/Time Healthcare Directive Type of Healthcare Directive Copy in [address] Agent's Name Healthcare Agent's Phone Number    21 1304  No, patient does not have an advance directive for healthcare treatment -- -- -- -- --            Admitting Physician:  Hoda Fong MD  PCP: Tee Sanabria MD    Discharging Nurse: [facility] Unit/Room#: 6068/4796-56  Discharging Unit Phone Number: ***    Emergency Contact:   Extended Emergency Contact Information  Primary Emergency Contact: Angelita Wong  Address: [address] [place] [address] Phone: [phone]  Mobile Phone: [phone]  Relation: Child  Secondary Emergency Contact: [facility] [address], [address] [place] [address] Phone: [phone]  Mobile Phone: [phone]  Relation: Spouse    Past Surgical History:  Past Surgical History:   Procedure Laterality Date    BLADDER REPAIR      BRONCHOSCOPY      BRONCHOSCOPY N/A 3/6/2020    BRONCHOSCOPY THERAPUTIC ASPIRATION INITIAL performed by Linh Snyder MD at [address] [place] [address]  3/6/2020    BRONCHOSCOPY ALVEOLAR LAVAGE performed by Linh Snyder MD at [address] [place] [address] 2020    BRONCHOSCOPY THERAPUTIC ASPIRATION INITIAL performed by Caleb Carpio MD at [address] [place] [address]  2020    BRONCHOSCOPY ALVEOLAR LAVAGE performed by Caleb Carpio MD at [address] [place] [address]  2021    Dr Jamar Hernandez N/A 2021    BRONCHOSCOPY DIAGNOSTIC OR CELL [address] performed by Caleb Carpio MD at [address] [place] [address]  2021    BRONCHOSCOPY THERAPUTIC ASPIRATION INITIAL performed by Caleb Carpio MD at [facility] U ENDOSCOPY    BRONCHOSCOPY  6/23/2021    BRONCHOSCOPY ALVEOLAR LAVAGE [facility] MD at [facility]  2012    ENDOSCOPY, COLON, DIAGNOSTIC      ESOPHAGEAL DILATATION  9/20/2018    ESOPHAGEAL DILATION Lucille Libel performed by Tacho Alfonso MD at [address] [facility] HISTORY  2/12/15    T8 Kyphoplasty    VT COLONOSCOPY FLX DX W/COLLJ SPEC WHEN PFRMD N/A 9/20/2018    EGD AND COLONOSCOPY WITH ANESTHESIA performed by Tacho Alfonso MD at [address] [facility] ESOPHAGOGASTRODUODENOSCOPY TRANSORAL DIAGNOSTIC N/A 9/20/2018    EGD AND COLONOSCOPY WITH ANESTHESIA performed by Tacho Alfonso MD at [address] [facility]         Immunization History:   Immunization History   Administered Date(s) Administered    COVID-19, Chan Peter, PF, 30mcg/0.3mL 03/10/2021, 03/31/2021    Influenza 10/04/2010, 10/12/2011, 11/28/2012    Influenza Virus Vaccine 10/16/2014, 01/14/2016    Influenza, Intradermal, Preservative free 11/04/2013    Influenza, Kenny Lard, IM, (6 mo and older Fluzone, Flulaval, Fluarix and 3 yrs and older Afluria) 10/12/2011, 01/30/2017, 10/19/2017    Influenza, Kenny Lard, IM, PF (6 mo and older Fluzone, Flulaval, Fluarix, and 3 yrs and older Afluria) 09/06/2018, 12/26/2019    Pneumococcal Conjugate 7-valent (Prevnar7) 01/01/2009    Pneumococcal Polysaccharide ([id number]) 01/14/2016    Td, unspecified formulation 11/04/2013    Tdap (Boostrix, Adacel) 01/25/2018       Active Problems:  Patient Active Problem List   Diagnosis Code    Stage 3 severe COPD by GOLD classification ([facility] J44.9    Smoker F17.200    Fibromyalgia M79.7    Hypokalemia E87.6    Hyperlipidemia E78.5    Hyponatremia E87.1    Depression F32.9    Tobacco dependence F17.200    Pulmonary fibrosis ([facility] J84.10    ILD (interstitial lung disease) ([facility] J84.9    Recurrent major depressive disorder, in partial remission ([facility] F33.41    Thyroid nodule E04.1    History of prescription drug abuse [id number]    Esophageal dysphagia R13.10    Heartburn R12    Rectal bleeding K62.5    History of colon polyps Z86.010    Compression fx, thoracic spine, sequela S22.000S    Kyphosis M40.209    Anxiety and depression F41.9, F32.9    Chronic pain syndrome G89.4    Polyarthropathy, multiple sites M13.0    Pneumonia J18.9    Acute on chronic respiratory failure with hypoxia and hypercapnia ([facility]) J96.21, J96.22    Acute respiratory failure with hypoxia ([facility]) J96.01    Healthcare associated bacterial pneumonia J15.9    Bandemia D72.825    Sepsis ([facility]) A41.9    COPD exacerbation ([facility]) J44.1    Pulmonary infiltrates R91.8    Chest congestion R09.89    Mucus plugging of bronchi T17.500A    Ineffective airway clearance R06.89    Acute on chronic respiratory failure with hypoxemia ([facility]) J96.21    COPD (chronic obstructive pulmonary disease) ([facility]) J44.9    Acute on chronic respiratory failure ([facility]) J96.20    Cannabis dependence with current use ([facility]) F12.20    Other secondary kyphosis, 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    Marijuana use W56.47    Diastolic dysfunction N68.27       Isolation/Infection:   Isolation            No Isolation          Patient Infection Status       Infection Onset Added Last Indicated Last Indicated By Review Planned Expiration Resolved Resolved By    None active    Resolved    COVID-19 Rule Out 20 COVID-19 (Ordered)   20 Rule-Out Test Resulted    COVID-19 Rule Out 10/09/20 10/09/20 10/09/20 COVID-19 (Ordered)   10/10/20 Rule-Out Test Resulted    COVID-19 Rule Out 20 COVID-19 (Ordered)   20 Rule-Out Test Resulted    COVID-19 Rule Out 20 COVID-19 (Ordered)   20 Rule-Out Test Resulted    INFLUENZA 20 Respiratory Panel, Molecular   20             Nurse Assessment:  Last Vital Signs: BP {THERAPEUTIC INTERVENTION:0336552097}  Weight Bearing Status/Restrictions: Fadumo PATIÑO Weight Bearin:::0}  Other Medical Equipment (for information only, NOT a DME order):  {EQUIPMENT:854372244}  Other Treatments: ***    Patient's personal belongings (please select all that are sent with patient):  {CHP DME Belongings:458559123:::0}    RN SIGNATURE:  {Esignature:[id number]:::0}    CASE MANAGEMENT/SOCIAL WORK SECTION    Inpatient Status Date: ***    Readmission Risk Assessment Score:  Readmission Risk              Risk of Unplanned Readmission:  30           Discharging to Facility/ Agency   · Name: [facility]  · [facility] [address]  · Phone:[phone]  · Fax: [phone]    / signature: Electronically signed by Nate Curtis RN on 21 at 2:20 PM EDT    PHYSICIAN SECTION    Prognosis: Good    Condition at Discharge: Stable    Rehab Potential (if transferring to Rehab): Good    Recommended Labs or Other Treatments After Discharge: per discharge instructions     Physician Certification: I certify the above information and transfer of Marlin Thomas  is necessary for the continuing treatment of the diagnosis listed and that she requires Home Care for {GREATER/LESS:949926659} 30 days.      Update Admission H&P: No change in H&P    PHYSICIAN SIGNATURE:  Electronically signed by Isaac Momin MD on 21 at 1:33 PM EDT

## 2021-06-24 NOTE — RT PROTOCOL NOTE
with frequency of every 2 hours PRN wheezing or increased work of breathing using Per Protocol order mode. Repeat RT Therapy Protocol Assessment with second treatment then BID and as needed. If Albuterol Inhaler not tolerated or not effective, then discontinue the Albuterol Inhaler orders and enter two Albuterol Nebulizer orders with same frequencies and PRN reasons. 11-13 - discontinue any other Inpatient aerosolized bronchodilator medication orders and enter DuoNeb Nebulizer orders QID frequency and an Albuterol Nebulizer order every 2 hours PRN wheezing or increased work of breathing using Per Protocol order mode. Repeat RT Therapy Protocol Assessment with second treatment then QID and as needed. Greater than 13 - discontinue any other Inpatient bronchodilator aerosolized medication orders and enter DuoNeb Nebulizer order every 4 hours frequency and Albuterol Nebulizer every 2 hours PRN wheezing or increased work of breathing using Per Protocol order mode. Repeat RT Therapy Protocol Assessment with second treatment then every 4 hours and as needed. RT to enter RT Home Evaluation for COPD & MDI Assessment order using Per Protocol order mode.     Electronically signed by James Moore RCP on 6/24/2021 at 8:47 AM

## 2021-06-24 NOTE — CARE COORDINATION
CASE MANAGEMENT DISCHARGE SUMMARY      Discharge to: Home with [facility] [place] thru [facility]. SN, PT, OT.    F/U apt with PCP and [facility]. IMM given:     New Durable Medical Equipment ordered/agency: None, established with [facility] for oxygen,  has portable tan    Transportation: Family    Confirmed discharge plan with: Met with pt at bedside. Patient: yes     Facility/Agency, name:  [facility] faxed     RN, name: Payton Rober    Note: Discharging nurse to complete [facility], reconcile [facility], and place final copy with patient's discharge packet. RN to ensure that written prescriptions for  Level II medications are sent with patient to the facility as per protocol.

## 2021-06-24 NOTE — PROGRESS NOTES
Physical Therapy    Facility/Department: [facility] C5 - MED SURG/ORTHO  Initial Assessment/ DC Summary    NAME: Beverley Recio  : 1955  MRN: [id number]    Date of Service: 2021    Discharge Recommendations:  Home with assist PRN   PT Equipment Recommendations  Equipment Needed: No    Assessment   Body structures, Functions, Activity limitations: Decreased functional mobility ; Decreased endurance  Assessment: Pt was evaluated by PT s/p COPD exacerbation 21. Pt noted improved symptoms post broncoscopy and was eager to return home. Pt was able to ambulate 300ft requiring no more than SBA and ascend/descend 18 consecutive steps with 3 standing rest breaks requiring SBA. Due to pt's 's ability to assist pt as well as the pt's current level of assist, PT recommends home with assist PRN and without PT services. Treatment Diagnosis: decreased endurance  Prognosis: Good  Decision Making: Low Complexity  PT Education: Goals;PT Role;Plan of Care;General Safety;Gait Training;Disease Specific Education; Functional Mobility Training  Patient Education: disease specific education- pt verbalized understanding of the importance of OOB activity  No Skilled PT: Safe to return home  REQUIRES PT FOLLOW UP: No  Activity Tolerance  Activity Tolerance: Patient Tolerated treatment well  Activity Tolerance: SEMI MEJÍA: /71, HR 97, SpO2 95%       Patient Diagnosis(es): The primary encounter diagnosis was COPD exacerbation ([address] A diagnosis of Hyponatremia was also pertinent to this visit.      has a past medical history of Allergic rhinitis, Anxiety, Arthritis, Aspiration pneumonia ([address] Asthma, Bronchitis chronic, COPD (chronic obstructive pulmonary disease) ([address] Depression, Drug abuse, cocaine type ([address] Emphysema of lung ([address] Fibromyalgia, GERD (gastroesophageal reflux disease), Hyperlipidemia, Influenza A, Lung disease, On home oxygen therapy, Osteoporosis, Pneumonia, Polysubstance dependence ([address] to navigate her stairs at home -MET 6/24/21  Short term goal 3: Pt will independently be able to perform diaphragmatic breathing in order to improve endurance - MET 6/24/21  Patient Goals   Patient goals :  \"To go home\"       Therapy Time   Individual Concurrent Group Co-treatment   Time In 0946         Time Out 1019         Minutes 33         Timed Code Treatment Minutes: 21 Minutes       Mila Tapia, SPT

## 2021-06-24 NOTE — TELEPHONE ENCOUNTER
[facility] calling asking if Dr. Jaspreet Marquez will sign orders for OT, PT, Skilled Nursing.  Please Advise   [phone]

## 2021-06-24 NOTE — CARE COORDINATION
[facility]    Received referral for homecare services. [facility] unable to staff in [facility]. Awaiting callback from resources to service. Jael wilson planner made aware. 1:39PM  [facility] is able to accept referral for homeare services.  Electronically signed by Ellie Ugalde LPN on 7/72/35 at 0:52 PM EDT      Ellie Ugalde LPN  [facility] [address] Transition Nurse  [phone]

## 2021-06-24 NOTE — PROGRESS NOTES
Occupational Therapy   Occupational Therapy Initial Assessment, Treatment, and Discharge  Date: 2021   Patient Name: Irene Cotton  MRN: [id number]     : 1955    Date of Service: 2021    Discharge Recommendations:  24 hour supervision or assist    Assessment   Performance deficits / Impairments: Decreased functional mobility ; Decreased endurance  Assessment: Pt is a 60 y.o. female admitted to [facility] d/t COPD exacerbation. Pt was IPTA and lives with  who is home 24hrs. Pt's bedroom is on second floor of home (16 steps) though reports sleeping on counch for past few weeks d/t increasing SOB. On this date, pt performed bed mobility with modified independence, donned socks independently on side of bed. Pt performed sit <> stand transfers from bed and toilet with SBA and ambulated to/from BR with SBA. Pt stood at sink to perform grooming ADLs with supervision. Pt reported feeling SOB once walking in hallway; educated on PLB and able to return demonstrate technique. Recommend d/c home with 24hr assist to ensure safety when completing ADLs. No further skilled OT needed in acute care d/t pt functioning at baseline level. Prognosis: Good  Decision Making: Low Complexity     OT Education: ADL Adaptive Strategies;OT Role;Plan of Care;Energy Conservation  Patient Education: Disease specific: Patient educated on pursed lip breathing to aid in recovery and energy conservation s/p therapeutic activity as related to patient's respiratory condition. Patient verbalized understanding of above education. REQUIRES OT FOLLOW UP: No    Activity Tolerance  Activity Tolerance: Patient Tolerated treatment well  Safety Devices  Safety Devices in place: Yes  Type of devices: Left in chair;Call light within reach;Nurse notified;Gait belt        Patient Diagnosis(es): The primary encounter diagnosis was COPD exacerbation ([address] A diagnosis of Hyponatremia was also pertinent to this visit.      has a past medical history Tub/Shower unit  Bathroom Toilet: Standard  Bathroom Equipment: Hand-held shower  Home Equipment: Cane, 4 wheeled walker, Oxygen (2L O2 at night)  ADL Assistance: Independent (though needs rest breaks and someone there with her to supervise)  Homemaking Responsibilities: Yes  Meal Prep Responsibility: Primary  Laundry Responsibility: Primary  Cleaning Responsibility: Primary  Shopping Responsibility: No  Ambulation Assistance: Independent (though has recently started furniture walking in home d/t SOB)  Transfer Assistance: Independent  Active : No  Patient's  Info:   Mode of Transportation: Car  Occupation: On disability  Leisure & Hobbies: playing games on tablet, brandon    Objective   Vision: Impaired  Vision Exceptions: Wears glasses at all times  Hearing: Within functional limits    Orientation  Overall Orientation Status: Within Functional Limits     Balance  Sitting Balance: Independent  Standing Balance: Stand by assistance  Standing Balance  Time: 2 mins  Activity: ADLs at sink - brushing teeth and washing hands  Functional Mobility  Functional - Mobility Device: No device  Activity: To/from bathroom (around hallway)  Assist Level: Stand by assistance  Functional Mobility Comments: Pt SOB following ambulation around hallway, no SOB noted when walking to/from BR from bed  Toilet Transfers  Toilet - Technique: Ambulating  Equipment Used: Standard toilet  Toilet Transfer: Stand by assistance  ADL  Feeding: Independent  Grooming: Supervision (at sink for 2 mins)  LE Dressing: Stand by assistance (pants management during toileting, supervision for donning socks)  Toileting: Supervision;Stand by assistance  Tone RUE  RUE Tone: Normotonic  Tone LUE  LUE Tone: Normotonic  Coordination  Movements Are Fluid And Coordinated: Yes     Bed mobility  Supine to Sit: Modified independent (HOB elevated, to pt's R side)  Sit to Supine: Unable to assess (pt in chair at end of session)  Scooting: Independent (to EOB)  Transfers  Sit to stand: Stand by assistance  Stand to sit: Stand by assistance     Cognition  Overall Cognitive Status: WFL    LUE AROM (degrees)  LUE AROM : WFL  Left Hand AROM (degrees)  Left Hand AROM: WFL  RUE AROM (degrees)  RUE AROM : WFL  Right Hand AROM (degrees)  Right Hand AROM: WFL  LUE Strength  Gross LUE Strength: WFL  RUE Strength  Gross RUE Strength: WFL    Plan   Plan  Times per week: 1 session only    AM-PAC Score   AM-PAC Inpatient Daily Activity Raw Score: 22 (06/24/21 1035)  AM-PAC Inpatient ADL T-Scale Score : 47.1 (06/24/21 1035)  ADL Inpatient CMS 0-100% Score: 25.8 (06/24/21 1035)  ADL Inpatient CMS G-Code Modifier : Hali Dolores (06/24/21 1035)    Goals  Short term goals  Time Frame for Short term goals: 1 session only  Short term goal 1: Perform standing level ADLs at sink with supervision - goal met  Patient Goals   Patient goals :  To go home       Therapy Time   Individual Concurrent Group Co-treatment   Time In 0944         Time Out 1018         Minutes 34         Timed Code Treatment Minutes: 24 Minutes (10 min eval)       Diogenes Guillermo, S/OT

## 2021-06-24 NOTE — PROCEDURES
Bronchoscopy note    Patient with recurrent COPD exacerbation/chest congestion/mucus plugging/ineffective airway clearance and given the patient's clinical status and radiology it was decided to do a bronchoscopy for diagnostic and therapeutic purposes and for that reason after informed consent, the patient was taken to the endoscopy suite, patient was given oropharyngeal analgesia with benzocaine after timeout, patient was given lidocaine for tracheobronchial analgesia, patient was given conscious sedation with IV Versed, the details of the sedation are as following-    Physician/patient of face-to-face sedation start time was 10:19 AM  Physician/patient face-to-face sedation stop time was 10:31 AM  Total moderate sedation time in minutes was 12 minutes  The patient was monitored continuously  throughout the entire procedure while the sedation was being administered      The bronchoscope was introduced to the mouth using a bite block, patient was found to have some thick mucous plugs in the posterior pharynx which was suctioned out, patient also had some mucous plug which was blocking the procedure part of the vocal cord which was suctioned out, patient vocal cords were normal without any paralysis or any anatomy defect, patient had significant tracheobronchomalacia, along with that patient had extensive mucous plugs in the entire tracheobronchial tree which was significant, the bronchoscope was wedged into the right lower lobe bronchus and BAL was done from the area which was sent for various culture and cytology, rest of the tracheobronchial tree was therapeutically aspirated using Mucomyst and saline with improvement in the patency of the airways, patient did not have any endobronchial lesion, patient tolerated the procedure well and did not have any apparent complications  Estimated blood loss of 0    Further management depending on patient's clinical status and the bronchoscopy results     Shad tracy MD

## 2021-06-24 NOTE — PRE SEDATION
Sedation Pre-Procedure Note    Patient Name: Jesus Nguyen   YOB: 1955  Room/Bed: 4390/3187-13  Medical Record Number: [id number]  Date: 6/23/2021   Time: 11:20 PM       Indication: Recurrent COPD exacerbation, mucous plugging, chest congestion, ineffective airway clearance    Consent: I have discussed with the patient and/or the patient representative the indication, alternatives, and the possible risks and/or complications of the planned procedure and the anesthesia methods. The patient and/or patient representative appear to understand and agree to proceed. Vital Signs:   Vitals:    06/23/21 1940   BP:    Pulse:    Resp:    Temp:    SpO2: 93%       Past Medical History:   has a past medical history of Allergic rhinitis, Anxiety, Arthritis, Aspiration pneumonia ([address] Asthma, Bronchitis chronic, COPD (chronic obstructive pulmonary disease) ([address] Depression, Drug abuse, cocaine type ([address] Emphysema of lung ([address] Fibromyalgia, GERD (gastroesophageal reflux disease), Hyperlipidemia, Influenza A, Lung disease, On home oxygen therapy, Osteoporosis, Pneumonia, Polysubstance dependence ([address] 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[address] Pulmonary fibrosis ([address] Pulmonary infiltrate, Pulmonary nodule, and Tobacco abuse. Past Surgical History:   has a past surgical history that includes bronchoscopy; Hysterectomy; Salivary gland surgery; Endoscopy, colon, diagnostic; other surgical history (2/12/15); Colonoscopy (2012); bladder repair; pr colonoscopy flx dx w/collj spec when pfrmd (N/A, 9/20/2018); pr esophagogastroduodenoscopy transoral diagnostic (N/A, 9/20/2018); Esophagus dilation (9/20/2018); bronchoscopy (N/A, 3/6/2020); bronchoscopy (3/6/2020); bronchoscopy (N/A, 6/26/2020); bronchoscopy (6/26/2020); and bronchoscopy (06/23/2021).     Medications:   Scheduled Meds:    acetylcysteine  600 mg Inhalation BID    pantoprazole  40 mg Oral QAM AC    budesonide  0.5 mg Nebulization BID    doxycycline (VIBRAMYCIN) IV  100 mg Intravenous Q12H    nicotine  1 patch Transdermal Daily    sodium chloride flush  5-40 mL Intravenous 2 times per day    enoxaparin  40 mg Subcutaneous Daily    predniSONE  40 mg Oral Daily    busPIRone  30 mg Oral BID    FLUoxetine  60 mg Oral Daily    montelukast  10 mg Oral Nightly    traZODone  150 mg Oral Nightly    ipratropium-albuterol  1 ampule Inhalation Q4H     Continuous Infusions:    sodium chloride Stopped (06/23/21 0927)     PRN Meds: melatonin, hydroxypropyl methylcellulose, calcium carbonate, sodium chloride flush, sodium chloride, ondansetron **OR** ondansetron, polyethylene glycol, acetaminophen **OR** acetaminophen, guaiFENesin  Home Meds:   Prior to Admission medications    Medication Sig Start Date End Date Taking?  Authorizing Provider   ibuprofen (ADVIL;MOTRIN) 600 MG tablet Take 1 tablet by mouth 3 times daily as needed for Pain 4/23/21  Yes SHAUN Buchanan CNP   albuterol sulfate HFA (VENTOLIN HFA) 108 (90 Base) MCG/ACT inhaler Inhale 2 puffs into the lungs every 6 hours as needed for Wheezing or Shortness of Breath 3/4/21  Yes Alyssa Tapia MD   naproxen (NAPROSYN) 500 MG tablet Take 1 tablet by mouth 2 times daily (with meals) 2/5/21  Yes SHAUN Barbour CNP   BD PEN NEEDLE SVETLANA U/F 32G X 4 MM MISC USE ONE DAILY AS DIRECTED 12/11/20  Yes Belgica Abraham MD   montelukast (SINGULAIR) 10 MG tablet TAKE 1 TABLET BY MOUTH EACH NIGHT 12/7/20  Yes Alyssa Tapia MD   FLUoxetine (PROZAC) 20 MG capsule TAKE 3 CAPSULES BY MOUTH DAILY 12/3/20  Yes Belgica Abraham MD   traZODone (DESYREL) 150 MG tablet TAKE 2 TABLETS AT BEDTIME 9/11/20  Yes Belgica Abraham MD   fluticasone-salmeterol [facility]) 500-50 MCG/DOSE diskus inhaler Inhale 1 puff into the lungs every 12 hours 9/9/20  Yes Alyssa Tapia MD   tiotropium (Raine Paresh) 18 MCG inhalation capsule INHALE THE CONTENTS OF 1 CAPSULE EVERY DAY 9/9/20  Yes Alyssa Tapia MD   albuterol (PROVENTIL) (2.5 MG/3ML) 0.083% nebulizer solution Take 3 mLs by nebulization every 6 hours as needed for Wheezing or Shortness of Breath DX COPD J44.9 9/9/20  Yes Marquis Hawa MD   simvastatin (ZOCOR) 20 MG tablet TAKE 1 TABLET EVERY EVENING 8/7/20  Yes Mandy Das MD   busPIRone (BUSPAR) 30 MG tablet TAKE 1 TABLET TWICE DAILY 8/7/20  Yes Mandy Das MD   guaiFENesin (MUCINEX) 600 MG extended release tablet Take 1 tablet by mouth 2 times daily as needed for Congestion 7/24/20  Yes Tisha Hassan DO   vitamin D (ERGOCALCIFEROL) 1.25 MG (15007 UT) CAPS capsule Take 1 capsule by mouth once a week for 5 days Every Tuesday 3/23/21 3/28/21  Melody Holder MD   teriparatide, recombinant, (FORTEO) 600 MCG/2.4ML SOLN injection Inject 0.08 mLs into the skin daily One dose injected daily. 9/27/19 12/7/20  Alecia Holbrook MD     *      Pre-Sedation Documentation and Exam:   Vital signs have been reviewed (see flow sheet for vitals).     Mallampati Airway Assessment:  Mallampati Class III - (soft palate & base of uvula are visible)    Prior History of Anesthesia Complications:   none    ASA Classification:  Class 3 - A patient with severe systemic disease that limits activity but is not incapacitating    Sedation/ Anesthesia Plan:   intravenous sedation    Medications Planned:   midazolam (Versed) IV    Patient is an appropriate candidate for plan of sedation: yes    Electronically signed by Laith Howard MD on 6/23/2021 at 11:20 PM

## 2021-06-24 NOTE — PROGRESS NOTES
06/23/21  0642   WBC 18.3* 19.3*   HGB 11.7* 11.1*   HCT 33.8* 32.8*   MCV 88.1 88.8    321     BMP:   Recent Labs     06/21/21  0631 06/23/21  0642   * 130*   K 4.1 4.4   CL 99 98*   CO2 22 22   BUN 11 21*   CREATININE <0.5* 0.6       Imaging:  I have reviewed radiology images personally. XR CHEST PORTABLE   Final Result   Prominent interstitial markings again noted, stable suggesting interstitial   lung disease      No acute cardiopulmonary process           XR CHEST PORTABLE    Result Date: 6/20/2021  EXAMINATION: ONE XRAY VIEW OF THE CHEST 6/20/2021 10:20 am COMPARISON: 06/07/2021 HISTORY: ORDERING SYSTEM PROVIDED HISTORY: sob TECHNOLOGIST PROVIDED HISTORY: Reason for exam:->sob FINDINGS: Cardiomediastinal silhouette is stable. Prominent interstitial markings are noted. No focal pulmonary consolidation. No pneumothorax. No pleural effusion. Prominent interstitial markings again noted, stable suggesting interstitial lung disease No acute cardiopulmonary process     XR CHEST PORTABLE    Result Date: 6/8/2021  EXAMINATION: ONE XRAY VIEW OF THE CHEST 6/7/2021 10:07 am COMPARISON: May 24, 2021 HISTORY: ORDERING SYSTEM PROVIDED HISTORY: SOB TECHNOLOGIST PROVIDED HISTORY: Reason for exam:->SOB Reason for Exam: resp distress Acuity: Acute Type of Exam: Initial Initial evaluation for acute respiratory distress. FINDINGS: The cardiomediastinal silhouette is normal in size. Mildly increased interstitial opacities are present bilaterally with lower lobe predominance. This appears stable from previous exam. No focal lobar consolidation or air bronchograms. No pleural effusion or pneumothorax. No significant interval change. Stable bilateral lower lobe predominant increased interstitial opacities. No superimposed acute process. No significant interval change compared to May 24, 2021. Results for Rangel Shelley (MRN [id number]) as of 6/24/2021 00:32   Ref.  Range 6/21/2021 06:31 6/23/2021 06: 42   Sodium Latest Ref Range: 136 - 145 mmol/L 132 (L) 130 (L)   Potassium Latest Ref Range: 3.5 - 5.1 mmol/L 4.1 4.4   Chloride Latest Ref Range: 99 - 110 mmol/L 99 98 (L)   CO2 Latest Ref Range: 21 - 32 mmol/L 22 22   BUN Latest Ref Range: 7 - 20 mg/dL 11 21 (H)   Creatinine Latest Ref Range: 0.6 - 1.2 mg/dL <0.5 (L) 0.6   Anion Gap Latest Ref Range: 3 - 16  11 10   GFR Non- Latest Ref Range: >60  >60 >60   GFR  Latest Ref Range: >60  >60 >60   Glucose Latest Ref Range: 70 - 99 mg/dL 152 (H) 93   Calcium Latest Ref Range: 8.3 - 10.6 mg/dL 8.4 8.3   WBC Latest Ref Range: 4.0 - 11.0 K/uL 18.3 (H) 19.3 (H)   RBC Latest Ref Range: 4.00 - 5.20 M/uL 3.83 (L) 3.69 (L)   Hemoglobin Quant Latest Ref Range: 12.0 - 16.0 g/dL 11.7 (L) 11.1 (L)   Hematocrit Latest Ref Range: 36.0 - 48.0 % 33.8 (L) 32.8 (L)   MCV Latest Ref Range: 80.0 - 100.0 fL 88.1 88.8   MCH Latest Ref Range: 26.0 - 34.0 pg 30.6 30.2   MCHC Latest Ref Range: 31.0 - 36.0 g/dL 34.7 34.0   MPV Latest Ref Range: 5.0 - 10.5 fL 6.7 6.7   RDW Latest Ref Range: 12.4 - 15.4 % 13.1 13.1   Platelet Count Latest Ref Range: 135 - 450 K/uL 311 321   Neutrophils % Latest Units: %  90.0   Lymphocyte % Latest Units: %  1.0   Monocytes % Latest Units: %  8.0   Eosinophils % Latest Units: %  0.0   Basophils % Latest Units: %  0.0   Neutrophils Absolute Latest Ref Range: 1.7 - 7.7 K/uL  17.6 (H)           Assessment:  Active Problems:    Stage 3 severe COPD by GOLD classification ([facility])    Hyponatremia    Pulmonary fibrosis (HCC)    Bandemia    COPD exacerbation (HCC)    Chest congestion    Ineffective airway clearance    Other secondary kyphosis, thoracic region    Marijuana use    Diastolic dysfunction  Resolved Problems:    * No resolved hospital problems.  *          Plan:   · Oxygen supplementation to keep saturation being 90 to 94% only  · Please titrate the oxygen as per the above parameters  · Bronchodilators  · DuoNeb to continue  · Pulmicort and Mucomyst nebulization added to the regimen  · IV Solu-Medrol to continue  · Patient has slight elevation of WBC which needs to be trended  · Incentive spirometry  · Pulmonary toilet  · Patient continues to have increased chest congestion along with mucus plugging and ineffective airway clearance and patient has recurrent hospitalization with similar symptoms and patient will benefit from diagnostic and therapeutic purposes-patient was told about the procedure and the pros and cons and patient was agreeable-will arrange that for today  · Patient was started on IV doxycycline and titration as per clinical status and cultures  · Patient's hyponatremia may be secondary to SSRI  · Patient's TSH in the past was normal  · Evaluation and management of hyponatremia as per internal medicine team  · Smoking cessation advised and pros and cons of continued smoking discussed  · Nicotine replacement therapy  · PUD  as per IM   · Patient needs to discuss with Dr. Pooja Colmenares if patient needs any evaluation for SUAD; deemed appropriate  · PPI started     Case discussed with patient and nursing     Further management depending on patient's clinical status and follow-up on above recommendations and bronchoscopy finding            Electronically signed by:  Azeem Rockwell MD    6/23/2021    11:19 PM.

## 2021-06-24 NOTE — DISCHARGE SUMMARY
course : Patient noted to be tachypneic and tachycardic upon arrival to ED. Maintaining oxygen saturation in mid 90s on 2 L/min (home requirement) . Basic labs suggestive of hyponatremia sodium 125 and mild leukocytosis 15.4. Chest x-ray suggestive of prominent interstitial markings suggesting interstitial lung disease. No acute cardiopulmonary process. Patient received multiple breathing treatments in ED with partial relief of symptoms. She was subsequently placed on continuous nebulizer treatments and hospitalist consulted for admission for further evaluation and management. She received IV steroids x1 in ED. Hospital Course:      Acute COPD exacerbation in a patient with chronic hypoxic respiratory failure with ILD  POA:   Improved with supportive care. S/p bronch on 6/23 with BAL pending with no growth at time of d/c. Patient to continue steroid taper, doxycycline with bronchodilators. Counseled on cessation of tobacco abuse.        Acute on Chronic Respiratory Failure - w/ increased hypoxia, POArrival.  Presence of clinical respiratory distress w/ tachypnea/dyspnea/SOB and wheezing w/ use of accessory muscles to breath. O2 was weaned off- uses 2 L O2 at night chronically.       Acute on chronic hypoNatremia - etiology clinically unable to determine but likely hypovolemic. ? SSRI ( fluoxetine) possibly contributing. Sodium 125 on admission which improved with IVF. 129 at d/c. Repeat BMP in 1 week. May need to eventually have her SSRI DC'd and alternative med used. Deferred to her PCP.            Tobacco Abuse - active and ongoing. Rob Jeffrey counseled.  Nicotine replacement PRN offered but patient declined .      Depression/Anxiety - controlled on home Fluoxetine, Bupropion, and Trazodone - continued.       Leukocytosis-possibly secondary to steroid, pt afebrile.      Physical Exam Performed:     /71   Pulse 97   Temp 98.1 °F (36.7 °C) (Oral)   Resp 20   Ht 5' 5\" (1.651 m)   Wt 150 pulmonologist, Dr Dav Butler         Code Status:  Prior    Activity: activity as tolerated    Diet: regular diet      Discharge Medications:     Discharge Medication List as of 6/24/2021  2:12 PM           Details   predniSONE (DELTASONE) 10 MG tablet Take 30mg for 3 days then 20 mg for 3 days then 10mg for 3 days then stop, Disp-18 tablet, R-0Normal      doxycycline hyclate (VIBRA-TABS) 100 MG tablet Take 1 tablet by mouth every 12 hours for 5 days, Disp-10 tablet, R-0Normal      pantoprazole (PROTONIX) 40 MG tablet Take 1 tablet by mouth every morning (before breakfast), Disp-30 tablet, R-1Normal              Details   ibuprofen (ADVIL;MOTRIN) 600 MG tablet Take 1 tablet by mouth 3 times daily as needed for Pain, Disp-30 tablet, R-0Normal      vitamin D (ERGOCALCIFEROL) 1.25 MG (14532 UT) CAPS capsule Take 1 capsule by mouth once a week for 5 days Every Tuesday, Disp-5 capsule, R-0Normal      albuterol sulfate HFA (VENTOLIN HFA) 108 (90 Base) MCG/ACT inhaler Inhale 2 puffs into the lungs every 6 hours as needed for Wheezing or Shortness of Breath, Disp-3 Inhaler, R-1Normal      naproxen (NAPROSYN) 500 MG tablet Take 1 tablet by mouth 2 times daily (with meals), Disp-30 tablet, R-0Print      BD PEN NEEDLE SVETLANA U/F 32G X 4 MM MISC Disp-100 each, R-5, Normal      montelukast (SINGULAIR) 10 MG tablet TAKE 1 TABLET BY MOUTH EACH NIGHT, Disp-90 tablet,R-1Normal      FLUoxetine (PROZAC) 20 MG capsule TAKE 3 CAPSULES BY MOUTH DAILY, Disp-270 capsule,R-1Normal      traZODone (DESYREL) 150 MG tablet TAKE 2 TABLETS AT BEDTIME, Disp-180 tablet,R-1Normal      fluticasone-salmeterol (WIXELA INHUB) 500-50 MCG/DOSE diskus inhaler Inhale 1 puff into the lungs every 12 hours, Disp-180 each,R-1Normal      tiotropium (SPIRIVA HANDIHALER) 18 MCG inhalation capsule INHALE THE CONTENTS OF 1 CAPSULE EVERY DAY, Disp-90 capsule,R-1Normal      albuterol (PROVENTIL) (2.5 MG/3ML) 0.083% nebulizer solution Take 3 mLs by nebulization every 6 hours as needed for Wheezing or Shortness of Breath DX COPD J44.9, Disp-120 vial,R-6Normal      simvastatin (ZOCOR) 20 MG tablet TAKE 1 TABLET EVERY EVENING, Disp-90 tablet,R-1Normal      busPIRone (BUSPAR) 30 MG tablet TAKE 1 TABLET TWICE DAILY, Disp-180 tablet,R-1Normal      guaiFENesin (MUCINEX) 600 MG extended release tablet Take 1 tablet by mouth 2 times daily as needed for Congestion, Disp-60 tablet,R-2Normal      teriparatide, recombinant, (FORTEO) 600 MCG/2.4ML SOLN injection Inject 0.08 mLs into the skin daily One dose injected daily. , Disp-1 pen,R-11Print             Time Spent on discharge is more than 30 minutes in the examination, evaluation, counseling and review of medications and discharge plan. Signed:    Valentin Fabian MD   6/24/2021      Thank you Serafin Ramirez MD for the opportunity to be involved in this patient's care. If you have any questions or concerns please feel free to contact me at [phone]. Addendum:   BAL culture noted to have grown Pseudomonas after discharge. Doxycyline not listed as one of meds it is sensitive to. Pt was notified and called in PO cipro 500mg BID X7 days to pt's Pharmacy at [facility].

## 2021-06-24 NOTE — PROGRESS NOTES
INPATIENT PULMONARY CRITICAL CARE PROGRESS NOTE      Reason for visit    refractory COPD with 3 admissions in last 30 days    SUBJECTIVE: Patient underwent bronchoscopy and mucous plugs were lavaged out, when seen this morning feels much better as compared to what she has been feeling in the past, patient does not have that major chest congestion now, patient's breathing is much better, patient does not have any increasing chest tightness, patient was afebrile and hemodynamically maintained, patient was on RA oxygen with saturation of 95%, patient had sinus rhythm on the monitor, patient urine output has not been documented in the epic for review, patient's glycemic control was acceptable, patient was alert and oriented, no other pertinent review of system of concern          Physical Exam:  Blood pressure 115/71, pulse 97, temperature 98.1 °F (36.7 °C), temperature source Oral, resp. rate 20, height 5' 5\" (1.651 m), weight 150 lb (68 kg), SpO2 95 %, not currently breastfeeding.'     Constitutional: No respiratory distress. When seen  HENT:  Oropharynx is clear and moist. No thyromegaly. Eyes:  Conjunctivae are normal. Pupils equal, round, and reactive to light. No scleral icterus. Neck: . No tracheal deviation present. No obvious thyroid mass. Cardiovascular: Normal rate, regular rhythm, normal heart sounds.  No right ventricular heave. No lower extremity edema. Pulmonary/Chest: Minimal scattered wheezes.  no significant  rales. No chest congestion chest wall is not dull to percussion.  No accessory muscle usage or stridor.  Decreased breath sound density  Abdominal: Soft. Bowel sounds present. No distension or hernia. No tenderness.    Musculoskeletal: No cyanosis. No clubbing. No obvious joint deformity. Lymphadenopathy: No cervical or supraclavicular adenopathy. Skin: Skin is warm and dry. No rash or nodules on the exposed extremities. Psychiatric: Normal mood and affect.  Behavior is normal.  No anxiety. Neurologic: Alert, awake and oriented.  PERRL.  Speech fluent    Results:  CBC:   Recent Labs     06/23/21  0642   WBC 19.3*   HGB 11.1*   HCT 32.8*   MCV 88.8        BMP:   Recent Labs     06/23/21  0642 06/24/21  0840   * 129*   K 4.4 4.1   CL 98* 98*   CO2 22 23   BUN 21* 17   CREATININE 0.6 0.8       Imaging:  I have reviewed radiology images personally. XR CHEST PORTABLE   Final Result   Prominent interstitial markings again noted, stable suggesting interstitial   lung disease      No acute cardiopulmonary process             Results for Miranda Gay (MRN [id number]) as of 6/24/2021 11:51   Ref.  Range 6/21/2021 06:31 6/23/2021 06:42 6/23/2021 10:51 6/24/2021 08:40   Sodium Latest Ref Range: 136 - 145 mmol/L 132 (L) 130 (L)  129 (L)   Potassium Latest Ref Range: 3.5 - 5.1 mmol/L 4.1 4.4  4.1   Chloride Latest Ref Range: 99 - 110 mmol/L 99 98 (L)  98 (L)   CO2 Latest Ref Range: 21 - 32 mmol/L 22 22  23   BUN Latest Ref Range: 7 - 20 mg/dL 11 21 (H)  17   Creatinine Latest Ref Range: 0.6 - 1.2 mg/dL <0.5 (L) 0.6  0.8   Anion Gap Latest Ref Range: 3 - 16  11 10  8   GFR Non- Latest Ref Range: >60  >60 >60  >60   GFR  Latest Ref Range: >60  >60 >60  >60   Glucose Latest Ref Range: 70 - 99 mg/dL 152 (H) 93  88   Calcium Latest Ref Range: 8.3 - 10.6 mg/dL 8.4 8.3  8.9   WBC Latest Ref Range: 4.0 - 11.0 K/uL 18.3 (H) 19.3 (H)     RBC Latest Ref Range: 4.00 - 5.20 M/uL 3.83 (L) 3.69 (L)     Hemoglobin Quant Latest Ref Range: 12.0 - 16.0 g/dL 11.7 (L) 11.1 (L)     Hematocrit Latest Ref Range: 36.0 - 48.0 % 33.8 (L) 32.8 (L)     MCV Latest Ref Range: 80.0 - 100.0 fL 88.1 88.8     MCH Latest Ref Range: 26.0 - 34.0 pg 30.6 30.2     MCHC Latest Ref Range: 31.0 - 36.0 g/dL 34.7 34.0     MPV Latest Ref Range: 5.0 - 10.5 fL 6.7 6.7     RDW Latest Ref Range: 12.4 - 15.4 % 13.1 13.1     Platelet Count Latest Ref Range: 135 - 450 K/uL 311 321     Neutrophils % Latest Units: %  90.0     Lymphocyte % Latest Units: %  1.0     Monocytes % Latest Units: %  8.0     Eosinophils % Latest Units: %  0.0     Basophils % Latest Units: %  0.0       Bronchoscopy results are  pending      Assessment:  Active Problems:    Stage 3 severe COPD by GOLD classification ([facility])    Hyponatremia    Pulmonary fibrosis (HCC)    Bandemia    COPD exacerbation ([facility])    Chest congestion    Ineffective airway clearance    Other secondary kyphosis, thoracic region    Marijuana use    Diastolic dysfunction  Resolved Problems:    * No resolved hospital problems. *          Plan:   · Oxygen supplementation to keep saturation being 90 to 94% only  · Please titrate the oxygen as per the above parameters  · Bronchodilators  · DuoNeb to continue  · Pulmicort and Mucomyst nebulization d/argenis   · IV Solu-Medrol to be changed to tapering doses of p.o. prednisone  · Patient has slight elevation of WBC which needs to be trended  · Incentive spirometry  · Pulmonary toilet  · Status post bronchoscopy and the results are pending  · Patient was started on IV doxycycline and titration as per clinical status and cultures which can be changed to p.o. doxycycline for 4 to 5 days more  · Patient's hyponatremia may be secondary to SSRI  · Patient's TSH in the past was normal  · Evaluation and management of hyponatremia as per internal medicine team  · Smoking cessation advised and pros and cons of continued smoking discussed  · Nicotine replacement therapy  · PUD  as per IM   · Patient needs to discuss with Dr. Belkis Silva if patient needs any evaluation for SUAD; deemed appropriate  · PPI started     Case discussed with patient and nursing    ? Discharge planning    If discharged then patient needs to follow-up with Dr. Belkis Silva in 3-4 weeks             Electronically signed by:  Rodney White MD    6/24/2021    11:31 AM.

## 2021-06-25 ENCOUNTER — TELEPHONE (OUTPATIENT)
Dept: FAMILY MEDICINE CLINIC | Age: 66
End: 2021-06-25

## 2021-06-25 ENCOUNTER — CARE COORDINATION (OUTPATIENT)
Dept: CASE MANAGEMENT | Age: 66
End: 2021-06-25

## 2021-06-25 LAB
ADENOVIRUS PCR: NOT DETECTED
CULTURE, RESPIRATORY: ABNORMAL
CULTURE, RESPIRATORY: ABNORMAL
GRAM STAIN RESULT: ABNORMAL
HUMAN METAPNEUMOVIRUS PCR: NOT DETECTED
INFLUENZA A: NOT DETECTED
INFLUENZA B: NOT DETECTED
ORGANISM: ABNORMAL
PARAINFLUENZA 1 PCR: NOT DETECTED
PARAINFLUENZA 2 PCR: NOT DETECTED
PARAINFLUENZA 3 PCR: NOT DETECTED
PARAINFLUENZA 4 PCR: NOT DETECTED
RHINO/ENTEROVIRUS PCR: NOT DETECTED
RSV BY PCR: NOT DETECTED
RSV SOURCE: NORMAL

## 2021-06-25 NOTE — TELEPHONE ENCOUNTER
Ajith 45 Transitions Initial Follow Up Call    Outreach made within 2 business days of discharge: Yes    Patient: Idalia Hayden Patient : 1955   MRN: [id number]  Reason for Admission: There are no discharge diagnoses documented for the most recent discharge. Discharge Date: 21       Spoke with: Charmayne Loge    Discharge department/facility: [facility] Interactive Patient Contact:  Was patient able to fill all prescriptions: Yes  Was patient instructed to bring all medications to the follow-up visit: Yes  Is patient taking all medications as directed in the discharge summary?  Yes  Does patient understand their discharge instructions: Yes  Does patient have questions or concerns that need addressed prior to 7-14 day follow up office visit: no    Scheduled appointment with PCP within 7-14 days    Follow Up  Future Appointments   Date Time Provider Matt Crabtree   2021  2:30 PM MD JAIRO Hull  [place] - DYMARYANN   2021  9:30 AM MD LINDA Orellana   12/3/2021 11:00 AM [facility] [place] [place]unez, [address] [place] [address] Joanna Thomson

## 2021-06-25 NOTE — CARE COORDINATION
Ajith 45 Transitions Initial Follow Up Call    Call within 2 business days of discharge: Yes    Patient: Eddy Kirk Patient : 1955   MRN: [id number]  Reason for Admission: COPD   Discharge Date: 21 RARS: Readmission Risk Score: 31      Last Discharge [facility]       Complaint Diagnosis Description Type Department Provider    21 Shortness of Breath COPD exacerbation ([address] . .. ED to Hosp-Admission (Discharged) (ADMITTED) WILSON Franks MD; Tressa Alas. Per chart review patient was outreached by LPN and MA from PCP office. CTN will follow up with patient next week to avoid duplication. CTN [facility]. and spoke with Charles العراقي in intake who verified [address] orders were received and patient is scheduled for soc today.      Care Transitions 24 Hour Call    Do you have all of your prescriptions and are they filled?: Yes  Care Transitions Interventions         Follow Up  Future Appointments   Date Time Provider Matt Crabtree   2021  2:30 PM MD JAIRO Casiano  [place] - DYD   2021  9:30 AM MD LINDA Boyer PULJD MMA   12/3/2021 11:00 AM [facility] [place] [facility]

## 2021-06-28 ENCOUNTER — OFFICE VISIT (OUTPATIENT)
Dept: FAMILY MEDICINE CLINIC | Age: 66
End: 2021-06-28
Payer: MEDICARE

## 2021-06-28 VITALS — SYSTOLIC BLOOD PRESSURE: 114 MMHG | HEART RATE: 107 BPM | OXYGEN SATURATION: 96 % | DIASTOLIC BLOOD PRESSURE: 60 MMHG

## 2021-06-28 DIAGNOSIS — J84.10 PULMONARY FIBROSIS (HCC): ICD-10-CM

## 2021-06-28 DIAGNOSIS — J44.1 COPD EXACERBATION (HCC): ICD-10-CM

## 2021-06-28 DIAGNOSIS — Z12.31 ENCOUNTER FOR SCREENING MAMMOGRAM FOR BREAST CANCER: ICD-10-CM

## 2021-06-28 DIAGNOSIS — J84.9 ILD (INTERSTITIAL LUNG DISEASE) (HCC): ICD-10-CM

## 2021-06-28 DIAGNOSIS — E87.1 HYPONATREMIA: ICD-10-CM

## 2021-06-28 DIAGNOSIS — F33.41 RECURRENT MAJOR DEPRESSIVE DISORDER, IN PARTIAL REMISSION (HCC): ICD-10-CM

## 2021-06-28 DIAGNOSIS — J96.01 ACUTE HYPOXEMIC RESPIRATORY FAILURE (HCC): Primary | ICD-10-CM

## 2021-06-28 DIAGNOSIS — J44.9 STAGE 3 SEVERE COPD BY GOLD CLASSIFICATION (HCC): ICD-10-CM

## 2021-06-28 DIAGNOSIS — F12.20 CANNABIS DEPENDENCE WITH CURRENT USE (HCC): ICD-10-CM

## 2021-06-28 PROCEDURE — 1111F DSCHRG MED/CURRENT MED MERGE: CPT | Performed by: FAMILY MEDICINE

## 2021-06-28 PROCEDURE — 99496 TRANSJ CARE MGMT HIGH F2F 7D: CPT | Performed by: FAMILY MEDICINE

## 2021-06-28 RX ORDER — CIPROFLOXACIN 500 MG/1
TABLET, FILM COATED ORAL
Status: ON HOLD | COMMUNITY
Start: 2021-06-25 | End: 2021-07-14 | Stop reason: HOSPADM

## 2021-06-28 ASSESSMENT — PATIENT HEALTH QUESTIONNAIRE - PHQ9
SUM OF ALL RESPONSES TO PHQ QUESTIONS 1-9: 0
1. LITTLE INTEREST OR PLEASURE IN DOING THINGS: 0
2. FEELING DOWN, DEPRESSED OR HOPELESS: 0
SUM OF ALL RESPONSES TO PHQ9 QUESTIONS 1 & 2: 0
SUM OF ALL RESPONSES TO PHQ QUESTIONS 1-9: 0
SUM OF ALL RESPONSES TO PHQ QUESTIONS 1-9: 0

## 2021-06-28 NOTE — ACP (ADVANCE CARE PLANNING)
Advance Care Planning     Advance Care Planning (ACP) Physician/NP/PA Conversation    Date of Conversation: 6/28/2021  Conducted with: Patient with Decision Making Capacity    Healthcare Decision Maker:      Primary Decision Maker: Jeffy Parra - Spouse - [phone]    Secondary Decision Maker: Angelita Wong - Child - [phone]    Secondary Decision Maker: Thurrhoda kaylynn Morin - Child - [phone]    Supplemental (Other) Decision Maker: Norbert Wong - Child - [phone]    Click here to complete [address] including selection of the Healthcare Decision Maker Relationship (ie \"Primary\")  Today we documented Decision Maker(s) consistent with Legal Next of Kin hierarchy. Care Preferences:    Hospitalization: \"If your health worsens and it becomes clear that your chance of recovery is unlikely, what would be your preference regarding hospitalization? \"  The patient would prefer hospitalization. Ventilation: \"If you were unable to breath on your own and your chance of recovery was unlikely, what would be your preference about the use of a ventilator (breathing machine) if it was available to you? \"  The patient is unsure. Resuscitation: \"In the event your heart stopped as a result of an underlying serious health condition, would you want attempts made to restart your heart, or would you prefer a natural death? \"  Yes, attempt to resuscitate.       Conversation Outcomes / Follow-Up Plan:  ACP in process - completing/providing documents  Reviewed DNR/DNI and patient elects Full Code (Attempt Resuscitation)    Length of Voluntary ACP Conversation in minutes:  <16 minutes (Non-Billable)    Belgica Abraham MD

## 2021-06-29 ENCOUNTER — CARE COORDINATION (OUTPATIENT)
Dept: CASE MANAGEMENT | Age: 66
End: 2021-06-29

## 2021-06-29 NOTE — CARE COORDINATION
[facility]    Call within 2 business days of discharge: Yes    Patient: Ashlie Varghese Patient : 1955   MRN: [id number]  Reason for Admission: SOB  Discharge Date: 21 RARS: Readmission Risk Score: 31      Last Discharge [facility]       Complaint Diagnosis Description Type Department Provider    21 Shortness of Breath COPD exacerbation ([address] . .. ED to Hosp-Admission (Discharged) (ADMITTED) MHAZ C5 Isabela Field MD; Tressa Arambula Spoke with: [address] [place] [address] Avenue: [facility]    . Transitions of Care Initial Call    Was this an external facility discharge? No Discharge Facility: n/a    Challenges to be reviewed by the provider   Additional needs identified to be addressed with provider: No  none             Method of communication with provider : none      Advance Care Planning:   Does patient have an Advance Directive:  not on file. Was this a readmission? Yes  Patient stated reason for admission: Sob   Patients top risk factors for readmission: medical condition-COPD     Care Transition Nurse (CTN) contacted the patient by telephone to perform post hospital discharge assessment. Verified name and  with patient as identifiers. Provided introduction to self, and explanation of the CTN role. CTN reviewed discharge instructions, medical action plan and red flags with patient who verbalized understanding. Patient given an opportunity to ask questions and does not have any further questions or concerns at this time. Were discharge instructions available to patient? Yes. Reviewed appropriate site of care based on symptoms and resources available to patient including: PCP, Specialist, Home health and When to call 911. The patient agrees to contact the PCP office for questions related to their healthcare. Medication reconciliation was performed with patient, who verbalizes understanding of administration of home medications.  Advised obtaining a 90-day supply of all daily and as-needed medications. Covid Risk Education     Educated patient about risk for severe COVID-19 due to risk factors according to CDC guidelines. CTN reviewed discharge instructions, medical action plan and red flag symptoms with the patient who verbalized understanding. Discussed COVID vaccination status: Yes. Education provided on COVID-19 vaccination as appropriate. Discussed exposure protocols and quarantine with CDC Guidelines. Patient was given an opportunity to verbalize any questions and concerns and agrees to contact CTN or health care provider for questions related to their healthcare. Reviewed and educated patient on any new and changed medications related to discharge diagnosis. Was patient discharged with a pulse oximeter? No Discussed and confirmed pulse oximeter discharge instructions and when to notify provider or seek emergency care. CTN provided contact information. Plan for follow-up call in 5-7 days based on severity of symptoms and risk factors. Spoke with patient who reported she is doing alright. Patient reported she is still short of air at times. Patient reported she is using her oxygen. CTN discussed the importance of limiting time outside while it is so hot and to decrease smoking. Patient had apt with PCP yesterday and reported everything is stable and no changes to medications at this time. Patient reported the [facility] nurse was out yesterday. Denies any acute needs at present time. Agreeable to f/u calls. Educated on the use of urgent care or physicians 24 hr access line if assistance is needed after hours & that they can always contact their home care provider to request a nurse visit even if it isn't their regularly scheduled day for their nurse to visit.                Care Transitions 24 Hour Call    Do you have any ongoing symptoms?: No  Do you have a copy of your discharge instructions?: Yes  Do you have all of your prescriptions and are they filled?: Yes  Have you been contacted by a [address]?: No  Have you scheduled your follow up appointment?: Yes  How are you going to get to your appointment?: Car - family or friend to transport  Were you discharged with any Home Care or Post Acute Services: Yes  Post Acute Services:  [facility]  Do you feel like you have everything you need to keep you well at home?: Yes  Care Transitions Interventions         Follow Up  Future Appointments   Date Time Provider Matt Crabtree   7/8/2021  9:30 AM MD LINDA Figueroa   12/3/2021 11:00  [address]       Edwin Kulkarni RN

## 2021-06-29 NOTE — PROGRESS NOTES
Post-Discharge Transitional Care Management Services or Hospital Follow Up      Reba Hall   YOB: 1955    Date of Office Visit:  6/28/2021  Date of Hospital Admission: 6/20/21  Date of Hospital Discharge: 6/24/21  Readmission Risk Score(high >=14%.  Medium >=10%):Readmission Risk Score: 31      Care management risk score Rising risk (score 2-5) and Complex Care (Scores >=6): 6     Non face to face  following discharge, date last encounter closed (first attempt may have been earlier): 6/25/2021 10:58 AM 6/25/2021 10:58 AM    Call initiated 2 business days of discharge: Yes     Patient Active Problem List   Diagnosis    Stage 3 severe COPD by GOLD classification ([address]    Smoker    Fibromyalgia    Hypokalemia    Hyperlipidemia    Hyponatremia    Depression    Tobacco dependence    Pulmonary fibrosis ([address]    ILD (interstitial lung disease) ([address]    Recurrent major depressive disorder, in partial remission ([address]    Thyroid nodule    History of prescription drug abuse    Esophageal dysphagia    Heartburn    Rectal bleeding    History of colon polyps    Compression fx, thoracic spine, sequela    Kyphosis    Anxiety and depression    Chronic pain syndrome    Polyarthropathy, multiple sites    Pneumonia    Acute on chronic respiratory failure with hypoxia and hypercapnia (HCC)    Acute respiratory failure with hypoxia ([address]    Healthcare associated bacterial pneumonia    Bandemia    Sepsis ([address]    COPD exacerbation ([address]    Pulmonary infiltrates    Chest congestion    Mucus plugging of bronchi    Ineffective airway clearance    Acute on chronic respiratory failure with hypoxemia (HCC)    COPD (chronic obstructive pulmonary disease) (HCC)    Acute on chronic respiratory failure (HCC)    Cannabis dependence with current use (HCC)    Other secondary kyphosis, thoracic region    Marijuana use    Diastolic dysfunction       Allergies   Allergen Reactions    Meperidine Anaphylaxis     \"Demerol\"     Demerol Hives       Medications listed as ordered at the time of discharge from hospital   Wong, [address] Medication Instructions BALJINDER:    Printed on:06/29/21 0700   Medication Information                      albuterol (PROVENTIL) (2.5 MG/3ML) 0.083% nebulizer solution  Take 3 mLs by nebulization every 6 hours as needed for Wheezing or Shortness of Breath DX COPD J44.9             albuterol sulfate HFA (VENTOLIN HFA) 108 (90 Base) MCG/ACT inhaler  Inhale 2 puffs into the lungs every 6 hours as needed for Wheezing or Shortness of Breath             BD PEN NEEDLE SVETLANA U/F 32G X 4 MM MISC  USE ONE DAILY AS DIRECTED             busPIRone (BUSPAR) 30 MG tablet  TAKE 1 TABLET TWICE DAILY             ciprofloxacin (CIPRO) 500 MG tablet               FLUoxetine (PROZAC) 20 MG capsule  TAKE 3 CAPSULES BY MOUTH DAILY             fluticasone-salmeterol (WIXELA INHUB) 500-50 MCG/DOSE diskus inhaler  Inhale 1 puff into the lungs every 12 hours             guaiFENesin (MUCINEX) 600 MG extended release tablet  Take 1 tablet by mouth 2 times daily as needed for Congestion             ibuprofen (ADVIL;MOTRIN) 600 MG tablet  Take 1 tablet by mouth 3 times daily as needed for Pain             montelukast (SINGULAIR) 10 MG tablet  TAKE 1 TABLET BY MOUTH EACH NIGHT             naproxen (NAPROSYN) 500 MG tablet  Take 1 tablet by mouth 2 times daily (with meals)             pantoprazole (PROTONIX) 40 MG tablet  Take 1 tablet by mouth every morning (before breakfast)             predniSONE (DELTASONE) 10 MG tablet  Take 30mg for 3 days then 20 mg for 3 days then 10mg for 3 days then stop             simvastatin (ZOCOR) 20 MG tablet  TAKE 1 TABLET EVERY EVENING             teriparatide, recombinant, (FORTEO) 600 MCG/2.4ML SOLN injection  Inject 0.08 mLs into the skin daily One dose injected daily.              tiotropium (SPIRIVA HANDIHALER) 18 MCG inhalation capsule  INHALE THE CONTENTS OF 1 CAPSULE EVERY DAY             traZODone (DESYREL) 150 MG tablet  TAKE 2 TABLETS AT BEDTIME             vitamin D (ERGOCALCIFEROL) 1.25 MG (42702 UT) CAPS capsule  Take 1 capsule by mouth once a week for 5 days Every Tuesday                   Medications marked \"taking\" at this time  No outpatient medications have been marked as taking for the 6/28/21 encounter (Office Visit) with Jessika Pryor MD.        Medications patient taking as of now reconciled against medications ordered at time of hospital discharge: Yes    Chief Complaint   Patient presents with   [address] from [address] [facility] 6/24/21, COPD       HPI   Patient is here for follow up from recent hospitalizations for COPD exacerbations. She has had several admissions in the last few months due to this. She has ILD. She recurrently becomes severely short of breath, requires increased oxygen, IV steroids, multiple breathing treatments. She states this time she required Bipap due to respiratory failure. Found to have hyponatremia. Pattern consistent with dehydration. Hospital concerned about SSRI causing it. Inpatient course: Discharge summary reviewed- see chart. Interval history/Current status: She is feeling better. Continues to smoke marijuana daily. She was told she must quit or she'll \"be dead in a year\" by the hospital. She is considering changing to edibles. Does not have a legal marijuana card. Review of Systems   Respiratory: Positive for chest tightness, shortness of breath and wheezing. Vitals:    06/28/21 1320   BP: 114/60   Pulse: 107   SpO2: 96%     There is no height or weight on file to calculate BMI. Wt Readings from Last 3 Encounters:   06/20/21 150 lb (68 kg)   06/11/21 152 lb 14.4 oz (69.4 kg)   05/24/21 140 lb (63.5 kg)     BP Readings from Last 3 Encounters:   06/28/21 114/60   06/24/21 115/71   06/11/21 127/72       Physical Exam  Vitals and nursing note reviewed.    Constitutional:       General: She is not in acute distress. Appearance: She is well-developed. She is not diaphoretic. HENT:      Head: Normocephalic and atraumatic. Eyes:      General: No scleral icterus. Conjunctiva/sclera: Conjunctivae normal.   Cardiovascular:      Rate and Rhythm: Normal rate and regular rhythm. Heart sounds: Normal heart sounds. No murmur heard. No friction rub. No gallop. Pulmonary:      Effort: Pulmonary effort is normal. No respiratory distress. Breath sounds: Decreased air movement present. Decreased breath sounds, wheezing and rhonchi present. No rales. Abdominal:      General: Bowel sounds are normal. There is no distension. Palpations: Abdomen is soft. There is no mass. Tenderness: There is no abdominal tenderness. There is no guarding or rebound. Neurological:      Mental Status: She is alert. Assessment/Plan:  1. COPD exacerbation ([address] causing acute hypoxic respiratory failure  Advised to continue on oral steroids, Cipro, Wixela, Spiriva, and albuterol. Advised to keep follow up with pulmonologist. Continue incentive spirometry with flutter valve. 2. Stage 3 severe COPD by GOLD classification ([address]  Worsening. Discussed it is imperative that she quit smoking anything, including marijuana. Discussed continuing to smoke will continue to cause further lung damage, and could be a risk of dying soon should her condition continue to worsen. Discussed the pattern of very frequent and severe exacerbations is very concerning, and the best things she can do to help prevent this is quit. She is going to look into getting edible marijuana instead. 3. ILD (interstitial lung disease) ([address]  Continue current respiratory meds, smoking cessation discussed, follow up closely with pulmonology. 4. Pulmonary fibrosis ([address]  Continue current respiratory meds, smoking cessation discussed, follow up closely with pulmonology.      5. Encounter for screening mammogram for breast cancer  -

## 2021-06-30 ASSESSMENT — ENCOUNTER SYMPTOMS
WHEEZING: 1
CHEST TIGHTNESS: 1
SHORTNESS OF BREATH: 1

## 2021-07-07 ENCOUNTER — CARE COORDINATION (OUTPATIENT)
Dept: CASE MANAGEMENT | Age: 66
End: 2021-07-07

## 2021-07-07 NOTE — CARE COORDINATION
[facility] Transitions Follow Up Call    2021    Patient: Dhiraj Welch  Patient : 1955   MRN: [id number]  Reason for Admission: SOB  Discharge Date: 21 RARS: Readmission Risk Score: 31         Spoke with: Paula Transitions Follow Up Call    Needs to be reviewed by the provider   Additional needs identified to be addressed with provider: No  none             Method of communication with provider : phone      Care Transition Nurse (CTN) contacted the patient by telephone to follow up after admission. Verified name and  with patient as identifiers. Addressed changes since last contact: none  Discussed follow-up appointments. If no appointment was previously scheduled, appointment scheduling offered: Yes. Is follow up appointment scheduled within 7 days of discharge? Yes. Advance Care Planning:   Does patient have an Advance Directive:  reviewed and current. CTN reviewed discharge instructions, medical action plan and red flags with patient and discussed any barriers to care and/or understanding of plan of care after discharge. Discussed appropriate site of care based on symptoms and resources available to patient including: PCP, Specialist and Home health. The patient agrees to contact the PCP office for questions related to their healthcare. Patients top risk factors for readmission: medical condition-SOB  Interventions to address risk factors: Scheduled appointment with Specialist-discussed upcoming  with pulmonology    Non-[facility] follow up appointment(s): NA     Patient verified  and was pleasant and agreeable to transition calls. Patient SOB still persistent. Patient HC active: OT, nursing. Patient using O2, mostly at night. LPN CC encouraged patient to use as she needs it throughout the day and that patient O2 goal should be above 90%. Patient verbalized understanding. States O2 this AM 95%. Denies any recent medication changes.  Using breathing tx as prescribed. Patient trying to move to one story house to eliminate stair use. Appt with pulmonology 7/21. Denies any acute needs at present time. Agreeable to f/u calls. Educated on the use of urgent care or physicians 24 hr access line if assistance is needed after hours. CTN provided contact information for future needs. Plan for follow-up call in 7-10 days based on severity of symptoms and risk factors. Марина Combs, [address] Transitions  [phone]    Care Transitions Subsequent and Final Call    Subsequent and Final Calls  Do you currently have any active services?: Yes  Are you currently active with any services?: Home Health  Care Transitions Interventions  Other Interventions:            Follow Up  Future Appointments   Date Time Provider Matt Crabtree   7/21/2021  1:00 PM Lauro Francois MD [facility] PULM [facility]   12/3/2021 11:00 AM [facility] CT MAIN [facility] CT TEZ Combs LPN

## 2021-07-11 ENCOUNTER — TELEPHONE (OUTPATIENT)
Dept: OTHER | Facility: CLINIC | Age: 66
End: 2021-07-11

## 2021-07-11 ENCOUNTER — APPOINTMENT (OUTPATIENT)
Dept: GENERAL RADIOLOGY | Age: 66
DRG: 190 | End: 2021-07-11
Payer: MEDICARE

## 2021-07-11 ENCOUNTER — HOSPITAL ENCOUNTER (INPATIENT)
Age: 66
LOS: 3 days | Discharge: HOME | DRG: 190 | End: 2021-07-14
Attending: EMERGENCY MEDICINE | Admitting: INTERNAL MEDICINE
Payer: MEDICARE

## 2021-07-11 DIAGNOSIS — J96.00 ACUTE RESPIRATORY FAILURE, UNSPECIFIED WHETHER WITH HYPOXIA OR HYPERCAPNIA (HCC): ICD-10-CM

## 2021-07-11 DIAGNOSIS — J44.1 COPD EXACERBATION (HCC): Primary | ICD-10-CM

## 2021-07-11 LAB
ANION GAP SERPL CALCULATED.3IONS-SCNC: 11 MMOL/L (ref 3–16)
BASE EXCESS VENOUS: -0.2 MMOL/L (ref -3–3)
BASOPHILS ABSOLUTE: 0.1 K/UL (ref 0–0.2)
BASOPHILS RELATIVE PERCENT: 1 %
BUN BLDV-MCNC: 7 MG/DL (ref 7–20)
CALCIUM SERPL-MCNC: 9.1 MG/DL (ref 8.3–10.6)
CARBOXYHEMOGLOBIN: 2.2 % (ref 0–1.5)
CHLORIDE BLD-SCNC: 89 MMOL/L (ref 99–110)
CO2: 25 MMOL/L (ref 21–32)
CREAT SERPL-MCNC: 0.7 MG/DL (ref 0.6–1.2)
EOSINOPHILS ABSOLUTE: 0.4 K/UL (ref 0–0.6)
EOSINOPHILS RELATIVE PERCENT: 4.6 %
GFR AFRICAN AMERICAN: >60
GFR NON-AFRICAN AMERICAN: >60
GLUCOSE BLD-MCNC: 87 MG/DL (ref 70–99)
HCO3 VENOUS: 23.7 MMOL/L (ref 23–29)
HCT VFR BLD CALC: 32.8 % (ref 36–48)
HEMOGLOBIN: 11.7 G/DL (ref 12–16)
LYMPHOCYTES ABSOLUTE: 2 K/UL (ref 1–5.1)
LYMPHOCYTES RELATIVE PERCENT: 22 %
MCH RBC QN AUTO: 30.1 PG (ref 26–34)
MCHC RBC AUTO-ENTMCNC: 35.6 G/DL (ref 31–36)
MCV RBC AUTO: 84.6 FL (ref 80–100)
METHEMOGLOBIN VENOUS: 0.2 %
MONOCYTES ABSOLUTE: 0.9 K/UL (ref 0–1.3)
MONOCYTES RELATIVE PERCENT: 10 %
NEUTROPHILS ABSOLUTE: 5.8 K/UL (ref 1.7–7.7)
NEUTROPHILS RELATIVE PERCENT: 62.4 %
O2 SAT, VEN: 96 %
O2 THERAPY: ABNORMAL
PCO2, VEN: 36.3 MMHG (ref 40–50)
PDW BLD-RTO: 12.9 % (ref 12.4–15.4)
PH VENOUS: 7.43 (ref 7.35–7.45)
PLATELET # BLD: 400 K/UL (ref 135–450)
PMV BLD AUTO: 7 FL (ref 5–10.5)
PO2, VEN: 85.5 MMHG (ref 25–40)
POTASSIUM REFLEX MAGNESIUM: 4.5 MMOL/L (ref 3.5–5.1)
RBC # BLD: 3.87 M/UL (ref 4–5.2)
SARS-COV-2, NAAT: NOT DETECTED
SODIUM BLD-SCNC: 125 MMOL/L (ref 136–145)
TCO2 CALC VENOUS: 25 MMOL/L
WBC # BLD: 9.3 K/UL (ref 4–11)

## 2021-07-11 PROCEDURE — 2700000000 HC OXYGEN THERAPY PER DAY

## 2021-07-11 PROCEDURE — 6360000002 HC RX W HCPCS: Performed by: NURSE PRACTITIONER

## 2021-07-11 PROCEDURE — 1200000000 HC SEMI PRIVATE

## 2021-07-11 PROCEDURE — 6370000000 HC RX 637 (ALT 250 FOR IP): Performed by: NURSE PRACTITIONER

## 2021-07-11 PROCEDURE — 96374 THER/PROPH/DIAG INJ IV PUSH: CPT

## 2021-07-11 PROCEDURE — 93005 ELECTROCARDIOGRAM TRACING: CPT | Performed by: PHYSICIAN ASSISTANT

## 2021-07-11 PROCEDURE — 80048 BASIC METABOLIC PNL TOTAL CA: CPT

## 2021-07-11 PROCEDURE — 6370000000 HC RX 637 (ALT 250 FOR IP): Performed by: PHYSICIAN ASSISTANT

## 2021-07-11 PROCEDURE — 82803 BLOOD GASES ANY COMBINATION: CPT

## 2021-07-11 PROCEDURE — 71046 X-RAY EXAM CHEST 2 VIEWS: CPT

## 2021-07-11 PROCEDURE — 87635 SARS-COV-2 COVID-19 AMP PRB: CPT

## 2021-07-11 PROCEDURE — 2580000003 HC RX 258: Performed by: NURSE PRACTITIONER

## 2021-07-11 PROCEDURE — 85025 COMPLETE CBC W/AUTO DIFF WBC: CPT

## 2021-07-11 PROCEDURE — 99283 EMERGENCY DEPT VISIT LOW MDM: CPT

## 2021-07-11 PROCEDURE — 94761 N-INVAS EAR/PLS OXIMETRY MLT: CPT

## 2021-07-11 PROCEDURE — 94640 AIRWAY INHALATION TREATMENT: CPT

## 2021-07-11 RX ORDER — SODIUM CHLORIDE 0.9 % (FLUSH) 0.9 %
5-40 SYRINGE (ML) INJECTION PRN
Status: DISCONTINUED | OUTPATIENT
Start: 2021-07-11 | End: 2021-07-14 | Stop reason: HOSPADM

## 2021-07-11 RX ORDER — ONDANSETRON 2 MG/ML
4 INJECTION INTRAMUSCULAR; INTRAVENOUS EVERY 6 HOURS PRN
Status: DISCONTINUED | OUTPATIENT
Start: 2021-07-11 | End: 2021-07-14 | Stop reason: HOSPADM

## 2021-07-11 RX ORDER — ACETAMINOPHEN 650 MG/1
650 SUPPOSITORY RECTAL EVERY 6 HOURS PRN
Status: DISCONTINUED | OUTPATIENT
Start: 2021-07-11 | End: 2021-07-14 | Stop reason: HOSPADM

## 2021-07-11 RX ORDER — SODIUM CHLORIDE 9 MG/ML
INJECTION, SOLUTION INTRAVENOUS CONTINUOUS
Status: ACTIVE | OUTPATIENT
Start: 2021-07-11 | End: 2021-07-12

## 2021-07-11 RX ORDER — SODIUM CHLORIDE 9 MG/ML
25 INJECTION, SOLUTION INTRAVENOUS PRN
Status: DISCONTINUED | OUTPATIENT
Start: 2021-07-11 | End: 2021-07-14 | Stop reason: HOSPADM

## 2021-07-11 RX ORDER — ALBUTEROL SULFATE 2.5 MG/3ML
2.5 SOLUTION RESPIRATORY (INHALATION)
Status: DISCONTINUED | OUTPATIENT
Start: 2021-07-12 | End: 2021-07-14 | Stop reason: HOSPADM

## 2021-07-11 RX ORDER — ACETAMINOPHEN 325 MG/1
650 TABLET ORAL EVERY 6 HOURS PRN
Status: DISCONTINUED | OUTPATIENT
Start: 2021-07-11 | End: 2021-07-14 | Stop reason: HOSPADM

## 2021-07-11 RX ORDER — PANTOPRAZOLE SODIUM 40 MG/1
40 TABLET, DELAYED RELEASE ORAL
Status: DISCONTINUED | OUTPATIENT
Start: 2021-07-12 | End: 2021-07-14 | Stop reason: HOSPADM

## 2021-07-11 RX ORDER — IPRATROPIUM BROMIDE AND ALBUTEROL SULFATE 2.5; .5 MG/3ML; MG/3ML
3 SOLUTION RESPIRATORY (INHALATION)
Status: DISCONTINUED | OUTPATIENT
Start: 2021-07-11 | End: 2021-07-11

## 2021-07-11 RX ORDER — POLYETHYLENE GLYCOL 3350 17 G/17G
17 POWDER, FOR SOLUTION ORAL DAILY PRN
Status: DISCONTINUED | OUTPATIENT
Start: 2021-07-11 | End: 2021-07-14 | Stop reason: HOSPADM

## 2021-07-11 RX ORDER — BUDESONIDE AND FORMOTEROL FUMARATE DIHYDRATE 160; 4.5 UG/1; UG/1
2 AEROSOL RESPIRATORY (INHALATION) 2 TIMES DAILY
Status: DISCONTINUED | OUTPATIENT
Start: 2021-07-12 | End: 2021-07-14 | Stop reason: HOSPADM

## 2021-07-11 RX ORDER — SODIUM CHLORIDE 0.9 % (FLUSH) 0.9 %
5-40 SYRINGE (ML) INJECTION EVERY 12 HOURS SCHEDULED
Status: DISCONTINUED | OUTPATIENT
Start: 2021-07-11 | End: 2021-07-14 | Stop reason: HOSPADM

## 2021-07-11 RX ORDER — ATORVASTATIN CALCIUM 40 MG/1
40 TABLET, FILM COATED ORAL DAILY
Status: DISCONTINUED | OUTPATIENT
Start: 2021-07-12 | End: 2021-07-14 | Stop reason: HOSPADM

## 2021-07-11 RX ORDER — METHYLPREDNISOLONE SODIUM SUCCINATE 40 MG/ML
40 INJECTION, POWDER, LYOPHILIZED, FOR SOLUTION INTRAMUSCULAR; INTRAVENOUS EVERY 6 HOURS
Status: COMPLETED | OUTPATIENT
Start: 2021-07-11 | End: 2021-07-13

## 2021-07-11 RX ORDER — TRAZODONE HYDROCHLORIDE 50 MG/1
150 TABLET ORAL NIGHTLY
Status: DISCONTINUED | OUTPATIENT
Start: 2021-07-11 | End: 2021-07-14 | Stop reason: HOSPADM

## 2021-07-11 RX ORDER — BUSPIRONE HYDROCHLORIDE 5 MG/1
30 TABLET ORAL 2 TIMES DAILY
Status: DISCONTINUED | OUTPATIENT
Start: 2021-07-11 | End: 2021-07-14 | Stop reason: HOSPADM

## 2021-07-11 RX ORDER — FLUOXETINE HYDROCHLORIDE 20 MG/1
60 CAPSULE ORAL DAILY
Status: DISCONTINUED | OUTPATIENT
Start: 2021-07-12 | End: 2021-07-14 | Stop reason: HOSPADM

## 2021-07-11 RX ORDER — SIMVASTATIN 40 MG
80 TABLET ORAL NIGHTLY
Status: DISCONTINUED | OUTPATIENT
Start: 2021-07-11 | End: 2021-07-11

## 2021-07-11 RX ORDER — PREDNISONE 20 MG/1
40 TABLET ORAL DAILY
Status: DISCONTINUED | OUTPATIENT
Start: 2021-07-14 | End: 2021-07-14 | Stop reason: HOSPADM

## 2021-07-11 RX ORDER — MONTELUKAST SODIUM 10 MG/1
10 TABLET ORAL NIGHTLY
Status: DISCONTINUED | OUTPATIENT
Start: 2021-07-11 | End: 2021-07-14 | Stop reason: HOSPADM

## 2021-07-11 RX ORDER — ALBUTEROL SULFATE 2.5 MG/3ML
2.5 SOLUTION RESPIRATORY (INHALATION) EVERY 6 HOURS PRN
Status: DISCONTINUED | OUTPATIENT
Start: 2021-07-11 | End: 2021-07-14 | Stop reason: HOSPADM

## 2021-07-11 RX ORDER — ALBUTEROL SULFATE 90 UG/1
2 AEROSOL, METERED RESPIRATORY (INHALATION) EVERY 6 HOURS PRN
Status: DISCONTINUED | OUTPATIENT
Start: 2021-07-11 | End: 2021-07-14 | Stop reason: HOSPADM

## 2021-07-11 RX ORDER — ONDANSETRON 4 MG/1
4 TABLET, ORALLY DISINTEGRATING ORAL EVERY 8 HOURS PRN
Status: DISCONTINUED | OUTPATIENT
Start: 2021-07-11 | End: 2021-07-14 | Stop reason: HOSPADM

## 2021-07-11 RX ADMIN — IPRATROPIUM BROMIDE AND ALBUTEROL SULFATE 3 AMPULE: .5; 3 SOLUTION RESPIRATORY (INHALATION) at 18:43

## 2021-07-11 RX ADMIN — ALBUTEROL SULFATE 2.5 MG: 2.5 SOLUTION RESPIRATORY (INHALATION) at 23:31

## 2021-07-11 RX ADMIN — TRAZODONE HYDROCHLORIDE 150 MG: 50 TABLET ORAL at 22:38

## 2021-07-11 RX ADMIN — Medication 10 ML: at 22:40

## 2021-07-11 RX ADMIN — METHYLPREDNISOLONE SODIUM SUCCINATE 40 MG: 40 INJECTION, POWDER, FOR SOLUTION INTRAMUSCULAR; INTRAVENOUS at 22:39

## 2021-07-11 RX ADMIN — SODIUM CHLORIDE: 9 INJECTION, SOLUTION INTRAVENOUS at 22:41

## 2021-07-11 RX ADMIN — MONTELUKAST SODIUM 10 MG: 10 TABLET ORAL at 22:38

## 2021-07-11 RX ADMIN — BUSPIRONE HYDROCHLORIDE 30 MG: 5 TABLET ORAL at 22:38

## 2021-07-11 ASSESSMENT — ENCOUNTER SYMPTOMS
SHORTNESS OF BREATH: 1
VOMITING: 0
ABDOMINAL PAIN: 0
CHEST TIGHTNESS: 0
BACK PAIN: 0
NAUSEA: 0
COUGH: 1

## 2021-07-11 ASSESSMENT — PAIN SCALES - GENERAL: PAINLEVEL_OUTOF10: 0

## 2021-07-11 NOTE — ED NOTES
Pt ambulatory with portable oxygen to the restroom. Pt appears very labored at this time. Pt is tachypneic. Pt advised that we will minimize ambulation for future restroom needs and provide bedside commode or bedpan, pt agrees with plan.       Bridget Harris RN  07/11/21 7696

## 2021-07-11 NOTE — ED NOTES
Pt ambulated without oxygen. O2 decreased from 96% to 93%. Pt reports feeling SOB. Labored breathing and HR increased to 117.       Castillo Thomson RN  07/11/21 1958

## 2021-07-11 NOTE — ED PROVIDER NOTES
**ADVANCED PRACTICE PROVIDER, I HAVE EVALUATED THIS PATIENT**        [facility] A2 CARD TELEMETRY  EMERGENCY DEPARTMENT ENCOUNTER      Pt Name: Sofiya Downing  UKEELY:[id number]  [place] 1955  Date of evaluation: 7/11/2021  Provider: ANY Armando      Chief Complaint:    Chief Complaint   Patient presents with    Shortness of Breath         Nursing Notes, Past Medical Hx, Past Surgical Hx, Social Hx, Allergies, and Family Hx were all reviewed and agreed with or any disagreements were addressed in the HPI.    HPI: (Location, Duration, Timing, Severity, Quality, Assoc Sx, Context, Modifying factors)    Chief Complaint of shortness of breath. This is a  60 y.o. female who presents to the emergency department complaining of shortness of breath that began a few days ago. She states she has a history of COPD and does wear 3 L of oxygen at home at night, however over the last few days she has been wearing it during the day as well. She does have a cough and states she coughs up green and yellow phlegm. She denies any pain at this time. She denies any fever, chills, chest pain, shortness of breath, cough, abdominal pain, nausea, vomiting. She states she did try breathing treatment earlier today without significant relief. Currently she is on 4 L of oxygen.     PastMedical/Surgical History:      Diagnosis Date    Allergic rhinitis 6/11/2010    Anxiety     Arthritis     Aspiration pneumonia ([address] 3/21/2012    Recurrent    Asthma     Bronchitis chronic     COPD (chronic obstructive pulmonary disease) ([address] 12/3/2009    Depression     Drug abuse, cocaine type (HCC)     past history of crack cocaine use    Emphysema of lung (HCC)     Fibromyalgia     GERD (gastroesophageal reflux disease)     Hyperlipidemia     Influenza A 03/05/2020    Lung disease     On home oxygen therapy     uses O2 NC 3L prn at night    Osteoporosis     Pneumonia     Polysubstance dependence ([address] 1/2/2012    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    Pulmonary fibrosis ([facility] 10/16/2014    Pulmonary infiltrate     Pulmonary nodule 12/3/2009    Tobacco abuse          Procedure Laterality Date    BLADDER REPAIR      BRONCHOSCOPY      BRONCHOSCOPY N/A 3/6/2020    BRONCHOSCOPY THERAPUTIC ASPIRATION INITIAL performed by Kevin Saavedra MD at [address]  3/6/2020    BRONCHOSCOPY ALVEOLAR LAVAGE performed by Kevin Saavedra MD at [address] N/A 6/26/2020    BRONCHOSCOPY THERAPUTIC ASPIRATION INITIAL performed by Daniela Kennedy MD at [address]  6/26/2020    BRONCHOSCOPY ALVEOLAR LAVAGE performed by Daniela Kennedy MD at [address]  06/23/2021    Dr Mary Paula N/A 6/23/2021    BRONCHOSCOPY DIAGNOSTIC OR CELL [address] [facility] performed by Daniela Kennedy MD at [address]  6/23/2021    BRONCHOSCOPY THERAPUTIC ASPIRATION INITIAL performed by Daniela Kennedy MD at [address]  6/23/2021    BRONCHOSCOPY ALVEOLAR LAVAGE performed by Daniela Kennedy MD at [facility]  2012    ENDOSCOPY, COLON, DIAGNOSTIC      ESOPHAGEAL DILATATION  9/20/2018    ESOPHAGEAL DILATION Philippe Bo performed by Orlene Hodgkins, MD at [address] [facility] HISTORY  2/12/15    T8 Kyphoplasty    FL COLONOSCOPY FLX DX W/COLLJ SPEC WHEN PFRMD N/A 9/20/2018    EGD AND COLONOSCOPY WITH ANESTHESIA performed by Orlene Hodgkins, MD at [address] [facility] ESOPHAGOGASTRODUODENOSCOPY TRANSORAL DIAGNOSTIC N/A 9/20/2018    EGD AND COLONOSCOPY WITH ANESTHESIA performed by Orlene Hodgkins, MD at [address] [facility]         Medications:  Current Discharge Medication List      CONTINUE these medications which have NOT CHANGED    Details   pantoprazole (PROTONIX) 40 MG tablet Take 1 tablet by mouth every morning (before breakfast)  Qty: 30 tablet, Refills: 1      albuterol sulfate HFA (VENTOLIN HFA) 108 (90 Base) MCG/ACT inhaler Inhale 2 puffs into the lungs every 6 hours as needed for Wheezing or Shortness of Breath  Qty: 3 Inhaler, Refills: 1      montelukast (SINGULAIR) 10 MG tablet TAKE 1 TABLET BY MOUTH EACH NIGHT  Qty: 90 tablet, Refills: 1      FLUoxetine (PROZAC) 20 MG capsule TAKE 3 CAPSULES BY MOUTH DAILY  Qty: 270 capsule, Refills: 1      traZODone (DESYREL) 150 MG tablet TAKE 2 TABLETS AT BEDTIME  Qty: 180 tablet, Refills: 1      fluticasone-salmeterol (WIXELA INHUB) 500-50 MCG/DOSE diskus inhaler Inhale 1 puff into the lungs every 12 hours  Qty: 180 each, Refills: 1      tiotropium (SPIRIVA HANDIHALER) 18 MCG inhalation capsule INHALE THE CONTENTS OF 1 CAPSULE EVERY DAY  Qty: 90 capsule, Refills: 1      simvastatin (ZOCOR) 20 MG tablet TAKE 1 TABLET EVERY EVENING  Qty: 90 tablet, Refills: 1      busPIRone (BUSPAR) 30 MG tablet TAKE 1 TABLET TWICE DAILY  Qty: 180 tablet, Refills: 1      ciprofloxacin (CIPRO) 500 MG tablet       predniSONE (DELTASONE) 10 MG tablet Take 30mg for 3 days then 20 mg for 3 days then 10mg for 3 days then stop  Qty: 18 tablet, Refills: 0      ibuprofen (ADVIL;MOTRIN) 600 MG tablet Take 1 tablet by mouth 3 times daily as needed for Pain  Qty: 30 tablet, Refills: 0      vitamin D (ERGOCALCIFEROL) 1.25 MG (19916 UT) CAPS capsule Take 1 capsule by mouth once a week for 5 days Every Tuesday  Qty: 5 capsule, Refills: 0      naproxen (NAPROSYN) 500 MG tablet Take 1 tablet by mouth 2 times daily (with meals)  Qty: 30 tablet, Refills: 0      BD PEN NEEDLE SVETLANA U/F 32G X 4 MM MISC USE ONE DAILY AS DIRECTED  Qty: 100 each, Refills: 5      albuterol (PROVENTIL) (2.5 MG/3ML) 0.083% nebulizer solution Take 3 mLs by nebulization every 6 hours as needed for Wheezing or Shortness of Breath DX COPD J44.9  Qty: 120 vial, Refills: 6      guaiFENesin (MUCINEX) 600 MG extended release tablet Take 1 tablet by mouth 2 times daily as needed for Congestion  Qty: 60 tablet, Refills: 2    Associated Diagnoses: COPD exacerbation (HCC)      teriparatide, recombinant, (FORTEO) 600 MCG/2.4ML SOLN injection Inject 0.08 mLs into the skin daily One dose injected daily. Qty: 1 pen, Refills: 11               Review of Systems:  (2-9 systems needed)  Review of Systems   Constitutional: Negative for chills and fever. HENT: Negative for congestion. Eyes: Negative for visual disturbance. Respiratory: Positive for cough and shortness of breath. Negative for chest tightness. Cardiovascular: Negative for chest pain and palpitations. Gastrointestinal: Negative for abdominal pain, nausea and vomiting. Genitourinary: Negative for dysuria, frequency and urgency. Musculoskeletal: Negative for back pain. Skin: Negative for rash. Neurological: Negative for dizziness, weakness and headaches. \"Positives and Pertinent negatives as per HPI\"    Physical Exam:  Physical Exam  Vitals and nursing note reviewed. Constitutional:       Appearance: Normal appearance. She is not diaphoretic. HENT:      Head: Normocephalic and atraumatic. Nose: Nose normal.   Eyes:      General:         Right eye: No discharge. Left eye: No discharge. Cardiovascular:      Rate and Rhythm: Normal rate and regular rhythm. Pulses: Normal pulses. Heart sounds: Normal heart sounds. No murmur heard. No friction rub. No gallop. Pulmonary:      Effort: Tachypnea and accessory muscle usage present. Breath sounds: No stridor. Wheezing and rhonchi present. No rales. Abdominal:      Palpations: Abdomen is soft. Tenderness: There is no guarding or rebound. Musculoskeletal:         General: Normal range of motion. Cervical back: Normal range of motion and neck supple. Skin:     General: Skin is warm and dry. Coloration: Skin is not pale.    Neurological:      Mental Status: She is alert and oriented to person, place, and time. Psychiatric:         Mood and Affect: Mood normal.         Behavior: Behavior normal.         MEDICAL DECISION MAKING    Vitals:    Vitals:    07/11/21 2200 07/11/21 2215 07/11/21 2322 07/11/21 2342   BP: 111/75  112/71    Pulse: 87  80    Resp: 20  18    Temp: 97.5 °F (36.4 °C)  97.8 °F (36.6 °C)    TempSrc: Oral  Oral    SpO2: 96% 96% 94% 93%   Weight: 156 lb 14.4 oz (71.2 kg)      Height: 5' 5\" (1.651 m)          LABS:  Labs Reviewed   CBC WITH AUTO DIFFERENTIAL - Abnormal; Notable for the following components:       Result Value    RBC 3.87 (*)     Hemoglobin 11.7 (*)     Hematocrit 32.8 (*)     All other components within normal limits    Narrative:     Performed at:  [place] [address] [address]   Phone [phone]   BASIC METABOLIC PANEL W/ REFLEX TO MG FOR LOW K - Abnormal; Notable for the following components:    Sodium 125 (*)     Chloride 89 (*)     All other components within normal limits    Narrative:     Performed at:  [place] [address] [address]   Phone [phone]   BLOOD GAS, VENOUS - Abnormal; Notable for the following components:    pCO2, Ivan 36.3 (*)     pO2, Ivan 85.5 (*)     Carboxyhemoglobin 2.2 (*)     All other components within normal limits    Narrative:     Performed at:  [place] [address] [address]   Phone [phone], RAPID    Narrative:     Performed at:  [place] [address] [address]   Phone [phone]   CBC WITH AUTO DIFFERENTIAL   BASIC METABOLIC PANEL W/ REFLEX TO MG FOR LOW K        Remainder of labs reviewed and were negative at this time or not returned at the time of this note.     RADIOLOGY:   Non-plain film images such as CT, Ultrasound and MRI are read by the radiologist. ANY Randhawa have directly visualized the radiologic plain film image(s) with the below findings:      Interpretation per the Radiologist below, if available at the time of this note:    XR CHEST (2 VW)   Final Result   Chronic obstructive lung changes with mild left basilar atelectasis vs early   infiltrates or progressive scarring              XR CHEST PORTABLE    Result Date: 6/20/2021  EXAMINATION: ONE XRAY VIEW OF THE CHEST 6/20/2021 10:20 am COMPARISON: 06/07/2021 HISTORY: ORDERING SYSTEM PROVIDED HISTORY: sob TECHNOLOGIST PROVIDED HISTORY: Reason for exam:->sob FINDINGS: Cardiomediastinal silhouette is stable. Prominent interstitial markings are noted. No focal pulmonary consolidation. No pneumothorax. No pleural effusion. Prominent interstitial markings again noted, stable suggesting interstitial lung disease No acute cardiopulmonary process          MEDICAL DECISION MAKING / ED COURSE:      Seen and evaluated by myself and attending physician. All diagnostic, treatment, and disposition decisions were made in conjunction with attending physician.     Patient was given:  Medications   albuterol (PROVENTIL) nebulizer solution 2.5 mg (2.5 mg Nebulization Given 7/11/21 2331)   albuterol sulfate  (90 Base) MCG/ACT inhaler 2 puff (has no administration in time range)   busPIRone (BUSPAR) tablet 30 mg (30 mg Oral Given 7/11/21 2238)   FLUoxetine (PROZAC) capsule 60 mg (has no administration in time range)   montelukast (SINGULAIR) tablet 10 mg (10 mg Oral Given 7/11/21 2238)   pantoprazole (PROTONIX) tablet 40 mg (has no administration in time range)   traZODone (DESYREL) tablet 150 mg (150 mg Oral Given 7/11/21 2238)   sodium chloride flush 0.9 % injection 5-40 mL (10 mLs Intravenous Given 7/11/21 2240)   sodium chloride flush 0.9 % injection 5-40 mL (has no administration in time range)   0.9 % sodium chloride infusion (has no administration in time range)   ondansetron (ZOFRAN-ODT) disintegrating tablet 4 mg (has no administration in time range)     Or   ondansetron (ZOFRAN) injection 4 mg (has no administration in time range)   polyethylene glycol (GLYCOLAX) packet 17 g (has no administration in time range)   enoxaparin (LOVENOX) injection 40 mg (has no administration in time range)   acetaminophen (TYLENOL) tablet 650 mg (has no administration in time range)     Or   acetaminophen (TYLENOL) suppository 650 mg (has no administration in time range)   0.9 % sodium chloride infusion ( Intravenous New Bag 7/11/21 2241)   methylPREDNISolone sodium (SOLU-MEDROL) injection 40 mg (40 mg Intravenous Given 7/11/21 2239)     Followed by   predniSONE (DELTASONE) tablet 40 mg (has no administration in time range)   budesonide-formoterol (SYMBICORT) 160-4.5 MCG/ACT inhaler 2 puff (has no administration in time range)   atorvastatin (LIPITOR) tablet 40 mg (has no administration in time range)   tiotropium (SPIRIVA RESPIMAT) 2.5 MCG/ACT inhaler 2 puff (has no administration in time range)   albuterol (PROVENTIL) nebulizer solution 2.5 mg (has no administration in time range)       Patient presented to the emergency department for shortness of breath. She is now requiring 3 L nasal cannula. Physical exam is notable for increased work of breathing and audible wheezing consistent with COPD.  3 DuoNeb treatments are ordered at this time. CBC leukocytosis acute anemia. CMP does show mild hyponatremia at 125. VBG unremarkable. Chest x-ray findings consistent with COPD changes. We will plan to admit to hospitalist for further evaluation and treatment of COPD exacerbation and acute respiratory failure with increased oxygen requirement. The patient tolerated their visit well. I evaluated the patient. The physician was available for consultation as needed. The patient and / or the family were informed of the results of any tests, a time was given to answer questions, a plan was proposed and they agreed with plan.       CLINICAL IMPRESSION:  1. COPD exacerbation ([facility]    2. Acute respiratory failure, unspecified whether with hypoxia or hypercapnia ([facility]        DISPOSITION Admitted 07/11/2021 09:12:51 PM      PATIENT REFERRED TO:  No follow-up provider specified.     DISCHARGE MEDICATIONS:  Current Discharge Medication List          DISCONTINUED MEDICATIONS:  Current Discharge Medication List                 (Please note the MDM and HPI sections of this note were completed with a voice recognition program.  Efforts were made to edit the dictations but occasionally words are mis-transcribed.)    Electronically signed, Sangeeta Mcallister,           Sangeeta Mcallister  07/11/21 6516

## 2021-07-11 NOTE — ED TRIAGE NOTES
Pt states she has been SOB for 3 days, she wears home O2 at night but has needed it during the day as well

## 2021-07-12 LAB
ANION GAP SERPL CALCULATED.3IONS-SCNC: 11 MMOL/L (ref 3–16)
BASOPHILS ABSOLUTE: 0 K/UL (ref 0–0.2)
BASOPHILS RELATIVE PERCENT: 0.2 %
BUN BLDV-MCNC: 6 MG/DL (ref 7–20)
CALCIUM SERPL-MCNC: 9.2 MG/DL (ref 8.3–10.6)
CHLORIDE BLD-SCNC: 99 MMOL/L (ref 99–110)
CO2: 22 MMOL/L (ref 21–32)
CREAT SERPL-MCNC: 0.6 MG/DL (ref 0.6–1.2)
EKG ATRIAL RATE: 86 BPM
EKG DIAGNOSIS: NORMAL
EKG P AXIS: 76 DEGREES
EKG P-R INTERVAL: 152 MS
EKG Q-T INTERVAL: 350 MS
EKG QRS DURATION: 70 MS
EKG QTC CALCULATION (BAZETT): 418 MS
EKG R AXIS: 29 DEGREES
EKG T AXIS: 77 DEGREES
EKG VENTRICULAR RATE: 86 BPM
EOSINOPHILS ABSOLUTE: 0 K/UL (ref 0–0.6)
EOSINOPHILS RELATIVE PERCENT: 0.2 %
GFR AFRICAN AMERICAN: >60
GFR NON-AFRICAN AMERICAN: >60
GLUCOSE BLD-MCNC: 186 MG/DL (ref 70–99)
HCT VFR BLD CALC: 34.8 % (ref 36–48)
HEMOGLOBIN: 12 G/DL (ref 12–16)
LYMPHOCYTES ABSOLUTE: 0.4 K/UL (ref 1–5.1)
LYMPHOCYTES RELATIVE PERCENT: 8.1 %
MCH RBC QN AUTO: 29.8 PG (ref 26–34)
MCHC RBC AUTO-ENTMCNC: 34.6 G/DL (ref 31–36)
MCV RBC AUTO: 86.2 FL (ref 80–100)
MONOCYTES ABSOLUTE: 0 K/UL (ref 0–1.3)
MONOCYTES RELATIVE PERCENT: 1 %
NEUTROPHILS ABSOLUTE: 4.2 K/UL (ref 1.7–7.7)
NEUTROPHILS RELATIVE PERCENT: 90.5 %
PDW BLD-RTO: 12.8 % (ref 12.4–15.4)
PLATELET # BLD: 379 K/UL (ref 135–450)
PMV BLD AUTO: 6.5 FL (ref 5–10.5)
POTASSIUM REFLEX MAGNESIUM: 3.9 MMOL/L (ref 3.5–5.1)
PROCALCITONIN: 0.04 NG/ML (ref 0–0.15)
RBC # BLD: 4.04 M/UL (ref 4–5.2)
SODIUM BLD-SCNC: 132 MMOL/L (ref 136–145)
WBC # BLD: 4.6 K/UL (ref 4–11)

## 2021-07-12 PROCEDURE — 96376 TX/PRO/DX INJ SAME DRUG ADON: CPT

## 2021-07-12 PROCEDURE — 99222 1ST HOSP IP/OBS MODERATE 55: CPT | Performed by: INTERNAL MEDICINE

## 2021-07-12 PROCEDURE — 6360000002 HC RX W HCPCS: Performed by: NURSE PRACTITIONER

## 2021-07-12 PROCEDURE — 6370000000 HC RX 637 (ALT 250 FOR IP): Performed by: NURSE PRACTITIONER

## 2021-07-12 PROCEDURE — 94640 AIRWAY INHALATION TREATMENT: CPT

## 2021-07-12 PROCEDURE — 84145 PROCALCITONIN (PCT): CPT

## 2021-07-12 PROCEDURE — 2700000000 HC OXYGEN THERAPY PER DAY

## 2021-07-12 PROCEDURE — 96375 TX/PRO/DX INJ NEW DRUG ADDON: CPT

## 2021-07-12 PROCEDURE — 36415 COLL VENOUS BLD VENIPUNCTURE: CPT

## 2021-07-12 PROCEDURE — 80048 BASIC METABOLIC PNL TOTAL CA: CPT

## 2021-07-12 PROCEDURE — 94761 N-INVAS EAR/PLS OXIMETRY MLT: CPT

## 2021-07-12 PROCEDURE — 93010 ELECTROCARDIOGRAM REPORT: CPT | Performed by: INTERNAL MEDICINE

## 2021-07-12 PROCEDURE — 2580000003 HC RX 258: Performed by: NURSE PRACTITIONER

## 2021-07-12 PROCEDURE — 85025 COMPLETE CBC W/AUTO DIFF WBC: CPT

## 2021-07-12 PROCEDURE — 1200000000 HC SEMI PRIVATE

## 2021-07-12 RX ADMIN — ALBUTEROL SULFATE 2.5 MG: 2.5 SOLUTION RESPIRATORY (INHALATION) at 07:39

## 2021-07-12 RX ADMIN — FLUOXETINE HYDROCHLORIDE 60 MG: 20 CAPSULE ORAL at 09:41

## 2021-07-12 RX ADMIN — PANTOPRAZOLE SODIUM 40 MG: 40 TABLET, DELAYED RELEASE ORAL at 06:48

## 2021-07-12 RX ADMIN — METHYLPREDNISOLONE SODIUM SUCCINATE 40 MG: 40 INJECTION, POWDER, FOR SOLUTION INTRAMUSCULAR; INTRAVENOUS at 03:57

## 2021-07-12 RX ADMIN — ATORVASTATIN CALCIUM 40 MG: 40 TABLET, FILM COATED ORAL at 09:40

## 2021-07-12 RX ADMIN — Medication 2 PUFF: at 19:17

## 2021-07-12 RX ADMIN — BUSPIRONE HYDROCHLORIDE 30 MG: 5 TABLET ORAL at 21:55

## 2021-07-12 RX ADMIN — ALBUTEROL SULFATE 2.5 MG: 2.5 SOLUTION RESPIRATORY (INHALATION) at 15:40

## 2021-07-12 RX ADMIN — ENOXAPARIN SODIUM 40 MG: 40 INJECTION SUBCUTANEOUS at 09:40

## 2021-07-12 RX ADMIN — Medication 10 ML: at 21:55

## 2021-07-12 RX ADMIN — METHYLPREDNISOLONE SODIUM SUCCINATE 40 MG: 40 INJECTION, POWDER, FOR SOLUTION INTRAMUSCULAR; INTRAVENOUS at 21:54

## 2021-07-12 RX ADMIN — TRAZODONE HYDROCHLORIDE 150 MG: 50 TABLET ORAL at 21:54

## 2021-07-12 RX ADMIN — TIOTROPIUM BROMIDE INHALATION SPRAY 2 PUFF: 3.12 SPRAY, METERED RESPIRATORY (INHALATION) at 07:42

## 2021-07-12 RX ADMIN — Medication 10 ML: at 09:41

## 2021-07-12 RX ADMIN — METHYLPREDNISOLONE SODIUM SUCCINATE 40 MG: 40 INJECTION, POWDER, FOR SOLUTION INTRAMUSCULAR; INTRAVENOUS at 17:16

## 2021-07-12 RX ADMIN — Medication 2 PUFF: at 07:39

## 2021-07-12 RX ADMIN — ALBUTEROL SULFATE 2.5 MG: 2.5 SOLUTION RESPIRATORY (INHALATION) at 11:42

## 2021-07-12 RX ADMIN — METHYLPREDNISOLONE SODIUM SUCCINATE 40 MG: 40 INJECTION, POWDER, FOR SOLUTION INTRAMUSCULAR; INTRAVENOUS at 09:41

## 2021-07-12 RX ADMIN — BUSPIRONE HYDROCHLORIDE 30 MG: 5 TABLET ORAL at 09:41

## 2021-07-12 RX ADMIN — ALBUTEROL SULFATE 2.5 MG: 2.5 SOLUTION RESPIRATORY (INHALATION) at 19:13

## 2021-07-12 RX ADMIN — MONTELUKAST SODIUM 10 MG: 10 TABLET ORAL at 21:54

## 2021-07-12 RX ADMIN — ALBUTEROL SULFATE 2.5 MG: 2.5 SOLUTION RESPIRATORY (INHALATION) at 03:46

## 2021-07-12 ASSESSMENT — PAIN SCALES - GENERAL
PAINLEVEL_OUTOF10: 0

## 2021-07-12 ASSESSMENT — ENCOUNTER SYMPTOMS
SHORTNESS OF BREATH: 1
EYES NEGATIVE: 1
COUGH: 1
CHEST TIGHTNESS: 0
WHEEZING: 1
ALLERGIC/IMMUNOLOGIC NEGATIVE: 1
GASTROINTESTINAL NEGATIVE: 1
APNEA: 0

## 2021-07-12 NOTE — PROGRESS NOTES
Hospitalist Progress Note      PCP: Genesis Luo MD    Date of Admission: 7/11/2021    Chief Complaint: SOB    Hospital Course:   60 y.o. female, with PMH of COPD, tobacco use, marijuana use, HLD and anxiety depression who presented to [facility] with SOB. Pt states she has been experiencing SOB for the last few days. She stated she does have a history of COPD and wears 2 L of supplemental oxygen via nasal cannula at night. However, she stated over the last couple of days she has been having to wear her oxygen more frequently and today she had a on all day at 3 L nasal cannula. The pt stated she does have a productive cough with \"green and yellow phlegm\". However, she denies any fevers and/or chills. The pt stated she did try her nebulizer at home, but it was ineffective. She stated she follows with Dr. Che Faustin from pulmonology and has an appointment with him on July 21. In the ER a chest x-ray was obtained that revealed chronic obstructive lung changes with mild left basilar atelectasis versus early infiltrates and progressive scarring. The pt received 3 duo nebs. She was admitted for further evaluation and treatment. Pulmonology has been consulted for the a.m. The pt denied any other associated symptoms as well as any aggravating and/or alleviating factors. At the time of this assessment, the pt was resting comfortably in bed. He currently denies any chest pain, back pain, abdominal pain, shortness of breath, numbness, tingling, N/V/C/D, fever and/or chills. Subjective:   Pt is on RA. Afebrile. VSS. Dyspnea is improved. No chest pain. No N/V/D.      Medications:  Reviewed    Infusion Medications    sodium chloride       Scheduled Medications    busPIRone  30 mg Oral BID    FLUoxetine  60 mg Oral Daily    montelukast  10 mg Oral Nightly    pantoprazole  40 mg Oral QAM AC    traZODone  150 mg Oral Nightly    sodium chloride flush  5-40 mL Intravenous 2 times per day    enoxaparin  40 mg Subcutaneous Daily    methylPREDNISolone  40 mg Intravenous Q6H    Followed by   Patria Colby ON 7/14/2021] predniSONE  40 mg Oral Daily    budesonide-formoterol  2 puff Inhalation BID    atorvastatin  40 mg Oral Daily    tiotropium  2 puff Inhalation Daily    albuterol  2.5 mg Nebulization Q4H WA     PRN Meds: albuterol, albuterol sulfate HFA, sodium chloride flush, sodium chloride, ondansetron **OR** ondansetron, polyethylene glycol, acetaminophen **OR** acetaminophen      Intake/Output Summary (Last 24 hours) at 7/12/2021 1337  Last data filed at 7/12/2021 1320  Gross per 24 hour   Intake 1450.66 ml   Output 1000 ml   Net 450.66 ml       Physical Exam Performed:    /71   Pulse 103   Temp 98.1 °F (36.7 °C) (Oral)   Resp 16   Ht 5' 5\" (1.651 m)   Wt 156 lb 14.4 oz (71.2 kg)   SpO2 93%   BMI 26.11 kg/m²     General appearance: No apparent distress, appears stated age and cooperative. HEENT: Pupils equal, round, and reactive to light. Conjunctivae/corneas clear. Neck: Supple, with full range of motion. No jugular venous distention. Trachea midline. Respiratory:  Normal respiratory effort. Clear to auscultation, bilaterally without Rales/Wheezes/Rhonchi. Cardiovascular: Regular rate and rhythm with normal S1/S2 without murmurs, rubs or gallops. Abdomen: Soft, non-tender, non-distended with normal bowel sounds. Musculoskeletal: No clubbing, cyanosis or edema bilaterally. Full range of motion without deformity. Skin: Skin color, texture, turgor normal.  No rashes or lesions. Neurologic:  Neurovascularly intact without any focal sensory/motor deficits.  Cranial nerves: II-XII intact, grossly non-focal.  Psychiatric: Alert and oriented, thought content appropriate, normal insight  Capillary Refill: Brisk,3 seconds, normal   Peripheral Pulses: +2 palpable, equal bilaterally       Labs:   Recent Labs     07/11/21  1730 07/12/21  0643   WBC 9.3 4.6   HGB 11.7* 12.0   HCT 32.8* 34.8*    379 Recent Labs     07/11/21  1730 07/12/21  0643   * 132*   K 4.5 3.9   CL 89* 99   CO2 25 22   BUN 7 6*   CREATININE 0.7 0.6   CALCIUM 9.1 9.2     Urinalysis:      Lab Results   Component Value Date    NITRU Negative 06/07/2021    WBCUA 0-3 04/13/2012    RBCUA 3-5 04/13/2012    BLOODU Negative 06/07/2021    SPECGRAV 1.015 06/07/2021    GLUCOSEU Negative 06/07/2021    GLUCOSEU NEGATIVE 04/13/2012       Radiology:  XR CHEST (2 VW)   Final Result   Chronic obstructive lung changes with mild left basilar atelectasis vs early   infiltrates or progressive scarring             Assessment/Plan:    Active Hospital Problems    Diagnosis     Stage 3 severe COPD by GOLD classification ([facility] [J44.9]      Priority: Medium    Acute on chronic respiratory failure ([facility] [J96.20]     Tobacco dependence [F17.200]        Acute on chronic hypoxic respiratory failure due to AECOPD:   - CXR showed chronic obstructive lung changes, mild left basilar atelectasis vs early infiltrate. Procalcitonin is normal. Pt is afebrile with normal WBC. - Wean supplemental O2 as tolerated. Wears 2 LPM QHS at baseline. Currently on RA.   - Continue inhaled bronchodilator therapy. - Continue IV solumedrol.   - Continue pulmonary toilet with IS and acapella. - No indication for abx given normal procalcitonin. - Pulmonary consulted for further assistance -- appreciate.      Tobacco use  - Tobacco cessation advised/encouraged. - Nicotine patch if needed.      Marijuana use  - Cessation advised/encouraged.      Anxiety depression  - Mood stable. Continue her home medications.     Hyperlipidemia   - Continue simvastatin.     Hyponatremia (improved)  - 125 on admission. Pt appears to have chronic hyponatremia. Monitor closely.      Chronic normocytic anemia  - 11.7/32.8 on admission.  - No s/s of bleeding at this time. Monitor closely. DVT Prophylaxis: Lovenox   Diet: ADULT DIET;  Regular  Code Status: Full Code    PT/OT Eval Status: Not indicated     Dispo - 1-2 days pending clinical course     Sarah Paz, APRN - CNP

## 2021-07-12 NOTE — CARE COORDINATION
CASE MANAGEMENT INITIAL ASSESSMENT      Reviewed chart and completed assessment with:patient  Explained Case Management role/services. Primary contact information:see below    Health Care Decision Maker :   Primary Decision Maker: Xavi Tipton - Spouse - [phone]    Secondary Decision Maker: Angelita Wong - Child - [phone]    Secondary Decision Maker: kaylynn Mcginnis - Child - [phone]    Supplemental (Other) Decision Maker: Talib Hardin - Child - [phone]          Can this person be reached and be able to respond quickly, such as within a few minutes or hours? Yes  Admit date/status:07/11/2021  Diagnosis:Acute on chronic respiratory failure   Is this a Readmission?:  Yes, pt returning for respiratory failure    Insurance:[employer] Medicare   Precert required for SNF: Yes       3 night stay required: No    Living arrangements, Adls, care needs, prior to admission:Pt lives in a 2 story condo w/spouse, Phyllis Izaguirre, spouse drives. Bedroom on second floor    [address] at home:  Walker__Cane__RTS__ BSC__Shower Chair__  02_2L O2 w/[facility]_ HHN__ CPAP__  BiPap__  Hospital Bed__ W/C___ Other__________    Services in the home and/or outpatient, prior to admission:[facility] O[facility], [facility] [address]    PT/OT recs:none at this time    [address] Notification (HEN):not initiated    Barriers to discharge:none    Plan/comments:Pt plans to d/c home when stable and would like to resume care w/[facility]. Pt states that she does not have full tanks at this time and will need one to return home. CM confirmed w/Steven w/[facility] that he can provide tank at d/c.   Elizabet Avila RN       [facility] on chart for MD signature

## 2021-07-12 NOTE — CONSULTS
Consult Note            Date:7/12/2021        Patient Mick Rodríguez     YOB: 1955     Age:65 y.o. Consult to Pulmonology  Consult performed by: Kiana Acosta MD  Consult ordered by: SHAUN Toussaint CNP  Reason for consult: COPD          Chief Complaint     Chief Complaint   Patient presents with    Shortness of Breath      [facility] complaint of acute bronchitis, with a history of tobacco use and severe COPD    History Obtained From   patient    History of Present Illness   Is a 52-year-old female who normally sees Dr. Lma Hartley at [facility]. Patient gave up smoking 1 week ago. But longtime smoker. Patient has experienced increased shortness of breath mucus production, mucus has been about a green and yellow phlegm. No fevers or chills. The patient did try her nebulizer at home. However this was not working well. Patient came to the hospital received duo nebs. During the evaluation she felt a little bit better. Unfortunately she still smokes marijuana from time to time.   No hemoptysis, no chest pains, no palpitations    Past Medical History     Past Medical History:   Diagnosis Date    Allergic rhinitis 6/11/2010    Anxiety     Arthritis     Aspiration pneumonia ([address] 3/21/2012    Recurrent    Asthma     Bronchitis chronic     COPD (chronic obstructive pulmonary disease) ([address] 12/3/2009    Depression     Drug abuse, cocaine type (HCC)     past history of crack cocaine use    Emphysema of lung (HCC)     Fibromyalgia     GERD (gastroesophageal reflux disease)     Hyperlipidemia     Influenza A 03/05/2020    Lung disease     On home oxygen therapy     uses O2 NC 3L prn at night    Osteoporosis     Pneumonia     Polysubstance dependence ([address] 1/2/2012    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    Pulmonary fibrosis ([address] 10/16/2014    Pulmonary infiltrate     Pulmonary nodule 12/3/2009    Tobacco abuse         Past Surgical History Past Surgical History:   Procedure Laterality Date    BLADDER REPAIR      BRONCHOSCOPY      BRONCHOSCOPY N/A 3/6/2020    BRONCHOSCOPY THERAPUTIC ASPIRATION INITIAL performed by Louise Nava MD at [address] [place] [address]  3/6/2020    BRONCHOSCOPY ALVEOLAR LAVAGE performed by Louise Nava MD at [address] [place] [address] N/A 6/26/2020    BRONCHOSCOPY THERAPUTIC ASPIRATION INITIAL performed by Layla Shirley MD at [address] [place] [address]  6/26/2020    BRONCHOSCOPY ALVEOLAR LAVAGE performed by Layla Shirley MD at [address] [place] [address]  06/23/2021    Dr Jaun Farah N/A 6/23/2021    BRONCHOSCOPY DIAGNOSTIC OR CELL [address] performed by Layla Shirley MD at [address] [place] [address]  6/23/2021    BRONCHOSCOPY THERAPUTIC ASPIRATION INITIAL performed by Layla Shirley MD at [address] [place] [address]  6/23/2021    BRONCHOSCOPY ALVEOLAR LAVAGE performed by Layla Shirley MD at [address] [address]    ENDOSCOPY, COLON, DIAGNOSTIC      ESOPHAGEAL DILATATION  9/20/2018    ESOPHAGEAL DILATION Shannan Stephens performed by Mary Kate Rodriguez MD at [address] [facility] HISTORY  2/12/15    T8 Kyphoplasty    IA COLONOSCOPY FLX DX W/COLLJ SPEC WHEN PFRMD N/A 9/20/2018    EGD AND COLONOSCOPY WITH ANESTHESIA performed by Mary Kate Rodriguez MD at [address] [facility] ESOPHAGOGASTRODUODENOSCOPY TRANSORAL DIAGNOSTIC N/A 9/20/2018    EGD AND COLONOSCOPY WITH ANESTHESIA performed by Mary Kate Rodriguez MD at [address] [facility]          Medications     Prior to Admission medications    Medication Sig Start Date End Date Taking?  Authorizing Provider   pantoprazole (PROTONIX) 40 MG tablet Take 1 tablet by mouth every morning (before breakfast) 6/25/21  Yes Emy Harrison MD   albuterol sulfate HFA (VENTOLIN HFA) 108 (90 Base) MCG/ACT inhaler Inhale 2 puffs into the lungs every 6 hours as needed for Wheezing or Shortness of Breath 3/4/21  Yes Jose Luis Renteria MD   montelukast (SINGULAIR) 10 MG tablet TAKE 1 TABLET BY MOUTH EACH NIGHT 12/7/20  Yes Jose Luis Renteria MD   FLUoxetine (PROZAC) 20 MG capsule TAKE 3 CAPSULES BY MOUTH DAILY 12/3/20  Yes Yue Che MD   traZODone (DESYREL) 150 MG tablet TAKE 2 TABLETS AT BEDTIME 9/11/20  Yes Yue Che MD   fluticasone-salmeterol [facility]) 500-50 MCG/DOSE diskus inhaler Inhale 1 puff into the lungs every 12 hours 9/9/20  Yes Jose Luis Renteria MD   tiotropium (Serenity Dalia) 18 MCG inhalation capsule INHALE THE CONTENTS OF 1 CAPSULE EVERY DAY 9/9/20  Yes Jose Luis Renteria MD   simvastatin (ZOCOR) 20 MG tablet TAKE 1 TABLET EVERY EVENING 8/7/20  Yes Yue Che MD   busPIRone (BUSPAR) 30 MG tablet TAKE 1 TABLET TWICE DAILY 8/7/20  Yes Yue Che MD   ciprofloxacin (CIPRO) 500 MG tablet  6/25/21   Historical Provider, MD   predniSONE (DELTASONE) 10 MG tablet Take 30mg for 3 days then 20 mg for 3 days then 10mg for 3 days then stop 6/24/21   Chhaya Nava MD   ibuprofen (ADVIL;MOTRIN) 600 MG tablet Take 1 tablet by mouth 3 times daily as needed for Pain 4/23/21   SHAUN Irizarry CNP   vitamin D (ERGOCALCIFEROL) 1.25 MG (63278 UT) CAPS capsule Take 1 capsule by mouth once a week for 5 days Every Tuesday 3/23/21 3/28/21  Sánchez Macdonald MD   naproxen (NAPROSYN) 500 MG tablet Take 1 tablet by mouth 2 times daily (with meals) 2/5/21   SHAUN Barbour CNP   BD PEN NEEDLE SVETLANA U/F 32G X 4 MM MISC USE ONE DAILY AS DIRECTED 12/11/20   Yue Che MD   albuterol (PROVENTIL) (2.5 MG/3ML) 0.083% nebulizer solution Take 3 mLs by nebulization every 6 hours as needed for Wheezing or Shortness of Breath DX COPD J44.9 9/9/20   Jose Luis Renteria MD   guaiFENesin (MUCINEX) 600 MG extended release tablet Take 1 tablet by mouth 2 times daily as needed for Congestion 7/24/20   Tisha Hassan DO   teriparatide, recombinant, (FORTEO) 600 MCG/2.4ML SOLN injection Inject 0.08 mLs into the skin daily One dose injected daily.  9/27/19 12/7/20  Axel Rico MD        albuterol (PROVENTIL) nebulizer solution 2.5 mg, Q6H PRN  albuterol sulfate  (90 Base) MCG/ACT inhaler 2 puff, Q6H PRN  busPIRone (BUSPAR) tablet 30 mg, BID  FLUoxetine (PROZAC) capsule 60 mg, Daily  montelukast (SINGULAIR) tablet 10 mg, Nightly  pantoprazole (PROTONIX) tablet 40 mg, QAM AC  traZODone (DESYREL) tablet 150 mg, Nightly  sodium chloride flush 0.9 % injection 5-40 mL, 2 times per day  sodium chloride flush 0.9 % injection 5-40 mL, PRN  0.9 % sodium chloride infusion, PRN  ondansetron (ZOFRAN-ODT) disintegrating tablet 4 mg, Q8H PRN   Or  ondansetron (ZOFRAN) injection 4 mg, Q6H PRN  polyethylene glycol (GLYCOLAX) packet 17 g, Daily PRN  enoxaparin (LOVENOX) injection 40 mg, Daily  acetaminophen (TYLENOL) tablet 650 mg, Q6H PRN   Or  acetaminophen (TYLENOL) suppository 650 mg, Q6H PRN  methylPREDNISolone sodium (SOLU-MEDROL) injection 40 mg, Q6H   Followed by  Vanessa Jewell ON 7/14/2021] predniSONE (DELTASONE) tablet 40 mg, Daily  budesonide-formoterol (SYMBICORT) 160-4.5 MCG/ACT inhaler 2 puff, BID  atorvastatin (LIPITOR) tablet 40 mg, Daily  tiotropium (SPIRIVA RESPIMAT) 2.5 MCG/ACT inhaler 2 puff, Daily  albuterol (PROVENTIL) nebulizer solution 2.5 mg, Q4H WA        Allergies   Meperidine and Demerol    Social History     Social History     Tobacco History     Smoking Status  Former Smoker Smoking Start Date  1/1/1972 Quit date  6/20/2021 Smoking Frequency  0.5 packs/day for 40 years (20 pk yrs)    Smoking Tobacco Type  Cigarettes    Smokeless Tobacco Use  Never Used    Tobacco Comment  0.5 PPD restarted          Alcohol History     Alcohol Use Status  No          Drug Use     Drug Use Status  Yes Types  Marijuana Comment  Daily          Sexual Activity     Sexually Active  Yes Partners  Male                Family History     Family History Problem Relation Age of Onset    Asthma Mother     Diabetes Mother     Emphysema Mother     Heart Failure Mother     Hypertension Mother     Heart Disease Mother     High Cholesterol Mother     Cancer Mother     Depression Mother     Diabetes Father     Emphysema Father     Heart Failure Father     Hypertension Father     Heart Disease Father     High Cholesterol Father     Substance Abuse Father     Emphysema Paternal Grandfather     Diabetes Sister     High Cholesterol Sister     Vision Loss Maternal Uncle        Review of Systems   Review of Systems   Constitutional: Negative. HENT: Negative. Eyes: Negative. Respiratory: Positive for cough, shortness of breath and wheezing. Negative for apnea and chest tightness. Cardiovascular: Negative. Gastrointestinal: Negative. Endocrine: Negative. Genitourinary: Negative. Musculoskeletal: Negative. Skin: Negative. Allergic/Immunologic: Negative. Neurological: Negative. Hematological: Negative. Psychiatric/Behavioral: Negative. Physical Exam   /71   Pulse 103   Temp 98.1 °F (36.7 °C) (Oral)   Resp 16   Ht 5' 5\" (1.651 m)   Wt 156 lb 14.4 oz (71.2 kg)   SpO2 93%   BMI 26.11 kg/m²      Physical Exam  Constitutional:       General: She is not in acute distress. Appearance: Normal appearance. HENT:      Head: Normocephalic and atraumatic. Right Ear: External ear normal.      Left Ear: External ear normal.      Nose: Nose normal.      Mouth/Throat:      Mouth: Mucous membranes are moist.      Pharynx: Oropharynx is clear. Comments: Mallampati class III throat  Eyes:      General:         Right eye: No discharge. Left eye: No discharge. Extraocular Movements: Extraocular movements intact. Conjunctiva/sclera: Conjunctivae normal.      Pupils: Pupils are equal, round, and reactive to light. Cardiovascular:      Rate and Rhythm: Normal rate and regular rhythm. Pulses: Normal pulses. Heart sounds: No murmur heard. No friction rub. Pulmonary:      Effort: Pulmonary effort is normal. No respiratory distress. Breath sounds: No stridor. Wheezing present. No rhonchi or rales. Comments: Positive wheezing bilaterally, decreased breath sounds bilaterally  Chest:      Chest wall: No tenderness. Abdominal:      General: Abdomen is flat. Bowel sounds are normal. There is no distension. Palpations: Abdomen is soft. There is no mass. Tenderness: There is no abdominal tenderness. There is no right CVA tenderness, left CVA tenderness, guarding or rebound. Hernia: No hernia is present. Musculoskeletal:         General: No swelling. Normal range of motion. Cervical back: Normal range of motion. No rigidity. Skin:     General: Skin is dry. Coloration: Skin is not jaundiced or pale. Neurological:      General: No focal deficit present. Mental Status: She is alert and oriented to person, place, and time. Mental status is at baseline. Cranial Nerves: No cranial nerve deficit. Sensory: No sensory deficit. Coordination: Coordination normal.   Psychiatric:         Mood and Affect: Mood normal.         Behavior: Behavior normal.         Thought Content: Thought content normal.         Judgment: Judgment normal.         Labs    CBC:  Recent Labs     07/11/21  1730 07/12/21  0643   WBC 9.3 4.6   RBC 3.87* 4.04   HGB 11.7* 12.0   HCT 32.8* 34.8*   MCV 84.6 86.2   RDW 12.9 12.8    379     CHEMISTRIES:  Recent Labs     07/11/21  1730 07/12/21  0643   * 132*   K 4.5 3.9   CL 89* 99   CO2 25 22   BUN 7 6*   CREATININE 0.7 0.6   GLUCOSE 87 186*     PT/INR:No results for input(s): PROTIME, INR in the last 72 hours. APTT:No results for input(s): APTT in the last 72 hours. LIVER PROFILE:No results for input(s): AST, ALT, BILIDIR, BILITOT, ALKPHOS in the last 72 hours.     Imaging/Diagnostics   XR CHEST (2 VW)    Result Date: 7/11/2021  Chronic obstructive lung changes with mild left basilar atelectasis vs early infiltrates or progressive scarring       Assessment      Hospital Problems         Last Modified POA    Stage 3 severe COPD by GOLD classification ([facility] (Chronic) 7/11/2021 Yes    Tobacco dependence 7/11/2021 Yes    Acute on chronic respiratory failure ([facility] 7/11/2021 Yes          Plan   1.   Patient is already on maximal therapy with Symbicort and Spiriva at home  She just recently gave up smoking  However she continues to smoke marijuana  Agree with the Solu-Medrol with followed by prednisone    Continue with the Singulair  Continue with Protonix  We will wean oxygen as tolerated  Continue with trazodone    Would consider doing  Sleep study at home        Electronically signed by Amanda Naidu MD on 7/12/21 at 2:42 PM EDT

## 2021-07-12 NOTE — ED PROVIDER NOTES
I independently performed a history and physical on Tish Catalan. All diagnostic, treatment, and disposition decisions were made by myself in conjunction with the advanced practice provider. For further details of [address] [facility] emergency department encounter, please see ANY Cabrera's documentation. Patient with history of tobacco abuse/COPD presents to the emergency department with concerns of shortness of breath. Patient denies fevers but reports occasional nonproductive cough. Patient is typically on 2 L of oxygen via nasal cannula at night only. She arrives into the emergency department in respiratory distress with O2 sats of 96% upon arrival.      Physical exam: General: Moderate discomfort. Respiratory distress. Heart: Regular rate and rhythm. Normal S1-S2. Lungs: Diffuse rhonchi with tachypnea. Conversational dyspnea      EKG: Normal sinus rhythm with a rate of 86. Normal axis peer normal intervals and durations. No ST or T wave changes appreciated. No acute signs of ischemia. No previous EKG. 1. COPD exacerbation ([address]    2.  Acute respiratory failure, unspecified whether with hypoxia or hypercapnia ([address]                         Jatin Rodriguez DO  07/11/21 2030

## 2021-07-12 NOTE — PLAN OF CARE
Problem: Pain:  Goal: Pain level will decrease  Description: Pain level will decrease  Outcome: Ongoing  Note: Pt will be satisfied with pain control. Pt uses numeric pain rating scale with reassessments after pain med administration. Will continue to monitor progression throughout shift. Problem: Falls - Risk of:  Goal: Will remain free from falls  Description: Will remain free from falls  7/12/2021 1549 by Anuja Bates RN  Outcome: Ongoing  Note: Pt will remain free from falls throughout hospital stay. Fall precautions in place, bed alarm on, bed in lowest position with wheels locked and side rails 2/4 up. Room door open and hourly rounding completed. Will continue to monitor throughout shift.

## 2021-07-12 NOTE — DISCHARGE INSTR - COC
Continuity of Care Form    Patient Name: Beverley Recio   :  1955  MRN:  [id number]    Admit date:  2021  Discharge date:  2021    Code Status Order: Full Code   Advance Directives:      Admitting Physician:  Hope Lewis DO  PCP: Floridalma Samayoa MD    Discharging Nurse: [facility] Unit/Room#: 0201/0201-01  Discharging Unit Phone Number: [phone]    Emergency Contact:   Extended Emergency Contact Information  Primary Emergency Contact: Angelita Wong  Address: [place] [phone]           DAUGHTER IN [facility] of [address] [place] [address] Phone: [phone]  Mobile Phone: [phone]  Relation: Child  Secondary Emergency Contact: y [address], [address] [facility] Phone: [phone]  Mobile Phone: [phone]  Relation: Spouse    Past Surgical History:  Past Surgical History:   Procedure Laterality Date    BLADDER REPAIR      BRONCHOSCOPY      BRONCHOSCOPY N/A 3/6/2020    BRONCHOSCOPY THERAPUTIC ASPIRATION INITIAL performed by Nettie Prado MD at [address] [place] [address]  3/6/2020    BRONCHOSCOPY ALVEOLAR LAVAGE performed by Nettie Prado MD at [address] [place] [address] N/A 2020    BRONCHOSCOPY THERAPUTIC ASPIRATION INITIAL performed by Summer Obregon MD at [address] [place] [address]  2020    BRONCHOSCOPY ALVEOLAR LAVAGE performed by Summer Obregon MD at [address] [place] [address]  2021    Dr Contreras Gaitan N/A 2021    [address] [facility] performed by Summre Obregon MD at [address] [place] [address]  2021    BRONCHOSCOPY THERAPUTIC ASPIRATION INITIAL performed by Summer Obregon MD at [address] [place] [address]  2021    BRONCHOSCOPY ALVEOLAR LAVAGE performed by Summer Obregon MD at [address] [place] [address]      ENDOSCOPY, COLON, DIAGNOSTIC      ESOPHAGEAL DILATATION  2018    ESOPHAGEAL DILATION WATERS performed by Hanna Sahu MD at [facility] ENDOSCOPY    HYSTERECTOMY      OTHER SURGICAL HISTORY  2/12/15    T8 Kyphoplasty    FL COLONOSCOPY FLX DX W/COLLJ SPEC WHEN PFRMD N/A 9/20/2018    EGD AND COLONOSCOPY WITH ANESTHESIA performed by Delfin Briggs MD [place] [address] [facility] ESOPHAGOGASTRODUODENOSCOPY TRANSORAL DIAGNOSTIC N/A 9/20/2018    EGD AND COLONOSCOPY WITH ANESTHESIA performed by Delfin Briggs MD at [address] [facility]         Immunization History:   Immunization History   Administered Date(s) Administered    COVID-19, Chan Peter, PF, 30mcg/0.3mL 03/10/2021, 03/31/2021    Influenza 10/04/2010, 10/12/2011, 11/28/2012    Influenza Virus Vaccine 10/16/2014, 01/14/2016    Influenza, Intradermal, Preservative free 11/04/2013    Influenza, Kimmy Torres IM, (6 mo and older Fluzone, Flulaval, Fluarix and 3 yrs and older Afluria) 10/12/2011, 01/30/2017, 10/19/2017    Influenza, YOUNG Leblanc, PF (6 mo and older Fluzone, Flulaval, Fluarix, and 3 yrs and older Afluria) 09/06/2018, 12/26/2019    Pneumococcal Conjugate 7-valent (Prevnar7) 01/01/2009    Pneumococcal Polysaccharide (Yuvnquofc56) 01/14/2016    Td, unspecified formulation 11/04/2013    Tdap (Boostrix, Adacel) 01/25/2018       Active Problems:  Patient Active Problem List   Diagnosis Code    Stage 3 severe COPD by GOLD classification ([facility] J44.9    Smoker F17.200    Fibromyalgia M79.7    Hypokalemia E87.6    Hyperlipidemia E78.5    Hyponatremia E87.1    Depression F32.9    Tobacco dependence F17.200    Pulmonary fibrosis ([facility] J84.10    ILD (interstitial lung disease) ([facility] J84.9    Recurrent major depressive disorder, in partial remission ([facility] F33.41    Thyroid nodule E04.1    History of prescription drug abuse FHH1021    Esophageal dysphagia R13.10    Heartburn R12    Rectal bleeding K62.5    History of colon polyps Z86.010    Compression fx, thoracic spine, sequela S22.000S    Kyphosis M40.209    Anxiety and depression F41.9, F32.9    Chronic pain syndrome G89.4    Polyarthropathy, multiple sites M13.0    Pneumonia J18.9    Acute on chronic respiratory failure with hypoxia and hypercapnia ([facility]) J96.21, J96.22    Acute respiratory failure with hypoxia ([facility]) J96.01    Healthcare associated bacterial pneumonia J15.9    Bandemia D72.825    Sepsis ([facility]) A41.9    COPD exacerbation ([facility]) J44.1    Pulmonary infiltrates R91.8    Chest congestion R09.89    Mucus plugging of bronchi T17.500A    Ineffective airway clearance R06.89    Acute on chronic respiratory failure with hypoxemia ([facility]) J96.21    COPD (chronic obstructive pulmonary disease) ([facility]) J44.9    Acute on chronic respiratory failure ([facility]) J96.20    Cannabis dependence with current use ([facility]) F12.20    Other secondary kyphosis, 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    Marijuana use V36.15    Diastolic dysfunction R62.76       Isolation/Infection:   Isolation            No Isolation          Patient Infection Status       Infection Onset Added Last Indicated Last Indicated By Review Planned Expiration Resolved Resolved By    None active    Resolved    COVID-19 Rule Out 21 COVID-19, Rapid (Ordered)   21 Rule-Out Test Resulted    COVID-19 Rule Out 20 COVID-19 (Ordered)   20 Rule-Out Test Resulted    COVID-19 Rule Out 10/09/20 10/09/20 10/09/20 COVID-19 (Ordered)   10/10/20 Rule-Out Test Resulted    COVID-19 Rule Out 20 COVID-19 (Ordered)   20 Rule-Out Test Resulted    COVID-19 Rule Out 20 COVID-19 (Ordered)   20 Rule-Out Test Resulted    INFLUENZA 20 Respiratory Panel, Molecular   20             Nurse Assessment:  Last Vital Signs: /75   Pulse 109   Temp 97.5 °F (36.4 °C) (Oral)   Resp 18   Ht 5' 5\" (1.651 m)   Wt 156 lb 14.4 oz (71.2 kg)   SpO2 93%   BMI 26.11 kg/m²     Last documented pain score (0-10 scale): Pain Level: 0  Last Weight:   Wt Readings from Last 1 Encounters:   07/11/21 156 lb 14.4 oz (71.2 kg)     Mental Status:  oriented and alert    IV Access:  - None    Nursing Mobility/ADLs:  Walking   Independent  Transfer  Independent  Bathing  Independent  Dressing  Independent  Toileting  Independent  Feeding  Independent  Med [address]  Med Delivery   whole    Wound Care Documentation and Therapy:        Elimination:  Continence:   · Bowel: Yes  · Bladder: Yes  Urinary Catheter: None   Colostomy/Ileostomy/Ileal Conduit: No       Date of Last BM:     Intake/Output Summary (Last 24 hours) at 7/12/2021 1001  Last data filed at 7/12/2021 0941  Gross per 24 hour   Intake 855.66 ml   Output 1000 ml   Net -144.34 ml     I/O last 3 completed shifts: In: 845.7 [P.O.:595; I.V.:250.7]  Out: 1000 [Urine:1000]    Safety Concerns:     None    Impairments/Disabilities:      Vision    Nutrition Therapy:  Current Nutrition Therapy:   - Oral Diet:  General    Routes of Feeding: Oral  Liquids: No Restrictions  Daily Fluid Restriction: no  Last Modified Barium Swallow with Video (Video Swallowing Test): not done    Treatments at the Time of Hospital Discharge:   Respiratory Treatments:   Oxygen Therapy:  is on oxygen at 2 L/min per nasal cannula. At bedtime. Ventilator:    - No ventilator support    Rehab Therapies:   Weight Bearing Status/Restrictions: No weight bearing restirctions  Other Medical Equipment (for information only, NOT a DME order):     Other Treatments:     Patient's personal belongings (please select all that are sent with patient):  Uday    RN SIGNATURE:  Electronically signed by Jacob Zavala RN on 7/14/21 at 10:38 AM EDT    CASE MANAGEMENT/SOCIAL WORK SECTION    Inpatient Status Date: ***    Readmission Risk Assessment Score:  Readmission Risk              Risk of Unplanned Readmission:  30           Discharging to Facility/ [address]

## 2021-07-12 NOTE — H&P
Hospital Medicine History & Physical      PCP: Joey Henson MD    Date of Admission: 7/11/2021    Date of Service: Pt seen/examined on 7/11/2021 and Admitted to Inpatient with expected LOS greater than two midnights due to medical therapy. Chief Complaint:  Shortness of breath    History Of Present Illness:      60 y.o. female, with PMH of COPD, tobacco use, marijuana use, HLD and anxiety depression,  who presented to [facility] with shortness of breath. History obtained from the patient and review of EMR. Patient states she has been experiencing shortness of breath for the last few days. She stated she does have a history of COPD and wears 2 L of supplemental oxygen via nasal cannula at night. However, she stated over the last couple of days she has been having to wear her oxygen more frequently and today she had a on all day at 3 L nasal cannula. The patient stated she does have a productive cough with \"green and yellow phlegm\". However, she denies any fevers and/or chills. The patient stated she did try her nebulizer at home, but it was ineffective. She stated she follows with Dr. Jacob Nickerson from pulmonology and has an appointment with him on July 21. In the emergency room a chest x-ray was obtained that revealed chronic obstructive lung changes with mild left basilar atelectasis versus early infiltrates and progressive scarring. The patient received 3 duo nebs. She will be admitted for further evaluation and treatment. Pulmonology has been consulted for the a. m. The patient denied any other associated symptoms as well as any aggravating and/or alleviating factors. At the time of this assessment, the patient was resting comfortably in bed. He currently denies any chest pain, back pain, abdominal pain, shortness of breath, numbness, tingling, N/V/C/D, fever and/or chills.      Past Medical History:          Diagnosis Date    Allergic rhinitis 6/11/2010    Anxiety     Arthritis     Aspiration pneumonia ([facility] 3/21/2012    Recurrent    Asthma     Bronchitis chronic     COPD (chronic obstructive pulmonary disease) ([facility] 12/3/2009    Depression     Drug abuse, cocaine type ([facility]     past history of crack cocaine use    Emphysema of lung (HCC)     Fibromyalgia     GERD (gastroesophageal reflux disease)     Hyperlipidemia     Influenza A 03/05/2020    Lung disease     On home oxygen therapy     uses O2 NC 3L prn at night    Osteoporosis     Pneumonia     Polysubstance dependence (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 10/16/2014    Pulmonary infiltrate     Pulmonary nodule 12/3/2009    Tobacco abuse      Past Surgical History:          Procedure Laterality Date    BLADDER REPAIR      BRONCHOSCOPY      BRONCHOSCOPY N/A 3/6/2020    BRONCHOSCOPY THERAPUTIC ASPIRATION INITIAL performed by Luis Tay MD at [address]  3/6/2020    BRONCHOSCOPY ALVEOLAR LAVAGE performed by Luis Tay MD at [address] N/A 6/26/2020    BRONCHOSCOPY THERAPUTIC ASPIRATION INITIAL performed by Chase Colunga MD at [address]  6/26/2020    BRONCHOSCOPY ALVEOLAR LAVAGE performed by Chase Colunga MD at [address]  06/23/2021    Dr Sherrie Ramirez N/A 6/23/2021    BRONCHOSCOPY DIAGNOSTIC OR CELL [address] performed by Chase Colunga MD at [address]  6/23/2021    BRONCHOSCOPY THERAPUTIC ASPIRATION INITIAL performed by Chase Colunga MD at [address]  6/23/2021    BRONCHOSCOPY ALVEOLAR LAVAGE performed by Chase Colunga MD at [address] [facility]  2012    ENDOSCOPY, COLON, DIAGNOSTIC      ESOPHAGEAL DILATATION  9/20/2018    ESOPHAGEAL DILATION WATERS performed by Yomaira Wong MD at [address] [facility] 2/12/15    T8 Kyphoplasty    MT COLONOSCOPY FLX DX W/COLLJ SPEC WHEN PFRMD N/A 9/20/2018    EGD AND COLONOSCOPY WITH ANESTHESIA performed by Orlene Hodgkins, MD at [address] [facility] ESOPHAGOGASTRODUODENOSCOPY TRANSORAL DIAGNOSTIC N/A 9/20/2018    EGD AND COLONOSCOPY WITH ANESTHESIA performed by Orlene Hodgkins, MD at [address] [facility]       Medications Prior to Admission:      Prior to Admission medications    Medication Sig Start Date End Date Taking?  Authorizing Provider   ciprofloxacin (CIPRO) 500 MG tablet  6/25/21   Historical Provider, MD   predniSONE (DELTASONE) 10 MG tablet Take 30mg for 3 days then 20 mg for 3 days then 10mg for 3 days then stop 6/24/21   Snow Harrison MD   pantoprazole (PROTONIX) 40 MG tablet Take 1 tablet by mouth every morning (before breakfast) 6/25/21   Italo Henson MD   ibuprofen (ADVIL;MOTRIN) 600 MG tablet Take 1 tablet by mouth 3 times daily as needed for Pain 4/23/21   SHAUN Buchanan CNP   vitamin D (ERGOCALCIFEROL) 1.25 MG (21078 UT) CAPS capsule Take 1 capsule by mouth once a week for 5 days Every Tuesday 3/23/21 3/28/21  Anson Sargent MD   albuterol sulfate HFA (VENTOLIN HFA) 108 (90 Base) MCG/ACT inhaler Inhale 2 puffs into the lungs every 6 hours as needed for Wheezing or Shortness of Breath 3/4/21   Alyssa Tapia MD   naproxen (NAPROSYN) 500 MG tablet Take 1 tablet by mouth 2 times daily (with meals) 2/5/21   SHAUN Barbour CNP   BD PEN NEEDLE SVETLANA U/F 32G X 4 MM MISC USE ONE DAILY AS DIRECTED 12/11/20   Belgica Abraham MD   montelukast (SINGULAIR) 10 MG tablet TAKE 1 TABLET BY MOUTH EACH NIGHT 12/7/20   Alyssa Tapia MD   FLUoxetine (PROZAC) 20 MG capsule TAKE 3 CAPSULES BY MOUTH DAILY 12/3/20   Belgica Abraham MD   traZODone (DESYREL) 150 MG tablet TAKE 2 TABLETS AT BEDTIME 9/11/20   Belgica Abraham MD   fluticasone-salmeterol (WIXELA INHUB) 500-50 MCG/DOSE diskus inhaler Inhale 1 puff into the lungs every 12 hours 9/9/20   Belkis Ruiz MD   tiotropium (SPIRIVA HANDIHALER) 18 MCG inhalation capsule INHALE THE CONTENTS OF 1 CAPSULE EVERY DAY 9/9/20   Belkis Ruiz MD   albuterol (PROVENTIL) (2.5 MG/3ML) 0.083% nebulizer solution Take 3 mLs by nebulization every 6 hours as needed for Wheezing or Shortness of Breath DX COPD J44.9 9/9/20   Belkis Ruiz MD   simvastatin (ZOCOR) 20 MG tablet TAKE 1 TABLET EVERY EVENING 8/7/20   Susu Cooney MD   busPIRone (BUSPAR) 30 MG tablet TAKE 1 TABLET TWICE DAILY 8/7/20   Susu Cooney MD   guaiFENesin (MUCINEX) 600 MG extended release tablet Take 1 tablet by mouth 2 times daily as needed for Congestion 7/24/20   Tisha Hassan DO   teriparatide, recombinant, (FORTEO) 600 MCG/2.4ML SOLN injection Inject 0.08 mLs into the skin daily One dose injected daily. 9/27/19 12/7/20  Guzman Joseph MD     Allergies:  Meperidine and Demerol    Social History:      The patient currently lives at home    TOBACCO:   reports that she quit smoking about 3 weeks ago. Her smoking use included cigarettes. She started smoking about 49 years ago. She has a 20.00 pack-year smoking history. She has never used smokeless tobacco.  ETOH:   reports no history of alcohol use. E-Cigarettes/Vaping Use     Questions Responses    E-Cigarette/Vaping Use Never User    Start Date     Passive Exposure     Quit Date     Counseling Given     Comments Unknown        Family History:      Reviewed in detail.  Positive as follows:        Problem Relation Age of Onset    Asthma Mother     Diabetes Mother     Emphysema Mother     Heart Failure Mother     Hypertension Mother     Heart Disease Mother     High Cholesterol Mother    [de-identified] Cancer Mother     Depression Mother     Diabetes Father     Emphysema Father     Heart Failure Father     Hypertension Father     Heart Disease Father     High Cholesterol Father     Substance Abuse Father     Emphysema Paternal Grandfather     Diabetes Sister    Jacek Gipson High Cholesterol Sister     Vision Loss Maternal Uncle      REVIEW OF SYSTEMS COMPLETED:   Pertinent positives as noted in the HPI. All other systems reviewed and negative. PHYSICAL EXAM PERFORMED:    /72   Pulse 94   Temp 97.9 °F (36.6 °C) (Oral)   Resp 17   Wt 145 lb (65.8 kg)   SpO2 96%   BMI 24.13 kg/m²     General appearance:  Pleasant female in no apparent distress, appears stated age and cooperative. HEENT:  Pupils equal, round, and reactive to light. Glasses  Extra ocular muscles intact. Conjunctivae/corneas clear. Neck: Supple, with full range of motion. No jugular venous distention. Trachea midline. Respiratory:  Normal respiratory effort. Diminished breath sounds bilaterally t/o  Expiratory wheezes bilaterally t/o. 3 lnc  Cardiovascular:  Regular rate and rhythm with normal S1/S2 without murmurs, rubs or gallops. Abdomen: Soft, non-tender, non-distended with normal bowel sounds. Musculoskeletal:  No clubbing, cyanosis or edema bilaterally. Full range of motion without deformity. Skin: Skin color, texture, turgor normal.  No significant rashes or lesions. Neurologic:  Neurovascularly intact.  Cranial nerves: II-XII intact, grossly non-focal.  Psychiatric:  Alert and oriented, thought content appropriate, normal insight  Capillary Refill: Brisk,3 seconds, normal  Peripheral Pulses: +2 palpable, equal bilaterally     Labs:     Recent Labs     07/11/21  1730   WBC 9.3   HGB 11.7*   HCT 32.8*        Recent Labs     07/11/21  1730   *   K 4.5   CL 89*   CO2 25   BUN 7   CREATININE 0.7   CALCIUM 9.1     Urinalysis:      Lab Results   Component Value Date    NITRU Negative 06/07/2021    WBCUA 0-3 04/13/2012    RBCUA 3-5 04/13/2012    BLOODU Negative 06/07/2021    SPECGRAV 1.015 06/07/2021    GLUCOSEU Negative 06/07/2021    GLUCOSEU NEGATIVE 04/13/2012     Radiology:     CXR: I have reviewed the CXR with the following interpretation: Chronic obstructive lung changes with mild left basilar atelectasis versus early infiltrates or progressive scarring    EKG:  I have reviewed the EKG with the following interpretation: The Ekg interpreted in the absence of a cardiologist shows Normal sinus rhythm, rate 86. Normal ECG. When compared with ECG of 20-JUN-2021 10:32, No significant change was found    XR CHEST (2 VW)   Final Result   Chronic obstructive lung changes with mild left basilar atelectasis vs early   infiltrates or progressive scarring           ASSESSMENT:    Active Hospital Problems    Diagnosis Date Noted    Stage 3 severe COPD by GOLD classification ([facility] [J44.9] 12/03/2009     Priority: Medium    Acute on chronic respiratory failure ([facility] [facility] [J96.20] 06/07/2021    Tobacco dependence [F17.200] 09/27/2014     PLAN:    Acute on chronic respiratory failure in setting of known COPD.  Baseline wears 2 LNC at night, currently requiring 3 LNC  -Chest x-ray revealed:Chronic obstructive lung changes with mild left basilar atelectasis versus early infiltrates or progressive scarring  -duoneb x 3 given in ED  -continue prn inhalers  -continue prn nebulizers  -continue spiriva  -continue singulair  -solumedrol followed by prednisone  -oxygen therapy protocol  -tele monitoring  -spot check spo2  -pulmonary consult - appreciate recommendations in advance    Tobacco use  -tobacco cessation   -nicotine patch if needed    Marijuana use  -cessation discussed    Anxiety depression  -mood appears stable at this time  -continue home medications    HLD  -continue simvastatin    Hyponatremia, 125 on admission  -pt appears to have chronic hyponatremia  -monitor with mivf  -bmp in am    Chronic normocytic anemia, 11.7/32.8 on admission  -no s/s of bleeding at this time  -cbc in am    DVT Prophylaxis: Lovenox    Diet: No diet orders on file     Code Status: Prior    PT/OT Eval Status: no indication for need at this time    Dispo - 2-3 days pending clinical improvement     Jerrod Dyer, APRN - CNP    Thank you Faby Redmond MD for the opportunity to be involved in this patient's care.  If you have any questions or concerns please feel free to contact me at [phone].  -------------------------------Dr. Savanna Cano--------------------------------------------------

## 2021-07-12 NOTE — PROGRESS NOTES
4 Eyes Admission Assessment     I agree as the admission nurse that 2 RN's have performed a thorough Head to Toe Skin Assessment on the patient. ALL assessment sites listed below have been assessed on admission. Areas assessed by both nurses:   [x]   Head, Face, and Ears   [x]   Shoulders, Back, and Chest  [x]   Arms, Elbows, and Hands   [x]   Coccyx, Sacrum, and Ischum  [x]   Legs, Feet, and Heels        Does the Patient have Skin Breakdown?   No         Philip Prevention initiated:  No   Wound Care Orders initiated:  No      [facility] nurse consulted for Pressure Injury (Stage 3,4, Unstageable, DTI, NWPT, and Complex wounds):  No      Nurse 1 eSignature: Electronically signed by Jaspreet Manning RN on 7/11/21 at 11:54 PM EDT    **SHARE this note so that the co-signing nurse is able to place an eSignature**    Nurse 2 eSignature: Electronically signed by Colvin Halsted, RN on 7/11/21 at 10:14 PM EDT

## 2021-07-12 NOTE — ACP (ADVANCE CARE PLANNING)
Advance Care Planning     General Advance Care Planning (ACP) Conversation    Date of Conversation: 7/11/2021  Conducted with: Patient with Decision Making Capacity    Healthcare Decision Maker:      Primary Decision Maker: [address]    Secondary Decision Maker: Angelita Wong - Child - [phone]    Secondary Decision Maker: Renee kaylynn Cochran - Child - [phone]    Supplemental (Other) Decision Maker: Norbert Wong - Child - [phone]    Click here to complete [address] including selection of the Healthcare Decision Maker Relationship (ie \"Primary\")  Today we documented Decision Maker(s) consistent with Legal Next of Kin hierarchy.     Content/Action Overview:  Has NO ACP documents/care preferences - requested patient complete ACP documents  Reviewed DNR/DNI and patient elects Full Code (Attempt Resuscitation)        Length of Voluntary ACP Conversation in minutes:  <16 minutes (Non-Billable)    Lucila Roper RN

## 2021-07-12 NOTE — RT PROTOCOL NOTE
RT Inhaler-Nebulizer Bronchodilator Protocol Note    There is a bronchodilator order in the chart from a provider indicating to follow the RT Bronchodilator Protocol and there is an Initiate RT Bronchodilator Protocol order as well (see protocol at bottom of note). The findings from the last RT Protocol Assessment were as follows:  Smoking: >15 Pack years  Surgical Status: No surgery  Xray: Multiple lobe infiltrates/atelectasis/pleural effusion/edema  Respiratory Pattern: RR <12 or 21-30  Mental Status: Alert and Oriented  Breath Sounds: Wheezing and/or rhonchi  Cough: Strong, spontaneous, non-productive  Activity Level: Walking unassisted  Oxygen Requirement: Room Air - 2LNC/28% or home setting  Indication for Bronchodilator Therapy: On home bronchodilators, Wheezing associated with pulm disorder  Bronchodilator Assessment Score: 9    Aerosolized bronchodilator medication orders have been revised according to the RT Bronchodilator Protocol. RT Inhaler-Nebulizer Bronchodilator Protocol:    Respiratory Therapist to perform RT Therapy Protocol Assessment then follow the protocol. No Indications - adjust the frequency to every 6 hours PRN wheezing or bronchospasm, if no treatments needed after 48 hours then discontinue using Per Protocol order mode. If indication present, adjust the RT bronchodilator orders based on the Bronchodilator Assessment Score as follows:    0-6 - enter or revise RT bronchodilator order to Albuterol Inhaler order with frequency of every 2 hours PRN for wheezing or increased work of breathing using Per Protocol order mode. If Albuterol Inhaler not tolerated or not effective, then discontinue the Albuterol Inhaler order and enter Albuterol Nebulizer order with same frequency and PRN reasons. Repeat RT Therapy Protocol Assessment as needed.     7-10 - discontinue any other Inpatient aerosolized bronchodilator medication orders and enter or revise two Albuterol Inhaler orders, one with BID frequency and one with frequency of every 2 hours PRN wheezing or increased work of breathing using Per Protocol order mode. Repeat RT Therapy Protocol Assessment with second treatment then BID and as needed. If Albuterol Inhaler not tolerated or not effective, then discontinue the Albuterol Inhaler orders and enter two Albuterol Nebulizer orders with same frequencies and PRN reasons. 11-13 - discontinue any other Inpatient aerosolized bronchodilator medication orders and enter DuoNeb Nebulizer orders QID frequency and an Albuterol Nebulizer order every 2 hours PRN wheezing or increased work of breathing using Per Protocol order mode. Repeat RT Therapy Protocol Assessment with second treatment then QID and as needed. Greater than 13 - discontinue any other Inpatient bronchodilator aerosolized medication orders and enter DuoNeb Nebulizer order every 4 hours frequency and Albuterol Nebulizer every 2 hours PRN wheezing or increased work of breathing using Per Protocol order mode. Repeat RT Therapy Protocol Assessment with second treatment then every 4 hours and as needed. RT to enter RT Home Evaluation for COPD & MDI Assessment order using Per Protocol order mode.     Electronically signed by Toni Rhodes RCP on 7/11/2021 at 11:44 PM

## 2021-07-13 PROCEDURE — 94640 AIRWAY INHALATION TREATMENT: CPT

## 2021-07-13 PROCEDURE — 94761 N-INVAS EAR/PLS OXIMETRY MLT: CPT

## 2021-07-13 PROCEDURE — 1200000000 HC SEMI PRIVATE

## 2021-07-13 PROCEDURE — 2580000003 HC RX 258: Performed by: NURSE PRACTITIONER

## 2021-07-13 PROCEDURE — 6370000000 HC RX 637 (ALT 250 FOR IP): Performed by: NURSE PRACTITIONER

## 2021-07-13 PROCEDURE — 99232 SBSQ HOSP IP/OBS MODERATE 35: CPT | Performed by: INTERNAL MEDICINE

## 2021-07-13 PROCEDURE — 2700000000 HC OXYGEN THERAPY PER DAY

## 2021-07-13 PROCEDURE — 96376 TX/PRO/DX INJ SAME DRUG ADON: CPT

## 2021-07-13 PROCEDURE — 6360000002 HC RX W HCPCS: Performed by: NURSE PRACTITIONER

## 2021-07-13 RX ADMIN — ATORVASTATIN CALCIUM 40 MG: 40 TABLET, FILM COATED ORAL at 09:12

## 2021-07-13 RX ADMIN — MONTELUKAST SODIUM 10 MG: 10 TABLET ORAL at 20:45

## 2021-07-13 RX ADMIN — BUSPIRONE HYDROCHLORIDE 30 MG: 5 TABLET ORAL at 20:45

## 2021-07-13 RX ADMIN — TIOTROPIUM BROMIDE INHALATION SPRAY 2 PUFF: 3.12 SPRAY, METERED RESPIRATORY (INHALATION) at 07:47

## 2021-07-13 RX ADMIN — PANTOPRAZOLE SODIUM 40 MG: 40 TABLET, DELAYED RELEASE ORAL at 05:18

## 2021-07-13 RX ADMIN — ALBUTEROL SULFATE 2.5 MG: 2.5 SOLUTION RESPIRATORY (INHALATION) at 16:19

## 2021-07-13 RX ADMIN — METHYLPREDNISOLONE SODIUM SUCCINATE 40 MG: 40 INJECTION, POWDER, FOR SOLUTION INTRAMUSCULAR; INTRAVENOUS at 09:12

## 2021-07-13 RX ADMIN — ALBUTEROL SULFATE 2.5 MG: 2.5 SOLUTION RESPIRATORY (INHALATION) at 20:27

## 2021-07-13 RX ADMIN — METHYLPREDNISOLONE SODIUM SUCCINATE 40 MG: 40 INJECTION, POWDER, FOR SOLUTION INTRAMUSCULAR; INTRAVENOUS at 05:18

## 2021-07-13 RX ADMIN — ALBUTEROL SULFATE 2.5 MG: 2.5 SOLUTION RESPIRATORY (INHALATION) at 12:12

## 2021-07-13 RX ADMIN — ENOXAPARIN SODIUM 40 MG: 40 INJECTION SUBCUTANEOUS at 09:12

## 2021-07-13 RX ADMIN — Medication 10 ML: at 20:45

## 2021-07-13 RX ADMIN — METHYLPREDNISOLONE SODIUM SUCCINATE 40 MG: 40 INJECTION, POWDER, FOR SOLUTION INTRAMUSCULAR; INTRAVENOUS at 17:50

## 2021-07-13 RX ADMIN — TRAZODONE HYDROCHLORIDE 150 MG: 50 TABLET ORAL at 20:45

## 2021-07-13 RX ADMIN — BUSPIRONE HYDROCHLORIDE 30 MG: 5 TABLET ORAL at 09:12

## 2021-07-13 RX ADMIN — ALBUTEROL SULFATE 2.5 MG: 2.5 SOLUTION RESPIRATORY (INHALATION) at 07:44

## 2021-07-13 RX ADMIN — Medication 2 PUFF: at 20:27

## 2021-07-13 RX ADMIN — Medication 10 ML: at 09:13

## 2021-07-13 RX ADMIN — Medication 2 PUFF: at 07:47

## 2021-07-13 RX ADMIN — FLUOXETINE HYDROCHLORIDE 60 MG: 20 CAPSULE ORAL at 09:12

## 2021-07-13 ASSESSMENT — PAIN SCALES - GENERAL
PAINLEVEL_OUTOF10: 0

## 2021-07-13 NOTE — PROGRESS NOTES
Subcutaneous Daily    methylPREDNISolone  40 mg Intravenous Q6H    Followed by   Felix Granados ON 7/14/2021] predniSONE  40 mg Oral Daily    budesonide-formoterol  2 puff Inhalation BID    atorvastatin  40 mg Oral Daily    tiotropium  2 puff Inhalation Daily    albuterol  2.5 mg Nebulization Q4H WA     PRN Meds: albuterol, albuterol sulfate HFA, sodium chloride flush, sodium chloride, ondansetron **OR** ondansetron, polyethylene glycol, acetaminophen **OR** acetaminophen      Intake/Output Summary (Last 24 hours) at 7/13/2021 1244  Last data filed at 7/13/2021 0904  Gross per 24 hour   Intake 1388 ml   Output --   Net 1388 ml       Physical Exam Performed:    /68   Pulse 93   Temp 97.5 °F (36.4 °C) (Oral)   Resp 16   Ht 5' 5\" (1.651 m)   Wt 156 lb 14.4 oz (71.2 kg)   SpO2 91%   BMI 26.11 kg/m²     General appearance: No apparent distress, appears stated age and cooperative. HEENT: Pupils equal, round, and reactive to light. Conjunctivae/corneas clear. Neck: Supple, with full range of motion. No jugular venous distention. Trachea midline. Respiratory:  Normal respiratory effort. Faint expiratory wheezing. Cardiovascular: Regular rate and rhythm with normal S1/S2 without murmurs, rubs or gallops. Abdomen: Soft, non-tender, non-distended with normal bowel sounds. Musculoskeletal: No clubbing, cyanosis or edema bilaterally. Full range of motion without deformity. Skin: Skin color, texture, turgor normal.  No rashes or lesions. Neurologic:  Neurovascularly intact without any focal sensory/motor deficits.  Cranial nerves: II-XII intact, grossly non-focal.  Psychiatric: Alert and oriented, thought content appropriate, normal insight  Capillary Refill: Brisk,3 seconds, normal   Peripheral Pulses: +2 palpable, equal bilaterally       Labs:   Recent Labs     07/11/21 1730 07/12/21  0643   WBC 9.3 4.6   HGB 11.7* 12.0   HCT 32.8* 34.8*    379     Recent Labs     07/11/21 1730 07/12/21  0643   NA 125* 132*   K 4.5 3.9   CL 89* 99   CO2 25 22   BUN 7 6*   CREATININE 0.7 0.6   CALCIUM 9.1 9.2     Urinalysis:      Lab Results   Component Value Date    NITRU Negative 06/07/2021    WBCUA 0-3 04/13/2012    RBCUA 3-5 04/13/2012    BLOODU Negative 06/07/2021    SPECGRAV 1.015 06/07/2021    GLUCOSEU Negative 06/07/2021    GLUCOSEU NEGATIVE 04/13/2012       Radiology:  XR CHEST (2 VW)   Final Result   Chronic obstructive lung changes with mild left basilar atelectasis vs early   infiltrates or progressive scarring             Assessment/Plan:    Active Hospital Problems    Diagnosis     Stage 3 severe COPD by GOLD classification ([facility] [address] [J44.9]      Priority: Medium    Acute on chronic respiratory failure ([facility] [address] [J96.20]     Tobacco dependence [F17.200]        Acute on chronic hypoxic respiratory failure due to AECOPD  - CXR showed chronic obstructive lung changes, mild left basilar atelectasis vs early infiltrate. Procalcitonin is normal. Pt is afebrile with normal WBC. - Wean supplemental O2 as tolerated. Wears 2 LPM QHS at baseline. Currently on RA.   - Continue inhaled bronchodilator therapy. - Continue IV solumedrol, will change to PO prednisone tomorrow. - Continue pulmonary toilet with IS and acapella. - No indication for abx given normal procalcitonin. - Pulmonary consulted/following.      Tobacco use  - Tobacco cessation advised/encouraged. - Nicotine patch if needed.      Marijuana use  - Cessation advised/encouraged.      Anxiety depression  - Mood stable. Continue her home medications.     Hyperlipidemia   - Continue simvastatin.     Hyponatremia (improved)  - 125 on admission. Pt appears to have chronic hyponatremia. Monitor closely.      Chronic normocytic anemia  - 11.7/32.8 on admission.  - No s/s of bleeding at this time. Monitor closely. DVT Prophylaxis: Lovenox   Diet: ADULT DIET;  Regular  Code Status: Full Code    PT/OT Eval Status: Not indicated     Dispo - Likely home tomorrow 7/14 if okay with Erin 23, APRN - CNP

## 2021-07-13 NOTE — CARE COORDINATION
Pt followed by IM and Pulm; pt wears O2 supplied by [employer] at home. Pt will need a tank from [facility]. CM will continue to follow for needs.   Fernando Gupta RN

## 2021-07-13 NOTE — PROGRESS NOTES
Patient: Brooklyn Whaley MRN: [id number]  Date of  Admission: 7/11/2021   YOB: 1955  Age: 60 y.o.   Sex: female    Unit: [facility] A2 CARD TELEMETRY  Room/Bed: [facility]/0201-01 Admitting Physician: Oniel GAVIRIA    Attending Physician:  Neda Alvares MD         Pulmonary Service Note    Consult to Pulmonology  Consult performed by: Juvencio Adkins MD  Consult ordered by: SHAUN Cristina CNP  Reason for consult: COPD  SUBJECTIVE: No issues overnight no chest pains  Breathing easier  Off of oxygen  Probably DC tomorrow    ROS:  A comprehensive review of systems was negative except for: Above    OBJECTIVE    Medications    Continuous Infusions:   sodium chloride         Scheduled Meds:   busPIRone  30 mg Oral BID    FLUoxetine  60 mg Oral Daily    montelukast  10 mg Oral Nightly    pantoprazole  40 mg Oral QAM AC    traZODone  150 mg Oral Nightly    sodium chloride flush  5-40 mL Intravenous 2 times per day    enoxaparin  40 mg Subcutaneous Daily    methylPREDNISolone  40 mg Intravenous Q6H    Followed by   Teena Guy ON 7/14/2021] predniSONE  40 mg Oral Daily    budesonide-formoterol  2 puff Inhalation BID    atorvastatin  40 mg Oral Daily    tiotropium  2 puff Inhalation Daily    albuterol  2.5 mg Nebulization Q4H WA       PRN Meds:  albuterol, albuterol sulfate HFA, sodium chloride flush, sodium chloride, ondansetron **OR** ondansetron, polyethylene glycol, acetaminophen **OR** acetaminophen    Physical    Patient Vitals for the past 24 hrs:   BP Temp Temp src Pulse Resp SpO2   07/13/21 1214 -- -- -- -- -- 91 %   07/13/21 1149 128/68 97.5 °F (36.4 °C) Oral 93 16 95 %   07/13/21 0749 121/71 98.2 °F (36.8 °C) Oral 91 22 92 %   07/13/21 0747 -- -- -- -- 20 95 %   07/13/21 0518 114/63 97.5 °F (36.4 °C) Oral 92 16 94 %   07/12/21 2359 111/71 97.6 °F (36.4 °C) Oral 98 16 94 %   07/12/21 2008 116/73 97.6 °F (36.4 °C) Oral 109 20 94 %   07/12/21 1913 -- -- -- -- -- 96 %   07/12/21 1605 118/72 97.8 °F (36.6 °C) Oral 117 20 95 %   07/12/21 1540 -- -- -- -- -- 94 %        Physical Exam   General appearance: alert, appears stated age, cooperative and no distress off of oxygen  HEENT: Eyes pupils equal reactive light accommodation no scleral icterus  Neck: Supple no thyromegaly  Lungs:clear to auscultation bilaterally and diminished breath sounds bilaterally  Heart: Heart S1-S2 regular rate and rhythm no murmurs gallops   Abdomen: Soft nontender bowel sounds are positive   Extremities: No edema no clubbing no cyanosis  Skin: Skin is warm dry no rash muscular muscles are supple  Neurologic: Alert and oriented to person place and time    Weight  Weight change:       Labs:   Recent Labs     07/11/21  1730 07/12/21  0643   WBC 9.3 4.6   HGB 11.7* 12.0   HCT 32.8* 34.8*    379      Recent Labs     07/11/21  1730 07/12/21  0643   * 132*   K 4.5 3.9   CL 89* 99   CO2 25 22   BUN 7 6*   GLUCOSE 87 186*       Additional labs:       Radiology, images personally reviewed. Not indicated        Assessment:     Active Problems:    Stage 3 severe COPD by GOLD classification ([facility])    Tobacco dependence    Acute on chronic respiratory failure (HCC)  Resolved Problems:    * No resolved hospital problems. *      Discussion /Plan:       1. Agree with dropping of the Solu-Medrol to prednisone tomorrow  2. Plan on sending home on Singulair  3. Continue with Symbicort and Spiriva  4. Patient is on triple therapy and maximal therapy for her COPD  5. Continue with Proventil as needed  6. We will need to follow-up with Dr. Bridgette Guillory  7. Probably okay to discharge tomorrow  8. Would probably advise the patient to get a sleep study as an outpatient    Idalia Rosa MD    [facility] Pulmonary, Critical Care and Sleep Medicine  [phone]      Please note that some or all of this record was generated using voice recognition software.  If there are any questions about the content of this document, please contact the author as some errors in transcription may have occurred.

## 2021-07-14 VITALS
HEIGHT: 65 IN | BODY MASS INDEX: 26.14 KG/M2 | SYSTOLIC BLOOD PRESSURE: 130 MMHG | DIASTOLIC BLOOD PRESSURE: 50 MMHG | OXYGEN SATURATION: 94 % | TEMPERATURE: 98.1 F | WEIGHT: 156.9 LBS | HEART RATE: 84 BPM | RESPIRATION RATE: 18 BRPM

## 2021-07-14 PROCEDURE — 2580000003 HC RX 258: Performed by: NURSE PRACTITIONER

## 2021-07-14 PROCEDURE — 6360000002 HC RX W HCPCS: Performed by: NURSE PRACTITIONER

## 2021-07-14 PROCEDURE — 94640 AIRWAY INHALATION TREATMENT: CPT

## 2021-07-14 PROCEDURE — 6370000000 HC RX 637 (ALT 250 FOR IP): Performed by: NURSE PRACTITIONER

## 2021-07-14 RX ORDER — PREDNISONE 20 MG/1
40 TABLET ORAL DAILY
Qty: 10 TABLET | Refills: 0 | Status: SHIPPED | OUTPATIENT
Start: 2021-07-15 | End: 2021-07-20

## 2021-07-14 RX ADMIN — ALBUTEROL SULFATE 2.5 MG: 2.5 SOLUTION RESPIRATORY (INHALATION) at 07:45

## 2021-07-14 RX ADMIN — TIOTROPIUM BROMIDE INHALATION SPRAY 2 PUFF: 3.12 SPRAY, METERED RESPIRATORY (INHALATION) at 07:45

## 2021-07-14 RX ADMIN — PREDNISONE 40 MG: 20 TABLET ORAL at 09:19

## 2021-07-14 RX ADMIN — Medication 10 ML: at 09:21

## 2021-07-14 RX ADMIN — BUSPIRONE HYDROCHLORIDE 30 MG: 5 TABLET ORAL at 09:19

## 2021-07-14 RX ADMIN — PANTOPRAZOLE SODIUM 40 MG: 40 TABLET, DELAYED RELEASE ORAL at 06:28

## 2021-07-14 RX ADMIN — ATORVASTATIN CALCIUM 40 MG: 40 TABLET, FILM COATED ORAL at 09:19

## 2021-07-14 RX ADMIN — FLUOXETINE HYDROCHLORIDE 60 MG: 20 CAPSULE ORAL at 09:19

## 2021-07-14 RX ADMIN — Medication 2 PUFF: at 07:45

## 2021-07-14 ASSESSMENT — PAIN SCALES - GENERAL: PAINLEVEL_OUTOF10: 0

## 2021-07-14 NOTE — CARE COORDINATION
CASE MANAGEMENT DISCHARGE SUMMARY      Discharge to: Home w/[facility] [address]        IMM given: (date) 07/14/2021    New Durable Medical Equipment ordered/agency: [employer] providing tank to return home; Dolores Rice aware of need    Transportation: private     Confirmed discharge plan with:     Patient: yes     Family:  No, pt stated she will contact family     RN, name: Nick Luna RN    Note: CM spoke to [facility] intake, orders faxed over. CM confirmed with Dolores Rice w/[employer] that he will bring tank to pt room.   Fernando Gupta, RN

## 2021-07-14 NOTE — DISCHARGE SUMMARY
back pain, abdominal pain, shortness of breath, numbness, tingling, N/V/C/D, fever and/or chills.     Acute on chronic hypoxic respiratory failure due to AECOPD (clinically improved)  - CXR showed chronic obstructive lung changes, mild left basilar atelectasis vs early infiltrate. Procalcitonin is normal. Pt is afebrile with normal WBC. - Wean supplemental O2 as tolerated. Wears 2 LPM QHS at baseline. Currently on RA.   - Continue inhaled bronchodilator therapy. - Received IV solumedrol while inpt. PO prednisone 40 mg daily for 5 days at dc. - Continue pulmonary toilet with IS and acapella. - No indication for abx given normal procalcitonin. - Pulmonary consulted while inpt. Pt has appt with Dr. Taylor Stephen in 2 weeks.      Tobacco use  - Tobacco cessation advised/encouraged. - Nicotine patch if needed.      Marijuana use  - Cessation advised/encouraged.      Anxiety depression  - Mood stable. Continue her home medications.     Hyperlipidemia   - Continue simvastatin.     Hyponatremia (improved)  - 125 on admission. Pt appears to have chronic hyponatremia. Monitor closely.      Chronic normocytic anemia  - 11.7/32.8 on admission. H&H is stable. - No s/s of bleeding at this time. Physical Exam Performed:     BP (!) 130/50   Pulse 84   Temp 98.1 °F (36.7 °C) (Oral)   Resp 18   Ht 5' 5\" (1.651 m)   Wt 156 lb 14.4 oz (71.2 kg)   SpO2 94%   BMI 26.11 kg/m²       General appearance:  No apparent distress, appears stated age and cooperative. HEENT:  Normal cephalic, atraumatic without obvious deformity. Pupils equal, round, and reactive to light. Extra ocular muscles intact. Conjunctivae/corneas clear. Neck: Supple, with full range of motion. No jugular venous distention. Trachea midline. Respiratory:  Normal respiratory effort. Clear to auscultation, bilaterally without Rales/Wheezes/Rhonchi. Cardiovascular:  Regular rate and rhythm with normal S1/S2 without murmurs, rubs or gallops.   Abdomen: Soft, non-tender, non-distended with normal bowel sounds. Musculoskeletal:  No clubbing, cyanosis or edema bilaterally. Full range of motion without deformity. Skin: Skin color, texture, turgor normal.  No rashes or lesions. Neurologic:  Neurovascularly intact without any focal sensory/motor deficits. Cranial nerves: II-XII intact, grossly non-focal.  Psychiatric:  Alert and oriented, thought content appropriate, normal insight  Capillary Refill: Brisk,< 3 seconds   Peripheral Pulses: +2 palpable, equal bilaterally       Labs:  For convenience and continuity at follow-up the following most recent labs are provided:      CBC:    Lab Results   Component Value Date    WBC 4.6 07/12/2021    HGB 12.0 07/12/2021    HCT 34.8 07/12/2021     07/12/2021       Renal:    Lab Results   Component Value Date     07/12/2021    K 3.9 07/12/2021    CL 99 07/12/2021    CO2 22 07/12/2021    BUN 6 07/12/2021    CREATININE 0.6 07/12/2021    CALCIUM 9.2 07/12/2021    PHOS 2.3 05/25/2021         Significant Diagnostic Studies    Radiology:   XR CHEST (2 VW)   Final Result   Chronic obstructive lung changes with mild left basilar atelectasis vs early   infiltrates or progressive scarring                Consults:     IP CONSULT TO HOSPITALIST  IP CONSULT TO PULMONOLOGY  IP CONSULT TO HOME CARE NEEDS    Disposition:  Home with [facility]      Condition at Discharge: Stable    Discharge Instructions/Follow-up:  Follow-up with PCP and Pulmonary     Code Status:  Full Code     Activity: activity as tolerated    Diet: regular diet      Discharge Medications:     Discharge Medication List as of 7/14/2021 10:50 AM           Details   predniSONE (DELTASONE) 20 MG tablet Take 2 tablets by mouth daily for 5 days, Disp-10 tablet, R-0Normal              Details   pantoprazole (PROTONIX) 40 MG tablet Take 1 tablet by mouth every morning (before breakfast), Disp-30 tablet, R-1Normal      ibuprofen (ADVIL;MOTRIN) 600 MG tablet Take 1 tablet by mouth 3 times daily as needed for Pain, Disp-30 tablet, R-0Normal      vitamin D (ERGOCALCIFEROL) 1.25 MG (23015 UT) CAPS capsule Take 1 capsule by mouth once a week for 5 days Every Tuesday, Disp-5 capsule, R-0Normal      albuterol sulfate HFA (VENTOLIN HFA) 108 (90 Base) MCG/ACT inhaler Inhale 2 puffs into the lungs every 6 hours as needed for Wheezing or Shortness of Breath, Disp-3 Inhaler, R-1Normal      naproxen (NAPROSYN) 500 MG tablet Take 1 tablet by mouth 2 times daily (with meals), Disp-30 tablet, R-0Print      BD PEN NEEDLE SVETLANA U/F 32G X 4 MM MISC Disp-100 each, R-5, Normal      montelukast (SINGULAIR) 10 MG tablet TAKE 1 TABLET BY MOUTH EACH NIGHT, Disp-90 tablet,R-1Normal      FLUoxetine (PROZAC) 20 MG capsule TAKE 3 CAPSULES BY MOUTH DAILY, Disp-270 capsule,R-1Normal      traZODone (DESYREL) 150 MG tablet TAKE 2 TABLETS AT BEDTIME, Disp-180 tablet,R-1Normal      fluticasone-salmeterol (WIXELA INHUB) 500-50 MCG/DOSE diskus inhaler Inhale 1 puff into the lungs every 12 hours, Disp-180 each,R-1Normal      tiotropium (SPIRIVA HANDIHALER) 18 MCG inhalation capsule INHALE THE CONTENTS OF 1 CAPSULE EVERY DAY, Disp-90 capsule,R-1Normal      albuterol (PROVENTIL) (2.5 MG/3ML) 0.083% nebulizer solution Take 3 mLs by nebulization every 6 hours as needed for Wheezing or Shortness of Breath DX COPD J44.9, Disp-120 vial,R-6Normal      simvastatin (ZOCOR) 20 MG tablet TAKE 1 TABLET EVERY EVENING, Disp-90 tablet,R-1Normal      busPIRone (BUSPAR) 30 MG tablet TAKE 1 TABLET TWICE DAILY, Disp-180 tablet,R-1Normal      guaiFENesin (MUCINEX) 600 MG extended release tablet Take 1 tablet by mouth 2 times daily as needed for Congestion, Disp-60 tablet,R-2Normal      teriparatide, recombinant, (FORTEO) 600 MCG/2.4ML SOLN injection Inject 0.08 mLs into the skin daily One dose injected daily. , Disp-1 pen,R-11Print             Time Spent on discharge is more than 45 minutes in the examination, evaluation, counseling and review of medications and discharge plan. Signed:    SHAUN Cortez - CNP   7/14/2021      Thank you Yue Che MD for the opportunity to be involved in this patient's care. If you have any questions or concerns please feel free to contact me at [phone].

## 2021-07-14 NOTE — PROGRESS NOTES
Discharge paperwork went over with and given to pt. Verbalizes understanding. Pt awaiting ride home. Home oxygen tank at bedside.

## 2021-07-14 NOTE — PLAN OF CARE
Problem: Pain:  Goal: Pain level will decrease  Description: Pain level will decrease  Outcome: Ongoing  Note: Pt will be satisfied with pain control. Pt uses numeric pain rating scale with reassessments after pain med administration. Will continue to monitor progression throughout shift.

## 2021-07-14 NOTE — PROGRESS NOTES
Pt lock box emptied. Pt wheeled via wheelchair to private car with all belongings. Pt picked up meds to beds. Pt discharge home in stable condition.

## 2021-07-15 ENCOUNTER — CARE COORDINATION (OUTPATIENT)
Dept: CASE MANAGEMENT | Age: 66
End: 2021-07-15

## 2021-07-15 ENCOUNTER — CARE COORDINATION (OUTPATIENT)
Dept: CARE COORDINATION | Age: 66
End: 2021-07-15

## 2021-07-15 ENCOUNTER — TELEPHONE (OUTPATIENT)
Dept: FAMILY MEDICINE CLINIC | Age: 66
End: 2021-07-15

## 2021-07-15 NOTE — TELEPHONE ENCOUNTER
[facility] Transitions Initial Follow Up Call    Outreach made within 2 business days of discharge: Yes    Patient: Yudelka Mejia Patient : 1955   MRN: [id number]  Reason for Admission: There are no discharge diagnoses documented for the most recent discharge. Discharge Date: 21       Spoke with: Thiago Nuñez     Discharge department/facility: [facility] Interactive Patient Contact:  Was patient able to fill all prescriptions: Yes  Was patient instructed to bring all medications to the follow-up visit: Yes  Is patient taking all medications as directed in the discharge summary?  Yes  Does patient understand their discharge instructions: Yes  Does patient have questions or concerns that need addressed prior to 7-14 day follow up office visit: no    Scheduled appointment with PCP within 7-14 days    Follow Up  Future Appointments   Date Time Provider Matt Crabtree   2021  1:00 PM Precious Demarco MD [facility] PULM [facility]   12/3/2021 11:00 AM Magy Huitron LPN

## 2021-07-15 NOTE — CARE COORDINATION
ACM was going to reach out to patient, upon chart review patient is being followed by CTN. ACM will reach out to patient once graduation from [address] [place] [address] Drive program obtained.

## 2021-07-15 NOTE — CARE COORDINATION
Per chart review noted LPN Beola Drivers with PCP office made TCM 24 hr outreach to patient. CTN will reach out tomorrow to prevent duplication.     Vanessa DENISEN, RN, [facility]  Care Transition Nurse  [phone] mobile

## 2021-07-16 ENCOUNTER — CARE COORDINATION (OUTPATIENT)
Dept: CASE MANAGEMENT | Age: 66
End: 2021-07-16

## 2021-07-21 ENCOUNTER — VIRTUAL VISIT (OUTPATIENT)
Dept: PULMONOLOGY | Age: 66
End: 2021-07-21
Payer: MEDICARE

## 2021-07-21 ENCOUNTER — TELEPHONE (OUTPATIENT)
Dept: PULMONOLOGY | Age: 66
End: 2021-07-21

## 2021-07-21 DIAGNOSIS — J44.1 COPD EXACERBATION (HCC): ICD-10-CM

## 2021-07-21 DIAGNOSIS — R93.89 ABNORMAL CT OF THE CHEST: ICD-10-CM

## 2021-07-21 DIAGNOSIS — R93.89 ABNORMAL CXR: Primary | ICD-10-CM

## 2021-07-21 PROCEDURE — 99443 PR PHYS/QHP TELEPHONE EVALUATION 21-30 MIN: CPT | Performed by: INTERNAL MEDICINE

## 2021-07-21 RX ORDER — GUAIFENESIN 600 MG/1
600 TABLET, EXTENDED RELEASE ORAL 2 TIMES DAILY PRN
Qty: 60 TABLET | Refills: 5 | Status: ON HOLD | OUTPATIENT
Start: 2021-07-21 | End: 2021-08-29 | Stop reason: SDUPTHER

## 2021-07-21 RX ORDER — PREDNISONE 10 MG/1
TABLET ORAL
Qty: 30 TABLET | Refills: 0 | Status: SHIPPED | OUTPATIENT
Start: 2021-07-21 | End: 2021-08-02

## 2021-07-21 RX ORDER — MONTELUKAST SODIUM 10 MG/1
TABLET ORAL
Qty: 90 TABLET | Refills: 1 | Status: SHIPPED | OUTPATIENT
Start: 2021-07-21

## 2021-07-21 RX ORDER — TIOTROPIUM BROMIDE 18 UG/1
CAPSULE ORAL; RESPIRATORY (INHALATION)
Qty: 90 CAPSULE | Refills: 1 | Status: SHIPPED | OUTPATIENT
Start: 2021-07-21 | End: 2021-09-23

## 2021-07-21 RX ORDER — DOXYCYCLINE HYCLATE 100 MG/1
100 CAPSULE ORAL 2 TIMES DAILY
Qty: 14 CAPSULE | Refills: 0 | Status: SHIPPED | OUTPATIENT
Start: 2021-07-21 | End: 2021-07-28

## 2021-07-21 RX ORDER — ALBUTEROL SULFATE 2.5 MG/3ML
2.5 SOLUTION RESPIRATORY (INHALATION) EVERY 6 HOURS PRN
Qty: 120 VIAL | Refills: 6 | Status: SHIPPED | OUTPATIENT
Start: 2021-07-21

## 2021-07-21 NOTE — TELEPHONE ENCOUNTER
Pt to complete CXR in 6 weeks    LOV: 7/21/21    Assessment:   · Moderate obstructive airways diease secondary to COPD and asthma with AE  · Abnormal CXR 7/11/21 - Atelectasis vs PNA   · Abnormal CT 4/1/2016 with GGO- DDx RB-ILD, NSIP, HP, DIP, eosinophilic pneumonitis, aspiration and CTD-ILD. Favor smoking related ILD vs HP. Bronch 4/6/16 purulent secretions and grew strep pneumoniae. Negative AFB. Got rid of her Jael Starch. Evidently changed on repeat CT 12/2/2020. · Bronchiectasis   · Nocturnal hypoxia- on 2LPM   · >30 pack-years smoking                                                  Plan:   · Prednisone taper  · Doxycycline 100 mg PO BID for 7 days. · CXR in 6 weeks   · Continue Advair 500/50 BID and Albuterol 2 puffs Q4-6 hrs PRN  · Refills on Spiriva daily   · Complete prednisone taper   · Continue O2 2LPM at night  · Advised to titrate O2 using her pulse oximeter- target O2 sat 90-92%. · CT chest, low dose protocol, screening for lung cancer December 2021. · Patient is up to date with Covid, pneumococcal vaccine and influenza vaccine   · Acapella and Mucinex   · Advised to continue with smoking cessation.    · Follow up after CT chest

## 2021-07-21 NOTE — PROGRESS NOTES
[facility]    Outpatient Follow Up Note  TELEHEALTH EVALUATION: Service performed was Audio (During GXODD-33 public health emergency) and not a face-to-face visit       CHIEF COMPLAINT: COPD      HPI:   Was discharged July 13 after admission for COPD AE  Currently on prednisone taper   Still with SOB on exertion  Cough with light green sputum still   No hemoptysis   No smoking since hospitalization     From prior visit:   No humidifier. No air . No purifier. No steam sauna. No steam shower. + indoor hot tub- no reported. Had mold in her old house and moved to new one 1.5 months ago. No asbestos exposure. No down pillows or comforters. Has 1 parrot. No fatigue. No joint stiffness, pain or swelling wrists or knees. No difficulty swallowing. + dryness mouth. + fingers color change in cold weather- fingers turns white in cold weather. No recurrent fever. + weight loss. + GERD. No rash. No mouth ulcers.            Past Medical History:   Diagnosis Date    Allergic rhinitis 6/11/2010    Anxiety     Arthritis     Aspiration pneumonia ([address] 3/21/2012    Recurrent    Asthma     Bronchitis chronic     COPD (chronic obstructive pulmonary disease) ([address] 12/3/2009    Depression     Drug abuse, cocaine type (HCC)     past history of crack cocaine use    Emphysema of lung (HCC)     Fibromyalgia     GERD (gastroesophageal reflux disease)     Hyperlipidemia     Influenza A 03/05/2020    Lung disease     On home oxygen therapy     uses O2 NC 3L prn at night    Osteoporosis     Pneumonia     Polysubstance dependence ([address] 1/2/2012    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    Pulmonary fibrosis ([address] 10/16/2014    Pulmonary infiltrate     Pulmonary nodule 12/3/2009    Tobacco abuse        Past Surgical History:        Procedure Laterality Date    BLADDER REPAIR      BRONCHOSCOPY      BRONCHOSCOPY N/A 3/6/2020    BRONCHOSCOPY THERAPUTIC ASPIRATION INITIAL performed by Eleonora Harden MD at [address]  3/6/2020    BRONCHOSCOPY ALVEOLAR LAVAGE performed by Eleonora Harden MD at [facility] [address] N/A 6/26/2020    BRONCHOSCOPY THERAPUTIC ASPIRATION INITIAL performed by Nimo Aponte MD at [facility] [address]  6/26/2020    BRONCHOSCOPY ALVEOLAR LAVAGE performed by Nimo Aponte MD at [address]  06/23/2021    Dr Kelly Manning N/A 6/23/2021    [address] performed by Nimo Aponte MD at [address]  6/23/2021    BRONCHOSCOPY THERAPUTIC ASPIRATION INITIAL performed by Nimo Aponte MD at [facility] [address]  6/23/2021    BRONCHOSCOPY ALVEOLAR LAVAGE performed by Nimo Aponte MD at [address] [facility]  2012    ENDOSCOPY, COLON, DIAGNOSTIC      ESOPHAGEAL DILATATION  9/20/2018    ESOPHAGEAL DILATION Patric Buster performed by Abelino Adan MD at [address] [facility] HISTORY  2/12/15    T8 Kyphoplasty    DC COLONOSCOPY FLX DX W/COLLJ SPEC WHEN PFRMD N/A 9/20/2018    EGD AND COLONOSCOPY WITH ANESTHESIA performed by Abelino Adan MD at [address] [facility] ESOPHAGOGASTRODUODENOSCOPY TRANSORAL DIAGNOSTIC N/A 9/20/2018    EGD AND COLONOSCOPY WITH ANESTHESIA performed by Abelino Adan MD at [address] [facility]         Allergies:  is allergic to meperidine and demerol. Social History:    TOBACCO:   reports that she quit smoking about 4 weeks ago. Her smoking use included cigarettes. She started smoking about 49 years ago. She has a 20.00 pack-year smoking history. She has never used smokeless tobacco.  ETOH:   reports no history of alcohol use.       Family History:       Problem Relation Age of Onset    Asthma Mother     Diabetes Mother     Emphysema Mother     Heart Failure Mother     Hypertension Mother     Heart Disease Mother     High Cholesterol Mother     Cancer Mother     Depression Mother     Diabetes Father     Emphysema Father     Heart Failure Father     Hypertension Father     Heart Disease Father     High Cholesterol Father     Substance Abuse Father     Emphysema Paternal Grandfather     Diabetes Sister     High Cholesterol Sister     Vision Loss Maternal Uncle        Current Medications:    Current Outpatient Medications:     pantoprazole (PROTONIX) 40 MG tablet, Take 1 tablet by mouth every morning (before breakfast), Disp: 30 tablet, Rfl: 1    ibuprofen (ADVIL;MOTRIN) 600 MG tablet, Take 1 tablet by mouth 3 times daily as needed for Pain, Disp: 30 tablet, Rfl: 0    albuterol sulfate HFA (VENTOLIN HFA) 108 (90 Base) MCG/ACT inhaler, Inhale 2 puffs into the lungs every 6 hours as needed for Wheezing or Shortness of Breath, Disp: 3 Inhaler, Rfl: 1    naproxen (NAPROSYN) 500 MG tablet, Take 1 tablet by mouth 2 times daily (with meals), Disp: 30 tablet, Rfl: 0    BD PEN NEEDLE SVETLANA U/F 32G X 4 MM MISC, USE ONE DAILY AS DIRECTED, Disp: 100 each, Rfl: 5    montelukast (SINGULAIR) 10 MG tablet, TAKE 1 TABLET BY MOUTH EACH NIGHT, Disp: 90 tablet, Rfl: 1    FLUoxetine (PROZAC) 20 MG capsule, TAKE 3 CAPSULES BY MOUTH DAILY, Disp: 270 capsule, Rfl: 1    traZODone (DESYREL) 150 MG tablet, TAKE 2 TABLETS AT BEDTIME, Disp: 180 tablet, Rfl: 1    fluticasone-salmeterol (WIXELA INHUB) 500-50 MCG/DOSE diskus inhaler, Inhale 1 puff into the lungs every 12 hours, Disp: 180 each, Rfl: 1    tiotropium (SPIRIVA HANDIHALER) 18 MCG inhalation capsule, INHALE THE CONTENTS OF 1 CAPSULE EVERY DAY, Disp: 90 capsule, Rfl: 1    albuterol (PROVENTIL) (2.5 MG/3ML) 0.083% nebulizer solution, Take 3 mLs by nebulization every 6 hours as needed for Wheezing or Shortness of Breath DX COPD J44.9, Disp: 120 vial, Rfl: 6    simvastatin (ZOCOR) 20 MG tablet, TAKE 1 TABLET EVERY EVENING, Disp: 90 tablet, Rfl: 1    busPIRone (BUSPAR) 30 MG tablet, TAKE 1 TABLET TWICE DAILY, Disp: 180 tablet, Rfl: 1    guaiFENesin (MUCINEX) 600 MG extended release tablet, Take 1 tablet by mouth 2 times daily as needed for Congestion, Disp: 60 tablet, Rfl: 2    vitamin D (ERGOCALCIFEROL) 1.25 MG (28386 UT) CAPS capsule, Take 1 capsule by mouth once a week for 5 days Every Tuesday, Disp: 5 capsule, Rfl: 0    teriparatide, recombinant, (FORTEO) 600 MCG/2.4ML SOLN injection, Inject 0.08 mLs into the skin daily One dose injected daily. , Disp: 1 pen, Rfl: 11            Objective:   Telephone visit not able to obtain physical exam             DATA reviewed by me:   PFTs 12/02/2020 FVC 2.12 (64%) FEV1 1.15(45%) TLC 4.16 (78%)  DLCO 06.97(30%) 6MW 880  F L O2 93%   PFTs 08/29/2019 FVC 1.68 (50%) FEV1 0.98(38%) TLC 4.35 (81%)  DLCO 07.19(30%) 6MW 860  F L O2 90%   PFTs 02/28/2019 FVC 2.03 (60%) FEV1 1.30(50%) TLC 4.15 (78%)  DLCO 07.23(31%) 6MW 560  F L O2 90%   PFTs 08/15/2018 FVC 1.86 (55%) FEV1 1.19(45%) TLC 4.28(80%)   DLCO 07.59(32%) 6MW 840  F L O2 94%   PFTs 10/13/2017 FVC 2.00 (58%) FEV1 1.27(48%) TLC 4.78(89%)   DLCO 06.16(38%) 6MW 1120F L O2 94%  PFTs 05/25/2017 FVC 2.15 (63%) FEV1 1.25(47%) TLC 5.37(100%) DLCO 11.13(47%) 6MW 1120F L O2 95%  PFTs 01/30/2017 FVC 2.05 (60%) FEV1 1.18(44%) TLC 4.69(87%)   DLCO 09.35(39%) 6MW 1190F L O2 95%  PFTs 10/10/2016 FVC 1.96 (57%) FEV1 1.18(44%) TLC 4.57(85%)   DLCO 08.31(35%) 6MW 1000F L O2 94%  PFTs 04/25/2016 FVC 1.63 (50%) FEV1 0.93(37%) TLC 4.96(97%)   DLCO 09.66(42%) 6MW 1140F L O2 91%  PFTs 12/09/2013                            FEV1 1.54(59%) TLC 5.34(104%) DLCO 11.30(49%) 6MW 1140F L O2 91%. PFTs 06/03/2013                            FEV1 1.69(65%) TLC 4.81(93%)   DLCO 09.44(41%) 6MW 1400F L O2 95%. PFTs 11/28/2012                            FEV1 1.67(64%) TLC 5.08(98%)   DLCO 11.94(51%) 6MW 1400F L O2 95%.    PFTs 06/19/2012                            FEV1 1.59(60%) TLC 4.66(90%)   DLCO 10.21(44%) 6MW 1280F L O2 96%. PFTs 03/07/2012                            FEV1 1.72(70%) TLC 4.49(87%)   DLCO 10.72(54%)  PFTs 08/03/2011                            FEV1 1.71(72%) TLC 4.72(96%)   DLCO 12.3 (63%)  PFTs 01/10/2011                            FEV1 1.76           TLC 4.54             DLCO 10.50  PFTs 03/30/2010                            FEV1 1.96           TLC 4.86             DLCO 14.13  PFTs 03/03/2009                            FEV1 2.04           TLC 5.08             DLCO 14.02      CT chest 8/25/2019  Decreased bibasilar airspace disease and nodular opacification  Bronchial wall thickening  Stable reticular opacities upper lobe  Stable mediastinal lymph nodes    CT chest 12/2/2020   No acute intrapulmonary findings  Stable multifocal irregular opacities associated with GGO's likely reflecting element of chronic interstitial lung disease  Stable mild mediastinal adenopathy    CXR 7/11/21  images reviewed by me and showed:   Chronic obstructive lung changes with mild left basilar atelectasis vs early  infiltrates or progressive scarring          4/5/2016 normal/negative RF 14, SCL70 SSA SSB aldolase CK GIORGI CCP Anti MALORIE-1    4/25/2016 Elevated IgA normal IgG and IgM      IgE 140 with unremarkable immunocap      Assessment:   · Moderate obstructive airways diease secondary to COPD and asthma with AE  · Abnormal CXR 7/11/21 - Atelectasis vs PNA   · Abnormal CT 4/1/2016 with GGO- DDx RB-ILD, NSIP, HP, DIP, eosinophilic pneumonitis, aspiration and CTD-ILD. Favor smoking related ILD vs HP. Bronch 4/6/16 purulent secretions and grew strep pneumoniae. Negative AFB. Got rid of her Dolphus Level. Evidently changed on repeat CT 12/2/2020. · Bronchiectasis   · Nocturnal hypoxia- on 2LPM   · >30 pack-years smoking                                          Plan:   · Prednisone taper  · Doxycycline 100 mg PO BID for 7 days.   · CXR in 6 weeks   · ER if not better  · Continue Advair 500/50 BID and Albuterol 2 puffs Q4-6 hrs PRN  · Refills on Spiriva daily   · Complete prednisone taper   · Continue O2 2LPM at night  Advised to titrate O2 using her pulse oximeter- target O2 sat 90-92%. · CT chest, low dose protocol, screening for lung cancer December 2021. · Patient is up to date with Covid, pneumococcal vaccine and influenza vaccine   · Acapella and Mucinex   · Advised to continue with smoking cessation. · Follow up after CT chest             Isis Alvarado is a 60 y.o. female evaluated via telephone on 7/21/2021. Consent:  She and/or health care decision maker is aware that that she may receive a bill for this telephone service, depending on her insurance coverage, and has provided verbal consent to proceed: Yes       Documentation:  I communicated with the patient and/or health care decision maker about: See above   Details of this discussion including any medical advice provided: See above       I Affirm this is a Patient Initiated Episode with an Established Patient who has not had a related appointment within my department in the past 7 days or scheduled within the next 24 hours.     Total Time: 21-30 minutes     Note: not billable if this call serves to triage the patient into an appointment for the relevant concern      Oneida King MD

## 2021-07-22 ENCOUNTER — CARE COORDINATION (OUTPATIENT)
Dept: CASE MANAGEMENT | Age: 66
End: 2021-07-22

## 2021-07-22 DIAGNOSIS — J44.9 CHRONIC OBSTRUCTIVE PULMONARY DISEASE, UNSPECIFIED COPD TYPE (HCC): Primary | ICD-10-CM

## 2021-07-22 NOTE — CARE COORDINATION
[facility] Transitions Follow Up Call    2021    Patient: Haylee Alvarado  Patient : 1955   MRN: [id number]  Reason for Admission: COPD   Discharge Date: 21 RARS: Readmission Risk Score: 30         Spoke with: Paula Transitions Follow Up Call    Needs to be reviewed by the provider   Additional needs identified to be addressed with provider: No  none             Method of communication with provider : none      Care Transition Nurse (CTN) contacted the patient by telephone to follow up after admission on 21. Verified name and  with patient as identifiers. Addressed changes since last contact: none  Discussed follow-up appointments. If no appointment was previously scheduled, appointment scheduling offered: Yes. Is follow up appointment scheduled within 7 days of discharge? Yes. Advance Care Planning:   Does patient have an Advance Directive: not on file. CTN reviewed discharge instructions, medical action plan and red flags with patient and discussed any barriers to care and/or understanding of plan of care after discharge. Discussed appropriate site of care based on symptoms and resources available to patient including: PCP, Specialist and When to call 911. The patient agrees to contact the PCP office for questions related to their healthcare. Patients top risk factors for readmission: medical condition-COPD   Interventions to address risk factors: Obtained and reviewed discharge summary and/or continuity of care documents      Non-[facility] follow up appointment(s):     CTN provided contact information for future needs. Plan for follow-up call in 3-5 days based on severity of symptoms and risk factors. Plan for next call: symptom management-COPD      Spoke with patient who reported she is doing alright. Patient reported her breathing is stable. Patient reported she is currently using her oxygen prn and oxygen saturation was 94% on room air during PT today. Patient had apt with Dr. Renaldo Mendez on 7/21. Patient was encouraged to keep oxygen saturation between 90-92% and continue prednisone taper and doxycycline. Patient encouraged to reach out to doctor with worsen sob or oxygen saturation dropping below 90%. Denies any acute needs at present time. Agreeable to f/u calls. Educated on the use of urgent care or physicians 24 hr access line if assistance is needed after hours & that they can always contact their home care provider to request a nurse visit even if it isn't their regularly scheduled day for their nurse to visit. Care Transitions Subsequent and Final Call    Subsequent and Final Calls  Do you have any ongoing symptoms?: Yes  Onset of Patient-reported symptoms: Other  Patient-reported symptoms: Shortness of Breath  Interventions for patient-reported symptoms: Other  Have your medications changed?: Yes  Do you have any questions related to your medications?: No  Do you currently have any active services?: Yes  Are you currently active with any services?: Home Health  Do you have any needs or concerns that I can assist you with?: No  Identified Barriers: None  Care Transitions Interventions  Other Interventions:            Follow Up  Future Appointments   Date Time Provider Matt Crabtree   12/3/2021 11:00 AM [facility]   12/14/2021  2:45 PM MD LINDA Francois RN

## 2021-07-26 LAB
FUNGUS (MYCOLOGY) CULTURE: NORMAL
FUNGUS STAIN: NORMAL

## 2021-07-27 ENCOUNTER — CARE COORDINATION (OUTPATIENT)
Dept: CASE MANAGEMENT | Age: 66
End: 2021-07-27

## 2021-07-27 NOTE — CARE COORDINATION
[facility] Transitions Follow Up Call    2021    Patient: Herlinda Isabel  Patient : 1955   MRN: [id number]  Reason for Admission: COPD   Discharge Date: 21 RARS: Readmission Risk Score: 30         Spoke with:  Ashley Mtz with patient who reported she is doing well and breathing has been stable. Patient reported she hasn't needed the oxygen and her oxygen saturation was 97% yesterday. Patient still taking prescribed prednisone and doxycycline. Patient denied any issues at this time. CTN advised patient of use of urgent care or physicians 24 hr access line if assistance is needed after hours. Also advised that they can always contact their home care provider to request a nurse visit even if it isn't their regularly scheduled day for their nurse to visit. Care Transitions Subsequent and Final Call    Subsequent and Final Calls  Do you have any ongoing symptoms?: No  Have your medications changed?: No  Do you have any questions related to your medications?: No  Do you currently have any active services?: Yes  Are you currently active with any services?: Home Health  Do you have any needs or concerns that I can assist you with?: No  Identified Barriers: None  Care Transitions Interventions  Other Interventions:            Follow Up  Future Appointments   Date Time Provider Matt Crabtree   12/3/2021 11:00 [place] [facility] [place] [facility] [place]   2021  2:45 PM MD LINDA Jovel RN

## 2021-07-29 ENCOUNTER — CARE COORDINATION (OUTPATIENT)
Dept: CARE COORDINATION | Age: 66
End: 2021-07-29

## 2021-07-29 NOTE — CARE COORDINATION
ACM was going to attempt outreach to patient, but upon chart review patient is still active with CTN. ACM will follow once CTN is completed.

## 2021-08-03 ENCOUNTER — CARE COORDINATION (OUTPATIENT)
Dept: CASE MANAGEMENT | Age: 66
End: 2021-08-03

## 2021-08-03 NOTE — CARE COORDINATION
[facility] Transitions Follow Up Call    8/3/2021    Patient: Doug Escalante  Patient : 1955   MRN: [id number]  Reason for Admission: COPD   Discharge Date: 21 RARS: Readmission Risk Score: 30         Spoke with:  Ashley Mtz with patient who reported she is doing well and reported her breathing has been stable. Patient reported her oxygen saturation has been 95%. Patient denied any further concerns at this time. CTN informed patient ACM will be following up for future transition calls. Care Transitions Subsequent and Final Call    Subsequent and Final Calls  Do you have any ongoing symptoms?: No  Have your medications changed?: No  Do you have any questions related to your medications?: No  Do you currently have any active services?: Yes  Are you currently active with any services?: Home Health  Do you have any needs or concerns that I can assist you with?: No  Identified Barriers: None  Care Transitions Interventions  Other Interventions:            Follow Up  Future Appointments   Date Time Provider Matt Crabtree   12/3/2021 11:00 [place] [facility] [place] [facility] [place] Rad   2021  2:45 PM MD LINDA Helm PULJD Saini RN

## 2021-08-04 ENCOUNTER — CARE COORDINATION (OUTPATIENT)
Dept: CARE COORDINATION | Age: 66
End: 2021-08-04

## 2021-08-04 RX ORDER — NICOTINE 21 MG/24HR
1 PATCH, TRANSDERMAL 24 HOURS TRANSDERMAL DAILY
Qty: 42 PATCH | Refills: 0 | Status: SHIPPED | OUTPATIENT
Start: 2021-08-04 | End: 2021-08-17

## 2021-08-04 RX ORDER — VARENICLINE TARTRATE
KIT
Qty: 1 BOX | Refills: 0 | Status: SHIPPED | OUTPATIENT
Start: 2021-08-04 | End: 2021-12-27

## 2021-08-04 ASSESSMENT — ENCOUNTER SYMPTOMS: DYSPNEA ASSOCIATED WITH: EXERTION

## 2021-08-04 NOTE — TELEPHONE ENCOUNTER
I agree with the Care Coordinator's Plan of Care. I will put in Rxs for her. Yes, follow up would be great.

## 2021-08-04 NOTE — CARE COORDINATION
Ambulatory Care Coordination Note  8/4/2021  CM Risk Score: 14  Charlson 10 Year Mortality Risk Score: 23%     ACC: Sarah Beth De Leon RN    Summary Note: ACM outreached to patient after CTN referral. Patient said she is doing well with being at home. Patient said she is currently on RA and has not been using PRN oxygen much. Patient said she has 2 L/O2 PRN she uses. Patient said last oxygen reading was 95% on RA. Patient is mostly independent with care and takes rest breaks when she is SOB which improves. Patient did start smoking again recently and is currently smoke 0.5ppd. Anthony said she found old chantix and is taking that. ACM said she would ask for a new Rx to be sent to pharmacy. Patient is also wanting to try nicotine patches. ACM said she would reach out to PCP for orders. ACM asked if there was anything else that she could assist with right now. Patient declined at this time. Plan:  Complete enrollemt ([facility])  F/U call in 1 week  Smoking Cessation  Rx for Chantix and Nicotine patches. Ambulatory Care Coordination Assessment    Care Coordination Protocol  Program Enrollment: Complex Care  Referral from Primary Care Provider: No  Week 1 - Initial Assessment     Do you have all of your prescriptions and are they filled?: Yes  Barriers to medication adherence: None  Are you able to afford your medications?: Yes  How often do you have trouble taking your medications the way you have been told to take them?: I always take them as prescribed. Do you have Home O2 Therapy?: Yes   Oxygen Regimen: PRN Flow - Enter rate/FIO2: 2   Method of Delivery: Nasal Cannula      Ability to seek help/take action for Emergent Urgent situations i.e. fire, crime, inclement weather or health crisis. : Independent  Ability to ambulate to restroom: Independent  Ability handle personal hygeine needs (bathing/dressing/grooming): Independent  Ability to manage Medications:  Independent  Ability to prepare Food Preparation: Independent  Ability to maintain home (clean home, laundry): Independent  Ability to drive and/or has transportation: Needs Assistance  Ability to do shopping: Needs Assistance  Ability to manage finances: Needs Assistance  Is patient able to live independently?: Yes     Current Housing: Private Residence        Per the Fall Risk Screening, did the patient have 2 or more falls or 1 fall with injury in the past year?: Yes        Are you experiencing loss of meaning?: No  Are you experiencing loss of hope and peace?: No     Thinking about your patient's physical health needs, are there any symptoms or problems (risk indicators) you are unsure about that require further investigation?: No identified areas of uncertainly or problems already being investigated   Are the patients physical health problems impacting on their mental well-being?: Mild impact on mental well-being e.g. \"\"feeling fed-up\"\", \"\"reduced enjoyment\"\"   Are there any problems with your patients lifestyle behaviors (alcohol, drugs, diet, exercise) that are impacting on physical or mental well-being?: Moderate to severe impact on well-being, preventing enjoyment of usual activities   Do you have any other concerns about your patients mental well-being?  How would you rate their severity and impact on the patient?: Mild problems - don't interfere with function   How would you rate their home environment in terms of safety and stability (including domestic violence, insecure housing, neighbor harassment)?: Consistently safe, supportive, stable, no identified problems   How do daily activities impact on the patient's well-being? (include current or anticipated unemployment, work, caregiving, access to transportation or other): No identified problems or perceived positive benefits   How would you rate their social network (family, work, friends)?: Good participation with social networks   How would you rate their financial resources (including ability to afford all required medical care)?: Financially secure, some resource challenges   How wells does the patient now understand their health and well-being (symptoms, signs or risk factors) and what they need to do to manage their health?: Reasonable to good understanding but do not feel able to engage with advice at this time   How well do you think your patient can engage in healthcare discussions? (Barriers include language, deafness, aphasia, alcohol or drug problems, learning difficulties, concentration): Adequate communication, with or without minor barriers   Do other services need to be involved to help this patient?: Other care/services in place and adequate   Are current services involved with this patient well-coordinated? (Include coordination with other services you are now recommendation): Required care/services in place and adequately coordinated   Suggested Interventions and Community Resources   Smoking Cessation: In Process   Zone Management Tools: In Process         Set up/Review an Education Plan, Set up/Review Goals              Prior to Admission medications    Medication Sig Start Date End Date Taking?  Authorizing Provider   fluticasone-salmeterol (WIXELA INHUB) 500-50 MCG/DOSE diskus inhaler Inhale 1 puff into the lungs every 12 hours 7/21/21  Yes Yesika Dubon MD   tiotropium ([place]) 18 MCG inhalation capsule INHALE THE CONTENTS OF 1 CAPSULE EVERY DAY 7/21/21  Yes Yesika Dubon MD   albuterol (PROVENTIL) (2.5 MG/3ML) 0.083% nebulizer solution Take 3 mLs by nebulization every 6 hours as needed for Wheezing or Shortness of Breath DX COPD J44.9 7/21/21  Yes Yesika Dubon MD   guaiFENesin (MUCINEX) 600 MG extended release tablet Take 1 tablet by mouth 2 times daily as needed for Congestion 7/21/21  Yes Yesika Dubon MD   montelukast (SINGULAIR) 10 MG tablet TAKE 1 TABLET BY MOUTH EACH NIGHT 7/21/21  Yes Yesika Dubon MD   pantoprazole (PROTONIX) 40 MG tablet Take 1 tablet by mouth every morning (before breakfast) 6/25/21  Yes Emy Harrison MD   ibuprofen (ADVIL;MOTRIN) 600 MG tablet Take 1 tablet by mouth 3 times daily as needed for Pain 4/23/21  Yes SHAUN Preston CNP   albuterol sulfate HFA (VENTOLIN HFA) 108 (90 Base) MCG/ACT inhaler Inhale 2 puffs into the lungs every 6 hours as needed for Wheezing or Shortness of Breath 3/4/21  Yes Jennifer Emery MD   BD PEN NEEDLE SVETLANA U/F 32G X 4 MM MISC USE ONE DAILY AS DIRECTED 12/11/20  Yes Rodolfo Vance MD   FLUoxetine (PROZAC) 20 MG capsule TAKE 3 CAPSULES BY MOUTH DAILY 12/3/20  Yes Rodolfo Vance MD   traZODone (DESYREL) 150 MG tablet TAKE 2 TABLETS AT BEDTIME 9/11/20  Yes Rodolfo Vance MD   simvastatin (ZOCOR) 20 MG tablet TAKE 1 TABLET EVERY EVENING 8/7/20  Yes Rodolfo Vance MD   busPIRone (BUSPAR) 30 MG tablet TAKE 1 TABLET TWICE DAILY 8/7/20  Yes Rodolfo Vance MD   vitamin D (ERGOCALCIFEROL) 1.25 MG (44229 UT) CAPS capsule Take 1 capsule by mouth once a week for 5 days Every Tuesday 3/23/21 3/28/21  Tanisha Lopez MD   naproxen (NAPROSYN) 500 MG tablet Take 1 tablet by mouth 2 times daily (with meals)  Patient not taking: Reported on 8/4/2021 2/5/21   SHAUN Barbour CNP   teriparatide, recombinant, (FORTEO) 600 MCG/2.4ML SOLN injection Inject 0.08 mLs into the skin daily One dose injected daily.  9/27/19 12/7/20  Sherley Jeff MD       Future Appointments   Date Time Provider Matt Crabtree   12/3/2021 11:00 AM [facility]   12/14/2021  2:45 PM Jennifer Emery MD [facility]     ,   COPD Assessment    Does the patient understand envrionmental exposure?: Yes  Is the patient able to verbalize Rescue vs. Long Acting medications?: Yes  Does the patient have a nebulizer?: Yes  Does the patient use a space with inhaled medications?: Yes     Increase in cough         Symptoms:  COPD associated wheezing: Pos      Symptom course: stable  Breathlessness: exertion  Increase use of rapid acting/rescue inhaled medications?: No  Change in chronic cough?: No/At Baseline  Change in sputum?: No/At Baseline  Sputum characteristics: White  Self Monitoring - SaO2: Yes  Baseline SaO2 Readin      and   General Assessment    Do you have any symptoms that are causing concern?: No

## 2021-08-05 ENCOUNTER — TELEPHONE (OUTPATIENT)
Dept: FAMILY MEDICINE CLINIC | Age: 66
End: 2021-08-05

## 2021-08-05 NOTE — TELEPHONE ENCOUNTER
[facility] Canseco [facility] Transitions Initial Follow Up Call    Outreach made within 2 business days of discharge: Yes    Patient: Angella Estes Patient : 1955   MRN: [id number]  Reason for Admission: There are no discharge diagnoses documented for the most recent discharge. Discharge Date: 21       Spoke with: Patient     Discharge department/facility: [facility] back in July, been being follow by CTN     TCM Interactive Patient Contact:  Was patient able to fill all prescriptions: Yes  Was patient instructed to bring all medications to the follow-up visit: Yes  Is patient taking all medications as directed in the discharge summary?  Yes  Does patient understand their discharge instructions: Yes  Does patient have questions or concerns that need addressed prior to 7-14 day follow up office visit: no    Scheduled appointment with PCP within 7-14 days    Follow Up  Future Appointments   Date Time Provider Matt Crabtree   12/3/2021 11:00 AM [facility] Yadi Cherry   2021  2:45 PM MD LINDA Rodrigues

## 2021-08-05 NOTE — TELEPHONE ENCOUNTER
----- Message from Mino Boyce RN sent at 8/4/2021  4:59 PM EDT -----  Can someone please call and schedule patient a hospital follow up appt please? Thanks.

## 2021-08-10 LAB
AFB CULTURE (MYCOBACTERIA): NORMAL
AFB SMEAR: NORMAL

## 2021-08-11 ENCOUNTER — CARE COORDINATION (OUTPATIENT)
Dept: CARE COORDINATION | Age: 66
End: 2021-08-11

## 2021-08-11 ASSESSMENT — ENCOUNTER SYMPTOMS: DYSPNEA ASSOCIATED WITH: MINIMAL EXERTION

## 2021-08-11 NOTE — CARE COORDINATION
Ambulatory Care Coordination Note  8/11/2021  CM Risk Score: 14  Charlson 10 Year Mortality Risk Score: 23%     ACC: Jer Cheatham RN    Summary Note: ACM called and completed care coordination follow up. Patient said she has had oxygen on all day today. Patient said the humidity in the air has made her SOB which is not uncommon. [facility] was able to complete enrollment in [address] [place] [address] program. Patient said she had smoked one cigarette today and one bowl of THC. Patient said she normally will smoke 3 small bowls of THC a day. ACM encouraged patient to comply with smoking cessation as this will help with breathing difficulty. Patient said she did restart Chantix but has not picked up new Rx from pharmacy. Patient declined any fruther needs at this time. ACM said she would call and follow up with patient in a few weeks    Plan:    F/U call in 2 weeks. Care Coordination Interventions    Program Enrollment: Complex Care  Referral from Primary Care Provider: No  Suggested Interventions and Community Resources  Smoking Cessation: In Process  Zone Management Tools: In Process         Goals Addressed    None         Prior to Admission medications    Medication Sig Start Date End Date Taking? Authorizing Provider   nicotine (NICODERM CQ) 14 MG/24HR Place 1 patch onto the skin daily 8/4/21 9/15/21  Bettina Isaacs MD   varenicline (CHANTIX STARTING MONTH PAK) 0.5 MG X 11 & 1 MG X 42 tablet Take by mouth.  8/4/21   Bettina Isaacs MD   fluticasone-salmeterol [facility] INHUB) 500-50 MCG/DOSE diskus inhaler Inhale 1 puff into the lungs every 12 hours 7/21/21   Barry Marsh MD   tiotropium (SPIRIVA HANDIHALER) 18 MCG inhalation capsule INHALE THE CONTENTS OF 1 CAPSULE EVERY DAY 7/21/21   Barry Marsh MD   albuterol (PROVENTIL) (2.5 MG/3ML) 0.083% nebulizer solution Take 3 mLs by nebulization every 6 hours as needed for Wheezing or Shortness of Breath DX COPD J44.9 7/21/21   Barry Marsh MD   guaiFENesin ([address]) 600 MG extended release tablet Take 1 tablet by mouth 2 times daily as needed for Congestion 7/21/21   Luis E De Paz MD   montelukast (SINGULAIR) 10 MG tablet TAKE 1 TABLET BY MOUTH EACH NIGHT 7/21/21   Luis E De Paz MD   pantoprazole (PROTONIX) 40 MG tablet Take 1 tablet by mouth every morning (before breakfast) 6/25/21   Ella Curiel MD   ibuprofen (ADVIL;MOTRIN) 600 MG tablet Take 1 tablet by mouth 3 times daily as needed for Pain 4/23/21   SHAUN Paredes CNP   vitamin D (ERGOCALCIFEROL) 1.25 MG (41954 UT) CAPS capsule Take 1 capsule by mouth once a week for 5 days Every Tuesday 3/23/21 3/28/21  Laura Marino MD   albuterol sulfate HFA (VENTOLIN HFA) 108 (90 Base) MCG/ACT inhaler Inhale 2 puffs into the lungs every 6 hours as needed for Wheezing or Shortness of Breath 3/4/21   Luis E De Paz MD   naproxen (NAPROSYN) 500 MG tablet Take 1 tablet by mouth 2 times daily (with meals)  Patient not taking: Reported on 8/4/2021 2/5/21   SHAUN Barbour CNP   BD PEN NEEDLE SVETLANA U/F 32G X 4 MM MISC USE ONE DAILY AS DIRECTED 12/11/20   Isabel Smart MD   FLUoxetine (PROZAC) 20 MG capsule TAKE 3 CAPSULES BY MOUTH DAILY 12/3/20   Isabel Smart MD   traZODone (DESYREL) 150 MG tablet TAKE 2 TABLETS AT BEDTIME 9/11/20   Isabel Smart MD   simvastatin (ZOCOR) 20 MG tablet TAKE 1 TABLET EVERY EVENING 8/7/20   Isabel Smart MD   busPIRone (BUSPAR) 30 MG tablet TAKE 1 TABLET TWICE DAILY 8/7/20   Isabel Smart MD   teriparatide, recombinant, (FORTEO) 600 MCG/2.4ML SOLN injection Inject 0.08 mLs into the skin daily One dose injected daily.  9/27/19 12/7/20  Carito Goldberg MD       Future Appointments   Date Time Provider Matt Crabtree   8/12/2021  2:30 PM [facility] [place] [facility]   12/3/2021 11:00 AM [facility] Yadi Rad   12/14/2021  2:45 PM Luis E De Paz MD CLERM PUL[facility]     ,   COPD Assessment    Does the patient understand envrionmental exposure?: Yes  Is the patient able to verbalize Rescue vs. Long Acting medications?: Yes  Does the patient have a nebulizer?: Yes  Does the patient use a space with inhaled medications?: Yes     Shortness of breath (worse than baseline)         Symptoms:  None: Yes      Symptom course: no change  Breathlessness: minimal exertion  Increase use of rapid acting/rescue inhaled medications?: No  Change in chronic cough?: No/At Baseline  Change in sputum?: No/At Baseline  Self Monitoring - SaO2: Yes      and   General Assessment    Do you have any symptoms that are causing concern?: No

## 2021-08-17 ENCOUNTER — VIRTUAL VISIT (OUTPATIENT)
Dept: FAMILY MEDICINE CLINIC | Age: 66
End: 2021-08-17
Payer: MEDICARE

## 2021-08-17 DIAGNOSIS — F12.90 MARIJUANA SMOKER, CONTINUOUS: ICD-10-CM

## 2021-08-17 DIAGNOSIS — J44.1 COPD, FREQUENT EXACERBATIONS (HCC): Primary | ICD-10-CM

## 2021-08-17 DIAGNOSIS — F17.218 CIGARETTE NICOTINE DEPENDENCE WITH OTHER NICOTINE-INDUCED DISORDER: ICD-10-CM

## 2021-08-17 PROCEDURE — 99442 PR PHYS/QHP TELEPHONE EVALUATION 11-20 MIN: CPT | Performed by: FAMILY MEDICINE

## 2021-08-17 RX ORDER — PREDNISONE 10 MG/1
TABLET ORAL
Qty: 42 TABLET | Refills: 0 | Status: ON HOLD | OUTPATIENT
Start: 2021-08-17 | End: 2021-08-29

## 2021-08-17 SDOH — ECONOMIC STABILITY: TRANSPORTATION INSECURITY
IN THE PAST 12 MONTHS, HAS LACK OF TRANSPORTATION KEPT YOU FROM MEETINGS, WORK, OR FROM GETTING THINGS NEEDED FOR DAILY LIVING?: NO

## 2021-08-17 SDOH — ECONOMIC STABILITY: FOOD INSECURITY: WITHIN THE PAST 12 MONTHS, YOU WORRIED THAT YOUR FOOD WOULD RUN OUT BEFORE YOU GOT MONEY TO BUY MORE.: NEVER TRUE

## 2021-08-17 SDOH — ECONOMIC STABILITY: TRANSPORTATION INSECURITY
IN THE PAST 12 MONTHS, HAS THE LACK OF TRANSPORTATION KEPT YOU FROM MEDICAL APPOINTMENTS OR FROM GETTING MEDICATIONS?: NO

## 2021-08-17 SDOH — ECONOMIC STABILITY: FOOD INSECURITY: WITHIN THE PAST 12 MONTHS, THE FOOD YOU BOUGHT JUST DIDN'T LAST AND YOU DIDN'T HAVE MONEY TO GET MORE.: NEVER TRUE

## 2021-08-17 ASSESSMENT — SOCIAL DETERMINANTS OF HEALTH (SDOH): HOW HARD IS IT FOR YOU TO PAY FOR THE VERY BASICS LIKE FOOD, HOUSING, MEDICAL CARE, AND HEATING?: NOT HARD AT ALL

## 2021-08-17 NOTE — PROGRESS NOTES
Chrissie Lopez is a 60 y.o. female evaluated via telephone on 8/17/2021. Consent:  She and/or health care decision maker is aware that that she may receive a bill for this telephone service, depending on her insurance coverage, and has provided verbal consent to proceed: Yes      Documentation:  I communicated with the patient and/or health care decision maker about   Chief Complaint   Patient presents with    COPD      COPD  She complains of chest tightness, difficulty breathing, shortness of breath, sputum production (mostly clear, a little light yellow) and wheezing. This is a new problem. The current episode started 1 to 4 weeks ago. The problem occurs constantly. The problem has been rapidly worsening. Her symptoms are aggravated by exposure to smoke. Her symptoms are alleviated by oral steroids (She is currentl out of them and would like some). Risk factors for lung disease include smoking/tobacco exposure. Her past medical history is significant for bronchitis and COPD. Past medical history comments: Pulmonary fibrosis. Details of this discussion including any medical advice provided:     1. COPD, frequent exacerbations ([address]  As sputum is currently clear, will avoid antibiotics at the moment. Restart oral steroid. Will reach out to Dr. Ervin Gautam to discuss longer term oral steroids for her, which she would very much like to do. She will continue with all her inhalers and will continue with nebulizer use. - predniSONE (DELTASONE) 10 MG tablet; Take 6tab by mouth x2 days, then 5tab x2 days, then 4tabs x2 days, then 3tab x2 days, then 2tab x2 days, then 1tab x2 days. Dispense: 42 tablet; Refill: 0    2. Cigarette nicotine dependence with other nicotine-induced disorder  Discussed various options to assist with smoking cessation including cold turkey, 1-800-QUIT-NOW, nicotine replacement, zyban, and chantix and the risks and benefits of each. I advised patient to quit, and offered support.   Discussed

## 2021-08-17 NOTE — Clinical Note
Hi Dr. Fitz Bean,    I was curious what your thoughts might be about keeping Ryan Thomas on chronic oral steroids at this point? She called in for them again, and I'm concerned about the frequency of her exacerbations and hospitalizations. Do you feel she might be appropriate for this? Unfortunately, she is still smoking marijuana and has started back cigarette smoking with increasing frequency. I have sent in Chantix for her as that has helped in the past.     Thank you for your time, and of course, all that you do!   [employer]

## 2021-08-18 PROBLEM — J44.1 COPD, FREQUENT EXACERBATIONS (HCC): Status: ACTIVE | Noted: 2021-05-24

## 2021-08-18 ASSESSMENT — ENCOUNTER SYMPTOMS
DIFFICULTY BREATHING: 1
SPUTUM PRODUCTION: 1
WHEEZING: 1
SHORTNESS OF BREATH: 1
CHEST TIGHTNESS: 1

## 2021-08-18 ASSESSMENT — COPD QUESTIONNAIRES: COPD: 1

## 2021-08-19 ENCOUNTER — TELEPHONE (OUTPATIENT)
Dept: PULMONOLOGY | Age: 66
End: 2021-08-19

## 2021-08-19 RX ORDER — PREDNISONE 10 MG/1
10 TABLET ORAL DAILY
Qty: 30 TABLET | Refills: 3 | Status: ON HOLD | OUTPATIENT
Start: 2021-08-19 | End: 2021-09-20

## 2021-08-19 NOTE — TELEPHONE ENCOUNTER
Patient informed; rx pended. Appt [facility] 9/15/21.

## 2021-08-26 ENCOUNTER — HOSPITAL ENCOUNTER (INPATIENT)
Age: 66
LOS: 3 days | Discharge: HOME HEALTH | DRG: 190 | End: 2021-08-29
Attending: EMERGENCY MEDICINE | Admitting: INTERNAL MEDICINE
Payer: MEDICARE

## 2021-08-26 ENCOUNTER — APPOINTMENT (OUTPATIENT)
Dept: GENERAL RADIOLOGY | Age: 66
DRG: 190 | End: 2021-08-26
Payer: MEDICARE

## 2021-08-26 DIAGNOSIS — J18.9 ATYPICAL PNEUMONIA: ICD-10-CM

## 2021-08-26 DIAGNOSIS — J44.1 COPD, FREQUENT EXACERBATIONS (HCC): ICD-10-CM

## 2021-08-26 DIAGNOSIS — J44.1 COPD EXACERBATION (HCC): Primary | ICD-10-CM

## 2021-08-26 DIAGNOSIS — D72.829 LEUKOCYTOSIS, UNSPECIFIED TYPE: ICD-10-CM

## 2021-08-26 LAB
A/G RATIO: 1.3 (ref 1.1–2.2)
ALBUMIN SERPL-MCNC: 3.8 G/DL (ref 3.4–5)
ALP BLD-CCNC: 64 U/L (ref 40–129)
ALT SERPL-CCNC: 18 U/L (ref 10–40)
ANION GAP SERPL CALCULATED.3IONS-SCNC: 13 MMOL/L (ref 3–16)
AST SERPL-CCNC: 18 U/L (ref 15–37)
BANDED NEUTROPHILS RELATIVE PERCENT: 3 % (ref 0–7)
BASE EXCESS VENOUS: -1.2 MMOL/L (ref -3–3)
BASE EXCESS VENOUS: -1.8 MMOL/L (ref -3–3)
BASOPHILS ABSOLUTE: 0 K/UL (ref 0–0.2)
BASOPHILS RELATIVE PERCENT: 0 %
BILIRUB SERPL-MCNC: <0.2 MG/DL (ref 0–1)
BILIRUBIN URINE: NEGATIVE
BLOOD, URINE: NEGATIVE
BUN BLDV-MCNC: 12 MG/DL (ref 7–20)
CALCIUM SERPL-MCNC: 9 MG/DL (ref 8.3–10.6)
CARBOXYHEMOGLOBIN: 1.7 % (ref 0–1.5)
CARBOXYHEMOGLOBIN: 2.8 % (ref 0–1.5)
CHLORIDE BLD-SCNC: 97 MMOL/L (ref 99–110)
CLARITY: CLEAR
CO2: 21 MMOL/L (ref 21–32)
COLOR: YELLOW
CREAT SERPL-MCNC: 0.6 MG/DL (ref 0.6–1.2)
EKG ATRIAL RATE: 92 BPM
EKG DIAGNOSIS: NORMAL
EKG P AXIS: 66 DEGREES
EKG P-R INTERVAL: 136 MS
EKG Q-T INTERVAL: 332 MS
EKG QRS DURATION: 76 MS
EKG QTC CALCULATION (BAZETT): 410 MS
EKG R AXIS: 10 DEGREES
EKG T AXIS: 70 DEGREES
EKG VENTRICULAR RATE: 92 BPM
EOSINOPHILS ABSOLUTE: 0 K/UL (ref 0–0.6)
EOSINOPHILS RELATIVE PERCENT: 0 %
GFR AFRICAN AMERICAN: >60
GFR NON-AFRICAN AMERICAN: >60
GLOBULIN: 3 G/DL
GLUCOSE BLD-MCNC: 100 MG/DL (ref 70–99)
GLUCOSE URINE: NEGATIVE MG/DL
HCO3 VENOUS: 21.5 MMOL/L (ref 23–29)
HCO3 VENOUS: 23 MMOL/L (ref 23–29)
HCT VFR BLD CALC: 41.4 % (ref 36–48)
HEMOGLOBIN: 14.5 G/DL (ref 12–16)
KETONES, URINE: NEGATIVE MG/DL
LACTIC ACID: 1.1 MMOL/L (ref 0.4–2)
LEUKOCYTE ESTERASE, URINE: NEGATIVE
LYMPHOCYTES ABSOLUTE: 2.7 K/UL (ref 1–5.1)
LYMPHOCYTES RELATIVE PERCENT: 17 %
MCH RBC QN AUTO: 30.6 PG (ref 26–34)
MCHC RBC AUTO-ENTMCNC: 35 G/DL (ref 31–36)
MCV RBC AUTO: 87.4 FL (ref 80–100)
METHEMOGLOBIN VENOUS: 0.6 %
METHEMOGLOBIN VENOUS: 0.6 %
MICROSCOPIC EXAMINATION: NORMAL
MONOCYTES ABSOLUTE: 0.6 K/UL (ref 0–1.3)
MONOCYTES RELATIVE PERCENT: 4 %
NEUTROPHILS ABSOLUTE: 12.3 K/UL (ref 1.7–7.7)
NEUTROPHILS RELATIVE PERCENT: 76 %
NITRITE, URINE: NEGATIVE
O2 SAT, VEN: 98 %
O2 SAT, VEN: 98 %
O2 THERAPY: ABNORMAL
O2 THERAPY: ABNORMAL
PCO2, VEN: 32.8 MMHG (ref 40–50)
PCO2, VEN: 37 MMHG (ref 40–50)
PDW BLD-RTO: 13.8 % (ref 12.4–15.4)
PH UA: 6.5 (ref 5–8)
PH VENOUS: 7.41 (ref 7.35–7.45)
PH VENOUS: 7.43 (ref 7.35–7.45)
PLATELET # BLD: 360 K/UL (ref 135–450)
PLATELET SLIDE REVIEW: ADEQUATE
PMV BLD AUTO: 6.6 FL (ref 5–10.5)
PO2, VEN: 109.7 MMHG (ref 25–40)
PO2, VEN: 115.4 MMHG (ref 25–40)
POTASSIUM SERPL-SCNC: 4.4 MMOL/L (ref 3.5–5.1)
PRO-BNP: 36 PG/ML (ref 0–124)
PROTEIN UA: NEGATIVE MG/DL
RBC # BLD: 4.74 M/UL (ref 4–5.2)
RBC # BLD: NORMAL 10*6/UL
SARS-COV-2, NAAT: NOT DETECTED
SLIDE REVIEW: ABNORMAL
SODIUM BLD-SCNC: 131 MMOL/L (ref 136–145)
SPECIFIC GRAVITY UA: 1.02 (ref 1–1.03)
TCO2 CALC VENOUS: 23 MMOL/L
TCO2 CALC VENOUS: 24 MMOL/L
TOTAL PROTEIN: 6.8 G/DL (ref 6.4–8.2)
TROPONIN: <0.01 NG/ML
URINE TYPE: NORMAL
UROBILINOGEN, URINE: 0.2 E.U./DL
WBC # BLD: 15.6 K/UL (ref 4–11)

## 2021-08-26 PROCEDURE — 6370000000 HC RX 637 (ALT 250 FOR IP): Performed by: INTERNAL MEDICINE

## 2021-08-26 PROCEDURE — 2700000000 HC OXYGEN THERAPY PER DAY

## 2021-08-26 PROCEDURE — 2580000003 HC RX 258: Performed by: INTERNAL MEDICINE

## 2021-08-26 PROCEDURE — 82803 BLOOD GASES ANY COMBINATION: CPT

## 2021-08-26 PROCEDURE — 84484 ASSAY OF TROPONIN QUANT: CPT

## 2021-08-26 PROCEDURE — 83880 ASSAY OF NATRIURETIC PEPTIDE: CPT

## 2021-08-26 PROCEDURE — 71045 X-RAY EXAM CHEST 1 VIEW: CPT

## 2021-08-26 PROCEDURE — 94660 CPAP INITIATION&MGMT: CPT

## 2021-08-26 PROCEDURE — 6360000002 HC RX W HCPCS: Performed by: EMERGENCY MEDICINE

## 2021-08-26 PROCEDURE — 6370000000 HC RX 637 (ALT 250 FOR IP): Performed by: EMERGENCY MEDICINE

## 2021-08-26 PROCEDURE — 6360000002 HC RX W HCPCS: Performed by: INTERNAL MEDICINE

## 2021-08-26 PROCEDURE — 81003 URINALYSIS AUTO W/O SCOPE: CPT

## 2021-08-26 PROCEDURE — 93005 ELECTROCARDIOGRAM TRACING: CPT

## 2021-08-26 PROCEDURE — 99285 EMERGENCY DEPT VISIT HI MDM: CPT

## 2021-08-26 PROCEDURE — 94640 AIRWAY INHALATION TREATMENT: CPT

## 2021-08-26 PROCEDURE — 87635 SARS-COV-2 COVID-19 AMP PRB: CPT

## 2021-08-26 PROCEDURE — 85025 COMPLETE CBC W/AUTO DIFF WBC: CPT

## 2021-08-26 PROCEDURE — 83605 ASSAY OF LACTIC ACID: CPT

## 2021-08-26 PROCEDURE — 36415 COLL VENOUS BLD VENIPUNCTURE: CPT

## 2021-08-26 PROCEDURE — 94761 N-INVAS EAR/PLS OXIMETRY MLT: CPT

## 2021-08-26 PROCEDURE — 87040 BLOOD CULTURE FOR BACTERIA: CPT

## 2021-08-26 PROCEDURE — 2060000000 HC ICU INTERMEDIATE R&B

## 2021-08-26 PROCEDURE — 96374 THER/PROPH/DIAG INJ IV PUSH: CPT

## 2021-08-26 PROCEDURE — 80053 COMPREHEN METABOLIC PANEL: CPT

## 2021-08-26 RX ORDER — BUDESONIDE AND FORMOTEROL FUMARATE DIHYDRATE 160; 4.5 UG/1; UG/1
2 AEROSOL RESPIRATORY (INHALATION) 2 TIMES DAILY
Status: DISCONTINUED | OUTPATIENT
Start: 2021-08-26 | End: 2021-08-27

## 2021-08-26 RX ORDER — PANTOPRAZOLE SODIUM 40 MG/1
40 TABLET, DELAYED RELEASE ORAL
Status: DISCONTINUED | OUTPATIENT
Start: 2021-08-27 | End: 2021-08-29 | Stop reason: HOSPADM

## 2021-08-26 RX ORDER — SODIUM CHLORIDE 9 MG/ML
25 INJECTION, SOLUTION INTRAVENOUS PRN
Status: DISCONTINUED | OUTPATIENT
Start: 2021-08-26 | End: 2021-08-29 | Stop reason: HOSPADM

## 2021-08-26 RX ORDER — ATORVASTATIN CALCIUM 10 MG/1
10 TABLET, FILM COATED ORAL NIGHTLY
Status: DISCONTINUED | OUTPATIENT
Start: 2021-08-26 | End: 2021-08-29 | Stop reason: HOSPADM

## 2021-08-26 RX ORDER — SODIUM CHLORIDE 0.9 % (FLUSH) 0.9 %
5-40 SYRINGE (ML) INJECTION EVERY 12 HOURS SCHEDULED
Status: DISCONTINUED | OUTPATIENT
Start: 2021-08-26 | End: 2021-08-29 | Stop reason: HOSPADM

## 2021-08-26 RX ORDER — POLYETHYLENE GLYCOL 3350 17 G/17G
17 POWDER, FOR SOLUTION ORAL DAILY PRN
Status: DISCONTINUED | OUTPATIENT
Start: 2021-08-26 | End: 2021-08-29 | Stop reason: HOSPADM

## 2021-08-26 RX ORDER — BUSPIRONE HYDROCHLORIDE 5 MG/1
30 TABLET ORAL 2 TIMES DAILY
Status: DISCONTINUED | OUTPATIENT
Start: 2021-08-26 | End: 2021-08-29 | Stop reason: HOSPADM

## 2021-08-26 RX ORDER — TRAZODONE HYDROCHLORIDE 50 MG/1
300 TABLET ORAL NIGHTLY
Status: DISCONTINUED | OUTPATIENT
Start: 2021-08-26 | End: 2021-08-29 | Stop reason: HOSPADM

## 2021-08-26 RX ORDER — DOXYCYCLINE HYCLATE 100 MG
100 TABLET ORAL EVERY 12 HOURS
Status: DISCONTINUED | OUTPATIENT
Start: 2021-08-26 | End: 2021-08-27

## 2021-08-26 RX ORDER — ONDANSETRON 4 MG/1
4 TABLET, ORALLY DISINTEGRATING ORAL EVERY 8 HOURS PRN
Status: DISCONTINUED | OUTPATIENT
Start: 2021-08-26 | End: 2021-08-29 | Stop reason: HOSPADM

## 2021-08-26 RX ORDER — IPRATROPIUM BROMIDE AND ALBUTEROL SULFATE 2.5; .5 MG/3ML; MG/3ML
1 SOLUTION RESPIRATORY (INHALATION)
Status: DISCONTINUED | OUTPATIENT
Start: 2021-08-26 | End: 2021-08-26

## 2021-08-26 RX ORDER — METHYLPREDNISOLONE SODIUM SUCCINATE 40 MG/ML
40 INJECTION, POWDER, LYOPHILIZED, FOR SOLUTION INTRAMUSCULAR; INTRAVENOUS EVERY 6 HOURS
Status: DISCONTINUED | OUTPATIENT
Start: 2021-08-26 | End: 2021-08-28

## 2021-08-26 RX ORDER — ACETAMINOPHEN 650 MG/1
650 SUPPOSITORY RECTAL EVERY 6 HOURS PRN
Status: DISCONTINUED | OUTPATIENT
Start: 2021-08-26 | End: 2021-08-29 | Stop reason: HOSPADM

## 2021-08-26 RX ORDER — SODIUM CHLORIDE 0.9 % (FLUSH) 0.9 %
5-40 SYRINGE (ML) INJECTION PRN
Status: DISCONTINUED | OUTPATIENT
Start: 2021-08-26 | End: 2021-08-29 | Stop reason: HOSPADM

## 2021-08-26 RX ORDER — ONDANSETRON 2 MG/ML
4 INJECTION INTRAMUSCULAR; INTRAVENOUS EVERY 6 HOURS PRN
Status: DISCONTINUED | OUTPATIENT
Start: 2021-08-26 | End: 2021-08-29 | Stop reason: HOSPADM

## 2021-08-26 RX ORDER — MONTELUKAST SODIUM 10 MG/1
10 TABLET ORAL NIGHTLY
Status: DISCONTINUED | OUTPATIENT
Start: 2021-08-26 | End: 2021-08-29 | Stop reason: HOSPADM

## 2021-08-26 RX ORDER — PREDNISONE 20 MG/1
40 TABLET ORAL DAILY
Status: DISCONTINUED | OUTPATIENT
Start: 2021-08-29 | End: 2021-08-28

## 2021-08-26 RX ORDER — IPRATROPIUM BROMIDE AND ALBUTEROL SULFATE 2.5; .5 MG/3ML; MG/3ML
1 SOLUTION RESPIRATORY (INHALATION)
Status: DISCONTINUED | OUTPATIENT
Start: 2021-08-26 | End: 2021-08-29 | Stop reason: HOSPADM

## 2021-08-26 RX ORDER — FLUOXETINE HYDROCHLORIDE 20 MG/1
60 CAPSULE ORAL DAILY
Status: DISCONTINUED | OUTPATIENT
Start: 2021-08-27 | End: 2021-08-29 | Stop reason: HOSPADM

## 2021-08-26 RX ORDER — ACETAMINOPHEN 325 MG/1
650 TABLET ORAL EVERY 6 HOURS PRN
Status: DISCONTINUED | OUTPATIENT
Start: 2021-08-26 | End: 2021-08-29 | Stop reason: HOSPADM

## 2021-08-26 RX ORDER — METHYLPREDNISOLONE SODIUM SUCCINATE 125 MG/2ML
125 INJECTION, POWDER, LYOPHILIZED, FOR SOLUTION INTRAMUSCULAR; INTRAVENOUS ONCE
Status: COMPLETED | OUTPATIENT
Start: 2021-08-26 | End: 2021-08-26

## 2021-08-26 RX ADMIN — MONTELUKAST SODIUM 10 MG: 10 TABLET ORAL at 19:38

## 2021-08-26 RX ADMIN — BUSPIRONE HYDROCHLORIDE 30 MG: 5 TABLET ORAL at 20:45

## 2021-08-26 RX ADMIN — ENOXAPARIN SODIUM 40 MG: 40 INJECTION SUBCUTANEOUS at 18:25

## 2021-08-26 RX ADMIN — TRAZODONE HYDROCHLORIDE 300 MG: 50 TABLET ORAL at 19:38

## 2021-08-26 RX ADMIN — IPRATROPIUM BROMIDE AND ALBUTEROL SULFATE 1 AMPULE: .5; 3 SOLUTION RESPIRATORY (INHALATION) at 10:15

## 2021-08-26 RX ADMIN — Medication 2 PUFF: at 20:11

## 2021-08-26 RX ADMIN — IPRATROPIUM BROMIDE AND ALBUTEROL SULFATE 1 AMPULE: .5; 3 SOLUTION RESPIRATORY (INHALATION) at 20:07

## 2021-08-26 RX ADMIN — METHYLPREDNISOLONE SODIUM SUCCINATE 125 MG: 125 INJECTION, POWDER, FOR SOLUTION INTRAMUSCULAR; INTRAVENOUS at 10:16

## 2021-08-26 RX ADMIN — METHYLPREDNISOLONE SODIUM SUCCINATE 40 MG: 40 INJECTION, POWDER, FOR SOLUTION INTRAMUSCULAR; INTRAVENOUS at 19:41

## 2021-08-26 RX ADMIN — IPRATROPIUM BROMIDE AND ALBUTEROL SULFATE 1 AMPULE: .5; 3 SOLUTION RESPIRATORY (INHALATION) at 15:30

## 2021-08-26 RX ADMIN — ATORVASTATIN CALCIUM 10 MG: 10 TABLET, FILM COATED ORAL at 19:38

## 2021-08-26 RX ADMIN — DOXYCYCLINE HYCLATE 100 MG: 100 TABLET, COATED ORAL at 18:01

## 2021-08-26 RX ADMIN — Medication 10 ML: at 19:39

## 2021-08-26 ASSESSMENT — ENCOUNTER SYMPTOMS
RHINORRHEA: 0
SORE THROAT: 0
SHORTNESS OF BREATH: 1
EYE REDNESS: 0
PHOTOPHOBIA: 0
COUGH: 0
RECTAL PAIN: 0
DIARRHEA: 0
COLOR CHANGE: 0
WHEEZING: 0
VOICE CHANGE: 0
CONSTIPATION: 0
SINUS PRESSURE: 0
CHEST TIGHTNESS: 0
APNEA: 0
EYE DISCHARGE: 0
STRIDOR: 0
NAUSEA: 0
TROUBLE SWALLOWING: 0
BLOOD IN STOOL: 0
ABDOMINAL PAIN: 0
ABDOMINAL DISTENTION: 0
EYE PAIN: 0
VOMITING: 0
BACK PAIN: 0

## 2021-08-26 ASSESSMENT — PAIN SCALES - GENERAL: PAINLEVEL_OUTOF10: 0

## 2021-08-26 NOTE — ED NOTES
[address] @ 7919  Re: admission  Dr. Harriet Lopez @ [address] [facility]  08/26/21 4944

## 2021-08-26 NOTE — PROGRESS NOTES
08/26/21 1020   NIV Type   $NIV $Daily Charge   Skin Protection for O2 Device Yes   Location Nose   NIV Started/Stopped On   Equipment Type v60   Mode Bilevel   Mask Type Full face mask   Mask Size Medium   Settings/Measurements   IPAP 12 cmH20   CPAP/EPAP 6 cmH2O   Rate Ordered 12   Resp 16  (Simultaneous filing.  User may not have seen previous data.)   FiO2  40 %  (initially 50%)   Vt Exhaled 586 mL   Minute Volume 16 Liters   Mask Leak (lpm) 0 lpm   Comfort Level Good   Using Accessory Muscles Yes   SpO2 98   Breath Sounds   Right Upper Lobe Expiratory Wheezes   Right Middle Lobe Diminished   Right Lower Lobe Expiratory Wheezes   Left Upper Lobe Diminished   Left Lower Lobe Diminished   Patient Observation   Observations mepilex on nose, hhn in line   Alarm Settings   Alarms On Y   Press Low Alarm 6 cmH2O   High Pressure Alarm 30 cmH2O   Delay Alarm 20 sec(s)   Apnea (secs) 20 secs   Resp Rate Low Alarm 6   High Respiratory Rate 40 br/min

## 2021-08-26 NOTE — ED NOTES
Pt is an ED hold at this time. Changed VS per inpatient VS policy. Pt alert and oriented, VSS with RR 24-28. Pt currently on 3LO2 NC which is her baseline. Pt requesting to not be put back on BiPAP until after she eats dinner. Pt on an adult general diet. Dinner tray ordered. Pt states she is feeling better than when she first came in. All inpatient orders are released. Fall precautions in place. Will continue to monitor.      Lizet Pacheco RN  08/26/21 1418

## 2021-08-26 NOTE — H&P
Hospital Medicine History & Physical      PCP: Audrey Betancur MD    Date of Admission: 8/26/2021    Date of Service: Pt seen/examined on 8/26/2021 and Admitted to Inpatient with expected LOS greater than two midnights due to medical therapy. Chief Complaint: Shortness of breath      History Of Present Illness:  60 y.o. female who presented to [address] [place] [address] with above complaints  Patient with PMH of severe COPD, depression, fibromyalgia, ILD, tobacco dependence presented to the ED today with complaints of shortness of breath. Patient reports ongoing shortness of breath for the last 4 weeks, progressively worsening. Has nonproductive cough. No subjective fevers chills or rigors. No chest pain. We will seen by PCP on 8/17. Was prescribed a course of oral steroids and has been taking her nebulizers and inhalers at home without much relief. Continues to smoke. Chantix was prescribed by her PCP which the patient has been taking. Also smokes marijuana. Patient does not use home oxygen all the time, only uses it at night.     Past Medical History:          Diagnosis Date    Allergic rhinitis 6/11/2010    Anxiety     Arthritis     Aspiration pneumonia ([address] 3/21/2012    Recurrent    Asthma     Bronchitis chronic     COPD (chronic obstructive pulmonary disease) ([address] 12/3/2009    Depression     Drug abuse, cocaine type (HCC)     past history of crack cocaine use    Emphysema of lung (HCC)     Fibromyalgia     GERD (gastroesophageal reflux disease)     Hyperlipidemia     Influenza A 03/05/2020    Lung disease     On home oxygen therapy     uses O2 NC 3L prn at night    Osteoporosis     Pneumonia     Polysubstance dependence ([address] 1/2/2012    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    Pulmonary fibrosis ([address] 10/16/2014    Pulmonary infiltrate     Pulmonary nodule 12/3/2009    Tobacco abuse        Past Surgical History:          Procedure Laterality Date    BLADDER REPAIR      BRONCHOSCOPY      BRONCHOSCOPY N/A 3/6/2020    BRONCHOSCOPY THERAPUTIC ASPIRATION INITIAL performed by Elliott Moya MD at [address] [place] [address]  3/6/2020    BRONCHOSCOPY ALVEOLAR LAVAGE performed by Elliott Moya MD at [address] [place] [address] N/A 6/26/2020    BRONCHOSCOPY THERAPUTIC ASPIRATION INITIAL performed by Azael Pedro MD at [address] [place] [address]  6/26/2020    BRONCHOSCOPY ALVEOLAR LAVAGE performed by Azael Pedro MD at [address] [place] [address]  06/23/2021    Dr Moisés Pastor N/A 6/23/2021    BRONCHOSCOPY DIAGNOSTIC OR CELL [address] performed by Azael Pedro MD at [address] [place] [address]  6/23/2021    BRONCHOSCOPY THERAPUTIC ASPIRATION INITIAL performed by Azael Pedro MD at [address] [place] [address]  6/23/2021    BRONCHOSCOPY ALVEOLAR LAVAGE performed by Azael Pedro MD at [address] [address]    ENDOSCOPY, COLON, DIAGNOSTIC      ESOPHAGEAL DILATATION  9/20/2018    ESOPHAGEAL DILATION Charmian Manning performed by Teressa Vitale MD at [address] [facility] HISTORY  2/12/15    T8 Kyphoplasty    HI COLONOSCOPY FLX DX W/COLLJ SPEC WHEN PFRMD N/A 9/20/2018    EGD AND COLONOSCOPY WITH ANESTHESIA performed by Teressa Vitale MD at [address] [facility] ESOPHAGOGASTRODUODENOSCOPY TRANSORAL DIAGNOSTIC N/A 9/20/2018    EGD AND COLONOSCOPY WITH ANESTHESIA performed by Teressa Vitale MD at [address]         Medications Prior to Admission:      Prior to Admission medications    Medication Sig Start Date End Date Taking?  Authorizing Provider   predniSONE (DELTASONE) 10 MG tablet Take 1 tablet by mouth daily 8/19/21 9/18/21  Africa Mclaughlin MD   predniSONE (DELTASONE) 10 MG tablet Take 6tab by mouth x2 days, then 5tab x2 days, then 4tabs x2 days, then 3tab x2 days, then 2tab x2 days, then 1tab x2 days. 8/17/21 8/27/21  Sirisha Dacosta MD   varenicline (CHANTIX STARTING MONTH PAK) 0.5 MG X 11 & 1 MG X 42 tablet Take by mouth.  8/4/21   Sirisha Dacosta MD   fluticasone-salmeterol [facility] IN[facility]UB) 500-50 MCG/DOSE diskus inhaler Inhale 1 puff into the lungs every 12 hours 7/21/21   Nader Bueno MD   tiotropium (Tita Neer) 18 MCG inhalation capsule INHALE THE CONTENTS OF 1 CAPSULE EVERY DAY 7/21/21   Nader Bueno MD   albuterol (PROVENTIL) (2.5 MG/3ML) 0.083% nebulizer solution Take 3 mLs by nebulization every 6 hours as needed for Wheezing or Shortness of Breath DX COPD J44.9 7/21/21   Nader Bueno MD   guaiFENesin (MUCINEX) 600 MG extended release tablet Take 1 tablet by mouth 2 times daily as needed for Congestion  Patient not taking: Reported on 8/17/2021 7/21/21   Nader Bueno MD   montelukast (SINGULAIR) 10 MG tablet TAKE 1 TABLET BY MOUTH EACH NIGHT 7/21/21   Nader Bueno MD   pantoprazole (PROTONIX) 40 MG tablet Take 1 tablet by mouth every morning (before breakfast) 6/25/21   Katy Harrison MD   ibuprofen (ADVIL;MOTRIN) 600 MG tablet Take 1 tablet by mouth 3 times daily as needed for Pain  Patient not taking: Reported on 8/17/2021 4/23/21   SHAUN Gonzalez CNP   albuterol sulfate HFA (VENTOLIN HFA) 108 (90 Base) MCG/ACT inhaler Inhale 2 puffs into the lungs every 6 hours as needed for Wheezing or Shortness of Breath 3/4/21   Nader Bueno MD   naproxen (NAPROSYN) 500 MG tablet Take 1 tablet by mouth 2 times daily (with meals) 2/5/21   SHAUN Barbour CNP   BD PEN NEEDLE SVETLANA U/F 32G X 4 MM MISC USE ONE DAILY AS DIRECTED 12/11/20   Sirisha Dacosta MD   FLUoxetine (PROZAC) 20 MG capsule TAKE 3 CAPSULES BY MOUTH DAILY 12/3/20   Sirisha Dacosta MD   traZODone (DESYREL) 150 MG tablet TAKE 2 TABLETS AT BEDTIME 9/11/20   Sirisha Dacosta MD   simvastatin (ZOCOR) 20 MG tablet TAKE 1 TABLET EVERY EVENING 8/7/20   Sirisha Dacosta MD   busPIRone (BUSPAR) 30 MG tablet TAKE 1 TABLET TWICE DAILY 8/7/20   Lottie Christian MD   teriparatide, recombinant, (FORTEO) 600 MCG/2.4ML SOLN injection Inject 0.08 mLs into the skin daily One dose injected daily. 9/27/19 12/7/20  Shannon Ashby MD       Allergies:  Meperidine and Demerol    Social History:      The patient currently lives at home    TOBACCO:   reports that she has been smoking cigarettes. She started smoking about 49 years ago. She has a 20.00 pack-year smoking history. She has never used smokeless tobacco.  ETOH:   reports no history of alcohol use. E-Cigarettes/Vaping Use     Questions Responses    E-Cigarette/Vaping Use Never User    Start Date     Passive Exposure     Quit Date     Counseling Given     Comments Unknown            Family History:    Positive as follows:        Problem Relation Age of Onset    Asthma Mother     Diabetes Mother     Emphysema Mother     Heart Failure Mother     Hypertension Mother     Heart Disease Mother     High Cholesterol Mother     Cancer Mother     Depression Mother     Diabetes Father     Emphysema Father     Heart Failure Father     Hypertension Father     Heart Disease Father     High Cholesterol Father     Substance Abuse Father     Emphysema Paternal Grandfather     Diabetes Sister     High Cholesterol Sister     Vision Loss Maternal Uncle        REVIEW OF SYSTEMS COMPLETED:   Pertinent positives as noted in the HPI. All other systems reviewed and negative. PHYSICAL EXAM PERFORMED:    /79   Pulse 92   Temp 98.9 °F (37.2 °C) (Oral)   Resp 23   Wt 156 lb (70.8 kg)   SpO2 97%   BMI 25.96 kg/m²     General appearance:  No apparent distress, appears stated age and cooperative. HEENT:  Normal cephalic, atraumatic without obvious deformity. Pupils equal, round, and reactive to light. Extra ocular muscles intact. Conjunctivae/corneas clear. Neck: Supple, with full range of motion. No jugular venous distention. Trachea midline.   Respiratory: Tachypneic, scattered bilateral wheeze to auscultation with prolonged expiratory phase, bilateral equal air entry  Cardiovascular:  Regular rate and rhythm with normal S1/S2 without murmurs, rubs or gallops. Abdomen: Soft, non-tender, non-distended with normal bowel sounds. Musculoskeletal:  No clubbing, cyanosis or edema bilaterally. Full range of motion without deformity. Skin: Skin color, texture, turgor normal.  No rashes or lesions. Neurologic:  Neurovascularly intact without any focal sensory/motor deficits. Cranial nerves: II-XII intact, grossly non-focal.  Psychiatric:  Alert and oriented, thought content appropriate, normal insight  Capillary Refill: Brisk,3 seconds, normal  Peripheral Pulses: +2 palpable, equal bilaterally       Labs:     Recent Labs     08/26/21  1007   WBC 15.6*   HGB 14.5   HCT 41.4        Recent Labs     08/26/21  1007   *   K 4.4   CL 97*   CO2 21   BUN 12   CREATININE 0.6   CALCIUM 9.0     Recent Labs     08/26/21  1007   AST 18   ALT 18   BILITOT <0.2   ALKPHOS 64     No results for input(s): INR in the last 72 hours.   Recent Labs     08/26/21  1007   TROPONINI <0.01       Urinalysis:      Lab Results   Component Value Date    NITRU Negative 06/07/2021    WBCUA 0-3 04/13/2012    RBCUA 3-5 04/13/2012    BLOODU Negative 06/07/2021    SPECGRAV 1.015 06/07/2021    GLUCOSEU Negative 06/07/2021    GLUCOSEU NEGATIVE 04/13/2012       Radiology:     CXR: I have reviewed the CXR with the following interpretation: Interstitial opacities similar to prior chest x-ray which I reviewed personally    EKG:  I have reviewed the EKG with the following interpretation: Sinus tachycardia    XR CHEST PORTABLE   Final Result   Peripheral ground-glass opacities suspicious for viral/atypical pattern of   pneumonia             ASSESSMENT:PLAN:    COPD exacerbation   Acute on chronic respiratory failure   Stage 3 severe COPD by GOLD classification   - inhaled bronchodilator nebs Q4H scheduled with duonebs  - inhaled Symbicort BID  - systemic steroids with iv solumedrol  - po antibiotics doxy 100 mg bid  -Currently on BiPAP support, continue . Check repeat gas in few hours . supplemental O2 to keep sats > 88%  - low suspicion for acute PE  -Rapid Covid negative, patient is vaccinated  -Pulmonary consult  -Marijuana smoking cessation counseling given    Tobacco dependence-counseled cessation. Chantix    Depression/anxiety -mood stable, resume home medication regimen    ILD (interstitial lung disease)/pulmonary fibrosis -evident on CXR similar to prior. Also seen on CT back in March 2021. Likely related to smoking tobacco and marijuana. Hyperlipidemia  -Resume statin     Hypertension  -Controlled, resume atenolol     DVT Prophylaxis: Lovenox  Diet: No diet orders on file  Code Status: Full code  PT/OT Eval Status: Ambulatory    Dispo -IP stay    Total critical care time caring for this patient with life threatening, unstable organ failure, including direct patient contact, management of life support systems, review of data including imaging and labs, discussions with other team members and physicians at least 35 min so far today, excluding procedures. Zoltan Nunez MD    Thank you Theo Millan MD for the opportunity to be involved in this patient's care. If you have any questions or concerns please feel free to contact me at [phone].

## 2021-08-26 NOTE — ED NOTES
Pt still requesting to stay off of BiPAP at this time so she can drink a Pepsi. States she will call when ready to get back on. SpO2 96% on 3LO2 NC.      Brianda Vital RN  08/26/21 0990

## 2021-08-26 NOTE — PROGRESS NOTES
08/26/21 1534   NIV Type   Skin Protection for O2 Device Yes   Location Nose   NIV Started/Stopped On   Equipment Type v60   Mode Bilevel   Mask Type Full face mask   Mask Size Medium   Settings/Measurements   IPAP 12 cmH20   CPAP/EPAP 6 cmH2O   Resp 23   FiO2  30 %   Vt Exhaled 345 mL   Minute Volume 10.8 Liters   Mask Leak (lpm) 26 lpm   Comfort Level Good   Using Accessory Muscles No   SpO2 97   Breath Sounds   Right Upper Lobe Expiratory Wheezes   Right Middle Lobe Diminished   Right Lower Lobe Expiratory Wheezes; Diminished   Left Upper Lobe Diminished   Left Lower Lobe Diminished   Patient Observation   Observations mepilex on nose, hhn with bipap   Alarm Settings   Alarms On Y   Press Low Alarm 6 cmH2O   High Pressure Alarm 30 cmH2O   Delay Alarm 20 sec(s)   Apnea (secs) 20 secs   Resp Rate Low Alarm 6   High Respiratory Rate 40 br/min

## 2021-08-26 NOTE — ED PROVIDER NOTES
Brennon Vega is a 60year old female with a history of COPD who presents to the ED with increased SOB x1 day. She was prescribed a steroid last week, and has not yet completed it. She is on home oxygen at night only and as needed. She has a cough, but denies chest pain. She was last hospitalized 2 months ago by her report. .      BP (!) 135/97   Pulse 104   Temp 98.9 °F (37.2 °C) (Oral)   Resp (!) 34   Wt 156 lb (70.8 kg)   SpO2 91%   BMI 25.96 kg/m²     I have reviewed the following from the nursing documentation:      Prior to Admission medications    Medication Sig Start Date End Date Taking? Authorizing Provider   predniSONE (DELTASONE) 10 MG tablet Take 1 tablet by mouth daily 8/19/21 9/18/21  Helen Nogueira MD   predniSONE (DELTASONE) 10 MG tablet Take 6tab by mouth x2 days, then 5tab x2 days, then 4tabs x2 days, then 3tab x2 days, then 2tab x2 days, then 1tab x2 days. 8/17/21 8/27/21  Rdaha Regalado MD   varenicline (CHANTIX STARTING MONTH PAK) 0.5 MG X 11 & 1 MG X 42 tablet Take by mouth.  8/4/21   Radha Regalado MD   fluticasone-salmeterol [facility] IN[facility]UB) 500-50 MCG/DOSE diskus inhaler Inhale 1 puff into the lungs every 12 hours 7/21/21   Helen Nogueira MD   tiotropium (SPIRIVA HANDIHALER) 18 MCG inhalation capsule INHALE THE CONTENTS OF 1 CAPSULE EVERY DAY 7/21/21   Helen Nogueira MD   albuterol (PROVENTIL) (2.5 MG/3ML) 0.083% nebulizer solution Take 3 mLs by nebulization every 6 hours as needed for Wheezing or Shortness of Breath DX COPD J44.9 7/21/21   Helen Nogueira MD   guaiFENesin (MUCINEX) 600 MG extended release tablet Take 1 tablet by mouth 2 times daily as needed for Congestion  Patient not taking: Reported on 8/17/2021 7/21/21   Helen Nogueira MD   montelukast (SINGULAIR) 10 MG tablet TAKE 1 TABLET BY MOUTH EACH NIGHT 7/21/21   Helen Nogueira MD   pantoprazole (PROTONIX) 40 MG tablet Take 1 tablet by mouth every morning (before breakfast) 6/25/21   Ella Mujica MD   ibuprofen (ADVIL;MOTRIN) 600 MG tablet Take 1 tablet by mouth 3 times daily as needed for Pain  Patient not taking: Reported on 8/17/2021 4/23/21   SHAUN Holt CNP   albuterol sulfate HFA (VENTOLIN HFA) 108 (90 Base) MCG/ACT inhaler Inhale 2 puffs into the lungs every 6 hours as needed for Wheezing or Shortness of Breath 3/4/21   Parul Noel MD   naproxen (NAPROSYN) 500 MG tablet Take 1 tablet by mouth 2 times daily (with meals) 2/5/21   SHAUN Barbour CNP   BD PEN NEEDLE SVETLANA U/F 32G X 4 MM MISC USE ONE DAILY AS DIRECTED 12/11/20   Yao Underwood MD   FLUoxetine (PROZAC) 20 MG capsule TAKE 3 CAPSULES BY MOUTH DAILY 12/3/20   Yao Underwood MD   traZODone (DESYREL) 150 MG tablet TAKE 2 TABLETS AT BEDTIME 9/11/20   Yao Underwood MD   simvastatin (ZOCOR) 20 MG tablet TAKE 1 TABLET EVERY EVENING 8/7/20   Yao Underwood MD   busPIRone (BUSPAR) 30 MG tablet TAKE 1 TABLET TWICE DAILY 8/7/20   Yao Underwood MD   teriparatide, recombinant, (FORTEO) 600 MCG/2.4ML SOLN injection Inject 0.08 mLs into the skin daily One dose injected daily.  9/27/19 12/7/20  Rayo Cannon MD       Allergies as of 08/26/2021 - Fully Reviewed 08/26/2021   Allergen Reaction Noted    Meperidine Anaphylaxis 02/12/2015    Demerol Hives 12/03/2009       Past Medical History:   Diagnosis Date    Allergic rhinitis 6/11/2010    Anxiety     Arthritis     Aspiration pneumonia ([address] 3/21/2012    Recurrent    Asthma     Bronchitis chronic     COPD (chronic obstructive pulmonary disease) ([address] 12/3/2009    Depression     Drug abuse, cocaine type ([address]     past history of crack cocaine use    Emphysema of lung (HCC)     Fibromyalgia     GERD (gastroesophageal reflux disease)     Hyperlipidemia     Influenza A 03/05/2020    Lung disease     On home oxygen therapy     uses O2 NC 3L prn at night    Osteoporosis     Pneumonia     Polysubstance dependence ([address] 1/2/2012    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    Pulmonary fibrosis ([facility] 10/16/2014    Pulmonary infiltrate     Pulmonary nodule 12/3/2009    Tobacco abuse         Surgical History:   Past Surgical History:   Procedure Laterality Date    BLADDER REPAIR      BRONCHOSCOPY      BRONCHOSCOPY N/A 3/6/2020    BRONCHOSCOPY THERAPUTIC ASPIRATION INITIAL performed by Severa Blue, MD at [address]  3/6/2020    BRONCHOSCOPY ALVEOLAR LAVAGE performed by Severa Blue, MD at [address] N/A 6/26/2020    BRONCHOSCOPY THERAPUTIC ASPIRATION INITIAL performed by Wyatt Moralez MD at [address]  6/26/2020    BRONCHOSCOPY ALVEOLAR LAVAGE performed by Wyatt Moralez MD at [address]  06/23/2021    Dr Yumi Kenny N/A 6/23/2021    BRONCHOSCOPY DIAGNOSTIC OR CELL [address] performed by Wyatt Moralez MD at [address]  6/23/2021    BRONCHOSCOPY THERAPUTIC ASPIRATION INITIAL performed by Wyatt Moralez MD at [address]  6/23/2021    BRONCHOSCOPY ALVEOLAR LAVAGE performed by Wyatt Moralez MD at [address]  2012    ENDOSCOPY, COLON, DIAGNOSTIC      ESOPHAGEAL DILATATION  9/20/2018    ESOPHAGEAL DILATION Maki Adame performed by Iza Guzman MD at [address] [facility] OTHER SURGICAL HISTORY  2/12/15    T8 Kyphoplasty    GA COLONOSCOPY FLX DX W/COLLJ SPEC WHEN PFRMD N/A 9/20/2018    EGD AND COLONOSCOPY WITH ANESTHESIA performed by Iza Guzman MD at [address] [facility] ESOPHAGOGASTRODUODENOSCOPY TRANSORAL DIAGNOSTIC N/A 9/20/2018    EGD AND COLONOSCOPY WITH ANESTHESIA performed by Iza Guzman MD at [address] [facility]          Family History:    Family History   Problem Relation Age of Onset    Asthma Mother     Diabetes Mother     Emphysema Mother     Heart Failure Mother     Hypertension Mother     Heart Disease Mother     High Cholesterol Mother     Cancer Mother     Depression Mother     Diabetes Father     Emphysema Father     Heart Failure Father     Hypertension Father     Heart Disease Father     High Cholesterol Father     Substance Abuse Father     Emphysema Paternal Grandfather     Diabetes Sister     High Cholesterol Sister     Vision Loss Maternal Uncle        Social History     Socioeconomic History    Marital status:      Spouse name: Not on file    Number of children: 3    Years of education: Not on file    Highest education level: Not on file   Occupational History    Occupation: Disabled     Comment:    Tobacco Use    Smoking status: Current Every Day Smoker     Packs/day: 0.50     Years: 40.00     Pack years: 20.00     Types: Cigarettes     Start date: 1972     Last attempt to quit: 2021     Years since quittin.1    Smokeless tobacco: Never Used    Tobacco comment: 0.5 PPD restarted   Vaping Use    Vaping Use: Never used   Substance and Sexual Activity    Alcohol use: No     Alcohol/week: 0.0 standard drinks    Drug use: Yes     Types: Marijuana     Comment: Daily    Sexual activity: Yes     Partners: Male   Other Topics Concern    Not on file   Social History Narrative    Not on file     Social Determinants of Health     Financial Resource Strain: Low Risk     Difficulty of Paying Living Expenses: Not hard at all   Food Insecurity: No Food Insecurity    Worried About Running Out of Food in the Last Year: Never true    Erica of Food in the Last Year: Never true   Transportation Needs: No Transportation Needs    Lack of Transportation (Medical): No    Lack of Transportation (Non-Medical):  No   Physical Activity:     Days of Exercise per Week:     Minutes of Exercise per Session:    Stress:     Feeling of Stress :    Social Connections:     Frequency of Communication with Friends and Family:     Frequency of Social Gatherings with Friends and Family:     Attends Jain Services:     Active Member of Clubs or Organizations:     Attends Club or Organization Meetings:     Marital Status:    Intimate Partner Violence:     Fear of Current or Ex-Partner:     Emotionally Abused:     Physically Abused:     Sexually Abused:              Review of Systems   Constitutional: Negative for activity change, appetite change, chills, diaphoresis, fatigue, fever and unexpected weight change. HENT: Negative for congestion, ear pain, mouth sores, rhinorrhea, sinus pressure, sore throat, tinnitus, trouble swallowing and voice change. Eyes: Negative for photophobia, pain, discharge, redness and visual disturbance. Respiratory: Positive for shortness of breath. Negative for apnea, cough, chest tightness, wheezing and stridor. Cardiovascular: Negative for chest pain, palpitations and leg swelling. Gastrointestinal: Negative for abdominal distention, abdominal pain, blood in stool, constipation, diarrhea, nausea, rectal pain and vomiting. Genitourinary: Negative for difficulty urinating, dyspareunia, dysuria, flank pain, frequency, genital sores, menstrual problem, pelvic pain, urgency, vaginal bleeding, vaginal discharge and vaginal pain. Musculoskeletal: Negative for arthralgias, back pain, joint swelling, neck pain and neck stiffness. Skin: Negative for color change and rash. Neurological: Negative for dizziness, tremors, seizures, syncope, facial asymmetry, speech difficulty, weakness, light-headedness, numbness and headaches. Hematological: Negative for adenopathy. Does not bruise/bleed easily. Psychiatric/Behavioral: Negative for agitation, confusion, dysphoric mood, hallucinations, self-injury, sleep disturbance and suicidal ideas. All other systems reviewed and are negative. Physical Exam  Constitutional:       General: She is in acute distress. Appearance: She is well-developed.    HENT:      Head: Normocephalic and atraumatic. Left Ear: External ear normal.      Mouth/Throat:      Pharynx: No oropharyngeal exudate. Eyes:      General:         Right eye: No discharge. Left eye: No discharge. Conjunctiva/sclera: Conjunctivae normal.      Pupils: Pupils are equal, round, and reactive to light. Neck:      Vascular: No JVD. Trachea: No tracheal deviation. Cardiovascular:      Rate and Rhythm: Normal rate and regular rhythm. Heart sounds: Normal heart sounds. No murmur heard. No friction rub. No gallop. Pulmonary:      Effort: Pulmonary effort is normal. Tachypnea present. No respiratory distress. Breath sounds: No stridor. Examination of the right-upper field reveals wheezing. Examination of the left-upper field reveals wheezing. Examination of the right-middle field reveals wheezing. Examination of the left-middle field reveals wheezing. Examination of the right-lower field reveals decreased breath sounds. Examination of the left-lower field reveals decreased breath sounds. Decreased breath sounds and wheezing present. No rales. Chest:      Chest wall: No tenderness. Abdominal:      General: Bowel sounds are normal. There is no distension. Palpations: Abdomen is soft. There is no mass. Tenderness: There is no abdominal tenderness. There is no guarding or rebound. Musculoskeletal:         General: No tenderness. Normal range of motion. Cervical back: Normal range of motion. Lymphadenopathy:      Cervical: No cervical adenopathy. Skin:     Findings: No rash. Neurological:      Mental Status: She is alert and oriented to person, place, and time. Cranial Nerves: No cranial nerve deficit. Motor: No abnormal muscle tone. Coordination: Coordination normal.      Deep Tendon Reflexes: Reflexes normal.   Psychiatric:         Behavior: Behavior normal.         Thought Content:  Thought content normal.         Judgment: Judgment normal.          Procedures MDM   Results for orders placed or performed during the hospital encounter of 08/26/21   COVID-19, Rapid    Specimen: Nasopharyngeal Swab   Result Value Ref Range    SARS-CoV-2, NAAT Not Detected Not Detected   CBC Auto Differential   Result Value Ref Range    WBC 15.6 (H) 4.0 - 11.0 K/uL    RBC 4.74 4.00 - 5.20 M/uL    Hemoglobin 14.5 12.0 - 16.0 g/dL    Hematocrit 41.4 36.0 - 48.0 %    MCV 87.4 80.0 - 100.0 fL    MCH 30.6 26.0 - 34.0 pg    MCHC 35.0 31.0 - 36.0 g/dL    RDW 13.8 12.4 - 15.4 %    Platelets 613 048 - 207 K/uL    MPV 6.6 5.0 - 10.5 fL    PLATELET SLIDE REVIEW Adequate     SLIDE REVIEW see below     Neutrophils % 76.0 %    Lymphocytes % 17.0 %    Monocytes % 4.0 %    Eosinophils % 0.0 %    Basophils % 0.0 %    Neutrophils Absolute 12.3 (H) 1.7 - 7.7 K/uL    Lymphocytes Absolute 2.7 1.0 - 5.1 K/uL    Monocytes Absolute 0.6 0.0 - 1.3 K/uL    Eosinophils Absolute 0.0 0.0 - 0.6 K/uL    Basophils Absolute 0.0 0.0 - 0.2 K/uL    Bands Relative 3 0 - 7 %    RBC Morphology Normal    Comprehensive Metabolic Panel   Result Value Ref Range    Sodium 131 (L) 136 - 145 mmol/L    Potassium 4.4 3.5 - 5.1 mmol/L    Chloride 97 (L) 99 - 110 mmol/L    CO2 21 21 - 32 mmol/L    Anion Gap 13 3 - 16    Glucose 100 (H) 70 - 99 mg/dL    BUN 12 7 - 20 mg/dL    CREATININE 0.6 0.6 - 1.2 mg/dL    GFR Non-African American >60 >60    GFR African American >60 >60    Calcium 9.0 8.3 - 10.6 mg/dL    Total Protein 6.8 6.4 - 8.2 g/dL    Albumin 3.8 3.4 - 5.0 g/dL    Albumin/Globulin Ratio 1.3 1.1 - 2.2    Total Bilirubin <0.2 0.0 - 1.0 mg/dL    Alkaline Phosphatase 64 40 - 129 U/L    ALT 18 10 - 40 U/L    AST 18 15 - 37 U/L    Globulin 3.0 g/dL   Troponin   Result Value Ref Range    Troponin <0.01 <0.01 ng/mL   Blood Gas, Venous   Result Value Ref Range    pH, Ivan 7.411 7.350 - 7.450    pCO2, Ivan 37.0 (L) 40.0 - 50.0 mmHg    pO2, Ivan 115.4 (H) 25 - 40 mmHg    HCO3, Venous 23.0 23.0 - 29.0 mmol/L    Base Excess, Ivan -1.2 -3.0 - 3.0 mmol/L    O2 Sat, Ivan 98 Not Established %    Carboxyhemoglobin 2.8 (H) 0.0 - 1.5 %    MetHgb, Ivan 0.6 <1.5 %    TC02 (Calc), Ivan 24 Not Established mmol/L    O2 Therapy Unknown    Lactic acid, plasma   Result Value Ref Range    Lactic Acid 1.1 0.4 - 2.0 mmol/L   EKG 12 Lead   Result Value Ref Range    Ventricular Rate 92 BPM    Atrial Rate 92 BPM    P-R Interval 136 ms    QRS Duration 76 ms    Q-T Interval 332 ms    QTc Calculation (Bazett) 410 ms    P Axis 66 degrees    R Axis 10 degrees    T Axis 70 degrees    Diagnosis       Normal sinus rhythmPossible Inferior infarct , age undeterminedAbnormal ECGWhen compared with ECG of 11-JUL-2021 18:43,No significant change was found       I spoke with Dr. Dakotah Cisneros, hospitalist. We thoroughly discussed the history, physical exam, laboratory and imaging studies, as well as, emergency department course. Based upon that discussion, we've decided to admit Herlinda Isabel for further observation and evaluation of Piotr Wong's dyspnea. As I have deemed necessary from their history, physical, and studies, I have considered and evaluated Herlinda Isabel for the following diagnoses:  ACUTE CORONARY SYNDROME, CHRONIC OBSTRUCTIVE PULMONARY DISEASE, CONGESTIVE HEART FAILURE, PERICARDIAL TAMPONADE, PNEUMONIA, PNEUMOTHORAX, PULMONARY EMBOLISM, SEPSIS, and THORACIC DISSECTION. FINAL IMPRESSION  1. COPD exacerbation ([address]    2. Atypical pneumonia    3. Leukocytosis, unspecified type        Vitals:  Blood pressure (!) 122/90, pulse 97, temperature 98.9 °F (37.2 °C), temperature source Oral, resp. rate 26, weight 156 lb (70.8 kg), SpO2 95 %, not currently breastfeeding. Radiology  XR CHEST PORTABLE    Result Date: 8/26/2021  Peripheral ground-glass opacities suspicious for viral/atypical pattern of pneumonia       EKG Interpretation. The Ekg interpreted by me in the absence of a cardiologist shows.   normal sinus rhythm with a rate of 92  Axis is   Normal  QTc is  within an acceptable range  Intervals and Durations are unremarkable. No specific ST-T wave changes appreciated. No evidence of acute ischemia. No significant change from prior EKG dated 11 July 2021.           Cynthia Dave MD  08/26/21 1500

## 2021-08-27 PROCEDURE — 0BC38ZZ EXTIRPATION OF MATTER FROM RIGHT MAIN BRONCHUS, VIA NATURAL OR ARTIFICIAL OPENING ENDOSCOPIC: ICD-10-PCS | Performed by: INTERNAL MEDICINE

## 2021-08-27 PROCEDURE — 6360000002 HC RX W HCPCS: Performed by: INTERNAL MEDICINE

## 2021-08-27 PROCEDURE — 87205 SMEAR GRAM STAIN: CPT

## 2021-08-27 PROCEDURE — 6370000000 HC RX 637 (ALT 250 FOR IP): Performed by: INTERNAL MEDICINE

## 2021-08-27 PROCEDURE — 94761 N-INVAS EAR/PLS OXIMETRY MLT: CPT

## 2021-08-27 PROCEDURE — 7100000010 HC PHASE II RECOVERY - FIRST 15 MIN: Performed by: INTERNAL MEDICINE

## 2021-08-27 PROCEDURE — 31645 BRNCHSC W/THER ASPIR 1ST: CPT | Performed by: INTERNAL MEDICINE

## 2021-08-27 PROCEDURE — 99152 MOD SED SAME PHYS/QHP 5/>YRS: CPT | Performed by: INTERNAL MEDICINE

## 2021-08-27 PROCEDURE — 3609027000 HC BRONCHOSCOPY: Performed by: INTERNAL MEDICINE

## 2021-08-27 PROCEDURE — 2580000003 HC RX 258: Performed by: INTERNAL MEDICINE

## 2021-08-27 PROCEDURE — 99222 1ST HOSP IP/OBS MODERATE 55: CPT | Performed by: INTERNAL MEDICINE

## 2021-08-27 PROCEDURE — 7100000011 HC PHASE II RECOVERY - ADDTL 15 MIN: Performed by: INTERNAL MEDICINE

## 2021-08-27 PROCEDURE — 2700000000 HC OXYGEN THERAPY PER DAY

## 2021-08-27 PROCEDURE — 94640 AIRWAY INHALATION TREATMENT: CPT

## 2021-08-27 PROCEDURE — 87632 RESP VIRUS 6-11 TARGETS: CPT

## 2021-08-27 PROCEDURE — 87070 CULTURE OTHR SPECIMN AEROBIC: CPT

## 2021-08-27 PROCEDURE — 2709999900 HC NON-CHARGEABLE SUPPLY: Performed by: INTERNAL MEDICINE

## 2021-08-27 PROCEDURE — 2060000000 HC ICU INTERMEDIATE R&B

## 2021-08-27 RX ORDER — MIDAZOLAM HYDROCHLORIDE 5 MG/ML
INJECTION INTRAMUSCULAR; INTRAVENOUS PRN
Status: DISCONTINUED | OUTPATIENT
Start: 2021-08-27 | End: 2021-08-27 | Stop reason: ALTCHOICE

## 2021-08-27 RX ORDER — GUAIFENESIN 600 MG/1
600 TABLET, EXTENDED RELEASE ORAL 2 TIMES DAILY
Status: DISCONTINUED | OUTPATIENT
Start: 2021-08-27 | End: 2021-08-29 | Stop reason: HOSPADM

## 2021-08-27 RX ORDER — ACETYLCYSTEINE 200 MG/ML
600 SOLUTION ORAL; RESPIRATORY (INHALATION) 2 TIMES DAILY
Status: DISCONTINUED | OUTPATIENT
Start: 2021-08-27 | End: 2021-08-29 | Stop reason: HOSPADM

## 2021-08-27 RX ORDER — BUDESONIDE 0.5 MG/2ML
0.5 INHALANT ORAL 2 TIMES DAILY
Status: DISCONTINUED | OUTPATIENT
Start: 2021-08-27 | End: 2021-08-29 | Stop reason: HOSPADM

## 2021-08-27 RX ORDER — IPRATROPIUM BROMIDE AND ALBUTEROL SULFATE 2.5; .5 MG/3ML; MG/3ML
1 SOLUTION RESPIRATORY (INHALATION) ONCE
Status: DISCONTINUED | OUTPATIENT
Start: 2021-08-27 | End: 2021-08-29 | Stop reason: HOSPADM

## 2021-08-27 RX ADMIN — METHYLPREDNISOLONE SODIUM SUCCINATE 40 MG: 40 INJECTION, POWDER, FOR SOLUTION INTRAMUSCULAR; INTRAVENOUS at 09:45

## 2021-08-27 RX ADMIN — METHYLPREDNISOLONE SODIUM SUCCINATE 40 MG: 40 INJECTION, POWDER, FOR SOLUTION INTRAMUSCULAR; INTRAVENOUS at 20:12

## 2021-08-27 RX ADMIN — Medication 2 PUFF: at 08:53

## 2021-08-27 RX ADMIN — IPRATROPIUM BROMIDE AND ALBUTEROL SULFATE 1 AMPULE: .5; 3 SOLUTION RESPIRATORY (INHALATION) at 08:52

## 2021-08-27 RX ADMIN — Medication 10 ML: at 09:45

## 2021-08-27 RX ADMIN — IPRATROPIUM BROMIDE AND ALBUTEROL SULFATE 1 AMPULE: .5; 3 SOLUTION RESPIRATORY (INHALATION) at 11:58

## 2021-08-27 RX ADMIN — METHYLPREDNISOLONE SODIUM SUCCINATE 40 MG: 40 INJECTION, POWDER, FOR SOLUTION INTRAMUSCULAR; INTRAVENOUS at 04:01

## 2021-08-27 RX ADMIN — BUSPIRONE HYDROCHLORIDE 30 MG: 5 TABLET ORAL at 20:10

## 2021-08-27 RX ADMIN — DOXYCYCLINE HYCLATE 100 MG: 100 TABLET, COATED ORAL at 16:52

## 2021-08-27 RX ADMIN — MONTELUKAST SODIUM 10 MG: 10 TABLET ORAL at 20:11

## 2021-08-27 RX ADMIN — TRAZODONE HYDROCHLORIDE 300 MG: 50 TABLET ORAL at 20:10

## 2021-08-27 RX ADMIN — GUAIFENESIN 600 MG: 600 TABLET, EXTENDED RELEASE ORAL at 23:33

## 2021-08-27 RX ADMIN — DOXYCYCLINE HYCLATE 100 MG: 100 TABLET, COATED ORAL at 06:22

## 2021-08-27 RX ADMIN — METHYLPREDNISOLONE SODIUM SUCCINATE 40 MG: 40 INJECTION, POWDER, FOR SOLUTION INTRAMUSCULAR; INTRAVENOUS at 14:06

## 2021-08-27 RX ADMIN — ATORVASTATIN CALCIUM 10 MG: 10 TABLET, FILM COATED ORAL at 20:11

## 2021-08-27 RX ADMIN — PANTOPRAZOLE SODIUM 40 MG: 40 TABLET, DELAYED RELEASE ORAL at 06:22

## 2021-08-27 RX ADMIN — SODIUM CHLORIDE 1000 ML: 9 INJECTION, SOLUTION INTRAVENOUS at 14:32

## 2021-08-27 RX ADMIN — Medication 10 ML: at 20:14

## 2021-08-27 RX ADMIN — IPRATROPIUM BROMIDE AND ALBUTEROL SULFATE 1 AMPULE: .5; 3 SOLUTION RESPIRATORY (INHALATION) at 20:10

## 2021-08-27 RX ADMIN — Medication 2 PUFF: at 20:10

## 2021-08-27 RX ADMIN — CEFEPIME HYDROCHLORIDE 2000 MG: 2 INJECTION, POWDER, FOR SOLUTION INTRAVENOUS at 23:31

## 2021-08-27 RX ADMIN — IPRATROPIUM BROMIDE AND ALBUTEROL SULFATE 1 AMPULE: .5; 3 SOLUTION RESPIRATORY (INHALATION) at 15:21

## 2021-08-27 ASSESSMENT — PAIN SCALES - GENERAL
PAINLEVEL_OUTOF10: 0

## 2021-08-27 NOTE — PROGRESS NOTES
Pt arrived from [place] purse lip breathing /dyspnea  at rest /HOB elevated high fowlers/ HR-124 at 1512pm   1518pm Dr Nj Henry called - received duoneb treatment order   1521pm duoneb inititiated with immediate cessation of purse lip breathing   1537 duoneb complete   HR-116/Sob and tachypnea much improved    pt respirations relaxed /states duoneb \"helped a lot\"- no coughing

## 2021-08-27 NOTE — ED NOTES
Bedside report given to [facility], RN. Pt left ED via wheelchair in stable condition at this time on 3LO2 NC. RT notified and followed with BiPAP. All belongings sent with pt. Care transferred.      Vic Rouse RN  08/26/21 [id number]

## 2021-08-27 NOTE — ED NOTES
Perfect Serve sent to Dr. Ankit Fonseca to evaluate pts HR 120s-130s. Pt did just receive Solumedrol and a scheduled breathing treatment. Pt denies increased SOB or CP. All other VSS on baseline 3LO2 NC. Pt refusing BiPAP at this time. Awaiting response. Pt awaiting clean room on C4. Addendum: No new orders. Monitor pt.      Carroll Doran RN  08/26/21 2051       Carroll Doran RN  08/26/21 2052       Carroll Doran RN  08/26/21 2131

## 2021-08-27 NOTE — PROGRESS NOTES
Hospitalist Progress Note    Date of Admission: 8/26/2021    Chief Complaint: SOB    Hospital Course:   60 y.o. female who presented to [facility] with above complaints  Patient with PMH of severe COPD, depression, fibromyalgia, ILD, tobacco dependence presented to the ED today with complaints of shortness of breath. Patient reports ongoing shortness of breath for the last 4 weeks, progressively worsening. Has nonproductive cough. No subjective fevers chills or rigors. No chest pain. We will seen by PCP on 8/17. Was prescribed a course of oral steroids and has been taking her nebulizers and inhalers at home without much relief. Continues to smoke. Chantix was prescribed by her PCP which the patient has been taking. Also smokes marijuana. Patient does not use home oxygen all the time, only uses it at night. Subjective: Significant improvement since presentation, stable off BIPAP. O2 requirement nearing baseline. Labs:   Recent Labs     08/26/21  1007   WBC 15.6*   HGB 14.5   HCT 41.4        Recent Labs     08/26/21  1007   *   K 4.4   CL 97*   CO2 21   BUN 12   CREATININE 0.6   CALCIUM 9.0     Recent Labs     08/26/21  1007   AST 18   ALT 18   BILITOT <0.2   ALKPHOS 64     No results for input(s): INR in the last 72 hours. Physical Exam Performed:    /63   Pulse 108   Temp 98.1 °F (36.7 °C) (Axillary)   Resp 24   Ht 5' 5\" (1.651 m)   Wt 163 lb 11.2 oz (74.3 kg)   SpO2 94%   BMI 27.24 kg/m²     General appearance:  No apparent distress, appears stated age and cooperative. HEENT:  Normal cephalic, atraumatic without obvious deformity. Pupils equal, round, and reactive to light. Extra ocular muscles intact. Conjunctivae/corneas clear. Neck: Supple, with full range of motion. No jugular venous distention. Trachea midline.   Respiratory: Tachypneic, scattered bilateral wheeze to auscultation with prolonged expiratory phase, bilateral equal air entry  Cardiovascular:  Regular rate and rhythm with normal S1/S2 without murmurs, rubs or gallops. Abdomen: Soft, non-tender, non-distended with normal bowel sounds. Musculoskeletal:  No clubbing, cyanosis or edema bilaterally. Full range of motion without deformity. Skin: Skin color, texture, turgor normal.  No rashes or lesions. Neurologic:  Neurovascularly intact without any focal sensory/motor deficits. Cranial nerves: II-XII intact, grossly non-focal.  Psychiatric:  Alert and oriented, thought content appropriate, normal insight  Capillary Refill: Brisk,3 seconds, normal  Peripheral Pulses: +2 palpable, equal bilaterally     Assessment/Plan:    Active Hospital Problems    Diagnosis     Stage 3 severe COPD by GOLD classification ([facility] [J44.9]      Priority: Medium    Acute on chronic respiratory failure ([facility]) [J96.20]     Chest congestion [R09.89]     Mucus plugging of bronchi [T17.500A]     Ineffective airway clearance [R06.89]     COPD exacerbation ([facility] [J44.1]     ILD (interstitial lung disease) ([facility] [J84.9]     Pulmonary fibrosis ([facility] [J84.10]     Tobacco dependence [F17.200]     Depression [F32.9]      COPD exacerbation   Acute on chronic respiratory failure   Stage 3 severe COPD by GOLD classification   -Rapid Covid negative, patient is vaccinated  - inhaled bronchodilator nebs Q4H scheduled with duonebs  - systemic steroids with iv solumedrol  - po antibiotics doxy 100 mg bid  -Pulmonary following; Bronch today. Tobacco dependence-counseled cessation. Chantix    Depression/anxiety -mood stable, resume home medication regimen    ILD (interstitial lung disease)/pulmonary fibrosis -evident on CXR similar to prior. Also seen on CT back in March 2021. Likely related to smoking tobacco and marijuana. Hyperlipidemia  -Resume statin     Hypertension  -Controlled, resume atenolol     DVT Prophylaxis: Lovenox  Diet: ADULT DIET;  Regular  Code Status: Full Code  PT/OT Eval Status: NA    Dispo - 1-2 days    Kenny Anderson MD

## 2021-08-27 NOTE — CONSULTS
small joint pains or any skin rashes, no skin nodules, patient does not have any increasing leg edema, patient does not have any change in the ambient environment, patient does have a dog as a pet, patient does not have any sick contacts, no parakeets/birds as pets, patient feels tired and having no energy, no discrete history of sleep fragmentation per se, patient continues to be a smoker, patient was alert and communicative, patient continues to smoke and patient states that she does not know how to quit smoking and she understands that it is not good for her but patient still smokes about nearly 1 pack a day, patient continues to be symptomatic when seen this morning and patient has had recurrent hospitalizations with recurrent medications but no significant provement per se, no other pertinent review of system of concern     Patient Active Problem List    Diagnosis Date Noted    Stage 3 severe COPD by GOLD classification ([address] 12/03/2009    Hypokalemia 03/19/2012    Fibromyalgia 06/11/2010    Other secondary kyphosis, thoracic region 06/22/2021    Marijuana use 27/02/8878    Diastolic dysfunction 13/13/9446    Cannabis dependence with current use ([address] 06/10/2021    Acute on chronic respiratory failure ([address] 06/07/2021    COPD, frequent exacerbations ([address] 05/24/2021    Acute on chronic respiratory failure with hypoxemia ([address] 03/14/2021    Pulmonary infiltrates 06/25/2020    Chest congestion 06/25/2020    Mucus plugging of bronchi 06/25/2020    Ineffective airway clearance 06/25/2020    COPD exacerbation ([address] 06/24/2020    Sepsis ([address] 06/01/2020   [address] associated bacterial pneumonia     Bandemia     Acute respiratory failure with hypoxia ([address] 04/03/2020    Acute on chronic respiratory failure with hypoxia and hypercapnia ([address] 03/04/2020    Pneumonia 03/19/2019    Compression fx, thoracic spine, sequela 12/14/2018    Kyphosis 12/14/2018    Anxiety and depression 12/14/2018    Chronic pain syndrome 12/14/2018    Polyarthropathy, multiple sites 12/14/2018    Esophageal dysphagia     Heartburn     Rectal bleeding     History of colon polyps     History of prescription drug abuse 04/21/2018    Thyroid nodule 04/13/2018    Recurrent major depressive disorder, in partial remission ([address] 01/25/2018    ILD (interstitial lung disease) ([address] 04/06/2016    Pulmonary fibrosis ([address] 10/16/2014    Tobacco dependence 09/27/2014    Depression 04/11/2013    Hyperlipidemia 09/10/2012    Hyponatremia 09/10/2012    Smoker 12/03/2009       Past Medical History:   Diagnosis Date    Allergic rhinitis 6/11/2010    Anxiety     Arthritis     Aspiration pneumonia ([address] 3/21/2012    Recurrent    Asthma     Bronchitis chronic     COPD (chronic obstructive pulmonary disease) ([address] 12/3/2009    Depression     Drug abuse, cocaine type (HCC)     past history of crack cocaine use    Emphysema of lung (HCC)     Fibromyalgia     GERD (gastroesophageal reflux disease)     Hyperlipidemia     Influenza A 03/05/2020    Lung disease     On home oxygen therapy     uses O2 NC 3L prn at night    Osteoporosis     Pneumonia     Polysubstance dependence ([address] 1/2/2012    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    Pulmonary fibrosis ([address] 10/16/2014    Pulmonary infiltrate     Pulmonary nodule 12/3/2009    Tobacco abuse         Past Surgical History:   Procedure Laterality Date    BLADDER REPAIR      BRONCHOSCOPY      BRONCHOSCOPY N/A 3/6/2020    BRONCHOSCOPY THERAPUTIC ASPIRATION INITIAL performed by Sanford Thorpe MD at [address] [place] [address]  3/6/2020    BRONCHOSCOPY ALVEOLAR LAVAGE performed by Sanford Thorpe MD at [address] [place] [address] N/A 6/26/2020    BRONCHOSCOPY THERAPUTIC ASPIRATION INITIAL performed by Tanya Arias MD at [address] [place] [address]  6/26/2020    BRONCHOSCOPY ALVEOLAR LAVAGE performed by Tanya Arias MD Substance Use Topics    Alcohol use: No     Alcohol/week: 0.0 standard drinks        Allergies   Allergen Reactions    Meperidine Anaphylaxis     \"Demerol\"     Demerol Hives               Physical Exam:  Blood pressure 117/71, pulse 106, temperature 97.6 °F (36.4 °C), temperature source Oral, resp. rate 22, height 5' 5\" (1.651 m), weight 163 lb 11.2 oz (74.3 kg), SpO2 95 %, not currently breastfeeding.'     Constitutional: No respiratory distress. When seen  HENT:  Oropharynx is clear and moist. No thyromegaly. Eyes:  Conjunctivae are normal. Pupils equal, round, and reactive to light. No scleral icterus. Neck: . No tracheal deviation present. No obvious thyroid mass. Cardiovascular: Normal rate, regular rhythm, normal heart sounds.  No right ventricular heave. No lower extremity edema. Pulmonary/Chest: Bilateral wheezes.  Bibasilar scattered rales.  Increased chest congestion chest wall is not dull to percussion.  No accessory muscle usage or stridor.  Decreased breath sound density  Abdominal: Soft. Bowel sounds present. No distension or hernia. No tenderness.    Musculoskeletal: No cyanosis. No clubbing. No obvious joint deformity. Lymphadenopathy: No cervical or supraclavicular adenopathy. Skin: Skin is warm and dry. No rash or nodules on the exposed extremities. Psychiatric: Normal mood and affect. Behavior is normal.  No anxiety. Neurologic: Alert, awake and oriented.  PERRL.  Speech fluent           Results:  CBC:   Recent Labs     08/26/21  1007   WBC 15.6*   HGB 14.5   HCT 41.4   MCV 87.4        BMP:   Recent Labs     08/26/21  1007   *   K 4.4   CL 97*   CO2 21   BUN 12   CREATININE 0.6     LIVER PROFILE:   Recent Labs     08/26/21  1007   AST 18   ALT 18   BILITOT <0.2   ALKPHOS 64     UA:  Recent Labs     08/26/21  1714   COLORU Yellow   PHUR 6.5   CLARITYU Clear   SPECGRAV 1.020   LEUKOCYTESUR Negative   UROBILINOGEN 0.2   BILIRUBINUR Negative   BLOODU Negative   GLUCOSEU Negative       Imaging:  I have reviewed radiology images personally. XR CHEST PORTABLE   Final Result   Peripheral ground-glass opacities suspicious for viral/atypical pattern of   pneumonia           XR CHEST PORTABLE    Result Date: 8/26/2021  EXAMINATION: ONE XRAY VIEW OF THE CHEST 8/26/2021 10:05 am COMPARISON: None. HISTORY: ORDERING SYSTEM PROVIDED HISTORY: shortness of breath TECHNOLOGIST PROVIDED HISTORY: Reason for exam:->shortness of breath Reason for Exam: sob Acuity: Acute Type of Exam: Initial FINDINGS: Heart size and pulmonary vasculature within normal limits. Thoracic aorta tortuous. Peripheral ground-glass opacities in the mid and lower lung zones. Costophrenic angles sharp     Peripheral ground-glass opacities suspicious for viral/atypical pattern of pneumonia           Echocardiogram:Summary   Left ventricular systolic function is normal with ejection fraction   estimated at 55%. No regional wall motion abnormalities. There is mild concentric left ventricular hypertrophy. Grade I diastolic dysfunction with normal left ventricular filling pressure. Moderate aortic regurgitation    PFT:PFT in 2018-INDICATION:  COPD.     FINDINGS:  1.  Spirometry revealed evidence of severe obstructive defect.  FEV1 is  1.19 liters, which is 45% of predicted.  No significant response to  bronchodilators.  FEV1/FVC ratio of 64%. 2.  Lung volume revealed normal total lung capacity of 4.28 liters, which  is 80% of predicted.  Air trapping residual volume 2.43 liters, which is  124% of predicted. 3.  Diffusion capacity is severely decreased at 7.59, which is 32% of  predicted. 4.  Flow volume loops consistent with obstructive defect.   5.  Six-minute walk done per [address] patient  was able to walk 840 feet.  Saturation on room air at rest was 96% with a  heart rate of 63.  There is no significant desaturation on exertion.     CONCLUSION:  1.  Severe obstructive defect with air trapping and severely decreased  diffusion capacity. 2.  Six-minute walk with no significant desaturation on exertion.     CT chest in March of this year-  CTA OF THE CHEST 3/14/2021 9:19 pm       TECHNIQUE:   CTA of the chest was performed after the administration of intravenous   contrast.  Multiplanar reformatted images are provided for review.  MIP   images are provided for review.  Dose modulation, iterative reconstruction,   and/or weight based adjustment of the mA/kV was utilized to reduce the   radiation dose to as low as reasonably achievable.       COMPARISON:   None.       HISTORY:   ORDERING SYSTEM PROVIDED HISTORY: Hypoxia with tachycardia R/O PE; also   assymetrical L basilar opacification in pt with emphysema and chronic tobacco   abuse   TECHNOLOGIST PROVIDED HISTORY:   Reason for exam:->Hypoxia with tachycardia R/O PE; also assymetrical L   basilar opacification in pt with emphysema and chronic tobacco abuse   Decision Support Exception->Emergency Medical Condition (MA)   Reason for Exam: sob x 2 days-hypoxia-tachycardia   Acuity: Acute   Type of Exam: Initial   Relevant Medical/Surgical History: no surg       FINDINGS:   Pulmonary Arteries: Pulmonary arteries are adequately opacified for   evaluation.  No evidence of intraluminal filling defect to suggest pulmonary   embolism.  Main pulmonary artery is normal in caliber.       Mediastinum: No evidence of mediastinal lymphadenopathy.  The heart and   pericardium demonstrate no acute abnormality.  There is no acute abnormality   of the thoracic aorta.       Lungs/pleura: Mild scattered fibrotic changes are noted in the bilateral   lungs.  The lungs are without acute process.  No focal consolidation or   pulmonary edema.  No evidence of pleural effusion or pneumothorax.       Upper Abdomen: Limited images of the upper abdomen are unremarkable.       Soft Tissues/Bones: No acute bone or soft tissue abnormality.  Chronic   compression fractures of the thoracic spine including a severe central   compression of the T3 with minimal retropulsion, severe compression of T8   with minimal retropulsion and evidence of previous vertebroplasty, and mild   anterior compression of the T5 vertebral body.  Exaggerated thoracic kyphotic   curvature.  Generalized osteopenia.           Impression   No evidence of pulmonary embolism or acute pulmonary abnormality.       Mild scattered fibrotic pulmonary changes.       Exaggerated thoracic kyphotic curvature with multiple chronic vertebral body   compression fractures. Results for Cristopher Ryan (MRN [id number]) as of 8/27/2021 22:13   Ref. Range 6/24/2021 08:40 7/11/2021 17:30 7/12/2021 06:43 8/26/2021 10:07   Sodium Latest Ref Range: 136 - 145 mmol/L 129 (L) 125 (L) 132 (L) 131 (L)   Potassium Latest Ref Range: 3.5 - 5.1 mmol/L 4.1 4.5 3.9 4.4   Chloride Latest Ref Range: 99 - 110 mmol/L 98 (L) 89 (L) 99 97 (L)   CO2 Latest Ref Range: 21 - 32 mmol/L 23 25 22 21   BUN Latest Ref Range: 7 - 20 mg/dL 17 7 6 (L) 12   Creatinine Latest Ref Range: 0.6 - 1.2 mg/dL 0.8 0.7 0.6 0.6   Anion Gap Latest Ref Range: 3 - 16  8 11 11 13   GFR Non- Latest Ref Range: >60  >60 >60 >60 >60   GFR  Latest Ref Range: >60  >60 >60 >60 >60   Lactic Acid Latest Ref Range: 0.4 - 2.0 mmol/L    1.1   Glucose Latest Ref Range: 70 - 99 mg/dL 88 87 186 (H) 100 (H)   Calcium Latest Ref Range: 8.3 - 10.6 mg/dL 8.9 9.1 9.2 9.0   Total Protein Latest Ref Range: 6.4 - 8.2 g/dL    6.8   Procalcitonin Latest Ref Range: 0.00 - 0.15 ng/mL   0.04      Results for Cristopher Ryan (MRN [id number]) as of 8/27/2021 22:13   Ref.  Range 6/23/2021 06:42 7/11/2021 17:30 7/12/2021 06:43 8/26/2021 10:07   WBC Latest Ref Range: 4.0 - 11.0 K/uL 19.3 (H) 9.3 4.6 15.6 (H)   RBC Latest Ref Range: 4.00 - 5.20 M/uL 3.69 (L) 3.87 (L) 4.04 4.74   Hemoglobin Quant Latest Ref Range: 12.0 - 16.0 g/dL 11.1 (L) 11.7 (L) 12.0 14.5   Hematocrit Latest Ref Range: 36.0 - 48.0 % 32.8 (L) 32.8 (L) 34.8 (L) 41.4   MCV Latest Ref Range: 80.0 - 100.0 fL 88.8 84.6 86.2 87.4   MCH Latest Ref Range: 26.0 - 34.0 pg 30.2 30.1 29.8 30.6   MCHC Latest Ref Range: 31.0 - 36.0 g/dL 34.0 35.6 34.6 35.0   MPV Latest Ref Range: 5.0 - 10.5 fL 6.7 7.0 6.5 6.6   RDW Latest Ref Range: 12.4 - 15.4 % 13.1 12.9 12.8 13.8   Platelet Count Latest Ref Range: 135 - 450 K/uL 321 400 379 360   Neutrophils % Latest Units: % 90.0 62.4 90.5 76.0   Lymphocyte % Latest Units: % 1.0 22.0 8.1 17.0   Monocytes % Latest Units: % 8.0 10.0 1.0 4.0   Eosinophils % Latest Units: % 0.0 4.6 0.2 0.0     Recent bronchoscopy -  Pseudomonas aeruginosa    CULTURE, RESPIRATORY 06/23/2021 10:51 AM [facility]   1,000 to 10,000 CFU/mL    Testing Performed By    Lab - Abbreviation Name Director Address Valid Date Range   19- - [address] [place] [address] LAB Hope Og M.D. [address] [place] [id number] 09/26/20 0000-Present   25-- [address] [place] [address] LAB [address] [place] [address] [place] [id number] 08/30/17 0807-Present   Narrative  Performed by: [facility]  ORDER#: [id number]                          ORDERED BY: Candice Avalos   SOURCE: Bronchial Alveolar Lavage Right LowCOLLECTED:  06/23/21 10:51   ANTIBIOTICS AT HANYN. :                      RECEIVED :  06/24/21 05:22   Performed at:   [facility]   [address] [facility] [address]   Phone [phone]   Susceptibility    Pseudomonas aeruginosa (1)    Antibiotic Interpretation RUBINA Status   cefepime Sensitive <=2 mcg/mL    ciprofloxacin Sensitive <=1 mcg/mL    gentamicin Sensitive <=4 mcg/mL    meropenem Sensitive <=1 mcg/mL    piperacillin-tazobactam Sensitive <=16 mcg/mL    tobramycin Sensitive <=4 mcg/mL            Assessment:  Active Problems:    Stage 3 severe COPD by GOLD classification ([address] Depression    Tobacco dependence    Pulmonary fibrosis (HCC)    ILD (interstitial lung disease) (HCC)    COPD exacerbation (HCC)    Chest congestion    Mucus plugging of bronchi    Ineffective airway clearance    Acute on chronic respiratory failure ([facility])  Resolved Problems:    * No resolved hospital problems.  *          Plan:   · Oxygen supplementation to keep saturation being 90 to 94% only  · Please titrate the oxygen as per the above parameters  · Bronchodilators  · DuoNeb to continue  · No need for symbicort with duoneb and pulmicort   · Pulmicort and Mucomyst nebulization added to the regimen  · IV Solu-Medrol to continue  · Incentive spirometry  · Pulmonary toilet  · Patient continues to have increased chest congestion along with mucus plugging and ineffective airway clearance and patient has recurrent hospitalization with similar symptoms and patient will benefit from diagnostic and therapeutic purposes-patient was told about the procedure and the pros and cons and patient was agreeable-will arrange that today  · Patient was started on IV doxycycline -has H/O Pseudomonas pneumonia -will change antibiotic to cefepime and reassess as per clinical status and c/s  · Patient's hyponatremia may be secondary to SSRI  · Patient's TSH in the past was normal  · Mucomyst nebulization and mucinex added to regimen  · Smoking cessation advised and pros and cons of continued smoking discussed  · Nicotine replacement therapy if patient wants   · PUD  as per IM   · Patient needs to discuss with Dr. Lian Shepherd if patient needs any evaluation for SUAD; deemed appropriate  · Patient is vaccinated against COVID 19   · DVT prophylaxis as per IM      Case discussed with patient and nursing     Further management depending on patient's clinical status and follow-up on above recommendations and bronchoscopy findings            Electronically signed by:  Kenji Perera MD    8/27/2021    11:34 AM.

## 2021-08-27 NOTE — PLAN OF CARE
Problem: Falls - Risk of:  Goal: Will remain free from falls  Description: Will remain free from falls  8/27/2021 1032 by Arvin Mathis RN  Outcome: Ongoing  Note: Patient will remain free of falls. Patient calls out appropriately. Patient utilizes call light, bed is in lowest locked position, nonskid footwear in place. Patient's room is kept free of clutter and patient is assisted with ambulation as needed. Will continue to monitor and educate patient on safety precautions.

## 2021-08-28 PROCEDURE — 94640 AIRWAY INHALATION TREATMENT: CPT

## 2021-08-28 PROCEDURE — 6370000000 HC RX 637 (ALT 250 FOR IP): Performed by: INTERNAL MEDICINE

## 2021-08-28 PROCEDURE — 94761 N-INVAS EAR/PLS OXIMETRY MLT: CPT

## 2021-08-28 PROCEDURE — 2580000003 HC RX 258: Performed by: INTERNAL MEDICINE

## 2021-08-28 PROCEDURE — 6360000002 HC RX W HCPCS: Performed by: INTERNAL MEDICINE

## 2021-08-28 PROCEDURE — 99232 SBSQ HOSP IP/OBS MODERATE 35: CPT | Performed by: INTERNAL MEDICINE

## 2021-08-28 PROCEDURE — 2060000000 HC ICU INTERMEDIATE R&B

## 2021-08-28 PROCEDURE — 2700000000 HC OXYGEN THERAPY PER DAY

## 2021-08-28 RX ORDER — PREDNISONE 20 MG/1
40 TABLET ORAL DAILY
Status: DISCONTINUED | OUTPATIENT
Start: 2021-08-28 | End: 2021-08-29 | Stop reason: HOSPADM

## 2021-08-28 RX ADMIN — BUDESONIDE 500 MCG: 0.5 SUSPENSION RESPIRATORY (INHALATION) at 08:49

## 2021-08-28 RX ADMIN — ACETYLCYSTEINE 600 MG: 200 SOLUTION ORAL; RESPIRATORY (INHALATION) at 20:43

## 2021-08-28 RX ADMIN — IPRATROPIUM BROMIDE AND ALBUTEROL SULFATE 1 AMPULE: .5; 3 SOLUTION RESPIRATORY (INHALATION) at 20:43

## 2021-08-28 RX ADMIN — GUAIFENESIN 600 MG: 600 TABLET, EXTENDED RELEASE ORAL at 21:47

## 2021-08-28 RX ADMIN — ACETYLCYSTEINE 600 MG: 200 SOLUTION ORAL; RESPIRATORY (INHALATION) at 08:49

## 2021-08-28 RX ADMIN — Medication 10 ML: at 21:47

## 2021-08-28 RX ADMIN — ATORVASTATIN CALCIUM 10 MG: 10 TABLET, FILM COATED ORAL at 21:46

## 2021-08-28 RX ADMIN — METHYLPREDNISOLONE SODIUM SUCCINATE 40 MG: 40 INJECTION, POWDER, FOR SOLUTION INTRAMUSCULAR; INTRAVENOUS at 09:22

## 2021-08-28 RX ADMIN — MONTELUKAST SODIUM 10 MG: 10 TABLET ORAL at 21:47

## 2021-08-28 RX ADMIN — PREDNISONE 40 MG: 20 TABLET ORAL at 12:13

## 2021-08-28 RX ADMIN — BUSPIRONE HYDROCHLORIDE 30 MG: 5 TABLET ORAL at 21:46

## 2021-08-28 RX ADMIN — TRAZODONE HYDROCHLORIDE 300 MG: 50 TABLET ORAL at 21:46

## 2021-08-28 RX ADMIN — Medication 10 ML: at 09:22

## 2021-08-28 RX ADMIN — ENOXAPARIN SODIUM 40 MG: 40 INJECTION SUBCUTANEOUS at 09:21

## 2021-08-28 RX ADMIN — METHYLPREDNISOLONE SODIUM SUCCINATE 40 MG: 40 INJECTION, POWDER, FOR SOLUTION INTRAMUSCULAR; INTRAVENOUS at 03:43

## 2021-08-28 RX ADMIN — BUSPIRONE HYDROCHLORIDE 30 MG: 5 TABLET ORAL at 09:34

## 2021-08-28 RX ADMIN — GUAIFENESIN 600 MG: 600 TABLET, EXTENDED RELEASE ORAL at 09:23

## 2021-08-28 RX ADMIN — IPRATROPIUM BROMIDE AND ALBUTEROL SULFATE 1 AMPULE: .5; 3 SOLUTION RESPIRATORY (INHALATION) at 08:49

## 2021-08-28 RX ADMIN — PANTOPRAZOLE SODIUM 40 MG: 40 TABLET, DELAYED RELEASE ORAL at 05:32

## 2021-08-28 RX ADMIN — ACETAMINOPHEN 650 MG: 325 TABLET ORAL at 18:03

## 2021-08-28 RX ADMIN — BUDESONIDE 500 MCG: 0.5 SUSPENSION RESPIRATORY (INHALATION) at 20:43

## 2021-08-28 RX ADMIN — IPRATROPIUM BROMIDE AND ALBUTEROL SULFATE 1 AMPULE: .5; 3 SOLUTION RESPIRATORY (INHALATION) at 16:20

## 2021-08-28 RX ADMIN — IPRATROPIUM BROMIDE AND ALBUTEROL SULFATE 1 AMPULE: .5; 3 SOLUTION RESPIRATORY (INHALATION) at 12:07

## 2021-08-28 RX ADMIN — CEFEPIME HYDROCHLORIDE 2000 MG: 2 INJECTION, POWDER, FOR SOLUTION INTRAVENOUS at 12:17

## 2021-08-28 RX ADMIN — FLUOXETINE 60 MG: 20 CAPSULE ORAL at 09:21

## 2021-08-28 ASSESSMENT — PAIN SCALES - GENERAL
PAINLEVEL_OUTOF10: 0

## 2021-08-28 NOTE — PROCEDURES
Bronchoscopy note     Patient with recurrent COPD exacerbation/chest congestion/mucus plugging/ineffective airway clearance and given the patient's clinical status and radiology it was decided to do a bronchoscopy for diagnostic and therapeutic purposes and for that reason after informed consent, the patient was taken to the endoscopy suite, patient was given oropharyngeal analgesia with benzocaine after timeout, patient was given lidocaine for tracheobronchial analgesia, patient was given conscious sedation with IV Versed, the details of the sedation are as following-     Physician/patient of face-to-face sedation start time was  2:56 PM  Physician/patient face-to-face sedation stop time was  3:08 PM  Total moderate sedation time in minutes was 12 minutes  The patient was monitored continuously  throughout the entire procedure while the sedation was being administered        The bronchoscope was introduced to the mouth using a bite block, patient was found to have some thick mucous plugs in the posterior pharynx which was suctioned out, patient also had some mucous plug which was blocking the procedure part of the vocal cord which was suctioned out, patient vocal cords were normal without any paralysis or any anatomy defect, patient had significant tracheobronchomalacia, along with that patient had extensive mucous plugs in the entire tracheobronchial tree which was significant and the mucous plugs were quite tenacious and viscous, the tracheobronchial tree was therapeutically aspirated using Mucomyst and saline with improvement in the patency of the airways, patient did not have any endobronchial lesion, patient tolerated the procedure well and did not have any apparent complications  Estimated blood loss of 0     Further management depending on patient's clinical status and the bronchoscopy results     Shad Leonard MD

## 2021-08-28 NOTE — PRE SEDATION
Sedation Pre-Procedure Note    Patient Name: Corey Ambriz   YOB: 1955  Room/Bed: 4169/7650-06  Medical Record Number: [id number]  Date: 8/27/2021   Time: 10:24 PM       Indication: Shortness of breath/COPD exacerbation/chest congestion/mucus plugging/ineffective airway clearance/history of Pseudomonas pneumonia    Consent: I have discussed with the patient and/or the patient representative the indication, alternatives, and the possible risks and/or complications of the planned procedure and the anesthesia methods. The patient and/or patient representative appear to understand and agree to proceed. Vital Signs:   Vitals:    08/27/21 2011   BP:    Pulse:    Resp: 24   Temp:    SpO2: 94%       Past Medical History:   has a past medical history of Allergic rhinitis, Anxiety, Arthritis, Aspiration pneumonia ([address] Asthma, Bronchitis chronic, COPD (chronic obstructive pulmonary disease) ([address] Depression, Drug abuse, cocaine type ([address] Emphysema of lung ([address] Fibromyalgia, GERD (gastroesophageal reflux disease), Hyperlipidemia, Influenza A, Lung disease, On home oxygen therapy, Osteoporosis, Pneumonia, Polysubstance dependence ([address] 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[address] Pulmonary fibrosis ([address] Pulmonary infiltrate, Pulmonary nodule, and Tobacco abuse. Past Surgical History:   has a past surgical history that includes bronchoscopy; Hysterectomy; Salivary gland surgery; Endoscopy, colon, diagnostic; other surgical history (2/12/15); Colonoscopy (2012); bladder repair; pr colonoscopy flx dx w/collj spec when pfrmd (N/A, 9/20/2018); pr esophagogastroduodenoscopy transoral diagnostic (N/A, 9/20/2018);  Esophagus dilation (9/20/2018); bronchoscopy (N/A, 3/6/2020); bronchoscopy (3/6/2020); bronchoscopy (N/A, 6/26/2020); bronchoscopy (6/26/2020); bronchoscopy (06/23/2021); bronchoscopy (N/A, 6/23/2021); bronchoscopy (6/23/2021); and bronchoscopy (6/23/2021). Medications:   Scheduled Meds:    ipratropium-albuterol  1 ampule Inhalation Once    cefepime  2,000 mg IntraVENous Q12H    budesonide  0.5 mg Nebulization BID    guaiFENesin  600 mg Oral BID    acetylcysteine  600 mg Inhalation BID    busPIRone  30 mg Oral BID    FLUoxetine  60 mg Oral Daily    montelukast  10 mg Oral Nightly    pantoprazole  40 mg Oral QAM AC    atorvastatin  10 mg Oral Nightly    traZODone  300 mg Oral Nightly    sodium chloride flush  5-40 mL IntraVENous 2 times per day    enoxaparin  40 mg Subcutaneous Daily    ipratropium-albuterol  1 ampule Inhalation Q4H WA    methylPREDNISolone  40 mg IntraVENous Q6H    Followed by   Lorenza Sahu ON 8/29/2021] predniSONE  40 mg Oral Daily     Continuous Infusions:    sodium chloride Stopped (08/27/21 1554)     PRN Meds: sodium chloride flush, sodium chloride, ondansetron **OR** ondansetron, polyethylene glycol, acetaminophen **OR** acetaminophen  Home Meds:   Prior to Admission medications    Medication Sig Start Date End Date Taking? Authorizing Provider   predniSONE (DELTASONE) 10 MG tablet Take 1 tablet by mouth daily 8/19/21 9/18/21  Norma Her MD   predniSONE (DELTASONE) 10 MG tablet Take 6tab by mouth x2 days, then 5tab x2 days, then 4tabs x2 days, then 3tab x2 days, then 2tab x2 days, then 1tab x2 days. 8/17/21 8/27/21  Evaristo Cash MD   varenicline (CHANTIX STARTING MONTH PAK) 0.5 MG X 11 & 1 MG X 42 tablet Take by mouth.  8/4/21   Evaristo Cash MD   fluticasone-salmeterol [facility] IN[facility]) 500-50 MCG/DOSE diskus inhaler Inhale 1 puff into the lungs every 12 hours 7/21/21   Norma Her MD   tiotropium (SPIRIVA HANDIHALER) 18 MCG inhalation capsule INHALE THE CONTENTS OF 1 CAPSULE EVERY DAY 7/21/21   Norma Her MD   albuterol (PROVENTIL) (2.5 MG/3ML) 0.083% nebulizer solution Take 3 mLs by nebulization every 6 hours as needed for Wheezing or Shortness of Breath DX COPD J44.9 7/21/21   MD Scar Hampton (MUCINEX) 600 MG extended release tablet Take 1 tablet by mouth 2 times daily as needed for Congestion  Patient not taking: Reported on 8/17/2021 7/21/21   Diya Omer MD   montelukast (SINGULAIR) 10 MG tablet TAKE 1 TABLET BY MOUTH EACH NIGHT 7/21/21   Diya Omer MD   pantoprazole (PROTONIX) 40 MG tablet Take 1 tablet by mouth every morning (before breakfast) 6/25/21   Dequan Soriano MD   ibuprofen (ADVIL;MOTRIN) 600 MG tablet Take 1 tablet by mouth 3 times daily as needed for Pain  Patient not taking: Reported on 8/17/2021 4/23/21   SHAUN Sanches CNP   albuterol sulfate HFA (VENTOLIN HFA) 108 (90 Base) MCG/ACT inhaler Inhale 2 puffs into the lungs every 6 hours as needed for Wheezing or Shortness of Breath 3/4/21   Diya Omer MD   naproxen (NAPROSYN) 500 MG tablet Take 1 tablet by mouth 2 times daily (with meals) 2/5/21   SHAUN Barbour CNP   BD PEN NEEDLE SVETLANA U/F 32G X 4 MM MISC USE ONE DAILY AS DIRECTED 12/11/20   Roxi Neves MD   FLUoxetine (PROZAC) 20 MG capsule TAKE 3 CAPSULES BY MOUTH DAILY 12/3/20   Roxi Neves MD   traZODone (DESYREL) 150 MG tablet TAKE 2 TABLETS AT BEDTIME 9/11/20   Roxi Neves MD   simvastatin (ZOCOR) 20 MG tablet TAKE 1 TABLET EVERY EVENING 8/7/20   Roxi Neves MD   busPIRone (BUSPAR) 30 MG tablet TAKE 1 TABLET TWICE DAILY 8/7/20   Roxi Neves MD   teriparatide, recombinant, (FORTEO) 600 MCG/2.4ML SOLN injection Inject 0.08 mLs into the skin daily One dose injected daily. 9/27/19 12/7/20  Zachery Mckinney MD           Pre-Sedation Documentation and Exam:   Vital signs have been reviewed (see flow sheet for vitals).     Mallampati Airway Assessment:  Mallampati Class II - (soft palate, fauces & uvula are visible)    Prior History of Anesthesia Complications:   none    ASA Classification:  Class 3    Sedation/ Anesthesia Plan:   intravenous sedation    Medications Planned:   midazolam (Versed) intravenously    Patient is an appropriate candidate for plan of sedation: yes    Electronically signed by Zane Lopez MD on 8/27/2021 at 10:24 PM

## 2021-08-28 NOTE — PROGRESS NOTES
INPATIENT PULMONARY CRITICAL CARE PROGRESS NOTE      Reason for visit    COPD exacerbation     SUBJECTIVE: Patient underwent the therapeutic bronchoscopy yesterday and large amount of thick mucous plugs were lavaged out, patient when seen this morning was feeling much better, patient was able to take a deep breath, patient was not having any chest congestion, patient did not have any increasing shortness of breath, patient does not have any pleuritic chest pain, patient was afebrile and hemodynamically maintained, patient had sinus rhythm on the monitor which was ranging from normal sinus rhythm to sinus tachycardia, patient was on 3 L of nasal cannula oxygen with saturation 95%, patient urine output has not been recorded in the epic for review, patient was alert and oriented, no other pertinent review of system of concern          Physical Exam:  Blood pressure 119/73, pulse 90, temperature 98.3 °F (36.8 °C), temperature source Oral, resp. rate 22, height 5' 5\" (1.651 m), weight 162 lb (73.5 kg), SpO2 95 %, not currently breastfeeding.'       Constitutional: No respiratory distress. When seen  HENT:  Oropharynx is clear and moist. No thyromegaly. Eyes:  Conjunctivae are normal. Pupils equal, round, and reactive to light. No scleral icterus. Neck: . No tracheal deviation present. No obvious thyroid mass. Cardiovascular: Normal rate, regular rhythm, normal heart sounds.  No right ventricular heave. No lower extremity edema. Pulmonary/Chest: Bilateral minimal wheezes. No  rales. No significant chest congestion chest wall is not dull to percussion.  No accessory muscle usage or stridor.  Decreased breath sound density  Abdominal: Soft. Bowel sounds present. No distension or hernia. No tenderness.    Musculoskeletal: No cyanosis. No clubbing. No obvious joint deformity. Lymphadenopathy: No cervical or supraclavicular adenopathy. Skin: Skin is warm and dry.  No rash or nodules on the exposed Depression    Tobacco dependence    Pulmonary fibrosis (HCC)    ILD (interstitial lung disease) (HCC)    COPD exacerbation (HCC)    Chest congestion    Mucus plugging of bronchi    Ineffective airway clearance    Acute on chronic respiratory failure ([facility])  Resolved Problems:    * No resolved hospital problems. *          Plan:   · Oxygen supplementation to keep saturation being 90 to 94% only  · Please titrate the oxygen as per the above parameters  · Bronchodilators  · DuoNeb to continue  · No need for symbicort with duoneb and pulmicort   · Pulmicort and Mucomyst nebulization added to the regimen  · IV Solu-Medrol being changed to PO prednsione   · Incentive spirometry  · Pulmonary toilet  · S/p bronchoscopy and the results are pending   · Patient was started on IV doxycycline -has H/O Pseudomonas pneumonia -will change antibiotic to cefepime and reassess as per clinical status and c/s-can be changed to PO cipro   · Patient's hyponatremia may be secondary to SSRI  · Patient's TSH in the past was normal  · Mucomyst nebulization and mucinex added to regimen  · Smoking cessation advised and pros and cons of continued smoking discussed  · Steam inhalation at home advised   · Humidifier in bedroom especially in [place] may be helpful  · Nicotine replacement therapy if patient wants   · PUD  as per IM   · Patient needs to discuss with Dr. Dee Dominguez patient needs any evaluation for SUAD; deemed appropriate  · Patient is vaccinated against COVID 19   · DVT prophylaxis as per IM      Case discussed with patient and nursing     Further management depending on patient's clinical status and follow-up on above recommendations and bronchoscopy results    ? Discharge planning     Case d/w patient and nursing           Electronically signed by:  Ale Hayes MD    8/28/2021    11:34 AM.

## 2021-08-28 NOTE — PROGRESS NOTES
Hospitalist Progress Note      PCP: Georgette Roth MD    Date of Admission: 8/26/2021        Hospital Course:     60 y.o. female  with  PMH of severe COPD, depression, fibromyalgia, ILD, tobacco dependence presented to the ED today with complaints of shortness of breath, progressively worsening over 4 weeks    Subjective:     Feels much better today. Shortness of breath is improved. No fevers or chills. Medications:  Reviewed    Infusion Medications    sodium chloride Stopped (08/27/21 6654)     Scheduled Medications    predniSONE  40 mg Oral Daily    ipratropium-albuterol  1 ampule Inhalation Once    cefepime  2,000 mg IntraVENous Q12H    budesonide  0.5 mg Nebulization BID    guaiFENesin  600 mg Oral BID    acetylcysteine  600 mg Inhalation BID    busPIRone  30 mg Oral BID    FLUoxetine  60 mg Oral Daily    montelukast  10 mg Oral Nightly    pantoprazole  40 mg Oral QAM AC    atorvastatin  10 mg Oral Nightly    traZODone  300 mg Oral Nightly    sodium chloride flush  5-40 mL IntraVENous 2 times per day    enoxaparin  40 mg Subcutaneous Daily    ipratropium-albuterol  1 ampule Inhalation Q4H WA     PRN Meds: sodium chloride flush, sodium chloride, ondansetron **OR** ondansetron, polyethylene glycol, acetaminophen **OR** acetaminophen      Intake/Output Summary (Last 24 hours) at 8/28/2021 1401  Last data filed at 8/28/2021 0545  Gross per 24 hour   Intake 990 ml   Output --   Net 990 ml       Exam:    /69   Pulse 90   Temp 97.7 °F (36.5 °C) (Oral)   Resp 18   Ht 5' 5\" (1.651 m)   Wt 162 lb (73.5 kg)   SpO2 98%   BMI 26.96 kg/m²     General appearance: No apparent distress, appears stated age and cooperative. HEENT: Pupils equal, round, and reactive to light. Conjunctivae/corneas clear. Neck: Supple, with full range of motion. No jugular venous distention. Trachea midline. Respiratory:  Normal respiratory effort.   Mild bibasilar rales  Cardiovascular: Regular rate and rhythm with normal S1/S2 without murmurs, rubs or gallops. Abdomen: Soft, non-tender, non-distended with normal bowel sounds. Musculoskeletal: No clubbing, cyanosis or edema bilaterally. Full range of motion without deformity. Skin: Skin color, texture, turgor normal.  No rashes or lesions. Neurologic:  Neurovascularly intact without any focal sensory/motor deficits. Cranial nerves: II-XII intact, grossly non-focal.  Psychiatric: Alert and oriented, thought content appropriate, normal insight  Capillary Refill: Brisk,< 3 seconds   Peripheral Pulses: +2 palpable, equal bilaterally       Labs:   Recent Labs     08/26/21  1007   WBC 15.6*   HGB 14.5   HCT 41.4        Recent Labs     08/26/21  1007   *   K 4.4   CL 97*   CO2 21   BUN 12   CREATININE 0.6   CALCIUM 9.0     Recent Labs     08/26/21  1007   AST 18   ALT 18   BILITOT <0.2   ALKPHOS 64     No results for input(s): INR in the last 72 hours. Recent Labs     08/26/21  1007   TROPONINI <0.01       Assessment/Plan:      -Severe COPD with exacerbation clinically improving-continue steroids, bronchodilators antibiotics. Smoking cessation strongly advised-continue supplemental oxygen  -Acute on chronic respiratory failure hypoxia-improved-down to 3 L-chronically uses oxygen only at night-home O2 eval prior to discharge  -Mucous plugging status post bronchoscopy with BAL 8/27-cultures pending. . Continue IV cefepime given history of Pseudomonas  -Interstitial lung disease with pulmonary fibrosis-chest x-ray showed chronic findings--- smoking cessation emphasized  -Essential hypertension-stable-continue Tenormin  -Hyperlipidemia-continue statin  -Anxiety/depression-continue buspirone, fluoxetine and trazodone   -Chronic tobacco abuse --smoking cessation counseling provided-continue Chantix     Disposition--home in 24 hours if she continues to improve    DVT Prophylaxis: Lovenox  Diet: ADULT DIET;  Regular  Code Status: Full Code        Xenia Miller

## 2021-08-29 VITALS
WEIGHT: 167.7 LBS | BODY MASS INDEX: 27.94 KG/M2 | RESPIRATION RATE: 16 BRPM | HEIGHT: 65 IN | TEMPERATURE: 97.7 F | HEART RATE: 71 BPM | SYSTOLIC BLOOD PRESSURE: 121 MMHG | DIASTOLIC BLOOD PRESSURE: 63 MMHG | OXYGEN SATURATION: 95 %

## 2021-08-29 LAB
ANION GAP SERPL CALCULATED.3IONS-SCNC: 9 MMOL/L (ref 3–16)
BANDED NEUTROPHILS RELATIVE PERCENT: 3 % (ref 0–7)
BASOPHILS ABSOLUTE: 0 K/UL (ref 0–0.2)
BASOPHILS RELATIVE PERCENT: 0 %
BUN BLDV-MCNC: 21 MG/DL (ref 7–20)
CALCIUM SERPL-MCNC: 8.6 MG/DL (ref 8.3–10.6)
CHLORIDE BLD-SCNC: 97 MMOL/L (ref 99–110)
CO2: 25 MMOL/L (ref 21–32)
CREAT SERPL-MCNC: 0.7 MG/DL (ref 0.6–1.2)
CULTURE, RESPIRATORY: NORMAL
EOSINOPHILS ABSOLUTE: 0 K/UL (ref 0–0.6)
EOSINOPHILS RELATIVE PERCENT: 0 %
GFR AFRICAN AMERICAN: >60
GFR NON-AFRICAN AMERICAN: >60
GLUCOSE BLD-MCNC: 121 MG/DL (ref 70–99)
GRAM STAIN RESULT: NORMAL
HCT VFR BLD CALC: 36.8 % (ref 36–48)
HEMOGLOBIN: 12.5 G/DL (ref 12–16)
LYMPHOCYTES ABSOLUTE: 1.7 K/UL (ref 1–5.1)
LYMPHOCYTES RELATIVE PERCENT: 8 %
MCH RBC QN AUTO: 29.9 PG (ref 26–34)
MCHC RBC AUTO-ENTMCNC: 34 G/DL (ref 31–36)
MCV RBC AUTO: 87.8 FL (ref 80–100)
MONOCYTES ABSOLUTE: 0.4 K/UL (ref 0–1.3)
MONOCYTES RELATIVE PERCENT: 2 %
NEUTROPHILS ABSOLUTE: 19.4 K/UL (ref 1.7–7.7)
NEUTROPHILS RELATIVE PERCENT: 87 %
PDW BLD-RTO: 13.5 % (ref 12.4–15.4)
PLATELET # BLD: 307 K/UL (ref 135–450)
PLATELET SLIDE REVIEW: ADEQUATE
PMV BLD AUTO: 6.7 FL (ref 5–10.5)
POTASSIUM SERPL-SCNC: 4.3 MMOL/L (ref 3.5–5.1)
RBC # BLD: 4.19 M/UL (ref 4–5.2)
RBC # BLD: NORMAL 10*6/UL
SODIUM BLD-SCNC: 131 MMOL/L (ref 136–145)
WBC # BLD: 21.6 K/UL (ref 4–11)

## 2021-08-29 PROCEDURE — 2580000003 HC RX 258: Performed by: INTERNAL MEDICINE

## 2021-08-29 PROCEDURE — 6370000000 HC RX 637 (ALT 250 FOR IP): Performed by: INTERNAL MEDICINE

## 2021-08-29 PROCEDURE — 94640 AIRWAY INHALATION TREATMENT: CPT

## 2021-08-29 PROCEDURE — 80048 BASIC METABOLIC PNL TOTAL CA: CPT

## 2021-08-29 PROCEDURE — 2700000000 HC OXYGEN THERAPY PER DAY

## 2021-08-29 PROCEDURE — 99232 SBSQ HOSP IP/OBS MODERATE 35: CPT | Performed by: INTERNAL MEDICINE

## 2021-08-29 PROCEDURE — 36415 COLL VENOUS BLD VENIPUNCTURE: CPT

## 2021-08-29 PROCEDURE — 85025 COMPLETE CBC W/AUTO DIFF WBC: CPT

## 2021-08-29 PROCEDURE — 6360000002 HC RX W HCPCS: Performed by: INTERNAL MEDICINE

## 2021-08-29 PROCEDURE — 94761 N-INVAS EAR/PLS OXIMETRY MLT: CPT

## 2021-08-29 RX ORDER — GUAIFENESIN 600 MG/1
600 TABLET, EXTENDED RELEASE ORAL 2 TIMES DAILY PRN
Qty: 60 TABLET | Refills: 1 | Status: SHIPPED | OUTPATIENT
Start: 2021-08-29

## 2021-08-29 RX ORDER — PREDNISONE 10 MG/1
TABLET ORAL
Qty: 42 TABLET | Refills: 0 | Status: SHIPPED | OUTPATIENT
Start: 2021-08-30 | End: 2021-09-08

## 2021-08-29 RX ORDER — CIPROFLOXACIN 500 MG/1
500 TABLET, FILM COATED ORAL 2 TIMES DAILY
Qty: 14 TABLET | Refills: 0 | Status: SHIPPED | OUTPATIENT
Start: 2021-08-29 | End: 2021-09-05

## 2021-08-29 RX ADMIN — CEFEPIME HYDROCHLORIDE 2000 MG: 2 INJECTION, POWDER, FOR SOLUTION INTRAVENOUS at 00:00

## 2021-08-29 RX ADMIN — FLUOXETINE 60 MG: 20 CAPSULE ORAL at 08:30

## 2021-08-29 RX ADMIN — Medication 10 ML: at 08:30

## 2021-08-29 RX ADMIN — ENOXAPARIN SODIUM 40 MG: 40 INJECTION SUBCUTANEOUS at 08:30

## 2021-08-29 RX ADMIN — PREDNISONE 40 MG: 20 TABLET ORAL at 08:30

## 2021-08-29 RX ADMIN — GUAIFENESIN 600 MG: 600 TABLET, EXTENDED RELEASE ORAL at 08:30

## 2021-08-29 RX ADMIN — BUSPIRONE HYDROCHLORIDE 30 MG: 5 TABLET ORAL at 08:30

## 2021-08-29 RX ADMIN — IPRATROPIUM BROMIDE AND ALBUTEROL SULFATE 1 AMPULE: .5; 3 SOLUTION RESPIRATORY (INHALATION) at 08:19

## 2021-08-29 RX ADMIN — ACETYLCYSTEINE 600 MG: 200 SOLUTION ORAL; RESPIRATORY (INHALATION) at 08:19

## 2021-08-29 RX ADMIN — BUDESONIDE 500 MCG: 0.5 SUSPENSION RESPIRATORY (INHALATION) at 08:19

## 2021-08-29 RX ADMIN — PANTOPRAZOLE SODIUM 40 MG: 40 TABLET, DELAYED RELEASE ORAL at 07:42

## 2021-08-29 NOTE — DISCHARGE SUMMARY
Hospital Discharge Summary    Patient's PCP: Aidan Stevens MD  Admit Date: 8/26/2021   Discharge Date: 8/29/2021    Admitting Physician: Dr. Yaya Herbert MD  Discharge Physician: Dr. Ric Aquino MD   Consults: pulmonary/intensive care    Brief HPI:     60 y. o. female  with  PMH of severe COPD, depression, fibromyalgia, ILD, tobacco dependence presented to the ED today with complaints of shortness of breath, progressively worsening over 4 weeks    Brief hospital course:     -Severe COPD with exacerbation -clinically improved with treatment-continue steroids, bronchodilators antibiotics. Smoking cessation strongly advised-continue supplemental oxygen  -Acute on chronic respiratory failure hypoxia-improved-down to 3 L-chronically uses oxygen only at night-discharge-intermittent  -Mucous plugging status post bronchoscopy with BAL 8/27-cultures pending. .  Treated with IV cefepime given history of Pseudomonas it was changed to ciprofloxacin on discharge. BAL cultures were negative but he was discharged  -Interstitial lung disease with pulmonary fibrosis-chest x-ray showed chronic findings--- smoking cessation emphasized  -Essential hypertension-stable-not on medications  -Hyperlipidemia-continue statin  -Anxiety/depression-continue buspirone, fluoxetine and trazodone   -Chronic tobacco abuse --smoking cessation counseling provided-continue Chantix    Invasive procedures:  Bronchoscopy with BAL    Discharge Diagnoses: Active Problems:    Stage 3 severe COPD by GOLD classification ([facility])    Depression    Tobacco dependence    Pulmonary fibrosis (HCC)    ILD (interstitial lung disease) ([facility])    COPD exacerbation ([facility])    Chest congestion    Mucus plugging of bronchi    Ineffective airway clearance    Acute on chronic respiratory failure ([facility])  Resolved Problems:    * No resolved hospital problems.  *      Physical Exam: /63   Pulse 71   Temp 97.7 °F (36.5 °C) (Oral)   Resp 16   Ht 5' 5\" (1.651 m) Wt 167 lb 11.2 oz (76.1 kg)   SpO2 95%   BMI 27.91 kg/m²   Gen/overall appearance: Not in acute distress. Alert. Head: Normocephalic, atraumatic  Eyes: EOMI, good acuity  ENT:- Oral mucosa moist  Neck: No JVD, thyromegaly  CVS: Nml S1S2, no MRG, RRR  Pulm: Clear bilaterally. No crackles/wheezes  Gastrointestinal: Soft, NT/ND, +BS  Musculoskeletal: No edema. Warm  Neuro: No focal deficit. Moves extremity spontaneously. Psychiatry: Appropriate affect. Not agitated. Skin: Warm, dry with normal turgor. No rash        Significant Diagnostic Studies:    See above        Treatments: As above. Discharge Medications:     Medication List      START taking these medications    ciprofloxacin 500 MG tablet  Commonly known as: CIPRO  Take 1 tablet by mouth 2 times daily for 7 days        CONTINUE taking these medications    * albuterol sulfate  (90 Base) MCG/ACT inhaler  Commonly known as: Ventolin HFA  Inhale 2 puffs into the lungs every 6 hours as needed for Wheezing or Shortness of Breath     * albuterol (2.5 MG/3ML) 0.083% nebulizer solution  Commonly known as: PROVENTIL  Take 3 mLs by nebulization every 6 hours as needed for Wheezing or Shortness of Breath DX COPD J44.9     BD Pen Needle Christa U/F 32G X 4 MM Misc  Generic drug: Insulin Pen Needle  USE ONE DAILY AS DIRECTED     busPIRone 30 MG tablet  Commonly known as: BUSPAR  TAKE 1 TABLET TWICE DAILY     Chantix Starting Month [place] 0.5 MG X 11 & 1 MG X 42 tablet  Generic drug: varenicline  Take by mouth.      FLUoxetine 20 MG capsule  Commonly known as: PROZAC  TAKE 3 CAPSULES BY MOUTH DAILY     fluticasone-salmeterol 500-50 MCG/DOSE diskus inhaler  Commonly known as: Wixela Inhub  Inhale 1 puff into the lungs every 12 hours     guaiFENesin 600 MG extended release tablet  Commonly known as: Mucinex  Take 1 tablet by mouth 2 times daily as needed for Congestion     montelukast 10 MG tablet  Commonly known as: SINGULAIR  TAKE 1 TABLET BY MOUTH EACH NIGHT naproxen 500 MG tablet  Commonly known as: Naprosyn  Take 1 tablet by mouth 2 times daily (with meals)     pantoprazole 40 MG tablet  Commonly known as: PROTONIX  Take 1 tablet by mouth every morning (before breakfast)     * predniSONE 10 MG tablet  Commonly known as: DELTASONE  Take 1 tablet by mouth daily     * predniSONE 10 MG tablet  Commonly known as: DELTASONE  Take 6tab by mouth x2 days, then 5tab x2 days, then 4tabs x2 days, then 3tab x2 days, then 2tab x2 days, then 1tab x2 days. Start taking on: August 30, 2021     simvastatin 20 MG tablet  Commonly known as: ZOCOR  TAKE 1 TABLET EVERY EVENING     Spiriva HandiHaler 18 MCG inhalation capsule  Generic drug: tiotropium  INHALE THE CONTENTS OF 1 CAPSULE EVERY DAY     teriparatide (recombinant) 600 MCG/2.4ML Soln injection  Commonly known as: Forteo  Inject 0.08 mLs into the skin daily One dose injected daily. traZODone 150 MG tablet  Commonly known as: DESYREL  TAKE 2 TABLETS AT BEDTIME         * This list has 4 medication(s) that are the same as other medications prescribed for you. Read the directions carefully, and ask your doctor or other care provider to review them with you. STOP taking these medications    ibuprofen 600 MG tablet  Commonly known as: ADVIL;MOTRIN           Where to Get Your Medications      These medications were sent to [address], [place] [address] [facility] Sravani Cornell [phone] Pineda Moon [phone]  [address], [place]    Phone: [phone]   · ciprofloxacin 500 MG tablet  · guaiFENesin 600 MG extended release tablet  · predniSONE 10 MG tablet         Activity: activity as tolerated  Diet: ADULT DIET;  Regular      Disposition: home  Discharged Condition: Stable  Follow Up:   Gricel Go MD  [address] [facility] [address]  [address] [facility] [address] [place] [address] S    In 1 week      Katie Combs MD  [address] [facility] [address] En[address] Goss [address] [address] [facility]  [phone]    In 2 weeks          Code status:  Full Code         Total time spent on discharge, finalizing medications, referrals and arranging outpatient follow up was more than 45 minutes      Thank you Dr. Radha Regalado MD for the opportunity to be involved in this patients care.

## 2021-08-29 NOTE — PROGRESS NOTES
INPATIENT PULMONARY CRITICAL CARE PROGRESS NOTE      Reason for visit    COPD exacerbation     SUBJECTIVE: Patient when seen this morning continues to feel better with no increasing cough/expectoration, patient was not having any chest congestion, patient did not have any increasing shortness of breath, patient does not have any pleuritic chest pain;patient had a comfortable night , patient was afebrile and hemodynamically maintained, patient had sinus rhythm on the monitor which was ranging from normal sinus rhythm to sinus tachycardia, patient was on 3 L of nasal cannula oxygen with saturation 95%, patient had adequate urine o/p overnight , patient was alert and oriented, no other pertinent review of system of concern          Physical Exam:  Blood pressure 121/63, pulse 71, temperature 97.7 °F (36.5 °C), temperature source Oral, resp. rate 16, height 5' 5\" (1.651 m), weight 167 lb 11.2 oz (76.1 kg), SpO2 95 %, not currently breastfeeding.'       Constitutional: No respiratory distress. When seen  HENT:  Oropharynx is clear and moist. No thyromegaly. Eyes:  Conjunctivae are normal. Pupils equal, round, and reactive to light. No scleral icterus. Neck: . No tracheal deviation present. No obvious thyroid mass. Cardiovascular: Normal rate, regular rhythm, normal heart sounds.  No right ventricular heave. No lower extremity edema. Pulmonary/Chest: No  wheezes. No  rales. No  chest congestion chest wall is not dull to percussion.  No accessory muscle usage or stridor.  Decreased breath sound density  Abdominal: Soft. Bowel sounds present. No distension or hernia. No tenderness.    Musculoskeletal: No cyanosis. No clubbing. No obvious joint deformity. Lymphadenopathy: No cervical or supraclavicular adenopathy. Skin: Skin is warm and dry. No rash or nodules on the exposed extremities. Psychiatric: Normal mood and affect. Behavior is normal.  No anxiety. Neurologic: Alert, awake and oriented.  PERRL. Jason Snyder fluent         Results:  CBC:   Recent Labs     08/26/21  1007 08/29/21  0454   WBC 15.6* 21.6*   HGB 14.5 12.5   HCT 41.4 36.8   MCV 87.4 87.8    307     BMP:   Recent Labs     08/26/21  1007 08/29/21  0454   * 131*   K 4.4 4.3   CL 97* 97*   CO2 21 25   BUN 12 21*   CREATININE 0.6 0.7     LIVER PROFILE:   Recent Labs     08/26/21  1007   AST 18   ALT 18   BILITOT <0.2   ALKPHOS 64     UA:  Recent Labs     08/26/21  1714   COLORU Yellow   PHUR 6.5   CLARITYU Clear   SPECGRAV 1.020   LEUKOCYTESUR Negative   UROBILINOGEN 0.2   BILIRUBINUR Negative   BLOODU Negative   GLUCOSEU Negative       Imaging:  I have reviewed radiology images personally. XR CHEST PORTABLE   Final Result   Peripheral ground-glass opacities suspicious for viral/atypical pattern of   pneumonia           XR CHEST PORTABLE    Result Date: 8/26/2021  EXAMINATION: ONE XRAY VIEW OF THE CHEST 8/26/2021 10:05 am COMPARISON: None. HISTORY: ORDERING SYSTEM PROVIDED HISTORY: shortness of breath TECHNOLOGIST PROVIDED HISTORY: Reason for exam:->shortness of breath Reason for Exam: sob Acuity: Acute Type of Exam: Initial FINDINGS: Heart size and pulmonary vasculature within normal limits. Thoracic aorta tortuous. Peripheral ground-glass opacities in the mid and lower lung zones. Costophrenic angles sharp     Peripheral ground-glass opacities suspicious for viral/atypical pattern of pneumonia       Results for Toya Betancur (MRN [id number]) as of 8/28/2021 11:35   Ref. Range 8/27/2021 15:08   CULTURE, RESPIRATORY Unknown Rpt   Gram Stain Result Unknown 2+ WBC's (Polymor. .. CULTURE, RESPIRATORY Unknown Further report to follow   Rsv Source Unknown bronch wash       Results for Toya Betancur (MRN [id number]) as of 8/29/2021 09:54   Ref. Range 8/27/2021 15:08   CULTURE, RESPIRATORY Unknown Rpt   Gram Stain Result Unknown 2+ WBC's (Polymor. ..    CULTURE, RESPIRATORY Unknown Further report to follow   Rsv Source Unknown bronch wash Assessment:  Active Problems:    Stage 3 severe COPD by GOLD classification ([facility])    Depression    Tobacco dependence    Pulmonary fibrosis ([facility])    ILD (interstitial lung disease) ([facility])    COPD exacerbation ([facility])    Chest congestion    Mucus plugging of bronchi    Ineffective airway clearance    Acute on chronic respiratory failure ([facility])  Resolved Problems:    * No resolved hospital problems. *          Plan:   · Oxygen supplementation to keep saturation being 90 to 94% only  · Please titrate the oxygen as per the above parameters  · Bronchodilators  ·  PO prednsione in tapering doses   · Incentive spirometry  · Pulmonary toilet  · S/p bronchoscopy and the results are still pending   · On Cefepime -consider changing to PO Ciprofloaxacin on the basis of previous cultures-IM to decide   · Patient has worsening leukocytosis which can be secondary to steroids ;patient does not have diarrhea-needs to follow up by PCP in few weeks ,if deemed appropriate   · Patient's hyponatremia may be secondary to SSRI  · Patient's TSH in the past was normal  · Patient has   · Smoking cessation advised and pros and cons of continued smoking discussed  · Steam inhalation at home advised   · Nicotine replacement therapy if patient wants   · PUD  as per IM   · Patient needs to discuss with Dr. Louann Amin patient needs any evaluation for SUAD; deemed appropriate  · Patient is vaccinated against COVID 19   · DVT prophylaxis as per IM      Case discussed with patient and nursing     Further management depending on patient's clinical status and follow-up on above recommendations and bronchoscopy results    ? Discharge planning     Case d/w patient and IM    No other recommendations from Pulmonary/CCM stand point -will sign off -please call on PRN basis,if the patient is not discharged           Electronically signed by:  Amadeo Cisse MD    8/29/2021    9:39 AM.

## 2021-08-29 NOTE — PROGRESS NOTES
Care assumed from previous RN. A&O, VSS, NSR on tele, assessment complete as documented. SpO2 WNL on 3L O2 (her home dose), lungs diminished but no adventitious breath sounds noted, pt states she has intermittent dyspnea but no more than usual. Aware of need to call for ambulation assistance if weakness of legs or dizziness occurs. Denies pain or other needs at this time.

## 2021-08-29 NOTE — DISCHARGE INSTR - COC
Continuity of Care Form    Patient Name: Marlena Abbott   :  1955  MRN:  [id number]    Admit date:  2021  Discharge date:  21    Code Status Order: Full Code   Advance Directives:   Advance Care Flowsheet Documentation       Date/Time Healthcare Directive Type of Healthcare Directive Copy in [address] Agent's Name Healthcare Agent's Phone Number    21 1438  No, patient does not have an advance directive for healthcare treatment  --  --  --  --  --            Admitting Physician:  Morales Trujillo MD  PCP: Roxi Neves MD    Discharging Nurse: [facility] Unit/Room#: 3987/5923-87  Discharging Unit Phone Number: [phone]    Emergency Contact:   Extended Emergency Contact Information  Primary Emergency Contact: MarvinAngelita  Address: [place] [phone]           DAUGHTER IN [facility] of [address] [place] [address] Phone: [phone]  Mobile Phone: [phone]  Relation: Child  Secondary Emergency Contact: [facility] [address], [address] [place] [address] Phone: [phone]  Mobile Phone: [phone]  Relation: Spouse    Past Surgical History:  Past Surgical History:   Procedure Laterality Date    BLADDER REPAIR      BRONCHOSCOPY      BRONCHOSCOPY N/A 3/6/2020    BRONCHOSCOPY THERAPUTIC ASPIRATION INITIAL performed by Georgina Starr MD at [address] [place] [address]  3/6/2020    BRONCHOSCOPY ALVEOLAR LAVAGE performed by Georgina Starr MD at [address] [place] [address] 2020    BRONCHOSCOPY [address] [facility] [address] performed by Colin Cee MD at [address] [place] [address]  2020    [address] performed by Colin Cee MD at [address] [place] [address]  2021    Dr Aldair Armendariz N/A 2021    BRONCHOSCOPY DIAGNOSTIC OR CELL [address] performed by Colin Cee MD at [address] [place] [address]  2021    BRONCHOSCOPY THERAPUTIC ASPIRATION INITIAL performed by Nicola Nuñez MD at [address] [facility]  6/23/2021    BRONCHOSCOPY ALVEOLAR LAVAGE performed by Nicola Nuñez MD at [address] [facility]  2012    ENDOSCOPY, COLON, DIAGNOSTIC      ESOPHAGEAL DILATATION  9/20/2018    ESOPHAGEAL DILATION [place] performed by Delroy Umaña MD at [address] [facility] HISTORY  2/12/15    T8 Kyphoplasty    OK COLONOSCOPY FLX DX W/COLLJ SPEC WHEN PFRMD N/A 9/20/2018    EGD AND COLONOSCOPY WITH ANESTHESIA performed by Delroy Umaña MD at [address] ESOPHAGOGASTRODUODENOSCOPY TRANSORAL DIAGNOSTIC N/A 9/20/2018    EGD AND COLONOSCOPY WITH ANESTHESIA performed by Delroy Umaña MD at [address] [facility]         Immunization History:   Immunization History   Administered Date(s) Administered    COVID-19, Chan Peter, PF, 30mcg/0.3mL 03/10/2021, 03/31/2021    Influenza 10/04/2010, 10/12/2011, 11/28/2012    Influenza Virus Vaccine 10/16/2014, 01/14/2016    Influenza, Intradermal, Preservative free 11/04/2013    Influenza, Khanna Jacks, IM, (6 mo and older Fluzone, Flulaval, Fluarix and 3 yrs and older Afluria) 10/12/2011, 01/30/2017, 10/19/2017    Influenza, Khanna Jacks, IM, PF (6 mo and older Fluzone, Flulaval, Fluarix, and 3 yrs and older Afluria) 09/06/2018, 12/26/2019    Pneumococcal Conjugate 7-valent (Prevnar7) 01/01/2009    Pneumococcal Polysaccharide (Vvclwonwm02) 01/14/2016    Td, unspecified formulation 11/04/2013    Tdap (Boostrix, Adacel) 01/25/2018       Active Problems:  Patient Active Problem List   Diagnosis Code    Stage 3 severe COPD by GOLD classification ([facility] J44.9    Smoker F17.200    Fibromyalgia M79.7    Hypokalemia E87.6    Hyperlipidemia E78.5    Hyponatremia E87.1    Depression F32.9    Tobacco dependence F17.200    Pulmonary fibrosis ([facility] J84.10    ILD (interstitial lung disease) ([facility] J84.9    Recurrent major depressive disorder, in partial remission ([facility]) F33.41    Thyroid nodule E04.1    History of prescription drug abuse [id number]    Esophageal dysphagia R13.10    Heartburn R12    Rectal bleeding K62.5    History of colon polyps Z86.010    Compression fx, thoracic spine, sequela S22.000S    Kyphosis M40.209    Anxiety and depression F41.9, F32.9    Chronic pain syndrome G89.4    Polyarthropathy, multiple sites M13.0    Pneumonia J18.9    Acute on chronic respiratory failure with hypoxia and hypercapnia ([facility]) J96.21, J96.22    Acute respiratory failure with hypoxia ([facility]) J96.01    Healthcare associated bacterial pneumonia J15.9    Bandemia D72.825    Sepsis ([facility]) A41.9    COPD exacerbation ([facility]) J44.1    Pulmonary infiltrates R91.8    Chest congestion R09.89    Mucus plugging of bronchi T17.500A    Ineffective airway clearance R06.89    Acute on chronic respiratory failure with hypoxemia ([facility]) J96.21    COPD, frequent exacerbations ([facility]) J44.1    Acute on chronic respiratory failure ([facility]) J96.20    Cannabis dependence with current use ([facility]) F12.20    Other secondary kyphosis, 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    Marijuana use K23.02    Diastolic dysfunction J85.48       Isolation/Infection:   Isolation            No Isolation          Patient Infection Status       Infection Onset Added Last Indicated Last Indicated By Review Planned Expiration Resolved Resolved By    None active    Resolved    COVID-19 Rule Out 07/11/21 07/11/21 07/11/21 COVID-19, Rapid (Ordered)   07/11/21 Rule-Out Test Resulted    COVID-19 Rule Out 11/27/20 11/27/20 11/27/20 COVID-19 (Ordered)   11/27/20 Rule-Out Test Resulted    COVID-19 Rule Out 10/09/20 10/09/20 10/09/20 COVID-19 (Ordered)   10/10/20 Rule-Out Test Resulted    COVID-19 Rule Out 06/24/20 06/24/20 06/24/20 COVID-19 (Ordered)   06/25/20 Rule-Out Test Resulted    COVID-19 Rule Out 04/03/20 04/03/20 06/01/20 COVID-19 (Ordered)   06/02/20 Rule-Out Test Resulted    INFLUENZA 20 Respiratory Panel, Molecular   20             Nurse Assessment:  Last Vital Signs: /63   Pulse 71   Temp 97.7 °F (36.5 °C) (Oral)   Resp 16   Ht 5' 5\" (1.651 m)   Wt 167 lb 11.2 oz (76.1 kg)   SpO2 95%   BMI 27.91 kg/m²     Last documented pain score (0-10 scale): Pain Level: 0  Last Weight:   Wt Readings from Last 1 Encounters:   21 167 lb 11.2 oz (76.1 kg)     Mental Status:  oriented and alert    IV Access:  - None    Nursing Mobility/ADLs:  Walking   Independent  Transfer  Independent  Bathing  Independent  Dressing  Independent  [address]  Med Delivery   whole    Wound Care Documentation and Therapy:        Elimination:  Continence:   · Bowel: Yes  · Bladder: Yes  Urinary Catheter: None   Colostomy/Ileostomy/Ileal Conduit: No       Date of Last BM:     Intake/Output Summary (Last 24 hours) at 2021 0946  Last data filed at 2021 6898  Gross per 24 hour   Intake 2040 ml   Output 850 ml   Net 1190 ml     I/O last 3 completed shifts: In: 1920 [P.O.:1920]  Out: 850 [Urine:850]    Safety Concerns: At Risk for Falls    Impairments/Disabilities:      None    Nutrition Therapy:  Current Nutrition Therapy:   - Oral Diet:  General    Routes of Feeding: Oral  Liquids:  Thin Liquids  Daily Fluid Restriction: no  Last Modified Barium Swallow with Video (Video Swallowing Test): not done    Treatments at the Time of Hospital Discharge:   Respiratory Treatments: ***  Oxygen Therapy:  {Therapy; copd oxygen:15968}  Ventilator:    { CC Vent HJPV:021937586}    Rehab Therapies: {THERAPEUTIC INTERVENTION:5028191408}  Weight Bearing Status/Restrictions: {[facility] Weight Bearin}  Other Medical Equipment (for information only, NOT a DME order):  {EQUIPMENT:929698062}  Other Treatments: ***    Patient's personal belongings (please select all that are sent with patient):  {[facility] DME Belongings:170657392}    RN SIGNATURE:  {Esignature:158062242}    CASE MANAGEMENT/SOCIAL WORK SECTION    Inpatient Status Date: 8/26/21    Readmission Risk Assessment Score:  Readmission Risk              Risk of Unplanned Readmission:  36           Discharging to Facility/ Agency   · Name: On license of [facility]  · Address:  · Phone: [phone]  · Fax:    Dialysis Facility (if applicable)   · Name:  · Address:  · Dialysis Schedule:  · Phone:  · Fax:    / signature: Electronically signed by Yasmine Broussard RN on 8/29/21 at 9:47 AM EDT    PHYSICIAN SECTION    Prognosis: Fair    Condition at Discharge: Stable    Rehab Potential (if transferring to Rehab): Fair    Recommended Labs or Other Treatments After Discharge: cbc/bmp in 1 week-fax results to PCP    Physician Certification: I certify the above information and transfer of Phares Sicard  is necessary for the continuing treatment of the diagnosis listed and that she requires Home Care for greater 30 days.      Update Admission H&P: No change in H&P    PHYSICIAN SIGNATURE:  Electronically signed by Yolanda Rondon MD on 8/29/21 at 9:51 AM EDT

## 2021-08-30 ENCOUNTER — CARE COORDINATION (OUTPATIENT)
Dept: CASE MANAGEMENT | Age: 66
End: 2021-08-30

## 2021-08-30 ENCOUNTER — CARE COORDINATION (OUTPATIENT)
Dept: CARE COORDINATION | Age: 66
End: 2021-08-30

## 2021-08-30 DIAGNOSIS — J96.21 ACUTE ON CHRONIC RESPIRATORY FAILURE WITH HYPOXIA (HCC): Primary | ICD-10-CM

## 2021-08-30 LAB
ADENOVIRUS PCR: NOT DETECTED
BLOOD CULTURE, ROUTINE: NORMAL
HUMAN METAPNEUMOVIRUS PCR: NOT DETECTED
INFLUENZA A: NOT DETECTED
INFLUENZA B: NOT DETECTED
PARAINFLUENZA 1 PCR: NOT DETECTED
PARAINFLUENZA 2 PCR: NOT DETECTED
PARAINFLUENZA 3 PCR: NOT DETECTED
PARAINFLUENZA 4 PCR: NOT DETECTED
RHINO/ENTEROVIRUS PCR: NOT DETECTED
RSV BY PCR: NOT DETECTED
RSV SOURCE: NORMAL

## 2021-08-30 PROCEDURE — 1111F DSCHRG MED/CURRENT MED MERGE: CPT | Performed by: FAMILY MEDICINE

## 2021-08-30 NOTE — CARE COORDINATION
Aj[facility] Transitions Initial Follow Up Call    Call within 2 business days of discharge: Yes    Patient: Apolinar Gardner Patient : 1955   MRN: [id number]  Reason for Admission: COPD  Discharge Date: 21 RARS: Readmission Risk Score: 36      Last Discharge [address]       Complaint Diagnosis Description Type Department Provider    21 Shortness of Breath COPD exacerbation ([facility] . .. ED to Hosp-Admission (Discharged) (ADMITTED) [facility] C4 Mei Chacon MD; Gissel Biggs. .. Spoke with: [address]: [facility]    Transitions of Care Initial Call    Challenges to be reviewed by the provider   Additional needs identified to be addressed with provider: No  none             Method of communication with provider : phone      Advance Care Planning:   Does patient have an Advance Directive: reviewed and current, reviewed and needs to be updated, not on file; education provided, not on file, patient declined education, decision maker updated and referral to internal ACP facilitator. Was this a readmission? No  Patients top risk factors for readmission: medical condition-COPD    Care Transition Nurse (CTN) contacted the patient by telephone to perform post hospital discharge assessment. Verified name and  with patient as identifiers. Provided introduction to self, and explanation of the CTN role. CTN reviewed discharge instructions, medical action plan and red flags with patient who verbalized understanding. Patient given an opportunity to ask questions and does not have any further questions or concerns at this time. Were discharge instructions available to patient? Yes. Reviewed appropriate site of care based on symptoms and resources available to patient including: PCP. The patient agrees to contact the PCP office for questions related to their healthcare.      Medication reconciliation was performed with patient, who verbalizes understanding of administration of home medications. Advised obtaining a 90-day supply of all daily and as-needed medications. Covid Risk Education     Educated patient about risk for severe COVID-19 due to risk factors according to CDC guidelines. CTN reviewed discharge instructions, medical action plan and red flag symptoms with the patient who verbalized understanding. Discussed COVID vaccination status: Yes. Education provided on COVID-19 vaccination as appropriate. Discussed exposure protocols and quarantine with CDC Guidelines. Patient was given an opportunity to verbalize any questions and concerns and agrees to contact CTN or health care provider for questions related to their healthcare. Reviewed and educated patient on any new and changed medications related to discharge diagnosis. Was patient discharged with a pulse oximeter? No, patient had Discussed and confirmed pulse oximeter discharge instructions and when to notify provider or seek emergency care. Patient answered call and verified . Patient pleasant and agreeable to transition list. Full medication reconciliation completed. Patient taking all medication as directed. Patient [facility] not [facility] and CTN placed call to provider. [facility] has [facility] referral and contact was to be made today. Patient confirmed that she has PCP office number and would call today and get follow up visit scheduled. Denies any acute needs at present time. Agreeable to f/u calls. Educated on the use of urgent care or physicians 24 hr access line if assistance is needed after hours. CTN provided contact information. Plan for follow up call in 7-10 days based on severity of symptoms and risk factors.   Cheryl Pryor RN   Care Transition Nurse  [phone]      Care Transitions 24 Hour Call    Do you have any ongoing symptoms?: No  Do you have a copy of your discharge instructions?: Yes  Do you have all of your prescriptions and are they filled?: Yes  Have you been contacted by a [facility] Pharmacist?: No  Have you scheduled your follow up appointment?: No  Were you discharged with any Home Care or Post Acute Services: Yes  Post Acute Services: Home Health (Comment: [facility])  Patient DME: [address] [facility] Will chair, Kalani Rodas  Patient Home Equipment: Oxygen, Nebulizer  Do you have support at home?: Partner/Spouse/SO  Do you feel like you have everything you need to keep you well at home?: Yes  Are you an active caregiver in your home?: No  Care Transitions Interventions         Follow Up  Future Appointments   Date Time Provider Matt Crabtree   9/15/2021  9:45 AM MD LINDA Dennison PULM [facility]   12/3/2021 11:00 AM [facility]   12/14/2021  2:45 PM Africa Mclaughlin MD [address] [place] [address]       Ochoa London, RN

## 2021-08-31 ENCOUNTER — TELEPHONE (OUTPATIENT)
Dept: FAMILY MEDICINE CLINIC | Age: 66
End: 2021-08-31

## 2021-08-31 NOTE — TELEPHONE ENCOUNTER
[facility] calling asking if Dr. Jael Aguilar would follow and sign orders for pt to have PT, OT and skilled nursing.  Please Advise [phone]

## 2021-09-01 NOTE — TELEPHONE ENCOUNTER
Patient was IP 8/26/21-8/29/21 CXR was completed     Narrative   EXAMINATION:   ONE XRAY VIEW OF THE CHEST       8/26/2021 10:05 am       COMPARISON:   None.       HISTORY:   ORDERING SYSTEM PROVIDED HISTORY: shortness of breath   TECHNOLOGIST PROVIDED HISTORY:   Reason for exam:->shortness of breath   Reason for Exam: sob   Acuity: Acute   Type of Exam: Initial       FINDINGS:   Heart size and pulmonary vasculature within normal limits.  Thoracic aorta   tortuous.  Peripheral ground-glass opacities in the mid and lower lung zones.    Costophrenic angles sharp           Impression   Peripheral ground-glass opacities suspicious for viral/atypical pattern of   pneumonia

## 2021-09-03 ENCOUNTER — APPOINTMENT (OUTPATIENT)
Dept: GENERAL RADIOLOGY | Age: 66
End: 2021-09-03
Payer: MEDICARE

## 2021-09-03 ENCOUNTER — HOSPITAL ENCOUNTER (EMERGENCY)
Age: 66
Discharge: HOME | End: 2021-09-03
Attending: EMERGENCY MEDICINE
Payer: MEDICARE

## 2021-09-03 ENCOUNTER — TELEPHONE (OUTPATIENT)
Dept: OTHER | Facility: CLINIC | Age: 66
End: 2021-09-03

## 2021-09-03 VITALS
DIASTOLIC BLOOD PRESSURE: 74 MMHG | HEART RATE: 94 BPM | SYSTOLIC BLOOD PRESSURE: 142 MMHG | OXYGEN SATURATION: 97 % | HEIGHT: 65 IN | BODY MASS INDEX: 27.49 KG/M2 | RESPIRATION RATE: 18 BRPM | WEIGHT: 165 LBS | TEMPERATURE: 97.6 F

## 2021-09-03 DIAGNOSIS — J44.1 COPD EXACERBATION (HCC): Primary | ICD-10-CM

## 2021-09-03 LAB
ANION GAP SERPL CALCULATED.3IONS-SCNC: 10 MMOL/L (ref 3–16)
BASOPHILS ABSOLUTE: 0 K/UL (ref 0–0.2)
BASOPHILS RELATIVE PERCENT: 0.1 %
BUN BLDV-MCNC: 10 MG/DL (ref 7–20)
CALCIUM SERPL-MCNC: 8.9 MG/DL (ref 8.3–10.6)
CHLORIDE BLD-SCNC: 98 MMOL/L (ref 99–110)
CO2: 26 MMOL/L (ref 21–32)
CREAT SERPL-MCNC: 0.7 MG/DL (ref 0.6–1.2)
EKG ATRIAL RATE: 109 BPM
EKG DIAGNOSIS: NORMAL
EKG P AXIS: 68 DEGREES
EKG P-R INTERVAL: 132 MS
EKG Q-T INTERVAL: 324 MS
EKG QRS DURATION: 72 MS
EKG QTC CALCULATION (BAZETT): 436 MS
EKG R AXIS: 15 DEGREES
EKG T AXIS: 76 DEGREES
EKG VENTRICULAR RATE: 109 BPM
EOSINOPHILS ABSOLUTE: 0.1 K/UL (ref 0–0.6)
EOSINOPHILS RELATIVE PERCENT: 0.7 %
GFR AFRICAN AMERICAN: >60
GFR NON-AFRICAN AMERICAN: >60
GLUCOSE BLD-MCNC: 95 MG/DL (ref 70–99)
HCT VFR BLD CALC: 37.8 % (ref 36–48)
HEMOGLOBIN: 12.7 G/DL (ref 12–16)
LYMPHOCYTES ABSOLUTE: 1.9 K/UL (ref 1–5.1)
LYMPHOCYTES RELATIVE PERCENT: 11.8 %
MCH RBC QN AUTO: 29.7 PG (ref 26–34)
MCHC RBC AUTO-ENTMCNC: 33.7 G/DL (ref 31–36)
MCV RBC AUTO: 88.1 FL (ref 80–100)
MONOCYTES ABSOLUTE: 1.1 K/UL (ref 0–1.3)
MONOCYTES RELATIVE PERCENT: 7.1 %
NEUTROPHILS ABSOLUTE: 12.9 K/UL (ref 1.7–7.7)
NEUTROPHILS RELATIVE PERCENT: 80.3 %
PDW BLD-RTO: 14.3 % (ref 12.4–15.4)
PLATELET # BLD: 285 K/UL (ref 135–450)
PMV BLD AUTO: 6.7 FL (ref 5–10.5)
POTASSIUM SERPL-SCNC: 4.3 MMOL/L (ref 3.5–5.1)
PRO-BNP: 77 PG/ML (ref 0–124)
RBC # BLD: 4.29 M/UL (ref 4–5.2)
SODIUM BLD-SCNC: 134 MMOL/L (ref 136–145)
TROPONIN: <0.01 NG/ML
WBC # BLD: 16 K/UL (ref 4–11)

## 2021-09-03 PROCEDURE — 84484 ASSAY OF TROPONIN QUANT: CPT

## 2021-09-03 PROCEDURE — 94640 AIRWAY INHALATION TREATMENT: CPT

## 2021-09-03 PROCEDURE — 94761 N-INVAS EAR/PLS OXIMETRY MLT: CPT

## 2021-09-03 PROCEDURE — 2700000000 HC OXYGEN THERAPY PER DAY

## 2021-09-03 PROCEDURE — 93010 ELECTROCARDIOGRAM REPORT: CPT | Performed by: INTERNAL MEDICINE

## 2021-09-03 PROCEDURE — 85025 COMPLETE CBC W/AUTO DIFF WBC: CPT

## 2021-09-03 PROCEDURE — 80048 BASIC METABOLIC PNL TOTAL CA: CPT

## 2021-09-03 PROCEDURE — 6370000000 HC RX 637 (ALT 250 FOR IP): Performed by: EMERGENCY MEDICINE

## 2021-09-03 PROCEDURE — 96374 THER/PROPH/DIAG INJ IV PUSH: CPT

## 2021-09-03 PROCEDURE — 6360000002 HC RX W HCPCS: Performed by: EMERGENCY MEDICINE

## 2021-09-03 PROCEDURE — 2580000003 HC RX 258: Performed by: EMERGENCY MEDICINE

## 2021-09-03 PROCEDURE — 83880 ASSAY OF NATRIURETIC PEPTIDE: CPT

## 2021-09-03 PROCEDURE — 99283 EMERGENCY DEPT VISIT LOW MDM: CPT

## 2021-09-03 PROCEDURE — 93005 ELECTROCARDIOGRAM TRACING: CPT | Performed by: EMERGENCY MEDICINE

## 2021-09-03 PROCEDURE — 71045 X-RAY EXAM CHEST 1 VIEW: CPT

## 2021-09-03 RX ORDER — IPRATROPIUM BROMIDE AND ALBUTEROL SULFATE 2.5; .5 MG/3ML; MG/3ML
1 SOLUTION RESPIRATORY (INHALATION) EVERY 6 HOURS PRN
Qty: 360 ML | Refills: 0 | Status: SHIPPED | OUTPATIENT
Start: 2021-09-03 | End: 2021-09-23 | Stop reason: SDUPTHER

## 2021-09-03 RX ORDER — METHYLPREDNISOLONE SODIUM SUCCINATE 125 MG/2ML
80 INJECTION, POWDER, LYOPHILIZED, FOR SOLUTION INTRAMUSCULAR; INTRAVENOUS ONCE
Status: COMPLETED | OUTPATIENT
Start: 2021-09-03 | End: 2021-09-03

## 2021-09-03 RX ORDER — BENZONATATE 100 MG/1
100 CAPSULE ORAL 3 TIMES DAILY PRN
Qty: 21 CAPSULE | Refills: 0 | Status: ON HOLD
Start: 2021-09-03 | End: 2021-09-11 | Stop reason: HOSPADM

## 2021-09-03 RX ORDER — IPRATROPIUM BROMIDE AND ALBUTEROL SULFATE 2.5; .5 MG/3ML; MG/3ML
1 SOLUTION RESPIRATORY (INHALATION) ONCE
Status: COMPLETED | OUTPATIENT
Start: 2021-09-03 | End: 2021-09-03

## 2021-09-03 RX ORDER — 0.9 % SODIUM CHLORIDE 0.9 %
1000 INTRAVENOUS SOLUTION INTRAVENOUS ONCE
Status: COMPLETED | OUTPATIENT
Start: 2021-09-03 | End: 2021-09-03

## 2021-09-03 RX ORDER — ALBUTEROL SULFATE 2.5 MG/3ML
2.5 SOLUTION RESPIRATORY (INHALATION) EVERY 6 HOURS PRN
Status: DISCONTINUED | OUTPATIENT
Start: 2021-09-03 | End: 2021-09-03

## 2021-09-03 RX ORDER — PREDNISONE 10 MG/1
TABLET ORAL
Qty: 12 TABLET | Refills: 0 | Status: ON HOLD
Start: 2021-09-03 | End: 2021-09-20 | Stop reason: HOSPADM

## 2021-09-03 RX ADMIN — SODIUM CHLORIDE 1000 ML: 9 INJECTION, SOLUTION INTRAVENOUS at 13:30

## 2021-09-03 RX ADMIN — METHYLPREDNISOLONE SODIUM SUCCINATE 80 MG: 125 INJECTION, POWDER, FOR SOLUTION INTRAMUSCULAR; INTRAVENOUS at 13:53

## 2021-09-03 RX ADMIN — IPRATROPIUM BROMIDE AND ALBUTEROL SULFATE 1 AMPULE: .5; 3 SOLUTION RESPIRATORY (INHALATION) at 14:31

## 2021-09-03 ASSESSMENT — PAIN SCALES - GENERAL: PAINLEVEL_OUTOF10: 0

## 2021-09-03 ASSESSMENT — ENCOUNTER SYMPTOMS
VOMITING: 0
SHORTNESS OF BREATH: 1
SORE THROAT: 0
ABDOMINAL PAIN: 0
COUGH: 1

## 2021-09-03 NOTE — TELEPHONE ENCOUNTER
Writer contacted ED provider Darnell MILES   to inform of 30 day readmission risk. Writer's attempt to contact ED provider was unsuccessful. No Decision on disposition at this time.

## 2021-09-03 NOTE — ED PROVIDER NOTES
(DESYREL) 150 MG tablet TAKE 2 TABLETS AT BEDTIME 9/11/20   Doug Alvarado MD   simvastatin (ZOCOR) 20 MG tablet TAKE 1 TABLET EVERY EVENING 8/7/20   Doug Alvarado MD   busPIRone (BUSPAR) 30 MG tablet TAKE 1 TABLET TWICE DAILY 8/7/20   Doug Alvarado MD   teriparatide, recombinant, (FORTEO) 600 MCG/2.4ML SOLN injection Inject 0.08 mLs into the skin daily One dose injected daily. 9/27/19 12/7/20  Mai Yung MD       Social history:  reports that she has been smoking cigarettes. She started smoking about 49 years ago. She has a 20.00 pack-year smoking history. She has never used smokeless tobacco. She reports current drug use. Drug: Marijuana. She reports that she does not drink alcohol. Family history:    Family History   Problem Relation Age of Onset    Asthma Mother     Diabetes Mother     Emphysema Mother     Heart Failure Mother     Hypertension Mother     Heart Disease Mother     High Cholesterol Mother     Cancer Mother     Depression Mother     Diabetes Father     Emphysema Father     Heart Failure Father     Hypertension Father     Heart Disease Father     High Cholesterol Father     Substance Abuse Father     Emphysema Paternal Grandfather     Diabetes Sister     High Cholesterol Sister     Vision Loss Maternal Uncle        Exam  ED Triage Vitals [09/03/21 1308]   BP Temp Temp Source Pulse Resp SpO2 Height Weight   139/72 97.6 °F (36.4 °C) Tympanic 111 28 95 % 5' 5\" (1.651 m) 165 lb (74.8 kg)   Physical Exam  Vitals and nursing note reviewed. Constitutional:       Appearance: Normal appearance. She is well-developed. She is not toxic-appearing or diaphoretic. Comments: Patient has pursed lip breathing and appears to be in respiratory distress. HENT:      Head: Normocephalic and atraumatic. Eyes:      General: Lids are normal. No scleral icterus. Right eye: No discharge. Left eye: No discharge.       Conjunctiva/sclera: Conjunctivae normal.      Pupils: Pupils are equal, round, and reactive to light. Neck:      Trachea: No tracheal deviation. Cardiovascular:      Rate and Rhythm: Normal rate and regular rhythm. Heart sounds: Normal heart sounds. No murmur heard. Pulmonary:      Effort: Tachypnea and respiratory distress (Increased work of breathing was pursed lip breathing) present. No accessory muscle usage. Breath sounds: No stridor. Wheezing present. Chest:      Chest wall: No tenderness. Abdominal:      General: There is no distension. Palpations: Abdomen is soft. Tenderness: There is no abdominal tenderness. There is no guarding or rebound. Musculoskeletal:         General: No deformity. Cervical back: Neck supple. Skin:     General: Skin is warm and dry. Coloration: Skin is not pale. Findings: No rash. Neurological:      General: No focal deficit present. Mental Status: She is alert and oriented to person, place, and time. Cranial Nerves: No dysarthria or facial asymmetry. Motor: Motor function is intact. No tremor. Psychiatric:         Mood and Affect: Mood is anxious.          Speech: Speech normal.         Behavior: Behavior normal.           MDM/ED Course  ED Medication Orders (From admission, onward)    Start Ordered     Status Ordering Provider    09/03/21 1400 09/03/21 1348  methylPREDNISolone sodium (SOLU-MEDROL) injection 80 mg  ONCE      Last MAR action: Given - by Debo Moya on 09/03/21 at [facility]    09/03/21 1400 09/03/21 1348  0.9 % sodium chloride bolus  ONCE      Last MAR action: New Bag - by Debo [facility]03[facility] [id number] [facility]    09/03/21 1400 09/03/21 1348  ipratropium-albuterol (DUONEB) nebulizer solution 1 ampule  ONCE     Question:  Initiate RT Bronchodilator Protocol  Answer:  Yes    Last MAR action: Given - by Juliet Jara on 09/03/21 at [facility]          EKG  The Ekg interpreted by me in the absence of a cardiologist shows.  sinus tachycardia, ulau=681    Axis is   Normal  QTc is  normal  Intervals and Durations are unremarkable. No specific ST-T wave changes appreciated. No evidence of acute ischemia. Compared to prior EKG dated August 26, 2021, patient is tachycardic today. Previous EKG showed normal sinus rhythm with a heart rate of 92. Radiology  XR CHEST PORTABLE    Result Date: 9/3/2021  EXAMINATION: ONE X-RAY VIEW OF THE CHEST 9/3/2021 1:38 pm COMPARISON: Chest radiograph performed August 26, 2021. HISTORY: ORDERING SYSTEM PROVIDED HISTORY:  SOB TECHNOLOGIST PROVIDED HISTORY: Reason for Exam:  SOB Reason for Exam:  SOB Acuity:  Acute Type of Exam:  Initial FINDINGS: Hazy opacities at the bilateral lung bases, not significantly changed from prior examination. No definite pleural effusion or pneumothorax. Stable cardiomediastinal silhouette. No acute osseous abnormality. Stable bibasilar airspace opacities.          Labs  Results for orders placed or performed during the hospital encounter of 94/83/10   Basic Metabolic Panel   Result Value Ref Range    Sodium 134 (L) 136 - 145 mmol/L    Potassium 4.3 3.5 - 5.1 mmol/L    Chloride 98 (L) 99 - 110 mmol/L    CO2 26 21 - 32 mmol/L    Anion Gap 10 3 - 16    Glucose 95 70 - 99 mg/dL    BUN 10 7 - 20 mg/dL    CREATININE 0.7 0.6 - 1.2 mg/dL    GFR Non-African American >60 >60    GFR African American >60 >60    Calcium 8.9 8.3 - 10.6 mg/dL   Brain Natriuretic Peptide   Result Value Ref Range    Pro-BNP 77 0 - 124 pg/mL   CBC Auto Differential   Result Value Ref Range    WBC 16.0 (H) 4.0 - 11.0 K/uL    RBC 4.29 4.00 - 5.20 M/uL    Hemoglobin 12.7 12.0 - 16.0 g/dL    Hematocrit 37.8 36.0 - 48.0 %    MCV 88.1 80.0 - 100.0 fL    MCH 29.7 26.0 - 34.0 pg    MCHC 33.7 31.0 - 36.0 g/dL    RDW 14.3 12.4 - 15.4 %    Platelets 358 429 - 605 K/uL    MPV 6.7 5.0 - 10.5 fL    Neutrophils % 80.3 %    Lymphocytes % 11.8 %    Monocytes % 7.1 %    Eosinophils % 0.7 %    Basophils % 0.1 %    Neutrophils Absolute 12.9 (H) 1.7 - 7.7 K/uL    Lymphocytes Absolute 1.9 1.0 - 5.1 K/uL    Monocytes Absolute 1.1 0.0 - 1.3 K/uL    Eosinophils Absolute 0.1 0.0 - 0.6 K/uL    Basophils Absolute 0.0 0.0 - 0.2 K/uL   Troponin   Result Value Ref Range    Troponin <0.01 <0.01 ng/mL   EKG 12 Lead   Result Value Ref Range    Ventricular Rate 109 BPM    Atrial Rate 109 BPM    P-R Interval 132 ms    QRS Duration 72 ms    Q-T Interval 324 ms    QTc Calculation (Bazett) 436 ms    P Axis 68 degrees    R Axis 15 degrees    T Axis 76 degrees    Diagnosis       Sinus tachycardiaConfirmed by Domingo Amos MD, Paul Lopez ([id number]) on 9/3/2021 2:25:53 PM         - Patient seen and evaluated in room 32.  60 y.o. female presented for SOB. Exam showed diffuse wheezing with pursed lip breathing. Patient is currently on a steroid taper. I gave her Solu-Medrol IV followed by DuoNeb and albuterol neb. On reassessment, patient was much more comfortable. She was eating cracker and drinking hot coffee in the room. Her wheezing also resolved. She remained on 3 L without oxygen desaturation.   - Patient was placed on telemetry during his/her ED stay and no malignant dysrhythmia observed. - Pertinent old records reviewed. - Diagnostic studies reviewed. Negative troponin. EKG did not show acute changes. Chest x-ray is stable without acute changes. Rest of the labs also unremarkable. - I discussed the results with patient. Clinically, she appears much more comfortable after Solu-Medrol and DuoNeb treatment in the ED. In reviewing patient's medication, we decided to temporarily increase her outpatient steroid back up to 60 mg for 3 more days before attempting to taper again. In addition, she has albuterol nebs at home but not DuoNeb's. I will write her DuoNeb. - Return precautions also discussed. patient verbalized understanding of care plan and agreed to follow-up with PCP and pulmonologist as advised.   Patient states she has appointment with both of them on 9/13 and 9/15 respectively. I estimate there is LOW risk for ACUTE CORONARY SYNDROME, SEVERE COPD/ASTHMA EXACERBATION WITH HYPOXIA, ACUTE EXACERBATION OF CONGESTIVE HEART FAILURE, PERICARDIAL TAMPONADE, PNEUMONIA WITH HYPOXIA, PNEUMOTHORAX, PULMONARY EMBOLISM, SEPSIS, and THORACIC DISSECTION,  thus I consider the discharge disposition reasonable. Georgette Whittington and I have discussed the diagnosis and risks, and we agree with discharging home to follow-up with her PCP and pulmonologist.  We also discussed returning to the Emergency Department immediately if new or worsening symptoms occur. We have discussed the symptoms which are most concerning (e.g., bloody sputum, fever, worsening pain or shortness of breath, vomiting) that necessitate immediate return. Clinical Impression:  1. COPD exacerbation ([address]          Disposition:  Discharge to home in good condition. Blood pressure 138/74, pulse 95, temperature 97.6 °F (36.4 °C), temperature source Tympanic, resp. rate 22, height 5' 5\" (1.651 m), weight 165 lb (74.8 kg), SpO2 98 %, not currently breastfeeding. Patient was given scripts for the following medications. I counseled patient how to take these medications. New Prescriptions    BENZONATATE (TESSALON PERLES) 100 MG CAPSULE    Take 1 capsule by mouth 3 times daily as needed for Cough    IPRATROPIUM-ALBUTEROL (DUONEB) 0.5-2.5 (3) MG/3ML SOLN NEBULIZER SOLUTION    Inhale 3 mLs into the lungs every 6 hours as needed for Shortness of Breath    PREDNISONE (DELTASONE) 10 MG TABLET    60mg daily x 3 days, 50mg daily x 2 days, 40mg daily x 1 day. Then go to your other prescription you already have to finish the taper.        Disposition referral (if applicable):  Mustapha Herbert MD  [address] [place] [address] [address] [facility] [id number] [phone]    Schedule an appointment as soon as possible for a visit in 1 week  Please go to your appointment on 9/13/21    [facility]  [facility] [address]  [phone]    As needed, If symptoms worsen         This chart was generated in part by using Dragon Dictation system and may contain errors related to that system including errors in grammar, punctuation, and spelling, as well as words and phrases that may be inappropriate. If there are any questions or concerns please feel free to contact the dictating provider for clarification.      Earlene Klein MD  [facility] Tamara Dixon MD  09/03/21 [id number]

## 2021-09-07 ENCOUNTER — TELEPHONE (OUTPATIENT)
Dept: OTHER | Facility: CLINIC | Age: 66
End: 2021-09-07

## 2021-09-08 ENCOUNTER — CARE COORDINATION (OUTPATIENT)
Dept: CASE MANAGEMENT | Age: 66
End: 2021-09-08

## 2021-09-08 NOTE — CARE COORDINATION
[facility] Transitions Follow Up Call    2021    Patient: Brennon Vega  Patient : 1955   MRN: [id number]  Reason for Admission: COPD  Discharge Date: 9/3/21 RARS: Readmission Risk Score: 39         Spoke with: Paula Transitions Follow Up Call    Needs to be reviewed by the provider   Additional needs identified to be addressed with provider: Yes  SN-request to move up SN visit             Method of communication with provider : phone      Care Transition Nurse (CTN) contacted the patient by telephone to follow up after admission. Verified name and  with patient as identifiers. Addressed changes since last contact: none  Discussed follow-up appointments. If no appointment was previously scheduled, appointment scheduling offered: Yes. Is follow up appointment scheduled within 7 days of discharge? Yes. Advance Care Planning:   Does patient have an Advance Directive: reviewed and current, reviewed and needs to be updated, not on file; education provided, not on file, patient declined education, decision maker updated and referral to internal ACP facilitator. CTN reviewed discharge instructions, medical action plan and red flags with patient and discussed any barriers to care and/or understanding of plan of care after discharge. Discussed appropriate site of care based on symptoms and resources available to patient including: PCP. The patient agrees to contact the PCP office for questions related to their healthcare. Patient answered call and verified . Patient noted to be short of breath during conversation and patient short with sentences. CTN reviewed notes and noticed ER visit since last contact. CTN asked patient if she felt like she needed to go back to the ER, but stated that she did not feel like she needed to. Patient confirmed that she has taken all medication as directed. Patient going to complete HHN treatment once call completed.  CTN request that home care nurse visit today if available and patient agreeable. CTN placed call [facility] and spoke to Hali Vieyra. Saunemin Crofts was able to pull up patient's schedule and home care nurse was scheduled for tomorrow. Hali Vieyra was going to reach out to patient's primary nurse and ask her to move visit up to today or place call to patient today. CTN provided contact information for future needs. Plan for follow up call in 1-3 days based on severity of symptoms and risk factors. Mariusz Anaya RN   Care Transition Nurse  [phone]        Care Transitions Subsequent and Final Call    Subsequent and Final Calls  Do you have any ongoing symptoms?: Yes  Onset of Patient-reported symptoms: In the past 7 days  Patient-reported symptoms: Shortness of Breath  Interventions for patient-reported symptoms: Notified Home Care  Have your medications changed?: No  Do you have any questions related to your medications?: No  Do you currently have any active services?: Yes  Are you currently active with any services?: Home Health  Do you have any needs or concerns that I can assist you with?: No  Identified Barriers: None  Care Transitions Interventions  Other Interventions:            Follow Up  Future Appointments   Date Time Provider Department Center   9/13/2021  4:15 PM Dylan Fine MD [facility] [place] [facility]   9/15/2021  9:45 AM MD LINDA Recio   12/3/2021 11:00 AM [facility] MAIN [facility]   12/14/2021  2:45 PM MD LINDA Recio RN

## 2021-09-09 ENCOUNTER — CARE COORDINATION (OUTPATIENT)
Dept: CASE MANAGEMENT | Age: 66
End: 2021-09-09

## 2021-09-09 ENCOUNTER — HOSPITAL ENCOUNTER (INPATIENT)
Age: 66
LOS: 2 days | Discharge: HOME | DRG: 189 | End: 2021-09-11
Attending: EMERGENCY MEDICINE | Admitting: INTERNAL MEDICINE
Payer: MEDICARE

## 2021-09-09 ENCOUNTER — TELEPHONE (OUTPATIENT)
Dept: OTHER | Facility: CLINIC | Age: 66
End: 2021-09-09

## 2021-09-09 ENCOUNTER — APPOINTMENT (OUTPATIENT)
Dept: GENERAL RADIOLOGY | Age: 66
DRG: 189 | End: 2021-09-09
Payer: MEDICARE

## 2021-09-09 DIAGNOSIS — R06.82 TACHYPNEA: ICD-10-CM

## 2021-09-09 DIAGNOSIS — J44.1 COPD EXACERBATION (HCC): ICD-10-CM

## 2021-09-09 DIAGNOSIS — J96.01 ACUTE RESPIRATORY FAILURE WITH HYPOXIA (HCC): Primary | ICD-10-CM

## 2021-09-09 PROBLEM — J44.9 COPD (CHRONIC OBSTRUCTIVE PULMONARY DISEASE) (HCC): Status: ACTIVE | Noted: 2021-09-09

## 2021-09-09 LAB
A/G RATIO: 1.3 (ref 1.1–2.2)
ALBUMIN SERPL-MCNC: 3.6 G/DL (ref 3.4–5)
ALP BLD-CCNC: 49 U/L (ref 40–129)
ALT SERPL-CCNC: 18 U/L (ref 10–40)
ANION GAP SERPL CALCULATED.3IONS-SCNC: 11 MMOL/L (ref 3–16)
AST SERPL-CCNC: 17 U/L (ref 15–37)
BASE EXCESS VENOUS: 2.3 MMOL/L (ref -3–3)
BASOPHILS ABSOLUTE: 0 K/UL (ref 0–0.2)
BASOPHILS RELATIVE PERCENT: 0.1 %
BILIRUB SERPL-MCNC: 0.4 MG/DL (ref 0–1)
BUN BLDV-MCNC: 12 MG/DL (ref 7–20)
CALCIUM SERPL-MCNC: 8.9 MG/DL (ref 8.3–10.6)
CARBOXYHEMOGLOBIN: 3.3 % (ref 0–1.5)
CHLORIDE BLD-SCNC: 96 MMOL/L (ref 99–110)
CO2: 22 MMOL/L (ref 21–32)
CREAT SERPL-MCNC: 0.6 MG/DL (ref 0.6–1.2)
EOSINOPHILS ABSOLUTE: 0.1 K/UL (ref 0–0.6)
EOSINOPHILS RELATIVE PERCENT: 0.8 %
GFR AFRICAN AMERICAN: >60
GFR NON-AFRICAN AMERICAN: >60
GLOBULIN: 2.8 G/DL
GLUCOSE BLD-MCNC: 106 MG/DL (ref 70–99)
HCO3 VENOUS: 23.4 MMOL/L (ref 23–29)
HCT VFR BLD CALC: 38.2 % (ref 36–48)
HEMOGLOBIN: 13.1 G/DL (ref 12–16)
LACTIC ACID: 1.4 MMOL/L (ref 0.4–2)
LYMPHOCYTES ABSOLUTE: 3 K/UL (ref 1–5.1)
LYMPHOCYTES RELATIVE PERCENT: 21.2 %
MCH RBC QN AUTO: 29.7 PG (ref 26–34)
MCHC RBC AUTO-ENTMCNC: 34.4 G/DL (ref 31–36)
MCV RBC AUTO: 86.1 FL (ref 80–100)
METHEMOGLOBIN VENOUS: 0.4 %
MONOCYTES ABSOLUTE: 1 K/UL (ref 0–1.3)
MONOCYTES RELATIVE PERCENT: 6.9 %
NEUTROPHILS ABSOLUTE: 10.2 K/UL (ref 1.7–7.7)
NEUTROPHILS RELATIVE PERCENT: 71 %
O2 SAT, VEN: 98 %
O2 THERAPY: ABNORMAL
PCO2, VEN: 27.2 MMHG (ref 40–50)
PDW BLD-RTO: 13.9 % (ref 12.4–15.4)
PH VENOUS: 7.55 (ref 7.35–7.45)
PLATELET # BLD: 260 K/UL (ref 135–450)
PMV BLD AUTO: 6.9 FL (ref 5–10.5)
PO2, VEN: 139.1 MMHG (ref 25–40)
POTASSIUM REFLEX MAGNESIUM: 4.1 MMOL/L (ref 3.5–5.1)
PROCALCITONIN: 0.04 NG/ML (ref 0–0.15)
RBC # BLD: 4.43 M/UL (ref 4–5.2)
SARS-COV-2, NAAT: NOT DETECTED
SODIUM BLD-SCNC: 129 MMOL/L (ref 136–145)
TCO2 CALC VENOUS: 24 MMOL/L
TOTAL PROTEIN: 6.4 G/DL (ref 6.4–8.2)
TROPONIN: <0.01 NG/ML
WBC # BLD: 14.4 K/UL (ref 4–11)

## 2021-09-09 PROCEDURE — 6370000000 HC RX 637 (ALT 250 FOR IP): Performed by: INTERNAL MEDICINE

## 2021-09-09 PROCEDURE — 84484 ASSAY OF TROPONIN QUANT: CPT

## 2021-09-09 PROCEDURE — 6360000002 HC RX W HCPCS: Performed by: PHYSICIAN ASSISTANT

## 2021-09-09 PROCEDURE — 2580000003 HC RX 258: Performed by: INTERNAL MEDICINE

## 2021-09-09 PROCEDURE — 2700000000 HC OXYGEN THERAPY PER DAY

## 2021-09-09 PROCEDURE — 6360000002 HC RX W HCPCS: Performed by: INTERNAL MEDICINE

## 2021-09-09 PROCEDURE — 99285 EMERGENCY DEPT VISIT HI MDM: CPT

## 2021-09-09 PROCEDURE — 96374 THER/PROPH/DIAG INJ IV PUSH: CPT

## 2021-09-09 PROCEDURE — 83605 ASSAY OF LACTIC ACID: CPT

## 2021-09-09 PROCEDURE — 82803 BLOOD GASES ANY COMBINATION: CPT

## 2021-09-09 PROCEDURE — 94761 N-INVAS EAR/PLS OXIMETRY MLT: CPT

## 2021-09-09 PROCEDURE — 1200000000 HC SEMI PRIVATE

## 2021-09-09 PROCEDURE — 87040 BLOOD CULTURE FOR BACTERIA: CPT

## 2021-09-09 PROCEDURE — 80053 COMPREHEN METABOLIC PANEL: CPT

## 2021-09-09 PROCEDURE — 93005 ELECTROCARDIOGRAM TRACING: CPT | Performed by: PHYSICIAN ASSISTANT

## 2021-09-09 PROCEDURE — 87635 SARS-COV-2 COVID-19 AMP PRB: CPT

## 2021-09-09 PROCEDURE — 71045 X-RAY EXAM CHEST 1 VIEW: CPT

## 2021-09-09 PROCEDURE — 94640 AIRWAY INHALATION TREATMENT: CPT

## 2021-09-09 PROCEDURE — 84145 PROCALCITONIN (PCT): CPT

## 2021-09-09 PROCEDURE — 6370000000 HC RX 637 (ALT 250 FOR IP): Performed by: PHYSICIAN ASSISTANT

## 2021-09-09 PROCEDURE — 6360000002 HC RX W HCPCS: Performed by: EMERGENCY MEDICINE

## 2021-09-09 PROCEDURE — 85025 COMPLETE CBC W/AUTO DIFF WBC: CPT

## 2021-09-09 RX ORDER — BUDESONIDE AND FORMOTEROL FUMARATE DIHYDRATE 160; 4.5 UG/1; UG/1
2 AEROSOL RESPIRATORY (INHALATION) 2 TIMES DAILY
Status: DISCONTINUED | OUTPATIENT
Start: 2021-09-09 | End: 2021-09-11 | Stop reason: HOSPADM

## 2021-09-09 RX ORDER — SODIUM CHLORIDE 0.9 % (FLUSH) 0.9 %
5-40 SYRINGE (ML) INJECTION PRN
Status: DISCONTINUED | OUTPATIENT
Start: 2021-09-09 | End: 2021-09-11 | Stop reason: HOSPADM

## 2021-09-09 RX ORDER — BUSPIRONE HYDROCHLORIDE 5 MG/1
30 TABLET ORAL 2 TIMES DAILY
Status: DISCONTINUED | OUTPATIENT
Start: 2021-09-09 | End: 2021-09-11 | Stop reason: HOSPADM

## 2021-09-09 RX ORDER — SODIUM CHLORIDE 0.9 % (FLUSH) 0.9 %
5-40 SYRINGE (ML) INJECTION EVERY 12 HOURS SCHEDULED
Status: DISCONTINUED | OUTPATIENT
Start: 2021-09-09 | End: 2021-09-11 | Stop reason: HOSPADM

## 2021-09-09 RX ORDER — ACETAMINOPHEN 650 MG/1
650 SUPPOSITORY RECTAL EVERY 6 HOURS PRN
Status: DISCONTINUED | OUTPATIENT
Start: 2021-09-09 | End: 2021-09-11 | Stop reason: HOSPADM

## 2021-09-09 RX ORDER — TRAZODONE HYDROCHLORIDE 50 MG/1
100 TABLET ORAL NIGHTLY PRN
Status: DISCONTINUED | OUTPATIENT
Start: 2021-09-09 | End: 2021-09-11 | Stop reason: HOSPADM

## 2021-09-09 RX ORDER — SODIUM CHLORIDE 9 MG/ML
25 INJECTION, SOLUTION INTRAVENOUS PRN
Status: DISCONTINUED | OUTPATIENT
Start: 2021-09-09 | End: 2021-09-11 | Stop reason: HOSPADM

## 2021-09-09 RX ORDER — METHYLPREDNISOLONE SODIUM SUCCINATE 40 MG/ML
40 INJECTION, POWDER, LYOPHILIZED, FOR SOLUTION INTRAMUSCULAR; INTRAVENOUS 2 TIMES DAILY
Status: DISCONTINUED | OUTPATIENT
Start: 2021-09-09 | End: 2021-09-11 | Stop reason: HOSPADM

## 2021-09-09 RX ORDER — NAPROXEN 250 MG/1
500 TABLET ORAL 2 TIMES DAILY WITH MEALS
Status: DISCONTINUED | OUTPATIENT
Start: 2021-09-09 | End: 2021-09-11 | Stop reason: HOSPADM

## 2021-09-09 RX ORDER — ATORVASTATIN CALCIUM 10 MG/1
20 TABLET, FILM COATED ORAL DAILY
Status: DISCONTINUED | OUTPATIENT
Start: 2021-09-09 | End: 2021-09-11 | Stop reason: HOSPADM

## 2021-09-09 RX ORDER — IPRATROPIUM BROMIDE AND ALBUTEROL SULFATE 2.5; .5 MG/3ML; MG/3ML
1 SOLUTION RESPIRATORY (INHALATION)
Status: DISCONTINUED | OUTPATIENT
Start: 2021-09-10 | End: 2021-09-11 | Stop reason: HOSPADM

## 2021-09-09 RX ORDER — POLYETHYLENE GLYCOL 3350 17 G/17G
17 POWDER, FOR SOLUTION ORAL DAILY PRN
Status: DISCONTINUED | OUTPATIENT
Start: 2021-09-09 | End: 2021-09-11 | Stop reason: HOSPADM

## 2021-09-09 RX ORDER — IPRATROPIUM BROMIDE AND ALBUTEROL SULFATE 2.5; .5 MG/3ML; MG/3ML
1 SOLUTION RESPIRATORY (INHALATION) EVERY 4 HOURS PRN
Status: DISCONTINUED | OUTPATIENT
Start: 2021-09-09 | End: 2021-09-09

## 2021-09-09 RX ORDER — GUAIFENESIN 600 MG/1
600 TABLET, EXTENDED RELEASE ORAL 2 TIMES DAILY PRN
Status: DISCONTINUED | OUTPATIENT
Start: 2021-09-09 | End: 2021-09-11 | Stop reason: HOSPADM

## 2021-09-09 RX ORDER — BENZONATATE 100 MG/1
100 CAPSULE ORAL 3 TIMES DAILY PRN
Status: DISCONTINUED | OUTPATIENT
Start: 2021-09-09 | End: 2021-09-11 | Stop reason: HOSPADM

## 2021-09-09 RX ORDER — ONDANSETRON 2 MG/ML
4 INJECTION INTRAMUSCULAR; INTRAVENOUS EVERY 6 HOURS PRN
Status: DISCONTINUED | OUTPATIENT
Start: 2021-09-09 | End: 2021-09-11 | Stop reason: HOSPADM

## 2021-09-09 RX ORDER — IPRATROPIUM BROMIDE AND ALBUTEROL SULFATE 2.5; .5 MG/3ML; MG/3ML
1 SOLUTION RESPIRATORY (INHALATION) ONCE
Status: COMPLETED | OUTPATIENT
Start: 2021-09-09 | End: 2021-09-09

## 2021-09-09 RX ORDER — FLUOXETINE HYDROCHLORIDE 20 MG/1
60 CAPSULE ORAL DAILY
Status: DISCONTINUED | OUTPATIENT
Start: 2021-09-10 | End: 2021-09-11 | Stop reason: HOSPADM

## 2021-09-09 RX ORDER — ACETAMINOPHEN 325 MG/1
650 TABLET ORAL EVERY 6 HOURS PRN
Status: DISCONTINUED | OUTPATIENT
Start: 2021-09-09 | End: 2021-09-11 | Stop reason: HOSPADM

## 2021-09-09 RX ORDER — ONDANSETRON 4 MG/1
4 TABLET, ORALLY DISINTEGRATING ORAL EVERY 8 HOURS PRN
Status: DISCONTINUED | OUTPATIENT
Start: 2021-09-09 | End: 2021-09-11 | Stop reason: HOSPADM

## 2021-09-09 RX ORDER — MONTELUKAST SODIUM 10 MG/1
10 TABLET ORAL NIGHTLY
Status: DISCONTINUED | OUTPATIENT
Start: 2021-09-09 | End: 2021-09-11 | Stop reason: HOSPADM

## 2021-09-09 RX ORDER — METHYLPREDNISOLONE SODIUM SUCCINATE 125 MG/2ML
60 INJECTION, POWDER, LYOPHILIZED, FOR SOLUTION INTRAMUSCULAR; INTRAVENOUS ONCE
Status: COMPLETED | OUTPATIENT
Start: 2021-09-09 | End: 2021-09-09

## 2021-09-09 RX ORDER — PANTOPRAZOLE SODIUM 40 MG/1
40 TABLET, DELAYED RELEASE ORAL
Status: DISCONTINUED | OUTPATIENT
Start: 2021-09-10 | End: 2021-09-11 | Stop reason: HOSPADM

## 2021-09-09 RX ADMIN — ATORVASTATIN CALCIUM 20 MG: 10 TABLET, FILM COATED ORAL at 22:17

## 2021-09-09 RX ADMIN — TRAZODONE HYDROCHLORIDE 100 MG: 50 TABLET ORAL at 22:17

## 2021-09-09 RX ADMIN — BUSPIRONE HYDROCHLORIDE 30 MG: 5 TABLET ORAL at 22:17

## 2021-09-09 RX ADMIN — METHYLPREDNISOLONE SODIUM SUCCINATE 60 MG: 125 INJECTION, POWDER, FOR SOLUTION INTRAMUSCULAR; INTRAVENOUS at 13:47

## 2021-09-09 RX ADMIN — TIOTROPIUM BROMIDE INHALATION SPRAY 2 PUFF: 3.12 SPRAY, METERED RESPIRATORY (INHALATION) at 19:32

## 2021-09-09 RX ADMIN — METHYLPREDNISOLONE SODIUM SUCCINATE 40 MG: 40 INJECTION, POWDER, FOR SOLUTION INTRAMUSCULAR; INTRAVENOUS at 22:17

## 2021-09-09 RX ADMIN — MONTELUKAST SODIUM 10 MG: 10 TABLET ORAL at 21:29

## 2021-09-09 RX ADMIN — NAPROXEN 500 MG: 250 TABLET ORAL at 22:16

## 2021-09-09 RX ADMIN — IPRATROPIUM BROMIDE AND ALBUTEROL SULFATE 3 ML: .5; 3 SOLUTION RESPIRATORY (INHALATION) at 19:31

## 2021-09-09 RX ADMIN — ENOXAPARIN SODIUM 40 MG: 40 INJECTION SUBCUTANEOUS at 18:59

## 2021-09-09 RX ADMIN — IPRATROPIUM BROMIDE AND ALBUTEROL SULFATE 1 AMPULE: .5; 3 SOLUTION RESPIRATORY (INHALATION) at 14:14

## 2021-09-09 RX ADMIN — Medication 2 PUFF: at 19:31

## 2021-09-09 RX ADMIN — ALBUTEROL SULFATE 5 MG: 2.5 SOLUTION RESPIRATORY (INHALATION) at 14:17

## 2021-09-09 RX ADMIN — Medication 10 ML: at 22:17

## 2021-09-09 RX ADMIN — ACETAMINOPHEN 650 MG: 325 TABLET ORAL at 21:29

## 2021-09-09 ASSESSMENT — ENCOUNTER SYMPTOMS
NAUSEA: 0
COLOR CHANGE: 0
SHORTNESS OF BREATH: 1
VOMITING: 0
WHEEZING: 1
EYES NEGATIVE: 1
BACK PAIN: 0
CHEST TIGHTNESS: 1
ABDOMINAL PAIN: 0

## 2021-09-09 ASSESSMENT — PAIN SCALES - GENERAL
PAINLEVEL_OUTOF10: 0
PAINLEVEL_OUTOF10: 0
PAINLEVEL_OUTOF10: 4
PAINLEVEL_OUTOF10: 2

## 2021-09-09 NOTE — H&P
Hospital Medicine History & Physical      PCP: Devika Emery MD    Date of Admission: 9/9/2021    Date of Service: Pt seen/examined on 10 Sept and Admitted to Inpatient with expected LOS greater than two midnights due to medical therapy. Chief Complaint:  SOB      History Of Present Illness:        60 y.o. female w/ hx COPD/ILD/Fibrosis w/ chronic respiratory failure on 3L Nocturnal O2 and continues to smoke who presented to [facility] with a 1 day hx of increasing, now severe SOB/VELÁSQUEZ. She was recently admitted/discharged for similar COPD exacerbation. Since admission her tx have offered some relief but still w/ c/o SOB/VELÁSQUEZ above baseline    She was noted to be hypoxic in the 80s on her home oxygen by Juan Carlos  nursing severe enough to have trouble speaking complete sentences    She has been fully vaccinated for COVID-19. The patient denies any fever/chills or other signs/sxs of systemic illness or identifiable aggravating/alleviating factors.       Past Medical History:          Diagnosis Date    Allergic rhinitis 6/11/2010    Anxiety     Arthritis     Aspiration pneumonia ([address] 3/21/2012    Recurrent    Asthma     Bronchitis chronic     COPD (chronic obstructive pulmonary disease) ([address] 12/3/2009    Depression     Drug abuse, cocaine type (HCC)     past history of crack cocaine use    Emphysema of lung (HCC)     Fibromyalgia     GERD (gastroesophageal reflux disease)     Hyperlipidemia     Influenza A 03/05/2020    Lung disease     On home oxygen therapy     uses O2 NC 3L prn at night    Osteoporosis     Pneumonia     Polysubstance dependence ([address] 1/2/2012    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    Pulmonary fibrosis ([address] 10/16/2014    Pulmonary infiltrate     Pulmonary nodule 12/3/2009    Tobacco abuse        Past Surgical History:          Procedure Laterality Date    BLADDER REPAIR      BRONCHOSCOPY      BRONCHOSCOPY N/A 3/6/2020 BRONCHOSCOPY THERAPUTIC ASPIRATION INITIAL performed by Eleonora Harden MD at [address] [place] [address]  3/6/2020    BRONCHOSCOPY ALVEOLAR LAVAGE performed by Eleonora Harden MD at [address] [place] [address] N/A 6/26/2020    BRONCHOSCOPY THERAPUTIC ASPIRATION INITIAL performed by Nimo Aponte MD at [address] [place] [address]  6/26/2020    BRONCHOSCOPY ALVEOLAR LAVAGE performed [facility] Nimo Aponte MD at [facility]  06/23/2021    Dr Kelly Manning N/A 6/23/2021    BRONCHOSCOPY DIAGNOSTIC OR CELL [address] performed by Nimo Aponte MD at [address] [place] [address]  6/23/2021    BRONCHOSCOPY THERAPUTIC ASPIRATION INITIAL performed by Nimo Aponte MD at [address] [place] [address]  6/23/2021    BRONCHOSCOPY ALVEOLAR LAVAGE performed by Nimo Aponte MD at [address] [place] [address] N/A 8/27/2021    BRONCHOSCOPY DIAGNOSTIC OR CELL [address] ONLY performed by Nimo Aponte MD at [place] [address]  2012    ENDOSCOPY, COLON, DIAGNOSTIC      ESOPHAGEAL DILATATION  9/20/2018    ESOPHAGEAL DILATION Patric Buster performed by Abelino Adan MD at [address] [place] [address],[address] HISTORY  2/12/15    T8 Kyphoplasty    UT COLONOSCOPY FLX DX W/COLLJ SPEC WHEN PFRMD N/A 9/20/2018    EGD AND COLONOSCOPY WITH ANESTHESIA performed by Abelino Adan MD at [address] [facility] ESOPHAGOGASTRODUODENOSCOPY TRANSORAL DIAGNOSTIC N/A 9/20/2018    EGD AND COLONOSCOPY WITH ANESTHESIA performed by Abelino Adan MD at [address] [facility]         Medications Prior to Admission:      Prior to Admission medications    Medication Sig Start Date End Date Taking? Authorizing Provider   predniSONE (DELTASONE) 10 MG tablet 60mg daily x 3 days, 50mg daily x 2 days, 40mg daily x 1 day. Then go to your other prescription you already have to finish the taper.  9/3/21  Yes Minal Gonsalez Eyal Nuñez MD   benzonatate (TESSALON PERLES) 100 MG capsule Take 1 capsule by mouth 3 times daily as needed for Cough 9/3/21 9/10/21 Yes Abhay Brady MD   ipratropium-albuterol (DUONEB) 0.5-2.5 (3) MG/3ML SOLN nebulizer solution Inhale 3 mLs into the lungs every 6 hours as needed for Shortness of Breath 9/3/21  Yes Abhay Brady MD   guaiFENesin [facility]) 600 MG extended release tablet Take 1 tablet by mouth 2 times daily as needed for Congestion 8/29/21  Yes Ricardo Allen MD   predniSONE (DELTASONE) 10 MG tablet Take 1 tablet by mouth daily 8/19/21 9/18/21 Yes Neo Aguilar MD   varenicline (CHANTIX STARTING MONTH PAK) 0.5 MG X 11 & 1 MG X 42 tablet Take by mouth.  8/4/21  Yes Juan Hines MD   fluticasone-salmeterol [facility]) 500-50 MCG/DOSE diskus inhaler Inhale 1 puff into the lungs every 12 hours 7/21/21  Yes Neo Aguilar MD   tiotropium (Rockne Asters) 18 MCG inhalation capsule INHALE THE CONTENTS OF 1 CAPSULE EVERY DAY 7/21/21  Yes Neo Aguilar MD   albuterol (PROVENTIL) (2.5 MG/3ML) 0.083% nebulizer solution Take 3 mLs by nebulization every 6 hours as needed for Wheezing or Shortness of Breath DX COPD J44.9 7/21/21  Yes Neo Aguilar MD   montelukast (SINGULAIR) 10 MG tablet TAKE 1 TABLET BY MOUTH EACH NIGHT 7/21/21  Yes Neo Aguilar MD   pantoprazole (PROTONIX) 40 MG tablet Take 1 tablet by mouth every morning (before breakfast) 6/25/21  Yes Emy Harrison MD   albuterol sulfate HFA (VENTOLIN HFA) 108 (90 Base) MCG/ACT inhaler Inhale 2 puffs into the lungs every 6 hours as needed for Wheezing or Shortness of Breath 3/4/21  Yes Neo Aguilar MD   naproxen (NAPROSYN) 500 MG tablet Take 1 tablet by mouth 2 times daily (with meals) 2/5/21  Yes Orly Ramirez, APRN - CNP   BD PEN NEEDLE SVETLANA U/F 32G X 4 MM MISC USE ONE DAILY AS DIRECTED 12/11/20  Yes Juan Hines MD   FLUoxetine (PROZAC) 20 MG capsule TAKE 3 CAPSULES BY MOUTH DAILY 12/3/20  Yes Juan Hines MD   traZODone (DESYREL) 150 MG tablet TAKE 2 TABLETS AT BEDTIME 9/11/20  Yes Tatianna Martines MD   simvastatin (ZOCOR) 20 MG tablet TAKE 1 TABLET EVERY EVENING 8/7/20  Yes Tatianna Martines MD   busPIRone (BUSPAR) 30 MG tablet TAKE 1 TABLET TWICE DAILY 8/7/20  Yes Tatianna Martines MD   teriparatide, recombinant, (FORTEO) 600 MCG/2.4ML SOLN injection Inject 0.08 mLs into the skin daily One dose injected daily. 9/27/19 12/7/20  Mario Fitzpatrick MD       Allergies:  Meperidine and Demerol    Social History:      The patient currently lives independently    TOBACCO:   reports that she has been smoking cigarettes. She started smoking about 49 years ago. She has a 20.00 pack-year smoking history. She has never used smokeless tobacco.  ETOH:   reports no history of alcohol use. Family History:      Reviewed in detail and negative for CVA. Positive as follows:        Problem Relation Age of Onset    Asthma Mother     Diabetes Mother     Emphysema Mother     Heart Failure Mother     Hypertension Mother     Heart Disease Mother     High Cholesterol Mother     Cancer Mother     Depression Mother     Diabetes Father     Emphysema Father     Heart Failure Father     Hypertension Father     Heart Disease Father     High Cholesterol Father     Substance Abuse Father     Emphysema Paternal Grandfather     Diabetes Sister     High Cholesterol Sister     Vision Loss Maternal Uncle        REVIEW OF SYSTEMS:   Pertinent positives as noted in the HPI. All other systems reviewed and negative. PHYSICAL EXAM:    /75   Pulse 107   Temp 98 °F (36.7 °C) (Axillary)   Resp 24   Ht 5' 5\" (1.651 m)   Wt 164 lb 4 oz (74.5 kg)   SpO2 96%   BMI 27.33 kg/m²     General appearance:  No apparent distress, appears stated age and cooperative. HEENT:  Normal cephalic, atraumatic without obvious deformity. Pupils equal, round, and reactive to light. Extra ocular muscles intact. Conjunctivae/corneas clear. Neck: Supple, with full range of motion.  No jugular venous distention. Trachea midline. Respiratory:  Normal respiratory effort. Clear to auscultation, bilaterally without Rales/Wheezes/Rhonchi. Cardiovascular:  Regular rate and rhythm with normal S1/S2 without murmurs, rubs or gallops. Abdomen: Soft, non-tender, non-distended with normal bowel sounds. Musculoskeletal:  No clubbing, cyanosis or edema bilaterally. Full range of motion without deformity. Skin: Skin color, texture, turgor normal.  No rashes or lesions. Neurologic:  Neurovascularly intact without any focal sensory/motor deficits. Cranial nerves: II-XII intact, grossly non-focal.  Psychiatric:  Alert and oriented, thought content appropriate, normal insight  Capillary Refill: Brisk,< 3 seconds   Peripheral Pulses: +2 palpable, equal bilaterally       CXR:  I have reviewed the CXR with the following interpretation: no acute ASD   EKG:  I have reviewed the EKG with the following interpretation: no acute ischemic changes. Labs:     Recent Labs     09/09/21  1341 09/10/21  0719   WBC 14.4* 13.0*   HGB 13.1 13.0   HCT 38.2 38.3    253     Recent Labs     09/09/21  1341 09/10/21  0719   * 129*   K 4.1 4.7   CL 96* 95*   CO2 22 23   BUN 12 18   CREATININE 0.6 0.6   CALCIUM 8.9 8.9   PHOS  --  2.9     Recent Labs     09/09/21  1341   AST 17   ALT 18   BILITOT 0.4   ALKPHOS 49     No results for input(s): INR in the last 72 hours.   Recent Labs     09/09/21  1341   TROPONINI <0.01       Urinalysis:      Lab Results   Component Value Date    NITRU Negative 08/26/2021    WBCUA 0-3 04/13/2012    RBCUA 3-5 04/13/2012    BLOODU Negative 08/26/2021    SPECGRAV 1.020 08/26/2021    GLUCOSEU Negative 08/26/2021    GLUCOSEU NEGATIVE 04/13/2012         ASSESSMENT:    Active Hospital Problems    Diagnosis Date Noted    COPD (chronic obstructive pulmonary disease) ([facility] [J44.9] 09/09/2021    COPD exacerbation ([facility] [J44.1] 06/24/2020    [facility] respiratory [facility] with hypoxia [facility] [J96.01] 04/03/2020    ILD (interstitial lung disease) ([facility] [J84.9] 04/06/2016    Pulmonary fibrosis ([facility] [J84.10] 10/16/2014    Hyperlipidemia [E78.5] 09/10/2012    Hyponatremia [E87.1] 09/10/2012    Smoker [F17.200] 12/03/2009       PLAN:        COPD - w/ chronic respiratory failure on baseline home O2. Also w/ reported ILD/Pulmonary Fibrosis. Normally controlled on home medication regimen - continued. Here w/ acute exacerbation. Started on HHN/IV steroids. Acute on ChronicRespiratory Failure - w/ increased hypoxia, POArrival.  Presence of clinical respiratory distress w/ tachypnea/dyspnea/SOB and wheezing w/ use of accessory muscles to breath. Supplemental O2 and wean as tolerated. HyperLipidemia - controlled on home Statin. Continue, w/ f/u and med adjustment w/ PCP    HypoNatremia - etiology clinically unable to determine but likely hypovolemic. Follow serial labs on IVF. Reviewed and documented as above. DVT Prophylaxis: LMWH  Diet: ADULT DIET; Regular  Code Status: Full Code      PT/OT Eval Status: not yet ordered. Dispo - Possibly Friday/Sat 10/11 Sept pending clinical course. Hilario Hamman, MD    Thank you Emre Garcia MD for the opportunity to be involved in this patient's care. If you have any questions or concerns please feel free to contact me at [phone].

## 2021-09-09 NOTE — CARE COORDINATION
Upon chart review, patient noted to be in ER for evaluation.  CTN to continue to monitor  Kuldeep Rowland RN   Care Transition Nurse  [phone]

## 2021-09-09 NOTE — ED PROVIDER NOTES
Emergency Department Attending Provider Note  Location: [facility]  ED  9/9/2021     Patient Identification  Corey Ambriz is a 60 y.o. female      Corey Ambriz was evaluated in the Emergency Department for acute on chronic shortness of breath and wheezing. History of COPD with multiple ED visits and admissions. Recently discharged on September 3 placed on steroids. Describes progressive shortness of breath. No fevers no productive cough no known sick contacts or exposures to COVID-19. Ba Jung Physical exam revealed:  Vital signs reviewed  Gen: Alert and oriented, no acute distress  Card: RRR, no murmurs, equal radial pulses  Resp: Moderate respiratory distress with accessory muscle use tachypneic, inspiratory and expiratory wheezes throughout  Abd: Soft nontender, nondistended abdomen, no guarding or rebound, no CVA tenderness  Ext: No deformities noted  Neuro: Grossly normal moving extremities equally      EKG Interpretation  Normal sinus rhythm rate 85 normal axis normal intervals no evidence of conduction abnormalities no diagnostic ischemic changes noted,     Patient seen and evaluated. Relevant records reviewed. 60-year-old female presents with respiratory distress consistent with COPD exacerbation. No focal findings on lung exam no fevers no infectious symptoms otherwise. Her x-ray does not show any acute infiltrate. She does have a mild leukocytosis however she is recently been on steroids and is overall downtrending from previous visits. Will give DuoNeb and steroids here extended breathing treatments and anticipate likely admission unless she has substantial recovery. Total critical care time is 35 minutes, which excludes separately billable procedures and updating family. Time spent is specifically for management of the presenting complaint and symptoms initially, direct bedside care, reevaluation, review of records, and consultation.   There was a high probability of clinically significant life-threatening deterioration in the patient's condition, which required my urgent intervention. Although initial history and physical exam information was obtained by SARA/NPP/MD/ (who also dictated a record of this visit), I independently examined and evaluated this patient and made all diagnostic, treatment, and disposition decisions. This chart was generated in part by using Dragon Dictation system and may contain errors related to that system including errors in grammar, punctuation, and spelling, as well as words and phrases that may be inappropriate. If there are any questions or concerns please feel free to contact the dictating provider for clarification.      Jaden Combs MD  [address] [place] [address] MD  09/10/21 0635

## 2021-09-09 NOTE — ED NOTES
Bed: 06  Expected date:   Expected time:   Means of arrival:   Comments:   [facility], RN  09/09/21 [id number]

## 2021-09-09 NOTE — ED PROVIDER NOTES
[address] [facility]  ED  EMERGENCY DEPARTMENT ENCOUNTER        Pt Name: Marlena Abbott  MRN: [id number]  [place] 1955  Date of evaluation: 9/9/2021  Provider: Sabas Ramos PA-C  PCP: Roxi Neves MD  ED Attending: Terence Linton MD      This patient was seen by the attending provider Terence Linton MD    History provided by the patient    CHIEF COMPLAINT:     Chief Complaint   Patient presents with    Shortness of Breath     started yesterday, progressively worsening. pt on 3L n/c at home at night. pt given two breathing tx by home health nurse this morning without relief. pt has increased WOB, accessory muscle use. speaks in short sentences. pt currently on NRB at 98%       HISTORY OF PRESENT ILLNESS:      Marlena Abbott is a 60 y.o. female who arrives to the ED by [address] [facility] from home. Patient is here with acute shortness of breath. She has COPD. She typically wears oxygen at night. She has been wearing it around-the-clock, at 3 L. She reports increased difficulty with breathing. Home health nurse came today for the first time since her recent hospitalization (for COPD exacerbation). Home health nurse noted the patient to be in respiratory distress. She was noted to be hypoxic in the 80s on her home oxygen. Patient having trouble speaking in complete sentences due to shortness of breath. She denies chest pain. She has no increasing cough. No fevers or chills. She has been vaccinated for COVID-19. No identifiable exacerbating or alleviating factors to symptoms at this time. Nursing Notes were reviewed     REVIEW OF SYSTEMS:     Review of Systems   Constitutional: Positive for activity change. Negative for appetite change, chills and fever. HENT: Negative. Eyes: Negative. Respiratory: Positive for chest tightness, shortness of breath and wheezing. Cardiovascular: Negative for chest pain. Gastrointestinal: Negative for abdominal pain, nausea and vomiting. Genitourinary: Negative. Musculoskeletal: Negative for back pain and neck pain. Skin: Negative for color change. Neurological: Negative for weakness and headaches. All other systems reviewed and are negative. Except as noted above in the ROS, all other systems were reviewed and negative.          PAST MEDICAL HISTORY:     Past Medical History:   Diagnosis Date    Allergic rhinitis 6/11/2010    Anxiety     Arthritis     Aspiration pneumonia ([address] 3/21/2012    Recurrent    Asthma     Bronchitis chronic     COPD (chronic obstructive pulmonary disease) ([address] 12/3/2009    Depression     Drug abuse, cocaine type ([address]     past history of crack cocaine use    Emphysema of lung (HCC)     Fibromyalgia     GERD (gastroesophageal reflux disease)     Hyperlipidemia     Influenza A 03/05/2020    Lung disease     On home oxygen therapy     uses O2 NC 3L prn at night    Osteoporosis     Pneumonia     Polysubstance dependence ([address] 1/2/2012    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    Pulmonary fibrosis ([facility] 10/16/2014    Pulmonary infiltrate     Pulmonary nodule 12/3/2009    Tobacco abuse          SURGICAL HISTORY:      Past Surgical History:   Procedure Laterality Date    BLADDER REPAIR      BRONCHOSCOPY      BRONCHOSCOPY N/A 3/6/2020    BRONCHOSCOPY THERAPUTIC ASPIRATION INITIAL performed by Chapincito Wade MD at [address]  3/6/2020    BRONCHOSCOPY ALVEOLAR LAVAGE performed by Chapincito Wade MD at [address] N/A 6/26/2020    BRONCHOSCOPY THERAPUTIC ASPIRATION INITIAL performed by Angélica Naidu MD at [address]  6/26/2020    BRONCHOSCOPY ALVEOLAR LAVAGE performed by Angélica Naidu MD at [address]  06/23/2021    Dr Melly Izaguirre N/A 6/23/2021    BRONCHOSCOPY DIAGNOSTIC OR CELL [address] performed by Angélica Naidu MD at [facility] BRONCHOSCOPY  6/23/2021    BRONCHOSCOPY THERAPUTIC ASPIRATION INITIAL performed by Nicola Nuñez MD at [address] [place] [address]  6/23/2021    BRONCHOSCOPY ALVEOLAR LAVAGE performed by Nicola Nuñez MD at [address] [place] [address] N/A 8/27/2021    BRONCHOSCOPY DIAGNOSTIC OR CELL [facility] ONLY performed by Nicola Nuñez MD at [address] 2012    ENDOSCOPY, COLON, DIAGNOSTIC      ESOPHAGEAL DILATATION  9/20/2018    ESOPHAGEAL DILATION Octavio Julissa performed by Delroy Umaña MD at [address] [facility] OTHER SURGICAL HISTORY  2/12/15    T8 Kyphoplasty    NV COLONOSCOPY FLX DX W/COLLJ SPEC WHEN PFRMD N/A 9/20/2018    EGD AND COLONOSCOPY WITH ANESTHESIA performed by Delroy Umaña MD at [address] [place] [address] ESOPHAGOGASTRODUODENOSCOPY TRANSORAL DIAGNOSTIC N/A 9/20/2018    EGD AND COLONOSCOPY WITH ANESTHESIA performed by Delroy Umaña MD at [facility]:       Previous Medications    ALBUTEROL (PROVENTIL) (2.5 MG/3ML) 0.083% NEBULIZER SOLUTION    Take 3 mLs by nebulization every 6 hours as needed for Wheezing or Shortness of Breath DX COPD J44.9    ALBUTEROL SULFATE HFA (VENTOLIN HFA) 108 (90 BASE) MCG/ACT INHALER    Inhale 2 puffs into the lungs every 6 hours as needed for Wheezing or Shortness of Breath    BD PEN NEEDLE SVETLANA U/F 32G X 4 MM MISC    USE ONE DAILY AS DIRECTED    BENZONATATE (TESSALON PERLES) 100 MG CAPSULE    Take 1 capsule by mouth 3 times daily as needed for Cough    BUSPIRONE (BUSPAR) 30 MG TABLET    TAKE 1 TABLET TWICE DAILY    FLUOXETINE (PROZAC) 20 MG CAPSULE    TAKE 3 CAPSULES BY MOUTH DAILY    FLUTICASONE-SALMETEROL (WIXELA INHUB) 500-50 MCG/DOSE DISKUS INHALER    Inhale 1 puff into the lungs every 12 hours    GUAIFENESIN (MUCINEX) 600 MG EXTENDED RELEASE TABLET    Take 1 tablet by mouth 2 times daily as needed for Congestion IPRATROPIUM-ALBUTEROL (DUONEB) 0.5-2.5 (3) MG/3ML SOLN NEBULIZER SOLUTION    Inhale 3 mLs into the lungs every 6 hours as needed for Shortness of Breath    MONTELUKAST (SINGULAIR) 10 MG TABLET    TAKE 1 TABLET BY MOUTH EACH NIGHT    NAPROXEN (NAPROSYN) 500 MG TABLET    Take 1 tablet by mouth 2 times daily (with meals)    PANTOPRAZOLE (PROTONIX) 40 MG TABLET    Take 1 tablet by mouth every morning (before breakfast)    PREDNISONE (DELTASONE) 10 MG TABLET    Take 1 tablet by mouth daily    PREDNISONE (DELTASONE) 10 MG TABLET    60mg daily x 3 days, 50mg daily x 2 days, 40mg daily x 1 day. Then go to your other prescription you already have to finish the taper. SIMVASTATIN (ZOCOR) 20 MG TABLET    TAKE 1 TABLET EVERY EVENING    TERIPARATIDE, RECOMBINANT, (FORTEO) 600 MCG/2.4ML SOLN INJECTION    Inject 0.08 mLs into the skin daily One dose injected daily. TIOTROPIUM (SPIRIVA HANDIHALER) 18 MCG INHALATION CAPSULE    INHALE THE CONTENTS OF 1 CAPSULE EVERY DAY    TRAZODONE (DESYREL) 150 MG TABLET    TAKE 2 TABLETS AT BEDTIME    VARENICLINE (CHANTIX STARTING MONTH PAK) 0.5 MG X 11 & 1 MG X 42 TABLET    Take by mouth.          ALLERGIES:    Meperidine and Demerol    FAMILY HISTORY:       Family History   Problem Relation Age of Onset    Asthma Mother     Diabetes Mother     Emphysema Mother     Heart Failure Mother     Hypertension Mother     Heart Disease Mother     High Cholesterol Mother     Cancer Mother     Depression Mother     Diabetes Father     Emphysema Father     Heart Failure Father     Hypertension Father     Heart Disease Father     High Cholesterol Father     Substance Abuse Father     Emphysema Paternal Grandfather     Diabetes Sister     High Cholesterol Sister     Vision Loss Maternal Uncle           SOCIAL HISTORY:       Social History     Socioeconomic History    Marital status:      Spouse name: None    Number of children: 3    Years of education: None    Highest education level: None   Occupational History    Occupation: Disabled     Comment:    Tobacco Use    Smoking status: Current Every Day Smoker     Packs/day: 0.50     Years: 40.00     Pack years: 20.00     Types: Cigarettes     Start date: 1972     Last attempt to quit: 2021     Years since quittin.2    Smokeless tobacco: Never Used    Tobacco comment: 0.5 PPD restarted   Vaping Use    Vaping Use: Never used   Substance and Sexual Activity    Alcohol use: No     Alcohol/week: 0.0 standard drinks    Drug use: Yes     Types: Marijuana     Comment: Daily    Sexual activity: Yes     Partners: Male   Other Topics Concern    None   Social History Narrative    None     Social Determinants of Health     Financial Resource Strain: Low Risk     Difficulty of Paying Living Expenses: Not hard at all   Food Insecurity: No Food Insecurity    Worried About Running Out of Food in the Last Year: Never true    Erica of Food in the Last Year: Never true   Transportation Needs: No Transportation Needs    Lack of Transportation (Medical): No    Lack of Transportation (Non-Medical):  No   Physical Activity:     Days of Exercise per Week:     Minutes of Exercise per Session:    Stress:     Feeling of Stress :    Social Connections:     Frequency of Communication with Friends and Family:     Frequency of Social Gatherings with Friends and Family:     Attends Latter-day Services:     Active Member of Clubs or Organizations:     Attends Club or Organization Meetings:     Marital Status:    Intimate Partner Violence:     Fear of Current or Ex-Partner:     Emotionally Abused:     Physically Abused:     Sexually Abused:        SCREENINGS:    [place] Coma Scale  Eye Opening: Spontaneous  Best Verbal Response: Oriented  Best Motor Response: Obeys commands  Sarah Coma Scale Score: 15        PHYSICAL EXAM:       ED Triage Vitals   BP Temp Temp Source Pulse Resp SpO2 Height Weight   21 1331 21 [phone] 09/09/21 1331 09/09/21 1331 09/09/21 1331 09/09/21 1331 09/09/21 1320 09/09/21 1320   120/81 98.7 °F (37.1 °C) Oral 102 30 98 % 5' 5\" (1.651 m) 165 lb (74.8 kg)       Physical Exam    CONSTITUTIONAL: Awake and alert. Cooperative. Well-developed. Well-nourished. Non-toxic. Moderate respiratory distress on arrival.  HENT: Normocephalic. Atraumatic. External ears normal, without discharge. No nasal discharge. Oropharynx clear. Mucous membranes moist.  EYES: Conjunctiva non-injected. No scleral icterus. PERRL. EOM's grossly intact. NECK: Supple. Normal ROM. CARDIOVASCULAR: RRR. No Murmer. Intact distal pulses. PULMONARY/CHEST WALL: Increased respiratory effort with tachypnea. Lungs with diffuse wheezing and rhonchi to auscultation  ABDOMEN: Normal BS. Soft. Nondistended. No tenderness to palpate. No guarding. /ANORECTAL: Not assessed  MUSKULOSKELETAL: Normal ROM. No acute deformities. No edema. No tenderness to palpate. SKIN: Warm and dry. No rash. NEUROLOGICAL: Alert and oriented x 3. GCS 15. CN II-XII grossly intact. Strength is 5/5 in all extremities and sensation is intact. Normal gait.    PSYCHIATRIC: Normal affect        DIAGNOSTICRESULTS:     LABS:    Results for orders placed or performed during the hospital encounter of 09/09/21   CBC Auto Differential   Result Value Ref Range    WBC 14.4 (H) 4.0 - 11.0 K/uL    RBC 4.43 4.00 - 5.20 M/uL    Hemoglobin 13.1 12.0 - 16.0 g/dL    Hematocrit 38.2 36.0 - 48.0 %    MCV 86.1 80.0 - 100.0 fL    MCH 29.7 26.0 - 34.0 pg    MCHC 34.4 31.0 - 36.0 g/dL    RDW 13.9 12.4 - 15.4 %    Platelets 114 393 - 106 K/uL    MPV 6.9 5.0 - 10.5 fL    Neutrophils % 71.0 %    Lymphocytes % 21.2 %    Monocytes % 6.9 %    Eosinophils % 0.8 %    Basophils % 0.1 %    Neutrophils Absolute 10.2 (H) 1.7 - 7.7 K/uL    Lymphocytes Absolute 3.0 1.0 - 5.1 K/uL    Monocytes Absolute 1.0 0.0 - 1.3 K/uL    Eosinophils Absolute 0.1 0.0 - 0.6 K/uL    Basophils Absolute 0.0 0.0 - 0.2 K/uL Comprehensive Metabolic Panel w/ Reflex to MG   Result Value Ref Range    Sodium 129 (L) 136 - 145 mmol/L    Potassium reflex Magnesium 4.1 3.5 - 5.1 mmol/L    Chloride 96 (L) 99 - 110 mmol/L    CO2 22 21 - 32 mmol/L    Anion Gap 11 3 - 16    Glucose 106 (H) 70 - 99 mg/dL    BUN 12 7 - 20 mg/dL    CREATININE 0.6 0.6 - 1.2 mg/dL    GFR Non-African American >60 >60    GFR African American >60 >60    Calcium 8.9 8.3 - 10.6 mg/dL    Total Protein 6.4 6.4 - 8.2 g/dL    Albumin 3.6 3.4 - 5.0 g/dL    Albumin/Globulin Ratio 1.3 1.1 - 2.2    Total Bilirubin 0.4 0.0 - 1.0 mg/dL    Alkaline Phosphatase 49 40 - 129 U/L    ALT 18 10 - 40 U/L    AST 17 15 - 37 U/L    Globulin 2.8 g/dL   Troponin   Result Value Ref Range    Troponin <0.01 <0.01 ng/mL   Blood Gas, Venous   Result Value Ref Range    pH, Ivan 7.553 (H) 7.350 - 7.450    pCO2, Ivan 27.2 (L) 40.0 - 50.0 mmHg    pO2, Ivan 139.1 (H) 25 - 40 mmHg    HCO3, Venous 23.4 23.0 - 29.0 mmol/L    Base Excess, Ivan 2.3 -3.0 - 3.0 mmol/L    O2 Sat, Ivan 98 Not Established %    Carboxyhemoglobin 3.3 (H) 0.0 - 1.5 %    MetHgb, Ivan 0.4 <1.5 %    TC02 (Calc), Ivan 24 Not Established mmol/L    O2 Therapy Unknown    Lactic Acid, Plasma   Result Value Ref Range    Lactic Acid 1.4 0.4 - 2.0 mmol/L   Procalcitonin   Result Value Ref Range    Procalcitonin 0.04 0.00 - 0.15 ng/mL   EKG 12 Lead   Result Value Ref Range    Ventricular Rate 84 BPM    Atrial Rate 84 BPM    P-R Interval 136 ms    QRS Duration 76 ms    Q-T Interval 356 ms    QTc Calculation (Bazett) 420 ms    P Axis 69 degrees    R Axis 5 degrees    T Axis 67 degrees    Diagnosis       Normal sinus rhythmPossible Inferior infarct , age undeterminedAbnormal ECGWhen compared with ECG of 03-SEP-2021 13:20,No significant change was found         RADIOLOGY:  All x-ray studies areviewed/reviewed by me.   Formal interpretations per the radiologist are as follows:      XR CHEST PORTABLE    Result Date: 9/9/2021  EXAMINATION: ONE XRAY VIEW OF THE CHEST 9/9/2021 1:39 pm COMPARISON: 09/03/2021 HISTORY: ORDERING SYSTEM PROVIDED HISTORY: Shortness of breath TECHNOLOGIST PROVIDED HISTORY: Reason for exam:->Shortness of breath Reason for Exam: sob for 2 days Acuity: Acute Type of Exam: Initial FINDINGS: The cardiac silhouette is normal.  Thoracic aorta remains mildly tortuous. Interstitial opacities are mildly increased. No consolidation, pleural effusion or pneumothorax is seen. No acute cardiopulmonary disease. Stable pattern of fibrosis. EKG: The Ekg interpreted by me in the absence of a cardiologist shows. normal sinus rhythm with a rate of 84    See also interpretation by Parveen Bernabe MD.      PROCEDURES:   N/A    CRITICAL CARE TIME:       Due to the immediate potential for life-threatening deterioration due to acute respiratory failure with hypoxia, I spent 32 minutes providing critical care. This time is excluding time spent performing procedures. CONSULTS:  IP CONSULT TO HOSPITALIST      EMERGENCY DEPARTMENT COURSE and DIFFERENTIAL DIAGNOSIS/MDM:   Vitals:    Vitals:    09/09/21 1418 09/09/21 1430 09/09/21 1445 09/09/21 1451   BP:    124/78   Pulse:  90 95 103   Resp: 19 24 21 16   Temp:       TempSrc:       SpO2: 91% 95% 96% 96%   Weight:       Height:           Patient was given the following medications:  Medications   methylPREDNISolone sodium (SOLU-MEDROL) injection 60 mg (60 mg IntraVENous Given 9/9/21 1347)   ipratropium-albuterol (DUONEB) nebulizer solution 1 ampule (1 ampule Inhalation Given 9/9/21 1414)   albuterol (PROVENTIL) nebulizer solution 5 mg (5 mg Nebulization Given 9/9/21 1417)         Patient was evaluated by both myself and Parveen Bernabe MD.   Old records were reviewed. Patient arrives to the ED via EMS with acute respiratory distress. She has COPD. She wears home oxygen on an as-needed basis but has been wearing it around-the-clock for the last couple of days.   Patient arrives hypoxic on her baseline 3 L nasal cannula oxygen. She had a nonrebreather placed temporarily before being weaned off of this. She has been given Solu-Medrol IV and DuoNeb and albuterol nebulizer treatments. Patient kept on telemetry monitor. Work-up initiated. EKG reveals normal sinus rhythm with rate 84 bpm  Portable chest x-ray no acute cardiopulmonary disease. Stable pattern of fibrosis  CBC white count elevated at 14.4 with H&H 13.1 and 38.2  CMP slight hyponatremia at 129. Chloride slightly low at 96. Remaining metabolic panel okay. Troponin negative   VBG pH 7.553, PCO2 27.2 and PO2 139.1  Lactic acid 1.4  Procalcitonin 0.04  Blood cultures x2 sent  Rapid COVID-19 test pending  Patient reports some improvement upon reassessing her. However, she continues to be tachypneic and is requiring an increased amount of oxygen compared to her baseline home oxygen need. At this time, I believe the patient warrants hospitalization. She will need further steroids, around-the-clock breathing treatments and potentially pulmonology consult. I am consulting the hospitalist at this time. I spoke with [facility] hospitalists. We thoroughly discussed the history, physical exam, laboratory and imaging studies, as well as, emergency department course. Based upon that discussion, we've decided to admit Haylee Alvarado for further observation and evaluation of Alberto Wong's acute respiratory failure with hypoxia, COPD. As I have deemed necessary from their history, physical, and studies, I have considered and evaluated Haylee Alvarado for the following diagnoses:  ACUTE CORONARY SYNDROME, PERICARDIAL TAMPONADE, CHF, SEPSIS, COPD EXACERBATION, PNEUMONIA, PNEUMOTHORAX, PULMONARY EMBOLISM, and THORACIC DISSECTION. FINAL IMPRESSION:      1. Acute respiratory failure with hypoxia ([address]    2. COPD exacerbation ([facility]    3.  Tachypnea          DISPOSITION/PLAN:   DISPOSITION Decision To Admit                 (Please note thatportions of this note were completed with a voice recognition program.  Efforts were made to edit the dictations, but occasionally words are mis-transcribed.)    Nestor Pizarro PA-C (electronicallysigned)              Sangeeta Coronado  09/09/21 8296

## 2021-09-09 NOTE — PROGRESS NOTES
Patient admitted to room 202 from ER. Patient oriented to room, call light, bed rails, phone, lights and bathroom. Patient instructed about the schedule of the day including: vital sign frequency, lab draws, possible tests, frequency of MD and staff rounds, including RN/MD rounding together at bedside, daily weights, and I &O's. Patient instructed about prescribed diet, how to use 8MENU, and television. Bed locked, in lowest position, side rails up 2/4, call light within reach. Will continue to monitor.

## 2021-09-09 NOTE — TELEPHONE ENCOUNTER
Writer contacted ED provider Lisa Eaton  to inform of 30 day readmission risk. ED provider informed writer of possible readmission.

## 2021-09-10 LAB
ALBUMIN SERPL-MCNC: 3.6 G/DL (ref 3.4–5)
ANION GAP SERPL CALCULATED.3IONS-SCNC: 11 MMOL/L (ref 3–16)
BUN BLDV-MCNC: 18 MG/DL (ref 7–20)
CALCIUM SERPL-MCNC: 8.9 MG/DL (ref 8.3–10.6)
CHLORIDE BLD-SCNC: 95 MMOL/L (ref 99–110)
CO2: 23 MMOL/L (ref 21–32)
CREAT SERPL-MCNC: 0.6 MG/DL (ref 0.6–1.2)
EKG ATRIAL RATE: 84 BPM
EKG DIAGNOSIS: NORMAL
EKG P AXIS: 69 DEGREES
EKG P-R INTERVAL: 136 MS
EKG Q-T INTERVAL: 356 MS
EKG QRS DURATION: 76 MS
EKG QTC CALCULATION (BAZETT): 420 MS
EKG R AXIS: 5 DEGREES
EKG T AXIS: 67 DEGREES
EKG VENTRICULAR RATE: 84 BPM
GFR AFRICAN AMERICAN: >60
GFR NON-AFRICAN AMERICAN: >60
GLUCOSE BLD-MCNC: 140 MG/DL (ref 70–99)
HCT VFR BLD CALC: 38.3 % (ref 36–48)
HEMOGLOBIN: 13 G/DL (ref 12–16)
MCH RBC QN AUTO: 29.4 PG (ref 26–34)
MCHC RBC AUTO-ENTMCNC: 33.8 G/DL (ref 31–36)
MCV RBC AUTO: 86.8 FL (ref 80–100)
PDW BLD-RTO: 14.3 % (ref 12.4–15.4)
PHOSPHORUS: 2.9 MG/DL (ref 2.5–4.9)
PLATELET # BLD: 253 K/UL (ref 135–450)
PMV BLD AUTO: 6.8 FL (ref 5–10.5)
POTASSIUM SERPL-SCNC: 4.7 MMOL/L (ref 3.5–5.1)
RBC # BLD: 4.41 M/UL (ref 4–5.2)
SODIUM BLD-SCNC: 129 MMOL/L (ref 136–145)
WBC # BLD: 13 K/UL (ref 4–11)

## 2021-09-10 PROCEDURE — 2700000000 HC OXYGEN THERAPY PER DAY

## 2021-09-10 PROCEDURE — 94640 AIRWAY INHALATION TREATMENT: CPT

## 2021-09-10 PROCEDURE — 85027 COMPLETE CBC AUTOMATED: CPT

## 2021-09-10 PROCEDURE — 1200000000 HC SEMI PRIVATE

## 2021-09-10 PROCEDURE — 36415 COLL VENOUS BLD VENIPUNCTURE: CPT

## 2021-09-10 PROCEDURE — 93010 ELECTROCARDIOGRAM REPORT: CPT | Performed by: INTERNAL MEDICINE

## 2021-09-10 PROCEDURE — 80069 RENAL FUNCTION PANEL: CPT

## 2021-09-10 PROCEDURE — 6360000002 HC RX W HCPCS: Performed by: INTERNAL MEDICINE

## 2021-09-10 PROCEDURE — 6370000000 HC RX 637 (ALT 250 FOR IP): Performed by: INTERNAL MEDICINE

## 2021-09-10 PROCEDURE — 2580000003 HC RX 258: Performed by: INTERNAL MEDICINE

## 2021-09-10 PROCEDURE — 94761 N-INVAS EAR/PLS OXIMETRY MLT: CPT

## 2021-09-10 RX ADMIN — ENOXAPARIN SODIUM 40 MG: 40 INJECTION SUBCUTANEOUS at 10:18

## 2021-09-10 RX ADMIN — TIOTROPIUM BROMIDE INHALATION SPRAY 2 PUFF: 3.12 SPRAY, METERED RESPIRATORY (INHALATION) at 08:08

## 2021-09-10 RX ADMIN — FLUOXETINE 60 MG: 20 CAPSULE ORAL at 10:19

## 2021-09-10 RX ADMIN — BUSPIRONE HYDROCHLORIDE 30 MG: 5 TABLET ORAL at 20:40

## 2021-09-10 RX ADMIN — IPRATROPIUM BROMIDE AND ALBUTEROL SULFATE 3 ML: .5; 3 SOLUTION RESPIRATORY (INHALATION) at 12:25

## 2021-09-10 RX ADMIN — IPRATROPIUM BROMIDE AND ALBUTEROL SULFATE 3 ML: .5; 3 SOLUTION RESPIRATORY (INHALATION) at 00:46

## 2021-09-10 RX ADMIN — Medication 2 PUFF: at 08:06

## 2021-09-10 RX ADMIN — TRAZODONE HYDROCHLORIDE 100 MG: 50 TABLET ORAL at 20:40

## 2021-09-10 RX ADMIN — IPRATROPIUM BROMIDE AND ALBUTEROL SULFATE 3 ML: .5; 3 SOLUTION RESPIRATORY (INHALATION) at 08:04

## 2021-09-10 RX ADMIN — Medication 10 ML: at 20:41

## 2021-09-10 RX ADMIN — IPRATROPIUM BROMIDE AND ALBUTEROL SULFATE 3 ML: .5; 3 SOLUTION RESPIRATORY (INHALATION) at 15:48

## 2021-09-10 RX ADMIN — METHYLPREDNISOLONE SODIUM SUCCINATE 40 MG: 40 INJECTION, POWDER, FOR SOLUTION INTRAMUSCULAR; INTRAVENOUS at 10:19

## 2021-09-10 RX ADMIN — Medication 2 PUFF: at 19:25

## 2021-09-10 RX ADMIN — IPRATROPIUM BROMIDE AND ALBUTEROL SULFATE 3 ML: .5; 3 SOLUTION RESPIRATORY (INHALATION) at 19:25

## 2021-09-10 RX ADMIN — MONTELUKAST SODIUM 10 MG: 10 TABLET ORAL at 20:40

## 2021-09-10 RX ADMIN — NAPROXEN 500 MG: 250 TABLET ORAL at 17:57

## 2021-09-10 RX ADMIN — ATORVASTATIN CALCIUM 20 MG: 10 TABLET, FILM COATED ORAL at 10:19

## 2021-09-10 RX ADMIN — Medication 10 ML: at 10:20

## 2021-09-10 RX ADMIN — BUSPIRONE HYDROCHLORIDE 30 MG: 5 TABLET ORAL at 10:18

## 2021-09-10 RX ADMIN — NAPROXEN 500 MG: 250 TABLET ORAL at 10:19

## 2021-09-10 RX ADMIN — PANTOPRAZOLE SODIUM 40 MG: 40 TABLET, DELAYED RELEASE ORAL at 06:25

## 2021-09-10 RX ADMIN — IPRATROPIUM BROMIDE AND ALBUTEROL SULFATE 3 ML: .5; 3 SOLUTION RESPIRATORY (INHALATION) at 23:33

## 2021-09-10 RX ADMIN — METHYLPREDNISOLONE SODIUM SUCCINATE 40 MG: 40 INJECTION, POWDER, FOR SOLUTION INTRAMUSCULAR; INTRAVENOUS at 20:41

## 2021-09-10 RX ADMIN — IPRATROPIUM BROMIDE AND ALBUTEROL SULFATE 3 ML: .5; 3 SOLUTION RESPIRATORY (INHALATION) at 04:06

## 2021-09-10 ASSESSMENT — PAIN SCALES - GENERAL
PAINLEVEL_OUTOF10: 4
PAINLEVEL_OUTOF10: 2
PAINLEVEL_OUTOF10: 3

## 2021-09-10 ASSESSMENT — PAIN DESCRIPTION - LOCATION: LOCATION: HEAD

## 2021-09-10 NOTE — PROGRESS NOTES
Bedside report given to [facility]. Call light and bedside table within reach. No needs voiced at this time. Bed in lowest position, brakes locked. Bed alarm on and in place.

## 2021-09-10 NOTE — ACP (ADVANCE CARE PLANNING)
Advance Care Planning     General Advance Care Planning (ACP) Conversation    Date of Conversation: 9/9/2021  Conducted with: Patient with Decision Making Capacity    Healthcare Decision Maker:    Primary Decision Maker: Lalitha Dorsey - Spouse - [phone]    Secondary Decision Maker: Angelita Wong - Child - [phone]    Secondary Decision Maker: Pina Garberkaylynn - Child - [phone]    Supplemental (Other) Decision Maker: Norbert Wong - Child - [phone]  Click here to complete Healthcare Decision Makers including selection of the Healthcare Decision Maker Relationship (ie \"Primary\"). Today we documented Decision Maker(s) consistent with Legal Next of Kin hierarchy.     Content/Action Overview:  Has NO ACP documents/care preferences - information provided, considering goals and options  Reviewed DNR/DNI and patient elects Full Code (Attempt Resuscitation)    Length of Voluntary ACP Conversation in minutes:  <16 minutes (Non-Billable)    KATIE Dillard

## 2021-09-10 NOTE — PROGRESS NOTES
Shift Summary    /77   Pulse 100   Temp 97.5 °F (36.4 °C) (Oral)   Resp 24   Ht 5' 5\" (1.651 m)   Wt 164 lb 4 oz (74.5 kg)   SpO2 96%   BMI 27.33 kg/m²     GEN: Patient is alert & oriented, speech clear and appropriate. Requested and gave pepsi and peanut butter crackers. Pain: c/o headache. Gave tylenol. Pain/Discomfort is being managed with PRN analgesics per MD orders (See MAR). Patient is able to express and rate pain using numerical scale. IV:    Left hand IV site clean dry and intact without redness or pain. CV: HRRRR. No edema. Palpable pulses. Lungs: Tachypnea with pursed lips. On 4L oxygen, Lungs with expiratory wheezing, greater on the left. Encouraged C&DB. Receiving breathing treatments per RT and Solumedrol 40 BID. GI/: No complaints of nausea/vomiting/diarrhea. Tolerating diet. Abdomen soft, non tender, with bowel sounds. Continent of bowel and bladder. Denies dysuria. Skin: Warm and dry. Mobility: Up as tolerated independently      Safety: Calls appropriately. Plan of care and safety measures reviewed with the patient. Call light in reach. Disposition: From home with home health and oxygen.  (3L dependent at HS)

## 2021-09-10 NOTE — CARE COORDINATION
CASE MANAGEMENT INITIAL ASSESSMENT    Reviewed chart and completed assessment  With: Patient   Explained Case Management role/services. Primary contact information: [address] Decision Maker :   Primary Decision Maker: Jennifer Gordon - Spouse - [phone]    Secondary Decision Maker: Angelita Wong - Child - [phone]    Secondary Decision Maker: kaylynn Calvert - Child - [phone]    Supplemental (Other) Decision Maker: Patricia Negrete - Child - [phone]          Can this person be reached and be able to respond quickly, such as within a few minutes or hours? Yes      Admit date/status:2021 Inpatient   Diagnosis:COPD exacerbation    Is this a Readmission?:  Yes - COPD,  ED same- Patient report s I just need to stop smoking has rx for cessation, but not started       Insurance: [employer], [facility]  Precert required for SNF: Yes       3 night stay required: No    Living arrangements, Adls, care needs, prior to admission:Anthony reports lives in 2 story apartment ( 2nd floor bedroom), one step to enter from out side. Patient reports been living on 1st floor re poor endurance     Transportation:Spouse supports     Durable Medical Equipment at home:  Galileo Radha but reports does not use_Cane__RTS__ BSC_x_Shower Chair__  02_x #l at HS thru [employer]_ HHN__ CPAP__  BiPap__  Hospital Bed__ W/C___ Other__________    Services in the home and/or outpatient, prior to admission:  ative with [facility] [address] call placed to notify- spoke with Angelita Mendoza inpatient, will need resumption of care orders at dc    PT/OT recs: not ordered     Hospital Exemption Notification (HEN):NA    Barriers to discharge: Will need new orders and testing as prior has  to get portable tanks refilled     Plan/comments: Patient reports living at home with spouse, one step to enter. Patient reports has been staying on the 1str floor re poor endurance and not able to climb steps to second floor.  Patient active with [facility]- aware inpatient setting. Patient reports has 02 at [facility] with Waldemar, need to re fill tanks edu on process reports is aware. Writer spoke with Waldemar reports unable to refill unless gets new orders and testing. Patient reports has 4 [employer] but does not use. SW will ct to follow for DCP needs. KATIE Petersen       [facility] on chart for MD signature

## 2021-09-10 NOTE — PROGRESS NOTES
09/09/21 2224   RT Protocol   Smoking Status 2   Surgical status 0   Xray 3   Respiratory pattern 3   Mental Status 0   Breath sounds 1   Cough 0   Activity level 1   Oxygen Requirement 1   Indications for Bronchodilator Therapy On home bronchodilators   Bronchodilator Assessment Score 9   Indications for Bronchial Hygiene None   Bronchial Hygiene Assessment Score 7   Indications for Volume Expansion None   Volume Expansion Assessment Score 6

## 2021-09-10 NOTE — DISCHARGE INSTR - COC
Continuity of Care Form    Patient Name: Anaya Macdonald   :  1955  MRN:  [id number]    Admit date:  2021  Discharge date:  21    Code Status Order: Full Code   Advance Directives:      Admitting Physician:  Candido Rodas MD  PCP: Dylan Fine MD    [facility]ing [facility]/Room[facility] 7044/9062-65  Discharging Unit Phone Number: [phone]    Emergency Contact:   Extended Emergency Contact Information  Primary Emergency Contact: Angelita Wong  Address: [employer] [address] [phone]           DAUGHTER IN LAW [facility] of [address] [place] [address] Phone: [phone]  Mobile Phone: [phone]  Relation: Child  Secondary Emergency Contact: Laura [address], [address] [facility] Phone: [phone]  Mobile Phone: [phone]  Relation: Spouse    Past Surgical History:  Past Surgical History:   Procedure Laterality Date    BLADDER REPAIR      BRONCHOSCOPY      BRONCHOSCOPY N/A 3/6/2020    BRONCHOSCOPY THERAPUTIC ASPIRATION INITIAL performed by Joellen Coello MD at [address] [place] [address]  3/6/2020    BRONCHOSCOPY ALVEOLAR LAVAGE performed by Joellen Coello MD at [address] [place] [address] N/A 2020    BRONCHOSCOPY THERAPUTIC ASPIRATION INITIAL performed by Kinjal Melendez MD at [address] [place] [address]  2020    BRONCHOSCOPY ALVEOLAR LAVAGE performed by Kinjal Melendez MD at [address] [place] [address]  2021    Dr Chrissie Chester N/A 2021    [address] performed by Kinjal Melendez MD at [address] [place] [address]  2021    BRONCHOSCOPY THERAPUTIC ASPIRATION INITIAL performed by Kinjal Melendez MD at [address] [place] [address]  2021    BRONCHOSCOPY ALVEOLAR LAVAGE performed by Kinjal Melendez MD at [address] [place] [address] 2021    BRONCHOSCOPY DIAGNOSTIC OR CELL [address] performed by Kinjal Melendez MD at [address]      ENDOSCOPY, COLON, DIAGNOSTIC      ESOPHAGEAL DILATATION  9/20/2018    ESOPHAGEAL DILATION WATERS performed by Elizabeth Colmenares MD at [address] [facility] HISTORY  2/12/15    T8 Kyphoplasty    NH COLONOSCOPY FLX DX W/COLLJ SPEC WHEN PFRMD N/A 9/20/2018    EGD AND COLONOSCOPY WITH ANESTHESIA performed by Elizabeth Colmenares MD at [address] [facility] ESOPHAGOGASTRODUODENOSCOPY TRANSORAL DIAGNOSTIC N/A 9/20/2018    EGD AND COLONOSCOPY WITH ANESTHESIA performed by Elizabeth Colmenares MD at [address] [facility]         Immunization History:   Immunization History   Administered Date(s) Administered    COVID-19, Chan Peter, PF, 30mcg/0.3mL 03/10/2021, 03/31/2021    Influenza 10/04/2010, 10/12/2011, 11/28/2012    Influenza Virus Vaccine 10/16/2014, 01/14/2016    Influenza, Intradermal, Preservative free 11/04/2013    Influenza, Claritza Half, IM, (6 mo and older Fluzone, Flulaval, Fluarix and 3 yrs and older Afluria) 10/12/2011, 01/30/2017, 10/19/2017    Influenza, Claritza Half, IM, PF (6 mo and older Fluzone, Flulaval, Fluarix, and 3 yrs and older Afluria) 09/06/2018, 12/26/2019    Pneumococcal Conjugate 7-valent (Prevnar7) 01/01/2009    Pneumococcal Polysaccharide ([id number]) 01/14/2016    Td, unspecified formulation 11/04/2013    Tdap (Boostrix, Adacel) 01/25/2018       Active Problems:  Patient Active Problem List   Diagnosis Code    Stage 3 severe COPD by GOLD classification ([facility] J44.9    Smoker F17.200    Fibromyalgia M79.7    Hypokalemia E87.6    Hyperlipidemia E78.5    Hyponatremia E87.1    Depression F32.9    Tobacco dependence F17.200    Pulmonary fibrosis ([facility] J84.10    ILD (interstitial lung disease) ([facility] J84.9    Recurrent major depressive disorder, in partial remission ([facility] F33.41    Thyroid nodule E04.1    History of prescription drug abuse [id number]    Esophageal dysphagia R13.10    Heartburn R12    Rectal bleeding K62.5    History of colon polyps Z86.010    Compression fx, thoracic spine, sequela S22.000S    Kyphosis M40.209    Anxiety and depression F41.9, F32.9    Chronic pain syndrome G89.4    Polyarthropathy, multiple sites M13.0    Pneumonia J18.9    Acute on chronic respiratory failure with hypoxia and hypercapnia ([facility]) J96.21, J96.22    Acute respiratory failure with hypoxia ([facility]) J96.01    Healthcare associated bacterial pneumonia J15.9    Bandemia D72.825    Sepsis ([facility]) A41.9    COPD exacerbation ([facility]) J44.1    Pulmonary infiltrates R91.8    Chest congestion R09.89    Mucus plugging of bronchi T17.500A    Ineffective airway clearance R06.89    Acute on chronic respiratory failure with hypoxemia ([facility]) J96.21    COPD, frequent exacerbations ([facility]) J44.1    Acute on chronic respiratory failure ([facility]) J96.20    Cannabis dependence with current use ([facility]) F12.20    Other secondary kyphosis, 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    Marijuana use W81.99    Diastolic dysfunction N44.57    COPD (chronic obstructive pulmonary disease) ([facility]) J44.9       Isolation/Infection:   Isolation            No Isolation          Patient Infection Status       Infection Onset Added Last Indicated Last Indicated By Review Planned Expiration Resolved Resolved By    None active    Resolved    COVID-19 Rule Out 09/09/21 09/09/21 09/09/21 SARS-CoV-2 NAAT (Rapid) (Ordered)   09/09/21 Rule-Out Test Resulted    COVID-19 Rule Out 07/11/21 07/11/21 07/11/21 COVID-19, Rapid (Ordered)   07/11/21 Rule-Out Test Resulted    COVID-19 Rule Out 11/27/20 11/27/20 11/27/20 COVID-19 (Ordered)   11/27/20 Rule-Out Test Resulted    COVID-19 Rule Out 10/09/20 10/09/20 10/09/20 COVID-19 (Ordered)   10/10/20 Rule-Out Test Resulted    COVID-19 Rule Out 06/24/20 06/24/20 06/24/20 COVID-19 (Ordered)   06/25/20 Rule-Out Test Resulted    COVID-19 Rule Out 04/03/20 04/03/20 06/01/20 COVID-19 (Ordered)   06/02/20 Rule-Out Test Resulted    INFLUENZA 03/05/20 03/07/20 03/05/20 Respiratory Panel, Molecular   20             Nurse Assessment:  Last Vital Signs: /75   Pulse 107   Temp 98 °F (36.7 °C) (Axillary)   Resp 24   Ht 5' 5\" (1.651 m)   Wt 164 lb 4 oz (74.5 kg)   SpO2 96%   BMI 27.33 kg/m²     Last documented pain score (0-10 scale): Pain Level: 2  Last Weight:   Wt Readings from Last 1 Encounters:   21 164 lb 4 oz (74.5 kg)     Mental Status:  oriented, alert, coherent, logical, thought processes intact and able to concentrate and follow conversation    IV Access:  - None    Nursing Mobility/ADLs:  Walking   Independent  Transfer  Independent  Bathing  Independent  Dressing  Independent  [address]  Med Delivery   whole    Wound Care Documentation and Therapy:        Elimination:  Continence:   · Bowel: Yes  · Bladder: Yes  Urinary Catheter: None   Colostomy/Ileostomy/Ileal Conduit: No       Date of Last BM: Prior to admission    Intake/Output Summary (Last 24 hours) at 9/10/2021 1057  Last data filed at 9/10/2021 1039  Gross per 24 hour   Intake 120 ml   Output 0 ml   Net 120 ml     No intake/output data recorded. Safety Concerns:     None    Impairments/Disabilities:      None    Nutrition Therapy:  Current Nutrition Therapy:   - Oral Diet:  General    Routes of Feeding: Oral  Liquids: No Restrictions  Daily Fluid Restriction: no  Last Modified Barium Swallow with Video (Video Swallowing Test): not done    Treatments at the Time of Hospital Discharge:   Respiratory Treatments:   budesonide-formoterol (SYMBICORT) 160-4.5 MCG/ACT inhaler 2 puffDose: 2 puff : Inhalation : 2 TIMES DAILY :   ipratropium-albuterol (DUONEB) nebulizer solution 3 mLOrdered Dose: 1 vial : Admin Dose: 3 mL : Inhalation : EVERY 4 HOURS SCHEDULED (6 times per day)  tiotropium (SPIRIVA RESPIMAT) 2.5 MCG/ACT inhaler 2 puffDose: 2 puff :  Inhalation : DAILY :     Oxygen Therapy:  is on oxygen at 2 L/min per nasal

## 2021-09-11 VITALS
HEIGHT: 65 IN | BODY MASS INDEX: 27.36 KG/M2 | TEMPERATURE: 97.6 F | OXYGEN SATURATION: 94 % | SYSTOLIC BLOOD PRESSURE: 109 MMHG | RESPIRATION RATE: 24 BRPM | DIASTOLIC BLOOD PRESSURE: 64 MMHG | WEIGHT: 164.25 LBS | HEART RATE: 108 BPM

## 2021-09-11 LAB
ALBUMIN SERPL-MCNC: 3.6 G/DL (ref 3.4–5)
ANION GAP SERPL CALCULATED.3IONS-SCNC: 11 MMOL/L (ref 3–16)
BUN BLDV-MCNC: 20 MG/DL (ref 7–20)
CALCIUM SERPL-MCNC: 8.6 MG/DL (ref 8.3–10.6)
CHLORIDE BLD-SCNC: 99 MMOL/L (ref 99–110)
CO2: 22 MMOL/L (ref 21–32)
CREAT SERPL-MCNC: 0.6 MG/DL (ref 0.6–1.2)
GFR AFRICAN AMERICAN: >60
GFR NON-AFRICAN AMERICAN: >60
GLUCOSE BLD-MCNC: 130 MG/DL (ref 70–99)
HCT VFR BLD CALC: 36.5 % (ref 36–48)
HEMOGLOBIN: 12.5 G/DL (ref 12–16)
MCH RBC QN AUTO: 29.6 PG (ref 26–34)
MCHC RBC AUTO-ENTMCNC: 34.3 G/DL (ref 31–36)
MCV RBC AUTO: 86.5 FL (ref 80–100)
PDW BLD-RTO: 14.5 % (ref 12.4–15.4)
PHOSPHORUS: 3.4 MG/DL (ref 2.5–4.9)
PLATELET # BLD: 259 K/UL (ref 135–450)
PMV BLD AUTO: 7.2 FL (ref 5–10.5)
POTASSIUM SERPL-SCNC: 4.7 MMOL/L (ref 3.5–5.1)
RBC # BLD: 4.22 M/UL (ref 4–5.2)
SODIUM BLD-SCNC: 132 MMOL/L (ref 136–145)
WBC # BLD: 18.6 K/UL (ref 4–11)

## 2021-09-11 PROCEDURE — 85027 COMPLETE CBC AUTOMATED: CPT

## 2021-09-11 PROCEDURE — 6370000000 HC RX 637 (ALT 250 FOR IP): Performed by: INTERNAL MEDICINE

## 2021-09-11 PROCEDURE — 2580000003 HC RX 258: Performed by: INTERNAL MEDICINE

## 2021-09-11 PROCEDURE — 94640 AIRWAY INHALATION TREATMENT: CPT

## 2021-09-11 PROCEDURE — 94761 N-INVAS EAR/PLS OXIMETRY MLT: CPT

## 2021-09-11 PROCEDURE — 2700000000 HC OXYGEN THERAPY PER DAY

## 2021-09-11 PROCEDURE — 36415 COLL VENOUS BLD VENIPUNCTURE: CPT

## 2021-09-11 PROCEDURE — 80069 RENAL FUNCTION PANEL: CPT

## 2021-09-11 PROCEDURE — 6360000002 HC RX W HCPCS: Performed by: INTERNAL MEDICINE

## 2021-09-11 RX ADMIN — IPRATROPIUM BROMIDE AND ALBUTEROL SULFATE 3 ML: .5; 3 SOLUTION RESPIRATORY (INHALATION) at 03:47

## 2021-09-11 RX ADMIN — IPRATROPIUM BROMIDE AND ALBUTEROL SULFATE 3 ML: .5; 3 SOLUTION RESPIRATORY (INHALATION) at 09:11

## 2021-09-11 RX ADMIN — ENOXAPARIN SODIUM 40 MG: 40 INJECTION SUBCUTANEOUS at 09:00

## 2021-09-11 RX ADMIN — NAPROXEN 500 MG: 250 TABLET ORAL at 08:59

## 2021-09-11 RX ADMIN — Medication 10 ML: at 08:59

## 2021-09-11 RX ADMIN — TIOTROPIUM BROMIDE INHALATION SPRAY 2 PUFF: 3.12 SPRAY, METERED RESPIRATORY (INHALATION) at 09:12

## 2021-09-11 RX ADMIN — ATORVASTATIN CALCIUM 20 MG: 10 TABLET, FILM COATED ORAL at 09:00

## 2021-09-11 RX ADMIN — METHYLPREDNISOLONE SODIUM SUCCINATE 40 MG: 40 INJECTION, POWDER, FOR SOLUTION INTRAMUSCULAR; INTRAVENOUS at 09:00

## 2021-09-11 RX ADMIN — PANTOPRAZOLE SODIUM 40 MG: 40 TABLET, DELAYED RELEASE ORAL at 05:16

## 2021-09-11 RX ADMIN — BUSPIRONE HYDROCHLORIDE 30 MG: 5 TABLET ORAL at 09:00

## 2021-09-11 RX ADMIN — FLUOXETINE 60 MG: 20 CAPSULE ORAL at 08:59

## 2021-09-11 RX ADMIN — IPRATROPIUM BROMIDE AND ALBUTEROL SULFATE 3 ML: .5; 3 SOLUTION RESPIRATORY (INHALATION) at 11:29

## 2021-09-11 RX ADMIN — Medication 2 PUFF: at 09:12

## 2021-09-11 RX ADMIN — IPRATROPIUM BROMIDE AND ALBUTEROL SULFATE 3 ML: .5; 3 SOLUTION RESPIRATORY (INHALATION) at 15:33

## 2021-09-11 ASSESSMENT — PAIN SCALES - GENERAL
PAINLEVEL_OUTOF10: 0

## 2021-09-11 NOTE — PROGRESS NOTES
Jerry Cheney will require the following home care treatments or therapies: nebulized medications, o2 management, vital signs and RN assessments. Home care will be necessary because of COPD. The patient is in agreement to receiving home care.       Brittny Woods PA-C

## 2021-09-11 NOTE — CARE COORDINATION
CASE MANAGEMENT DISCHARGE SUMMARY      Discharge to: home with [facility]       New Durable Medical Equipment ordered/agency: [facility] home oxygen. Portable concentrator delivered to room    Transportation:    Family/car: private    Confirmed discharge plan with:RN, Waldemar Dumont     Patient: yes     Family:  yes/no    Name: Contact number:     Facility/Agency, name:  [facility] faxed   Phone number for report to facility:      RN, name:     Note: Discharging nurse to complete [facility], reconcile [facility], and place final copy with patient's discharge packet. RN to ensure that written prescriptions for  Level II medications are sent with patient to the facility as per protocol.

## 2021-09-11 NOTE — DISCHARGE SUMMARY
Discharge Summary    Date: 9/11/2021  Patient Name: Mari Cheney YOB: 1955 Age: 60 y.o. Admit Date: 9/9/2021  Discharge Date: 9/11/2021  Discharge Condition: Good    Admission Diagnosis  Tachypnea (R06.82);COPD (chronic obstructive pulmonary disease) (HCC) (J44.9);COPD exacerbation (HCC) (J44.1); Acute respiratory failure with hypoxia (HCC) (J96.01)     Discharge Diagnosis  Principal Problem: COPD exacerbation (HCC)Active Problems: Smoker Hyperlipidemia  Hyponatremia  Pulmonary fibrosis (HCC)  ILD (interstitial lung disease) ([address]  Acute respiratory failure with hypoxia (HCC)  COPD (chronic obstructive pulmonary disease) ([facility])Resolved Problems:  * No resolved hospital problems. [facility] Stay  Narrative of Hospital Course:  60 y.o. female w/ hx COPD/ILD/Fibrosis w/ chronic respiratory failure on 3L Nocturnal O2 and continues to smoke who presented to [facility] with a 1 day hx of increasing, now severe SOB/VELÁSQUEZ.      She was recently admitted/discharged for similar COPD exacerbation.      Since admission her tx have offered some relief but still w/ c/o SOB/VELÁSQUEZ above baseline     She was noted to be hypoxic in the 80s on her home oxygen by [address] nursing severe enough to have trouble speaking complete sentences     She has been fully vaccinated for COVID-19. On day of discharge she has been on her home dose o2 at 3L and has been ambulating with o2 sat >90%. She will be dc to home in stable condition with continued PO steroids. She will follow up with her PCP and pulmonlogy groups. Consultants:  IP CONSULT TO HOSPITALIST    Surgeries/procedures Performed:       Treatments:    Steroids and Respiratory Therapy    O2 and Albuterol/Atropine Nebulizer, Solu-Medrol    Discharge Plan/Disposition:  Home    Hospital/Incidental Findings Requiring Follow Up:    Patient Instructions:    Diet: Regular Diet    Activity:Activity as Tolerated  For number of days (if applicable):       Other Instructions:    Provider Follow-Up:   No follow-ups on file.      Significant Diagnostic Studies:    Recent Labs:  Admission on 09/09/2021Ventricular Rate                              Date: 09/09/2021Value: 84          Ref range: BPM                Status: FinalAtrial Rate                                   Date: 09/09/2021Value: 84          Ref range: BPM                Status: FinalP-R Interval                                  Date: 09/09/2021Value: 136         Ref range: ms                 Status: FinalQRS Duration                                  Date: 09/09/2021Value: 76          Ref range: ms                 Status: FinalQ-T Interval                                  Date: 09/09/2021Value: 356         Ref range: ms                 Status: FinalQTc Calculation (Bazett)                      Date: 09/09/2021Value: 420         Ref range: ms                 Status: FinalP Axis                                        Date: 09/09/2021Value: 69          Ref range: degrees            Status: FinalR Axis                                        Date: 09/09/2021Value: 5           Ref range: degrees            Status: FinalT Axis                                        Date: 09/09/2021Value: 67          Ref range: degrees            Status: FinalDiagnosis                                     Date: 09/09/2021Value: Normal sinus rhythmPossible Inferior infarct , ag*                     Status: [address] [facility]                                           Date: 09/09/2021Value: 14.4*       Ref range: 4.0 - 11.0 K/uL    Status: FinalRBC                                           Date: 09/09/2021Value: 4.43        Ref range: 4.00 - 5.20 M/uL   Status: FinalHemoglobin                                    Date: 09/09/2021Value: 13.1        Ref range: 12.0 - 16.0 g/dL   Status: FinalHematocrit                                    Date: 09/09/2021Value: 38.2        Ref range: 36.0 - 48.0 %      Status: FinalMCV Date: 09/09/2021Value: 86.1        Ref range: 80.0 - 100.0 fL    Status: [address]                                           Date: 09/09/2021Value: 29.7        Ref range: 26.0 - 34.0 pg     Status: [address]                                          Date: 09/09/2021Value: 34.4        Ref range: 31.0 - 36.0 g/dL   Status: FinalRDW                                           Date: 09/09/2021Value: 13.9        Ref range: 12.4 - 15.4 %      Status: FinalPlatelets                                     Date: 09/09/2021Value: 260         Ref range: 135 - 450 K/uL     Status: FinalMPV                                           Date: 09/09/2021Value: 6.9         Ref range: 5.0 - 10.5 fL      Status: FinalNeutrophils %                                 Date: 09/09/2021Value: 71.0        Ref range: %                  Status: FinalLymphocytes %                                 Date: 09/09/2021Value: 21.2        Ref range: %                  Status: FinalMonocytes %                                   Date: 09/09/2021Value: 6.9         Ref range: %                  Status: FinalEosinophils %                                 Date: 09/09/2021Value: 0.8         Ref range: %                  Status: FinalBasophils %                                   Date: 09/09/2021Value: 0.1         Ref range: %                  Status: FinalNeutrophils Absolute                          Date: 09/09/2021Value: 10.2*       Ref range: 1.7 - 7.7 K/uL     Status: FinalLymphocytes Absolute                          Date: 09/09/2021Value: 3.0         Ref range: 1.0 - 5.1 K/uL     Status: FinalMonocytes Absolute                            Date: 09/09/2021Value: 1.0         Ref range: 0.0 - 1.3 K/uL     Status: FinalEosinophils Absolute                          Date: 09/09/2021Value: 0.1         Ref range: 0.0 - 0.6 K/uL     Status: FinalBasophils Absolute                            Date: 09/09/2021Value: 0.0         Ref range: 0.0 - 0.2 K/uL     Status: FinalSodium Date: 09/09/2021Value: 129*        Ref range: 136 - 145 mmol/L   Status: FinalPotassium reflex Magnesium                    Date: 09/09/2021Value: 4.1         Ref range: 3.5 - 5.1 mmol/L   Status: FinalChloride                                      Date: 09/09/2021Value: 96*         Ref range: 99 - 110 mmol/L    Status: FinalCO2                                           Date: 09/09/2021Value: 22          Ref range: 21 - 32 mmol/L     Status: FinalAnion Gap                                     Date: 09/09/2021Value: 11          Ref range: 3 - 16             Status: FinalGlucose                                       Date: 09/09/2021Value: 106*        Ref range: 70 - 99 mg/dL      Status: FinalBUN                                           Date: 09/09/2021Value: 12          Ref range: 7 - 20 mg/dL       Status: FinalCREATININE                                    Date: 09/09/2021Value: 0.6         Ref range: 0.6 - 1.2 mg/dL    Status: FinalGFR Non-                      Date: 09/09/2021Value: >60         Ref range: >60                Status: Final              Comment: >60 mL/min/1.73m2 EGFR, calc. for ages 25 and older using theMDRD formula (not corrected for weight), is valid for stablerenal function. GFR                           Date: 09/09/2021Value: >60         Ref range: >60                Status: Final              Comment: Chronic Kidney Disease: less than 60 ml/min/1.73 sq.m. Kidney Failure: less than 15 ml/min/1.73 sq. m. Results valid for patients 18 years and older. Calcium                                       Date: 09/09/2021Value: 8.9         Ref range: 8.3 - 10.6 mg/dL   Status: FinalTotal Protein                                 Date: 09/09/2021Value: 6.4         Ref range: 6.4 - 8.2 g/dL     Status: FinalAlbumin                                       Date: 09/09/2021Value: 3.6         Ref range: 3.4 - 5.0 g/dL     Status: FinalAlbumin/Globulin Ratio                        Date: 09/09/2021Value: 1.3         Ref range: 1.1 - 2.2          Status: FinalTotal Bilirubin                               Date: 09/09/2021Value: 0.4         Ref range: 0.0 - 1.0 mg/dL    Status: FinalAlkaline Phosphatase                          Date: 09/09/2021Value: 49          Ref range: 40 - 129 U/L       Status: FinalALT                                           Date: 09/09/2021Value: 18          Ref range: 10 - 40 U/L        Status: FinalAST                                           Date: 09/09/2021Value: 17          Ref range: 15 - 37 U/L        Status: FinalGlobulin                                      Date: 09/09/2021Value: 2.8         Ref range: g/dL               Status: FinalTroponin                                      Date: 09/09/2021Value: <0.01       Ref range: <0.01 ng/mL        Status: Final              Comment: Methodology by Troponin TpH, Ivan                                       Date: 09/09/2021Value: 7.553*      Ref range: 7.350 - 7.450      Status: Jenni Flavin                                     Date: 09/09/2021Value: 27.2*       Ref range: 40.0 - 50.0 mmHg   Status: Jenni Flavin                                      Date: 09/09/2021Value: 139.1*      Ref range: 25 - 40 mmHg       Status: FinalHCO3, Venous                                  Date: 09/09/2021Value: 23.4        Ref range: 23.0 - 29.0 mmol*  Status: FinalBase Excess, Ivan                              Date: 09/09/2021Value: 2.3         Ref range: -3.0 - 3.0 mmol/L  Status: FinalO2 Sat, Ivan                                   Date: 09/09/2021Value: 98          Ref range: Not Established %  Status: FinalCarboxyhemoglobin                             Date: 09/09/2021Value: 3.3*        Ref range: 0.0 - 1.5 %        Status: Final              Comment:      0.0-1. 5(Smokers 1.5-5.0)MetHRock tierrawell Roch                                   Date: 09/09/2021Value: 0.4         Ref range: <1.5 % Status: [id number] (Calc)Pamela                              Date: 09/09/2021Value: 24          Ref range: Not Established *  Status: FinalO2 Therapy                                    Date: 09/09/2021Value: Unknown       Status: FinalLactic Acid                                   Date: 09/09/2021Value: 1.4         Ref range: 0.4 - 2.0 mmol/L   Status: FinalCulture, Blood 2                              Date: 09/09/2021Value: No Growth to date. Any change in status will be *                     Status: PreliminaryBlood Culture, Routine                        Date: 09/09/2021Value: No Growth to date. Any change in status will be *                     Status: PreliminaryProcalcitonin                                 Date: 09/09/2021Value: 0.04        Ref range: 0.00 - 0.15 ng/mL  Status: Final              Comment: Suspected Sepsis:Low likelihood of sepsis  <.50 ng/mLIncreased likelihood of sepsis 0.50-2.00 ng/mLAntibiotics encouragedHigh risk of sepsis/shock   >2.00 ng/mLAntibiotics strongly encouragedSuspected Lower Respiratory Tract Infections:Low likelihood of bacterial infection  <0.24 ng/mLIncreased likelihood of bacterial infection >0.24 ng/mLAntibiotics encouragedWith successful antibiotic therapy, PCT levels should decreaserapidly. (Half-life of 24 to 36 hours. )Procalcitonin values from samples collected within the first6 hours of systemic infection may still be low. Retesting may be indicated. Values from day 1 and day 4 can be entered into the Change inProcalcitonin Calculator to determine the patient'sMortality Risk http://www.stern.info/. com)In healthy neonates, plasma Procalcitonin (PCT) concentrationsincrease gradually after birth, reaching peak values at about24 hours of age then decrease to normal values below 0.5                        ng/mLby 48-72 hours of age. SARS-CoV-2, NAAT                              Date: 09/09/2021Value: Not Detected                   Ref range: Not Detected Status: Final              Comment: Rapid NAAT:   Negative results should be treated as presumptive and,if inconsistent with clinical signs and symptoms or necessary forpatient management, should be tested with an alternative molecularassay. Negative results do not preclude SARS-CoV-2 infection andshould not be used as the sole basis for patient management decisions. This test has been authorized by the FDA under an Emergency UseAuthorization (EUA) for use by authorized laboratories. Fact sheet for Healthcare [employer] sheet for Patients: KissManuela.dk: Isothermal Nucleic Acid AmplificationWBC                                           Date: 09/10/2021Value: 13.0*       Ref range: 4.0 - 11.0 K/uL    Status: FinalRBC                                           Date: 09/10/2021Value: 4.41        Ref range: 4.00 - 5.20 M/uL   Status: FinalHemoglobin                                    Date: 09/10/2021Value: 13.0        Ref range: 12.0 - 16.0 g/dL   Status: FinalHematocrit                                    Date: 09/10/2021Value: 38.3        Ref range: 36.0 - 48.0 %      Status: FinalMCV                                           Date: 09/10/2021Value: 86.8        Ref range: 80.0 - 100.0 fL    [facility]                                           Date: 09/10/2021Value: 29.4        Ref range: 26.0 - 34.0 pg     Status: [address] [facility]                                          Date: 09/10/2021Value: 33.8        Ref range: 31.0 - 36.0 g/dL   Status: FinalRDW                                           Date: 09/10/2021Value: 14.3        Ref range: 12.4 - 15.4 %      Status: FinalPlatelets                                     Date: 09/10/2021Value: 253         Ref range: 135 - 450 K/uL     Status: FinalMPV                                           Date: 09/10/2021Value: 6.8         Ref range: 5.0 - 10.5 fL      Status: FinalSodium Date: 09/10/2021Value: 129*        Ref range: 136 - 145 mmol/L   Status: FinalPotassium                                     Date: 09/10/2021Value: 4.7         Ref range: 3.5 - 5.1 mmol/L   Status: FinalChloride                                      Date: 09/10/2021Value: 95*         Ref range: 99 - 110 mmol/L    Status: FinalCO2                                           Date: 09/10/2021Value: 23          Ref range: 21 - 32 mmol/L     Status: FinalAnion Gap                                     Date: 09/10/2021Value: 11          Ref range: 3 - 16             Status: FinalGlucose                                       Date: 09/10/2021Value: 140*        Ref range: 70 - 99 mg/dL      Status: FinalBUN                                           Date: 09/10/2021Value: 18          Ref range: 7 - 20 mg/dL       Status: FinalCREATININE                                    Date: 09/10/2021Value: 0.6         Ref range: 0.6 - 1.2 mg/dL    Status: FinalGFR Non-                      Date: 09/10/2021Value: >60         Ref range: >60                Status: Final              Comment: >60 mL/min/1.73m2 EGFR, calc. for ages 25 and older using theMDRD formula (not corrected for weight), is valid for stablerenal function. GFR                           Date: 09/10/2021Value: >60         Ref range: >60                Status: Final              Comment: Chronic Kidney Disease: less than 60 ml/min/1.73 sq.m. Kidney Failure: less than 15 ml/min/1.73 sq. m. Results valid for patients 18 years and older. Calcium                                       Date: 09/10/2021Value: 8.9         Ref range: 8.3 - 10.6 mg/dL   Status: FinalPhosphorus                                    Date: 09/10/2021Value: 2.9         Ref range: 2.5 - 4.9 mg/dL    Status: FinalAlbumin                                       Date: 09/10/2021Value: 3.6         Ref range: 3.4 - 5.0 g/dL     Status: [address] [facility] Date: 09/11/2021Value: 18.6*       Ref range: 4.0 - 11.0 K/uL    Status: FinalRBC                                           Date: 09/11/2021Value: 4.22        Ref range: 4.00 - 5.20 M/uL   Status: FinalHemoglobin                                    Date: 09/11/2021Value: 12.5        Ref range: 12.0 - 16.0 g/dL   Status: FinalHematocrit                                    Date: 09/11/2021Value: 36.5        Ref range: 36.0 - 48.0 %      Status: FinalMCV                                           Date: 09/11/2021Value: 86.5        Ref range: 80.0 - 100.0 fL    Status: [address]                                           Date: 09/11/2021Value: 29.6        Ref range: 26.0 - 34.0 pg     Status: [address]                                          Date: 09/11/2021Value: 34.3        Ref range: 31.0 - 36.0 g/dL   Status: FinalRDW                                           Date: 09/11/2021Value: 14.5        Ref range: 12.4 - 15.4 %      Status: FinalPlatelets                                     Date: 09/11/2021Value: 259         Ref range: 135 - 450 K/uL     Status: FinalMPV                                           Date: 09/11/2021Value: 7.2         Ref range: 5.0 - 10.5 fL      Status: FinalSodium                                        Date: 09/11/2021Value: 132*        Ref range: 136 - 145 mmol/L   Status: FinalPotassium                                     Date: 09/11/2021Value: 4.7         Ref range: 3.5 - 5.1 mmol/L   Status: FinalChloride                                      Date: 09/11/2021Value: 99          Ref range: 99 - 110 mmol/L    Status: FinalCO2                                           Date: 09/11/2021Value: 22          Ref range: 21 - 32 mmol/L     Status: FinalAnion Gap                                     Date: 09/11/2021Value: 11          Ref range: 3 - 16             Status: FinalGlucose                                       Date: 09/11/2021Value: 130*        Ref range: 70 - 99 mg/dL      Status: [facility]                                           Date: 09/11/2021Value: 20          Ref range: 7 - 20 mg/dL       Status: FinalCREATININE                                    Date: 09/11/2021Value: 0.6         Ref range: 0.6 - 1.2 mg/dL    Status: FinalGFR Non-                      Date: 09/11/2021Value: >60         Ref range: >60                Status: Final              Comment: >60 mL/min/1.73m2 EGFR, calc. for ages 25 and older using theMDRD formula (not corrected for weight), is valid for stablerenal function. GFR                           Date: 09/11/2021Value: >60         Ref range: >60                Status: Final              Comment: Chronic Kidney Disease: less than 60 ml/min/1.73 sq.m. Kidney Failure: less than 15 ml/min/1.73 sq. m. Results valid for patients 18 years and older. Calcium                                       Date: 09/11/2021Value: 8.6         Ref range: 8.3 - 10.6 mg/dL   Status: FinalPhosphorus                                    Date: 09/11/2021Value: 3.4         Ref range: 2.5 - 4.9 mg/dL    Status: FinalAlbumin                                       Date: 09/11/2021Value: 3.6         Ref range: 3.4 - 5.0 g/dL     Status: Final------------    Radiology last 7 days:  XR CHEST PORTABLEResult Date: 9/9/2021No acute cardiopulmonary disease. Stable pattern of fibrosis. Pending Labs   Order Current Status  Culture, Blood 1 Preliminary result  Culture, Blood 2 Preliminary result      Discharge Medications    Current Discharge Medication List    Current Discharge Medication List    Current Discharge Medication ListCONTINUE these medications which have NOT CHANGED!! predniSONE (DELTASONE) 10 MG [id number] daily x 3 days, 50mg daily x 2 days, 40mg daily x 1 day. Then go to your other prescription you already have to finish the taper. Qty: 12 tablet Refills: 0ipratropium-albuterol (DUONEB) 0.5-2.5 (3) MG/3ML SOLN nebulizer solutionInhale 3 mLs into the lungs every 6 hours as needed for Shortness of BreathQty: 360 mL Refills: 0guaiFENesin (MUCINEX) 600 MG extended release tabletTake 1 tablet by mouth 2 times daily as needed for CongestionQty: 60 tablet Refills: 1!! predniSONE (DELTASONE) 10 MG tabletTake 1 tablet by mouth dailyQty: 30 tablet Refills: 3varenicline (CHANTIX STARTING MONTH TAL) 0.5 MG X 11 & 1 MG X 42 tabletTake by mouth. Qty: 1 box Refills: 0fluticasone-salmeterol (WIXELA INHUB) 500-50 MCG/DOSE diskus inhalerInhale 1 puff into the lungs every 12 hoursQty: 180 each Refills: 1tiotropium (SPIRIVA HANDIHALER) 18 MCG inhalation capsuleINHALE THE CONTENTS OF 1 CAPSULE EVERY DAYQty: 90 capsule Refills: 1albuterol (PROVENTIL) (2.5 MG/3ML) 0.083% nebulizer solutionTake 3 mLs by nebulization every 6 hours as needed for Wheezing or Shortness of Breath DX COPD J44. 9Qty: 120 vial Refills: 6montelukast (SINGULAIR) 10 MG tabletTAKE 1 TABLET BY MOUTH EACH NIGHTQty: 90 tablet Refills: 1pantoprazole (PROTONIX) 40 MG tabletTake 1 tablet by mouth every morning (before breakfast)Qty: 30 tablet Refills: 1albuterol sulfate HFA (VENTOLIN HFA) 108 (90 Base) MCG/ACT inhalerInhale 2 puffs into the lungs every 6 hours as needed for Wheezing or Shortness of BreathQty: 3 Inhaler Refills: 1BD PEN NEEDLE SVETLANA U/F 32G X 4 MM MISCUSE ONE DAILY AS DIRECTEDQty: 100 each Refills: 5FLUoxetine (PROZAC) 20 MG capsuleTAKE 3 CAPSULES BY MOUTH DAILYQty: 270 capsule Refills: 1traZODone (DESYREL) 150 MG tabletTAKE 2 TABLETS AT BEDTIMEQty: 180 tablet Refills: 1simvastatin (ZOCOR) 20 MG tabletTAKE 1 TABLET EVERY EVENINGQty: 90 tablet Refills: 1busPIRone (BUSPAR) 30 MG tabletTAKE 1 TABLET TWICE DAILYQty: 180 tablet Refills: 1teriparatide, recombinant, (FORTEO) 600 MCG/2.4ML SOLN injectionInject 0.08 mLs into the skin daily One dose injected daily. Qty: 1 pen Refills: 11!! - Potential duplicate medications found. Please discuss with provider.     Current Discharge Medication ListSTOP taking these medicationsbenzonatate (TESSALON PERLES) 100 MG capsuleComments:Reason for Stopping:naproxen (NAPROSYN) 500 MG tabletComments:Reason for Stopping:    Time Spent on Discharge:4E] minutes were spent in patient examination, evaluation, counseling as well as medication reconciliation, prescriptions for required medications, discharge plan, and follow up.     Electronically signed by Edgar Puckett PA-C on 9/11/21 at 11:27 AM EDT

## 2021-09-11 NOTE — PROGRESS NOTES
Oxygen documentation:    1. O2 saturation at REST on ROOM AIR = 94%    If saturation is 89% or above please proceed with steps 2 and 3. 2. O2 saturation with AMBULATION of 50 feet on ROOM AIR = 88%    3. O2 saturation with AMBULATION on 2 liter/min = 94%    DCP notified:  Yes

## 2021-09-13 ENCOUNTER — TELEPHONE (OUTPATIENT)
Dept: FAMILY MEDICINE CLINIC | Age: 66
End: 2021-09-13

## 2021-09-13 ENCOUNTER — CARE COORDINATION (OUTPATIENT)
Dept: CASE MANAGEMENT | Age: 66
End: 2021-09-13

## 2021-09-13 ENCOUNTER — VIRTUAL VISIT (OUTPATIENT)
Dept: FAMILY MEDICINE CLINIC | Age: 66
End: 2021-09-13
Payer: MEDICARE

## 2021-09-13 DIAGNOSIS — F17.218 CIGARETTE NICOTINE DEPENDENCE WITH OTHER NICOTINE-INDUCED DISORDER: ICD-10-CM

## 2021-09-13 DIAGNOSIS — F12.90 MARIJUANA SMOKER, CONTINUOUS: ICD-10-CM

## 2021-09-13 DIAGNOSIS — J44.1 COPD EXACERBATION (HCC): Primary | ICD-10-CM

## 2021-09-13 DIAGNOSIS — G47.00 INSOMNIA, UNSPECIFIED TYPE: ICD-10-CM

## 2021-09-13 LAB
BLOOD CULTURE, ROUTINE: NORMAL
CULTURE, BLOOD 2: NORMAL

## 2021-09-13 PROCEDURE — 99442 PR PHYS/QHP TELEPHONE EVALUATION 11-20 MIN: CPT | Performed by: FAMILY MEDICINE

## 2021-09-13 RX ORDER — TRAZODONE HYDROCHLORIDE 150 MG/1
150-300 TABLET ORAL NIGHTLY
Qty: 180 TABLET | Refills: 1 | Status: SHIPPED | OUTPATIENT
Start: 2021-09-13

## 2021-09-13 RX ORDER — TRAZODONE HYDROCHLORIDE 150 MG/1
TABLET ORAL
Qty: 180 TABLET | Refills: 1 | OUTPATIENT
Start: 2021-09-13

## 2021-09-13 RX ORDER — TRAZODONE HYDROCHLORIDE 150 MG/1
150-300 TABLET ORAL NIGHTLY
Qty: 180 TABLET | Refills: 1 | Status: SHIPPED | OUTPATIENT
Start: 2021-09-13 | End: 2021-09-13

## 2021-09-13 NOTE — CARE COORDINATION
directed. Patient scheduled for virtual visit this afternoon with PCP. Patient has not heard from home care agency yet, so CTN placed call to [facility]. Spoke to Joceline Mott and was unaware that patient was discharged home. CTN provided agency with discharge information and request for RISHI visit to be scheduled. Joceline Mott to pull information from [facility] and get RISHI visit scheduled. Patient denied any acute needs at present time. Agreeable to f/u calls. Educated on the use of urgent care or physicians 24 hr access line if assistance is needed after hours. Radha Avalos RN   Care Transition Nurse  [phone]      Care Transitions Subsequent and Final Call    Subsequent and Final Calls  Do you have any ongoing symptoms?: No  Have your medications changed?: Yes  Do you have any questions related to your medications?: No  Do you currently have any active services?: Yes  Are you currently active with any services?: Home Health  Do you have any needs or concerns that I can assist you with?: No  Identified Barriers: None  Care Transitions Interventions  Other Interventions:            Follow Up  Future Appointments   Date Time Provider Department Center   9/13/2021  4:15 PM MD JAIRO Sanders  [place] [facility]   9/15/2021  9:45 AM MD LINDA Jovel   12/3/2021 11:00 AM [facility] [place] [facility] [facility]   12/14/2021  2:45 PM MD LIDNA Jovel

## 2021-09-13 NOTE — TELEPHONE ENCOUNTER
----- Message from Pasquale Mckeon sent at 9/13/2021 10:15 AM EDT -----  Subject: Refill Request    QUESTIONS  Name of Medication? Other - Trazodone  Patient-reported dosage and instructions? 150mg Take 1-2 tablets every   night  How many days do you have left? 0  Preferred Pharmacy? [address] phone number (if available)? [phone]  ---------------------------------------------------------------------------  --------------  Elton DONALDSON  What is the best way for the office to contact you? OK to leave message on   voicemail  Preferred Call Back Phone Number?  [phone]

## 2021-09-13 NOTE — PROGRESS NOTES
Tobi Welch is a 60 y.o. female evaluated via telephone on 9/13/2021. Consent:  She and/or health care decision maker is aware that that she may receive a bill for this telephone service, depending on her insurance coverage, and has provided verbal consent to proceed: Yes      Documentation:  I communicated with the patient and/or health care decision maker about   Chief Complaint   Patient presents with   [address] from Hospital     [facility] 9[facility]11[facility]    COPD    Insomnia     Details of this discussion including any medical advice provided:     Visit was converted to telephone encounter as patient was not feeling up to coming into the office today. She had been scheduled for an in person appointment initially. Patient reports her breathing is better since being in the hospital. She feels her breathing is back to baseline. She is on a prednisone taper. She was not given antibiotics. She continues to smoke both cigarettes and marijuana. She is aware this is making her COPD worse. She has restarted Chantix in hopes of quitting smoking cigarettes. It has helped her in the past. She has not looked into getting legal edible marijuana. She needs a refill of trazodone. She usually takes 1-2 tablets of trazodone 150 mg per night, which works well for her. Shirin Gentile was seen today for follow-up from hospital, copd and insomnia. Diagnoses and all orders for this visit:    COPD exacerbation ([address]  Currently feeling better, still on oral steroids. Will see Dr. Rena Thurman in 2 days. For now, continue current medications. Discussed coming into the office again soon for a physical examination, which she is agreeable to. Cigarette nicotine dependence with other nicotine-induced disorder  Restarted Chantix. Encouraged to continue this for at least 3 months.      Marijuana smoker, continuous  Encouraged to seek out legal, edible marijuana if she feels she must have it, but overall encouraged to quit MJ in general.     Insomnia, unspecified type  -     traZODone (DESYREL) 150 MG tablet; Take 1-2 tablets by mouth nightly TAKE 2 TABLETS AT BEDTIME  This problem is well controlled. Pt should continue current medication at current dose. I affirm this is a Patient Initiated Episode with a Patient who has not had a related appointment within my department in the past 7 days or scheduled within the next 24 hours. Patient identification was verified at the start of the visit: Yes    Total Time: minutes: 11-20 minutes    The visit was conducted pursuant to the emergency declaration under the [facility], [address] [place] [address] authority and the [employer] Act. Patient identification was verified, and a caregiver was present when appropriate. The patient was located in a state where the provider was credentialed to provide care.     Note: not billable if this call serves to triage the patient into an appointment for the relevant concern      Isabel Smart MD

## 2021-09-13 NOTE — TELEPHONE ENCOUNTER
Refill Request     Last Seen: Last Seen Department: 8/17/2021  Last Seen by PCP: 8/17/2021    Last Written: 9/11/2020    Next Appointment:   Future Appointments   Date Time Provider Matt Crabtree   9/13/2021  4:15 PM Emre Garcia MD [facility] [place]   9/15/2021  9:45 AM MD LINDA Handy   12/3/2021 11:00 AM [facility] [place] [facility] [place]   12/14/2021  2:45 PM MD LINDA Handy       Future appointment scheduled      Requested Prescriptions     Pending Prescriptions Disp Refills    traZODone (DESYREL) 150 MG tablet 180 tablet 1     Sig: TAKE 2 TABLETS AT BEDTIME

## 2021-09-13 NOTE — TELEPHONE ENCOUNTER
[facility] Trans[facility] Initial Follow Up Call    Outreach made within 2 business days of discharge: Yes    Patient: Angella Estes Patient : 1955   MRN: [id number]  Reason for Admission: There are no discharge diagnoses documented for the most recent discharge. Discharge Date: 21       Spoke with: Patient    Discharge department/facility: [facility]    TCM Interactive Patient Contact:  Was patient able to fill all prescriptions: Yes  Was patient instructed to bring all medications to the follow-up visit: Yes  Is patient taking all medications as directed in the discharge summary?  Yes  Does patient understand their discharge instructions: Yes  Does patient have questions or concerns that need addressed prior to 7-14 day follow up office visit: no    Scheduled appointment with PCP within 7-14 days    Follow Up  Future Appointments   Date Time Provider Matt Crabtree   2021  4:15 PM Rodolfo Vance MD [facility] [place] [facility] [place]   9/15/2021  9:45 AM MD LINDA Rodrigues   12/3/2021 11:00 AM [facility] [place] [facility]   2021  2:45 PM MD LINDA Rodrigues

## 2021-09-13 NOTE — TELEPHONE ENCOUNTER
Pt calling stating that \" she doesn't feel up to coming to her apt today\" could we turn into a vv or call? Pt also was asking if  [facility] would fill her Trazodone for her.  Please Advise

## 2021-09-14 ENCOUNTER — TELEPHONE (OUTPATIENT)
Dept: FAMILY MEDICINE CLINIC | Age: 66
End: 2021-09-14

## 2021-09-14 NOTE — TELEPHONE ENCOUNTER
----- Message from Mallory Coleman sent at 9/13/2021  5:42 PM EDT -----  Subject: Medication Problem    QUESTIONS  Name of Medication? traZODone (DESYREL) 150 MG tablet  Patient-reported dosage and instructions? one to two tablets nightly by   mouth  What question or problem do you have with the medication? 2 different sets   of directions were sent to the pharmacy. Pharmacy is requiring clarified   dosage and instructions before they will fill the medication. Preferred Pharmacy? [facility] [address] [place] [address] [phone] - F [phone]  Pharmacy phone number (if available)? [phone]  Additional Information for Provider?   ---------------------------------------------------------------------------  --------------  CALL BACK INFO  What is the best way for the office to contact you? OK to leave message on   voicemail  Preferred Call Back Phone Number? [phone]  ---------------------------------------------------------------------------  --------------  SCRIPT ANSWERS  Relationship to Patient?  Self

## 2021-09-14 NOTE — TELEPHONE ENCOUNTER
Pharmacy calling about pts trazodone, they are wondering which directions the pt needs to follow.  Please Advise

## 2021-09-15 ENCOUNTER — TELEPHONE (OUTPATIENT)
Dept: PULMONOLOGY | Age: 66
End: 2021-09-15

## 2021-09-15 NOTE — TELEPHONE ENCOUNTER
Patient did not show for 4 week prednisone f/u appointment  with Dr. Yony Correa on 9/15/21    Same Day Cancellation: Yes. Pt states that she overslept. Patient rescheduled:  Yes    New appointment: 9/22/21    Patient was also no show on: 3/4/20, 11/26/19    VV 7/21/21:    Assessment:   · Moderate obstructive airways diease secondary to COPD and asthma with AE  · Abnormal CXR 7/11/21 - Atelectasis vs PNA   · Abnormal CT 4/1/2016 with GGO- DDx RB-ILD, NSIP, HP, DIP, eosinophilic pneumonitis, aspiration and CTD-ILD. Favor smoking related ILD vs HP. Bronch 4/6/16 purulent secretions and grew strep pneumoniae. Negative AFB. Got rid of her Ainsley Martinez. Evidently changed on repeat CT 12/2/2020. · Bronchiectasis   · Nocturnal hypoxia- on 2LPM   · >30 pack-years smoking                                                  Plan:   · Prednisone taper  · Doxycycline 100 mg PO BID for 7 days. · CXR in 6 weeks   · ER if not better  · Continue Advair 500/50 BID and Albuterol 2 puffs Q4-6 hrs PRN  · Refills on Spiriva daily   · Complete prednisone taper   · Continue O2 2LPM at night  · Advised to titrate O2 using her pulse oximeter- target O2 sat 90-92%. · CT chest, low dose protocol, screening for lung cancer December 2021. · Patient is up to date with Covid, pneumococcal vaccine and influenza vaccine   · Acapella and Mucinex   · Advised to continue with smoking cessation.    · Follow up after CT chest

## 2021-09-17 ENCOUNTER — HOSPITAL ENCOUNTER (INPATIENT)
Age: 66
LOS: 3 days | Discharge: HOME | DRG: 190 | End: 2021-09-20
Attending: EMERGENCY MEDICINE | Admitting: INTERNAL MEDICINE
Payer: MEDICARE

## 2021-09-17 ENCOUNTER — APPOINTMENT (OUTPATIENT)
Dept: GENERAL RADIOLOGY | Age: 66
DRG: 190 | End: 2021-09-17
Payer: MEDICARE

## 2021-09-17 DIAGNOSIS — J18.9 PNEUMONIA OF BOTH LUNGS DUE TO INFECTIOUS ORGANISM, UNSPECIFIED PART OF LUNG: ICD-10-CM

## 2021-09-17 DIAGNOSIS — J96.01 ACUTE RESPIRATORY FAILURE WITH HYPOXIA (HCC): Primary | ICD-10-CM

## 2021-09-17 DIAGNOSIS — J44.1 COPD EXACERBATION (HCC): ICD-10-CM

## 2021-09-17 LAB
A/G RATIO: 1.3 (ref 1.1–2.2)
ALBUMIN SERPL-MCNC: 3.9 G/DL (ref 3.4–5)
ALP BLD-CCNC: 70 U/L (ref 40–129)
ALT SERPL-CCNC: 19 U/L (ref 10–40)
ANION GAP SERPL CALCULATED.3IONS-SCNC: 10 MMOL/L (ref 3–16)
ANISOCYTOSIS: ABNORMAL
AST SERPL-CCNC: 16 U/L (ref 15–37)
ATYPICAL LYMPHOCYTE RELATIVE PERCENT: 1 % (ref 0–6)
BANDED NEUTROPHILS RELATIVE PERCENT: 5 % (ref 0–7)
BASE EXCESS VENOUS: 3.7 MMOL/L (ref -3–3)
BASOPHILS ABSOLUTE: 0 K/UL (ref 0–0.2)
BASOPHILS RELATIVE PERCENT: 0 %
BILIRUB SERPL-MCNC: 0.3 MG/DL (ref 0–1)
BUN BLDV-MCNC: 12 MG/DL (ref 7–20)
CALCIUM SERPL-MCNC: 9.3 MG/DL (ref 8.3–10.6)
CARBOXYHEMOGLOBIN: 4.2 % (ref 0–1.5)
CHLORIDE BLD-SCNC: 94 MMOL/L (ref 99–110)
CO2: 27 MMOL/L (ref 21–32)
CREAT SERPL-MCNC: 0.6 MG/DL (ref 0.6–1.2)
EOSINOPHILS ABSOLUTE: 0.4 K/UL (ref 0–0.6)
EOSINOPHILS RELATIVE PERCENT: 3 %
GFR AFRICAN AMERICAN: >60
GFR NON-AFRICAN AMERICAN: >60
GLOBULIN: 2.9 G/DL
GLUCOSE BLD-MCNC: 93 MG/DL (ref 70–99)
HCO3 VENOUS: 29.1 MMOL/L (ref 23–29)
HCT VFR BLD CALC: 39.6 % (ref 36–48)
HEMOGLOBIN: 13.8 G/DL (ref 12–16)
LACTIC ACID, SEPSIS: 0.7 MMOL/L (ref 0.4–1.9)
LYMPHOCYTES ABSOLUTE: 2.3 K/UL (ref 1–5.1)
LYMPHOCYTES RELATIVE PERCENT: 17 %
MACROCYTES: ABNORMAL
MCH RBC QN AUTO: 29.8 PG (ref 26–34)
MCHC RBC AUTO-ENTMCNC: 34.9 G/DL (ref 31–36)
MCV RBC AUTO: 85.5 FL (ref 80–100)
METHEMOGLOBIN VENOUS: 0.5 %
MICROCYTES: ABNORMAL
MONOCYTES ABSOLUTE: 0.9 K/UL (ref 0–1.3)
MONOCYTES RELATIVE PERCENT: 7 %
NEUTROPHILS ABSOLUTE: 9.3 K/UL (ref 1.7–7.7)
NEUTROPHILS RELATIVE PERCENT: 67 %
O2 SAT, VEN: 95 %
O2 THERAPY: ABNORMAL
PCO2, VEN: 46.9 MMHG (ref 40–50)
PDW BLD-RTO: 13.9 % (ref 12.4–15.4)
PH VENOUS: 7.41 (ref 7.35–7.45)
PLATELET # BLD: 321 K/UL (ref 135–450)
PLATELET SLIDE REVIEW: ADEQUATE
PMV BLD AUTO: 6.7 FL (ref 5–10.5)
PO2, VEN: 73.1 MMHG (ref 25–40)
POIKILOCYTES: ABNORMAL
POLYCHROMASIA: ABNORMAL
POTASSIUM SERPL-SCNC: 4.1 MMOL/L (ref 3.5–5.1)
PROCALCITONIN: 0.06 NG/ML (ref 0–0.15)
RBC # BLD: 4.63 M/UL (ref 4–5.2)
SARS-COV-2, NAAT: NOT DETECTED
SLIDE REVIEW: ABNORMAL
SODIUM BLD-SCNC: 131 MMOL/L (ref 136–145)
SPECIMEN STATUS: NORMAL
TCO2 CALC VENOUS: 31 MMOL/L
TOTAL PROTEIN: 6.8 G/DL (ref 6.4–8.2)
WBC # BLD: 12.9 K/UL (ref 4–11)

## 2021-09-17 PROCEDURE — 6370000000 HC RX 637 (ALT 250 FOR IP): Performed by: EMERGENCY MEDICINE

## 2021-09-17 PROCEDURE — 96375 TX/PRO/DX INJ NEW DRUG ADDON: CPT

## 2021-09-17 PROCEDURE — 6360000002 HC RX W HCPCS: Performed by: EMERGENCY MEDICINE

## 2021-09-17 PROCEDURE — 84145 PROCALCITONIN (PCT): CPT

## 2021-09-17 PROCEDURE — 2700000000 HC OXYGEN THERAPY PER DAY

## 2021-09-17 PROCEDURE — 80053 COMPREHEN METABOLIC PANEL: CPT

## 2021-09-17 PROCEDURE — 96374 THER/PROPH/DIAG INJ IV PUSH: CPT

## 2021-09-17 PROCEDURE — 87635 SARS-COV-2 COVID-19 AMP PRB: CPT

## 2021-09-17 PROCEDURE — 83605 ASSAY OF LACTIC ACID: CPT

## 2021-09-17 PROCEDURE — 2580000003 HC RX 258: Performed by: EMERGENCY MEDICINE

## 2021-09-17 PROCEDURE — 85025 COMPLETE CBC W/AUTO DIFF WBC: CPT

## 2021-09-17 PROCEDURE — 94660 CPAP INITIATION&MGMT: CPT

## 2021-09-17 PROCEDURE — 71045 X-RAY EXAM CHEST 1 VIEW: CPT

## 2021-09-17 PROCEDURE — 82803 BLOOD GASES ANY COMBINATION: CPT

## 2021-09-17 PROCEDURE — 87040 BLOOD CULTURE FOR BACTERIA: CPT

## 2021-09-17 PROCEDURE — 94761 N-INVAS EAR/PLS OXIMETRY MLT: CPT

## 2021-09-17 PROCEDURE — 94640 AIRWAY INHALATION TREATMENT: CPT

## 2021-09-17 PROCEDURE — 99285 EMERGENCY DEPT VISIT HI MDM: CPT

## 2021-09-17 PROCEDURE — 2060000000 HC ICU INTERMEDIATE R&B

## 2021-09-17 PROCEDURE — 93005 ELECTROCARDIOGRAM TRACING: CPT | Performed by: EMERGENCY MEDICINE

## 2021-09-17 RX ORDER — TRAZODONE HYDROCHLORIDE 50 MG/1
150 TABLET ORAL NIGHTLY
Status: DISCONTINUED | OUTPATIENT
Start: 2021-09-17 | End: 2021-09-20 | Stop reason: HOSPADM

## 2021-09-17 RX ORDER — 0.9 % SODIUM CHLORIDE 0.9 %
1000 INTRAVENOUS SOLUTION INTRAVENOUS ONCE
Status: COMPLETED | OUTPATIENT
Start: 2021-09-17 | End: 2021-09-17

## 2021-09-17 RX ORDER — ACETAMINOPHEN 325 MG/1
650 TABLET ORAL EVERY 6 HOURS PRN
Status: DISCONTINUED | OUTPATIENT
Start: 2021-09-17 | End: 2021-09-20 | Stop reason: HOSPADM

## 2021-09-17 RX ORDER — ONDANSETRON 2 MG/ML
4 INJECTION INTRAMUSCULAR; INTRAVENOUS EVERY 6 HOURS PRN
Status: DISCONTINUED | OUTPATIENT
Start: 2021-09-17 | End: 2021-09-20 | Stop reason: HOSPADM

## 2021-09-17 RX ORDER — PREDNISONE 20 MG/1
40 TABLET ORAL DAILY
Status: DISCONTINUED | OUTPATIENT
Start: 2021-09-20 | End: 2021-09-19

## 2021-09-17 RX ORDER — ATORVASTATIN CALCIUM 10 MG/1
10 TABLET, FILM COATED ORAL DAILY
Status: DISCONTINUED | OUTPATIENT
Start: 2021-09-18 | End: 2021-09-20 | Stop reason: HOSPADM

## 2021-09-17 RX ORDER — ACETYLCYSTEINE 100 MG/ML
4 SOLUTION ORAL; RESPIRATORY (INHALATION)
Status: DISCONTINUED | OUTPATIENT
Start: 2021-09-18 | End: 2021-09-19

## 2021-09-17 RX ORDER — LEVOFLOXACIN 5 MG/ML
750 INJECTION, SOLUTION INTRAVENOUS ONCE
Status: COMPLETED | OUTPATIENT
Start: 2021-09-17 | End: 2021-09-17

## 2021-09-17 RX ORDER — ONDANSETRON 4 MG/1
4 TABLET, ORALLY DISINTEGRATING ORAL EVERY 8 HOURS PRN
Status: DISCONTINUED | OUTPATIENT
Start: 2021-09-17 | End: 2021-09-20 | Stop reason: HOSPADM

## 2021-09-17 RX ORDER — METHYLPREDNISOLONE SODIUM SUCCINATE 125 MG/2ML
125 INJECTION, POWDER, LYOPHILIZED, FOR SOLUTION INTRAMUSCULAR; INTRAVENOUS EVERY 6 HOURS
Status: DISCONTINUED | OUTPATIENT
Start: 2021-09-17 | End: 2021-09-17

## 2021-09-17 RX ORDER — BUSPIRONE HYDROCHLORIDE 5 MG/1
30 TABLET ORAL DAILY
Status: DISCONTINUED | OUTPATIENT
Start: 2021-09-18 | End: 2021-09-20 | Stop reason: HOSPADM

## 2021-09-17 RX ORDER — SODIUM CHLORIDE 0.9 % (FLUSH) 0.9 %
5-40 SYRINGE (ML) INJECTION PRN
Status: DISCONTINUED | OUTPATIENT
Start: 2021-09-17 | End: 2021-09-20 | Stop reason: HOSPADM

## 2021-09-17 RX ORDER — MONTELUKAST SODIUM 10 MG/1
10 TABLET ORAL NIGHTLY
Status: DISCONTINUED | OUTPATIENT
Start: 2021-09-17 | End: 2021-09-20 | Stop reason: HOSPADM

## 2021-09-17 RX ORDER — FLUOXETINE HYDROCHLORIDE 20 MG/1
60 CAPSULE ORAL DAILY
Status: DISCONTINUED | OUTPATIENT
Start: 2021-09-18 | End: 2021-09-20 | Stop reason: HOSPADM

## 2021-09-17 RX ORDER — ACETAMINOPHEN 650 MG/1
650 SUPPOSITORY RECTAL EVERY 6 HOURS PRN
Status: DISCONTINUED | OUTPATIENT
Start: 2021-09-17 | End: 2021-09-20 | Stop reason: HOSPADM

## 2021-09-17 RX ORDER — SODIUM CHLORIDE 9 MG/ML
25 INJECTION, SOLUTION INTRAVENOUS PRN
Status: DISCONTINUED | OUTPATIENT
Start: 2021-09-17 | End: 2021-09-20 | Stop reason: HOSPADM

## 2021-09-17 RX ORDER — IPRATROPIUM BROMIDE AND ALBUTEROL SULFATE 2.5; .5 MG/3ML; MG/3ML
1 SOLUTION RESPIRATORY (INHALATION)
Status: DISCONTINUED | OUTPATIENT
Start: 2021-09-18 | End: 2021-09-18

## 2021-09-17 RX ORDER — POLYETHYLENE GLYCOL 3350 17 G/17G
17 POWDER, FOR SOLUTION ORAL DAILY PRN
Status: DISCONTINUED | OUTPATIENT
Start: 2021-09-17 | End: 2021-09-20 | Stop reason: HOSPADM

## 2021-09-17 RX ORDER — IPRATROPIUM BROMIDE AND ALBUTEROL SULFATE 2.5; .5 MG/3ML; MG/3ML
3 SOLUTION RESPIRATORY (INHALATION)
Status: DISCONTINUED | OUTPATIENT
Start: 2021-09-17 | End: 2021-09-17

## 2021-09-17 RX ORDER — GUAIFENESIN 600 MG/1
600 TABLET, EXTENDED RELEASE ORAL 2 TIMES DAILY PRN
Status: DISCONTINUED | OUTPATIENT
Start: 2021-09-17 | End: 2021-09-20 | Stop reason: HOSPADM

## 2021-09-17 RX ORDER — METHYLPREDNISOLONE SODIUM SUCCINATE 40 MG/ML
40 INJECTION, POWDER, LYOPHILIZED, FOR SOLUTION INTRAMUSCULAR; INTRAVENOUS EVERY 6 HOURS
Status: DISCONTINUED | OUTPATIENT
Start: 2021-09-18 | End: 2021-09-19

## 2021-09-17 RX ORDER — VARENICLINE TARTRATE 25 MG
1 KIT ORAL DAILY
Status: DISCONTINUED | OUTPATIENT
Start: 2021-09-18 | End: 2021-09-20 | Stop reason: HOSPADM

## 2021-09-17 RX ORDER — BUDESONIDE 0.5 MG/2ML
0.5 INHALANT ORAL 2 TIMES DAILY
Status: DISCONTINUED | OUTPATIENT
Start: 2021-09-17 | End: 2021-09-19

## 2021-09-17 RX ORDER — PANTOPRAZOLE SODIUM 40 MG/1
40 TABLET, DELAYED RELEASE ORAL
Status: DISCONTINUED | OUTPATIENT
Start: 2021-09-18 | End: 2021-09-20 | Stop reason: HOSPADM

## 2021-09-17 RX ORDER — ACETYLCYSTEINE 100 MG/ML
4 SOLUTION ORAL; RESPIRATORY (INHALATION) EVERY 4 HOURS
Status: DISCONTINUED | OUTPATIENT
Start: 2021-09-17 | End: 2021-09-17

## 2021-09-17 RX ORDER — SODIUM CHLORIDE 0.9 % (FLUSH) 0.9 %
5-40 SYRINGE (ML) INJECTION EVERY 12 HOURS SCHEDULED
Status: DISCONTINUED | OUTPATIENT
Start: 2021-09-17 | End: 2021-09-20 | Stop reason: HOSPADM

## 2021-09-17 RX ADMIN — VANCOMYCIN HYDROCHLORIDE 1250 MG: 10 INJECTION, POWDER, LYOPHILIZED, FOR SOLUTION INTRAVENOUS at 22:30

## 2021-09-17 RX ADMIN — IPRATROPIUM BROMIDE AND ALBUTEROL SULFATE 2 AMPULE: .5; 3 SOLUTION RESPIRATORY (INHALATION) at 19:47

## 2021-09-17 RX ADMIN — CEFEPIME HYDROCHLORIDE 2000 MG: 2 INJECTION, POWDER, FOR SOLUTION INTRAVENOUS at 21:01

## 2021-09-17 RX ADMIN — LEVOFLOXACIN 750 MG: 5 INJECTION, SOLUTION INTRAVENOUS at 21:05

## 2021-09-17 RX ADMIN — METHYLPREDNISOLONE SODIUM SUCCINATE 125 MG: 125 INJECTION, POWDER, FOR SOLUTION INTRAMUSCULAR; INTRAVENOUS at 19:58

## 2021-09-17 RX ADMIN — SODIUM CHLORIDE 1000 ML: 9 INJECTION, SOLUTION INTRAVENOUS at 19:58

## 2021-09-17 ASSESSMENT — ENCOUNTER SYMPTOMS: TACHYPNEA: 1

## 2021-09-17 NOTE — PROGRESS NOTES
09/17/21 1940   NIV Type   $NIV $Daily Charge   Skin Protection for O2 Device Yes   Location Nose   Equipment Type v60   Mode Bilevel   Mask Type Full face mask   Mask Size Medium   Settings/Measurements   IPAP 15 cmH20   CPAP/EPAP 6 cmH2O   Rate Ordered 4   Resp 27   Insp Rise Time (%) 1 %   FiO2  35 %   I Time/ I Time % 0.9 s   Vt Exhaled 948 mL   Minute Volume 25.2 Liters   Mask Leak (lpm) 60 lpm  (will adjust )   Comfort Level Good   Using Accessory Muscles Yes   SpO2 100   Alarm Settings   Alarms On Y   Press Low Alarm 6 cmH2O   High Pressure Alarm 30 cmH2O   Delay Alarm 20 sec(s)   Resp Rate Low Alarm 6   High Respiratory Rate 47 br/min

## 2021-09-18 LAB
EKG ATRIAL RATE: 123 BPM
EKG DIAGNOSIS: NORMAL
EKG P AXIS: 81 DEGREES
EKG P-R INTERVAL: 134 MS
EKG Q-T INTERVAL: 304 MS
EKG QRS DURATION: 74 MS
EKG QTC CALCULATION (BAZETT): 435 MS
EKG R AXIS: 26 DEGREES
EKG T AXIS: 81 DEGREES
EKG VENTRICULAR RATE: 123 BPM

## 2021-09-18 PROCEDURE — 2060000000 HC ICU INTERMEDIATE R&B

## 2021-09-18 PROCEDURE — 99222 1ST HOSP IP/OBS MODERATE 55: CPT | Performed by: INTERNAL MEDICINE

## 2021-09-18 PROCEDURE — 6360000002 HC RX W HCPCS: Performed by: INTERNAL MEDICINE

## 2021-09-18 PROCEDURE — 94761 N-INVAS EAR/PLS OXIMETRY MLT: CPT

## 2021-09-18 PROCEDURE — 2580000003 HC RX 258: Performed by: INTERNAL MEDICINE

## 2021-09-18 PROCEDURE — 6370000000 HC RX 637 (ALT 250 FOR IP): Performed by: INTERNAL MEDICINE

## 2021-09-18 PROCEDURE — 93010 ELECTROCARDIOGRAM REPORT: CPT | Performed by: INTERNAL MEDICINE

## 2021-09-18 PROCEDURE — 94640 AIRWAY INHALATION TREATMENT: CPT

## 2021-09-18 RX ORDER — IPRATROPIUM BROMIDE AND ALBUTEROL SULFATE 2.5; .5 MG/3ML; MG/3ML
1 SOLUTION RESPIRATORY (INHALATION) EVERY 4 HOURS
Status: DISCONTINUED | OUTPATIENT
Start: 2021-09-18 | End: 2021-09-20 | Stop reason: HOSPADM

## 2021-09-18 RX ADMIN — BUSPIRONE HYDROCHLORIDE 30 MG: 5 TABLET ORAL at 09:59

## 2021-09-18 RX ADMIN — TRAZODONE HYDROCHLORIDE 150 MG: 50 TABLET ORAL at 01:08

## 2021-09-18 RX ADMIN — BUDESONIDE 500 MCG: 0.5 SUSPENSION RESPIRATORY (INHALATION) at 19:35

## 2021-09-18 RX ADMIN — ACETYLCYSTEINE 400 MG: 100 SOLUTION ORAL; RESPIRATORY (INHALATION) at 08:29

## 2021-09-18 RX ADMIN — METHYLPREDNISOLONE SODIUM SUCCINATE 40 MG: 40 INJECTION, POWDER, FOR SOLUTION INTRAMUSCULAR; INTRAVENOUS at 10:00

## 2021-09-18 RX ADMIN — Medication 10 ML: at 04:29

## 2021-09-18 RX ADMIN — ACETYLCYSTEINE 400 MG: 100 SOLUTION ORAL; RESPIRATORY (INHALATION) at 19:36

## 2021-09-18 RX ADMIN — IPRATROPIUM BROMIDE AND ALBUTEROL SULFATE 1 AMPULE: .5; 3 SOLUTION RESPIRATORY (INHALATION) at 08:29

## 2021-09-18 RX ADMIN — PANTOPRAZOLE SODIUM 40 MG: 40 TABLET, DELAYED RELEASE ORAL at 09:59

## 2021-09-18 RX ADMIN — AZITHROMYCIN MONOHYDRATE 500 MG: 500 INJECTION, POWDER, LYOPHILIZED, FOR SOLUTION INTRAVENOUS at 06:41

## 2021-09-18 RX ADMIN — Medication 10 ML: at 21:18

## 2021-09-18 RX ADMIN — IPRATROPIUM BROMIDE AND ALBUTEROL SULFATE 1 AMPULE: .5; 3 SOLUTION RESPIRATORY (INHALATION) at 12:10

## 2021-09-18 RX ADMIN — FLUOXETINE 60 MG: 20 CAPSULE ORAL at 09:59

## 2021-09-18 RX ADMIN — ACETYLCYSTEINE 400 MG: 100 SOLUTION ORAL; RESPIRATORY (INHALATION) at 00:09

## 2021-09-18 RX ADMIN — TRAZODONE HYDROCHLORIDE 150 MG: 50 TABLET ORAL at 21:18

## 2021-09-18 RX ADMIN — IPRATROPIUM BROMIDE AND ALBUTEROL SULFATE 1 AMPULE: .5; 3 SOLUTION RESPIRATORY (INHALATION) at 19:32

## 2021-09-18 RX ADMIN — METHYLPREDNISOLONE SODIUM SUCCINATE 40 MG: 40 INJECTION, POWDER, FOR SOLUTION INTRAMUSCULAR; INTRAVENOUS at 04:30

## 2021-09-18 RX ADMIN — MONTELUKAST SODIUM 10 MG: 10 TABLET ORAL at 21:18

## 2021-09-18 RX ADMIN — METHYLPREDNISOLONE SODIUM SUCCINATE 40 MG: 40 INJECTION, POWDER, FOR SOLUTION INTRAMUSCULAR; INTRAVENOUS at 21:18

## 2021-09-18 RX ADMIN — ACETYLCYSTEINE 400 MG: 100 SOLUTION ORAL; RESPIRATORY (INHALATION) at 16:03

## 2021-09-18 RX ADMIN — ATORVASTATIN CALCIUM 10 MG: 10 TABLET, FILM COATED ORAL at 10:00

## 2021-09-18 RX ADMIN — MONTELUKAST SODIUM 10 MG: 10 TABLET ORAL at 01:08

## 2021-09-18 RX ADMIN — ENOXAPARIN SODIUM 40 MG: 40 INJECTION SUBCUTANEOUS at 09:59

## 2021-09-18 RX ADMIN — Medication 10 ML: at 10:00

## 2021-09-18 RX ADMIN — IPRATROPIUM BROMIDE AND ALBUTEROL SULFATE 1 AMPULE: .5; 3 SOLUTION RESPIRATORY (INHALATION) at 16:01

## 2021-09-18 RX ADMIN — METHYLPREDNISOLONE SODIUM SUCCINATE 40 MG: 40 INJECTION, POWDER, FOR SOLUTION INTRAMUSCULAR; INTRAVENOUS at 15:57

## 2021-09-18 RX ADMIN — ACETYLCYSTEINE 400 MG: 100 SOLUTION ORAL; RESPIRATORY (INHALATION) at 12:12

## 2021-09-18 RX ADMIN — BUDESONIDE 500 MCG: 0.5 SUSPENSION RESPIRATORY (INHALATION) at 08:29

## 2021-09-18 RX ADMIN — IPRATROPIUM BROMIDE AND ALBUTEROL SULFATE 1 AMPULE: .5; 3 SOLUTION RESPIRATORY (INHALATION) at 00:09

## 2021-09-18 ASSESSMENT — ENCOUNTER SYMPTOMS
ALLERGIC/IMMUNOLOGIC NEGATIVE: 1
SHORTNESS OF BREATH: 1
GASTROINTESTINAL NEGATIVE: 1
COUGH: 1
EYES NEGATIVE: 1

## 2021-09-18 ASSESSMENT — PAIN SCALES - GENERAL
PAINLEVEL_OUTOF10: 0

## 2021-09-18 NOTE — PLAN OF CARE
Problem: Falls - Risk of:  Goal: Will remain free from falls  Description: Will remain free from falls  9/18/2021 0207 by Rajiv Mondragon RN  Outcome: Ongoing     Problem:  Activity Intolerance:  Goal: Ability to tolerate increased activity will improve  Description: Ability to tolerate increased activity will improve  9/18/2021 0207 by Rajiv Mondragon RN  Outcome: Ongoing     Problem: Breathing Pattern - Ineffective:  Goal: Ability to achieve and maintain a regular respiratory rate will improve  Description: Ability to achieve and maintain a regular respiratory rate will improve  9/18/2021 0207 by Rajiv Mondragon RN  Outcome: Ongoing     Problem: Gas Exchange - Impaired:  Goal: Levels of oxygenation will improve  Description: Levels of oxygenation will improve  9/18/2021 0207 by Rajiv Mondragon RN  Outcome: Ongoing     Problem: Discharge Planning:  Goal: Patients continuum of care needs are met  Description: Patients continuum of care needs are met  9/18/2021 0207 by Rajiv Mondragon RN  Outcome: Ongoing     Problem: Tobacco Use:  Goal: Inpatient tobacco use cessation counseling participation  Description: Inpatient tobacco use cessation counseling participation  Outcome: Ongoing

## 2021-09-18 NOTE — ED PROVIDER NOTES
CHIEF COMPLAINT  Shortness of Breath (starting today. hx of COPD. wear O2 3.5 L at home at night as needed. denies chest pain)      HISTORY OF PRESENT ILLNESS  Wilmer Goff is a 60 y.o. female with a history of anxiety, COPD, chronic oxygen use of 3 L per nasal cannula at night who presents to the emergency department complaining of shortness of breath. Patient presents to the emergency department in acute respiratory distress. History is limited secondary to symptomology. Patient denies recent fevers, chills, or sweats. She states that she is currently fully vaccinated. .   No other complaints, modifying factors or associated symptoms. I have reviewed the following from the nursing documentation.     Past Medical History:   Diagnosis Date    Allergic rhinitis 6/11/2010    Anxiety     Arthritis     Aspiration pneumonia ([address] 3/21/2012    Recurrent    Asthma     Bronchitis chronic     COPD (chronic obstructive pulmonary disease) ([address] 12/3/2009    Depression     Drug abuse, cocaine type (HCC)     past history of crack cocaine use    Emphysema of lung (HCC)     Fibromyalgia     GERD (gastroesophageal reflux disease)     Hyperlipidemia     Influenza A 03/05/2020    Lung disease     On home oxygen therapy     uses O2 NC 3L prn at night    Osteoporosis     Pneumonia     Polysubstance dependence ([address] 1/2/2012    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    Pulmonary fibrosis ([address] 10/16/2014    Pulmonary infiltrate     Pulmonary nodule 12/3/2009    Tobacco abuse      Past Surgical History:   Procedure Laterality Date    BLADDER REPAIR      BRONCHOSCOPY      BRONCHOSCOPY N/A 3/6/2020    BRONCHOSCOPY THERAPUTIC ASPIRATION INITIAL performed by Sagar Lewis MD at [facility]  3/6/2020    BRONCHOSCOPY ALVEOLAR LAVAGE performed by Sagar Lewis MD at [address] 6/26/2020    BRONCHOSCOPY THERAPUTIC ASPIRATION INITIAL performed by Sushma Paula MD at [address]  6/26/2020    BRONCHOSCOPY ALVEOLAR LAVAGE performed by Sushma Paula MD at [address]  06/23/2021    Dr Leroy Archuleta N/A 6/23/2021    [address] performed by Sushma Paula MD at [address]  6/23/2021    BRONCHOSCOPY THERAPUTIC ASPIRATION INITIAL performed by Sushma Paula MD at [address]  6/23/2021    BRONCHOSCOPY ALVEOLAR LAVAGE performed by Sushma Paula MD at [address] N/A 8/27/2021    BRONCHOSCOPY DIAGNOSTIC OR CELL [address] ONLY performed by Sushma Paula MD at [place] [address]  2012    ENDOSCOPY, COLON, DIAGNOSTIC      ESOPHAGEAL DILATATION  9/20/2018    ESOPHAGEAL DILATION Chapin Lu performed by Maria Del Rosario Reyes MD at [address] [facility] OTHER SURGICAL HISTORY  2/12/15    T8 Kyphoplasty    HI COLONOSCOPY FLX DX W/COLLJ SPEC WHEN PFRMD N/A 9/20/2018    EGD AND COLONOSCOPY WITH ANESTHESIA performed by Maria Del Rosario Reyes MD at [address] [facility] ESOPHAGOGASTRODUODENOSCOPY TRANSORAL DIAGNOSTIC N/A 9/20/2018    EGD AND COLONOSCOPY WITH ANESTHESIA performed by Maria Del Rosario Reyes MD at [facility]       Family History   Problem Relation Age of Onset    Asthma Mother     Diabetes Mother     Emphysema Mother     Heart Failure Mother     Hypertension Mother     Heart Disease Mother     High Cholesterol Mother     Cancer Mother     Depression Mother     Diabetes Father     Emphysema Father     Heart Failure Father     Hypertension Father     Heart Disease Father     High Cholesterol Father     Substance Abuse Father     Emphysema Paternal Grandfather     Diabetes Sister     High Cholesterol Sister     Vision Loss Maternal Uncle      Social History     Socioeconomic History    Marital status:      Spouse name: Not on file    Number of children: 3    Years of education: Not on file    Highest education level: Not on file   Occupational History    Occupation: Disabled     Comment:    Tobacco Use    Smoking status: Current Every Day Smoker     Packs/day: 0.50     Years: 40.00     Pack years: 20.00     Types: Cigarettes     Start date: 1972     Last attempt to quit: 2021     Years since quittin.2    Smokeless tobacco: Never Used    Tobacco comment: 0.5 PPD restarted   Vaping Use    Vaping Use: Never used   Substance and Sexual Activity    Alcohol use: No     Alcohol/week: 0.0 standard drinks    Drug use: Yes     Types: Marijuana     Comment: Daily    Sexual activity: Yes     Partners: Male   Other Topics Concern    Not on file   Social History Narrative    Not on file     Social Determinants of Health     Financial Resource Strain: Low Risk     Difficulty of Paying Living Expenses: Not hard at all   Food Insecurity: No Food Insecurity    Worried About Running Out of Food in the Last Year: Never true    Erica of Food in the Last Year: Never true   Transportation Needs: No Transportation Needs    Lack of Transportation (Medical): No    Lack of Transportation (Non-Medical):  No   Physical Activity:     Days of Exercise per Week:     Minutes of Exercise per Session:    Stress:     Feeling of Stress :    Social Connections:     Frequency of Communication with Friends and Family:     Frequency of Social Gatherings with Friends and Family:     Attends Voodoo Services:     Active Member of Clubs or Organizations:     Attends Club or Organization Meetings:     Marital Status:    Intimate Partner Violence:     Fear of Current or Ex-Partner:     Emotionally Abused:     Physically Abused:     Sexually Abused:      Current Facility-Administered Medications   Medication Dose Route Frequency Provider Last Rate Last Admin    methylPREDNISolone sodium (SOLU-MEDROL) injection 125 mg  125 mg IntraVENous Q6H Shelvy Ply, DO   125 mg at 09/17/21 1958    0.9 % sodium chloride bolus  1,000 mL IntraVENous Once Shelvy Ply, DO 1,000 mL/hr at 09/17/21 1958 1,000 mL at 09/17/21 1958    ipratropium-albuterol (DUONEB) nebulizer solution 3 ampule  3 ampule Inhalation Q4H WA Shelvy Ply, DO   2 ampule at 09/17/21 1947    cefepime (MAXIPIME) 2000 mg IVPB minibag  2,000 mg IntraVENous Once Shelvy Ply, DO        And    levoFLOXacin (LEVAQUIN) 750 MG/150ML infusion 750 mg  750 mg IntraVENous Once Shelvy Ply, DO        vancomycin (VANCOCIN) 1,250 mg in dextrose 5 % 250 mL IVPB  15 mg/kg IntraVENous Once Shelvy Ply, DO         Current Outpatient Medications   Medication Sig Dispense Refill    traZODone (DESYREL) 150 MG tablet Take 1-2 tablets by mouth nightly TAKE 2 TABLETS AT BEDTIME 180 tablet 1    predniSONE (DELTASONE) 10 MG tablet 60mg daily x 3 days, 50mg daily x 2 days, 40mg daily x 1 day. Then go to your other prescription you already have to finish the taper. 12 tablet 0    ipratropium-albuterol (DUONEB) 0.5-2.5 (3) MG/3ML SOLN nebulizer solution Inhale 3 mLs into the lungs every 6 hours as needed for Shortness of Breath 360 mL 0    guaiFENesin (MUCINEX) 600 MG extended release tablet Take 1 tablet by mouth 2 times daily as needed for Congestion 60 tablet 1    predniSONE (DELTASONE) 10 MG tablet Take 1 tablet by mouth daily 30 tablet 3    varenicline (CHANTIX STARTING MONTH PAK) 0.5 MG X 11 & 1 MG X 42 tablet Take by mouth.  1 box 0    fluticasone-salmeterol (WIXELA INHUB) 500-50 MCG/DOSE diskus inhaler Inhale 1 puff into the lungs every 12 hours 180 each 1    tiotropium (SPIRIVA HANDIHALER) 18 MCG inhalation capsule INHALE THE CONTENTS OF 1 CAPSULE EVERY DAY 90 capsule 1    albuterol (PROVENTIL) (2.5 MG/3ML) 0.083% nebulizer solution Take 3 mLs by nebulization every 6 hours as needed for Wheezing or Shortness of Breath DX COPD J44.9 120 vial 6  montelukast (SINGULAIR) 10 MG tablet TAKE 1 TABLET BY MOUTH EACH NIGHT 90 tablet 1    pantoprazole (PROTONIX) 40 MG tablet Take 1 tablet by mouth every morning (before breakfast) 30 tablet 1    albuterol sulfate HFA (VENTOLIN HFA) 108 (90 Base) MCG/ACT inhaler Inhale 2 puffs into the lungs every 6 hours as needed for Wheezing or Shortness of Breath 3 Inhaler 1    BD PEN NEEDLE SVETLANA U/F 32G X 4 MM MISC USE ONE DAILY AS DIRECTED 100 each 5    FLUoxetine (PROZAC) 20 MG capsule TAKE 3 CAPSULES BY MOUTH DAILY 270 capsule 1    simvastatin (ZOCOR) 20 MG tablet TAKE 1 TABLET EVERY EVENING 90 tablet 1    busPIRone (BUSPAR) 30 MG tablet TAKE 1 TABLET TWICE DAILY 180 tablet 1    teriparatide, recombinant, (FORTEO) 600 MCG/2.4ML SOLN injection Inject 0.08 mLs into the skin daily One dose injected daily. 1 pen 11     Allergies   Allergen Reactions    Meperidine Anaphylaxis     \"Demerol\"     Demerol Hives       REVIEW OF SYSTEMS  10 systems reviewed, pertinent positives per HPI otherwise noted to be negative. PHYSICAL EXAM  /86   Pulse 122   Temp 97.6 °F (36.4 °C) (Oral)   Resp (!) 34   Ht 5' 5\" (1.651 m)   Wt 175 lb (79.4 kg)   SpO2 100%   BMI 29.12 kg/m²   GENERAL APPEARANCE: Awake and alert. Cooperative. Acute respiratory distress. HEAD: Normocephalic. Atraumatic. EYES: PERRL. EOM's grossly intact. .  ENT: Mucous membranes are moist.   NECK: Supple, trachea midline. HEART: Tachycardic. [place] Normal S1, S2. No murmurs, rubs or gallops. LUNGS: Tachypnea. Diffuse wheezing/rhonchi. Retractions noted. ABDOMEN: Soft. Non-distended. Non-tender. No guarding or rebound. Normal Bowel sounds. EXTREMITIES: No peripheral edema. MAEE. No acute deformities. SKIN: Warm and dry. No acute rashes. NEUROLOGICAL: Alert and oriented X 3. CN II-XII intact. No gross facial drooping. Strength 5/5, sensation intact. No pronator drift. Normal coordination.   Gait normal.   PSYCHIATRIC: Normal mood and affect. LABS  I have reviewed all labs for this visit.    Results for orders placed or performed during the hospital encounter of 09/17/21   COVID-19, Rapid    Specimen: Nasopharyngeal Swab; Throat   Result Value Ref Range    SARS-CoV-2, NAAT Not Detected Not Detected   CBC Auto Differential   Result Value Ref Range    WBC 12.9 (H) 4.0 - 11.0 K/uL    RBC 4.63 4.00 - 5.20 M/uL    Hemoglobin 13.8 12.0 - 16.0 g/dL    Hematocrit 39.6 36.0 - 48.0 %    MCV 85.5 80.0 - 100.0 fL    MCH 29.8 26.0 - 34.0 pg    MCHC 34.9 31.0 - 36.0 g/dL    RDW 13.9 12.4 - 15.4 %    Platelets 491 052 - 383 K/uL    MPV 6.7 5.0 - 10.5 fL    PLATELET SLIDE REVIEW Adequate     SLIDE REVIEW see below     Neutrophils % 67.0 %    Lymphocytes % 17.0 %    Monocytes % 7.0 %    Eosinophils % 3.0 %    Basophils % 0.0 %    Neutrophils Absolute 9.3 (H) 1.7 - 7.7 K/uL    Lymphocytes Absolute 2.3 1.0 - 5.1 K/uL    Monocytes Absolute 0.9 0.0 - 1.3 K/uL    Eosinophils Absolute 0.4 0.0 - 0.6 K/uL    Basophils Absolute 0.0 0.0 - 0.2 K/uL    Bands Relative 5 0 - 7 %    Atypical Lymphocytes Relative 1 0 - 6 %    Anisocytosis 1+ (A)     Macrocytes Occasional (A)     Microcytes Occasional (A)     Polychromasia Occasional (A)     Poikilocytes Occasional (A)    Comprehensive Metabolic Panel   Result Value Ref Range    Sodium 131 (L) 136 - 145 mmol/L    Potassium 4.1 3.5 - 5.1 mmol/L    Chloride 94 (L) 99 - 110 mmol/L    CO2 27 21 - 32 mmol/L    Anion Gap 10 3 - 16    Glucose 93 70 - 99 mg/dL    BUN 12 7 - 20 mg/dL    CREATININE 0.6 0.6 - 1.2 mg/dL    GFR Non-African American >60 >60    GFR African American >60 >60    Calcium 9.3 8.3 - 10.6 mg/dL    Total Protein 6.8 6.4 - 8.2 g/dL    Albumin 3.9 3.4 - 5.0 g/dL    Albumin/Globulin Ratio 1.3 1.1 - 2.2    Total Bilirubin 0.3 0.0 - 1.0 mg/dL    Alkaline Phosphatase 70 40 - 129 U/L    ALT 19 10 - 40 U/L    AST 16 15 - 37 U/L    Globulin 2.9 g/dL   Lactate, Sepsis   Result Value Ref Range    Lactic Acid, Sepsis 0.7 0.4 - 1.9 mmol/L   Procalcitonin   Result Value Ref Range    Procalcitonin 0.06 0.00 - 0.15 ng/mL   Blood gas, venous   Result Value Ref Range    pH, Ivan 7.411 7.350 - 7.450    pCO2, Ivan 46.9 40.0 - 50.0 mmHg    pO2, Ivan 73.1 (H) 25 - 40 mmHg    HCO3, Venous 29.1 (H) 23.0 - 29.0 mmol/L    Base Excess, Ivan 3.7 (H) -3.0 - 3.0 mmol/L    O2 Sat, Ivan 95 Not Established %    Carboxyhemoglobin 4.2 (H) 0.0 - 1.5 %    MetHgb, Ivan 0.5 <1.5 %    TC02 (Calc), Ivan 31 Not Established mmol/L    O2 Therapy Unknown    Sample possible blood bank testing   Result Value Ref Range    Specimen Status HANNY        EKG  The Ekg interpreted by myself  sinus tachycardia, hwfl=214   Axis is   Normal  QTc is  normal  Intervals and Durations are unremarkable. No specific ST-T wave changes appreciated. No evidence of acute ischemia. Sinus tachycardia has replaced normal sinus rhythm from previous EKG on September 9, 2021. Cardiac Monitoring: Tachycardic. Regular. RADIOLOGY  X-RAYS:  I have reviewed radiologic plain film image(s). ALL OTHER NON-PLAIN FILM IMAGES SUCH AS CT, ULTRASOUND AND MRI HAVE BEEN READ BY THE RADIOLOGIST. XR CHEST PORTABLE   Final Result   Bilateral airspace opacities could represent atypical or multifocal bacterial   pneumonia                    Rechecks: Physical assessment performed. 1932: Pulse 124. O2 sats 98% on BiPAP    1948: O2 sats 99%. Patient resting comfortably. 2002: Pulse 122. Respirations 34. Again, patient resting comfortably on BiPAP. ED COURSE/MDM  Patient seen and evaluated. Old records reviewed. Labs and imaging reviewed and results discussed with patient. Patient was given multiple nebulizer treatments, normal saline, broad-spectrum antibiotics and BiPAP therapy in the ED with good symptomatic relief. Patient was reassessed as noted above . Patient will be admitted for acute respiratory distress with COPD exacerbation/potential pneumonia. Covid testing is negative. Joseph Torres Plan of care discussed with patient and family. Patient and family in agreement with plan. Patient was given scripts for the following medications. I counseled patient how to take these medications. New Prescriptions    No medications on file       CLINICAL IMPRESSION  1. Acute respiratory failure with hypoxia ([address]    2. Pneumonia of both lungs due to infectious organism, unspecified part of lung    3. COPD exacerbation (HCC)        Blood pressure 118/86, pulse 122, temperature 97.6 °F (36.4 °C), temperature source Oral, resp. rate (!) 34, height 5' 5\" (1.651 m), weight 175 lb (79.4 kg), SpO2 100 %, not currently breastfeeding. DISPOSITION  Blank Pang was admitted in stable condition.         Glenys Bailey DO  09/17/21 2052

## 2021-09-18 NOTE — RT PROTOCOL NOTE
RT Inhaler-Nebulizer Bronchodilator Protocol Note    There is a bronchodilator order in the chart from a provider indicating to follow the RT Bronchodilator Protocol and there is an Initiate RT Bronchodilator Protocol order as well (see protocol at bottom of note). The findings from the last RT Protocol Assessment were as follows:  Smoking: >15 Pack years  Surgical Status: No surgery  Xray: One lobe infiltrates/atelectasis/pleural effusion/edema  Respiratory Pattern: RR <12 or 21-30  Mental Status: Alert and Oriented  Breath Sounds: Wheezing and/or rhonchi  Cough: Strong, spontaneous, non-productive  Activity Level: Walking unassisted  Oxygen Requirement: 29% or 3LNC - 5LNC/40%  Indication for Bronchodilator Therapy:    Bronchodilator Assessment Score: 8    Aerosolized bronchodilator medication orders have been revised according to the RT Bronchodilator Protocol. RT Inhaler-Nebulizer Bronchodilator Protocol:    Respiratory Therapist to perform RT Therapy Protocol Assessment then follow the protocol. No Indications - adjust the frequency to every 6 hours PRN wheezing or bronchospasm, if no treatments needed after 48 hours then discontinue using Per Protocol order mode. If indication present, adjust the RT bronchodilator orders based on the Bronchodilator Assessment Score as follows:    0-6 - enter or revise RT bronchodilator order to Albuterol Inhaler order with frequency of every 2 hours PRN for wheezing or increased work of breathing using Per Protocol order mode. If Albuterol Inhaler not tolerated or not effective, then discontinue the Albuterol Inhaler order and enter Albuterol Nebulizer order with same frequency and PRN reasons. Repeat RT Therapy Protocol Assessment as needed.     7-10 - discontinue any other Inpatient aerosolized bronchodilator medication orders and enter or revise two Albuterol Inhaler orders, one with BID frequency and one with frequency of every 2 hours PRN wheezing or

## 2021-09-18 NOTE — PROGRESS NOTES
09/18/21 0011   NIV Type   Skin Protection for O2 Device Yes   Location Nose   Equipment Type [id number]   Mode Bilevel   Mask Type Full face mask   Mask Size Medium   Settings/Measurements   IPAP 15 cmH20   CPAP/EPAP 6 cmH2O   Rate Ordered 4   Resp 20   FiO2  35 %   I Time/ I Time % 0.9 s   Vt Exhaled 1290 mL   Minute Volume 20.3 Liters   Mask Leak (lpm) 30 lpm   Comfort Level Good   Using Accessory Muscles No   SpO2 99   Alarm Settings   Alarms On Y   Press Low Alarm 6 cmH2O   High Pressure Alarm 30 cmH2O   Delay Alarm 20 sec(s)   Resp Rate Low Alarm 6   High Respiratory Rate 47 br/min   Oxygen Therapy/Pulse Ox   SpO2 99 %

## 2021-09-18 NOTE — CONSULTS
Consult Note            Date:9/18/2021        Patient Chris Howard     YOB: 1955     Age:65 y.o. Consult to Pulmonology  Consult performed by: Dalbert Dancer, MD  Consult ordered by: Filemon Quiñones MD  Reason for consult: Acute exacerbation of COPD, severe COPD, tobacco abuse          Chief Complaint     Chief Complaint   Patient presents with    Shortness of Breath     starting today. hx of COPD. wear O2 3.5 L at home at night as needed. denies chest pain      Acute exacerbation of COPD, severe COPD, tobacco abuse    History Obtained From   patient, electronic medical record    History of Present Illness   Is a 71-year-old female who has severe COPD and continues to smoke. She normally sees Dr. Clemencia Valdovinos at [facility]. Patient started having issues with breathing over the last several days and progressively getting worse and finally came into the hospital.  Patient is on triple therapy for her inhalers, Advair and Spiriva, has been continue to smoke and apparently has had issues with recurrent bronchitis episodes despite being told not to smoke. She is been admitted 6 times since May 2021.     Past Medical History     Past Medical History:   Diagnosis Date    Allergic rhinitis 6/11/2010    Anxiety     Arthritis     Aspiration pneumonia ([address] 3/21/2012    Recurrent    Asthma     Bronchitis chronic     COPD (chronic obstructive pulmonary disease) ([address] 12/3/2009    Depression     Drug abuse, cocaine type (HCC)     past history of crack cocaine use    Emphysema of lung (HCC)     Fibromyalgia     GERD (gastroesophageal reflux disease)     Hyperlipidemia     Influenza A 03/05/2020    Lung disease     On home oxygen therapy     uses O2 NC 3L prn at night    Osteoporosis     Pneumonia     Polysubstance dependence ([address] 1/2/2012    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    Pulmonary fibrosis ([address] 10/16/2014    Pulmonary infiltrate     Pulmonary nodule 12/3/2009    Tobacco abuse         Past Surgical History     Past Surgical History:   Procedure Laterality Date    BLADDER REPAIR      BRONCHOSCOPY      BRONCHOSCOPY N/A 3/6/2020    BRONCHOSCOPY THERAPUTIC ASPIRATION INITIAL performed by Isaura Chandler MD at [address]  3/6/2020    BRONCHOSCOPY ALVEOLAR LAVAGE performed by Isaura Chandler MD at [address] N/A 2020    BRONCHOSCOPY THERAPUTIC ASPIRATION INITIAL performed by Rosa Perez MD at [address]  2020    BRONCHOSCOPY ALVEOLAR LAVAGE performed by Rosa Perez MD at [address]  2021    Dr Shy Antunez N/A 2021    BRONCHOSCOPY DIAGNOSTIC OR CELL [address] performed by Rosa Perez MD at [address]  2021    BRONCHOSCOPY THERAPUTIC ASPIRATION INITIAL performed by Rosa Perez MD at [address]  2021    BRONCHOSCOPY ALVEOLAR LAVAGE performed by Rosa Perez MD at [address] N/A 2021    BRONCHOSCOPY DIAGNOSTIC OR CELL [address] ONLY performed by Rosa Perez MD at [address] [facility]      ENDOSCOPY, COLON, DIAGNOSTIC      ESOPHAGEAL DILATATION  2018    ESOPHAGEAL DILATION Robertie Malady performed by Daniel Swann MD at [address] [facility],[address] HISTORY  2/12/15    T8 Kyphoplasty    HI COLONOSCOPY FLX DX W/COLLJ SPEC WHEN PFRMD N/A 2018    EGD AND COLONOSCOPY WITH ANESTHESIA performed by Daniel Swann MD at [address] [facility] ESOPHAGOGASTRODUODENOSCOPY TRANSORAL DIAGNOSTIC N/A 2018    EGD AND COLONOSCOPY WITH ANESTHESIA performed by Daniel Swann MD at [address] [facility]          Medications     Prior to Admission medications    Medication Sig Start Date End Date Taking?  Authorizing Provider   traZODone (DESYREL) 150 MG tablet Take 1-2 tablets by mouth nightly TAKE 2 TABLETS AT BEDTIME 9/13/21   Ramana Patrick MD   predniSONE (DELTASONE) 10 MG tablet 60mg daily x 3 days, 50mg daily x 2 days, 40mg daily x 1 day. Then go to your other prescription you already have to finish the taper. 9/3/21   Jazmyne Cruz MD   ipratropium-albuterol (DUONEB) 0.5-2.5 (3) MG/3ML SOLN nebulizer solution Inhale 3 mLs into the lungs every 6 hours as needed for Shortness of Breath 9/3/21   Jazmyne Cruz MD   guaiFENesin [facility]) 600 MG extended release tablet Take 1 tablet by mouth 2 times daily as needed for Congestion 8/29/21   Nba Reddy MD   predniSONE (DELTASONE) 10 MG tablet Take 1 tablet by mouth daily 8/19/21 9/18/21  Dav Cornejo MD   varenicline (CHANTIX STARTING MONTH PAK) 0.5 MG X 11 & 1 MG X 42 tablet Take by mouth.  8/4/21   Ramana Patrick MD   fluticasone-salmeterol [facility]) 500-50 MCG/DOSE diskus inhaler Inhale 1 puff into the lungs every 12 hours 7/21/21   Dav Cornejo MD   tiotropium (SPIRIVA HANDIHALER) 18 MCG inhalation capsule INHALE THE CONTENTS OF 1 CAPSULE EVERY DAY 7/21/21   Dav Cornejo MD   albuterol (PROVENTIL) (2.5 MG/3ML) 0.083% nebulizer solution Take 3 mLs by nebulization every 6 hours as needed for Wheezing or Shortness of Breath DX COPD J44.9 7/21/21   Dav Cornejo MD   montelukast (SINGULAIR) 10 MG tablet TAKE 1 TABLET BY MOUTH EACH NIGHT 7/21/21   Dav Cornejo MD   pantoprazole (PROTONIX) 40 MG tablet Take 1 tablet by mouth every morning (before breakfast) 6/25/21   Emy Harrison MD   albuterol sulfate HFA (VENTOLIN HFA) 108 (90 Base) MCG/ACT inhaler Inhale 2 puffs into the lungs every 6 hours as needed for Wheezing or Shortness of Breath 3/4/21   Dav Cornejo MD   BD PEN NEEDLE SVETLANA U/F 32G X 4 MM MISC USE ONE DAILY AS DIRECTED 12/11/20   Ramana Patrick MD   FLUoxetine (PROZAC) 20 MG capsule TAKE 3 CAPSULES BY MOUTH DAILY 12/3/20   Ramana Patrick MD   simvastatin (ZOCOR) 20 MG tablet TAKE 1 TABLET EVERY EVENING 8/7/20   Sharad Gamble MD   busPIRone (BUSPAR) 30 MG tablet TAKE 1 TABLET TWICE DAILY 8/7/20   Sharad Gamble MD   teriparatide, recombinant, (FORTEO) 600 MCG/2.4ML SOLN injection Inject 0.08 mLs into the skin daily One dose injected daily.  9/27/19 12/7/20  Rhea Alvarado MD        ipratropium-albuterol (DUONEB) nebulizer solution 1 ampule, Q4H  busPIRone (BUSPAR) tablet 30 mg, Daily  FLUoxetine (PROZAC) capsule 60 mg, Daily  budesonide (PULMICORT) nebulizer suspension 500 mcg, BID  guaiFENesin (MUCINEX) extended release tablet 600 mg, BID PRN  montelukast (SINGULAIR) tablet 10 mg, Nightly  pantoprazole (PROTONIX) tablet 40 mg, QAM AC  atorvastatin (LIPITOR) tablet 10 mg, Daily  traZODone (DESYREL) tablet 150 mg, Nightly  varenicline (CHANTIX TAL) tablet 1 tablet, Daily  sodium chloride flush 0.9 % injection 5-40 mL, 2 times per day  sodium chloride flush 0.9 % injection 5-40 mL, PRN  0.9 % sodium chloride infusion, PRN  ondansetron (ZOFRAN-ODT) disintegrating tablet 4 mg, Q8H PRN   Or  ondansetron (ZOFRAN) injection 4 mg, Q6H PRN  polyethylene glycol (GLYCOLAX) packet 17 g, Daily PRN  enoxaparin (LOVENOX) injection 40 mg, Daily  acetaminophen (TYLENOL) tablet 650 mg, Q6H PRN   Or  acetaminophen (TYLENOL) suppository 650 mg, Q6H PRN  methylPREDNISolone sodium (SOLU-MEDROL) injection 40 mg, Q6H   Followed by  Juan F Vital ON 9/20/2021] predniSONE (DELTASONE) tablet 40 mg, Daily  azithromycin (ZITHROMAX) 500 mg in D5W 250ml addavial, Q24H  acetylcysteine (MUCOMYST) 10 % solution 400 mg, Q4H WA        Allergies   Meperidine and Demerol    Social History     Social History     Tobacco History     Smoking Status  Current Every Day Smoker Smoking Start Date  1/1/1972 Last attempt to quit  6/20/2021 Smoking Frequency  0.5 packs/day for 40 years (20 pk yrs)    Smoking Tobacco Type  Cigarettes    Smokeless Tobacco Use  Never Used    Tobacco Comment  0.5 PPD restarted          Alcohol History Alcohol Use Status  No          Drug Use     Drug Use Status  Yes Types  Marijuana Comment  Daily          Sexual Activity     Sexually Active  Yes Partners  Male                Family History     Family History   Problem Relation Age of Onset    Asthma Mother     Diabetes Mother     Emphysema Mother     Heart Failure Mother     Hypertension Mother     Heart Disease Mother     High Cholesterol Mother     Cancer Mother     Depression Mother     Diabetes Father     Emphysema Father     Heart Failure Father     Hypertension Father     Heart Disease Father     High Cholesterol Father     Substance Abuse Father     Emphysema Paternal Grandfather     Diabetes Sister     High Cholesterol Sister     Vision Loss Maternal Uncle        Review of Systems   Review of Systems   Constitutional: Negative. HENT: Negative. Eyes: Negative. Respiratory: Positive for cough and shortness of breath. Cardiovascular: Negative. Gastrointestinal: Negative. Endocrine: Negative. Genitourinary: Negative. Musculoskeletal: Negative. Skin: Negative. Allergic/Immunologic: Negative. Neurological: Negative. Hematological: Negative. Psychiatric/Behavioral: Negative. Physical Exam   /78   Pulse 95   Temp 97.7 °F (36.5 °C) (Oral)   Resp 18   Ht 5' 5\" (1.651 m)   Wt 171 lb 8.3 oz (77.8 kg)   SpO2 95%   BMI 28.54 kg/m²      Physical Exam  Vitals and nursing note reviewed. Constitutional:       General: She is not in acute distress. Appearance: Normal appearance. She is not ill-appearing. HENT:      Head: Normocephalic and atraumatic. Right Ear: External ear normal.      Left Ear: External ear normal.      Nose: Nose normal.      Mouth/Throat:      Mouth: Mucous membranes are moist.      Pharynx: Oropharynx is clear. Comments: Mallampati 3  Eyes:      General: No scleral icterus. Extraocular Movements: Extraocular movements intact.       Conjunctiva/sclera: Conjunctivae normal.      Pupils: Pupils are equal, round, and reactive to light. Cardiovascular:      Rate and Rhythm: Normal rate and regular rhythm. Pulses: Normal pulses. Heart sounds: Normal heart sounds. No murmur heard. No friction rub. Pulmonary:      Effort: No respiratory distress. Breath sounds: No stridor. No wheezing, rhonchi or rales. Chest:      Chest wall: No tenderness. Abdominal:      General: Abdomen is flat. Bowel sounds are normal. There is no distension. Tenderness: There is no abdominal tenderness. There is no guarding. Musculoskeletal:         General: No swelling or tenderness. Normal range of motion. Cervical back: Normal range of motion and neck supple. No rigidity. Skin:     General: Skin is warm and dry. Coloration: Skin is not jaundiced. Neurological:      General: No focal deficit present. Mental Status: She is alert and oriented to person, place, and time. Mental status is at baseline. Cranial Nerves: No cranial nerve deficit. Sensory: No sensory deficit. Motor: No weakness. Gait: Gait normal.   Psychiatric:         Mood and Affect: Mood normal.         Thought Content: Thought content normal.         Judgment: Judgment normal.         Labs    CBC:  Recent Labs     09/17/21 1935   WBC 12.9*   RBC 4.63   HGB 13.8   HCT 39.6   MCV 85.5   RDW 13.9        CHEMISTRIES:  Recent Labs     09/17/21 1935   *   K 4.1   CL 94*   CO2 27   BUN 12   CREATININE 0.6   GLUCOSE 93     PT/INR:No results for input(s): PROTIME, INR in the last 72 hours. APTT:No results for input(s): APTT in the last 72 hours.   LIVER PROFILE:  Recent Labs     09/17/21 1935   AST 16   ALT 19   BILITOT 0.3   ALKPHOS 70       Imaging/Diagnostics   XR CHEST PORTABLE    Result Date: 9/17/2021  Bilateral airspace opacities could represent atypical or multifocal bacterial pneumonia       Assessment      Hospital Problems         Last Modified

## 2021-09-18 NOTE — H&P
Hospital Medicine History & Physical      PCP: Jean Claude Fonseca MD    Date of Admission: 9/17/2021    Date of Service: Pt seen/examined on 9/17/2021 and Admitted to Inpatient with expected LOS greater than two midnights due to medical therapy. Chief Complaint: Shortness of breath      History Of Present Illness:   60 y.o. female who presented to [facility] with shortness of breath  Patient with PMH of severe COPD, depression, fibromyalgia, ILD, tobacco dependence presented to the ED today with complaints of shortness of breath. Patient has been admitted 6 times since late May 2021 for COPD exacerbation. Has been bronched twice in that. Last admission was on 9/10/2021. Patient reports she continues to smoke cigarettes and marijuana but has reduced the number drastically. Is currently trying Chantix to help quitting smoking. She continues to have episodic shortness of breath which is worsening, cough with some sputum and chest congestion. She had a virtual visit with her PCP on 9/13/2021 following discharge from hospital, and was counseled to take prednisone taper with doxycycline, which the patient has been taking. However she reports her symptoms have been getting worse at home. She is currently using 3 L of oxygen at night.     Past Medical History:          Diagnosis Date    Allergic rhinitis 6/11/2010    Anxiety     Arthritis     Aspiration pneumonia ([address] 3/21/2012    Recurrent    Asthma     Bronchitis chronic     COPD (chronic obstructive pulmonary disease) ([address] 12/3/2009    Depression     Drug abuse, cocaine type (HCC)     past history of crack cocaine use    Emphysema of lung (HCC)     Fibromyalgia     GERD (gastroesophageal reflux disease)     Hyperlipidemia     Influenza A 03/05/2020    Lung disease     On home oxygen therapy     uses O2 NC 3L prn at night    Osteoporosis     Pneumonia     Polysubstance dependence ([address] 1/2/2012    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/2012    Pulmonary fibrosis ([address] 10/16/2014    Pulmonary infiltrate     Pulmonary nodule 12/3/2009    Tobacco abuse        Past Surgical History:          Procedure Laterality Date    BLADDER REPAIR      BRONCHOSCOPY      BRONCHOSCOPY N/A 3/6/2020    BRONCHOSCOPY THERAPUTIC ASPIRATION INITIAL performed by Jaleesa Bejarano MD at [address] [place] [address]  3/6/2020    BRONCHOSCOPY ALVEOLAR LAVAGE performed by Jaleesa Bejarano MD at [address] [place] [address] N/A 6/26/2020    BRONCHOSCOPY THERAPUTIC ASPIRATION INITIAL performed by Kika Bar MD at [address] [place] [address]  6/26/2020    BRONCHOSCOPY ALVEOLAR LAVAGE performed by Kika Bar MD at [address] [place] [address]  06/23/2021    Dr Clemencia Oconnor N/A 6/23/2021    BRONCHOSCOPY DIAGNOSTIC OR CELL [address] performed by Kika Bar MD at [address] [place] [address]  6/23/2021    BRONCHOSCOPY THERAPUTIC ASPIRATION INITIAL performed by Kika Bar MD at [address] [place] [address]  6/23/2021    BRONCHOSCOPY ALVEOLAR LAVAGE performed by Kika Bar MD at [address] [place] [address] 8/27/2021    BRONCHOSCOPY DIAGNOSTIC OR CELL [address] ONLY performed by Kika Bar MD at [place] [address]  2012    ENDOSCOPY, COLON, DIAGNOSTIC      ESOPHAGEAL DILATATION  9/20/2018    ESOPHAGEAL DILATION Wolfgang Alter performed by Sally Gasca MD at [address] [place] [address],[address] HISTORY  2/12/15    T8 Kyphoplasty    NC COLONOSCOPY FLX DX W/COLLJ SPEC WHEN PFRMD N/A 9/20/2018    EGD AND COLONOSCOPY WITH ANESTHESIA performed by Sally Gasca MD at [address] [facility] ESOPHAGOGASTRODUODENOSCOPY TRANSORAL DIAGNOSTIC N/A 9/20/2018    EGD AND COLONOSCOPY WITH ANESTHESIA performed by Sally Gasca MD at [address] [facility]         Medications Prior to Admission:      Prior to Admission medications    Medication Sig Start Date End Date Taking? Authorizing Provider   traZODone (DESYREL) 150 MG tablet Take 1-2 tablets by mouth nightly TAKE 2 TABLETS AT BEDTIME 9/13/21   Jean Claude Fonseca MD   predniSONE (DELTASONE) 10 MG tablet 60mg daily x 3 days, 50mg daily x 2 days, 40mg daily x 1 day. Then go to your other prescription you already have to finish the taper. 9/3/21   Skye Cadet MD   ipratropium-albuterol (DUONEB) 0.5-2.5 (3) MG/3ML SOLN nebulizer solution Inhale 3 mLs into the lungs every 6 hours as needed for Shortness of Breath 9/3/21   Skye Cadet MD   guaiFENesin [facility]) 600 MG extended release tablet Take 1 tablet by mouth 2 times daily as needed for Congestion 8/29/21   Belgica Cotton MD   predniSONE (DELTASONE) 10 MG tablet Take 1 tablet by mouth daily 8/19/21 9/18/21  Guille Wiley MD   varenicline (CHANTIX STARTING MONTH PAK) 0.5 MG X 11 & 1 MG X 42 tablet Take by mouth.  8/4/21   Jean Claude Fonseca MD   fluticasone-salmeterol [facility]) 500-50 MCG/DOSE diskus inhaler Inhale 1 puff into the lungs every 12 hours 7/21/21   Guille Wiley MD   tiotropium (SPIRIVA HANDIHALER) 18 MCG inhalation capsule INHALE THE CONTENTS OF 1 CAPSULE EVERY DAY 7/21/21   Guille Wiley MD   albuterol (PROVENTIL) (2.5 MG/3ML) 0.083% nebulizer solution Take 3 mLs by nebulization every 6 hours as needed for Wheezing or Shortness of Breath DX COPD J44.9 7/21/21   Guille Wiley MD   montelukast (SINGULAIR) 10 MG tablet TAKE 1 TABLET BY MOUTH EACH NIGHT 7/21/21   Guille Wiley MD   pantoprazole (PROTONIX) 40 MG tablet Take 1 tablet by mouth every morning (before breakfast) 6/25/21   Emy Harrison MD   albuterol sulfate HFA (VENTOLIN HFA) 108 (90 Base) MCG/ACT inhaler Inhale 2 puffs into the lungs every 6 hours as needed for Wheezing or Shortness of Breath 3/4/21   Guille Wiley MD   BD PEN NEEDLE SVETLANA U/F 32G X 4 MM MISC USE ONE DAILY AS DIRECTED 12/11/20   Jean Claude Fonseca MD   FLUoxetine (PROZAC) 20 MG capsule TAKE 3 CAPSULES BY MOUTH DAILY 12/3/20   Stephanie Craft MD   simvastatin (ZOCOR) 20 MG tablet TAKE 1 TABLET EVERY EVENING 8/7/20   Stephanie Craft MD   busPIRone (BUSPAR) 30 MG tablet TAKE 1 TABLET TWICE DAILY 8/7/20   Stephanie Craft MD   teriparatide, recombinant, (FORTEO) 600 MCG/2.4ML SOLN injection Inject 0.08 mLs into the skin daily One dose injected daily. 9/27/19 12/7/20  Oskar Lal MD       Allergies:  Meperidine and Demerol    Social History:      The patient currently lives at home  TOBACCO:   reports that she has been smoking cigarettes. She started smoking about 49 years ago. She has a 20.00 pack-year smoking history. She has never used smokeless tobacco.  ETOH:   reports no history of alcohol use. E-Cigarettes/Vaping Use     Questions Responses    E-Cigarette/Vaping Use Never User    Start Date     Passive Exposure     Quit Date     Counseling Given     Comments Unknown            Family History:  Positive as follows:        Problem Relation Age of Onset    Asthma Mother     Diabetes Mother     Emphysema Mother     Heart Failure Mother     Hypertension Mother     Heart Disease Mother     High Cholesterol Mother     Cancer Mother     Depression Mother     Diabetes Father     Emphysema Father     Heart Failure Father     Hypertension Father     Heart Disease Father     High Cholesterol Father     Substance Abuse Father     Emphysema Paternal Grandfather     Diabetes Sister     High Cholesterol Sister     Vision Loss Maternal Uncle        REVIEW OF SYSTEMS COMPLETED:   Pertinent positives as noted in the HPI. All other systems reviewed and negative. PHYSICAL EXAM PERFORMED:    /78   Pulse 104   Temp 97.6 °F (36.4 °C) (Oral)   Resp 27   Ht 5' 5\" (1.651 m)   Wt 175 lb (79.4 kg)   SpO2 98%   BMI 29.12 kg/m²     General appearance: Moderate distress, appears stated age and cooperative. HEENT:  Normal cephalic, atraumatic without obvious deformity.  Pupils equal, round, and reactive to light. Extra ocular muscles intact. Conjunctivae/corneas clear. Neck: Supple, with full range of motion. No jugular venous distention. Trachea midline. Respiratory: Moderate distress, increased respiratory effort. Bilateral diminished breath sounds with scattered wheeze   cardiovascular: Tachycardic, regular rhythm with normal S1/S2 without murmurs, rubs or gallops. Abdomen: Soft, non-tender, non-distended with normal bowel sounds. Musculoskeletal:  No clubbing, cyanosis or edema bilaterally. Full range of motion without deformity. Skin: Skin color, texture, turgor normal.  No rashes or lesions. Neurologic:  Neurovascularly intact without any focal sensory/motor deficits. Cranial nerves: II-XII intact, grossly non-focal.  Psychiatric:  Alert and oriented, thought content appropriate, normal insight  Capillary Refill: Brisk,3 seconds, normal  Peripheral Pulses: +2 palpable, equal bilaterally       Labs:     Recent Labs     09/17/21 1935   WBC 12.9*   HGB 13.8   HCT 39.6        Recent Labs     09/17/21 1935   *   K 4.1   CL 94*   CO2 27   BUN 12   CREATININE 0.6   CALCIUM 9.3     Recent Labs     09/17/21 1935   AST 16   ALT 19   BILITOT 0.3   ALKPHOS 70     No results for input(s): INR in the last 72 hours. No results for input(s): Patrisha Meigs in the last 72 hours.     Urinalysis:      Lab Results   Component Value Date    NITRU Negative 08/26/2021    WBCUA 0-3 04/13/2012    RBCUA 3-5 04/13/2012    BLOODU Negative 08/26/2021    SPECGRAV 1.020 08/26/2021    GLUCOSEU Negative 08/26/2021    [address] [place] [address] NEGATIVE 04/13/2012       Radiology:     CXR: I have reviewed the CXR with the following interpretation: Bilateral airspace opacities, appears chronic which I compared to prior chest x-rays      XR CHEST PORTABLE   Final Result   Bilateral airspace opacities could represent atypical or multifocal bacterial   pneumonia             ASSESSMENT:PLAN:    Recurrent COPD exacerbation   Chronic bronchitis with underlying stage III severe COPD  -Consult pulmonology for therapeutic bronchoscopy due to ineffective airway clearance  - inhaled bronchodilator nebs Q4H scheduled with edwige  - Add Pulmicort nebs + Mucomyst nebs  - systemic steroids with iv solumedrol   -IV azithromycin  -Currently on BiPAP support, continue . Check repeat gas in few hours . supplemental O2 to keep sats > 88%  -Rapid Covid negative, patient is vaccinated  -Marijuana smoking cessation counseling given  -Incentive spirometry, pulmonary toilet    Tobacco dependence-counseled cessation. Chantix     Cannabis dependence with current use -urged patient if she can arrange legally to get edibles instead of smoking. Depression/anxiety -mood stable, resume home medication regimen     ILD (interstitial lung disease)/pulmonary fibrosis -evident on CXR similar to prior. Also seen on CT back in March 2021. Likely related to smoking tobacco and marijuana.     Hyperlipidemia  -Resume statin     Hypertension  -Controlled, resume atenolol      DVT Prophylaxis: lmwh  Diet: No diet orders on file  Code Status: full  PT/OT Eval Status: n/a    Dispo - IP stay       Jerrie Najjar, MD    Thank you Jocelyn Hernandez MD for the opportunity to be involved in this patient's care. If you have any questions or concerns please feel free to contact me at [phone].

## 2021-09-18 NOTE — PROGRESS NOTES
cardiovascular: Tachycardic, regular rhythm with normal S1/S2 without murmurs, rubs or gallops. Abdomen: Soft, non-tender, non-distended with normal bowel sounds. Musculoskeletal:  No clubbing, cyanosis or edema bilaterally. Full range of motion without deformity. Skin: Skin color, texture, turgor normal.  No rashes or lesions. Neurologic:  Neurovascularly intact without any focal sensory/motor deficits. Cranial nerves: II-XII intact, grossly non-focal.  Psychiatric:  Alert and oriented, thought content appropriate, normal insight  Capillary Refill: Brisk,3 seconds, normal  Peripheral Pulses: +2 palpable, equal bilaterally       Labs:   Recent Labs     09/17/21 1935   WBC 12.9*   HGB 13.8   HCT 39.6        Recent Labs     09/17/21 1935   *   K 4.1   CL 94*   CO2 27   BUN 12   CREATININE 0.6   CALCIUM 9.3     Recent Labs     09/17/21 1935   AST 16   ALT 19   BILITOT 0.3   ALKPHOS 70     No results for input(s): INR in the last 72 hours. No results for input(s): Lilia Gazella in the last 72 hours.     Urinalysis:      Lab Results   Component Value Date    NITRU Negative 08/26/2021    WBCUA 0-3 04/13/2012    RBCUA 3-5 04/13/2012    BLOODU Negative 08/26/2021    SPECGRAV 1.020 08/26/2021    GLUCOSEU Negative 08/26/2021    GLUCOSEU NEGATIVE 04/13/2012       Radiology:  XR CHEST PORTABLE   Final Result   Bilateral airspace opacities could represent atypical or multifocal bacterial   pneumonia                 Assessment/Plan:    Active Hospital Problems    Diagnosis     Stage 3 severe COPD by GOLD classification ([facility] [J44.9]      Priority: Medium    Cannabis dependence with current use ([facility] [F12.20]     Acute on chronic respiratory failure ([facility] [J96.20]     COPD exacerbation (HCC) [J44.1]     Anxiety and depression [F41.9, F32.9]     ILD (interstitial lung disease) ([facility] [J84.9]     Tobacco dependence [F17.200]     Hyperlipidemia [E78.5]     Smoker [F17.200]        Recurrent COPD exacerbation   Chronic bronchitis with underlying stage III severe COPD  -Consulted pulmonology for therapeutic bronchoscopy due to ineffective airway clearance  - inhaled bronchodilator nebs Q4H scheduled with edwige  - Added Pulmicort nebs + Mucomyst nebs  - systemic steroids with iv solumedrol   -IV azithromycin  -Currently on BiPAP support, continue .  Check repeat gas in few hours . supplemental O2 to keep sats > 88%  -Rapid Covid negative, patient is vaccinated  -Marijuana smoking cessation counseling given  -Incentive spirometry, pulmonary toilet     Tobacco dependence-counseled cessation.    -on Chantix     Cannabis dependence with current use -urged patient if she can arrange legally to get edibles instead of smoking.     Depression/anxiety -mood stable, resume home medication regimen     ILD (interstitial lung disease)/pulmonary fibrosis -evident on CXR similar to prior.  Also seen on CT back in March 2021.  Likely related to smoking tobacco and marijuana.     Hyperlipidemia  -Resume statin     Hypertension  -Controlled, resume atenolol        DVT Prophylaxis: lmwh  Diet: Diet NPO  Code Status: Full Code    PT/OT Eval Status: not yet ordered    Dispo - pending improvement, pulm recs    Oliver Zuñiga MD

## 2021-09-18 NOTE — PROGRESS NOTES
4 Eyes Skin Assessment     The patient is being assess for   Admission    I agree that 2 RN's have performed a thorough Head to Toe Skin Assessment on the patient. ALL assessment sites listed below have been assessed. Areas assessed for pressure by both nurses:   [x]   Head, Face, and Ears   [x]   Shoulders, Back, and Chest, Abdomen  [x]   Arms, Elbows, and Hands   [x]   Coccyx, Sacrum, and Ischium  [x]   Legs, Feet, and Heels        Skin Assessed Under all Medical Devices by both nurses:  O2 device tubing and Bipap mask              All Mepilex Borders were peeled back and area peeked at by both nurses:  No: N/A  Please list where Mepilex Borders are located:  N/A              **SHARE this note so that the co-signing nurse is able to place an eSignature**    Co-signer eSignature: Electronically signed by Roman Anderson RN on 9/18/21 at 2:12 AM EDT    Does the Patient have Skin Breakdown related to pressure?   No     (Insert Photo here)         Philip Prevention initiated:  NA   Wound Care Orders initiated:  NA      WOC nurse consulted for Pressure Injury (Stage 3,4, Unstageable, DTI, NWPT, Complex wounds)and New or Established Ostomies:  NA      Primary Nurse eSignature: Electronically signed by Meredith Joseph RN on 9/18/21 at 2:05 AM EDT

## 2021-09-19 PROCEDURE — 2580000003 HC RX 258: Performed by: INTERNAL MEDICINE

## 2021-09-19 PROCEDURE — 94761 N-INVAS EAR/PLS OXIMETRY MLT: CPT

## 2021-09-19 PROCEDURE — 2700000000 HC OXYGEN THERAPY PER DAY

## 2021-09-19 PROCEDURE — 6370000000 HC RX 637 (ALT 250 FOR IP): Performed by: INTERNAL MEDICINE

## 2021-09-19 PROCEDURE — 6360000002 HC RX W HCPCS: Performed by: INTERNAL MEDICINE

## 2021-09-19 PROCEDURE — 94660 CPAP INITIATION&MGMT: CPT

## 2021-09-19 PROCEDURE — 99232 SBSQ HOSP IP/OBS MODERATE 35: CPT | Performed by: INTERNAL MEDICINE

## 2021-09-19 PROCEDURE — 94640 AIRWAY INHALATION TREATMENT: CPT

## 2021-09-19 PROCEDURE — 2060000000 HC ICU INTERMEDIATE R&B

## 2021-09-19 RX ORDER — PREDNISONE 20 MG/1
40 TABLET ORAL DAILY
Status: DISCONTINUED | OUTPATIENT
Start: 2021-09-20 | End: 2021-09-20 | Stop reason: HOSPADM

## 2021-09-19 RX ORDER — METHYLPREDNISOLONE SODIUM SUCCINATE 40 MG/ML
40 INJECTION, POWDER, LYOPHILIZED, FOR SOLUTION INTRAMUSCULAR; INTRAVENOUS EVERY 12 HOURS
Status: ACTIVE | OUTPATIENT
Start: 2021-09-20 | End: 2021-09-20

## 2021-09-19 RX ADMIN — METHYLPREDNISOLONE SODIUM SUCCINATE 40 MG: 40 INJECTION, POWDER, FOR SOLUTION INTRAMUSCULAR; INTRAVENOUS at 04:13

## 2021-09-19 RX ADMIN — IPRATROPIUM BROMIDE AND ALBUTEROL SULFATE 1 AMPULE: .5; 3 SOLUTION RESPIRATORY (INHALATION) at 12:09

## 2021-09-19 RX ADMIN — ACETYLCYSTEINE 400 MG: 100 SOLUTION ORAL; RESPIRATORY (INHALATION) at 07:36

## 2021-09-19 RX ADMIN — BUDESONIDE 500 MCG: 0.5 SUSPENSION RESPIRATORY (INHALATION) at 07:36

## 2021-09-19 RX ADMIN — IPRATROPIUM BROMIDE AND ALBUTEROL SULFATE 1 AMPULE: .5; 3 SOLUTION RESPIRATORY (INHALATION) at 07:36

## 2021-09-19 RX ADMIN — ATORVASTATIN CALCIUM 10 MG: 10 TABLET, FILM COATED ORAL at 08:25

## 2021-09-19 RX ADMIN — TRAZODONE HYDROCHLORIDE 150 MG: 50 TABLET ORAL at 20:12

## 2021-09-19 RX ADMIN — IPRATROPIUM BROMIDE AND ALBUTEROL SULFATE 1 AMPULE: .5; 3 SOLUTION RESPIRATORY (INHALATION) at 20:58

## 2021-09-19 RX ADMIN — FLUOXETINE 60 MG: 20 CAPSULE ORAL at 08:26

## 2021-09-19 RX ADMIN — BUSPIRONE HYDROCHLORIDE 30 MG: 5 TABLET ORAL at 08:26

## 2021-09-19 RX ADMIN — IPRATROPIUM BROMIDE AND ALBUTEROL SULFATE 1 AMPULE: .5; 3 SOLUTION RESPIRATORY (INHALATION) at 15:48

## 2021-09-19 RX ADMIN — ACETYLCYSTEINE 400 MG: 100 SOLUTION ORAL; RESPIRATORY (INHALATION) at 12:12

## 2021-09-19 RX ADMIN — MONTELUKAST SODIUM 10 MG: 10 TABLET ORAL at 20:12

## 2021-09-19 RX ADMIN — METHYLPREDNISOLONE SODIUM SUCCINATE 40 MG: 40 INJECTION, POWDER, FOR SOLUTION INTRAMUSCULAR; INTRAVENOUS at 15:32

## 2021-09-19 RX ADMIN — ACETYLCYSTEINE 400 MG: 100 SOLUTION ORAL; RESPIRATORY (INHALATION) at 15:48

## 2021-09-19 RX ADMIN — IPRATROPIUM BROMIDE AND ALBUTEROL SULFATE 1 AMPULE: .5; 3 SOLUTION RESPIRATORY (INHALATION) at 04:04

## 2021-09-19 RX ADMIN — ENOXAPARIN SODIUM 40 MG: 40 INJECTION SUBCUTANEOUS at 08:25

## 2021-09-19 RX ADMIN — AZITHROMYCIN MONOHYDRATE 500 MG: 500 INJECTION, POWDER, LYOPHILIZED, FOR SOLUTION INTRAVENOUS at 07:04

## 2021-09-19 RX ADMIN — Medication 10 ML: at 08:26

## 2021-09-19 RX ADMIN — PANTOPRAZOLE SODIUM 40 MG: 40 TABLET, DELAYED RELEASE ORAL at 07:06

## 2021-09-19 RX ADMIN — IPRATROPIUM BROMIDE AND ALBUTEROL SULFATE 1 AMPULE: .5; 3 SOLUTION RESPIRATORY (INHALATION) at 23:28

## 2021-09-19 RX ADMIN — METHYLPREDNISOLONE SODIUM SUCCINATE 40 MG: 40 INJECTION, POWDER, FOR SOLUTION INTRAMUSCULAR; INTRAVENOUS at 08:25

## 2021-09-19 ASSESSMENT — PAIN SCALES - GENERAL
PAINLEVEL_OUTOF10: 0

## 2021-09-19 NOTE — PLAN OF CARE
Problem: Falls - Risk of:  Goal: Will remain free from falls  Description: Will remain free from falls  9/19/2021 0833 by Rose Sargent RN  Outcome: Ongoing  9/19/2021 0609 by Eduardo Collins RN  Note: Pt will remain free of falls this shift. Bedside table and call light within reach, bed alarm in place. Pt instructed to call out when in need of assistance, verbalized understanding. Will continue to monitor. Goal: Absence of physical injury  Description: Absence of physical injury  Outcome: Ongoing     Problem:  Activity Intolerance:  Goal: Ability to tolerate increased activity will improve  Description: Ability to tolerate increased activity will improve  Outcome: Ongoing     Problem: Airway Clearance - Ineffective:  Goal: Ability to maintain a clear airway will improve  Description: Ability to maintain a clear airway will improve  Outcome: Ongoing     Problem: Breathing Pattern - Ineffective:  Goal: Ability to achieve and maintain a regular respiratory rate will improve  Description: Ability to achieve and maintain a regular respiratory rate will improve  Outcome: Ongoing     Problem: Gas Exchange - Impaired:  Goal: Levels of oxygenation will improve  Description: Levels of oxygenation will improve  Outcome: Ongoing     Problem: Infection:  Goal: Will remain free from infection  Description: Will remain free from infection  Outcome: Ongoing     Problem: Daily Care:  Goal: Daily care needs are met  Description: Daily care needs are met  Outcome: Ongoing     Problem: Pain:  Goal: Patient's pain/discomfort is manageable  Description: Patient's pain/discomfort is manageable  Outcome: Ongoing     Problem: Discharge Planning:  Goal: Patients continuum of care needs are met  Description: Patients continuum of care needs are met  Outcome: Ongoing     Problem: Tobacco Use:  Goal: Inpatient tobacco use cessation counseling participation  Description: Inpatient tobacco use cessation counseling participation  Outcome: Ongoing

## 2021-09-19 NOTE — PROGRESS NOTES
Hospitalist Progress Note      PCP: Ramana Patrick MD    Date of Admission: 9/17/2021         Chief Complaint: Shortness of breath     Hospital Course: reviewed      Subjective:  Feels improved from admission, still on oxygen 2L(does not use during daytime) , no overnight issues          Medications:  Reviewed    Infusion Medications    sodium chloride       Scheduled Medications    ipratropium-albuterol  1 ampule Inhalation Q4H    busPIRone  30 mg Oral Daily    FLUoxetine  60 mg Oral Daily    budesonide  0.5 mg Nebulization BID    montelukast  10 mg Oral Nightly    pantoprazole  40 mg Oral QAM AC    atorvastatin  10 mg Oral Daily    traZODone  150 mg Oral Nightly    varenicline  1 tablet Oral Daily    sodium chloride flush  5-40 mL IntraVENous 2 times per day    enoxaparin  40 mg SubCUTAneous Daily    methylPREDNISolone  40 mg IntraVENous Q6H    Followed by   Verner [place] ON 9/20/2021] predniSONE  40 mg Oral Daily    azithromycin  500 mg IntraVENous Q24H    acetylcysteine  4 mL Inhalation Q4H WA     PRN Meds: guaiFENesin, sodium chloride flush, sodium chloride, ondansetron **OR** ondansetron, polyethylene glycol, acetaminophen **OR** acetaminophen      Intake/Output Summary (Last 24 hours) at 9/19/2021 0800  Last data filed at 9/19/2021 0553  Gross per 24 hour   Intake 1530 ml   Output 600 ml   Net 930 ml       Physical Exam Performed:    /76   Pulse 96   Temp 97.7 °F (36.5 °C) (Oral)   Resp 16   Ht 5' 5\" (1.651 m)   Wt 170 lb 3.2 oz (77.2 kg)   SpO2 95%   BMI 28.32 kg/m²     General appearance: no distress, appears stated age and cooperative. HEENT:  Normal cephalic, atraumatic without obvious deformity. Pupils equal, round, and reactive to light.  Extra ocular muscles intact. Conjunctivae/corneas clear. Neck: Supple, with full range of motion. No jugular venous distention. Trachea midline.   Respiratory: no distress, improved respiratory effort.  Bilateral diminished breath sounds with scattered wheeze seem resolved  cardiovascular: Tachycardic, regular rhythm with normal S1/S2 without murmurs, rubs or gallops. Abdomen: Soft, non-tender, non-distended with normal bowel sounds. Musculoskeletal:  No clubbing, cyanosis or edema bilaterally.  Full range of motion without deformity. Skin: Skin color, texture, turgor normal.  No rashes or lesions. Neurologic:  Neurovascularly intact without any focal sensory/motor deficits. Cranial nerves: II-XII intact, grossly non-focal.  Psychiatric:  Alert and oriented, thought content appropriate, normal insight  Capillary Refill: Brisk,3 seconds, normal  Peripheral Pulses: +2 palpable, equal bilaterally     Labs:   Recent Labs     09/17/21 1935   WBC 12.9*   HGB 13.8   HCT 39.6        Recent Labs     09/17/21 1935   *   K 4.1   CL 94*   CO2 27   BUN 12   CREATININE 0.6   CALCIUM 9.3     Recent Labs     09/17/21 1935   AST 16   ALT 19   BILITOT 0.3   ALKPHOS 70     No results for input(s): INR in the last 72 hours. No results for input(s): Wayna Khat in the last 72 hours.     Urinalysis:      Lab Results   Component Value Date    NITRU Negative 08/26/2021    WBCUA 0-3 04/13/2012    RBCUA 3-5 04/13/2012    BLOODU Negative 08/26/2021    SPECGRAV 1.020 08/26/2021    GLUCOSEU Negative 08/26/2021    GLUCOSEU NEGATIVE 04/13/2012       Radiology:  XR CHEST PORTABLE   Final Result   Bilateral airspace opacities could represent atypical or multifocal bacterial   pneumonia                 Assessment/Plan:    Active Hospital Problems    Diagnosis     Stage 3 [facility] [J44.9]      Priority: Medium    Cannabis dependence with current use ([facility] [F12.20]     Acute on chronic respiratory failure ([facility] [J96.20]     COPD exacerbation (HCC) [J44.1]     Anxiety and depression [F41.9, F32.9]     ILD (interstitial lung disease) ([facility] [J84.9]     Tobacco dependence [F17.200]     Hyperlipidemia [E78.5]     Smoker [F17.200]             Recurrent COPD exacerbation   Chronic bronchitis with underlying stage III severe COPD  -Consulted pulmonology for need for ? therapeutic bronchoscopy due to ineffective airway clearance, pulm noted hx of noncompliance with office visits  - inhaled bronchodilator nebs Q4H scheduled with edwige  - Added Pulmicort nebs + Mucomyst nebs  - systemic steroids with iv solumedrol   -IV azithromycin  -Currently on BiPAP support, continue .  Check repeat gas in few hours . supplemental O2 to keep sats > 88%  -Rapid Covid negative, patient is vaccinated  -Marijuana smoking cessation counseling given  -Incentive spirometry, pulmonary toilet     Tobacco dependence-counseled cessation.    -on Chantix     Cannabis dependence with current use -urged patient if she can arrange legally to get edibles instead of smoking.     Depression/anxiety -mood stable, resume home medication regimen     ILD (interstitial lung disease)/pulmonary fibrosis -evident on CXR similar to prior.  Also seen on CT back in March 2021.  Likely related to smoking tobacco and marijuana.     Hyperlipidemia  -Resume statin     Hypertension  -Controlled, resume atenolol        DVT Prophylaxis: lmwh  Diet: ADULT DIET;  Regular  Code Status: Full Code    PT/OT Eval Status: not yet ordered     Dispo - pending improvement, pulm recs, ?by Monday if stable and weaned off daytime oxygen    Rob Golden MD

## 2021-09-19 NOTE — PROGRESS NOTES
INPATIENT PULMONARY CRITICAL CARE PROGRESS NOTE      Reason for visit: Acute respiratory failure, COPD exacerbation. Smoker, uses marijuana. Patient is seen by Dr. Bayron Saenz, has been seen in our hospital for COPD exacerbation. SUBJECTIVE: The patient feels better, is not really short of breath. She has minimal cough. She did not use BiPAP last night. She has been weaned off oxygen, SaO2 93%. She denies chest pain. Both she and her  are smokers, she uses marijuana every day     Physical Exam:  Blood pressure 108/65, pulse 112, temperature 97.8 °F (36.6 °C), temperature source Oral, resp. rate 18, height 5' 5\" (1.651 m), weight 170 lb 3.2 oz (77.2 kg), SpO2 93 %, not currently breastfeeding.'   Constitutional: Pleasant, averagely built, overweight. No acute distress. HENT:  Oropharynx is clear and moist.  Class III airway, edentulous  Eyes:  Conjunctivae are normal. Pupils equal, round, and reactive to light. No scleral icterus. Neck: No JVD. No tracheal deviation present. No obvious thyroid mass. Cardiovascular: Normal rate, regular rhythm, distant heart sounds. No lower extremity edema. Pulmonary/Chest: Diminished air entry, no wheezes. No rales. No accessory muscle usage or stridor. Abdominal: Deferred. Musculoskeletal: No cyanosis. No clubbing. No obvious joint deformity. Lymphadenopathy: Deferred. Skin: Skin is warm and dry. No rash or nodules on the exposed extremities. Psychiatric: Normal mood and affect. Behavior is normal.  No anxiety. Neurologic: Alert, awake and oriented. PERRL.   Speech fluent       Results:  CBC:   Recent Labs     09/17/21 1935   WBC 12.9*   HGB 13.8   HCT 39.6   MCV 85.5        BMP:   Recent Labs     09/17/21 1935   *   K 4.1   CL 94*   CO2 27   BUN 12   CREATININE 0.6     LIVER PROFILE:   Recent Labs     09/17/21 1935   AST 16   ALT 19   BILITOT 0.3   ALKPHOS 70     PT/INR: No results for input(s): PROTIME, INR in the last 72 hours.  APTT: No results for input(s): APTT in the last 72 hours. UA:No results for input(s): NITRITE, COLORU, PHUR, LABCAST, WBCUA, RBCUA, MUCUS, TRICHOMONAS, YEAST, BACTERIA, CLARITYU, SPECGRAV, LEUKOCYTESUR, UROBILINOGEN, BILIRUBINUR, BLOODU, GLUCOSEU, AMORPHOUS in the last 72 hours. Invalid input(s): KETONESU    Imaging:  I have reviewed radiology images personally. XR CHEST PORTABLE   Final Result   Bilateral airspace opacities could represent atypical or multifocal bacterial   pneumonia           XR CHEST PORTABLE    Result Date: 9/17/2021  EXAMINATION: ONE XRAY VIEW OF THE CHEST 9/17/2021 7:36 pm COMPARISON: 09/09/2021 HISTORY: ORDERING SYSTEM PROVIDED HISTORY: shortness of breath TECHNOLOGIST PROVIDED HISTORY: Reason for exam:->shortness of breath Reason for Exam: SOB Acuity: Unknown Type of Exam: Initial Relevant Medical/Surgical History: HX of COPD FINDINGS: Cardiomediastinal silhouette is stable. Bilateral airspace opacities. No pulmonary vascular congestion or edema. No pneumothorax. Bilateral airspace opacities could represent atypical or multifocal bacterial pneumonia     XR CHEST PORTABLE    Result Date: 9/9/2021  EXAMINATION: ONE XRAY VIEW OF THE CHEST 9/9/2021 1:39 pm COMPARISON: 09/03/2021 HISTORY: ORDERING SYSTEM PROVIDED HISTORY: Shortness of breath TECHNOLOGIST PROVIDED HISTORY: Reason for exam:->Shortness of breath Reason for Exam: sob for 2 days Acuity: Acute Type of Exam: Initial FINDINGS: The cardiac silhouette is normal.  Thoracic aorta remains mildly tortuous. Interstitial opacities are mildly increased. No consolidation, pleural effusion or pneumothorax is seen. No acute cardiopulmonary disease. Stable pattern of fibrosis. XR CHEST PORTABLE    Result Date: 9/5/2021  EXAMINATION: ONE X-RAY VIEW OF THE CHEST 9/3/2021 1:38 pm COMPARISON: Chest radiograph performed August 26, 2021.  HISTORY: ORDERING SYSTEM PROVIDED HISTORY:  SOB TECHNOLOGIST PROVIDED HISTORY: Reason for Exam:  SOB Reason for Exam:  SOB Acuity:  Acute Type of Exam:  Initial FINDINGS: Hazy opacities at the bilateral lung bases, not significantly changed from prior examination. No definite pleural effusion or pneumothorax. Stable cardiomediastinal silhouette. No acute osseous abnormality. Stable bibasilar airspace opacities. XR CHEST PORTABLE    Result Date: 8/26/2021  EXAMINATION: ONE XRAY VIEW OF THE CHEST 8/26/2021 10:05 am COMPARISON: None. HISTORY: ORDERING SYSTEM PROVIDED HISTORY: shortness of breath TECHNOLOGIST PROVIDED HISTORY: Reason for exam:->shortness of breath Reason for Exam: sob Acuity: Acute Type of Exam: Initial FINDINGS: Heart size and pulmonary vasculature within normal limits. Thoracic aorta tortuous. Peripheral ground-glass opacities in the mid and lower lung zones. Costophrenic angles sharp     Peripheral ground-glass opacities suspicious for viral/atypical pattern of pneumonia       Assessment and plan:  COPD exacerbation, severe COPD. Feeling much better. Will reduce Solu-Medrol to every 12 hours, agree with changing to prednisone tomorrow. Presently does not have significant mucus plugging clinically, will discontinue acetylcysteine. Discontinue antibiotic as procalcitonin normal.  ILD. Nonspecific appearance. Being followed by Dr. Jason Carrasco and marijuana dependence. Talked to her about smoking altogether, both she and her  should try quitting smoking to gallop. On Chantix  HLD, allergic rhinitis, depression. Per IM  DVT prophylaxis.   Lovenox            Electronically signed by:  Anastasiia Wren MD    9/19/2021    4:11 PM.

## 2021-09-19 NOTE — DISCHARGE INSTR - COC
Continuity of Care Form    Patient Name: Chris Almazan   :  1955  MRN:  [id number]    Admit date:  2021  Discharge date:  ***    Code Status Order: Full Code   Advance Directives:      Admitting Physician:  Puja Ortiz MD  PCP: Jose Alejandro Salgado MD    Discharging Nurse: [facility] Unit/Room#: 3110/1643-61  Discharging Unit Phone Number: ***    Emergency Contact:   Extended Emergency Contact Information  Primary Emergency Contact: Angelita Wong  Address: [place] Tam [phone]           [address] [place] [address] Phone: [phone]  Mobile Phone: [phone]  Relation: Child  Secondary Emergency Contact: y [address], [address] [facility] Phone: [phone]  Mobile Phone: [phone]  Relation: Spouse    Past Surgical History:  Past Surgical History:   Procedure Laterality Date    BLADDER REPAIR      BRONCHOSCOPY      BRONCHOSCOPY N/A 3/6/2020    BRONCHOSCOPY THERAPUTIC ASPIRATION INITIAL performed by Quin Meadows MD at [facility]  3/6/2020    BRONCHOSCOPY ALVEOLAR LAVAGE performed by Quin Meadows MD at [address] [place] [address] 2020    BRONCHOSCOPY THERAPUTIC ASPIRATION INITIAL performed by Jessica Murguia MD at [facility]  2020    BRONCHOSCOPY ALVEOLAR LAVAGE performed by Jessica Murguia MD at [facility]  2021    Dr Cayden Sun N/A 2021    [address] performed by Jessica Murguia MD at [facility]  2021    BRONCHOSCOPY THERAPUTIC ASPIRATION INITIAL performed by Jessica Murguia MD at [facility]  2021    BRONCHOSCOPY ALVEOLAR LAVAGE performed by Jessica Murguia MD at [address] [place] [address] 2021    BRONCHOSCOPY DIAGNOSTIC OR CELL [address] performed by Jessica Murguia MD at [address] [facility]      ENDOSCOPY, COLON, DIAGNOSTIC      ESOPHAGEAL DILATATION  9/20/2018    ESOPHAGEAL DILATION WATERS performed by Thompson Meadows MD at [place] [address]  2/12/15    T8 Kyphoplasty    NJ COLONOSCOPY FLX DX W/COLLJ SPEC WHEN PFRMD N/A 9/20/2018    EGD AND COLONOSCOPY WITH ANESTHESIA performed by Thompson Meadows MD at [address] [place] [address] ESOPHAGOGASTRODUODENOSCOPY TRANSORAL DIAGNOSTIC N/A 9/20/2018    EGD AND COLONOSCOPY WITH ANESTHESIA performed by Thompson Meadows MD at [address] [facility]         Immunization History:   Immunization History   Administered Date(s) Administered    COVID-19, Pfizer, PF, 30mcg/0.3mL 03/10/2021, 03/31/2021    Influenza 10/04/2010, 10/12/2011, 11/28/2012    Influenza Virus Vaccine 10/16/2014, 01/14/2016    Influenza, Intradermal, Preservative free 11/04/2013    Influenza, Quadv, IM, (6 mo and older Fluzone, Flulaval, Fluarix and 3 yrs and older Afluria) 10/12/2011, 01/30/2017, 10/19/2017    Influenza, Naomy Crass, IM, PF (6 mo and older Fluzone, Flulaval, Fluarix, and 3 yrs and older Afluria) 09/06/2018, 12/26/2019    Pneumococcal Conjugate 7-valent (Prevnar7) 01/01/2009    Pneumococcal Polysaccharide (Xndvbvlom14) 01/14/2016    Td, unspecified formulation 11/04/2013    Tdap (Boostrix, Adacel) 01/25/2018       Active Problems:  Patient Active Problem List   Diagnosis Code    Stage 3 severe COPD by GOLD classification ([facility] J44.9    Smoker F17.200    Fibromyalgia M79.7    Hypokalemia E87.6    Hyperlipidemia E78.5    Hyponatremia E87.1    Depression F32.9    Tobacco dependence F17.200    Pulmonary fibrosis (HCC) J84.10    ILD (interstitial lung disease) ([facility] J84.9    Recurrent major depressive disorder, in partial remission (HCC) F33.41    Thyroid nodule E04.1    History of prescription drug abuse [id number]    Esophageal dysphagia R13.10    Heartburn R12    Rectal bleeding K62.5    History of colon polyps Z86.010    Compression fx, thoracic spine, sequela S22.000S    Kyphosis M40.209    Anxiety and depression F41.9, F32.9    Chronic pain syndrome G89.4    Polyarthropathy, multiple sites M13.0    Pneumonia J18.9    Acute on chronic respiratory failure with hypoxia and hypercapnia ([facility]) J96.21, J96.22    Acute respiratory failure with hypoxia ([facility]) J96.01    Healthcare associated bacterial pneumonia J15.9    Bandemia D72.825    Sepsis ([facility]) A41.9    COPD exacerbation ([facility]) J44.1    Pulmonary infiltrates R91.8    Chest congestion R09.89    Mucus plugging of bronchi T17.500A    Ineffective airway clearance R06.89    Acute on chronic respiratory failure with hypoxemia ([facility]) J96.21    COPD, frequent exacerbations ([facility]) J44.1    Acute on chronic respiratory failure ([facility]) J96.20    Cannabis dependence with current use ([facility]) F12.20    Other secondary kyphosis, 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    Marijuana use L56.87    Diastolic dysfunction C67.61    COPD (chronic obstructive pulmonary disease) ([facility]) J44.9       Isolation/Infection:   Isolation            No Isolation          Patient Infection Status       Infection Onset Added Last Indicated Last Indicated By Review Planned Expiration Resolved Resolved By    None active    Resolved    COVID-19 Rule Out 09/17/21 09/17/21 09/17/21 COVID-19, Rapid (Ordered)   09/17/21 Rule-Out Test Resulted    COVID-19 Rule Out 09/09/21 09/09/21 09/09/21 SARS-CoV-2 NAAT (Rapid) (Ordered)   09/09/21 Rule-Out Test Resulted    COVID-19 Rule Out 07/11/21 07/11/21 07/11/21 COVID-19, Rapid (Ordered)   07/11/21 Rule-Out Test Resulted    COVID-19 Rule Out 11/27/20 11/27/20 11/27/20 COVID-19 (Ordered)   11/27/20 Rule-Out Test Resulted    COVID-19 Rule Out 10/09/20 10/09/20 10/09/20 COVID-19 (Ordered)   10/10/20 Rule-Out Test Resulted    COVID-19 Rule Out 06/24/20 06/24/20 06/24/20 COVID-19 (Ordered)   06/25/20 Rule-Out Test Resulted    COVID-19 Rule Out 04/03/20 04/03/20 06/01/20 COVID-19 (Ordered)   06/02/20 Rule-Out Test Resulted    INFLUENZA 03/05/20 20 Respiratory Panel, Molecular   20             Nurse Assessment:  Last Vital Signs: /70   Pulse 105   Temp 97.7 °F (36.5 °C) (Oral)   Resp 17   Ht 5' 5\" (1.651 m)   Wt 170 lb 3.2 oz (77.2 kg)   SpO2 93%   BMI 28.32 kg/m²     Last documented pain score (0-10 scale): Pain Level: 0  Last Weight:   Wt Readings from Last 1 Encounters:   21 170 lb 3.2 oz (77.2 kg)     Mental Status:  {IP PT MENTAL STATUS:}    IV Access:  { ASHLEY IV ACCESS:848972281}    Nursing Mobility/ADLs:  Walking   {CHP DME IGEX:098402001}  Transfer  {CHP DME HXOJ:052279167}  Bathing  {CHP DME GYSS:228404045}  Dressing  {CHP DME YZBE:962673096}  Toileting  {CHP DME PTIJ:892159422}  Feeding  {CHP DME OSDD:310504836}  Med Admin  {CHP DME PUUJ:675175596}  Med Delivery   { ASHLEY MED Delivery:396012542}    Wound Care Documentation and Therapy:        Elimination:  Continence: Bowel: {YES / D}  Bladder: {YES / QC:39577}  Urinary Catheter: {Urinary Catheter:413067487}   Colostomy/Ileostomy/Ileal Conduit: {YES / IX:69169}       Date of Last BM: ***    Intake/Output Summary (Last 24 hours) at 2021 1317  Last data filed at 2021 0553  Gross per 24 hour   Intake 1530 ml   Output 600 ml   Net 930 ml     I/O last 3 completed shifts:   In: 0 [P.O.:1530]  Out: 600 [Urine:600]    Safety Concerns:     [employer] Safety Concerns:607788656}    Impairments/Disabilities:      [employer] Impairments/Disabilities:195403647}    Nutrition Therapy:  Current Nutrition Therapy:   [employer] Diet List:344996903}    Routes of Feeding: {CHP DME Other Feedings:830948168}  Liquids: {Slp liquid thickness:06997}  Daily Fluid Restriction: {CHP DME Yes amt example:007139629}  Last Modified Barium Swallow with Video (Video Swallowing Test): {Done Not Done ACFP:033280918}    Treatments at the Time of Hospital Discharge:   Respiratory Treatments: ***  Oxygen Therapy:  {Therapy; copd oxygen:04811}  Ventilator:    { CC Vent BRITTON:435960729}    Rehab Therapies: {THERAPEUTIC INTERVENTION:5717493855}  Weight Bearing Status/Restrictions: [address] [employer] Weight Bearin}  Other Medical Equipment (for information only, NOT a DME order):  {EQUIPMENT:850499270}  Other Treatments: ***    Patient's personal belongings (please select all that are sent with patient):  {CHP DME Belongings:283899489}    RN SIGNATURE:  {Esignature:876854174}    CASE MANAGEMENT/SOCIAL WORK SECTION    Inpatient Status Date: ***    Readmission Risk Assessment Score:  Readmission Risk              Risk of Unplanned Readmission:  50           Discharging to Facility/ Agency   Name:   Address:  Phone:  Fax:    Dialysis Facility (if applicable)   Name:  Address:  Dialysis Schedule:  Phone:  Fax:    / signature: {Esignature:312741349}    PHYSICIAN SECTION    Prognosis: Good    Condition at Discharge: Stable    Rehab Potential (if transferring to Rehab): Good    Recommended Labs or Other Treatments After Discharge: PT/OT    Physician Certification: I certify the above information and transfer of Tito Rutherford  is necessary for the continuing treatment of the diagnosis listed and that she requires Home Care for greater 30 days.      Update Admission H&P: No change in H&P    PHYSICIAN SIGNATURE:  Electronically signed by Eddie Garza MD on 21 at 4:53 PM EDT

## 2021-09-19 NOTE — FLOWSHEET NOTE
09/19/21 1931   Assessment   Charting Type Shift assessment   Venetia Coma Scale   Eye Opening 4   Best Verbal Response 5   Best Motor Response 6   Sarah Coma Scale Score 15   HEENT   HEENT (WDL) X   Right Eye Impaired vision   Left Eye Impaired vision   Nose Intact   Throat Intact   Tongue Dry   Voice Normal   Mucous Membrane Pink; Intact;Dry   Teeth Dentures upper;Missing teeth   Respiratory   Respiratory (WDL) X   Respiratory Pattern Regular   Respiratory Depth Normal   Respiratory Quality/Effort Dyspnea with exertion   Chest Assessment Chest expansion symmetrical;Trachea midline   L Breath Sounds Diminished; Expiratory Wheezes   R Breath Sounds Diminished; Expiratory Wheezes   Breath Sounds   Right Upper Lobe Expiratory Wheezes   Right Middle Lobe Expiratory Wheezes   Right Lower Lobe Diminished   Left Upper Lobe Expiratory Wheezes   Left Lower Lobe Diminished   Cardiac   Cardiac (WDL) X   Cardiac Regularity Regular   Heart Sounds S1, S2   Cardiac Rhythm Sinus rhythm;Sinus tachy   Cardiac Monitor   Telemetry Monitor On Yes   Telemetry Audible Yes   Telemetry Alarms Set Yes   Gastrointestinal   Abdominal (WDL) WDL   Peripheral Vascular   Peripheral Vascular (WDL) WDL   Edema None   Skin Color/Condition   Skin Color/Condition (WDL) WDL   Skin Color Appropriate for ethnicity   Skin Condition/Temp Cool   Skin Integrity   Skin Integrity (WDL) X   Skin Integrity Abrasion;Bruising   Location scattered    Musculoskeletal   Musculoskeletal (WDL) X   RUE Full movement   LUE Full movement   RL Extremity Weakness   LL Extremity Weakness   Genitourinary   Genitourinary (WDL) WDL   Flank Tenderness No   Suprapubic Tenderness No   Dysuria No   Urine Assessment   Incontinence No   Urine Color Yellow/straw   Urine Appearance Hazy   Urine Odor Malodorous   Anus/Rectum   Anus/Rectum (WDL) WDL   Psychosocial   Psychosocial (WDL) WDL
Wheezes   Left Lower Lobe Rhonchi;Diminished   Cough/Sputum   Sputum How Obtained None   Frequency Occasional   Cardiac   Cardiac (WDL) X   Cardiac Regularity Regular   Heart Sounds S1, S2   Cardiac Rhythm Sinus rhythm;Sinus tachy   Rhythm Interpretation   Pulse 92   Cardiac Monitor   Telemetry Monitor On Yes   Telemetry Audible Yes   Telemetry Alarms Set Yes   Gastrointestinal   Abdominal (WDL) WDL   Last BM (including prior to admit) 09/17/21   Peripheral Vascular   Peripheral Vascular (WDL) WDL   Edema None   Sensation RUE Full sensation   Sensation LUE Full sensation   Sensation RLE Full sensation   Sensation LLE Full sensation   Skin Color/Condition   Skin Color/Condition (WDL) WDL   Skin Color Appropriate for ethnicity;Pale   Skin Condition/Temp Cool;Dry   Skin Integrity   Skin Integrity (WDL) X   Skin Integrity Bruising   Location Scattered   Preventative Dressing No   Skin Fold Management No   Multiple Skin Integrity Sites No   Musculoskeletal   Musculoskeletal (WDL) WDL   RUE Full movement   LUE Full movement   RL Extremity Full movement   LL Extremity Full movement   Genitourinary   Genitourinary (WDL) WDL   Flank Tenderness No   Suprapubic Tenderness No   Dysuria No   Urine Assessment   Incontinence No   Urine Color Yellow/straw   Urine Appearance Hazy   Urine Odor Malodorous   Anus/Rectum   Anus/Rectum (WDL) WDL   Psychosocial   Psychosocial (WDL) WDL

## 2021-09-20 ENCOUNTER — CARE COORDINATION (OUTPATIENT)
Dept: CARE COORDINATION | Age: 66
End: 2021-09-20

## 2021-09-20 ENCOUNTER — CARE COORDINATION (OUTPATIENT)
Dept: CASE MANAGEMENT | Age: 66
End: 2021-09-20

## 2021-09-20 VITALS
HEIGHT: 65 IN | HEART RATE: 105 BPM | BODY MASS INDEX: 28.36 KG/M2 | TEMPERATURE: 98.5 F | OXYGEN SATURATION: 95 % | WEIGHT: 170.2 LBS | DIASTOLIC BLOOD PRESSURE: 85 MMHG | SYSTOLIC BLOOD PRESSURE: 132 MMHG | RESPIRATION RATE: 16 BRPM

## 2021-09-20 PROCEDURE — 6370000000 HC RX 637 (ALT 250 FOR IP): Performed by: STUDENT IN AN ORGANIZED HEALTH CARE EDUCATION/TRAINING PROGRAM

## 2021-09-20 PROCEDURE — 6370000000 HC RX 637 (ALT 250 FOR IP): Performed by: INTERNAL MEDICINE

## 2021-09-20 PROCEDURE — 94660 CPAP INITIATION&MGMT: CPT

## 2021-09-20 PROCEDURE — 94640 AIRWAY INHALATION TREATMENT: CPT

## 2021-09-20 PROCEDURE — 2580000003 HC RX 258: Performed by: INTERNAL MEDICINE

## 2021-09-20 PROCEDURE — 6360000002 HC RX W HCPCS: Performed by: INTERNAL MEDICINE

## 2021-09-20 PROCEDURE — 99232 SBSQ HOSP IP/OBS MODERATE 35: CPT | Performed by: INTERNAL MEDICINE

## 2021-09-20 RX ORDER — PREDNISONE 20 MG/1
40 TABLET ORAL DAILY
Qty: 6 TABLET | Refills: 0 | Status: SHIPPED | OUTPATIENT
Start: 2021-09-20 | End: 2021-09-23

## 2021-09-20 RX ORDER — AZITHROMYCIN 250 MG/1
500 TABLET, FILM COATED ORAL ONCE
Status: COMPLETED | OUTPATIENT
Start: 2021-09-20 | End: 2021-09-20

## 2021-09-20 RX ADMIN — PREDNISONE 40 MG: 20 TABLET ORAL at 17:17

## 2021-09-20 RX ADMIN — IPRATROPIUM BROMIDE AND ALBUTEROL SULFATE 1 AMPULE: .5; 3 SOLUTION RESPIRATORY (INHALATION) at 15:55

## 2021-09-20 RX ADMIN — Medication 10 ML: at 09:09

## 2021-09-20 RX ADMIN — IPRATROPIUM BROMIDE AND ALBUTEROL SULFATE 1 AMPULE: .5; 3 SOLUTION RESPIRATORY (INHALATION) at 11:43

## 2021-09-20 RX ADMIN — IPRATROPIUM BROMIDE AND ALBUTEROL SULFATE 1 AMPULE: .5; 3 SOLUTION RESPIRATORY (INHALATION) at 07:47

## 2021-09-20 RX ADMIN — IPRATROPIUM BROMIDE AND ALBUTEROL SULFATE 1 AMPULE: .5; 3 SOLUTION RESPIRATORY (INHALATION) at 03:39

## 2021-09-20 RX ADMIN — BUSPIRONE HYDROCHLORIDE 30 MG: 5 TABLET ORAL at 09:08

## 2021-09-20 RX ADMIN — ATORVASTATIN CALCIUM 10 MG: 10 TABLET, FILM COATED ORAL at 09:08

## 2021-09-20 RX ADMIN — FLUOXETINE 60 MG: 20 CAPSULE ORAL at 09:09

## 2021-09-20 RX ADMIN — PANTOPRAZOLE SODIUM 40 MG: 40 TABLET, DELAYED RELEASE ORAL at 05:21

## 2021-09-20 RX ADMIN — AZITHROMYCIN 500 MG: 250 TABLET, FILM COATED ORAL at 17:16

## 2021-09-20 RX ADMIN — ENOXAPARIN SODIUM 40 MG: 40 INJECTION SUBCUTANEOUS at 09:09

## 2021-09-20 NOTE — DISCHARGE SUMMARY
Hospital Medicine Discharge Summary    Patient ID: Blank Pang      Patient's PCP: Jocelyn Hernandez MD    Admit Date: 9/17/2021     Discharge Date:   09/20/2021    Admitting Physician: Jerrie Najjar, MD     Discharge Physician: Sergio Knutson MD     Discharge Diagnoses: Active Hospital Problems    Diagnosis     Stage 3 severe COPD by GOLD classification ([facility] [J44.9]      Priority: Medium    Cannabis dependence with current use ([facility] [F12.20]     Acute on chronic respiratory failure (HCC) [J96.20]     COPD exacerbation (HCC) [J44.1]     Anxiety and depression [F41.9, F32.9]     ILD (interstitial lung disease) ([facility] [J84.9]     Tobacco dependence [F17.200]     Hyperlipidemia [E78.5]     Smoker [F17.200]        The patient was seen and examined on day of discharge and this discharge summary is in conjunction with any daily progress note from day of discharge. Hospital Course:     60 y.o. female who presented to [facility] with shortness of breath in the setting of COPD exacerbation. PMH of severe COPD, depression, fibromyalgia, ILD, tobacco dependence    Problem based hospital course below. Please see H&P from 09/17.     Recurrent COPD exacerbation- Chronic bronchitis with underlying stage III severe COPD  -Steroid 09/17-09/23  - Azithromycin 500mg qday x3  - continued home [id number] and nebulized therapy  - supplemental O2 to keep sats > 88%, maintained on RA  -Rapid Covid negative, patient is vaccinated     Tobacco dependence-counseled cessation.    -on Chantix     Cannabis dependence with current use -urged patient if she can arrange legally to get edibles instead of smoking.     Depression/anxiety -mood stable, resume home medication regimen     ILD (interstitial lung disease)/pulmonary fibrosis -evident on CXR similar to prior.  Also seen on CT back in March 2021.  Likely related to smoking tobacco and marijuana.     Hyperlipidemia  -Resume statin     Hypertension  -Controlled, resume atenolol           Physical Exam Performed:     /85   Pulse 105   Temp 98.5 °F (36.9 °C) (Oral)   Resp 16   Ht 5' 5\" (1.651 m)   Wt 170 lb 3.2 oz (77.2 kg)   SpO2 95%   BMI 28.32 kg/m²       General appearance:  No apparent distress, appears stated age and cooperative. HEENT:  Normal cephalic, atraumatic without obvious deformity. Pupils equal, round, and reactive to light. Extra ocular muscles intact. Conjunctivae/corneas clear. Neck: Supple, with full range of motion. No jugular venous distention. Trachea midline. Respiratory:  Normal respiratory effort. Clear to auscultation, bilaterally without Rales/Wheezes/Rhonchi. Cardiovascular:  Regular rate and rhythm with normal S1/S2 without murmurs, rubs or gallops. Abdomen: Soft, non-tender, non-distended with normal bowel sounds. Musculoskeletal:  No clubbing, cyanosis or edema bilaterally. Full range of motion without deformity. Skin: Skin color, texture, turgor normal.  No rashes or lesions. Neurologic:  Neurovascularly intact without any focal sensory/motor deficits. Cranial nerves: II-XII intact, grossly non-focal.  Psychiatric:  Alert and oriented, thought content appropriate, normal insight  Capillary Refill: Brisk,< 3 seconds   Peripheral Pulses: +2 palpable, equal bilaterally       Labs:  For convenience and continuity at follow-up the following most recent labs are provided:      CBC:    Lab Results   Component Value Date    WBC 12.9 09/17/2021    HGB 13.8 09/17/2021    HCT 39.6 09/17/2021     09/17/2021       Renal:    Lab Results   Component Value Date     09/17/2021    K 4.1 09/17/2021    K 4.1 09/09/2021    CL 94 09/17/2021    CO2 27 09/17/2021    BUN 12 09/17/2021    CREATININE 0.6 09/17/2021    CALCIUM 9.3 09/17/2021    PHOS 3.4 09/11/2021         Significant Diagnostic Studies    Radiology:   XR CHEST PORTABLE   Final Result   Bilateral airspace opacities could represent atypical or multifocal bacterial   pneumonia                Consults:     IP CONSULT TO HOSPITALIST  IP CONSULT TO PULMONOLOGY  IP CONSULT TO HOME CARE NEEDS    Disposition:  home     Condition at Discharge: Stable    Discharge Instructions/Follow-up:  PCP    Code Status:  Full Code     Activity: activity as tolerated    Diet: regular diet      Discharge Medications:     Current Discharge Medication List           Details   predniSONE (DELTASONE) 20 MG tablet Take 2 tablets by mouth daily for 3 days  Qty: 6 tablet, Refills: 0              Details   traZODone (DESYREL) 150 MG tablet Take 1-2 tablets by mouth nightly TAKE 2 TABLETS AT BEDTIME  Qty: 180 tablet, Refills: 1    Associated Diagnoses: Insomnia, unspecified type      ipratropium-albuterol (DUONEB) 0.5-2.5 (3) MG/3ML SOLN nebulizer solution Inhale 3 mLs into the lungs every 6 hours as needed for Shortness of Breath  Qty: 360 mL, Refills: 0      guaiFENesin (MUCINEX) 600 MG extended release tablet Take 1 tablet by mouth 2 times daily as needed for Congestion  Qty: 60 tablet, Refills: 1      varenicline (CHANTIX STARTING MONTH PAK) 0.5 MG X 11 & 1 MG X 42 tablet Take by mouth.   Qty: 1 box, Refills: 0      fluticasone-salmeterol (WIXELA INHUB) 500-50 MCG/DOSE diskus inhaler Inhale 1 puff into the lungs every 12 hours  Qty: 180 each, Refills: 1      tiotropium (SPIRIVA HANDIHALER) 18 MCG inhalation capsule INHALE THE CONTENTS OF 1 CAPSULE EVERY DAY  Qty: 90 capsule, Refills: 1      albuterol (PROVENTIL) (2.5 MG/3ML) 0.083% nebulizer solution Take 3 mLs by nebulization every 6 hours as needed for Wheezing or Shortness of Breath DX COPD J44.9  Qty: 120 vial, Refills: 6      montelukast (SINGULAIR) 10 MG tablet TAKE 1 TABLET BY MOUTH EACH NIGHT  Qty: 90 tablet, Refills: 1      pantoprazole (PROTONIX) 40 MG tablet Take 1 tablet by mouth every morning (before breakfast)  Qty: 30 tablet, Refills: 1      albuterol sulfate HFA (VENTOLIN HFA) 108 (90 Base) MCG/ACT inhaler

## 2021-09-20 NOTE — PROGRESS NOTES
09/20/21 0341   NIV Type   Equipment Type v60   Mode Bilevel   Mask Type Full face mask   Mask Size Medium   Settings/Measurements   IPAP 15 cmH20   CPAP/EPAP 6 cmH2O   Rate Ordered 4   Resp 19   Insp Rise Time (%) 2 %   FiO2  30 %   I Time/ I Time % 1 s   Vt Exhaled 634 mL   Minute Volume 11.8 Liters   Mask Leak (lpm) 19 lpm   Comfort Level Good   Using Accessory Muscles No   Patient Observation   Observations pt asleep    Alarm Settings   Alarms On Y   Press Low Alarm 6 cmH2O   High Pressure Alarm 30 cmH2O   Delay Alarm 20 sec(s)   Resp Rate Low Alarm 6   High Respiratory Rate 40 br/min

## 2021-09-20 NOTE — CARE COORDINATION
No outreach at this time d/t patient noted to be inpatient and being followed by [facility].      [facility]  [phone]  [facility] [address] Browne  [address] [place] [address]

## 2021-09-20 NOTE — PROGRESS NOTES
Pt discharged home per order. IV removed, meds reviewed. Patient knows meds are called into [facility] pharmacy [facility]. Pt fully understand d/c instructions.

## 2021-09-20 NOTE — PROGRESS NOTES
INPATIENT PULMONARY CRITICAL CARE PROGRESS NOTE      Reason for visit: Acute respiratory failure, COPD exacerbation. Smoker, uses marijuana. Patient is seen by Dr. Tori Lema, has been seen in our hospital for COPD exacerbation. SUBJECTIVE: Patient when seen this morning was feeling better, patient did not have any increasing cough expectoration shortness of breath or wheezing, patient does not have any chest pain or palpitations, patient was afebrile and hemodynamically maintained, patient had normal sinus rhythm on the monitor, patient was not hypoxemic and was on room air oxygen with saturation 95%, patient did use BiPAP last night, patient was alert and oriented, urine output has not been documented in the epic for review, no other pertinent review of system of concern     Physical Exam:  Blood pressure 128/77, pulse 98, temperature 98.1 °F (36.7 °C), temperature source Oral, resp. rate 16, height 5' 5\" (1.651 m), weight 170 lb 3.2 oz (77.2 kg), SpO2 95 %, not currently breastfeeding.'   Constitutional: Pleasant, averagely built, overweight. No acute distress. HENT:  Oropharynx is clear and moist.  Class III airway, edentulous  Eyes:  Conjunctivae are normal. Pupils equal, round, and reactive to light. No scleral icterus. Neck: No JVD. No tracheal deviation present. No obvious thyroid mass. Cardiovascular: Normal rate, regular rhythm, distant heart sounds. No lower extremity edema. Pulmonary/Chest: Diminished air entry, no wheezes. No rales. No accessory muscle usage or stridor. Abdominal:  No palpable hepatosplenomegaly or tenderness  Musculoskeletal: No cyanosis. No clubbing. No obvious joint deformity. Lymphadenopathy: Deferred. Skin: Skin is warm and dry. No rash or nodules on the exposed extremities. Psychiatric: Normal mood and affect. Behavior is normal.  No anxiety. Neurologic: Alert, awake and oriented. PERRL.   Speech fluent       Results:  CBC:   Recent Labs     09/17/21  1935 WBC 12.9*   HGB 13.8   HCT 39.6   MCV 85.5        BMP:   Recent Labs     09/17/21 1935   *   K 4.1   CL 94*   CO2 27   BUN 12   CREATININE 0.6     LIVER PROFILE:   Recent Labs     09/17/21 1935   AST 16   ALT 19   BILITOT 0.3   ALKPHOS 70       Imaging:  I have reviewed radiology images personally. XR CHEST PORTABLE   Final Result   Bilateral airspace opacities could represent atypical or multifocal bacterial   pneumonia           XR CHEST PORTABLE    Result Date: 9/17/2021  EXAMINATION: ONE XRAY VIEW OF THE CHEST 9/17/2021 7:36 pm COMPARISON: 09/09/2021 HISTORY: ORDERING SYSTEM PROVIDED HISTORY: shortness of breath TECHNOLOGIST PROVIDED HISTORY: Reason for exam:->shortness of breath Reason for Exam: SOB Acuity: Unknown Type of Exam: Initial Relevant Medical/Surgical History: HX of COPD FINDINGS: Cardiomediastinal silhouette is stable. Bilateral airspace opacities. No pulmonary vascular congestion or edema. No pneumothorax. Bilateral airspace opacities could represent atypical or multifocal bacterial pneumonia     XR CHEST PORTABLE    Result Date: 9/9/2021  EXAMINATION: ONE XRAY VIEW OF THE CHEST 9/9/2021 1:39 pm COMPARISON: 09/03/2021 HISTORY: ORDERING SYSTEM PROVIDED HISTORY: Shortness of breath TECHNOLOGIST PROVIDED HISTORY: Reason for exam:->Shortness of breath Reason for Exam: sob for 2 days Acuity: Acute Type of Exam: Initial FINDINGS: The cardiac silhouette is normal.  Thoracic aorta remains mildly tortuous. Interstitial opacities are mildly increased. No consolidation, pleural effusion or pneumothorax is seen. No acute cardiopulmonary disease. Stable pattern of fibrosis. XR CHEST PORTABLE    Result Date: 9/5/2021  EXAMINATION: ONE X-RAY VIEW OF THE CHEST 9/3/2021 1:38 pm COMPARISON: Chest radiograph performed August 26, 2021.  HISTORY: ORDERING SYSTEM PROVIDED HISTORY:  SOB TECHNOLOGIST PROVIDED HISTORY: Reason for Exam:  SOB Reason for Exam:  SOB Acuity:  Acute Type of Exam:  Initial FINDINGS: Hazy opacities at the bilateral lung bases, not significantly changed from prior examination. No definite pleural effusion or pneumothorax. Stable cardiomediastinal silhouette. No acute osseous abnormality. Stable bibasilar airspace opacities. XR CHEST PORTABLE    Result Date: 8/26/2021  EXAMINATION: ONE XRAY VIEW OF THE CHEST 8/26/2021 10:05 am COMPARISON: None. HISTORY: ORDERING SYSTEM PROVIDED HISTORY: shortness of breath TECHNOLOGIST PROVIDED HISTORY: Reason for exam:->shortness of breath Reason for Exam: sob Acuity: Acute Type of Exam: Initial FINDINGS: Heart size and pulmonary vasculature within normal limits. Thoracic aorta tortuous. Peripheral ground-glass opacities in the mid and lower lung zones. Costophrenic angles sharp     Peripheral ground-glass opacities suspicious for viral/atypical pattern of pneumonia     Results for Lizabeth Santiago (MRN [id number]) as of 9/20/2021 14:29   Ref. Range 2/28/2019 10:53 8/29/2019 23:59 12/2/2020 14:06   DLCO %Pred Latest Units: % 31 30 30   FEV1 %Pred-Post Latest Units: % 54 38 49   FEV1 %Pred-Pre Latest Units: % 50 38 45   FEV1/FVC-Post Latest Units: % 67 60 57   FEV1/FVC-Pre Latest Units: % 64 59 54   TLC %Pred Latest Units: % 78 81 78     Results for Lizabeth Santiago (MRN [id number]) as of 9/20/2021 14:29   Ref.  Range 7/11/2021 17:30 8/26/2021 10:07 8/26/2021 18:27 9/9/2021 13:41 9/17/2021 19:35   pH, Ivan Latest Ref Range: 7.350 - 7.450  7.433 7.411 7.435 7.553 (H) 7.411   pCO2, Ivan Latest Ref Range: 40.0 - 50.0 mmHg 36.3 (L) 37.0 (L) 32.8 (L) 27.2 (L) 46.9   pO2, Ivan Latest Ref Range: 25 - 40 mmHg 85.5 (H) 115.4 (H) 109.7 (H) 139.1 (H) 73.1 (H)   HCO3, Venous Latest Ref Range: 23.0 - 29.0 mmol/L 23.7 23.0 21.5 (L) 23.4 29.1 (H)   TC02 (Calc), Ivan Latest Ref Range: Not Established mmol/L 25 24 23 24 31   Base Excess, Ivan Latest Ref Range: -3.0 - 3.0 mmol/L -0.2 -1.2 -1.8 2.3 3.7 (H)   MetHgb, Ivan Latest Ref Range: <1.5 % 0.2 0.6 0.6 0.4 0.5   O2 Sat, Ivan Latest Ref Range: Not Established % 96 98 98 98 95       Assessment and plan:  COPD exacerbation, severe COPD. Oxygen supplementation, if required, to keep saturation between 90 to 94% only  Patient was on room air oxygen when seen   presently does not have significant mucus plugging clinically, will discontinue acetylcysteine. Discontinue antibiotic as procalcitonin normal.  ILD. Nonspecific appearance. Being followed by Dr. Asia Saldaña ;?RBILD   Tobacco and marijuana dependence. Talked to her about smoking altogether, both she and her  should try quitting smoking to gallop. On Chantix  HLD, allergic rhinitis, depression. Per IM  DVT prophylaxis. Lovenox    ? Discharge planning    Case d/w patient and nursing             Electronically signed by:  Greggory Rinne, MD    9/20/2021    2:28 PM.

## 2021-09-20 NOTE — PROGRESS NOTES
09/19/21 6677   NIV Type   $NIV $Daily Charge   Equipment Type v60   Mode Bilevel   Mask Type Full face mask   Mask Size Medium narrow   Settings/Measurements   IPAP 15 cmH20   CPAP/EPAP 6 cmH2O   Rate Ordered 4   Resp 28   Insp Rise Time (%) 1 %   FiO2  35 %   I Time/ I Time % 1 s   Vt Exhaled 942 mL   Minute Volume 26.8 Liters   Mask Leak (lpm) 0 lpm   Comfort Level Good   Using Accessory Muscles No   SpO2 97   Alarm Settings   Alarms On Y   Press Low Alarm 6 cmH2O   High Pressure Alarm 30 cmH2O   Delay Alarm 20 sec(s)   Resp Rate Low Alarm 6   High Respiratory Rate 47 br/min

## 2021-09-21 ENCOUNTER — CARE COORDINATION (OUTPATIENT)
Dept: CASE MANAGEMENT | Age: 66
End: 2021-09-21

## 2021-09-21 ENCOUNTER — TELEPHONE (OUTPATIENT)
Dept: FAMILY MEDICINE CLINIC | Age: 66
End: 2021-09-21

## 2021-09-21 LAB
BLOOD CULTURE, ROUTINE: NORMAL
CULTURE, BLOOD 2: NORMAL

## 2021-09-21 NOTE — TELEPHONE ENCOUNTER
Germaine Braxton [facility] 45 Transitions Initial Follow Up Call    Outreach made within 2 business days of discharge: Yes    Patient: Elizabet Richter Patient : 1955   MRN: [id number]  Reason for Admission: There are no discharge diagnoses documented for the most recent discharge. Discharge Date: 21       Spoke with: Patient, nothing available to schedule her within 7-10 days, only 15 min slots and she only wishes to see you. What do you suggest?    Discharge department/facility: [facility]     TCM Interactive Patient Contact:  Was patient able to fill all prescriptions: Yes  Was patient instructed to bring all medications to the follow-up visit: Yes  Is patient taking all medications as directed in the discharge summary?  Yes  Does patient understand their discharge instructions: Yes  Does patient have questions or concerns that need addressed prior to 7-14 day follow up office visit: no    Scheduled appointment with PCP within 7-14 days    Follow Up  Future Appointments   Date Time Provider Matt Crabtree   2021  9:00 AM Yesika Dubon MD [facility] PULM MMA   12/3/2021 11:00 AM [facility] CT MAIN [facility] CT SC Yadi Rad   2021  2:45 PM MD CATALINA Shabazz\[facility]\"" KYMBERLY Rangel

## 2021-09-21 NOTE — CARE COORDINATION
Ajith 45 Transitions Follow Up Call    2021    Patient: Dhiraj Welch  Patient : 1955   MRN: [id number]  Reason for Admission: COPD  Discharge Date: 21 RARS: Readmission Risk Score: 52         Spoke with: Paula Transitions Follow Up Call    Needs to be reviewed by the provider   Additional needs identified to be addressed with provider: No  none             Method of communication with provider : phone      Care Transition Nurse (CTN) contacted the patient by telephone to follow up after admission on 21. Verified name and  with patient as identifiers. Addressed changes since last contact: none  Discussed follow-up appointments. If no appointment was previously scheduled, appointment scheduling offered: Yes. Is follow up appointment scheduled within 7 days of discharge? Yes. Advance Care Planning:   Does patient have an Advance Directive: reviewed and current, reviewed and needs to be updated, not on file; education provided, not on file, patient declined education, decision maker updated and referral to internal ACP facilitator. CTN reviewed discharge instructions, medical action plan and red flags with patient and discussed any barriers to care and/or understanding of plan of care after discharge. Discussed appropriate site of care based on symptoms and resources available to patient including: PCP and Specialist. The patient agrees to contact the PCP office for questions related to their healthcare. Patients top risk factors for readmission: medical condition-COPD  Interventions to address risk factors: Obtained and reviewed discharge summary and/or continuity of care documents and Communication with home health agencies or other community services the patient [facility] [employer] [facility].    CTN provided contact information for future needs. Plan for follow-up call in 7-10 days based on severity of symptoms and risk factors.   Plan for next call: follow up appointment-dr herrera scheduled for tomorrow       Patient answered call and verified . Patient pleasant and agreeable to transition call. Patient stated that she was having difficulty breathing and had to return to the hospital last week. Patient stated that she is breathing easier and happy to be back home. Patient aware of prednisone prescription and taking all medication as directed. CTN discussed importance of taking steroid and purpose of medication. Patient stated understanding. Patient has already reached out to PCP office to schedule follow up visit, but MD schedule was full. Patient is waiting to hear back from office with appt date/time. CTN noted follow up scheduled with pulmonologist, Dr Najma Melara tomorrow and encouraged patient to keep appt. CTN educated patient on home bipap/cpap option to discuss with pulmonologist. Patient stated that she would go to appt. Patient has not heard from [facility] yet. CTN spoke to Dang Alanis at [facility] and notified of patient's discharge from hospital and [facility] Karuna martinez. Clemencia to pull information from Epic. Patient denies any acute needs at present time. Agreeable to f/u calls. Educated on the use of urgent care or physicians 24 hr access line if assistance is needed after hours. Care Transitions Subsequent and Final Call    Subsequent and Final Calls  Do you have any ongoing symptoms?: No  Have your medications changed?: Yes  Do you have any questions related to your medications?: No  Do you currently have any active services?: Yes  Are you currently active with any services?: Home Health  Do you have any needs or concerns that I can assist you with?: No  Identified Barriers: None  Care Transitions Interventions  Other Interventions:            Follow Up  Future Appointments   Date Time Provider Matt Crabtree   2021  9:00 AM Js Bazzi MD [facility] PULM MMA   12/3/2021 11:00 AM [facility]   2021  2:45 PM Js Bazzi MD LINDA Rowland RN

## 2021-09-22 ENCOUNTER — TELEPHONE (OUTPATIENT)
Dept: PULMONOLOGY | Age: 66
End: 2021-09-22

## 2021-09-22 NOTE — TELEPHONE ENCOUNTER
Patient did not show for f/u prednisone  appointment  with Dr Kennedy Sender on 9/22/21    Same Day Cancellation: No    Patient rescheduled:  No    New appointment:  n/a    Patient was also no show on: 9/15/21, 3/4/20/,11/26/19    LOV 7/21/21    Assessment:   · Moderate obstructive airways diease secondary to COPD and asthma with AE  · Abnormal CXR 7/11/21 - Atelectasis vs PNA   · Abnormal CT 4/1/2016 with GGO- DDx RB-ILD, NSIP, HP, DIP, eosinophilic pneumonitis, aspiration and CTD-ILD. Favor smoking related ILD vs HP. Bronch 4/6/16 purulent secretions and grew strep pneumoniae. Negative AFB. Got rid of her Dion Greer. Evidently changed on repeat CT 12/2/2020. · Bronchiectasis   · Nocturnal hypoxia- on 2LPM   · >30 pack-years smoking                                                  Plan:   · Prednisone taper  · Doxycycline 100 mg PO BID for 7 days. · CXR in 6 weeks   · ER if not better  · Continue Advair 500/50 BID and Albuterol 2 puffs Q4-6 hrs PRN  · Refills on Spiriva daily   · Complete prednisone taper   · Continue O2 2LPM at night  · Advised to titrate O2 using her pulse oximeter- target O2 sat 90-92%. · CT chest, low dose protocol, screening for lung cancer December 2021. · Patient is up to date with Covid, pneumococcal vaccine and influenza vaccine   · Acapella and Mucinex   · Advised to continue with smoking cessation.    · Follow up after CT chest

## 2021-09-23 ENCOUNTER — TELEPHONE (OUTPATIENT)
Dept: PULMONOLOGY | Age: 66
End: 2021-09-23

## 2021-09-23 ENCOUNTER — VIRTUAL VISIT (OUTPATIENT)
Dept: PULMONOLOGY | Age: 66
End: 2021-09-23
Payer: MEDICARE

## 2021-09-23 DIAGNOSIS — R93.89 ABNORMAL CHEST X-RAY: ICD-10-CM

## 2021-09-23 DIAGNOSIS — J44.1 COPD EXACERBATION (HCC): Primary | ICD-10-CM

## 2021-09-23 PROCEDURE — 99443 PR PHYS/QHP TELEPHONE EVALUATION 21-30 MIN: CPT | Performed by: INTERNAL MEDICINE

## 2021-09-23 RX ORDER — PREDNISONE 10 MG/1
TABLET ORAL
Qty: 30 TABLET | Refills: 0 | Status: ON HOLD
Start: 2021-09-23 | End: 2021-10-03 | Stop reason: HOSPADM

## 2021-09-23 RX ORDER — DOXYCYCLINE HYCLATE 100 MG/1
100 CAPSULE ORAL 2 TIMES DAILY
Qty: 14 CAPSULE | Refills: 0 | Status: ON HOLD
Start: 2021-09-23 | End: 2021-10-03 | Stop reason: HOSPADM

## 2021-09-23 RX ORDER — IPRATROPIUM BROMIDE AND ALBUTEROL SULFATE 2.5; .5 MG/3ML; MG/3ML
1 SOLUTION RESPIRATORY (INHALATION) EVERY 4 HOURS PRN
Qty: 360 ML | Refills: 5 | Status: SHIPPED | OUTPATIENT
Start: 2021-09-23

## 2021-09-23 NOTE — TELEPHONE ENCOUNTER
Within this Telehealth Consent, the terms you and yours refer to the person using the Telehealth Service (Service), or in the case of a use of the Service by or on behalf of a minor, you and yours refer to and include (i) the parent or legal guardian who provides consent to the use of the Service by such minor or uses the Service on behalf of such minor, and (ii) the minor for whom consent is being provided or on whose behalf the Service is being utilized. When using Service, you will be consulting with your health care providers via the use of Telehealth.   Telehealth involves the delivery of healthcare services using electronic communications, information technology or other means between a healthcare provider and a patient who are not in the same physical location. Telehealth may be used for diagnosis, treatment, follow-up and/or patient education, and may include, but is not limited to, one or more of the following:    Electronic transmission of medical records, photo images, personal health information or other data between a patient and a healthcare provider    Interactions between a patient and healthcare provider via audio, video and/or data communications    Use of output data from medical devices, sound and video files    Anticipated Benefits   The use of Telehealth by your Provider(s) through the Service may have the following possible benefits:    Making it easier and more efficient for you to access medical care and treatment for the conditions treated by such Provider(s) utilizing the Service    Allowing you to obtain medical care and treatment by Provider(s) at times that are convenient for you    Enabling you to interact with Provider(s) without the necessity of an in-office appointment     Possible Risks   While the use of Telehealth can provide potential benefits for you, there are also potential risks associated with the use of Telehealth.  These risks include, but may not be limited to the following:    Your Provider(s) may not able to provide medical treatment for your particular condition and you may be required to seek alternative healthcare or emergency care services.  The electronic systems or other security protocols or safeguards used in the Service could fail, causing a breach of privacy of your medical or other information.  Given regulatory requirements in certain jurisdictions, your Provider(s) diagnosis and/or treatment options, especially pertaining to certain prescriptions, may be limited. Acceptance   1. You understand that Services will be provided via Telehealth. This process involves the use of HIPAA compliant and secure, real-time audio-visual interfacing with a qualified and appropriately trained provider located at [facility]. 2. You understand that, under no circumstances, will this session be recorded. 3. You understand that the Provider(s) at [facility] and other clinical participants will be party to the information obtained during the Telehealth session in accordance with best medical practices. 4. You understand that the information obtained during the Telehealth session will be used to help determine the most appropriate treatment options. 5. You understand that You have the right to revoke this consent at any point in time. 6. You understand that Telehealth is voluntary, and that continued treatment is not dependent upon consent. 7. You understand that, in the event of non-consent to Telehealth services and/or technical difficulties, you will obtain services as typically provided in the absence of Telehealth technology. 8. You understand that this consent will be kept in Your medical record. 9. No potential benefits from the use of Telehealth or specific results can be guaranteed. Your condition may not be cured or improved and, in some cases, may get worse.    10. There are limitations in the provision of medical care and treatment via Telehealth and the Service and you may not be able to receive diagnosis and/or treatment through the Service for every condition for which you seek diagnosis and/or treatment. 11. There are potential risks to the use of Telehealth, including but not limited to the risks described in this Telehealth Consent. 12. Your Provider(s) have discussed the use of Telehealth and the Service with you, including the benefits and risks of such and you have provided oral consent to your Provider(s) for the use of Telehealth and the Service. 15. You understand that it is your duty to provide your Provider(s) truthful, accurate and complete information, including all relevant information regarding care that you may have received or may be receiving from other healthcare providers outside of the Service. 14. You understand that each of your Provider(s) may determine in his or sole discretion that your condition is not suitable for diagnosis and/or treatment using the Service, and that you may need to seek medical care and treatment a specialist or other healthcare provider, outside of the Service. 15. You understand that you are fully responsible for payment for all services provided by Provider(s) or through use of the Service and that you may not be able to use third-party insurance. 16. You represent that (a) you have read this Telehealth Consent carefully, (b) you understand the risks and benefits of the Service and the use of Telehealth in the medical care and treatment provided to you by Provider(s) using the Service, and (c) you have the legal capacity and authority to provide this consent for yourself and/or the minor for which you are consenting under applicable federal and state laws, including laws relating to the age of [de-identified] and/or parental/guardian consent.    17. You give your informed consent to the use of Telehealth by Provider(s) using the Service under the terms described in the Terms of Service and this Telehealth Consent. The patient was read the following statement and has consented to the visit as of 9/23/21. The patient has been scheduled for their first telehealth visit on 9/23/21 with Dr. Brant Yung.

## 2021-09-23 NOTE — TELEPHONE ENCOUNTER
Left message asking patient to return call to office to go over AVS. See appt desk. Pt will need cxr prior to fua    LOV: 9/23/21    Assessment:   · Moderate obstructive airways diease secondary to COPD and asthma with AE  · Abnormal CXR 9/17/21 with ASD- admitted and treated for PNA  · Abnormal CT 4/1/2016 with GGO- DDx RB-ILD, NSIP, HP, DIP, eosinophilic pneumonitis, aspiration and CTD-ILD. Favor smoking related ILD vs HP. Bronch 4/6/16 purulent secretions and grew strep pneumoniae. Negative AFB. Got rid of her Al Michael. Evidently changed on repeat CT 12/2/2020. · Bronchiectasis   · Nocturnal hypoxia- on 2LPM   · >30 pack-years smoking                                                  Plan:   · Prednisone taper  · Doxycycline 100 mg PO BID for 7 days. · Repeat CXR in 4-6 weeks   · ER if not better  · Continue Wixela BID   · Trial of DuoNeb 2-4 times/day   · D/C Spiriva   · Continue O2 2LPM at night  · 0-3 LPM O2. Advised to titrate O2 using her pulse oximeter- target O2 sat 90-92%. · CT chest, low dose protocol, screening for lung cancer December 2021  · Patient is up to date with Covid, pneumococcal vaccine and influenza vaccine   · Acapella and Mucinex   · Advised to continue with smoking cessation.    · Follow up in 4-6 weeks in person

## 2021-09-23 NOTE — PROGRESS NOTES
[facility]    Outpatient Follow Up Note  TELEHEALTH EVALUATION: Service performed was Audio (During AGLGP-77 public health emergency) and not a face-to-face visit       CHIEF COMPLAINT: illness       HPI:   Patient was discharged 9/20 after admission for COPD exacerbation  Still with SOB cough and wheezes   Cough with clear sputum  No hemoptysis   No fever   Uses Neb 2-3 times/day- has not used yet today   No smoking   Patient started prednisone taper again today 40 mg/day   O2 93% on RA      From prior visit:   No humidifier. No air . No purifier. No steam sauna. No steam shower. + indoor hot tub- no reported. Had mold in her old house and moved to new one 1.5 months ago. No asbestos exposure. No down pillows or comforters. Has 1 parrot. No fatigue. No joint stiffness, pain or swelling wrists or knees. No difficulty swallowing. + dryness mouth. + fingers color change in cold weather- fingers turns white in cold weather. No recurrent fever. + weight loss. + GERD. No rash. No mouth ulcers.            Past Medical History:   Diagnosis Date    Allergic rhinitis 6/11/2010    Anxiety     Arthritis     Aspiration pneumonia ([address] 3/21/2012    Recurrent    Asthma     Bronchitis chronic     COPD (chronic obstructive pulmonary disease) ([address] 12/3/2009    Depression     Drug abuse, cocaine type (HCC)     past history of crack cocaine use    Emphysema of lung (HCC)     Fibromyalgia     GERD (gastroesophageal reflux disease)     Hyperlipidemia     Influenza A 03/05/2020    Lung disease     On home oxygen therapy     uses O2 NC 3L prn at night    Osteoporosis     Pneumonia     Polysubstance dependence ([address] 1/2/2012    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    Pulmonary fibrosis ([address] 10/16/2014    Pulmonary infiltrate     Pulmonary nodule 12/3/2009    Tobacco abuse        Past Surgical History:        Procedure Laterality Date    BLADDER REPAIR      BRONCHOSCOPY      BRONCHOSCOPY N/A 3/6/2020    BRONCHOSCOPY THERAPUTIC ASPIRATION INITIAL performed by Gwen Soriano MD at [facility] [address]  3/6/2020    BRONCHOSCOPY ALVEOLAR LAVAGE performed by Gwen Soriano MD at [facility] [address] N/A 6/26/2020    BRONCHOSCOPY THERAPUTIC ASPIRATION INITIAL performed by Allyson Goncalves MD at [facility]  6/26/2020    BRONCHOSCOPY ALVEOLAR LAVAGE performed by Allyson Goncalves MD at [facility] [address]  06/23/2021    Dr Terese Scott N/A 6/23/2021    BRONCHOSCOPY DIAGNOSTIC OR CELL [address] performed by Allyson Goncalves MD at [facility] [address]  6/23/2021    BRONCHOSCOPY THERAPUTIC ASPIRATION INITIAL performed by Allyson Goncalves MD at [facility] [address]  6/23/2021    BRONCHOSCOPY ALVEOLAR LAVAGE performed by Allyson Goncalves MD at [facility] [address] N/A 8/27/2021    BRONCHOSCOPY DIAGNOSTIC OR CELL [address] performed by Allyson Goncalves MD at [place] [address]  2012    ENDOSCOPY, COLON, DIAGNOSTIC      ESOPHAGEAL DILATATION  9/20/2018    ESOPHAGEAL DILATION Cherelle Ruff performed by Niko Phelps MD at [facility] HISTORY  2/12/15    T8 Kyphoplasty    MT COLONOSCOPY FLX DX W/COLLJ SPEC WHEN PFRMD N/A 9/20/2018    EGD AND COLONOSCOPY WITH ANESTHESIA performed by Niko Phelps MD at [facility] ESOPHAGOGASTRODUODENOSCOPY TRANSORAL DIAGNOSTIC N/A 9/20/2018    EGD AND COLONOSCOPY WITH ANESTHESIA performed by Niko Phelps MD at [address] [facility]         Allergies:  is allergic to meperidine and demerol. Social History:    TOBACCO:   reports that she has been smoking cigarettes. She started smoking about 49 years ago. She has a 20.00 pack-year smoking history.  She has never used smokeless tobacco.  ETOH:   reports no history of alcohol use.       Family History:       Problem Relation Age of Onset    Asthma Mother     Diabetes Mother     Emphysema Mother     Heart Failure Mother     Hypertension Mother     Heart Disease Mother     High Cholesterol Mother     Cancer Mother     Depression Mother     Diabetes Father     Emphysema Father     Heart Failure Father     Hypertension Father     Heart Disease Father     High Cholesterol Father     Substance Abuse Father     Emphysema Paternal Grandfather     Diabetes Sister     High Cholesterol Sister     Vision Loss Maternal Uncle        Current Medications:    Current Outpatient Medications:     predniSONE (DELTASONE) 20 MG tablet, Take 2 tablets by mouth daily for 3 days, Disp: 6 tablet, Rfl: 0    traZODone (DESYREL) 150 MG tablet, Take 1-2 tablets by mouth nightly TAKE 2 TABLETS AT BEDTIME, Disp: 180 tablet, Rfl: 1    ipratropium-albuterol (DUONEB) 0.5-2.5 (3) MG/3ML SOLN nebulizer solution, Inhale 3 mLs into the lungs every 6 hours as needed for Shortness of Breath, Disp: 360 mL, Rfl: 0    guaiFENesin (MUCINEX) 600 MG extended release tablet, Take 1 tablet by mouth 2 times daily as needed for Congestion, Disp: 60 tablet, Rfl: 1    varenicline (CHANTIX STARTING MONTH TAL) 0.5 MG X 11 & 1 MG X 42 tablet, Take by mouth., Disp: 1 box, Rfl: 0    fluticasone-salmeterol (WIXELA INHUB) 500-50 MCG/DOSE diskus inhaler, Inhale 1 puff into the lungs every 12 hours, Disp: 180 each, Rfl: 1    tiotropium (SPIRIVA HANDIHALER) 18 MCG inhalation capsule, INHALE THE CONTENTS OF 1 CAPSULE EVERY DAY, Disp: 90 capsule, Rfl: 1    albuterol (PROVENTIL) (2.5 MG/3ML) 0.083% nebulizer solution, Take 3 mLs by nebulization every 6 hours as needed for Wheezing or Shortness of Breath DX COPD J44.9, Disp: 120 vial, Rfl: 6    montelukast (SINGULAIR) 10 MG tablet, TAKE 1 TABLET BY MOUTH EACH NIGHT, Disp: 90 tablet, Rfl: 1    pantoprazole (PROTONIX) 40 MG tablet, Take 1 tablet by mouth every morning (before breakfast), Disp: 30 tablet, Rfl: 1    albuterol sulfate HFA (VENTOLIN HFA) 108 (90 Base) MCG/ACT inhaler, Inhale 2 puffs into the lungs every 6 hours as needed for Wheezing or Shortness of Breath, Disp: 3 Inhaler, Rfl: 1    BD PEN NEEDLE SVETLANA U/F 32G X 4 MM MISC, USE ONE DAILY AS DIRECTED, Disp: 100 each, Rfl: 5    FLUoxetine (PROZAC) 20 MG capsule, TAKE 3 CAPSULES BY MOUTH DAILY, Disp: 270 capsule, Rfl: 1    simvastatin (ZOCOR) 20 MG tablet, TAKE 1 TABLET EVERY EVENING, Disp: 90 tablet, Rfl: 1    busPIRone (BUSPAR) 30 MG tablet, TAKE 1 TABLET TWICE DAILY, Disp: 180 tablet, Rfl: 1    teriparatide, recombinant, (FORTEO) 600 MCG/2.4ML SOLN injection, Inject 0.08 mLs into the skin daily One dose injected daily. , Disp: 1 pen, Rfl: 11            Objective:   Telephone visit not able to obtain physical exam             DATA reviewed by me:   PFTs 12/02/2020 FVC 2.12 (64%) FEV1 1.15(45%) TLC 4.16 (78%)  DLCO 06.97(30%) 6MW 880  F L O2 93%   PFTs 08/29/2019 FVC 1.68 (50%) FEV1 0.98(38%) TLC 4.35 (81%)  DLCO 07.19(30%) 6MW 860  F L O2 90%   PFTs 02/28/2019 FVC 2.03 (60%) FEV1 1.30(50%) TLC 4.15 (78%)  DLCO 07.23(31%) 6MW 560  F L O2 90%   PFTs 08/15/2018 FVC 1.86 (55%) FEV1 1.19(45%) TLC 4.28(80%)   DLCO 07.59(32%) 6MW 840  F L O2 94%   PFTs 10/13/2017 FVC 2.00 (58%) FEV1 1.27(48%) TLC 4.78(89%)   DLCO 06.16(38%) 6MW 1120F L O2 94%  PFTs 05/25/2017 FVC 2.15 (63%) FEV1 1.25(47%) TLC 5.37(100%) DLCO 11.13(47%) 6MW 1120F L O2 95%  PFTs 01/30/2017 FVC 2.05 (60%) FEV1 1.18(44%) TLC 4.69(87%)   DLCO 09.35(39%) 6MW 1190F L O2 95%  PFTs 10/10/2016 FVC 1.96 (57%) FEV1 1.18(44%) TLC 4.57(85%)   DLCO 08.31(35%) 6MW 1000F L O2 94%  PFTs 04/25/2016 FVC 1.63 (50%) FEV1 0.93(37%) TLC 4.96(97%)   DLCO 09.66(42%) 6MW 1140F L O2 91%  PFTs 12/09/2013                            FEV1 1.54(59%) TLC 5.34(104%) DLCO 11.30(49%) 6MW 1140F L O2 91%.    PFTs 06/03/2013                            FEV1 1.69(65%) TLC 4.81(93%) DLCO 09.44(41%) 6MW 1400F L O2 95%. PFTs 11/28/2012                            FEV1 1.67(64%) TLC 5.08(98%)   DLCO 11.94(51%) 6MW 1400F L O2 95%. PFTs 06/19/2012                            FEV1 1.59(60%) TLC 4.66(90%)   DLCO 10.21(44%) 6MW 1280F L O2 96%. PFTs 03/07/2012                            FEV1 1.72(70%) TLC 4.49(87%)   DLCO 10.72(54%)  PFTs 08/03/2011                            FEV1 1.71(72%) TLC 4.72(96%)   DLCO 12.3 (63%)  PFTs 01/10/2011                            FEV1 1.76           TLC 4.54             DLCO 10.50  PFTs 03/30/2010                            FEV1 1.96           TLC 4.86             DLCO 14.13  PFTs 03/03/2009                            FEV1 2.04           TLC 5.08             DLCO 14.02      CT chest 8/25/2019  Decreased bibasilar airspace disease and nodular opacification  Bronchial wall thickening  Stable reticular opacities upper lobe  Stable mediastinal lymph nodes    CT chest 12/2/2020   No acute intrapulmonary findings  Stable multifocal irregular opacities associated with GGO's likely reflecting element of chronic interstitial lung disease  Stable mild mediastinal adenopathy    CXR 7/11/21  images reviewed by me and showed:   Chronic obstructive lung changes with mild left basilar atelectasis vs early  infiltrates or progressive scarring    CXR 9/17/21  images reviewed by me and showed:   Bilateral ASD       4/5/2016 normal/negative RF 14, SCL70 SSA SSB aldolase CK GIORGI CCP Anti MALORIE-1    4/25/2016 Elevated IgA normal IgG and IgM      IgE 140 with unremarkable immunocap      Assessment:   · Moderate obstructive airways diease secondary to COPD and asthma with AE  · Abnormal CXR 9/17/21 with ASD- admitted and treated for PNA  · Abnormal CT 4/1/2016 with GGO- DDx RB-ILD, NSIP, HP, DIP, eosinophilic pneumonitis, aspiration and CTD-ILD. Favor smoking related ILD vs HP. Bronch 4/6/16 purulent secretions and grew strep pneumoniae. Negative AFB. Got rid of her Stephanie Eves.   Evidently changed on repeat CT 12/2/2020. · Bronchiectasis   · Nocturnal hypoxia- on 2LPM   · >30 pack-years smoking                                          Plan:   · Prednisone taper  · Doxycycline 100 mg PO BID for 7 days. · Repeat CXR in 4-6 weeks   · ER if not better  · Continue Wixela BID   · Trial of DuoNeb. Advised to use 2-4 times/day   · D/C Spiriva   · Continue O2 2LPM at night  · 1-3 LPM O2 on exertion. Advised to titrate O2 using her pulse oximeter- target O2 sat 90-92%. · CT chest, low dose protocol, screening for lung cancer December 2021  · Patient is up to date with Covid, pneumococcal vaccine and influenza vaccine   · Acapella and Mucinex   · Advised to continue with smoking cessation. · Follow up in 4-6 weeks in person             Georgette Whittington is a 60 y.o. female evaluated via telephone on 9/23/2021. Consent:  She and/or health care decision maker is aware that that she may receive a bill for this telephone service, depending on her insurance coverage, and has provided verbal consent to proceed: Yes       Documentation:  I communicated with the patient and/or health care decision maker about: See above   Details of this discussion including any medical advice provided: See above       I Affirm this is a Patient Initiated Episode with an Established Patient who has not had a related appointment within my department in the past 7 days or scheduled within the next 24 hours.     Total Time: 21-30 minutes     Note: not billable if this call serves to triage the patient into an appointment for the relevant concern      Jules Ojeda MD

## 2021-09-23 NOTE — TELEPHONE ENCOUNTER
Pt scheduled for VV 9/23/21
See tomorrow VV  ER if worse
Plan:   · Prednisone taper  · Doxycycline 100 mg PO BID for 7 days. · CXR in 6 weeks   · ER if not better  · Continue Advair 500/50 BID and Albuterol 2 puffs Q4-6 hrs PRN  · Refills on Spiriva daily   · Complete prednisone taper   · Continue O2 2LPM at night  · Advised to titrate O2 using her pulse oximeter- target O2 sat 90-92%. · CT chest, low dose protocol, screening for lung cancer December 2021. · Patient is up to date with Covid, pneumococcal vaccine and influenza vaccine   · Acapella and Mucinex   · Advised to continue with smoking cessation.    · Follow up after CT chest

## 2021-09-27 ENCOUNTER — APPOINTMENT (OUTPATIENT)
Dept: GENERAL RADIOLOGY | Age: 66
DRG: 193 | End: 2021-09-27
Payer: MEDICARE

## 2021-09-27 ENCOUNTER — HOSPITAL ENCOUNTER (INPATIENT)
Age: 66
LOS: 6 days | Discharge: HOME | DRG: 193 | End: 2021-10-03
Attending: STUDENT IN AN ORGANIZED HEALTH CARE EDUCATION/TRAINING PROGRAM | Admitting: INTERNAL MEDICINE
Payer: MEDICARE

## 2021-09-27 ENCOUNTER — TELEPHONE (OUTPATIENT)
Dept: OTHER | Facility: CLINIC | Age: 66
End: 2021-09-27

## 2021-09-27 DIAGNOSIS — J44.1 COPD EXACERBATION (HCC): ICD-10-CM

## 2021-09-27 DIAGNOSIS — R06.02 SHORTNESS OF BREATH: Primary | ICD-10-CM

## 2021-09-27 DIAGNOSIS — J96.00 ACUTE RESPIRATORY FAILURE, UNSPECIFIED WHETHER WITH HYPOXIA OR HYPERCAPNIA (HCC): ICD-10-CM

## 2021-09-27 DIAGNOSIS — R65.10 SIRS (SYSTEMIC INFLAMMATORY RESPONSE SYNDROME) (HCC): ICD-10-CM

## 2021-09-27 LAB
A/G RATIO: 1.5 (ref 1.1–2.2)
ALBUMIN SERPL-MCNC: 4 G/DL (ref 3.4–5)
ALP BLD-CCNC: 58 U/L (ref 40–129)
ALT SERPL-CCNC: 15 U/L (ref 10–40)
ANION GAP SERPL CALCULATED.3IONS-SCNC: 10 MMOL/L (ref 3–16)
AST SERPL-CCNC: 14 U/L (ref 15–37)
BACTERIA: ABNORMAL /HPF
BASE EXCESS VENOUS: 0.2 MMOL/L (ref -3–3)
BASOPHILS ABSOLUTE: 0.1 K/UL (ref 0–0.2)
BASOPHILS RELATIVE PERCENT: 0.5 %
BILIRUB SERPL-MCNC: 0.3 MG/DL (ref 0–1)
BILIRUBIN URINE: NEGATIVE
BLOOD, URINE: ABNORMAL
BUN BLDV-MCNC: 16 MG/DL (ref 7–20)
CALCIUM SERPL-MCNC: 9.2 MG/DL (ref 8.3–10.6)
CARBOXYHEMOGLOBIN: 3.9 % (ref 0–1.5)
CHLORIDE BLD-SCNC: 92 MMOL/L (ref 99–110)
CLARITY: CLEAR
CO2: 25 MMOL/L (ref 21–32)
COLOR: YELLOW
CREAT SERPL-MCNC: 0.7 MG/DL (ref 0.6–1.2)
EKG ATRIAL RATE: 95 BPM
EKG DIAGNOSIS: NORMAL
EKG P AXIS: 56 DEGREES
EKG P-R INTERVAL: 146 MS
EKG Q-T INTERVAL: 334 MS
EKG QRS DURATION: 76 MS
EKG QTC CALCULATION (BAZETT): 419 MS
EKG R AXIS: -7 DEGREES
EKG T AXIS: 54 DEGREES
EKG VENTRICULAR RATE: 95 BPM
EOSINOPHILS ABSOLUTE: 0 K/UL (ref 0–0.6)
EOSINOPHILS RELATIVE PERCENT: 0.2 %
EPITHELIAL CELLS, UA: ABNORMAL /HPF (ref 0–5)
GFR AFRICAN AMERICAN: >60
GFR NON-AFRICAN AMERICAN: >60
GLOBULIN: 2.7 G/DL
GLUCOSE BLD-MCNC: 97 MG/DL (ref 70–99)
GLUCOSE URINE: NEGATIVE MG/DL
HCO3 VENOUS: 24.9 MMOL/L (ref 23–29)
HCT VFR BLD CALC: 40.3 % (ref 36–48)
HEMOGLOBIN: 13.7 G/DL (ref 12–16)
KETONES, URINE: NEGATIVE MG/DL
LACTIC ACID, SEPSIS: 1.2 MMOL/L (ref 0.4–1.9)
LEUKOCYTE ESTERASE, URINE: ABNORMAL
LYMPHOCYTES ABSOLUTE: 3 K/UL (ref 1–5.1)
LYMPHOCYTES RELATIVE PERCENT: 17.1 %
MCH RBC QN AUTO: 29.5 PG (ref 26–34)
MCHC RBC AUTO-ENTMCNC: 33.9 G/DL (ref 31–36)
MCV RBC AUTO: 87.2 FL (ref 80–100)
METHEMOGLOBIN VENOUS: 0.5 %
MICROSCOPIC EXAMINATION: YES
MONOCYTES ABSOLUTE: 0.9 K/UL (ref 0–1.3)
MONOCYTES RELATIVE PERCENT: 5.2 %
NEUTROPHILS ABSOLUTE: 13.5 K/UL (ref 1.7–7.7)
NEUTROPHILS RELATIVE PERCENT: 77 %
NITRITE, URINE: NEGATIVE
O2 SAT, VEN: 93 %
O2 THERAPY: ABNORMAL
PCO2, VEN: 40.8 MMHG (ref 40–50)
PDW BLD-RTO: 13.9 % (ref 12.4–15.4)
PH UA: 7 (ref 5–8)
PH VENOUS: 7.4 (ref 7.35–7.45)
PLATELET # BLD: 306 K/UL (ref 135–450)
PLATELET SLIDE REVIEW: ADEQUATE
PMV BLD AUTO: 7.4 FL (ref 5–10.5)
PO2, VEN: 66.6 MMHG (ref 25–40)
POTASSIUM REFLEX MAGNESIUM: 3.9 MMOL/L (ref 3.5–5.1)
PRO-BNP: 60 PG/ML (ref 0–124)
PROCALCITONIN: 0.04 NG/ML (ref 0–0.15)
PROTEIN UA: NEGATIVE MG/DL
RBC # BLD: 4.63 M/UL (ref 4–5.2)
RBC UA: ABNORMAL /HPF (ref 0–4)
SARS-COV-2, NAAT: NOT DETECTED
SLIDE REVIEW: ABNORMAL
SODIUM BLD-SCNC: 127 MMOL/L (ref 136–145)
SPECIFIC GRAVITY UA: 1.01 (ref 1–1.03)
TCO2 CALC VENOUS: 26 MMOL/L
TOTAL PROTEIN: 6.7 G/DL (ref 6.4–8.2)
TROPONIN: <0.01 NG/ML
URINE TYPE: ABNORMAL
UROBILINOGEN, URINE: 0.2 E.U./DL
WBC # BLD: 17.5 K/UL (ref 4–11)
WBC UA: ABNORMAL /HPF (ref 0–5)

## 2021-09-27 PROCEDURE — 6360000002 HC RX W HCPCS: Performed by: INTERNAL MEDICINE

## 2021-09-27 PROCEDURE — 83880 ASSAY OF NATRIURETIC PEPTIDE: CPT

## 2021-09-27 PROCEDURE — 1200000000 HC SEMI PRIVATE

## 2021-09-27 PROCEDURE — 82803 BLOOD GASES ANY COMBINATION: CPT

## 2021-09-27 PROCEDURE — 96374 THER/PROPH/DIAG INJ IV PUSH: CPT

## 2021-09-27 PROCEDURE — 71045 X-RAY EXAM CHEST 1 VIEW: CPT

## 2021-09-27 PROCEDURE — 84484 ASSAY OF TROPONIN QUANT: CPT

## 2021-09-27 PROCEDURE — 6370000000 HC RX 637 (ALT 250 FOR IP): Performed by: STUDENT IN AN ORGANIZED HEALTH CARE EDUCATION/TRAINING PROGRAM

## 2021-09-27 PROCEDURE — 2580000003 HC RX 258: Performed by: STUDENT IN AN ORGANIZED HEALTH CARE EDUCATION/TRAINING PROGRAM

## 2021-09-27 PROCEDURE — 2700000000 HC OXYGEN THERAPY PER DAY

## 2021-09-27 PROCEDURE — 85025 COMPLETE CBC W/AUTO DIFF WBC: CPT

## 2021-09-27 PROCEDURE — 84145 PROCALCITONIN (PCT): CPT

## 2021-09-27 PROCEDURE — 94640 AIRWAY INHALATION TREATMENT: CPT

## 2021-09-27 PROCEDURE — 99285 EMERGENCY DEPT VISIT HI MDM: CPT

## 2021-09-27 PROCEDURE — 83605 ASSAY OF LACTIC ACID: CPT

## 2021-09-27 PROCEDURE — 2580000003 HC RX 258: Performed by: INTERNAL MEDICINE

## 2021-09-27 PROCEDURE — 81001 URINALYSIS AUTO W/SCOPE: CPT

## 2021-09-27 PROCEDURE — 80053 COMPREHEN METABOLIC PANEL: CPT

## 2021-09-27 PROCEDURE — 6370000000 HC RX 637 (ALT 250 FOR IP): Performed by: INTERNAL MEDICINE

## 2021-09-27 PROCEDURE — 94761 N-INVAS EAR/PLS OXIMETRY MLT: CPT

## 2021-09-27 PROCEDURE — 87635 SARS-COV-2 COVID-19 AMP PRB: CPT

## 2021-09-27 PROCEDURE — 6360000002 HC RX W HCPCS: Performed by: STUDENT IN AN ORGANIZED HEALTH CARE EDUCATION/TRAINING PROGRAM

## 2021-09-27 PROCEDURE — 93005 ELECTROCARDIOGRAM TRACING: CPT | Performed by: STUDENT IN AN ORGANIZED HEALTH CARE EDUCATION/TRAINING PROGRAM

## 2021-09-27 PROCEDURE — 93010 ELECTROCARDIOGRAM REPORT: CPT | Performed by: INTERNAL MEDICINE

## 2021-09-27 PROCEDURE — 87040 BLOOD CULTURE FOR BACTERIA: CPT

## 2021-09-27 PROCEDURE — 96375 TX/PRO/DX INJ NEW DRUG ADDON: CPT

## 2021-09-27 RX ORDER — FLUOXETINE HYDROCHLORIDE 20 MG/1
60 CAPSULE ORAL DAILY
Status: DISCONTINUED | OUTPATIENT
Start: 2021-09-27 | End: 2021-10-03 | Stop reason: HOSPADM

## 2021-09-27 RX ORDER — SODIUM CHLORIDE 9 MG/ML
25 INJECTION, SOLUTION INTRAVENOUS PRN
Status: DISCONTINUED | OUTPATIENT
Start: 2021-09-27 | End: 2021-10-03 | Stop reason: HOSPADM

## 2021-09-27 RX ORDER — MONTELUKAST SODIUM 10 MG/1
10 TABLET ORAL NIGHTLY
Status: DISCONTINUED | OUTPATIENT
Start: 2021-09-27 | End: 2021-10-03 | Stop reason: HOSPADM

## 2021-09-27 RX ORDER — IPRATROPIUM BROMIDE AND ALBUTEROL SULFATE 2.5; .5 MG/3ML; MG/3ML
SOLUTION RESPIRATORY (INHALATION)
Status: DISPENSED
Start: 2021-09-27 | End: 2021-09-27

## 2021-09-27 RX ORDER — 0.9 % SODIUM CHLORIDE 0.9 %
500 INTRAVENOUS SOLUTION INTRAVENOUS ONCE
Status: COMPLETED | OUTPATIENT
Start: 2021-09-27 | End: 2021-09-27

## 2021-09-27 RX ORDER — ONDANSETRON 4 MG/1
4 TABLET, ORALLY DISINTEGRATING ORAL EVERY 8 HOURS PRN
Status: DISCONTINUED | OUTPATIENT
Start: 2021-09-27 | End: 2021-10-03 | Stop reason: HOSPADM

## 2021-09-27 RX ORDER — ONDANSETRON 2 MG/ML
4 INJECTION INTRAMUSCULAR; INTRAVENOUS EVERY 6 HOURS PRN
Status: DISCONTINUED | OUTPATIENT
Start: 2021-09-27 | End: 2021-10-03 | Stop reason: HOSPADM

## 2021-09-27 RX ORDER — ATORVASTATIN CALCIUM 10 MG/1
20 TABLET, FILM COATED ORAL DAILY
Status: DISCONTINUED | OUTPATIENT
Start: 2021-09-27 | End: 2021-10-03 | Stop reason: HOSPADM

## 2021-09-27 RX ORDER — IPRATROPIUM BROMIDE AND ALBUTEROL SULFATE 2.5; .5 MG/3ML; MG/3ML
1 SOLUTION RESPIRATORY (INHALATION)
Status: DISCONTINUED | OUTPATIENT
Start: 2021-09-27 | End: 2021-10-03 | Stop reason: HOSPADM

## 2021-09-27 RX ORDER — LEVOFLOXACIN 5 MG/ML
500 INJECTION, SOLUTION INTRAVENOUS DAILY
Status: DISCONTINUED | OUTPATIENT
Start: 2021-09-28 | End: 2021-10-03 | Stop reason: HOSPADM

## 2021-09-27 RX ORDER — LEVOFLOXACIN 5 MG/ML
750 INJECTION, SOLUTION INTRAVENOUS ONCE
Status: COMPLETED | OUTPATIENT
Start: 2021-09-27 | End: 2021-09-27

## 2021-09-27 RX ORDER — POLYETHYLENE GLYCOL 3350 17 G/17G
17 POWDER, FOR SOLUTION ORAL DAILY PRN
Status: DISCONTINUED | OUTPATIENT
Start: 2021-09-27 | End: 2021-10-03 | Stop reason: HOSPADM

## 2021-09-27 RX ORDER — SODIUM CHLORIDE 0.9 % (FLUSH) 0.9 %
5-40 SYRINGE (ML) INJECTION PRN
Status: DISCONTINUED | OUTPATIENT
Start: 2021-09-27 | End: 2021-10-03 | Stop reason: HOSPADM

## 2021-09-27 RX ORDER — ACETAMINOPHEN 650 MG/1
650 SUPPOSITORY RECTAL EVERY 6 HOURS PRN
Status: DISCONTINUED | OUTPATIENT
Start: 2021-09-27 | End: 2021-10-03 | Stop reason: HOSPADM

## 2021-09-27 RX ORDER — PANTOPRAZOLE SODIUM 40 MG/1
40 TABLET, DELAYED RELEASE ORAL
Status: DISCONTINUED | OUTPATIENT
Start: 2021-09-28 | End: 2021-10-03 | Stop reason: HOSPADM

## 2021-09-27 RX ORDER — BUSPIRONE HYDROCHLORIDE 5 MG/1
10 TABLET ORAL 2 TIMES DAILY
Status: DISCONTINUED | OUTPATIENT
Start: 2021-09-27 | End: 2021-10-03 | Stop reason: HOSPADM

## 2021-09-27 RX ORDER — TRAZODONE HYDROCHLORIDE 50 MG/1
300 TABLET ORAL NIGHTLY
Status: DISCONTINUED | OUTPATIENT
Start: 2021-09-27 | End: 2021-10-03 | Stop reason: HOSPADM

## 2021-09-27 RX ORDER — METHYLPREDNISOLONE SODIUM SUCCINATE 40 MG/ML
40 INJECTION, POWDER, LYOPHILIZED, FOR SOLUTION INTRAMUSCULAR; INTRAVENOUS 2 TIMES DAILY
Status: DISCONTINUED | OUTPATIENT
Start: 2021-09-27 | End: 2021-10-02

## 2021-09-27 RX ORDER — IPRATROPIUM BROMIDE AND ALBUTEROL SULFATE 2.5; .5 MG/3ML; MG/3ML
1 SOLUTION RESPIRATORY (INHALATION) ONCE
Status: COMPLETED | OUTPATIENT
Start: 2021-09-27 | End: 2021-09-27

## 2021-09-27 RX ORDER — GUAIFENESIN 600 MG/1
600 TABLET, EXTENDED RELEASE ORAL 2 TIMES DAILY PRN
Status: DISCONTINUED | OUTPATIENT
Start: 2021-09-27 | End: 2021-10-03 | Stop reason: HOSPADM

## 2021-09-27 RX ORDER — IPRATROPIUM BROMIDE AND ALBUTEROL SULFATE 2.5; .5 MG/3ML; MG/3ML
1 SOLUTION RESPIRATORY (INHALATION)
Status: DISCONTINUED | OUTPATIENT
Start: 2021-09-27 | End: 2021-09-27

## 2021-09-27 RX ORDER — METHYLPREDNISOLONE SODIUM SUCCINATE 125 MG/2ML
125 INJECTION, POWDER, LYOPHILIZED, FOR SOLUTION INTRAMUSCULAR; INTRAVENOUS ONCE
Status: COMPLETED | OUTPATIENT
Start: 2021-09-27 | End: 2021-09-27

## 2021-09-27 RX ORDER — SODIUM CHLORIDE 0.9 % (FLUSH) 0.9 %
5-40 SYRINGE (ML) INJECTION EVERY 12 HOURS SCHEDULED
Status: DISCONTINUED | OUTPATIENT
Start: 2021-09-27 | End: 2021-10-03 | Stop reason: HOSPADM

## 2021-09-27 RX ORDER — ACETAMINOPHEN 325 MG/1
650 TABLET ORAL EVERY 6 HOURS PRN
Status: DISCONTINUED | OUTPATIENT
Start: 2021-09-27 | End: 2021-10-03 | Stop reason: HOSPADM

## 2021-09-27 RX ADMIN — BUSPIRONE HYDROCHLORIDE 10 MG: 5 TABLET ORAL at 20:23

## 2021-09-27 RX ADMIN — METHYLPREDNISOLONE SODIUM SUCCINATE 125 MG: 125 INJECTION, POWDER, FOR SOLUTION INTRAMUSCULAR; INTRAVENOUS at 10:51

## 2021-09-27 RX ADMIN — SODIUM CHLORIDE, PRESERVATIVE FREE 10 ML: 5 INJECTION INTRAVENOUS at 20:24

## 2021-09-27 RX ADMIN — IPRATROPIUM BROMIDE AND ALBUTEROL SULFATE 1 AMPULE: .5; 3 SOLUTION RESPIRATORY (INHALATION) at 10:17

## 2021-09-27 RX ADMIN — CEFEPIME HYDROCHLORIDE 2000 MG: 2 INJECTION, POWDER, FOR SOLUTION INTRAVENOUS at 13:53

## 2021-09-27 RX ADMIN — IPRATROPIUM BROMIDE AND ALBUTEROL SULFATE 1 AMPULE: .5; 3 SOLUTION RESPIRATORY (INHALATION) at 19:37

## 2021-09-27 RX ADMIN — ENOXAPARIN SODIUM 40 MG: 40 INJECTION SUBCUTANEOUS at 19:22

## 2021-09-27 RX ADMIN — ATORVASTATIN CALCIUM 20 MG: 10 TABLET, FILM COATED ORAL at 19:22

## 2021-09-27 RX ADMIN — METHYLPREDNISOLONE SODIUM SUCCINATE 40 MG: 40 INJECTION, POWDER, FOR SOLUTION INTRAMUSCULAR; INTRAVENOUS at 20:24

## 2021-09-27 RX ADMIN — LEVOFLOXACIN 750 MG: 5 INJECTION, SOLUTION INTRAVENOUS at 14:25

## 2021-09-27 RX ADMIN — TRAZODONE HYDROCHLORIDE 300 MG: 50 TABLET ORAL at 20:24

## 2021-09-27 RX ADMIN — MONTELUKAST SODIUM 10 MG: 10 TABLET ORAL at 20:22

## 2021-09-27 RX ADMIN — FLUOXETINE 60 MG: 20 CAPSULE ORAL at 20:23

## 2021-09-27 RX ADMIN — SODIUM CHLORIDE 500 ML: 9 INJECTION, SOLUTION INTRAVENOUS at 13:53

## 2021-09-27 ASSESSMENT — ENCOUNTER SYMPTOMS: TACHYPNEA: 1

## 2021-09-27 ASSESSMENT — PAIN SCALES - GENERAL
PAINLEVEL_OUTOF10: 0
PAINLEVEL_OUTOF10: 0

## 2021-09-27 NOTE — ED NOTES
Pranay Gonslaves is a 60 y.o. female who presents to the ED via  for shortness of breath and increased work of breathing. Pt reports symptoms began after smoking a cigarette just prior to arrival.  Pt also reports recent inpatient admission for COPD exacerbation. Pt states during her last admission she needed BiPAP due to increased work of breathing. Pt denies chest pain, emesis, recent long distance travel, or leg pain. Pt resting in bed with call light within reach and updated on plan of care; pending lab results, pending x-ray results, pending provider to assess. Pt denies any needs at this time.       Debi Nelson, MALDONADO  09/27/21 [id number]

## 2021-09-27 NOTE — H&P
Hospital Medicine History & Physical      PCP: Jemima Velazquez MD    Date of Admission: 9/27/2021    Date of Service: Pt seen/examined on 28 Sept and Admitted to Inpatient with expected LOS greater than two midnights due to medical therapy. Chief Complaint:  SOB      History Of Present Illness:        60 y.o. female w/ hx COPD/ILD/Fibrosis w/ chronic respiratory failure on 3L Nocturnal O2 and continues to smoke who presented to [facility] with a 1 day hx of increasing, now severe SOB/VELÁSQUEZ. She was recently admitted/discharged multiple times for similar COPD exacerbation. Since admission her tx have offered some relief but still w/ c/o SOB/VELÁSQUEZ above baseline    She was noted to be hypoxic in the 80s on her home oxygen by [facility] severe enough to have trouble speaking complete sentences and was placed on BiPap in ED - started on ABX in ED. She has been fully vaccinated for COVID-19. The patient denies any fever/chills or other signs/sxs of systemic illness or identifiable aggravating/alleviating factors.       Past Medical History:          Diagnosis Date    Allergic rhinitis 6/11/2010    Anxiety     Arthritis     Aspiration pneumonia ([address] 3/21/2012    Recurrent    Asthma     Bronchitis chronic     COPD (chronic obstructive pulmonary disease) ([address] 12/3/2009    Depression     Drug abuse, cocaine type (HCC)     past history of crack cocaine use    Emphysema of lung (HCC)     Fibromyalgia     GERD (gastroesophageal reflux disease)     Hyperlipidemia     Influenza A 03/05/2020    Lung disease     On home oxygen therapy     uses O2 NC 3L prn at night    Osteoporosis     Pneumonia     Polysubstance dependence ([address] 1/2/2012    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 9/27/19 12/7/20  Milly Pride MD       Allergies:  Meperidine and Demerol    Social History:      The patient currently lives independently    TOBACCO:   reports that she has been smoking cigarettes. She started smoking about 49 years ago. She has a 20.00 pack-year smoking history. She has never used smokeless tobacco.  ETOH:   reports no history of alcohol use. Family History:      Reviewed in detail and negative for CVA. Positive as follows:        Problem Relation Age of Onset    Asthma Mother     Diabetes Mother     Emphysema Mother     Heart Failure Mother     Hypertension Mother     Heart Disease Mother     High Cholesterol Mother     Cancer Mother     Depression Mother     Diabetes Father     Emphysema Father     Heart Failure Father     Hypertension Father     Heart Disease Father     High Cholesterol Father     Substance Abuse Father     Emphysema Paternal Grandfather     Diabetes Sister     High Cholesterol Sister     Vision Loss Maternal Uncle        REVIEW OF SYSTEMS:   Pertinent positives as noted in the HPI. All other systems reviewed and negative. PHYSICAL EXAM:    /72   Pulse 120   Temp 97.7 °F (36.5 °C) (Oral)   Resp 25   Ht 5' 5\" (1.651 m)   Wt 165 lb (74.8 kg)   SpO2 92%   BMI 27.46 kg/m²     General appearance:  No apparent distress, appears stated age and cooperative. HEENT:  Normal cephalic, atraumatic without obvious deformity. Pupils equal, round, and reactive to light. Extra ocular muscles intact. Conjunctivae/corneas clear. Neck: Supple, with full range of motion. No jugular venous distention. Trachea midline. Respiratory:  Normal respiratory effort. Clear to auscultation, bilaterally without Rales/Wheezes/Rhonchi. Cardiovascular:  Regular rate and rhythm with normal S1/S2 without murmurs, rubs or gallops.   Abdomen: Soft, non-tender, non-distended with normal bowel sounds. Musculoskeletal:  No clubbing, cyanosis or edema bilaterally. Full range of motion without deformity. Skin: Skin color, texture, turgor normal.  No rashes or lesions. Neurologic:  Neurovascularly intact without any focal sensory/motor deficits. Cranial nerves: II-XII intact, grossly non-focal.  Psychiatric:  Alert and oriented, thought content appropriate, normal insight  Capillary Refill: Brisk,< 3 seconds   Peripheral Pulses: +2 palpable, equal bilaterally       CXR:  I have reviewed the CXR with the following interpretation: bilateral ASD   EKG:  I have reviewed the EKG with the following interpretation: no acute ischemic changes. Labs:     Recent Labs     09/27/21  1047 09/28/21  0536   WBC 17.5* 12.5*   HGB 13.7 13.1   HCT 40.3 38.9    287     Recent Labs     09/27/21  1047 09/28/21  0536   * 128*   K 3.9 4.1   CL 92* 96*   CO2 25 23   BUN 16 18   CREATININE 0.7 0.7   CALCIUM 9.2 8.6   PHOS  --  3.2     Recent Labs     09/27/21  1047   AST 14*   ALT 15   BILITOT 0.3   ALKPHOS 58     No results for input(s): INR in the last 72 hours. Recent Labs     09/27/21  1047   TROPONINI <0.01       Urinalysis:      Lab Results   Component Value Date    NITRU Negative 09/27/2021    WBCUA 3-5 09/27/2021    BACTERIA 3+ 09/27/2021    RBCUA 5-10 09/27/2021    BLOODU TRACE-INTACT 09/27/2021    SPECGRAV 1.010 09/27/2021    GLUCOSEU Negative 09/27/2021    GLUCOSEU NEGATIVE 04/13/2012         ASSESSMENT:    Active Hospital Problems    Diagnosis Date Noted    Fibromyalgia [M79.7] 06/11/2010     Priority: Low    COPD (chronic obstructive pulmonary disease) [address] [address] [facility] [J44.9] 09/09/2021    Pneumonia [J18.9] 03/19/2019    Chronic pain syndrome [G89.4] 12/14/2018    Tobacco dependence [F17.200] 09/27/2014       PLAN:        COPD - w/ chronic respiratory failure on baseline home O2. Also w/ reported ILD/Pulmonary Fibrosis. Normally controlled on home medication regimen - continued.  Here w/ acute

## 2021-09-27 NOTE — PROGRESS NOTES
09/27/21 1018   NIV Type   Skin Protection for O2 Device Yes   Location Nose   NIV Started/Stopped On   Equipment Type v60   Mode Bilevel   Mask Type Full face mask   Mask Size Medium   Settings/Measurements   IPAP 12 cmH20   CPAP/EPAP 6 cmH2O   Rate Ordered 8   Resp 28   Insp Rise Time (%) 1 %   FiO2  40 %   Vt Exhaled 509 mL   Minute Volume 14 Liters   Mask Leak (lpm) 24 lpm   Comfort Level Good   Using Accessory Muscles Yes   SpO2 98   Alarm Settings   Alarms On Y   Press Low Alarm 6 cmH2O   High Pressure Alarm 30 cmH2O   Apnea (secs) 20 secs   Resp Rate Low Alarm 6   High Respiratory Rate 45 br/min

## 2021-09-27 NOTE — ED NOTES
Pt's RR and work of breathing increased. ED attending assessed pt and pt reported she would feel better with the BiPAP. BiPAP resumed with original settings. RT notified and en route to assess patient.        David Ochoa RN  09/27/21 [id number]

## 2021-09-27 NOTE — ED PROVIDER NOTES
 Lung disease     On home oxygen therapy     uses O2 NC 3L prn at night    Osteoporosis     Pneumonia     Polysubstance dependence ([facility] [address] 1/2/2012    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[facility] [address] 1/2/2012    Pulmonary fibrosis ([facility] [address] 10/16/2014    Pulmonary infiltrate     Pulmonary nodule 12/3/2009    Tobacco abuse      Past Surgical History:   Procedure Laterality Date    BLADDER REPAIR      BRONCHOSCOPY      BRONCHOSCOPY N/A 3/6/2020    BRONCHOSCOPY THERAPUTIC ASPIRATION INITIAL performed by Roderick Rolon MD at [address] [place] [address]  3/6/2020    BRONCHOSCOPY ALVEOLAR LAVAGE performed by Roderick Rolon MD at [address] [place] [address] N/A 6/26/2020    BRONCHOSCOPY THERAPUTIC ASPIRATION INITIAL performed by Arrie Sever, MD at [address] [place] [address]  6/26/2020    BRONCHOSCOPY ALVEOLAR LAVAGE performed by Arrie Sever, MD at [address] [place] [address]  06/23/2021    Dr Thomasena Ahumada N/A 6/23/2021    BRONCHOSCOPY DIAGNOSTIC OR CELL [address] [facility] performed by Arrie Sever, MD at [address] [place] [address]  6/23/2021    BRONCHOSCOPY THERAPUTIC ASPIRATION INITIAL performed by Arrie Sever, MD at [address] [place] [address]  6/23/2021    BRONCHOSCOPY ALVEOLAR LAVAGE performed by Arrie Sever, MD at [address] [place] [address] 8/27/2021    BRONCHOSCOPY DIAGNOSTIC OR CELL [address] ONLY performed by Arrie Sever, MD at [place] [address]  2012    ENDOSCOPY, COLON, DIAGNOSTIC      ESOPHAGEAL DILATATION  9/20/2018    ESOPHAGEAL DILATION [facility] performed by Wendie Jay MD at [address] [place] [address],[address] HISTORY  2/12/15    T8 Kyphoplasty    NH COLONOSCOPY FLX DX W/COLLJ SPEC WHEN PFRMD N/A 9/20/2018    EGD AND COLONOSCOPY WITH ANESTHESIA performed by Wendie Jay MD at [address] [facility] ESOPHAGOGASTRODUODENOSCOPY TRANSORAL DIAGNOSTIC N/A 9/20/2018 EGD AND COLONOSCOPY WITH ANESTHESIA performed by Jonny Yun MD at [address] [facility]       Family History   Problem Relation Age of Onset    Asthma Mother     Diabetes Mother     Emphysema Mother     Heart Failure Mother     Hypertension Mother     Heart Disease Mother     High Cholesterol Mother     Cancer Mother     Depression Mother     Diabetes Father     Emphysema Father     Heart Failure Father     Hypertension Father     Heart Disease Father     High Cholesterol Father     Substance Abuse Father     Emphysema Paternal Grandfather     Diabetes Sister     High Cholesterol Sister     Vision Loss Maternal Uncle      Social History     Socioeconomic History    Marital status:      Spouse name: Not on file    Number of children: 1    Years of education: Not on file    Highest education level: Not on file   Occupational History    Occupation: Disabled     Comment:    Tobacco Use    Smoking status: Current Every Day Smoker     Packs/day: 0.50     Years: 40.00     Pack years: 20.00     Types: Cigarettes     Start date: 1972     Last attempt to quit: 2021     Years since quittin.2    Smokeless tobacco: Never Used    Tobacco comment: 0.5 PPD restarted   Vaping Use    Vaping Use: Never used   Substance and Sexual Activity    Alcohol use: No     Alcohol/week: 0.0 standard drinks    Drug use: Yes     Types: Marijuana     Comment: Daily    Sexual activity: Yes     Partners: Male   Other Topics Concern    Not on file   Social History Narrative    Not on file     Social Determinants of Health     Financial Resource Strain: Low Risk     Difficulty of Paying Living Expenses: Not hard at all   Food Insecurity: No Food Insecurity    Worried About Running Out of Food in the Last Year: Never true    Erica of Food in the Last Year: Never true   Transportation Needs: No Transportation Needs    Lack of Transportation (Medical): No    Lack of Transportation (Non-Medical): No   Physical Activity:     Days of Exercise per Week:     Minutes of Exercise per Session:    Stress:     Feeling of Stress :    Social Connections:     Frequency of Communication with Friends and Family:     Frequency of Social Gatherings with Friends and Family:     Attends Islam Services:     Active Member of Clubs or Organizations:     Attends Club or Organization Meetings:     Marital Status:    Intimate Partner Violence:     Fear of Current or Ex-Partner:     Emotionally Abused:     Physically Abused:     Sexually Abused:      Current Facility-Administered Medications   Medication Dose Route Frequency Provider Last Rate Last Admin    cefepime (MAXIPIME) 2000 mg IVPB minibag  2,000 mg IntraVENous Once Corry Garcia MD 12.5 mL/hr at 09/27/21 1353 2,000 mg at 09/27/21 1353    And    levoFLOXacin (LEVAQUIN) 750 MG/150ML infusion 750 mg  750 mg IntraVENous Once Corry Garcia  mL/hr at 09/27/21 1425 750 mg at 09/27/21 1425    0.9 % sodium chloride bolus  500 mL IntraVENous Once Corry Garcia  mL/hr at 09/27/21 1353 500 mL at 09/27/21 1353     Current Outpatient Medications   Medication Sig Dispense Refill    predniSONE (DELTASONE) 10 MG tablet Take 40 mg by mouth for 3 days 30 mg for 3 days 20 mg for 3 days 10 mg for 3 days. 30 tablet 0    doxycycline hyclate (VIBRAMYCIN) 100 MG capsule Take 1 capsule by mouth 2 times daily for 7 days 14 capsule 0    ipratropium-albuterol (DUONEB) 0.5-2.5 (3) MG/3ML SOLN nebulizer solution Inhale 3 mLs into the lungs every 4 hours as needed for Shortness of Breath 360 mL 5    traZODone (DESYREL) 150 MG tablet Take 1-2 tablets by mouth nightly TAKE 2 TABLETS AT BEDTIME 180 tablet 1    guaiFENesin (MUCINEX) 600 MG extended release tablet Take 1 tablet by mouth 2 times daily as needed for Congestion 60 tablet 1    varenicline (CHANTIX STARTING MONTH PAK) 0.5 MG X 11 & 1 MG X 42 tablet Take by mouth.  1 ABDOMEN: No tenderness. Soft. Non-distended. No masses. No organomegaly. No guarding or rebound. MUSCULOSKELETAL: No extremity edema. Compartments soft. No deformity. No tenderness in the extremities. All extremities neurovascularly intact. SKIN: Warm and dry. No acute rashes. NEUROLOGICAL: Alert and oriented. No gross facial drooping. Strength 5/5, sensation intact. PSYCHIATRIC: Normal mood and affect. LABS  I have reviewed all labs for this visit.    Results for orders placed or performed during the hospital encounter of 09/27/21   COVID-19, Rapid    Specimen: Nasopharyngeal Swab   Result Value Ref Range    SARS-CoV-2, NAAT Not Detected Not Detected   CBC Auto Differential   Result Value Ref Range    WBC 17.5 (H) 4.0 - 11.0 K/uL    RBC 4.63 4.00 - 5.20 M/uL    Hemoglobin 13.7 12.0 - 16.0 g/dL    Hematocrit 40.3 36.0 - 48.0 %    MCV 87.2 80.0 - 100.0 fL    MCH 29.5 26.0 - 34.0 pg    MCHC 33.9 31.0 - 36.0 g/dL    RDW 13.9 12.4 - 15.4 %    Platelets 425 141 - 961 K/uL    MPV 7.4 5.0 - 10.5 fL    PLATELET SLIDE REVIEW Adequate     SLIDE REVIEW see below     Neutrophils % 77.0 %    Lymphocytes % 17.1 %    Monocytes % 5.2 %    Eosinophils % 0.2 %    Basophils % 0.5 %    Neutrophils Absolute 13.5 (H) 1.7 - 7.7 K/uL    Lymphocytes Absolute 3.0 1.0 - 5.1 K/uL    Monocytes Absolute 0.9 0.0 - 1.3 K/uL    Eosinophils Absolute 0.0 0.0 - 0.6 K/uL    Basophils Absolute 0.1 0.0 - 0.2 K/uL   Comprehensive Metabolic Panel w/ Reflex to MG   Result Value Ref Range    Sodium 127 (L) 136 - 145 mmol/L    Potassium reflex Magnesium 3.9 3.5 - 5.1 mmol/L    Chloride 92 (L) 99 - 110 mmol/L    CO2 25 21 - 32 mmol/L    Anion Gap 10 3 - 16    Glucose 97 70 - 99 mg/dL    BUN 16 7 - 20 mg/dL    CREATININE 0.7 0.6 - 1.2 mg/dL    GFR Non-African American >60 >60    GFR African American >60 >60    Calcium 9.2 8.3 - 10.6 mg/dL    Total Protein 6.7 6.4 - 8.2 g/dL    Albumin 4.0 3.4 - 5.0 g/dL    Albumin/Globulin Ratio 1.5 1.1 - 2.2 Total Bilirubin 0.3 0.0 - 1.0 mg/dL    Alkaline Phosphatase 58 40 - 129 U/L    ALT 15 10 - 40 U/L    AST 14 (L) 15 - 37 U/L    Globulin 2.7 g/dL   Troponin   Result Value Ref Range    Troponin <0.01 <0.01 ng/mL   Brain Natriuretic Peptide   Result Value Ref Range    Pro-BNP 60 0 - 124 pg/mL   Blood Gas, Venous   Result Value Ref Range    pH, Ivan 7.404 7.350 - 7.450    pCO2, Ivan 40.8 40.0 - 50.0 mmHg    pO2, Ivan 66.6 (H) 25 - 40 mmHg    HCO3, Venous 24.9 23.0 - 29.0 mmol/L    Base Excess, Ivan 0.2 -3.0 - 3.0 mmol/L    O2 Sat, Ivan 93 Not Established %    Carboxyhemoglobin 3.9 (H) 0.0 - 1.5 %    MetHgb, Ivan 0.5 <1.5 %    TC02 (Calc), Ivan 26 Not Established mmol/L    O2 Therapy Unknown    Lactate, Sepsis   Result Value Ref Range    Lactic Acid, Sepsis 1.2 0.4 - 1.9 mmol/L   Procalcitonin   Result Value Ref Range    Procalcitonin 0.04 0.00 - 0.15 ng/mL   EKG 12 Lead   Result Value Ref Range    Ventricular Rate 95 BPM    Atrial Rate 95 BPM    P-R Interval 146 ms    QRS Duration 76 ms    Q-T Interval 334 ms    QTc Calculation (Bazett) 419 ms    P Axis 56 degrees    R Axis -7 degrees    T Axis 54 degrees    Diagnosis       Normal sinus rhythmInferior infarct , age undeterminedAbnormal ECGWhen compared with ECG of 17-SEP-2021 19:42,No significant change was found       ECG  The Ekg interpreted by me shows  normal sinus rhythm with a rate of 95  Axis is   Left axis deviation  QTc is  within an acceptable range  Intervals and Durations are unremarkable. ST Segments: no acute change  No significant change from prior EKG dated 9/17/21    RADIOLOGY    XR CHEST PORTABLE   Final Result   Increased lung markings bilaterally, may be related to mild pulmonary   vascular congestion versus bronchitis or early pneumonia. ED COURSE / MDM  Patient seen and evaluated. Old records reviewed and pertinent information included in HPI. Labs and imaging reviewed and results discussed with patient.       Overall ill appearing cefepime (MAXIPIME) 2000 mg IVPB minibag (2,000 mg IntraVENous New Bag 9/27/21 1353)     And   levoFLOXacin (LEVAQUIN) 750 MG/150ML infusion 750 mg (750 mg IntraVENous New Bag 9/27/21 1425)   0.9 % sodium chloride bolus (500 mLs IntraVENous New Bag 9/27/21 1353)   methylPREDNISolone sodium (SOLU-MEDROL) injection 125 mg (125 mg IntraVENous Given 9/27/21 1051)   ipratropium-albuterol (DUONEB) nebulizer solution 1 ampule (1 ampule Inhalation Given 9/27/21 1017)      At this time I have low concern for pulmonary embolism. Patient has not had a previous blood clot. Patient denies other risk factors for pulmonary embolism. Patient does not have any evidence of DVT on exam.  Patient is low risk on Wells criteria. At this time, considering that risks associated with further work-up for pulmonary embolism outweigh the likelihood of this diagnosis. At least 3 minutes of smoking cessation education was provided to the patient. Based on results of work-up, I am concerned for acute respiratory failure, COPD exacerbation, pneumonia. At this time, do feel the patient requires admission for further work-up and management. Discussed the patient with hospital team, Dr. Rajeev Jerry. I spent a total of 30 minutes of critical care time in obtaining history, performing a physical exam, bedside monitoring of interventions, collecting and interpreting tests and discussion with consultants but not including time spent performing procedures. Clinical Concern respiratory failure, COPD exacerbation, pneumonia    Intervention BiPAP, steroids, breathing treatments, antibiotics, frequent reassessment          CLINICAL IMPRESSION  1. Shortness of breath    2. COPD exacerbation ([address]    3. SIRS (systemic inflammatory response syndrome) ([facility])    4. Acute respiratory failure, unspecified whether with hypoxia or hypercapnia (HCC)        Blood pressure 128/81, pulse 94, temperature 98.4 °F (36.9 °C), temperature source Oral, resp. rate 28, height 5' 5\" (1.651 m), weight 165 lb (74.8 kg), SpO2 90 %, not currently breastfeeding. DISPOSITION  Tito Rutherford was admitted in stable condition. DISCLAIMER: This chart was created using Dragon dictation software. Efforts were made by me to ensure accuracy, however some errors may be present due to limitations of this technology and occasionally words are not transcribed correctly.               Olvin Daniels MD  10/02/21 3221

## 2021-09-27 NOTE — PROGRESS NOTES
09/27/21 1645   NIV Type   Skin Protection for O2 Device Yes   Location Nose   NIV Started/Stopped On   Equipment Type v60   Mode Bilevel   Mask Type Full face mask   Mask Size Medium   Settings/Measurements   IPAP 12 cmH20   CPAP/EPAP 6 cmH2O   Rate Ordered 8   Resp 15   FiO2  40 %   Vt Exhaled 487 mL   Minute Volume 8 Liters   Mask Leak (lpm) 40 lpm   Comfort Level Good   Using Accessory Muscles No   SpO2 99   Alarm Settings   Alarms On Y   Press Low Alarm 6 cmH2O   High Pressure Alarm 30 cmH2O   Apnea (secs) 20 secs   Resp Rate Low Alarm 6   High Respiratory Rate 45 br/min

## 2021-09-27 NOTE — ED NOTES
Pt removed from BiPAP, post pt request.  Hospitalist was consulted prior to removal.  RT notified.        Jojo Alcantar RN  09/27/21 8570

## 2021-09-27 NOTE — PROGRESS NOTES
09/27/21 1903   RT Protocol   History Pulmonary Disease 2   Respiratory pattern 6   Breath sounds 2   Cough 1   Indications for Bronchodilator Therapy Decreased or absent breath sounds; On home bronchodilators   Bronchodilator Assessment Score 11

## 2021-09-27 NOTE — PROGRESS NOTES
09/27/21 1239   NIV Type   Skin Protection for O2 Device Yes   Location Nose   NIV Started/Stopped On   Equipment Type v60   Mode Bilevel   Mask Type Full face mask   Mask Size Medium   Settings/Measurements   IPAP 12 cmH20   CPAP/EPAP 6 cmH2O   Rate Ordered 8   Resp 19   FiO2  40 %   Vt Exhaled 435 mL   Minute Volume 10.5 Liters   Mask Leak (lpm) 22 lpm   Comfort Level Good   Using Accessory Muscles No   Alarm Settings   Alarms On Y   Press Low Alarm 6 cmH2O   High Pressure Alarm 30 cmH2O   Apnea (secs) 20 secs   Resp Rate Low Alarm 6   High Respiratory Rate 45 br/min

## 2021-09-28 ENCOUNTER — CARE COORDINATION (OUTPATIENT)
Dept: CASE MANAGEMENT | Age: 66
End: 2021-09-28

## 2021-09-28 LAB
ALBUMIN SERPL-MCNC: 3.6 G/DL (ref 3.4–5)
ANION GAP SERPL CALCULATED.3IONS-SCNC: 9 MMOL/L (ref 3–16)
BUN BLDV-MCNC: 18 MG/DL (ref 7–20)
CALCIUM SERPL-MCNC: 8.6 MG/DL (ref 8.3–10.6)
CHLORIDE BLD-SCNC: 96 MMOL/L (ref 99–110)
CO2: 23 MMOL/L (ref 21–32)
CREAT SERPL-MCNC: 0.7 MG/DL (ref 0.6–1.2)
GFR AFRICAN AMERICAN: >60
GFR NON-AFRICAN AMERICAN: >60
GLUCOSE BLD-MCNC: 130 MG/DL (ref 70–99)
HCT VFR BLD CALC: 38.9 % (ref 36–48)
HEMOGLOBIN: 13.1 G/DL (ref 12–16)
MCH RBC QN AUTO: 29.4 PG (ref 26–34)
MCHC RBC AUTO-ENTMCNC: 33.6 G/DL (ref 31–36)
MCV RBC AUTO: 87.5 FL (ref 80–100)
PDW BLD-RTO: 13.9 % (ref 12.4–15.4)
PHOSPHORUS: 3.2 MG/DL (ref 2.5–4.9)
PLATELET # BLD: 287 K/UL (ref 135–450)
PMV BLD AUTO: 6.6 FL (ref 5–10.5)
POTASSIUM SERPL-SCNC: 4.1 MMOL/L (ref 3.5–5.1)
RBC # BLD: 4.44 M/UL (ref 4–5.2)
SODIUM BLD-SCNC: 128 MMOL/L (ref 136–145)
WBC # BLD: 12.5 K/UL (ref 4–11)

## 2021-09-28 PROCEDURE — 1200000000 HC SEMI PRIVATE

## 2021-09-28 PROCEDURE — 94761 N-INVAS EAR/PLS OXIMETRY MLT: CPT

## 2021-09-28 PROCEDURE — 6370000000 HC RX 637 (ALT 250 FOR IP): Performed by: INTERNAL MEDICINE

## 2021-09-28 PROCEDURE — 6360000002 HC RX W HCPCS: Performed by: INTERNAL MEDICINE

## 2021-09-28 PROCEDURE — 2580000003 HC RX 258: Performed by: INTERNAL MEDICINE

## 2021-09-28 PROCEDURE — 36415 COLL VENOUS BLD VENIPUNCTURE: CPT

## 2021-09-28 PROCEDURE — 80069 RENAL FUNCTION PANEL: CPT

## 2021-09-28 PROCEDURE — 94640 AIRWAY INHALATION TREATMENT: CPT

## 2021-09-28 PROCEDURE — 2700000000 HC OXYGEN THERAPY PER DAY

## 2021-09-28 PROCEDURE — 85027 COMPLETE CBC AUTOMATED: CPT

## 2021-09-28 RX ORDER — MECOBALAMIN 5000 MCG
5 TABLET,DISINTEGRATING ORAL NIGHTLY
Status: DISCONTINUED | OUTPATIENT
Start: 2021-09-28 | End: 2021-10-03 | Stop reason: HOSPADM

## 2021-09-28 RX ORDER — IBUPROFEN 400 MG/1
400 TABLET ORAL EVERY 6 HOURS PRN
Status: DISCONTINUED | OUTPATIENT
Start: 2021-09-28 | End: 2021-10-03 | Stop reason: HOSPADM

## 2021-09-28 RX ADMIN — TRAZODONE HYDROCHLORIDE 300 MG: 50 TABLET ORAL at 20:57

## 2021-09-28 RX ADMIN — LEVOFLOXACIN 500 MG: 5 INJECTION, SOLUTION INTRAVENOUS at 13:19

## 2021-09-28 RX ADMIN — BUSPIRONE HYDROCHLORIDE 10 MG: 5 TABLET ORAL at 20:57

## 2021-09-28 RX ADMIN — METHYLPREDNISOLONE SODIUM SUCCINATE 40 MG: 40 INJECTION, POWDER, FOR SOLUTION INTRAMUSCULAR; INTRAVENOUS at 09:33

## 2021-09-28 RX ADMIN — MONTELUKAST SODIUM 10 MG: 10 TABLET ORAL at 20:57

## 2021-09-28 RX ADMIN — IPRATROPIUM BROMIDE AND ALBUTEROL SULFATE 1 AMPULE: .5; 3 SOLUTION RESPIRATORY (INHALATION) at 12:15

## 2021-09-28 RX ADMIN — ENOXAPARIN SODIUM 40 MG: 40 INJECTION SUBCUTANEOUS at 09:33

## 2021-09-28 RX ADMIN — IPRATROPIUM BROMIDE AND ALBUTEROL SULFATE 1 AMPULE: .5; 3 SOLUTION RESPIRATORY (INHALATION) at 19:48

## 2021-09-28 RX ADMIN — IBUPROFEN 400 MG: 400 TABLET, FILM COATED ORAL at 20:10

## 2021-09-28 RX ADMIN — PANTOPRAZOLE SODIUM 40 MG: 40 TABLET, DELAYED RELEASE ORAL at 06:00

## 2021-09-28 RX ADMIN — Medication 5 MG: at 20:57

## 2021-09-28 RX ADMIN — SODIUM CHLORIDE, PRESERVATIVE FREE 10 ML: 5 INJECTION INTRAVENOUS at 20:56

## 2021-09-28 RX ADMIN — IPRATROPIUM BROMIDE AND ALBUTEROL SULFATE 1 AMPULE: .5; 3 SOLUTION RESPIRATORY (INHALATION) at 16:17

## 2021-09-28 RX ADMIN — FLUOXETINE 60 MG: 20 CAPSULE ORAL at 09:33

## 2021-09-28 RX ADMIN — METHYLPREDNISOLONE SODIUM SUCCINATE 40 MG: 40 INJECTION, POWDER, FOR SOLUTION INTRAMUSCULAR; INTRAVENOUS at 20:56

## 2021-09-28 RX ADMIN — BUSPIRONE HYDROCHLORIDE 10 MG: 5 TABLET ORAL at 09:33

## 2021-09-28 RX ADMIN — ATORVASTATIN CALCIUM 20 MG: 10 TABLET, FILM COATED ORAL at 09:33

## 2021-09-28 RX ADMIN — SODIUM CHLORIDE, PRESERVATIVE FREE 10 ML: 5 INJECTION INTRAVENOUS at 09:34

## 2021-09-28 RX ADMIN — IPRATROPIUM BROMIDE AND ALBUTEROL SULFATE 1 AMPULE: .5; 3 SOLUTION RESPIRATORY (INHALATION) at 08:30

## 2021-09-28 ASSESSMENT — PAIN SCALES - GENERAL
PAINLEVEL_OUTOF10: 0
PAINLEVEL_OUTOF10: 5
PAINLEVEL_OUTOF10: 5

## 2021-09-28 ASSESSMENT — PAIN DESCRIPTION - PAIN TYPE: TYPE: CHRONIC PAIN

## 2021-09-28 ASSESSMENT — PAIN DESCRIPTION - LOCATION: LOCATION: BACK

## 2021-09-28 NOTE — CARE COORDINATION
CASE MANAGEMENT INITIAL ASSESSMENT      Reviewed chart and completed assessment  With: Pt  Explained Case Management role/services. Primary contact information:  [facility] Decision Maker :   Primary Decision Maker: Barb Nelson - Spouse - [phone]    Secondary Decision Maker: Angelita Wong - Child - [phone]    Secondary Decision Maker: kaylynn rutledge - Child - [phone]    Supplemental (Other) Decision Maker: Gregory Albright - Child - [phone]          Can this person be reached and be able to respond quickly, such as within a few minutes or hours? Yes  Who would be your back-up decision maker? Name: daughter Addy Valdovinos  Phone Number: [phone]    Admit date/status: IP, 9/28/21  Diagnosis: COPD  Is this a Readmission?:  Yes  Discharged home with family with [facility] through [facility] form A    Insurance: [employer]  Precert required for SNF: No       3 night stay required: No    Living arrangements, Adls, care needs, prior to admission: lives in a two story condo with spouse, 2SE and master bedroom on the second floor. Has been sleeping on the couch. Independent in ADL's and family drives. Transportation: family    Durable Medical Equipment at home:  Walker_XCane_X_RTS__ BSC__Shower Chair_X_  02__[employer] 2L cont. HHN__ CPAP__  BiPap__  Hospital Bed__ W/C___ Other__________    Services in the home and/or outpatient, prior to admission: Active with [facility]        PT/OT recs: None seen at this time    Hospital Exemption Notification (HEN): needed for SNF, not initiated. Barriers to discharge: None    Plan/comments: CM met with pt at bedside for initial assessment. Pt plans to return back home with current support system in place and [facility] thru [facility] skilled HC. Pt will have a ride home and has portable oxygen tank.  CM following-Jael Hernandes RN      ECOC on chart for MD signature

## 2021-09-28 NOTE — CARE COORDINATION
Upon chart review, patient noted to be inpatient.  CTN to continue to monitor and follow up call after hospital discharge  Ester Ma RN   Care Transition Nurse  [phone]

## 2021-09-28 NOTE — PROGRESS NOTES
Pt states that she does not want to wear BIPAP at this time. Pt states she will have RN contact RT when she is ready for BIPAP.  Pt resting comfortably on NC.

## 2021-09-28 NOTE — ACP (ADVANCE CARE PLANNING)
Advance Care Planning     General Advance Care Planning (ACP) Conversation    Date of Conversation: 9/27/2021  Conducted with: Patient with Decision Making Capacity    Healthcare Decision Maker:    Primary Decision Maker: Honey Aponte - Spouse - [phone]    Secondary Decision Maker: Angelita Wong - Child - [phone]    Secondary Decision Maker: Rosey kaylynn Valdez - Child - [phone]    Supplemental (Other) Decision Maker: WongNorbert - Child - [phone]  Click here to complete Healthcare Decision Makers including selection of the Healthcare Decision Maker Relationship (ie \"Primary\"). Today we documented Decision Maker(s) consistent with Legal Next of Kin hierarchy.     Content/Action Overview:  Has NO ACP documents/care preferences - information provided, considering goals and options  Reviewed DNR/DNI and patient elects Full Code (Attempt Resuscitation)      Length of Voluntary ACP Conversation in minutes:  <16 minutes (Non-Billable)    Eleonora Mandujano RN

## 2021-09-28 NOTE — ED NOTES
Pt continues to sit in bed, eating peanut butter and crackers. Pt's WOB continues to be minimal.  Pt denies dyspnea. Pt's O2 remains >94%. Pt denies needs at this time.      Tabby Butler RN  09/27/21 2001

## 2021-09-28 NOTE — PROGRESS NOTES
4 Eyes Skin Assessment     The patient is being assess for   Admission    I agree that 2 RN's have performed a thorough Head to Toe Skin Assessment on the patient. ALL assessment sites listed below have been assessed. Areas assessed for pressure by both nurses:   [x]   Head, Face, and Ears   [x]   Shoulders, Back, and Chest, Abdomen  [x]   Arms, Elbows, and Hands   [x]   Coccyx, Sacrum, and Ischium  [x]   Legs, Feet, and Heels        Skin Assessed Under all Medical Devices by both nurses:  O2 device tubing                **SHARE this note so that the co-signing nurse is able to place an eSignature**    Co-signer eSignature: Electronically signed by Montse Taylor RN on 9/28/21 at 4:37 AM EDT    Does the Patient have Skin Breakdown related to pressure?   No     Philip Prevention initiated:  NA   Wound Care Orders initiated:  NA      [facility] nurse consulted for Pressure Injury (Stage 3,4, Unstageable, DTI, NWPT, Complex wounds)and New or Established Ostomies:  NA      Primary Nurse eSignature: Electronically signed by Trevor Ernst RN on 9/27/21 at 11:30 PM EDT

## 2021-09-29 ENCOUNTER — CARE COORDINATION (OUTPATIENT)
Dept: CARE COORDINATION | Age: 66
End: 2021-09-29

## 2021-09-29 LAB
ALBUMIN SERPL-MCNC: 3.6 G/DL (ref 3.4–5)
ANION GAP SERPL CALCULATED.3IONS-SCNC: 13 MMOL/L (ref 3–16)
BUN BLDV-MCNC: 18 MG/DL (ref 7–20)
CALCIUM SERPL-MCNC: 8.6 MG/DL (ref 8.3–10.6)
CHLORIDE BLD-SCNC: 98 MMOL/L (ref 99–110)
CO2: 21 MMOL/L (ref 21–32)
CREAT SERPL-MCNC: 0.7 MG/DL (ref 0.6–1.2)
GFR AFRICAN AMERICAN: >60
GFR NON-AFRICAN AMERICAN: >60
GLUCOSE BLD-MCNC: 192 MG/DL (ref 70–99)
HCT VFR BLD CALC: 38.5 % (ref 36–48)
HEMOGLOBIN: 13.1 G/DL (ref 12–16)
MCH RBC QN AUTO: 29.7 PG (ref 26–34)
MCHC RBC AUTO-ENTMCNC: 34 G/DL (ref 31–36)
MCV RBC AUTO: 87.4 FL (ref 80–100)
PDW BLD-RTO: 14 % (ref 12.4–15.4)
PHOSPHORUS: 2.9 MG/DL (ref 2.5–4.9)
PLATELET # BLD: 264 K/UL (ref 135–450)
PMV BLD AUTO: 6.9 FL (ref 5–10.5)
POTASSIUM SERPL-SCNC: 4.2 MMOL/L (ref 3.5–5.1)
RBC # BLD: 4.4 M/UL (ref 4–5.2)
SODIUM BLD-SCNC: 132 MMOL/L (ref 136–145)
WBC # BLD: 16.3 K/UL (ref 4–11)

## 2021-09-29 PROCEDURE — 85027 COMPLETE CBC AUTOMATED: CPT

## 2021-09-29 PROCEDURE — 36415 COLL VENOUS BLD VENIPUNCTURE: CPT

## 2021-09-29 PROCEDURE — 1200000000 HC SEMI PRIVATE

## 2021-09-29 PROCEDURE — 80069 RENAL FUNCTION PANEL: CPT

## 2021-09-29 PROCEDURE — 2580000003 HC RX 258: Performed by: INTERNAL MEDICINE

## 2021-09-29 PROCEDURE — 6370000000 HC RX 637 (ALT 250 FOR IP): Performed by: INTERNAL MEDICINE

## 2021-09-29 PROCEDURE — 94761 N-INVAS EAR/PLS OXIMETRY MLT: CPT

## 2021-09-29 PROCEDURE — 94640 AIRWAY INHALATION TREATMENT: CPT

## 2021-09-29 PROCEDURE — 6360000002 HC RX W HCPCS: Performed by: INTERNAL MEDICINE

## 2021-09-29 PROCEDURE — 2700000000 HC OXYGEN THERAPY PER DAY

## 2021-09-29 RX ORDER — LIDOCAINE 4 G/G
1 PATCH TOPICAL DAILY
Status: DISCONTINUED | OUTPATIENT
Start: 2021-09-29 | End: 2021-10-03 | Stop reason: HOSPADM

## 2021-09-29 RX ADMIN — BUSPIRONE HYDROCHLORIDE 10 MG: 5 TABLET ORAL at 20:55

## 2021-09-29 RX ADMIN — IPRATROPIUM BROMIDE AND ALBUTEROL SULFATE 1 AMPULE: .5; 3 SOLUTION RESPIRATORY (INHALATION) at 07:44

## 2021-09-29 RX ADMIN — PANTOPRAZOLE SODIUM 40 MG: 40 TABLET, DELAYED RELEASE ORAL at 05:20

## 2021-09-29 RX ADMIN — SODIUM CHLORIDE, PRESERVATIVE FREE 10 ML: 5 INJECTION INTRAVENOUS at 20:54

## 2021-09-29 RX ADMIN — ENOXAPARIN SODIUM 40 MG: 40 INJECTION SUBCUTANEOUS at 08:54

## 2021-09-29 RX ADMIN — BUSPIRONE HYDROCHLORIDE 10 MG: 5 TABLET ORAL at 08:54

## 2021-09-29 RX ADMIN — TRAZODONE HYDROCHLORIDE 300 MG: 50 TABLET ORAL at 20:54

## 2021-09-29 RX ADMIN — METHYLPREDNISOLONE SODIUM SUCCINATE 40 MG: 40 INJECTION, POWDER, FOR SOLUTION INTRAMUSCULAR; INTRAVENOUS at 20:54

## 2021-09-29 RX ADMIN — LEVOFLOXACIN 500 MG: 5 INJECTION, SOLUTION INTRAVENOUS at 09:04

## 2021-09-29 RX ADMIN — SODIUM CHLORIDE 25 ML: 9 INJECTION, SOLUTION INTRAVENOUS at 09:02

## 2021-09-29 RX ADMIN — ATORVASTATIN CALCIUM 20 MG: 10 TABLET, FILM COATED ORAL at 08:54

## 2021-09-29 RX ADMIN — Medication 5 MG: at 20:55

## 2021-09-29 RX ADMIN — IPRATROPIUM BROMIDE AND ALBUTEROL SULFATE 1 AMPULE: .5; 3 SOLUTION RESPIRATORY (INHALATION) at 16:34

## 2021-09-29 RX ADMIN — FLUOXETINE 60 MG: 20 CAPSULE ORAL at 08:55

## 2021-09-29 RX ADMIN — METHYLPREDNISOLONE SODIUM SUCCINATE 40 MG: 40 INJECTION, POWDER, FOR SOLUTION INTRAMUSCULAR; INTRAVENOUS at 08:54

## 2021-09-29 RX ADMIN — IPRATROPIUM BROMIDE AND ALBUTEROL SULFATE 1 AMPULE: .5; 3 SOLUTION RESPIRATORY (INHALATION) at 19:52

## 2021-09-29 RX ADMIN — IPRATROPIUM BROMIDE AND ALBUTEROL SULFATE 1 AMPULE: .5; 3 SOLUTION RESPIRATORY (INHALATION) at 11:29

## 2021-09-29 RX ADMIN — SODIUM CHLORIDE, PRESERVATIVE FREE 10 ML: 5 INJECTION INTRAVENOUS at 08:55

## 2021-09-29 RX ADMIN — MONTELUKAST SODIUM 10 MG: 10 TABLET ORAL at 20:55

## 2021-09-29 RX ADMIN — IBUPROFEN 400 MG: 400 TABLET, FILM COATED ORAL at 20:55

## 2021-09-29 ASSESSMENT — PAIN SCALES - GENERAL
PAINLEVEL_OUTOF10: 5
PAINLEVEL_OUTOF10: 5
PAINLEVEL_OUTOF10: 0

## 2021-09-29 ASSESSMENT — PAIN DESCRIPTION - LOCATION
LOCATION: BACK
LOCATION: BACK

## 2021-09-29 ASSESSMENT — PAIN DESCRIPTION - PAIN TYPE
TYPE: CHRONIC PAIN
TYPE: CHRONIC PAIN

## 2021-09-29 NOTE — CARE COORDINATION
ACM was going to place outgoing call to patient but upon chart review patient noted to be in hospital. ACM will follow once patient completes care transitions as Scotty Robert RN is following.

## 2021-09-29 NOTE — PROGRESS NOTES
Hospitalist Progress Note      PCP: Jean Claude Fonseca MD    Date of Admission: 9/27/2021    Chief Complaint: SOB    Subjective: no new c/o. Medications:  Reviewed    Infusion Medications    sodium chloride 25 mL (09/29/21 0902)     Scheduled Medications    melatonin  5 mg Oral Nightly    levofloxacin  500 mg IntraVENous Daily    methylPREDNISolone  40 mg IntraVENous BID    ipratropium-albuterol  1 ampule Inhalation Q4H WA    busPIRone  10 mg Oral BID    FLUoxetine  60 mg Oral Daily    montelukast  10 mg Oral Nightly    pantoprazole  40 mg Oral QAM AC    atorvastatin  20 mg Oral Daily    traZODone  300 mg Oral Nightly    sodium chloride flush  5-40 mL IntraVENous 2 times per day    enoxaparin  40 mg SubCUTAneous Daily     PRN Meds: ibuprofen, guaiFENesin, sodium chloride flush, sodium chloride, ondansetron **OR** ondansetron, polyethylene glycol, acetaminophen **OR** acetaminophen    No intake or output data in the 24 hours ending 09/29/21 0905    Physical Exam Performed:    /74   Pulse 95   Temp 98.1 °F (36.7 °C) (Oral)   Resp 18   Ht 5' 5\" (1.651 m)   Wt 165 lb (74.8 kg)   SpO2 96%   BMI 27.46 kg/m²     General appearance: No apparent distress, appears stated age and cooperative. HEENT: Pupils equal, round, and reactive to light. Conjunctivae/corneas clear. Neck: Supple, with full range of motion. No jugular venous distention. Trachea midline. Respiratory:  Normal respiratory effort. Clear to auscultation, bilaterally without Rales/Wheezes/Rhonchi. Cardiovascular: Regular rate and rhythm with normal S1/S2 without murmurs, rubs or gallops. Abdomen: Soft, non-tender, non-distended with normal bowel sounds. Musculoskeletal: No clubbing, cyanosis or edema bilaterally. Full range of motion without deformity. Skin: Skin color, texture, turgor normal.  No rashes or lesions. Neurologic:  Neurovascularly intact without any focal sensory/motor deficits.  Cranial nerves: II-XII intact, grossly non-focal.  Psychiatric: Alert and oriented, thought content appropriate, normal insight  Capillary Refill: Brisk,< 3 seconds   Peripheral Pulses: +2 palpable, equal bilaterally       Labs:   Recent Labs     09/27/21  1047 09/28/21  0536 09/29/21  0604   WBC 17.5* 12.5* 16.3*   HGB 13.7 13.1 13.1   HCT 40.3 38.9 38.5    287 264     Recent Labs     09/27/21  1047 09/28/21  0536 09/29/21  0604   * 128* 132*   K 3.9 4.1 4.2   CL 92* 96* 98*   CO2 25 23 21   BUN 16 18 18   CREATININE 0.7 0.7 0.7   CALCIUM 9.2 8.6 8.6   PHOS  --  3.2 2.9     Recent Labs     09/27/21  1047   AST 14*   ALT 15   BILITOT 0.3   ALKPHOS 58     No results for input(s): INR in the last 72 hours. Recent Labs     09/27/21  1047   TROPONINI <0.01       Urinalysis:      Lab Results   Component Value Date    NITRU Negative 09/27/2021    WBCUA 3-5 09/27/2021    BACTERIA 3+ 09/27/2021    RBCUA 5-10 09/27/2021    BLOODU TRACE-INTACT 09/27/2021    SPECGRAV 1.010 09/27/2021    GLUCOSEU Negative 09/27/2021    GLUCOSEU NEGATIVE 04/13/2012       Consults:    IP CONSULT TO HOSPITALIST      Assessment/Plan:    Active Hospital Problems    Diagnosis     Fibromyalgia [M79.7]      Priority: Low    COPD (chronic obstructive pulmonary disease) ([facility] [address] [J44.9]     Pneumonia [J18.9]     Chronic pain syndrome [G89.4]     Tobacco dependence [F17.200]           COPD - w/ chronic respiratory failure on baseline home O2. Also w/ reported ILD/Pulmonary Fibrosis. Normally controlled on home medication regimen - continued. Here w/ acute exacerbation. Started on HHN/IV steroids.      PNA - likely CAP w/ Strep Pneumonia. Started on empiric Levaquin/Maxipime in ED on 27 Sept - continued Levaquin.      Acute on Chronic Respiratory Failure - w/ increased hypoxia, POArrival.  Presence of clinical respiratory distress w/ tachypnea/dyspnea/SOB and wheezing w/ use of accessory muscles to breath. Supplemental O2 and wean as tolerated.    HyperLipidemia - controlled on home Statin. Continue, w/ f/u and med adjustment w/ PCP     HypoNatremia - etiology clinically unable to determine but likely hypovolemic. Follow serial labs on IVF. Reviewed and documented as above.        DVT Prophylaxis: LMWH      Recent Labs     09/27/21  1047 09/28/21  0536 09/29/21  0604    287 264     Diet: ADULT DIET;  Regular  Code Status: Full Code      PT/OT Eval Status: not yet ordered.      Dispo -  Possibly to home Thurs 30 Sept pending clinical course       Claude Reeves MD

## 2021-09-29 NOTE — PROGRESS NOTES
Perfect serve to Brian Wilson MD:     [facility]  or Facility: [facility] From: [facility] RE: [address] 1955 RM: [phone]     Patient is reporting increased chronic back pain. She is asking to get a lidocaine patch if possible. Thank you so much.  Need     Callback: NO CALLBACK REQ C5 OSBALDO Smith / Mariah Bhagat

## 2021-09-29 NOTE — PLAN OF CARE
Pt will be satisfied with pain control. Pt uses numeric pain rating scale with reassessments after pain med administration. Will continue to monitor progression throughout shift.    Problem: Pain:  Goal: Control of chronic pain  Description: Control of chronic pain  9/29/2021 1148 by Zhang Ba RN  Outcome: Ongoing

## 2021-09-30 LAB
ALBUMIN SERPL-MCNC: 3.3 G/DL (ref 3.4–5)
ANION GAP SERPL CALCULATED.3IONS-SCNC: 11 MMOL/L (ref 3–16)
BUN BLDV-MCNC: 19 MG/DL (ref 7–20)
CALCIUM SERPL-MCNC: 8.5 MG/DL (ref 8.3–10.6)
CHLORIDE BLD-SCNC: 100 MMOL/L (ref 99–110)
CO2: 21 MMOL/L (ref 21–32)
CREAT SERPL-MCNC: 0.6 MG/DL (ref 0.6–1.2)
GFR AFRICAN AMERICAN: >60
GFR NON-AFRICAN AMERICAN: >60
GLUCOSE BLD-MCNC: 118 MG/DL (ref 70–99)
HCT VFR BLD CALC: 35.7 % (ref 36–48)
HEMOGLOBIN: 12.2 G/DL (ref 12–16)
MCH RBC QN AUTO: 29.8 PG (ref 26–34)
MCHC RBC AUTO-ENTMCNC: 34.2 G/DL (ref 31–36)
MCV RBC AUTO: 87 FL (ref 80–100)
PDW BLD-RTO: 13.9 % (ref 12.4–15.4)
PHOSPHORUS: 2.7 MG/DL (ref 2.5–4.9)
PLATELET # BLD: 257 K/UL (ref 135–450)
PMV BLD AUTO: 6.7 FL (ref 5–10.5)
POTASSIUM SERPL-SCNC: 4.2 MMOL/L (ref 3.5–5.1)
RBC # BLD: 4.1 M/UL (ref 4–5.2)
SODIUM BLD-SCNC: 132 MMOL/L (ref 136–145)
WBC # BLD: 16.4 K/UL (ref 4–11)

## 2021-09-30 PROCEDURE — 6370000000 HC RX 637 (ALT 250 FOR IP): Performed by: INTERNAL MEDICINE

## 2021-09-30 PROCEDURE — 2580000003 HC RX 258: Performed by: INTERNAL MEDICINE

## 2021-09-30 PROCEDURE — 85027 COMPLETE CBC AUTOMATED: CPT

## 2021-09-30 PROCEDURE — 36415 COLL VENOUS BLD VENIPUNCTURE: CPT

## 2021-09-30 PROCEDURE — 6360000002 HC RX W HCPCS: Performed by: INTERNAL MEDICINE

## 2021-09-30 PROCEDURE — 80069 RENAL FUNCTION PANEL: CPT

## 2021-09-30 PROCEDURE — 94640 AIRWAY INHALATION TREATMENT: CPT

## 2021-09-30 PROCEDURE — 1200000000 HC SEMI PRIVATE

## 2021-09-30 RX ADMIN — BUSPIRONE HYDROCHLORIDE 10 MG: 5 TABLET ORAL at 09:03

## 2021-09-30 RX ADMIN — MONTELUKAST SODIUM 10 MG: 10 TABLET ORAL at 21:19

## 2021-09-30 RX ADMIN — FLUOXETINE 60 MG: 20 CAPSULE ORAL at 09:03

## 2021-09-30 RX ADMIN — Medication 5 MG: at 21:18

## 2021-09-30 RX ADMIN — METHYLPREDNISOLONE SODIUM SUCCINATE 40 MG: 40 INJECTION, POWDER, FOR SOLUTION INTRAMUSCULAR; INTRAVENOUS at 09:03

## 2021-09-30 RX ADMIN — IPRATROPIUM BROMIDE AND ALBUTEROL SULFATE 1 AMPULE: .5; 3 SOLUTION RESPIRATORY (INHALATION) at 13:09

## 2021-09-30 RX ADMIN — PANTOPRAZOLE SODIUM 40 MG: 40 TABLET, DELAYED RELEASE ORAL at 05:35

## 2021-09-30 RX ADMIN — LEVOFLOXACIN 500 MG: 5 INJECTION, SOLUTION INTRAVENOUS at 10:16

## 2021-09-30 RX ADMIN — TRAZODONE HYDROCHLORIDE 300 MG: 50 TABLET ORAL at 21:18

## 2021-09-30 RX ADMIN — SODIUM CHLORIDE, PRESERVATIVE FREE 10 ML: 5 INJECTION INTRAVENOUS at 09:03

## 2021-09-30 RX ADMIN — IBUPROFEN 400 MG: 400 TABLET, FILM COATED ORAL at 21:19

## 2021-09-30 RX ADMIN — SODIUM CHLORIDE, PRESERVATIVE FREE 10 ML: 5 INJECTION INTRAVENOUS at 21:18

## 2021-09-30 RX ADMIN — IPRATROPIUM BROMIDE AND ALBUTEROL SULFATE 1 AMPULE: .5; 3 SOLUTION RESPIRATORY (INHALATION) at 20:37

## 2021-09-30 RX ADMIN — ATORVASTATIN CALCIUM 20 MG: 10 TABLET, FILM COATED ORAL at 09:03

## 2021-09-30 RX ADMIN — BUSPIRONE HYDROCHLORIDE 10 MG: 5 TABLET ORAL at 21:18

## 2021-09-30 RX ADMIN — ENOXAPARIN SODIUM 40 MG: 40 INJECTION SUBCUTANEOUS at 09:02

## 2021-09-30 RX ADMIN — IPRATROPIUM BROMIDE AND ALBUTEROL SULFATE 1 AMPULE: .5; 3 SOLUTION RESPIRATORY (INHALATION) at 16:26

## 2021-09-30 RX ADMIN — METHYLPREDNISOLONE SODIUM SUCCINATE 40 MG: 40 INJECTION, POWDER, FOR SOLUTION INTRAMUSCULAR; INTRAVENOUS at 21:18

## 2021-09-30 ASSESSMENT — PAIN DESCRIPTION - PAIN TYPE: TYPE: CHRONIC PAIN

## 2021-09-30 ASSESSMENT — PAIN DESCRIPTION - LOCATION: LOCATION: BACK

## 2021-09-30 ASSESSMENT — PAIN SCALES - GENERAL
PAINLEVEL_OUTOF10: 5
PAINLEVEL_OUTOF10: 0
PAINLEVEL_OUTOF10: 5

## 2021-09-30 NOTE — PROGRESS NOTES
Pt. Resting in bed. Alert/oriented. Vitals and assessment stable as charted. O2 96 on 3l NC; still with dyspnea at rest worsening with exertion. Mild expiratory wheezing throughout; worse on left. Call light in reach. Will continue to monitor.

## 2021-09-30 NOTE — DISCHARGE INSTR - COC
Continuity of Care Form    Patient Name: Pina Ramachandran   :  1955  MRN:  [id number]    Admit date:  2021  Discharge date:  ***    Code Status Order: Full Code   Advance Directives:      Admitting Physician:  Alexander Powell MD  PCP: Sharad Gamble MD    Discharging Nurse: [facility] Unit/Room#: 0110/0110-02  Discharging Unit Phone Number: ***    Emergency Contact:   Extended Emergency Contact Information  Primary Emergency Contact: Angelita Wong  Address: [place] [facility] [phone]           [address] [place] [address] Phone: [phone]  Mobile Phone: [phone]  Relation: Child  Secondary Emergency Contact: y [address], [address] [facility] Phone: [phone]  Mobile Phone: [phone]  Relation: Spouse    Past Surgical History:  Past Surgical History:   Procedure Laterality Date    BLADDER REPAIR      BRONCHOSCOPY      BRONCHOSCOPY N/A 3/6/2020    BRONCHOSCOPY THERAPUTIC ASPIRATION INITIAL performed by Venus Bush MD at [address] [place] [address]  3/6/2020    BRONCHOSCOPY ALVEOLAR LAVAGE performed by Venus Bush MD at [address] [place] [address] 2020    BRONCHOSCOPY THERAPUTIC ASPIRATION INITIAL performed by Subha Seo MD at [address] [place] [address]  2020    BRONCHOSCOPY ALVEOLAR LAVAGE performed by Subha Seo MD at [address] [place] [address]  2021    Dr Lise Brand N/A 2021    [address] performed by Subha Seo MD at [address] [place] [address]  2021    BRONCHOSCOPY THERAPUTIC ASPIRATION INITIAL performed by Subha Seo MD at [address] [place] [address]  2021    BRONCHOSCOPY ALVEOLAR LAVAGE performed by Subha Seo MD at [address] [place] [address] 2021    BRONCHOSCOPY DIAGNOSTIC OR CELL [address] performed by Subha Seo MD at [address]      ENDOSCOPY, COLON, DIAGNOSTIC      ESOPHAGEAL DILATATION  9/20/2018    ESOPHAGEAL DILATION WATERS performed by Alicia Solorio MD at [place] [address]  2/12/15    T8 Kyphoplasty    CA COLONOSCOPY FLX DX W/COLLJ SPEC WHEN PFRMD N/A 9/20/2018    EGD AND COLONOSCOPY WITH ANESTHESIA performed by Alicia Solorio MD at [address] [place] [address] ESOPHAGOGASTRODUODENOSCOPY TRANSORAL DIAGNOSTIC N/A 9/20/2018    EGD AND COLONOSCOPY WITH ANESTHESIA performed by Alicia Solorio MD at [address] [place] [address]         Immunization History:   Immunization History   Administered Date(s) Administered    COVID-19, Pfizer, PF, 30mcg/0.3mL 03/10/2021, 03/31/2021    Influenza 10/04/2010, 10/12/2011, 11/28/2012    Influenza Virus Vaccine 10/16/2014, 01/14/2016    Influenza, Intradermal, Preservative free 11/04/2013    Influenza, Quadv, IM, (6 mo and older Fluzone, Flulaval, Fluarix and 3 yrs and older Afluria) 10/12/2011, 01/30/2017, 10/19/2017    Influenza, Donnella Jasiel, IM, PF (6 mo and older Fluzone, Flulaval, Fluarix, and 3 yrs and older Afluria) 09/06/2018, 12/26/2019    Pneumococcal Conjugate 7-valent (Prevnar7) 01/01/2009    Pneumococcal Polysaccharide ([id number]) 01/14/2016    Td, unspecified formulation 11/04/2013    Tdap (Boostrix, Adacel) 01/25/2018       Active Problems:  Patient Active Problem List   Diagnosis Code    Stage 3 severe COPD by GOLD classification ([facility] J44.9    Smoker F17.200    Fibromyalgia M79.7    Hypokalemia E87.6    Hyperlipidemia E78.5    Hyponatremia E87.1    Depression F32.9    Tobacco dependence F17.200    Pulmonary fibrosis (HCC) J84.10    ILD (interstitial lung disease) ([facility] J84.9    Recurrent major depressive disorder, in partial remission (HCC) F33.41    Thyroid nodule E04.1    History of prescription drug abuse [id number]    Esophageal dysphagia R13.10    Heartburn R12    Rectal bleeding K62.5    History of colon polyps Z86.010    Compression fx, thoracic spine, sequela S22.000S    Kyphosis M40.209    Anxiety and depression F41.9, F32.9    Chronic pain syndrome G89.4    Polyarthropathy, multiple sites M13.0    Pneumonia J18.9    Acute on chronic respiratory failure with hypoxia and hypercapnia ([facility]) J96.21, J96.22    Acute respiratory failure with hypoxia ([facility]) J96.01    Healthcare associated bacterial pneumonia J15.9    Bandemia D72.825    Sepsis ([address] A41.9    COPD exacerbation ([facility]) J44.1    Pulmonary infiltrates R91.8    Chest congestion R09.89    Mucus plugging of bronchi T17.500A    Ineffective airway clearance R06.89    Acute on chronic respiratory failure with hypoxemia ([facility]) J96.21    COPD, frequent exacerbations ([facility]) J44.1    Acute on chronic respiratory failure ([facility]) J96.20    Cannabis dependence with current use ([facility]) F12.20    Other secondary kyphosis, 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    Marijuana use H14.52    Diastolic dysfunction N74.91    COPD (chronic obstructive pulmonary disease) ([facility]) J44.9       Isolation/Infection:   Isolation            No Isolation          Patient Infection Status       Infection Onset Added Last Indicated Last Indicated By Review Planned Expiration Resolved Resolved By    None active    Resolved    COVID-19 Rule Out 09/27/21 09/27/21 09/27/21 COVID-19, Rapid (Ordered)   09/27/21 Rule-Out Test Resulted    COVID-19 Rule Out 09/17/21 09/17/21 09/17/21 COVID-19, Rapid (Ordered)   09/17/21 Rule-Out Test Resulted    COVID-19 Rule Out 09/09/21 09/09/21 09/09/21 SARS-CoV-2 NAAT (Rapid) (Ordered)   09/09/21 Rule-Out Test Resulted    COVID-19 Rule Out 07/11/21 07/11/21 07/11/21 COVID-19, Rapid (Ordered)   07/11/21 Rule-Out Test Resulted    COVID-19 Rule Out 11/27/20 11/27/20 11/27/20 COVID-19 (Ordered)   11/27/20 Rule-Out Test Resulted    COVID-19 Rule Out 10/09/20 10/09/20 10/09/20 COVID-19 (Ordered)   10/10/20 Rule-Out Test Resulted    COVID-19 Rule Out 06/24/20 06/24/20 06/24/20 COVID-19 (Ordered)   06/25/20 Rule-Out Test Resulted    COVID-19 Rule Out 20 COVID-19 (Ordered)   20 Rule-Out Test Resulted    INFLUENZA 20 Respiratory Panel, Molecular   20             Nurse Assessment:  Last Vital Signs: /88   Pulse 79   Temp 97.6 °F (36.4 °C)   Resp 22   Ht 5' 5\" (1.651 m)   Wt 165 lb (74.8 kg)   SpO2 96%   BMI 27.46 kg/m²     Last documented pain score (0-10 scale): Pain Level: 5  Last Weight:   Wt Readings from Last 1 Encounters:   21 165 lb (74.8 kg)     Mental Status:  {IP PT MENTAL STATUS:}    IV Access:  { ASHLEY IV ACCESS:536834951}    Nursing Mobility/ADLs:  Walking   {P DME KFRS:907826738}  Transfer  {[facility] DME WVBW:750525117}  Bathing  {[facility] DME CYVF:568037836}  Dressing  {P DME HRLU:435760433}  Toileting  {P DME LQRD:553303565}  Feeding  {[facility] DME LXBF:401287747}  Med Admin  {[facility] DME GBFS:761418600}  Med Delivery   { ASHLEY MED Delivery:714646426}    Wound Care Documentation and Therapy:        Elimination:  Continence: Bowel: {YES / J}  Bladder: {YES / JU:87230}  Urinary Catheter: {Urinary Catheter:505979994}   Colostomy/Ileostomy/Ileal Conduit: {YES / PL:28059}       Date of Last BM: ***    Intake/Output Summary (Last 24 hours) at 2021 0941  Last data filed at 2021 1806  Gross per 24 hour   Intake 946.67 ml   Output --   Net 946.67 ml     I/O last 3 completed shifts:   In: 1060.1 [P.O.:860; I.V.:0.1; IV Piggyback:200]  Out: -     Safety Concerns:     508 check24 Safety Concerns:263676427}    Impairments/Disabilities:      508 check24 Impairments/Disabilities:201664939}    Nutrition Therapy:  Current Nutrition Therapy:   [address] ASHLEY Diet List:613208430}    Routes of Feeding: {CHP DME Other Feedings:483751890}  Liquids: {Slp liquid thickness:57930}  Daily Fluid Restriction: {CHP DME Yes amt example:841005588}  Last Modified Barium Swallow with Video (Video Swallowing Test): {Done Not Done DNWV:771816094}    Treatments at the Time of Hospital Discharge:

## 2021-09-30 NOTE — PROGRESS NOTES
Hospitalist Progress Note      PCP: Donnie Angel MD    Date of Admission: 9/27/2021    Chief Complaint: SOB    Subjective: no new c/o. Medications:  Reviewed    Infusion Medications    sodium chloride Stopped (09/29/21 0903)     Scheduled Medications    lidocaine  1 patch TransDERmal Daily    melatonin  5 mg Oral Nightly    levofloxacin  500 mg IntraVENous Daily    methylPREDNISolone  40 mg IntraVENous BID    ipratropium-albuterol  1 ampule Inhalation Q4H WA    busPIRone  10 mg Oral BID    FLUoxetine  60 mg Oral Daily    montelukast  10 mg Oral Nightly    pantoprazole  40 mg Oral QAM AC    atorvastatin  20 mg Oral Daily    traZODone  300 mg Oral Nightly    sodium chloride flush  5-40 mL IntraVENous 2 times per day    enoxaparin  40 mg SubCUTAneous Daily     PRN Meds: ibuprofen, guaiFENesin, sodium chloride flush, sodium chloride, ondansetron **OR** ondansetron, polyethylene glycol, acetaminophen **OR** acetaminophen      Intake/Output Summary (Last 24 hours) at 9/30/2021 1038  Last data filed at 9/29/2021 1806  Gross per 24 hour   Intake 666.67 ml   Output --   Net 666.67 ml       Physical Exam Performed:    /88   Pulse 79   Temp 97.6 °F (36.4 °C)   Resp 22   Ht 5' 5\" (1.651 m)   Wt 165 lb (74.8 kg)   SpO2 96%   BMI 27.46 kg/m²     General appearance: No apparent distress, appears stated age and cooperative. HEENT: Pupils equal, round, and reactive to light. Conjunctivae/corneas clear. Neck: Supple, with full range of motion. No jugular venous distention. Trachea midline. Respiratory:  Normal respiratory effort. Clear to auscultation, bilaterally without Rales/Wheezes/Rhonchi. Cardiovascular: Regular rate and rhythm with normal S1/S2 without murmurs, rubs or gallops. Abdomen: Soft, non-tender, non-distended with normal bowel sounds. Musculoskeletal: No clubbing, cyanosis or edema bilaterally. Full range of motion without deformity.   Skin: Skin color, texture, turgor normal.  No rashes or lesions. Neurologic:  Neurovascularly intact without any focal sensory/motor deficits. Cranial nerves: II-XII intact, grossly non-focal.  Psychiatric: Alert and oriented, thought content appropriate, normal insight  Capillary Refill: Brisk,< 3 seconds   Peripheral Pulses: +2 palpable, equal bilaterally       Labs:   Recent Labs     09/28/21  0536 09/29/21  0604 09/30/21  0656   WBC 12.5* 16.3* 16.4*   HGB 13.1 13.1 12.2   HCT 38.9 38.5 35.7*    264 257     Recent Labs     09/28/21  0536 09/29/21  0604 09/30/21  0657   * 132* 132*   K 4.1 4.2 4.2   CL 96* 98* 100   CO2 23 21 21   BUN 18 18 19   CREATININE 0.7 0.7 0.6   CALCIUM 8.6 8.6 8.5   PHOS 3.2 2.9 2.7     Recent Labs     09/27/21  1047   AST 14*   ALT 15   BILITOT 0.3   ALKPHOS 58     No results for input(s): INR in the last 72 hours. Recent Labs     09/27/21  1047   TROPONINI <0.01       Urinalysis:      Lab Results   Component Value Date    NITRU Negative 09/27/2021    WBCUA 3-5 09/27/2021    BACTERIA 3+ 09/27/2021    RBCUA 5-10 09/27/2021    BLOODU TRACE-INTACT 09/27/2021    SPECGRAV 1.010 09/27/2021    GLUCOSEU Negative 09/27/2021    GLUCOSEU NEGATIVE 04/13/2012       Consults:    IP CONSULT TO HOSPITALIST      Assessment/Plan:    Active Hospital Problems    Diagnosis     Fibromyalgia [M79.7]      Priority: Low    COPD (chronic obstructive pulmonary disease) ([address] [J44.9]     Pneumonia [J18.9]     Chronic pain syndrome [G89.4]     Tobacco dependence [F17.200]           COPD - w/ chronic respiratory failure on baseline home O2. Also w/ reported ILD/Pulmonary Fibrosis. Normally controlled on home medication regimen - continued. Here w/ acute exacerbation. Started on HHN/IV steroids.      PNA - likely CAP w/ Strep Pneumonia.   Started on empiric Levaquin/Maxipime in ED on 27 Sept - continued Levaquin.      Acute on Chronic Respiratory Failure - w/ increased hypoxia, POArrival.  Presence of clinical respiratory distress w/ tachypnea/dyspnea/SOB and wheezing w/ use of accessory muscles to breath. Supplemental O2 and wean as tolerated.      HyperLipidemia - controlled on home Statin. Continue, w/ f/u and med adjustment w/ PCP     HypoNatremia - etiology clinically unable to determine but likely hypovolemic. Follow serial labs on IVF. Reviewed and documented as above.        DVT Prophylaxis: LMWH      Recent Labs     09/28/21  0536 09/29/21  0604 09/30/21  0656    264 257     Diet: ADULT DIET;  Regular  Code Status: Full Code      PT/OT Eval Status: not yet ordered.      Dispo -  Possibly to home Friday 1 October pending clinical course       Roberto Obregon MD

## 2021-10-01 LAB
ALBUMIN SERPL-MCNC: 3.3 G/DL (ref 3.4–5)
ANION GAP SERPL CALCULATED.3IONS-SCNC: 7 MMOL/L (ref 3–16)
BLOOD CULTURE, ROUTINE: NORMAL
BUN BLDV-MCNC: 23 MG/DL (ref 7–20)
CALCIUM SERPL-MCNC: 8.5 MG/DL (ref 8.3–10.6)
CHLORIDE BLD-SCNC: 100 MMOL/L (ref 99–110)
CO2: 25 MMOL/L (ref 21–32)
CREAT SERPL-MCNC: 0.6 MG/DL (ref 0.6–1.2)
CULTURE, BLOOD 2: NORMAL
GFR AFRICAN AMERICAN: >60
GFR NON-AFRICAN AMERICAN: >60
GLUCOSE BLD-MCNC: 128 MG/DL (ref 70–99)
HCT VFR BLD CALC: 34.2 % (ref 36–48)
HEMOGLOBIN: 11.7 G/DL (ref 12–16)
MCH RBC QN AUTO: 29.5 PG (ref 26–34)
MCHC RBC AUTO-ENTMCNC: 34.2 G/DL (ref 31–36)
MCV RBC AUTO: 86.1 FL (ref 80–100)
PDW BLD-RTO: 13.9 % (ref 12.4–15.4)
PHOSPHORUS: 3.1 MG/DL (ref 2.5–4.9)
PLATELET # BLD: 225 K/UL (ref 135–450)
PMV BLD AUTO: 7.1 FL (ref 5–10.5)
POTASSIUM SERPL-SCNC: 4.7 MMOL/L (ref 3.5–5.1)
RBC # BLD: 3.97 M/UL (ref 4–5.2)
SODIUM BLD-SCNC: 132 MMOL/L (ref 136–145)
WBC # BLD: 14.2 K/UL (ref 4–11)

## 2021-10-01 PROCEDURE — 94761 N-INVAS EAR/PLS OXIMETRY MLT: CPT

## 2021-10-01 PROCEDURE — 2700000000 HC OXYGEN THERAPY PER DAY

## 2021-10-01 PROCEDURE — 80069 RENAL FUNCTION PANEL: CPT

## 2021-10-01 PROCEDURE — 6370000000 HC RX 637 (ALT 250 FOR IP): Performed by: INTERNAL MEDICINE

## 2021-10-01 PROCEDURE — 6360000002 HC RX W HCPCS: Performed by: INTERNAL MEDICINE

## 2021-10-01 PROCEDURE — 36415 COLL VENOUS BLD VENIPUNCTURE: CPT

## 2021-10-01 PROCEDURE — 1200000000 HC SEMI PRIVATE

## 2021-10-01 PROCEDURE — 85027 COMPLETE CBC AUTOMATED: CPT

## 2021-10-01 PROCEDURE — 2580000003 HC RX 258: Performed by: INTERNAL MEDICINE

## 2021-10-01 PROCEDURE — 94640 AIRWAY INHALATION TREATMENT: CPT

## 2021-10-01 RX ORDER — FUROSEMIDE 10 MG/ML
40 INJECTION INTRAMUSCULAR; INTRAVENOUS ONCE
Status: COMPLETED | OUTPATIENT
Start: 2021-10-01 | End: 2021-10-01

## 2021-10-01 RX ADMIN — FLUOXETINE 60 MG: 20 CAPSULE ORAL at 09:04

## 2021-10-01 RX ADMIN — SODIUM CHLORIDE, PRESERVATIVE FREE 10 ML: 5 INJECTION INTRAVENOUS at 09:04

## 2021-10-01 RX ADMIN — IPRATROPIUM BROMIDE AND ALBUTEROL SULFATE 1 AMPULE: .5; 3 SOLUTION RESPIRATORY (INHALATION) at 16:41

## 2021-10-01 RX ADMIN — SODIUM CHLORIDE, PRESERVATIVE FREE 10 ML: 5 INJECTION INTRAVENOUS at 19:54

## 2021-10-01 RX ADMIN — Medication 5 MG: at 19:53

## 2021-10-01 RX ADMIN — PANTOPRAZOLE SODIUM 40 MG: 40 TABLET, DELAYED RELEASE ORAL at 06:30

## 2021-10-01 RX ADMIN — MONTELUKAST SODIUM 10 MG: 10 TABLET ORAL at 19:54

## 2021-10-01 RX ADMIN — BUSPIRONE HYDROCHLORIDE 10 MG: 5 TABLET ORAL at 19:53

## 2021-10-01 RX ADMIN — METHYLPREDNISOLONE SODIUM SUCCINATE 40 MG: 40 INJECTION, POWDER, FOR SOLUTION INTRAMUSCULAR; INTRAVENOUS at 19:54

## 2021-10-01 RX ADMIN — LEVOFLOXACIN 500 MG: 5 INJECTION, SOLUTION INTRAVENOUS at 10:24

## 2021-10-01 RX ADMIN — IPRATROPIUM BROMIDE AND ALBUTEROL SULFATE 1 AMPULE: .5; 3 SOLUTION RESPIRATORY (INHALATION) at 12:50

## 2021-10-01 RX ADMIN — IPRATROPIUM BROMIDE AND ALBUTEROL SULFATE 1 AMPULE: .5; 3 SOLUTION RESPIRATORY (INHALATION) at 19:33

## 2021-10-01 RX ADMIN — ATORVASTATIN CALCIUM 20 MG: 10 TABLET, FILM COATED ORAL at 09:05

## 2021-10-01 RX ADMIN — ENOXAPARIN SODIUM 40 MG: 40 INJECTION SUBCUTANEOUS at 09:04

## 2021-10-01 RX ADMIN — TRAZODONE HYDROCHLORIDE 300 MG: 50 TABLET ORAL at 19:54

## 2021-10-01 RX ADMIN — IPRATROPIUM BROMIDE AND ALBUTEROL SULFATE 1 AMPULE: .5; 3 SOLUTION RESPIRATORY (INHALATION) at 09:09

## 2021-10-01 RX ADMIN — METHYLPREDNISOLONE SODIUM SUCCINATE 40 MG: 40 INJECTION, POWDER, FOR SOLUTION INTRAMUSCULAR; INTRAVENOUS at 09:04

## 2021-10-01 RX ADMIN — FUROSEMIDE 40 MG: 10 INJECTION, SOLUTION INTRAMUSCULAR; INTRAVENOUS at 09:04

## 2021-10-01 RX ADMIN — BUSPIRONE HYDROCHLORIDE 10 MG: 5 TABLET ORAL at 09:05

## 2021-10-01 ASSESSMENT — PAIN SCALES - GENERAL: PAINLEVEL_OUTOF10: 0

## 2021-10-01 NOTE — PROGRESS NOTES
Hospitalist Progress Note      PCP: Ashley Daniel MD    Date of Admission: 9/27/2021    Chief Complaint: SOB    Subjective: no new c/o. Medications:  Reviewed    Infusion Medications    sodium chloride Stopped (09/29/21 0903)     Scheduled Medications    furosemide  40 mg IntraVENous Once    lidocaine  1 patch TransDERmal Daily    melatonin  5 mg Oral Nightly    levofloxacin  500 mg IntraVENous Daily    methylPREDNISolone  40 mg IntraVENous BID    ipratropium-albuterol  1 ampule Inhalation Q4H WA    busPIRone  10 mg Oral BID    FLUoxetine  60 mg Oral Daily    montelukast  10 mg Oral Nightly    pantoprazole  40 mg Oral QAM AC    atorvastatin  20 mg Oral Daily    traZODone  300 mg Oral Nightly    sodium chloride flush  5-40 mL IntraVENous 2 times per day    enoxaparin  40 mg SubCUTAneous Daily     PRN Meds: ibuprofen, guaiFENesin, sodium chloride flush, sodium chloride, ondansetron **OR** ondansetron, polyethylene glycol, acetaminophen **OR** acetaminophen    No intake or output data in the 24 hours ending 10/01/21 0830    Physical Exam Performed:    /76   Pulse 108   Temp 98 °F (36.7 °C) (Oral)   Resp 16   Ht 5' 5\" (1.651 m)   Wt 165 lb (74.8 kg)   SpO2 95%   BMI 27.46 kg/m²     General appearance: No apparent distress, appears stated age and cooperative. HEENT: Pupils equal, round, and reactive to light. Conjunctivae/corneas clear. Neck: Supple, with full range of motion. No jugular venous distention. Trachea midline. Respiratory:  Normal respiratory effort. Clear to auscultation, bilaterally without Rales/Wheezes/Rhonchi. Cardiovascular: Regular rate and rhythm with normal S1/S2 without murmurs, rubs or gallops. Abdomen: Soft, non-tender, non-distended with normal bowel sounds. Musculoskeletal: No clubbing, cyanosis or edema bilaterally. Full range of motion without deformity. Skin: Skin color, texture, turgor normal.  No rashes or lesions.   Neurologic: Neurovascularly intact without any focal sensory/motor deficits. Cranial nerves: II-XII intact, grossly non-focal.  Psychiatric: Alert and oriented, thought content appropriate, normal insight  Capillary Refill: Brisk,< 3 seconds   Peripheral Pulses: +2 palpable, equal bilaterally       Labs:   Recent Labs     09/29/21  0604 09/30/21  0656 10/01/21  0611   WBC 16.3* 16.4* 14.2*   HGB 13.1 12.2 11.7*   HCT 38.5 35.7* 34.2*    257 225     Recent Labs     09/29/21  0604 09/30/21  0657 10/01/21  0611   * 132* 132*   K 4.2 4.2 4.7   CL 98* 100 100   CO2 21 21 25   BUN 18 19 23*   CREATININE 0.7 0.6 0.6   CALCIUM 8.6 8.5 8.5   PHOS 2.9 2.7 3.1     No results for input(s): AST, ALT, BILIDIR, BILITOT, ALKPHOS in the last 72 hours. No results for input(s): INR in the last 72 hours. No results for input(s): Mariama Crawford in the last 72 hours. Urinalysis:      Lab Results   Component Value Date    NITRU Negative 09/27/2021    WBCUA 3-5 09/27/2021    BACTERIA 3+ 09/27/2021    RBCUA 5-10 09/27/2021    BLOODU TRACE-INTACT 09/27/2021    SPECGRAV 1.010 09/27/2021    GLUCOSEU Negative 09/27/2021    GLUCOSEU NEGATIVE 04/13/2012       Consults:    IP CONSULT TO HOSPITALIST      Assessment/Plan:    Active Hospital Problems    Diagnosis     Fibromyalgia [M79.7]      Priority: Low    COPD (chronic obstructive pulmonary disease) (HCC) [J44.9]     Pneumonia [J18.9]     Chronic pain syndrome [G89.4]     Tobacco dependence [F17.200]           COPD - w/ chronic respiratory failure on baseline home O2. Also w/ reported ILD/Pulmonary Fibrosis. Normally controlled on home medication regimen - continued. Here w/ acute exacerbation. Started on HHN/IV steroids.      PNA - likely CAP w/ Strep Pneumonia.   Started on empiric Levaquin/Maxipime in ED on 27 Sept - continued Levaquin.      Acute on Chronic Respiratory Failure - w/ increased hypoxia, POArrival.  Presence of clinical respiratory distress w/ tachypnea/dyspnea/SOB and wheezing w/ use of accessory muscles to breath. Supplemental O2 and wean as tolerated.      Tobacco Abuse - active and ongoing. Cessation counseled. Nicotine replacement PRN. HyperLipidemia - controlled on home Statin. Continue, w/ f/u and med adjustment w/ PCP     HypoNatremia - etiology clinically unable to determine but likely hypovolemic. Follow serial labs on IVF, now off and given a dose of Lasix 1 October. Reviewed and documented as above.        DVT Prophylaxis: LMWH      Recent Labs     09/29/21  0604 09/30/21  0656 10/01/21  0611    257 225     Diet: ADULT DIET;  Regular  Code Status: Full Code      PT/OT Eval Status: not yet ordered.      Dispo -  Possibly to home Friday/Sat 1/2 October pending clinical course.   Matt Sierra MD

## 2021-10-02 LAB
ALBUMIN SERPL-MCNC: 3.4 G/DL (ref 3.4–5)
ANION GAP SERPL CALCULATED.3IONS-SCNC: 9 MMOL/L (ref 3–16)
BUN BLDV-MCNC: 21 MG/DL (ref 7–20)
CALCIUM SERPL-MCNC: 9 MG/DL (ref 8.3–10.6)
CHLORIDE BLD-SCNC: 98 MMOL/L (ref 99–110)
CO2: 29 MMOL/L (ref 21–32)
CREAT SERPL-MCNC: 0.7 MG/DL (ref 0.6–1.2)
GFR AFRICAN AMERICAN: >60
GFR NON-AFRICAN AMERICAN: >60
GLUCOSE BLD-MCNC: 158 MG/DL (ref 70–99)
HCT VFR BLD CALC: 35.9 % (ref 36–48)
HEMOGLOBIN: 12.3 G/DL (ref 12–16)
MCH RBC QN AUTO: 29.9 PG (ref 26–34)
MCHC RBC AUTO-ENTMCNC: 34.2 G/DL (ref 31–36)
MCV RBC AUTO: 87.7 FL (ref 80–100)
PDW BLD-RTO: 13.8 % (ref 12.4–15.4)
PHOSPHORUS: 3.8 MG/DL (ref 2.5–4.9)
PLATELET # BLD: 233 K/UL (ref 135–450)
PMV BLD AUTO: 7.1 FL (ref 5–10.5)
POTASSIUM SERPL-SCNC: 4.2 MMOL/L (ref 3.5–5.1)
RBC # BLD: 4.09 M/UL (ref 4–5.2)
SODIUM BLD-SCNC: 136 MMOL/L (ref 136–145)
WBC # BLD: 13.5 K/UL (ref 4–11)

## 2021-10-02 PROCEDURE — 2580000003 HC RX 258: Performed by: INTERNAL MEDICINE

## 2021-10-02 PROCEDURE — 2700000000 HC OXYGEN THERAPY PER DAY

## 2021-10-02 PROCEDURE — 94640 AIRWAY INHALATION TREATMENT: CPT

## 2021-10-02 PROCEDURE — 94761 N-INVAS EAR/PLS OXIMETRY MLT: CPT

## 2021-10-02 PROCEDURE — 6370000000 HC RX 637 (ALT 250 FOR IP): Performed by: INTERNAL MEDICINE

## 2021-10-02 PROCEDURE — 80069 RENAL FUNCTION PANEL: CPT

## 2021-10-02 PROCEDURE — 36415 COLL VENOUS BLD VENIPUNCTURE: CPT

## 2021-10-02 PROCEDURE — 1200000000 HC SEMI PRIVATE

## 2021-10-02 PROCEDURE — 85027 COMPLETE CBC AUTOMATED: CPT

## 2021-10-02 PROCEDURE — 6360000002 HC RX W HCPCS: Performed by: INTERNAL MEDICINE

## 2021-10-02 RX ORDER — PREDNISONE 20 MG/1
40 TABLET ORAL DAILY
Status: DISCONTINUED | OUTPATIENT
Start: 2021-10-02 | End: 2021-10-03 | Stop reason: HOSPADM

## 2021-10-02 RX ADMIN — LEVOFLOXACIN 500 MG: 5 INJECTION, SOLUTION INTRAVENOUS at 10:21

## 2021-10-02 RX ADMIN — METHYLPREDNISOLONE SODIUM SUCCINATE 40 MG: 40 INJECTION, POWDER, FOR SOLUTION INTRAMUSCULAR; INTRAVENOUS at 10:14

## 2021-10-02 RX ADMIN — Medication 5 MG: at 21:30

## 2021-10-02 RX ADMIN — TRAZODONE HYDROCHLORIDE 300 MG: 50 TABLET ORAL at 21:30

## 2021-10-02 RX ADMIN — PREDNISONE 40 MG: 20 TABLET ORAL at 16:33

## 2021-10-02 RX ADMIN — SODIUM CHLORIDE, PRESERVATIVE FREE 10 ML: 5 INJECTION INTRAVENOUS at 10:17

## 2021-10-02 RX ADMIN — IPRATROPIUM BROMIDE AND ALBUTEROL SULFATE 1 AMPULE: .5; 3 SOLUTION RESPIRATORY (INHALATION) at 08:46

## 2021-10-02 RX ADMIN — ATORVASTATIN CALCIUM 20 MG: 10 TABLET, FILM COATED ORAL at 10:14

## 2021-10-02 RX ADMIN — ENOXAPARIN SODIUM 40 MG: 40 INJECTION SUBCUTANEOUS at 10:14

## 2021-10-02 RX ADMIN — PANTOPRAZOLE SODIUM 40 MG: 40 TABLET, DELAYED RELEASE ORAL at 05:58

## 2021-10-02 RX ADMIN — BUSPIRONE HYDROCHLORIDE 10 MG: 5 TABLET ORAL at 21:31

## 2021-10-02 RX ADMIN — BUSPIRONE HYDROCHLORIDE 10 MG: 5 TABLET ORAL at 10:14

## 2021-10-02 RX ADMIN — IPRATROPIUM BROMIDE AND ALBUTEROL SULFATE 1 AMPULE: .5; 3 SOLUTION RESPIRATORY (INHALATION) at 20:31

## 2021-10-02 RX ADMIN — IPRATROPIUM BROMIDE AND ALBUTEROL SULFATE 1 AMPULE: .5; 3 SOLUTION RESPIRATORY (INHALATION) at 15:25

## 2021-10-02 RX ADMIN — IPRATROPIUM BROMIDE AND ALBUTEROL SULFATE 1 AMPULE: .5; 3 SOLUTION RESPIRATORY (INHALATION) at 12:22

## 2021-10-02 RX ADMIN — FLUOXETINE 60 MG: 20 CAPSULE ORAL at 10:14

## 2021-10-02 ASSESSMENT — PAIN SCALES - GENERAL
PAINLEVEL_OUTOF10: 0

## 2021-10-02 NOTE — PLAN OF CARE
Bed locked and in low position, bedside table and call light within reach, nonskid socks on, ambulates in room with steady gait. Verbalizes adequate pain relief with lidoderm patch, which was removed @ HS.   Problem: Falls - Risk of:  Goal: Will remain free from falls  Description: Will remain free from falls  Outcome: Ongoing  Goal: Absence of physical injury  Description: Absence of physical injury  Outcome: Ongoing     Problem: Pain:  Goal: Pain level will decrease  Description: Pain level will decrease  Outcome: Ongoing  Goal: Control of acute pain  Description: Control of acute pain  Outcome: Ongoing  Goal: Control of chronic pain  Description: Control of chronic pain  Outcome: Ongoing

## 2021-10-02 NOTE — PROGRESS NOTES
Hospitalist Progress Note  10/2/2021 2:56 PM  Subjective:   Admit Date: 9/27/2021  PCP: Jose Alejandro Salgado MD Status: Inpatient [101]  Interval History: Hospital Day: 6, readmitted with community acquire pneumonia and acute exacerbation of COPD on baseline home oxygen. History of pulmonary fibrosis and interstitial lung disease. Diet: Regular  Left hand peripheral IV (9/27, day #6)  External urinary catheter (9/27, day #6)  Medications:   Sodium chloride at 100 ml/hr  lidocaine  4% patch TransDERmal Daily   melatonin  5 mg Oral Nightly   levofloxacin  500 mg IntraVENous Daily (9/27, day #6)   methylprednisolone  40 mg IntraVENous BID   ipratropium-albuterol  1 ampule Inhalation Q4H WA   buspirone  10 mg Oral BID   fluoxetine  60 mg Oral Daily   montelukast  10 mg Oral Nightly   pantoprazole  40 mg Oral QAM AC   atorvastatin  20 mg Oral Daily   trazodone  300 mg Oral Nightly   enoxaparin  40 mg SubCUTAneous Daily     Recent Labs     09/30/21  0656 10/01/21  0611 10/02/21  0536   WBC 16.4* 14.2* 13.5*   HGB 12.2 11.7* 12.3    225 233   MCV 87.0 86.1 87.7     Recent Labs     09/30/21  0657 10/01/21  0611 10/02/21  0536   * 132* 136   K 4.2 4.7 4.2    100 98*   CO2 21 25 29   BUN 19 23* 21*   CREATININE 0.6 0.6 0.7   GLUCOSE 118* 128* 158*     Blood culture x 2 (9/27) no growth after 4 days  SARS-CoV-2 NAAT (9/27) not detected    Portable CXR (9/27) Increased lung markings bilaterally, may be related to mild pulmonary vascular congestion versus bronchitis or early pneumonia.     Objective:   Vitals:  /80   Pulse 95   Temp 97.9 °F (36.6 °C) (Oral)   Resp 18   Ht 5' 5\" (1.651 m)   Wt 165 lb (74.8 kg)   SpO2 94%   BMI 27.46 kg/m²   General appearance: alert and cooperative with exam  Lungs: diffuse rhonchi  Heart: regular rate and rhythm, S1, S2 normal, no murmur, click, rub or gallop  Abdomen: soft, non-tender; bowel sounds normal; no masses,  no organomegaly  Extremities: extremities normal, atraumatic, no cyanosis or edema  Neurologic: No obvious focal neurologic deficits. Assessment and Plan:   1. Community acquired pneumonia. Antibiotic therapy with levofloxacin. 2. COPD with chronic respiratory failure on home oxygen. Systemic steroid therapy with methylprednisolone 40 mg IV BID. Transition to oral Prednisone. Oxygen at home used at night. 3. Acute on chronic respiratory failure:  Supplemental oxygen. 4. Nicotine dependence. Declines need for nicotine replacement therapy. She has Chantix at home. 5. Hyponatremia:  Resolved with IV saline volume expansion. 6. Dyslipidemia:  Continues on atorvastatin 20 mg daily. 7. Gastric reflux and stress ulcer prophylaxis with pantoprazole (Protonix) 40 mg daily. Advance Directive: Full Code  DVT prophylaxis with enoxaparin 40 mg sub-Q daily.    Discharge planning:  Jovan, Oct 3      Сергей Dasilva MD  [facility] Hospitalist

## 2021-10-03 VITALS
HEIGHT: 65 IN | RESPIRATION RATE: 18 BRPM | WEIGHT: 165 LBS | BODY MASS INDEX: 27.49 KG/M2 | OXYGEN SATURATION: 95 % | TEMPERATURE: 98.3 F | SYSTOLIC BLOOD PRESSURE: 129 MMHG | DIASTOLIC BLOOD PRESSURE: 74 MMHG | HEART RATE: 91 BPM

## 2021-10-03 PROCEDURE — 6360000002 HC RX W HCPCS: Performed by: INTERNAL MEDICINE

## 2021-10-03 PROCEDURE — 6370000000 HC RX 637 (ALT 250 FOR IP): Performed by: INTERNAL MEDICINE

## 2021-10-03 PROCEDURE — 94640 AIRWAY INHALATION TREATMENT: CPT

## 2021-10-03 PROCEDURE — 2580000003 HC RX 258: Performed by: INTERNAL MEDICINE

## 2021-10-03 RX ORDER — PREDNISONE 20 MG/1
TABLET ORAL
Qty: 25 TABLET | Refills: 0 | Status: SHIPPED | OUTPATIENT
Start: 2021-10-03 | End: 2021-10-07 | Stop reason: SDUPTHER

## 2021-10-03 RX ADMIN — SODIUM CHLORIDE, PRESERVATIVE FREE 10 ML: 5 INJECTION INTRAVENOUS at 09:10

## 2021-10-03 RX ADMIN — PANTOPRAZOLE SODIUM 40 MG: 40 TABLET, DELAYED RELEASE ORAL at 06:03

## 2021-10-03 RX ADMIN — ENOXAPARIN SODIUM 40 MG: 40 INJECTION SUBCUTANEOUS at 09:08

## 2021-10-03 RX ADMIN — ATORVASTATIN CALCIUM 20 MG: 10 TABLET, FILM COATED ORAL at 09:09

## 2021-10-03 RX ADMIN — LEVOFLOXACIN 500 MG: 5 INJECTION, SOLUTION INTRAVENOUS at 09:14

## 2021-10-03 RX ADMIN — FLUOXETINE 60 MG: 20 CAPSULE ORAL at 09:09

## 2021-10-03 RX ADMIN — IPRATROPIUM BROMIDE AND ALBUTEROL SULFATE 1 AMPULE: .5; 3 SOLUTION RESPIRATORY (INHALATION) at 12:23

## 2021-10-03 RX ADMIN — PREDNISONE 40 MG: 20 TABLET ORAL at 09:09

## 2021-10-03 RX ADMIN — IPRATROPIUM BROMIDE AND ALBUTEROL SULFATE 1 AMPULE: .5; 3 SOLUTION RESPIRATORY (INHALATION) at 08:14

## 2021-10-03 RX ADMIN — BUSPIRONE HYDROCHLORIDE 10 MG: 5 TABLET ORAL at 09:09

## 2021-10-03 ASSESSMENT — PAIN DESCRIPTION - PAIN TYPE
TYPE: CHRONIC PAIN

## 2021-10-03 ASSESSMENT — PAIN SCALES - GENERAL
PAINLEVEL_OUTOF10: 3

## 2021-10-03 ASSESSMENT — PAIN DESCRIPTION - LOCATION
LOCATION: BACK;SHOULDER
LOCATION: BACK;SHOULDER

## 2021-10-03 NOTE — PLAN OF CARE
Problem: Falls - Risk of:  Goal: Absence of physical injury  Description: Absence of physical injury  10/3/2021 1335 by Mary Wellington RN  Outcome: Completed  10/3/2021 1308 by Mary Wellington RN  Outcome: Ongoing

## 2021-10-03 NOTE — FLOWSHEET NOTE
10/02/21 2030   Assessment   Charting Type Shift assessment   Neurological   Neuro (WDL) WDL   Level of Consciousness Alert (0)   Orientation Level Oriented X4   Cognition Appropriate attention/concentration; Appropriate safety awareness; Appropriate for developmental age; Appropriate judgement; Follows commands   Language Clear   Size R Pupil (mm) 2   R Pupil Shape Round   R Pupil Reaction Brisk   Size L Pupil (mm) 2   L Pupil Shape Round   L Pupil Reaction Brisk   R Hand  Strong   L Hand  Strong   R Foot Dorsiflexion Strong   L Foot Dorsiflexion Strong   R Foot Plantar Flexion Strong   L Foot Plantar Flexion Strong   RUE Motor Response Responds to command;Normal extension;Normal flexion   LUE Motor Response Responds to command;Normal extension;Normal flexion   RLE Motor Response Responds to command;Normal extension;Normal flexion   LLE Motor Response Responds to command;Normal extension;Normal flexion   Gag Present   [place] Coma Scale   Eye Opening 4   Best Verbal Response 5   Best Motor Response 6   [place] Coma Scale Score 15   HEENT   HEENT (WDL) WDL   Right Eye Impaired vision   Left Eye Impaired vision   Nose Intact   Throat Intact   Neck Trachea midline;Symmetrical   Tongue Dry   Voice Normal   Mucous Membrane Pink; Intact   Teeth Dentures upper;Missing teeth   Respiratory   Respiratory (WDL) X   Respiratory Pattern Regular   Respiratory Depth Normal   Respiratory Quality/Effort Dyspnea with exertion;Unlabored   Chest Assessment Chest expansion symmetrical   L Breath Sounds Inspiratory Wheezes   R Breath Sounds Inspiratory Wheezes   Breath Sounds   Right Upper Lobe Inspiratory Wheezes   Right Middle Lobe Inspiratory Wheezes   Right Lower Lobe Inspiratory Wheezes   Left Upper Lobe Inspiratory Wheezes   Left Lower Lobe Inspiratory Wheezes   Cough/Sputum   Cough Productive   Cough Description   Sputum Amount Small   Sputum Color White;Clear   Tenacity Thin   Sputum How Obtained Cough on request   Cardiac Cardiac (WDL) WDL   Cardiac Regularity Regular   Heart Sounds S1, S2   Cardiac Rhythm Sinus rhythm   Cardiac Monitor   Telemetry Monitor On No   Telemetry Audible No   Telemetry Alarms Set No   Telemetry Box Number 19   Gastrointestinal   Abdominal (WDL) WDL   Peripheral Vascular   Peripheral Vascular (WDL) X   Edema None   RLE Edema +1;Pitting   LLE Edema +1;Pitting   Sensation RUE Full sensation   Sensation LUE Full sensation   Sensation RLE Full sensation   Sensation LLE Full sensation   Skin Color/Condition   Skin Color/Condition (WDL) WDL   Skin Integrity   Skin Integrity (WDL) X   Musculoskeletal   Musculoskeletal (WDL) WDL   RUE Full movement   LUE Full movement   RL Extremity Weakness   LL Extremity Weakness   Genitourinary   Genitourinary (WDL) WDL   Flank Tenderness No   Suprapubic Tenderness No   Dysuria No   Urine Assessment   Incontinence No   Urine Color Yellow/straw   Urine Appearance Hazy   Urine Odor Malodorous   Anus/Rectum   Anus/Rectum (WDL) WDL  (pt without c/o)   Psychosocial   Psychosocial (WDL) WDL   Patient Behaviors Appropriate for age;Calm; Cooperative

## 2021-10-03 NOTE — DISCHARGE SUMMARY
Hospital Medicine Discharge Summary    Opal Tello  :  1955  MRN:  [id number]    Admit date:  2021  Discharge date:  10/3/2021    Admitting Physician:  Forest Marshall MD  Primary Care Physician:  Duane Cancel, MD  Code Status:   Full Code  Status: Inpatient [101]  Discharged Condition: Stable  Activity: activity as tolerated  Diet:  ADULT DIET; Regular      Discharge Diagnoses:      Community acquired pneumonia    COPD exacerbation     Acute on chronic hypoxic respiratory failure    Nicotine dependence    Hyponatremia    Dyslipidemia    Gastric reflux    Hospital Course:   60 y.o. female Readmitted with community acquire pneumonia refractory to outpatient doxycycline and acute exacerbation of COPD on baseline home oxygen.  History of pulmonary fibrosis and interstitial lung disease.   Completed a seven day course of levofloxacin 750 mg IV daily. 1. Community acquired pneumonia.  Antibiotic therapy with levofloxacin x 7 days. Not prescribed at discharge. 2. COPD with chronic respiratory failure on home oxygen.   Systemic steroid therapy with methylprednisolone 40 mg IV BID.  Transition to oral Prednisone with 21 day taper at discharge.  Oxygen at home used at night.    3. Acute on chronic respiratory failure:  Supplemental oxygen. 4. Nicotine dependence.  Declines need for nicotine replacement therapy.  She has Chantix at home and plans to start after discharge. 5. Hyponatremia:  Resolved with IV saline volume expansion.    6. Dyslipidemia:  Continues on atorvastatin 20 mg daily.   7. Gastric reflux and stress ulcer prophylaxis with pantoprazole (Protonix) 40 mg daily.        Discharge Medications:  New script in [facility], e prescribed to Kaylee Mix on [employer]   albuterol (PROVENTIL) (2.5 MG/3ML) 0.083% nebulizer solution  Take 3 mLs by nebulization every 6 hours as needed for Wheezing or Shortness of Breath DX COPD J44.9     albuterol sulfate HFA (VENTOLIN HFA) 108 (90 Base) MCG/ACT inhaler  Inhale 2 puffs into the lungs every 6 hours as needed for Wheezing or Shortness of Breath     BD PEN NEEDLE SVETLANA U/F 32G X 4 MM MISC  USE ONE DAILY AS DIRECTED     busPIRone (BUSPAR) 30 MG tablet  TAKE 1 TABLET TWICE DAILY     FLUoxetine (PROZAC) 20 MG capsule  TAKE 3 CAPSULES BY MOUTH DAILY     fluticasone-salmeterol (WIXELA INHUB) 500-50 MCG/DOSE diskus inhaler  Inhale 1 puff into the lungs every 12 hours     guaiFENesin (MUCINEX) 600 MG extended release tablet  Take 1 tablet by mouth 2 times daily as needed for Congestion     ipratropium-albuterol (DUONEB) 0.5-2.5 (3) MG/3ML SOLN nebulizer solution  Inhale 3 mLs into the lungs every 4 hours as needed for Shortness of Breath     montelukast (SINGULAIR) 10 MG tablet  TAKE 1 TABLET BY MOUTH EACH NIGHT     predniSONE (DELTASONE) 20 MG tablet  Take 40 mg (two tablets) daily for five days, then 20 mg (one tablet) daily for seven days, then (10 mg (half tablet) daily for seven days, then STOP. (new script, #25, RF-0)     simvastatin (ZOCOR) 20 MG tablet  TAKE 1 TABLET EVERY EVENING     teriparatide, recombinant, (FORTEO) 600 MCG/2.4ML SOLN injection  Inject 0.08 mLs into the skin daily One dose injected daily. traZODone (DESYREL) 150 MG tablet  Take 1-2 tablets by mouth nightly TAKE 2 TABLETS AT BEDTIME     varenicline (CHANTIX STARTING MONTH PAK) 0.5 MG X 11 & 1 MG X 42 tablet  Take by mouth. Consults:  none    Significant Diagnostic Studies:   Blood culture x 2 (9/27) no growth after 4 days  SARS-CoV-2 NAAT (9/27) not detected     Portable CXR (9/27) Increased lung markings bilaterally, may be related to mild pulmonary vascular congestion versus bronchitis or early pneumonia. Treatments:   IV methylprednisolone and levofloxacin    Disposition:   Home with self care, prednisone taper  Follow up with Perico Brown MD in 1-2 weeks.       Signed:  Jae Abrams MD  10/3/2021, 1:27 PM

## 2021-10-04 ENCOUNTER — TELEPHONE (OUTPATIENT)
Dept: FAMILY MEDICINE CLINIC | Age: 66
End: 2021-10-04

## 2021-10-04 ENCOUNTER — CARE COORDINATION (OUTPATIENT)
Dept: CASE MANAGEMENT | Age: 66
End: 2021-10-04

## 2021-10-04 DIAGNOSIS — I51.89 DIASTOLIC DYSFUNCTION: Primary | ICD-10-CM

## 2021-10-04 PROCEDURE — 1111F DSCHRG MED/CURRENT MED MERGE: CPT | Performed by: FAMILY MEDICINE

## 2021-10-04 NOTE — CARE COORDINATION
understanding of administration of home medications. Advised obtaining a 90-day supply of all daily and as-needed medications. Covid Risk Education     Educated patient about risk for severe COVID-19 due to risk factors according to CDC guidelines. CTN reviewed discharge instructions, medical action plan and red flag symptoms with the patient who verbalized understanding. Discussed COVID vaccination status: Yes. Education provided on COVID-19 vaccination as appropriate. Discussed exposure protocols and quarantine with CDC Guidelines. Patient was given an opportunity to verbalize any questions and concerns and agrees to contact CTN or health care provider for questions related to their healthcare. Reviewed and educated patient on any new and changed medications related to discharge diagnosis. Was patient discharged with a pulse oximeter? No, patient has one Discussed and confirmed pulse oximeter discharge instructions and when to notify provider or seek emergency care. Patient answered call and verified . Patient noticeably short of breath with conversation, but stated that it was \"better\". Patient has received call from pulmonologist and scheduled chest xray and follow up visit. Full medication reconciliation completed and noted in system. Patient taking all medication as directed. Patient has not heard from [facility] as of this morning, so CTN placed call to agency. Spoke to Nehemiah Khanna in regards to referral. Agency was having issues with Epic access and request referral to be Fax to [phone]. CTN does not have access to fax machine so call was placed to C5 discharge planner, Jesus Manuel Lin with request to send referral to agency. Provided fax number. Denies any acute needs at present time. Agreeable to f/u calls. Educated on the use of urgent care or physicians 24 hr access line if assistance is needed after hours. CTN provided contact information.  Plan for follow-up call in 5-7 days based on severity of symptoms and risk factors.       Care Transitions 24 Hour Call    Do you have any ongoing symptoms?: No  Do you have a copy of your discharge instructions?: Yes  Do you have all of your prescriptions and are they filled?: Yes  Have you been contacted by a [address]?: No  Have you scheduled your follow up appointment?: Yes  How are you going to get to your appointment?: Car - family or friend to transport  Were you discharged with any Home Care or Post Acute Services: Yes  Post Acute Services: Home Health (Comment: [facility])  Patient DME: [id number] chair, Kalani Rodas  Patient Home Equipment: Oxygen, Nebulizer  Do you have support at home?: Partner/Spouse/SO  Do you feel like you have everything you need to keep you well at home?: Yes  Are you an active caregiver in your home?: No  Care Transitions Interventions         Follow Up  Future Appointments   Date Time Provider Matt Crabtree   11/11/2021  3:45 PM MD LINDA Wolfe [facility]   12/3/2021 11:00 AM Magy Cherry   12/14/2021  2:45 PM Jey Guan MD [id number] [address] Snyder RN

## 2021-10-04 NOTE — TELEPHONE ENCOUNTER
Patient discharged from the hospital on 10/3/21, do you recommend her see someone else for the follow up or do you have a time to fit her in? Please advise before I call patient.  Thank you

## 2021-10-06 NOTE — TELEPHONE ENCOUNTER
Ajith 45 Transitions Initial Follow Up Call    Outreach made within 2 business days of discharge: Yes    Patient: Mart Parsons Patient : 1955   MRN: [id number]  Reason for Admission: There are no discharge diagnoses documented for the most recent discharge. Discharge Date: 10/3/21       Spoke with: pt    Discharge department/facility: [facility] Patient Contact:  Was patient able to fill all prescriptions: Yes  Was patient instructed to bring all medications to the follow-up visit: Yes  Is patient taking all medications as directed in the discharge summary?  Yes  Does patient understand their discharge instructions: Yes  Does patient have questions or concerns that need addressed prior to 7-14 day follow up office visit: no    Scheduled appointment with PCP within 7-14 days, Thurs 10/7/21 at 1:00 pm.    Follow Up  Future Appointments   Date Time Provider Matt Crabtree   10/7/2021  1:00 PM MD JAIRO Bui  [place] [facility]   2021  3:45 PM MD LINDA Fuller   12/3/2021 11:00 AM [facility]   2021  2:45 PM MD LINDA Fuller LPN

## 2021-10-07 ENCOUNTER — VIRTUAL VISIT (OUTPATIENT)
Dept: FAMILY MEDICINE CLINIC | Age: 66
End: 2021-10-07
Payer: MEDICARE

## 2021-10-07 DIAGNOSIS — E24.2 IATROGENIC CUSHING'S DISEASE (HCC): ICD-10-CM

## 2021-10-07 DIAGNOSIS — J84.10 PULMONARY FIBROSIS (HCC): ICD-10-CM

## 2021-10-07 DIAGNOSIS — Z12.31 ENCOUNTER FOR SCREENING MAMMOGRAM FOR BREAST CANCER: ICD-10-CM

## 2021-10-07 DIAGNOSIS — J96.21 ACUTE ON CHRONIC RESPIRATORY FAILURE WITH HYPOXIA (HCC): ICD-10-CM

## 2021-10-07 DIAGNOSIS — F17.218 CIGARETTE NICOTINE DEPENDENCE WITH OTHER NICOTINE-INDUCED DISORDER: ICD-10-CM

## 2021-10-07 DIAGNOSIS — F12.90 MARIJUANA SMOKER, CONTINUOUS: ICD-10-CM

## 2021-10-07 DIAGNOSIS — J44.1 COPD EXACERBATION (HCC): Primary | ICD-10-CM

## 2021-10-07 DIAGNOSIS — Z99.81 SUPPLEMENTAL OXYGEN DEPENDENT: ICD-10-CM

## 2021-10-07 DIAGNOSIS — J84.9 ILD (INTERSTITIAL LUNG DISEASE) (HCC): ICD-10-CM

## 2021-10-07 PROCEDURE — 99496 TRANSJ CARE MGMT HIGH F2F 7D: CPT | Performed by: FAMILY MEDICINE

## 2021-10-07 PROCEDURE — 1111F DSCHRG MED/CURRENT MED MERGE: CPT | Performed by: FAMILY MEDICINE

## 2021-10-07 RX ORDER — PREDNISONE 20 MG/1
60 TABLET ORAL DAILY
Qty: 15 TABLET | Refills: 0 | Status: ON HOLD
Start: 2021-10-07 | End: 2021-10-12 | Stop reason: HOSPADM

## 2021-10-08 NOTE — PROGRESS NOTES
Physician Progress Note      Sean Taylor  CSN #:                  [id number]  :                       1955  ADMIT DATE:       2021 7:25 PM  DISCH DATE:        2021 6:42 PM  RESPONDING  PROVIDER #:        Sari Cabot          QUERY TEXT:    Pt admitted with COPD exacerbation. Noted documentation of \"Acute respiratory   failure with hypoxia \" by ED Provider. Pt noted to require 3 L NC at home at   baseline. If possible, please document in progress notes and discharge   summary:    The medical record reflects the following:  Risk Factors: COPD exacerbation, ILD  Clinical Indicators: 3 L NC at baseline, required bipap briefly, RR 11-38,   SPO2 %,, shortness of breath, dyspnea with exertion  Treatment: supplemental oxygen, breathing treatments, Pulmonology consult,   supportive care    Thank you,  Annetta Gay RN, CDS  [phone]  Options provided:  -- Acute respiratory failure with hypoxia confirmed present on admission  -- Acute respiratory failure with hypoxia ruled out, chronic hypoxic   respiratory failure only  -- Other - I will add my own diagnosis  -- Disagree - Not applicable / Not valid  -- Disagree - Clinically unable to determine / Unknown  -- Refer to Clinical Documentation Reviewer    PROVIDER RESPONSE TEXT:    The diagnosis of Acute respiratory failure with hypoxia was confirmed as   present on admission.     Query created by: Kalyan Rodrigues on 10/6/2021 2:27 PM      Electronically signed by:  Sari Cabot 10/8/2021 7:48 AM

## 2021-10-09 ENCOUNTER — APPOINTMENT (OUTPATIENT)
Dept: GENERAL RADIOLOGY | Age: 66
DRG: 190 | End: 2021-10-09
Payer: MEDICARE

## 2021-10-09 ENCOUNTER — TELEPHONE (OUTPATIENT)
Dept: OTHER | Facility: CLINIC | Age: 66
End: 2021-10-09

## 2021-10-09 ENCOUNTER — APPOINTMENT (OUTPATIENT)
Dept: CT IMAGING | Age: 66
DRG: 190 | End: 2021-10-09
Payer: MEDICARE

## 2021-10-09 ENCOUNTER — HOSPITAL ENCOUNTER (INPATIENT)
Age: 66
LOS: 3 days | Discharge: HOME | DRG: 190 | End: 2021-10-12
Attending: EMERGENCY MEDICINE | Admitting: INTERNAL MEDICINE
Payer: MEDICARE

## 2021-10-09 DIAGNOSIS — E87.1 HYPONATREMIA: ICD-10-CM

## 2021-10-09 DIAGNOSIS — J44.1 COPD EXACERBATION (HCC): Primary | ICD-10-CM

## 2021-10-09 DIAGNOSIS — D72.829 LEUKOCYTOSIS, UNSPECIFIED TYPE: ICD-10-CM

## 2021-10-09 DIAGNOSIS — Z72.0 TOBACCO ABUSE DISORDER: ICD-10-CM

## 2021-10-09 DIAGNOSIS — R06.02 SHORTNESS OF BREATH: ICD-10-CM

## 2021-10-09 LAB
A/G RATIO: 1.5 (ref 1.1–2.2)
ALBUMIN SERPL-MCNC: 3.6 G/DL (ref 3.4–5)
ALP BLD-CCNC: 46 U/L (ref 40–129)
ALT SERPL-CCNC: 18 U/L (ref 10–40)
ANION GAP SERPL CALCULATED.3IONS-SCNC: 10 MMOL/L (ref 3–16)
AST SERPL-CCNC: 15 U/L (ref 15–37)
BASE EXCESS VENOUS: -1.8 MMOL/L (ref -3–3)
BASOPHILS ABSOLUTE: 0.1 K/UL (ref 0–0.2)
BASOPHILS RELATIVE PERCENT: 0.4 %
BILIRUB SERPL-MCNC: 0.4 MG/DL (ref 0–1)
BUN BLDV-MCNC: 12 MG/DL (ref 7–20)
CALCIUM SERPL-MCNC: 8.7 MG/DL (ref 8.3–10.6)
CARBOXYHEMOGLOBIN: 3.7 % (ref 0–1.5)
CHLORIDE BLD-SCNC: 93 MMOL/L (ref 99–110)
CO2: 22 MMOL/L (ref 21–32)
CREAT SERPL-MCNC: 0.6 MG/DL (ref 0.6–1.2)
EKG ATRIAL RATE: 91 BPM
EKG DIAGNOSIS: NORMAL
EKG P AXIS: 62 DEGREES
EKG P-R INTERVAL: 144 MS
EKG Q-T INTERVAL: 346 MS
EKG QRS DURATION: 76 MS
EKG QTC CALCULATION (BAZETT): 425 MS
EKG R AXIS: 0 DEGREES
EKG T AXIS: 58 DEGREES
EKG VENTRICULAR RATE: 91 BPM
EOSINOPHILS ABSOLUTE: 0 K/UL (ref 0–0.6)
EOSINOPHILS RELATIVE PERCENT: 0.3 %
GFR AFRICAN AMERICAN: >60
GFR NON-AFRICAN AMERICAN: >60
GLOBULIN: 2.4 G/DL
GLUCOSE BLD-MCNC: 87 MG/DL (ref 70–99)
HCO3 VENOUS: 23.1 MMOL/L (ref 23–29)
HCT VFR BLD CALC: 39.6 % (ref 36–48)
HEMOGLOBIN: 13.3 G/DL (ref 12–16)
LYMPHOCYTES ABSOLUTE: 2.8 K/UL (ref 1–5.1)
LYMPHOCYTES RELATIVE PERCENT: 19.1 %
MCH RBC QN AUTO: 30.2 PG (ref 26–34)
MCHC RBC AUTO-ENTMCNC: 33.6 G/DL (ref 31–36)
MCV RBC AUTO: 89.8 FL (ref 80–100)
METHEMOGLOBIN VENOUS: 0.4 %
MONOCYTES ABSOLUTE: 0.9 K/UL (ref 0–1.3)
MONOCYTES RELATIVE PERCENT: 6 %
NEUTROPHILS ABSOLUTE: 10.8 K/UL (ref 1.7–7.7)
NEUTROPHILS RELATIVE PERCENT: 74.2 %
O2 SAT, VEN: 68 %
O2 THERAPY: ABNORMAL
PCO2, VEN: 39.9 MMHG (ref 40–50)
PDW BLD-RTO: 14.3 % (ref 12.4–15.4)
PH VENOUS: 7.38 (ref 7.35–7.45)
PLATELET # BLD: 186 K/UL (ref 135–450)
PMV BLD AUTO: 6.3 FL (ref 5–10.5)
PO2, VEN: 36.1 MMHG (ref 25–40)
POTASSIUM REFLEX MAGNESIUM: 3.7 MMOL/L (ref 3.5–5.1)
PRO-BNP: 112 PG/ML (ref 0–124)
RBC # BLD: 4.41 M/UL (ref 4–5.2)
SARS-COV-2, NAAT: NOT DETECTED
SODIUM BLD-SCNC: 125 MMOL/L (ref 136–145)
TCO2 CALC VENOUS: 24 MMOL/L
TOTAL PROTEIN: 6 G/DL (ref 6.4–8.2)
TROPONIN: <0.01 NG/ML
WBC # BLD: 14.5 K/UL (ref 4–11)

## 2021-10-09 PROCEDURE — 6370000000 HC RX 637 (ALT 250 FOR IP): Performed by: NURSE PRACTITIONER

## 2021-10-09 PROCEDURE — 6360000002 HC RX W HCPCS: Performed by: NURSE PRACTITIONER

## 2021-10-09 PROCEDURE — 84484 ASSAY OF TROPONIN QUANT: CPT

## 2021-10-09 PROCEDURE — 6360000004 HC RX CONTRAST MEDICATION: Performed by: NURSE PRACTITIONER

## 2021-10-09 PROCEDURE — 6360000002 HC RX W HCPCS: Performed by: INTERNAL MEDICINE

## 2021-10-09 PROCEDURE — 93005 ELECTROCARDIOGRAM TRACING: CPT | Performed by: NURSE PRACTITIONER

## 2021-10-09 PROCEDURE — 83880 ASSAY OF NATRIURETIC PEPTIDE: CPT

## 2021-10-09 PROCEDURE — 6370000000 HC RX 637 (ALT 250 FOR IP): Performed by: INTERNAL MEDICINE

## 2021-10-09 PROCEDURE — 71045 X-RAY EXAM CHEST 1 VIEW: CPT

## 2021-10-09 PROCEDURE — 99284 EMERGENCY DEPT VISIT MOD MDM: CPT

## 2021-10-09 PROCEDURE — 80053 COMPREHEN METABOLIC PANEL: CPT

## 2021-10-09 PROCEDURE — 82803 BLOOD GASES ANY COMBINATION: CPT

## 2021-10-09 PROCEDURE — 2580000003 HC RX 258: Performed by: NURSE PRACTITIONER

## 2021-10-09 PROCEDURE — 93010 ELECTROCARDIOGRAM REPORT: CPT | Performed by: INTERNAL MEDICINE

## 2021-10-09 PROCEDURE — 94640 AIRWAY INHALATION TREATMENT: CPT

## 2021-10-09 PROCEDURE — 6370000000 HC RX 637 (ALT 250 FOR IP): Performed by: EMERGENCY MEDICINE

## 2021-10-09 PROCEDURE — 71260 CT THORAX DX C+: CPT

## 2021-10-09 PROCEDURE — 85025 COMPLETE CBC W/AUTO DIFF WBC: CPT

## 2021-10-09 PROCEDURE — 87635 SARS-COV-2 COVID-19 AMP PRB: CPT

## 2021-10-09 PROCEDURE — 2580000003 HC RX 258: Performed by: INTERNAL MEDICINE

## 2021-10-09 PROCEDURE — 1200000000 HC SEMI PRIVATE

## 2021-10-09 PROCEDURE — 36415 COLL VENOUS BLD VENIPUNCTURE: CPT

## 2021-10-09 RX ORDER — IPRATROPIUM BROMIDE AND ALBUTEROL SULFATE 2.5; .5 MG/3ML; MG/3ML
1 SOLUTION RESPIRATORY (INHALATION) ONCE
Status: COMPLETED | OUTPATIENT
Start: 2021-10-09 | End: 2021-10-09

## 2021-10-09 RX ORDER — IPRATROPIUM BROMIDE AND ALBUTEROL SULFATE 2.5; .5 MG/3ML; MG/3ML
1 SOLUTION RESPIRATORY (INHALATION) EVERY 4 HOURS
Status: DISCONTINUED | OUTPATIENT
Start: 2021-10-09 | End: 2021-10-10

## 2021-10-09 RX ORDER — DOXYCYCLINE HYCLATE 100 MG
100 TABLET ORAL EVERY 12 HOURS SCHEDULED
Status: DISCONTINUED | OUTPATIENT
Start: 2021-10-09 | End: 2021-10-12 | Stop reason: HOSPADM

## 2021-10-09 RX ORDER — FLUOXETINE HYDROCHLORIDE 20 MG/1
60 CAPSULE ORAL DAILY
Status: DISCONTINUED | OUTPATIENT
Start: 2021-10-10 | End: 2021-10-12 | Stop reason: HOSPADM

## 2021-10-09 RX ORDER — ONDANSETRON 2 MG/ML
4 INJECTION INTRAMUSCULAR; INTRAVENOUS EVERY 6 HOURS PRN
Status: DISCONTINUED | OUTPATIENT
Start: 2021-10-09 | End: 2021-10-12 | Stop reason: HOSPADM

## 2021-10-09 RX ORDER — METHYLPREDNISOLONE SODIUM SUCCINATE 40 MG/ML
40 INJECTION, POWDER, LYOPHILIZED, FOR SOLUTION INTRAMUSCULAR; INTRAVENOUS EVERY 8 HOURS
Status: DISCONTINUED | OUTPATIENT
Start: 2021-10-09 | End: 2021-10-12 | Stop reason: HOSPADM

## 2021-10-09 RX ORDER — BUSPIRONE HYDROCHLORIDE 5 MG/1
30 TABLET ORAL 2 TIMES DAILY
Status: DISCONTINUED | OUTPATIENT
Start: 2021-10-09 | End: 2021-10-12 | Stop reason: HOSPADM

## 2021-10-09 RX ORDER — SODIUM CHLORIDE 9 MG/ML
INJECTION, SOLUTION INTRAVENOUS CONTINUOUS
Status: DISCONTINUED | OUTPATIENT
Start: 2021-10-09 | End: 2021-10-10

## 2021-10-09 RX ORDER — ATORVASTATIN CALCIUM 10 MG/1
20 TABLET, FILM COATED ORAL DAILY
Status: DISCONTINUED | OUTPATIENT
Start: 2021-10-10 | End: 2021-10-12 | Stop reason: HOSPADM

## 2021-10-09 RX ORDER — ACETAMINOPHEN 325 MG/1
650 TABLET ORAL EVERY 6 HOURS PRN
Status: DISCONTINUED | OUTPATIENT
Start: 2021-10-09 | End: 2021-10-12 | Stop reason: HOSPADM

## 2021-10-09 RX ORDER — ONDANSETRON 4 MG/1
4 TABLET, ORALLY DISINTEGRATING ORAL EVERY 8 HOURS PRN
Status: DISCONTINUED | OUTPATIENT
Start: 2021-10-09 | End: 2021-10-12 | Stop reason: HOSPADM

## 2021-10-09 RX ORDER — PREDNISONE 20 MG/1
60 TABLET ORAL ONCE
Status: COMPLETED | OUTPATIENT
Start: 2021-10-09 | End: 2021-10-09

## 2021-10-09 RX ORDER — ACETYLCYSTEINE 100 MG/ML
4 SOLUTION ORAL; RESPIRATORY (INHALATION) EVERY 4 HOURS
Status: DISCONTINUED | OUTPATIENT
Start: 2021-10-09 | End: 2021-10-10

## 2021-10-09 RX ORDER — SODIUM CHLORIDE 0.9 % (FLUSH) 0.9 %
5-40 SYRINGE (ML) INJECTION PRN
Status: DISCONTINUED | OUTPATIENT
Start: 2021-10-09 | End: 2021-10-12 | Stop reason: HOSPADM

## 2021-10-09 RX ORDER — SODIUM CHLORIDE 9 MG/ML
25 INJECTION, SOLUTION INTRAVENOUS PRN
Status: DISCONTINUED | OUTPATIENT
Start: 2021-10-09 | End: 2021-10-12 | Stop reason: HOSPADM

## 2021-10-09 RX ORDER — POLYETHYLENE GLYCOL 3350 17 G/17G
17 POWDER, FOR SOLUTION ORAL DAILY PRN
Status: DISCONTINUED | OUTPATIENT
Start: 2021-10-09 | End: 2021-10-12 | Stop reason: HOSPADM

## 2021-10-09 RX ORDER — 0.9 % SODIUM CHLORIDE 0.9 %
1000 INTRAVENOUS SOLUTION INTRAVENOUS ONCE
Status: COMPLETED | OUTPATIENT
Start: 2021-10-09 | End: 2021-10-09

## 2021-10-09 RX ORDER — PANTOPRAZOLE SODIUM 40 MG/1
40 TABLET, DELAYED RELEASE ORAL
Status: DISCONTINUED | OUTPATIENT
Start: 2021-10-10 | End: 2021-10-12 | Stop reason: HOSPADM

## 2021-10-09 RX ORDER — GUAIFENESIN 600 MG/1
600 TABLET, EXTENDED RELEASE ORAL 2 TIMES DAILY PRN
Status: DISCONTINUED | OUTPATIENT
Start: 2021-10-09 | End: 2021-10-12 | Stop reason: HOSPADM

## 2021-10-09 RX ORDER — TRAZODONE HYDROCHLORIDE 50 MG/1
150 TABLET ORAL NIGHTLY
Status: DISCONTINUED | OUTPATIENT
Start: 2021-10-09 | End: 2021-10-12 | Stop reason: HOSPADM

## 2021-10-09 RX ORDER — MONTELUKAST SODIUM 10 MG/1
10 TABLET ORAL NIGHTLY
Status: DISCONTINUED | OUTPATIENT
Start: 2021-10-09 | End: 2021-10-12 | Stop reason: HOSPADM

## 2021-10-09 RX ORDER — ALBUTEROL SULFATE 2.5 MG/3ML
5 SOLUTION RESPIRATORY (INHALATION) ONCE
Status: COMPLETED | OUTPATIENT
Start: 2021-10-09 | End: 2021-10-09

## 2021-10-09 RX ORDER — PREDNISONE 20 MG/1
40 TABLET ORAL DAILY
Status: CANCELLED | OUTPATIENT
Start: 2021-10-09

## 2021-10-09 RX ORDER — ACETAMINOPHEN 650 MG/1
650 SUPPOSITORY RECTAL EVERY 6 HOURS PRN
Status: DISCONTINUED | OUTPATIENT
Start: 2021-10-09 | End: 2021-10-12 | Stop reason: HOSPADM

## 2021-10-09 RX ORDER — SODIUM CHLORIDE 0.9 % (FLUSH) 0.9 %
5-40 SYRINGE (ML) INJECTION EVERY 12 HOURS SCHEDULED
Status: DISCONTINUED | OUTPATIENT
Start: 2021-10-09 | End: 2021-10-12 | Stop reason: HOSPADM

## 2021-10-09 RX ORDER — ALBUTEROL SULFATE 2.5 MG/3ML
5 SOLUTION RESPIRATORY (INHALATION) ONCE
Status: DISCONTINUED | OUTPATIENT
Start: 2021-10-09 | End: 2021-10-09

## 2021-10-09 RX ORDER — BUDESONIDE 0.5 MG/2ML
0.5 INHALANT ORAL 2 TIMES DAILY
Status: DISCONTINUED | OUTPATIENT
Start: 2021-10-09 | End: 2021-10-12 | Stop reason: HOSPADM

## 2021-10-09 RX ADMIN — IOPAMIDOL 75 ML: 755 INJECTION, SOLUTION INTRAVENOUS at 16:12

## 2021-10-09 RX ADMIN — SODIUM CHLORIDE 1000 ML: 9 INJECTION, SOLUTION INTRAVENOUS at 14:23

## 2021-10-09 RX ADMIN — SODIUM CHLORIDE: 9 INJECTION, SOLUTION INTRAVENOUS at 23:09

## 2021-10-09 RX ADMIN — ALBUTEROL SULFATE 5 MG: 2.5 SOLUTION RESPIRATORY (INHALATION) at 12:20

## 2021-10-09 RX ADMIN — SODIUM CHLORIDE 1000 ML: 9 INJECTION, SOLUTION INTRAVENOUS at 18:52

## 2021-10-09 RX ADMIN — DOXYCYCLINE HYCLATE 100 MG: 100 TABLET, COATED ORAL at 23:10

## 2021-10-09 RX ADMIN — PREDNISONE 60 MG: 20 TABLET ORAL at 12:32

## 2021-10-09 RX ADMIN — MONTELUKAST SODIUM 10 MG: 10 TABLET ORAL at 23:10

## 2021-10-09 RX ADMIN — TRAZODONE HYDROCHLORIDE 150 MG: 50 TABLET ORAL at 23:10

## 2021-10-09 RX ADMIN — IPRATROPIUM BROMIDE AND ALBUTEROL SULFATE 1 AMPULE: .5; 3 SOLUTION RESPIRATORY (INHALATION) at 21:04

## 2021-10-09 RX ADMIN — BUSPIRONE HYDROCHLORIDE 30 MG: 5 TABLET ORAL at 23:10

## 2021-10-09 RX ADMIN — METHYLPREDNISOLONE SODIUM SUCCINATE 40 MG: 40 INJECTION, POWDER, FOR SOLUTION INTRAMUSCULAR; INTRAVENOUS at 23:10

## 2021-10-09 RX ADMIN — BUDESONIDE 500 MCG: 0.5 SUSPENSION RESPIRATORY (INHALATION) at 21:54

## 2021-10-09 RX ADMIN — IPRATROPIUM BROMIDE AND ALBUTEROL SULFATE 1 AMPULE: .5; 3 SOLUTION RESPIRATORY (INHALATION) at 12:20

## 2021-10-09 NOTE — TELEPHONE ENCOUNTER
Writer contacted [facility] Hermos[facility] to inform of 30 day readmission risk. [facility] Hermosillo infomred writer there is no decision on disposition at this time.

## 2021-10-09 NOTE — ED PROVIDER NOTES
Evaluated by Advanced Practice Provider    EMERGENCY DEPARTMENT ENCOUNTER      CHIEFCOMPLAINT  Shortness of Breath (started yesterday, was smoking yesterday and  today and now cannot breathe)    KIMBERLY Kwan is a 60 y.o. female who presents to the emergency department with complaints of shortness of breath. States she can't breathe. Having the SOB since yesterday. She wears O2 at 3L. Denies CP. Coughing some, productive of white sputum. Denies fevers, chills, sweats. Denies nausea, vomiting, diarrhea. She typically only wears O2 at night, been using it during the day for past couple of days. Has a nebulizer at home, using it but not working. She is currently on steroids 3 a day. She was having SOB when they were started, states prednisone has not really helped. Thepatient is currently rating their pain as 0/10. Treatments tried prior to arrival in the ED include: above. The patient arrived to the ED via private car.     PAST MEDICAL HISTORY    Past Medical History:   Diagnosis Date    Allergic rhinitis 6/11/2010    Anxiety     Arthritis     Aspiration pneumonia ([address] 3/21/2012    Recurrent    Asthma     Bronchitis chronic     COPD (chronic obstructive pulmonary disease) ([address] 12/3/2009    Depression     Drug abuse, cocaine type ([address]     past history of crack cocaine use    Emphysema of lung (HCC)     Fibromyalgia     GERD (gastroesophageal reflux disease)     Hyperlipidemia     Influenza A 03/05/2020    Lung disease     On home oxygen therapy     uses O2 NC 3L prn at night    Osteoporosis     Pneumonia     Polysubstance dependence ([address] 1/2/2012    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    Pulmonary fibrosis ([address] 10/16/2014    Pulmonary infiltrate     Pulmonary nodule 12/3/2009    Tobacco abuse        SURGICAL HISTORY    Past Surgical History:   Procedure Laterality Date    BLADDER REPAIR      BRONCHOSCOPY      BRONCHOSCOPY N/A 3/6/2020 BRONCHOSCOPY THERAPUTIC ASPIRATION INITIAL performed by Loretta Mcdonnell MD at [address]  3/6/2020    BRONCHOSCOPY ALVEOLAR LAVAGE performed by Loretta Mcdonnell MD at [address] N/A 6/26/2020    BRONCHOSCOPY THERAPUTIC ASPIRATION INITIAL performed by Marquise Khoury MD at [address]  6/26/2020    BRONCHOSCOPY ALVEOLAR LAVAGE performed by Marquise Khoury MD at [address]  06/23/2021    Dr Ketan Hsu N/A 6/23/2021    BRONCHOSCOPY DIAGNOSTIC OR CELL [address] performed by Marquise Khoury MD at [address]  6/23/2021    BRONCHOSCOPY THERAPUTIC ASPIRATION INITIAL performed by Marquise Khoury MD at [address]  6/23/2021    BRONCHOSCOPY ALVEOLAR LAVAGE performed by Marquise Khoury MD at [address] [facility] 8/27/2021    BRONCHOSCOPY DIAGNOSTIC OR CELL [address] ONLY performed by Marquise Khoury MD at [place] [address]  2012    ENDOSCOPY, COLON, DIAGNOSTIC      ESOPHAGEAL DILATATION  9/20/2018    ESOPHAGEAL DILATION [place] performed by Josie Christopher MD at [address] [facility] HISTORY  2/12/15    T8 Kyphoplasty    NM COLONOSCOPY FLX DX W/COLLJ SPEC WHEN PFRMD N/A 9/20/2018    EGD AND COLONOSCOPY WITH ANESTHESIA performed by Josie Christopher MD at [address] [facility] ESOPHAGOGASTRODUODENOSCOPY TRANSORAL DIAGNOSTIC N/A 9/20/2018    EGD AND COLONOSCOPY WITH ANESTHESIA performed by Josie Christopher MD at [address] [facility]    Current Outpatient Rx   Medication Sig Dispense Refill    predniSONE (DELTASONE) 20 MG tablet Take 3 tablets by mouth daily for 5 days 15 tablet 0    ipratropium-albuterol (DUONEB) 0.5-2.5 (3) MG/3ML SOLN nebulizer solution Inhale 3 mLs into the lungs every 4 hours as needed for Shortness of Breath 360 mL 5    traZODone (DESYREL) 150 MG tablet Take 1-2 tablets by mouth nightly TAKE 2 TABLETS AT BEDTIME 180 tablet 1    guaiFENesin (MUCINEX) 600 MG extended release tablet Take 1 tablet by mouth 2 times daily as needed for Congestion (Patient not taking: Reported on 10/7/2021) 60 tablet 1    varenicline (CHANTIX STARTING MONTH TAL) 0.5 MG X 11 & 1 MG X 42 tablet Take by mouth. 1 box 0    fluticasone-salmeterol (WIXELA INHUB) 500-50 MCG/DOSE diskus inhaler Inhale 1 puff into the lungs every 12 hours 180 each 1    albuterol (PROVENTIL) (2.5 MG/3ML) 0.083% nebulizer solution Take 3 mLs by nebulization every 6 hours as needed for Wheezing or Shortness of Breath DX COPD J44.9 120 vial 6    montelukast (SINGULAIR) 10 MG tablet TAKE 1 TABLET BY MOUTH EACH NIGHT 90 tablet 1    albuterol sulfate HFA (VENTOLIN HFA) 108 (90 Base) MCG/ACT inhaler Inhale 2 puffs into the lungs every 6 hours as needed for Wheezing or Shortness of Breath 3 Inhaler 1    BD PEN NEEDLE SVETLANA U/F 32G X 4 MM MISC USE ONE DAILY AS DIRECTED 100 each 5    FLUoxetine (PROZAC) 20 MG capsule TAKE 3 CAPSULES BY MOUTH DAILY 270 capsule 1    simvastatin (ZOCOR) 20 MG tablet TAKE 1 TABLET EVERY EVENING 90 tablet 1    busPIRone (BUSPAR) 30 MG tablet TAKE 1 TABLET TWICE DAILY 180 tablet 1    teriparatide, recombinant, (FORTEO) 600 MCG/2.4ML SOLN injection Inject 0.08 mLs into the skin daily One dose injected daily.  1 pen 11       ALLERGIES    Allergies   Allergen Reactions    Meperidine Anaphylaxis     \"Demerol\"     Demerol Hives       FAMILY HISTORY    Family History   Problem Relation Age of Onset    Asthma Mother     Diabetes Mother     Emphysema Mother     Heart Failure Mother     Hypertension Mother     Heart Disease Mother     High Cholesterol Mother     Cancer Mother     Depression Mother     Diabetes Father     Emphysema Father     Heart Failure Father     Hypertension Father     Heart Disease Father     High Cholesterol Father     Substance Abuse Father     Emphysema Paternal Grandfather     Diabetes Sister     High Cholesterol Sister     Vision Loss Maternal Uncle        SOCIAL HISTORY    Social History     Socioeconomic History    Marital status:      Spouse name: None    Number of children: 3    Years of education: None    Highest education level: None   Occupational History    Occupation: Disabled     Comment:    Tobacco Use    Smoking status: Current Every Day Smoker     Packs/day: 0.50     Years: 40.00     Pack years: 20.00     Types: Cigarettes     Start date: 1972     Last attempt to quit: 2021     Years since quittin.3    Smokeless tobacco: Never Used    Tobacco comment: 0.5 PPD restarted   Vaping Use    Vaping Use: Never used   Substance and Sexual Activity    Alcohol use: No     Alcohol/week: 0.0 standard drinks    Drug use: Yes     Types: Marijuana     Comment: Daily    Sexual activity: Yes     Partners: Male   Other Topics Concern    None   Social History Narrative    None     Social Determinants of Health     Financial Resource Strain: Low Risk     Difficulty of Paying Living Expenses: Not hard at all   Food Insecurity: No Food Insecurity    Worried About Running Out of Food in the Last Year: Never true    Erica of Food in the Last Year: Never true   Transportation Needs: No Transportation Needs    Lack of Transportation (Medical): No    Lack of Transportation (Non-Medical):  No   Physical Activity:     Days of Exercise per Week:     Minutes of Exercise per Session:    Stress:     Feeling of Stress :    Social Connections:     Frequency of Communication with Friends and Family:     Frequency of Social Gatherings with Friends and Family:     Attends Mu-ism Services:     Active Member of Clubs or Organizations:     Attends Club or Organization Meetings:     Marital Status:    Intimate Partner Violence:     Fear of Current or Ex-Partner:     Emotionally Abused:     Physically Abused:     Sexually Abused:        REVIEW OF SYSTEMS    10 systems reviewed, pertinent positives per HPI otherwise noted to be negative    PHYSICAL EXAM  Physical Exam  Vitals:    10/09/21 1556   BP: 123/80   Pulse: 85   Resp: 23   Temp:    SpO2: 97%     GENERAL: Patient is well-developed, obese. Awake and alert. Cooperative. Resting in bed. Moderately distressed due to her current work of breathing. HEENT:  Normocephalic, atraumatic. Conjunctiva appear normal. Sclera is non-icteric. External ears are normal.    NECK: Supple with normal ROM. Trachea midline  LUNGS: Equal and symmetric chest rise. Breathing is labored. Patient is using abdominal muscles for breathing. Lungs are with rhonchi and wheezing throughout all lung fields. Without rales. CADIOVASCULAR:  Regular rate and rhythm. Normal S1-S2 sounds. No murmurs, rubs, or gallops. Capillary refill is brisk in all 4extremities. Bilateral lower extremities are equal in size, there is no swelling observed. There is no tenderness to palpation. There is no erythema observed or warmth palpated. GI: Soft, nontender, nondistended with positive bowelsounds. No rebound tenderness, guarding or any peritoneal signs. No masses or hepatosplenomegaly   MUSCULOSKELETAL:  No gross deformities or trauma noted. Moving allextremities equally and appropriately. Normal ROM. SKIN: Warm/dry. Skin is intact. Norashes/lesions noted. PSYCHIATRIC: Mood and affect appropriate. Speech is clear andarticulate. NEUROLOGIC: Alert and oriented. No focal motor or sensory deficits. LABS  I havereviewed all labs for this visit.    Results for orders placed or performed during the hospital encounter of 10/09/21   CBC Auto Differential   Result Value Ref Range    WBC 14.5 (H) 4.0 - 11.0 K/uL    RBC 4.41 4.00 - 5.20 M/uL    Hemoglobin 13.3 12.0 - 16.0 g/dL    Hematocrit 39.6 36.0 - 48.0 %    MCV 89.8 80.0 - 100.0 fL    MCH 30.2 26.0 - 34.0 pg    MCHC 33.6 31.0 - 36.0 g/dL    RDW 14.3 12.4 - 15.4 %    Platelets 105 308 - 541 K/uL    MPV 6.3 5.0 - 10.5 fL    Neutrophils % 74.2 %    Lymphocytes % 19.1 %    Monocytes % 6.0 %    Eosinophils % 0.3 %    Basophils % 0.4 %    Neutrophils Absolute 10.8 (H) 1.7 - 7.7 K/uL    Lymphocytes Absolute 2.8 1.0 - 5.1 K/uL    Monocytes Absolute 0.9 0.0 - 1.3 K/uL    Eosinophils Absolute 0.0 0.0 - 0.6 K/uL    Basophils Absolute 0.1 0.0 - 0.2 K/uL   Comprehensive Metabolic Panel w/ Reflex to MG   Result Value Ref Range    Sodium 125 (L) 136 - 145 mmol/L    Potassium reflex Magnesium 3.7 3.5 - 5.1 mmol/L    Chloride 93 (L) 99 - 110 mmol/L    CO2 22 21 - 32 mmol/L    Anion Gap 10 3 - 16    Glucose 87 70 - 99 mg/dL    BUN 12 7 - 20 mg/dL    CREATININE 0.6 0.6 - 1.2 mg/dL    GFR Non-African American >60 >60    GFR African American >60 >60    Calcium 8.7 8.3 - 10.6 mg/dL    Total Protein 6.0 (L) 6.4 - 8.2 g/dL    Albumin 3.6 3.4 - 5.0 g/dL    Albumin/Globulin Ratio 1.5 1.1 - 2.2    Total Bilirubin 0.4 0.0 - 1.0 mg/dL    Alkaline Phosphatase 46 40 - 129 U/L    ALT 18 10 - 40 U/L    AST 15 15 - 37 U/L    Globulin 2.4 Not Established g/dL   Troponin   Result Value Ref Range    Troponin <0.01 <0.01 ng/mL   Brain Natriuretic Peptide   Result Value Ref Range    Pro- 0 - 124 pg/mL   Blood Gas, Venous   Result Value Ref Range    pH, Ivan 7.381 7.350 - 7.450    pCO2, Ivan 39.9 (L) 40.0 - 50.0 mmHg    pO2, Ivan 36.1 25 - 40 mmHg    HCO3, Venous 23.1 23.0 - 29.0 mmol/L    Base Excess, Ivan -1.8 -3.0 - 3.0 mmol/L    O2 Sat, Ivan 68 Not Established %    Carboxyhemoglobin 3.7 (H) 0.0 - 1.5 %    MetHgb, Ivan 0.4 <1.5 %    TC02 (Calc), Ivan 24 Not Established mmol/L    O2 Therapy Unknown    EKG 12 Lead   Result Value Ref Range    Ventricular Rate 91 BPM    Atrial Rate 91 BPM    P-R Interval 144 ms    QRS Duration 76 ms    Q-T Interval 346 ms    QTc Calculation (Bazett) 425 ms    P Axis 62 degrees    R Axis 0 degrees    T Axis 58 degrees    Diagnosis       Normal sinus rhythmPossible Inferior infarct (cited on or before 27-SEP-2021)Abnormal ECGConfirmed by TRINITY DELGADILLO, Haroldo Taverasher ([id number]) on 10/9/2021 4:59:19 PM       RADIOLOGY    XR CHEST PORTABLE    Result Date: 10/9/2021  EXAMINATION: ONE XRAY VIEW OF THE CHEST 10/9/2021 12:18 pm COMPARISON: 09/27/2021 HISTORY: ORDERING SYSTEM PROVIDED HISTORY: SOB TECHNOLOGIST PROVIDED HISTORY: Reason for exam:->SOB Reason for Exam: Shortness of Breath (started yesterday, was smoking yesterday and  today and now cannot breathe) has COPD FINDINGS: Bibasilar opacities again noted. There is no effusion or pneumothorax. The cardiomediastinal silhouette is stable. The osseous structures are stable. Bibasilar opacities could represent subsegmental atelectasis or infection     CT CHEST PULMONARY EMBOLISM W CONTRAST    Result Date: 10/9/2021  EXAMINATION: CTA OF THE CHEST 10/9/2021 2:34 pm TECHNIQUE: CTA of the chest was performed after the administration of intravenous contrast.  Multiplanar reformatted images are provided for review. MIP images are provided for review. Dose modulation, iterative reconstruction, and/or weight based adjustment of the mA/kV was utilized to reduce the radiation dose to as low as reasonably achievable. COMPARISON: 03/14/2021 HISTORY: ORDERING SYSTEM PROVIDED HISTORY: COPD, trouble breathing, SOB TECHNOLOGIST PROVIDED HISTORY: Reason for exam:->COPD, trouble breathing, SOB Decision Support Exception - unselect if not a suspected or confirmed emergency medical condition->Emergency Medical Condition (MA) Reason for Exam: shortness of breath,denies any chest pain Additional signs and symptoms: COPD Relevant Medical/Surgical History: smoker x 50 years FINDINGS: Pulmonary Arteries: There is adequate pulmonary artery enhancement with no evidence of pulmonary embolus. Mediastinum: No evidence of mediastinal lymphadenopathy. The heart and pericardium demonstrate no acute abnormality. No evidence of aortic dissection.  Lungs/pleura: There are patchy areas of bilateral lower lobe airspace disease greater at the right lung base and left lower lobe posterolaterally. This is intermixed with interstitial lung disease and emphysema. In addition, this is unchanged from the prior study. There is some limitation with patient motion artifact. There is no pleural effusion or pneumothorax. Interstitial lung disease is seen bilaterally. There is centrilobular emphysema. No suspicious or new nodule. Upper Abdomen: Limited images of the upper abdomen are unremarkable. Soft Tissues/Bones: Moderate to severe fracture of the T3 vertebral body mild compression T5 and severe T8 compression with prior augmentation all unchanged. No new fracture. Bones are moderately demineralized. No destructive lesion seen. And     No evidence of pulmonary embolism. Patchy bilateral areas of airspace and interstitial disease unchanged from prior study. Moderate emphysema. No acute parenchymal disease. Compression fractures and prior augmentation unchanged. ED COURSE/MDM  Patient seen and evaluated. Old records reviewed. Diagnostic testing reviewed and results discussed. I have seen and evaluated this patient with supervising physician: Joycelyn Deluca MD. We thoroughly discussed the history, physical exam, diagnostic testing, emergency department course, plan and disposition. Cuauhtemoc Fragoso presented to the ED with the above noted complaints. Arrival vital signs: Febrile and hemodynamically stable except for blood pressure elevated at 137/109. Patient was 92% on room air but due to her labored breathing was put on 2 L for comfort, 99% saturated. Physical exam performed at 1200: Rhonchi and wheezing throughout all lung fields. Patient is labored with her breathing and using abdominal accessory muscles. Blood work: There is leukocytosis is WBC elevated at 14.5, absolute neutrophils elevated at 10.8. No further differential shift. No anemia.   There is Wong for the following diagnoses:  ACUTE CORONARY SYNDROME, CHRONIC OBSTRUCTIVE PULMONARY DISEASE, CONGESTIVE HEART FAILURE, PERICARDIAL TAMPONADE, PNEUMONIA, PNEUMOTHORAX, PULMONARY EMBOLISM, SEPSIS, and THORACIC DISSECTION. The total critical care time spent while evaluating and treating this patient was 41 minutes. This excludes time spent doing separately billable procedures. This includes time at the bedside, data interpretation, medication management, obtaining critical history from collateral sources if the patient is unable to provide it directly, and physician consultation. Specifics of interventions taken and potentially life-threatening diagnostic considerations are listed above in the medical decision making. CLINICAL IMPRESSION    1. COPD exacerbation ([facility]    2. Hyponatremia    3. Shortness of breath    4. Leukocytosis, unspecified type       DISPOSITION  Admit. Comment: Please note this report has been produced using speech recognition software and may contain errors related to that system including errorsin grammar, punctuation, and spelling, as well as words and phrases that may be inappropriate. If there are any questions or concerns please feel free to contact the dictating provider for clarification.        SHAUN Locke CNP  10/09/21 1311

## 2021-10-09 NOTE — ED NOTES
Pt requesting food. Per CNP, ok to eat. Pt given crackers, peanut butter and drinks.       Tori Hernandez RN  10/09/21 [id number]

## 2021-10-09 NOTE — H&P
Hospital Medicine History & Physical      PCP: Duane Cancel, MD    Date of Admission: 10/9/2021    Date of Service: Pt seen/examined on 10/9/21 and Admitted to Inpatient with expected LOS greater than two midnights due to medical therapy. Chief Complaint:  sob      History Of Present Illness:  60 y.o. female who presented to [facility] with *above complaints  Patient with PMH of severe COPD, depression, fibromyalgia, ILD, tobacco dependence, marijuana use presented to the ED today with complaints of shortness of breath. Patient has had frequent COPD exacerbations the last few months with frequent visits to the hospital.  Last admission was 9/28 and discharged on 10/3 on long prednisone taper which the patient has been taking. She continues to smoke cigarettes and marijuana and has not shown any commitment to quitting. This time she reports the shortness of breath got worse yesterday, has been wearing home oxygen during daytime which she typically does not has been using frequent nebulizers at home without much relief, associated productive cough. No subjective fever chills or rigors. No chest pain.   She follows up with Dr. Milka Deluca    Past Medical History:          Diagnosis Date    Allergic rhinitis 6/11/2010    Anxiety     Arthritis     Aspiration pneumonia ([address] 3/21/2012    Recurrent    Asthma     Bronchitis chronic     COPD (chronic obstructive pulmonary disease) ([address] 12/3/2009    Depression     Drug abuse, cocaine type ([address]     past history of crack cocaine use    Emphysema of lung ([address]     Fibromyalgia     GERD (gastroesophageal reflux disease)     Hyperlipidemia     Influenza A 03/05/2020    Lung disease     On home oxygen therapy     uses O2 NC 3L prn at night    Osteoporosis     Pneumonia     Polysubstance dependence ([address] 1/2/2012    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    Pulmonary fibrosis ([address] 10/16/2014    Pulmonary infiltrate  Pulmonary nodule 12/3/2009    Tobacco abuse        Past Surgical History:          Procedure Laterality Date    BLADDER REPAIR      BRONCHOSCOPY      BRONCHOSCOPY N/A 3/6/2020    BRONCHOSCOPY THERAPUTIC ASPIRATION INITIAL performed by Keyshawn Crawford MD at [address] [place] [address]  3/6/2020    BRONCHOSCOPY ALVEOLAR LAVAGE performed by Keyshawn Crawford MD at [address] [place] [address] N/A 6/26/2020    BRONCHOSCOPY THERAPUTIC ASPIRATION INITIAL performed by Shazia Rea MD at [address] [place] [address]  6/26/2020    BRONCHOSCOPY ALVEOLAR LAVAGE performed by Shazia Rea MD at [address] [place] [address]  06/23/2021    Dr Fredy Sierra N/A 6/23/2021    BRONCHOSCOPY DIAGNOSTIC OR CELL [address] performed by Shazia Rea MD at [address] [place] [address]  6/23/2021    BRONCHOSCOPY THERAPUTIC ASPIRATION INITIAL performed by Shazia Rea MD at [address] [place] [address]  6/23/2021    BRONCHOSCOPY ALVEOLAR LAVAGE performed by Shazia Rea MD at [address] [place] [address] N/A 8/27/2021    BRONCHOSCOPY DIAGNOSTIC OR CELL [address] ONLY performed by Shazia Rea MD at [facility]  2012    ENDOSCOPY, COLON, DIAGNOSTIC      ESOPHAGEAL DILATATION  9/20/2018    ESOPHAGEAL DILATION Bri Brazen performed by Chuckie Jackman MD at [address] [facility] HISTORY  2/12/15    T8 Kyphoplasty    [place] COLONOSCOPY FLX DX W/COLLJ SPEC WHEN PFRMD N/A 9/20/2018    EGD AND COLONOSCOPY WITH ANESTHESIA performed by Chuckie Jackman MD at [address] [facility] ESOPHAGOGASTRODUODENOSCOPY TRANSORAL DIAGNOSTIC N/A 9/20/2018    EGD AND COLONOSCOPY WITH ANESTHESIA performed by Chuckie Jackman MD at [address] [facility]         Medications Prior to Admission:      Prior to Admission medications    Medication Sig Start Date End Date Taking?  Authorizing Provider predniSONE (DELTASONE) 20 MG tablet Take 3 tablets by mouth daily for 5 days 10/7/21 10/12/21  Soledad Gonzalez MD   ipratropium-albuterol (DUONEB) 0.5-2.5 (3) MG/3ML SOLN nebulizer solution Inhale 3 mLs into the lungs every 4 hours as needed for Shortness of Breath 9/23/21   Jocelin Villanueva MD   traZODone (DESYREL) 150 MG tablet Take 1-2 tablets by mouth nightly TAKE 2 TABLETS AT BEDTIME 9/13/21   Soledad Gonzalez MD   guaiFENesin (MUCINEX) 600 MG extended release tablet Take 1 tablet by mouth 2 times daily as needed for Congestion  Patient not taking: Reported on 10/7/2021 8/29/21   Carly Jane MD   varenicline (CHANTIX STARTING MONTH PAK) 0.5 MG X 11 & 1 MG X 42 tablet Take by mouth. 8/4/21   Soledad Gonzalez MD   fluticasone-salmeterol [facility]) 500-50 MCG/DOSE diskus inhaler Inhale 1 puff into the lungs every 12 hours 7/21/21   Jocelin Villanueva MD   albuterol (PROVENTIL) (2.5 MG/3ML) 0.083% nebulizer solution Take 3 mLs by nebulization every 6 hours as needed for Wheezing or Shortness of Breath DX COPD J44.9 7/21/21   Jocelin Villanueva MD   montelukast (SINGULAIR) 10 MG tablet TAKE 1 TABLET BY MOUTH EACH NIGHT 7/21/21   Jocelin Villanueva MD   albuterol sulfate HFA (VENTOLIN HFA) 108 (90 Base) MCG/ACT inhaler Inhale 2 puffs into the lungs every 6 hours as needed for Wheezing or Shortness of Breath 3/4/21   Jocelin Villanueva MD   BD PEN NEEDLE SVETLANA U/F 32G X 4 MM MISC USE ONE DAILY AS DIRECTED 12/11/20   Soledad Gonzalez MD   FLUoxetine (PROZAC) 20 MG capsule TAKE 3 CAPSULES BY MOUTH DAILY 12/3/20   Soledad Gonzalez MD   simvastatin (ZOCOR) 20 MG tablet TAKE 1 TABLET EVERY EVENING 8/7/20   Soledad Gonzalez MD   busPIRone (BUSPAR) 30 MG tablet TAKE 1 TABLET TWICE DAILY 8/7/20   Soledad Gonzalez MD   teriparatide, recombinant, (FORTEO) 600 MCG/2.4ML SOLN injection Inject 0.08 mLs into the skin daily One dose injected daily.  9/27/19 12/7/20  Kita Babinski, MD       Allergies:  Meperidine and Demerol    Social History:      The patient currently lives at home    TOBACCO:   reports that she has been smoking cigarettes. She started smoking about 49 years ago. She has a 20.00 pack-year smoking history. She has never used smokeless tobacco.  ETOH:   reports no history of alcohol use. E-Cigarettes/Vaping Use     Questions Responses    E-Cigarette/Vaping Use Never User    Start Date     Passive Exposure     Quit Date     Counseling Given     Comments Unknown            Family History:  Positive as follows:        Problem Relation Age of Onset    Asthma Mother     Diabetes Mother     Emphysema Mother     Heart Failure Mother     Hypertension Mother     Heart Disease Mother     High Cholesterol Mother     Cancer Mother     Depression Mother     Diabetes Father     Emphysema Father     Heart Failure Father     Hypertension Father     Heart Disease Father     High Cholesterol Father     Substance Abuse Father     Emphysema Paternal Grandfather     Diabetes Sister     High Cholesterol Sister     Vision Loss Maternal Uncle        REVIEW OF SYSTEMS COMPLETED:   Pertinent positives as noted in the HPI. All other systems reviewed and negative. PHYSICAL EXAM PERFORMED:    /81   Pulse 86   Temp 98 °F (36.7 °C) (Oral)   Resp 23   Ht 5' 5\" (1.651 m)   Wt 165 lb (74.8 kg)   SpO2 97%   BMI 27.46 kg/m²     General appearance:  No apparent distress, appears stated age and cooperative. HEENT:  Normal cephalic, atraumatic without obvious deformity. Pupils equal, round, and reactive to light. Extra ocular muscles intact. Conjunctivae/corneas clear. Neck: Supple, with full range of motion. No jugular venous distention. Trachea midline. Respiratory: Tachypneic, increased respiratory effort. Distant breath sounds bilaterally with scattered wheeze   cardiovascular:  Regular rate and rhythm with normal S1/S2 without murmurs, rubs or gallops. Abdomen: Soft, non-tender, non-distended with normal bowel sounds.   Musculoskeletal:  No clubbing, cyanosis or edema bilaterally. Full range of motion without deformity. Skin: Skin color, texture, turgor normal.  No rashes or lesions. Neurologic:  Neurovascularly intact without any focal sensory/motor deficits. Cranial nerves: II-XII intact, grossly non-focal.  Psychiatric:  Alert and oriented, thought content appropriate, normal insight  Capillary Refill: Brisk,3 seconds, normal  Peripheral Pulses: +2 palpable, equal bilaterally       Labs:     Recent Labs     10/09/21  1215   WBC 14.5*   HGB 13.3   HCT 39.6        Recent Labs     10/09/21  1215   *   K 3.7   CL 93*   CO2 22   BUN 12   CREATININE 0.6   CALCIUM 8.7     Recent Labs     10/09/21  1215   AST 15   ALT 18   BILITOT 0.4   ALKPHOS 46     No results for input(s): INR in the last 72 hours. Recent Labs     10/09/21  1215   TROPONINI <0.01       Urinalysis:      Lab Results   Component Value Date    NITRU Negative 09/27/2021    WBCUA 3-5 09/27/2021    BACTERIA 3+ 09/27/2021    RBCUA 5-10 09/27/2021    BLOODU TRACE-INTACT 09/27/2021    SPECGRAV 1.010 09/27/2021    GLUCOSEU Negative 09/27/2021    GLUCOSEU NEGATIVE 04/13/2012       Radiology:     I personally reviewed the CT chest pulmonary and chest x-ray and also reviewed the radiologist interpretation        CT CHEST PULMONARY EMBOLISM W CONTRAST   Final Result   No evidence of pulmonary embolism. Patchy bilateral areas of airspace and interstitial disease unchanged from   prior study. Moderate emphysema. No acute parenchymal disease. Compression fractures and prior augmentation unchanged.          XR CHEST PORTABLE   Final Result   Bibasilar opacities could represent subsegmental atelectasis or infection             ASSESSMENT:PLAN:    Active Hospital Problems    Diagnosis Date Noted    COPD exacerbation [facility] [J44.1] 06/24/2020     Priority: High    Tobacco dependence [F17.200] 09/27/2014     Priority: High    Stage 3 severe COPD by GOLD classification ([facility] [J44.9] 12/03/2009     Priority: Medium    Cannabis dependence with current use ([facility] [F12.20] 06/10/2021    Anxiety and depression [F41.9, F32.A] 12/14/2018    ILD (interstitial lung disease) ([facility] [J84.9] 04/06/2016    Depression [Z43. A] 04/11/2013    Hyperlipidemia [E78.5] 09/10/2012    Smoker [F17.200] 12/03/2009     Recurrent, acute COPD exacerbation   Chronic bronchitis with underlying stage III severe COPD, non compliant with ongoing smoking  - inhaled bronchodilator nebs Q4H scheduled with duonebs  - Add Pulmicort nebs + Mucomyst nebs  - systemic steroids with iv solumedrol 40mg q8H  - po doxycycline  -Continue supplemental oxygen, currently at 3 LPM, wean as tolerated  -Rapid Covid test  -Marijuana smoking cessation counseling given  -acapella, pulmonary toilet  - pt having very frequent exacerbations lately. Could consider every other day azithromycin or daily prednisone long term      Tobacco dependence-counseled cessation. pt contemplating Chantix     Cannabis dependence with current use -urged patient if she can arrange legally to get edibles instead of smoking.     Depression/anxiety -mood stable, resume home medication regimen     ILD (interstitial lung disease)/pulmonary fibrosis -evident on CXR similar to prior.  Also seen on CT back in March 2021.  Likely related to smoking tobacco and marijuana.     Hyperlipidemia  -Resume statin     Hypertension  -Controlled, resume atenolol    DVT Prophylaxis: lmwh  Diet: No diet orders on file  Code Status: full  PT/OT Eval Status: Ambulatory    Dispo -IP stay       Davian Soriano MD    Thank you Cherise Márquez MD for the opportunity to be involved in this patient's care. If you have any questions or concerns please feel free to contact me at [phone].

## 2021-10-10 PROBLEM — Z99.81 SUPPLEMENTAL OXYGEN DEPENDENT: Status: ACTIVE | Noted: 2021-10-10

## 2021-10-10 LAB
ANION GAP SERPL CALCULATED.3IONS-SCNC: 9 MMOL/L (ref 3–16)
BASOPHILS ABSOLUTE: 0 K/UL (ref 0–0.2)
BASOPHILS RELATIVE PERCENT: 0.1 %
BUN BLDV-MCNC: 13 MG/DL (ref 7–20)
CALCIUM SERPL-MCNC: 8.2 MG/DL (ref 8.3–10.6)
CHLORIDE BLD-SCNC: 99 MMOL/L (ref 99–110)
CO2: 24 MMOL/L (ref 21–32)
CREAT SERPL-MCNC: 0.6 MG/DL (ref 0.6–1.2)
EOSINOPHILS ABSOLUTE: 0 K/UL (ref 0–0.6)
EOSINOPHILS RELATIVE PERCENT: 0 %
GFR AFRICAN AMERICAN: >60
GFR NON-AFRICAN AMERICAN: >60
GLUCOSE BLD-MCNC: 148 MG/DL (ref 70–99)
GLUCOSE BLD-MCNC: 179 MG/DL (ref 70–99)
GLUCOSE BLD-MCNC: 181 MG/DL (ref 70–99)
GLUCOSE BLD-MCNC: 191 MG/DL (ref 70–99)
HCT VFR BLD CALC: 36.2 % (ref 36–48)
HEMOGLOBIN: 12.4 G/DL (ref 12–16)
LYMPHOCYTES ABSOLUTE: 0.3 K/UL (ref 1–5.1)
LYMPHOCYTES RELATIVE PERCENT: 2.2 %
MCH RBC QN AUTO: 30 PG (ref 26–34)
MCHC RBC AUTO-ENTMCNC: 34.2 G/DL (ref 31–36)
MCV RBC AUTO: 87.6 FL (ref 80–100)
MONOCYTES ABSOLUTE: 0.3 K/UL (ref 0–1.3)
MONOCYTES RELATIVE PERCENT: 2.2 %
NEUTROPHILS ABSOLUTE: 11.3 K/UL (ref 1.7–7.7)
NEUTROPHILS RELATIVE PERCENT: 95.5 %
PDW BLD-RTO: 14.4 % (ref 12.4–15.4)
PERFORMED ON: ABNORMAL
PLATELET # BLD: 214 K/UL (ref 135–450)
PMV BLD AUTO: 6.4 FL (ref 5–10.5)
POTASSIUM REFLEX MAGNESIUM: 3.9 MMOL/L (ref 3.5–5.1)
RBC # BLD: 4.13 M/UL (ref 4–5.2)
SODIUM BLD-SCNC: 132 MMOL/L (ref 136–145)
WBC # BLD: 11.8 K/UL (ref 4–11)

## 2021-10-10 PROCEDURE — 36415 COLL VENOUS BLD VENIPUNCTURE: CPT

## 2021-10-10 PROCEDURE — 94640 AIRWAY INHALATION TREATMENT: CPT

## 2021-10-10 PROCEDURE — 80048 BASIC METABOLIC PNL TOTAL CA: CPT

## 2021-10-10 PROCEDURE — 6370000000 HC RX 637 (ALT 250 FOR IP): Performed by: INTERNAL MEDICINE

## 2021-10-10 PROCEDURE — 6360000002 HC RX W HCPCS: Performed by: INTERNAL MEDICINE

## 2021-10-10 PROCEDURE — 94669 MECHANICAL CHEST WALL OSCILL: CPT

## 2021-10-10 PROCEDURE — 94761 N-INVAS EAR/PLS OXIMETRY MLT: CPT

## 2021-10-10 PROCEDURE — 1200000000 HC SEMI PRIVATE

## 2021-10-10 PROCEDURE — 2700000000 HC OXYGEN THERAPY PER DAY

## 2021-10-10 PROCEDURE — 2580000003 HC RX 258: Performed by: INTERNAL MEDICINE

## 2021-10-10 PROCEDURE — 85025 COMPLETE CBC W/AUTO DIFF WBC: CPT

## 2021-10-10 RX ORDER — VARENICLINE TARTRATE 1 MG/1
1 TABLET, FILM COATED ORAL 2 TIMES DAILY
Status: DISCONTINUED | OUTPATIENT
Start: 2021-10-11 | End: 2021-10-12 | Stop reason: HOSPADM

## 2021-10-10 RX ORDER — VARENICLINE TARTRATE 1 MG/1
0.5 TABLET, FILM COATED ORAL DAILY
Status: DISCONTINUED | OUTPATIENT
Start: 2021-10-10 | End: 2021-10-11 | Stop reason: DRUGHIGH

## 2021-10-10 RX ORDER — IPRATROPIUM BROMIDE AND ALBUTEROL SULFATE 2.5; .5 MG/3ML; MG/3ML
1 SOLUTION RESPIRATORY (INHALATION)
Status: DISCONTINUED | OUTPATIENT
Start: 2021-10-11 | End: 2021-10-12 | Stop reason: HOSPADM

## 2021-10-10 RX ORDER — VARENICLINE TARTRATE 1 MG/1
0.5 TABLET, FILM COATED ORAL 2 TIMES DAILY
Status: DISCONTINUED | OUTPATIENT
Start: 2021-10-13 | End: 2021-10-11 | Stop reason: DRUGHIGH

## 2021-10-10 RX ORDER — ACETYLCYSTEINE 100 MG/ML
4 SOLUTION ORAL; RESPIRATORY (INHALATION)
Status: DISCONTINUED | OUTPATIENT
Start: 2021-10-11 | End: 2021-10-12 | Stop reason: HOSPADM

## 2021-10-10 RX ADMIN — MONTELUKAST SODIUM 10 MG: 10 TABLET ORAL at 21:17

## 2021-10-10 RX ADMIN — METHYLPREDNISOLONE SODIUM SUCCINATE 40 MG: 40 INJECTION, POWDER, FOR SOLUTION INTRAMUSCULAR; INTRAVENOUS at 05:51

## 2021-10-10 RX ADMIN — BUSPIRONE HYDROCHLORIDE 30 MG: 5 TABLET ORAL at 09:52

## 2021-10-10 RX ADMIN — ACETYLCYSTEINE 400 MG: 100 SOLUTION ORAL; RESPIRATORY (INHALATION) at 03:48

## 2021-10-10 RX ADMIN — ENOXAPARIN SODIUM 40 MG: 40 INJECTION SUBCUTANEOUS at 09:53

## 2021-10-10 RX ADMIN — BUDESONIDE 500 MCG: 0.5 SUSPENSION RESPIRATORY (INHALATION) at 08:36

## 2021-10-10 RX ADMIN — IPRATROPIUM BROMIDE AND ALBUTEROL SULFATE 3 ML: .5; 3 SOLUTION RESPIRATORY (INHALATION) at 11:54

## 2021-10-10 RX ADMIN — BUSPIRONE HYDROCHLORIDE 30 MG: 5 TABLET ORAL at 21:17

## 2021-10-10 RX ADMIN — DOXYCYCLINE HYCLATE 100 MG: 100 TABLET, COATED ORAL at 21:17

## 2021-10-10 RX ADMIN — IPRATROPIUM BROMIDE AND ALBUTEROL SULFATE 3 ML: .5; 3 SOLUTION RESPIRATORY (INHALATION) at 19:44

## 2021-10-10 RX ADMIN — IPRATROPIUM BROMIDE AND ALBUTEROL SULFATE 3 ML: .5; 3 SOLUTION RESPIRATORY (INHALATION) at 08:36

## 2021-10-10 RX ADMIN — SODIUM CHLORIDE, PRESERVATIVE FREE 10 ML: 5 INJECTION INTRAVENOUS at 09:53

## 2021-10-10 RX ADMIN — ACETYLCYSTEINE 400 MG: 100 SOLUTION ORAL; RESPIRATORY (INHALATION) at 19:44

## 2021-10-10 RX ADMIN — IPRATROPIUM BROMIDE AND ALBUTEROL SULFATE 3 ML: .5; 3 SOLUTION RESPIRATORY (INHALATION) at 03:48

## 2021-10-10 RX ADMIN — SODIUM CHLORIDE, PRESERVATIVE FREE 10 ML: 5 INJECTION INTRAVENOUS at 21:17

## 2021-10-10 RX ADMIN — IPRATROPIUM BROMIDE AND ALBUTEROL SULFATE 3 ML: .5; 3 SOLUTION RESPIRATORY (INHALATION) at 00:02

## 2021-10-10 RX ADMIN — IPRATROPIUM BROMIDE AND ALBUTEROL SULFATE 3 ML: .5; 3 SOLUTION RESPIRATORY (INHALATION) at 15:42

## 2021-10-10 RX ADMIN — ACETYLCYSTEINE 400 MG: 100 SOLUTION ORAL; RESPIRATORY (INHALATION) at 08:36

## 2021-10-10 RX ADMIN — ACETYLCYSTEINE 400 MG: 100 SOLUTION ORAL; RESPIRATORY (INHALATION) at 11:55

## 2021-10-10 RX ADMIN — ATORVASTATIN CALCIUM 20 MG: 10 TABLET, FILM COATED ORAL at 09:52

## 2021-10-10 RX ADMIN — METHYLPREDNISOLONE SODIUM SUCCINATE 40 MG: 40 INJECTION, POWDER, FOR SOLUTION INTRAMUSCULAR; INTRAVENOUS at 13:16

## 2021-10-10 RX ADMIN — METHYLPREDNISOLONE SODIUM SUCCINATE 40 MG: 40 INJECTION, POWDER, FOR SOLUTION INTRAMUSCULAR; INTRAVENOUS at 21:17

## 2021-10-10 RX ADMIN — PANTOPRAZOLE SODIUM 40 MG: 40 TABLET, DELAYED RELEASE ORAL at 05:51

## 2021-10-10 RX ADMIN — TRAZODONE HYDROCHLORIDE 150 MG: 50 TABLET ORAL at 21:16

## 2021-10-10 RX ADMIN — FLUOXETINE 60 MG: 20 CAPSULE ORAL at 09:53

## 2021-10-10 RX ADMIN — BUDESONIDE 500 MCG: 0.5 SUSPENSION RESPIRATORY (INHALATION) at 19:44

## 2021-10-10 RX ADMIN — ACETYLCYSTEINE 400 MG: 100 SOLUTION ORAL; RESPIRATORY (INHALATION) at 15:42

## 2021-10-10 RX ADMIN — ACETYLCYSTEINE 400 MG: 100 SOLUTION ORAL; RESPIRATORY (INHALATION) at 00:02

## 2021-10-10 RX ADMIN — DOXYCYCLINE HYCLATE 100 MG: 100 TABLET, COATED ORAL at 09:52

## 2021-10-10 ASSESSMENT — PAIN SCALES - GENERAL
PAINLEVEL_OUTOF10: 0
PAINLEVEL_OUTOF10: 0

## 2021-10-10 ASSESSMENT — ENCOUNTER SYMPTOMS
SHORTNESS OF BREATH: 1
COUGH: 1
WHEEZING: 1
CHEST TIGHTNESS: 1
DIFFICULTY BREATHING: 1

## 2021-10-10 ASSESSMENT — COPD QUESTIONNAIRES: COPD: 1

## 2021-10-10 NOTE — ED PROVIDER NOTES
I independently performed a history and physical on Nikole Khalil. All diagnostic, treatment, and disposition decisions were made by myself in conjunction with the advanced practice provider. Patient with dyspnea. History of COPD with ongoing tobacco abuse. Significant wheezing bilaterally consistent with COPD exacerbation. Patient receiving steroids and nebulizer treatments. Will be admitted to the hospitalist service. Critical Care: 30min. Management of COPD exacerbation with increased oxygen requirement from baseline and necessitating steroids and multiple nebulizer treatments. For further details of [address] [facility] emergency department encounter, please see Precious AGARWAL's documentation.              Mila Keller MD  10/09/21 2029       Mila Keller MD  10/09/21 2031

## 2021-10-10 NOTE — PROGRESS NOTES
Hospitalist Progress Note      PCP: Jean Claude Fonseca MD    Date of Admission: 10/9/2021    Chief Complaint: dyspnea       Subjective:  Still breathing heavily. Not wheezing at the moment. Denies orthopnea or edema. No fever/chills. Wants to quit smoking. Medications:  Reviewed    Infusion Medications    sodium chloride       Scheduled Medications    busPIRone  30 mg Oral BID    FLUoxetine  60 mg Oral Daily    ipratropium-albuterol  1 vial Inhalation Q4H    montelukast  10 mg Oral Nightly    atorvastatin  20 mg Oral Daily    traZODone  150 mg Oral Nightly    sodium chloride flush  5-40 mL IntraVENous 2 times per day    enoxaparin  40 mg SubCUTAneous Daily    pantoprazole  40 mg Oral QAM AC    methylPREDNISolone  40 mg IntraVENous Q8H    acetylcysteine  4 mL Inhalation Q4H    budesonide  0.5 mg Nebulization BID    doxycycline hyclate  100 mg Oral 2 times per day     PRN Meds: guaiFENesin, sodium chloride flush, sodium chloride, ondansetron **OR** ondansetron, polyethylene glycol, acetaminophen **OR** acetaminophen      Intake/Output Summary (Last 24 hours) at 10/10/2021 0842  Last data filed at 10/10/2021 0329  Gross per 24 hour   Intake 0 ml   Output 1200 ml   Net -1200 ml       Physical Exam Performed:    /83   Pulse 110   Temp 97.4 °F (36.3 °C) (Oral)   Resp 20   Ht 5' 5\" (1.651 m)   Wt 174 lb (78.9 kg)   SpO2 97%   BMI 28.96 kg/m²     General appearance: No apparent distress, appears stated age and cooperative. HEENT: Pupils equal, round, and reactive to light. Conjunctivae/corneas clear. Neck: Supple, with full range of motion. No jugular venous distention. Trachea midline. Respiratory:  Increased respiratory effort. Clear to auscultation, bilaterally without Rales/Wheezes/Rhonchi. Cardiovascular: Regular rate and rhythm with normal S1/S2 without murmurs, rubs or gallops. Abdomen: Soft, non-tender, non-distended with normal bowel sounds.   Musculoskeletal: No clubbing, cyanosis or edema bilaterally. Full range of motion without deformity. Skin: Skin color, texture, turgor normal.  No rashes or lesions. Neurologic:  Neurovascularly intact without any focal sensory/motor deficits. Cranial nerves: II-XII intact, grossly non-focal.  Psychiatric: Alert and oriented, thought content appropriate, normal insight  Capillary Refill: Brisk,3 seconds, normal   Peripheral Pulses: +2 palpable, equal bilaterally       Labs:   Recent Labs     10/09/21  1215 10/10/21  0639   WBC 14.5* 11.8*   HGB 13.3 12.4   HCT 39.6 36.2    214     Recent Labs     10/09/21  1215 10/10/21  0639   * 132*   K 3.7 3.9   CL 93* 99   CO2 22 24   BUN 12 13   CREATININE 0.6 0.6   CALCIUM 8.7 8.2*     Recent Labs     10/09/21  1215   AST 15   ALT 18   BILITOT 0.4   ALKPHOS 46     No results for input(s): INR in the last 72 hours. Recent Labs     10/09/21  1215   TROPONINI <0.01       Urinalysis:      Lab Results   Component Value Date    NITRU Negative 09/27/2021    WBCUA 3-5 09/27/2021    BACTERIA 3+ 09/27/2021    RBCUA 5-10 09/27/2021    BLOODU TRACE-INTACT 09/27/2021    SPECGRAV 1.010 09/27/2021    GLUCOSEU Negative 09/27/2021    GLUCOSEU NEGATIVE 04/13/2012       Radiology:  CT CHEST PULMONARY EMBOLISM W CONTRAST   Final Result   No evidence of pulmonary embolism. Patchy bilateral areas of airspace and interstitial disease unchanged from   prior study. Moderate emphysema. No acute parenchymal disease. Compression fractures and prior augmentation unchanged.          XR CHEST PORTABLE   Final Result   Bibasilar opacities could represent subsegmental atelectasis or infection                 Assessment/Plan:    Active Hospital Problems    Diagnosis     Stage 3 severe COPD by GOLD classification ([address] [J44.9]      Priority: Medium    Cannabis dependence with current use ([address] [F12.20]     COPD exacerbation ([address] [J44.1]     Anxiety and depression [F41.9, F32.A]     ILD (interstitial lung disease) ([facility] [J84.9]     Tobacco dependence [F17.200]     Depression [F32. A]     Hyperlipidemia [E78.5]     Smoker [F17.200]        \"Patient with PMH of severe COPD, depression, fibromyalgia, ILD, tobacco dependence, marijuana use presented to the ED today with complaints of shortness of breath. Patient has had frequent COPD exacerbations the last few months with frequent visits to the hospital.  Last admission was 9/28 and discharged on 10/3 on long prednisone taper which the patient has been taking. She continues to smoke cigarettes and marijuana. \"      Recurrent AECOPD. Steroids, inhaled bronchodilators. Empiric doxy per admitting provider. Encouraged smoking cessation, started varenicline per patient request, discussed side effects. COVID negative. Doubt flu. ILD. Supportive care. F/u with Dr. Deniz Perera. Acute hypoxic respiratory failure, due to above, wean to RA. CTPA negative for PE.    HTN, HLD, depression. Resumed home meds. DVT Prophylaxis: enoxaparin  Diet: ADULT DIET; Regular  Code Status: Full Code    PT/OT Eval Status: not indicated    Dispo - perhaps 10/12. She lives at home. High risk for readmissions.        Eboni Herron MD

## 2021-10-10 NOTE — RT PROTOCOL NOTE
RT Inhaler-Nebulizer Bronchodilator Protocol Note    There is a bronchodilator order in the chart from a provider indicating to follow the RT Bronchodilator Protocol and there is an Initiate RT Inhaler-Nebulizer Bronchodilator Protocol order as well (see protocol at bottom of note). CXR Findings:  XR CHEST PORTABLE    Result Date: 10/9/2021  Bibasilar opacities could represent subsegmental atelectasis or infection       The findings from the last RT Protocol Assessment were as follows:   History Pulmonary Disease: Chronic pulmonary disease  Respiratory Pattern: Mild dyspnea at rest, irregular pattern, or RR 21-25 bpm  Breath Sounds: Inspiratory and expiratory or bilateral wheezing and/or rhonchi  Cough: Strong, productive  Indication for Bronchodilator Therapy: On home bronchodilators  Bronchodilator Assessment Score: 13    Aerosolized bronchodilator medication orders have been revised according to the RT Inhaler-Nebulizer Bronchodilator Protocol below. Respiratory Therapist to perform RT Therapy Protocol Assessment initially then follow the protocol. Repeat RT Therapy Protocol Assessment PRN for score 0-3 or on second treatment, BID, and PRN for scores above 3. No Indications - adjust the frequency to every 6 hours PRN wheezing or bronchospasm, if no treatments needed after 48 hours then discontinue using Per Protocol order mode. If indication present, adjust the RT bronchodilator orders based on the Bronchodilator Assessment Score as indicated below. Use Inhaler orders unless patient has one or more of the following: on home nebulizer, not able to hold breath for 10 seconds, is not alert and oriented, cannot activate and use MDI correctly, or respiratory rate 25 breaths per minute or more, then use the equivalent nebulizer order(s) with same Frequency and PRN reasons based on the score. If a patient is on this medication at home then do not decrease Frequency below that used at home.     0-3 - enter or revise RT bronchodilator order(s) to equivalent RT Bronchodilator order with Frequency of every 4 hours PRN for wheezing or increased work of breathing using Per Protocol order mode. 4-6 - enter or revise RT Bronchodilator order(s) to two equivalent RT bronchodilator orders with one order with BID Frequency and one order with Frequency of every 4 hours PRN wheezing or increased work of breathing using Per Protocol order mode. 7-10 - enter or revise RT Bronchodilator order(s) to two equivalent RT bronchodilator orders with one order with TID Frequency and one order with Frequency of every 4 hours PRN wheezing or increased work of breathing using Per Protocol order mode. 11-13 - enter or revise RT Bronchodilator order(s) to one equivalent RT bronchodilator order with QID Frequency and an Albuterol order with Frequency of every 4 hours PRN wheezing or increased work of breathing using Per Protocol order mode. Greater than 13 - enter or revise RT Bronchodilator order(s) to one equivalent RT bronchodilator order with every 4 hours Frequency and an Albuterol order with Frequency of every 2 hours PRN wheezing or increased work of breathing using Per Protocol order mode. RT to enter RT Home Evaluation for COPD & MDI Assessment order using Per Protocol order mode.     Electronically signed by Josep Swann RCP on 10/9/2021 at 10:03 PM

## 2021-10-10 NOTE — PROGRESS NOTES
MD notified: FYI: Chantix ordered, pharmacy states we do not have. pt informed to bring from home if able.

## 2021-10-10 NOTE — ACP (ADVANCE CARE PLANNING)
Advance Care Planning     General Advance Care Planning (ACP) Conversation    Date of Conversation: 10/9/2021  Conducted with: Patient with Decision Making Capacity    Healthcare Decision Maker:    Primary Decision Maker: Frank Harris - Spouse - [phone]    Secondary Decision Maker: Angelita Wong - Child - [phone]    Secondary Decision Maker: Nupur Tomkaylynn - Child - [phone]    Supplemental (Other) Decision Maker: Norbert Wong - Child - [phone]  Click here to complete Healthcare Decision Makers including selection of the Healthcare Decision Maker Relationship (ie \"Primary\"). Content/Action Overview:   Has ACP document(s) on file - reflects the patient's care preferences  Reviewed DNR/DNI and patient elects Full Code (Attempt Resuscitation)    Length of Voluntary ACP Conversation in minutes:  <16 minutes (Non-Billable)    Adina Akers RN

## 2021-10-10 NOTE — PROGRESS NOTES
Pt titrated to room air. Will need walking test to ensure does not desat with ambulation. Pt has oxygen at home for night time use.

## 2021-10-10 NOTE — PROGRESS NOTES
Post-Discharge Transitional Care Management Services or Hospital Follow Up      Danisha Vaz   YOB: 1955    Date of Office Visit:  10/7/2021  Date of Hospital Admission: 9/27/21  Date of Hospital Discharge: 10/3/21  Readmission Risk Score(high >=14%.  Medium >=10%):Readmission Risk Score: 62      Care management risk score Rising risk (score 2-5) and Complex Care (Scores >=6): 14     Non face to face  following discharge, date last encounter closed (first attempt may have been earlier): 10/6/2021  9:05 AM 10/6/2021  9:05 AM    Call initiated 2 business days of discharge: Yes     Patient Active Problem List   Diagnosis    Stage 3 severe COPD by GOLD classification ([address]    Smoker    Fibromyalgia    Hypokalemia    Hyperlipidemia    Hyponatremia    Depression    Tobacco dependence    Pulmonary fibrosis ([address]    ILD (interstitial lung disease) ([address]    Recurrent major depressive disorder, in partial remission ([address]    Thyroid nodule    History of prescription drug abuse    Esophageal dysphagia    Heartburn    Rectal bleeding    History of colon polyps    Compression fx, thoracic spine, sequela    Kyphosis    Anxiety and depression    Chronic pain syndrome    Polyarthropathy, multiple sites    Pneumonia    Acute on chronic respiratory failure with hypoxia and hypercapnia (HCC)    Acute respiratory failure with hypoxia ([address]    Healthcare associated bacterial pneumonia    Bandemia    Sepsis ([address]    COPD exacerbation (HCC)    Pulmonary infiltrates    Chest congestion    Mucus plugging of bronchi    Ineffective airway clearance    Acute on chronic respiratory failure with hypoxemia (HCC)    COPD, frequent exacerbations (HCC)    Acute on chronic respiratory failure (HCC)    Cannabis dependence with current use (HCC)    Other secondary kyphosis, thoracic region    Marijuana use    Diastolic dysfunction    COPD (chronic obstructive pulmonary disease) ([address] Allergies   Allergen Reactions    Meperidine Anaphylaxis     \"Demerol\"     Demerol Hives       Medications listed as ordered at the time of discharge from hospital   [place], [address] Medication Instructions BALJINDER:    Printed on:10/10/21 2873   Medication Information                      albuterol (PROVENTIL) (2.5 MG/3ML) 0.083% nebulizer solution  Take 3 mLs by nebulization every 6 hours as needed for Wheezing or Shortness of Breath DX COPD J44.9             albuterol sulfate HFA (VENTOLIN HFA) 108 (90 Base) MCG/ACT inhaler  Inhale 2 puffs into the lungs every 6 hours as needed for Wheezing or Shortness of Breath             BD PEN NEEDLE SVETLANA U/F 32G X 4 MM MISC  USE ONE DAILY AS DIRECTED             busPIRone (BUSPAR) 30 MG tablet  TAKE 1 TABLET TWICE DAILY             FLUoxetine (PROZAC) 20 MG capsule  TAKE 3 CAPSULES BY MOUTH DAILY             fluticasone-salmeterol (WIXELA INHUB) 500-50 MCG/DOSE diskus inhaler  Inhale 1 puff into the lungs every 12 hours             guaiFENesin (MUCINEX) 600 MG extended release tablet  Take 1 tablet by mouth 2 times daily as needed for Congestion             ipratropium-albuterol (DUONEB) 0.5-2.5 (3) MG/3ML SOLN nebulizer solution  Inhale 3 mLs into the lungs every 4 hours as needed for Shortness of Breath             montelukast (SINGULAIR) 10 MG tablet  TAKE 1 TABLET BY MOUTH EACH NIGHT             predniSONE (DELTASONE) 20 MG tablet  Take 3 tablets by mouth daily for 5 days             simvastatin (ZOCOR) 20 MG tablet  TAKE 1 TABLET EVERY EVENING             teriparatide, recombinant, (FORTEO) 600 MCG/2.4ML SOLN injection  Inject 0.08 mLs into the skin daily One dose injected daily. traZODone (DESYREL) 150 MG tablet  Take 1-2 tablets by mouth nightly TAKE 2 TABLETS AT BEDTIME             varenicline (CHANTIX STARTING MONTH PAK) 0.5 MG X 11 & 1 MG X 42 tablet  Take by mouth.                    Medications marked \"taking\" at this time  Outpatient Medications Marked as Taking for the 10/7/21 encounter (Virtual Visit) with Marvin More MD   Medication Sig Dispense Refill    predniSONE (DELTASONE) 20 MG tablet Take 3 tablets by mouth daily for 5 days 15 tablet 0    ipratropium-albuterol (DUONEB) 0.5-2.5 (3) MG/3ML SOLN nebulizer solution Inhale 3 mLs into the lungs every 4 hours as needed for Shortness of Breath 360 mL 5    traZODone (DESYREL) 150 MG tablet Take 1-2 tablets by mouth nightly TAKE 2 TABLETS AT BEDTIME 180 tablet 1    varenicline (CHANTIX STARTING MONTH TAL) 0.5 MG X 11 & 1 MG X 42 tablet Take by mouth. 1 box 0    fluticasone-salmeterol (WIXELA INHUB) 500-50 MCG/DOSE diskus inhaler Inhale 1 puff into the lungs every 12 hours 180 each 1    albuterol (PROVENTIL) (2.5 MG/3ML) 0.083% nebulizer solution Take 3 mLs by nebulization every 6 hours as needed for Wheezing or Shortness of Breath DX COPD J44.9 120 vial 6    montelukast (SINGULAIR) 10 MG tablet TAKE 1 TABLET BY MOUTH EACH NIGHT 90 tablet 1    albuterol sulfate HFA (VENTOLIN HFA) 108 (90 Base) MCG/ACT inhaler Inhale 2 puffs into the lungs every 6 hours as needed for Wheezing or Shortness of Breath 3 Inhaler 1    FLUoxetine (PROZAC) 20 MG capsule TAKE 3 CAPSULES BY MOUTH DAILY 270 capsule 1    simvastatin (ZOCOR) 20 MG tablet TAKE 1 TABLET EVERY EVENING 90 tablet 1    busPIRone (BUSPAR) 30 MG tablet TAKE 1 TABLET TWICE DAILY 180 tablet 1        Medications patient taking as of now reconciled against medications ordered at time of hospital discharge: Yes    Chief Complaint   Patient presents with    Follow-Up from [facility], COPD exacerbation, DC on Sunday        COPD  She complains of chest tightness, cough, difficulty breathing, shortness of breath and wheezing. This is a recurrent problem. The current episode started 1 to 4 weeks ago. The problem occurs constantly. The problem has been rapidly worsening. The cough is productive of sputum.  Pertinent negatives include no fever. Her symptoms are aggravated by exposure to smoke. Her symptoms are alleviated by oral steroids, beta-agonist, ipratropium and rest. She reports minimal improvement on treatment. Risk factors for lung disease include smoking/tobacco exposure and animal exposure. Her past medical history is significant for COPD and pneumonia. Inpatient course: Discharge summary reviewed- see chart. Interval history/Current status: She has been home from the hospital for 4 days. She is starting to feel worse again. She states she does not feel bad enough to go to the ED currently. She states her face has been swelling a great deal in the past few weeks, as has the back of her neck. She thinks it is from the steroids. Currently on 40 mg daily. She got started on Chantix about a month ago and is waiting to see if that help with quitting smoking. She is still smoking cigarettes and marijuana. Review of Systems   Constitutional: Negative for fever. Respiratory: Positive for cough, shortness of breath and wheezing. There were no vitals filed for this visit. There is no height or weight on file to calculate BMI. Wt Readings from Last 3 Encounters:   10/10/21 174 lb (78.9 kg)   09/27/21 165 lb (74.8 kg)   09/19/21 170 lb 3.2 oz (77.2 kg)     BP Readings from Last 3 Encounters:   10/10/21 (!) 140/81   10/03/21 129/74   09/20/21 132/85       Physical Exam  Vitals and nursing note reviewed. Constitutional:       Appearance: Normal appearance. Comments: Dramatic change in her face shape since her last appointment, clearly developed moon facies   Pulmonary:      Effort: Tachypnea and accessory muscle usage present. Comments: Visibly short of breath, speaking in short sentences  Neurological:      Mental Status: She is alert.          Narrative   EXAMINATION:   ONE XRAY VIEW OF THE CHEST       9/27/2021 10:11 am       COMPARISON:   Chest x-ray September 17, 2021       HISTORY:   [address] PROVIDED HISTORY: shortness of breath   TECHNOLOGIST PROVIDED HISTORY:   Reason for exam:->shortness of breath   Reason for Exam: severe hypoxia, SOB   Acuity: Acute   Type of Exam: Initial       FINDINGS:   The cardiomediastinal silhouette is stable.  There are increased lung   markings bilaterally, may be related to mild pulmonary vascular congestion   versus bronchitis or early pneumonia.  There is no pleural effusion. Anabell Many   is no pneumothorax.  There is no acute osseous abnormality.           Impression   Increased lung markings bilaterally, may be related to mild pulmonary   vascular congestion versus bronchitis or early pneumonia. Karmen Arias [facility] [place] [facility]    Assessment/Plan:  1. COPD exacerbation ([facility]  Will try to increase oral steroids for a few days to see if that helps. Encouraged close pulmonology follow up as well. - predniSONE (DELTASONE) 20 MG tablet; Take 3 tablets by mouth daily for 5 days  Dispense: 15 tablet; Refill: 0    2. ILD (interstitial lung disease) ([facility]  Patient would like to have a palliative care consult given her condition is deteriorating, and she would like as much assistance as possible and would like to stay home as much as possible. Sending referral to palliative care for her.   - [facility]) [address]    3. Pulmonary fibrosis ([facility]  - [facility]) [address]    4. Iatrogenic Cushing's disease ([facility]  Unable to do complete assessment given this was a video visit, but moon facies and frequent high dose steroids, as well as electrolyte changes on labs, are suggestive of iatrogenic Cushing's. We discussed risks of this vs risks of her lung condition. Will need updated DEXA once her condition stabilizes. - [facility]) [address]    5. Cigarette nicotine dependence with other nicotine-induced disorder  Continue Chantix.  Strongly encouraged quitting, and encouraged to keep going on the Chantix. 6. Marijuana smoker, continuous  Have discussed this several times. Suggest moving to edibles if she is unable to quit all together. 7. Encounter for screening mammogram for breast cancer  - [facility] Digital Screen Bilateral [[id number]]; Future    8. Acute on chronic respiratory failure with hypoxia (HCC)  Continue Duonebs, supplemental O2, oral steroids, and complete antibiotics. 9. Supplemental oxygen dependent  Continue to keep O2 90-92% (currently 1-3 LPM). Medical Decision Making: high complexity      Oliver Melendez, was evaluated through a synchronous (real-time) audio-video encounter. The patient (or guardian if applicable) is aware that this is a billable service. Verbal consent to proceed has been obtained within the past 12 months. The visit was conducted pursuant to the emergency declaration under the [address] [place] [address], [address] [place] [address] authority and the [employer] Act. Patient identification was verified, and a caregiver was present when appropriate. The patient was located in a state where the provider was credentialed to provide care. --Nuria Rogel MD on 10/10/2021 at 1:26 PM    An electronic signature was used to authenticate this note.

## 2021-10-11 LAB
GLUCOSE BLD-MCNC: 158 MG/DL (ref 70–99)
PERFORMED ON: ABNORMAL

## 2021-10-11 PROCEDURE — 2580000003 HC RX 258: Performed by: INTERNAL MEDICINE

## 2021-10-11 PROCEDURE — 6360000002 HC RX W HCPCS: Performed by: INTERNAL MEDICINE

## 2021-10-11 PROCEDURE — 6370000000 HC RX 637 (ALT 250 FOR IP): Performed by: INTERNAL MEDICINE

## 2021-10-11 PROCEDURE — 94640 AIRWAY INHALATION TREATMENT: CPT

## 2021-10-11 PROCEDURE — 1200000000 HC SEMI PRIVATE

## 2021-10-11 PROCEDURE — 94761 N-INVAS EAR/PLS OXIMETRY MLT: CPT

## 2021-10-11 PROCEDURE — 94669 MECHANICAL CHEST WALL OSCILL: CPT

## 2021-10-11 PROCEDURE — 2700000000 HC OXYGEN THERAPY PER DAY

## 2021-10-11 RX ORDER — MECOBALAMIN 5000 MCG
5 TABLET,DISINTEGRATING ORAL NIGHTLY PRN
Status: DISCONTINUED | OUTPATIENT
Start: 2021-10-11 | End: 2021-10-12 | Stop reason: HOSPADM

## 2021-10-11 RX ADMIN — METHYLPREDNISOLONE SODIUM SUCCINATE 40 MG: 40 INJECTION, POWDER, FOR SOLUTION INTRAMUSCULAR; INTRAVENOUS at 13:57

## 2021-10-11 RX ADMIN — IPRATROPIUM BROMIDE AND ALBUTEROL SULFATE 3 ML: .5; 3 SOLUTION RESPIRATORY (INHALATION) at 23:53

## 2021-10-11 RX ADMIN — SODIUM CHLORIDE, PRESERVATIVE FREE 10 ML: 5 INJECTION INTRAVENOUS at 08:55

## 2021-10-11 RX ADMIN — VARENICLINE TARTRATE 1 MG: 1 TABLET, FILM COATED ORAL at 21:59

## 2021-10-11 RX ADMIN — ATORVASTATIN CALCIUM 20 MG: 10 TABLET, FILM COATED ORAL at 08:54

## 2021-10-11 RX ADMIN — PANTOPRAZOLE SODIUM 40 MG: 40 TABLET, DELAYED RELEASE ORAL at 06:36

## 2021-10-11 RX ADMIN — BUSPIRONE HYDROCHLORIDE 30 MG: 5 TABLET ORAL at 08:54

## 2021-10-11 RX ADMIN — MONTELUKAST SODIUM 10 MG: 10 TABLET ORAL at 21:44

## 2021-10-11 RX ADMIN — METHYLPREDNISOLONE SODIUM SUCCINATE 40 MG: 40 INJECTION, POWDER, FOR SOLUTION INTRAMUSCULAR; INTRAVENOUS at 21:44

## 2021-10-11 RX ADMIN — IPRATROPIUM BROMIDE AND ALBUTEROL SULFATE 3 ML: .5; 3 SOLUTION RESPIRATORY (INHALATION) at 11:45

## 2021-10-11 RX ADMIN — FLUOXETINE 60 MG: 20 CAPSULE ORAL at 08:54

## 2021-10-11 RX ADMIN — ACETYLCYSTEINE 400 MG: 100 SOLUTION ORAL; RESPIRATORY (INHALATION) at 20:01

## 2021-10-11 RX ADMIN — BUDESONIDE 500 MCG: 0.5 SUSPENSION RESPIRATORY (INHALATION) at 20:10

## 2021-10-11 RX ADMIN — BUSPIRONE HYDROCHLORIDE 30 MG: 5 TABLET ORAL at 21:44

## 2021-10-11 RX ADMIN — ACETYLCYSTEINE 400 MG: 100 SOLUTION ORAL; RESPIRATORY (INHALATION) at 08:05

## 2021-10-11 RX ADMIN — IPRATROPIUM BROMIDE AND ALBUTEROL SULFATE 3 ML: .5; 3 SOLUTION RESPIRATORY (INHALATION) at 15:41

## 2021-10-11 RX ADMIN — ACETYLCYSTEINE 400 MG: 100 SOLUTION ORAL; RESPIRATORY (INHALATION) at 11:46

## 2021-10-11 RX ADMIN — SODIUM CHLORIDE, PRESERVATIVE FREE 10 ML: 5 INJECTION INTRAVENOUS at 21:49

## 2021-10-11 RX ADMIN — METHYLPREDNISOLONE SODIUM SUCCINATE 40 MG: 40 INJECTION, POWDER, FOR SOLUTION INTRAMUSCULAR; INTRAVENOUS at 06:35

## 2021-10-11 RX ADMIN — DOXYCYCLINE HYCLATE 100 MG: 100 TABLET, COATED ORAL at 08:55

## 2021-10-11 RX ADMIN — BUDESONIDE 500 MCG: 0.5 SUSPENSION RESPIRATORY (INHALATION) at 11:46

## 2021-10-11 RX ADMIN — IPRATROPIUM BROMIDE AND ALBUTEROL SULFATE 3 ML: .5; 3 SOLUTION RESPIRATORY (INHALATION) at 19:59

## 2021-10-11 RX ADMIN — IPRATROPIUM BROMIDE AND ALBUTEROL SULFATE 3 ML: .5; 3 SOLUTION RESPIRATORY (INHALATION) at 08:05

## 2021-10-11 RX ADMIN — TRAZODONE HYDROCHLORIDE 150 MG: 50 TABLET ORAL at 21:44

## 2021-10-11 RX ADMIN — DOXYCYCLINE HYCLATE 100 MG: 100 TABLET, COATED ORAL at 21:44

## 2021-10-11 RX ADMIN — ENOXAPARIN SODIUM 40 MG: 40 INJECTION SUBCUTANEOUS at 08:54

## 2021-10-11 RX ADMIN — ACETYLCYSTEINE 400 MG: 100 SOLUTION ORAL; RESPIRATORY (INHALATION) at 15:42

## 2021-10-11 ASSESSMENT — PAIN SCALES - GENERAL
PAINLEVEL_OUTOF10: 0
PAINLEVEL_OUTOF10: 0

## 2021-10-11 NOTE — PROGRESS NOTES
Hospitalist Progress Note      PCP: Marvin More MD    Date of Admission: 10/9/2021    Chief Complaint: dyspnea       Subjective:  Still breathing heavily, even at rest.  Pursed lips, prolonged expiratory phase. Still seems high risk for another quick readmission, even though she is weaned to RA. She wants to stay another day. Medications:  Reviewed    Infusion Medications    sodium chloride       Scheduled Medications    varenicline  0.5 mg Oral Daily    And    [START ON 10/13/2021] varenicline  0.5 mg Oral BID    And    [START ON 10/18/2021] varenicline  1 mg Oral BID    influenza virus vaccine  0.5 mL IntraMUSCular Prior to discharge    acetylcysteine  4 mL Inhalation Q4H WA    ipratropium-albuterol  1 vial Inhalation Q4H WA    busPIRone  30 mg Oral BID    FLUoxetine  60 mg Oral Daily    montelukast  10 mg Oral Nightly    atorvastatin  20 mg Oral Daily    traZODone  150 mg Oral Nightly    sodium chloride flush  5-40 mL IntraVENous 2 times per day    enoxaparin  40 mg SubCUTAneous Daily    pantoprazole  40 mg Oral QAM AC    methylPREDNISolone  40 mg IntraVENous Q8H    budesonide  0.5 mg Nebulization BID    doxycycline hyclate  100 mg Oral 2 times per day     PRN Meds: melatonin, guaiFENesin, sodium chloride flush, sodium chloride, ondansetron **OR** ondansetron, polyethylene glycol, acetaminophen **OR** acetaminophen      Intake/Output Summary (Last 24 hours) at 10/11/2021 1641  Last data filed at 10/11/2021 1021  Gross per 24 hour   Intake 250 ml   Output --   Net 250 ml       Physical Exam Performed:    /78   Pulse 105   Temp 98.5 °F (36.9 °C) (Axillary)   Resp 20   Ht 5' 5\" (1.651 m)   Wt 174 lb (78.9 kg)   SpO2 95%   BMI 28.96 kg/m²     General appearance: No apparent distress, appears stated age and cooperative. HEENT: Pupils equal, round, and reactive to light. Conjunctivae/corneas clear. Neck: Supple, with full range of motion. No jugular venous distention. Trachea midline. Respiratory:  Increased respiratory effort. Clear to auscultation, bilaterally without Rales/Wheezes/Rhonchi. Cardiovascular: Regular rate and rhythm with normal S1/S2 without murmurs, rubs or gallops. Abdomen: Soft, non-tender, non-distended with normal bowel sounds. Musculoskeletal: No clubbing, cyanosis or edema bilaterally. Full range of motion without deformity. Skin: Skin color, texture, turgor normal.  No rashes or lesions. Neurologic:  Neurovascularly intact without any focal sensory/motor deficits. Cranial nerves: II-XII intact, grossly non-focal.  Psychiatric: Alert and oriented, thought content appropriate, normal insight  Capillary Refill: Brisk,3 seconds, normal   Peripheral Pulses: +2 palpable, equal bilaterally       Labs:   Recent Labs     10/09/21  1215 10/10/21  0639   WBC 14.5* 11.8*   HGB 13.3 12.4   HCT 39.6 36.2    214     Recent Labs     10/09/21  1215 10/10/21  0639   * 132*   K 3.7 3.9   CL 93* 99   CO2 22 24   BUN 12 13   CREATININE 0.6 0.6   CALCIUM 8.7 8.2*     Recent Labs     10/09/21  1215   AST 15   ALT 18   BILITOT 0.4   ALKPHOS 46     No results for input(s): INR in the last 72 hours. Recent Labs     10/09/21  1215   TROPONINI <0.01       Urinalysis:      Lab Results   Component Value Date    NITRU Negative 09/27/2021    WBCUA 3-5 09/27/2021    BACTERIA 3+ 09/27/2021    RBCUA 5-10 09/27/2021    BLOODU TRACE-INTACT 09/27/2021    SPECGRAV 1.010 09/27/2021    GLUCOSEU Negative 09/27/2021    GLUCOSEU NEGATIVE 04/13/2012       Radiology:  CT CHEST PULMONARY EMBOLISM W CONTRAST   Final Result   No evidence of pulmonary embolism. Patchy bilateral areas of airspace and interstitial disease unchanged from   prior study. Moderate emphysema. No acute parenchymal disease. Compression fractures and prior augmentation unchanged.          XR CHEST PORTABLE   Final Result   Bibasilar opacities could represent subsegmental atelectasis or infection Assessment/Plan:    Active Hospital Problems    Diagnosis     Stage 3 severe COPD by GOLD classification ([facility] [J44.9]      Priority: Medium    Cannabis dependence with current use ([facility] [F12.20]     COPD exacerbation ([facility] [J44.1]     Anxiety and depression [F41.9, F32.A]     [facility]inter[facility] disease) [facility] [J84.9]     Tobacco dependence [F17.200]     Depression [F32. A]     Hyperlipidemia [E78.5]     Smoker [F17.200]        \"Patient with PMH of severe COPD, depression, fibromyalgia, ILD, tobacco dependence, marijuana use presented to the ED today with complaints of shortness of breath. Patient has had frequent COPD exacerbations the last few months with frequent visits to the hospital.  Last admission was 9/28 and discharged on 10/3 on long prednisone taper which the patient has been taking. She continues to smoke cigarettes and marijuana. \"      Recurrent AECOPD. Steroids, inhaled bronchodilators. Empiric doxy per admitting provider. Encouraged smoking cessation, started varenicline per patient request, discussed side effects. COVID negative. Doubt flu. ILD. Supportive care. F/u with Dr. Lela Thomas. Acute hypoxic respiratory failure, due to above, wean to RA. CTPA negative for PE.    HTN, HLD, depression. Resumed home meds. DVT Prophylaxis: enoxaparin  Diet: ADULT DIET; Regular  Code Status: Full Code. Discussed in detail with the patient. She didn't want intubated under any circumstances, but still wanted CPR if it ever came to that. We discussed how that is somewhat inconsistent. She will continue to think it over. She wasn't at all ready for hospice or even to have those sorts of discussions. PT/OT Eval Status: not indicated    Dispo - perhaps 10/12. She lives at home. High risk for readmissions.        Kiera Downing MD

## 2021-10-11 NOTE — PROGRESS NOTES
10/10/21 3744   RT Protocol   History Pulmonary Disease 2   Respiratory pattern 2   Breath sounds 2   Cough 0   Indications for Bronchodilator Therapy On home bronchodilators   Bronchodilator Assessment Score 6

## 2021-10-12 ENCOUNTER — CARE COORDINATION (OUTPATIENT)
Dept: CASE MANAGEMENT | Age: 66
End: 2021-10-12

## 2021-10-12 VITALS
HEIGHT: 65 IN | WEIGHT: 174 LBS | HEART RATE: 99 BPM | TEMPERATURE: 98.9 F | DIASTOLIC BLOOD PRESSURE: 89 MMHG | BODY MASS INDEX: 28.99 KG/M2 | RESPIRATION RATE: 20 BRPM | SYSTOLIC BLOOD PRESSURE: 164 MMHG | OXYGEN SATURATION: 96 %

## 2021-10-12 PROCEDURE — 6360000002 HC RX W HCPCS: Performed by: INTERNAL MEDICINE

## 2021-10-12 PROCEDURE — 6370000000 HC RX 637 (ALT 250 FOR IP): Performed by: INTERNAL MEDICINE

## 2021-10-12 PROCEDURE — 2700000000 HC OXYGEN THERAPY PER DAY

## 2021-10-12 PROCEDURE — 90686 IIV4 VACC NO PRSV 0.5 ML IM: CPT | Performed by: INTERNAL MEDICINE

## 2021-10-12 PROCEDURE — 94761 N-INVAS EAR/PLS OXIMETRY MLT: CPT

## 2021-10-12 PROCEDURE — 2580000003 HC RX 258: Performed by: INTERNAL MEDICINE

## 2021-10-12 PROCEDURE — G0008 ADMIN INFLUENZA VIRUS VAC: HCPCS | Performed by: INTERNAL MEDICINE

## 2021-10-12 PROCEDURE — 94640 AIRWAY INHALATION TREATMENT: CPT

## 2021-10-12 PROCEDURE — 94669 MECHANICAL CHEST WALL OSCILL: CPT

## 2021-10-12 RX ORDER — PREDNISONE 20 MG/1
TABLET ORAL
Qty: 25 TABLET | Refills: 0 | Status: ON HOLD | OUTPATIENT
Start: 2021-10-12 | End: 2021-10-20 | Stop reason: SDUPTHER

## 2021-10-12 RX ORDER — DOXYCYCLINE HYCLATE 100 MG
100 TABLET ORAL EVERY 12 HOURS SCHEDULED
Qty: 6 TABLET | Refills: 0 | Status: SHIPPED | OUTPATIENT
Start: 2021-10-12 | End: 2021-10-15

## 2021-10-12 RX ADMIN — BUDESONIDE 500 MCG: 0.5 SUSPENSION RESPIRATORY (INHALATION) at 08:06

## 2021-10-12 RX ADMIN — FLUOXETINE 60 MG: 20 CAPSULE ORAL at 08:40

## 2021-10-12 RX ADMIN — VARENICLINE TARTRATE 1 MG: 1 TABLET, FILM COATED ORAL at 09:00

## 2021-10-12 RX ADMIN — ENOXAPARIN SODIUM 40 MG: 40 INJECTION SUBCUTANEOUS at 08:41

## 2021-10-12 RX ADMIN — BUSPIRONE HYDROCHLORIDE 30 MG: 5 TABLET ORAL at 08:41

## 2021-10-12 RX ADMIN — METHYLPREDNISOLONE SODIUM SUCCINATE 40 MG: 40 INJECTION, POWDER, FOR SOLUTION INTRAMUSCULAR; INTRAVENOUS at 05:48

## 2021-10-12 RX ADMIN — PANTOPRAZOLE SODIUM 40 MG: 40 TABLET, DELAYED RELEASE ORAL at 05:48

## 2021-10-12 RX ADMIN — SODIUM CHLORIDE, PRESERVATIVE FREE 10 ML: 5 INJECTION INTRAVENOUS at 08:40

## 2021-10-12 RX ADMIN — INFLUENZA A VIRUS A/VICTORIA/2570/2019 IVR-215 (H1N1) ANTIGEN (PROPIOLACTONE INACTIVATED), INFLUENZA A VIRUS A/CAMBODIA/E0826360/2020 IVR-224 (H3N2) ANTIGEN (PROPIOLACTONE INACTIVATED), INFLUENZA B VIRUS B/VICTORIA/705/2018 BVR-11 ANTIGEN (PROPIOLACTONE INACTIVATED), INFLUENZA B VIRUS B/PHUKET/3073/2013 BVR-1B ANTIGEN (PROPIOLACTONE INACTIVATED) 0.5 ML: 15; 15; 15; 15 INJECTION, SUSPENSION INTRAMUSCULAR at 10:53

## 2021-10-12 RX ADMIN — IPRATROPIUM BROMIDE AND ALBUTEROL SULFATE 3 ML: .5; 3 SOLUTION RESPIRATORY (INHALATION) at 08:06

## 2021-10-12 RX ADMIN — IPRATROPIUM BROMIDE AND ALBUTEROL SULFATE 3 ML: .5; 3 SOLUTION RESPIRATORY (INHALATION) at 04:11

## 2021-10-12 RX ADMIN — ATORVASTATIN CALCIUM 20 MG: 10 TABLET, FILM COATED ORAL at 08:41

## 2021-10-12 RX ADMIN — DOXYCYCLINE HYCLATE 100 MG: 100 TABLET, COATED ORAL at 08:41

## 2021-10-12 RX ADMIN — ACETYLCYSTEINE 400 MG: 100 SOLUTION ORAL; RESPIRATORY (INHALATION) at 08:06

## 2021-10-12 NOTE — PROGRESS NOTES
Pt O2 sat at 98% on 3L. Placed on room air per Keagan DELGADILLO nursing communication. Will recheck O2 sat. Pt O2 sat 96% on RA.

## 2021-10-12 NOTE — CARE COORDINATION
Upon chart review, patient noted to be inpatient.  CTN to continue to monitor  Chet Louise RN   Care Transition Nurse  [phone]

## 2021-10-12 NOTE — PLAN OF CARE
Problem: Falls - Risk of:  Goal: Will remain free from falls  Description: Will remain free from falls  10/12/2021 1044 by Mariama Leiva RN  Outcome: Ongoing   Ongoing until discharge.

## 2021-10-12 NOTE — DISCHARGE SUMMARY
all ready for hospice or even to have those sorts of discussions. Continue to reassess with her pulmonary clinic.      ILD. Supportive care. F/u with Dr. Asia Saldaña.       Acute hypoxic respiratory failure, due to above, wean to RA. CTPA negative for PE.     HTN, HLD, depression. Resumed home meds. Physical Exam Performed:     BP (!) 164/89   Pulse 99   Temp 98.9 °F (37.2 °C) (Oral)   Resp 20   Ht 5' 5\" (1.651 m)   Wt 174 lb (78.9 kg)   SpO2 96%   BMI 28.96 kg/m²       General appearance: No apparent distress, appears stated age and cooperative. HEENT: Pupils equal, round, and reactive to light. Conjunctivae/corneas clear. Neck: Supple, with full range of motion. No jugular venous distention. Trachea midline. Respiratory:  Increased respiratory effort. Clear to auscultation, bilaterally without Rales/Wheezes/Rhonchi. Cardiovascular: Regular rate and rhythm with normal S1/S2 without murmurs, rubs or gallops. Abdomen: Soft, non-tender, non-distended with normal bowel sounds. Musculoskeletal: No clubbing, cyanosis or edema bilaterally. Full range of motion without deformity. Skin: Skin color, texture, turgor normal.  No rashes or lesions. Neurologic:  Neurovascularly intact without any focal sensory/motor deficits. Cranial nerves: II-XII intact, grossly non-focal.  Psychiatric: Alert and oriented, thought content appropriate, normal insight  Capillary Refill: Brisk,3 seconds, normal   Peripheral Pulses: +2 palpable, equal bilaterally       Labs:  For convenience and continuity at follow-up the following most recent labs are provided:      CBC:    Lab Results   Component Value Date    WBC 11.8 10/10/2021    HGB 12.4 10/10/2021    HCT 36.2 10/10/2021     10/10/2021       Renal:    Lab Results   Component Value Date     10/10/2021    K 3.9 10/10/2021    CL 99 10/10/2021    CO2 24 10/10/2021    BUN 13 10/10/2021    CREATININE 0.6 10/10/2021    CALCIUM 8.2 10/10/2021    PHOS 3.8 10/02/2021         Significant Diagnostic Studies    Radiology:   CT CHEST PULMONARY EMBOLISM W CONTRAST   Final Result   No evidence of pulmonary embolism. Patchy bilateral areas of airspace and interstitial disease unchanged from   prior study. Moderate emphysema. No acute parenchymal disease. Compression fractures and prior augmentation unchanged. XR CHEST PORTABLE   Final Result   Bibasilar opacities could represent subsegmental atelectasis or infection                Consults:     None    Disposition:  home     Condition at Discharge: Stable    Discharge Instructions/Follow-up:  Follow up with PCP within 1-2 weeks. Code Status:  Limited     Activity: activity as tolerated    Diet: regular diet      Discharge Medications:     Current Discharge Medication List           Details   doxycycline hyclate (VIBRA-TABS) 100 MG tablet Take 1 tablet by mouth every 12 hours for 3 days  Qty: 6 tablet, Refills: 0              Details   predniSONE (DELTASONE) 20 MG tablet 2 tabs daily for 5 days, then 1.5 tabs daily for 5 days, then 1 tab daily for 5 days, then 0.5 tabs daily for 5 days. Qty: 25 tablet, Refills: 0              Details   ipratropium-albuterol (DUONEB) 0.5-2.5 (3) MG/3ML SOLN nebulizer solution Inhale 3 mLs into the lungs every 4 hours as needed for Shortness of Breath  Qty: 360 mL, Refills: 5      traZODone (DESYREL) 150 MG tablet Take 1-2 tablets by mouth nightly TAKE 2 TABLETS AT BEDTIME  Qty: 180 tablet, Refills: 1    Associated Diagnoses: Insomnia, unspecified type      guaiFENesin (MUCINEX) 600 MG extended release tablet Take 1 tablet by mouth 2 times daily as needed for Congestion  Qty: 60 tablet, Refills: 1      varenicline (CHANTIX STARTING MONTH PAK) 0.5 MG X 11 & 1 MG X 42 tablet Take by mouth.   Qty: 1 box, Refills: 0      fluticasone-salmeterol (WIXELA INHUB) 500-50 MCG/DOSE diskus inhaler Inhale 1 puff into the lungs every 12 hours  Qty: 180 each, Refills: 1 albuterol (PROVENTIL) (2.5 MG/3ML) 0.083% nebulizer solution Take 3 mLs by nebulization every 6 hours as needed for Wheezing or Shortness of Breath DX COPD J44.9  Qty: 120 vial, Refills: 6      montelukast (SINGULAIR) 10 MG tablet TAKE 1 TABLET BY MOUTH EACH NIGHT  Qty: 90 tablet, Refills: 1      albuterol sulfate HFA (VENTOLIN HFA) 108 (90 Base) MCG/ACT inhaler Inhale 2 puffs into the lungs every 6 hours as needed for Wheezing or Shortness of Breath  Qty: 3 Inhaler, Refills: 1      BD PEN NEEDLE SVETLANA U/F 32G X 4 MM MISC USE ONE DAILY AS DIRECTED  Qty: 100 each, Refills: 5      FLUoxetine (PROZAC) 20 MG capsule TAKE 3 CAPSULES BY MOUTH DAILY  Qty: 270 capsule, Refills: 1      simvastatin (ZOCOR) 20 MG tablet TAKE 1 TABLET EVERY EVENING  Qty: 90 tablet, Refills: 1      busPIRone (BUSPAR) 30 MG tablet TAKE 1 TABLET TWICE DAILY  Qty: 180 tablet, Refills: 1      teriparatide, recombinant, (FORTEO) 600 MCG/2.4ML SOLN injection Inject 0.08 mLs into the skin daily One dose injected daily. Qty: 1 pen, Refills: 11               Time Spent on discharge is more than 30 minutes in the examination, evaluation, counseling and review of medications and discharge plan. Signed:    Eboni Herron MD   10/12/2021      Thank you Marvin More MD for the opportunity to be involved in this patient's care. If you have any questions or concerns please feel free to contact me at [phone].

## 2021-10-12 NOTE — PROGRESS NOTES
Pt ok for discharge per MD. Discharge instructions and meds to beds picked up. IV removed. No questions or concerns at this time. Transported by wheelchair to family's car.

## 2021-10-13 ENCOUNTER — TELEPHONE (OUTPATIENT)
Dept: FAMILY MEDICINE CLINIC | Age: 66
End: 2021-10-13

## 2021-10-13 ENCOUNTER — CARE COORDINATION (OUTPATIENT)
Dept: CASE MANAGEMENT | Age: 66
End: 2021-10-13

## 2021-10-13 NOTE — TELEPHONE ENCOUNTER
[facility] Transitions Initial Follow Up Call    Outreach made within 2 business days of discharge: Yes    Patient: Gio Layne Patient : 1955   MRN: [id number]  Reason for Admission: There are no discharge diagnoses documented for the most recent discharge. Discharge Date: 10/12/21       Spoke with: Patient- will call back to schedule later today    Discharge department/facility: [facility]    Non-face-to-face services provided:  Obtained and reviewed discharge summary and/or continuity of care documents  Assessment and support for treatment adherence and medication management-Paitent denied needing assistance with medication or costs. No questions at this time. TCM Interactive Patient Contact:  Was patient able to fill all prescriptions: Yes  Was patient instructed to bring all medications to the follow-up visit: Yes  Is patient taking all medications as directed in the discharge summary?  Yes  Does patient understand their discharge instructions: Yes  Does patient have questions or concerns that need addressed prior to 7-14 day follow up office visit: no    Scheduled appointment with PCP within 7-14 days    Follow Up  Future Appointments   Date Time Provider Matt Crabtree   2021  3:45 PM Fercho Dorantes MD [facility]   12/3/2021 11:00 AM [facility]   2021  2:45 PM Fercho Dorantes MD [facility]   2021  1:30 PM Simon Carolina MD [id number], RN

## 2021-10-13 NOTE — CARE COORDINATION
[facility] Transitions Follow Up Call    10/13/2021    Patient: Gissell Coma  Patient : 1955   MRN: [id number]  Reason for Admission: COPD  Discharge Date: 10/12/21 RARS: Readmission Risk Score: 61         Spoke with: Gissell Coma    Patient answered call, but stated that she was just getting up and request a call back this afternoon. CTN rescheduled call per patient request.    Care Transitions Subsequent and Final Call    Subsequent and Final Calls  Care Transitions Interventions  Other Interventions:            Follow Up  Future Appointments   Date Time Provider Matt Crabtree   2021  3:45 PM Ольга Hinojosa MD [facility]   12/3/2021 11:00 AM [facility]   2021  2:45 PM Ольга Hinojosa MD [facility]   2021  1:30 PM Sheryl Rosales MD [facility] Andrew Sinclair RN

## 2021-10-14 ENCOUNTER — CARE COORDINATION (OUTPATIENT)
Dept: CASE MANAGEMENT | Age: 66
End: 2021-10-14

## 2021-10-14 NOTE — CARE COORDINATION
[facility]    10/14/2021    Patient: Yue Villanueva  Patient : 1955   MRN: [id number]  Reason for Admission: COPD  Discharge Date: 10/12/21 RARS: Readmission Risk Score: 61         Spoke with: Yue Villanueva    Patient answered call and verified . Patient pleasant and agreeable to transition call. Patient continues to have shortness of breath with exertion due to COPD progress and currently resting. Patient confirmed that he is taking new medications as prescribed. Patient to call PCP office today and get follow up visit scheduled. patient scheduled to see pulmonary specialist next month. Denies any acute needs at present time. Agreeable to f/u calls. Educated on the use of urgent care or physicians 24 hr access line if assistance is needed after hours. Care Transitions Subsequent and Final Call    Subsequent and Final Calls  Do you have any ongoing symptoms?: No  Have your medications changed?: Yes  Do you have any questions related to your medications?: No  Do you currently have any active services?: No  Are you currently active with any services?: Home Health  Do you have any needs or concerns that I can assist you with?: No  Identified Barriers: None  Care Transitions Interventions  Other Interventions:            Follow Up  Future Appointments   Date Time Provider Matt Crabtree   2021  3:45 PM Claudy Moran MD [facility]   12/3/2021 11:00 AM [facility] MAIN [facility]   2021  2:45 PM Claudy Moran MD [facility]   2021  1:30 PM Rm Rudd MD [facility] Andrew Hernandez RN

## 2021-10-16 ENCOUNTER — APPOINTMENT (OUTPATIENT)
Dept: GENERAL RADIOLOGY | Age: 66
DRG: 191 | End: 2021-10-16
Payer: MEDICARE

## 2021-10-16 ENCOUNTER — HOSPITAL ENCOUNTER (INPATIENT)
Age: 66
LOS: 4 days | Discharge: HOME | DRG: 191 | End: 2021-10-20
Attending: EMERGENCY MEDICINE | Admitting: INTERNAL MEDICINE
Payer: MEDICARE

## 2021-10-16 DIAGNOSIS — J44.1 COPD EXACERBATION (HCC): Primary | ICD-10-CM

## 2021-10-16 DIAGNOSIS — D72.829 LEUKOCYTOSIS, UNSPECIFIED TYPE: ICD-10-CM

## 2021-10-16 PROBLEM — J96.11 CHRONIC RESPIRATORY FAILURE WITH HYPOXIA AND HYPERCAPNIA (HCC): Status: ACTIVE | Noted: 2021-10-16

## 2021-10-16 PROBLEM — Z87.01 H/O PNEUMONIA DUE TO PSEUDOMONAS: Status: ACTIVE | Noted: 2021-10-16

## 2021-10-16 PROBLEM — J96.12 CHRONIC RESPIRATORY FAILURE WITH HYPOXIA AND HYPERCAPNIA (HCC): Status: ACTIVE | Noted: 2021-10-16

## 2021-10-16 LAB
A/G RATIO: 1.4 (ref 1.1–2.2)
ALBUMIN SERPL-MCNC: 3.9 G/DL (ref 3.4–5)
ALP BLD-CCNC: 54 U/L (ref 40–129)
ALT SERPL-CCNC: 24 U/L (ref 10–40)
ANION GAP SERPL CALCULATED.3IONS-SCNC: 11 MMOL/L (ref 3–16)
ANISOCYTOSIS: ABNORMAL
AST SERPL-CCNC: 19 U/L (ref 15–37)
ATYPICAL LYMPHOCYTE RELATIVE PERCENT: 3 % (ref 0–6)
BANDED NEUTROPHILS RELATIVE PERCENT: 1 % (ref 0–7)
BASE EXCESS VENOUS: 3.1 MMOL/L (ref -3–3)
BASOPHILS ABSOLUTE: 0 K/UL (ref 0–0.2)
BASOPHILS RELATIVE PERCENT: 0 %
BILIRUB SERPL-MCNC: 0.5 MG/DL (ref 0–1)
BUN BLDV-MCNC: 17 MG/DL (ref 7–20)
CALCIUM SERPL-MCNC: 9.1 MG/DL (ref 8.3–10.6)
CARBOXYHEMOGLOBIN: 3.5 % (ref 0–1.5)
CHLORIDE BLD-SCNC: 93 MMOL/L (ref 99–110)
CO2: 28 MMOL/L (ref 21–32)
CREAT SERPL-MCNC: 0.7 MG/DL (ref 0.6–1.2)
EKG ATRIAL RATE: 94 BPM
EKG DIAGNOSIS: NORMAL
EKG P AXIS: 64 DEGREES
EKG P-R INTERVAL: 136 MS
EKG Q-T INTERVAL: 334 MS
EKG QRS DURATION: 70 MS
EKG QTC CALCULATION (BAZETT): 417 MS
EKG R AXIS: 0 DEGREES
EKG T AXIS: 70 DEGREES
EKG VENTRICULAR RATE: 94 BPM
EOSINOPHILS ABSOLUTE: 0.3 K/UL (ref 0–0.6)
EOSINOPHILS RELATIVE PERCENT: 2 %
GFR AFRICAN AMERICAN: >60
GFR NON-AFRICAN AMERICAN: >60
GLOBULIN: 2.7 G/DL
GLUCOSE BLD-MCNC: 100 MG/DL (ref 70–99)
HCO3 VENOUS: 29.2 MMOL/L (ref 23–29)
HCT VFR BLD CALC: 41.3 % (ref 36–48)
HEMOGLOBIN: 14 G/DL (ref 12–16)
LACTIC ACID: 2 MMOL/L (ref 0.4–2)
LYMPHOCYTES ABSOLUTE: 2.2 K/UL (ref 1–5.1)
LYMPHOCYTES RELATIVE PERCENT: 13 %
MACROCYTES: ABNORMAL
MCH RBC QN AUTO: 29.7 PG (ref 26–34)
MCHC RBC AUTO-ENTMCNC: 33.9 G/DL (ref 31–36)
MCV RBC AUTO: 87.6 FL (ref 80–100)
METHEMOGLOBIN VENOUS: 0.4 %
MICROCYTES: ABNORMAL
MONOCYTES ABSOLUTE: 1 K/UL (ref 0–1.3)
MONOCYTES RELATIVE PERCENT: 7 %
NEUTROPHILS ABSOLUTE: 10.2 K/UL (ref 1.7–7.7)
NEUTROPHILS RELATIVE PERCENT: 74 %
O2 SAT, VEN: 80 %
O2 THERAPY: ABNORMAL
PCO2, VEN: 50.2 MMHG (ref 40–50)
PDW BLD-RTO: 14.6 % (ref 12.4–15.4)
PH VENOUS: 7.38 (ref 7.35–7.45)
PLATELET # BLD: 230 K/UL (ref 135–450)
PLATELET SLIDE REVIEW: ADEQUATE
PMV BLD AUTO: 6.7 FL (ref 5–10.5)
PO2, VEN: 44.7 MMHG (ref 25–40)
POLYCHROMASIA: ABNORMAL
POTASSIUM SERPL-SCNC: 5 MMOL/L (ref 3.5–5.1)
PRO-BNP: 86 PG/ML (ref 0–124)
RBC # BLD: 4.71 M/UL (ref 4–5.2)
SARS-COV-2, NAAT: NOT DETECTED
SLIDE REVIEW: ABNORMAL
SODIUM BLD-SCNC: 132 MMOL/L (ref 136–145)
TCO2 CALC VENOUS: 31 MMOL/L
TOTAL PROTEIN: 6.6 G/DL (ref 6.4–8.2)
TROPONIN: <0.01 NG/ML
WBC # BLD: 13.6 K/UL (ref 4–11)

## 2021-10-16 PROCEDURE — 94640 AIRWAY INHALATION TREATMENT: CPT

## 2021-10-16 PROCEDURE — 6360000002 HC RX W HCPCS: Performed by: INTERNAL MEDICINE

## 2021-10-16 PROCEDURE — 71045 X-RAY EXAM CHEST 1 VIEW: CPT

## 2021-10-16 PROCEDURE — 94660 CPAP INITIATION&MGMT: CPT

## 2021-10-16 PROCEDURE — 87635 SARS-COV-2 COVID-19 AMP PRB: CPT

## 2021-10-16 PROCEDURE — 6370000000 HC RX 637 (ALT 250 FOR IP): Performed by: INTERNAL MEDICINE

## 2021-10-16 PROCEDURE — 85025 COMPLETE CBC W/AUTO DIFF WBC: CPT

## 2021-10-16 PROCEDURE — 84484 ASSAY OF TROPONIN QUANT: CPT

## 2021-10-16 PROCEDURE — 2580000003 HC RX 258: Performed by: INTERNAL MEDICINE

## 2021-10-16 PROCEDURE — 94761 N-INVAS EAR/PLS OXIMETRY MLT: CPT

## 2021-10-16 PROCEDURE — 99223 1ST HOSP IP/OBS HIGH 75: CPT | Performed by: INTERNAL MEDICINE

## 2021-10-16 PROCEDURE — 80053 COMPREHEN METABOLIC PANEL: CPT

## 2021-10-16 PROCEDURE — 6370000000 HC RX 637 (ALT 250 FOR IP): Performed by: NURSE PRACTITIONER

## 2021-10-16 PROCEDURE — 2060000000 HC ICU INTERMEDIATE R&B

## 2021-10-16 PROCEDURE — 83605 ASSAY OF LACTIC ACID: CPT

## 2021-10-16 PROCEDURE — 83880 ASSAY OF NATRIURETIC PEPTIDE: CPT

## 2021-10-16 PROCEDURE — 99285 EMERGENCY DEPT VISIT HI MDM: CPT

## 2021-10-16 PROCEDURE — 6360000002 HC RX W HCPCS: Performed by: EMERGENCY MEDICINE

## 2021-10-16 PROCEDURE — 82803 BLOOD GASES ANY COMBINATION: CPT

## 2021-10-16 PROCEDURE — 2700000000 HC OXYGEN THERAPY PER DAY

## 2021-10-16 PROCEDURE — 93010 ELECTROCARDIOGRAM REPORT: CPT | Performed by: INTERNAL MEDICINE

## 2021-10-16 PROCEDURE — 93005 ELECTROCARDIOGRAM TRACING: CPT | Performed by: EMERGENCY MEDICINE

## 2021-10-16 PROCEDURE — 6370000000 HC RX 637 (ALT 250 FOR IP): Performed by: EMERGENCY MEDICINE

## 2021-10-16 RX ORDER — SODIUM CHLORIDE 0.9 % (FLUSH) 0.9 %
5-40 SYRINGE (ML) INJECTION PRN
Status: DISCONTINUED | OUTPATIENT
Start: 2021-10-16 | End: 2021-10-20 | Stop reason: HOSPADM

## 2021-10-16 RX ORDER — IPRATROPIUM BROMIDE AND ALBUTEROL SULFATE 2.5; .5 MG/3ML; MG/3ML
1 SOLUTION RESPIRATORY (INHALATION)
Status: DISCONTINUED | OUTPATIENT
Start: 2021-10-16 | End: 2021-10-20 | Stop reason: HOSPADM

## 2021-10-16 RX ORDER — PREDNISONE 20 MG/1
40 TABLET ORAL DAILY
Status: DISCONTINUED | OUTPATIENT
Start: 2021-10-19 | End: 2021-10-17

## 2021-10-16 RX ORDER — ATORVASTATIN CALCIUM 10 MG/1
20 TABLET, FILM COATED ORAL DAILY
Status: DISCONTINUED | OUTPATIENT
Start: 2021-10-16 | End: 2021-10-20 | Stop reason: HOSPADM

## 2021-10-16 RX ORDER — TRAZODONE HYDROCHLORIDE 50 MG/1
150 TABLET ORAL NIGHTLY
Status: DISCONTINUED | OUTPATIENT
Start: 2021-10-16 | End: 2021-10-20 | Stop reason: HOSPADM

## 2021-10-16 RX ORDER — ACETAMINOPHEN 325 MG/1
650 TABLET ORAL EVERY 6 HOURS PRN
Status: DISCONTINUED | OUTPATIENT
Start: 2021-10-16 | End: 2021-10-20 | Stop reason: HOSPADM

## 2021-10-16 RX ORDER — ONDANSETRON 4 MG/1
4 TABLET, ORALLY DISINTEGRATING ORAL EVERY 8 HOURS PRN
Status: DISCONTINUED | OUTPATIENT
Start: 2021-10-16 | End: 2021-10-20 | Stop reason: HOSPADM

## 2021-10-16 RX ORDER — CIPROFLOXACIN 2 MG/ML
400 INJECTION, SOLUTION INTRAVENOUS EVERY 12 HOURS
Status: DISCONTINUED | OUTPATIENT
Start: 2021-10-16 | End: 2021-10-20 | Stop reason: HOSPADM

## 2021-10-16 RX ORDER — ACETAMINOPHEN 650 MG/1
650 SUPPOSITORY RECTAL EVERY 6 HOURS PRN
Status: DISCONTINUED | OUTPATIENT
Start: 2021-10-16 | End: 2021-10-20 | Stop reason: HOSPADM

## 2021-10-16 RX ORDER — METHYLPREDNISOLONE SODIUM SUCCINATE 125 MG/2ML
125 INJECTION, POWDER, LYOPHILIZED, FOR SOLUTION INTRAMUSCULAR; INTRAVENOUS ONCE
Status: COMPLETED | OUTPATIENT
Start: 2021-10-16 | End: 2021-10-16

## 2021-10-16 RX ORDER — ONDANSETRON 2 MG/ML
4 INJECTION INTRAMUSCULAR; INTRAVENOUS EVERY 6 HOURS PRN
Status: DISCONTINUED | OUTPATIENT
Start: 2021-10-16 | End: 2021-10-20 | Stop reason: HOSPADM

## 2021-10-16 RX ORDER — PANTOPRAZOLE SODIUM 40 MG/1
40 TABLET, DELAYED RELEASE ORAL
Status: DISCONTINUED | OUTPATIENT
Start: 2021-10-17 | End: 2021-10-20 | Stop reason: HOSPADM

## 2021-10-16 RX ORDER — MECOBALAMIN 5000 MCG
5 TABLET,DISINTEGRATING ORAL NIGHTLY
Status: DISCONTINUED | OUTPATIENT
Start: 2021-10-16 | End: 2021-10-20 | Stop reason: HOSPADM

## 2021-10-16 RX ORDER — POLYETHYLENE GLYCOL 3350 17 G/17G
17 POWDER, FOR SOLUTION ORAL DAILY PRN
Status: DISCONTINUED | OUTPATIENT
Start: 2021-10-16 | End: 2021-10-20 | Stop reason: HOSPADM

## 2021-10-16 RX ORDER — METHYLPREDNISOLONE SODIUM SUCCINATE 40 MG/ML
40 INJECTION, POWDER, LYOPHILIZED, FOR SOLUTION INTRAMUSCULAR; INTRAVENOUS EVERY 6 HOURS
Status: DISCONTINUED | OUTPATIENT
Start: 2021-10-16 | End: 2021-10-17

## 2021-10-16 RX ORDER — TOBRAMYCIN INHALATION SOLUTION 300 MG/5ML
300 INHALANT RESPIRATORY (INHALATION) 2 TIMES DAILY
Status: DISCONTINUED | OUTPATIENT
Start: 2021-10-16 | End: 2021-10-20 | Stop reason: HOSPADM

## 2021-10-16 RX ORDER — SODIUM CHLORIDE 9 MG/ML
25 INJECTION, SOLUTION INTRAVENOUS PRN
Status: DISCONTINUED | OUTPATIENT
Start: 2021-10-16 | End: 2021-10-20 | Stop reason: HOSPADM

## 2021-10-16 RX ORDER — GUAIFENESIN 600 MG/1
600 TABLET, EXTENDED RELEASE ORAL 2 TIMES DAILY PRN
Status: DISCONTINUED | OUTPATIENT
Start: 2021-10-16 | End: 2021-10-20 | Stop reason: HOSPADM

## 2021-10-16 RX ORDER — BUSPIRONE HYDROCHLORIDE 5 MG/1
30 TABLET ORAL 2 TIMES DAILY
Status: DISCONTINUED | OUTPATIENT
Start: 2021-10-16 | End: 2021-10-20 | Stop reason: HOSPADM

## 2021-10-16 RX ORDER — SODIUM CHLORIDE 0.9 % (FLUSH) 0.9 %
5-40 SYRINGE (ML) INJECTION EVERY 12 HOURS SCHEDULED
Status: DISCONTINUED | OUTPATIENT
Start: 2021-10-16 | End: 2021-10-20 | Stop reason: HOSPADM

## 2021-10-16 RX ORDER — FLUOXETINE HYDROCHLORIDE 20 MG/1
60 CAPSULE ORAL DAILY
Status: DISCONTINUED | OUTPATIENT
Start: 2021-10-16 | End: 2021-10-20 | Stop reason: HOSPADM

## 2021-10-16 RX ORDER — IPRATROPIUM BROMIDE AND ALBUTEROL SULFATE 2.5; .5 MG/3ML; MG/3ML
1 SOLUTION RESPIRATORY (INHALATION)
Status: DISCONTINUED | OUTPATIENT
Start: 2021-10-16 | End: 2021-10-16

## 2021-10-16 RX ORDER — MONTELUKAST SODIUM 10 MG/1
10 TABLET ORAL NIGHTLY
Status: DISCONTINUED | OUTPATIENT
Start: 2021-10-16 | End: 2021-10-20 | Stop reason: HOSPADM

## 2021-10-16 RX ORDER — ALBUTEROL SULFATE 2.5 MG/3ML
2.5 SOLUTION RESPIRATORY (INHALATION)
Status: DISCONTINUED | OUTPATIENT
Start: 2021-10-16 | End: 2021-10-20 | Stop reason: HOSPADM

## 2021-10-16 RX ADMIN — SODIUM CHLORIDE, PRESERVATIVE FREE 10 ML: 5 INJECTION INTRAVENOUS at 20:39

## 2021-10-16 RX ADMIN — TRAZODONE HYDROCHLORIDE 150 MG: 50 TABLET ORAL at 21:09

## 2021-10-16 RX ADMIN — IPRATROPIUM BROMIDE AND ALBUTEROL SULFATE 1 AMPULE: .5; 3 SOLUTION RESPIRATORY (INHALATION) at 19:43

## 2021-10-16 RX ADMIN — IPRATROPIUM BROMIDE AND ALBUTEROL SULFATE 1 AMPULE: .5; 3 SOLUTION RESPIRATORY (INHALATION) at 11:57

## 2021-10-16 RX ADMIN — IPRATROPIUM BROMIDE AND ALBUTEROL SULFATE 1 AMPULE: .5; 3 SOLUTION RESPIRATORY (INHALATION) at 16:44

## 2021-10-16 RX ADMIN — TOBRAMYCIN 300 MG: 300 SOLUTION RESPIRATORY (INHALATION) at 19:44

## 2021-10-16 RX ADMIN — ATORVASTATIN CALCIUM 20 MG: 10 TABLET, FILM COATED ORAL at 16:39

## 2021-10-16 RX ADMIN — METHYLPREDNISOLONE SODIUM SUCCINATE 40 MG: 40 INJECTION, POWDER, FOR SOLUTION INTRAMUSCULAR; INTRAVENOUS at 17:07

## 2021-10-16 RX ADMIN — METHYLPREDNISOLONE SODIUM SUCCINATE 125 MG: 125 INJECTION, POWDER, FOR SOLUTION INTRAMUSCULAR; INTRAVENOUS at 11:43

## 2021-10-16 RX ADMIN — BUSPIRONE HYDROCHLORIDE 30 MG: 5 TABLET ORAL at 20:39

## 2021-10-16 RX ADMIN — MONTELUKAST SODIUM 10 MG: 10 TABLET ORAL at 20:39

## 2021-10-16 RX ADMIN — Medication 5 MG: at 21:09

## 2021-10-16 RX ADMIN — METHYLPREDNISOLONE SODIUM SUCCINATE 40 MG: 40 INJECTION, POWDER, FOR SOLUTION INTRAMUSCULAR; INTRAVENOUS at 23:24

## 2021-10-16 RX ADMIN — CIPROFLOXACIN 400 MG: 2 INJECTION, SOLUTION INTRAVENOUS at 20:47

## 2021-10-16 ASSESSMENT — ENCOUNTER SYMPTOMS
WHEEZING: 1
APNEA: 0
DIARRHEA: 0
STRIDOR: 0
CHEST TIGHTNESS: 0
PHOTOPHOBIA: 0
BLOOD IN STOOL: 0
CONSTIPATION: 0
VOICE CHANGE: 0
RECTAL PAIN: 0
SINUS PRESSURE: 0
SHORTNESS OF BREATH: 1
COLOR CHANGE: 0
TROUBLE SWALLOWING: 0
RHINORRHEA: 0
COUGH: 0
ABDOMINAL PAIN: 0
BACK PAIN: 0
EYE REDNESS: 0
VOMITING: 0
NAUSEA: 0
SORE THROAT: 0
ABDOMINAL DISTENTION: 0
EYE PAIN: 0
EYE DISCHARGE: 0

## 2021-10-16 ASSESSMENT — PAIN SCALES - GENERAL
PAINLEVEL_OUTOF10: 0
PAINLEVEL_OUTOF10: 0

## 2021-10-16 NOTE — ED NOTES
Patient resting comfortably, respirations 20 and unlabored     Terry MALDONADO Collins  10/16/21 2591

## 2021-10-16 NOTE — CONSULTS
INPATIENT PULMONARY CRITICAL CARE CONSULT NOTE      Chief Complaint/Referring Provider:  Patient is being seen at the request of  for a consultation for sever COPD with pulmonary fibrosis      Presenting HPI: Patient presented to the hospital with increasing shortness of breath    As per admitting provider-The patient is a 60 y.o. female who presents with progressive dyspnea and worsening hypoxia requiring BiPAP in the emergency department. Hospitalized 10/9 - 10/12 for severe stage 3 COPD with exacerbation. On oxygen at home. History of interstitial lung disease, stage 3 severe COPD, depression, fibromyalgia, ILD, tobacco dependence, and marijuana use.  Patient has had frequent COPD exacerbations the last few months with frequent visits to the hospital.  She continues to smoke cigarettes and marijuana. \"  Home oxygen at 3 LPM.     Code status:  Previous admission was discussed in detail with the patient.  She didn't want intubated under any circumstances, but still wanted CPR if it ever came to that.        Apparently has been hospitalized multiple times with shortness of breath, patient was recently hospitalized on 9/17/2021 followed by 9/28/2021 and then on 10/9/2021 and patient was discharged on 10/12/2021 and patient has come back for shortness of breath    Patient has been put on BiPAP from the ER and patient was continued to be on BiPAP when seen this evening, patient was alert and oriented on the BiPAP, patient on questioning states that she has been having some increased shortness of breath along with that patient does not have any increasing coughing, patient does have some increased chest congestion, patient has a wheezing, patient does not have any significant rhinorrhea or nasal congestion, patient does not have a significant otalgia no ear discharge, patient was not having sore throat or difficulty in swallowing, patient continues to have wheezing, patient does not have any significant ([address] 03/04/2020    Pneumonia 03/19/2019    Compression fx, thoracic spine, sequela 12/14/2018    Kyphosis 12/14/2018    Anxiety and depression 12/14/2018    Chronic pain syndrome 12/14/2018    Polyarthropathy, multiple sites 12/14/2018    Esophageal dysphagia     Heartburn     Rectal bleeding     History of colon polyps     History of prescription drug abuse 04/21/2018    Thyroid nodule 04/13/2018    Recurrent major depressive disorder, in partial remission ([address] 01/25/2018    ILD (interstitial lung disease) ([address] 04/06/2016    Pulmonary fibrosis ([address] 10/16/2014    Tobacco dependence 09/27/2014    Depression 04/11/2013    Hyperlipidemia 09/10/2012    Hyponatremia 09/10/2012    Smoker 12/03/2009       Past Medical History:   Diagnosis Date    Allergic rhinitis 6/11/2010    Anxiety     Arthritis     Aspiration pneumonia ([address] 3/21/2012    Recurrent    Asthma     Bronchitis chronic     COPD (chronic obstructive pulmonary disease) ([address] 12/3/2009    Depression     Drug abuse, cocaine type (HCC)     past history of crack cocaine use    Emphysema of lung (HCC)     Fibromyalgia     GERD (gastroesophageal reflux disease)     Hyperlipidemia     Influenza A 03/05/2020    Lung disease     On home oxygen therapy     uses O2 NC 3L prn at night    Osteoporosis     Pneumonia     Polysubstance dependence ([address] 1/2/2012    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    Pulmonary fibrosis ([address] 10/16/2014    Pulmonary infiltrate     Pulmonary nodule 12/3/2009    Tobacco abuse         Past Surgical History:   Procedure Laterality Date    BLADDER REPAIR      BRONCHOSCOPY      BRONCHOSCOPY N/A 3/6/2020    BRONCHOSCOPY THERAPUTIC ASPIRATION INITIAL performed by Quin Meadows MD at [address] [facility]  3/6/2020    BRONCHOSCOPY ALVEOLAR LAVAGE performed by Quin Meadows MD at [address] [facility] 6/26/2020    BRONCHOSCOPY THERAPUTIC ASPIRATION INITIAL performed by Sushma Paula MD at [address] [place] [address]  6/26/2020    BRONCHOSCOPY ALVEOLAR LAVAGE performed by Sushma Paula MD at [address] [place] [address]  06/23/2021    Dr Leroy Archuleta N/A 6/23/2021    [address] performed by Sushma Paula MD at [address] [place] [address]  6/23/2021    BRONCHOSCOPY THERAPUTIC ASPIRATION INITIAL performed by Sushma Paula MD at [address] [place] [address]  6/23/2021    BRONCHOSCOPY ALVEOLAR LAVAGE performed by Sushma Paula MD at [address] [place] [address] N/A 8/27/2021    BRONCHOSCOPY DIAGNOSTIC OR CELL [address] ONLY performed by Sushma Paula MD at [address] [facility]  2012    ENDOSCOPY, COLON, DIAGNOSTIC      ESOPHAGEAL DILATATION  9/20/2018    ESOPHAGEAL DILATION Chapin Lu performed by Maria Del Rosario Reyes MD at [address] [facility] OTHER SURGICAL HISTORY  2/12/15    T8 Kyphoplasty    WA COLONOSCOPY FLX DX W/COLLJ SPEC WHEN PFRMD N/A 9/20/2018    EGD AND COLONOSCOPY WITH ANESTHESIA performed by Maria Del Rosario Reyes MD at [address] [facility] ESOPHAGOGASTRODUODENOSCOPY TRANSORAL DIAGNOSTIC N/A 9/20/2018    EGD AND COLONOSCOPY WITH ANESTHESIA performed by Maria Del Rosario Reyes MD at [address] [facility]          Family History   Problem Relation Age of Onset    Asthma Mother     Diabetes Mother     Emphysema Mother     Heart Failure Mother     Hypertension Mother     Heart Disease Mother     High Cholesterol Mother    Bubba Antunez Mother     Depression Mother     Diabetes Father     Emphysema Father     Heart Failure Father     Hypertension Father     Heart Disease Father     High Cholesterol Father     Substance Abuse Father     Emphysema Paternal Grandfather     Diabetes Sister     High Cholesterol Sister     Vision Loss Maternal Uncle         Social History     Tobacco Use    Smoking status: Current Every Day Smoker     Packs/day: 0.50     Years: 40.00     Pack years: 20.00     Types: Cigarettes     Start date: 1972     Last attempt to quit: 2021     Years since quittin.3    Smokeless tobacco: Never Used    Tobacco comment: 0.5 PPD restarted   Substance Use Topics    Alcohol use: No     Alcohol/week: 0.0 standard drinks        Allergies   Allergen Reactions    Meperidine Anaphylaxis     \"Demerol\"     Demerol Hives               Physical Exam:  Blood pressure 127/84, pulse 95, temperature 97.5 °F (36.4 °C), temperature source Axillary, resp. rate (!) 34, height 5' 5\" (1.651 m), weight 175 lb (79.4 kg), SpO2 97 %, not currently breastfeeding.'     Constitutional: No significant respite distress on BiPAP, patient had mild tachypnea when seen  HENT: BiPAP mask intact. No thyromegaly. Eyes:  Conjunctivae are normal. Pupils equal, round, and reactive to light. No scleral icterus. Neck: . No tracheal deviation present. No obvious thyroid mass. Cardiovascular: Normal rate, regular rhythm, normal heart sounds.  No right ventricular heave. Some lower extremity edema. Pulmonary/Chest: Bilateral scattered wheezes.  Bibasilar scattered rales.  I some chest congestion chest wall is not dull to percussion.  No accessory muscle usage or stridor.  Decreased breath sound density  Abdominal: Soft. Bowel sounds present. No distension or hernia. No tenderness.    Musculoskeletal: No cyanosis. No clubbing. No obvious joint deformity. Lymphadenopathy: No cervical or supraclavicular adenopathy. Skin: Skin is warm and dry. No rash or nodules on the exposed extremities. Psychiatric: Normal mood and affect. Behavior is normal.  No anxiety. Neurologic: Alert, awake and oriented.  PERRL.  Speech fluent         Results:  CBC:   Recent Labs     10/16/21  1130   WBC 13.6*   HGB 14.0   HCT 41.3   MCV 87.6        BMP:   Recent Labs     10/16/21  1130   *   K 5.0   CL 93*   CO2 28   BUN 17   CREATININE 0.7     LIVER PROFILE:   Recent Labs     10/16/21  1130   AST 19   ALT 24   BILITOT 0.5   ALKPHOS 54       Imaging:  I have reviewed radiology images personally. XR CHEST PORTABLE   Final Result   Bibasilar airspace disease, increased on the left but decreased on the right           XR CHEST PORTABLE    Result Date: 10/16/2021  EXAMINATION: ONE XRAY VIEW OF THE CHEST 10/16/2021 11:52 am COMPARISON: 10/09/2021 HISTORY: ORDERING SYSTEM PROVIDED HISTORY: SOB TECHNOLOGIST PROVIDED HISTORY: Reason for exam:->SOB Reason for Exam: SOB Acuity: Acute Type of Exam: Initial FINDINGS: Heart size is normal.  Mediastinal contours are normal.  Pulmonary vascularity is normal.Patchy opacity is seen at the left lung base, slightly increased. Right lung base is better aerated. Spurring is seen in the spine and shoulder joints     Bibasilar airspace disease, increased on the left but decreased on the right     XR CHEST PORTABLE    Result Date: 10/9/2021  EXAMINATION: ONE XRAY VIEW OF THE CHEST 10/9/2021 12:18 pm COMPARISON: 09/27/2021 HISTORY: ORDERING SYSTEM PROVIDED HISTORY: SOB TECHNOLOGIST PROVIDED HISTORY: Reason for exam:->SOB Reason for Exam: Shortness of Breath (started yesterday, was smoking yesterday and  today and now cannot breathe) has COPD FINDINGS: Bibasilar opacities again noted. There is no effusion or pneumothorax. The cardiomediastinal silhouette is stable. The osseous structures are stable. Bibasilar opacities could represent subsegmental atelectasis or infection     XR CHEST PORTABLE    Result Date: 9/27/2021  EXAMINATION: ONE XRAY VIEW OF THE CHEST 9/27/2021 10:11 am COMPARISON: Chest x-ray September 17, 2021 HISTORY: ORDERING SYSTEM PROVIDED HISTORY: shortness of breath TECHNOLOGIST PROVIDED HISTORY: Reason for exam:->shortness of breath Reason for Exam: severe hypoxia, SOB Acuity: Acute Type of Exam: Initial FINDINGS: The cardiomediastinal silhouette is stable.   There are increased lung markings bilaterally, may be related to mild pulmonary vascular congestion versus bronchitis or early pneumonia. There is no pleural effusion. There is no pneumothorax. There is no acute osseous abnormality. Increased lung markings bilaterally, may be related to mild pulmonary vascular congestion versus bronchitis or early pneumonia. XR CHEST PORTABLE    Result Date: 9/17/2021  EXAMINATION: ONE XRAY VIEW OF THE CHEST 9/17/2021 7:36 pm COMPARISON: 09/09/2021 HISTORY: ORDERING SYSTEM PROVIDED HISTORY: shortness of breath TECHNOLOGIST PROVIDED HISTORY: Reason for exam:->shortness of breath Reason for Exam: SOB Acuity: Unknown Type of Exam: Initial Relevant Medical/Surgical History: HX of COPD FINDINGS: Cardiomediastinal silhouette is stable. Bilateral airspace opacities. No pulmonary vascular congestion or edema. No pneumothorax. Bilateral airspace opacities could represent atypical or multifocal bacterial pneumonia     CT CHEST PULMONARY EMBOLISM W CONTRAST    Result Date: 10/9/2021  EXAMINATION: CTA OF THE CHEST 10/9/2021 2:34 pm TECHNIQUE: CTA of the chest was performed after the administration of intravenous contrast.  Multiplanar reformatted images are provided for review. MIP images are provided for review. Dose modulation, iterative reconstruction, and/or weight based adjustment of the mA/kV was utilized to reduce the radiation dose to as low as reasonably achievable. COMPARISON: 03/14/2021 HISTORY: ORDERING SYSTEM PROVIDED HISTORY: COPD, trouble breathing, SOB TECHNOLOGIST PROVIDED HISTORY: Reason for exam:->COPD, trouble breathing, SOB Decision Support Exception - unselect if not a suspected or confirmed emergency medical condition->Emergency Medical Condition (MA) Reason for Exam: shortness of breath,denies any chest pain Additional signs and symptoms: COPD Relevant Medical/Surgical History: smoker x 50 years FINDINGS: Pulmonary Arteries:   There is adequate pulmonary artery enhancement with no evidence of pulmonary embolus. Mediastinum: No evidence of mediastinal lymphadenopathy. The heart and pericardium demonstrate no acute abnormality. No evidence of aortic dissection. Lungs/pleura: There are patchy areas of bilateral lower lobe airspace disease greater at the right lung base and left lower lobe posterolaterally. This is intermixed with interstitial lung disease and emphysema. In addition, this is unchanged from the prior study. There is some limitation with patient motion artifact. There is no pleural effusion or pneumothorax. Interstitial lung disease is seen bilaterally. There is centrilobular emphysema. No suspicious or new nodule. Upper Abdomen: Limited images of the upper abdomen are unremarkable. Soft Tissues/Bones: Moderate to severe fracture of the T3 vertebral body mild compression T5 and severe T8 compression with prior augmentation all unchanged. No new fracture. Bones are moderately demineralized. No destructive lesion seen. And     No evidence of pulmonary embolism. Patchy bilateral areas of airspace and interstitial disease unchanged from prior study. Moderate emphysema. No acute parenchymal disease. Compression fractures and prior augmentation unchanged. Echocardiogram:Summary   Left ventricular systolic function is normal with ejection fraction   estimated at 55%. No regional wall motion abnormalities. There is mild concentric left ventricular hypertrophy. Grade I diastolic dysfunction with normal left ventricular filling pressure. Moderate aortic regurgitation. PFT:PFT in 2018-INDICATION:  COPD.     FINDINGS:  1.  Spirometry revealed evidence of severe obstructive defect.  FEV1 is  1.19 liters, which is 45% of predicted.  No significant response to  bronchodilators.  FEV1/FVC ratio of 64%.   2.  Lung volume revealed normal total lung capacity of 4.28 liters, which  is 80% of predicted.  Air trapping residual volume 2.43 liters, which is  124% of predicted. 3.  Diffusion capacity is severely decreased at 7.59, which is 32% of  predicted. 4.  Flow volume loops consistent with obstructive defect. 5.  Six-minute walk done per [address] patient  was able to walk 840 feet.  Saturation on room air at rest was 96% with a  heart rate of 63.  There is no significant desaturation on exertion.     CONCLUSION:  1.  Severe obstructive defect with air trapping and severely decreased  diffusion capacity. 2.  Six-minute walk with no     Results for Sera Monzon (MRN [id number]) as of 10/16/2021 17:39   Ref.  Range 10/9/2021 12:15 10/10/2021 06:39 10/16/2021 11:30   WBC Latest Ref Range: 4.0 - 11.0 K/uL 14.5 (H) 11.8 (H) 13.6 (H)   RBC Latest Ref Range: 4.00 - 5.20 M/uL 4.41 4.13 4.71   Hemoglobin Quant Latest Ref Range: 12.0 - 16.0 g/dL 13.3 12.4 14.0   Hematocrit Latest Ref Range: 36.0 - 48.0 % 39.6 36.2 41.3   MCV Latest Ref Range: 80.0 - 100.0 fL 89.8 87.6 87.6   MCH Latest Ref Range: 26.0 - 34.0 pg 30.2 30.0 29.7   MCHC Latest Ref Range: 31.0 - 36.0 g/dL 33.6 34.2 33.9   MPV Latest Ref Range: 5.0 - 10.5 fL 6.3 6.4 6.7   RDW Latest Ref Range: 12.4 - 15.4 % 14.3 14.4 14.6   Platelet Count Latest Ref Range: 135 - 450 K/uL 186 214 230   Neutrophils % Latest Units: % 74.2 95.5 74.0   Lymphocyte % Latest Units: % 19.1 2.2 13.0   Monocytes % Latest Units: % 6.0 2.2 7.0   Eosinophils % Latest Units: % 0.3 0.0 2.0   Basophils % Latest Units: % 0.4 0.1 0.0   Neutrophils Absolute Latest Ref Range: 1.7 - 7.7 K/uL 10.8 (H) 11.3 (H) 10.2 (H)   Lymphocytes Absolute Latest Ref Range: 1.0 - 5.1 K/uL 2.8 0.3 (L) 2.2   Monocytes Absolute Latest Ref Range: 0.0 - 1.3 K/uL 0.9 0.3 1.0   Eosinophils Absolute Latest Ref Range: 0.0 - 0.6 K/uL 0.0 0.0 0.3   Basophils Absolute Latest Ref Range: 0.0 - 0.2 K/uL 0.1 0.0 0.0   Bands Relative Latest Ref Range: 0 - 7 %   1   Atypical Lymphocytes Relative Latest Ref Range: 0 - 6 %   3     Results for Hipolito Dorantes (MRN [id number]) as of 10/16/2021 17:39   Ref. Range 9/17/2021 19:35 9/27/2021 10:47 10/9/2021 13:12 10/16/2021 11:30   Carboxyhemoglobin Latest Ref Range: 0.0 - 1.5 % 4.2 (H) 3.9 (H) 3.7 (H) 3.5 (H)   O2 Therapy Unknown Unknown Unknown Unknown Unknown   pH, Ivan Latest Ref Range: 7.350 - 7.450  7. 411 7.404 7. 381 7.383   pCO2, Ivan Latest Ref Range: 40.0 - 50.0 mmHg 46.9 40.8 39.9 (L) 50.2 (H)   pO2, Ivan Latest Ref Range: 25 - 40 mmHg 73.1 (H) 66.6 (H) 36.1 44.7 (H)   HCO3, Venous Latest Ref Range: 23.0 - 29.0 mmol/L 29.1 (H) 24.9 23.1 29.2 (H)   TC02 (Calc), Ivan Latest Ref Range: Not Established mmol/L 31 26 24 31   Base Excess, Ivan Latest Ref Range: -3.0 - 3.0 mmol/L 3.7 (H) 0.2 -1.8 3.1 (H)   MetHgb, Ivan Latest Ref Range: <1.5 % 0.5 0.5 0.4 0.4   O2 Sat, Ivan Latest Ref Range: Not Established % 95 93 68 80       Results for Hipolito Dorantes (MRN [id number]) as of 10/16/2021 17:39   Ref. Range 10/9/2021 23:20 10/16/2021 11:30   SARS-CoV-2, NAAT Latest Ref Range: Not Detected  Not Detected Not Detected     Bronchoscopy in past -  Pseudomonas aeruginosa    CULTURE, RESPIRATORY 06/23/2021 10:51 AM [facility]   1,000 to 10,000 CFU/mL    Testing Performed By    Lab - Abbreviation Name Director Address Valid Date Range   19- - [address] [place] [address] LAB Connie Rayo M.D. [address] [facility] [address] 09/26/20 0000-09/14/21 1118   25-[facility] Chava Emery [address]  [facility] Eda Head M.D. [address] [facility] [address] 08/30/17 0807-09/09/21 1507   Narrative  Performed by: [facility]  ORDER#: [id number]                          ORDERED BY: Srinivasa Mckeon   SOURCE: Bronchial Alveolar Lavage Right LowCOLLECTED:  06/23/21 10:51   ANTIBIOTICS AT [facility]ANNY. :                      RECEIVED :  06/24/21 05:22   Performed at:   [address] [facility]   [address] [facility] [address]   Phone [phone]   Susceptibility    Pseudomonas aeruginosa (1)    Antibiotic Interpretation RUBINA Status    cefepime Sensitive <=2 mcg/mL     ciprofloxacin Sensitive <=1 mcg/mL     gentamicin Sensitive <=4 mcg/mL     meropenem Sensitive <=1 mcg/mL     piperacillin-tazobactam Sensitive <=16 mcg/mL     tobramycin Sensitive <=4 mcg/mL           Assessment:  Active Problems:    Stage 3 severe COPD by GOLD classification ([facility])    Smoker    Pulmonary fibrosis ([facility])    Kyphosis    Cannabis dependence with current use ([facility]    COPD with acute exacerbation ([facility])    H/O pneumonia due to Pseudomonas    Chronic respiratory failure with hypoxia and hypercapnia ([facility])  Resolved Problems:    * No resolved hospital problems.  *          Plan:   · Oxygen supplementation to keep saturation being 90 to 94% only  · Please titrate the oxygen as per the above parameters-and communication has been sent to that effect  · Patient has a compensated respiratory acidosis with hypoxemia and patient does not need any BiPAP at this time which is being discontinued  · Monitor for any worsening increasing somnolence suggestive of hypercapnia which may need to be evaluated and reassessed in terms of management  · Patient's recent few ABGs did not show any hypercapnia which was not compensated  · Bronchodilators  · DuoNeb to continue  · IV Solu-Medrol to continue  · Incentive spirometry  · Pulmonary toilet  · Patient continues to have increased chest congestion along with mucus plugging and ineffective airway clearance and patient has recurrent hospitalization with similar symptoms -patient has had bronchoscopy done in the past which showed patient to have Pseudomonas pneumonia  · Given the patient's clinical condition and recurrent hospitalization-patient was started on IV ciprofloxacin along with that patient was started on DONALDO nebs  · Will hold off on any bronchoscopy at this time  · Patient's hyponatremia may be secondary to SSRI  · Patient's TSH in the past was normal  · Smoking cessation advised and pros and cons of continued smoking discussed-not ready for any commitment  · Nicotine replacement therapy if the patient wants  · Patient's use of marijuana is adding to the complexity of the disease process  · PUD  as per IM   · Patient does not want any evaluation for SUAD  · Patient is vaccinated against COVID-19  · Promote IS and acapella  · PPI started     Case discussed with patient and nursing    Patient is DNI        Electronically signed by:  Radha Morales MD    10/16/2021    6:11 PM.

## 2021-10-16 NOTE — ED NOTES
Report given to [facility], patient transported in stable condition.  RT at bedside to assist with [address] [address]Daniel Villegas, MALDONADO  10/16/21 Abhijit Menendez RN  10/16/21 [id number]

## 2021-10-16 NOTE — ED NOTES
RT at bedside setting up [address] [facility] [place], RN  10/16/21 3967

## 2021-10-16 NOTE — H&P
12/3/2009    Tobacco abuse        Past Surgical History:      Procedure Laterality Date    BLADDER REPAIR      BRONCHOSCOPY      BRONCHOSCOPY N/A 3/6/2020    BRONCHOSCOPY THERAPUTIC ASPIRATION INITIAL performed by Juan Davies MD at [facility]  3/6/2020    BRONCHOSCOPY ALVEOLAR LAVAGE performed by Juan Davies MD at [facility] N/A 6/26/2020    BRONCHOSCOPY THERAPUTIC ASPIRATION INITIAL performed by Rabia Mendoza MD at [facility]  6/26/2020    BRONCHOSCOPY ALVEOLAR LAVAGE performed by Rabia Mendoza MD at [facility]  06/23/2021    Dr Delores Mccoy N/A 6/23/2021    BRONCHOSCOPY DIAGNOSTIC OR CELL [address] performed by Rabia Mendoza MD at [facility]  6/23/2021    BRONCHOSCOPY THERAPUTIC ASPIRATION INITIAL performed by Rabia Mendoza MD at [facility]  6/23/2021    BRONCHOSCOPY ALVEOLAR LAVAGE performed by Rabia Mendoza MD at [facility] N/A 8/27/2021    BRONCHOSCOPY DIAGNOSTIC OR CELL [address] ONLY performed by Rabia Mendoza MD at [place] [address]  2012    ENDOSCOPY, COLON, DIAGNOSTIC      ESOPHAGEAL DILATATION  9/20/2018    ESOPHAGEAL DILATION Arnold Bimler performed by Vivek Hansen MD at [address] [facility] HISTORY  2/12/15    T8 Kyphoplasty    MA COLONOSCOPY FLX DX W/COLLJ SPEC WHEN PFRMD N/A 9/20/2018    EGD AND COLONOSCOPY WITH ANESTHESIA performed by Vivek Hansen MD at [address] [facility] ESOPHAGOGASTRODUODENOSCOPY TRANSORAL DIAGNOSTIC N/A 9/20/2018    EGD AND COLONOSCOPY WITH ANESTHESIA performed by Vivek Hansen MD at [address] [facility]         Medications Prior to Admission:    Prior to Admission medications    Medication Sig Start Date End Date Taking?  Authorizing Provider   predniSONE (DELTASONE) 20 MG that she has been smoking cigarettes. She started smoking about 49 years ago. She has a 20.00 pack-year smoking history. She has never used smokeless tobacco.  ETOH:   reports no history of alcohol use. Family History:       Problem Relation Age of Onset    Asthma Mother     Diabetes Mother     Emphysema Mother     Heart Failure Mother     Hypertension Mother     Heart Disease Mother     High Cholesterol Mother     Cancer Mother     Depression Mother     Diabetes Father     Emphysema Father     Heart Failure Father     Hypertension Father     Heart Disease Father     High Cholesterol Father     Substance Abuse Father     Emphysema Paternal Grandfather     Diabetes Sister     High Cholesterol Sister     Vision Loss Maternal Uncle        REVIEW OF SYSTEMS:  Ten systems reviewed and negative except for dyspnea. Physical Exam:    Vitals: /86   Pulse 99   Temp 99.2 °F (37.3 °C) (Oral)   Resp 23   Ht 5' 5\" (1.651 m)   Wt 175 lb (79.4 kg)   SpO2 98%   BMI 29.12 kg/m²   General appearance: alert, appears stated age and cooperative  Skin: Skin color, texture, turgor normal. No rashes or lesions  HEENT: Head: Normocephalic, no lesions, without obvious abnormality.   Neck: no adenopathy, no carotid bruit, no JVD, supple, symmetrical, trachea midline and thyroid not enlarged, symmetric, no tenderness/mass/nodules  Lungs: diffuse rhonchi, on BiPAP  Heart: regular rate and rhythm, S1, S2 normal, no murmur, click, rub or gallop  Abdomen: soft, non-tender; bowel sounds normal; no masses,  no organomegaly  Extremities: extremities normal, atraumatic, no cyanosis or edema  Neurologic: Mental status: Alert, oriented, thought content appropriate    Recent Labs     10/16/21  1130   WBC 13.6*   HGB 14.0        Recent Labs     10/16/21  1130   *   K 5.0   CL 93*   CO2 28   BUN 17   CREATININE 0.7   GLUCOSE 100*   AST 19   ALT 24   BILITOT 0.5   ALKPHOS 54     Troponin T:  < 0.01 ng/mL  Lactate (10/16) 2.0 mmol/L  SARS-CoV-2 NAAT (10/16) not detected  proBNP (10/16) 86 pg/mL    Portable CXR (10/16) Bibasilar airspace disease, increased on the left but decreased on the right     ECG:  Normal sinus rhythm. Nonspecific ST changes. Unchanged from prior ECG of 09-OCT-2021. Assessment and Plan   1. Acute exacerbation of stage 3 severe COPD with interstitial lung disease with pulmonary fibrosis. Admit to inpatient status. Systemic steroid, ventilatory support, respiratory therapy. BiPAP 12/6.    2. COPD with chronic respiratory failure on home oxygen with acute hypoxic respiratory failure: BiPAP initiated in the emergency department. Systemic steroid therapy with methylprednisolone 40 mg IV BID.  Transition to oral Prednisone with 21 day taper at discharge.  Oxygen at home used at night.    3. Acute on chronic respiratory failure:  Supplemental oxygen. 4. Nicotine dependence.  Declines need for nicotine replacement therapy.  She has Chantix at home and plans to start after discharge. 5. Hyponatremia:  Chronic and noted on prior admission.  Has previously resolved with IV saline volume expansion.    6. Anxiety:  Continues on buspirone 30 mg BID and fluoxetine 60 mg daily and trazodone 150 mg nightly. 7. Dyslipidemia:  Continues on atorvastatin 20 mg daily. 8. Gastric reflux and stress ulcer prophylaxis with pantoprazole (Protonix) 40 mg daily.     Advance Directive: Limited code (no intubation)  DVT prophylaxis with enoxaparin 40 mg sub-Q daily. Discharge planning:  Admit to inpatient status for evaluation and management that will require at least two midnights of acute hospitalization.         Pradeep Vela MD  Admitting Hospitalist

## 2021-10-16 NOTE — ED PROVIDER NOTES
Piedad Kwan is a 60year old female with a history of COPD who presents to the ED with increased shortness of breath. She was recently hospitalized for the same, and was on steroids and antibiotics at that time. She takes Prednisone daily, and is on oxygen 3 liters as needed at home. She has been immunized against COVID-19. She denies fever. She denies chest pain. She appears to have labored, pursed-lip breathing. BP (!) 164/83   Pulse 105   Temp 99.2 °F (37.3 °C) (Oral)   Resp (!) 38   Ht 5' 5\" (1.651 m)   Wt 175 lb (79.4 kg)   SpO2 95%   BMI 29.12 kg/m²     I have reviewed the following from the nursing documentation:      Prior to Admission medications    Medication Sig Start Date End Date Taking? Authorizing Provider   predniSONE (DELTASONE) 20 MG tablet 2 tabs daily for 5 days, then 1.5 tabs daily for 5 days, then 1 tab daily for 5 days, then 0.5 tabs daily for 5 days. 10/12/21   Bob Mann MD   ipratropium-albuterol (DUONEB) 0.5-2.5 (3) MG/3ML SOLN nebulizer solution Inhale 3 mLs into the lungs every 4 hours as needed for Shortness of Breath 9/23/21   Jonelle Mendiola MD   traZODone (DESYREL) 150 MG tablet Take 1-2 tablets by mouth nightly TAKE 2 TABLETS AT BEDTIME 9/13/21   Glenda José MD   guaiFENesin (MUCINEX) 600 MG extended release tablet Take 1 tablet by mouth 2 times daily as needed for Congestion 8/29/21   Lisa Duncan MD   varenicline (CHANTIX STARTING MONTH PAK) 0.5 MG X 11 & 1 MG X 42 tablet Take by mouth.  8/4/21   Glenda José MD   fluticasone-salmeterol [facility]) 500-50 MCG/DOSE diskus inhaler Inhale 1 puff into the lungs every 12 hours 7/21/21   Jonelle Mendiola MD   albuterol (PROVENTIL) (2.5 MG/3ML) 0.083% nebulizer solution Take 3 mLs by nebulization every 6 hours as needed for Wheezing or Shortness of Breath DX COPD J44.9 7/21/21   Jonelle Mendiola MD   montelukast (SINGULAIR) 10 MG tablet TAKE 1 TABLET BY MOUTH EACH NIGHT 7/21/21   Jonelle Mendiola MD   albuterol sulfate HFA (VENTOLIN HFA) 108 (90 Base) MCG/ACT inhaler Inhale 2 puffs into the lungs every 6 hours as needed for Wheezing or Shortness of Breath 3/4/21   Guille Wiley MD   BD PEN NEEDLE SVETLANA U/F 32G X 4 MM MISC USE ONE DAILY AS DIRECTED 12/11/20   Jean Claude Fonseca MD   FLUoxetine (PROZAC) 20 MG capsule TAKE 3 CAPSULES BY MOUTH DAILY 12/3/20   Jean Claude Fonseca MD   simvastatin (ZOCOR) 20 MG tablet TAKE 1 TABLET EVERY EVENING 8/7/20   Jean Claude Fonseca MD   busPIRone (BUSPAR) 30 MG tablet TAKE 1 TABLET TWICE DAILY 8/7/20   Jean Claude Fonseca MD   teriparatide, recombinant, (FORTEO) 600 MCG/2.4ML SOLN injection Inject 0.08 mLs into the skin daily One dose injected daily.  9/27/19 12/7/20  Nikole Jasso MD       Allergies as of 10/16/2021 - Fully Reviewed 10/16/2021   Allergen Reaction Noted    Meperidine Anaphylaxis 02/12/2015    Demerol Hives 12/03/2009       Past Medical History:   Diagnosis Date    Allergic rhinitis 6/11/2010    Anxiety     Arthritis     Aspiration pneumonia ([address] 3/21/2012    Recurrent    Asthma     Bronchitis chronic     COPD (chronic obstructive pulmonary disease) ([address] 12/3/2009    Depression     Drug abuse, cocaine type ([address]     past history of crack cocaine use    Emphysema of lung (HCC)     Fibromyalgia     GERD (gastroesophageal reflux disease)     Hyperlipidemia     Influenza A 03/05/2020    Lung disease     On home oxygen therapy     uses O2 NC 3L prn at night    Osteoporosis     Pneumonia     Polysubstance dependence ([address] 1/2/2012    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    Pulmonary fibrosis ([address] 10/16/2014    Pulmonary infiltrate     Pulmonary nodule 12/3/2009    Tobacco abuse         Surgical History:   Past Surgical History:   Procedure Laterality Date    BLADDER REPAIR      BRONCHOSCOPY      BRONCHOSCOPY N/A 3/6/2020    BRONCHOSCOPY THERAPUTIC ASPIRATION INITIAL performed by Jermaine Wilde MD at [employer] 3/6/2020    BRONCHOSCOPY ALVEOLAR LAVAGE performed by Anabel Jerome MD at [address] [place] [address] N/A 6/26/2020    BRONCHOSCOPY THERAPUTIC ASPIRATION INITIAL performed by Justino Mann MD at [address] [place] [address]  6/26/2020    BRONCHOSCOPY ALVEOLAR LAVAGE performed by Justino Mann MD at [address] [place] [address]  06/23/2021    Dr Sarah Beth Archuleta N/A 6/23/2021    [address] performed by Justino Mann MD at [address] [place] [address]  6/23/2021    BRONCHOSCOPY THERAPUTIC ASPIRATION INITIAL performed by Justino Mann MD at [address] [place] [address]  6/23/2021    BRONCHOSCOPY ALVEOLAR LAVAGE performed by Justino Mann MD at [address] [place] [address] N/A 8/27/2021    BRONCHOSCOPY DIAGNOSTIC OR CELL [facility] ONLY performed by Justino Mann MD at [place] [address]  2012    ENDOSCOPY, COLON, DIAGNOSTIC      ESOPHAGEAL DILATATION  9/20/2018    ESOPHAGEAL DILATION [place] performed by Mily Spence MD at [address] [facility] OTHER SURGICAL HISTORY  2/12/15    T8 Kyphoplasty    [place] COLONOSCOPY FLX DX W/COLLJ SPEC WHEN PFRMD N/A 9/20/2018    EGD AND COLONOSCOPY WITH ANESTHESIA performed by Mily Spence MD at [address] [facility]] ESOPHAGOGASTRODUODENOSCOPY TRANSORAL DIAGNOSTIC N/A 9/20/2018    EGD AND COLONOSCOPY WITH ANESTHESIA performed by Mily Spence MD at [address] [facility]          Family History:    Family History   Problem Relation Age of Onset    Asthma Mother     Diabetes Mother     Emphysema Mother     Heart Failure Mother     Hypertension Mother     Heart Disease Mother     High Cholesterol Mother     Cancer Mother     Depression Mother     Diabetes Father     Emphysema Father     Heart Failure Father     Hypertension Father     Heart Disease Father     High Cholesterol Father     Substance Abuse Father     Emphysema Paternal Grandfather     Diabetes Sister     High Cholesterol Sister     Vision Loss Maternal Uncle        Social History     Socioeconomic History    Marital status:      Spouse name: Not on file    Number of children: 3    Years of education: Not on file    Highest education level: Not on file   Occupational History    Occupation: Disabled     Comment:    Tobacco Use    Smoking status: Current Every Day Smoker     Packs/day: 0.50     Years: 40.00     Pack years: 20.00     Types: Cigarettes     Start date: 1972     Last attempt to quit: 2021     Years since quittin.3    Smokeless tobacco: Never Used    Tobacco comment: 0.5 PPD restarted   Vaping Use    Vaping Use: Never used   Substance and Sexual Activity    Alcohol use: No     Alcohol/week: 0.0 standard drinks    Drug use: Yes     Types: Marijuana     Comment: Daily    Sexual activity: Yes     Partners: Male   Other Topics Concern    Not on file   Social History Narrative    Not on file     Social Determinants of Health     Financial Resource Strain: Low Risk     Difficulty of Paying Living Expenses: Not hard at all   Food Insecurity: No Food Insecurity    Worried About Running Out of Food in the Last Year: Never true    Erica of Food in the Last Year: Never true   Transportation Needs: No Transportation Needs    Lack of Transportation (Medical): No    Lack of Transportation (Non-Medical):  No   Physical Activity:     Days of Exercise per Week:     Minutes of Exercise per Session:    Stress:     Feeling of Stress :    Social Connections:     Frequency of Communication with Friends and Family:     Frequency of Social Gatherings with Friends and Family:     Attends Taoist Services:     Active Member of Clubs or Organizations:     Attends Club or Organization Meetings:     Marital Status:    Intimate Partner Violence:     Fear of Current or Ex-Partner:     Emotionally Abused:  Physically Abused:     Sexually Abused:          Review of Systems   Constitutional: Negative for activity change, appetite change, chills, diaphoresis, fatigue, fever and unexpected weight change. HENT: Negative for congestion, ear pain, mouth sores, rhinorrhea, sinus pressure, sore throat, tinnitus, trouble swallowing and voice change. Eyes: Negative for photophobia, pain, discharge, redness and visual disturbance. Respiratory: Positive for shortness of breath and wheezing. Negative for apnea, cough, chest tightness and stridor. Cardiovascular: Negative for chest pain, palpitations and leg swelling. Gastrointestinal: Negative for abdominal distention, abdominal pain, blood in stool, constipation, diarrhea, nausea, rectal pain and vomiting. Genitourinary: Negative for difficulty urinating, dyspareunia, dysuria, flank pain, frequency, genital sores, menstrual problem, pelvic pain, urgency, vaginal bleeding, vaginal discharge and vaginal pain. Musculoskeletal: Negative for arthralgias, back pain, joint swelling, neck pain and neck stiffness. Skin: Negative for color change and rash. Neurological: Negative for dizziness, tremors, seizures, syncope, facial asymmetry, speech difficulty, weakness, light-headedness, numbness and headaches. Hematological: Negative for adenopathy. Does not bruise/bleed easily. Psychiatric/Behavioral: Negative for agitation, confusion, dysphoric mood, hallucinations, self-injury, sleep disturbance and suicidal ideas. All other systems reviewed and are negative. Physical Exam  Constitutional:       General: She is in acute distress. Appearance: She is well-developed. She is obese. She is ill-appearing. HENT:      Head: Normocephalic and atraumatic. Left Ear: External ear normal.      Mouth/Throat:      Pharynx: No oropharyngeal exudate. Eyes:      General:         Right eye: No discharge. Left eye: No discharge. Conjunctiva/sclera: Conjunctivae normal.      Pupils: Pupils are equal, round, and reactive to light. Neck:      Vascular: No JVD. Trachea: No tracheal deviation. Cardiovascular:      Rate and Rhythm: Normal rate and regular rhythm. Heart sounds: Normal heart sounds. No murmur heard. No friction rub. No gallop. Pulmonary:      Effort: Pulmonary effort is normal. No respiratory distress. Breath sounds: No stridor. Examination of the right-upper field reveals decreased breath sounds and wheezing. Examination of the left-upper field reveals decreased breath sounds and wheezing. Examination of the right-middle field reveals decreased breath sounds. Examination of the left-middle field reveals decreased breath sounds. Examination of the right-lower field reveals decreased breath sounds. Examination of the left-lower field reveals decreased breath sounds. Decreased breath sounds and wheezing present. No rales. Comments: Moderately prolonged expiratory phase. Chest:      Chest wall: No tenderness. Abdominal:      General: Bowel sounds are normal. There is no distension. Palpations: Abdomen is soft. There is no mass. Tenderness: There is no abdominal tenderness. There is no guarding or rebound. Musculoskeletal:         General: No tenderness. Normal range of motion. Cervical back: Normal range of motion. Lymphadenopathy:      Cervical: No cervical adenopathy. Skin:     General: Skin is warm and dry. Findings: No rash. Neurological:      Mental Status: She is alert and oriented to person, place, and time. Cranial Nerves: No cranial nerve deficit. Motor: No abnormal muscle tone. Coordination: Coordination normal.      Deep Tendon Reflexes: Reflexes normal.   Psychiatric:         Behavior: Behavior normal.         Thought Content:  Thought content normal.         Judgment: Judgment normal.          Procedures     MDM   Results for orders placed or performed during the hospital encounter of 10/16/21   COVID-19, Rapid    Specimen: Nasopharyngeal Swab   Result Value Ref Range    SARS-CoV-2, NAAT Not Detected Not Detected   CBC auto differential   Result Value Ref Range    WBC 13.6 (H) 4.0 - 11.0 K/uL    RBC 4.71 4.00 - 5.20 M/uL    Hemoglobin 14.0 12.0 - 16.0 g/dL    Hematocrit 41.3 36.0 - 48.0 %    MCV 87.6 80.0 - 100.0 fL    MCH 29.7 26.0 - 34.0 pg    MCHC 33.9 31.0 - 36.0 g/dL    RDW 14.6 12.4 - 15.4 %    Platelets 846 575 - 964 K/uL    MPV 6.7 5.0 - 10.5 fL    PLATELET SLIDE REVIEW Adequate     SLIDE REVIEW see below     Neutrophils % 74.0 %    Lymphocytes % 13.0 %    Monocytes % 7.0 %    Eosinophils % 2.0 %    Basophils % 0.0 %    Neutrophils Absolute 10.2 (H) 1.7 - 7.7 K/uL    Lymphocytes Absolute 2.2 1.0 - 5.1 K/uL    Monocytes Absolute 1.0 0.0 - 1.3 K/uL    Eosinophils Absolute 0.3 0.0 - 0.6 K/uL    Basophils Absolute 0.0 0.0 - 0.2 K/uL    Bands Relative 1 0 - 7 %    Atypical Lymphocytes Relative 3 0 - 6 %    Anisocytosis Occasional (A)     Macrocytes Occasional (A)     Microcytes Occasional (A)     Polychromasia Occasional (A)    Comprehensive metabolic panel   Result Value Ref Range    Sodium 132 (L) 136 - 145 mmol/L    Potassium 5.0 3.5 - 5.1 mmol/L    Chloride 93 (L) 99 - 110 mmol/L    CO2 28 21 - 32 mmol/L    Anion Gap 11 3 - 16    Glucose 100 (H) 70 - 99 mg/dL    BUN 17 7 - 20 mg/dL    CREATININE 0.7 0.6 - 1.2 mg/dL    GFR Non-African American >60 >60    GFR African American >60 >60    Calcium 9.1 8.3 - 10.6 mg/dL    Total Protein 6.6 6.4 - 8.2 g/dL    Albumin 3.9 3.4 - 5.0 g/dL    Albumin/Globulin Ratio 1.4 1.1 - 2.2    Total Bilirubin 0.5 0.0 - 1.0 mg/dL    Alkaline Phosphatase 54 40 - 129 U/L    ALT 24 10 - 40 U/L    AST 19 15 - 37 U/L    Globulin 2.7 Not Established g/dL   Troponin   Result Value Ref Range    Troponin <0.01 <0.01 ng/mL   Brain Natriuretic Peptide   Result Value Ref Range    Pro-BNP 86 0 - 124 pg/mL   Lactic Acid, Plasma Result Value Ref Range    Lactic Acid 2.0 0.4 - 2.0 mmol/L   Blood gas, venous   Result Value Ref Range    pH, Ivan 7.383 7.350 - 7.450    pCO2, Ivan 50.2 (H) 40.0 - 50.0 mmHg    pO2, Ivan 44.7 (H) 25 - 40 mmHg    HCO3, Venous 29.2 (H) 23.0 - 29.0 mmol/L    Base Excess, Ivan 3.1 (H) -3.0 - 3.0 mmol/L    O2 Sat, Ivan 80 Not Established %    Carboxyhemoglobin 3.5 (H) 0.0 - 1.5 %    MetHgb, Ivan 0.4 <1.5 %    TC02 (Calc), Ivan 31 Not Established mmol/L    O2 Therapy Unknown    EKG 12 Lead   Result Value Ref Range    Ventricular Rate 94 BPM    Atrial Rate 94 BPM    P-R Interval 136 ms    QRS Duration 70 ms    Q-T Interval 334 ms    QTc Calculation (Bazett) 417 ms    P Axis 64 degrees    R Axis 0 degrees    T Axis 70 degrees    Diagnosis       Normal sinus rhythmPossible Inferior infarct (cited on or before 27-SEP-2021)Abnormal ECGWhen compared with ECG of 09-OCT-2021 13:06,No significant change was found       I spoke with Dr. Albino Aranda, hospitalist. We thoroughly discussed the history, physical exam, laboratory and imaging studies, as well as, emergency department course. Based upon that discussion, we've decided to admit Jo Aleman for further observation and evaluation of Iris Wong's dyspnea. As I have deemed necessary from their history, physical, and studies, I have considered and evaluated Jo Aleman for the following diagnoses:  ACUTE CORONARY SYNDROME, CHRONIC OBSTRUCTIVE PULMONARY DISEASE, CONGESTIVE HEART FAILURE, PERICARDIAL TAMPONADE, PNEUMONIA, PNEUMOTHORAX, PULMONARY EMBOLISM, SEPSIS, and THORACIC DISSECTION. FINAL IMPRESSION  1. COPD exacerbation (HCC)    2. Leukocytosis, unspecified type        Vitals:  Blood pressure 125/86, pulse 99, temperature 99.2 °F (37.3 °C), temperature source Oral, resp. rate 23, height 5' 5\" (1.651 m), weight 175 lb (79.4 kg), SpO2 98 %, not currently breastfeeding.   Radiology  XR CHEST PORTABLE    Result Date: 10/16/2021  Bibasilar airspace disease, increased on the left

## 2021-10-17 LAB
ANION GAP SERPL CALCULATED.3IONS-SCNC: 15 MMOL/L (ref 3–16)
BUN BLDV-MCNC: 17 MG/DL (ref 7–20)
CALCIUM SERPL-MCNC: 9.3 MG/DL (ref 8.3–10.6)
CHLORIDE BLD-SCNC: 93 MMOL/L (ref 99–110)
CO2: 23 MMOL/L (ref 21–32)
CREAT SERPL-MCNC: 0.7 MG/DL (ref 0.6–1.2)
GFR AFRICAN AMERICAN: >60
GFR NON-AFRICAN AMERICAN: >60
GLUCOSE BLD-MCNC: 129 MG/DL (ref 70–99)
GLUCOSE BLD-MCNC: 159 MG/DL (ref 70–99)
GLUCOSE BLD-MCNC: 282 MG/DL (ref 70–99)
PERFORMED ON: ABNORMAL
PERFORMED ON: ABNORMAL
POTASSIUM REFLEX MAGNESIUM: 4 MMOL/L (ref 3.5–5.1)
SODIUM BLD-SCNC: 131 MMOL/L (ref 136–145)

## 2021-10-17 PROCEDURE — 6370000000 HC RX 637 (ALT 250 FOR IP): Performed by: INTERNAL MEDICINE

## 2021-10-17 PROCEDURE — 6370000000 HC RX 637 (ALT 250 FOR IP): Performed by: NURSE PRACTITIONER

## 2021-10-17 PROCEDURE — 2580000003 HC RX 258: Performed by: INTERNAL MEDICINE

## 2021-10-17 PROCEDURE — 94761 N-INVAS EAR/PLS OXIMETRY MLT: CPT

## 2021-10-17 PROCEDURE — 2700000000 HC OXYGEN THERAPY PER DAY

## 2021-10-17 PROCEDURE — 99233 SBSQ HOSP IP/OBS HIGH 50: CPT | Performed by: INTERNAL MEDICINE

## 2021-10-17 PROCEDURE — 94640 AIRWAY INHALATION TREATMENT: CPT

## 2021-10-17 PROCEDURE — 2060000000 HC ICU INTERMEDIATE R&B

## 2021-10-17 PROCEDURE — 36415 COLL VENOUS BLD VENIPUNCTURE: CPT

## 2021-10-17 PROCEDURE — 6360000002 HC RX W HCPCS: Performed by: INTERNAL MEDICINE

## 2021-10-17 PROCEDURE — 94669 MECHANICAL CHEST WALL OSCILL: CPT

## 2021-10-17 PROCEDURE — 80048 BASIC METABOLIC PNL TOTAL CA: CPT

## 2021-10-17 RX ORDER — DEXTROSE MONOHYDRATE 50 MG/ML
100 INJECTION, SOLUTION INTRAVENOUS PRN
Status: DISCONTINUED | OUTPATIENT
Start: 2021-10-17 | End: 2021-10-20 | Stop reason: HOSPADM

## 2021-10-17 RX ORDER — FUROSEMIDE 10 MG/ML
40 INJECTION INTRAMUSCULAR; INTRAVENOUS ONCE
Status: COMPLETED | OUTPATIENT
Start: 2021-10-17 | End: 2021-10-17

## 2021-10-17 RX ORDER — DEXTROSE MONOHYDRATE 25 G/50ML
12.5 INJECTION, SOLUTION INTRAVENOUS PRN
Status: DISCONTINUED | OUTPATIENT
Start: 2021-10-17 | End: 2021-10-20 | Stop reason: HOSPADM

## 2021-10-17 RX ORDER — METHYLPREDNISOLONE SODIUM SUCCINATE 40 MG/ML
40 INJECTION, POWDER, LYOPHILIZED, FOR SOLUTION INTRAMUSCULAR; INTRAVENOUS EVERY 12 HOURS
Status: DISCONTINUED | OUTPATIENT
Start: 2021-10-18 | End: 2021-10-20 | Stop reason: HOSPADM

## 2021-10-17 RX ORDER — NICOTINE POLACRILEX 4 MG
15 LOZENGE BUCCAL PRN
Status: DISCONTINUED | OUTPATIENT
Start: 2021-10-17 | End: 2021-10-20 | Stop reason: HOSPADM

## 2021-10-17 RX ADMIN — BUSPIRONE HYDROCHLORIDE 30 MG: 5 TABLET ORAL at 21:19

## 2021-10-17 RX ADMIN — METHYLPREDNISOLONE SODIUM SUCCINATE 40 MG: 40 INJECTION, POWDER, FOR SOLUTION INTRAMUSCULAR; INTRAVENOUS at 12:31

## 2021-10-17 RX ADMIN — IPRATROPIUM BROMIDE AND ALBUTEROL SULFATE 1 AMPULE: .5; 3 SOLUTION RESPIRATORY (INHALATION) at 00:01

## 2021-10-17 RX ADMIN — Medication 5 MG: at 21:19

## 2021-10-17 RX ADMIN — CIPROFLOXACIN 400 MG: 2 INJECTION, SOLUTION INTRAVENOUS at 17:38

## 2021-10-17 RX ADMIN — CIPROFLOXACIN 400 MG: 2 INJECTION, SOLUTION INTRAVENOUS at 05:55

## 2021-10-17 RX ADMIN — TOBRAMYCIN 300 MG: 300 SOLUTION RESPIRATORY (INHALATION) at 20:25

## 2021-10-17 RX ADMIN — TRAZODONE HYDROCHLORIDE 150 MG: 50 TABLET ORAL at 21:18

## 2021-10-17 RX ADMIN — IPRATROPIUM BROMIDE AND ALBUTEROL SULFATE 1 AMPULE: .5; 3 SOLUTION RESPIRATORY (INHALATION) at 20:25

## 2021-10-17 RX ADMIN — TOBRAMYCIN 300 MG: 300 SOLUTION RESPIRATORY (INHALATION) at 08:08

## 2021-10-17 RX ADMIN — IPRATROPIUM BROMIDE AND ALBUTEROL SULFATE 1 AMPULE: .5; 3 SOLUTION RESPIRATORY (INHALATION) at 16:12

## 2021-10-17 RX ADMIN — ENOXAPARIN SODIUM 40 MG: 40 INJECTION SUBCUTANEOUS at 08:31

## 2021-10-17 RX ADMIN — BUSPIRONE HYDROCHLORIDE 30 MG: 5 TABLET ORAL at 08:31

## 2021-10-17 RX ADMIN — IPRATROPIUM BROMIDE AND ALBUTEROL SULFATE 1 AMPULE: .5; 3 SOLUTION RESPIRATORY (INHALATION) at 08:04

## 2021-10-17 RX ADMIN — FLUOXETINE 60 MG: 20 CAPSULE ORAL at 08:31

## 2021-10-17 RX ADMIN — SODIUM CHLORIDE, PRESERVATIVE FREE 10 ML: 5 INJECTION INTRAVENOUS at 08:32

## 2021-10-17 RX ADMIN — IPRATROPIUM BROMIDE AND ALBUTEROL SULFATE 1 AMPULE: .5; 3 SOLUTION RESPIRATORY (INHALATION) at 12:26

## 2021-10-17 RX ADMIN — MONTELUKAST SODIUM 10 MG: 10 TABLET ORAL at 21:18

## 2021-10-17 RX ADMIN — IPRATROPIUM BROMIDE AND ALBUTEROL SULFATE 1 AMPULE: .5; 3 SOLUTION RESPIRATORY (INHALATION) at 03:55

## 2021-10-17 RX ADMIN — PANTOPRAZOLE SODIUM 40 MG: 40 TABLET, DELAYED RELEASE ORAL at 08:31

## 2021-10-17 RX ADMIN — METHYLPREDNISOLONE SODIUM SUCCINATE 40 MG: 40 INJECTION, POWDER, FOR SOLUTION INTRAMUSCULAR; INTRAVENOUS at 23:47

## 2021-10-17 RX ADMIN — ATORVASTATIN CALCIUM 20 MG: 10 TABLET, FILM COATED ORAL at 08:31

## 2021-10-17 RX ADMIN — METHYLPREDNISOLONE SODIUM SUCCINATE 40 MG: 40 INJECTION, POWDER, FOR SOLUTION INTRAMUSCULAR; INTRAVENOUS at 05:49

## 2021-10-17 RX ADMIN — SODIUM CHLORIDE, PRESERVATIVE FREE 10 ML: 5 INJECTION INTRAVENOUS at 21:23

## 2021-10-17 RX ADMIN — FUROSEMIDE 40 MG: 10 INJECTION, SOLUTION INTRAMUSCULAR; INTRAVENOUS at 16:15

## 2021-10-17 ASSESSMENT — PAIN SCALES - GENERAL
PAINLEVEL_OUTOF10: 0

## 2021-10-17 NOTE — PROGRESS NOTES
INPATIENT PULMONARY CRITICAL CARE PROGRESS NOTE      Reason for visit    severe COPD with pulmonary fibrosis     SUBJECTIVE: Patient when seen this morning continues to be off BiPAP, patient was on 3 L of nasal cannula oxygen with saturation 94%, patient was complaining of increased shortness of breath, patient was not having any increased expectoration per se, patient was afebrile and had sinus rhythm on the monitor which was ranging from normal sinus rhythm to sinus tachycardia, patient had maintain hemodynamics, patient urine output has not been recorded in the epic for review, patient has suboptimal control of glycemia which can be secondary to steroids, patient was alert and communicative, no other pertinent review of system of concern             Physical Exam:  Blood pressure 111/75, pulse 101, temperature 97.7 °F (36.5 °C), temperature source Oral, resp. rate 18, height 5' 5\" (1.651 m), weight 172 lb 11.2 oz (78.3 kg), SpO2 94 %, not currently breastfeeding.'     Constitutional: No significant respiratory distress on nasal cannula oxygenation , patient had mild tachypnea when seen  HENT: No facial asymmetry . No thyromegaly.;moon facies   Eyes:  Conjunctivae are normal. Pupils equal, round, and reactive to light. No scleral icterus. Neck: . No tracheal deviation present. No obvious thyroid mass. Cardiovascular: Sinus tachycardia, normal heart sounds.  No right ventricular heave. Some lower extremity edema. upper limb edema   Pulmonary/Chest: Bilateral scattered wheezes.  Bibasilar scattered rales. No increasing  chest congestion chest wall is not dull to percussion.  No accessory muscle usage or stridor.  Decreased breath sound density  Abdominal: Soft. Bowel sounds present. No distension or hernia. No tenderness.    Musculoskeletal: No cyanosis. No clubbing. No obvious joint deformity. Lymphadenopathy: No cervical or supraclavicular adenopathy. Skin: Skin is warm and dry.  No rash or nodules on the exposed extremities. Psychiatric: Normal mood and affect. Behavior is normal.  No anxiety. Neurologic: Alert, awake and oriented.  PERRL.  Speech fluent       Results:  CBC:   Recent Labs     10/16/21  1130   WBC 13.6*   HGB 14.0   HCT 41.3   MCV 87.6        BMP:   Recent Labs     10/16/21  1130 10/17/21  0913   * 131*   K 5.0 4.0   CL 93* 93*   CO2 28 23   BUN 17 17   CREATININE 0.7 0.7     LIVER PROFILE:   Recent Labs     10/16/21  1130   AST 19   ALT 24   BILITOT 0.5   ALKPHOS 54       Imaging:  I have reviewed radiology images personally. XR CHEST PORTABLE   Final Result   Bibasilar airspace disease, increased on the left but decreased on the right           XR CHEST PORTABLE    Result Date: 10/16/2021  EXAMINATION: ONE XRAY VIEW OF THE CHEST 10/16/2021 11:52 am COMPARISON: 10/09/2021 HISTORY: ORDERING SYSTEM PROVIDED HISTORY: SOB TECHNOLOGIST PROVIDED HISTORY: Reason for exam:->SOB Reason for Exam: SOB Acuity: Acute Type of Exam: Initial FINDINGS: Heart size is normal.  Mediastinal contours are normal.  Pulmonary vascularity is normal.Patchy opacity is seen at the left lung base, slightly increased. Right lung base is better aerated. Spurring is seen in the spine and shoulder joints     Bibasilar airspace disease, increased on the left but decreased on the right     XR CHEST PORTABLE    Result Date: 10/9/2021  EXAMINATION: ONE XRAY VIEW OF THE CHEST 10/9/2021 12:18 pm COMPARISON: 09/27/2021 HISTORY: ORDERING SYSTEM PROVIDED HISTORY: SOB TECHNOLOGIST PROVIDED HISTORY: Reason for exam:->SOB Reason for Exam: Shortness of Breath (started yesterday, was smoking yesterday and  today and now cannot breathe) has COPD FINDINGS: Bibasilar opacities again noted. There is no effusion or pneumothorax. The cardiomediastinal silhouette is stable. The osseous structures are stable.      Bibasilar opacities could represent subsegmental atelectasis or infection     XR CHEST PORTABLE    Result Date: exam:->COPD, trouble breathing, SOB Decision Support Exception - unselect if not a suspected or confirmed emergency medical condition->Emergency Medical Condition (MA) Reason for Exam: shortness of breath,denies any chest pain Additional signs and symptoms: COPD Relevant Medical/Surgical History: smoker x 50 years FINDINGS: Pulmonary Arteries: There is adequate pulmonary artery enhancement with no evidence of pulmonary embolus. Mediastinum: No evidence of mediastinal lymphadenopathy. The heart and pericardium demonstrate no acute abnormality. No evidence of aortic dissection. Lungs/pleura: There are patchy areas of bilateral lower lobe airspace disease greater at the right lung base and left lower lobe posterolaterally. This is intermixed with interstitial lung disease and emphysema. In addition, this is unchanged from the prior study. There is some limitation with patient motion artifact. There is no pleural effusion or pneumothorax. Interstitial lung disease is seen bilaterally. There is centrilobular emphysema. No suspicious or new nodule. Upper Abdomen: Limited images of the upper abdomen are unremarkable. Soft Tissues/Bones: Moderate to severe fracture of the T3 vertebral body mild compression T5 and severe T8 compression with prior augmentation all unchanged. No new fracture. Bones are moderately demineralized. No destructive lesion seen. And     No evidence of pulmonary embolism. Patchy bilateral areas of airspace and interstitial disease unchanged from prior study. Moderate emphysema. No acute parenchymal disease. Compression fractures and prior augmentation unchanged. Results for Shaji Salcedo (MRN [id number]) as of 10/17/2021 14:22   Ref.  Range 10/16/2021 11:30 10/17/2021 09:13   Sodium Latest Ref Range: 136 - 145 mmol/L 132 (L) 131 (L)   Potassium Latest Ref Range: 3.5 - 5.1 mmol/L 5.0 4.0   Chloride Latest Ref Range: 99 - 110 mmol/L 93 (L) 93 (L)   CO2 Latest Ref Range: 21 - 32 mmol/L 28 23   BUN Latest Ref Range: 7 - 20 mg/dL 17 17   Creatinine Latest Ref Range: 0.6 - 1.2 mg/dL 0.7 0.7   Anion Gap Latest Ref Range: 3 - 16  11 15   GFR Non- Latest Ref Range: >60  >60 >60   GFR  Latest Ref Range: >60  >60 >60   Lactic Acid Latest Ref Range: 0.4 - 2.0 mmol/L 2.0    Glucose Latest Ref Range: 70 - 99 mg/dL 100 (H) 282 (H)   Calcium Latest Ref Range: 8.3 - 10.6 mg/dL 9.1 9.3   Total Protein Latest Ref Range: 6.4 - 8.2 g/dL 6.6            Assessment:  Active Problems:    Stage 3 severe COPD by GOLD classification ([facility])    Smoker    Pulmonary fibrosis ([facility])    Kyphosis    Cannabis dependence with current use ([facility])    COPD with acute exacerbation ([facility])    H/O pneumonia due to Pseudomonas    Chronic respiratory failure with hypoxia and hypercapnia ([facility])  Resolved Problems:    * No resolved hospital problems.  *          Plan:   · Oxygen supplementation to keep saturation being 90 to 94% only  · Please titrate the oxygen as per the above parameters-nursing communication has been sent to that effect  · Patient has a compensated respiratory acidosis with hypoxemia and patient does not need any BiPAP at this time which is being discontinued  · Monitor for any worsening increasing somnolence suggestive of hypercapnia which may need to be evaluated and reassessed in terms of management  · Bronchodilators  · DuoNeb to continue  · IV Solu-Medrol to continue but the dose was decreased to 40 mg IV push every 12 hours  · Incentive spirometry  · Pulmonary toilet  · Patient continues to have increased chest congestion along with mucus plugging and ineffective airway clearance and patient has recurrent hospitalization with similar symptoms -patient has had bronchoscopy done in the past which showed patient to have Pseudomonas pneumonia  · Given the patient's clinical condition and recurrent hospitalization-patient was started on IV ciprofloxacin along with that patient was started on DONALDO nebs  · Will hold off on any bronchoscopy at this time  · Patient also appears to have slight fluid overload and third spacing which may be contributing to patient's symptomatology and for which reason patient ordered for 1 dose of IV Lasix  · Strict input output charting  · Monitor input output and BMP  · Correct electrolytes and whenever necessary basis  · Patient's hyponatremia may be secondary to SSRI  · Patient's TSH in the past was normal  · Smoking cessation advised and pros and cons of continued smoking discussed-not ready for any commitment  · Nicotine replacement therapy if the patient wants  · Patient's use of marijuana is adding to the complexity of the disease process  · PUD  as per IM   · Patient does not want any evaluation for SUAD  · Patient is vaccinated against COVID-19  · Promote IS and acapella  · PPI started       Patient is DNI            Electronically signed by:  Shi Lepe MD    10/17/2021    2:21 PM.

## 2021-10-17 NOTE — PROGRESS NOTES
Hospitalist Progress Note  10/17/2021 8:59 AM  Subjective:   Admit Date: 10/16/2021  PCP: Mya Weir MD Status: Inpatient [101]  Interval History: Hospital Day: 2, continues to experience dyspnea and cough. History of present illness:  Admitted with progressive dyspnea and worsening hypoxia requiring BiPAP in the emergency department. Hospitalized 10/9 - 10/12 for severe stage 3 COPD with exacerbation. On oxygen at home. History of interstitial lung disease, stage 3 severe COPD, depression, fibromyalgia, ILD, tobacco dependence, and marijuana use.  Patient has had frequent COPD exacerbations the last few months with frequent visits to the hospital.  She continues to smoke cigarettes and marijuana. \"  Home oxygen at 3 LPM.    Diet: Regular  Left hand peripheral IV (10/16, day #2)  Medications:     buspirone  30 mg Oral BID   fluoxetine  60 mg Oral Daily   montelukast  10 mg Oral Nightly   atorvastatin  20 mg Oral Daily   enoxaparin  40 mg SubCUTAneous Daily   methylprednisolone  40 mg IntraVENous Q6H   ipratropium-albuterol  1 ampule Inhalation Q4H WA   ciprofloxacin  400 mg IntraVENous Q12H (10/16, day #2)   tobramycin   300 mg Nebulization BID   pantoprazole  40 mg Oral QAM AC   trazodone  150 mg Oral Nightly   melatonin  5 mg Oral Nightly     Recent Labs     10/16/21  1130   WBC 13.6*   HGB 14.0      MCV 87.6     Recent Labs     10/16/21  1130   *   K 5.0   CL 93*   CO2 28   BUN 17   CREATININE 0.7   GLUCOSE 100*     Recent Labs     10/16/21  1130   AST 19   ALT 24   BILITOT 0.5   ALKPHOS 54     Troponin T:  < 0.01 ng/mL  Lactate (10/16) 2.0 mmol/L  SARS-CoV-2 NAAT (10/16) not detected  proBNP (10/16) 86 pg/mL     Portable CXR (10/16) Bibasilar airspace disease, increased on the left but decreased on the right      ECG:   Normal sinus rhythm. Nonspecific ST changes.   Unchanged from prior ECG of 09-OCT-2021    Objective:   Vitals:  /69   Pulse 84   Temp 97.6 °F (oral)   Resp 18 Ht 5' 5\"  Wt 78.3 kg  SpO2 96% on 3 LPM  BMI 28.74 kg/m²   General appearance: alert and cooperative with exam  Lungs: diffuse rhonchi, reasonable overall air movement  Heart: regular rate and rhythm, S1, S2 normal, no murmur, click, rub or gallop  Abdomen: soft, non-tender; bowel sounds normal; no masses,  no organomegaly  Extremities: extremities normal, atraumatic, no cyanosis or edema  Neurologic: No obvious focal neurologic deficits. Assessment and Plan:   1. Acute exacerbation of stage 3 severe COPD with interstitial lung disease with pulmonary fibrosis. Admit to inpatient status. Systemic steroid, ventilatory support, respiratory therapy. BiPAP 12/6.    2. COPD with chronic respiratory failure on home oxygen with acute hypoxic respiratory failure: BiPAP initiated in the emergency department. Systemic steroid therapy with methylprednisolone 40 mg IV BID.  Transition to oral Prednisone with 21 day taper at discharge.  Oxygen at home used at night.    3. Pulmonary fibrosis with acute on chronic respiratory failure:  Supplemental oxygen. Started on IV ciprofloxacin. Incentive spirometer and Acapella per pulmonary. 4. Nicotine dependence.  Declines need for nicotine replacement therapy.  She has Chantix at home and plans to start after discharge.   5. Hyponatremia:  Chronic and noted on prior admission.  Has previously resolved with IV saline volume expansion.  May be secondary to SSRI. 6. Anxiety:  Continues on buspirone 30 mg BID and fluoxetine 60 mg daily and trazodone 150 mg nightly. 7. Dyslipidemia:  Continues on atorvastatin 20 mg daily. 8. Gastric reflux and stress ulcer prophylaxis with pantoprazole (Protonix) 40 mg daily.      Advance Directive: Limited code (no intubation)  DVT prophylaxis with enoxaparin 40 mg sub-Q daily. Discharge planning:   Will require at least another two days inpatient status      Сергей Dasilva MD  [facility] Hospitalist

## 2021-10-17 NOTE — PLAN OF CARE
Problem: Pain:  Goal: Pain level will decrease  Description: Pain level will decrease  Outcome: Met This Shift  Goal: Control of acute pain  Description: Control of acute pain  Outcome: Met This Shift  Goal: Control of chronic pain  Description: Control of chronic pain  Outcome: Met This Shift     Problem: Discharge Planning:  Goal: Discharged to appropriate level of care  Description: Discharged to appropriate level of care  Outcome: Ongoing     Problem:  Activity Intolerance:  Goal: Ability to tolerate increased activity will improve  Description: Ability to tolerate increased activity will improve  Outcome: Ongoing     Problem: Airway Clearance - Ineffective:  Goal: Ability to maintain a clear airway will improve  Description: Ability to maintain a clear airway will improve  Outcome: Ongoing     Problem: Breathing Pattern - Ineffective:  Goal: Ability to achieve and maintain a regular respiratory rate will improve  Description: Ability to achieve and maintain a regular respiratory rate will improve  Outcome: Ongoing     Problem: Gas Exchange - Impaired:  Goal: Levels of oxygenation will improve  Description: Levels of oxygenation will improve  Outcome: Ongoing

## 2021-10-18 LAB
BASOPHILS ABSOLUTE: 0.1 K/UL (ref 0–0.2)
BASOPHILS RELATIVE PERCENT: 0.3 %
EOSINOPHILS ABSOLUTE: 0 K/UL (ref 0–0.6)
EOSINOPHILS RELATIVE PERCENT: 0 %
GLUCOSE BLD-MCNC: 117 MG/DL (ref 70–99)
GLUCOSE BLD-MCNC: 141 MG/DL (ref 70–99)
GLUCOSE BLD-MCNC: 149 MG/DL (ref 70–99)
GLUCOSE BLD-MCNC: 172 MG/DL (ref 70–99)
HCT VFR BLD CALC: 39.7 % (ref 36–48)
HEMOGLOBIN: 13.6 G/DL (ref 12–16)
LYMPHOCYTES ABSOLUTE: 0.4 K/UL (ref 1–5.1)
LYMPHOCYTES RELATIVE PERCENT: 1.8 %
MCH RBC QN AUTO: 30 PG (ref 26–34)
MCHC RBC AUTO-ENTMCNC: 34.2 G/DL (ref 31–36)
MCV RBC AUTO: 87.7 FL (ref 80–100)
MONOCYTES ABSOLUTE: 0.5 K/UL (ref 0–1.3)
MONOCYTES RELATIVE PERCENT: 2.5 %
NEUTROPHILS ABSOLUTE: 19.3 K/UL (ref 1.7–7.7)
NEUTROPHILS RELATIVE PERCENT: 95.4 %
PDW BLD-RTO: 14.3 % (ref 12.4–15.4)
PERFORMED ON: ABNORMAL
PLATELET # BLD: 199 K/UL (ref 135–450)
PMV BLD AUTO: 6.6 FL (ref 5–10.5)
RBC # BLD: 4.52 M/UL (ref 4–5.2)
WBC # BLD: 20.2 K/UL (ref 4–11)

## 2021-10-18 PROCEDURE — 36415 COLL VENOUS BLD VENIPUNCTURE: CPT

## 2021-10-18 PROCEDURE — 6370000000 HC RX 637 (ALT 250 FOR IP): Performed by: INTERNAL MEDICINE

## 2021-10-18 PROCEDURE — 2700000000 HC OXYGEN THERAPY PER DAY

## 2021-10-18 PROCEDURE — 99233 SBSQ HOSP IP/OBS HIGH 50: CPT | Performed by: INTERNAL MEDICINE

## 2021-10-18 PROCEDURE — 2060000000 HC ICU INTERMEDIATE R&B

## 2021-10-18 PROCEDURE — 6360000002 HC RX W HCPCS: Performed by: INTERNAL MEDICINE

## 2021-10-18 PROCEDURE — 6370000000 HC RX 637 (ALT 250 FOR IP): Performed by: NURSE PRACTITIONER

## 2021-10-18 PROCEDURE — 2580000003 HC RX 258: Performed by: INTERNAL MEDICINE

## 2021-10-18 PROCEDURE — 94761 N-INVAS EAR/PLS OXIMETRY MLT: CPT

## 2021-10-18 PROCEDURE — 94640 AIRWAY INHALATION TREATMENT: CPT

## 2021-10-18 PROCEDURE — 85025 COMPLETE CBC W/AUTO DIFF WBC: CPT

## 2021-10-18 PROCEDURE — 94669 MECHANICAL CHEST WALL OSCILL: CPT

## 2021-10-18 RX ORDER — FLUTICASONE PROPIONATE 110 UG/1
1 AEROSOL, METERED RESPIRATORY (INHALATION) 2 TIMES DAILY
Status: DISCONTINUED | OUTPATIENT
Start: 2021-10-18 | End: 2021-10-20 | Stop reason: HOSPADM

## 2021-10-18 RX ADMIN — METHYLPREDNISOLONE SODIUM SUCCINATE 40 MG: 40 INJECTION, POWDER, FOR SOLUTION INTRAMUSCULAR; INTRAVENOUS at 23:50

## 2021-10-18 RX ADMIN — PANTOPRAZOLE SODIUM 40 MG: 40 TABLET, DELAYED RELEASE ORAL at 09:29

## 2021-10-18 RX ADMIN — ACETAMINOPHEN 650 MG: 325 TABLET ORAL at 19:41

## 2021-10-18 RX ADMIN — CIPROFLOXACIN 400 MG: 2 INJECTION, SOLUTION INTRAVENOUS at 05:35

## 2021-10-18 RX ADMIN — TOBRAMYCIN 300 MG: 300 SOLUTION RESPIRATORY (INHALATION) at 20:20

## 2021-10-18 RX ADMIN — CIPROFLOXACIN 400 MG: 2 INJECTION, SOLUTION INTRAVENOUS at 16:53

## 2021-10-18 RX ADMIN — BUSPIRONE HYDROCHLORIDE 30 MG: 5 TABLET ORAL at 09:30

## 2021-10-18 RX ADMIN — Medication 5 MG: at 21:22

## 2021-10-18 RX ADMIN — FLUOXETINE 60 MG: 20 CAPSULE ORAL at 09:30

## 2021-10-18 RX ADMIN — SODIUM CHLORIDE 25 ML: 9 INJECTION, SOLUTION INTRAVENOUS at 16:51

## 2021-10-18 RX ADMIN — METHYLPREDNISOLONE SODIUM SUCCINATE 40 MG: 40 INJECTION, POWDER, FOR SOLUTION INTRAMUSCULAR; INTRAVENOUS at 12:33

## 2021-10-18 RX ADMIN — IPRATROPIUM BROMIDE AND ALBUTEROL SULFATE 1 AMPULE: .5; 3 SOLUTION RESPIRATORY (INHALATION) at 11:48

## 2021-10-18 RX ADMIN — Medication 1 PUFF: at 20:28

## 2021-10-18 RX ADMIN — TOBRAMYCIN 300 MG: 300 SOLUTION RESPIRATORY (INHALATION) at 08:42

## 2021-10-18 RX ADMIN — SODIUM CHLORIDE, PRESERVATIVE FREE 10 ML: 5 INJECTION INTRAVENOUS at 09:30

## 2021-10-18 RX ADMIN — IPRATROPIUM BROMIDE AND ALBUTEROL SULFATE 1 AMPULE: .5; 3 SOLUTION RESPIRATORY (INHALATION) at 16:17

## 2021-10-18 RX ADMIN — TRAZODONE HYDROCHLORIDE 150 MG: 50 TABLET ORAL at 21:21

## 2021-10-18 RX ADMIN — MONTELUKAST SODIUM 10 MG: 10 TABLET ORAL at 21:21

## 2021-10-18 RX ADMIN — ENOXAPARIN SODIUM 40 MG: 40 INJECTION SUBCUTANEOUS at 09:29

## 2021-10-18 RX ADMIN — SODIUM CHLORIDE, PRESERVATIVE FREE 10 ML: 5 INJECTION INTRAVENOUS at 21:23

## 2021-10-18 RX ADMIN — TIOTROPIUM BROMIDE AND OLODATEROL 2 PUFF: 3.124; 2.736 SPRAY, METERED RESPIRATORY (INHALATION) at 16:21

## 2021-10-18 RX ADMIN — ATORVASTATIN CALCIUM 20 MG: 10 TABLET, FILM COATED ORAL at 09:30

## 2021-10-18 RX ADMIN — BUSPIRONE HYDROCHLORIDE 30 MG: 5 TABLET ORAL at 21:20

## 2021-10-18 RX ADMIN — IPRATROPIUM BROMIDE AND ALBUTEROL SULFATE 1 AMPULE: .5; 3 SOLUTION RESPIRATORY (INHALATION) at 08:40

## 2021-10-18 RX ADMIN — IPRATROPIUM BROMIDE AND ALBUTEROL SULFATE 1 AMPULE: .5; 3 SOLUTION RESPIRATORY (INHALATION) at 20:12

## 2021-10-18 ASSESSMENT — PAIN SCALES - GENERAL
PAINLEVEL_OUTOF10: 3
PAINLEVEL_OUTOF10: 6
PAINLEVEL_OUTOF10: 6

## 2021-10-18 ASSESSMENT — PAIN DESCRIPTION - PAIN TYPE
TYPE: CHRONIC PAIN
TYPE: CHRONIC PAIN

## 2021-10-18 ASSESSMENT — PAIN DESCRIPTION - PROGRESSION
CLINICAL_PROGRESSION: GRADUALLY WORSENING
CLINICAL_PROGRESSION: GRADUALLY WORSENING

## 2021-10-18 ASSESSMENT — PAIN DESCRIPTION - LOCATION
LOCATION: BACK
LOCATION: BACK

## 2021-10-18 ASSESSMENT — PAIN DESCRIPTION - ONSET
ONSET: GRADUAL
ONSET: GRADUAL

## 2021-10-18 ASSESSMENT — PAIN - FUNCTIONAL ASSESSMENT: PAIN_FUNCTIONAL_ASSESSMENT: ACTIVITIES ARE NOT PREVENTED

## 2021-10-18 ASSESSMENT — PAIN DESCRIPTION - FREQUENCY
FREQUENCY: CONTINUOUS
FREQUENCY: CONTINUOUS

## 2021-10-18 ASSESSMENT — ENCOUNTER SYMPTOMS: TACHYPNEA: 1

## 2021-10-18 NOTE — PLAN OF CARE
Problem: Activity Intolerance:  Intervention: Assess signs of activity intolerance  Note: Pt able to get up to bathroom with standby/self this shift. Problem: Breathing Pattern - Ineffective:  Goal: Ability to achieve and maintain a regular respiratory rate will improve  Description: Ability to achieve and maintain a regular respiratory rate will improve  Note: Pt with intermittent tachypnea. Pt with intermitted dyspnea at rest, dyspnea with exertion. Problem: Gas Exchange - Impaired:  Goal: Levels of oxygenation will improve  Description: Levels of oxygenation will improve  Note: Pt was on 3L at beginning of shift, pt's home baseline, weaned to 2L O2 per pulm note to keep O2 90-94% only.

## 2021-10-18 NOTE — PROGRESS NOTES
/69   Pulse 117   Temp 97.6 °F (36.4 °C) (Oral)   Resp 22   Ht 5' 5\" (1.651 m)   Wt 172 lb 11.2 oz (78.3 kg)   SpO2 95%   BMI 28.74 kg/m²  on 3L O2 per nasal cannula, O2 decreased to 2L. Pt sitting in bed. Pt with complaints of intermittent back pain, encourage to reposition frequently, pt declines any other intervention at this time. Lungs diminished through out. Pt with intermittent dyspnea at rest, dyspnea with exertion. ST on tele. Pt denies any needs at this time. Bedside table and call light within reach. Pt instructed to call out for any needs and assistance. Will continue to monitor.   Jojo Vanegas RN  10/18/2021

## 2021-10-18 NOTE — PROGRESS NOTES
rhythm, S1, S2 normal, no murmur, click, rub or gallop  Abdomen: soft, non-tender; bowel sounds normal; no masses,  no organomegaly  Extremities: extremities normal, atraumatic, no cyanosis or edema  Neurologic: No obvious focal neurologic deficits. Assessment and Plan:       The patient is a 60 Y F with severe COPD who has presented here with dyspnea 10 times in the last 5 months and been admitted with a COPD exacerbation most of those times. She again returns with dyspnea and was wheezing heavily. She continues to smoke both cigarettes and marijuana. AECOPD  - steroids (will discharge with 21 day prednisone taper). - pulmonary planning to discharge her with tiotropium+olodaterol and fluticasone. - strongly encouraged smoking cessation. She will keep trying. Has chantix at home. Declines nicotine patch.  - discussed DNR and/or hospice vs palliative care. Patient is emotionally unable to make a decision. Discussed with her family - they want the patient to decide. Palliative care consulted for ongoing discussions. - systemic cipro and tobramycin nebs per pulmonary. No infiltrate seen on CXR during this stay, and no procalcitonin was sent. COVID negative. Doubt flu. Underlying pulmonary fibrosis. Appreciate pulmonary input. Chronic hypoxic respiratory failure. Due to above issues. Baseline 3LNC. Anxiety:  Continues on buspirone and fluoxetine, also trazodone. HLD. Statin. GERD.  PPI.       PT/OT: not indicated  Advance Directive: full code (patient will continue to discuss with family)  DVT prophylaxis: enoxaparin  Discharge planning:  when her breathing seems as good as it is going to get. Readmission seems nearly unavoidable. Patient would like portable oxygen arranged at discharge - informed CM.       April Finley MD

## 2021-10-18 NOTE — ACP (ADVANCE CARE PLANNING)
Advance Care Planning     Advance Care Planning Activator (Inpatient)  Conversation Note      Date of ACP Conversation: 10/18/2021     Conversation Conducted with: patient     ACP Activator: Danyelle Villanueva RN      Health Care Decision Maker:     Current Designated Health Care Decision Maker:     Primary Decision Maker: Erik Rolon - Spouse - [phone]    Secondary Decision Maker: Angelita Wong - Child - [phone]    Secondary Decision Maker: kaylynn Galvan - Child - [phone]    Supplemental (Other) Decision Maker: Antoine Bahena Child - [phone]      Care Preferences    Ventilation: \"If you were in your present state of health and suddenly became very ill and were unable to breathe on your own, what would your preference be about the use of a ventilator (breathing machine) if it were available to you? \"      Would the patient desire the use of ventilator (breathing machine)?: yes    \"If your health worsens and it becomes clear that your chance of recovery is unlikely, what would your preference be about the use of a ventilator (breathing machine) if it were available to you? \"     Would the patient desire the use of ventilator (breathing machine)?: No      Resuscitation  \"CPR works best to restart the heart when there is a sudden event, like a heart attack, in someone who is otherwise healthy. Unfortunately, CPR does not typically restart the heart for people who have serious health conditions or who are very sick. \"    \"In the event your heart stopped as a result of an underlying serious health condition, would you want attempts to be made to restart your heart (answer \"yes\" for attempt to resuscitate) or would you prefer a natural death (answer \"no\" for do not attempt to resuscitate)? \" yes       [x] Yes   [] No   Educated Patient / Tricia Hewitt regarding differences between Advance Directives and portable DNR orders.     Length of ACP Conversation in minutes: 5     Conversation Outcomes:  [x] ACP discussion completed  [] Existing advance directive reviewed with patient; no changes to patient's previously recorded wishes  [] New Advance Directive completed  [] Portable Do Not Rescitate prepared for Provider review and signature  [] POLST/POST/MOLST/MOST prepared for Provider review and signature      Follow-up plan:    [] Schedule follow-up conversation to continue planning  [x] Referred individual to Provider for additional questions/concerns   [] Advised patient/agent/surrogate to review completed ACP document and update if needed with changes in condition, patient preferences or care setting    [] This note routed to one or more involved healthcare providers

## 2021-10-18 NOTE — PROGRESS NOTES
Progressive Care Progress Note    Patient: Cuca Stoner MRN: [id number]  Date of  Admission: 10/16/2021   YOB: 1955  Age: 60 y.o. Sex: female    Unit: [facility] C4 U  Room/Bed: 4454/0420-37 Attending Physician: Haile Gay MD   Admitting Physician: Balwinder Troncoso, [address] [place] [address]        Reason for visit     severe COPD with pulmonary fibrosis       Subjective: Patient still having a little bit of shortness of breath  I seen her during her last admission  Patient near her baseline oxygen requirements        ROS:A comprehensive review of systems was negative except for: Above    Objective: Intake and Output:   Current Shift:   10/18 0701 - 10/18 1500  In: 545 [P.O.:535; I.V.:10]  Out: 1000 [Urine:1000]  Last three shifts:   10/17 0701 - 10/18 0700  In: 330 [P.O.:120; I.V.:10]  Out: 500 [Urine:500]        Hemodynamic parameters for last 24 hours:  [unfilled]    Physical Exam:   Patient Vitals for the past 24 hrs:   BP Temp Temp src Pulse Resp SpO2   10/18/21 1150 -- -- -- -- 20 95 %   10/18/21 1143 110/60 98.1 °F (36.7 °C) Oral 93 20 97 %   10/18/21 0849 -- -- -- -- -- 94 %   10/18/21 0844 -- -- -- -- 28 92 %   10/18/21 0842 135/72 98.3 °F (36.8 °C) Oral 114 28 96 %   10/18/21 0542 115/78 97.8 °F (36.6 °C) Oral 98 20 95 %   10/17/21 2348 107/65 97.9 °F (36.6 °C) Oral 90 12 97 %   10/17/21 2029 -- -- -- -- -- 94 %   10/17/21 1922 126/76 97.8 °F (36.6 °C) Oral 113 16 96 %   10/17/21 1613 126/84 97.6 °F (36.4 °C) Oral 105 18 92 %        Physical Exam  Vitals and nursing note reviewed. Constitutional:       General: She is not in acute distress. Appearance: Normal appearance. She is obese. HENT:      Head: Normocephalic and atraumatic. Right Ear: External ear normal.      Left Ear: External ear normal.      Nose: Nose normal.      Mouth/Throat:      Mouth: Mucous membranes are moist.      Pharynx: Oropharynx is clear.       Comments: Mallampati class III throat  Eyes:      General: Right eye: No discharge. Left eye: No discharge. Extraocular Movements: Extraocular movements intact. Conjunctiva/sclera: Conjunctivae normal.      Pupils: Pupils are equal, round, and reactive to light. Cardiovascular:      Rate and Rhythm: Normal rate and regular rhythm. Pulses: Normal pulses. Heart sounds: No murmur heard. Pulmonary:      Effort: No respiratory distress. Breath sounds: No stridor. No wheezing, rhonchi or rales. Comments: Decreased breath sounds bilaterally  Chest:      Chest wall: No tenderness. Abdominal:      General: Bowel sounds are normal. There is no distension. Palpations: Abdomen is soft. There is no mass. Tenderness: There is no abdominal tenderness. Hernia: No hernia is present. Musculoskeletal:         General: No swelling. Normal range of motion. Cervical back: Normal range of motion. No rigidity. Skin:     General: Skin is warm and dry. Coloration: Skin is not jaundiced or pale. Neurological:      General: No focal deficit present. Mental Status: She is alert and oriented to person, place, and time. Mental status is at baseline. Cranial Nerves: No cranial nerve deficit. Sensory: No sensory deficit. Psychiatric:         Mood and Affect: Mood normal.         Behavior: Behavior normal.         Thought Content: Thought content normal.             Labs:   Recent Labs     10/16/21  1130 10/18/21  0526   WBC 13.6* 20.2*   HGB 14.0 13.6   HCT 41.3 39.7    199      Recent Labs     10/16/21  1130 10/17/21  0913   * 131*   K 5.0 4.0   CL 93* 93*   CO2 28 23   BUN 17 17   GLUCOSE 100* 282*           Radiology, images personally reviewed.  Yes  Procedure Component Value Ref Range Date/Time      XR CHEST PORTABLE [[id number]] Collected: 10/16/21 1212     Order Status: Completed Updated: 10/16/21 1219     Narrative:       EXAMINATION:   ONE XRAY VIEW OF THE CHEST     10/16/2021 11:52 am COMPARISON:   10/09/2021     HISTORY:   ORDERING SYSTEM PROVIDED HISTORY: SOB   TECHNOLOGIST PROVIDED HISTORY:   Reason for exam:->SOB   Reason for Exam: SOB   Acuity: Acute   Type of Exam: Initial     FINDINGS:   Heart size is normal.  Mediastinal contours are normal.  Pulmonary   vascularity is normal.Patchy opacity is seen at the left lung base, slightly   increased. Right lung base is better aerated.  Spurring is seen in the spine   and shoulder joints      Impression:       Bibasilar airspace disease, increased on the left but decreased on the right                Other imaging: Not indicated      Micro: Negative growth to date    Assessment:     Active Problems:    Stage 3 severe COPD by GOLD classification ([address]    Smoker    Pulmonary fibrosis (HCC)    Kyphosis    Cannabis dependence with current use ([address]    COPD with acute exacerbation ([address]    H/O pneumonia due to Pseudomonas    Chronic respiratory failure with hypoxia and hypercapnia (HCC)  Resolved Problems:    * No resolved hospital problems. *      Discussion / Plan:         1. Patient is on a LABA/ICS at home  2. I would advance her therapy to triple therapy  3. We will start her on Stiolto with Flovent here in the hospital  4. Patient should not be leaving on the same medication that she is come in with  5. Continue with Singulair  6. Patient most likely has sleep apnea which needs to be addressed as an outpatient  7. Patient had a PCO2 of less than 52 on a venous blood gas  8. Continue with duo neb  9. She really needs to stop smoking marijuana  10. This is probably going to kill her          Kortney Appiah MD    [facility] Pulmonary, Critical Care and Sleep Medicine  [phone]    Please note that some or all of this record was generated using voice recognition software. If there are any questions about the content of this document, please contact the author as some errors in transcription may have occurred.

## 2021-10-18 NOTE — CARE COORDINATION
CASE MANAGEMENT INITIAL ASSESSMENT      Reviewed chart and completed assessment with: patient  Explained Case Management role/services. Health Care Decision Maker :   Primary Decision Maker: Marli Dalton - Spouse - [phone]    Secondary Decision Maker: Angelita Wong - Child - [phone]    Secondary Decision Maker: kaylynn Sharif - Child - [phone]    Supplemental (Other) Decision Maker: Miriam Howard - Child - [phone]          Can this person be reached and be able to respond quickly, such as within a few minutes or hours? Yes    Admit date/status: 10/16 Inpatient   Diagnosis: COPD   Is this a Readmission?:  Yes      Insurance: [address] required for SNF: No       3 night stay required: No    Living arrangements, Adls, care needs, prior to admission: lives in an apartment with her      Transportation: TBD      Durable Medical Equipment at home:  Walker_X_Cane__RTS__ BSC__Shower Chair__  02_X ([employer])_ HHN_X_ CPAP__  BiPap__  Hospital Bed__ W/C___ Other__________    Services in the home and/or outpatient, prior to admission: active with [facility]     Dialysis Facility (if applicable)   · Name:  · Address:  · Dialysis Schedule:  · Phone:  · Fax:    PT/OT recs: none    Hospital Exemption Notification (HEN): none    Barriers to discharge: none    Plan/comments: Patient is independent at home. She is asking about portable oxygen, however, after speaking to [employer] [place] [employer] with [facility], she does not qualify for one as she would have to be cigarette free for a year in order to qualify. She is active with [facility] and will resume with them at discharge. Please notify CM should any additional needs arise.       ECOC on chart for MD signature

## 2021-10-18 NOTE — PROGRESS NOTES
/72   Pulse 114   Temp 98.3 °F (36.8 °C) (Oral)   Resp 28   Ht 5' 5\" (1.651 m)   Wt 172 lb 11.2 oz (78.3 kg)   SpO2 94%   BMI 28.74 kg/m²  on 3L O2 per nasal cannula. Pt sitting up in bed. Pt denies pain, discomfort or shortness of breath at this time; dyspnea with exertion. Lungs diminished through out with crackles in bases. ST on tele. Pt denies any needs at this time. Bedside table and call light within reach. Pt instructed to call out for any needs and assistance. Will continue to monitor.   Lupe Osorio RN  10/18/2021

## 2021-10-18 NOTE — DISCHARGE INSTR - COC
Continuity of Care Form    Patient Name: Ervin Altamirano   :  1955  MRN:  [id number]    Admit date:  10/16/2021  Discharge date:  ***    Code Status Order: Full Code   Advance Directives:     Admitting Physician:  Nikhil Smith MD  PCP: Pippa Shen MD    Discharging Nurse: [facility] Unit/Room#: 7060/0882-52  Discharging Unit Phone Number: ***    Emergency Contact:   Extended Emergency Contact Information  Primary Emergency Contact: Angelita Wong  Address: [address] [place] [address] Phone: [phone]  Mobile Phone: [phone]  Relation: Child  Secondary Emergency Contact: [address], [address] [facility] Phone: [phone]  Mobile Phone: [phone]  Relation: Spouse    Past Surgical History:  Past Surgical History:   Procedure Laterality Date    BLADDER REPAIR      BRONCHOSCOPY      BRONCHOSCOPY N/A 3/6/2020    BRONCHOSCOPY THERAPUTIC ASPIRATION INITIAL performed by Jayson Yap MD at [address] [place] [address]  3/6/2020    BRONCHOSCOPY ALVEOLAR LAVAGE performed by Jayson Yap MD at [address] [place] [address] N/A 2020    BRONCHOSCOPY THERAPUTIC ASPIRATION INITIAL performed by Moris Gilman MD at [address] [place] [address]  2020    BRONCHOSCOPY ALVEOLAR LAVAGE performed by Moris Gilman MD at [address] [place] [address]  2021    Dr Alana Leslie N/A 2021    [address] performed by Moris Gilman MD at [address] [place] [address]  2021    BRONCHOSCOPY THERAPUTIC ASPIRATION INITIAL performed by Moris Gilman MD at [address] [place] [address]  2021    BRONCHOSCOPY ALVEOLAR LAVAGE performed by Moris Gilman MD at [address] [place] [address] 2021    BRONCHOSCOPY DIAGNOSTIC OR CELL [address] performed by Moris Gilman MD at [place] [address]      ENDOSCOPY, COLON, DIAGNOSTIC  ESOPHAGEAL DILATATION  9/20/2018    ESOPHAGEAL DILATION WATERS performed by Devante Kwong MD at [address] [facility] HISTORY  2/12/15    T8 Kyphoplasty    MA COLONOSCOPY FLX DX W/COLLJ SPEC WHEN PFRMD N/A 9/20/2018    EGD AND COLONOSCOPY WITH ANESTHESIA performed by Devante Kwong MD at [address] [facility] ESOPHAGOGASTRODUODENOSCOPY TRANSORAL DIAGNOSTIC N/A 9/20/2018    EGD AND COLONOSCOPY WITH ANESTHESIA performed by Devante Kwong MD at [address] [facility]         Immunization History:   Immunization History   Administered Date(s) Administered    COVID-19, ExceleraRx, PF, 30mcg/0.3mL 03/10/2021, 03/31/2021    Influenza 10/04/2010, 10/12/2011, 11/28/2012    Influenza Virus Vaccine 10/16/2014, 01/14/2016    Influenza, Intradermal, Preservative free 11/04/2013    Influenza, Elyce Asmita, IM, (6 mo and older Fluzone, Flulaval, Fluarix and 3 yrs and older Afluria) 10/12/2011, 01/30/2017, 10/19/2017    Influenza, Elyce Bittinger, IM, PF (6 mo and older Fluzone, Flulaval, Fluarix, and 3 yrs and older Afluria) 09/06/2018, 12/26/2019, 10/12/2021    Pneumococcal Conjugate 7-valent (Prevnar7) 01/01/2009    Pneumococcal Polysaccharide (Acsuqngtn90) 01/14/2016    Td, unspecified formulation 11/04/2013    Tdap (Boostrix, Adacel) 01/25/2018       Active Problems:  Patient Active Problem List   Diagnosis Code    Stage 3 severe COPD by GOLD classification ([facility] J44.9    Smoker F17.200    Fibromyalgia M79.7    Hypokalemia E87.6    Hyperlipidemia E78.5    Hyponatremia E87.1    Depression F32. A    Tobacco dependence F17.200    Pulmonary fibrosis (HCC) J84.10    ILD (interstitial lung disease) ([facility]) J84.9    Recurrent major depressive disorder, in partial remission ([facility] F33.41    Thyroid nodule E04.1    History of prescription drug abuse NFS4404    Esophageal dysphagia R13.19    Heartburn R12    Rectal bleeding K62.5    History of colon polyps Z86.010    Compression fx, thoracic spine, sequela S22.000S    Kyphosis M40.209    Anxiety and depression F41.9, F32. A    Chronic pain syndrome G89.4    Polyarthropathy, multiple sites M13.0    Pneumonia J18.9    Acute on chronic respiratory failure with hypoxia and hypercapnia ([facility]) J96.21, J96.22    Acute respiratory failure with hypoxia ([facility]) J96.01    Healthcare associated bacterial pneumonia J15.9    Bandemia D72.825    Sepsis ([facility]) A41.9    COPD exacerbation ([facility]) J44.1    Pulmonary infiltrates R91.8    Chest congestion R09.89    Mucus plugging of bronchi T17.500A    Ineffective airway clearance R06.89    Acute on chronic respiratory failure with hypoxemia ([facility]) J96.21    COPD, frequent exacerbations ([facility]) J44.1    Acute on chronic respiratory failure ([facility]) J96.20    Cannabis dependence with current use ([facility]) F12.20    Other secondary kyphosis, 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    Marijuana use X00.02    Diastolic dysfunction U16.62    COPD (chronic obstructive pulmonary disease) ([facility]) J44.9    Supplemental oxygen dependent Z99.81    COPD with acute exacerbation ([facility]) J44.1    H/O pneumonia due to Pseudomonas Z87.01    Chronic respiratory failure with hypoxia and hypercapnia ([facility]) J96.11, J96.12       Isolation/Infection:   Isolation          No Isolation        Patient Infection Status     Infection Onset Added Last Indicated Last Indicated By Review Planned Expiration Resolved Resolved By    None active    Resolved    COVID-19 Rule Out 10/09/21 10/09/21 10/09/21 COVID-19, Rapid (Ordered)   10/09/21 Rule-Out Test Resulted    COVID-19 Rule Out 09/27/21 09/27/21 09/27/21 COVID-19, Rapid (Ordered)   09/27/21 Rule-Out Test Resulted    COVID-19 Rule Out 09/17/21 09/17/21 09/17/21 COVID-19, Rapid (Ordered)   09/17/21 Rule-Out Test Resulted    COVID-19 Rule Out 09/09/21 09/09/21 09/09/21 SARS-CoV-2 NAAT (Rapid) (Ordered)   09/09/21 Rule-Out Test Resulted    COVID-19 Rule Out 07/11/21 21 COVID-19, Rapid (Ordered)   21 Rule-Out Test Resulted    COVID-19 Rule Out 20 COVID-19 (Ordered)   20 Rule-Out Test Resulted    COVID-19 Rule Out 10/09/20 10/09/20 10/09/20 COVID-19 (Ordered)   10/10/20 Rule-Out Test Resulted    COVID-19 Rule Out 20 COVID-19 (Ordered)   20 Rule-Out Test Resulted    COVID-19 Rule Out 20 COVID-19 (Ordered)   20 Rule-Out Test Resulted    INFLUENZA 20 Respiratory Panel, Molecular   20           Nurse Assessment:  Last Vital Signs: /69   Pulse 117   Temp 97.6 °F (36.4 °C) (Oral)   Resp 22   Ht 5' 5\" (1.651 m)   Wt 172 lb 11.2 oz (78.3 kg)   SpO2 95%   BMI 28.74 kg/m²     Last documented pain score (0-10 scale): Pain Level: 0  Last Weight:   Wt Readings from Last 1 Encounters:   10/17/21 172 lb 11.2 oz (78.3 kg)     Mental Status:  {IP PT MENTAL STATUS:35627}    IV Access:  { ASHLEY IV ACCESS:906388039}    Nursing Mobility/ADLs:  Walking   {P DME ZYGK:782313691}  Transfer  {P DME CWTF:018912880}  Bathing  {CHP DME YIUU:315330818}  Dressing  {CHP DME KWQZ:474622691}  Toileting  {CHP DME TUSZ:477028547}  Feeding  {CHP DME NKBE:237551374}  Med Admin  {CHP DME VUJO:096584917}  Med Delivery   { ASHLEY MED Delivery:697361438}    Wound Care Documentation and Therapy:        Elimination:  Continence:   · Bowel: {YES / HJ:83695}  · Bladder: {YES / AS:44942}  Urinary Catheter: {Urinary Catheter:046822083}   Colostomy/Ileostomy/Ileal Conduit: {YES / UN:35456}       Date of Last BM: ***    Intake/Output Summary (Last 24 hours) at 10/18/2021 1602  Last data filed at 10/18/2021 1500  Gross per 24 hour   Intake 875 ml   Output 1800 ml   Net -925 ml     I/O last 3 completed shifts: In: 588 [P.O.:655;  I.V.:20; IV Piggyback:200]  Out: 1800 [Urine:1800]    Safety Concerns:     [address] [facility] Safety Concerns:333957722}    Impairments/Disabilities: [address] [facility] Impairments/Disabilities:812453918}    Nutrition Therapy:  Current Nutrition Therapy:   [address] ASHLEY Diet List:129775639}    Routes of Feeding: {CHP DME Other Feedings:692864865}  Liquids: {Slp liquid thickness:77664}  Daily Fluid Restriction: {CHP DME Yes amt example:551688913}  Last Modified Barium Swallow with Video (Video Swallowing Test): {Done Not Done ZTGS:599280352}    Treatments at the Time of Hospital Discharge:   Respiratory Treatments: ***  Oxygen Therapy:  {Therapy; copd oxygen:93037}  Ventilator:    { CC Vent LRLH:121801574}    Rehab Therapies: {THERAPEUTIC INTERVENTION:3406451583}  Weight Bearing Status/Restrictions: [address] [facility] Weight Bearin}  Other Medical Equipment (for information only, NOT a DME order):  {EQUIPMENT:989754632}  Other Treatments: ***    Patient's personal belongings (please select all that are sent with patient):  {[facility] DME Belongings:946637274}    RN SIGNATURE:  {Esignature:442074791}    CASE MANAGEMENT/SOCIAL WORK SECTION    Inpatient Status Date: ***    Readmission Risk Assessment Score:  Readmission Risk              Risk of Unplanned Readmission:  62           Discharging to Facility/ Agency   · Name:   · Address:  · Phone:  · Fax:    Dialysis Facility (if applicable)   · Name:  · Address:  · Dialysis Schedule:  · Phone:  · Fax:    / signature: {Esignature:641099783}    PHYSICIAN SECTION    Prognosis: {Prognosis:2997829111}    Condition at Discharge: [address] Patient Condition:949352205}    Rehab Potential (if transferring to Rehab): {Prognosis:6376897070}    Recommended Labs or Other Treatments After Discharge: ***  Recommended Follow-up, Labs or Other Treatments After Discharge:    ***             Physician Certification: I certify the above information and transfer of Wilmer Goff  is necessary for the continuing treatment of the diagnosis listed and that she requires {Admit to Appropriate Level of Care:71424} for {GREATER/LESS:141040996} 30 days.     Update Admission H&P: [facility]}    PHYSICIAN SIGNATURE:  {Esignature:251777605}

## 2021-10-19 LAB
GLUCOSE BLD-MCNC: 117 MG/DL (ref 70–99)
GLUCOSE BLD-MCNC: 130 MG/DL (ref 70–99)
GLUCOSE BLD-MCNC: 138 MG/DL (ref 70–99)
GLUCOSE BLD-MCNC: 144 MG/DL (ref 70–99)
PERFORMED ON: ABNORMAL

## 2021-10-19 PROCEDURE — 2060000000 HC ICU INTERMEDIATE R&B

## 2021-10-19 PROCEDURE — 94669 MECHANICAL CHEST WALL OSCILL: CPT

## 2021-10-19 PROCEDURE — 94761 N-INVAS EAR/PLS OXIMETRY MLT: CPT

## 2021-10-19 PROCEDURE — 2580000003 HC RX 258: Performed by: INTERNAL MEDICINE

## 2021-10-19 PROCEDURE — 2700000000 HC OXYGEN THERAPY PER DAY

## 2021-10-19 PROCEDURE — 6370000000 HC RX 637 (ALT 250 FOR IP): Performed by: INTERNAL MEDICINE

## 2021-10-19 PROCEDURE — 6360000002 HC RX W HCPCS: Performed by: INTERNAL MEDICINE

## 2021-10-19 PROCEDURE — 99232 SBSQ HOSP IP/OBS MODERATE 35: CPT | Performed by: INTERNAL MEDICINE

## 2021-10-19 PROCEDURE — 6370000000 HC RX 637 (ALT 250 FOR IP): Performed by: NURSE PRACTITIONER

## 2021-10-19 PROCEDURE — 94640 AIRWAY INHALATION TREATMENT: CPT

## 2021-10-19 RX ADMIN — FLUOXETINE 60 MG: 20 CAPSULE ORAL at 10:28

## 2021-10-19 RX ADMIN — TIOTROPIUM BROMIDE AND OLODATEROL 2 PUFF: 3.124; 2.736 SPRAY, METERED RESPIRATORY (INHALATION) at 08:05

## 2021-10-19 RX ADMIN — IPRATROPIUM BROMIDE AND ALBUTEROL SULFATE 1 AMPULE: .5; 3 SOLUTION RESPIRATORY (INHALATION) at 21:01

## 2021-10-19 RX ADMIN — PANTOPRAZOLE SODIUM 40 MG: 40 TABLET, DELAYED RELEASE ORAL at 06:58

## 2021-10-19 RX ADMIN — TRAZODONE HYDROCHLORIDE 150 MG: 50 TABLET ORAL at 20:45

## 2021-10-19 RX ADMIN — SODIUM CHLORIDE, PRESERVATIVE FREE 10 ML: 5 INJECTION INTRAVENOUS at 20:46

## 2021-10-19 RX ADMIN — ATORVASTATIN CALCIUM 20 MG: 10 TABLET, FILM COATED ORAL at 10:28

## 2021-10-19 RX ADMIN — Medication 1 PUFF: at 08:05

## 2021-10-19 RX ADMIN — IPRATROPIUM BROMIDE AND ALBUTEROL SULFATE 1 AMPULE: .5; 3 SOLUTION RESPIRATORY (INHALATION) at 15:59

## 2021-10-19 RX ADMIN — METHYLPREDNISOLONE SODIUM SUCCINATE 40 MG: 40 INJECTION, POWDER, FOR SOLUTION INTRAMUSCULAR; INTRAVENOUS at 12:50

## 2021-10-19 RX ADMIN — TOBRAMYCIN 300 MG: 300 SOLUTION RESPIRATORY (INHALATION) at 08:05

## 2021-10-19 RX ADMIN — BUSPIRONE HYDROCHLORIDE 30 MG: 5 TABLET ORAL at 20:40

## 2021-10-19 RX ADMIN — METHYLPREDNISOLONE SODIUM SUCCINATE 40 MG: 40 INJECTION, POWDER, FOR SOLUTION INTRAMUSCULAR; INTRAVENOUS at 23:11

## 2021-10-19 RX ADMIN — CIPROFLOXACIN 400 MG: 2 INJECTION, SOLUTION INTRAVENOUS at 10:35

## 2021-10-19 RX ADMIN — IPRATROPIUM BROMIDE AND ALBUTEROL SULFATE 1 AMPULE: .5; 3 SOLUTION RESPIRATORY (INHALATION) at 12:03

## 2021-10-19 RX ADMIN — Medication 1 PUFF: at 21:08

## 2021-10-19 RX ADMIN — Medication 5 MG: at 20:44

## 2021-10-19 RX ADMIN — ACETAMINOPHEN 650 MG: 325 TABLET ORAL at 20:44

## 2021-10-19 RX ADMIN — CIPROFLOXACIN 400 MG: 2 INJECTION, SOLUTION INTRAVENOUS at 22:05

## 2021-10-19 RX ADMIN — MONTELUKAST SODIUM 10 MG: 10 TABLET ORAL at 20:39

## 2021-10-19 RX ADMIN — SODIUM CHLORIDE, PRESERVATIVE FREE 10 ML: 5 INJECTION INTRAVENOUS at 10:28

## 2021-10-19 RX ADMIN — BUSPIRONE HYDROCHLORIDE 30 MG: 5 TABLET ORAL at 10:28

## 2021-10-19 RX ADMIN — ENOXAPARIN SODIUM 40 MG: 40 INJECTION SUBCUTANEOUS at 10:28

## 2021-10-19 RX ADMIN — IPRATROPIUM BROMIDE AND ALBUTEROL SULFATE 1 AMPULE: .5; 3 SOLUTION RESPIRATORY (INHALATION) at 08:05

## 2021-10-19 ASSESSMENT — PAIN - FUNCTIONAL ASSESSMENT: PAIN_FUNCTIONAL_ASSESSMENT: ACTIVITIES ARE NOT PREVENTED

## 2021-10-19 ASSESSMENT — PAIN DESCRIPTION - LOCATION: LOCATION: BACK

## 2021-10-19 ASSESSMENT — PAIN SCALES - GENERAL
PAINLEVEL_OUTOF10: 6
PAINLEVEL_OUTOF10: 0
PAINLEVEL_OUTOF10: 5

## 2021-10-19 ASSESSMENT — PAIN DESCRIPTION - PAIN TYPE: TYPE: CHRONIC PAIN

## 2021-10-19 ASSESSMENT — PAIN DESCRIPTION - PROGRESSION
CLINICAL_PROGRESSION: GRADUALLY IMPROVING
CLINICAL_PROGRESSION: GRADUALLY IMPROVING

## 2021-10-19 ASSESSMENT — PAIN DESCRIPTION - ONSET: ONSET: ON-GOING

## 2021-10-19 ASSESSMENT — PAIN DESCRIPTION - FREQUENCY: FREQUENCY: INTERMITTENT

## 2021-10-19 NOTE — PROGRESS NOTES
INPATIENT PULMONARY CRITICAL CARE PROGRESS NOTE      Reason for visit    severe COPD with pulmonary fibrosis     SUBJECTIVE: Patient when seen this morning was on 2 L of nasal cannula oxygen with saturation 94%, patient was not having any increasing shortness of breath/increased work of breathing ;patient was somewhat anxious this morning as she said that she was told by the hospital doctor that she has less than 6 months to live , patient was not having any increased expectoration per se, patient was afebrile and has NSR on the monitor  patient had maintain hemodynamics, patient has had adequate urine output; patient has acceptable blood sugar control ; patient was alert and communicative, no other pertinent review of system of concern             Physical Exam:  Blood pressure 123/79, pulse 93, temperature 98 °F (36.7 °C), temperature source Oral, resp. rate 20, height 5' 5\" (1.651 m), weight 173 lb 4.5 oz (78.6 kg), SpO2 96 %, not currently breastfeeding.'     Constitutional: No significant respiratory distress on nasal cannula oxygenation , patient had mild tachypnea when seen  HENT: No facial asymmetry . No thyromegaly.;moon facies   Eyes:  Conjunctivae are normal. Pupils equal, round, and reactive to light. No scleral icterus. Neck: . No tracheal deviation present. No obvious thyroid mass. Cardiovascular: Sinus  Rhythm , normal heart sounds.  No right ventricular heave. Some lower extremity edema. upper limb edema   Pulmonary/Chest: No  wheezes.  no significant  rales. No increasing  chest congestion chest wall is not dull to percussion.  No accessory muscle usage or stridor.  Decreased breath sound density  Abdominal: Soft. Bowel sounds present. No distension or hernia. No tenderness.    Musculoskeletal: No cyanosis. No clubbing. No obvious joint deformity. Lymphadenopathy: No cervical or supraclavicular adenopathy. Skin: Skin is warm and dry. No rash or nodules on the exposed extremities. ecchymosis Lt forearm   Psychiatric: Normal mood and affect. Behavior is normal.  No anxiety. Neurologic: Alert, awake and oriented.  PERRL.  Speech fluent       Results:  CBC:   Recent Labs     10/18/21  0526   WBC 20.2*   HGB 13.6   HCT 39.7   MCV 87.7        BMP:   Recent Labs     10/17/21  0913   *   K 4.0   CL 93*   CO2 23   BUN 17   CREATININE 0.7       Imaging:  I have reviewed radiology images personally. XR CHEST PORTABLE   Final Result   Bibasilar airspace disease, increased on the left but decreased on the right           Results for Renu Ware (MRN [id number]) as of 10/19/2021 12:30   Ref.  Range 10/2/2021 05:36 10/9/2021 12:15 10/10/2021 06:39 10/16/2021 11:30 10/18/2021 05:26   WBC Latest Ref Range: 4.0 - 11.0 K/uL 13.5 (H) 14.5 (H) 11.8 (H) 13.6 (H) 20.2 (H)   RBC Latest Ref Range: 4.00 - 5.20 M/uL 4.09 4.41 4.13 4.71 4.52   Hemoglobin Quant Latest Ref Range: 12.0 - 16.0 g/dL 12.3 13.3 12.4 14.0 13.6   Hematocrit Latest Ref Range: 36.0 - 48.0 % 35.9 (L) 39.6 36.2 41.3 39.7   MCV Latest Ref Range: 80.0 - 100.0 fL 87.7 89.8 87.6 87.6 87.7   MCH Latest Ref Range: 26.0 - 34.0 pg 29.9 30.2 30.0 29.7 30.0   MCHC Latest Ref Range: 31.0 - 36.0 g/dL 34.2 33.6 34.2 33.9 34.2   MPV Latest Ref Range: 5.0 - 10.5 fL 7.1 6.3 6.4 6.7 6.6   RDW Latest Ref Range: 12.4 - 15.4 % 13.8 14.3 14.4 14.6 14.3   Platelet Count Latest Ref Range: 135 - 450 K/uL 233 186 214 230 199   Neutrophils % Latest Units: %  74.2 95.5 74.0 95.4   Lymphocyte % Latest Units: %  19.1 2.2 13.0 1.8   Monocytes % Latest Units: %  6.0 2.2 7.0 2.5   Eosinophils % Latest Units: %  0.3 0.0 2.0 0.0             Assessment:  Active Problems:    Stage 3 severe COPD by GOLD classification ([facility])    Smoker    Pulmonary fibrosis ([facility])    Kyphosis    Cannabis dependence with current use ([facility])    COPD with acute exacerbation ([facility])    H/O pneumonia due to Pseudomonas    Chronic respiratory failure with hypoxia and hypercapnia ([facility])  Resolved Problems:    * No resolved hospital problems.  *          Plan:   · Oxygen supplementation to keep saturation being 90 to 94% only  · Please titrate the oxygen as per the above parameters-nursing communication has been sent to that effect  · Patient has a compensated respiratory acidosis with hypoxemia and patient does not need any BiPAP at this time which is being discontinued  · Monitor for any worsening increasing somnolence suggestive of hypercapnia which may need to be evaluated and reassessed in terms of management  · Bronchodilators-patient has been satrted on stiolto and flovent along with  duoneb which can cause tachycardia -consider changing duonebt o PRN   · DuoNeb to continue  · IV Solu-Medrol to continue but the dose was decreased to 40 mg IV push every 12 hours-will consider changing to PO prednisone in AM   · Incentive spirometry  · Pulmonary toilet  · Patient continues to have increased chest congestion along with mucus plugging and ineffective airway clearance and patient has recurrent hospitalization with similar symptoms -patient has had bronchoscopy done in the past which showed patient to have Pseudomonas pneumonia  · Given the patient's clinical condition and recurrent hospitalization-patient was started on IV ciprofloxacin along with that patient was started on DONALDO nebs  · Will hold off on any bronchoscopy at this time  · Improved urine o/p with one dose of lasix   · Strict input output charting  · Monitor input output and BMP  · Correct electrolytes and whenever necessary basis  · Patient's hyponatremia may be secondary to SSRI  · Patient's TSH in the past was normal  · Smoking cessation advised and pros and cons of continued smoking discussed-not ready for any commitment  · Nicotine replacement therapy if the patient wants  · Patient's use of marijuana is adding to the complexity of the disease process  · PUD  as per IM   · Patient does not want any evaluation for SUAD  · Patient is vaccinated against COVID-19  · Promote IS and acapella  · PPI        Patient now wants to be a full code    Case d/w nursing       Electronically signed by:  Hope Stoner MD    10/19/2021    12:26 PM.

## 2021-10-19 NOTE — PROGRESS NOTES
Palliative Care Progress Note  Palliative Care Admit date:  10/19/21    Plan:  Family conference w/ pt and children tomorrow, 10/20, @ 1100.         Reason for consult:  _X_ Advance Care Planning  ___ Transition of Care Planning  ___ Psychosocial/Spiritual Support  ___ Symptom Management                                                                                                                                Amadeo Coronado RN

## 2021-10-19 NOTE — FLOWSHEET NOTE
10/19/21 0815   Assessment   Charting Type Shift assessment   Neurological   Neuro (WDL) WDL   Level of Consciousness Alert (0)   Orientation Level Oriented X4;Appropriate for developmental age   Cognition Appropriate judgement; Appropriate safety awareness; Appropriate attention/concentration; Appropriate for developmental age; Follows commands   Language Clear; Appropriate for developmental age   Size R Pupil (mm) 2   R Pupil Shape Round   R Pupil Reaction Brisk   Size L Pupil (mm) 2   L Pupil Shape Round   L Pupil Reaction Brisk   R Hand  Moderate   L Hand  Moderate   R Foot Dorsiflexion Moderate   L Foot Dorsiflexion Moderate   R Foot Plantar Flexion Moderate   L Foot Plantar Flexion Moderate   RUE Motor Response Responds to command;Normal extension;Normal flexion   LUE Motor Response Responds to command;Normal extension;Normal flexion   RLE Motor Response Responds to command;Normal extension;Normal flexion   LLE Motor Response Responds to command;Normal extension;Normal flexion   Sarah Coma Scale   Eye Opening 4   Best Verbal Response 5   Best Motor Response 6   Sarah Coma Scale Score 15   HEENT   HEENT (WDL) X   Right Eye Impaired vision   Left Eye Impaired vision   Teeth Edentulous   Respiratory   Respiratory (WDL) X   Respiratory Pattern Regular   Respiratory Depth Normal   Respiratory Quality/Effort Dyspnea with exertion   Chest Assessment Chest expansion symmetrical   Breath Sounds   Right Upper Lobe Clear   Right Middle Lobe Clear   Right Lower Lobe Diminished   Left Upper Lobe End Expiratory Wheezes   Left Lower Lobe Diminished   Cough/Sputum   Cough Non-productive   Cardiac   Cardiac (WDL) X   Cardiac Regularity Regular   Heart Sounds S1, S2   Cardiac Rhythm Sinus tachy   Rhythm Interpretation   Pulse 92   Cardiac Monitor   Telemetry Monitor On Yes   Telemetry Audible Yes   Telemetry Alarms Set Yes   Telemetry Box Number 58   Gastrointestinal   Abdominal (WDL) WDL   Peripheral Vascular

## 2021-10-19 NOTE — PROGRESS NOTES
Hospitalist Progress Note  10/19/2021 10:50 AM  Subjective:   Admit Date: 10/16/2021  PCP: Nuria Rogel MD Status: Inpatient [101]      Interval History:  She continues to feel dyspneic. Breathing more heavily than baseline. Hopes to go home tomorrow. Medications:        tiotropium-olodaterol  2 puff Inhalation Daily    fluticasone  1 puff Inhalation BID    methylPREDNISolone  40 mg IntraVENous Q12H    insulin lispro  0-12 Units SubCUTAneous TID WC    insulin lispro  0-6 Units SubCUTAneous Nightly    busPIRone  30 mg Oral BID    FLUoxetine  60 mg Oral Daily    montelukast  10 mg Oral Nightly    atorvastatin  20 mg Oral Daily    sodium chloride flush  5-40 mL IntraVENous 2 times per day    enoxaparin  40 mg SubCUTAneous Daily    ipratropium-albuterol  1 ampule Inhalation Q4H WA    ciprofloxacin  400 mg IntraVENous Q12H    tobramycin (PF)  300 mg Nebulization BID    pantoprazole  40 mg Oral QAM AC    traZODone  150 mg Oral Nightly    melatonin  5 mg Oral Nightly           Recent Labs     10/16/21  1130 10/18/21  0526   WBC 13.6* 20.2*   HGB 14.0 13.6    199   MCV 87.6 87.7     Recent Labs     10/16/21  1130 10/17/21  0913   * 131*   K 5.0 4.0   CL 93* 93*   CO2 28 23   BUN 17 17   CREATININE 0.7 0.7   GLUCOSE 100* 282*     Recent Labs     10/16/21  1130   AST 19   ALT 24   BILITOT 0.5   ALKPHOS 54     Troponin T:  < 0.01 ng/mL  Lactate (10/16) 2.0 mmol/L  SARS-CoV-2 NAAT (10/16) not detected  proBNP (10/16) 86 pg/mL     Portable CXR (10/16) Bibasilar airspace disease, increased on the left but decreased on the right      ECG:   Normal sinus rhythm. Nonspecific ST changes.   Unchanged from prior ECG of 09-OCT-2021    Objective:   Vitals:  /69   Pulse 84   Temp 97.6 °F (oral)   Resp 18   Ht 5' 5\"  Wt 78.3 kg  SpO2 96% on 3 LPM  BMI 28.74 kg/m²   General appearance: alert and cooperative with exam  Lungs: diffuse rhonchi, reasonable overall air movement, prominent wheezing  Heart: regular rate and rhythm, S1, S2 normal, no murmur, click, rub or gallop  Abdomen: soft, non-tender; bowel sounds normal; no masses,  no organomegaly  Extremities: extremities normal, atraumatic, no cyanosis or edema  Neurologic: No obvious focal neurologic deficits. Assessment and Plan:       The patient is a 60 Y F with severe COPD who has presented here with dyspnea 10 times in the last 5 months and been admitted with a COPD exacerbation most of those times. She again returns with dyspnea and was wheezing heavily. She continues to smoke both cigarettes and marijuana. AECOPD  - steroids (will discharge with 21 day prednisone taper). - pulmonary planning to discharge her with tiotropium+olodaterol and fluticasone. - strongly encouraged smoking cessation. She will keep trying. Has chantix at home. Declines nicotine patch.  - discussed DNR and/or hospice vs palliative care. Patient is emotionally unable to make a decision. Discussed with her family - they want the patient to decide. Palliative care consulted for ongoing discussions. - systemic cipro and tobramycin nebs per pulmonary. No infiltrate seen on CXR during this stay, and no procalcitonin was sent. COVID negative. Doubt flu. Underlying pulmonary fibrosis. Appreciate pulmonary input. Chronic hypoxic respiratory failure. Due to above issues. Baseline 3LNC. Anxiety:  Continues on buspirone and fluoxetine, also trazodone. HLD. Statin. GERD.  PPI.       PT/OT: not indicated  Advance Directive: full code (patient will continue to discuss with family)  DVT prophylaxis: enoxaparin    Discharge planning:  when her breathing seems as good as it is going to get. Probably 10/20. Readmission seems nearly unavoidable. Patient would like portable oxygen but she does not qualify because she has smoked within the last year per CM.       Brittny Slater MD

## 2021-10-20 ENCOUNTER — TELEPHONE (OUTPATIENT)
Dept: FAMILY MEDICINE CLINIC | Age: 66
End: 2021-10-20

## 2021-10-20 VITALS
BODY MASS INDEX: 29.36 KG/M2 | SYSTOLIC BLOOD PRESSURE: 154 MMHG | RESPIRATION RATE: 20 BRPM | OXYGEN SATURATION: 96 % | HEIGHT: 65 IN | HEART RATE: 92 BPM | TEMPERATURE: 98 F | DIASTOLIC BLOOD PRESSURE: 79 MMHG | WEIGHT: 176.2 LBS

## 2021-10-20 DIAGNOSIS — Z51.5 HOSPICE CARE: ICD-10-CM

## 2021-10-20 LAB
GLUCOSE BLD-MCNC: 122 MG/DL (ref 70–99)
PERFORMED ON: ABNORMAL

## 2021-10-20 PROCEDURE — 6370000000 HC RX 637 (ALT 250 FOR IP): Performed by: INTERNAL MEDICINE

## 2021-10-20 PROCEDURE — 94669 MECHANICAL CHEST WALL OSCILL: CPT

## 2021-10-20 PROCEDURE — 94761 N-INVAS EAR/PLS OXIMETRY MLT: CPT

## 2021-10-20 PROCEDURE — 94640 AIRWAY INHALATION TREATMENT: CPT

## 2021-10-20 PROCEDURE — 2700000000 HC OXYGEN THERAPY PER DAY

## 2021-10-20 PROCEDURE — 6360000002 HC RX W HCPCS: Performed by: INTERNAL MEDICINE

## 2021-10-20 PROCEDURE — 99232 SBSQ HOSP IP/OBS MODERATE 35: CPT | Performed by: INTERNAL MEDICINE

## 2021-10-20 RX ORDER — CIPROFLOXACIN 500 MG/1
500 TABLET, FILM COATED ORAL 2 TIMES DAILY
Qty: 6 TABLET | Refills: 0 | Status: SHIPPED | OUTPATIENT
Start: 2021-10-20 | End: 2021-10-23

## 2021-10-20 RX ORDER — PREDNISONE 20 MG/1
TABLET ORAL
Qty: 25 TABLET | Refills: 0 | Status: SHIPPED | OUTPATIENT
Start: 2021-10-20 | End: 2021-12-27 | Stop reason: ALTCHOICE

## 2021-10-20 RX ADMIN — FLUOXETINE 60 MG: 20 CAPSULE ORAL at 09:07

## 2021-10-20 RX ADMIN — TOBRAMYCIN 300 MG: 300 SOLUTION RESPIRATORY (INHALATION) at 00:02

## 2021-10-20 RX ADMIN — IPRATROPIUM BROMIDE AND ALBUTEROL SULFATE 1 AMPULE: .5; 3 SOLUTION RESPIRATORY (INHALATION) at 08:25

## 2021-10-20 RX ADMIN — ATORVASTATIN CALCIUM 20 MG: 10 TABLET, FILM COATED ORAL at 09:08

## 2021-10-20 RX ADMIN — TIOTROPIUM BROMIDE AND OLODATEROL 2 PUFF: 3.124; 2.736 SPRAY, METERED RESPIRATORY (INHALATION) at 08:25

## 2021-10-20 RX ADMIN — TOBRAMYCIN 300 MG: 300 SOLUTION RESPIRATORY (INHALATION) at 08:26

## 2021-10-20 RX ADMIN — CIPROFLOXACIN 400 MG: 2 INJECTION, SOLUTION INTRAVENOUS at 09:10

## 2021-10-20 RX ADMIN — Medication 1 PUFF: at 08:26

## 2021-10-20 RX ADMIN — BUSPIRONE HYDROCHLORIDE 30 MG: 5 TABLET ORAL at 09:08

## 2021-10-20 RX ADMIN — PANTOPRAZOLE SODIUM 40 MG: 40 TABLET, DELAYED RELEASE ORAL at 05:46

## 2021-10-20 ASSESSMENT — PAIN SCALES - GENERAL
PAINLEVEL_OUTOF10: 0
PAINLEVEL_OUTOF10: 0

## 2021-10-20 ASSESSMENT — PAIN DESCRIPTION - PROGRESSION: CLINICAL_PROGRESSION: GRADUALLY IMPROVING

## 2021-10-20 NOTE — TELEPHONE ENCOUNTER
Hospice calling asking for Dr. Carrillo Ramirez  To put in an order for pt to be admitted to hospice and for Dr. Carrillo Ramirez to attend and follow pt.    Please Advise, hospice is needing a call back at [phone] before end of the day

## 2021-10-20 NOTE — DISCHARGE SUMMARY
Hospital Medicine Discharge Summary    Patient ID: Oliver Melendez      Patient's PCP: Nuria Rogel MD    Admit Date: 10/16/2021     Discharge Date:   10/20/21     Admitting Physician: Frankie Vela MD     Discharge Physician: Alejandro Lund MD     Discharge Diagnoses: Active Hospital Problems    Diagnosis     Stage 3 severe COPD by GOLD classification ([facility] [J44.9]      Priority: Medium    COPD with acute exacerbation ([facility] [J44.1]     H/O pneumonia due to Pseudomonas [Z87.01]     Chronic respiratory failure with hypoxia and hypercapnia ([facility]) [J96.11, J96.12]     Cannabis dependence with current use ([facility] [F12.20]     Kyphosis [M40.209]     Pulmonary fibrosis ([facility] [J84.10]     Smoker [F17.200]        The patient was seen and examined on day of discharge and this discharge summary is in conjunction with any daily progress note from day of discharge. Hospital Course: The patient is a 60 Y F with severe COPD who has presented here with dyspnea 10 times in the last 5 months and been admitted with a COPD exacerbation most of those times. She again returns with dyspnea and was wheezing heavily. She continues to smoke both cigarettes and marijuana.          AECOPD  - steroids (will discharge with 21 day prednisone taper). - pulmonary planning to discharge her with tiotropium+olodaterol and fluticasone. - strongly encouraged smoking cessation. She will keep trying. Has chantix at home. Declines nicotine patch.  - discussed her increasingly frequent admissions for COPD and lack of any sustained improvement, and how this usually is a pattern we see toward the end of a person's life. We discussed how she would be very difficult to extubate if she were ever intubated. DNR was discussed but the patient was emotionally unable to make a decision. Discussed with her family - they want the patient to decide.   Palliative care consulted for ongoing discussions (I see a family meeting has been arranged later this morning). I specifically told the patient that she MAY have 6 months or less to live, and that because of this she would qualify for hospice, or at least palliative care, if she were so inclined. As above, she cannot come to a decision. She remains full code for now by default. Addendum: after the family meeting the patient decided to sign on with [facility]. They will meet with her at home. At this point the significance of this is a little unclear, because she wants to remain full code and it seems that she still wants admitted to the hospital in the future if needed. I'm unsure whether any palliative opioids would increase or decrease her readmissions at this point. The main goal of [facility] should be to have ongoing goals of care discussions, starting with her code status. - treated with systemic cipro and tobramycin nebs per pulmonary. No infiltrate seen on CXR during this stay, and no procalcitonin was sent. Will discharge with another few days of cipro PO.  COVID negative. Doubt flu.     Underlying pulmonary fibrosis. Appreciate pulmonary input.     Chronic hypoxic respiratory failure. Due to above issues. Baseline 3LNC.     Anxiety:  Continues on buspirone and fluoxetine, also trazodone.      HLD. Statin.     GERD.  PPI. Physical Exam Performed:     BP (!) 154/79   Pulse 92   Temp 98 °F (36.7 °C) (Oral)   Resp 20   Ht 5' 5\" (1.651 m)   Wt 176 lb 3.2 oz (79.9 kg)   SpO2 96%   BMI 29.32 kg/m²       General appearance:  No apparent distress, appears stated age and cooperative. HEENT:  Normal cephalic, atraumatic without obvious deformity. Pupils equal, round, and reactive to light. Extra ocular muscles intact. Conjunctivae/corneas clear. Neck: Supple, with full range of motion. No jugular venous distention. Trachea midline. Respiratory:  Normal respiratory effort. Bilaterally without Rales/Rhonchi. Bilateral expiratory wheezing. Cardiovascular:  Regular rate and rhythm with normal S1/S2 without murmurs, rubs or gallops. Abdomen: Soft, non-tender, non-distended with normal bowel sounds. Musculoskeletal:  No clubbing, cyanosis or edema bilaterally. Full range of motion without deformity. Skin: Skin color, texture, turgor normal.  No rashes or lesions. Neurologic:  Neurovascularly intact without any focal sensory/motor deficits. Cranial nerves: II-XII intact, grossly non-focal.  Psychiatric:  Alert and oriented, thought content appropriate, normal insight  Capillary Refill: Brisk,< 3 seconds   Peripheral Pulses: +2 palpable, equal bilaterally       Labs: For convenience and continuity at follow-up the following most recent labs are provided:      CBC:    Lab Results   Component Value Date    WBC 20.2 10/18/2021    HGB 13.6 10/18/2021    HCT 39.7 10/18/2021     10/18/2021       Renal:    Lab Results   Component Value Date     10/17/2021    K 4.0 10/17/2021    CL 93 10/17/2021    CO2 23 10/17/2021    BUN 17 10/17/2021    CREATININE 0.7 10/17/2021    CALCIUM 9.3 10/17/2021    PHOS 3.8 10/02/2021         Significant Diagnostic Studies    Radiology:   XR CHEST PORTABLE   Final Result   Bibasilar airspace disease, increased on the left but decreased on the right                Consults:     IP CONSULT TO HOSPITALIST  IP CONSULT TO PULMONOLOGY  IP CONSULT TO CASE MANAGEMENT  IP CONSULT TO PALLIATIVE CARE    Disposition:  home     Condition at Discharge: Stable    Discharge Instructions/Follow-up:  Follow up with pulmonary within 1-2 weeks.        Code Status:  Full Code     Activity: activity as tolerated    Diet: regular diet      Discharge Medications:     Current Discharge Medication List           Details   ciprofloxacin (CIPRO) 500 MG tablet Take 1 tablet by mouth 2 times daily for 3 days  Qty: 6 tablet, Refills: 0              Details   predniSONE (DELTASONE) 20 MG tablet 2 tabs daily for 5 days, then 1.5 tabs daily for 5 days, then 1 tab daily for 5 days, then 0.5 tabs daily for 5 days. Qty: 25 tablet, Refills: 0              Details   ipratropium-albuterol (DUONEB) 0.5-2.5 (3) MG/3ML SOLN nebulizer solution Inhale 3 mLs into the lungs every 4 hours as needed for Shortness of Breath  Qty: 360 mL, Refills: 5      traZODone (DESYREL) 150 MG tablet Take 1-2 tablets by mouth nightly TAKE 2 TABLETS AT BEDTIME  Qty: 180 tablet, Refills: 1    Associated Diagnoses: Insomnia, unspecified type      guaiFENesin (MUCINEX) 600 MG extended release tablet Take 1 tablet by mouth 2 times daily as needed for Congestion  Qty: 60 tablet, Refills: 1      varenicline (CHANTIX STARTING MONTH TAL) 0.5 MG X 11 & 1 MG X 42 tablet Take by mouth. Qty: 1 box, Refills: 0      fluticasone-salmeterol (WIXELA INHUB) 500-50 MCG/DOSE diskus inhaler Inhale 1 puff into the lungs every 12 hours  Qty: 180 each, Refills: 1      albuterol (PROVENTIL) (2.5 MG/3ML) 0.083% nebulizer solution Take 3 mLs by nebulization every 6 hours as needed for Wheezing or Shortness of Breath DX COPD J44.9  Qty: 120 vial, Refills: 6      montelukast (SINGULAIR) 10 MG tablet TAKE 1 TABLET BY MOUTH EACH NIGHT  Qty: 90 tablet, Refills: 1      albuterol sulfate HFA (VENTOLIN HFA) 108 (90 Base) MCG/ACT inhaler Inhale 2 puffs into the lungs every 6 hours as needed for Wheezing or Shortness of Breath  Qty: 3 Inhaler, Refills: 1      BD PEN NEEDLE SVETLANA U/F 32G X 4 MM MISC USE ONE DAILY AS DIRECTED  Qty: 100 each, Refills: 5      FLUoxetine (PROZAC) 20 MG capsule TAKE 3 CAPSULES BY MOUTH DAILY  Qty: 270 capsule, Refills: 1      simvastatin (ZOCOR) 20 MG tablet TAKE 1 TABLET EVERY EVENING  Qty: 90 tablet, Refills: 1      busPIRone (BUSPAR) 30 MG tablet TAKE 1 TABLET TWICE DAILY  Qty: 180 tablet, Refills: 1      teriparatide, recombinant, (FORTEO) 600 MCG/2.4ML SOLN injection Inject 0.08 mLs into the skin daily One dose injected daily.   Qty: 1 pen, Refills: 11               Time Spent on discharge is more than 30 minutes in the examination, evaluation, counseling and review of medications and discharge plan. Signed:    Reggie Jose MD   10/20/2021      Thank you Jacqui Patrick MD for the opportunity to be involved in this patient's care. If you have any questions or concerns please feel free to contact me at [phone].

## 2021-10-20 NOTE — TELEPHONE ENCOUNTER
[facility] RN - Lily Griffin given VO to admit pt to hospice care. Hospice was able to get DNR signed today and they will fax a copy over to the office tomorrow. The DNR will be signed by Dr. Darylene Males or Dr. Anastacia Brewster. Pt admitted with Pulmonary Fibrosis as primary dx. Hospice would like to know the extent of PCP involvement in care. Pt requested for Dr. Percy Canchola to be involved if possible. Plan:   Verbal Order for Hospice care given  Report to PCP tomorrow   Call back to Hospice with further plan and orders as appropriate - Main office number is [phone]    Verbal order for hospice admission from Pilar Marsh DO to Lily Griffin RN at [facility].

## 2021-10-20 NOTE — PROGRESS NOTES
Discharge order noted iv hub and tele discontinued reviewed instruction with patient prescriptions from out patient pharmacy verbalizes understanding of instruction and follow up care.

## 2021-10-20 NOTE — CONSULTS
Palliative Care Initial Note  Palliative Care Admit date:  10/20/21  Reason for c/s:  GOC, Code status. Advance Directives: In the absence of a HCPOA, pts spouse is viewed as her NOK. Pt currently has a full code status. She has had the benefit of ongoing code discussions and, although she vacillated in our discussion today, she just isn't ready to amend. She knows the potential for difficulty in extubation and stated that she would not wish to be on MV support long term. Pts spouse is supportive of a DNR/DNI code status though also supports pt given she is not yet ready to amend. Plan of care/goals:  Met w/ pt and her spouse. Writer was initially informed that pts children would participate via speaker phone, however, pt expressed she wanted this conversation to be just b/t she and her spouse. Krystal Parra is aware of the severity of her resp status. She spoke w/ provider's re: potential for limited life expectancy. She laments the recurring hospitalizations she has endured and feels that any benefit she derives is short-lived. Krystal Parra would prefer to remain @ home w/ the goal of focusing on comfort rather than continue coming into the hospital.  To this point, she didn't think she had \"any other choice\" but when writer explained comfort care and the support of hospice, both she and her spouse were in favor of a comfort-oriented plan of care. We discussed hospice options in the area and they elected to have ref called to [facility] (done). Social/Spiritual:  Pt and spouse have been  for ~47 years; they have three adult children, grandchildren, siblings, nieces & nephews. Family is very important to Krystal Parra. She is hopeful for improved sx mgt for breathing and better quality @ home. Plan:  Ref called to [facility] who will see pt @ home; they are aware that Dr. Warren is pts PCP. Hospice will support ongoing discussions w/ pt around code wishes. Reason for consult:  ___ Advance Care Planning  _X_ Transition of Care Planning  _X_ Psychosocial/Spiritual Support  ___ Symptom Management                                                                                                                                                                            Johnathan Mccoy RN

## 2021-10-20 NOTE — TELEPHONE ENCOUNTER
I am not aware if Dr. Dustin Pantoja follows Hospice. Please advise next steps. I will help any way I need to. Please let me know.  Thanks, Orlando Gaytan

## 2021-10-20 NOTE — CARE COORDINATION
Discharge order noted. Per patient, she is active with [facility]. Placed call to Jonathan Lopez with [facility] but Jonathan Lopez states that they had discharged patient and were unable to accept back due to patient's multiple readmissions. Discussed this with patient. She states she really does not feel as though she needs home care due to her  is always able to assist her as needed. She states she would like a referral to [facility] to see if she can get any services. Referral made via [email or web address].   Family meeting with palliative RN today at 11am.

## 2021-10-20 NOTE — PROGRESS NOTES
INPATIENT PULMONARY CRITICAL CARE PROGRESS NOTE      Reason for visit    severe COPD with pulmonary fibrosis     SUBJECTIVE: Patient when seen this morning was comfortably sleeping in the bed with no increased work of breathing  on 2 L of nasal cannula oxygen with saturation 96%,  , patient was not having any increased expectoration per se, patient was afebrile and has NSR on the monitor  patient had maintain hemodynamics, patient has had adequate urine output; patient has acceptable blood sugar control ;, no other pertinent review of system of concern             Physical Exam:  Blood pressure (!) 154/79, pulse 92, temperature 98 °F (36.7 °C), temperature source Oral, resp. rate 20, height 5' 5\" (1.651 m), weight 176 lb 3.2 oz (79.9 kg), SpO2 96 %, not currently breastfeeding.'     Constitutional: No significant respiratory distress on nasal cannula oxygenation , patient had mild tachypnea when seen  HENT: No facial asymmetry . No thyromegaly.;moon facies   Eyes:  Conjunctivae are normal. Pupils equal, round, and reactive to light. No scleral icterus. Neck: . No tracheal deviation present. No obvious thyroid mass. Cardiovascular: Sinus  Rhythm , normal heart sounds.  No right ventricular heave. Some lower extremity edema. upper limb edema   Pulmonary/Chest: No  wheezes.  no significant  rales. No increasing  chest congestion chest wall is not dull to percussion.  No accessory muscle usage or stridor.  Decreased breath sound density  Abdominal: Soft. Bowel sounds present. No distension or hernia. No tenderness.    Musculoskeletal: No cyanosis. No clubbing. No obvious joint deformity. Lymphadenopathy: No cervical or supraclavicular adenopathy. Skin: Skin is warm and dry. No rash or nodules on the exposed extremities. ecchymosis Lt forearm   Neurologic: sleeping when seen       Results:  CBC:   Recent Labs     10/18/21  0526   WBC 20.2*   HGB 13.6   HCT 39.7   MCV 87.7        BMP:     Imaging:  I have is adding to the complexity of the disease process  · PUD  as per IM   · Patient does not want any evaluation for SUAD  · Patient is vaccinated against COVID-19  · Promote IS and acapella  · PPI     ? Discharge planning       Electronically signed by:  Loan Rodrigues MD    10/20/2021    2:38 PM.

## 2021-10-21 ENCOUNTER — CARE COORDINATION (OUTPATIENT)
Dept: CARE COORDINATION | Age: 66
End: 2021-10-21

## 2021-10-21 ENCOUNTER — TELEPHONE (OUTPATIENT)
Dept: FAMILY MEDICINE CLINIC | Age: 66
End: 2021-10-21

## 2021-10-21 ENCOUNTER — CARE COORDINATION (OUTPATIENT)
Dept: CASE MANAGEMENT | Age: 66
End: 2021-10-21

## 2021-10-21 NOTE — TELEPHONE ENCOUNTER
Slick Aranda with [facility] calling to get some clarification on patients medications and some prn orders. Patient came on the Hospice program yesterday    Patient's med list shows Albuterol neb and Duoneb nebulizer. Patient only has the Duoneb at home and Slick Aranda is requesting order to discontinue the albuterol nebulizer    Patient has Sprivia but no prescription seen for it. She does have Fluticasone disk. Slick Aranda is asking if she needs either one of them    Requesting to discontinue the Simvastatin    She smokes marijuana and has for many years. Does she need the Chantix? The prescription is there but no medication has been filled. Obtaining a medical marijuana card was discussed due to that would be a safer way than buying just anywhere. Order for prn Morphine,Ativan, and Tylenol suppositories.      Connie's number is [phone]

## 2021-10-21 NOTE — CARE COORDINATION
[facility] Transitions Follow Up Call    10/21/2021    Patient: Neal Lenz  Patient : 1955   MRN: [id number]  Reason for Admission: COPD  Discharge Date: 10/20/21 RARS: Readmission Risk Score: 64         Spoke with: Laura Jeffrey at [place] [address] placed call to [facility] and spoke to Laura Jeffrey. Patient was enrolled to hospice on 10/20/21, so CTN closed. Care Transitions Subsequent and Final Call    Subsequent and Final Calls  Care Transitions Interventions  Other Interventions:            Follow Up  Future Appointments   Date Time Provider Matt Crabtree   2021  3:45 PM Aracelis Maciel MD [facility]   12/3/2021 11:00 AM [facility]   2021  2:45 PM Aracelis Maciel MD [facility]   2021  1:30 PM MD JAIRO Mckinney  Andrew Bagley RN

## 2021-10-27 NOTE — TELEPHONE ENCOUNTER
It is ok to discontinue the albuterol  She should NOT continue on Spiriva if she will be using the [place] to D/c simvastatin  The Chantix was to help her quit smoking cigarettes. It will not work for marijuana. I'm ok with the medication orders. Please let me know if I need to send an Rx anywhere, or if they are faxing over an order to sign.

## 2021-10-29 ENCOUNTER — TELEPHONE (OUTPATIENT)
Dept: FAMILY MEDICINE CLINIC | Age: 66
End: 2021-10-29

## 2021-11-02 NOTE — PROGRESS NOTES
Physician Progress Note      Ute Dickens  CSN #:                  [id number]  :                       1955  ADMIT DATE:       10/16/2021 11:11 AM  DISCH DATE:        10/20/2021 12:20 PM  RESPONDING  PROVIDER #:        Roxana Dunn MD          QUERY TEXT:    Patient admitted with COPD AE. Noted documentation of \"Acute on Chronic   Respiratory Failure\" on H&P and \"Chronic Respiratory Failure\" on DCS. If possible, please document in progress notes and discharge summary if you   are evaluating and /or treating any of the following: The medical record reflects the following:  Risk Factors: COPD, Pulmonary fibrosis, anxiety, baseline O2 requirement 3L   NC, + smoker    Clinical Indicators: Per ED provider-\" Effort: Pulmonary effort is normal. No   respiratory distress. Breath sounds: No stridor. Examination of the   right-upper field reveals decreased breath sounds and wheezing\"  Per H&P- \"COPD with chronic respiratory failure on home oxygen with acute   hypoxic respiratory failure: BiPAP initiated in the emergency department. \"  Per Pulmonary consult 10/16-\" Patient has a compensated respiratory acidosis   with hypoxemia and patient does not need any BiPAP at this time which is being   discontinued. .. Patient's recent few ABGs did not show any hypercapnia\"  Per DCS - \"Chronic hypoxic respiratory failure\"  VBG - pCO2= 50.2    Treatment: Bipap from 10/16 at 1157 until 1800 when seen by Pulmonary consult,   O2at 2-3 L NC in use duration of admission, labs, ACP discussions to assess   goals of care, smoking cessation discussions    thank you,  lucho jones RN, BSN  [email or web address]  Options provided:  -- Acute on Chronic Respiratory Failure confirmed and Chronic Respiratory   Failure ruled out  -- Chronic Respiratory Failure confirmed and Acute on Chronic Respiratory   Failure ruled out  -- Other - I will add my own diagnosis  -- Disagree - Not applicable / Not valid  -- Disagree - Clinically unable to determine / Unknown  -- Refer to Clinical Documentation Reviewer    PROVIDER RESPONSE TEXT:    After study, Chronic Respiratory Failure confirmed and Acute on Chronic   Respiratory Failure ruled out.     Query created by: Vanda Goodrich on 10/28/2021 9:39 AM      Electronically signed by:  Kait Johns MD 11/2/2021 2:36 PM

## 2021-11-11 ENCOUNTER — TELEPHONE (OUTPATIENT)
Dept: PULMONOLOGY | Age: 66
End: 2021-11-11

## 2021-11-11 NOTE — TELEPHONE ENCOUNTER
Patient did not show for 4-6 week same day CXR follow-up appointment  with  on 11/11/21    Same Day Cancellation: No    Patient rescheduled:  No    New appointment: n/a    Patient was also no show on: 9/22/21,9/15/21,3/4/20 and 11/26/19    LOV     Assessment:9/23/21   · Moderate obstructive airways diease secondary to COPD and asthma with AE  · Abnormal CXR 9/17/21 with ASD- admitted and treated for PNA  · Abnormal CT 4/1/2016 with GGO- DDx RB-ILD, NSIP, HP, DIP, eosinophilic pneumonitis, aspiration and CTD-ILD. Favor smoking related ILD vs HP. Bronch 4/6/16 purulent secretions and grew strep pneumoniae. Negative AFB. Got rid of her Mcclure Leavens. Evidently changed on repeat CT 12/2/2020. · Bronchiectasis   · Nocturnal hypoxia- on 2LPM   · >30 pack-years smoking                                                  Plan:   · Prednisone taper  · Doxycycline 100 mg PO BID for 7 days. · Repeat CXR in 4-6 weeks   · ER if not better  · Continue Wixela BID   · Trial of DuoNeb. Advised to use 2-4 times/day   · D/C Spiriva   · Continue O2 2LPM at night  · 1-3 LPM O2 on exertion. Advised to titrate O2 using her pulse oximeter- target O2 sat 90-92%. · CT chest, low dose protocol, screening for lung cancer December 2021  · Patient is up to date with Covid, pneumococcal vaccine and influenza vaccine   · Acapella and Mucinex   · Advised to continue with smoking cessation. · Follow up in 4-6 weeks in person                       Nikole Khalil is a 60 y.o. female evaluated via telephone on 9/23/2021.       Consent:  She and/or health care decision maker is aware that that she may receive a bill for this telephone service, depending on her insurance coverage, and has provided verbal consent to proceed:  Yes         Documentation:  I communicated with the patient and/or health care decision maker about: See above   Details of this discussion including any medical advice provided: See above         I Affirm this is a Patient Initiated Episode with an Established Patient who has not had a related appointment within my department in the past 7 days or scheduled within the next 24 hours.     Total Time: 21-30 minutes      Note: not billable if this call serves to triage the patient into an appointment for the relevant concern  Hemal Pérez MD

## 2021-12-14 ENCOUNTER — TELEPHONE (OUTPATIENT)
Dept: PULMONOLOGY | Age: 66
End: 2021-12-14

## 2021-12-14 NOTE — LETTER
[facility] Pulmonary, Critical Care, and Sleep  [address] [place] [address] [address] [id number]  Phone: [phone]  Fax: [phone]    Scotty Aguirre MD      January 7, 2022    Vonna Nageotte  [address] [place] [address]      Dear James Peace:    I was sorry to learn you missed your appointments on 9/15, 9/22, 11/11, and 12/14. As stated in our policy we have the right to discharge a patient under those circumstances. I am, therefore, withdrawing my professional commitment to you as a pulmonary physician. It is essential that you continue to receive medical care for your condition. Therefore, I recommend you make immediate arrangements with another physician to provide the needed care. Please discuss this with your Primary Care Physician, Jessika Pryor MD, to see whom he/she would recommend. For emergency medical services, please go to the nearest emergency room for treatment. If you wish, I will continue to treat your urgent medical needs which may develop for the following thirty (30) days from today's date. Once you have established care with another physician, please have them fax a records release to our office, so that we can send your records to your new pulmonologist.     If you have any questions regarding the contents of this letter, you may reach either myself or my Practice Manager, Nikki Gutierrez. If you have any questions or concerns, please don't hesitate to call.     Sincerely,        Scotty Aguirre MD   Sent by regular US Mail and  Certified Return Receipt Requested

## 2021-12-14 NOTE — LETTER
[facility] Pulmonary, Critical Care, and Sleep  [address] [place] [address] [address] [id number]  Phone: [phone]  Fax: [phone]    Katlyn Mcbride MD      January 5, 2022    Beverley Proper  [address]  [address] [facility]      Dear Yue Guzman:    Dear Mrs. Wong,      I was sorry to learn you missed your appointments on 9/15/21, 9/22/201, 11/11/21, and 12/14/21. As stated in our policy we have the right to discharge a patient under those circumstances. I am, therefore, withdrawing my professional commitment to you as a pulmonary physician. It is essential that you continue to receive medical care for your condition. Therefore, I recommend you make immediate arrangements with another physician to provide the needed care. Please discuss this with your Primary Care Physician, Aneudy Nieto to see whom she would recommend. For emergency medical services, please go to the nearest emergency room for treatment. If you wish, I will continue to treat your urgent medical needs which may develop for the following thirty (30) days from today's date. Once you have established care with another physician, please have them fax a records release to our office, so that we can send your records to your new pulmonologist.     If you have any questions regarding the contents of this letter, you may reach either myself or my Practice Manager, Francisco J Martinez.       Sincerely,        Katlyn Mcbride MD

## 2021-12-14 NOTE — TELEPHONE ENCOUNTER
Patient did not show for 6 month COPD/CT follow-up appointment  with  on 12/14/21    Same Day Cancellation: No    Patient rescheduled:  No    New appointment: n/a    Patient was also no show on: 11/11/21,9/22/21,9/15/21 and 3/4/20    LOV   Assessment:9/23/21   · Moderate obstructive airways diease secondary to COPD and asthma with AE  · Abnormal CXR 9/17/21 with ASD- admitted and treated for PNA  · Abnormal CT 4/1/2016 with GGO- DDx RB-ILD, NSIP, HP, DIP, eosinophilic pneumonitis, aspiration and CTD-ILD. Favor smoking related ILD vs HP. Bronch 4/6/16 purulent secretions and grew strep pneumoniae. Negative AFB. Got rid of her Neslon Phenes. Evidently changed on repeat CT 12/2/2020. · Bronchiectasis   · Nocturnal hypoxia- on 2LPM   · >30 pack-years smoking                                                  Plan:   · Prednisone taper  · Doxycycline 100 mg PO BID for 7 days. · Repeat CXR in 4-6 weeks   · ER if not better  · Continue Wixela BID   · Trial of DuoNeb. Advised to use 2-4 times/day   · D/C Spiriva   · Continue O2 2LPM at night  · 1-3 LPM O2 on exertion. Advised to titrate O2 using her pulse oximeter- target O2 sat 90-92%. · CT chest, low dose protocol, screening for lung cancer December 2021  · Patient is up to date with Covid, pneumococcal vaccine and influenza vaccine   · Acapella and Mucinex   · Advised to continue with smoking cessation. · Follow up in 4-6 weeks in person                       Gilda Jimenez is a 60 y.o. female evaluated via telephone on 9/23/2021.       Consent:  She and/or health care decision maker is aware that that she may receive a bill for this telephone service, depending on her insurance coverage, and has provided verbal consent to proceed:  Yes         Documentation:  I communicated with the patient and/or health care decision maker about: See above   Details of this discussion including any medical advice provided: See above         I Affirm this is a Patient Initiated Episode with an Established Patient who has not had a related appointment within my department in the past 7 days or scheduled within the next 24 hours.     Total Time: 21-30 minutes      Note: not billable if this call serves to triage the patient into an appointment for the relevant concern        Belkis Ruiz MD

## 2021-12-27 ENCOUNTER — VIRTUAL VISIT (OUTPATIENT)
Dept: FAMILY MEDICINE CLINIC | Age: 66
End: 2021-12-27
Payer: MEDICAID

## 2021-12-27 DIAGNOSIS — Z00.00 ROUTINE GENERAL MEDICAL EXAMINATION AT A HEALTH CARE FACILITY: Primary | ICD-10-CM

## 2021-12-27 PROCEDURE — 4040F PNEUMOC VAC/ADMIN/RCVD: CPT | Performed by: FAMILY MEDICINE

## 2021-12-27 PROCEDURE — 3017F COLORECTAL CA SCREEN DOC REV: CPT | Performed by: FAMILY MEDICINE

## 2021-12-27 PROCEDURE — 1123F ACP DISCUSS/DSCN MKR DOCD: CPT | Performed by: FAMILY MEDICINE

## 2021-12-27 PROCEDURE — G0439 PPPS, SUBSEQ VISIT: HCPCS | Performed by: FAMILY MEDICINE

## 2021-12-27 PROCEDURE — G8482 FLU IMMUNIZE ORDER/ADMIN: HCPCS | Performed by: FAMILY MEDICINE

## 2021-12-27 ASSESSMENT — PATIENT HEALTH QUESTIONNAIRE - PHQ9
2. FEELING DOWN, DEPRESSED OR HOPELESS: 1
SUM OF ALL RESPONSES TO PHQ9 QUESTIONS 1 & 2: 2
1. LITTLE INTEREST OR PLEASURE IN DOING THINGS: 1
SUM OF ALL RESPONSES TO PHQ QUESTIONS 1-9: 2

## 2021-12-27 ASSESSMENT — LIFESTYLE VARIABLES: HOW OFTEN DO YOU HAVE A DRINK CONTAINING ALCOHOL: 0

## 2021-12-27 NOTE — PATIENT INSTRUCTIONS
Personalized Preventive Plan for Brenda Shoulder - 12/27/2021  Medicare offers a range of preventive health benefits. Some of the tests and screenings are paid in full while other may be subject to a deductible, co-insurance, and/or copay. Some of these benefits include a comprehensive review of your medical history including lifestyle, illnesses that may run in your family, and various assessments and screenings as appropriate. After reviewing your medical record and screening and assessments performed today your provider may have ordered immunizations, labs, imaging, and/or referrals for you. A list of these orders (if applicable) as well as your Preventive Care list are included within your After Visit Summary for your review. Other Preventive Recommendations:    · A preventive eye exam performed by an eye specialist is recommended every 1-2 years to screen for glaucoma; cataracts, macular degeneration, and other eye disorders. · A preventive dental visit is recommended every 6 months. · Try to get at least 150 minutes of exercise per week or 10,000 steps per day on a pedometer . · Order or download the FREE \"Exercise & Physical Activity: Your Everyday Guide\" from [employer]. Call 2-428.215.9121 or search [employer] online. · You need 3886-0966 mg of calcium and 8101-5248 IU of vitamin D per day. It is possible to meet your calcium requirement with diet alone, but a vitamin D supplement is usually necessary to meet this goal.  · When exposed to the sun, use a sunscreen that protects against both UVA and UVB radiation with an SPF of 30 or greater. Reapply every 2 to 3 hours or after sweating, drying off with a towel, or swimming. · Always wear a seat belt when traveling in a car. Always wear a helmet when riding a bicycle or motorcycle.

## 2021-12-27 NOTE — PROGRESS NOTES
Medicare Annual Wellness Visit  Name: William Stallings Date: 2021   MRN: [id number] Sex: Female   Age: 73 y.o. Ethnicity: Non- / Non    : 1955 Race: White (non-)      Cuca Stoner is here for Medicare AWV    Screenings for behavioral, psychosocial and functional/safety risks, and cognitive dysfunction are all negative except as indicated below. These results, as well as other patient data from the [address] [facility] [address] form, are documented in Flowsheets linked to this Encounter. Allergies   Allergen Reactions    Meperidine Anaphylaxis     \"Demerol\"     Demerol Hives       Prior to Visit Medications    Medication Sig Taking?  Authorizing Provider   ipratropium-albuterol (DUONEB) 0.5-2.5 (3) MG/3ML SOLN nebulizer solution Inhale 3 mLs into the lungs every 4 hours as needed for Shortness of Breath Yes Guille Wiley MD   traZODone (DESYREL) 150 MG tablet Take 1-2 tablets by mouth nightly TAKE 2 TABLETS AT BEDTIME Yes Jean Claude Fonseca MD   guaiFENesin (MUCINEX) 600 MG extended release tablet Take 1 tablet by mouth 2 times daily as needed for Congestion Yes Belgica Cotton MD   fluticasone-salmeterol [facility]) 500-50 MCG/DOSE diskus inhaler Inhale 1 puff into the lungs every 12 hours Yes Guille Wiley MD   albuterol (PROVENTIL) (2.5 MG/3ML) 0.083% nebulizer solution Take 3 mLs by nebulization every 6 hours as needed for Wheezing or Shortness of Breath DX COPD J44.9 Yes Guille Wiley MD   montelukast (SINGULAIR) 10 MG tablet TAKE 1 TABLET BY MOUTH EACH NIGHT Yes Guille Wiley MD   albuterol sulfate HFA (VENTOLIN HFA) 108 (90 Base) MCG/ACT inhaler Inhale 2 puffs into the lungs every 6 hours as needed for Wheezing or Shortness of Breath Yes Guille Wiley MD   BD PEN NEEDLE SVETLANA U/F 32G X 4 MM MISC USE ONE DAILY AS DIRECTED Yes Jean Claude Fonseca MD   FLUoxetine (PROZAC) 20 MG capsule TAKE 3 CAPSULES BY MOUTH DAILY Yes Jean Claude Fonseca MD   simvastatin (ZOCOR) 20 MG tablet TAKE 1 TABLET Brii Gonzalez MD   busPIRone (BUSPAR) 30 MG tablet TAKE 1 TABLET TWICE DAILY Yes Donnie Angel MD   teriparatide, recombinant, (FORTEO) 600 MCG/2.4ML SOLN injection Inject 0.08 mLs into the skin daily One dose injected daily.   Brandy Simpson MD       Past Medical History:   Diagnosis Date    Allergic rhinitis 6/11/2010    Anxiety     Arthritis     Aspiration pneumonia ([address] 3/21/2012    Recurrent    Asthma     Bronchitis chronic     COPD (chronic obstructive pulmonary disease) ([address] 12/3/2009    Depression     Drug abuse, cocaine type ([address]     past history of crack cocaine use    Emphysema of lung ([address]     Fibromyalgia     GERD (gastroesophageal reflux disease)     Hyperlipidemia     Influenza A 03/05/2020    Lung disease     On home oxygen therapy     uses O2 NC 3L prn at night    Osteoporosis     Pneumonia     Polysubstance dependence ([address] 1/2/2012    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    Pulmonary fibrosis ([address] 10/16/2014    Pulmonary infiltrate     Pulmonary nodule 12/3/2009    Tobacco abuse        Past Surgical History:   Procedure Laterality Date    BLADDER REPAIR      BRONCHOSCOPY      BRONCHOSCOPY N/A 3/6/2020    BRONCHOSCOPY THERAPUTIC ASPIRATION INITIAL performed by Jose Simons MD at [address] [place] [address]  3/6/2020    BRONCHOSCOPY ALVEOLAR LAVAGE performed by Jose Simons MD at [address] [place] [address] N/A 6/26/2020    BRONCHOSCOPY THERAPUTIC ASPIRATION INITIAL performed by Uriel Ballard MD at [address] [place] [address]  6/26/2020    BRONCHOSCOPY ALVEOLAR LAVAGE performed by Uriel Ballard MD at [address] [place] [address]  06/23/2021    Dr Katie Pryor N/A 6/23/2021    BRONCHOSCOPY DIAGNOSTIC OR CELL [address] performed by Uriel Ballard MD at [address] [place] [address]  6/23/2021    BRONCHOSCOPY THERAPUTIC ASPIRATION INITIAL performed by Uriel Ballard MD at [facility] ENDOSCOPY    BRONCHOSCOPY  6/23/2021    BRONCHOSCOPY ALVEOLAR LAVAGE performed by Erick Yen MD at [address] [facility] N/A 8/27/2021    BRONCHOSCOPY DIAGNOSTIC OR CELL [address] ONLY performed by Erick Yen MD at [address] [facility]  2012    ENDOSCOPY, COLON, DIAGNOSTIC      ESOPHAGEAL DILATATION  9/20/2018    ESOPHAGEAL DILATION [place] performed by Brianda Ledezma MD at [address] [facility] OTHER SURGICAL HISTORY  2/12/15    T8 Kyphoplasty    HI COLONOSCOPY FLX DX W/COLLJ SPEC WHEN PFRMD N/A 9/20/2018    EGD AND COLONOSCOPY WITH ANESTHESIA performed by Brianda Ledezma MD at [address] ESOPHAGOGASTRODUODENOSCOPY TRANSORAL DIAGNOSTIC N/A 9/20/2018    EGD AND COLONOSCOPY WITH ANESTHESIA performed by Brianda Ledezma MD at [address] [facility]         Family History   Problem Relation Age of Onset    Asthma Mother     Diabetes Mother     Emphysema Mother     Heart Failure Mother     Hypertension Mother     Heart Disease Mother     High Cholesterol Mother     Cancer Mother     Depression Mother     Diabetes Father     Emphysema Father     Heart Failure Father     Hypertension Father     Heart Disease Father     High Cholesterol Father     Substance Abuse Father     Emphysema Paternal Grandfather     Diabetes Sister     High Cholesterol Sister     Vision Loss Maternal Uncle        CareTeam (Including outside providers/suppliers regularly involved in providing care):   Patient Care Team:  Unruly Alamo MD as PCP - General (Family Medicine)  Unruly Alamo MD as PCP - [facility]  Julián Raymond MD as Consulting Physician (Pulmonology)  Manan Rodriguez RN as Registered Nurse  SHAUN Smith CNP as Nurse Practitioner (Nurse Practitioner)  Fernando Yoder MD as Surgeon (Orthopedic Surgery)    Wt Readings from Last 3 Encounters:   10/20/21 176 lb 3.2 oz (79.9 kg)   10/10/21 174 lb (78.9 kg)   09/27/21 165 lb (74.8 kg)     There were no vitals filed for this visit. There is no height or weight on file to calculate BMI. Based upon direct observation of the patient, evaluation of cognition reveals recent and remote memory intact. Patient's complete Health Risk Assessment and screening values have been reviewed and are found in Flowsheets. The following problems were reviewed today and where indicated follow up appointments were made and/or referrals ordered. Positive Risk Factor Screenings with Interventions:         Substance History:  Social History     Tobacco History     Smoking Status  Current Every Day Smoker Smoking Start Date  1/1/1972 Last attempt to quit  6/20/2021 Smoking Frequency  1 pack/day for 40 years (40 pk yrs)    Smoking Tobacco Type  Cigarettes    Smokeless Tobacco Use  Never Used    Tobacco Comment  0.5 PPD restarted, currently. Per Dr. Fernando Coombs lung screening order pt has 40 pack year smoking hx          Alcohol History     Alcohol Use Status  No          Drug Use     Drug Use Status  Yes Types  Marijuana (Weed) Comment  Daily          Sexual Activity     Sexually Active  Yes Partners  Male               Alcohol Screening:       A score of 8 or more is associated with harmful or hazardous drinking. A score of 13 or more in women, and 15 or more in men, is likely to indicate alcohol dependence. Substance Abuse Interventions:  · Tobacco abuse:  Patient states she is trying to quit    General Health and ACP:  General  In general, how would you say your health is?: Fair  In the past 7 days, have you experienced any of the following?  New or Increased Pain, New or Increased Fatigue, Loneliness, Social Isolation, Stress or Anger?: (!) New or Increased Fatigue,Social Isolation  Do you get the social and emotional support that you need?: Yes  Do you have a Living Will?: (!) No  Advance Directives     Power of [facility] Will ACP-Advance Directive ACP-Power of     Not on File Not on File Not on File Not on File      General Health Risk Interventions:  · Fatigue: patient declines any further evaluation/treatment for this issue  · Social isolation: patient declines any further intervention for this issue  · No Living Will: Patient declines ACP discussion/assistance  · Patient is currently in hospice. Health Habits/Nutrition:  Health Habits/Nutrition  Do you exercise for at least 20 minutes 2-3 times per week?: (!) No  Have you lost any weight without trying in the past 3 months?: No  Do you eat only one meal per day?: No  Have you seen the dentist within the past year?: N/A - wear dentures     Health Habits/Nutrition Interventions:  · Inadequate physical activity:  patient is not ready to increase his/her physical activity level at this time    Hearing/Vision:  No exam data present  Hearing/Vision  Do you or your family notice any trouble with your hearing that hasn't been managed with hearing aids?: No  Do you have difficulty driving, watching TV, or doing any of your daily activities because of your eyesight?: No  Have you had an eye exam within the past year?: (!) No  Hearing/Vision Interventions:  · Vision concerns:  patient encouraged to make appointment with his/her eye specialist     ADL:  ADLs  In the past 7 days, did you need help from others to perform any of the following everyday activities? Eating, dressing, grooming, bathing, toileting, or walking/balance?: (!) Bathing  In the past 7 days, did you need help from others to take care of any of the following?  Laundry, housekeeping, banking/finances, shopping, telephone use, food preparation, transportation, or taking medications?: (!) Laundry,Housekeeping,Banking/Finances,Shopping,Telephone Use,Food Preparation,Transportation,Taking Medications  ADL Interventions:  · Pt has aide the comes in to help with bathing and  does the rest.    Personalized Preventive Plan   Current Health Maintenance Status  Immunization History   Administered Date(s) Administered    COVID-19, Pfizer, PF, 30mcg/0.3mL 03/10/2021, 03/31/2021    Influenza 10/04/2010, 10/12/2011, 11/28/2012    Influenza Virus Vaccine 10/16/2014, 01/14/2016    Influenza, Intradermal, Preservative free 11/04/2013    Influenza, Rudean Heman, IM, (6 mo and older Fluzone, Flulaval, Fluarix and 3 yrs and older Afluria) 10/12/2011, 01/30/2017, 10/19/2017    Influenza, Rudean Heman, IM, PF (6 mo and older Fluzone, Flulaval, Fluarix, and 3 yrs and older Afluria) 09/06/2018, 12/26/2019, 10/12/2021    Pneumococcal Conjugate 7-valent (Prevnar7) 01/01/2009    Pneumococcal Polysaccharide ([id number]) 01/14/2016    Td, unspecified formulation 11/04/2013    Tdap (Boostrix, Adacel) 01/25/2018        There are no preventive care reminders to display for this patient. Recommendations for Preventive Services Due: see orders and patient instructions/AVS.  . Recommended screening schedule for the next 5-10 years is provided to the patient in written form: see Patient Instructions/AVS.    Carlos LAMA LPN, [birth year], performed the documented evaluation under the direct supervision of the attending physician. This encounter was performed under Pippa copeland MDs, direct supervision, 12/27/2021.

## 2022-03-01 ENCOUNTER — TELEPHONE (OUTPATIENT)
Dept: FAMILY MEDICINE CLINIC | Age: 67
End: 2022-03-01

## 2022-03-01 NOTE — TELEPHONE ENCOUNTER
Patients hospice nurse Alayna Ward is calling stating the patient is on Morphine and ativan, but it is not helping and wants to know if this can be increased.      Alayna Ward [phone]

## 2022-03-01 NOTE — TELEPHONE ENCOUNTER
Tried calling number back that was provided and it said, \"Welcome to "[employer] the number you have called has been changed, disconnected or no longer in service. \"

## 2022-05-27 ENCOUNTER — APPOINTMENT (OUTPATIENT)
Dept: GENERAL RADIOLOGY | Age: 67
DRG: 871 | End: 2022-05-27
Payer: MEDICARE

## 2022-05-27 ENCOUNTER — HOSPITAL ENCOUNTER (INPATIENT)
Age: 67
LOS: 2 days | Discharge: HOSPICE | DRG: 871 | End: 2022-05-30
Attending: EMERGENCY MEDICINE | Admitting: INTERNAL MEDICINE
Payer: MEDICARE

## 2022-05-27 DIAGNOSIS — J96.01 ACUTE RESPIRATORY FAILURE WITH HYPOXIA AND HYPERCAPNIA (HCC): ICD-10-CM

## 2022-05-27 DIAGNOSIS — J44.1 COPD EXACERBATION (HCC): ICD-10-CM

## 2022-05-27 DIAGNOSIS — J96.02 ACUTE RESPIRATORY FAILURE WITH HYPOXIA AND HYPERCAPNIA (HCC): ICD-10-CM

## 2022-05-27 DIAGNOSIS — Z87.01 H/O PNEUMONIA DUE TO PSEUDOMONAS: Primary | ICD-10-CM

## 2022-05-27 LAB
A/G RATIO: 0.8 (ref 1.1–2.2)
ALBUMIN SERPL-MCNC: 3.4 G/DL (ref 3.4–5)
ALP BLD-CCNC: 81 U/L (ref 40–129)
ALT SERPL-CCNC: 16 U/L (ref 10–40)
ANION GAP SERPL CALCULATED.3IONS-SCNC: 11 MMOL/L (ref 3–16)
AST SERPL-CCNC: 25 U/L (ref 15–37)
BACTERIA: ABNORMAL /HPF
BASE EXCESS VENOUS: -3.2 MMOL/L (ref -3–3)
BASE EXCESS VENOUS: -4.6 MMOL/L (ref -3–3)
BASOPHILS ABSOLUTE: 0.1 K/UL (ref 0–0.2)
BASOPHILS RELATIVE PERCENT: 0.5 %
BILIRUB SERPL-MCNC: <0.2 MG/DL (ref 0–1)
BILIRUBIN URINE: ABNORMAL
BLOOD, URINE: NEGATIVE
BUN BLDV-MCNC: 5 MG/DL (ref 7–20)
CALCIUM SERPL-MCNC: 9.1 MG/DL (ref 8.3–10.6)
CARBOXYHEMOGLOBIN: 2.7 % (ref 0–1.5)
CARBOXYHEMOGLOBIN: 4.5 % (ref 0–1.5)
CHLORIDE BLD-SCNC: 98 MMOL/L (ref 99–110)
CLARITY: ABNORMAL
CO2: 22 MMOL/L (ref 21–32)
COLOR: YELLOW
CREAT SERPL-MCNC: 0.7 MG/DL (ref 0.6–1.2)
EOSINOPHILS ABSOLUTE: 0.1 K/UL (ref 0–0.6)
EOSINOPHILS RELATIVE PERCENT: 0.5 %
EPITHELIAL CELLS, UA: ABNORMAL /HPF (ref 0–5)
GFR AFRICAN AMERICAN: >60
GFR NON-AFRICAN AMERICAN: >60
GLUCOSE BLD-MCNC: 150 MG/DL (ref 70–99)
GLUCOSE URINE: NEGATIVE MG/DL
HCO3 VENOUS: 25.4 MMOL/L (ref 23–29)
HCO3 VENOUS: 25.9 MMOL/L (ref 23–29)
HCT VFR BLD CALC: 44.9 % (ref 36–48)
HEMOGLOBIN: 14.4 G/DL (ref 12–16)
KETONES, URINE: NEGATIVE MG/DL
LEUKOCYTE ESTERASE, URINE: ABNORMAL
LYMPHOCYTES ABSOLUTE: 2.4 K/UL (ref 1–5.1)
LYMPHOCYTES RELATIVE PERCENT: 14.2 %
MCH RBC QN AUTO: 27.7 PG (ref 26–34)
MCHC RBC AUTO-ENTMCNC: 32.1 G/DL (ref 31–36)
MCV RBC AUTO: 86.2 FL (ref 80–100)
METHEMOGLOBIN VENOUS: 0.5 %
METHEMOGLOBIN VENOUS: 0.6 %
MICROSCOPIC EXAMINATION: YES
MONOCYTES ABSOLUTE: 1 K/UL (ref 0–1.3)
MONOCYTES RELATIVE PERCENT: 6 %
NEUTROPHILS ABSOLUTE: 13.3 K/UL (ref 1.7–7.7)
NEUTROPHILS RELATIVE PERCENT: 78.8 %
NITRITE, URINE: POSITIVE
O2 SAT, VEN: 95 %
O2 SAT, VEN: 98 %
O2 THERAPY: ABNORMAL
O2 THERAPY: ABNORMAL
PCO2, VEN: 64.4 MMHG (ref 40–50)
PCO2, VEN: 68.9 MMHG (ref 40–50)
PDW BLD-RTO: 14.7 % (ref 12.4–15.4)
PH UA: 6 (ref 5–8)
PH VENOUS: 7.18 (ref 7.35–7.45)
PH VENOUS: 7.22 (ref 7.35–7.45)
PLATELET # BLD: 420 K/UL (ref 135–450)
PMV BLD AUTO: 7.3 FL (ref 5–10.5)
PO2, VEN: 137.4 MMHG (ref 25–40)
PO2, VEN: 82.7 MMHG (ref 25–40)
POTASSIUM REFLEX MAGNESIUM: 4.3 MMOL/L (ref 3.5–5.1)
PROCALCITONIN: 0.04 NG/ML (ref 0–0.15)
PROCALCITONIN: 0.07 NG/ML (ref 0–0.15)
PROTEIN UA: ABNORMAL MG/DL
RAPID INFLUENZA  B AGN: NEGATIVE
RAPID INFLUENZA A AGN: NEGATIVE
RBC # BLD: 5.22 M/UL (ref 4–5.2)
RBC UA: ABNORMAL /HPF (ref 0–4)
SARS-COV-2, NAAT: NOT DETECTED
SODIUM BLD-SCNC: 131 MMOL/L (ref 136–145)
SPECIFIC GRAVITY UA: >=1.03 (ref 1–1.03)
TCO2 CALC VENOUS: 28 MMOL/L
TCO2 CALC VENOUS: 28 MMOL/L
TOTAL PROTEIN: 7.7 G/DL (ref 6.4–8.2)
URINE REFLEX TO CULTURE: YES
URINE TYPE: ABNORMAL
UROBILINOGEN, URINE: 0.2 E.U./DL
WBC # BLD: 16.9 K/UL (ref 4–11)
WBC UA: ABNORMAL /HPF (ref 0–5)

## 2022-05-27 PROCEDURE — 87449 NOS EACH ORGANISM AG IA: CPT

## 2022-05-27 PROCEDURE — 99285 EMERGENCY DEPT VISIT HI MDM: CPT

## 2022-05-27 PROCEDURE — 87635 SARS-COV-2 COVID-19 AMP PRB: CPT

## 2022-05-27 PROCEDURE — 2700000000 HC OXYGEN THERAPY PER DAY

## 2022-05-27 PROCEDURE — 71045 X-RAY EXAM CHEST 1 VIEW: CPT

## 2022-05-27 PROCEDURE — 87804 INFLUENZA ASSAY W/OPTIC: CPT

## 2022-05-27 PROCEDURE — 81001 URINALYSIS AUTO W/SCOPE: CPT

## 2022-05-27 PROCEDURE — 87086 URINE CULTURE/COLONY COUNT: CPT

## 2022-05-27 PROCEDURE — 87186 SC STD MICRODIL/AGAR DIL: CPT

## 2022-05-27 PROCEDURE — 87077 CULTURE AEROBIC IDENTIFY: CPT

## 2022-05-27 PROCEDURE — 96365 THER/PROPH/DIAG IV INF INIT: CPT

## 2022-05-27 PROCEDURE — 94660 CPAP INITIATION&MGMT: CPT

## 2022-05-27 PROCEDURE — 94761 N-INVAS EAR/PLS OXIMETRY MLT: CPT

## 2022-05-27 PROCEDURE — 80053 COMPREHEN METABOLIC PANEL: CPT

## 2022-05-27 PROCEDURE — 85025 COMPLETE CBC W/AUTO DIFF WBC: CPT

## 2022-05-27 PROCEDURE — 2580000003 HC RX 258

## 2022-05-27 PROCEDURE — 84145 PROCALCITONIN (PCT): CPT

## 2022-05-27 PROCEDURE — 82803 BLOOD GASES ANY COMBINATION: CPT

## 2022-05-27 RX ORDER — SODIUM CHLORIDE 9 MG/ML
1000 INJECTION, SOLUTION INTRAVENOUS CONTINUOUS
Status: DISCONTINUED | OUTPATIENT
Start: 2022-05-27 | End: 2022-05-28

## 2022-05-27 RX ADMIN — SODIUM CHLORIDE 1000 ML: 9 INJECTION, SOLUTION INTRAVENOUS at 21:04

## 2022-05-27 ASSESSMENT — ENCOUNTER SYMPTOMS
ABDOMINAL PAIN: 0
COUGH: 0
VOMITING: 0
RHINORRHEA: 0
NAUSEA: 0
SHORTNESS OF BREATH: 1

## 2022-05-27 ASSESSMENT — PAIN - FUNCTIONAL ASSESSMENT: PAIN_FUNCTIONAL_ASSESSMENT: NONE - DENIES PAIN

## 2022-05-28 LAB
BASE EXCESS ARTERIAL: -2.4 MMOL/L (ref -3–3)
CARBOXYHEMOGLOBIN ARTERIAL: 1 % (ref 0–1.5)
EKG ATRIAL RATE: 88 BPM
EKG DIAGNOSIS: NORMAL
EKG P AXIS: 65 DEGREES
EKG P-R INTERVAL: 158 MS
EKG Q-T INTERVAL: 384 MS
EKG QRS DURATION: 70 MS
EKG QTC CALCULATION (BAZETT): 464 MS
EKG R AXIS: 23 DEGREES
EKG T AXIS: 56 DEGREES
EKG VENTRICULAR RATE: 88 BPM
HCO3 ARTERIAL: 23.6 MMOL/L (ref 21–29)
HEMOGLOBIN, ART, EXTENDED: 13.5 G/DL (ref 12–16)
METHEMOGLOBIN ARTERIAL: 0.5 %
O2 SAT, ARTERIAL: 89.4 %
O2 THERAPY: ABNORMAL
PCO2 ARTERIAL: 45.3 MMHG (ref 35–45)
PH ARTERIAL: 7.33 (ref 7.35–7.45)
PO2 ARTERIAL: 56.6 MMHG (ref 75–108)
TCO2 ARTERIAL: 25 MMOL/L

## 2022-05-28 PROCEDURE — 2580000003 HC RX 258: Performed by: INTERNAL MEDICINE

## 2022-05-28 PROCEDURE — 94660 CPAP INITIATION&MGMT: CPT

## 2022-05-28 PROCEDURE — 6360000002 HC RX W HCPCS

## 2022-05-28 PROCEDURE — 6360000002 HC RX W HCPCS: Performed by: INTERNAL MEDICINE

## 2022-05-28 PROCEDURE — 36600 WITHDRAWAL OF ARTERIAL BLOOD: CPT

## 2022-05-28 PROCEDURE — 87040 BLOOD CULTURE FOR BACTERIA: CPT

## 2022-05-28 PROCEDURE — 93005 ELECTROCARDIOGRAM TRACING: CPT | Performed by: EMERGENCY MEDICINE

## 2022-05-28 PROCEDURE — 93010 ELECTROCARDIOGRAM REPORT: CPT | Performed by: INTERNAL MEDICINE

## 2022-05-28 PROCEDURE — 2060000000 HC ICU INTERMEDIATE R&B

## 2022-05-28 PROCEDURE — 94640 AIRWAY INHALATION TREATMENT: CPT

## 2022-05-28 PROCEDURE — 94761 N-INVAS EAR/PLS OXIMETRY MLT: CPT

## 2022-05-28 PROCEDURE — 6370000000 HC RX 637 (ALT 250 FOR IP): Performed by: INTERNAL MEDICINE

## 2022-05-28 PROCEDURE — 2580000003 HC RX 258

## 2022-05-28 PROCEDURE — 82803 BLOOD GASES ANY COMBINATION: CPT

## 2022-05-28 PROCEDURE — 2700000000 HC OXYGEN THERAPY PER DAY

## 2022-05-28 RX ORDER — ACETAMINOPHEN 325 MG/1
650 TABLET ORAL EVERY 6 HOURS PRN
Status: DISCONTINUED | OUTPATIENT
Start: 2022-05-28 | End: 2022-05-30 | Stop reason: HOSPADM

## 2022-05-28 RX ORDER — MORPHINE SULFATE 15 MG/1
15 TABLET, FILM COATED, EXTENDED RELEASE ORAL EVERY 12 HOURS SCHEDULED
Status: DISCONTINUED | OUTPATIENT
Start: 2022-05-28 | End: 2022-05-30 | Stop reason: HOSPADM

## 2022-05-28 RX ORDER — MORPHINE SULFATE 10 MG/5ML
10 SOLUTION ORAL EVERY 4 HOURS PRN
Status: DISCONTINUED | OUTPATIENT
Start: 2022-05-28 | End: 2022-05-30 | Stop reason: HOSPADM

## 2022-05-28 RX ORDER — POTASSIUM CHLORIDE 20 MEQ/1
40 TABLET, EXTENDED RELEASE ORAL PRN
Status: DISCONTINUED | OUTPATIENT
Start: 2022-05-28 | End: 2022-05-30 | Stop reason: HOSPADM

## 2022-05-28 RX ORDER — FLUOXETINE HYDROCHLORIDE 20 MG/1
60 CAPSULE ORAL DAILY
Status: DISCONTINUED | OUTPATIENT
Start: 2022-05-28 | End: 2022-05-30 | Stop reason: HOSPADM

## 2022-05-28 RX ORDER — SODIUM CHLORIDE 9 MG/ML
INJECTION, SOLUTION INTRAVENOUS PRN
Status: DISCONTINUED | OUTPATIENT
Start: 2022-05-28 | End: 2022-05-30 | Stop reason: HOSPADM

## 2022-05-28 RX ORDER — POTASSIUM CHLORIDE 7.45 MG/ML
10 INJECTION INTRAVENOUS PRN
Status: DISCONTINUED | OUTPATIENT
Start: 2022-05-28 | End: 2022-05-30 | Stop reason: HOSPADM

## 2022-05-28 RX ORDER — IPRATROPIUM BROMIDE AND ALBUTEROL SULFATE 2.5; .5 MG/3ML; MG/3ML
1 SOLUTION RESPIRATORY (INHALATION) EVERY 4 HOURS
Status: DISCONTINUED | OUTPATIENT
Start: 2022-05-28 | End: 2022-05-30 | Stop reason: HOSPADM

## 2022-05-28 RX ORDER — METHYLPREDNISOLONE SODIUM SUCCINATE 40 MG/ML
40 INJECTION, POWDER, LYOPHILIZED, FOR SOLUTION INTRAMUSCULAR; INTRAVENOUS EVERY 12 HOURS
Status: DISCONTINUED | OUTPATIENT
Start: 2022-05-28 | End: 2022-05-30

## 2022-05-28 RX ORDER — MAGNESIUM SULFATE 1 G/100ML
1000 INJECTION INTRAVENOUS PRN
Status: DISCONTINUED | OUTPATIENT
Start: 2022-05-28 | End: 2022-05-30 | Stop reason: HOSPADM

## 2022-05-28 RX ORDER — ACETAMINOPHEN 650 MG/1
650 SUPPOSITORY RECTAL EVERY 6 HOURS PRN
Status: DISCONTINUED | OUTPATIENT
Start: 2022-05-28 | End: 2022-05-30 | Stop reason: HOSPADM

## 2022-05-28 RX ORDER — ONDANSETRON 2 MG/ML
4 INJECTION INTRAMUSCULAR; INTRAVENOUS EVERY 6 HOURS PRN
Status: DISCONTINUED | OUTPATIENT
Start: 2022-05-28 | End: 2022-05-30 | Stop reason: HOSPADM

## 2022-05-28 RX ORDER — ONDANSETRON 4 MG/1
4 TABLET, ORALLY DISINTEGRATING ORAL EVERY 8 HOURS PRN
Status: DISCONTINUED | OUTPATIENT
Start: 2022-05-28 | End: 2022-05-30 | Stop reason: HOSPADM

## 2022-05-28 RX ORDER — SODIUM CHLORIDE 0.9 % (FLUSH) 0.9 %
10 SYRINGE (ML) INJECTION PRN
Status: DISCONTINUED | OUTPATIENT
Start: 2022-05-28 | End: 2022-05-30 | Stop reason: HOSPADM

## 2022-05-28 RX ORDER — BUSPIRONE HYDROCHLORIDE 5 MG/1
30 TABLET ORAL 2 TIMES DAILY
Status: DISCONTINUED | OUTPATIENT
Start: 2022-05-28 | End: 2022-05-30 | Stop reason: HOSPADM

## 2022-05-28 RX ORDER — ALBUTEROL SULFATE 2.5 MG/3ML
2.5 SOLUTION RESPIRATORY (INHALATION)
Status: DISCONTINUED | OUTPATIENT
Start: 2022-05-28 | End: 2022-05-30 | Stop reason: HOSPADM

## 2022-05-28 RX ORDER — MONTELUKAST SODIUM 10 MG/1
10 TABLET ORAL NIGHTLY
Status: DISCONTINUED | OUTPATIENT
Start: 2022-05-28 | End: 2022-05-30 | Stop reason: HOSPADM

## 2022-05-28 RX ORDER — ENOXAPARIN SODIUM 100 MG/ML
40 INJECTION SUBCUTANEOUS DAILY
Status: DISCONTINUED | OUTPATIENT
Start: 2022-05-28 | End: 2022-05-30 | Stop reason: HOSPADM

## 2022-05-28 RX ORDER — LANOLIN ALCOHOL/MO/W.PET/CERES
3 CREAM (GRAM) TOPICAL NIGHTLY PRN
Status: DISCONTINUED | OUTPATIENT
Start: 2022-05-28 | End: 2022-05-30 | Stop reason: HOSPADM

## 2022-05-28 RX ADMIN — MORPHINE SULFATE 15 MG: 15 TABLET, FILM COATED, EXTENDED RELEASE ORAL at 09:21

## 2022-05-28 RX ADMIN — PIPERACILLIN AND TAZOBACTAM 4500 MG: 4; .5 INJECTION, POWDER, LYOPHILIZED, FOR SOLUTION INTRAVENOUS at 02:09

## 2022-05-28 RX ADMIN — CEFTRIAXONE SODIUM 1000 MG: 1 INJECTION, POWDER, FOR SOLUTION INTRAMUSCULAR; INTRAVENOUS at 01:03

## 2022-05-28 RX ADMIN — BUSPIRONE HYDROCHLORIDE 30 MG: 5 TABLET ORAL at 09:20

## 2022-05-28 RX ADMIN — IPRATROPIUM BROMIDE AND ALBUTEROL SULFATE 1 AMPULE: .5; 2.5 SOLUTION RESPIRATORY (INHALATION) at 20:15

## 2022-05-28 RX ADMIN — Medication 10 ML: at 09:21

## 2022-05-28 RX ADMIN — IPRATROPIUM BROMIDE AND ALBUTEROL SULFATE 1 AMPULE: .5; 2.5 SOLUTION RESPIRATORY (INHALATION) at 16:10

## 2022-05-28 RX ADMIN — Medication 3 MG: at 23:40

## 2022-05-28 RX ADMIN — METHYLPREDNISOLONE SODIUM SUCCINATE 40 MG: 40 INJECTION, POWDER, FOR SOLUTION INTRAMUSCULAR; INTRAVENOUS at 14:12

## 2022-05-28 RX ADMIN — CEFTRIAXONE SODIUM 1000 MG: 1 INJECTION, POWDER, FOR SOLUTION INTRAMUSCULAR; INTRAVENOUS at 23:42

## 2022-05-28 RX ADMIN — MONTELUKAST SODIUM 10 MG: 10 TABLET ORAL at 21:05

## 2022-05-28 RX ADMIN — PIPERACILLIN AND TAZOBACTAM 4500 MG: 4; .5 INJECTION, POWDER, LYOPHILIZED, FOR SOLUTION INTRAVENOUS at 18:11

## 2022-05-28 RX ADMIN — FLUOXETINE 60 MG: 20 CAPSULE ORAL at 09:21

## 2022-05-28 RX ADMIN — BUSPIRONE HYDROCHLORIDE 30 MG: 5 TABLET ORAL at 21:05

## 2022-05-28 RX ADMIN — ENOXAPARIN SODIUM 40 MG: 100 INJECTION SUBCUTANEOUS at 09:21

## 2022-05-28 RX ADMIN — IPRATROPIUM BROMIDE AND ALBUTEROL SULFATE 1 AMPULE: .5; 2.5 SOLUTION RESPIRATORY (INHALATION) at 12:10

## 2022-05-28 RX ADMIN — AZITHROMYCIN MONOHYDRATE 500 MG: 500 INJECTION, POWDER, LYOPHILIZED, FOR SOLUTION INTRAVENOUS at 01:08

## 2022-05-28 RX ADMIN — PIPERACILLIN AND TAZOBACTAM 4500 MG: 4; .5 INJECTION, POWDER, LYOPHILIZED, FOR SOLUTION INTRAVENOUS at 09:30

## 2022-05-28 RX ADMIN — IPRATROPIUM BROMIDE AND ALBUTEROL SULFATE 1 AMPULE: .5; 2.5 SOLUTION RESPIRATORY (INHALATION) at 23:28

## 2022-05-28 RX ADMIN — METHYLPREDNISOLONE SODIUM SUCCINATE 40 MG: 40 INJECTION, POWDER, FOR SOLUTION INTRAMUSCULAR; INTRAVENOUS at 01:58

## 2022-05-28 RX ADMIN — MORPHINE SULFATE 15 MG: 15 TABLET, FILM COATED, EXTENDED RELEASE ORAL at 21:05

## 2022-05-28 ASSESSMENT — ENCOUNTER SYMPTOMS
COUGH: 1
VOMITING: 0
ABDOMINAL PAIN: 0
NAUSEA: 0
SHORTNESS OF BREATH: 1
SORE THROAT: 0

## 2022-05-28 ASSESSMENT — LIFESTYLE VARIABLES: HOW OFTEN DO YOU HAVE A DRINK CONTAINING ALCOHOL: NEVER

## 2022-05-28 NOTE — PROGRESS NOTES
05/28/22 1211   NIV Type   NIV Started/Stopped On   Equipment Type v60   Mode Bilevel   Mask Type Full face mask   Mask Size Medium   Settings/Measurements   PIP Observed 16 cm H20   IPAP 15 cmH20   CPAP/EPAP 5 cmH2O   Rate Ordered 15   Resp 16   FiO2  35 %   I Time/ I Time % 1 s   Vt Exhaled 364 mL   Minute Volume 5.6 Liters   Mask Leak (lpm) 8 lpm   Comfort Level Good   Using Accessory Muscles No   SpO2 96   Patient's Home Machine No   Alarm Settings   Alarms On Y   Low Pressure 6 cmH2O   High Pressure  30 cmH2O   Apnea (secs) 20 secs   RR Low  6   RR High 45 br/min

## 2022-05-28 NOTE — PROGRESS NOTES
Pt moved from ER to room 432, arrived to pt room at approx 0220 with no problems en route. Pt was responding appropriately to staff when spoken to.   Bipap connected, checked, will monitor

## 2022-05-28 NOTE — H&P
Hospital Medicine History & Physical      Patient:  Patel Archuleta  :   1955  MRN:   [id number]  Date of Service: 22    Chief Complaint   Patient presents with    Shortness of Breath     patient is currently in Hospice for COPD. started having sob and AMS today       HISTORY OF PRESENT ILLNESS:    Patel Archuleta is a 73 y.o. female. She presented to the ER from home by ambulance. She has a h/o severe COPD and ILD, likely smoking-related pulmonary fibrosis. At baseline she uses O2 2-3L/min. She presents for above-baseline dyspnea worsening over about 2-3 days. She is now dyspneic at rest.  She was started on BiPAP quickly in the ER. This along with fatigue and dyspnea somewhat limit history. She denies feeling acutely ill, just dyspneic, and it does seem that she is coughing more than usual.  She denies pleuritic chest pain and hemoptysis. Patient was receiving in-home hospice care. Review of Systems:  All pertinent positives and negatives are as noted in the HPI section. All other systems were reviewed and are negative.     Past Medical History:   Diagnosis Date    Allergic rhinitis 2010    Anxiety     Arthritis     Aspiration pneumonia ([address] 3/21/2012    Recurrent    Asthma     Bronchitis chronic     COPD (chronic obstructive pulmonary disease) ([address] 12/3/2009    Depression     Drug abuse, cocaine type (HCC)     past history of crack cocaine use    Emphysema of lung (HCC)     Fibromyalgia     GERD (gastroesophageal reflux disease)     Hyperlipidemia     Influenza A 2020    Lung disease     On home oxygen therapy     uses O2 NC 3L prn at night    Osteoporosis     Pneumonia     Polysubstance dependence ([address] 2012    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    Pulmonary fibrosis ([address] 10/16/2014    Pulmonary infiltrate     Pulmonary nodule 12/3/2009    Tobacco abuse        Past Surgical History:   Procedure Laterality Date    BLADDER REPAIR      BRONCHOSCOPY      BRONCHOSCOPY N/A 3/6/2020    BRONCHOSCOPY THERAPUTIC ASPIRATION INITIAL performed by Bogdan Waters MD at [address] [place] [address]  3/6/2020    BRONCHOSCOPY ALVEOLAR LAVAGE performed by Bogdan Waters MD at [address] [place] [address] N/A 6/26/2020    BRONCHOSCOPY THERAPUTIC ASPIRATION INITIAL performed by Johnson Valiente MD at [address] [place] [address]  6/26/2020    BRONCHOSCOPY ALVEOLAR LAVAGE performed by Johnson Valiente MD at [address] [place] [address]  06/23/2021    Dr Ashwini Amos N/A 6/23/2021    BRONCHOSCOPY DIAGNOSTIC OR CELL [address] performed by Johnson Valiente MD at [address] [place] [address]  6/23/2021    BRONCHOSCOPY THERAPUTIC ASPIRATION INITIAL performed by Johnson Valiente MD at [address] [place] [address]  6/23/2021    BRONCHOSCOPY ALVEOLAR LAVAGE performed by Johnson Valiente MD at [address] [facility] 8/27/2021    BRONCHOSCOPY DIAGNOSTIC OR CELL [address] ONLY performed by Johnson Valiente MD at [address] [facility]  2012    ENDOSCOPY, COLON, DIAGNOSTIC      ESOPHAGEAL DILATATION  9/20/2018    ESOPHAGEAL DILATION Payton Malagon performed by Esther Peterson MD at [address] [facility] HISTORY  2/12/15    T8 Kyphoplasty    CT COLONOSCOPY FLX DX W/COLLJ SPEC WHEN PFRMD N/A 9/20/2018    EGD AND COLONOSCOPY WITH ANESTHESIA performed by Esther Peterson MD at [address] [facility] ESOPHAGOGASTRODUODENOSCOPY TRANSORAL DIAGNOSTIC N/A 9/20/2018    EGD AND COLONOSCOPY WITH ANESTHESIA performed by Esther Peterson MD at [address] [facility]           Prior to Admission medications    Medication Sig Start Date End Date Taking?  Authorizing Provider   ipratropium-albuterol (DUONEB) 0.5-2.5 (3) MG/3ML SOLN nebulizer solution Inhale 3 mLs into the lungs every 4 hours as needed for Shortness of Breath 9/23/21   Rl Neff MD   traZODone (DESYREL) 150 MG tablet Take 1-2 tablets by mouth nightly TAKE 2 TABLETS AT BEDTIME 9/13/21   Lottie Christian MD   guaiFENesin (MUCINEX) 600 MG extended release tablet Take 1 tablet by mouth 2 times daily as needed for Congestion 8/29/21   Deisi Garcia MD   fluticasone-salmeterol [facility]) 500-50 MCG/DOSE diskus inhaler Inhale 1 puff into the lungs every 12 hours 7/21/21   Rl Neff MD   albuterol (PROVENTIL) (2.5 MG/3ML) 0.083% nebulizer solution Take 3 mLs by nebulization every 6 hours as needed for Wheezing or Shortness of Breath DX COPD J44.9 7/21/21   Rl Neff MD   montelukast (SINGULAIR) 10 MG tablet TAKE 1 TABLET BY MOUTH EACH NIGHT 7/21/21   Rl Neff MD   albuterol sulfate HFA (VENTOLIN HFA) 108 (90 Base) MCG/ACT inhaler Inhale 2 puffs into the lungs every 6 hours as needed for Wheezing or Shortness of Breath 3/4/21   Rl Neff MD   BD PEN NEEDLE SVETLANA U/F 32G X 4 MM MISC USE ONE DAILY AS DIRECTED 12/11/20   Lottie Christian MD   FLUoxetine (PROZAC) 20 MG capsule TAKE 3 CAPSULES BY MOUTH DAILY 12/3/20   Lottie Christian MD   simvastatin (ZOCOR) 20 MG tablet TAKE 1 TABLET EVERY EVENING 8/7/20   Lottie Christian MD   busPIRone (BUSPAR) 30 MG tablet TAKE 1 TABLET TWICE DAILY 8/7/20   Lottie Christian MD   teriparatide, recombinant, (FORTEO) 600 MCG/2.4ML SOLN injection Inject 0.08 mLs into the skin daily One dose injected daily. 9/27/19 12/7/20  Shannon Ashby MD     Morphine SR 15mg q12h  Morphine IR 30mg qam  Morphine IR 5-10mg every 4 hours prn    Allergies:   Meperidine and Demerol    Social:   reports that she has been smoking cigarettes. She started smoking about 50 years ago. She has a 40.00 pack-year smoking history. She has never used smokeless tobacco.   reports no history of alcohol use.   Social History     Substance and Sexual Activity   Drug Use Yes    Types: Marijuana Pamella Antonio)    Comment: Daily       Family History   Problem Relation Age of Onset  Asthma Mother     Diabetes Mother     Emphysema Mother     Heart Failure Mother     Hypertension Mother     Heart Disease Mother     High Cholesterol Mother     Cancer Mother     Depression Mother     Diabetes Father     Emphysema Father     Heart Failure Father     Hypertension Father     Heart Disease Father     High Cholesterol Father     Substance Abuse Father     Emphysema Paternal Grandfather     Diabetes Sister     High Cholesterol Sister     Vision Loss Maternal Uncle        PHYSICAL EXAM:  I performed this physical examination. Vitals:  Patient Vitals for the past 24 hrs:   BP Temp Temp src Pulse Resp SpO2 Height Weight   05/28/22 0033 106/62 -- -- 82 18 98 % -- --   05/27/22 2300 -- -- -- 91 -- -- -- --   05/27/22 2257 -- -- -- -- 21 -- -- --   05/27/22 2153 100/68 -- -- 100 21 99 % -- --   05/27/22 2120 82/63 -- -- 99 13 100 % -- --   05/27/22 2107 -- -- -- (!) 105 16 100 % -- --   05/27/22 2050 84/65 -- -- (!) 108 15 100 % -- --   05/27/22 2040 81/64 -- -- (!) 110 21 98 % -- --   05/27/22 2037 -- -- -- -- -- -- 5' 5\" (1.651 m) 170 lb (77.1 kg)   05/27/22 2033 -- -- -- (!) 115 -- -- -- --   05/27/22 2031 89/69 -- -- (!) 116 18 99 % -- --   05/27/22 2030 86/61 97 °F (36.1 °C) Axillary (!) 114 17 98 % -- --   05/27/22 2017 -- -- -- -- 22 -- -- --   05/27/22 2010 (!) 181/158 -- -- -- -- -- -- --   05/27/22 2007 -- -- -- (!) 128 24 93 % -- --       Intake/Output Summary (Last 24 hours) at 5/28/2022 0054  Last data filed at 5/27/2022 2252  Gross per 24 hour   Intake 1000 ml   Output --   Net 1000 ml     BiPAP 16 / 6  f = 16. Timed breaths result in small tidal volumes and load mask leak. Adjusted to. .. BiPAP 15 / 5 f = 10  Patient breathing spontaneously with Vt's 500-700    GEN:               Appearance:  Age appropirate female in NAD. Appears comfortable on BiPAP. Level of Consciousness:  alert. Orientation:  full and follows commands. Coarse junky cough several times. HEENT:           Sclera anicteric.  no conjunctival chemosis. moist mucus membranes. no specific or diagnostic oral lesions. NECK:             no signs of meningismus. Jugular veins not distended. Carotid pulses  2+.  no cervical lymphadenopathy. no thyromegaly. CV:                  regular rhythm. normal S1 & S2.    no murmur. no rub.  no gallop. PULM:             Chest excursion is symmetric. Breath sounds are globally diminished. Adventitious sounds:  Patchy crackles throughout. Poorly aerated left base. Coarse expiratory wheezes in all fields. AB:                  Abdominal shape is normal.  Bowel sounds are active. Generally soft to palpation. no tenderness is present. no involuntary guarding. no rebound guarding. EXTR:              Skin is warm. Capillary refill brisk. no specific or pathognomic rash. no clubbing. no pitting edema. no active wound or ulcer. LABS:  Lab Results   Component Value Date    WBC 16.9 (H) 05/27/2022    HGB 14.4 05/27/2022    HCT 44.9 05/27/2022    MCV 86.2 05/27/2022     05/27/2022     Lab Results   Component Value Date    CREATININE 0.7 05/27/2022    BUN 5 (L) 05/27/2022     (L) 05/27/2022    K 4.3 05/27/2022    CL 98 (L) 05/27/2022    CO2 22 05/27/2022     Lab Results   Component Value Date    ALT 16 05/27/2022    AST 25 05/27/2022    ALKPHOS 81 05/27/2022    BILITOT <0.2 05/27/2022     Lab Results   Component Value Date    CKTOTAL 39 04/04/2016    TROPONINI <0.01 10/16/2021     No results for input(s): PHART, EEE7OVY, PO2ART in the last 72 hours. IMAGING:  XR CHEST PORTABLE    Result Date: 5/27/2022  EXAMINATION: ONE XRAY VIEW OF THE CHEST 5/27/2022 8:32 pm COMPARISON: 10/16/2021 HISTORY: ORDERING SYSTEM PROVIDED HISTORY: Shortness of breath TECHNOLOGIST PROVIDED HISTORY: Reason for exam:->Shortness of breath Reason for Exam: sob FINDINGS: The heart is unchanged.   The pulmonary vessels are slightly ill-defined centrally. The lungs are hypoinflated with some hazy bibasilar interstitial ground-glass opacities which is more prominent on left and is increased. There is mild blunting of left costophrenic sulcus. There is moderate scoliosis. Hazy left basilar infiltrate and/or atelectasis and a small left pleural effusion. Mild right basilar atelectasis or scarring. Stable borderline cardiomegaly with mild central pulmonary congestion. I directly reviewed all recent imaging studies as well as pertinent prior studies. Radiology reports may or may not be available at the time of my review. EKG:  New and pertinent prior tracings were directly reviewed. My interpretation is as follows:  Normal sinus    Active Hospital Problems    Diagnosis Date Noted    Stage 3 severe COPD by GOLD classification ([facility] [J44.9] 12/03/2009     Priority: Medium    COPD with acute exacerbation ([facility] [J44.1] 10/16/2021    H/O pneumonia due to Pseudomonas [Z87.01] 10/16/2021    Ineffective airway clearance [R06.89] 06/25/2020    Acute on chronic respiratory failure with hypoxia and hypercapnia (HCC) [J96.21, J96.22] 03/04/2020    Pneumonia [J18.9] 03/19/2019    Anxiety and depression [F41.9, F32.A] 12/14/2018    Pulmonary fibrosis ([facility] [J84.10] 10/16/2014       ASSESSMENT & PLAN  Acute on Chronic Respiratory Failure, COPD & Pulmonary Fibrosis exacerbation, LLL pneumonia  -  Continue general respiratory support. Baseline O2 requirement is 2-3 L/min just at night. She currently receiving BiPAP support and doing well. Wean from support as tolerated. -  Start solumedrol 40mg IV q12h, scheduled duonebs q4h, prn albuterol nebs. Continue montelukast.  -  Procalcitonin low but pneumonia suspected and supported by imaging. Empiric zosyn for now. -  Continue home morphine SR 15mg BID and prn morphine q4h for pain and/or dyspnea/airhunger.     Depression/Anxiety  -  Continue fluoxetine, bupropion, and trazodone per home regimen. DVT prophylaxis: SCDs, lovenox  Code Status:  Full code status remains in effect. Patient has yet to amend. .  Palliative care assistance requested. Disposition:  Inpatient for the foreseeable future. Anticipate eventual d/c to home w/ home hospice.     Noah Zurita MD MD

## 2022-05-28 NOTE — PROGRESS NOTES
05/27/22 2017   NIV Type   $NIV $Daily Charge   NIV Started/Stopped On   Equipment Type v60   Mode Bilevel   Mask Type Full face mask   Mask Size Medium   Settings/Measurements   PIP Observed 19 cm H20   IPAP 18 cmH20   CPAP/EPAP 6 cmH2O   Rate Ordered 16   Resp 22   FiO2  100 %   I Time/ I Time % 1.05 s   Vt Exhaled 700 mL   Minute Volume 15.2 Liters   Mask Leak (lpm) 4 lpm   Using Accessory Muscles Yes   SpO2 98   Patient's Home Machine No   Breath Sounds   Right Upper Lobe Crackles   Right Middle Lobe Crackles   Left Upper Lobe Crackles   Left Lower Lobe Crackles   Alarm Settings   Alarms On Y   Low Pressure 6 cmH2O   High Pressure  30 cmH2O   Delay Alarm 20 sec(s)   RR Low  6   RR High 45 br/min

## 2022-05-28 NOTE — RT PROTOCOL NOTE
RT Inhaler-Nebulizer Bronchodilator Protocol Note    There is a bronchodilator order in the chart from a provider indicating to follow the RT Bronchodilator Protocol and there is an Initiate RT Inhaler-Nebulizer Bronchodilator Protocol order as well (see protocol at bottom of note). CXR Findings:  XR CHEST PORTABLE    Result Date: 5/27/2022  Hazy left basilar infiltrate and/or atelectasis and a small left pleural effusion. Mild right basilar atelectasis or scarring. Stable borderline cardiomegaly with mild central pulmonary congestion. The findings from the last RT Protocol Assessment were as follows:   History Pulmonary Disease: Chronic pulmonary disease  Respiratory Pattern: Regular pattern and RR 12-20 bpm  Breath Sounds: Slightly diminished and/or crackles  Cough: Strong, spontaneous, non-productive  Indication for Bronchodilator Therapy:    Bronchodilator Assessment Score: 4    Aerosolized bronchodilator medication orders have been revised according to the RT Inhaler-Nebulizer Bronchodilator Protocol below. Respiratory Therapist to perform RT Therapy Protocol Assessment initially then follow the protocol. Repeat RT Therapy Protocol Assessment PRN for score 0-3 or on second treatment, BID, and PRN for scores above 3. No Indications - adjust the frequency to every 6 hours PRN wheezing or bronchospasm, if no treatments needed after 48 hours then discontinue using Per Protocol order mode. If indication present, adjust the RT bronchodilator orders based on the Bronchodilator Assessment Score as indicated below. Use Inhaler orders unless patient has one or more of the following: on home nebulizer, not able to hold breath for 10 seconds, is not alert and oriented, cannot activate and use MDI correctly, or respiratory rate 25 breaths per minute or more, then use the equivalent nebulizer order(s) with same Frequency and PRN reasons based on the score.   If a patient is on this medication at home then do not decrease Frequency below that used at home. 0-3 - enter or revise RT bronchodilator order(s) to equivalent RT Bronchodilator order with Frequency of every 4 hours PRN for wheezing or increased work of breathing using Per Protocol order mode. 4-6 - enter or revise RT Bronchodilator order(s) to two equivalent RT bronchodilator orders with one order with BID Frequency and one order with Frequency of every 4 hours PRN wheezing or increased work of breathing using Per Protocol order mode. 7-10 - enter or revise RT Bronchodilator order(s) to two equivalent RT bronchodilator orders with one order with TID Frequency and one order with Frequency of every 4 hours PRN wheezing or increased work of breathing using Per Protocol order mode. 11-13 - enter or revise RT Bronchodilator order(s) to one equivalent RT bronchodilator order with QID Frequency and an Albuterol order with Frequency of every 4 hours PRN wheezing or increased work of breathing using Per Protocol order mode. Greater than 13 - enter or revise RT Bronchodilator order(s) to one equivalent RT bronchodilator order with every 4 hours Frequency and an Albuterol order with Frequency of every 2 hours PRN wheezing or increased work of breathing using Per Protocol order mode. RT to enter RT Home Evaluation for COPD & MDI Assessment order using Per Protocol order mode.     Electronically signed by Ronal Cornejo RCP on 5/28/2022 at 9:18 AM

## 2022-05-28 NOTE — RT PROTOCOL NOTE
RT Inhaler-Nebulizer Bronchodilator Protocol Note    There is a bronchodilator order in the chart from a provider indicating to follow the RT Bronchodilator Protocol and there is an Initiate RT Inhaler-Nebulizer Bronchodilator Protocol order as well (see protocol at bottom of note). CXR Findings:  XR CHEST PORTABLE    Result Date: 5/27/2022  Hazy left basilar infiltrate and/or atelectasis and a small left pleural effusion. Mild right basilar atelectasis or scarring. Stable borderline cardiomegaly with mild central pulmonary congestion. The findings from the last RT Protocol Assessment were as follows:   History Pulmonary Disease: Chronic pulmonary disease  Respiratory Pattern: Regular pattern and RR 12-20 bpm  Breath Sounds: Slightly diminished and/or crackles  Cough: Strong, spontaneous, non-productive  Indication for Bronchodilator Therapy:    Bronchodilator Assessment Score: 4    Aerosolized bronchodilator medication orders have been revised according to the RT Inhaler-Nebulizer Bronchodilator Protocol below. Respiratory Therapist to perform RT Therapy Protocol Assessment initially then follow the protocol. Repeat RT Therapy Protocol Assessment PRN for score 0-3 or on second treatment, BID, and PRN for scores above 3. No Indications - adjust the frequency to every 6 hours PRN wheezing or bronchospasm, if no treatments needed after 48 hours then discontinue using Per Protocol order mode. If indication present, adjust the RT bronchodilator orders based on the Bronchodilator Assessment Score as indicated below. Use Inhaler orders unless patient has one or more of the following: on home nebulizer, not able to hold breath for 10 seconds, is not alert and oriented, cannot activate and use MDI correctly, or respiratory rate 25 breaths per minute or more, then use the equivalent nebulizer order(s) with same Frequency and PRN reasons based on the score.   If a patient is on this medication at home then do not decrease Frequency below that used at home. 0-3 - enter or revise RT bronchodilator order(s) to equivalent RT Bronchodilator order with Frequency of every 4 hours PRN for wheezing or increased work of breathing using Per Protocol order mode. 4-6 - enter or revise RT Bronchodilator order(s) to two equivalent RT bronchodilator orders with one order with BID Frequency and one order with Frequency of every 4 hours PRN wheezing or increased work of breathing using Per Protocol order mode. 7-10 - enter or revise RT Bronchodilator order(s) to two equivalent RT bronchodilator orders with one order with TID Frequency and one order with Frequency of every 4 hours PRN wheezing or increased work of breathing using Per Protocol order mode. 11-13 - enter or revise RT Bronchodilator order(s) to one equivalent RT bronchodilator order with QID Frequency and an Albuterol order with Frequency of every 4 hours PRN wheezing or increased work of breathing using Per Protocol order mode. Greater than 13 - enter or revise RT Bronchodilator order(s) to one equivalent RT bronchodilator order with every 4 hours Frequency and an Albuterol order with Frequency of every 2 hours PRN wheezing or increased work of breathing using Per Protocol order mode. RT to enter RT Home Evaluation for COPD & MDI Assessment order using Per Protocol order mode.     Electronically signed by Luis Estevez RCP on 5/28/2022 at 9:18 AM

## 2022-05-28 NOTE — PROGRESS NOTES
Hospitalist Progress Note      PCP: Dylan Fine MD    Date of Admission: 5/27/2022    Chief Complaint: Shortness of breath    Hospital Course:   Anaya Macdonald is a 73 y.o. female. She presented to the ER from home by ambulance.       She has a h/o severe COPD and ILD, likely smoking-related pulmonary fibrosis.  At baseline she uses O2 2-3L/min. Rosita Terry presents for above-baseline dyspnea worsening over about 2-3 days.  She is now dyspneic at rest.  She was started on BiPAP quickly in the ER. This along with fatigue and dyspnea somewhat limit history. She denies feeling acutely ill, just dyspneic, and it does seem that she is coughing more than usual.  She denies pleuritic chest pain and hemoptysis.     Patient was receiving in-home hospice care. Subjective: Patient denies any chest pain or shortness of breath no nausea vomiting currently on a 2 L oxygen.         Medications:  Reviewed    Infusion Medications    sodium chloride       Scheduled Medications    cefTRIAXone (ROCEPHIN) IV  1,000 mg IntraVENous Q24H    busPIRone  30 mg Oral BID    FLUoxetine  60 mg Oral Daily    montelukast  10 mg Oral Nightly    ipratropium-albuterol  1 ampule Inhalation Q4H    morphine  15 mg Oral 2 times per day    methylPREDNISolone  40 mg IntraVENous Q12H    piperacillin-tazobactam  4,500 mg IntraVENous Q8H    enoxaparin  40 mg SubCUTAneous Daily     PRN Meds: sodium chloride flush, sodium chloride, potassium chloride **OR** potassium alternative oral replacement **OR** potassium chloride, magnesium sulfate, ondansetron **OR** ondansetron, acetaminophen **OR** acetaminophen, melatonin, albuterol, morphine      Intake/Output Summary (Last 24 hours) at 5/28/2022 0906  Last data filed at 5/28/2022 0133  Gross per 24 hour   Intake 1048.07 ml   Output --   Net 1048.07 ml       Physical Exam Performed:    BP 92/60   Pulse 79   Temp 97.7 °F (36.5 °C) (Axillary)   Resp 16   Ht 5' 5\" (1.651 m)   Wt 154 lb 12.2 oz (70.2 kg)   SpO2 94%   BMI 25.75 kg/m²     General appearance: No apparent distress, appears stated age and cooperative. HEENT: Pupils equal, round, and reactive to light. Conjunctivae/corneas clear. Neck: Supple, with full range of motion. No jugular venous distention. Trachea midline. Respiratory:  Normal respiratory effort. Bilateral wheezing and rhonchi. Cardiovascular: Regular rate and rhythm with normal S1/S2 without murmurs, rubs or gallops. Abdomen: Soft, non-tender, non-distended with normal bowel sounds. Musculoskeletal: No clubbing, cyanosis or edema bilaterally. Full range of motion without deformity. Skin: Skin color, texture, turgor normal.  No rashes or lesions. Neurologic:  Neurovascularly intact without any focal sensory/motor deficits. Cranial nerves: II-XII intact, grossly non-focal.  Psychiatric: Alert and oriented, thought content appropriate, normal insight  Capillary Refill: Brisk,3 seconds, normal   Peripheral Pulses: +2 palpable, equal bilaterally       Labs:   Recent Labs     05/27/22 2025   WBC 16.9*   HGB 14.4   HCT 44.9        Recent Labs     05/27/22 2025   *   K 4.3   CL 98*   CO2 22   BUN 5*   CREATININE 0.7   CALCIUM 9.1     Recent Labs     05/27/22 2025   AST 25   ALT 16   BILITOT <0.2   ALKPHOS 81     No results for input(s): INR in the last 72 hours. No results for input(s): Zettie Binet in the last 72 hours. Urinalysis:      Lab Results   Component Value Date    NITRU POSITIVE 05/27/2022    WBCUA 21-50 05/27/2022    BACTERIA 3+ 05/27/2022    RBCUA 0-2 05/27/2022    BLOODU Negative 05/27/2022    SPECGRAV >=1.030 05/27/2022    GLUCOSEU Negative 05/27/2022    GLUCOSEU NEGATIVE 04/13/2012       Radiology:  XR CHEST PORTABLE   Final Result   Hazy left basilar infiltrate and/or atelectasis and a small left pleural   effusion. Mild right basilar atelectasis or scarring. Stable borderline cardiomegaly with mild central pulmonary congestion. Assessment/Plan:    Active Hospital Problems    Diagnosis     Stage 3 severe COPD by GOLD classification ([facility] [J44.9]      Priority: Medium    COPD with acute exacerbation ([facility] [J44.1]     H/O pneumonia due to Pseudomonas [Z87.01]     Ineffective airway clearance [R06.89]     Acute on chronic respiratory failure with hypoxia and hypercapnia (HCC) [J96.21, J96.22]     Pneumonia [J18.9]     Anxiety and depression [F41.9, F32.A]     Pulmonary fibrosis (HCC) [J84.10]      1. Admitted with acute on chronic respiratory failure, COPD extubation and pulmonary-fibrosis, left lower lobe pneumonia. Continue with the supportive care. Consult pulmonary. 2.  History of depression anxiety continue with home medication. 3.  Patient is enrolled in hospice care at home.     DVT Prophylaxis: Lovenox subcu  Diet: Diet NPO Exceptions are: Ice Chips, Sips of Water with Meds, Sips of Clear Liquids  Code Status: Full Code    PT/OT Eval Status:    Cody Spear MD

## 2022-05-28 NOTE — ED PROVIDER NOTES
[address] [facility] [place]  ED  EMERGENCY DEPARTMENT ENCOUNTER      Pt Name: Rommel Morgan  MRN: [id number]  [place]eb[place]ash 1955  Date of evaluation: 5/27/2022  Provider: Kelsi Burris DO    CHIEF COMPLAINT       Chief Complaint   Patient presents with    Shortness of Breath     patient is currently in Hospice for COPD. started having sob and AMS today         HISTORY OF PRESENT ILLNESS   (Location/Symptom, Timing/Onset, Context/Setting, Quality, Duration, Modifying Factors, Severity)  Note limiting factors. Patient is a 43-year-old female with a past medical history of tobacco use, COPD and pulmonary fibrosis who presents to the emergency room with altered mental status and shortness of breath. She states that the onset was this morning. She denies any chest pain, fever chills or abdominal pain. She denies any sick contacts. She does wear 2 L of oxygen at baseline. However her  states that she only wears this occasionally and not like she was supposed to. She is from hospice. Nursing Notes were reviewed. REVIEW OF SYSTEMS    (2-9 systems for level 4, 10 or more for level 5)     Review of Systems   Constitutional: Negative for chills and fever. HENT: Negative for congestion and rhinorrhea. Respiratory: Positive for shortness of breath. Negative for cough. Cardiovascular: Negative for chest pain. Gastrointestinal: Negative for abdominal pain, nausea and vomiting. Except as noted above the remainder of the review of systems was reviewed and negative.        PAST MEDICAL HISTORY     Past Medical History:   Diagnosis Date    Allergic rhinitis 6/11/2010    Anxiety     Arthritis     Aspiration pneumonia ([address] 3/21/2012    Recurrent    Asthma     Bronchitis chronic     COPD (chronic obstructive pulmonary disease) ([address] 12/3/2009    Depression     Drug abuse, cocaine type ([address]     past history of crack cocaine use    Emphysema of lung (HCC)     Fibromyalgia     GERD (gastroesophageal reflux disease)     Hyperlipidemia     Influenza A 03/05/2020    Lung disease     On home oxygen therapy     uses O2 NC 3L prn at night    Osteoporosis     Pneumonia     Polysubstance dependence ([facility] 1/2/2012    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[facility] [facility] 1/2/2012    Pulmonary fibrosis ([facility] 10/16/2014    Pulmonary infiltrate     Pulmonary nodule 12/3/2009    Tobacco abuse          SURGICAL HISTORY       Past Surgical History:   Procedure Laterality Date    BLADDER REPAIR      BRONCHOSCOPY      BRONCHOSCOPY N/A 3/6/2020    BRONCHOSCOPY THERAPUTIC ASPIRATION INITIAL performed by Benji Wall MD at [address] [place] [address]  3/6/2020    BRONCHOSCOPY ALVEOLAR LAVAGE performed by Benji Wall MD at [address] [place] [address] N/A 6/26/2020    BRONCHOSCOPY THERAPUTIC ASPIRATION INITIAL performed by Viji Morrow MD at [address] [place] [address]  6/26/2020    BRONCHOSCOPY ALVEOLAR LAVAGE performed by Viji Morrow MD at [address] [place] [address]  06/23/2021    Dr Yumiko Palacios N/A 6/23/2021    BRONCHOSCOPY DIAGNOSTIC OR CELL [address] performed by Viji Morrow MD at [address] [place] [address]  6/23/2021    BRONCHOSCOPY THERAPUTIC ASPIRATION INITIAL performed by Viji Morrow MD at [address] [place] [address]  6/23/2021    BRONCHOSCOPY ALVEOLAR LAVAGE performed by Viji Morrow MD at [address] [facility] 8/27/2021    BRONCHOSCOPY DIAGNOSTIC OR CELL [address] performed by Viji Morrow MD at [address] [facility]  2012    ENDOSCOPY, COLON, DIAGNOSTIC      ESOPHAGEAL DILATATION  9/20/2018    ESOPHAGEAL DILATION [place] performed by Quynh Yoon MD at [address] [facility] HISTORY  2/12/15    T8 Kyphoplasty    SD COLONOSCOPY FLX DX W/COLLJ SPEC WHEN PFRMD N/A 9/20/2018    EGD AND COLONOSCOPY WITH ANESTHESIA performed by Jacinto Zavala Cameron Hines MD at [address] [facility] ESOPHAGOGASTRODUODENOSCOPY TRANSORAL DIAGNOSTIC N/A 9/20/2018    EGD AND COLONOSCOPY WITH ANESTHESIA [facility]       Previous Medications    ALBUTEROL (PROVENTIL) (2.5 MG/3ML) 0.083% NEBULIZER SOLUTION    Take 3 mLs by nebulization every 6 hours as needed for Wheezing or Shortness of Breath DX COPD J44.9    ALBUTEROL SULFATE HFA (VENTOLIN HFA) 108 (90 BASE) MCG/ACT INHALER    Inhale 2 puffs into the lungs every 6 hours as needed for Wheezing or Shortness of Breath    BD PEN NEEDLE SVETLANA U/F 32G X 4 MM MISC    USE ONE DAILY AS DIRECTED    BUSPIRONE (BUSPAR) 30 MG TABLET    TAKE 1 TABLET TWICE DAILY    FLUOXETINE (PROZAC) 20 MG CAPSULE    TAKE 3 CAPSULES BY MOUTH DAILY    FLUTICASONE-SALMETEROL (WIXELA INHUB) 500-50 MCG/DOSE DISKUS INHALER    Inhale 1 puff into the lungs every 12 hours    GUAIFENESIN (MUCINEX) 600 MG EXTENDED RELEASE TABLET    Take 1 tablet by mouth 2 times daily as needed for Congestion    IPRATROPIUM-ALBUTEROL (DUONEB) 0.5-2.5 (3) MG/3ML SOLN NEBULIZER SOLUTION    Inhale 3 mLs into the lungs every 4 hours as needed for Shortness of Breath    MONTELUKAST (SINGULAIR) 10 MG TABLET    TAKE 1 TABLET BY MOUTH EACH NIGHT    SIMVASTATIN (ZOCOR) 20 MG TABLET    TAKE 1 TABLET EVERY EVENING    TERIPARATIDE, RECOMBINANT, (FORTEO) 600 MCG/2.4ML SOLN INJECTION    Inject 0.08 mLs into the skin daily One dose injected daily.     TRAZODONE (DESYREL) 150 MG TABLET    Take 1-2 tablets by mouth nightly TAKE 2 TABLETS AT BEDTIME       ALLERGIES     Meperidine and Demerol    FAMILY HISTORY       Family History   Problem Relation Age of Onset    Asthma Mother     Diabetes Mother     Emphysema Mother     Heart Failure Mother     Hypertension Mother     Heart Disease Mother     High Cholesterol Mother     Cancer Mother     Depression Mother     Diabetes Father with Friends and Family: Not on file    Attends Synagogue Services: Not on file    Active Member of Clubs or Organizations: Not on file    Attends Club or Organization Meetings: Not on file    Marital Status: Not on file   Intimate Partner Violence:     Fear of Current or Ex-Partner: Not on file    Emotionally Abused: Not on file    Physically Abused: Not on file    Sexually Abused: Not on file   Housing Stability:     Unable to Pay for Housing in the Last Year: Not on file    Number of [place] in the Last Year: Not on file    Unstable Housing in the Last Year: Not on file       SCREENINGS        Sarah Coma Scale  Eye Opening: To speech  Best Verbal Response: Oriented  Best Motor Response: Obeys commands  Sarah Coma Scale Score: 14               PHYSICAL EXAM    (up to 7 for level 4, 8 or more for level 5)     ED Triage Vitals   BP Temp Temp src Heart Rate Resp SpO2 Height Weight   05/27/22 2010 -- -- 05/27/22 2007 05/27/22 2007 05/27/22 2007 -- --   (!) 181/158   (!) 128 24 93 %         Physical Exam  Constitutional:       General: She is in acute distress. Appearance: She is obese. She is ill-appearing. Interventions: Face mask in place. HENT:      Head: Normocephalic and atraumatic. Cardiovascular:      Rate and Rhythm: Regular rhythm. Tachycardia present. Pulses: Normal pulses. Heart sounds: Normal heart sounds. No murmur heard. No friction rub. No gallop. Pulmonary:      Effort: Accessory muscle usage and respiratory distress present. Breath sounds: Examination of the right-upper field reveals rhonchi. Examination of the left-upper field reveals rhonchi. Examination of the right-middle field reveals rhonchi. Examination of the left-middle field reveals rhonchi. Examination of the right-lower field reveals rhonchi. Examination of the left-lower field reveals rhonchi. Rhonchi present. No decreased breath sounds.    Abdominal:      General: Bowel sounds are normal.      Palpations: Abdomen is soft. There is no mass. Tenderness: There is no abdominal tenderness. Skin:     General: Skin is warm and dry. Neurological:      Mental Status: She is lethargic. DIAGNOSTIC RESULTS     EKG: All EKG's are interpreted by the Emergency Department Physician who either signs or Co-signs this chart in the absence of a cardiologist.      RADIOLOGY:   Non-plain film images such as CT, Ultrasound and MRI are read by the radiologist. Plain radiographic images are visualized and preliminarily interpreted by the emergency physician with the below findings:    Possible left infiltrate    Interpretation per the Radiologist below, if available at the time of this note:    XR CHEST PORTABLE   Final Result   Hazy left basilar infiltrate and/or atelectasis and a small left pleural   effusion. Mild right basilar atelectasis or scarring. Stable borderline cardiomegaly with mild central pulmonary congestion.                ED BEDSIDE ULTRASOUND:   Performed by ED Physician - none    LABS:  Labs Reviewed   CBC WITH AUTO DIFFERENTIAL - Abnormal; Notable for the following components:       Result Value    WBC 16.9 (*)     RBC 5.22 (*)     Neutrophils Absolute 13.3 (*)     All other components within normal limits   COMPREHENSIVE METABOLIC PANEL W/ REFLEX TO MG FOR LOW K - Abnormal; Notable for the following components:    Sodium 131 (*)     Chloride 98 (*)     Glucose 150 (*)     BUN 5 (*)     Albumin/Globulin Ratio 0.8 (*)     All other components within normal limits   BLOOD GAS, VENOUS - Abnormal; Notable for the following components:    pH, Ivan 7.185 (*)     pCO2, Ivan 68.9 (*)     pO2, Ivan 137.4 (*)     Base Excess, Ivan -4.6 (*)     Carboxyhemoglobin 4.5 (*)     All other components within normal limits    Narrative:     Ana Maria Jackson tel. [phone],  Chemistry results called to and read back by Mervin Stapleton RN, 05/27/2022  20:42, by [address] CULTURE - Abnormal; Notable for the following components:    Clarity, UA SL CLOUDY (*)     Bilirubin Urine SMALL (*)     Protein, UA TRACE (*)     Nitrite, Urine POSITIVE (*)     Leukocyte Esterase, Urine TRACE (*)     All other components within normal limits   BLOOD GAS, VENOUS - Abnormal; Notable for the following components:    pH, Ivan 7.222 (*)     pCO2, Ivan 64.4 (*)     pO2, Ivan 82.7 (*)     Base Excess, Ivan -3.2 (*)     Carboxyhemoglobin 2.7 (*)     All other components within normal limits   MICROSCOPIC URINALYSIS - Abnormal; Notable for the following components:    WBC, UA 21-50 (*)     Bacteria, UA 3+ (*)     All other components within normal limits   COVID-19, RAPID   RAPID INFLUENZA A/B ANTIGENS   CULTURE, URINE   PROCALCITONIN   PROCALCITONIN       All other labs were within normal range or not returned as of this dictation. EMERGENCY DEPARTMENT COURSE and DIFFERENTIAL DIAGNOSIS/MDM:   Vitals:    Vitals:    05/27/22 2120 05/27/22 2153 05/27/22 2257 05/27/22 2300   BP: 82/63 100/68     Pulse: 99 100  91   Resp: 13 21 21    Temp:       TempSrc:       SpO2: 100% 99%     Weight:       Height:           Patient arrived via EMS on nonrebreather. Upon initial examination patient is drowsy. Crackles are heard throughout the lung fields. Differential at this time includes pneumonia, respiratory failure, acute COPD exacerbation. CBC revealed a white count of 16.9. VBG revealed a pH of 7.185, PCO2 of 68.9, HCO3 of 25.4 and carboxyhemoglobin of 4.5%. Patient was hypotensive and received a liter of fluids. Patient was placed on BiPAP. Repeat VBG 2 hours after BiPAP showed a pH of 7.222, PCO2 of 64.4, bicarb 25.9 and a carboxyhemoglobin of 3.7. We will continue BiPAP therapy at this time. Procalcitonin was 0.07. Fluid and COVID were negative. UA showed evidence of UTI with positive nitrites and trace leukocyte esterase. Patient will be started on Rocephin and azithromycin.     MDM  Number of Diagnoses or Management Options     Amount and/or Complexity of Data Reviewed  Clinical lab tests: ordered and reviewed  Tests in the radiology section of CPT®: ordered and reviewed  Tests in the medicine section of CPT®: ordered and reviewed    Risk of Complications, Morbidity, and/or Mortality  Presenting problems: moderate    Patient Progress  Patient progress: improved        REASSESSMENT      And agree evaluation patient is satting 100% on BiPAP. She continues to be drowsy. CONSULTS:  None    PROCEDURES:  Unless otherwise noted below, none     Procedures      FINAL IMPRESSION      1. Acute respiratory failure with hypoxia and hypercapnia (HCC)    2. COPD exacerbation ([address]          DISPOSITION/PLAN   DISPOSITION    Plan to admit for further work-up and management. PATIENT REFERRED TO:  No follow-up provider specified. DISCHARGE MEDICATIONS:  New Prescriptions    No medications on file     Controlled Substances Monitoring:     RX Monitoring 12/14/2018   Attestation The Prescription Monitoring Report for this patient was reviewed today. Periodic Controlled Substance Monitoring Possible medication side effects, risk of tolerance/dependence & alternative treatments discussed. ;Potential drug abuse or diversion identified, see note documentation.        (Please note that portions of this note were completed with a voice recognition program.  Efforts were made to edit the dictations but occasionally words are mis-transcribed.)    Kavya Edge DO (electronically signed)  PGY-1           Chuckie Crew,   Resident  05/28/22 [id number]

## 2022-05-28 NOTE — PROGRESS NOTES
05/28/22 0515   NIV Type   Equipment Type V60   Mode Bilevel   Mask Type Full face mask   Mask Size Medium   Settings/Measurements   IPAP 15 cmH20   CPAP/EPAP 5 cmH2O   Resp 16   FiO2  35 %   Vt Exhaled 334 mL   Minute Volume 5 Liters   Mask Leak (lpm) 24 lpm   Comfort Level Good   Using Accessory Muscles No   SpO2 97   Patient's Home Machine No   Alarm Settings   Alarms On Y

## 2022-05-28 NOTE — ED PROVIDER NOTES
Emergency Department Provider Note     Location: [address] [place] [address]  ED  5/27/2022    I independently performed a history and physical on Herlinda Ruiz. All diagnostic, treatment, and disposition decisions were made by myself in conjunction with resident physician, Dr. Dexter Martinez. I have discussed with the resident the management of this patient. Briefly, this is a 73 y.o. female here for SOB x 2 days, found altered today. The patient has a history of COPD with baseline O2 requirement at night when she sleeps. Patient reports a mild cough, body ache, and increasing shortness of breath for 2 days. She was found hypoxic by EMS so she was transported with nonrebreather mask. On arrival, she was slightly confused and tachypneic so we put her on BiPAP immediately. Patient is in hospice but she stated that she would like to be treated today for the acute exacerbation. She also wishes to be admitted due to severe shortness of breath. Review of Systems   Constitutional: Positive for fatigue. Negative for chills and fever. HENT: Negative for congestion and sore throat. Eyes: Negative for visual disturbance. Respiratory: Positive for cough and shortness of breath. Cardiovascular: Negative for chest pain and leg swelling. Gastrointestinal: Negative for abdominal pain, nausea and vomiting. Genitourinary: Negative for dysuria and frequency. Musculoskeletal: Negative for neck pain. Skin: Negative for rash. Neurological: Positive for weakness (generalized weakness; denies focal weakness). Negative for light-headedness. Psychiatric/Behavioral: Positive for confusion.           ED Triage Vitals   BP Temp Temp Source Heart Rate Resp SpO2 Height Weight   05/27/22 2010 05/27/22 2030 05/27/22 2030 05/27/22 2007 05/27/22 2007 05/27/22 2007 05/27/22 2037 05/27/22 2037   (!) 181/158 97 °F (36.1 °C) Axillary (!) 128 24 93 % 5' 5\" (1.651 m) 170 lb (77.1 kg)        Exam showed WDWN F, awake, oriented to self, place and situation. PERRL. Neck: tracheal midline. Rapid shallow of breath, tachypnea, and respiratory distress. Diffuse rhonchi bilaterally. Heart : Tachycardic but regular. No pitting edema of the lower extremities. Abdomen soft, nondistended nontender. No rash. She moves all 4 extremities. No tremors. No facial droop. MDM/ED Course  ED Medication Orders (From admission, onward)    Start Ordered     Status Ordering Provider    05/27/22 2100 05/27/22 2054  0.9 % sodium chloride infusion  CONTINUOUS         Last MAR action: Stopped - by Monet Craft on 05/27/22 at [address]          EKG  The Ekg interpreted by me in the absence of a cardiologist shows. normal sinus rhythm with a rate of 88  Axis is   Normal  QTc is  normal  Intervals and Durations are unremarkable. No specific ST-T wave changes appreciated. No evidence of acute ischemia. No significant change from prior EKG dated 10/16/21        Radiology  XR CHEST PORTABLE    Result Date: 5/27/2022  EXAMINATION: ONE XRAY VIEW OF THE CHEST 5/27/2022 8:32 pm COMPARISON: 10/16/2021 HISTORY: ORDERING SYSTEM PROVIDED HISTORY: Shortness of breath TECHNOLOGIST PROVIDED HISTORY: Reason for exam:->Shortness of breath Reason for Exam: sob FINDINGS: The heart is unchanged. The pulmonary vessels are slightly ill-defined centrally. The lungs are hypoinflated with some hazy bibasilar interstitial ground-glass opacities which is more prominent on left and is increased. There is mild blunting of left costophrenic sulcus. There is moderate scoliosis. Hazy left basilar infiltrate and/or atelectasis and a small left pleural effusion. Mild right basilar atelectasis or scarring. Stable borderline cardiomegaly with mild central pulmonary congestion.          Labs  Results for orders placed or performed during the hospital encounter of 05/27/22   COVID-19, Rapid    Specimen: Nasopharyngeal Swab   Result Value Ref Range    SARS-CoV-2, NAAT Not Detected Not Detected   Rapid influenza A/B antigens    Specimen: Nasopharyngeal   Result Value Ref Range    Rapid Influenza A Ag Negative Negative    Rapid Influenza B Ag Negative Negative   CBC with Auto Differential   Result Value Ref Range    WBC 16.9 (H) 4.0 - 11.0 K/uL    RBC 5.22 (H) 4.00 - 5.20 M/uL    Hemoglobin 14.4 12.0 - 16.0 g/dL    Hematocrit 44.9 36.0 - 48.0 %    MCV 86.2 80.0 - 100.0 fL    MCH 27.7 26.0 - 34.0 pg    MCHC 32.1 31.0 - 36.0 g/dL    RDW 14.7 12.4 - 15.4 %    Platelets 605 370 - 940 K/uL    MPV 7.3 5.0 - 10.5 fL    Neutrophils % 78.8 %    Lymphocytes % 14.2 %    Monocytes % 6.0 %    Eosinophils % 0.5 %    Basophils % 0.5 %    Neutrophils Absolute 13.3 (H) 1.7 - 7.7 K/uL    Lymphocytes Absolute 2.4 1.0 - 5.1 K/uL    Monocytes Absolute 1.0 0.0 - 1.3 K/uL    Eosinophils Absolute 0.1 0.0 - 0.6 K/uL    Basophils Absolute 0.1 0.0 - 0.2 K/uL   Comprehensive Metabolic Panel w/ Reflex to MG   Result Value Ref Range    Sodium 131 (L) 136 - 145 mmol/L    Potassium reflex Magnesium 4.3 3.5 - 5.1 mmol/L    Chloride 98 (L) 99 - 110 mmol/L    CO2 22 21 - 32 mmol/L    Anion Gap 11 3 - 16    Glucose 150 (H) 70 - 99 mg/dL    BUN 5 (L) 7 - 20 mg/dL    CREATININE 0.7 0.6 - 1.2 mg/dL    GFR Non-African American >60 >60    GFR African American >60 >60    Calcium 9.1 8.3 - 10.6 mg/dL    Total Protein 7.7 6.4 - 8.2 g/dL    Albumin 3.4 3.4 - 5.0 g/dL    Albumin/Globulin Ratio 0.8 (L) 1.1 - 2.2    Total Bilirubin <0.2 0.0 - 1.0 mg/dL    Alkaline Phosphatase 81 40 - 129 U/L    ALT 16 10 - 40 U/L    AST 25 15 - 37 U/L   Blood Gas, Venous   Result Value Ref Range    pH, Ivan 7.185 (LL) 7.350 - 7.450    pCO2, Ivan 68.9 (H) 40.0 - 50.0 mmHg    pO2, Ivan 137.4 (H) 25.0 - 40.0 mmHg    HCO3, Venous 25.4 23.0 - 29.0 mmol/L    Base Excess, Ivan -4.6 (L) -3.0 - 3.0 mmol/L    O2 Sat, Ivan 98 Not Established %    Carboxyhemoglobin 4.5 (H) 0.0 - 1.5 %    MetHgb, Ivan 0.5 <1.5 %    TC02 (Calc), Ivan 28 Not Established mmol/L due to acute respiratory failure. On reassessment, patient appeared much more comfortable. She is also much more alert now and able to answer more questions. For further details of [facility] emergency department encounter, please see Dr. Venus Merlos documentation. I personally saw the patient and performed a substantive portion of the visit including all aspects of the medical decision making. I personally saw the patient and independently provided 35 minutes of non-concurrent critical care out of the total shared critical care time provided. The critical care time excludes separately billable procedures and updating family. Time spent is specifically for management of the presenting complaint and symptoms initially, direct bedside care, reevaluation, review of records, and consultation. There was a high probability of clinically significant life-threatening deterioration in the patient's condition, which required my urgent intervention. This chart was generated in part by using Dragon Dictation system and may contain errors related to that system including errors in grammar, punctuation, and spelling, as well as words and phrases that may be inappropriate. If there are any questions or concerns please feel free to contact the dictating provider for clarification.        Monica Wang MD  05/28/22 8106

## 2022-05-28 NOTE — PROGRESS NOTES
05/28/22 0224   NIV Type   $NIV $Daily Charge   NIV Started/Stopped On   Equipment Type v60   Mode Bilevel   Mask Type Full face mask   Mask Size Medium   Settings/Measurements   IPAP 15 cmH20   CPAP/EPAP 5 cmH2O   Resp 15   FiO2  35 %   I Time/ I Time % 1 s   Vt Exhaled 467 mL   Minute Volume 7.1 Liters   Mask Leak (lpm) 6 lpm   Comfort Level Good   Using Accessory Muscles No   SpO2 95   Patient's Home Machine No   Alarm Settings   Alarms On Y   Low Pressure 6 cmH2O   High Pressure  30 cmH2O   Delay Alarm 20 sec(s)   RR Low  6   RR High 45 br/min

## 2022-05-28 NOTE — PROGRESS NOTES
05/27/22 4227   Settings/Measurements   IPAP 18 cmH20   CPAP/EPAP 6 cmH2O   Rate Ordered 16   Resp 21   FiO2  40 %   Vt Exhaled 522 mL   Mask Leak (lpm) 22 lpm   Using Accessory Muscles No   SpO2 98   Patient's Home Machine No

## 2022-05-28 NOTE — PROGRESS NOTES
05/28/22 1605   Oxygen Therapy/Pulse Ox   O2 Therapy Oxygen   O2 Device Nasal cannula   O2 Flow Rate (L/min) 2 L/min   Blood Gas  Performed? Yes   $ABG $ABG   Jason's Test #1 Pos   Site #1 Left Radial   Site Prepped #1 Yes   Number of Attempts #1 1   Pressure Held #1 Yes   Complications #1 None   Post-procedure #1 Standard   Specimen Status #1 To lab   How Tolerated?  Tolerated well

## 2022-05-29 LAB
ANION GAP SERPL CALCULATED.3IONS-SCNC: 15 MMOL/L (ref 3–16)
BASOPHILS ABSOLUTE: 0 K/UL (ref 0–0.2)
BASOPHILS RELATIVE PERCENT: 0.1 %
BUN BLDV-MCNC: 13 MG/DL (ref 7–20)
CALCIUM SERPL-MCNC: 8.4 MG/DL (ref 8.3–10.6)
CHLORIDE BLD-SCNC: 96 MMOL/L (ref 99–110)
CO2: 22 MMOL/L (ref 21–32)
CREAT SERPL-MCNC: 0.6 MG/DL (ref 0.6–1.2)
EOSINOPHILS ABSOLUTE: 0 K/UL (ref 0–0.6)
EOSINOPHILS RELATIVE PERCENT: 0 %
GFR AFRICAN AMERICAN: >60
GFR NON-AFRICAN AMERICAN: >60
GLUCOSE BLD-MCNC: 142 MG/DL (ref 70–99)
HCT VFR BLD CALC: 35.8 % (ref 36–48)
HEMOGLOBIN: 11.7 G/DL (ref 12–16)
LYMPHOCYTES ABSOLUTE: 0.3 K/UL (ref 1–5.1)
LYMPHOCYTES RELATIVE PERCENT: 2.6 %
MAGNESIUM: 1.8 MG/DL (ref 1.8–2.4)
MCH RBC QN AUTO: 27.3 PG (ref 26–34)
MCHC RBC AUTO-ENTMCNC: 32.8 G/DL (ref 31–36)
MCV RBC AUTO: 83.3 FL (ref 80–100)
MONOCYTES ABSOLUTE: 0.2 K/UL (ref 0–1.3)
MONOCYTES RELATIVE PERCENT: 2.1 %
NEUTROPHILS ABSOLUTE: 10.9 K/UL (ref 1.7–7.7)
NEUTROPHILS RELATIVE PERCENT: 95.2 %
PDW BLD-RTO: 14.2 % (ref 12.4–15.4)
PLATELET # BLD: 315 K/UL (ref 135–450)
PMV BLD AUTO: 7.3 FL (ref 5–10.5)
POTASSIUM SERPL-SCNC: 3.6 MMOL/L (ref 3.5–5.1)
PROCALCITONIN: 0.1 NG/ML (ref 0–0.15)
RBC # BLD: 4.3 M/UL (ref 4–5.2)
SODIUM BLD-SCNC: 133 MMOL/L (ref 136–145)
WBC # BLD: 11.5 K/UL (ref 4–11)

## 2022-05-29 PROCEDURE — 36415 COLL VENOUS BLD VENIPUNCTURE: CPT

## 2022-05-29 PROCEDURE — 6360000002 HC RX W HCPCS: Performed by: INTERNAL MEDICINE

## 2022-05-29 PROCEDURE — 6370000000 HC RX 637 (ALT 250 FOR IP): Performed by: INTERNAL MEDICINE

## 2022-05-29 PROCEDURE — 6370000000 HC RX 637 (ALT 250 FOR IP): Performed by: HOSPITALIST

## 2022-05-29 PROCEDURE — 94660 CPAP INITIATION&MGMT: CPT

## 2022-05-29 PROCEDURE — 94640 AIRWAY INHALATION TREATMENT: CPT

## 2022-05-29 PROCEDURE — 87449 NOS EACH ORGANISM AG IA: CPT

## 2022-05-29 PROCEDURE — 85025 COMPLETE CBC W/AUTO DIFF WBC: CPT

## 2022-05-29 PROCEDURE — 84145 PROCALCITONIN (PCT): CPT

## 2022-05-29 PROCEDURE — 83735 ASSAY OF MAGNESIUM: CPT

## 2022-05-29 PROCEDURE — 94761 N-INVAS EAR/PLS OXIMETRY MLT: CPT

## 2022-05-29 PROCEDURE — 2060000000 HC ICU INTERMEDIATE R&B

## 2022-05-29 PROCEDURE — 99223 1ST HOSP IP/OBS HIGH 75: CPT | Performed by: INTERNAL MEDICINE

## 2022-05-29 PROCEDURE — 2580000003 HC RX 258: Performed by: INTERNAL MEDICINE

## 2022-05-29 PROCEDURE — 2700000000 HC OXYGEN THERAPY PER DAY

## 2022-05-29 PROCEDURE — 80048 BASIC METABOLIC PNL TOTAL CA: CPT

## 2022-05-29 RX ORDER — AZITHROMYCIN 250 MG/1
500 TABLET, FILM COATED ORAL DAILY
Status: DISCONTINUED | OUTPATIENT
Start: 2022-05-29 | End: 2022-05-30 | Stop reason: SDUPTHER

## 2022-05-29 RX ORDER — BUDESONIDE 0.5 MG/2ML
0.5 INHALANT ORAL 2 TIMES DAILY
Status: DISCONTINUED | OUTPATIENT
Start: 2022-05-29 | End: 2022-05-30 | Stop reason: HOSPADM

## 2022-05-29 RX ORDER — POTASSIUM CHLORIDE 750 MG/1
40 TABLET, EXTENDED RELEASE ORAL 2 TIMES DAILY
Status: COMPLETED | OUTPATIENT
Start: 2022-05-29 | End: 2022-05-29

## 2022-05-29 RX ADMIN — METHYLPREDNISOLONE SODIUM SUCCINATE 40 MG: 40 INJECTION, POWDER, FOR SOLUTION INTRAMUSCULAR; INTRAVENOUS at 13:41

## 2022-05-29 RX ADMIN — FLUOXETINE 60 MG: 20 CAPSULE ORAL at 08:11

## 2022-05-29 RX ADMIN — AZITHROMYCIN MONOHYDRATE 500 MG: 250 TABLET ORAL at 10:26

## 2022-05-29 RX ADMIN — ACETAMINOPHEN 650 MG: 325 TABLET ORAL at 18:24

## 2022-05-29 RX ADMIN — IPRATROPIUM BROMIDE AND ALBUTEROL SULFATE 1 AMPULE: .5; 2.5 SOLUTION RESPIRATORY (INHALATION) at 04:18

## 2022-05-29 RX ADMIN — IPRATROPIUM BROMIDE AND ALBUTEROL SULFATE 1 AMPULE: .5; 2.5 SOLUTION RESPIRATORY (INHALATION) at 12:12

## 2022-05-29 RX ADMIN — MORPHINE SULFATE 15 MG: 15 TABLET, FILM COATED, EXTENDED RELEASE ORAL at 21:24

## 2022-05-29 RX ADMIN — PIPERACILLIN AND TAZOBACTAM 4500 MG: 4; .5 INJECTION, POWDER, LYOPHILIZED, FOR SOLUTION INTRAVENOUS at 10:15

## 2022-05-29 RX ADMIN — Medication 10 ML: at 08:11

## 2022-05-29 RX ADMIN — POTASSIUM CHLORIDE 40 MEQ: 10 TABLET, EXTENDED RELEASE ORAL at 10:17

## 2022-05-29 RX ADMIN — BUSPIRONE HYDROCHLORIDE 30 MG: 5 TABLET ORAL at 21:24

## 2022-05-29 RX ADMIN — IPRATROPIUM BROMIDE AND ALBUTEROL SULFATE 1 AMPULE: .5; 2.5 SOLUTION RESPIRATORY (INHALATION) at 16:08

## 2022-05-29 RX ADMIN — IPRATROPIUM BROMIDE AND ALBUTEROL SULFATE 1 AMPULE: .5; 2.5 SOLUTION RESPIRATORY (INHALATION) at 23:41

## 2022-05-29 RX ADMIN — MORPHINE SULFATE 15 MG: 15 TABLET, FILM COATED, EXTENDED RELEASE ORAL at 08:11

## 2022-05-29 RX ADMIN — PIPERACILLIN AND TAZOBACTAM 4500 MG: 4; .5 INJECTION, POWDER, LYOPHILIZED, FOR SOLUTION INTRAVENOUS at 18:26

## 2022-05-29 RX ADMIN — MONTELUKAST SODIUM 10 MG: 10 TABLET ORAL at 21:24

## 2022-05-29 RX ADMIN — BUDESONIDE 500 MCG: 0.5 SUSPENSION RESPIRATORY (INHALATION) at 20:30

## 2022-05-29 RX ADMIN — IPRATROPIUM BROMIDE AND ALBUTEROL SULFATE 1 AMPULE: .5; 2.5 SOLUTION RESPIRATORY (INHALATION) at 07:56

## 2022-05-29 RX ADMIN — Medication 3 MG: at 22:55

## 2022-05-29 RX ADMIN — METHYLPREDNISOLONE SODIUM SUCCINATE 40 MG: 40 INJECTION, POWDER, FOR SOLUTION INTRAMUSCULAR; INTRAVENOUS at 00:28

## 2022-05-29 RX ADMIN — PIPERACILLIN AND TAZOBACTAM 4500 MG: 4; .5 INJECTION, POWDER, LYOPHILIZED, FOR SOLUTION INTRAVENOUS at 02:06

## 2022-05-29 RX ADMIN — ENOXAPARIN SODIUM 40 MG: 100 INJECTION SUBCUTANEOUS at 08:11

## 2022-05-29 RX ADMIN — IPRATROPIUM BROMIDE AND ALBUTEROL SULFATE 1 AMPULE: .5; 2.5 SOLUTION RESPIRATORY (INHALATION) at 20:23

## 2022-05-29 RX ADMIN — ONDANSETRON 4 MG: 4 TABLET, ORALLY DISINTEGRATING ORAL at 06:16

## 2022-05-29 RX ADMIN — BUSPIRONE HYDROCHLORIDE 30 MG: 5 TABLET ORAL at 08:11

## 2022-05-29 RX ADMIN — ACETAMINOPHEN 650 MG: 325 TABLET ORAL at 10:26

## 2022-05-29 ASSESSMENT — PAIN SCALES - GENERAL
PAINLEVEL_OUTOF10: 4
PAINLEVEL_OUTOF10: 6

## 2022-05-29 ASSESSMENT — PAIN DESCRIPTION - LOCATION
LOCATION: HEAD;BACK
LOCATION: BACK

## 2022-05-29 NOTE — PROGRESS NOTES
Pt placed on bipap at 0045 and removed just after 0400 tx ran and bipap charted. Pt was anxious to be taken off of the bipap at this time.

## 2022-05-29 NOTE — CONSULTS
Pulmonary & Critical Care   History and Physical       Patient Name:  Brijesh Akers ADMISSION/CC: Consult was requested by Yoselin Petit for possible COPD exacerbation  PCP: Mark Rios MD    HISTORY OF PRESENT ILLNESS:   73y.o. year old female wide-complex medical history including chronic respiratory failure on oxygen at home at 2 L due to severe COPD and questionable lung fibrosis the patient normally on Advair and Spiriva patient was doing well until about 2 days prior to admission when she started to have progressive worsening of her shortness of breath with increased cough with production of clear mucus she denies any fever any chest pain she does have wheezing and chest tightness occasionally she has no orthopnea or PND no edema to lower extremities no fever no sick contact she has no hemoptysis. Patient continues to smoke about half pack per day she is at least 60 pack smoking history.   Patient does not use BiPAP or CPAP at home    Past Medical History:   Diagnosis Date    Allergic rhinitis 6/11/2010    Anxiety     Arthritis     Aspiration pneumonia ([address] 3/21/2012    Recurrent    Asthma     Bronchitis chronic     COPD (chronic obstructive pulmonary disease) ([address] 12/3/2009    Depression     Drug abuse, cocaine type ([address]     past history of crack cocaine use    Emphysema of lung (HCC)     Fibromyalgia     GERD (gastroesophageal reflux disease)     Hyperlipidemia     Influenza A 03/05/2020    Lung disease     On home oxygen therapy     uses O2 NC 3L prn at night    Osteoporosis     Pneumonia     Polysubstance dependence ([address] 1/2/2012    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    Pulmonary fibrosis ([address] 10/16/2014    Pulmonary infiltrate     Pulmonary nodule 12/3/2009    Tobacco abuse        Past Surgical History:   Procedure Laterality Date    BLADDER REPAIR      BRONCHOSCOPY      BRONCHOSCOPY N/A 3/6/2020    BRONCHOSCOPY THERAPUTIC ASPIRATION INITIAL performed by Benji Wall MD at [address] [place] [address]  3/6/2020    BRONCHOSCOPY ALVEOLAR LAVAGE performed by Benji Wall MD at [address] [place] [address] N/A 6/26/2020    BRONCHOSCOPY THERAPUTIC ASPIRATION INITIAL performed by Viji Morrow MD at [address] [place] [address]  6/26/2020    BRONCHOSCOPY ALVEOLAR LAVAGE performed by Viji Morrow MD at [address] [place] [address]  06/23/2021    Dr Yumiko Palacios N/A 6/23/2021    [address] performed by Viji Morrow MD at [address] [place] [address]  6/23/2021    BRONCHOSCOPY THERAPUTIC ASPIRATION INITIAL performed by Viji Morrow MD at [address] [place] [address]  6/23/2021    BRONCHOSCOPY ALVEOLAR LAVAGE performed by Viji Morrow MD at [address] [facility] 8/27/2021    BRONCHOSCOPY DIAGNOSTIC OR CELL [address] performed by Viji Morrow MD at [place] [address]  2012    ENDOSCOPY, COLON, DIAGNOSTIC      ESOPHAGEAL DILATATION  9/20/2018    ESOPHAGEAL DILATION [place] performed by Quynh Yoon MD at [address] [facility] HISTORY  2/12/15    T8 Kyphoplasty    OK COLONOSCOPY FLX DX W/COLLJ SPEC WHEN PFRMD N/A 9/20/2018    EGD AND COLONOSCOPY WITH ANESTHESIA performed by Quynh Yoon MD at [address] [facility] ESOPHAGOGASTRODUODENOSCOPY TRANSORAL DIAGNOSTIC N/A 9/20/2018    EGD AND COLONOSCOPY WITH ANESTHESIA performed by Quynh Yoon MD at [address] [facility]         family history includes Asthma in her mother; Cancer in her mother; Depression in her mother; Diabetes in her father, mother, and sister; Emphysema in her father, mother, and paternal grandfather; Heart Disease in her father and mother; Heart Failure in her father and mother; High Cholesterol in her father, mother, and sister;  Hypertension in her father and mother; Substance Abuse in her father; Vision Loss in her maternal uncle. Social History     Tobacco Use    Smoking status: Current Every Day Smoker     Packs/day: 1.00     Years: 40.00     Pack years: 40.00     Types: Cigarettes     Start date: 1972     Last attempt to quit: 2021     Years since quittin.9    Smokeless tobacco: Never Used    Tobacco comment: 0.5 PPD restarted, currently.  Per Dr. Katie Drake lung screening order pt has 40 pack year smoking hx   Substance Use Topics    Alcohol use: No     Alcohol/week: 0.0 standard drinks        Meds:    Current Facility-Administered Medications:     budesonide (PULMICORT) nebulizer suspension 500 mcg, 0.5 mg, Nebulization, BID, Nunu Campbell MD    [facility]) tablet 500 mg, 500 mg, Oral, Daily, Nunu Campbell MD    busPIRone (BUSPAR) tablet 30 mg, 30 mg, Oral, BID, Driss Quezada MD, 30 mg at 22 08    FLUoxetine (PROZAC) capsule 60 mg, 60 mg, Oral, Daily, Driss Quezada MD, 60 mg at 22 08    montelukast (SINGULAIR) tablet 10 mg, 10 mg, Oral, Nightly, Driss Quezada MD, 10 mg at 22 210    sodium chloride flush 0.9 % injection 10 mL, 10 mL, IntraVENous, PRN, Driss Quezada MD, 10 mL at 22 0811    0.9 % sodium chloride infusion, , IntraVENous, PRN, Driss Quezada MD    potassium chloride (KLOR-CON M) extended release tablet 40 mEq, 40 mEq, Oral, PRN **OR** potassium bicarb-citric acid (EFFER-K) effervescent tablet 40 mEq, 40 mEq, Oral, PRN **OR** potassium chloride 10 mEq/100 mL IVPB (Peripheral Line), 10 mEq, IntraVENous, PRN, Driss Quezada MD    magnesium sulfate 1000 mg in dextrose 5% 100 mL IVPB, 1,000 mg, IntraVENous, PRN, Driss Quezada MD    ondansetron (ZOFRAN-ODT) disintegrating tablet 4 mg, 4 mg, Oral, Q8H PRN, 4 mg at 22 0616 **OR** ondansetron (ZOFRAN) injection 4 mg, 4 mg, IntraVENous, Q6H PRN, Driss Quezada MD    acetaminophen (TYLENOL) tablet 650 mg, 650 mg, Oral, Q6H PRN **OR** acetaminophen (TYLENOL) suppository 650 mg, 650 mg, Rectal, Q6H PRN, Lupis Khanna MD    melatonin tablet 3 mg, 3 mg, Oral, Nightly PRN, Lupis Khanna MD, 3 mg at 05/28/22 2340    ipratropium-albuterol (DUONEB) nebulizer solution 1 ampule, 1 ampule, Inhalation, Q4H, Lupis Khanna MD, 1 ampule at 05/29/22 0756    albuterol (PROVENTIL) nebulizer solution 2.5 mg, 2.5 mg, Nebulization, Q2H PRN, Lupis Khanna MD    morphine (MS CONTIN) extended release tablet 15 mg, 15 mg, Oral, 2 times per day, Lupis Khanna MD, 15 mg at 05/29/22 0811    morphine 10 MG/5ML solution 10 mg, 10 mg, Oral, Q4H PRN, Lupis Khanna MD    methylPREDNISolone sodium (SOLU-MEDROL) injection 40 mg, 40 mg, IntraVENous, Q12H, Lupis Khanna MD, 40 mg at 05/29/22 0028    piperacillin-tazobactam (ZOSYN) 4,500 mg in dextrose 5 % 100 mL IVPB (mini-bag), 4,500 mg, IntraVENous, Q8H, Lupis Khanna MD, Stopped at 05/29/22 0612    enoxaparin (LOVENOX) injection 40 mg, 40 mg, SubCUTAneous, Daily, Lupis Khanna MD, 40 mg at 05/29/22 0811     Continuous Infusions:   sodium chloride         PRN Meds:  sodium chloride flush, sodium chloride, potassium chloride **OR** potassium alternative oral replacement **OR** potassium chloride, magnesium sulfate, ondansetron **OR** ondansetron, acetaminophen **OR** acetaminophen, melatonin, albuterol, morphine    Allergies:  Patient is allergic to meperidine and demerol. REVIEW OF SYSTEMS:  Review of Systems  All other systems have been reviewed and they have been negative except what stated above in HPI  I personally reviewed the patient's medication list, medical and surgical history, and updated as needed.      Objective:   EXAM:  BP 98/64   Pulse 89   Temp 98 °F (36.7 °C) (Oral)   Resp 18   Ht 5' 5\" (1.651 m)   Wt 154 lb (69.9 kg)   SpO2 93%   BMI 25.63 kg/m²    Physical Exam   General appearance: No apparent distress, appears stated age and cooperative. HEENT: Pupils equal, round, and reactive to light. Conjunctivae/corneas clear. Neck: Supple, with full range of motion. No jugular venous distention. Trachea midline. Respiratory:  Normal respiratory effort. Generalized decreased air entry bilateral Bilateral wheezing and rhonchi. Cardiovascular: Regular rate and rhythm with normal S1/S2 without murmurs, rubs or gallops. Abdomen: Soft, non-tender, non-distended with normal bowel sounds. Musculoskeletal: No clubbing, cyanosis or edema bilaterally. Full range of motion without deformity. Skin: Skin color, texture, turgor normal.  No rashes or lesions. Neurologic:  Neurovascularly intact without any focal sensory/motor deficits. Cranial nerves: II-XII intact, grossly non-focal.  Psychiatric: Alert and oriented, thought content appropriate, normal insight  Capillary Refill: Brisk,3 seconds, normal   Peripheral Pulses: +2 palpable, equal bilaterally        Data Reviewed:   LABS:  :   Recent Labs     05/27/22 2025 05/29/22 0523   WBC 16.9* 11.5*   HGB 14.4 11.7*   HCT 44.9 35.8*   MCV 86.2 83.3    315     BMP:   Recent Labs     05/27/22 2025 05/29/22 0523   * 133*   K 4.3 3.6   CL 98* 96*   CO2 22 22   BUN 5* 13   CREATININE 0.7 0.6     PROFILE:   Recent Labs     05/27/22 2025   AST 25   ALT 16   BILITOT <0.2   ALKPHOS 81     PT/INR: No results for input(s): PROTIME, INR in the last 72 hours. APTT:No results for input(s):  APTT in the last 72 hours.  :  Recent Labs     05/27/22  2305   COLORU Yellow   PHUR 6.0   WBCUA 21-50*   RBCUA 0-2   BACTERIA 3+*   CLARITYU SL CLOUDY*   SPECGRAV >=1.030   LEUKOCYTESUR TRACE*   UROBILINOGEN 0.2   BILIRUBINUR SMALL*   BLOODU Negative   GLUCOSEU Negative     Recent Labs     05/28/22  1605   PHART 7.334*   FMF6DVX 45.3*   PO2ART 56.6*                    Plan:   -Acute on chronic respiratory failure likely due to COPD exacerbation chest x-ray showed left hemidiaphragm elevation which is old and possible underlying pneumonia she is currently on 2 L oxygen which is close to her baseline    -Severe COPD with acute exacerbation she is currently systemic steroids, Pulmicort she is already on azithromycin and  Zosyn  For possible left lower lobe pneumonia    -Chronic left hemidiaphragm elevation seen on previous imaging.     Other issues are managed by the primary hospitalist team          Supriya Sampson MD  9-  8:55 AM

## 2022-05-29 NOTE — PROGRESS NOTES
05/29/22 0045   NIV Type   Skin Protection for O2 Device Yes   Location Nose   NIV Started/Stopped On   Equipment Type v60   Mode Bilevel   Mask Type Full face mask   Mask Size Medium   Settings/Measurements   PIP Observed 17 cm H20   IPAP 15 cmH20   CPAP/EPAP 5 cmH2O   Rate Ordered 15   Resp 26   Insp Rise Time (%) 1 %   FiO2  30 %   I Time/ I Time % 1 s   Vt Exhaled 710 mL   Minute Volume 18.7 Liters   Mask Leak (lpm) 4 lpm   Comfort Level Good   Using Accessory Muscles No   SpO2 95   Patient's Home Machine No   Alarm Settings   Alarms On Y   Low Pressure 6 cmH2O   High Pressure  30 cmH2O   Delay Alarm 20 sec(s)   RR Low  6   RR High 45 br/min

## 2022-05-29 NOTE — PROGRESS NOTES
Hospitalist Progress Note      PCP: Georgette Roth MD    Date of Admission: 5/27/2022    Chief Complaint: Shortness of breath    Hospital Course:   Miriam Zepeda a 73 y. o. female.   She presented to the ER from home by ambulance.       She has a h/o severe COPD and ILD, likely smoking-related pulmonary fibrosis.  At baseline she uses O2 2-3L/min. Kely Lowry presents for above-baseline dyspnea worsening over about 2-3 days.  She is now dyspneic at rest.  She was started on BiPAP quickly in the ER.  This along with fatigue and dyspnea somewhat limit history.  She denies feeling acutely ill, just dyspneic, and it does seem that she is coughing more than usual.  She denies pleuritic chest pain and hemoptysis.     Patient was receiving in-home hospice care.      Subjective: Patient says shortness of breath is better today she wants to be full code currently on 1 L oxygen patient stated she uses 2 L at night. At home patient is in hospice.       Medications:  Reviewed    Infusion Medications    sodium chloride       Scheduled Medications    budesonide  0.5 mg Nebulization BID    azithromycin  500 mg Oral Daily    busPIRone  30 mg Oral BID    FLUoxetine  60 mg Oral Daily    montelukast  10 mg Oral Nightly    ipratropium-albuterol  1 ampule Inhalation Q4H    morphine  15 mg Oral 2 times per day    methylPREDNISolone  40 mg IntraVENous Q12H    piperacillin-tazobactam  4,500 mg IntraVENous Q8H    enoxaparin  40 mg SubCUTAneous Daily     PRN Meds: sodium chloride flush, sodium chloride, potassium chloride **OR** potassium alternative oral replacement **OR** potassium chloride, magnesium sulfate, ondansetron **OR** ondansetron, acetaminophen **OR** acetaminophen, melatonin, albuterol, morphine      Intake/Output Summary (Last 24 hours) at 5/29/2022 0911  Last data filed at 5/29/2022 0612  Gross per 24 hour   Intake --   Output 400 ml   Net -400 ml       Physical Exam Performed:    BP 98/64   Pulse 89   Temp 98 °F (36.7 °C) (Oral)   Resp 18   Ht 5' 5\" (1.651 m)   Wt 154 lb (69.9 kg)   SpO2 93%   BMI 25.63 kg/m²     General appearance: No apparent distress, appears stated age and cooperative. HEENT: Pupils equal, round, and reactive to light. Conjunctivae/corneas clear. Neck: Supple, with full range of motion. No jugular venous distention. Trachea midline. Respiratory:  Normal respiratory effort. Clear to auscultation, bilaterally without Rales/Wheezes/Rhonchi. Cardiovascular: Regular rate and rhythm with normal S1/S2 without murmurs, rubs or gallops. Abdomen: Soft, non-tender, non-distended with normal bowel sounds. Musculoskeletal: No clubbing, cyanosis or edema bilaterally. Full range of motion without deformity. Skin: Skin color, texture, turgor normal.  No rashes or lesions. Neurologic:  Neurovascularly intact without any focal sensory/motor deficits. Cranial nerves: II-XII intact, grossly non-focal.  Psychiatric: Alert and oriented, thought content appropriate, normal insight  Capillary Refill: Brisk,3 seconds, normal   Peripheral Pulses: +2 palpable, equal bilaterally       Labs:   Recent Labs     05/27/22 2025 05/29/22 0523   WBC 16.9* 11.5*   HGB 14.4 11.7*   HCT 44.9 35.8*    315     Recent Labs     05/27/22 2025 05/29/22 0523   * 133*   K 4.3 3.6   CL 98* 96*   CO2 22 22   BUN 5* 13   CREATININE 0.7 0.6   CALCIUM 9.1 8.4     Recent Labs     05/27/22 2025   AST 25   ALT 16   BILITOT <0.2   ALKPHOS 81     No results for input(s): INR in the last 72 hours. No results for input(s): [place] in the last 72 hours.     Urinalysis:      Lab Results   Component Value Date    NITRU POSITIVE 05/27/2022    WBCUA 21-50 05/27/2022    BACTERIA 3+ 05/27/2022    RBCUA 0-2 05/27/2022    BLOODU Negative 05/27/2022    SPECGRAV >=1.030 05/27/2022    GLUCOSEU Negative 05/27/2022    GLUCOSEU NEGATIVE 04/13/2012       Radiology:  XR CHEST PORTABLE   Final Result   Hazy left basilar infiltrate and/or atelectasis and a small left pleural   effusion. Mild right basilar atelectasis or scarring. Stable borderline cardiomegaly with mild central pulmonary congestion. Assessment/Plan:    Active Hospital Problems    Diagnosis     Stage 3 severe COPD by GOLD classification ([facility] [J44.9]      Priority: Medium    COPD with acute exacerbation ([facility] [J44.1]     H/O pneumonia due to Pseudomonas [Z87.01]     Ineffective airway clearance [R06.89]     Acute on chronic respiratory failure with hypoxia and hypercapnia (HCC) [J96.21, J96.22]     Pneumonia [J18.9]     Anxiety and depression [F41.9, F32.A]     Pulmonary fibrosis (HCC) [J84.10]      1. Admitted with acute on chronic respiratory failure, COPD exacerbation and pulmonary-fibrosis,? left lower lobe pneumonia. Procalcitonin level negative. Continue with the supportive care. Consulted pulmonary. 2.  History of depression anxiety continue with home medication. 3.  Patient is enrolled in hospice care at home. Today patient stated she wants to be full code    DVT Prophylaxis: Lovenox subcu  Diet: ADULT DIET;  Regular  Code Status: Full Code    PT/OT Eval Status:     Cynthia Guy MD

## 2022-05-29 NOTE — PLAN OF CARE
Bipap late night but requested off early morning. 02 2L able to sleep, denied sob at rest, but notable VELÁSQUEZ when up to bathroom to void.         Problem: Chronic Conditions and Co-morbidities  Goal: Patient's chronic conditions and co-morbidity symptoms are monitored and maintained or improved  Outcome: Progressing     Problem: Discharge Planning  Goal: Discharge to home or other facility with appropriate resources  Outcome: Adequate for Discharge     Problem: Safety - Adult  Goal: Free from fall injury  Outcome: Adequate for Discharge

## 2022-05-29 NOTE — PROGRESS NOTES
05/29/22 0417   NIV Type   Skin Protection for O2 Device Yes   Location Nose   NIV Started/Stopped On   Equipment Type v60   Mode Bilevel   Mask Type Full face mask   Mask Size Medium   Settings/Measurements   PIP Observed 17 cm H20   IPAP 15 cmH20   CPAP/EPAP 5 cmH2O   Rate Ordered 15   Resp 23   Insp Rise Time (%) 1 %   FiO2  30 %   I Time/ I Time % 1 s   Vt Exhaled 950 mL   Minute Volume 22.3 Liters   Mask Leak (lpm) 17 lpm   Comfort Level Good   Using Accessory Muscles No   SpO2 95   Patient's Home Machine No   Patient Observation   Observations pt removed bipap after this check/treatment    Alarm Settings   Alarms On Y   Low Pressure 6 cmH2O   High Pressure  30 cmH2O   Delay Alarm 20 sec(s)   RR Low  6   RR High 45 br/min

## 2022-05-29 NOTE — FLOWSHEET NOTE
05/29/22 0815   Assessment   Charting Type Shift assessment   Psychosocial   Psychosocial (WDL) WDL   Neurological   Level of Consciousness Alert (0)   Orientation Level Oriented to person;Oriented to place;Oriented to time;Oriented to situation   NIHSS Stroke Scale Assessed No   [place] Coma Scale   Eye Opening 4   Best Verbal Response 5   Best Motor Response 6   [place] Coma Scale Score 15   HEENT (Head, Ears, Eyes, Nose, & Throat)   HEENT (WDL) X   Teeth Dentures upper;Dentures lower   Respiratory   Respiratory (WDL) X   Respiratory Pattern Regular   Respiratory Quality/Effort Dyspnea with exertion   L Breath Sounds Coarse Crackles   R Breath Sounds Diminished   Cardiac   Cardiac (WDL) WDL   Gastrointestinal   Abdominal (WDL) WDL   Genitourinary   Genitourinary (WDL) WDL   Peripheral Vascular   Peripheral Vascular (WDL) WDL   Skin Integumentary    Skin Integumentary (WDL) WDL   Musculoskeletal   Musculoskeletal (WDL) X

## 2022-05-30 VITALS
SYSTOLIC BLOOD PRESSURE: 130 MMHG | BODY MASS INDEX: 25.66 KG/M2 | HEIGHT: 65 IN | WEIGHT: 154 LBS | DIASTOLIC BLOOD PRESSURE: 68 MMHG | TEMPERATURE: 97.1 F | HEART RATE: 89 BPM | RESPIRATION RATE: 22 BRPM | OXYGEN SATURATION: 95 %

## 2022-05-30 LAB
ANION GAP SERPL CALCULATED.3IONS-SCNC: 11 MMOL/L (ref 3–16)
BASOPHILS ABSOLUTE: 0 K/UL (ref 0–0.2)
BASOPHILS RELATIVE PERCENT: 0.2 %
BUN BLDV-MCNC: 12 MG/DL (ref 7–20)
CALCIUM SERPL-MCNC: 8.4 MG/DL (ref 8.3–10.6)
CHLORIDE BLD-SCNC: 95 MMOL/L (ref 99–110)
CO2: 22 MMOL/L (ref 21–32)
CREAT SERPL-MCNC: 0.6 MG/DL (ref 0.6–1.2)
EOSINOPHILS ABSOLUTE: 0 K/UL (ref 0–0.6)
EOSINOPHILS RELATIVE PERCENT: 0 %
GFR AFRICAN AMERICAN: >60
GFR NON-AFRICAN AMERICAN: >60
GLUCOSE BLD-MCNC: 141 MG/DL (ref 70–99)
HCT VFR BLD CALC: 34.5 % (ref 36–48)
HEMOGLOBIN: 11.5 G/DL (ref 12–16)
LYMPHOCYTES ABSOLUTE: 0.2 K/UL (ref 1–5.1)
LYMPHOCYTES RELATIVE PERCENT: 2.1 %
MAGNESIUM: 1.8 MG/DL (ref 1.8–2.4)
MCH RBC QN AUTO: 27.6 PG (ref 26–34)
MCHC RBC AUTO-ENTMCNC: 33.4 G/DL (ref 31–36)
MCV RBC AUTO: 82.4 FL (ref 80–100)
MONOCYTES ABSOLUTE: 0.3 K/UL (ref 0–1.3)
MONOCYTES RELATIVE PERCENT: 2.2 %
NEUTROPHILS ABSOLUTE: 10.9 K/UL (ref 1.7–7.7)
NEUTROPHILS RELATIVE PERCENT: 95.5 %
ORGANISM: ABNORMAL
PDW BLD-RTO: 14.4 % (ref 12.4–15.4)
PLATELET # BLD: 280 K/UL (ref 135–450)
PMV BLD AUTO: 7.8 FL (ref 5–10.5)
POTASSIUM SERPL-SCNC: 3.9 MMOL/L (ref 3.5–5.1)
RBC # BLD: 4.19 M/UL (ref 4–5.2)
SODIUM BLD-SCNC: 128 MMOL/L (ref 136–145)
URINE CULTURE, ROUTINE: ABNORMAL
WBC # BLD: 11.4 K/UL (ref 4–11)

## 2022-05-30 PROCEDURE — 6370000000 HC RX 637 (ALT 250 FOR IP): Performed by: INTERNAL MEDICINE

## 2022-05-30 PROCEDURE — 6360000002 HC RX W HCPCS: Performed by: INTERNAL MEDICINE

## 2022-05-30 PROCEDURE — 94640 AIRWAY INHALATION TREATMENT: CPT

## 2022-05-30 PROCEDURE — 99232 SBSQ HOSP IP/OBS MODERATE 35: CPT | Performed by: INTERNAL MEDICINE

## 2022-05-30 PROCEDURE — 36415 COLL VENOUS BLD VENIPUNCTURE: CPT

## 2022-05-30 PROCEDURE — 94761 N-INVAS EAR/PLS OXIMETRY MLT: CPT

## 2022-05-30 PROCEDURE — 85025 COMPLETE CBC W/AUTO DIFF WBC: CPT

## 2022-05-30 PROCEDURE — 80048 BASIC METABOLIC PNL TOTAL CA: CPT

## 2022-05-30 PROCEDURE — 2700000000 HC OXYGEN THERAPY PER DAY

## 2022-05-30 PROCEDURE — 2580000003 HC RX 258: Performed by: INTERNAL MEDICINE

## 2022-05-30 PROCEDURE — 83735 ASSAY OF MAGNESIUM: CPT

## 2022-05-30 RX ORDER — AMOXICILLIN AND CLAVULANATE POTASSIUM 875; 125 MG/1; MG/1
1 TABLET, FILM COATED ORAL EVERY 12 HOURS SCHEDULED
Qty: 8 TABLET | Refills: 0 | Status: SHIPPED | OUTPATIENT
Start: 2022-05-30 | End: 2022-06-03

## 2022-05-30 RX ORDER — PREDNISONE 20 MG/1
40 TABLET ORAL DAILY
Qty: 10 TABLET | Refills: 0 | Status: SHIPPED | OUTPATIENT
Start: 2022-05-31 | End: 2022-06-05

## 2022-05-30 RX ORDER — AZITHROMYCIN 500 MG/1
500 TABLET, FILM COATED ORAL DAILY
Qty: 2 TABLET | Refills: 0 | Status: SHIPPED | OUTPATIENT
Start: 2022-05-30 | End: 2022-06-01

## 2022-05-30 RX ORDER — AZITHROMYCIN 250 MG/1
500 TABLET, FILM COATED ORAL DAILY
Status: DISCONTINUED | OUTPATIENT
Start: 2022-05-30 | End: 2022-05-30 | Stop reason: HOSPADM

## 2022-05-30 RX ORDER — AMOXICILLIN AND CLAVULANATE POTASSIUM 875; 125 MG/1; MG/1
1 TABLET, FILM COATED ORAL EVERY 12 HOURS SCHEDULED
Status: DISCONTINUED | OUTPATIENT
Start: 2022-05-30 | End: 2022-05-30 | Stop reason: HOSPADM

## 2022-05-30 RX ORDER — PREDNISONE 20 MG/1
40 TABLET ORAL DAILY
Status: DISCONTINUED | OUTPATIENT
Start: 2022-05-30 | End: 2022-05-30 | Stop reason: HOSPADM

## 2022-05-30 RX ADMIN — BUSPIRONE HYDROCHLORIDE 30 MG: 5 TABLET ORAL at 10:53

## 2022-05-30 RX ADMIN — PIPERACILLIN AND TAZOBACTAM 4500 MG: 4; .5 INJECTION, POWDER, LYOPHILIZED, FOR SOLUTION INTRAVENOUS at 02:22

## 2022-05-30 RX ADMIN — AZITHROMYCIN MONOHYDRATE 500 MG: 250 TABLET ORAL at 10:54

## 2022-05-30 RX ADMIN — ENOXAPARIN SODIUM 40 MG: 100 INJECTION SUBCUTANEOUS at 10:54

## 2022-05-30 RX ADMIN — PIPERACILLIN AND TAZOBACTAM 4500 MG: 4; .5 INJECTION, POWDER, LYOPHILIZED, FOR SOLUTION INTRAVENOUS at 10:54

## 2022-05-30 RX ADMIN — METHYLPREDNISOLONE SODIUM SUCCINATE 40 MG: 40 INJECTION, POWDER, FOR SOLUTION INTRAMUSCULAR; INTRAVENOUS at 00:54

## 2022-05-30 RX ADMIN — FLUOXETINE 60 MG: 20 CAPSULE ORAL at 10:53

## 2022-05-30 RX ADMIN — MORPHINE SULFATE 15 MG: 15 TABLET, FILM COATED, EXTENDED RELEASE ORAL at 10:53

## 2022-05-30 RX ADMIN — PREDNISONE 40 MG: 20 TABLET ORAL at 11:03

## 2022-05-30 RX ADMIN — IPRATROPIUM BROMIDE AND ALBUTEROL SULFATE 1 AMPULE: .5; 2.5 SOLUTION RESPIRATORY (INHALATION) at 12:02

## 2022-05-30 RX ADMIN — IPRATROPIUM BROMIDE AND ALBUTEROL SULFATE 1 AMPULE: .5; 2.5 SOLUTION RESPIRATORY (INHALATION) at 08:56

## 2022-05-30 RX ADMIN — IPRATROPIUM BROMIDE AND ALBUTEROL SULFATE 1 AMPULE: .5; 2.5 SOLUTION RESPIRATORY (INHALATION) at 03:40

## 2022-05-30 RX ADMIN — BUDESONIDE 500 MCG: 0.5 SUSPENSION RESPIRATORY (INHALATION) at 08:56

## 2022-05-30 NOTE — PROGRESS NOTES
Hospitalist Progress Note      PCP: Kaley Hassan MD    Date of Admission: 5/27/2022    Chief Complaint:  Shortness of breath    Hospital Course:   Demar Mart a 73 y. o. female.   She presented to the ER from home by ambulance.       She has a h/o severe COPD and ILD, likely smoking-related pulmonary fibrosis.  At baseline she uses O2 2-3L/min. Mino Mott presents for above-baseline dyspnea worsening over about 2-3 days.  She is now dyspneic at rest.  She was started on BiPAP quickly in the ER.  This along with fatigue and dyspnea somewhat limit history.  She denies feeling acutely ill, just dyspneic, and it does seem that she is coughing more than usual.  She denies pleuritic chest pain and hemoptysis.     Patient was receiving in-home hospice care.      Subjective: Patient says shortness of breath is much better she is anxious to be discharged home she lives with her  she currently at home in [address] [place] [address],Third Floor care and pulmonary at [facility]. She continues to smoke.       Medications:  Reviewed    Infusion Medications    sodium chloride       Scheduled Medications    budesonide  0.5 mg Nebulization BID    azithromycin  500 mg Oral Daily    busPIRone  30 mg Oral BID    FLUoxetine  60 mg Oral Daily    montelukast  10 mg Oral Nightly    ipratropium-albuterol  1 ampule Inhalation Q4H    morphine  15 mg Oral 2 times per day    methylPREDNISolone  40 mg IntraVENous Q12H    piperacillin-tazobactam  4,500 mg IntraVENous Q8H    enoxaparin  40 mg SubCUTAneous Daily     PRN Meds: sodium chloride flush, sodium chloride, potassium chloride **OR** potassium alternative oral replacement **OR** potassium chloride, magnesium sulfate, ondansetron **OR** ondansetron, acetaminophen **OR** acetaminophen, melatonin, albuterol, morphine      Intake/Output Summary (Last 24 hours) at 5/30/2022 0871  Last data filed at 5/30/2022 0415  Gross per 24 hour   Intake --   Output 825 ml   Net -825 ml       Physical Exam Performed:    /68   Pulse 90   Temp 98.7 °F (37.1 °C) (Oral)   Resp 20   Ht 5' 5\" (1.651 m)   Wt 154 lb (69.9 kg)   SpO2 97%   BMI 25.63 kg/m²     General appearance: No apparent distress, appears stated age and cooperative. HEENT: Pupils equal, round, and reactive to light. Conjunctivae/corneas clear. Neck: Supple, with full range of motion. No jugular venous distention. Trachea midline. Respiratory:  Normal respiratory effort. Clear to auscultation, occasional wheezing. Cardiovascular: Regular rate and rhythm with normal S1/S2 without murmurs, rubs or gallops. Abdomen: Soft, non-tender, non-distended with normal bowel sounds. Musculoskeletal: No clubbing, cyanosis or edema bilaterally. Full range of motion without deformity. Skin: Skin color, texture, turgor normal.  No rashes or lesions. Neurologic:  Neurovascularly intact without any focal sensory/motor deficits. Cranial nerves: II-XII intact, grossly non-focal.  Psychiatric: Alert and oriented, thought content appropriate, normal insight  Capillary Refill: Brisk,3 seconds, normal   Peripheral Pulses: +2 palpable, equal bilaterally       Labs:   Recent Labs     05/27/22 2025 05/29/22 0523 05/30/22  0500   WBC 16.9* 11.5* 11.4*   HGB 14.4 11.7* 11.5*   HCT 44.9 35.8* 34.5*    315 280     Recent Labs     05/27/22 2025 05/29/22 0523 05/30/22  0500   * 133* 128*   K 4.3 3.6 3.9   CL 98* 96* 95*   CO2 22 22 22   BUN 5* 13 12   CREATININE 0.7 0.6 0.6   CALCIUM 9.1 8.4 8.4     Recent Labs     05/27/22 2025   AST 25   ALT 16   BILITOT <0.2   ALKPHOS 81     No results for input(s): INR in the last 72 hours. No results for input(s): Colleen Nohemy in the last 72 hours.     Urinalysis:      Lab Results   Component Value Date    NITRU POSITIVE 05/27/2022    WBCUA 21-50 05/27/2022    BACTERIA 3+ 05/27/2022    RBCUA 0-2 05/27/2022    BLOODU Negative 05/27/2022    SPECGRAV >=1.030 05/27/2022    GLUCOSEU Negative 05/27/2022 GLUCOSEU NEGATIVE 04/13/2012       Radiology:  XR CHEST PORTABLE   Final Result   Hazy left basilar infiltrate and/or atelectasis and a small left pleural   effusion. Mild right basilar atelectasis or scarring. Stable borderline cardiomegaly with mild central pulmonary congestion. Assessment/Plan:    Active Hospital Problems    Diagnosis     Stage 3 severe COPD by GOLD classification ([facility] [J44.9]      Priority: Medium    COPD with acute exacerbation ([facility] [J44.1]     H/O pneumonia due to Pseudomonas [Z87.01]     Ineffective airway clearance [R06.89]     Acute on chronic respiratory failure with hypoxia and hypercapnia (HCC) [J96.21, J96.22]     Pneumonia [J18.9]     Anxiety and depression [F41.9, F32.A]     Pulmonary fibrosis (HCC) [J84.10]      1.  Admitted with acute on chronic respiratory failure, COPD exacerbation and pulmonary-fibrosis,? left lower lobe pneumonia. Procalcitonin level negative. Treated with IV antibiotic, Solu-Medrol, inhaler continues to improve now patient is on a 2 L oxygen, pulmonary consult appreciated recommended patient can be discharged home on p.o. antibiotic, Zithromax 5 days course and Augmentin to finish 7 days course and prednisone 40 mg p.o. daily for 5 days. Will discharge patient home to follow-up with the PCP within week, pulmonary as instructed  2.  History of depression anxiety continue with home medication. 3.  Patient is enrolled in hospice care at at home with [facility], patient wants to remain in full code. 4.  Tobacco abuse recommended patient to stop smoking. DVT Prophylaxis: Lovenox subcu  Diet: ADULT DIET;  Regular  Code Status: Full Code    PT/OT Eval Status:     Tessy Chavarria MD

## 2022-05-30 NOTE — PROGRESS NOTES
Pulmonary & Critical Care   History and Physical       Patient Name:  Herminia Ji ADMISSION/CC: Consult was requested by Richelle Calhoun for possible COPD exacerbation  PCP: Irving Butler MD    HISTORY OF PRESENT ILLNESS:   73y.o. year old female wide-complex medical history including chronic respiratory failure on oxygen at home at 2 L due to severe COPD and questionable lung fibrosis the patient normally on Advair and Spiriva patient was doing well until about 2 days prior to admission when she started to have progressive worsening of her shortness of breath with increased cough with production of clear mucus     She is doing fine she is currently room air pulse oximetry is 93% she think that she is back to baseline no chest pain occasional cough no sputum production    Past Medical History:   Diagnosis Date    Allergic rhinitis 6/11/2010    Anxiety     Arthritis     Aspiration pneumonia ([address] 3/21/2012    Recurrent    Asthma     Bronchitis chronic     COPD (chronic obstructive pulmonary disease) ([address] 12/3/2009    Depression     Drug abuse, cocaine type ([address]     past history of crack cocaine use    Emphysema of lung ([address]     Fibromyalgia     GERD (gastroesophageal reflux disease)     Hyperlipidemia     Influenza A 03/05/2020    Lung disease     On home oxygen therapy     uses O2 NC 3L prn at night    Osteoporosis     Pneumonia     Polysubstance dependence ([address] 1/2/2012    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    Pulmonary fibrosis ([address] 10/16/2014    Pulmonary infiltrate     Pulmonary nodule 12/3/2009    Tobacco abuse        Past Surgical History:   Procedure Laterality Date    BLADDER REPAIR      BRONCHOSCOPY      BRONCHOSCOPY N/A 3/6/2020    BRONCHOSCOPY THERAPUTIC ASPIRATION INITIAL performed by Jess Edwards MD at [address]  3/6/2020    BRONCHOSCOPY ALVEOLAR LAVAGE performed by Jess Edwards MD at [facility] ENDOSCOPY    BRONCHOSCOPY N/A 6/26/2020    BRONCHOSCOPY THERAPUTIC ASPIRATION INITIAL performed by Kristy Casillas MD at [address] [place] [address]  6/26/2020    BRONCHOSCOPY ALVEOLAR LAVAGE performed by Kristy Casillas MD at [address] [place] [address]  06/23/2021    Dr Anaya Pi N/A 6/23/2021    BRONCHOSCOPY DIAGNOSTIC OR CELL [address] ONLY performed by Kristy Casillas MD at [address] [place] [address]  6/23/2021    BRONCHOSCOPY THERAPUTIC ASPIRATION INITIAL performed by Kristy Casillas MD at [address] [place] [address]  6/23/2021    BRONCHOSCOPY ALVEOLAR LAVAGE performed by Kristy Casillas MD at [address] [place] [address] N/A 8/27/2021    BRONCHOSCOPY DIAGNOSTIC OR CELL [address] ONLY performed by Kristy Casillas MD at [place] [address]  2012    ENDOSCOPY, COLON, DIAGNOSTIC      ESOPHAGEAL DILATATION  9/20/2018    ESOPHAGEAL DILATION Tanya Cora performed by Estefania Ji MD at [facility] HISTORY  2/12/15    T8 Kyphoplasty    MT COLONOSCOPY FLX DX W/COLLJ SPEC WHEN PFRMD N/A 9/20/2018    EGD AND COLONOSCOPY WITH ANESTHESIA performed by Estefania Ji MD at [address] [facility] ESOPHAGOGASTRODUODENOSCOPY TRANSORAL DIAGNOSTIC N/A 9/20/2018    EGD AND COLONOSCOPY WITH ANESTHESIA performed by Estefania Ji MD at [facility]         family history includes Asthma in her mother; Cancer in her mother; Depression in her mother; Diabetes in her father, mother, and sister; Emphysema in her father, mother, and paternal grandfather; Heart Disease in her father and mother; Heart Failure in her father and mother; High Cholesterol in her father, mother, and sister; Hypertension in her father and mother; Substance Abuse in her father; Vision Loss in her maternal uncle.     Social History     Tobacco Use    Smoking status: Current Every Day Smoker     Packs/day: 1.00     Years: 40.00     Pack years: 40.00     Types: Cigarettes     Start date: 1972     Last attempt to quit: 2021     Years since quittin.9    Smokeless tobacco: Never Used    Tobacco comment: 0.5 PPD restarted, currently.  Per Dr. Ester Crawford lung screening order pt has 40 pack year smoking hx   Substance Use Topics    Alcohol use: No     Alcohol/week: 0.0 standard drinks        Meds:    Current Facility-Administered Medications:     budesonide (PULMICORT) nebulizer suspension 500 mcg, 0.5 mg, Nebulization, BID, Fani Smith MD, 500 mcg at 22 2030    azithromycin (ZITHROMAX) tablet 500 mg, 500 mg, Oral, Daily, Fani Smith MD, 500 mg at 22 1026    busPIRone (BUSPAR) tablet 30 mg, 30 mg, Oral, BID, Elvin Contreras MD, 30 mg at 22 2124    FLUoxetine (PROZAC) capsule 60 mg, 60 mg, Oral, Daily, Elvin Contreras MD, 60 mg at 22 0811    montelukast (SINGULAIR) tablet 10 mg, 10 mg, Oral, Nightly, Elvin Contreras MD, 10 mg at 224    sodium chloride flush 0.9 % injection 10 mL, 10 mL, IntraVENous, PRN, Elvin Contreras MD, 10 mL at 22 0811    0.9 % sodium chloride infusion, , IntraVENous, PRN, Elvin Contreras MD    potassium chloride (KLOR-CON M) extended release tablet 40 mEq, 40 mEq, Oral, PRN **OR** potassium bicarb-citric acid (EFFER-K) effervescent tablet 40 mEq, 40 mEq, Oral, PRN **OR** potassium chloride 10 mEq/100 mL IVPB (Peripheral Line), 10 mEq, IntraVENous, PRN, Elvin Contreras MD    magnesium sulfate 1000 mg in dextrose 5% 100 mL IVPB, 1,000 mg, IntraVENous, PRN, Elvin Contreras MD    ondansetron (ZOFRAN-ODT) disintegrating tablet 4 mg, 4 mg, Oral, Q8H PRN, 4 mg at 22 0616 **OR** ondansetron (ZOFRAN) injection 4 mg, 4 mg, IntraVENous, Q6H PRN, Elvin Contreras MD    acetaminophen (TYLENOL) tablet 650 mg, 650 mg, Oral, Q6H PRN, 650 mg at 22 **OR** acetaminophen (TYLENOL) suppository 650 mg, 650 mg, Rectal, Q6H PRN, Jeffy Paz MD    melatonin tablet 3 mg, 3 mg, Oral, Nightly PRN, Jeffy Paz MD, 3 mg at 05/29/22 2255    ipratropium-albuterol (DUONEB) nebulizer solution 1 ampule, 1 ampule, Inhalation, Q4H, Jeffy Paz MD, 1 ampule at 05/30/22 0856    albuterol (PROVENTIL) nebulizer solution 2.5 mg, 2.5 mg, Nebulization, Q2H PRN, Jeffy Paz MD    morphine (MS CONTIN) extended release tablet 15 mg, 15 mg, Oral, 2 times per day, Jeffy Paz MD, 15 mg at 05/29/22 2124    morphine 10 MG/5ML solution 10 mg, 10 mg, Oral, Q4H PRN, Jeffy Paz MD    methylPREDNISolone sodium (SOLU-MEDROL) injection 40 mg, 40 mg, IntraVENous, Q12H, Jeffy Paz MD, 40 mg at 05/30/22 0054    piperacillin-tazobactam (ZOSYN) 4,500 mg in dextrose 5 % 100 mL IVPB (mini-bag), 4,500 mg, IntraVENous, Q8H, Jeffy Paz MD, Stopped at 05/30/22 3401    enoxaparin (LOVENOX) injection 40 mg, 40 mg, SubCUTAneous, Daily, Jeffy Paz MD, 40 mg at 05/29/22 0811     Continuous Infusions:   sodium chloride         PRN Meds:  sodium chloride flush, sodium chloride, potassium chloride **OR** potassium alternative oral replacement **OR** potassium chloride, magnesium sulfate, ondansetron **OR** ondansetron, acetaminophen **OR** acetaminophen, melatonin, albuterol, morphine    Allergies:  Patient is allergic to meperidine and demerol. REVIEW OF SYSTEMS:  Review of Systems  All other systems have been reviewed and they have been negative except what stated above in HPI  I personally reviewed the patient's medication list, medical and surgical history, and updated as needed. Objective:   EXAM:  /68   Pulse 90   Temp 98.7 °F (37.1 °C) (Oral)   Resp 20   Ht 5' 5\" (1.651 m)   Wt 154 lb (69.9 kg)   SpO2 93%   BMI 25.63 kg/m²    Physical Exam   General appearance: No apparent distress, appears stated age and cooperative. HEENT: Pupils equal, round, and reactive to light.  Conjunctivae/corneas clear.  Neck: Supple, with full range of motion. No jugular venous distention. Trachea midline. Respiratory:  Normal respiratory effort. Generalized decreased air entry bilateral Bilateral wheezing and rhonchi. Cardiovascular: Regular rate and rhythm with normal S1/S2 without murmurs, rubs or gallops. Abdomen: Soft, non-tender, non-distended with normal bowel sounds. Musculoskeletal: No clubbing, cyanosis or edema bilaterally. Full range of motion without deformity. Skin: Skin color, texture, turgor normal.  No rashes or lesions. Neurologic:  Neurovascularly intact without any focal sensory/motor deficits. Cranial nerves: II-XII intact, grossly non-focal.  Psychiatric: Alert and oriented, thought content appropriate, normal insight  Capillary Refill: Brisk,3 seconds, normal   Peripheral Pulses: +2 palpable, equal bilaterally        Data Reviewed:   LABS:  :   Recent Labs     05/27/22 2025 05/29/22 0523 05/30/22  0500   WBC 16.9* 11.5* 11.4*   HGB 14.4 11.7* 11.5*   HCT 44.9 35.8* 34.5*   MCV 86.2 83.3 82.4    315 280     BMP:   Recent Labs     05/27/22 2025 05/29/22 0523 05/30/22  0500   * 133* 128*   K 4.3 3.6 3.9   CL 98* 96* 95*   CO2 22 22 22   BUN 5* 13 12   CREATININE 0.7 0.6 0.6     PROFILE:   Recent Labs     05/27/22 2025   AST 25   ALT 16   BILITOT <0.2   ALKPHOS 81     PT/INR: No results for input(s): PROTIME, INR in the last 72 hours. APTT:No results for input(s):  APTT in the last 72 hours.  :  Recent Labs     05/27/22  2305   COLORU Yellow   PHUR 6.0   WBCUA 21-50*   RBCUA 0-2   BACTERIA 3+*   CLARITYU SL CLOUDY*   SPECGRAV >=1.030   LEUKOCYTESUR TRACE*   UROBILINOGEN 0.2   BILIRUBINUR SMALL*   BLOODU Negative   GLUCOSEU Negative     Recent Labs     05/28/22  1605   PHART 7.334*   [id number] 45.3*   PO2ART 56.6*                    Plan:   -Acute on chronic respiratory failure likely due to COPD exacerbation chest x-ray showed left hemidiaphragm elevation which is old and possible underlying pneumonia    She is improving overall on room air at rest.    -Severe COPD with acute exacerbation she is currently systemic steroids, Pulmicort she is already on azithromycin and  Zosyn  For possible left lower lobe pneumonia  -Chronic left hemidiaphragm elevation seen on previous imaging.       Discharge plan  We recommend to discharge her on Augmentin 875/125 for total of 7 days, to finish 500 mg of azithromycin for 5 days  Will change corticosteroids to p.o. prednisone 40 mg for total of 5 days and follow-up with her pulmonologist    Please call me for any questions or concern            Los Frost MD  2-  9:11 AM

## 2022-05-30 NOTE — PROGRESS NOTES
Patient discharge completed. Discharge information included information on diagnosis including signs and symptoms, complications and when to seek medical attention. Information on new medications also provided included use for the medication, side effects and when to call the doctor. Patient verbalized understanding of all discharge information. Patient escorted out by staff with all documented belongings. Home with  and portable oxygen tank.

## 2022-05-30 NOTE — PROGRESS NOTES
Pt states that she does not want to wear BIPAP. Pt states \"I prefer not to wear it\". RT encouraged pt to wear BIPAP, pt still request BIPAP held. O2 increased to 2L, pt states she wears this per home regimen at night.

## 2022-05-31 LAB — STREP PNEUMONIAE ANTIGEN, URINE: NORMAL

## 2022-06-01 ENCOUNTER — TELEPHONE (OUTPATIENT)
Dept: FAMILY MEDICINE CLINIC | Age: 67
End: 2022-06-01

## 2022-06-01 LAB
BLOOD CULTURE, ROUTINE: NORMAL
CULTURE, BLOOD 2: NORMAL
MISCELLANEOUS LAB TEST ORDER: NORMAL

## 2022-06-01 NOTE — TELEPHONE ENCOUNTER
Germaine Canseco 45 Transitions Initial Follow Up Call    Outreach made within 2 business days of discharge: Yes    Patient: Elizabet Richter Patient : 1955   MRN: [id number]  Reason for Admission: There are no discharge diagnoses documented for the most recent discharge.   Discharge Date: 22       Spoke with: LM with a male for pt to call back     Discharge department/facility: [facility]     Scheduled appointment with PCP within 7-14 days    Follow Up  Future Appointments   Date Time Provider Mtat Crabtree   2022 10:00 AM MD JAIRO Bob

## 2022-06-02 LAB — MISCELLANEOUS LAB TEST ORDER: NORMAL

## 2022-06-04 NOTE — DISCHARGE SUMMARY
Hospital Medicine Discharge Summary    Patient ID: Marlena Abbott      Patient's PCP: Roxi Neves MD    Admit Date: 5/27/2022     Discharge Date: 5/30/2022      Admitting Provider: Eusebio Hinkle MD     Discharge Provider: Aurea Mojica MD     Discharge Diagnoses: Active Hospital Problems    Diagnosis     Stage 3 severe COPD by GOLD classification ([facility] [J44.9]      Priority: Medium    COPD with acute exacerbation ([facility] [J44.1]     H/O pneumonia due to Pseudomonas [Z87.01]     Ineffective airway clearance [R06.89]     Acute on chronic respiratory failure with hypoxia and hypercapnia (HCC) [J96.21, J96.22]     Pneumonia [J18.9]     Anxiety and depression [F41.9, F32.A]     Pulmonary fibrosis (HCC) [J84.10]        The patient was seen and examined on day of discharge and this discharge summary is in conjunction with any daily progress note from day of discharge. Hospital Course:   1.  Admitted with acute on chronic respiratory failure, COPD exacerbation and pulmonary-fibrosis,? left lower lobe pneumonia.  Procalcitonin level negative. Treated with IV antibiotic, Solu-Medrol, inhaler continues to improve now patient is on a 2 L oxygen, pulmonary consult appreciated recommended patient can be discharged home on p.o. antibiotic, Zithromax 5 days course and Augmentin to finish 7 days course and prednisone 40 mg p.o. daily for 5 days. Will discharge patient home to follow-up with the PCP within week, pulmonary as instructed  2.  History of depression anxiety continue with home medication. 3.  Patient is enrolled in hospice care at at home with [facility], patient wants to remain in full code.   4.  Tobacco abuse recommended patient to stop smoking.           Physical Exam Performed:     /68   Pulse 89   Temp 97.1 °F (36.2 °C) (Oral)   Resp 22   Ht 5' 5\" (1.651 m)   Wt 154 lb (69.9 kg)   SpO2 95%   BMI 25.63 kg/m²       General appearance:  No apparent distress, appears stated age and cooperative. HEENT:  Normal cephalic, atraumatic without obvious deformity. Pupils equal, round, and reactive to light. Extra ocular muscles intact. Conjunctivae/corneas clear. Neck: Supple, with full range of motion. No jugular venous distention. Trachea midline. Respiratory:  Normal respiratory effort. Clear to auscultation, bilaterally without Rales/Wheezes/Rhonchi. Cardiovascular:  Regular rate and rhythm with normal S1/S2 without murmurs, rubs or gallops. Abdomen: Soft, non-tender, non-distended with normal bowel sounds. Musculoskeletal:  No clubbing, cyanosis or edema bilaterally. Full range of motion without deformity. Skin: Skin color, texture, turgor normal.  No rashes or lesions. Neurologic:  Neurovascularly intact without any focal sensory/motor deficits. Cranial nerves: II-XII intact, grossly non-focal.  Psychiatric:  Alert and oriented, thought content appropriate, normal insight  Capillary Refill: Brisk,< 3 seconds   Peripheral Pulses: +2 palpable, equal bilaterally       Labs: For convenience and continuity at follow-up the following most recent labs are provided:      CBC:    Lab Results   Component Value Date    WBC 11.4 05/30/2022    HGB 11.5 05/30/2022    HCT 34.5 05/30/2022     05/30/2022       Renal:    Lab Results   Component Value Date     05/30/2022    K 3.9 05/30/2022    K 4.3 05/27/2022    CL 95 05/30/2022    CO2 22 05/30/2022    BUN 12 05/30/2022    CREATININE 0.6 05/30/2022    CALCIUM 8.4 05/30/2022    PHOS 3.8 10/02/2021         Significant Diagnostic Studies    Radiology:   XR CHEST PORTABLE   Final Result   Hazy left basilar infiltrate and/or atelectasis and a small left pleural   effusion. Mild right basilar atelectasis or scarring. Stable borderline cardiomegaly with mild central pulmonary congestion.                 Consults:     IP CONSULT TO PALLIATIVE CARE  IP CONSULT TO PULMONOLOGY    Disposition: Home    Condition at Discharge: Stable    Discharge Instructions/Follow-up: Follow-up with the PCP within a week, pulmonary as instructed    Code Status: Full code    Activity: activity as tolerated    Diet: Regular      Discharge Medications:     Discharge Medication List as of 5/30/2022  3:23 PM           Details   predniSONE (DELTASONE) 20 MG tablet Take 2 tablets by mouth daily for 5 days, Disp-10 tablet, R-0Normal      azithromycin (ZITHROMAX) 500 MG tablet Take 1 tablet by mouth daily for 2 doses, Disp-2 tablet, R-0Normal      amoxicillin-clavulanate (AUGMENTIN) 875-125 MG per tablet Take 1 tablet by mouth every 12 hours for 4 days, Disp-8 tablet, R-0Normal              Details   ipratropium-albuterol (DUONEB) 0.5-2.5 (3) MG/3ML SOLN nebulizer solution Inhale 3 mLs into the lungs every 4 hours as needed for Shortness of Breath, Disp-360 mL, R-5Normal      traZODone (DESYREL) 150 MG tablet Take 1-2 tablets by mouth nightly TAKE 2 TABLETS AT BEDTIME, Disp-180 tablet, R-1Normal      guaiFENesin (MUCINEX) 600 MG extended release tablet Take 1 tablet by mouth 2 times daily as needed for Congestion, Disp-60 tablet, R-1Normal      fluticasone-salmeterol (WIXELA INHUB) 500-50 MCG/DOSE diskus inhaler Inhale 1 puff into the lungs every 12 hours, Disp-180 each, R-1Normal      albuterol (PROVENTIL) (2.5 MG/3ML) 0.083% nebulizer solution Take 3 mLs by nebulization every 6 hours as needed for Wheezing or Shortness of Breath DX COPD J44.9, Disp-120 vial, R-6Normal      montelukast (SINGULAIR) 10 MG tablet TAKE 1 TABLET BY MOUTH EACH NIGHT, Disp-90 tablet, R-1Normal      albuterol sulfate HFA (VENTOLIN HFA) 108 (90 Base) MCG/ACT inhaler Inhale 2 puffs into the lungs every 6 hours as needed for Wheezing or Shortness of Breath, Disp-3 Inhaler, R-1Normal      BD PEN NEEDLE SVETLANA U/F 32G X 4 MM MISC Disp-100 each, R-5, Normal      FLUoxetine (PROZAC) 20 MG capsule TAKE 3 CAPSULES BY MOUTH DAILY, Disp-270 capsule,R-1Normal      simvastatin (ZOCOR) 20 MG tablet TAKE 1 TABLET EVERY EVENING, Disp-90 tablet,R-1Normal      busPIRone (BUSPAR) 30 MG tablet TAKE 1 TABLET TWICE DAILY, Disp-180 tablet,R-1Normal      teriparatide, recombinant, (FORTEO) 600 MCG/2.4ML SOLN injection Inject 0.08 mLs into the skin daily One dose injected daily. , Disp-1 pen,R-11Print             Time Spent on discharge is more than 35 minutes in the examination, evaluation, counseling and review of medications and discharge plan. Signed:    Eric Cuba MD   6/4/2022      Thank you Nupur Ohara MD for the opportunity to be involved in this patient's care. If you have any questions or concerns, please feel free to contact me at [phone].

## 2022-06-07 NOTE — PROGRESS NOTES
Physician Progress Note      Ashley Rudd  [facility] #:                  [id number]  :                       1955  ADMIT DATE:       2022 8:05 PM  [address] [place] DATE:        2022 3:20 PM  RESPONDING  PROVIDER #:        Abrahan Magana MD          QUERY TEXT:    Pt admitted with pneumonia, noted with COPD in hospice, also listed hx   pseudomonas pneumonia within 12 months. If possible, please document in the   progress notes and discharge summary if you are evaluating and/or treating any   of the following: The medical record reflects the following:    Risk Factors: COPD /  dependence in hospice, pulmonary fibrosis/ILD,   positive respiratory cx for pseudomonas 21    Clinical Indicators: per pulm: on azithromycin and  Zosyn for possible left   lower lobe pneumonia. Sissy  Sissy  We recommend to discharge her on Augmentin 875/125 for   total of 7 days, to finish 500 mg of azithromycin for 5 days    Treatment: IV zosyn, rocephin and zithromax, pulm consult, imaging, labs,   monitoring and supportive care    Thank you,  [email or web address]  Options provided:  -- This patient was treated for suspected gram negative pneumonia  -- This patient was treated for suspected pseudomonas pneumonia  -- Other - I will add my own diagnosis  -- Disagree - Not applicable / Not valid  -- Disagree - Clinically unable to determine / Unknown  -- Refer to Clinical Documentation Reviewer    PROVIDER RESPONSE TEXT:    This patient was treated for suspected gram negative pneumonia. Query created by: Sabino Ware on 2022 1:04 PM      QUERY TEXT:    Pt admitted with acute respiratory failure, pneumonia, UTI. Pt noted to have   WBC 16.9 and tachycardia with hypotension noted in ER record. If possible,   please document in the progress notes and discharge summary if you are   evaluating and /or treating any of the following:     The medical record reflects the following:    Risk Factors: pneumonia, UTI    Clinical Indicators: on arrival WBC 16.9; per nursing flowsheets HR 110s-120s   w/ sbp 80s, per Er exam pt noted as \"in acute distress, ill appearing,   lethargic\". .. \"Patient was hypotensive and received a liter of fluids. \"    Treatment: 1 ltr bolus (not noted on MAR but is counted on I/O flowsheet), IV   abx, imaging, labs, supportive care and monitoring    Thank you,  [email or web address]  Options provided:  -- Sepsis, present on arrival  -- Localized infection without Sepsis  -- Other - I will add my own diagnosis  -- Disagree - Not applicable / Not valid  -- Disagree - Clinically unable to determine / Unknown  -- Refer to Clinical Documentation Reviewer    PROVIDER RESPONSE TEXT:    This patient had sepsis which was present on arrival.    Query created by:  Jearlean Hodgkin on 6/2/2022 1:05 PM      Electronically signed by:  Nory Jaime MD 6/6/2022 8:36 PM

## 2022-06-23 ENCOUNTER — TELEPHONE (OUTPATIENT)
Dept: FAMILY MEDICINE CLINIC | Age: 67
End: 2022-06-23

## 2022-06-23 NOTE — TELEPHONE ENCOUNTER
Patients  called from insurance called trying to get her scheduled for a hospital follow up. She was discharged  Back on 5/30/22. She thinks her insurance wants her to schedule but she didn't think it was needed. No where to schedule and patient only wishes to see you or do a telephone call with you. Does she need to be seen? If so where can we schedule?   Patient can be reached at [phone]

## 2022-06-24 NOTE — TELEPHONE ENCOUNTER
Patient is in hospice care. I'm happy to meet with her if she would find that beneficial, but at this point follow up has likely already been done by hospice. Have they resumed caring for her at home?

## 2022-08-24 NOTE — TELEPHONE ENCOUNTER
Refill Request     CONFIRM preferrred pharmacy with the patient. If Mail Order Rx - Pend for 90 day refill.       Last Seen: Last Seen Department: 12/27/2021  Last Seen by PCP: 12/27/2021    Last Written: 3/4/2021    Next Appointment:   Future Appointments   Date Time Provider Matt Crabtree   12/29/2022 10:00 AM Pippa Shen MD [facility]       Future appointment scheduled      Requested Prescriptions     Pending Prescriptions Disp Refills    albuterol sulfate HFA (VENTOLIN HFA) 108 (90 Base) MCG/ACT inhaler 3 each 1     Sig: Inhale 2 puffs into the lungs every 6 hours as needed for Wheezing or Shortness of Breath

## 2022-08-24 NOTE — TELEPHONE ENCOUNTER
[employer] Nurse Manager Jorge Yap called; spoke with patent this morning, Patient mentioned she needs her Albuterol Inhaler refilled.

## 2022-08-25 RX ORDER — ALBUTEROL SULFATE 90 UG/1
2 AEROSOL, METERED RESPIRATORY (INHALATION) EVERY 6 HOURS PRN
Qty: 3 EACH | Refills: 1 | Status: SHIPPED | OUTPATIENT
Start: 2022-08-25

## 2022-11-10 NOTE — PROGRESS NOTES
Burnice Marisol performed by Hans Araiza MD at [address]  2/12/15    T8 Kyphoplasty    W[place] COLONOSCOPY FLX DX W/COLLJ SPEC WHEN PFRMD N/A 9/20/2018    EGD AND COLONOSCOPY WITH ANESTHESIA performed by Hans Araiza MD at [facility] ESOPHAGOGASTRODUODENOSCOPY TRANSORAL DIAGNOSTIC N/A 9/20/2018    EGD AND COLONOSCOPY WITH ANESTHESIA performed by Hans Araiza MD at [address]         Level of Consciousness: Alert, Oriented, and Cooperative = 0    Level of Activity: Walking unassisted = 0    Respiratory Pattern: Regular Pattern; RR 8-20 = 0    Breath Sounds: Diminshed bilaterally and/or crackles = 2    Sputum   ,  ,    Cough: Strong, spontaneous, non-productive = 0    Vital Signs   /69   Pulse 73   Temp 97.8 °F (36.6 °C) (Oral)   Resp 16   Ht 5' 5\" (1.651 m)   Wt 124 lb 6.4 oz (56.4 kg)   SpO2 95%   BMI 20.70 kg/m²   SPO2 (COPD values may differ): 90-91% on room air or greater than 92% on FiO2 24- 28% = 1    Peak Flow (asthma only): not applicable = 0    RSI: 5-6 = Q4hr PRN (every four hours as needed) for dyspnea        Plan       Goals: medication delivery and volume expansion    Patient/caregiver was educated on the proper method of use for Respiratory Care Devices:  Yes      Level of patient/caregiver understanding able to:   ? Verbalize understanding   ? Demonstrate understanding       ? Teach back        ? Needs reinforcement       ? No available caregiver               ? Other:     Response to education:  Good     Is patient being placed on Home Treatment Regimen? Yes     Does the patient have everything they need prior to discharge? NA     Comments: Patient assessed    Plan of Care: Keep current therapy. Electronically signed by Ha Cerrato RCP on 3/4/2020 at 11:51 PM    Respiratory Protocol Guidelines     1.  Assessment and treatment by Respiratory no

## 2022-12-06 ENCOUNTER — TELEPHONE (OUTPATIENT)
Dept: FAMILY MEDICINE CLINIC | Age: 67
End: 2022-12-06

## 2022-12-06 NOTE — TELEPHONE ENCOUNTER
Incoming call from Meldoy Carranza looking to get updated contact phone number for patient. Verified patient with 3 identifiers (name, , address) and phone numbers given.     Asked for call back number in case patient reaches out or if office needs to contact and I was given [phone] option #2

## 2023-02-07 ENCOUNTER — APPOINTMENT (OUTPATIENT)
Dept: GENERAL RADIOLOGY | Age: 68
End: 2023-02-07
Payer: MEDICARE

## 2023-02-07 ENCOUNTER — HOSPITAL ENCOUNTER (INPATIENT)
Age: 68
LOS: 3 days | Discharge: NURSING, REHAB OR LONG-TERM CARE | End: 2023-02-10
Attending: EMERGENCY MEDICINE | Admitting: INTERNAL MEDICINE
Payer: MEDICARE

## 2023-02-07 DIAGNOSIS — S72.012A SUBCAPITAL FRACTURE OF FEMUR, LEFT, CLOSED, INITIAL ENCOUNTER (HCC): ICD-10-CM
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
## 2023-02-07 DIAGNOSIS — J44.1 CHRONIC OBSTRUCTIVE PULMONARY DISEASE WITH ACUTE EXACERBATION (HCC): ICD-10-CM

## 2023-02-07 DIAGNOSIS — S72.002A CLOSED FRACTURE OF LEFT HIP, INITIAL ENCOUNTER (HCC): Primary | ICD-10-CM

## 2023-02-07 PROBLEM — J96.11 CHRONIC RESPIRATORY FAILURE WITH HYPOXIA (HCC): Status: ACTIVE | Noted: 2023-02-07

## 2023-02-07 LAB
A/G RATIO: 0.9 (ref 1.1–2.2)
ALBUMIN SERPL-MCNC: 3.3 G/DL (ref 3.4–5)
ALP BLD-CCNC: 77 U/L (ref 40–129)
ALT SERPL-CCNC: 12 U/L (ref 10–40)
ANION GAP SERPL CALCULATED.3IONS-SCNC: 7 MMOL/L (ref 3–16)
AST SERPL-CCNC: 24 U/L (ref 15–37)
BASOPHILS ABSOLUTE: 0.1 K/UL (ref 0–0.2)
BASOPHILS RELATIVE PERCENT: 0.6 %
BILIRUB SERPL-MCNC: 0.3 MG/DL (ref 0–1)
BUN BLDV-MCNC: 6 MG/DL (ref 7–20)
CALCIUM SERPL-MCNC: 9.2 MG/DL (ref 8.3–10.6)
CHLORIDE BLD-SCNC: 89 MMOL/L (ref 99–110)
CO2: 30 MMOL/L (ref 21–32)
CREAT SERPL-MCNC: <0.5 MG/DL (ref 0.6–1.2)
EOSINOPHILS ABSOLUTE: 0 K/UL (ref 0–0.6)
EOSINOPHILS RELATIVE PERCENT: 0.2 %
GFR SERPL CREATININE-BSD FRML MDRD: >60 ML/MIN/{1.73_M2}
GLUCOSE BLD-MCNC: 110 MG/DL (ref 70–99)
HCT VFR BLD CALC: 38 % (ref 36–48)
HEMOGLOBIN: 12.4 G/DL (ref 12–16)
LYMPHOCYTES ABSOLUTE: 1.2 K/UL (ref 1–5.1)
LYMPHOCYTES RELATIVE PERCENT: 10.5 %
MCH RBC QN AUTO: 28.8 PG (ref 26–34)
MCHC RBC AUTO-ENTMCNC: 32.7 G/DL (ref 31–36)
MCV RBC AUTO: 88.2 FL (ref 80–100)
MONOCYTES ABSOLUTE: 0.5 K/UL (ref 0–1.3)
MONOCYTES RELATIVE PERCENT: 4.7 %
NEUTROPHILS ABSOLUTE: 9.4 K/UL (ref 1.7–7.7)
NEUTROPHILS RELATIVE PERCENT: 84 %
PDW BLD-RTO: 14.6 % (ref 12.4–15.4)
PLATELET # BLD: 335 K/UL (ref 135–450)
PMV BLD AUTO: 7.4 FL (ref 5–10.5)
POTASSIUM REFLEX MAGNESIUM: 4.6 MMOL/L (ref 3.5–5.1)
RBC # BLD: 4.31 M/UL (ref 4–5.2)
SODIUM BLD-SCNC: 126 MMOL/L (ref 136–145)
TOTAL PROTEIN: 7.1 G/DL (ref 6.4–8.2)
TROPONIN: <0.01 NG/ML
WBC # BLD: 11.1 K/UL (ref 4–11)

## 2023-02-07 PROCEDURE — 6360000002 HC RX W HCPCS: Performed by: PHYSICIAN ASSISTANT

## 2023-02-07 PROCEDURE — 71045 X-RAY EXAM CHEST 1 VIEW: CPT

## 2023-02-07 PROCEDURE — 84484 ASSAY OF TROPONIN QUANT: CPT

## 2023-02-07 PROCEDURE — 93005 ELECTROCARDIOGRAM TRACING: CPT | Performed by: PHYSICIAN ASSISTANT

## 2023-02-07 PROCEDURE — 2580000003 HC RX 258: Performed by: INTERNAL MEDICINE

## 2023-02-07 PROCEDURE — 1200000000 HC SEMI PRIVATE

## 2023-02-07 PROCEDURE — 99285 EMERGENCY DEPT VISIT HI MDM: CPT

## 2023-02-07 PROCEDURE — 94761 N-INVAS EAR/PLS OXIMETRY MLT: CPT

## 2023-02-07 PROCEDURE — 2700000000 HC OXYGEN THERAPY PER DAY

## 2023-02-07 PROCEDURE — 73502 X-RAY EXAM HIP UNI 2-3 VIEWS: CPT

## 2023-02-07 PROCEDURE — 6370000000 HC RX 637 (ALT 250 FOR IP): Performed by: INTERNAL MEDICINE

## 2023-02-07 PROCEDURE — 6370000000 HC RX 637 (ALT 250 FOR IP): Performed by: PHYSICIAN ASSISTANT

## 2023-02-07 PROCEDURE — 96374 THER/PROPH/DIAG INJ IV PUSH: CPT

## 2023-02-07 PROCEDURE — 85025 COMPLETE CBC W/AUTO DIFF WBC: CPT

## 2023-02-07 PROCEDURE — 80053 COMPREHEN METABOLIC PANEL: CPT

## 2023-02-07 RX ORDER — SODIUM CHLORIDE 9 MG/ML
INJECTION, SOLUTION INTRAVENOUS CONTINUOUS
Status: DISCONTINUED | OUTPATIENT
Start: 2023-02-07 | End: 2023-02-10 | Stop reason: HOSPADM

## 2023-02-07 RX ORDER — POLYETHYLENE GLYCOL 3350 17 G/17G
17 POWDER, FOR SOLUTION ORAL DAILY PRN
Status: DISCONTINUED | OUTPATIENT
Start: 2023-02-07 | End: 2023-02-08

## 2023-02-07 RX ORDER — ALBUTEROL SULFATE 2.5 MG/3ML
2.5 SOLUTION RESPIRATORY (INHALATION) 4 TIMES DAILY
Status: DISCONTINUED | OUTPATIENT
Start: 2023-02-07 | End: 2023-02-10 | Stop reason: HOSPADM

## 2023-02-07 RX ORDER — OXYCODONE HYDROCHLORIDE 5 MG/1
10 TABLET ORAL EVERY 4 HOURS PRN
Status: DISCONTINUED | OUTPATIENT
Start: 2023-02-07 | End: 2023-02-10 | Stop reason: HOSPADM

## 2023-02-07 RX ORDER — METHYLPREDNISOLONE SODIUM SUCCINATE 125 MG/2ML
125 INJECTION, POWDER, LYOPHILIZED, FOR SOLUTION INTRAMUSCULAR; INTRAVENOUS ONCE
Status: COMPLETED | OUTPATIENT
Start: 2023-02-07 | End: 2023-02-07

## 2023-02-07 RX ORDER — SODIUM CHLORIDE 9 MG/ML
INJECTION, SOLUTION INTRAVENOUS PRN
Status: DISCONTINUED | OUTPATIENT
Start: 2023-02-07 | End: 2023-02-10 | Stop reason: HOSPADM

## 2023-02-07 RX ORDER — MORPHINE SULFATE 2 MG/ML
2 INJECTION, SOLUTION INTRAMUSCULAR; INTRAVENOUS ONCE
Status: COMPLETED | OUTPATIENT
Start: 2023-02-07 | End: 2023-02-07

## 2023-02-07 RX ORDER — TRAZODONE HYDROCHLORIDE 50 MG/1
150 TABLET ORAL NIGHTLY
Status: DISCONTINUED | OUTPATIENT
Start: 2023-02-07 | End: 2023-02-10 | Stop reason: HOSPADM

## 2023-02-07 RX ORDER — ALBUTEROL SULFATE 2.5 MG/3ML
2.5 SOLUTION RESPIRATORY (INHALATION) EVERY 6 HOURS PRN
Status: DISCONTINUED | OUTPATIENT
Start: 2023-02-07 | End: 2023-02-07

## 2023-02-07 RX ORDER — SODIUM CHLORIDE 0.9 % (FLUSH) 0.9 %
5-40 SYRINGE (ML) INJECTION PRN
Status: DISCONTINUED | OUTPATIENT
Start: 2023-02-07 | End: 2023-02-10 | Stop reason: HOSPADM

## 2023-02-07 RX ORDER — SODIUM CHLORIDE 0.9 % (FLUSH) 0.9 %
5-40 SYRINGE (ML) INJECTION EVERY 12 HOURS SCHEDULED
Status: DISCONTINUED | OUTPATIENT
Start: 2023-02-07 | End: 2023-02-10 | Stop reason: HOSPADM

## 2023-02-07 RX ORDER — MORPHINE SULFATE 4 MG/ML
4 INJECTION, SOLUTION INTRAMUSCULAR; INTRAVENOUS
Status: DISCONTINUED | OUTPATIENT
Start: 2023-02-07 | End: 2023-02-10 | Stop reason: HOSPADM

## 2023-02-07 RX ORDER — ONDANSETRON 2 MG/ML
4 INJECTION INTRAMUSCULAR; INTRAVENOUS EVERY 6 HOURS PRN
Status: DISCONTINUED | OUTPATIENT
Start: 2023-02-07 | End: 2023-02-10 | Stop reason: HOSPADM

## 2023-02-07 RX ORDER — ATORVASTATIN CALCIUM 10 MG/1
10 TABLET, FILM COATED ORAL NIGHTLY
Status: DISCONTINUED | OUTPATIENT
Start: 2023-02-07 | End: 2023-02-10 | Stop reason: HOSPADM

## 2023-02-07 RX ORDER — ONDANSETRON 4 MG/1
4 TABLET, ORALLY DISINTEGRATING ORAL EVERY 8 HOURS PRN
Status: DISCONTINUED | OUTPATIENT
Start: 2023-02-07 | End: 2023-02-10 | Stop reason: HOSPADM

## 2023-02-07 RX ORDER — OXYCODONE HYDROCHLORIDE 5 MG/1
5 TABLET ORAL EVERY 4 HOURS PRN
Status: DISCONTINUED | OUTPATIENT
Start: 2023-02-07 | End: 2023-02-10 | Stop reason: HOSPADM

## 2023-02-07 RX ORDER — MONTELUKAST SODIUM 10 MG/1
10 TABLET ORAL NIGHTLY
Status: DISCONTINUED | OUTPATIENT
Start: 2023-02-07 | End: 2023-02-10 | Stop reason: HOSPADM

## 2023-02-07 RX ORDER — FLUOXETINE HYDROCHLORIDE 20 MG/1
60 CAPSULE ORAL DAILY
Status: DISCONTINUED | OUTPATIENT
Start: 2023-02-08 | End: 2023-02-10 | Stop reason: HOSPADM

## 2023-02-07 RX ORDER — BUSPIRONE HYDROCHLORIDE 5 MG/1
30 TABLET ORAL 2 TIMES DAILY
Status: DISCONTINUED | OUTPATIENT
Start: 2023-02-07 | End: 2023-02-10 | Stop reason: HOSPADM

## 2023-02-07 RX ORDER — MORPHINE SULFATE 2 MG/ML
2 INJECTION, SOLUTION INTRAMUSCULAR; INTRAVENOUS
Status: DISCONTINUED | OUTPATIENT
Start: 2023-02-07 | End: 2023-02-10 | Stop reason: HOSPADM

## 2023-02-07 RX ORDER — IPRATROPIUM BROMIDE AND ALBUTEROL SULFATE 2.5; .5 MG/3ML; MG/3ML
1 SOLUTION RESPIRATORY (INHALATION) ONCE
Status: COMPLETED | OUTPATIENT
Start: 2023-02-07 | End: 2023-02-07

## 2023-02-07 RX ADMIN — SODIUM CHLORIDE: 9 INJECTION, SOLUTION INTRAVENOUS at 22:07

## 2023-02-07 RX ADMIN — IPRATROPIUM BROMIDE AND ALBUTEROL SULFATE 1 AMPULE: .5; 2.5 SOLUTION RESPIRATORY (INHALATION) at 17:05

## 2023-02-07 RX ADMIN — SODIUM CHLORIDE, PRESERVATIVE FREE 10 ML: 5 INJECTION INTRAVENOUS at 22:08

## 2023-02-07 RX ADMIN — MORPHINE SULFATE 2 MG: 2 INJECTION, SOLUTION INTRAMUSCULAR; INTRAVENOUS at 19:26

## 2023-02-07 RX ADMIN — TRAZODONE HYDROCHLORIDE 150 MG: 50 TABLET ORAL at 22:00

## 2023-02-07 RX ADMIN — METHYLPREDNISOLONE SODIUM SUCCINATE 125 MG: 125 INJECTION, POWDER, FOR SOLUTION INTRAMUSCULAR; INTRAVENOUS at 17:35

## 2023-02-07 RX ADMIN — OXYCODONE HYDROCHLORIDE 5 MG: 5 TABLET ORAL at 22:01

## 2023-02-07 RX ADMIN — MONTELUKAST SODIUM 10 MG: 10 TABLET ORAL at 22:01

## 2023-02-07 ASSESSMENT — PAIN DESCRIPTION - LOCATION
LOCATION: HIP
LOCATION: HIP

## 2023-02-07 ASSESSMENT — PAIN DESCRIPTION - ORIENTATION
ORIENTATION: LEFT
ORIENTATION: LEFT

## 2023-02-07 ASSESSMENT — PAIN SCALES - GENERAL
PAINLEVEL_OUTOF10: 4
PAINLEVEL_OUTOF10: 5

## 2023-02-07 ASSESSMENT — PAIN DESCRIPTION - DESCRIPTORS: DESCRIPTORS: ACHING

## 2023-02-07 ASSESSMENT — PAIN - FUNCTIONAL ASSESSMENT
PAIN_FUNCTIONAL_ASSESSMENT: NONE - DENIES PAIN
PAIN_FUNCTIONAL_ASSESSMENT: 0-10

## 2023-02-07 NOTE — ED NOTES
Patient identified as a positive fall risk on the ED triage fall screening. Patient placed in fall precautions which includes:  yellow fall risk bracelet on wrist and yellow socks on feet. Patient instructed on importance of not getting out of bed or ambulating without assistance for safety. Pt verbalized understanding.        Reuben Weiner LPN  50/31/11 9052

## 2023-02-07 NOTE — ED PROVIDER NOTES
I independently performed a history and physical on Mian Carney. All diagnostic, treatment, and disposition decisions were made by myself in conjunction with the advanced practice provider/resident. For further details of [address] [facility] emergency department encounter, please see the SARA/resident's documentation. I personally saw the patient and performed a substantive portion of the visit including all aspects of the medical decision making. Briefly, this is a 25-year-old female presenting for evaluation of fall, left-sided hip pain. Patient states that she has wood floors, slipped on this. No head injury. There is pain of the left hip. On exam, she has intact distal pulses, mildly shortened left lower extremity x-ray imaging does show subcapital femoral fracture. Patient was informed of this result. She does have some coarse wheezes bilaterally she will be started on DuoNebs, steroids for optimization prior to suspected surgery. EKG  The Ekg interpreted by myself in the emergency department in the absence of a cardiologist.  normal sinus rhythm with a rate of 80  Axis is   Normal  QTc is   452  Intervals and Durations are unremarkable. No specific ST-T wave changes appreciated. No evidence of acute ischemia. No significant change from prior EKG dated May 28, 2022      I, Dr. Viola Hernandes, am the primary clinician of record. Comment: Please note this report has been produced using speech recognition software and may contain errors related to that system including errors in grammar, punctuation, and spelling, as well as words and phrases that may be inappropriate. If there are any questions or concerns please feel free to contact the dictating provider for clarification.      Robbin Terry MD  02/07/23 9035       Robbin Terry MD  02/07/23 4709

## 2023-02-07 NOTE — CARE COORDINATION
Received call from [facility], [phone]. patient is current with [facility]. She receives nurse visits 2-4 times a week. Patient lives at home with .   Electronically signed by GRANT Lackey on 2/7/2023 at 3:49 PM

## 2023-02-08 ENCOUNTER — ANESTHESIA (OUTPATIENT)
Dept: OPERATING ROOM | Age: 68
End: 2023-02-08
Payer: MEDICARE

## 2023-02-08 ENCOUNTER — APPOINTMENT (OUTPATIENT)
Dept: GENERAL RADIOLOGY | Age: 68
End: 2023-02-08
Payer: MEDICARE

## 2023-02-08 ENCOUNTER — ANESTHESIA EVENT (OUTPATIENT)
Dept: OPERATING ROOM | Age: 68
End: 2023-02-08
Payer: MEDICARE

## 2023-02-08 LAB
ANION GAP SERPL CALCULATED.3IONS-SCNC: 8 MMOL/L (ref 3–16)
BUN BLDV-MCNC: 11 MG/DL (ref 7–20)
CALCIUM SERPL-MCNC: 8.6 MG/DL (ref 8.3–10.6)
CHLORIDE BLD-SCNC: 97 MMOL/L (ref 99–110)
CO2: 27 MMOL/L (ref 21–32)
CREAT SERPL-MCNC: <0.5 MG/DL (ref 0.6–1.2)
EKG ATRIAL RATE: 80 BPM
EKG DIAGNOSIS: NORMAL
EKG P AXIS: 65 DEGREES
EKG P-R INTERVAL: 140 MS
EKG Q-T INTERVAL: 392 MS
EKG QRS DURATION: 80 MS
EKG QTC CALCULATION (BAZETT): 452 MS
EKG R AXIS: -6 DEGREES
EKG T AXIS: 67 DEGREES
EKG VENTRICULAR RATE: 80 BPM
GFR SERPL CREATININE-BSD FRML MDRD: >60 ML/MIN/{1.73_M2}
GLUCOSE BLD-MCNC: 137 MG/DL (ref 70–99)
POTASSIUM REFLEX MAGNESIUM: 5.8 MMOL/L (ref 3.5–5.1)
SODIUM BLD-SCNC: 132 MMOL/L (ref 136–145)

## 2023-02-08 PROCEDURE — 94761 N-INVAS EAR/PLS OXIMETRY MLT: CPT

## 2023-02-08 PROCEDURE — 6360000002 HC RX W HCPCS: Performed by: ANESTHESIOLOGY

## 2023-02-08 PROCEDURE — 72170 X-RAY EXAM OF PELVIS: CPT

## 2023-02-08 PROCEDURE — 36415 COLL VENOUS BLD VENIPUNCTURE: CPT

## 2023-02-08 PROCEDURE — 6360000002 HC RX W HCPCS: Performed by: SPECIALIST/TECHNOLOGIST

## 2023-02-08 PROCEDURE — 1200000000 HC SEMI PRIVATE

## 2023-02-08 PROCEDURE — 99221 1ST HOSP IP/OBS SF/LOW 40: CPT | Performed by: STUDENT IN AN ORGANIZED HEALTH CARE EDUCATION/TRAINING PROGRAM

## 2023-02-08 PROCEDURE — 2580000003 HC RX 258: Performed by: NURSE ANESTHETIST, CERTIFIED REGISTERED

## 2023-02-08 PROCEDURE — 73502 X-RAY EXAM HIP UNI 2-3 VIEWS: CPT

## 2023-02-08 PROCEDURE — 94640 AIRWAY INHALATION TREATMENT: CPT

## 2023-02-08 PROCEDURE — 6360000002 HC RX W HCPCS: Performed by: INTERNAL MEDICINE

## 2023-02-08 PROCEDURE — 2580000003 HC RX 258: Performed by: INTERNAL MEDICINE

## 2023-02-08 PROCEDURE — C1713 ANCHOR/SCREW BN/BN,TIS/BN: HCPCS | Performed by: STUDENT IN AN ORGANIZED HEALTH CARE EDUCATION/TRAINING PROGRAM

## 2023-02-08 PROCEDURE — 6360000002 HC RX W HCPCS: Performed by: STUDENT IN AN ORGANIZED HEALTH CARE EDUCATION/TRAINING PROGRAM

## 2023-02-08 PROCEDURE — 93010 ELECTROCARDIOGRAM REPORT: CPT | Performed by: INTERNAL MEDICINE

## 2023-02-08 PROCEDURE — 2500000003 HC RX 250 WO HCPCS: Performed by: STUDENT IN AN ORGANIZED HEALTH CARE EDUCATION/TRAINING PROGRAM

## 2023-02-08 PROCEDURE — 3700000001 HC ADD 15 MINUTES (ANESTHESIA): Performed by: STUDENT IN AN ORGANIZED HEALTH CARE EDUCATION/TRAINING PROGRAM

## 2023-02-08 PROCEDURE — 6370000000 HC RX 637 (ALT 250 FOR IP): Performed by: ANESTHESIOLOGY

## 2023-02-08 PROCEDURE — 80048 BASIC METABOLIC PNL TOTAL CA: CPT

## 2023-02-08 PROCEDURE — 3209999900 FLUORO FOR SURGICAL PROCEDURES

## 2023-02-08 PROCEDURE — 6360000002 HC RX W HCPCS: Performed by: NURSE ANESTHETIST, CERTIFIED REGISTERED

## 2023-02-08 PROCEDURE — C1769 GUIDE WIRE: HCPCS | Performed by: STUDENT IN AN ORGANIZED HEALTH CARE EDUCATION/TRAINING PROGRAM

## 2023-02-08 PROCEDURE — 6370000000 HC RX 637 (ALT 250 FOR IP): Performed by: STUDENT IN AN ORGANIZED HEALTH CARE EDUCATION/TRAINING PROGRAM

## 2023-02-08 PROCEDURE — 6370000000 HC RX 637 (ALT 250 FOR IP): Performed by: INTERNAL MEDICINE

## 2023-02-08 PROCEDURE — 3700000000 HC ANESTHESIA ATTENDED CARE: Performed by: STUDENT IN AN ORGANIZED HEALTH CARE EDUCATION/TRAINING PROGRAM

## 2023-02-08 PROCEDURE — 2580000003 HC RX 258: Performed by: STUDENT IN AN ORGANIZED HEALTH CARE EDUCATION/TRAINING PROGRAM

## 2023-02-08 PROCEDURE — 27235 TREAT THIGH FRACTURE: CPT | Performed by: STUDENT IN AN ORGANIZED HEALTH CARE EDUCATION/TRAINING PROGRAM

## 2023-02-08 PROCEDURE — 2500000003 HC RX 250 WO HCPCS: Performed by: NURSE ANESTHETIST, CERTIFIED REGISTERED

## 2023-02-08 PROCEDURE — 3600000004 HC SURGERY LEVEL 4 BASE: Performed by: STUDENT IN AN ORGANIZED HEALTH CARE EDUCATION/TRAINING PROGRAM

## 2023-02-08 PROCEDURE — 2700000000 HC OXYGEN THERAPY PER DAY

## 2023-02-08 PROCEDURE — 6370000000 HC RX 637 (ALT 250 FOR IP): Performed by: SPECIALIST/TECHNOLOGIST

## 2023-02-08 PROCEDURE — 2709999900 HC NON-CHARGEABLE SUPPLY: Performed by: STUDENT IN AN ORGANIZED HEALTH CARE EDUCATION/TRAINING PROGRAM

## 2023-02-08 PROCEDURE — 7100000001 HC PACU RECOVERY - ADDTL 15 MIN: Performed by: STUDENT IN AN ORGANIZED HEALTH CARE EDUCATION/TRAINING PROGRAM

## 2023-02-08 PROCEDURE — 7100000000 HC PACU RECOVERY - FIRST 15 MIN: Performed by: STUDENT IN AN ORGANIZED HEALTH CARE EDUCATION/TRAINING PROGRAM

## 2023-02-08 PROCEDURE — 2720000010 HC SURG SUPPLY STERILE: Performed by: STUDENT IN AN ORGANIZED HEALTH CARE EDUCATION/TRAINING PROGRAM

## 2023-02-08 PROCEDURE — 3600000014 HC SURGERY LEVEL 4 ADDTL 15MIN: Performed by: STUDENT IN AN ORGANIZED HEALTH CARE EDUCATION/TRAINING PROGRAM

## 2023-02-08 PROCEDURE — 0QS704Z REPOSITION LEFT UPPER FEMUR WITH INTERNAL FIXATION DEVICE, OPEN APPROACH: ICD-10-PCS | Performed by: STUDENT IN AN ORGANIZED HEALTH CARE EDUCATION/TRAINING PROGRAM

## 2023-02-08 DEVICE — SCREW BNE L90MM DIA6.5MM THRD L32MM S STL CANN HEX SOCK: Type: IMPLANTABLE DEVICE | Site: HIP | Status: FUNCTIONAL

## 2023-02-08 DEVICE — SCREW BNE L80MM DIA6.5MM THRD L32MM CANC S STL ST SELF DRL: Type: IMPLANTABLE DEVICE | Site: HIP | Status: FUNCTIONAL

## 2023-02-08 DEVICE — SCREW BNE L85MM DIA6.5MM THRD L32MM CANC S STL ST SELF DRL: Type: IMPLANTABLE DEVICE | Site: HIP | Status: FUNCTIONAL

## 2023-02-08 RX ORDER — OXYCODONE HYDROCHLORIDE 5 MG/1
5 TABLET ORAL EVERY 4 HOURS PRN
Qty: 12 TABLET | Refills: 0 | Status: SHIPPED | OUTPATIENT
Start: 2023-02-08 | End: 2023-02-11

## 2023-02-08 RX ORDER — CEFAZOLIN SODIUM IN 0.9 % NACL 2 G/100 ML
2000 PLASTIC BAG, INJECTION (ML) INTRAVENOUS
Status: COMPLETED | OUTPATIENT
Start: 2023-02-08 | End: 2023-02-08

## 2023-02-08 RX ORDER — SODIUM CHLORIDE 9 MG/ML
INJECTION, SOLUTION INTRAVENOUS CONTINUOUS PRN
Status: DISCONTINUED | OUTPATIENT
Start: 2023-02-08 | End: 2023-02-08 | Stop reason: SDUPTHER

## 2023-02-08 RX ORDER — FENTANYL CITRATE 50 UG/ML
INJECTION, SOLUTION INTRAMUSCULAR; INTRAVENOUS PRN
Status: DISCONTINUED | OUTPATIENT
Start: 2023-02-08 | End: 2023-02-08 | Stop reason: SDUPTHER

## 2023-02-08 RX ORDER — OXYCODONE HYDROCHLORIDE 5 MG/1
5 TABLET ORAL PRN
Status: COMPLETED | OUTPATIENT
Start: 2023-02-08 | End: 2023-02-08

## 2023-02-08 RX ORDER — ONDANSETRON 2 MG/ML
INJECTION INTRAMUSCULAR; INTRAVENOUS PRN
Status: DISCONTINUED | OUTPATIENT
Start: 2023-02-08 | End: 2023-02-08 | Stop reason: SDUPTHER

## 2023-02-08 RX ORDER — POLYETHYLENE GLYCOL 3350 17 G/17G
17 POWDER, FOR SOLUTION ORAL DAILY
Status: DISCONTINUED | OUTPATIENT
Start: 2023-02-09 | End: 2023-02-10 | Stop reason: HOSPADM

## 2023-02-08 RX ORDER — SODIUM CHLORIDE 9 MG/ML
INJECTION, SOLUTION INTRAVENOUS PRN
Status: DISCONTINUED | OUTPATIENT
Start: 2023-02-08 | End: 2023-02-08 | Stop reason: HOSPADM

## 2023-02-08 RX ORDER — POLYETHYLENE GLYCOL 3350 17 G/17G
17 POWDER, FOR SOLUTION ORAL DAILY
Qty: 527 G | Refills: 1 | DISCHARGE
Start: 2023-02-09 | End: 2023-03-11

## 2023-02-08 RX ORDER — DEXAMETHASONE SODIUM PHOSPHATE 4 MG/ML
INJECTION, SOLUTION INTRA-ARTICULAR; INTRALESIONAL; INTRAMUSCULAR; INTRAVENOUS; SOFT TISSUE PRN
Status: DISCONTINUED | OUTPATIENT
Start: 2023-02-08 | End: 2023-02-08 | Stop reason: SDUPTHER

## 2023-02-08 RX ORDER — LIDOCAINE HYDROCHLORIDE 20 MG/ML
INJECTION, SOLUTION INFILTRATION; PERINEURAL PRN
Status: DISCONTINUED | OUTPATIENT
Start: 2023-02-08 | End: 2023-02-08 | Stop reason: SDUPTHER

## 2023-02-08 RX ORDER — ACETAMINOPHEN 325 MG/1
650 TABLET ORAL EVERY 6 HOURS
Status: DISCONTINUED | OUTPATIENT
Start: 2023-02-08 | End: 2023-02-10 | Stop reason: HOSPADM

## 2023-02-08 RX ORDER — BUPIVACAINE HYDROCHLORIDE 5 MG/ML
INJECTION, SOLUTION EPIDURAL; INTRACAUDAL PRN
Status: DISCONTINUED | OUTPATIENT
Start: 2023-02-08 | End: 2023-02-08 | Stop reason: HOSPADM

## 2023-02-08 RX ORDER — ROCURONIUM BROMIDE 10 MG/ML
INJECTION, SOLUTION INTRAVENOUS PRN
Status: DISCONTINUED | OUTPATIENT
Start: 2023-02-08 | End: 2023-02-08 | Stop reason: SDUPTHER

## 2023-02-08 RX ORDER — PROPOFOL 10 MG/ML
INJECTION, EMULSION INTRAVENOUS PRN
Status: DISCONTINUED | OUTPATIENT
Start: 2023-02-08 | End: 2023-02-08 | Stop reason: SDUPTHER

## 2023-02-08 RX ORDER — OXYCODONE HYDROCHLORIDE 5 MG/1
10 TABLET ORAL PRN
Status: COMPLETED | OUTPATIENT
Start: 2023-02-08 | End: 2023-02-08

## 2023-02-08 RX ORDER — CEFAZOLIN SODIUM IN 0.9 % NACL 2 G/100 ML
2000 PLASTIC BAG, INJECTION (ML) INTRAVENOUS EVERY 8 HOURS
Status: DISCONTINUED | OUTPATIENT
Start: 2023-02-08 | End: 2023-02-10

## 2023-02-08 RX ORDER — SODIUM CHLORIDE 0.9 % (FLUSH) 0.9 %
5-40 SYRINGE (ML) INJECTION PRN
Status: DISCONTINUED | OUTPATIENT
Start: 2023-02-08 | End: 2023-02-08 | Stop reason: HOSPADM

## 2023-02-08 RX ORDER — DIPHENHYDRAMINE HYDROCHLORIDE 50 MG/ML
12.5 INJECTION INTRAMUSCULAR; INTRAVENOUS
Status: DISCONTINUED | OUTPATIENT
Start: 2023-02-08 | End: 2023-02-08 | Stop reason: HOSPADM

## 2023-02-08 RX ORDER — LABETALOL HYDROCHLORIDE 5 MG/ML
5 INJECTION, SOLUTION INTRAVENOUS EVERY 10 MIN PRN
Status: DISCONTINUED | OUTPATIENT
Start: 2023-02-08 | End: 2023-02-08 | Stop reason: HOSPADM

## 2023-02-08 RX ORDER — SODIUM CHLORIDE 0.9 % (FLUSH) 0.9 %
5-40 SYRINGE (ML) INJECTION EVERY 12 HOURS SCHEDULED
Status: DISCONTINUED | OUTPATIENT
Start: 2023-02-08 | End: 2023-02-08 | Stop reason: HOSPADM

## 2023-02-08 RX ORDER — ASPIRIN 81 MG/1
81 TABLET ORAL 2 TIMES DAILY
Qty: 30 TABLET | Refills: 3 | DISCHARGE
Start: 2023-02-08 | End: 2023-03-10

## 2023-02-08 RX ORDER — ASPIRIN 81 MG/1
81 TABLET ORAL 2 TIMES DAILY
Status: DISCONTINUED | OUTPATIENT
Start: 2023-02-08 | End: 2023-02-10 | Stop reason: HOSPADM

## 2023-02-08 RX ORDER — ONDANSETRON 2 MG/ML
4 INJECTION INTRAMUSCULAR; INTRAVENOUS
Status: DISCONTINUED | OUTPATIENT
Start: 2023-02-08 | End: 2023-02-08 | Stop reason: HOSPADM

## 2023-02-08 RX ADMIN — FENTANYL CITRATE 50 MCG: 50 INJECTION INTRAMUSCULAR; INTRAVENOUS at 14:19

## 2023-02-08 RX ADMIN — ASPIRIN 81 MG: 81 TABLET, COATED ORAL at 23:42

## 2023-02-08 RX ADMIN — OXYCODONE HYDROCHLORIDE 5 MG: 5 TABLET ORAL at 04:42

## 2023-02-08 RX ADMIN — SODIUM CHLORIDE: 9 INJECTION, SOLUTION INTRAVENOUS at 11:44

## 2023-02-08 RX ADMIN — FENTANYL CITRATE 25 MCG: 50 INJECTION INTRAMUSCULAR; INTRAVENOUS at 14:43

## 2023-02-08 RX ADMIN — HYDROMORPHONE HYDROCHLORIDE 0.25 MG: 1 INJECTION, SOLUTION INTRAMUSCULAR; INTRAVENOUS; SUBCUTANEOUS at 16:00

## 2023-02-08 RX ADMIN — OXYCODONE HYDROCHLORIDE 5 MG: 5 TABLET ORAL at 16:38

## 2023-02-08 RX ADMIN — ALBUTEROL SULFATE 2.5 MG: 2.5 SOLUTION RESPIRATORY (INHALATION) at 07:43

## 2023-02-08 RX ADMIN — HYDROMORPHONE HYDROCHLORIDE 0.5 MG: 1 INJECTION, SOLUTION INTRAMUSCULAR; INTRAVENOUS; SUBCUTANEOUS at 15:21

## 2023-02-08 RX ADMIN — TRAZODONE HYDROCHLORIDE 150 MG: 50 TABLET ORAL at 21:00

## 2023-02-08 RX ADMIN — Medication 2000 MG: at 21:06

## 2023-02-08 RX ADMIN — ROCURONIUM BROMIDE 50 MG: 10 SOLUTION INTRAVENOUS at 13:55

## 2023-02-08 RX ADMIN — MORPHINE SULFATE 2 MG: 2 INJECTION, SOLUTION INTRAMUSCULAR; INTRAVENOUS at 18:00

## 2023-02-08 RX ADMIN — BUSPIRONE HYDROCHLORIDE 30 MG: 5 TABLET ORAL at 21:00

## 2023-02-08 RX ADMIN — SODIUM CHLORIDE, PRESERVATIVE FREE 10 ML: 5 INJECTION INTRAVENOUS at 21:06

## 2023-02-08 RX ADMIN — ACETAMINOPHEN 325MG 650 MG: 325 TABLET ORAL at 23:42

## 2023-02-08 RX ADMIN — ONDANSETRON 4 MG: 2 INJECTION INTRAMUSCULAR; INTRAVENOUS at 14:40

## 2023-02-08 RX ADMIN — ATORVASTATIN CALCIUM 10 MG: 10 TABLET, FILM COATED ORAL at 21:01

## 2023-02-08 RX ADMIN — Medication 2 PUFF: at 20:50

## 2023-02-08 RX ADMIN — Medication 2000 MG: at 14:09

## 2023-02-08 RX ADMIN — LIDOCAINE HYDROCHLORIDE 80 MG: 20 INJECTION, SOLUTION INFILTRATION; PERINEURAL at 13:55

## 2023-02-08 RX ADMIN — SUGAMMADEX 200 MG: 100 INJECTION, SOLUTION INTRAVENOUS at 14:40

## 2023-02-08 RX ADMIN — DEXAMETHASONE SODIUM PHOSPHATE 4 MG: 4 INJECTION, SOLUTION INTRAMUSCULAR; INTRAVENOUS at 14:14

## 2023-02-08 RX ADMIN — Medication 2 PUFF: at 07:45

## 2023-02-08 RX ADMIN — SODIUM CHLORIDE: 9 INJECTION, SOLUTION INTRAVENOUS at 13:45

## 2023-02-08 RX ADMIN — FENTANYL CITRATE 25 MCG: 50 INJECTION INTRAMUSCULAR; INTRAVENOUS at 14:45

## 2023-02-08 RX ADMIN — MORPHINE SULFATE 2 MG: 2 INJECTION, SOLUTION INTRAMUSCULAR; INTRAVENOUS at 11:45

## 2023-02-08 RX ADMIN — ALBUTEROL SULFATE 2.5 MG: 2.5 SOLUTION RESPIRATORY (INHALATION) at 11:49

## 2023-02-08 RX ADMIN — MONTELUKAST SODIUM 10 MG: 10 TABLET ORAL at 21:00

## 2023-02-08 RX ADMIN — HYDROMORPHONE HYDROCHLORIDE 0.5 MG: 1 INJECTION, SOLUTION INTRAMUSCULAR; INTRAVENOUS; SUBCUTANEOUS at 15:12

## 2023-02-08 RX ADMIN — OXYCODONE HYDROCHLORIDE 10 MG: 5 TABLET ORAL at 21:00

## 2023-02-08 RX ADMIN — SODIUM CHLORIDE: 9 INJECTION, SOLUTION INTRAVENOUS at 17:48

## 2023-02-08 RX ADMIN — ALBUTEROL SULFATE 2.5 MG: 2.5 SOLUTION RESPIRATORY (INHALATION) at 20:50

## 2023-02-08 RX ADMIN — TIOTROPIUM BROMIDE INHALATION SPRAY 2 PUFF: 3.12 SPRAY, METERED RESPIRATORY (INHALATION) at 07:45

## 2023-02-08 RX ADMIN — PROPOFOL 110 MG: 10 INJECTION, EMULSION INTRAVENOUS at 13:55

## 2023-02-08 RX ADMIN — MORPHINE SULFATE 2 MG: 2 INJECTION, SOLUTION INTRAMUSCULAR; INTRAVENOUS at 08:43

## 2023-02-08 ASSESSMENT — PAIN DESCRIPTION - LOCATION
LOCATION: HIP

## 2023-02-08 ASSESSMENT — PAIN SCALES - GENERAL
PAINLEVEL_OUTOF10: 6
PAINLEVEL_OUTOF10: 7
PAINLEVEL_OUTOF10: 8
PAINLEVEL_OUTOF10: 4
PAINLEVEL_OUTOF10: 4
PAINLEVEL_OUTOF10: 3
PAINLEVEL_OUTOF10: 6
PAINLEVEL_OUTOF10: 5
PAINLEVEL_OUTOF10: 6
PAINLEVEL_OUTOF10: 4
PAINLEVEL_OUTOF10: 3
PAINLEVEL_OUTOF10: 6

## 2023-02-08 ASSESSMENT — PAIN DESCRIPTION - ORIENTATION
ORIENTATION: LEFT

## 2023-02-08 ASSESSMENT — PAIN SCALES - WONG BAKER
WONGBAKER_NUMERICALRESPONSE: 0

## 2023-02-08 ASSESSMENT — PAIN - FUNCTIONAL ASSESSMENT
PAIN_FUNCTIONAL_ASSESSMENT: PREVENTS OR INTERFERES WITH MANY ACTIVE NOT PASSIVE ACTIVITIES
PAIN_FUNCTIONAL_ASSESSMENT: PREVENTS OR INTERFERES WITH MANY ACTIVE NOT PASSIVE ACTIVITIES

## 2023-02-08 ASSESSMENT — COPD QUESTIONNAIRES: CAT_SEVERITY: SEVERE

## 2023-02-08 ASSESSMENT — PAIN DESCRIPTION - PAIN TYPE
TYPE: ACUTE PAIN
TYPE: SURGICAL PAIN
TYPE: ACUTE PAIN

## 2023-02-08 ASSESSMENT — PAIN DESCRIPTION - DESCRIPTORS
DESCRIPTORS: ACHING

## 2023-02-08 NOTE — CARE COORDINATION
Case Management Assessment  Initial Evaluation    Date/Time of Evaluation: 2/8/2023 12:41 PM  Assessment Completed by: Jarred Senior RN    If patient is discharged prior to next notation, then this note serves as note for discharge by case management. Patient Name: Nelson Srinivasan                   YOB: 1955  Diagnosis: Chronic obstructive pulmonary disease with acute exacerbation ([facility] [J44.1]  Closed fracture of left hip, initial encounter ([facility] Toney Vazquez  Fall, initial encounter [W19. XXXA]  Subcapital fracture of femur, left, closed, initial encounter [facility]) Nathaliemalcom Spearing                   Date / Time: 2/7/2023  3:57 PM    Patient Admission Status: Inpatient   Readmission Risk (Low < 19, Mod (19-27), High > 27): Readmission Risk Score: 10.6    Current PCP: Virgie Mooney MD  PCP verified by CM? Yes    Chart Reviewed: Yes      History Provided by: Patient  Patient Orientation: Alert and Oriented    Patient Cognition: Alert    Hospitalization in the last 30 days (Readmission):  Yes    If yes, Readmission Assessment in CM Navigator will be completed. Advance Directives:      Code Status: Full Code   Patient's Primary Decision Maker is: Legal Next of Kin    Primary Decision MakerBmell Tian Spouse - [phone]    Secondary Decision Maker: WongAngelita - Child - [phone]    Secondary Decision Maker: Waqar Sarah kaylynn - Child - [phone]    Supplemental (Other) Decision Maker: Norbert Wong - Child - [phone]    Discharge Planning:    Patient lives with: Spouse/Significant Other Type of Home: House  Primary Care Giver: Self  Patient Support Systems include: Spouse/Significant Other, Children   Current Financial resources: None  Current community resources: None  Current services prior to admission: [employer], Other (Comment) [facility] hospice 2-4 time a week.  Aide on MWF for one hour)            Current DME:              Type of Home Care services:       ADLS  Prior functional level: Independent in ADLs/IADLs  Current functional level: Independent in ADLs/IADLs    PT AM-PAC:   /24  OT AM-PAC:   /24    Family can provide assistance at DC: Yes  Would you like Case Management to discuss the discharge plan with any other family members/significant others, and if so, who? No  Plans to Return to Present Housing: Yes  Other Identified Issues/Barriers to RETURNING to current housing: none  Potential Assistance needed at discharge:              Potential DME:    Patient expects to discharge to: [address] [place] [address] for transportation at discharge:      Financial    Payor: Lam Guzman / Plan: Henrik Venegas / Product Type: *No Product type* /     Does insurance require precert for SNF: Yes    Potential assistance Purchasing Medications: No  Meds-to-Beds request: Yes      Fanny Macedo [id number] - [facility], [address] [place], [place] [phone] - f [phone]  [address] [address]  Phone: [phone] Fax: [phone]    [address]  [address]  Phone: [phone] Fax: [phone]    [place] [address] [phone] - f [facility] [address]  Phone: [phone] Fax: [phone]      Notes:    Factors facilitating achievement of predicted outcomes: Family support, Motivated, Cooperative, Pleasant, Good insight into deficits, and Has needed Durable Medical Equipment at home    Barriers to discharge: none    Additional Case Management Notes: none    The Plan for Transition of Care is related to the following treatment goals of Chronic obstructive pulmonary disease with acute exacerbation ([facility] [J44.1]  Closed fracture of left hip, initial encounter ([facility] Floria Height  Fall, initial encounter [W19. XXXA]  Subcapital fracture of femur, left, closed, initial encounter ([facility] [P8.424B]    IF APPLICABLE: The Patient and/or patient representative Seamus Ralph and her family were provided with a choice of provider and agrees with the discharge plan. Freedom of choice list with basic dialogue that supports the patient's individualized plan of care/goals and shares the quality data associated with the providers was provided to:     Patient Representative Name:       The Patient and/or Patient Representative Agree with the Discharge Plan?       Spencer Barrera RN  Case Management Department  Ph: [phone] Fax: [phone]

## 2023-02-08 NOTE — DISCHARGE INSTR - COC
Continuity of Care Form    Patient Name: Avni Cancer   :  1955  MRN:  [id number]    Admit date:  2023  Discharge date:  10 Feb 2023    Code Status Order: Full Code   Advance Directives:     Admitting Physician:  Zeny Walker MD  PCP: Fer Herbert MD    Discharging Nurse: [facility] Unit/Room#: 9086/6680-29  Discharging Unit Phone Number: [phone]    Emergency Contact:   Extended Emergency Contact Information  Primary Emergency Contact: Angelita Wong  Address: [facility] [phone]           DAUGHTER IN [address] Phone: [phone]  Mobile Phone: [phone]  Relation: Child  Secondary Emergency Contact: [address], [address] [facility] Phone: [phone]  Mobile Phone: [phone]  Relation: Spouse    Past Surgical History:  Past Surgical History:   Procedure Laterality Date    BLADDER REPAIR      BRONCHOSCOPY      BRONCHOSCOPY N/A 3/6/2020    BRONCHOSCOPY THERAPUTIC ASPIRATION INITIAL performed by Felecia Vieira MD at [facility]  3/6/2020    BRONCHOSCOPY ALVEOLAR LAVAGE performed by Felecia Vieira MD at [address] [place] [address] 2020    BRONCHOSCOPY THERAPUTIC ASPIRATION INITIAL performed by Jared Bruce MD at [facility]  2020    BRONCHOSCOPY ALVEOLAR LAVAGE performed by Jared Bruce MD at [facility]  2021    Dr Dustin Guerra N/A 2021    [address] performed by Jared Bruce MD at [facility]  2021    BRONCHOSCOPY THERAPUTIC ASPIRATION INITIAL performed by Jared Bruce MD at [facility]  2021    BRONCHOSCOPY ALVEOLAR LAVAGE performed by Jared Bruce MD at [address] [place] [address] 2021    BRONCHOSCOPY DIAGNOSTIC OR CELL [address] performed by Jared Bruce MD at [place] [address]      ENDOSCOPY, COLON, DIAGNOSTIC      ESOPHAGEAL DILATION  9/20/2018    ESOPHAGEAL DILATION WATERS performed by Sarah Jade MD at [place] [address]  2/12/15    T8 Kyphoplasty    ID COLONOSCOPY FLX DX W/COLLJ SPEC WHEN PFRMD N/A 9/20/2018    EGD AND COLONOSCOPY WITH ANESTHESIA performed by Sarah Jade MD at [address] [place] [address] ESOPHAGOGASTRODUODENOSCOPY TRANSORAL DIAGNOSTIC N/A 9/20/2018    EGD AND COLONOSCOPY WITH ANESTHESIA performed by Sarah Jade MD at [address] [place] [address]         Immunization History:   Immunization History   Administered Date(s) Administered    COVID-19, PFIZER Bivalent BOOSTER, DO NOT Dilute, (age 12y+), IM, 30 mcg/0.3 mL 02/02/2023    COVID-19, PFIZER PURPLE top, DILUTE for use, (age 15 y+), 30mcg/0.3mL 03/10/2021, 03/31/2021    Influenza 10/04/2010, 10/12/2011, 11/28/2012    Influenza Virus Vaccine 10/16/2014, 01/14/2016    Influenza, AFLURIA (age 1 yrs+), FLUZONE, (age 10 mo+), MDV, 0.5mL 10/12/2011, 01/30/2017, 10/19/2017    Influenza, FLUARIX, FLULAVAL, Glena Ficks (age 10 mo+) AND AFLURIA, (age 1 y+), PF, 0.5mL 09/06/2018, 12/26/2019, 10/12/2021    Influenza, Intradermal, Preservative free 11/04/2013    Pneumococcal Conjugate 7-valent (Prevnar7) 01/01/2009    Pneumococcal Polysaccharide ([id number]) 01/14/2016    Td, unspecified formulation 11/04/2013    Tdap (Boostrix, Adacel) 01/25/2018       Active Problems:  Patient Active Problem List   Diagnosis Code    Stage 3 severe COPD by GOLD classification ([facility] J44.9    Smoker F17.200    Fibromyalgia M79.7    Hypokalemia E87.6    Hyperlipidemia E78.5    Hyponatremia E87.1    Tobacco dependence F17.200    Pulmonary fibrosis (HCC) J84.10    ILD (interstitial lung disease) ([facility] J84.9    Recurrent major depressive disorder, in partial remission (HCC) F33.41    Thyroid nodule E04.1    History of prescription drug abuse [id number]    Esophageal dysphagia R13.19 Heartburn R12    Rectal bleeding K62.5    History of colon polyps Z86.010    Compression fx, thoracic spine, sequela S22.000S    Kyphosis M40.209    Anxiety and depression F41.9, F32. A    Chronic pain syndrome G89.4    Polyarthropathy, multiple sites M13.0    Pneumonia J18.9    Acute on chronic respiratory failure with hypoxia and hypercapnia (HCC) J96.21, J96.22    Mucus plugging of bronchi T17.500A    Ineffective airway clearance R06.89    Marijuana use E40.79    Diastolic dysfunction P47.84    COPD with acute exacerbation (HCC) J44.1    H/O pneumonia due to Pseudomonas Z87.01    Hospice care Z51.5    Subcapital fracture of femur, left, closed, initial encounter ([facility] S72.012A    Chronic respiratory failure with hypoxia ([facility] J96.11       Isolation/Infection:   Isolation            No Isolation          Patient Infection Status       Infection Onset Added Last Indicated Last Indicated By Review Planned Expiration Resolved Resolved By    None active    Resolved    COVID-19 (Rule Out) 05/27/22 05/27/22 05/27/22 COVID-19, Rapid (Ordered)   05/27/22 Rule-Out Test Resulted    COVID-19 (Rule Out) 10/09/21 10/09/21 10/09/21 COVID-19, Rapid (Ordered)   10/09/21 Rule-Out Test Resulted    COVID-19 (Rule Out) 09/27/21 09/27/21 09/27/21 COVID-19, Rapid (Ordered)   09/27/21 Rule-Out Test Resulted    COVID-19 (Rule Out) 09/17/21 09/17/21 09/17/21 COVID-19, Rapid (Ordered)   09/17/21 Rule-Out Test Resulted    COVID-19 (Rule Out) 09/09/21 09/09/21 09/09/21 SARS-CoV-2 NAAT (Rapid) (Ordered)   09/09/21 Rule-Out Test Resulted    COVID-19 (Rule Out) 07/11/21 07/11/21 07/11/21 COVID-19, Rapid (Ordered)   07/11/21 Rule-Out Test Resulted    COVID-19 (Rule Out) 11/27/20 11/27/20 11/27/20 COVID-19 (Ordered)   11/27/20 Rule-Out Test Resulted    COVID-19 (Rule Out) 10/09/20 10/09/20 10/09/20 COVID-19 (Ordered)   10/10/20 Rule-Out Test Resulted    COVID-19 (Rule Out) 06/24/20 06/24/20 06/24/20 COVID-19 (Ordered)   06/25/20 Rule-Out Test Resulted    COVID-19 (Rule Out) 20 COVID-19 (Ordered)   20 Rule-Out Test Resulted    Influenza 20 Respiratory Panel, Molecular   20             Nurse Assessment:  Last Vital Signs: /70   Pulse 94   Temp 98 °F (36.7 °C) (Oral)   Resp 16   Ht 5' 5\" (1.651 m)   Wt 127 lb 10.3 oz (57.9 kg)   SpO2 90%   BMI 21.24 kg/m²     Last documented pain score (0-10 scale): Pain Level: 5  Last Weight:   Wt Readings from Last 1 Encounters:   23 127 lb 10.3 oz (57.9 kg)     Mental Status:  disoriented and alert    IV Access:  - None    Nursing Mobility/ADLs:  Walking   Assisted  Transfer  Assisted  Bathing  Assisted  Dressing  Assisted  Toileting  Assisted  Feeding  Assisted  Med Admin  Assisted  Med Delivery   whole    Wound Care Documentation and Therapy:        Elimination:  Continence: Bowel: Yes  Bladder: Yes  Urinary Catheter: None   Colostomy/Ileostomy/Ileal Conduit: No       Date of Last BM: 2/10/23    Intake/Output Summary (Last 24 hours) at 2023 1244  Last data filed at 2023 0392  Gross per 24 hour   Intake 676 ml   Output 200 ml   Net 476 ml     I/O last 3 completed shifts: In: 46 [P.O.:60; I.V.:616]  Out: 200 [Urine:200]    Safety Concerns: At Risk for Falls    Impairments/Disabilities:      None    Nutrition Therapy:  Current Nutrition Therapy:   - Oral Diet:  General    Routes of Feeding: Oral  Liquids:  Thin Liquids  Daily Fluid Restriction: no  Last Modified Barium Swallow with Video (Video Swallowing Test): not done    Treatments at the Time of Hospital Discharge:   Respiratory Treatments: N/A  Oxygen Therapy:  is on 3L oxygen nasal cannula  Ventilator:    - No ventilator support    Rehab Therapies: Physical Therapy and Occupational Therapy  Weight Bearing Status/Restrictions: Touchdown weight bearing (10-25 lbs) only on left leg  Other Medical Equipment (for information only, NOT a DME order):  hospital bed  Other Treatments: N/A    Patient's personal belongings (please select all that are sent with patient):  None    RN SIGNATURE:  Electronically signed by Carlos Braun RN on 2/10/23 at 4:30 PM EST    CASE MANAGEMENT/SOCIAL WORK SECTION    Inpatient Status Date: ***    Readmission Risk Assessment Score:  Readmission Risk              Risk of Unplanned Readmission:  15           Discharging to Facility/ Agency   Name:   Address:  Phone:  Fax:    / signature: {Esignature:[id number]}    PHYSICIAN SECTION    Prognosis: Good    Condition at Discharge: Stable    Rehab Potential (if transferring to Rehab): Good    Recommended Follow-up, Labs or Other Treatments After Discharge:    PT/OT-toe-touch weightbearing to the lower extremity with assistive device. 81 mg twice daily by mouth for 4 weeks postop as DVT prophylaxis  Follow-up with Dr. Alma Crawford in 2 to 3 weeks postop. Call [facility] orthopedics at [phone] to schedule an appointment or with any questions               Physician Certification: I certify the above information and transfer of Miguel Puga  is necessary for the continuing treatment of the diagnosis listed and that she requires [facility] for less 30 days.      Update Admission H&P: No change in H&P    PHYSICIAN SIGNATURE:  Electronically signed by Aye Ledbetter MD on 2/10/23 at 12:34 PM EST

## 2023-02-08 NOTE — PROGRESS NOTES
Pain medication given, second dose of dilaudid. Increased oxygen to 8L simple mask. Patient has slowed down rate of breathing.  Remains saturating between 89-91%

## 2023-02-08 NOTE — ANESTHESIA PRE PROCEDURE
Department of Anesthesiology  Preprocedure Note       Name:  Fan Jain   Age:  78 y.o.  :  1955                                          MRN:  [id number]         Date:  2023      Surgeon: Alvarez Blank):  Nancy Claudio MD    Procedure: Procedure(s):  LEFT HIP PINNING    Medications prior to admission:   Prior to Admission medications    Medication Sig Start Date End Date Taking?  Authorizing Provider   albuterol sulfate HFA (VENTOLIN HFA) 108 (90 Base) MCG/ACT inhaler Inhale 2 puffs into the lungs every 6 hours as needed for Wheezing or Shortness of Breath 22   Melody Mancia MD   ipratropium-albuterol (DUONEB) 0.5-2.5 (3) MG/3ML SOLN nebulizer solution Inhale 3 mLs into the lungs every 4 hours as needed for Shortness of Breath 21   Anastasiia Thompson MD   traZODone (DESYREL) 150 MG tablet Take 1-2 tablets by mouth nightly TAKE 2 TABLETS AT BEDTIME 21   Melody Mancia MD   guaiFENesin (MUCINEX) 600 MG extended release tablet Take 1 tablet by mouth 2 times daily as needed for Congestion 21   Juanita Sawant MD   fluticasone-salmeterol [facility]) 500-50 MCG/DOSE diskus inhaler Inhale 1 puff into the lungs every 12 hours 21   Anastasiia Thompson MD   albuterol (PROVENTIL) (2.5 MG/3ML) 0.083% nebulizer solution Take 3 mLs by nebulization every 6 hours as needed for Wheezing or Shortness of Breath DX COPD J44.9 21   Anastasiia Thompson MD   montelukast (SINGULAIR) 10 MG tablet TAKE 1 TABLET BY MOUTH EACH NIGHT 21   Anastasiia Thompson MD   BD PEN NEEDLE SVETLANA U/F 32G X 4 MM MISC USE ONE DAILY AS DIRECTED 20   Melody Mancia MD   FLUoxetine (PROZAC) 20 MG capsule TAKE 3 CAPSULES BY MOUTH DAILY 12/3/20   Melody Mancia MD   simvastatin (ZOCOR) 20 MG tablet TAKE 1 TABLET EVERY EVENING 20   Melody Mancia MD   busPIRone (BUSPAR) 30 MG tablet TAKE 1 TABLET TWICE DAILY 20   Melody Mancia MD   teriparatide, recombinant, (FORTEO) 600 MCG/2.4ML SOLN injection Inject 0.08 mLs into the skin daily One dose injected daily.  9/27/19 12/7/20  Lynda Tim MD       Current medications:    Current Facility-Administered Medications   Medication Dose Route Frequency Provider Last Rate Last Admin    ceFAZolin (ANCEF) 2000 mg in 0.9% sodium chloride 100 mL IVPB  2,000 mg IntraVENous On Call to [address]        busPIRone (BUSPAR) tablet 30 mg  30 mg Oral BID Vanessa Loza MD        FLUoxetine (PROZAC) capsule 60 mg  60 mg Oral Daily Vanessa Loza MD        mometasone-formoterol (DULERA) 200-5 MCG/ACT inhaler 2 puff  2 puff Inhalation BID Vanessa Loza MD   2 puff at 02/08/23 0745    montelukast (SINGULAIR) tablet 10 mg  10 mg Oral Nightly Vanessa Loza MD   10 mg at 02/07/23 2201    atorvastatin (LIPITOR) tablet 10 mg  10 mg Oral Nightly Vanessa Loza MD        traZODone (DESYREL) tablet 150 mg  150 mg Oral Nightly Vanessa Loza MD   150 mg at 02/07/23 2200    sodium chloride flush 0.9 % injection 5-40 mL  5-40 mL IntraVENous 2 times per day Vanessa Loza MD   10 mL at 02/07/23 2208    sodium chloride flush 0.9 % injection 5-40 mL  5-40 mL IntraVENous PRN Vanessa Loza MD        0.9 % sodium chloride infusion   IntraVENous PRN Vanessa Loza MD        ondansetron (ZOFRAN-ODT) disintegrating tablet 4 mg  4 mg Oral Q8H PRN Vanessa Loza MD        Or    ondansetron (ZOFRAN) injection 4 mg  4 mg IntraVENous Q6H PRN Vanessa Loza MD        polyethylene glycol (GLYCOLAX) packet 17 g  17 g Oral Daily PRN Vanessa Loza MD        0.9 % sodium chloride infusion   IntraVENous Continuous Vanessa Loza MD 75 mL/hr at 02/08/23 1144 New Bag at 02/08/23 1144    oxyCODONE (ROXICODONE) immediate release tablet 5 mg  5 mg Oral Q4H PRN Vanessa Loza MD   5 mg at 02/08/23 0442    Or    oxyCODONE (ROXICODONE) immediate release tablet 10 mg  10 mg Oral Q4H PRN Vanessa Loza MD        morphine (PF) injection 2 mg  2 mg IntraVENous Q2H PRN Pippa Lea MD   2 mg at 02/08/23 1145    Or    morphine sulfate (PF) injection 4 mg  4 mg IntraVENous Q2H PRN Pippa Lea MD        tiotropium (SPIRIVA RESPIMAT) 2.5 MCG/ACT inhaler 2 puff  2 puff Inhalation Daily Pippa Lea MD   2 puff at 02/08/23 0745    albuterol (PROVENTIL) nebulizer solution 2.5 mg  2.5 mg Nebulization 4x daily Pippa Lea MD   2.5 mg at 02/08/23 1149       Allergies: Allergies   Allergen Reactions    Meperidine Anaphylaxis     \"Demerol\"     Demerol Hives       Problem List:    Patient Active Problem List   Diagnosis Code    Stage 3 severe COPD by GOLD classification ([facility] J44.9    Smoker F17.200    Fibromyalgia M79.7    Hypokalemia E87.6    Hyperlipidemia E78.5    Hyponatremia E87.1    Tobacco dependence F17.200    Pulmonary fibrosis (HCC) J84.10    ILD (interstitial lung disease) ([facility] J84.9    Recurrent major depressive disorder, in partial remission ([facility] F33.41    Thyroid nodule E04.1    History of prescription drug abuse [id number]    Esophageal dysphagia R13.19    Heartburn R12    Rectal bleeding K62.5    History of colon polyps Z86.010    Compression fx, thoracic spine, sequela S22.000S    Kyphosis M40.209    Anxiety and depression F41.9, F32. A    Chronic pain syndrome G89.4    Polyarthropathy, multiple sites M13.0    Pneumonia J18.9    Acute on chronic respiratory failure with hypoxia and hypercapnia (HCC) J96.21, J96.22    Mucus plugging of bronchi T17.500A    Ineffective airway clearance R06.89    Marijuana use S44.85    Diastolic dysfunction D96.91    COPD with acute exacerbation (HCC) J44.1    H/O pneumonia due to Pseudomonas Z87.01    Hospice care Z51.5    Subcapital fracture of femur, left, closed, initial encounter ([facility] S72.012A    Chronic respiratory failure with hypoxia (HCC) J96.11       Past Medical History:        Diagnosis Date    Allergic rhinitis 6/11/2010    Anxiety     Arthritis     Aspiration pneumonia ([facility] 3/21/2012    Recurrent    Asthma     Bronchitis chronic     COPD (chronic obstructive pulmonary disease) ([facility] 12/3/2009    Depression     Drug abuse, cocaine type ([facility]     past history of crack cocaine use    Emphysema of lung (HCC)     Fibromyalgia     GERD (gastroesophageal reflux disease)     Hyperlipidemia     Influenza A 03/05/2020    Lung disease     On home oxygen therapy     uses O2 NC 3L prn at night    Osteoporosis     Pneumonia     Polysubstance dependence ([facility] 1/2/2012    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    Pulmonary fibrosis ([facility] 10/16/2014    Pulmonary infiltrate     Pulmonary nodule 12/3/2009    Tobacco abuse        Past Surgical History:        Procedure Laterality Date    BLADDER REPAIR      BRONCHOSCOPY      BRONCHOSCOPY N/A 3/6/2020    BRONCHOSCOPY THERAPUTIC ASPIRATION INITIAL performed by Margarita Chun MD at [facility]  3/6/2020    BRONCHOSCOPY ALVEOLAR LAVAGE performed by Margarita Chun MD at [facility] N/A 6/26/2020    BRONCHOSCOPY THERAPUTIC ASPIRATION INITIAL performed by Nolan Goodson MD at [facility]  6/26/2020    BRONCHOSCOPY ALVEOLAR LAVAGE performed by Nolan Goodson MD at [facility]  06/23/2021    Dr Glenn Dugan N/A 6/23/2021    BRONCHOSCOPY DIAGNOSTIC OR CELL [address] performed by Nolan Goodson MD at [facility]  6/23/2021    BRONCHOSCOPY THERAPUTIC ASPIRATION INITIAL performed by Nolan Goodson MD at [facility]  6/23/2021    BRONCHOSCOPY ALVEOLAR LAVAGE performed by Nolan Goodson MD at [address] [place] [address] 8/27/2021    BRONCHOSCOPY DIAGNOSTIC OR CELL [address] ONLY performed by Nolan Goodson MD at [place] [address]  2012    ENDOSCOPY, COLON, DIAGNOSTIC      ESOPHAGEAL DILATION  2018    ESOPHAGEAL DILATION WATERS performed by Myles Nails MD at [address] [facility] HISTORY  2/12/15    T8 Kyphoplasty    NM COLONOSCOPY FLX DX W/COLLJ SPEC WHEN PFRMD N/A 2018    EGD AND COLONOSCOPY WITH ANESTHESIA performed by Myles Nails MD at [address] [facility] ESOPHAGOGASTRODUODENOSCOPY TRANSORAL DIAGNOSTIC N/A 2018    EGD AND COLONOSCOPY WITH ANESTHESIA performed by Myles Nails MD at [address] [facility]         Social History:    Social History     Tobacco Use    Smoking status: Every Day     Packs/day: 1.00     Years: 40.00     Pack years: 40.00     Types: Cigarettes     Start date: 1972     Last attempt to quit: 2021     Years since quittin.6    Smokeless tobacco: Never    Tobacco comments:     0.5 PPD restarted, currently. Per Dr. Valentin Louise lung screening order pt has 40 pack year smoking hx   Substance Use Topics    Alcohol use: No     Alcohol/week: 0.0 standard drinks                                Ready to quit: Not Answered  Counseling given: Not Answered  Tobacco comments: 0.5 PPD restarted, currently. Per Dr. Valentin Louise lung screening order pt has 40 pack year smoking hx      Vital Signs (Current):   Vitals:    23 0833 23 0913 23 1145 23 1151   BP: 105/70      Pulse: 94      Resp: 16 17 16    Temp: 98 °F (36.7 °C)      TempSrc: Oral      SpO2: 93%   90%   Weight:       Height:                                                  BP Readings from Last 3 Encounters:   23 105/70   22 130/68   10/20/21 (!) 154/79       NPO Status:                                                                                 BMI:   Wt Readings from Last 3 Encounters:   23 127 lb 10.3 oz (57.9 kg)   22 154 lb (69.9 kg)   10/20/21 176 lb 3.2 oz (79.9 kg)     Body mass index is 21.24 kg/m².     CBC:   Lab Results   Component Value Date/Time    WBC 11.1 02/07/2023 05:08 PM    RBC 4.31 02/07/2023 05:08 PM    HGB 12.4 02/07/2023 05:08 PM    HCT 38.0 02/07/2023 05:08 PM    MCV 88.2 02/07/2023 05:08 PM    RDW 14.6 02/07/2023 05:08 PM     02/07/2023 05:08 PM       CMP:   Lab Results   Component Value Date/Time     02/08/2023 05:34 AM    K 5.8 02/08/2023 05:34 AM    CL 97 02/08/2023 05:34 AM    CO2 27 02/08/2023 05:34 AM    BUN 11 02/08/2023 05:34 AM    CREATININE <0.5 02/08/2023 05:34 AM    GFRAA >60 05/30/2022 05:00 AM    GFRAA >60 01/08/2013 05:54 PM    AGRATIO 0.9 02/07/2023 05:08 PM    LABGLOM >60 02/08/2023 05:34 AM    GLUCOSE 137 02/08/2023 05:34 AM    PROT 7.1 02/07/2023 05:08 PM    PROT 6.8 01/08/2013 05:54 PM    CALCIUM 8.6 02/08/2023 05:34 AM    BILITOT 0.3 02/07/2023 05:08 PM    ALKPHOS 77 02/07/2023 05:08 PM    AST 24 02/07/2023 05:08 PM    ALT 12 02/07/2023 05:08 PM       POC Tests: No results for input(s): POCGLU, POCNA, POCK, POCCL, POCBUN, POCHEMO, POCHCT in the last 72 hours.     Coags:   Lab Results   Component Value Date/Time    PROTIME 11.2 06/28/2020 06:10 AM    INR 0.97 06/28/2020 06:10 AM    APTT 26.1 06/28/2020 06:10 AM       HCG (If Applicable): No results found for: PREGTESTUR, PREGSERUM, HCG, HCGQUANT     ABGs:   Lab Results   Component Value Date/Time    PHART 7.334 05/28/2022 04:05 PM    PO2ART 56.6 05/28/2022 04:05 PM    UXK5DUZ 45.3 05/28/2022 04:05 PM    ALY0VKP 23.6 05/28/2022 04:05 PM    BEART -2.4 05/28/2022 04:05 PM    W4OOLFIM 89.4 05/28/2022 04:05 PM        Type & Screen (If Applicable):  No results found for: LABABO, LABRH    Drug/Infectious Status (If Applicable):  Lab Results   Component Value Date/Time    HEPCAB Non-Reactive (Negative) 03/26/2012 01:05 PM       COVID-19 Screening (If Applicable):   Lab Results   Component Value Date/Time    COVID19 Not Detected 05/27/2022 10:55 PM    COVID19 Not Detected 11/27/2020 11:56 AM    COVID19 Not Detected 10/09/2020 11:11 PM           Anesthesia Evaluation  Patient summary reviewed and Nursing notes reviewed no history of anesthetic complications:   Airway: Mallampati: II     Neck ROM: full     Dental:          Pulmonary:Negative Pulmonary ROS and normal exam    (+) pneumonia:  COPD (feels like currently at her baseline (3L)): severe,  asthma:                            Cardiovascular:Negative CV ROS    (+) hyperlipidemia                  Neuro/Psych:   Negative Neuro/Psych ROS  (+) neuromuscular disease (FM):, psychiatric history:depression/anxiety             GI/Hepatic/Renal: Neg GI/Hepatic/Renal ROS  (+) GERD:,      (-) hiatal hernia       Endo/Other: Negative Endo/Other ROS   (+) : arthritis:., .                 Abdominal:             Vascular: Other Findings:           Anesthesia Plan      general     ASA 3     (I discussed with the patient the risks and benefits of PIV, general anesthesia, IV Narcotics, PACU. All questions were answered the patient agrees with the plan and wishes to proceed.  )  Induction: intravenous. Pre-Operative Diagnosis: Chronic obstructive pulmonary disease with acute exacerbation ([facility] [J44.1]; Closed fracture of left hip, initial encounter ([facility] Kristen Reilly; Fall, initial encounter [W19. Aniket Seayuel; Subcapital fracture of femur, left, closed, initial encounter ([facility] [S72.012A]    78 y.o.   BMI:  Body mass index is 21.24 kg/m².      Vitals:    23 0833 23 0913 23 1145 23 1151   BP: 105/70      Pulse: 94      Resp: 16 17 16    Temp: 98 °F (36.7 °C)      TempSrc: Oral      SpO2: 93%   90%   Weight:       Height:           Allergies   Allergen Reactions    Meperidine Anaphylaxis     \"Demerol\"     Demerol Hives       Social History     Tobacco Use    Smoking status: Every Day     Packs/day: 1.00     Years: 40.00     Pack years: 40.00     Types: Cigarettes     Start date: 1972     Last attempt to quit: 2021     Years since quittin.6    Smokeless tobacco: Never    Tobacco comments:     0.5 PPD restarted, currently.  Per Dr. Ranjit Combs lung screening order pt has 40 pack year smoking hx   Substance Use Topics    Alcohol use: No     Alcohol/week: 0.0 standard drinks       LABS:    CBC  Lab Results   Component Value Date/Time    WBC 11.1 (H) 02/07/2023 05:08 PM    HGB 12.4 02/07/2023 05:08 PM    HCT 38.0 02/07/2023 05:08 PM     02/07/2023 05:08 PM     RENAL  Lab Results   Component Value Date/Time     (L) 02/08/2023 05:34 AM    K 5.8 (H) 02/08/2023 05:34 AM    CL 97 (L) 02/08/2023 05:34 AM    CO2 27 02/08/2023 05:34 AM    BUN 11 02/08/2023 05:34 AM    CREATININE <0.5 (L) 02/08/2023 05:34 AM    GLUCOSE 137 (H) 02/08/2023 05:34 AM   **hemolysis**  COAGS  Lab Results   Component Value Date/Time    PROTIME 11.2 06/28/2020 06:10 AM    INR 0.97 06/28/2020 06:10 AM    APTT 26.1 06/28/2020 06:10 AM         Ryan Mckeon MD   2/8/2023

## 2023-02-08 NOTE — ED PROVIDER NOTES
[address] [facility] [place]  ED  EMERGENCY DEPARTMENT ENCOUNTER        Pt Name: Fermin Benito  MRN: [id number]  [place] 1955  Date of evaluation: 2/7/2023  Provider: ANY Bonilla  PCP: Annabella Roa MD  Note Started: 7:34 PM EST 2/7/23       I have seen and evaluated this patient with my supervising physician Prabha Sumner MD.      [address] [place] [address]       Chief Complaint   Patient presents with    Fall    Hip Pain     Pt reports she fell last night and now her left hip is hurting       HISTORY OF PRESENT ILLNESS: 1 or more Elements     History from : Patient        Fermin Benito is a 78 y.o. female who presents after a fall yesterday. The patient states she slipped on her hardwood floors landing on her left hip. She has been unable to walk since then. She currently rates pain as 8/9. Denies any numbness or tingling in her leg. Denies hitting her head. She is not on a blood thinner. The patient does have COPD and is on chronic oxygen. Denies any chest pain or worsening shortness of breath. She has not taken anything for her pain. Nursing Notes were all reviewed and agreed with or any disagreements were addressed in the HPI. REVIEW OF SYSTEMS :      Review of Systems    Positives and Pertinent negatives as per HPI.      SURGICAL HISTORY     Past Surgical History:   Procedure Laterality Date    BLADDER REPAIR      BRONCHOSCOPY      BRONCHOSCOPY N/A 3/6/2020    BRONCHOSCOPY THERAPUTIC ASPIRATION INITIAL performed by Delos Leventhal, MD at [facility]  3/6/2020    BRONCHOSCOPY ALVEOLAR LAVAGE performed by Delos Leventhal, MD at [facility] N/A 6/26/2020    BRONCHOSCOPY THERAPUTIC ASPIRATION INITIAL performed by Rajwinder Prado MD at [facility]  6/26/2020    BRONCHOSCOPY ALVEOLAR LAVAGE performed by Rajwinder Prado MD at [facility]  06/23/2021    Dr Yina Alvarado N/A 6/23/2021    BRONCHOSCOPY DIAGNOSTIC OR CELL [address] ONLY performed by Matty Lucero MD at [facility]  6/23/2021    BRONCHOSCOPY THERAPUTIC ASPIRATION INITIAL performed by Matty Lucero MD at [facility]  6/23/2021    BRONCHOSCOPY ALVEOLAR LAVAGE performed by Matty Lucero MD at [facility] N/A 8/27/2021    BRONCHOSCOPY DIAGNOSTIC OR CELL [address] ONLY performed by Matty Lucero MD at [address]  2012    ENDOSCOPY, COLON, DIAGNOSTIC      ESOPHAGEAL DILATION  9/20/2018    ESOPHAGEAL DILATION Luis Prentiss performed by Kelly Reyes MD at [place] [address]  2/12/15    T8 Kyphoplasty    SD COLONOSCOPY FLX DX W/COLLJ SPEC WHEN PFRMD N/A 9/20/2018    EGD AND COLONOSCOPY WITH ANESTHESIA performed by Kelly Reyes MD at [address] [place] [address] ESOPHAGOGASTRODUODENOSCOPY TRANSORAL DIAGNOSTIC N/A 9/20/2018    EGD AND COLONOSCOPY WITH ANESTHESIA performed by Kelly Reyes MD at [address] [place] [address], [address]       Previous Medications    ALBUTEROL (PROVENTIL) (2.5 MG/3ML) 0.083% NEBULIZER SOLUTION    Take 3 mLs by nebulization every 6 hours as needed for Wheezing or Shortness of Breath DX COPD J44.9    ALBUTEROL SULFATE HFA (VENTOLIN HFA) 108 (90 BASE) MCG/ACT INHALER    Inhale 2 puffs into the lungs every 6 hours as needed for Wheezing or Shortness of Breath    BD PEN NEEDLE SVETLANA U/F 32G X 4 MM MISC    USE ONE DAILY AS DIRECTED    BUSPIRONE (BUSPAR) 30 MG TABLET    TAKE 1 TABLET TWICE DAILY    FLUOXETINE (PROZAC) 20 MG CAPSULE    TAKE 3 CAPSULES BY MOUTH DAILY    FLUTICASONE-SALMETEROL (WIXELA INHUB) 500-50 MCG/DOSE DISKUS INHALER    Inhale 1 puff into the lungs every 12 hours    GUAIFENESIN (MUCINEX) 600 MG EXTENDED RELEASE TABLET    Take 1 tablet by mouth 2 times daily as needed for Congestion    IPRATROPIUM-ALBUTEROL (DUONEB) 0.5-2.5 (3) MG/3ML SOLN NEBULIZER SOLUTION    Inhale 3 mLs into the lungs every 4 hours as needed for Shortness of Breath    MONTELUKAST (SINGULAIR) 10 MG TABLET    TAKE 1 TABLET BY MOUTH EACH NIGHT    SIMVASTATIN (ZOCOR) 20 MG TABLET    TAKE 1 TABLET EVERY EVENING    TERIPARATIDE, RECOMBINANT, (FORTEO) 600 MCG/2.4ML SOLN INJECTION    Inject 0.08 mLs into the skin daily One dose injected daily. TRAZODONE (DESYREL) 150 MG TABLET    Take 1-2 tablets by mouth nightly TAKE 2 TABLETS AT BEDTIME       ALLERGIES     Meperidine and Demerol    FAMILYHISTORY       Family History   Problem Relation Age of Onset    Asthma Mother     Diabetes Mother     Emphysema Mother     Heart Failure Mother     Hypertension Mother     Heart Disease Mother     High Cholesterol Mother     Cancer Mother     Depression Mother     Diabetes Father     Emphysema Father     Heart Failure Father     Hypertension Father     Heart Disease Father     High Cholesterol Father     Substance Abuse Father     Emphysema Paternal Grandfather     Diabetes Sister     High Cholesterol Sister     Vision Loss Maternal Uncle         SOCIAL HISTORY       Social History     Tobacco Use    Smoking status: Every Day     Packs/day: 1.00     Years: 40.00     Pack years: 40.00     Types: Cigarettes     Start date: 1972     Last attempt to quit: 2021     Years since quittin.6    Smokeless tobacco: Never    Tobacco comments:     0.5 PPD restarted, currently.  Per Dr. Edinson Bazan lung screening order pt has 40 pack year smoking hx   Vaping Use    Vaping Use: Never used   Substance Use Topics    Alcohol use: No     Alcohol/week: 0.0 standard drinks    Drug use: Yes     Types: Marijuana Inderjit Ordaz)     Comment: Daily       SCREENINGS                         CIWA Assessment  BP: 100/63  Heart Rate: 81           PHYSICAL EXAM  1 or more Elements     ED Triage Vitals [23 1411]   BP Temp Temp Source Heart Rate Resp SpO2 Height Weight   91/63 98.1 °F (36.7 °C) Oral 94 24 92 % 5' 5\" (1.651 m) 150 lb (68 kg)       Physical Exam  Vitals and nursing note reviewed. Constitutional:       Appearance: Normal appearance. She is not diaphoretic. HENT:      Head: Normocephalic and atraumatic. Nose: Nose normal.      Mouth/Throat:      Mouth: Mucous membranes are moist.   Eyes:      General:         Right eye: No discharge. Left eye: No discharge. Extraocular Movements: Extraocular movements intact. Pupils: Pupils are equal, round, and reactive to light. Cardiovascular:      Rate and Rhythm: Normal rate and regular rhythm. Pulses: Normal pulses. Heart sounds: Normal heart sounds. No murmur heard. No friction rub. No gallop. Pulmonary:      Effort: Pulmonary effort is normal. No respiratory distress. Breath sounds: No stridor. Wheezing present. No rhonchi or rales. Abdominal:      General: Abdomen is flat. Palpations: Abdomen is soft. Tenderness: There is no abdominal tenderness. There is no guarding or rebound. Musculoskeletal:      Cervical back: Normal range of motion and neck supple. Comments: Left hip tender to palpation. Unable to perform leg lift. Compartments are soft, nontender without crepitus. Pedal pulses 2+ and cap refill less than 2 seconds. Skin:     General: Skin is warm and dry. Coloration: Skin is not pale. Neurological:      Mental Status: She is alert and oriented to person, place, and time.    Psychiatric:         Mood and Affect: Mood normal.         Behavior: Behavior normal.         DIAGNOSTIC RESULTS   LABS:    Labs Reviewed   CBC WITH AUTO DIFFERENTIAL - Abnormal; Notable for the following components:       Result Value    WBC 11.1 (*)     Neutrophils Absolute 9.4 (*)     All other components within normal limits   COMPREHENSIVE METABOLIC PANEL W/ REFLEX TO MG FOR LOW K - Abnormal; Notable for the following components:    Sodium 126 (*)     Chloride 89 (*)     Glucose 110 (*)     BUN 6 (*)     Creatinine <0.5 (*) Albumin 3.3 (*)     Albumin/Globulin Ratio 0.9 (*)     All other components within normal limits   TROPONIN       When ordered only abnormal lab results are displayed. All other labs were within normal range or not returned as of this dictation. EKG: When ordered, EKG's are interpreted by the Emergency Department Physician in the absence of a cardiologist.  Please see their note for interpretation of EKG. RADIOLOGY:   Non-plain film images such as CT, Ultrasound and MRI are read by the radiologist. Plain radiographic images are visualized and preliminarily interpreted by the ED Provider with the below findings:    Hip xray with impacted subcapital fracture on left. Interpretation per the Radiologist below, if available at the time of this note:    XR CHEST PORTABLE   Final Result      Patchy opacity in the left lower lobe and mildly in the right upper lobe         XR HIP 2-3 VW W PELVIS LEFT   Final Result   Impacted subcapital fracture on the left           XR CHEST PORTABLE    Result Date: 2/7/2023  EXAMINATION: ONE XRAY VIEW OF THE CHEST 2/7/2023 4:46 pm COMPARISON: 05/27/2022 HISTORY: ORDERING SYSTEM PROVIDED HISTORY: sob TECHNOLOGIST PROVIDED HISTORY: Reason for exam:->sob Reason for Exam: SOB FINDINGS: Heart size is normal  Aorta is normal.  Lungs are normally expanded. Consolidation in the left lower lobe which is similar in appearance to the previous study. Chronic lung changes. Mild opacity also in the right upper lobe. Bullae in the upper lobes. .  No pleural effusions.  Scoliosis     Patchy opacity in the left lower lobe and mildly in the right upper lobe     XR HIP 2-3 VW W PELVIS LEFT    Result Date: 2/7/2023  EXAMINATION: ONE XRAY VIEW OF THE PELVIS AND TWO XRAY VIEWS LEFT HIP 2/7/2023 2:05 pm COMPARISON: 04/23/2021 HISTORY: ORDERING SYSTEM PROVIDED HISTORY: fall , left hip pain TECHNOLOGIST PROVIDED HISTORY: Reason for exam:->fall , left hip pain Reason for Exam: fall, left hip pain FINDINGS: No widening of the SI joints or the pubic symphysis. Sclerosis and deformity at the subcapital region on the left. It is not displaced. No dislocation. Impacted subcapital fracture on the left       No results found. PROCEDURES   Unless otherwise noted below, none     Procedures    CRITICAL CARE TIME (.cctime)   none    PAST MEDICAL HISTORY      has a past medical history of Allergic rhinitis (6/11/2010), Anxiety, Arthritis, Aspiration pneumonia ([facility] (3/21/2012), Asthma, Bronchitis chronic, COPD (chronic obstructive pulmonary disease) ([address] (12/3/2009), Depression, Drug abuse, cocaine type ([address] Emphysema of lung ([address] Fibromyalgia, GERD (gastroesophageal reflux disease), Hyperlipidemia, Influenza A (03/05/2020), Lung disease, On home oxygen therapy, Osteoporosis, Pneumonia, Polysubstance dependence ([facility] (1/2/2012), 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 Chronic Conditions affecting Care: COPD    EMERGENCY DEPARTMENT COURSE and DIFFERENTIAL DIAGNOSIS/MDM:   Vitals:    Vitals:    02/07/23 1411 02/07/23 1614 02/07/23 1759   BP: 91/63 120/75 100/63   Pulse: 94 95 81   Resp: 24 24 16   Temp: 98.1 °F (36.7 °C)     TempSrc: Oral     SpO2: 92% 92% 97%   Weight: 150 lb (68 kg)     Height: 5' 5\" (1.651 m)         Patient was given the following medications:  Medications   ipratropium-albuterol (DUONEB) nebulizer solution 1 ampule (1 ampule Inhalation Given 2/7/23 1705)   methylPREDNISolone sodium (SOLU-MEDROL) injection 125 mg (125 mg IntraVENous Given 2/7/23 1735)   morphine (PF) injection 2 mg (2 mg IntraVENous Given 2/7/23 1926)             Is this patient to be included in the SEP-1 Core Measure due to severe sepsis or septic shock? No   Exclusion criteria - the patient is NOT to be included for SEP-1 Core Measure due to:   Infection is not suspected    CONSULTS: (Who and What was discussed)  IP CONSULT TO HOSPITALIST              CC/HPI Summary, DDx, ED Course, and Reassessment: Patient was evaluated in the emergency department today after a fall. Differential diagnosis includes hip or pelvic fracture, dislocation, other. x-ray was performed in triage which shows subcapital femoral fracture. Patient does have COPD is on chronic oxygen appears to have increased work of breathing therefore we will give her steroids and nebulizer treatment. Preop lab testing obtained. I spoke with orthopedics who advised n.p.o. after midnight, did not feel CT scan was needed. Patient will be admitted to hospitalist.  She received morphine for pain. Patient is in agreement with treatment plan. Hospitalist is consulted at this time. Work-up results include:  Patchy opacity in left lower lobe which is similar to previous study. CBC with white blood cell count of 11.1 although this appears chronically elevated. No further left shift. No acute anemia. CMP with sodium of 126 again appears chronic. Renal function maintained. No transaminitis. Troponin is less than 0.01    Disposition Considerations (include 1 Tests not done, Shared Decision Making, Pt Expectation of Test or Tx.): Consider CT of the hip however I deferred to ortho for further imaging and Dr. China Mckeon did not feel CT was indicated. Patient will be admitted to hospitalist with Ortho to consult.       Results for orders placed or performed during the hospital encounter of 02/07/23   CBC with Auto Differential   Result Value Ref Range    WBC 11.1 (H) 4.0 - 11.0 K/uL    RBC 4.31 4.00 - 5.20 M/uL    Hemoglobin 12.4 12.0 - 16.0 g/dL    Hematocrit 38.0 36.0 - 48.0 %    MCV 88.2 80.0 - 100.0 fL    MCH 28.8 26.0 - 34.0 pg    MCHC 32.7 31.0 - 36.0 g/dL    RDW 14.6 12.4 - 15.4 %    Platelets 987 969 - 298 K/uL    MPV 7.4 5.0 - 10.5 fL    Neutrophils % 84.0 %    Lymphocytes % 10.5 %    Monocytes % 4.7 % Eosinophils % 0.2 %    Basophils % 0.6 %    Neutrophils Absolute 9.4 (H) 1.7 - 7.7 K/uL    Lymphocytes Absolute 1.2 1.0 - 5.1 K/uL    Monocytes Absolute 0.5 0.0 - 1.3 K/uL    Eosinophils Absolute 0.0 0.0 - 0.6 K/uL    Basophils Absolute 0.1 0.0 - 0.2 K/uL   CMP w/ Reflex to MG   Result Value Ref Range    Sodium 126 (L) 136 - 145 mmol/L    Potassium reflex Magnesium 4.6 3.5 - 5.1 mmol/L    Chloride 89 (L) 99 - 110 mmol/L    CO2 30 21 - 32 mmol/L    Anion Gap 7 3 - 16    Glucose 110 (H) 70 - 99 mg/dL    BUN 6 (L) 7 - 20 mg/dL    Creatinine <0.5 (L) 0.6 - 1.2 mg/dL    Est, Glom Filt Rate >60 >60    Calcium 9.2 8.3 - 10.6 mg/dL    Total Protein 7.1 6.4 - 8.2 g/dL    Albumin 3.3 (L) 3.4 - 5.0 g/dL    Albumin/Globulin Ratio 0.9 (L) 1.1 - 2.2    Total Bilirubin 0.3 0.0 - 1.0 mg/dL    Alkaline Phosphatase 77 40 - 129 U/L    ALT 12 10 - 40 U/L    AST 24 15 - 37 U/L   Troponin   Result Value Ref Range    Troponin <0.01 <0.01 ng/mL   EKG 12 Lead   Result Value Ref Range    Ventricular Rate 80 BPM    Atrial Rate 80 BPM    P-R Interval 140 ms    QRS Duration 80 ms    Q-T Interval 392 ms    QTc Calculation (Bazett) 452 ms    P Axis 65 degrees    R Axis -6 degrees    T Axis 67 degrees    Diagnosis       Normal sinus rhythmPossible Inferior infarct , age undeterminedAbnormal ECGWhen compared with ECG of 28-MAY-2022 00:43,No significant change was found       I spoke with [facility] hospitalist. We discussed the history, physical exam, diagnostics, as well as their emergency department course. Based upon that discussion, we've decided to admit Valerie Jones for further observation and evaluation of Venu Fly   1. Closed fracture of left hip, initial encounter ([facility]    2. Fall, initial encounter    3. Chronic obstructive pulmonary disease with acute exacerbation ([facility]    . I am the Primary Clinician of Record. FINAL IMPRESSION      1. Closed fracture of left hip, initial encounter ([facility]    2. Fall, initial encounter    3. Chronic obstructive pulmonary disease with acute exacerbation ([facility]          DISPOSITION/PLAN     DISPOSITION Admitted 02/07/2023 07:11:07 PM      PATIENT REFERRED TO:  No follow-up provider specified.     DISCHARGE MEDICATIONS:  New Prescriptions    No medications on file       DISCONTINUED MEDICATIONS:  Discontinued Medications    No medications on file              (Please note that portions of this note were completed with a voice recognition program.  Efforts were made to edit the dictations but occasionally words are mis-transcribed.)    ANY Lockett (electronically signed)           ANY Lockett  02/07/23 1945

## 2023-02-08 NOTE — CONSULTS
Consultant: Jett Stephen MD  From: ED  Reason: Left femoral neck fracture    Chief Complaint   Patient presents with    Fall    Hip Pain     Pt reports she fell last night and now her left hip is hurting        History of Present Illness      Fab Gamboa is a 78 y.o. female who was found to have a left femoral neck fracture and orthopedics was consulted for evaluation. She was previously ambulatory without assistance although she does have a prior history of osteoporosis and has previously been treated for this but is not currently taking any medication. Had a mechanical fall at home last night and had isolated left hip pain and inability to ambulate. Ultimately presented to [facility] for surgical fixation. Pain is isolated to the left hip without distal neurovascular complaints at this time.       Past History       Past Medical History:   Diagnosis Date    Allergic rhinitis 6/11/2010    Anxiety     Arthritis     Aspiration pneumonia ([address] 3/21/2012    Recurrent    Asthma     Bronchitis chronic     COPD (chronic obstructive pulmonary disease) ([address] 12/3/2009    Depression     Drug abuse, cocaine type ([address]     past history of crack cocaine use    Emphysema of lung ([address]     Fibromyalgia     GERD (gastroesophageal reflux disease)     Hyperlipidemia     Influenza A 03/05/2020    Lung disease     On home oxygen therapy     uses O2 NC 3L prn at night    Osteoporosis     Pneumonia     Polysubstance dependence ([address] 1/2/2012    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    Pulmonary fibrosis ([address] 10/16/2014    Pulmonary infiltrate     Pulmonary nodule 12/3/2009    Tobacco abuse      Past Surgical History:   Procedure Laterality Date    BLADDER REPAIR      BRONCHOSCOPY      BRONCHOSCOPY N/A 3/6/2020    BRONCHOSCOPY THERAPUTIC ASPIRATION INITIAL performed by Bruna Sabillon MD at [facility]  3/6/2020    BRONCHOSCOPY ALVEOLAR LAVAGE performed by Bruna Sabillon MD at [address] N/A 6/26/2020    BRONCHOSCOPY THERAPUTIC ASPIRATION INITIAL performed by Arnoldo Snyder MD at [address]  6/26/2020    BRONCHOSCOPY ALVEOLAR LAVAGE performed by Arnoldo Snyder MD at [address]  06/23/2021    Dr Jeremi Beltre N/A 6/23/2021    [address] performed by Arnoldo Snyder MD at [address]  6/23/2021    BRONCHOSCOPY THERAPUTIC ASPIRATION INITIAL performed by Arnoldo Snyder MD at [address]  6/23/2021    BRONCHOSCOPY ALVEOLAR LAVAGE performed by Arnoldo Snyder MD at [address] 8/27/2021    BRONCHOSCOPY DIAGNOSTIC OR [facility] ONLY performed by Arnoldo Snyder MD at [facility]  2012    ENDOSCOPY, COLON, DIAGNOSTIC      ESOPHAGEAL DILATION  9/20/2018    ESOPHAGEAL DILATION Renelle Pavy performed by Roselia Vazquez MD at [place] [address]  2/12/15    T8 Kyphoplasty    MT COLONOSCOPY FLX DX W/COLLJ SPEC WHEN PFRMD N/A 9/20/2018    EGD AND COLONOSCOPY WITH ANESTHESIA performed by Roselia Vazquez MD at [address] [place] [address] ESOPHAGOGASTRODUODENOSCOPY TRANSORAL DIAGNOSTIC N/A 9/20/2018    EGD AND COLONOSCOPY WITH ANESTHESIA performed by Roselia Vazquez MD at [address] [place] [address]       Family History   Problem Relation Age of Onset    Asthma Mother     Diabetes Mother     Emphysema Mother     Heart Failure Mother     Hypertension Mother     Heart Disease Mother     High Cholesterol Mother     Cancer Mother     Depression Mother     Diabetes Father     Emphysema Father     Heart Failure Father     Hypertension Father     Heart Disease Father     High Cholesterol Father     Substance Abuse Father     Emphysema Paternal Grandfather     Diabetes Sister     High Cholesterol Sister     Vision Loss Maternal Uncle Social History     Tobacco Use    Smoking status: Every Day     Packs/day: 1.00     Years: 40.00     Pack years: 40.00     Types: Cigarettes     Start date: 1972     Last attempt to quit: 2021     Years since quittin.6    Smokeless tobacco: Never    Tobacco comments:     0.5 PPD restarted, currently. Per Dr. Layton Mini lung screening order pt has 40 pack year smoking hx   Substance Use Topics    Alcohol use: No     Alcohol/week: 0.0 standard drinks      No current facility-administered medications on file prior to encounter.      Current Outpatient Medications on File Prior to Encounter   Medication Sig Dispense Refill    albuterol sulfate HFA (VENTOLIN HFA) 108 (90 Base) MCG/ACT inhaler Inhale 2 puffs into the lungs every 6 hours as needed for Wheezing or Shortness of Breath 3 each 1    ipratropium-albuterol (DUONEB) 0.5-2.5 (3) MG/3ML SOLN nebulizer solution Inhale 3 mLs into the lungs every 4 hours as needed for Shortness of Breath 360 mL 5    traZODone (DESYREL) 150 MG tablet Take 1-2 tablets by mouth nightly TAKE 2 TABLETS AT BEDTIME 180 tablet 1    guaiFENesin (MUCINEX) 600 MG extended release tablet Take 1 tablet by mouth 2 times daily as needed for Congestion 60 tablet 1    fluticasone-salmeterol (WIXELA INHUB) 500-50 MCG/DOSE diskus inhaler Inhale 1 puff into the lungs every 12 hours 180 each 1    albuterol (PROVENTIL) (2.5 MG/3ML) 0.083% nebulizer solution Take 3 mLs by nebulization every 6 hours as needed for Wheezing or Shortness of Breath DX COPD J44.9 120 vial 6    montelukast (SINGULAIR) 10 MG tablet TAKE 1 TABLET BY MOUTH EACH NIGHT 90 tablet 1    BD PEN NEEDLE SVETLANA U/F 32G X 4 MM MISC USE ONE DAILY AS DIRECTED 100 each 5    FLUoxetine (PROZAC) 20 MG capsule TAKE 3 CAPSULES BY MOUTH DAILY 270 capsule 1    simvastatin (ZOCOR) 20 MG tablet TAKE 1 TABLET EVERY EVENING 90 tablet 1    busPIRone (BUSPAR) 30 MG tablet TAKE 1 TABLET TWICE DAILY 180 tablet 1    teriparatide, recombinant, (FORTEO) 600 MCG/2.4ML SOLN injection Inject 0.08 mLs into the skin daily One dose injected daily. 1 pen 11      Meperidine and Demerol    Review of Systems      10-point ROS is negative other than what's mentioned in HPI. Physical Exam        /70   Pulse 94   Temp 98 °F (36.7 °C) (Oral)   Resp 16   Ht 5' 5\" (1.651 m)   Wt 127 lb 10.3 oz (57.9 kg)   SpO2 90%   BMI 21.24 kg/m²      Constitutional:       General: He is not in acute distress. Appearance: Normal appearance. Cardiovascular:      Rate and Rhythm: Normal rate and regular rhythm. Pulses: Normal pulses. Pulmonary:      Effort: Pulmonary effort is normal. No respiratory distress. Neurological:      Mental Status: He is alert and oriented to person, place, and time. Mental status is at baseline. Musculoskeletal:  Left hip exam shows the skin to be within normal limits. No neuro vascular compromise distally    Imaging       Radiographs: Multiple views of the left hip show a valgus impacted, nondisplaced left femoral neck fracture. Assessment and Plan      67-year-old female with nondisplaced left femoral neck fracture. Plan for OR today for percutaneous pinning. Medical optimization noted and appreciated. Aspirin 81 mg twice daily for 1 month after discharged for DVT prophylaxis. Discharge planning to home with home care versus rehab pending PT evaluation. Electronically signed by Bryant Luo MD on 5/3/4360 at 2:43 PM  This dictation was generated by voice recognition computer software. Although all attempts are made to edit the dictation for accuracy, there may be errors in the transcription that are not intended.

## 2023-02-08 NOTE — PROGRESS NOTES
Patient admitted to [facility] 8 in preparation for surgery, VSS. Consent confirmed. IV inserted in place in LFA, NS infusing. Belongings in inpatient room, glasses sent to PACU. NPO since 0000.

## 2023-02-08 NOTE — PROGRESS NOTES
Hospitalist Progress Note      PCP: Ochoa Ramirez MD    Date of Admission: 2/7/2023    Chief Complaint: L hip pain    Subjective: no new c/o. Medications:  Reviewed    Infusion Medications    sodium chloride      sodium chloride 75 mL/hr at 02/08/23 1144     Scheduled Medications    busPIRone  30 mg Oral BID    FLUoxetine  60 mg Oral Daily    mometasone-formoterol  2 puff Inhalation BID    montelukast  10 mg Oral Nightly    atorvastatin  10 mg Oral Nightly    traZODone  150 mg Oral Nightly    sodium chloride flush  5-40 mL IntraVENous 2 times per day    tiotropium  2 puff Inhalation Daily    albuterol  2.5 mg Nebulization 4x daily     PRN Meds: sodium chloride flush, sodium chloride, ondansetron **OR** ondansetron, polyethylene glycol, oxyCODONE **OR** oxyCODONE, morphine **OR** morphine      Intake/Output Summary (Last 24 hours) at 2/8/2023 1417  Last data filed at 2/8/2023 1409  Gross per 24 hour   Intake 776 ml   Output 200 ml   Net 576 ml       Physical Exam Performed:    /70   Pulse 94   Temp 98 °F (36.7 °C) (Oral)   Resp 16   Ht 5' 5\" (1.651 m)   Wt 127 lb 10.3 oz (57.9 kg)   SpO2 90%   BMI 21.24 kg/m²     General appearance: No apparent distress, appears stated age and cooperative. HEENT: Pupils equal, round, and reactive to light. Conjunctivae/corneas clear. Neck: Supple, with full range of motion. No jugular venous distention. Trachea midline. Respiratory:  Normal respiratory effort. Clear to auscultation, bilaterally without Rales/Wheezes/Rhonchi. Cardiovascular: Regular rate and rhythm with normal S1/S2 without murmurs, rubs or gallops. Abdomen: Soft, non-tender, non-distended with normal bowel sounds. Musculoskeletal: No clubbing, cyanosis or edema bilaterally. Full range of motion without deformity. Skin: Skin color, texture, turgor normal.  No rashes or lesions. Neurologic:  Neurovascularly intact without any focal sensory/motor deficits.  Cranial nerves: II-XII intact, grossly non-focal.  Psychiatric: Alert and oriented, thought content appropriate, normal insight  Capillary Refill: Brisk,< 3 seconds   Peripheral Pulses: +2 palpable, equal bilaterally       Labs:   Recent Labs     02/07/23  1708   WBC 11.1*   HGB 12.4   HCT 38.0        Recent Labs     02/07/23  1708 02/08/23  0534   * 132*   K 4.6 5.8*   CL 89* 97*   CO2 30 27   BUN 6* 11   CREATININE <0.5* <0.5*   CALCIUM 9.2 8.6     Recent Labs     02/07/23  1708   AST 24   ALT 12   BILITOT 0.3   ALKPHOS 77     No results for input(s): INR in the last 72 hours. Recent Labs     02/07/23  1708   TROPONINI <0.01       Urinalysis:      Lab Results   Component Value Date/Time    NITRU POSITIVE 05/27/2022 11:05 PM    WBCUA 21-50 05/27/2022 11:05 PM    BACTERIA 3+ 05/27/2022 11:05 PM    RBCUA 0-2 05/27/2022 11:05 PM    BLOODU Negative 05/27/2022 11:05 PM    SPECGRAV >=1.030 05/27/2022 11:05 PM    GLUCOSEU Negative 05/27/2022 11:05 PM    GLUCOSEU NEGATIVE 04/13/2012 12:13 PM       Consults:    IP CONSULT TO HOSPITALIST      Assessment/Plan:    Active Hospital Problems    Diagnosis     Subcapital fracture of femur, left, closed, initial encounter ([facility] [S72.012A]      Priority: Medium    Chronic respiratory failure with hypoxia ([facility] [J96.11]      Priority: Medium    Stage 3 severe COPD by GOLD classification ([facility] [J44.9]      Priority: Medium    Anxiety and depression [F41.9, F32.A]     [facility]interstitial lung disease) [facility] [J84.9]     Tobacco dependence [F17.200]          L hip fracture - Acute fx 2nd to mechanical fall. Likely a pathologic osteoporotic fracture sustained in diseased bone w/ decreased mechanical resistance to a 'normal' mechanical load from a fall that wouldn't break normal healthy bone. Ortho consulted and appreciated w/ plan to OR 8 Feb.      COPD - w/out chronic respiratory failure on baseline home O2 at 3L. Controlled on home medication regimen - continued.       HypoNatremia - etiology clinically unable to determine but likely hypovolemic. Follow serial labs on IVF. Reviewed and documented as above. Tobacco Abuse - active and ongoing. Cessation counseled. Nicotine replacement PRN. Depression-mood stable, resume Prozac, BuSpar daily and trazodone at night     HyperLipidemia - controlled on home Statin. Continue, w/ f/u and med adjustment w/ PCP      DVT Prophylaxis: IPC     Recent Labs     02/07/23  1708        Diet: Diet NPO  Code Status: Full Code      PT/OT Eval Status: not yet ordered. Dispo - Patient is likely to remain in-house at least until Friday/Sat 10/11 Feb pending clinical course, subspecialty recs and eventual PT/OT eval/recs if/when medically appropriate.         Geraldine Aguillon MD

## 2023-02-08 NOTE — RT PROTOCOL NOTE
RT Nebulizer Bronchodilator Protocol Note    There is a bronchodilator order in the chart from a provider indicating to follow the RT Bronchodilator Protocol and there is an Initiate RT Bronchodilator Protocol order as well (see protocol at bottom of note). CXR Findings:  XR CHEST PORTABLE    Result Date: 2/7/2023  Patchy opacity in the left lower lobe and mildly in the right upper lobe       The findings from the last RT Protocol Assessment were as follows:  Smoking: Chronic pulmonary disease  Respiratory Pattern: Dyspnea on exertion or RR 21-25 bpm  Breath Sounds: Intermittent or unilateral wheezes  Cough: Strong, spontaneous, non-productive  Indication for Bronchodilator Therapy: On home bronchodilators  Bronchodilator Assessment Score: 8    Aerosolized bronchodilator medication orders have been revised according to the RT Nebulizer Bronchodilator Protocol below. Respiratory Therapist to perform RT Therapy Protocol Assessment initially then follow the protocol. Repeat RT Therapy Protocol Assessment PRN for score 0-3 or on second treatment, BID, and PRN for scores above 3. No Indications - adjust the frequency to every 6 hours PRN wheezing or bronchospasm, if no treatments needed after 48 hours then discontinue using Per Protocol order mode. If indication present, adjust the RT bronchodilator orders based on the Bronchodilator Assessment Score as indicated below. If a patient is on this medication at home then do not decrease Frequency below that used at home. 0-3 - enter or revise RT bronchodilator order(s) to equivalent RT Bronchodilator order with Frequency of every 4 hours PRN for wheezing or increased work of breathing using Per Protocol order mode.        4-6 - enter or revise RT Bronchodilator order(s) to two equivalent RT bronchodilator orders with one order with BID Frequency and one order with Frequency of every 4 hours PRN wheezing or increased work of breathing using Per Protocol order mode. 7-10 - enter or revise RT Bronchodilator order(s) to two equivalent RT bronchodilator orders with one order with TID Frequency and one order with Frequency of every 4 hours PRN wheezing or increased work of breathing using Per Protocol order mode. 11-13 - enter or revise RT Bronchodilator order(s) to one equivalent RT bronchodilator order with QID Frequency and an Albuterol order with Frequency of every 4 hours PRN wheezing or increased work of breathing using Per Protocol order mode. Greater than 13 - enter or revise RT Bronchodilator order(s) to one equivalent RT bronchodilator order with every 4 hours Frequency and an Albuterol order with Frequency of every 2 hours PRN wheezing or increased work of breathing using Per Protocol order mode. RT to enter RT Home Evaluation for COPD & MDI Assessment order using Per Protocol order mode.     Electronically signed by Piotr Rand RCP on 2/7/2023 at 10:21 PM

## 2023-02-08 NOTE — ANESTHESIA POSTPROCEDURE EVALUATION
Department of Anesthesiology  Postprocedure Note    Patient: Rocio Hauser  MRN: [id number]  YOB: 1955  Date of evaluation: 2/8/2023      Procedure Summary     Date: 02/08/23 Room / Location: [facility]    Anesthesia Start: 8958 Anesthesia Stop: 3531    Procedure: LEFT HIP PINNING (Left: Hip) Diagnosis:       Closed fracture of neck of left femur, initial encounter ([facility]      (Closed fracture of neck of left femur, initial encounter ([facility] Laci Diana)    Surgeons: Genie Felty, MD Responsible Provider: Yordan Jewell MD    Anesthesia Type: general ASA Status: 3          Anesthesia Type: No value filed. Xiao Phase I: Xiao Score: 8    Xiao Phase II:        Anesthesia Post Evaluation    Patient location during evaluation: PACU  Patient participation: complete - patient participated  Level of consciousness: awake and alert  Airway patency: patent  Nausea & Vomiting: no nausea and no vomiting  Complications: no  Cardiovascular status: blood pressure returned to baseline  Respiratory status: acceptable  Hydration status: euvolemic  Comments: VSS on transfer to phase 2 recovery. No anesthetic complications.

## 2023-02-08 NOTE — PROGRESS NOTES
Pt admitted to PACU. Report obtained from Lyndsay Tabares  and anesthesia. Patient is on home O2, has history of  and uses THC. Patient transitioned to simple mask at 5L. Currently at 93% on 5L. Patient reports pain to left hip, medication given per [facility]  Warmed with blankets and ice to left hip

## 2023-02-08 NOTE — OP NOTE
Operative Note      Patient: Jessica Segura  YOB: 1955  MRN: [id number]    Date of Procedure: 2/8/2023    Pre-Op Diagnosis: Closed fracture of neck of left femur, initial encounter ([facility] [S72.002A]    Post-Op Diagnosis: Same       Procedure(s):  LEFT HIP PINNING    Surgeon(s):  Marita Almeida MD    Assistant:   Surgical Assistant: Margaret Stewart    Anesthesia: General    Estimated Blood Loss (mL): Minimal    Complications: None    Specimens:   * No specimens in log *    Implants:  Implant Name Type Inv. Item Serial No.  Lot No. LRB No. Used Action   SCREW BNE L90MM DIA6.5MM THRD L32MM S STL PORSHA HEX SOCK - [id number]  SCREW BNE L90MM DIA6.5MM THRD L32MM S STL PORSHA HEX SOCK  DEPU[employer]  Left 1 Implanted   SCREW PORSHA 32MM THRD SS 6.5X85MM - [id number]  SCREW PORSHA 32MM THRD SS 6.5X85MM  DEPUY [employer]  Left 1 Implanted   SCREW PORSHA 32MM THRD SS 6.5X80MM - [id number]  SCREW PORSHA 32MM THRD SS 6.5X80MM  DEPUY [employer]  Left 1 Implanted         Drains:   Urinary Catheter 02/07/23 Beal (Active)   Catheter Indications Prolonged immobilization (e.g. unstable thoracic or lumbar spine, multiple traumatic injuries such as pelvic fractures) 02/08/23 0833   Site Assessment Pink 02/08/23 0833   Urine Color Anamika 02/08/23 0833   Urine Appearance Clear 02/08/23 0833   Collection Container Standard 02/08/23 0833   Securement Method Securing device (Describe) 02/08/23 0833   Catheter Care  Perineal wipes 02/08/23 0442   Catheter Best Practices  Drainage tube clipped to bed; Bag below bladder;Bag not on floor; Lack of dependent loop in tubing;Drainage bag less than half full; Tamper seal intact; Catheter secured to thigh 02/08/23 0833   Status Draining;Patent 02/08/23 6654   Output (mL) 200 mL 02/08/23 0620       Findings: valgus impacted FNFx    Detailed Description of Procedure:   Clinical indications:  This is a 76-year-old female who had a mechanical fall at home yesterday and eventually presented to [facility] where she was found to have a valgus impacted left femoral neck fracture. I evaluated the patient on imaging and recommended percutaneous pinning to the left hip. I explained risk benefits limitations and alternatives to this procedure with the patient and the family in detail and all parties wish to proceed. Preoperatively the patient was identified, final questions were answered. The left hip was marked. Informed consent was verified. Ancef was administered perioperatively. The patient was taken the operating room where general anesthesia was administered and she was positioned supine on the Floral table. The left lower extremity was then prepped and draped in standard fashion. Timeout was performed according hospital protocol. Initial x-rays confirm the fracture remained nondisplaced valgus impacted. I made a 3 cm longitudinal incision just distal to the flare of the greater trochanter with scalpel and continued sharp dissection through the iliotibial band down to the femur. I then placed a initial guidewire under fluoroscopy in the central, inferior position in the femoral neck. Once satisfied with the position of this wire on AP and lateral radiographs I used the parallel wire guide to place an anterior superior and posterior superior guidewire. Once these were placed to the appropriate depth being careful to stay out of the hip joint I was able to measure for the appropriately sized 6.5 partially-threaded cannulated screws. These were placed over the guidewire. Final fluoroscopy images were taken and AP and multiple oblique and lateral imaging confirmed extra-articular placement of all screws. I did note that the posterior superior screw was in and out in along the posterior femoral neck but I did not think it would be indicated to change the position of this and leave stress risers in the femoral neck. The wound was then irrigated.   A final check was made for hemostasis. Closure then ensued with 0 Vicryl for the IT band. 2-0 Vicryl for the subcutaneous tissues and Monocryl for the skin. An occlusive dressing was placed. The patient was awoken from anesthesia and returned to PACU in stable condition. Postoperatively would like the patient to remain toe-touch weightbearing on the left lower extremity at least until follow-up. PT OT evaluation with placement to rehab versus home with home PT. Pain control minimizing narcotics. Aspirin 81 mg twice daily for 1 month for DVT prophylaxis. Okay for discharge from my standpoint when we have a disposition. Follow-up with me in 4 weeks, will need left hip x-rays at that time and will consider advancing weightbearing.     Electronically signed by Bryant Luo MD on 0/4/2116 at 2:50 PM

## 2023-02-09 LAB
ALBUMIN SERPL-MCNC: 3.1 G/DL (ref 3.4–5)
ANION GAP SERPL CALCULATED.3IONS-SCNC: 8 MMOL/L (ref 3–16)
BUN BLDV-MCNC: 16 MG/DL (ref 7–20)
CALCIUM SERPL-MCNC: 8.7 MG/DL (ref 8.3–10.6)
CHLORIDE BLD-SCNC: 103 MMOL/L (ref 99–110)
CO2: 28 MMOL/L (ref 21–32)
CREAT SERPL-MCNC: 0.6 MG/DL (ref 0.6–1.2)
GFR SERPL CREATININE-BSD FRML MDRD: >60 ML/MIN/{1.73_M2}
GLUCOSE BLD-MCNC: 101 MG/DL (ref 70–99)
HCT VFR BLD CALC: 36.3 % (ref 36–48)
HEMOGLOBIN: 11.6 G/DL (ref 12–16)
MCH RBC QN AUTO: 28.5 PG (ref 26–34)
MCHC RBC AUTO-ENTMCNC: 32.1 G/DL (ref 31–36)
MCV RBC AUTO: 88.9 FL (ref 80–100)
PDW BLD-RTO: 14.6 % (ref 12.4–15.4)
PHOSPHORUS: 3.1 MG/DL (ref 2.5–4.9)
PLATELET # BLD: 316 K/UL (ref 135–450)
PMV BLD AUTO: 7.4 FL (ref 5–10.5)
POTASSIUM SERPL-SCNC: 4.5 MMOL/L (ref 3.5–5.1)
RBC # BLD: 4.08 M/UL (ref 4–5.2)
SODIUM BLD-SCNC: 139 MMOL/L (ref 136–145)
WBC # BLD: 14.1 K/UL (ref 4–11)

## 2023-02-09 PROCEDURE — 94640 AIRWAY INHALATION TREATMENT: CPT

## 2023-02-09 PROCEDURE — 6370000000 HC RX 637 (ALT 250 FOR IP): Performed by: STUDENT IN AN ORGANIZED HEALTH CARE EDUCATION/TRAINING PROGRAM

## 2023-02-09 PROCEDURE — 6360000002 HC RX W HCPCS: Performed by: STUDENT IN AN ORGANIZED HEALTH CARE EDUCATION/TRAINING PROGRAM

## 2023-02-09 PROCEDURE — 6370000000 HC RX 637 (ALT 250 FOR IP): Performed by: SPECIALIST/TECHNOLOGIST

## 2023-02-09 PROCEDURE — 1200000000 HC SEMI PRIVATE

## 2023-02-09 PROCEDURE — 36415 COLL VENOUS BLD VENIPUNCTURE: CPT

## 2023-02-09 PROCEDURE — 94761 N-INVAS EAR/PLS OXIMETRY MLT: CPT

## 2023-02-09 PROCEDURE — APPNB60 APP NON BILLABLE TIME 46-60 MINS: Performed by: PHYSICIAN ASSISTANT

## 2023-02-09 PROCEDURE — 2700000000 HC OXYGEN THERAPY PER DAY

## 2023-02-09 PROCEDURE — 97162 PT EVAL MOD COMPLEX 30 MIN: CPT

## 2023-02-09 PROCEDURE — 97530 THERAPEUTIC ACTIVITIES: CPT

## 2023-02-09 PROCEDURE — 2580000003 HC RX 258: Performed by: STUDENT IN AN ORGANIZED HEALTH CARE EDUCATION/TRAINING PROGRAM

## 2023-02-09 PROCEDURE — 85027 COMPLETE CBC AUTOMATED: CPT

## 2023-02-09 PROCEDURE — 97166 OT EVAL MOD COMPLEX 45 MIN: CPT

## 2023-02-09 PROCEDURE — 80069 RENAL FUNCTION PANEL: CPT

## 2023-02-09 PROCEDURE — 97535 SELF CARE MNGMENT TRAINING: CPT

## 2023-02-09 RX ADMIN — OXYCODONE HYDROCHLORIDE 10 MG: 5 TABLET ORAL at 15:13

## 2023-02-09 RX ADMIN — ATORVASTATIN CALCIUM 10 MG: 10 TABLET, FILM COATED ORAL at 20:34

## 2023-02-09 RX ADMIN — ACETAMINOPHEN 325MG 650 MG: 325 TABLET ORAL at 09:53

## 2023-02-09 RX ADMIN — FLUOXETINE 60 MG: 20 CAPSULE ORAL at 08:19

## 2023-02-09 RX ADMIN — OXYCODONE HYDROCHLORIDE 10 MG: 5 TABLET ORAL at 20:34

## 2023-02-09 RX ADMIN — ALBUTEROL SULFATE 2.5 MG: 2.5 SOLUTION RESPIRATORY (INHALATION) at 15:33

## 2023-02-09 RX ADMIN — Medication 2 PUFF: at 07:32

## 2023-02-09 RX ADMIN — ALBUTEROL SULFATE 2.5 MG: 2.5 SOLUTION RESPIRATORY (INHALATION) at 19:37

## 2023-02-09 RX ADMIN — MORPHINE SULFATE 2 MG: 2 INJECTION, SOLUTION INTRAMUSCULAR; INTRAVENOUS at 08:31

## 2023-02-09 RX ADMIN — OXYCODONE HYDROCHLORIDE 10 MG: 5 TABLET ORAL at 09:53

## 2023-02-09 RX ADMIN — SODIUM CHLORIDE, PRESERVATIVE FREE 10 ML: 5 INJECTION INTRAVENOUS at 08:19

## 2023-02-09 RX ADMIN — OXYCODONE HYDROCHLORIDE 10 MG: 5 TABLET ORAL at 05:26

## 2023-02-09 RX ADMIN — ASPIRIN 81 MG: 81 TABLET, COATED ORAL at 08:19

## 2023-02-09 RX ADMIN — SODIUM CHLORIDE: 9 INJECTION, SOLUTION INTRAVENOUS at 13:02

## 2023-02-09 RX ADMIN — BUSPIRONE HYDROCHLORIDE 30 MG: 5 TABLET ORAL at 08:19

## 2023-02-09 RX ADMIN — ACETAMINOPHEN 325MG 650 MG: 325 TABLET ORAL at 22:16

## 2023-02-09 RX ADMIN — ACETAMINOPHEN 325MG 650 MG: 325 TABLET ORAL at 15:14

## 2023-02-09 RX ADMIN — MORPHINE SULFATE 2 MG: 2 INJECTION, SOLUTION INTRAMUSCULAR; INTRAVENOUS at 12:59

## 2023-02-09 RX ADMIN — Medication 2000 MG: at 13:03

## 2023-02-09 RX ADMIN — ALBUTEROL SULFATE 2.5 MG: 2.5 SOLUTION RESPIRATORY (INHALATION) at 07:32

## 2023-02-09 RX ADMIN — ALBUTEROL SULFATE 2.5 MG: 2.5 SOLUTION RESPIRATORY (INHALATION) at 12:10

## 2023-02-09 RX ADMIN — BUSPIRONE HYDROCHLORIDE 30 MG: 5 TABLET ORAL at 20:35

## 2023-02-09 RX ADMIN — POLYETHYLENE GLYCOL 3350 17 G: 17 POWDER, FOR SOLUTION ORAL at 08:19

## 2023-02-09 RX ADMIN — TIOTROPIUM BROMIDE INHALATION SPRAY 2 PUFF: 3.12 SPRAY, METERED RESPIRATORY (INHALATION) at 07:32

## 2023-02-09 RX ADMIN — Medication 2 PUFF: at 19:42

## 2023-02-09 RX ADMIN — MONTELUKAST SODIUM 10 MG: 10 TABLET ORAL at 20:34

## 2023-02-09 RX ADMIN — Medication 2000 MG: at 22:21

## 2023-02-09 RX ADMIN — ASPIRIN 81 MG: 81 TABLET, COATED ORAL at 20:34

## 2023-02-09 RX ADMIN — TRAZODONE HYDROCHLORIDE 150 MG: 50 TABLET ORAL at 20:33

## 2023-02-09 RX ADMIN — Medication 2000 MG: at 05:25

## 2023-02-09 ASSESSMENT — PAIN DESCRIPTION - ONSET
ONSET: ON-GOING

## 2023-02-09 ASSESSMENT — PAIN SCALES - GENERAL
PAINLEVEL_OUTOF10: 5
PAINLEVEL_OUTOF10: 7
PAINLEVEL_OUTOF10: 7
PAINLEVEL_OUTOF10: 4
PAINLEVEL_OUTOF10: 3
PAINLEVEL_OUTOF10: 5
PAINLEVEL_OUTOF10: 7
PAINLEVEL_OUTOF10: 6
PAINLEVEL_OUTOF10: 4
PAINLEVEL_OUTOF10: 7
PAINLEVEL_OUTOF10: 5
PAINLEVEL_OUTOF10: 7
PAINLEVEL_OUTOF10: 5
PAINLEVEL_OUTOF10: 7

## 2023-02-09 ASSESSMENT — PAIN DESCRIPTION - LOCATION
LOCATION: HIP

## 2023-02-09 ASSESSMENT — PAIN DESCRIPTION - FREQUENCY
FREQUENCY: CONTINUOUS

## 2023-02-09 ASSESSMENT — PAIN DESCRIPTION - ORIENTATION
ORIENTATION: LEFT

## 2023-02-09 ASSESSMENT — PAIN DESCRIPTION - DESCRIPTORS
DESCRIPTORS: ACHING;SORE
DESCRIPTORS: ACHING;SORE;DULL
DESCRIPTORS: ACHING
DESCRIPTORS: ACHING;DULL;SORE
DESCRIPTORS: ACHING
DESCRIPTORS: ACHING;CRAMPING;DULL;SORE
DESCRIPTORS: ACHING
DESCRIPTORS: ACHING;SHARP;SORE

## 2023-02-09 ASSESSMENT — PAIN SCALES - WONG BAKER
WONGBAKER_NUMERICALRESPONSE: 4
WONGBAKER_NUMERICALRESPONSE: 0
WONGBAKER_NUMERICALRESPONSE: 2

## 2023-02-09 ASSESSMENT — PAIN DESCRIPTION - PAIN TYPE: TYPE: SURGICAL PAIN

## 2023-02-09 NOTE — PROGRESS NOTES
Occupational Therapy  Facility/Department: [place] [id number] - MED SURG/ORTHO  Occupational Therapy Initial Assessment    Name: Trinidad Butt  : 1955  MRN: [id number]  Date of Service: 2023    Discharge Recommendations:  Subacute/Skilled Nursing Facility  OT Equipment Recommendations  Equipment Needed: No  Other: Defer to next level of care       Patient Diagnosis(es): The primary encounter diagnosis was Closed fracture of left hip, initial encounter ([address] Diagnoses of Fall, initial encounter, Chronic obstructive pulmonary disease with acute exacerbation ([address] and Subcapital fracture of femur, left, closed, initial encounter [facility]) were also pertinent to this visit. Past Medical History:  has a past medical history of Allergic rhinitis, Anxiety, Arthritis, Aspiration pneumonia ([address] Asthma, Bronchitis chronic, COPD (chronic obstructive pulmonary disease) ([address] Depression, Drug abuse, cocaine type ([address] Emphysema of lung ([address] Fibromyalgia, GERD (gastroesophageal reflux disease), Hyperlipidemia, Influenza A, Lung disease, On home oxygen therapy, Osteoporosis, Pneumonia, Polysubstance dependence ([address] 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[address] Pulmonary fibrosis ([address] Pulmonary infiltrate, Pulmonary nodule, and Tobacco abuse. Past Surgical History:  has a past surgical history that includes bronchoscopy; Hysterectomy; Salivary gland surgery; Endoscopy, colon, diagnostic; other surgical history (2/12/15); Colonoscopy (); bladder repair; pr colonoscopy flx dx w/collj spec when pfrmd (N/A, 2018); pr esophagogastroduodenoscopy transoral diagnostic (N/A, 2018); Esophagus dilation (2018); bronchoscopy (N/A, 3/6/2020); bronchoscopy (3/6/2020); bronchoscopy (N/A, 2020); bronchoscopy (2020); bronchoscopy (2021); bronchoscopy (N/A, 2021); bronchoscopy (2021); bronchoscopy (2021); bronchoscopy (N/A, 2021); and hip surgery (Left, 2023). Assessment   Performance deficits / Impairments: Decreased functional mobility ; Decreased endurance;Decreased ADL status; Decreased posture;Decreased balance;Decreased ROM; Decreased strength;Decreased safe awareness    Assessment: Pt is 77 yo female presenting with L hip fx s/p L hip pinning POD #1, TTWB precautions. PLOF is IND with mobility and ADLs w/o AD. Pt is functioning below baseline, requring min Ax2 for sit<>stand, mod Ax2 for stand step transfer to chair, and max A for toileting. Pt would benefit from continued acute OT at this time. Recommend SNF upon d/c to increase strength and mobility prior to returning home. Prognosis: Good  Decision Making: Medium Complexity  REQUIRES OT FOLLOW-UP: Yes  Activity Tolerance  Activity Tolerance: Patient limited by pain        Plan   Occupational Therapy Plan  Times Per Week: 4-5x/wk  Current Treatment Recommendations: Strengthening, ROM, Balance training, Functional mobility training, Endurance training, Positioning, Equipment evaluation, education, & procurement, Patient/Caregiver education & training, Safety education & training, Pain management, Self-Care / ADL, Home management training     Restrictions  Restrictions/Precautions  Restrictions/Precautions: Weight Bearing, ROM Restrictions, Fall Risk, Surgical Protocols, General Precautions  Lower Extremity Weight Bearing Restrictions  Left Lower Extremity Weight Bearing: Toe Touch Weight Bearing  Position Activity Restriction  Hip Precautions: Posterior hip precautions  Other position/activity restrictions: 3L, tele, IV, catheter    Subjective   General  Chart Reviewed: Yes, Orders, Progress Notes, History and Physical, Imaging, Labs, Operative Notes  Patient assessed for rehabilitation services?: Yes  Family / Caregiver Present: No  Referring Practitioner: Claudia Gr MD  Diagnosis: L hip fx  Subjective  Subjective: Pt in bed upon OT arrival, pleasant and agreeable to OT evaluation. Social/Functional History  Social/Functional History  Lives With: Spouse  Type of Home: Apartment  Home Layout: Two level  Home Access: Stairs to enter without rails  Entrance Stairs - Number of Steps: 1  Bathroom Shower/Tub: Tub/Shower unit  Bathroom Toilet: Standard  Bathroom Equipment: Shower chair  Home Equipment: Rollator, Cane, Reacher, Alert Button  Has the patient had two or more falls in the past year or any fall with injury in the past year?: Yes  ADL Assistance: Independent  Homemaking Assistance: Independent  Homemaking Responsibilities: Yes  Ambulation Assistance: Independent  Transfer Assistance: Independent  Active : No  Occupation: Retired  Type of Occupation: housekeeping at [employer]: watch TV, play on tablet       Objective   Heart Rate: 91  Heart Rate Source: Monitor  BP: 106/68  BP Location: Right upper arm  BP Method: Automatic  Patient Position: Semi fowlers  MAP (Calculated): 81  Resp: 20  SpO2: 94 %  O2 Device: Nasal cannula  Comment: 3L             Safety Devices  Type of Devices: All kp prominences offloaded; All fall risk precautions in place;Call light within reach; Chair alarm in place;Gait belt;Nurse notified; Left in chair;Patient at risk for falls  Restraints  Restraints Initially in Place: No  Bed Mobility Training  Bed Mobility Training: Yes  Supine to Sit: Stand-by assistance (HOB slightly elevated, use of rails)  Balance  Sitting: Intact  Standing: Impaired  Standing - Static: Constant support; Fair (heavy reliance with SW for UE support, required VC to maintain LLE WB status)  Standing - Dynamic: Constant support;Poor (heavy reliance on UE support, VC on compliance to LLE WB status)  Transfer Training  Transfer Training: Yes  Sit to Stand: Minimum assistance;Assist X2  Stand to Sit: Minimum assistance;Assist X2  Stand Pivot Transfers: Minimum assistance;Assist X2;Additional time; Adaptive equipment (with RW, VC on sequencing, VC required to make sure pt was adhering to LLE WB status)  Bed to Chair: Moderate assistance;Assist X2  Toilet Transfer: Moderate assistance;Assist X2  Gait Training  Gait Training: Yes  Left Side Weight Bearing: Toe touch  Gait  Overall Level of Assistance: Moderate assistance;Assist X2;Additional time; Adaptive equipment (with SW)  Interventions: Weight shifting training/pressure relief;Verbal cues; Safety awareness training  Base of Support: Shift to right  Speed/Jadyn: Slow  Step Length: Left shortened  Stance: Left decreased  Gait Abnormalities: Antalgic; Step to gait  Distance (ft): 5 Feet  Assistive Device: Gait belt;Walker        ADL  Toileting: Maximum assistance  Toileting Skilled Clinical Factors: max A for clothing management and bowel hygiene on [facility]     Activity Tolerance  Activity Tolerance: Patient limited by endurance; Patient limited by pain; Patient limited by fatigue        Vision  Vision: Within Functional Limits  Vision Exceptions: Wears glasses at all times  Hearing  Hearing: Within functional limits  Cognition  Overall Cognitive Status: [facility]  Orientation  Overall Orientation Status: Within Normal Limits  Orientation Level: Oriented X4                  Education Given To: Patient  Education Provided: Role of Therapy;Plan of Care;Precautions; ADL Adaptive Strategies;Transfer Training; Fall Prevention Strategies  Education Method: Demonstration;Verbal  Barriers to Learning: None  Education Outcome: Verbalized understanding;Demonstrated understanding  LUE AROM (degrees)  LUE AROM : WFL  Left Hand AROM (degrees)  Left Hand AROM: WFL  RUE AROM (degrees)  RUE AROM : WFL  Right Hand AROM (degrees)  Right Hand AROM: [facility]                         AM-PAC Score        AM-[facility] Inpatient Daily Activity Raw Score: 15 (02/09/23 1609)  AM-PAC Inpatient ADL T-Scale Score : 34.69 (02/09/23 1609)  ADL Inpatient CMS 0-100% Score: 56.46 (02/09/23 1609)  ADL Inpatient CMS G-Code Modifier : CK (02/09/23 1609)      Goals  Short Term Goals  Time Frame for Short Term Goals: Short term goals to be met by 2/16 unless otherwise specified  Short Term Goal 1: Pt will perform sit<>stand transfers with min A by 2/13  Short Term Goal 2: Pt will perform LB dressing with SBA and LRAD  Short Term Goal 3: Pt will perform toileting tasks with SBA and LRAD  Short Term Goal 4: Pt will verbalize weightbearing precautions w/o VC  Short Term Goal 5: Pt will tolerate functional mobility to/from bathroom while adhering to TTWB precautions with only 1 VC  Patient Goals   Patient goals : \"to get home without pain\"       Therapy Time   Individual Concurrent Group Co-treatment   Time In 1320         Time Out 1355         Minutes 35         Timed Code Treatment Minutes: 25 Minutes (+10 min OT eval)     If pt is unable to be seen after this session, please let this note serve as discharge summary. Please see case management note for discharge disposition. Thank you.     Pascual Gonzalez, OT

## 2023-02-09 NOTE — CARE COORDINATION
CM met with patient at bedside to discuss discharge plan. Home care vs SNF at discharge. Pt states leaning more towards SNF and prefers [facility], as it is close to her home. Declined SNF list when offered. In the interest of time, and precert being needed, CM called referral to [facility] with [facility]. Pt has no preference for home care agency. CM team will follow for therapy notes. [facility] knows to start precert ASAP, as tomorrow is Friday.

## 2023-02-09 NOTE — PROGRESS NOTES
Department of Orthopedic Surgery  Physician Assistant   Progress Note    Subjective:       Systemic or Specific Complaints:Pain Control    Objective:     Patient Vitals for the past 24 hrs:   BP Temp Temp src Pulse Resp SpO2   02/09/23 0953 -- -- -- -- 18 --   02/09/23 0820 -- -- -- 92 -- --   02/09/23 0814 122/75 97.9 °F (36.6 °C) Oral 93 21 91 %   02/09/23 0734 -- -- -- 81 -- 91 %   02/08/23 2052 113/66 97.8 °F (36.6 °C) Oral 90 18 90 %   02/08/23 1845 100/63 97.6 °F (36.4 °C) Oral 90 16 91 %   02/08/23 1805 105/66 97.8 °F (36.6 °C) Oral 96 16 91 %   02/08/23 1800 -- -- -- -- 16 --   02/08/23 1705 102/67 97.6 °F (36.4 °C) Oral 83 18 90 %   02/08/23 1523 93/77 -- -- (!) 102 24 (!) 86 %   02/08/23 1520 -- -- -- 99 23 92 %   02/08/23 1517 125/88 -- -- (!) 104 24 93 %   02/08/23 1515 105/72 -- -- (!) 104 30 91 %   02/08/23 1512 105/72 -- -- (!) 106 25 93 %   02/08/23 1511 113/63 -- -- (!) 105 26 93 %   02/08/23 1151 -- -- -- -- -- 90 %   02/08/23 1145 -- -- -- -- 16 --       General: alert, appears stated age, and cooperative   Wound: Wound clean and dry no evidence of infection. , No Erythema, No Drainage, Wound Intact, and Positive for Edema   Motion: Painful range of Motion in affected extremity   DVT Exam: No evidence of DVT seen on physical exam.     Additional exam: nvi    Data Review  CBC:   Lab Results   Component Value Date/Time    WBC 14.1 02/09/2023 05:49 AM    RBC 4.08 02/09/2023 05:49 AM    HGB 11.6 02/09/2023 05:49 AM    HCT 36.3 02/09/2023 05:49 AM     02/09/2023 05:49 AM       Renal:   Lab Results   Component Value Date/Time     02/09/2023 05:49 AM    K 4.5 02/09/2023 05:49 AM    K 5.8 02/08/2023 05:34 AM     02/09/2023 05:49 AM    CO2 28 02/09/2023 05:49 AM    BUN 16 02/09/2023 05:49 AM    CREATININE 0.6 02/09/2023 05:49 AM    GLUCOSE 101 02/09/2023 05:49 AM    CALCIUM 8.7 02/09/2023 05:49 AM            Assessment:      left hip pinning. Plan:      1:  PT/OT as able.   TTWB on the left side. Has been engaged with hospice in past and doesn't think she can go home at this time.   Has requested possibly [facility] at d/c  2:  Continue Deep venous thrombosis prophylaxis - ASA 81 BID for 30days  3:  Continue Pain Control    ANY Freedman

## 2023-02-09 NOTE — PROGRESS NOTES
Physical Therapy  Facility/Department: [place] [address] - MED SURG/ORTHO  Physical Therapy Initial Assessment/Treatment    Name: Miguel Puga  : 1955  MRN: [id number]  Date of Service: 2023    Discharge Recommendations:  Subacute/Skilled Nursing Facility   PT Equipment Recommendations  Equipment Needed: No      Patient Diagnosis(es): The primary encounter diagnosis was Closed fracture of left hip, initial encounter ([address] Diagnoses of Fall, initial encounter, Chronic obstructive pulmonary disease with acute exacerbation ([address] and Subcapital fracture of femur, left, closed, initial encounter [facility]) were also pertinent to this visit. Past Medical History:  has a past medical history of Allergic rhinitis, Anxiety, Arthritis, Aspiration pneumonia ([address] Asthma, Bronchitis chronic, COPD (chronic obstructive pulmonary disease) ([address] Depression, Drug abuse, cocaine type ([address] Emphysema of lung ([address] Fibromyalgia, GERD (gastroesophageal reflux disease), Hyperlipidemia, Influenza A, Lung disease, On home oxygen therapy, Osteoporosis, Pneumonia, Polysubstance dependence ([address] 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[address] Pulmonary fibrosis ([address] Pulmonary infiltrate, Pulmonary nodule, and Tobacco abuse. Past Surgical History:  has a past surgical history that includes bronchoscopy; Hysterectomy; Salivary gland surgery; Endoscopy, colon, diagnostic; other surgical history (2/12/15); Colonoscopy (); bladder repair; pr colonoscopy flx dx w/collj spec when pfrmd (N/A, 2018); pr esophagogastroduodenoscopy transoral diagnostic (N/A, 2018); Esophagus dilation (2018); bronchoscopy (N/A, 3/6/2020); bronchoscopy (3/6/2020); bronchoscopy (N/A, 2020); bronchoscopy (2020); bronchoscopy (2021); bronchoscopy (N/A, 2021); bronchoscopy (2021); bronchoscopy (2021); bronchoscopy (N/A, 2021); and hip surgery (Left, 2023).     Assessment   Body Structures, Functions, Activity Limitations Requiring Skilled Therapeutic Intervention: Decreased functional mobility ; Decreased ROM; Decreased strength;Decreased safe awareness;Decreased balance; Increased pain  Assessment: Pt presents to [facility] s/p L subcapital femur fracture. Pt touch down WB on LLE. Pt reports that she lives with her  in a 2-story house with 1 ARUN. She states that she is independent with ADLs and IND with mobility with a rollator. Currently, pt is performing bed mobility SBA, transfers with MinAx2 and was able to ambulate 5ft with SW and ModAx2. Pt demoed increased difficulty with adherencing to touch down WB status on LLE during the transfers and ambulation, requiring constant cueing to limit LLE weightbearing. Pt would continue to benefit from skilled therapy in order to address functional deficits and enhance pt independence. Recommend SNF upon d/c. Treatment Diagnosis: Impaired functional mobility  Therapy Prognosis: Fair  Decision Making: Medium Complexity  Requires PT Follow-Up: Yes  Activity Tolerance  Activity Tolerance: Patient limited by endurance; Patient limited by pain; Patient limited by fatigue     Plan   Physcial Therapy Plan  General Plan: 1 time a day 7 days a week  Current Treatment Recommendations: Strengthening, ROM, Balance training, Functional mobility training, Endurance training, Gait training, Stair training, Home exercise program, Safety education & training, Therapeutic activities  Safety Devices  Type of Devices: Call light within reach, Chair alarm in place, Gait belt, Patient at risk for falls, Left in chair, Nurse notified  Restraints  Restraints Initially in Place: No     Restrictions  Restrictions/Precautions  Restrictions/Precautions: Weight Bearing, Fall Risk, General Precautions  Lower Extremity Weight Bearing Restrictions  Left Lower Extremity Weight Bearing: Toe Touch Weight Bearing  Position Activity Restriction  Other position/activity restrictions: 3L, tele, IV, catheter     Subjective   Pain: 6/10 in Left hip  General  Chart Reviewed: Yes  Patient assessed for rehabilitation services?: Yes  Family / Caregiver Present: No  Referring Practitioner: Daisha Heard MD  Referral Date : 02/08/23  Diagnosis: Subcapital fracture of left femur  Follows Commands: Within Functional Limits  General Comment  Comments: RN cleared pt for PT eval  Subjective  Subjective: Arrived and pt supine, agreed to participate in PT eval     Social/Functional History  Social/Functional History  Lives With: Spouse  Type of Home: Apartment  Home Layout: Two level  Home Access: Stairs to enter without rails  Entrance Stairs - Number of Steps: 1  Bathroom Shower/Tub: Tub/Shower unit  Bathroom Toilet: Standard  Bathroom Equipment: Shower chair  Home Equipment: Rollator, Cane, Reacher, Alert Button  Has the patient had two or more falls in the past year or any fall with injury in the past year?: Yes  ADL Assistance: Independent  Homemaking Assistance: Independent  Homemaking Responsibilities: Yes  Ambulation Assistance: Independent  Transfer Assistance: Independent  Active : No  Occupation: Retired  Type of Occupation: housekeeping at [employer]: watch TV, play on tablet  [address]: Within [address] Exceptions: Wears glasses at all times  Hearing  Hearing: Within functional limits    Cognition   Overall orientation status: WNL  Orientation Level: Oriented x4  Overall cognitive Status: WFL    Objective   Heart Rate: (!) 102  Heart Rate Source: Monitor  BP: 106/68  BP Location: Right upper arm  BP Method: Automatic  Patient Position: Semi fowlers  MAP (Calculated): 81  Resp: 18  SpO2: 90 %  O2 Device: Nasal cannula  Comment: 3L     Gross Assessment  AROM: Generally decreased, functional (LLE hip AROM grossly limited due to pain)  Strength: Generally decreased, functional (LLE grossly limited due to pain)  Coordination: Within functional limits  Sensation: Intact     Bed Mobility Training  Bed Mobility Training: Yes  Supine to Sit: Stand-by assistance; Additional time (HOB elevated, use of bedrails)  Balance  Sitting: Intact  Standing: Impaired  Standing - Static: Constant support; Fair (heavy reliance with SW for UE support, required VC to maintain LLE WB status)  Standing - Dynamic: Constant support;Poor (heavy reliance on UE support, VC on compliance to LLE WB status)  Transfer Training  Transfer Training: Yes  Sit to Stand: Minimum assistance;Assist X2;Additional time; Adaptive equipment (with RW, VC on handplacement,VC on adhering to WB status)  Stand to Sit: Minimum assistance;Assist X2;Additional time; Adaptive equipment (with RW, VC on reaching back for chair)  Stand Pivot Transfers: Minimum assistance;Assist X2;Additional time; Adaptive equipment (with RW, VC on sequencing, VC required to make sure pt was adhering to LLE WB status)  Gait Training  Gait Training: Yes  Left Side Weight Bearing: Toe touch  Gait  Overall Level of Assistance: Moderate assistance;Assist X2;Additional time; Adaptive equipment (with SW)  Interventions: Weight shifting training/pressure relief;Verbal cues; Safety awareness training  Base of Support: Shift to right  Speed/Jadyn: Slow  Step Length: Left shortened  Stance: Left decreased  Gait Abnormalities: Antalgic; Step to gait  Distance (ft): 5 Feet  Assistive Device: Gait belt;Walker    AM-PAC Score  -PAC Inpatient Mobility Raw Score : 17 (02/09/23 1532)  AM-PAC Inpatient T-Scale Score : 42.13 (02/09/23 1532)  Mobility Inpatient CMS 0-100% Score: 50.57 (02/09/23 1532)  Mobility Inpatient CMS G-Code Modifier : CK (02/09/23 1532)     Goals  Short Term Goals  Time Frame for Short Term Goals: 7 days (2/16/23)  Short Term Goal 1: Pt will perform bed mobility with supervision  Short Term Goal 2: Pt will perform transfers with RW and SBA  Short Term Goal 3: Pt will ambulate 30ft with RW and SBA  Short Term Goal 4: Pt will perform 12-15 reps BLE exercises by 2/11/23  Patient Goals   Patient Goals : \"to walk with my walker with a little help\"     Education  Patient Education  Education Given To: Patient  Education Provided: Role of Therapy;Plan of Care;Home Exercise Program;Transfer Training  Education Method: Verbal  Barriers to Learning: None  Education Outcome: Verbalized understanding;Demonstrated understanding;Continued education needed  PT provided pt education on LLE WB status. Pt verbalized understanding. Needed constant reminders throughout evaluation to adhere to WB status. Therapy Time   Individual Concurrent Group Co-treatment   Time In 1320         Time Out 1355         Minutes 35         Timed Code Treatment Minutes: 25 Minutes (10 min eval)     If pt is unable to be seen after this session, please let this note serve as discharge summary. Please see case management note for discharge disposition. Thank you.     Sana Key,SPT

## 2023-02-09 NOTE — PROGRESS NOTES
Hospitalist Progress Note      PCP: Gabriela Diaz MD    Date of Admission: 2/7/2023    Chief Complaint: L hip pain    Subjective: no new c/o. Medications:  Reviewed    Infusion Medications    sodium chloride      sodium chloride 75 mL/hr at 02/08/23 1748     Scheduled Medications    ceFAZolin  2,000 mg IntraVENous Q8H    aspirin  81 mg Oral BID    acetaminophen  650 mg Oral Q6H    polyethylene glycol  17 g Oral Daily    busPIRone  30 mg Oral BID    FLUoxetine  60 mg Oral Daily    mometasone-formoterol  2 puff Inhalation BID    montelukast  10 mg Oral Nightly    atorvastatin  10 mg Oral Nightly    traZODone  150 mg Oral Nightly    sodium chloride flush  5-40 mL IntraVENous 2 times per day    tiotropium  2 puff Inhalation Daily    albuterol  2.5 mg Nebulization 4x daily     PRN Meds: sodium chloride flush, sodium chloride, ondansetron **OR** ondansetron, oxyCODONE **OR** oxyCODONE, morphine **OR** morphine      Intake/Output Summary (Last 24 hours) at 2/9/2023 0830  Last data filed at 2/8/2023 1637  Gross per 24 hour   Intake 900 ml   Output 135 ml   Net 765 ml         Physical Exam Performed:    /75   Pulse 93   Temp 97.9 °F (36.6 °C) (Oral)   Resp 21   Ht 5' 5\" (1.651 m)   Wt 127 lb 10.3 oz (57.9 kg)   SpO2 91%   BMI 21.24 kg/m²     General appearance: No apparent distress, appears stated age and cooperative. HEENT: Pupils equal, round, and reactive to light. Conjunctivae/corneas clear. Neck: Supple, with full range of motion. No jugular venous distention. Trachea midline. Respiratory:  Normal respiratory effort. Clear to auscultation, bilaterally without Rales/Wheezes/Rhonchi. Cardiovascular: Regular rate and rhythm with normal S1/S2 without murmurs, rubs or gallops. Abdomen: Soft, non-tender, non-distended with normal bowel sounds. Musculoskeletal: No clubbing, cyanosis or edema bilaterally. Full range of motion without deformity.   Skin: Skin color, texture, turgor normal.  No rashes or lesions. Neurologic:  Neurovascularly intact without any focal sensory/motor deficits. Cranial nerves: II-XII intact, grossly non-focal.  Psychiatric: Alert and oriented, thought content appropriate, normal insight  Capillary Refill: Brisk,< 3 seconds   Peripheral Pulses: +2 palpable, equal bilaterally       Labs:   Recent Labs     02/07/23  1708 02/09/23  0549   WBC 11.1* 14.1*   HGB 12.4 11.6*   HCT 38.0 36.3    316       Recent Labs     02/07/23  1708 02/08/23  0534 02/09/23  0549   * 132* 139   K 4.6 5.8* 4.5   CL 89* 97* 103   CO2 30 27 28   BUN 6* 11 16   CREATININE <0.5* <0.5* 0.6   CALCIUM 9.2 8.6 8.7   PHOS  --   --  3.1       Recent Labs     02/07/23  1708   AST 24   ALT 12   BILITOT 0.3   ALKPHOS 77       No results for input(s): INR in the last 72 hours. Recent Labs     02/07/23  1708   TROPONINI <0.01         Urinalysis:      Lab Results   Component Value Date/Time    NITRU POSITIVE 05/27/2022 11:05 PM    WBCUA 21-50 05/27/2022 11:05 PM    BACTERIA 3+ 05/27/2022 11:05 PM    RBCUA 0-2 05/27/2022 11:05 PM    BLOODU Negative 05/27/2022 11:05 PM    SPECGRAV >=1.030 05/27/2022 11:05 PM    GLUCOSEU Negative 05/27/2022 11:05 PM    GLUCOSEU NEGATIVE 04/13/2012 12:13 PM       Consults:    IP CONSULT TO HOSPITALIST      Assessment/Plan:    Active Hospital Problems    Diagnosis     Subcapital fracture of femur, left, closed, initial encounter ([facility] [S72.012A]      Priority: Medium    Chronic respiratory failure with hypoxia ([facility] [J96.11]      Priority: Medium    Stage 3 severe COPD by GOLD classification ([facility] [J44.9]      Priority: Medium    Anxiety and depression [F41.9, F32.A]     ILD (interstitial lung disease) ([facility] [J84.9]     Tobacco dependence [F17.200]          L hip fracture - Acute fx 2nd to mechanical fall.   Likely a pathologic osteoporotic fracture sustained in diseased bone w/ decreased mechanical resistance to a 'normal' mechanical load from a fall that wouldn't break normal healthy bone. Ortho consulted and appreciated s/p L pinning 8 Feb w/out complications. COPD - w/out chronic respiratory failure on baseline home O2 at 3L. Controlled on home medication regimen - continued. HypoNatremia - etiology clinically unable to determine but likely hypovolemic. Follow serial labs on IVF - resolved. Reviewed and documented as above. Tobacco Abuse - active and ongoing. Cessation counseled. Nicotine replacement PRN. Depression - mood stable, controlled on home Prozac/BuSpar w/ Trazodone at night - continued. HyperLipidemia - controlled on home Statin. Continue, w/ f/u and med adjustment w/ PCP      DVT Prophylaxis: IPC     Recent Labs     02/07/23  1708 02/09/23  0549    316       Diet: ADULT DIET; Regular  Code Status: Full Code      PT/OT Eval Status: not yet ordered. Dispo - Patient is likely to remain in-house at least until Friday/Sat 10/11 Feb pending clinical course, subspecialty recs and eventual PT/OT eval/recs if/when medically appropriate.         Yudith Dawkins MD

## 2023-02-09 NOTE — DISCHARGE INSTRUCTIONS
Keep dressing clean and dry. Change Mepilex every 3-5 days or as needed for excess drainage. Please call physician if increased redness around incision, increased drainage, fevers, or pain becomes significantly worse. Do not immerse in water such as baths but okay to shower with dressing on to protect wound. F/U with Dr Luiz Rogel in 14 days. Call [facility] for appointment time and date for follow up at [phone]. Weight Bearing on Surgical Side: toe touch weightbearing    Continue DVT Prophylaxis:  aspirin 81mg twice daily by mouth for 4 weeks post op    Pain Medications  You were given oxycodone (Oxycontin, Oxyir)  Wean off pain medications as you deem appropriate as long as pain is under control  Be sure to drink plenty of fluids (recommend water) while taking narcotic pain medications to prevent constipation   You may take an over the counter laxative or stool softener as needed to prevent/treat constipation as well, we recommend Senokot S OTC. We recommend that you consider taking these medications the entire time you are taking pain medication. Cold packs/Ice packs/Machine  May be used as much as necessary to control swelling/inflammation/soreness  Be sure to have a barrier (cloth, clothing, towel) between the site and the ice pack to prevent [address] office [phone] if  Increased redness, swelling, drainage of any kind, and/or pain to surgery site. As well as new onset fevers and or chills. These could signify an infection. Calf or thigh tenderness to touch as well as increased swelling or redness. This could signify a clot formation. Numbness or tingling to an area around the incision site or below the incision site (toes). Any rash appears, increased  or new onset nausea/vomiting occur. This may indicate a reaction to a medication.

## 2023-02-10 VITALS
TEMPERATURE: 98.2 F | DIASTOLIC BLOOD PRESSURE: 78 MMHG | RESPIRATION RATE: 18 BRPM | HEIGHT: 65 IN | OXYGEN SATURATION: 99 % | BODY MASS INDEX: 21.27 KG/M2 | WEIGHT: 127.65 LBS | SYSTOLIC BLOOD PRESSURE: 121 MMHG | HEART RATE: 89 BPM

## 2023-02-10 LAB
ALBUMIN SERPL-MCNC: 2.2 G/DL (ref 3.4–5)
ANION GAP SERPL CALCULATED.3IONS-SCNC: 8 MMOL/L (ref 3–16)
BUN BLDV-MCNC: 10 MG/DL (ref 7–20)
CALCIUM SERPL-MCNC: 8.5 MG/DL (ref 8.3–10.6)
CHLORIDE BLD-SCNC: 101 MMOL/L (ref 99–110)
CO2: 21 MMOL/L (ref 21–32)
CREAT SERPL-MCNC: <0.5 MG/DL (ref 0.6–1.2)
GFR SERPL CREATININE-BSD FRML MDRD: >60 ML/MIN/{1.73_M2}
GLUCOSE BLD-MCNC: 83 MG/DL (ref 70–99)
HCT VFR BLD CALC: 37.5 % (ref 36–48)
HEMOGLOBIN: 11.8 G/DL (ref 12–16)
MCH RBC QN AUTO: 28.3 PG (ref 26–34)
MCHC RBC AUTO-ENTMCNC: 31.6 G/DL (ref 31–36)
MCV RBC AUTO: 89.7 FL (ref 80–100)
PDW BLD-RTO: 14.6 % (ref 12.4–15.4)
PHOSPHORUS: 2.7 MG/DL (ref 2.5–4.9)
PLATELET # BLD: 292 K/UL (ref 135–450)
PMV BLD AUTO: 7.6 FL (ref 5–10.5)
POTASSIUM SERPL-SCNC: 4.5 MMOL/L (ref 3.5–5.1)
RBC # BLD: 4.17 M/UL (ref 4–5.2)
SODIUM BLD-SCNC: 130 MMOL/L (ref 136–145)
WBC # BLD: 11.5 K/UL (ref 4–11)

## 2023-02-10 PROCEDURE — 85027 COMPLETE CBC AUTOMATED: CPT

## 2023-02-10 PROCEDURE — 2580000003 HC RX 258: Performed by: STUDENT IN AN ORGANIZED HEALTH CARE EDUCATION/TRAINING PROGRAM

## 2023-02-10 PROCEDURE — 6360000002 HC RX W HCPCS: Performed by: STUDENT IN AN ORGANIZED HEALTH CARE EDUCATION/TRAINING PROGRAM

## 2023-02-10 PROCEDURE — 97530 THERAPEUTIC ACTIVITIES: CPT

## 2023-02-10 PROCEDURE — 6370000000 HC RX 637 (ALT 250 FOR IP): Performed by: STUDENT IN AN ORGANIZED HEALTH CARE EDUCATION/TRAINING PROGRAM

## 2023-02-10 PROCEDURE — 97110 THERAPEUTIC EXERCISES: CPT

## 2023-02-10 PROCEDURE — 99024 POSTOP FOLLOW-UP VISIT: CPT | Performed by: SPECIALIST/TECHNOLOGIST

## 2023-02-10 PROCEDURE — 94640 AIRWAY INHALATION TREATMENT: CPT

## 2023-02-10 PROCEDURE — 36415 COLL VENOUS BLD VENIPUNCTURE: CPT

## 2023-02-10 PROCEDURE — 51798 US URINE CAPACITY MEASURE: CPT

## 2023-02-10 PROCEDURE — 97535 SELF CARE MNGMENT TRAINING: CPT

## 2023-02-10 PROCEDURE — 80069 RENAL FUNCTION PANEL: CPT

## 2023-02-10 PROCEDURE — 6370000000 HC RX 637 (ALT 250 FOR IP): Performed by: SPECIALIST/TECHNOLOGIST

## 2023-02-10 PROCEDURE — APPNB60 APP NON BILLABLE TIME 46-60 MINS: Performed by: SPECIALIST/TECHNOLOGIST

## 2023-02-10 PROCEDURE — 94761 N-INVAS EAR/PLS OXIMETRY MLT: CPT

## 2023-02-10 PROCEDURE — 97116 GAIT TRAINING THERAPY: CPT

## 2023-02-10 PROCEDURE — 2700000000 HC OXYGEN THERAPY PER DAY

## 2023-02-10 RX ORDER — ACETAMINOPHEN 325 MG/1
650 TABLET ORAL EVERY 6 HOURS PRN
Status: DISCONTINUED | OUTPATIENT
Start: 2023-02-10 | End: 2023-02-10 | Stop reason: HOSPADM

## 2023-02-10 RX ADMIN — OXYCODONE HYDROCHLORIDE 5 MG: 5 TABLET ORAL at 05:10

## 2023-02-10 RX ADMIN — SODIUM CHLORIDE, PRESERVATIVE FREE 10 ML: 5 INJECTION INTRAVENOUS at 09:03

## 2023-02-10 RX ADMIN — FLUOXETINE 60 MG: 20 CAPSULE ORAL at 09:01

## 2023-02-10 RX ADMIN — ASPIRIN 81 MG: 81 TABLET, COATED ORAL at 09:01

## 2023-02-10 RX ADMIN — OXYCODONE HYDROCHLORIDE 10 MG: 5 TABLET ORAL at 13:12

## 2023-02-10 RX ADMIN — ACETAMINOPHEN 325MG 650 MG: 325 TABLET ORAL at 10:30

## 2023-02-10 RX ADMIN — Medication 2000 MG: at 06:21

## 2023-02-10 RX ADMIN — MORPHINE SULFATE 2 MG: 2 INJECTION, SOLUTION INTRAMUSCULAR; INTRAVENOUS at 10:33

## 2023-02-10 RX ADMIN — OXYCODONE HYDROCHLORIDE 10 MG: 5 TABLET ORAL at 09:00

## 2023-02-10 RX ADMIN — ALBUTEROL SULFATE 2.5 MG: 2.5 SOLUTION RESPIRATORY (INHALATION) at 11:44

## 2023-02-10 RX ADMIN — ALBUTEROL SULFATE 2.5 MG: 2.5 SOLUTION RESPIRATORY (INHALATION) at 15:52

## 2023-02-10 RX ADMIN — Medication 2 PUFF: at 08:59

## 2023-02-10 RX ADMIN — TIOTROPIUM BROMIDE INHALATION SPRAY 2 PUFF: 3.12 SPRAY, METERED RESPIRATORY (INHALATION) at 08:59

## 2023-02-10 RX ADMIN — ALBUTEROL SULFATE 2.5 MG: 2.5 SOLUTION RESPIRATORY (INHALATION) at 08:59

## 2023-02-10 RX ADMIN — ACETAMINOPHEN 325MG 650 MG: 325 TABLET ORAL at 17:27

## 2023-02-10 RX ADMIN — BUSPIRONE HYDROCHLORIDE 30 MG: 5 TABLET ORAL at 09:01

## 2023-02-10 ASSESSMENT — PAIN DESCRIPTION - FREQUENCY
FREQUENCY: CONTINUOUS

## 2023-02-10 ASSESSMENT — PAIN SCALES - GENERAL
PAINLEVEL_OUTOF10: 6
PAINLEVEL_OUTOF10: 7
PAINLEVEL_OUTOF10: 4
PAINLEVEL_OUTOF10: 6
PAINLEVEL_OUTOF10: 5
PAINLEVEL_OUTOF10: 6
PAINLEVEL_OUTOF10: 4
PAINLEVEL_OUTOF10: 7

## 2023-02-10 ASSESSMENT — PAIN DESCRIPTION - PAIN TYPE
TYPE: SURGICAL PAIN
TYPE: SURGICAL PAIN

## 2023-02-10 ASSESSMENT — PAIN - FUNCTIONAL ASSESSMENT
PAIN_FUNCTIONAL_ASSESSMENT: PREVENTS OR INTERFERES SOME ACTIVE ACTIVITIES AND ADLS
PAIN_FUNCTIONAL_ASSESSMENT: PREVENTS OR INTERFERES WITH ALL ACTIVE AND SOME PASSIVE ACTIVITIES
PAIN_FUNCTIONAL_ASSESSMENT: PREVENTS OR INTERFERES WITH ALL ACTIVE AND SOME PASSIVE ACTIVITIES

## 2023-02-10 ASSESSMENT — PAIN DESCRIPTION - ORIENTATION
ORIENTATION: LEFT

## 2023-02-10 ASSESSMENT — PAIN DESCRIPTION - ONSET
ONSET: ON-GOING

## 2023-02-10 ASSESSMENT — PAIN DESCRIPTION - DESCRIPTORS
DESCRIPTORS: PATIENT UNABLE TO DESCRIBE
DESCRIPTORS: ACHING
DESCRIPTORS: PATIENT UNABLE TO DESCRIBE

## 2023-02-10 ASSESSMENT — PAIN DESCRIPTION - LOCATION
LOCATION: HIP

## 2023-02-10 NOTE — PROGRESS NOTES
Physical Therapy  Facility/Department: [place] [address] - MED SURG/ORTHO  Daily Treatment Note  NAME: Demetris Sue  : 1955  MRN: [id number]    Date of Service: 2/10/2023    Discharge Recommendations:  Subacute/Skilled Nursing Facility     AM-PAC Basic Mobility - Inpatient   How much help is needed turning from your back to your side while in a flat bed without using bedrails?: A Little  How much help is needed moving from lying on your back to sitting on the side of a flat bed without using bedrails?: A Little  How much help is needed moving to and from a bed to a chair?: A Lot  How much help is needed standing up from a chair using your arms?: A Little  How much help is needed walking in hospital room?: A Lot  How much help is needed climbing 3-5 steps with a railing?: Total  AM-PAC Inpatient Mobility Raw Score : 14  AM-PAC Inpatient T-Scale Score : 38.1  Mobility Inpatient CMS 0-100% Score: 61.29  Mobility Inpatient CMS G-Code Modifier : CL            Patient Diagnosis(es): The primary encounter diagnosis was Closed fracture of left hip, initial encounter ([facility] Diagnoses of Fall, initial encounter, Chronic obstructive pulmonary disease with acute exacerbation ([facility] and Subcapital fracture of femur, left, closed, initial encounter [facility]) were also pertinent to this visit. Assessment   Assessment: . Activity Tolerance: Patient limited by endurance     Plan    Physcial Therapy Plan  General Plan: 1 time a day 7 days a week  Current Treatment Recommendations: Strengthening;ROM;Balance training;Functional mobility training; Endurance training;Gait training;Stair training;Home exercise program;Safety education & training; Therapeutic activities     Restrictions  Restrictions/Precautions  Restrictions/Precautions: Weight Bearing, ROM Restrictions, Fall Risk, Surgical Protocols, General Precautions  Lower Extremity Weight Bearing Restrictions  Left Lower Extremity Weight Bearing: Toe Touch Weight Bearing  Position Activity Restriction  Hip Precautions: Posterior hip precautions  Other position/activity restrictions: 4L, rehman     Subjective    Subjective  Subjective: agreeable  Pain: 6/10 in Left hip  Orientation  Overall Orientation Status: Within Functional Limits  Cognition  Overall Cognitive Status: Exceptions  Insights: Decreased awareness of deficits     Objective   Vitals  Heart Rate: 89  BP: 121/78  MAP (Calculated): 92  SpO2: 96 %  O2 Device: Nasal cannula (4L)  Bed Mobility Training  Bed Mobility Training: Yes  Supine to Sit: Minimum assistance; Additional time (HOB elevated and use of rails)  Scooting: Stand-by assistance  Duration: 5  Balance  Sitting: Intact  Standing: Impaired (RW)  Standing - Static: Constant support; Fair  Standing - Dynamic: Fair;Constant support  Transfer Training  Transfer Training: Yes  Sit to Stand: Minimum assistance;Assist X2;Additional time; Adaptive equipment (RW)  Stand to Sit: Minimum assistance;Assist X2;Additional time; Adaptive equipment (RW)  Stand Pivot Transfers: Minimum assistance;Assist X2;Additional time; Adaptive equipment (RW)  Gait Training  Gait Training: Yes  Left Side Weight Bearing: Toe touch  Gait  Overall Level of Assistance: Minimum assistance;Assist X2;Additional time; Adaptive equipment  Interventions: Weight shifting training/pressure relief;Verbal cues; Safety awareness training  Base of Support: Shift to right  Speed/Jadyn: Slow  Step Length: Left shortened  Stance: Left decreased  Gait Abnormalities: Antalgic; Step to gait  Distance (ft): 5 Feet  Assistive Device: Gait belt;Walker (unable to con't further 2* increasing VELÁSQUEZ and faitgue)     PT Exercises  Exercise Treatment: BLE ex: AP, LAQ, QS, GS     Safety Devices  Type of Devices: Left in chair;Chair alarm in place;Call light within reach;Nurse notified;Gait belt       Goals  Short Term Goals  Time Frame for Short Term Goals: 7 days (2/16/23)  Short Term Goal 1: Pt will perform bed mobility with supervision; 2/10 min A  Short Term Goal 2: Pt will perform transfers with RW and SBA; 2/10 Cornelio of 2  Short Term Goal 3: Pt will ambulate 30ft with RW and SBA; 2/10 min AOf 2 for 5' RW  Short Term Goal 4: Pt will perform 12-15 reps BLE exercises by 2/11/23; 2/10 progressing  Patient Goals   Patient Goals : \"to walk with my walker with a little help\"    Education  Patient Education  Education Given To: Patient  Education Provided: Role of Therapy;Plan of Care;Home Exercise Program;Transfer Training  Education Method: Verbal  Barriers to Learning: None  Education Outcome: Verbalized understanding;Demonstrated understanding;Continued education needed    Therapy Time   Individual Concurrent Group Co-treatment   Time In 1040         Time Out 1120         Minutes [address] [place] [address] [id number]

## 2023-02-10 NOTE — PLAN OF CARE
Problem: Discharge Planning  Goal: Discharge to home or other facility with appropriate resources  Outcome: Progressing     Problem: Pain  Goal: Verbalizes/displays adequate comfort level or baseline comfort level  Outcome: Progressing  Pt scoring pain on 0-10 scale. Pain medications given per MAR. Pt instructed to call out when pain level increasing. Call light within reach. Problem: Skin/Tissue Integrity  Goal: Absence of new skin breakdown  Description: 1. Monitor for areas of redness and/or skin breakdown  2. Assess vascular access sites hourly  3. Every 4-6 hours minimum:  Change oxygen saturation probe site  4. Every 4-6 hours:  If on nasal continuous positive airway pressure, respiratory therapy assess nares and determine need for appliance change or resting period.   Outcome: Progressing

## 2023-02-10 NOTE — FLOWSHEET NOTE
Pt discharged to [facility] [place] via transport. Report given to NORMA GIRALDO [facility] from [facility] [place]. Pt's IV discontinued with no complications noted. Med rec, ASHLEY completed and AVS paperwork printed out/sent with pt. Pt's belongings, pain med script, discharge paperwork sent with pt transport.

## 2023-02-10 NOTE — PROGRESS NOTES
Occupational Therapy  Facility/Department: [facility] C5 - MED SURG/ORTHO  Daily Treatment Note  NAME: Nelson Srinivasan  : 1955  MRN: [id number]    Date of Service: 2/10/2023    Discharge Recommendations:  Subacute/Skilled Nursing Facility       AM-PAC score  AM-PAC Inpatient Daily Activity Raw Score: 15 (02/10/23 115)  AM-PAC Inpatient ADL T-Scale Score : 34.69 (02/10/23 115)  ADL Inpatient CMS 0-100% Score: 56.46 (02/10/23 115)  ADL Inpatient CMS G-Code Modifier : CK (02/10/23 1159)    Patient Diagnosis(es): The primary encounter diagnosis was Closed fracture of left hip, initial encounter ([facility] Diagnoses of Fall, initial encounter, Chronic obstructive pulmonary disease with acute exacerbation ([facility] and Subcapital fracture of femur, left, closed, initial encounter [facility]) were also pertinent to this visit. Assessment    Assessment: Pt tolerated OT session fair. Pt requiring frequent and extended rest breaks due to dyspnea. Pt SPO2 90% or above on 4L, however very dyspneic with minimal activity. Pt requiring min A of 1-2 for functional transfers with SW, VCs to maintain TTWB with transfers initially, demos good ability to maintain as session progressed. Co-tx collaboration this date with PT staff to safely progress pt toward goals. Pt will have better occupational performance outcomes within a co-treatment than 1:1 session. Pt functioning below her baseline, would benefit from SNF at d/c. Activity Tolerance: Patient limited by endurance  Discharge Recommendations: Subacute/Skilled Nursing Facility      Plan   Occupational Therapy Plan  Times Per Week: 4-5x/wk     Restrictions  Restrictions/Precautions  Restrictions/Precautions: Weight Bearing;ROM Restrictions; Fall Risk;Surgical Protocols; General Precautions  Lower Extremity Weight Bearing Restrictions  Left Lower Extremity Weight Bearing: Toe Touch Weight Bearing  Position Activity Restriction  Hip Precautions: Posterior hip precautions  Other position/activity restrictions: 4L, rehman    Subjective   Subjective  Subjective: Pt resting in bed, pleasant and agreeable to OT treatment. Orientation  Overall Orientation Status: Within Functional Limits    Pain: 6/10 in Left hip    Cognition  Overall Cognitive Status: Exceptions  Insights: Decreased awareness of deficits        Objective    Vitals  /78  HR 89  SPO2 97% 4L       Bed Mobility Training  Bed Mobility Training: Yes  Supine to Sit: Minimum assistance; Additional time (HOB elevated and use of rails)  Scooting: Stand-by assistance    Balance  Sitting: Intact  Standing: Impaired (RW)  Standing - Static: Constant support; Fair  Standing - Dynamic: Fair;Constant support    Transfer Training  Transfer Training: Yes  Sit to Stand: Minimum assistance;Assist X2;Additional time; Adaptive equipment (RW)  Stand to Sit: Minimum assistance;Assist X2;Additional time; Adaptive equipment (RW)  Stand Pivot Transfers: Minimum assistance;Assist X2;Additional time; Adaptive equipment (RW)       ADL  Grooming: Setup;Minimal assistance;Verbal cueing; Increased time to complete  Grooming Skilled Clinical Factors: to comb hair and wash face seated in chair  Toileting: Dependent/Total  Toileting Skilled Clinical Factors: rehman          Safety Devices  Type of Devices: Left in chair;Chair alarm in place;Call light within reach;Nurse notified;Gait belt       Patient Education  Education Given To: Patient  Education Provided: Role of Therapy;Plan of Care;ADL Adaptive Strategies;Transfer Training;Precautions; Fall Prevention Strategies  Education Provided Comments: PLB  Education Method: Demonstration;Verbal  Barriers to Learning: None  Education Outcome: Verbalized understanding;Demonstrated understanding    Goals  Short Term Goals  Time Frame for Short Term Goals: Short term goals to be met by 2/16 unless otherwise specified -- goals ongoing 2/10/23  Short Term Goal 1: Pt will perform sit<>stand transfers with min A by 2/13  Short Term Goal 2: Pt will perform LB dressing with SBA and LRAD  Short Term Goal 3: Pt will perform toileting tasks with SBA and LRAD  Short Term Goal 4: Pt will verbalize weightbearing precautions w/o VC-- GOAL MET, pt verbalizes 2/10/23  Short Term Goal 5: Pt will tolerate functional mobility to/from bathroom while adhering to TTWB precautions with only 1 VC  Patient Goals   Patient goals : \"to get home without pain\"       Therapy Time   Individual Concurrent Group Co-treatment   Time In 1045         Time Out 1130         Minutes [address], ROTH/L

## 2023-02-10 NOTE — PROGRESS NOTES
Department of Orthopedic Surgery  Physician Assistant   Progress Note    Subjective:       Systemic or Specific Complaints:Pain Control    Objective:     Patient Vitals for the past 24 hrs:   BP Temp Temp src Pulse Resp SpO2   02/10/23 1054 121/78 -- -- 89 -- 96 %   02/10/23 1037 135/87 98.2 °F (36.8 °C) Oral 98 -- 96 %   02/10/23 0909 (!) 150/78 100.4 °F (38 °C) Axillary (!) 108 24 97 %   02/10/23 0903 -- -- -- -- -- 98 %   02/10/23 0540 -- -- -- -- 22 --   02/10/23 0415 (!) 141/81 98.8 °F (37.1 °C) Oral 83 22 91 %   02/10/23 0030 113/74 98.3 °F (36.8 °C) Oral 78 18 --   02/09/23 2104 -- -- -- -- 24 --   02/09/23 2034 -- -- -- -- 22 --   02/09/23 2032 -- -- -- 100 -- --   02/09/23 2000 113/61 98.8 °F (37.1 °C) Oral 100 20 96 %   02/09/23 1942 -- -- -- 88 18 97 %   02/09/23 1937 -- -- -- 92 18 94 %   02/09/23 1543 -- -- -- -- 19 --   02/09/23 1533 -- -- -- 91 20 94 %   02/09/23 1513 -- -- -- -- 18 --   02/09/23 1329 -- -- -- -- 18 --   02/09/23 1326 106/68 -- -- (!) 102 -- 90 %   02/09/23 1259 -- -- -- -- 18 --   02/09/23 1210 -- -- -- -- -- 92 %       General: alert, appears stated age, and cooperative   Wound: Wound clean and dry no evidence of infection. , No Erythema, No Drainage, Wound Intact, and Positive for Edema   Motion: Painful range of Motion in affected extremity   DVT Exam: No evidence of DVT seen on physical exam.     Additional exam: Pt seen sitting up in chair at time of interview.  NC O2 applied, pt pursed lip breathing  Legs at neutral alignment in seated position  Thigh moderately swollen, compartments soft and compressible  EHL FHL gastroc ant tib motor intact  DP and PT pulses 2+  Sensation intact to light touch    Data Review  CBC:   Lab Results   Component Value Date/Time    WBC 11.5 02/10/2023 05:30 AM    RBC 4.17 02/10/2023 05:30 AM    HGB 11.8 02/10/2023 05:30 AM    HCT 37.5 02/10/2023 05:30 AM     02/10/2023 05:30 AM       Renal:   Lab Results   Component Value Date/Time     02/10/2023 05:30 AM    K 4.5 02/10/2023 05:30 AM    K 5.8 02/08/2023 05:34 AM     02/10/2023 05:30 AM    CO2 21 02/10/2023 05:30 AM    BUN 10 02/10/2023 05:30 AM    CREATININE <0.5 02/10/2023 05:30 AM    GLUCOSE 83 02/10/2023 05:30 AM    CALCIUM 8.5 02/10/2023 05:30 AM            Assessment:     S/p left hip pinning, POD 2. DOS 8/3/65 by Dr Chico Roper:      1:  PT/OT as able. TTWB on the left side. Has been engaged with hospice in past and doesn't think she can go home at this time. Has requested possibly [address] [place] [address],[address] at d/c  2:  Continue Deep venous thrombosis prophylaxis - ASA 81 BID for 30days  3:  Continue Pain Control  4: PT/OT  5: post op ancef completed  6: OK to discharge from ortho standpoint, DCP updated.  Follow up with Dr Margarette Hernandez in 2 weeks    [place], [place]

## 2023-02-10 NOTE — CARE COORDINATION
VM received from [facility] liaison, stating she could not pull patient up in [facility] [address] CM called and left  this morning with patient information with request for call back.

## 2023-02-10 NOTE — CARE COORDINATION
Call back received from [facility] [place]. States able to accept and precert approved with payor for 7 days. 10:39 AM  Pt updated that [facility] [place] accepted. Pt still in agreement to dc to SNF when medically ready.

## 2023-02-10 NOTE — PROGRESS NOTES
Hospitalist Progress Note      PCP: Mira Wall MD    Date of Admission: 2/7/2023    Chief Complaint: L hip pain    Subjective: no new c/o. Medications:  Reviewed    Infusion Medications    sodium chloride      sodium chloride 75 mL/hr at 02/09/23 1302     Scheduled Medications    ceFAZolin  2,000 mg IntraVENous Q8H    aspirin  81 mg Oral BID    acetaminophen  650 mg Oral Q6H    polyethylene glycol  17 g Oral Daily    busPIRone  30 mg Oral BID    FLUoxetine  60 mg Oral Daily    mometasone-formoterol  2 puff Inhalation BID    montelukast  10 mg Oral Nightly    atorvastatin  10 mg Oral Nightly    traZODone  150 mg Oral Nightly    sodium chloride flush  5-40 mL IntraVENous 2 times per day    tiotropium  2 puff Inhalation Daily    albuterol  2.5 mg Nebulization 4x daily     PRN Meds: sodium chloride flush, sodium chloride, ondansetron **OR** ondansetron, oxyCODONE **OR** oxyCODONE, morphine **OR** morphine      Intake/Output Summary (Last 24 hours) at 2/10/2023 0854  Last data filed at 2/10/2023 0644  Gross per 24 hour   Intake 830 ml   Output 2500 ml   Net -1670 ml         Physical Exam Performed:    BP (!) 141/81   Pulse 83   Temp 98.8 °F (37.1 °C) (Oral)   Resp 22   Ht 5' 5\" (1.651 m)   Wt 127 lb 10.3 oz (57.9 kg)   SpO2 91%   BMI 21.24 kg/m²     General appearance: No apparent distress, appears stated age and cooperative. HEENT: Pupils equal, round, and reactive to light. Conjunctivae/corneas clear. Neck: Supple, with full range of motion. No jugular venous distention. Trachea midline. Respiratory:  Normal respiratory effort. Clear to auscultation, bilaterally without Rales/Wheezes/Rhonchi. Cardiovascular: Regular rate and rhythm with normal S1/S2 without murmurs, rubs or gallops. Abdomen: Soft, non-tender, non-distended with normal bowel sounds. Musculoskeletal: No clubbing, cyanosis or edema bilaterally. Full range of motion without deformity.   Skin: Skin color, texture, turgor normal.  No rashes or lesions. Neurologic:  Neurovascularly intact without any focal sensory/motor deficits. Cranial nerves: II-XII intact, grossly non-focal.  Psychiatric: Alert and oriented, thought content appropriate, normal insight  Capillary Refill: Brisk,< 3 seconds   Peripheral Pulses: +2 palpable, equal bilaterally       Labs:   Recent Labs     02/07/23  1708 02/09/23  0549 02/10/23  0530   WBC 11.1* 14.1* 11.5*   HGB 12.4 11.6* 11.8*   HCT 38.0 36.3 37.5    316 292       Recent Labs     02/08/23  0534 02/09/23  0549 02/10/23  0530   * 139 130*   K 5.8* 4.5 4.5   CL 97* 103 101   CO2 27 28 21   BUN 11 16 10   CREATININE <0.5* 0.6 <0.5*   CALCIUM 8.6 8.7 8.5   PHOS  --  3.1 2.7       Recent Labs     02/07/23  1708   AST 24   ALT 12   BILITOT 0.3   ALKPHOS 77       No results for input(s): INR in the last 72 hours. Recent Labs     02/07/23  1708   TROPONINI <0.01         Urinalysis:      Lab Results   Component Value Date/Time    NITRU POSITIVE 05/27/2022 11:05 PM    WBCUA 21-50 05/27/2022 11:05 PM    BACTERIA 3+ 05/27/2022 11:05 PM    RBCUA 0-2 05/27/2022 11:05 PM    BLOODU Negative 05/27/2022 11:05 PM    SPECGRAV >=1.030 05/27/2022 11:05 PM    GLUCOSEU Negative 05/27/2022 11:05 PM    GLUCOSEU NEGATIVE 04/13/2012 12:13 PM       Consults:    IP CONSULT TO HOSPITALIST      Assessment/Plan:    Active Hospital Problems    Diagnosis     Subcapital fracture of femur, left, closed, initial encounter ([facility] [S72.012A]      Priority: Medium    Chronic respiratory failure with hypoxia ([facility] [J96.11]      Priority: Medium    Stage 3 severe COPD by GOLD classification ([facility] [J44.9]      Priority: Medium    Anxiety and depression [F41.9, F32.A]     ILD (interstitial lung disease) ([facility] [J84.9]     Tobacco dependence [F17.200]          L hip fracture - Acute fx 2nd to mechanical fall.   Likely a pathologic osteoporotic fracture sustained in diseased bone w/ decreased mechanical resistance to a 'normal' mechanical load from a fall that wouldn't break normal healthy bone. Ortho consulted and appreciated s/p L pinning 8 Feb w/out complications. COPD - w/out chronic respiratory failure on baseline home O2 at 3L. Controlled on home medication regimen - continued. HypoNatremia - etiology clinically unable to determine but likely hypovolemic. Follow serial labs on IVF - resolved. Reviewed and documented as above. Tobacco Abuse - active and ongoing. Cessation counseled. Nicotine replacement PRN. Depression - mood stable, controlled on home Prozac/BuSpar w/ Trazodone at night - continued. HyperLipidemia - controlled on home Statin. Continue, w/ f/u and med adjustment w/ PCP      DVT Prophylaxis: IPC     Recent Labs     02/07/23  1708 02/09/23  0549 02/10/23  0530    316 292       Diet: ADULT DIET; Regular  Code Status: Full Code      PT/OT Eval Status: seen w/ recs for SNF. Dispo - Patient is likely to remain in-house at least until Friday/Sat 10/11 Feb pending clinical course, subspecialty recs and placement decision.         Olga Lidia Griffith MD

## 2023-02-13 NOTE — DISCHARGE SUMMARY
Hospital Medicine Discharge Summary    Patient ID: Abhinav Wong      Patient's PCP: Oralia Ramirez MD    Admit Date: 2/7/2023     Discharge Date: 2/10/2023      Admitting Physician: Best Zapien MD     Discharge Physician: Mehdi Obregon MD     Discharge Diagnoses: Active Hospital Problems    Diagnosis     Subcapital fracture of femur, left, closed, initial encounter ([facility] [S72.012A]      Priority: Medium    Chronic respiratory failure with hypoxia ([facility] [J96.11]      Priority: Medium    Stage 3 severe COPD by GOLD classification ([facility] [J44.9]      Priority: Medium    Anxiety and depression [F41.9, F32.A]     ILD (interstitial lung disease) ([facility] [J84.9]     Tobacco dependence [F17.200]        The patient was seen and examined on day of discharge and this discharge summary is in conjunction with any daily progress note from day of discharge. Hospital Course:       L hip fracture - Acute fx 2nd to mechanical fall. Likely a pathologic osteoporotic fracture sustained in diseased bone w/ decreased mechanical resistance to a 'normal' mechanical load from a fall that wouldn't break normal healthy bone. Ortho consulted and appreciated s/p L pinning 8 Feb w/out complications. COPD - w/out chronic respiratory failure on baseline home O2 at 3L. Controlled on home medication regimen - continued. HypoNatremia - etiology clinically unable to determine but likely hypovolemic. Followed serial labs on IVF - resolved. Tobacco Abuse - active and ongoing. Cessation counseled. Nicotine replacement PRN. Depression - mood stable, controlled on home Prozac/BuSpar w/ Trazodone at night - continued. HyperLipidemia - controlled on home Statin. Continue, w/ f/u and med adjustment w/ PCP       Labs:  For convenience and continuity at follow-up the following most recent labs are provided:      CBC:    Lab Results   Component Value Date/Time    WBC 11.5 02/10/2023 05:30 AM    HGB 11.8 02/10/2023 05:30 AM    HCT 37.5 02/10/2023 05:30 AM     02/10/2023 05:30 AM       Renal:    Lab Results   Component Value Date/Time     02/10/2023 05:30 AM    K 4.5 02/10/2023 05:30 AM    K 5.8 02/08/2023 05:34 AM     02/10/2023 05:30 AM    CO2 21 02/10/2023 05:30 AM    BUN 10 02/10/2023 05:30 AM    CREATININE <0.5 02/10/2023 05:30 AM    CALCIUM 8.5 02/10/2023 05:30 AM    PHOS 2.7 02/10/2023 05:30 AM         Significant Diagnostic Studies    Radiology:   XR PELVIS (1-2 VIEWS)   Final Result   3 screws traverse the subcapital fracture         XR HIP LEFT (2-3 VIEWS)   Final Result   Status post ORIF of left hip fracture         FLUORO FOR SURGICAL PROCEDURES   Final Result      XR CHEST PORTABLE   Final Result      Patchy opacity in the left lower lobe and mildly in the right upper lobe         XR HIP 2-3 VW W PELVIS LEFT   Final Result   Impacted subcapital fracture on the left                Consults:     IP CONSULT TO HOSPITALIST    Disposition: [facility]     Condition at Discharge: Stable    Discharge Instructions/Follow-up:  w/ PCP 1-2 weeks and subspecialists as arranged. Code Status:  Full Code    Activity: activity as tolerated    Diet: regular diet      Discharge Medications:     Discharge Medication List as of 2/10/2023  5:28 PM             Details   oxyCODONE (ROXICODONE) 5 MG immediate release tablet Take 1 tablet by mouth every 4 hours as needed for Pain for up to 3 days.  Max Daily Amount: 30 mg, Disp-12 tablet, R-0Print      aspirin 81 MG EC tablet Take 1 tablet by mouth in the morning and at bedtime for 60 doses, Disp-30 tablet, R-3DC to SNF      polyethylene glycol (GLYCOLAX) 17 g packet Take 17 g by mouth daily, Disp-527 g, R-1DC to SNF                Details   albuterol sulfate HFA (VENTOLIN HFA) 108 (90 Base) MCG/ACT inhaler Inhale 2 puffs into the lungs every 6 hours as needed for Wheezing or Shortness of Breath, Disp-3 each, R-1Normal      ipratropium-albuterol (DUONEB) 0.5-2.5 (3) MG/3ML SOLN nebulizer solution Inhale 3 mLs into the lungs every 4 hours as needed for Shortness of Breath, Disp-360 mL, R-5Normal      traZODone (DESYREL) 150 MG tablet Take 1-2 tablets by mouth nightly TAKE 2 TABLETS AT BEDTIME, Disp-180 tablet, R-1Normal      guaiFENesin (MUCINEX) 600 MG extended release tablet Take 1 tablet by mouth 2 times daily as needed for Congestion, Disp-60 tablet, R-1Normal      fluticasone-salmeterol (WIXELA INHUB) 500-50 MCG/DOSE diskus inhaler Inhale 1 puff into the lungs every 12 hours, Disp-180 each, R-1Normal      albuterol (PROVENTIL) (2.5 MG/3ML) 0.083% nebulizer solution Take 3 mLs by nebulization every 6 hours as needed for Wheezing or Shortness of Breath DX COPD J44.9, Disp-120 vial, R-6Normal      montelukast (SINGULAIR) 10 MG tablet TAKE 1 TABLET BY MOUTH EACH NIGHT, Disp-90 tablet, R-1Normal      BD PEN NEEDLE SVETLANA U/F 32G X 4 MM MISC Disp-100 each, R-5, Normal      FLUoxetine (PROZAC) 20 MG capsule TAKE 3 CAPSULES BY MOUTH DAILY, Disp-270 capsule,R-1Normal      simvastatin (ZOCOR) 20 MG tablet TAKE 1 TABLET EVERY EVENING, Disp-90 tablet,R-1Normal      busPIRone (BUSPAR) 30 MG tablet TAKE 1 TABLET TWICE DAILY, Disp-180 tablet,R-1Normal             Time Spent on discharge is more than 33 minutes in the examination, evaluation, counseling and review of medications and discharge plan. Signed:    Eboni Khan MD   2/13/2023      Thank you Evon Wiley MD for the opportunity to be involved in this patient's care. If you have any questions or concerns please feel free to contact me at [phone].

## 2023-02-27 NOTE — CARE COORDINATION
CASE MANAGEMENT DISCHARGE SUMMARY      Discharge to: Home with [facility]      Transportation:    Family/car: yes     Confirmed discharge plan with:     Patient: yes     Family: no   Per pt     Facility/Agency, name: [facility] [address] faxed     RN, name: Luna Madison RN    Pt already active with [facility] for o2, provided port o2 tank for discharge home. Note: Discharging nurse to complete ASHLEY, reconcile AVS, and place final copy with patient's discharge packet. RN to ensure that written prescriptions for  Level II medications are sent with patient to the facility as per protocol. No

## 2023-12-06 ENCOUNTER — TELEPHONE (OUTPATIENT)
Dept: FAMILY MEDICINE CLINIC | Age: 68
End: 2023-12-06

## 2024-02-15 NOTE — PROGRESS NOTES
Hospitalist Progress Note      PCP: Lala Calixto MD    Date of Admission: 3/14/2021    Chief Complaint: Shortness of breath    Hospital Course: Reviewed H&P    Subjective: Patient reports that she feels better and offers no new complaints. Labs suggestive of hyponatremia 125 and continue to have leukocytosis likely related to steroids. Will change IV steroids to p.o. prednisone from tomorrow. Continue rest of the current care. Likely DC home tomorrow. Medications:  Reviewed    Infusion Medications    dextrose       Scheduled Medications    [START ON 3/18/2021] predniSONE  40 mg Oral Daily    vitamin D  50,000 Units Oral Weekly    ipratropium-albuterol  1 ampule Inhalation BID    famotidine  20 mg Oral BID    busPIRone  30 mg Oral BID    FLUoxetine  60 mg Oral Daily    montelukast  10 mg Oral Nightly    atorvastatin  10 mg Oral Daily    Forteo  20 mcg Subcutaneous Daily    traZODone  150 mg Oral Nightly    varenicline  1 mg Oral BID    budesonide-formoterol  2 puff Inhalation BID    sodium chloride flush  10 mL Intravenous 2 times per day    enoxaparin  40 mg Subcutaneous Daily    nicotine  1 patch Transdermal Daily     PRN Meds: ipratropium-albuterol, guaiFENesin, glucose, dextrose, glucagon (rDNA), dextrose, sodium chloride flush, potassium chloride **OR** potassium alternative oral replacement **OR** potassium chloride, magnesium sulfate, promethazine **OR** ondansetron, senna, acetaminophen **OR** acetaminophen, perflutren lipid microspheres    No intake or output data in the 24 hours ending 03/17/21 1625    Physical Exam Performed:  /72   Pulse 109   Temp 97.6 °F (36.4 °C) (Oral)   Resp 16   Ht 5' 3\" (1.6 m)   Wt 140 lb 1.6 oz (63.5 kg)   SpO2 94%   BMI 24.82 kg/m²     General appearance: Pt in mild distress after returning from restroom(w/o O2), 3L O2 replaced and pt improved appears stated age and cooperative. HEENT: Pupils equal, round, and reactive to light. Conjunctivae/corneas clear. Neck: Supple, with full range of motion. No jugular venous distention. Trachea midline. Respiratory:  Increased respiratory effort, mild distress with exertion. Improved air exchange. Improving airflow to bases. Cardiovascular: Regular rate and rhythm with normal S1/S2 without murmurs, rubs or gallops. Abdomen: Soft, non-tender, non-distended with normal bowel sounds. Musculoskeletal: No clubbing, cyanosis or edema bilaterally. Full range of motion without deformity. Skin: Skin color, texture, turgor normal.  No rashes or lesions. Neurologic:  Neurovascularly intact without any focal sensory/motor deficits. Cranial nerves: II-XII intact, grossly non-focal.  Psychiatric: Alert and oriented, thought content appropriate, normal insight  Capillary Refill: Brisk,< 3 seconds   Peripheral Pulses: +2 palpable, equal bilaterally       Labs:   Recent Labs     03/15/21  0606 03/16/21  0943 03/17/21  1219   WBC 13.6* 23.0* 18.3*   HGB 13.4 13.4 12.9   HCT 39.0 40.4 38.0    285 298     Recent Labs     03/15/21  0606 03/16/21  0943 03/17/21  1219   * 128* 125*   K 4.3 3.8 4.1   CL 97* 96* 95*   CO2 23 21 22   BUN 14 12 16   CREATININE 0.6 0.6 <0.5*   CALCIUM 8.6 8.7 8.8     Recent Labs     03/14/21  1712 03/15/21  0606   AST 13* 13*   ALT 12 12   BILITOT 0.3 <0.2   ALKPHOS 56 50     No results for input(s): INR in the last 72 hours. Recent Labs     03/14/21  1712   TROPONINI <0.01     Radiology:  CT CHEST PULMONARY EMBOLISM W CONTRAST   Final Result   No evidence of pulmonary embolism or acute pulmonary abnormality. Mild scattered fibrotic pulmonary changes. Exaggerated thoracic kyphotic curvature with multiple chronic vertebral body   compression fractures. XR CHEST PORTABLE   Final Result   Increased dependent left basilar opacification, possibly infiltrate or   asymmetric edema.            Active Hospital Problems    Diagnosis Date Noted    Stage 3 severe COPD by GOLD classification ([facility] [J44.9] 12/03/2009     Priority: Medium    Acute on chronic respiratory failure with hypoxemia (HCC) [J96.21] 03/14/2021    Acute respiratory failure with hypoxia ([facility] [J96.01] 04/03/2020    Anxiety and depression [F41.9, F32.9] 12/14/2018    Chronic pain syndrome [G89.4] 12/14/2018    ILD (interstitial lung disease) ([facility] [J84.9] 04/06/2016    Tobacco dependence [F17.200] 09/27/2014    Hyponatremia [E87.1] 09/10/2012     Assessment/Plan:  Acute COPD exacerbation with decompensated hypoxic respiratory failure: improving  -Pt aware that 24hr home O2 will likely be needed, currently on 2L   -pt reports having O2 and a concentrator at home  -Continuous pulse oximetry monitoring initiated with PRN supplemental O2   -Continue home maintenance MDIs/nebulizer treatment including Advair, Mucinex and Singulair  -Encourage aggressive pulmonary toilet including incentive spirometry every 4H while awake  -DuoNebs scheduled Q4H while awake  -IV Solu-Medrol scheduled Q12H; POC glucose checks with PRN insulin coverage  -Procalcitonin level  - normal ; No Abx needed at this time   -Patient counseled on necessity of smoking cessation; nicotine replacement patch provided    Leukocytosis: worsening -likely due to steroids   -no known source of infection  -recheck CBC tomorrow AM    Mild Acute Hyponatremia - Likely due to Hypovolemia - unchanged  -Strict I's and O's  ; Encouraged PO fluid intake   - Received IVF    -Serial renal panels to ensure adequate electrolyte correction (while avoiding overly aggressive correction with subsequent CPM)     Osteoporosis with compression fractures  -Likely secondary to chronic recurrent need for systemic corticosteroids  -CT scan revealed multiple chronic vertebral body compression fractures  -Continue Forteo daily injection  -Calcium and vitamin D levels pending  -Fall risk parameters in place    DVT Prophylaxis: Lovenox   Diet: DIET GENERAL;  Code Status: [de-identified] : Patient is well nourished, well-developed, in no acute distress, with appropriate mood and affect. The patient is oriented to time, place, and person. Respirations are even and unlabored. Gait evaluation does reveal a limp. There is no inguinal adenopathy. Examination of the contralateral knee shows normal range of motion, strength, no tenderness, and intact skin. The affected limb is well-perfused, without skin lesions, shows a grossly normal motor and sensory examination. Knee motion is significantly reduced and does cause significant pain. The knee moves from  degrees. The knee is stable within that range-of-motion to AP and ML stress. The alignment of the knee is 5 degrees varus. Muscle strength is normal. Pedal pulses are palpable. Hip examination was negative.  Full Code    PT/OT Eval Status: Ambulatory     Dispo - Likely tomorrow pending continued clinical improvement    Nargis Ventura MD  Hospitalist Physician         The note was completed using EMR. Every effort was made to ensure accuracy; However, inadvertent computerized transcription errors may be present. [de-identified] : Long standing knee, AP knee, lateral knee, and patellar views of the right knee were ordered and taken in the office and demonstrate degenerative joint disease of the knee with joint space narrowing, osteophyte formation, and subchondral sclerosis.

## 2025-05-22 NOTE — CARE COORDINATION
CASE MANAGEMENT DISCHARGE SUMMARY      Discharge to: skilled to [id number] completed: yes  Hospital Exemption Notification (HENS) completed: yes. Document ID : [id number]    IMM given: (date) 2/9/23    New Durable Medical Equipment ordered/agency:     Transportation:       Medical Transport explained to [facility]. Pt/family voice no agency preference. Agency used: [address] up time: 1300   Ambulance form completed: Yes    Confirmed discharge plan with:     Patient: yes     Family:  yes     Facility/Agency, name:  [facility] able to be pulled from epic access. Liaison notified. Phone number for report to facility: [phone]     RN, name: Joleen    Note: Discharging nurse to complete [facility], reconcile [facility], and place final copy with patient's discharge packet. RN to ensure that written prescriptions for  Level II medications are sent with patient to the facility as per protocol.     Renetta Kat RN The patient's goals for the shift include  to feel better    The clinical goals for the shift include Patient's Spo2 will remain above 92%

## (undated) DEVICE — TUBING, SUCTION, 3/16" X 12', STRAIGHT: Brand: MEDLINE

## (undated) DEVICE — CANNULA,OXY,ADULT,SUPERSOFT,W/7'TUB,SC: Brand: MEDLINE INDUSTRIES, INC.

## (undated) DEVICE — CONMED SCOPE SAVER BITE BLOCK, 20X27 MM: Brand: SCOPE SAVER

## (undated) DEVICE — ENDO CARRY-ON PROCEDURE KIT INCLUDES SUCTION TUBING, LUBRICANT, GAUZE, BIOHAZARD STICKER, TRANSPORT PAD AND INTERCEPT BEDSIDE KIT.: Brand: ENDO CARRY-ON PROCEDURE KIT

## (undated) DEVICE — LINER,SOFT,SUCTION CANISTER,1500CC: Brand: MEDLINE

## (undated) DEVICE — SINGLE USE BIOPSY VALVE MAJ-210: Brand: SINGLE USE BIOPSY VALVE (STERILE)

## (undated) DEVICE — SOLUTION IRRIG 500ML 0.9% SOD CHLO USP POUR PLAS BTL

## (undated) DEVICE — PADDING CAST W6INXL4YD ST COT RAYON MICROPLEATED HIGHLY

## (undated) DEVICE — Z DISCONTINUED USE 2276105 GOWN PROTCT UNIV CHST W28IN L49IN SL 24IN BLU SPUNBOND FLM

## (undated) DEVICE — YANKAUER,BULB TIP,W/O VENT,RIGID,STERILE: Brand: MEDLINE

## (undated) DEVICE — SUTURE VCRL + SZ 2-0 L27IN ABSRB CLR CT-1 1/2 CIR TAPERCUT VCP259H

## (undated) DEVICE — SUTURE NONABSORBABLE MONOFILAMENT 3-0 PS-1 18 IN BLK ETHILON 1663H

## (undated) DEVICE — BOWL MED M 16OZ PLAS CAP GRAD

## (undated) DEVICE — SYRINGE MED 50ML LUERSLIP TIP

## (undated) DEVICE — TRAP,MUCUS SPECIMEN, 80CC: Brand: MEDLINE

## (undated) DEVICE — ELECTRODE,RADIOTRANSLUCENT,FOAM,3PK: Brand: MEDLINE

## (undated) DEVICE — GOWN,REINFORCED,POLY,AURORA,XLARGE,STRL: Brand: MEDLINE

## (undated) DEVICE — TUBING, SUCTION, 3/16" X 6', STRAIGHT: Brand: MEDLINE

## (undated) DEVICE — SYRINGE 10ML HYPO W/O NDL W/ LUER SLP TP

## (undated) DEVICE — SYRINGE MED 10ML SLIP TIP BLNT FILL AND LUERLOCK DISP

## (undated) DEVICE — Device

## (undated) DEVICE — ADHESIVE SKIN CLSR 0.7ML TOP DERMBND ADV

## (undated) DEVICE — PACK PROCEDURE SURG HIP PIN TROCHANTERIC FEM NAIL CDS

## (undated) DEVICE — SINGLE USE SUCTION VALVE MAJ-209: Brand: SINGLE USE SUCTION VALVE (STERILE)

## (undated) DEVICE — SUTURE VCRL + 2-0 CT-1 36 IN ABSRB BRAID ANTIBACT VC945H

## (undated) DEVICE — SUTURE VCRL SZ 0 L36IN ABSRB UD L36MM CT-1 1/2 CIR J946H

## (undated) DEVICE — Device: Brand: JELCO

## (undated) DEVICE — GLOVE SURG SZ 75 L12IN FNGR THK79MIL GRN LTX FREE

## (undated) DEVICE — GUIDEWIRE ORTH L300MM DIA2.8MM S STL THRD SHRP TRCR TIP FOR

## (undated) DEVICE — GLOVE ORTHO 7 1/2   MSG9475

## (undated) DEVICE — DRESSING FOAM W4XL4IN SIL FACE BORD ADH PD SUP ABSRB COR

## (undated) DEVICE — SUTURE MCRYL + SZ 4-0 L18IN ABSRB UD L19MM PS-2 3/8 CIR MCP496G